# Patient Record
Sex: FEMALE | Race: WHITE | NOT HISPANIC OR LATINO | Employment: OTHER | URBAN - METROPOLITAN AREA
[De-identification: names, ages, dates, MRNs, and addresses within clinical notes are randomized per-mention and may not be internally consistent; named-entity substitution may affect disease eponyms.]

---

## 2020-01-09 ENCOUNTER — TRANSFERRED RECORDS (OUTPATIENT)
Dept: HEALTH INFORMATION MANAGEMENT | Facility: CLINIC | Age: 75
End: 2020-01-09

## 2020-01-21 ENCOUNTER — TELEPHONE (OUTPATIENT)
Dept: SURGERY | Facility: CLINIC | Age: 75
End: 2020-01-21

## 2020-01-21 ENCOUNTER — TRANSCRIBE ORDERS (OUTPATIENT)
Dept: OTHER | Age: 75
End: 2020-01-21

## 2020-01-21 DIAGNOSIS — C43.71: Primary | ICD-10-CM

## 2020-01-21 NOTE — TELEPHONE ENCOUNTER
ONCOLOGY INTAKE: Records Information      APPT INFORMATION:  Referring provider:  Jessica Meadows DPM  Referring provider s clinic:  [facility]  Reason for visit/diagnosis:  Malignant Melanoma, right heel  Has patient been notified of appointment date and time?: Yes    RECORDS INFORMATION:  Were the records received with the referral (via [employer])? Yes    Has patient been seen for any external appt for this diagnosis? Yes    If yes, where? [facility]    Has patient had any imaging or procedures outside of [place] for this condition? Yes      If Yes, where? [facility]      ADDITIONAL INFORMATION:  Patient is on the wait list.

## 2020-01-21 NOTE — TELEPHONE ENCOUNTER
Received a call from Dr.Allison Alvarado from [facility] who is referring this pt to  for MM to right heel. Dr. Alvarado states that they were told pt could be seen on 2/27 but  is hoping pt can be seen sooner. RN notified Dr. Alvarado that RN would reach out to  to advise on appt. Openings available on 2/3 in  but RN wanted to reach out to  to advise if pt should be seen sooner. Records will be faxed over per .Staff message sent to Dr. Dorsey to advise. Dr. Dorsey in clinic today and RN spoke with her regarding pt.  Dr. Dorsey ok'd seeing pt on 1/27/20 in  at 2:45pm but that there may be a slight wait as pt being worked in . Pt called and notified of appt and pt expressed thanks and okay if there is a wait. Address of clinic given to pt. Pt advised upon arrival to go up to the second floor and check in at desk D. Call placed to  to update on sooner appt for pt..Melissa Ellsworth RN

## 2020-01-22 NOTE — TELEPHONE ENCOUNTER
RECORDS STATUS - ALL OTHER DIAGNOSIS      RECORDS RECEIVED FROM: [facility]   DATE RECEIVED: 1/27/2020   NOTES STATUS DETAILS   OFFICE NOTE from referring provider     OFFICE NOTE from medical oncologist     DISCHARGE SUMMARY from hospital     DISCHARGE REPORT from the ER     OPERATIVE REPORT     MEDICATION LIST     CLINICAL TRIAL TREATMENTS TO DATE     LABS     PATHOLOGY REPORTS     ANYTHING RELATED TO DIAGNOSIS     GENONOMIC TESTING     TYPE:     IMAGING (NEED IMAGES & REPORT)     CT SCANS     MRI     MAMMO     ULTRASOUND     PET

## 2020-01-22 NOTE — TELEPHONE ENCOUNTER
Action    Action Taken January 22, 2020  Call to PT and she stated she was seen at a place called [facility] in [facility] but no Dx was made as they didn't know what it was so she went to the podiatrist.     RECORDS STATUS - ALL OTHER DIAGNOSIS      RECORDS RECEIVED FROM: [facility]   DATE RECEIVED: In Epic 1/22/20   NOTES STATUS DETAILS   OFFICE NOTE from referring provider Received 1/22/20 [facility]   OFFICE NOTE from medical oncologist     DISCHARGE SUMMARY from hospital     DISCHARGE REPORT from the ER     OPERATIVE REPORT     MEDICATION LIST     CLINICAL TRIAL TREATMENTS TO DATE     LABS     PATHOLOGY REPORTS     ANYTHING RELATED TO DIAGNOSIS [place] - Need to request [facility]   GENONOMIC TESTING     TYPE:     IMAGING (NEED IMAGES & REPORT)     CT SCANS     MRI     MAMMO     ULTRASOUND     PET

## 2020-01-24 ENCOUNTER — TELEPHONE (OUTPATIENT)
Dept: SURGERY | Facility: CLINIC | Age: 75
End: 2020-01-24

## 2020-01-24 NOTE — TELEPHONE ENCOUNTER
PREVISIT INFORMATION                                                    Yadira Hoang scheduled for future visit at [facility] specialty clinics.    Patient is scheduled to see Dr. Dorsey on 1/27/20  Reason for visit: Malignant Melanoma right foot  Referring provider ROMA Gr  Has patient seen previous specialist? No  Medical Records:  Available in chart.  Patient was previously seen at a [facility] or [facility] facility.    REVIEW                                                      New patient packet mailed to patient: No  Medication reconciliation complete: No      No current outpatient medications on file.       Allergies: Patient has no allergy information on record.        PLAN/FOLLOW-UP NEEDED                                                      Previsit review complete.  Patient will see provider at future scheduled appointment.     Patient Reminders Given:  Please, make sure you bring an updated list of your medications.   If you are having a procedure, please, present 15 minutes early.  If you need to cancel or reschedule,please call [phone].    Marion Bonilla LPN

## 2020-01-27 ENCOUNTER — PRE VISIT (OUTPATIENT)
Dept: SURGERY | Facility: CLINIC | Age: 75
End: 2020-01-27

## 2020-01-27 ENCOUNTER — TRANSFERRED RECORDS (OUTPATIENT)
Dept: HEALTH INFORMATION MANAGEMENT | Facility: CLINIC | Age: 75
End: 2020-01-27

## 2020-01-27 ENCOUNTER — TELEPHONE (OUTPATIENT)
Dept: SURGERY | Facility: CLINIC | Age: 75
End: 2020-01-27

## 2020-01-27 ENCOUNTER — OFFICE VISIT (OUTPATIENT)
Dept: SURGERY | Facility: CLINIC | Age: 75
End: 2020-01-27
Payer: COMMERCIAL

## 2020-01-27 VITALS — HEIGHT: 67 IN | BODY MASS INDEX: 22.6 KG/M2 | WEIGHT: 144 LBS

## 2020-01-27 DIAGNOSIS — C43.71 MALIGNANT MELANOMA OF RIGHT LOWER EXTREMITY INCLUDING HIP (H): ICD-10-CM

## 2020-01-27 PROCEDURE — 99203 OFFICE O/P NEW LOW 30 MIN: CPT | Performed by: SURGERY

## 2020-01-27 RX ORDER — ACETAMINOPHEN 325 MG/1
975 TABLET ORAL ONCE
Status: CANCELLED | OUTPATIENT
Start: 2020-01-27 | End: 2020-01-27

## 2020-01-27 RX ORDER — TRAZODONE HYDROCHLORIDE 50 MG/1
TABLET, FILM COATED ORAL
COMMUNITY
End: 2020-01-27

## 2020-01-27 RX ORDER — OMEPRAZOLE 20 MG/1
20 TABLET, DELAYED RELEASE ORAL
COMMUNITY
Start: 2020-01-06 | End: 2020-01-27

## 2020-01-27 RX ORDER — LANOLIN ALCOHOL/MO/W.PET/CERES
CREAM (GRAM) TOPICAL
COMMUNITY
End: 2020-01-27

## 2020-01-27 RX ORDER — BUSPIRONE HYDROCHLORIDE 5 MG/1
TABLET ORAL
COMMUNITY
End: 2020-01-27

## 2020-01-27 RX ORDER — FLUCONAZOLE 100 MG/1
TABLET ORAL
COMMUNITY
End: 2020-01-27

## 2020-01-27 RX ORDER — FLUTICASONE PROPIONATE 50 MCG
1 SPRAY, SUSPENSION (ML) NASAL
COMMUNITY
Start: 2020-01-24 | End: 2020-02-17

## 2020-01-27 RX ORDER — ESTRADIOL 0.1 MG/G
1 CREAM VAGINAL
COMMUNITY
Start: 2019-10-09 | End: 2020-06-18

## 2020-01-27 RX ORDER — ALPRAZOLAM 0.25 MG
TABLET ORAL
COMMUNITY
End: 2020-01-27

## 2020-01-27 ASSESSMENT — MIFFLIN-ST. JEOR: SCORE: 1180.81

## 2020-01-27 NOTE — NURSING NOTE
Yadira Hoang's goals for this visit include:   Chief Complaint   Patient presents with     Consult     malignant melanoma on right heel       She requests these members of her care team be copied on today's visit information: no    PCP: No Ref-Primary, Physician    Referring Provider:  No referring provider defined for this encounter.    There were no vitals taken for this visit.    Do you need any medication refills at today's visit? No    Marion Bonilla LPN

## 2020-01-27 NOTE — PATIENT INSTRUCTIONS
Surgery Instructions    Always follow your surgeon s instructions. If you don t, your surgery could be cancelled. Please use the following checklist.  Your surgery is on: The surgery scheduler will contact you within 1 week of your consult with the surgeon. If you do not hear from them, please call the clinic or RN Care Coordinator for your provider.    Time: Prearrival times can vary depending on location/type of surgery.  [place] - 2 hour pre-arrival  [facility] - 2 hour pre-arrival  [place] - 1 hour pre-arrival    Note:  These times may change. A nurse will call you before surgery to confirm. If you have not received a call or if you have more questions, please call us on the working day before your surgery:  ? [place]: [phone] or [phone] (9am to 5:30pm)  ? [facility]: [phone] (8am to 6pm)  ? [place]: [phone] (9am to 5pm)  ? [facility] [phone] (7am to 4pm)  Prior to surgery  ? Have a pre-op physical exam with your Primary Doctor within 30 days of surgery  - Ask your doctor to send all of your results to the surgery center/hospital before surgery. Your doctor also may ask you to bring the results with you on the day of surgery.  - Tell your doctor if:  - You are allergic to latex or rubber (latex and rubber gloves are often used in medical care).  - You are taking any medicines (including aspirin), vitamins, or herbal products. You may need to stop taking some medicines before surgery.  - You have any medical problems (allergies, diabetes, or heart disease, for example).  - You have a pacemaker or an AICD (automatic implanted cardiac defibrillator). If you do, please bring the ID card with you on the day of surgery.  - People who smoke have a higher risk of infection after surgery. Ask your doctor how you can quit smoking.  - If you Primary Doctor is not within the [employer] system, you will need to have your pre-op physical faxed to us to be scanned into your  chart.  - [place]: [phone] or [phone]  - [facility]): [phone]  - [facility] [place] [place] [phone]  ? Call your insurance company. Ask if you need pre-approval for your surgery. If you do not have insurance, please let us know. If you wish to speak to the , please alert the clinic staff so this can be arranged.  ? Arrange for someone to drive you home after surgery.  will need to be a responsible adult (18 years or older) that will provide transportation to and from surgery and stay in the waiting room during your surgery. You may not drive yourself or take public transportation to and from surgery.  ? Arrange for someone to stay with you for 24 hours after you go home. This person must be a responsible adult (18 years or older).  ? Call your surgeon or their nurse if there is any change in your health (cold, flu, infections, hospitalizations).  ? Do not smoke, drink alcohol, or take over-the-counter medicine for 24 hours before and after surgery.  ? If you take prescribed drugs, you may need to stop them until after the surgery.  Discuss what medications to take or not take prior to surgery with your Primary Doctor at your pre-op physical. Avoid over-the-counter blood-thinning medications such as Aspirin, Ibuprofen, vitamin E, or fish oil 7 days prior to surgery (unless otherwise directed by your Primary Doctor). Tylenol is a good alternative for mild pain relief prior to surgery.  ? Eating and drinking guidelines prior to surgery:  - Stop all solid food consumption 8 hours prior to surgery  - You may drink clear liquids up to 2 hours prior to surgery (water, fruit juices without pulp, jello, tea/coffee without creamer, sports drinks, clear-fat free broth (bouillon or consomme), popsicles (without milk, bits of fruit, or seeds/nuts)  ? Follow instructions given for showering or bathing before surgery.    - Use 8 ounces of antiseptic surgical soap,  like:  - Hibiclens, Scrub Care, or Exidine  - You can find it at your local pharmacy, clinic, or retail store. If you have trouble, ask your pharmacist to help you find the right substitute.  - Please wash with one of the above soaps twice before coming to the hospital for your surgery. This will decrease bacteria (germs) on your skin. It will also help reduce your chance of infection after surgery.  - Items you will need for showering:  - 4 newly washed washcloths  - 2 newly washed towels  - 8 ounces of one of the above soaps  - Following these instructions:  - The evening before surgery: Shower or bathe as you normally would, using your regular soap and a clean washcloth. Give special attention to places where your incision (surgical cut) or catheters will be. This includes your groin area. Rinse well. You may wash your hair with your regular shampoo. Next, wash your body with 4 ounces of the antiseptic soap. Use a clean, damp washcloth and gently clean your body (from the chin down). If your surgery involves your head, use the special soap on your head and scalp. Rinse well and dry off using a newly washed towel.  - The morning of surgery: Repeat the same process as the evening shower.  - Other suggestions:    Do not shave within 12 inches of your incision (surgical cut) area for at least 3 days before surgery. Shaving can make small cuts in the skin. This puts you at higher risk of infection.    Wear freshly washed pajamas or clothing after your evening shower.    Wear freshly washed clothes the day of surgery.    Wash and change your bed sheets the day before surgery to have clean bed sheets after your shower and when you get home from surgery.    If you have trouble washing all areas, make sure someone helps you.    Don't use any deodorant, lotion or powder after your shower.    Women who are menstruating should wear a fresh sanitary pad to the hospital.  ? Do not wear or add deodorant, cologne, lotion,  makeup, nail polish or jewelry to surgery. If you wear fake nails, please remove at least one nail before coming to surgery (an oxygen monitor needs to be placed on your finger during surgery).  ? Bring these items to the surgery center/hospital:  - Insurance card  - Money for parking and co-pays, if needed  - A list of all the medicines you take. Include vitamins, minerals, herbs, and over-the-counter drugs.  Note any drug allergies.  - A copy of your advance health care directive, if you have one. This tells us what treatment you would want--and who would make health care decisions--if you could no longer speak for yourself. You may request this form in advance or download it from [email or web address].  - A case for glasses, contact lenses, hearing aids, or dentures.  - Your inhaler or CPAP machine, if you use these at home.  ? Leave extra cash, jewelry, and other valuables at home.  When you arrive  When you get to the surgery center/hospital, you will:  ? Check in. If you are under age 18, you must be with a parent or legal guardian.  ? Sign consent forms, if you haven t already. These forms state that you know the risks and benefits of surgery. When you sign the forms, you give us permission to do the surgery. Do not sign them unless you understand what will happen during and after your surgery. If you have any questions about your surgery, ask to speak with your doctor before you sign the forms. If you don t understand the answers, ask again.  ? Receive a copy of the Patient s Bill of Rights. If you do not receive a copy, please ask for one.  ? Change into hospital clothes. Your belongings will be placed in a bag. We will return them to you after surgery.  ? Meet with the anesthesia provider. He or she will tell you what kind of anesthesia (medicine) will be used to keep you comfortable during surgery.  Remember: it s okay to remind doctors and nurses to wash their hands before touching you.  In most  cases, your surgeon will use a marker to write his or her initials on the surgery site. This ensures that the exact site is operated on.  For safety reasons, we will ask you the same questions many times. For example, we may ask your name and birth date over and over again.  Friends and family can stay with you until it s time for surgery. While you re in surgery, they will be in the waiting area. Please note that cell phones are not allowed in some patient care areas.  If you have questions about what will happen in the operating room, talk to your care team.  After surgery  We will move you to a recovery room, where we will watch you closely. If you have any pain or discomfort, tell your nurse. He or she will try to make you comfortable.  If you are staying overnight, we will move you to your hospital room after you are awake.  If you are going home, we will move you to another room. Friends and family may be able to join you. The length of time you spend in recovery depend on the type of medicine you received, your medical condition, the type of surgery you had, or your response to the anesthesia given during your procedure.  When you are discharged from the recovery room, the nurses will review instructions with you and your caregiver.  ? Please wash your hands every time you touch the wound or change bandages or dressings.  ? Do not submerge the wound in water.  You may not use a bathtub or hot tub until the wound is closed. The wait time frame is generally 2-3 weeks, but any open area can be a source of incoming bacteria, so it is better to be on the safe side and avoid water submersion until your wound is fully healed.  ? You may take a shower 24 hours after surgery. Double check with your surgeon if it is OK for water to run over the wound, whether it has been sutured, stapled, glued, or is open. You may gently wash the wound using the antiseptic soap provided for your pre-surgery showering (do not use a  washcloth). Any mild soap will work as well.  ? Many surgical wounds will have small white strips of tape on them called steri-strips.  Do not remove these. The edges will curl and fall off within 7-10 days with normal showering.  ? If you are going home with sutures (stitches) or staples, you must return to the clinic to have them taken out, usually within 1-2 weeks. Some stitches are dissolvable and do not require removal. Make sure to clarify with your surgeon or surgery nurse reviewing discharge paperwork what kind of sutures you have.  ? Signs and symptoms of infection include:  - Fever, temperature over 101.5   F  - Redness  - Swelling  - Increased pain  - Green or yellow drainage which may or may not have a foul odor  Dealing with pain  A nurse will check your comfort level often during your stay. He or she will work with you to manage your pain.  Remember:  ? All pain is real. There are many ways to control pain. We can help you decide what works best for you.  ? Ask for pain medicine when you need it. Don t try to  tough it out --this can make you feel worse. Always take your medicine as ordered.  ? Medicine doesn t work the same for everyone. If your medicine isn t working, tell your nurse. There may be other medicines or treatments we can try.  Going home  We will let you know when you re ready to leave the surgery center or hospital. Before you leave, we will tell you how to care for yourself at home and prevent infections. If you do not understand something, please say so. We will answer any questions you have. We will then help you get ready to leave.  Remember, you must have a responsible adult (18 years or older) to stay with you 24 hours after you leave the hospital.  Take it easy when you get home. You will need some time to recover--you may be more tired than you realize at first. Rest and relax for at least the first 24 hours at home. You ll feel better and heal faster if you take good care of  yourself.  Follow the discharge instructions that are given to you when you leave the surgery center or hospital  Please call the clinic if you experience any problems during regular clinic hours (Monday-Friday 8:00am-5:00pm).  If you experience problems during non-clinic hours, please call the [facility] on-call line at [phone] and ask the  to page the on-call Provider for your specialty. The on-call Provider will call you back and can triage your symptoms and further advise. If you are having an emergency, always call 911 or seek immediate evaluation at the Emergency Room.  Locations  [facility]  Same-day surgery center - 2nd floor, check-in #5  [address] [place] [address]  [phone]  www.[facility].[place].[facility]  [address] [place], [place] [address] [phone]   [email or web address]    [address] [address] [phone] (patient registration)  [phone] (main line)  [email or web address]  [facility]  [address] [place] [address]  [facility], 3rd floor for check-in  [phone] (patient registration)  [phone] (main line)  [email or web address]  [facility]  [address] [address]  [phone]  www.[facility]  [address] [facility] [place] [address]  [phone]  www.[facility].[email or web address].[facility]  [address] [place] [address]  [phone]  www.[facility]  [address]  [place] [address]  [phone]  www.[facility].[email or web address].[facility]  [address]  [place] [address]  [phone]  [email or web address]  [address] [address]  [phone]  [email or web address]

## 2020-01-27 NOTE — LETTER
2020      RE: Yadira Hoang  [address] [facility] [address]     2020    No referring provider defined for this encounter.    RE: Yadira Hoang  (: 1945)    Dear Dr. Jessica Meadows:    Your patient was seen for evaluation in my office.  Please find a copy of my notes for your record and review.  If you have any further questions, please feel free to contact my office.   Thank you for your kind referral.    Sincerely,   Gabriela Dorsey MD MSc [facility] FACS    ---     NEW SURGICAL CONSULTATION  2020    Yadira Hoang is a 74 year old female who presents with a lesion on her right heel.  She was referred by Dr Meadows.    HPI:    She noted a painful lesion on the sole of the right foot for a few months.  It initially was small then became raised.  It spontaneously bled. She is not entirely sure of its appearance initially.  She denies noting any masses behind the knee or in the groin.    A punch biopsy was performed by Dr Jessica Meadows on 2020.  It showed melanoma, at least 3.4 mm deep with ulceration and 0 mitoses.      MELANOMA-SPECIFIC HISTORY:  History of tanning bed use: No  Prior skin biopsies: No  Prior skin cancer: No  Race/Ethnicity: Sd  Works indoors/outdoors: retired, worked in an office  Uses sunscreen: No    She has not had a skin check.   FAMILY HISTORY:  Pancreatic ca: Does not know ?brother  Melanoma: No   Breast ca: No  Other cancer: Yes - sister had ?GYN cancer.    Past Medical History:   Diagnosis Date     Concussion      No HTN, MI, CVA, asthma, DM    Past Surgical History:   Procedure Laterality Date     TUBAL LIGATION     No GA issues    Current Outpatient Medications   Medication Sig Dispense Refill     cholecalciferol (VITAMIN D3) 125 MCG (5000 UT) TABS tablet Take 2,000 Units by mouth       estradiol (ESTRACE) 0.1 MG/GM vaginal cream estradiol 0.01% (0.1 mg/gram) vaginal cream       fluticasone (FLONASE) 50 MCG/ACT nasal spray Spray 1 spray in  "nostril           Allergies no known allergies     SOCIAL HISTORY:  Smokes: No  EtOH: No  Illicit drugs: No    ROS  Headaches Yes - resolve spontaneously, not nocturnal, prior concussion (2011, 2014)  Vision changes No  Dizziness Yes - lightheadedness since concussion   Abdominal pain No  Rectal bleeding No  Melena No  Unintentional weight loss Yes - loss of appetite    Ht 1.702 m (5' 7\")   Wt 65.3 kg (144 lb)   BMI 22.55 kg/m     Physical Exam  Constitutional:       Appearance: She is well-developed.   Abdominal:      General: There is no distension.      Palpations: Abdomen is soft. There is no fluid wave, hepatomegaly or mass.      Tenderness: There is no abdominal tenderness.   Lymphadenopathy:      Cervical: No cervical adenopathy.      Right cervical: No superficial, deep or posterior cervical adenopathy.     Left cervical: No superficial, deep or posterior cervical adenopathy.      Upper Body:      Right upper body: No supraclavicular, axillary or pectoral adenopathy.      Left upper body: No supraclavicular, axillary or pectoral adenopathy.      Lower Body: No right inguinal adenopathy. No left inguinal adenopathy.      Comments: No popliteal lymphadenopathy bilaterally. No lymphedema in the lower extremities bilaterally.   Skin:     General: Skin is warm and dry.               INVESTIGATIONS:    Biopsy from [facility] (1/9/2020) showed:  Diagnosis:  Skin, Right Foot, Biopsy:  Malignant melanoma  Depth: at least 3.4 mm  Ulceration: Present  Mitotic rate: 0   Margin status: involved     ASSESSMENT:  Yadira Hoang is a 74 year old female with a right heel melanoma.    Her stage is:  Cancer Staging  Malignant melanoma of right lower extremity including hip (H)  Staging form: Melanoma of the Skin, AJCC 8th Edition  - Clinical: Stage IIB (cT3b, cN0, cM0) - Signed by Gabriela Dorsey MD on 1/27/2020    The staging and natural history of melanoma was discussed with the patient and her " .  With the presence of gross disease in the heel, I suspect it is more likely a stage IIC with a T4b lesion.    A wide local excision with appropriate margins is indicated to reduce the risk of local recurrence.  The risks of a wide local excision were discussed, including scar formation, bleeding, wound infection, wound dehiscence, seroma formation, and numbness of surrounding skin.  We reviewed the difficulties in closing an incision on the heel and I have recommended a referral to plastic surgery for assistance with complex wound closure. We also discussed potentially staging the surgeries, with the excision with wound vac application first, then wound closure later.    In addition to the surgical management of the skin, a sentinel lymph node biopsy is recommended for chantell staging.  This is performed with the combination of the radioactive colloid and lymphazurin. The risks of a sentinel lymph node biopsy were discussed with the patient and her , including the risks of lymphedema, bleeding, wound infection, wound dehiscence, seroma formation, and paresthesias. There is also a small risk of anaphylaxis with lymphazurin injection as part of the procedure.  The findings of the sentinel lymph node biopsy may result in the need for further staging evaluations, surgery (i.e. Completion lymph node dissection) +/- radiation.  Mapping will be determined pre-operatively with a lymphoscintigram.  There is a 10% chance of patients whose sentinel lymph nodes do not map. Should this be the case, we discussed that we would not pursue surgical chantell staging, but would instead proceed with chantell surveillance with imaging.     We discussed that surgical pathology results will be reviewed in person in clinic, at the postoperative visit. Because pathology reports are often complicated, results are not reviewed by phone.  Reviewing in person would also allow for careful discussion of next steps and for answering  questions.    Finally, we discussed that patients with a diagnosis of melanoma are at risk of recurrence (local and distant) and of subsequent de bigg melanoma.  Yadira Hoang will need to proceed with quarterly dermatologic full skin survey and evaluation.  I also reviewed the importance of protection from sun exposure, including wearing long sleeves, hats, etc and also the use of sunscreen with SPF of at least 35, with frequent re-applications.      She needs a referral to dermatology for a full skin evaluation at baseline, and then for follow up.    All of the above were discussed with the patient and all questions answered.  Yadira Hoang elected to proceed with the above, including a wide local excision of the right heel melanoma and sentinel lymph node mapping and biopsy.     Total time spent with the patient was 30 minutes, of which 75% was counseling.     PLAN:  1. Dermatology referral for full skin evaluation  2. Plastic surgery referral for complex wound closure  3. Wide local excision of right heel melanoma with wound vac application  4. [place] lymph node mapping and biopsy  5. Patient to report to her PCP for preoperative H&P and testing.    Gabriela Dorsey MD MSc [facility] FACS    Division of Surgical Oncology  [facility]

## 2020-01-27 NOTE — PROGRESS NOTES
"NEW SURGICAL CONSULTATION  Jan 27, 2020    Yadira Hoang is a 74 year old female who presents with a lesion on her right heel.  She was referred by Dr Meadows.    HPI:    She noted a painful lesion on the sole of the right foot for a few months.  It initially was small then became raised.  It spontaneously bled. She is not entirely sure of its appearance initially.  She denies noting any masses behind the knee or in the groin.    A punch biopsy was performed by Dr Jessica Meadows on 1/9/2020.  It showed melanoma, at least 3.4 mm deep with ulceration and 0 mitoses.      MELANOMA-SPECIFIC HISTORY:  History of tanning bed use: No  Prior skin biopsies: No  Prior skin cancer: No  Race/Ethnicity: Sd  Works indoors/outdoors: retired, worked in an office  Uses sunscreen: No    She has not had a skin check.   FAMILY HISTORY:  Pancreatic ca: Does not know ?brother  Melanoma: No   Breast ca: No  Other cancer: Yes - sister had ?GYN cancer.    Past Medical History:   Diagnosis Date     Concussion      No HTN, MI, CVA, asthma, DM    Past Surgical History:   Procedure Laterality Date     TUBAL LIGATION     No GA issues    Current Outpatient Medications   Medication Sig Dispense Refill     cholecalciferol (VITAMIN D3) 125 MCG (5000 UT) TABS tablet Take 2,000 Units by mouth       estradiol (ESTRACE) 0.1 MG/GM vaginal cream estradiol 0.01% (0.1 mg/gram) vaginal cream       fluticasone (FLONASE) 50 MCG/ACT nasal spray Spray 1 spray in nostril           Allergies no known allergies     SOCIAL HISTORY:  Smokes: No  EtOH: No  Illicit drugs: No    ROS  Headaches Yes - resolve spontaneously, not nocturnal, prior concussion (2011, 2014)  Vision changes No  Dizziness Yes - lightheadedness since concussion   Abdominal pain No  Rectal bleeding No  Melena No  Unintentional weight loss Yes - loss of appetite    Ht 1.702 m (5' 7\")   Wt 65.3 kg (144 lb)   BMI 22.55 kg/m    Physical Exam  Constitutional:       Appearance: She is " well-developed.   Abdominal:      General: There is no distension.      Palpations: Abdomen is soft. There is no fluid wave, hepatomegaly or mass.      Tenderness: There is no abdominal tenderness.   Lymphadenopathy:      Cervical: No cervical adenopathy.      Right cervical: No superficial, deep or posterior cervical adenopathy.     Left cervical: No superficial, deep or posterior cervical adenopathy.      Upper Body:      Right upper body: No supraclavicular, axillary or pectoral adenopathy.      Left upper body: No supraclavicular, axillary or pectoral adenopathy.      Lower Body: No right inguinal adenopathy. No left inguinal adenopathy.      Comments: No popliteal lymphadenopathy bilaterally. No lymphedema in the lower extremities bilaterally.   Skin:     General: Skin is warm and dry.               INVESTIGATIONS:    Biopsy from [facility] (1/9/2020) showed:  Diagnosis:  Skin, Right Foot, Biopsy:  Malignant melanoma  Depth: at least 3.4 mm  Ulceration: Present  Mitotic rate: 0   Margin status: involved     ASSESSMENT:  Yadira Hoang is a 74 year old female with a right heel melanoma.    Her stage is:  Cancer Staging  Malignant melanoma of right lower extremity including hip (H)  Staging form: Melanoma of the Skin, AJCC 8th Edition  - Clinical: Stage IIB (cT3b, cN0, cM0) - Signed by Gabriela Dorsey MD on 1/27/2020    The staging and natural history of melanoma was discussed with the patient and her .  With the presence of gross disease in the heel, I suspect it is more likely a stage IIC with a T4b lesion.    A wide local excision with appropriate margins is indicated to reduce the risk of local recurrence.  The risks of a wide local excision were discussed, including scar formation, bleeding, wound infection, wound dehiscence, seroma formation, and numbness of surrounding skin.  We reviewed the difficulties in closing an incision on the heel and I have recommended a referral to  plastic surgery for assistance with complex wound closure. We also discussed potentially staging the surgeries, with the excision with wound vac application first, then wound closure later.    In addition to the surgical management of the skin, a sentinel lymph node biopsy is recommended for chantell staging.  This is performed with the combination of the radioactive colloid and lymphazurin. The risks of a sentinel lymph node biopsy were discussed with the patient and her , including the risks of lymphedema, bleeding, wound infection, wound dehiscence, seroma formation, and paresthesias. There is also a small risk of anaphylaxis with lymphazurin injection as part of the procedure.  The findings of the sentinel lymph node biopsy may result in the need for further staging evaluations, surgery (i.e. Completion lymph node dissection) +/- radiation.  Mapping will be determined pre-operatively with a lymphoscintigram.  There is a 10% chance of patients whose sentinel lymph nodes do not map. Should this be the case, we discussed that we would not pursue surgical chantell staging, but would instead proceed with chantell surveillance with imaging.     We discussed that surgical pathology results will be reviewed in person in clinic, at the postoperative visit. Because pathology reports are often complicated, results are not reviewed by phone.  Reviewing in person would also allow for careful discussion of next steps and for answering questions.    Finally, we discussed that patients with a diagnosis of melanoma are at risk of recurrence (local and distant) and of subsequent de bigg melanoma.  Yadira Hoang will need to proceed with quarterly dermatologic full skin survey and evaluation.  I also reviewed the importance of protection from sun exposure, including wearing long sleeves, hats, etc and also the use of sunscreen with SPF of at least 35, with frequent re-applications.      She needs a referral to dermatology for a  full skin evaluation at baseline, and then for follow up.    All of the above were discussed with the patient and all questions answered.  Yadira Hoang elected to proceed with the above, including a wide local excision of the right heel melanoma and sentinel lymph node mapping and biopsy.     Total time spent with the patient was 30 minutes, of which 75% was counseling.     PLAN:  1. Dermatology referral for full skin evaluation  2. Plastic surgery referral for complex wound closure  3. Wide local excision of right heel melanoma with wound vac application  4. [place] lymph node mapping and biopsy  5. Patient to report to her PCP for preoperative H&P and testing.    Gabriela Dorsey MD MSc [facility] FACS    Division of Surgical Oncology  [facility]

## 2020-01-27 NOTE — TELEPHONE ENCOUNTER
PREVISIT INFORMATION                                                    Yadira Hoang scheduled for future visit at [facility] specialty clinics.    Patient is scheduled to see Dr. Crabtree on 1/31/20  Reason for visit: Malignant melanoma right heel  Referring provider Gabriela Dorsey MD  Has patient seen previous specialist? No  Medical Records:  Available in chart.  Patient was previously seen at a [facility] or [facility] facility.    REVIEW                                                      New patient packet mailed to patient: No  Medication reconciliation complete: No      Current Outpatient Medications   Medication Sig Dispense Refill     cholecalciferol (VITAMIN D3) 125 MCG (5000 UT) TABS tablet Take 2,000 Units by mouth       estradiol (ESTRACE) 0.1 MG/GM vaginal cream estradiol 0.01% (0.1 mg/gram) vaginal cream       fluticasone (FLONASE) 50 MCG/ACT nasal spray Spray 1 spray in nostril         Allergies: Patient has no known allergies.        PLAN/FOLLOW-UP NEEDED                                                      Previsit review complete.  Patient will see provider at future scheduled appointment.     Appointment date and time reviewed in Dr. Dorsey's clinic 1/27/20.    Patient Reminders Given:  Please, make sure you bring an updated list of your medications.   If you are having a procedure, please, present 15 minutes early.  If you need to cancel or reschedule,please call [phone].    Marion Bonilla LPN

## 2020-02-04 ENCOUNTER — OFFICE VISIT (OUTPATIENT)
Dept: ORTHOPEDICS | Facility: CLINIC | Age: 75
End: 2020-02-04
Payer: COMMERCIAL

## 2020-02-04 DIAGNOSIS — C43.71 MALIGNANT MELANOMA OF RIGHT LOWER EXTREMITY INCLUDING HIP (H): Primary | ICD-10-CM

## 2020-02-04 DIAGNOSIS — C43.9 MELANOMA OF SKIN (H): Primary | ICD-10-CM

## 2020-02-04 PROCEDURE — 99203 OFFICE O/P NEW LOW 30 MIN: CPT | Performed by: PLASTIC SURGERY

## 2020-02-04 RX ORDER — CEFAZOLIN SODIUM 1 G/50ML
1 INJECTION, SOLUTION INTRAVENOUS SEE ADMIN INSTRUCTIONS
Status: CANCELLED | OUTPATIENT
Start: 2020-02-04

## 2020-02-04 RX ORDER — CEFAZOLIN SODIUM 2 G/50ML
2 SOLUTION INTRAVENOUS
Status: CANCELLED | OUTPATIENT
Start: 2020-02-04

## 2020-02-04 NOTE — NURSING NOTE
Yadira Hoang's goals for this visit include:   Chief Complaint   Patient presents with     Left Foot - Consult     Consult     MM left heal       She requests these members of her care team be copied on today's visit information: no    PCP: No Ref-Primary, Physician    Referring Provider:  No referring provider defined for this encounter.    There were no vitals taken for this visit.    Do you need any medication refills at today's visit? No    Marion Bonilla LPN

## 2020-02-04 NOTE — PROGRESS NOTES
CONSULT NOTE      REFERRING PHYSICIAN:  Gabriela Dorsey MD      PRESENTING COMPLAINT:  Consultation for a right heel malignant melanoma.      HISTORY OF PRESENTING COMPLAINT:  Ms. Hoang is 74 years old.  She has been diagnosed with a malignant melanoma of the right heel 3.4 mm in [place] depth.  Planning a 2 cm margin excision.  Reconstruction of the heel will be required and she is here to discuss that with me.      PAST MEDICAL HISTORY:  Nil.      PAST SURGICAL HISTORY:  Nil.      MEDICATIONS:  Nil.      ALLERGIES:  Statin drugs.      SOCIAL HISTORY:  Does not smoke, does not drink.  She is retired, lives in [place].      REVIEW OF SYSTEMS:  Denies chest pain, shortness of breath, MI, CVA, DVT and PE.      PHYSICAL EXAMINATION:  Vital signs are stable.  She is afebrile, in no obvious distress.  She is 5 feet 7 inches, 144 pounds, BMI 22 kg/m2.  On examination of her right heel, she has about a 3 x 3 cm verrucous lesion on the right heel just on the instep on the medial aspect.  Pulses are palpable.  Sensation is intact.      ASSESSMENT AND PLAN:  Based on above findings, a diagnosis of a right heel malignant melanoma was made.  Reconstruction of the defect after excision will be required.  I think the best course of action would be to either use an instep flap if the blood flow is intact with a skin graft from the donor site or a free groin flap.  This was discussed with the patient and her  in detail.  I will discuss with Dr. Dorsey whether we do this simultaneously or in a staged fashion, depending on how confident she is about the margins given the fact that the lesion is quite atypical looking.  All risks, benefits and alternatives of the procedure including pain, infection, bleeding, scarring, asymmetry, seromas, hematomas, wound breakdown, wound dehiscence, loss of the flaps, requirement of further surgeries, long convalescence sensation loss, requirement of staged revisional surgeries, DVT, PE, MI,  CVA, pneumonia, renal failure and death were all explained.  She understood everything and wants to proceed.  I look forward to helping her out in the near future.      Total time spent with patient 30 minutes, more than half was counseling.      cc:   Gabriela Dorsey MD   [address], [facility] [address]   [place]  [address]

## 2020-02-05 ENCOUNTER — TELEPHONE (OUTPATIENT)
Dept: SURGERY | Facility: CLINIC | Age: 75
End: 2020-02-05

## 2020-02-05 DIAGNOSIS — C43.71 MALIGNANT MELANOMA OF RIGHT LOWER EXTREMITY INCLUDING HIP (H): Primary | ICD-10-CM

## 2020-02-05 NOTE — TELEPHONE ENCOUNTER
Patient was in clinic yesterday 2/4/20 to see Dr. Crabtree and patient stated she would like to come in and see Dr. Dorsey again to discuss surgery as she questions. Writer called and spoke with patient and schedule her on Monday 2/11/20 at 4:00.    Marion Bonilla LPN

## 2020-02-06 ENCOUNTER — TELEPHONE (OUTPATIENT)
Dept: SURGERY | Facility: CLINIC | Age: 75
End: 2020-02-06

## 2020-02-06 ENCOUNTER — TELEPHONE (OUTPATIENT)
Dept: ONCOLOGY | Facility: CLINIC | Age: 75
End: 2020-02-06

## 2020-02-06 NOTE — TELEPHONE ENCOUNTER
Patient called requesting pre-op forms. Then stated what she needs is a pre-op appointment. Writer advised that pre-op appointment is usually done with primary care provider. She then had more questions regarding where she can go and when it needs to be done. Writer advised patient that Dr. Dorsey's nurse will return her call to answer questions as writer does not know and did not want to give incorrect information. Patient understands and agrees to plan.  Aleida Gautam LPN

## 2020-02-06 NOTE — TELEPHONE ENCOUNTER
Patient returned call. Writer explained that she can reach out to the call center to schedule a pre-op with a PCP. Writer gave [phone] number to schedule. Writer transferred call to schedule.    Marion Bonilla LPN

## 2020-02-06 NOTE — TELEPHONE ENCOUNTER
Late entry note. Spoke with patient by phone at 8:30 am this morning.    Reviewed with her expectations of the wide local excision and sentinel lymph node mapping and biopsy. Explained that because she will need a free flap for reconstruction, scheduled for 2/26, it would be preferable to go head with surgery next Monday 2/10 instead of 2/17, so that there is sufficient time for us to obtain the pathology results regarding the surgical margins.    All questions answered and she was appreciative of the call.  Will get her scheduled.    Gabriela Dorsey MD MSc [facility]    Division of Surgical Oncology  [facility]

## 2020-02-06 NOTE — TELEPHONE ENCOUNTER
Writer returned call and spoke with patients . He stated she was on her cell phone making appointments. Writer explained that to change/see a primary care physician she just needs to call the call center and schedule. Writer gave clinic number if Yadira has any additional questions.    Marion Bonilla LPN

## 2020-02-06 NOTE — PROGRESS NOTES
[address] [address]  [phone]  Dept: [phone]    PRE-OP EVALUATION:  Today's date: 2020    Yadira Hoang (: 1945) presents for pre-operative evaluation assessment as requested by NIKA Mar MD Dr Hui MD.  She requires evaluation and anesthesia risk assessment prior to undergoing surgery/procedure for treatment of Right heel reconstruction,with local flap or groin free flap,possible skin graft.    Proposed Surgery/ Procedure: Right heel reconstruction,with local flap or groin free flap,possible skin graft.  Date of Surgery/ Procedure: 20  Time of Surgery/ Procedure: 12:00pm  Hospital/Surgical Facility: [facility]  Fax number for surgical facility: in [facility]  Primary Physician: No Ref-Primary, Physician  Type of Anesthesia Anticipated: General    Patient has a Health Care Directive or Living Will:  NO    1. NO - Do you have a history of heart attack, stroke, stent, bypass or surgery on an artery in the head, neck, heart or legs?  2. NO - Do you ever have any pain or discomfort in your chest?  3. NO - Do you have a history of  Heart Failure?  4. NO - Are you troubled by shortness of breath when: walking on the level, up a slight hill or at night?  5. NO - Do you currently have a cold, bronchitis or other respiratory infection?  6. YES - Do you have a cough, shortness of breath or wheezing? Chronic sinus congestion all her life. Clear nasal drainage. netti pot helps more than anything else.   7. NO - Do you sometimes get pains in the calves of your legs when you walk?  8. NO - Do you or anyone in your family have previous history of blood clots?  9. NO - Do you or does anyone in your family have a serious bleeding problem such as prolonged bleeding following surgeries or cuts?  10. NO - Have you ever had problems with anemia or been told to take iron pills?  11. NO - Have you had any abnormal blood loss such as  black, tarry or bloody stools, or abnormal vaginal bleeding?  12. YES - Have you ever had a blood transfusion? Years ago.   13. NO - Have you or any of your relatives ever had problems with anesthesia?  14. YES - Do you have sleep apnea, excessive snoring or daytime drowsiness? GUERLINE  15. NO - Do you have any prosthetic heart valves?  16. NO - Do you have prosthetic joints?  17. NO - Is there any chance that you may be pregnant?      HPI:     HPI related to upcoming procedure: history of melanoma, scheduled for wide local excision.     Yadira is new to our clinic.  She has a history of obstructive sleep apnea, has a new CPAP machine, doing very well with this.  She has no other health issues except for the recent diagnosis of melanoma.  Reports long standing history of food intolerance. Her stomach is always upset. Has lost 5 lbs in the last few weeks. She saw a GI provider in January this year but was a bad experience. She plans to see GI provider here in the future. Reports she has leaky gut. Does not take medication well. She is not taking anything for her GI issues. No vomiting or diarrhea.     MEDICAL HISTORY:     Patient Active Problem List    Diagnosis Date Noted     GUERLINE (obstructive sleep apnea) 02/07/2020     Priority: Medium     Chronic congestion of paranasal sinus 02/07/2020     Priority: Medium     Melanoma of skin (H) 02/04/2020     Priority: Medium     Added automatically from request for surgery [id number]       Malignant melanoma of right lower extremity including hip (H) 01/27/2020     Priority: Medium      Past Medical History:   Diagnosis Date     Concussion      Past Surgical History:   Procedure Laterality Date     TUBAL LIGATION       Current Outpatient Medications   Medication Sig Dispense Refill     cholecalciferol (VITAMIN D3) 125 MCG (5000 UT) TABS tablet Take 2,000 Units by mouth       estradiol (ESTRACE) 0.1 MG/GM vaginal cream estradiol 0.01% (0.1 mg/gram) vaginal cream       fluticasone  "(FLONASE) 50 MCG/ACT nasal spray Spray 1 spray in nostril       OTC products: None, except as noted above    Allergies   Allergen Reactions     Amoxicillin      Hmg-Coa-R Inhibitors Swelling     Lactose      Intolerance      Latex Allergy: NO    Social History     Tobacco Use     Smoking status: Never Smoker     Smokeless tobacco: Never Used   Substance Use Topics     Alcohol use: Not Currently     Frequency: Never     History   Drug Use Unknown       REVIEW OF SYSTEMS:   CONSTITUTIONAL: NEGATIVE for fever, chills, Positive for weight loss  ENT/MOUTH: NEGATIVE for ear, mouth and throat problems; chronic sinus congestion.   RESP: NEGATIVE for significant cough or SOB  CV: NEGATIVE for chest pain, palpitations or peripheral edema  GI: chronic stomach upset, no vomiting or diarrhea  MUSCULOSKELETAL: NEGATIVE for significant arthralgias or myalgia  Psych: feeling a bit anxious over surgery. No prior diagnosis of anxiety or depression.     EXAM:   /74   Pulse 98   Temp 97.9  F (36.6  C) (Temporal)   Ht 1.67 m (5' 5.75\")   Wt 63 kg (139 lb)   SpO2 97%   BMI 22.61 kg/m    GENERAL APPEARANCE: healthy, alert and no distress  HENT: ear canals and TM's normal and nose and mouth without ulcers or lesions  RESP: lungs clear to auscultation - no rales, rhonchi or wheezes  CV: regular rate and rhythm, normal S1 S2, no S3 or S4 and no murmur, click or rub   ABDOMEN: soft, nontender, no HSM or masses and bowel sounds normal  MS: extremities normal- no gross deformities noted  NEURO: Normal strength and tone, sensory exam grossly normal, mentation intact and speech normal    DIAGNOSTICS:   EKG: appears normal, NSR  Reviewed labs from outside hospital, previously had normal CBC BMP and liver function tests as of January 2020.  IMPRESSION:   Reason for surgery/procedure: melanoma  Diagnosis/reason for consult:       The proposed surgical procedure is considered INTERMEDIATE risk.    REVISED CARDIAC RISK INDEX  The patient " has the following serious cardiovascular risks for perioperative complications such as (MI, PE, VFib and 3  AV Block):  No serious cardiac risks  INTERPRETATION: 0 risks: Class I (very low risk - 0.4% complication rate)    The patient has the following additional risks for perioperative complications:  No identified additional risks    RECOMMENDATIONS:         --Patient is to take all scheduled medications on the day of surgery.    APPROVAL GIVEN to proceed with proposed procedure, without further diagnostic evaluation       Signed Electronically by: Erika Blanton MD PhD    Copy of this evaluation report is provided to requesting physician.    [facility] Preop Guidelines    Revised Cardiac Risk Index

## 2020-02-07 ENCOUNTER — OFFICE VISIT (OUTPATIENT)
Dept: PEDIATRICS | Facility: CLINIC | Age: 75
End: 2020-02-07
Payer: COMMERCIAL

## 2020-02-07 ENCOUNTER — ANESTHESIA EVENT (OUTPATIENT)
Dept: SURGERY | Facility: AMBULATORY SURGERY CENTER | Age: 75
End: 2020-02-07

## 2020-02-07 ENCOUNTER — ALLIED HEALTH/NURSE VISIT (OUTPATIENT)
Dept: NURSING | Facility: CLINIC | Age: 75
End: 2020-02-07
Payer: COMMERCIAL

## 2020-02-07 VITALS
SYSTOLIC BLOOD PRESSURE: 118 MMHG | OXYGEN SATURATION: 97 % | DIASTOLIC BLOOD PRESSURE: 74 MMHG | WEIGHT: 139 LBS | TEMPERATURE: 97.9 F | HEART RATE: 98 BPM | BODY MASS INDEX: 22.34 KG/M2 | HEIGHT: 66 IN

## 2020-02-07 DIAGNOSIS — Z99.89 CRUTCHES AS AMBULATION AID: Primary | ICD-10-CM

## 2020-02-07 DIAGNOSIS — G47.33 OSA (OBSTRUCTIVE SLEEP APNEA): ICD-10-CM

## 2020-02-07 DIAGNOSIS — C43.71 MALIGNANT MELANOMA OF RIGHT LOWER EXTREMITY INCLUDING HIP (H): ICD-10-CM

## 2020-02-07 DIAGNOSIS — Z01.818 PREOP GENERAL PHYSICAL EXAM: Primary | ICD-10-CM

## 2020-02-07 DIAGNOSIS — J32.9 CHRONIC CONGESTION OF PARANASAL SINUS: ICD-10-CM

## 2020-02-07 DIAGNOSIS — C43.71 MALIGNANT MELANOMA OF RIGHT LOWER EXTREMITY INCLUDING HIP (H): Primary | ICD-10-CM

## 2020-02-07 PROCEDURE — 93000 ELECTROCARDIOGRAM COMPLETE: CPT | Performed by: INTERNAL MEDICINE

## 2020-02-07 PROCEDURE — 99203 OFFICE O/P NEW LOW 30 MIN: CPT | Performed by: INTERNAL MEDICINE

## 2020-02-07 RX ORDER — CLINDAMYCIN PHOSPHATE 900 MG/50ML
900 INJECTION, SOLUTION INTRAVENOUS SEE ADMIN INSTRUCTIONS
Status: CANCELLED | OUTPATIENT
Start: 2020-02-07

## 2020-02-07 RX ORDER — CLINDAMYCIN PHOSPHATE 900 MG/50ML
900 INJECTION, SOLUTION INTRAVENOUS
Status: CANCELLED | OUTPATIENT
Start: 2020-02-07

## 2020-02-07 SDOH — HEALTH STABILITY: MENTAL HEALTH: HOW OFTEN DO YOU HAVE A DRINK CONTAINING ALCOHOL?: NEVER

## 2020-02-07 ASSESSMENT — MIFFLIN-ST. JEOR: SCORE: 1138.28

## 2020-02-07 NOTE — TELEPHONE ENCOUNTER
Per 's staff message on 2/6/2020 wound vac and  boot/crutchesl  to be ordered and available for surgery on 2/10/2020.  also would like Home care nursing ordered for pt. Call placed to  Home care nursing to notify of pt's need for home care after surgery. RN submitted Wound vac order today through [facility] express. H&P, snapshot, Insurance info and clinical documentation faxed to [facility]. Pt called and notified. RN also spoke with Earl MARTINS in ortho regarding boot and crutches and Earl offered to see pt on his nurse schedule to have pt try out crutches and boot. Pt called and notified and pt scheduled today after her pre-op appt with Ortho nurse at 12noon....Melissa Ellsworth RN

## 2020-02-07 NOTE — TELEPHONE ENCOUNTER
Call received from Keren from [facility] that they have been trying to reach pt regarding an insurance issue. Keren states that she is trying to get the wound vac delivered by tonight or tomorrow if possible RN called pt and notified pt of this and pt states that she will keep phone by her and answer RN called keren no answer. No message left.   RN sent an email to Keren notifying her that the [facility] is not open to accept the delivery tomorrow ...Melissa Ellsworth RN

## 2020-02-07 NOTE — TELEPHONE ENCOUNTER
RN received call from [facility] that wound vac was delivered....Melissa Ellsworth RN

## 2020-02-08 ENCOUNTER — DOCUMENTATION ONLY (OUTPATIENT)
Dept: CARE COORDINATION | Facility: CLINIC | Age: 75
End: 2020-02-08

## 2020-02-08 NOTE — PROGRESS NOTES
Dear Dr. Blanton,  Medicare Home Health regulations requires [facility] to provide an initial assessment visit either within 48 hours of the patient's return home, or on the physician ordered Start of Care date.    There will be a delay in the Initial Assessment for Yadira Hoang; MRN [id number]  Her surgery is planned for Monday, 2/10/20 with wound vac application at that time. She has requested her admission to home care be moved to Wednesday, 2/12/20 - when the vac will need to be changed.    Sincerely [facility]  Latonia Eastman, RN  [phone]

## 2020-02-09 NOTE — ANESTHESIA PREPROCEDURE EVALUATION
"Anesthesia Pre-Procedure Evaluation    Patient: Yadira Hoang   MRN:     [id number] Gender:   female   Age:    74 year old :      1945        Preoperative Diagnosis: Malignant melanoma of right lower extremity including hip (H) [C43.71]   Procedure(s):  Wide Local Excision of RIGHT heel melanoma  [place] Lymph Node Mapping And Biopsy     Past Medical History:   Diagnosis Date     Concussion       Past Surgical History:   Procedure Laterality Date     TUBAL LIGATION                     PHYSICAL EXAM:   Mental Status/Neuro: A/A/O   Airway: Facies: Feasible  Mallampati: II  Mouth/Opening: Full  TM distance: > 6 cm  Neck ROM: Full   Respiratory:   Resp. Rate: Normal     Resp. Effort: Normal      CV:   Rate: Age appropriate  Edema: None   Comments:      Dental: Normal Dentition                LABS:  CBC: No results found for: WBC, HGB, HCT, PLT  BMP: No results found for: NA, POTASSIUM, CHLORIDE, CO2, BUN, CR, GLC  COAGS: No results found for: PTT, INR, FIBR  POC: No results found for: BGM, HCG, HCGS  OTHER: No results found for: PH, LACT, A1C, GABY, PHOS, MAG, ALBUMIN, PROTTOTAL, ALT, AST, GGT, ALKPHOS, BILITOTAL, BILIDIRECT, LIPASE, AMYLASE, PALLAVI, TSH, T4, T3, CRP, SED     Preop Vitals    BP Readings from Last 3 Encounters:   20 118/74    Pulse Readings from Last 3 Encounters:   20 98      Resp Readings from Last 3 Encounters:   No data found for Resp    SpO2 Readings from Last 3 Encounters:   20 97%      Temp Readings from Last 1 Encounters:   20 97.9  F (36.6  C) (Temporal)    Ht Readings from Last 1 Encounters:   20 1.67 m (5' 5.75\")      Wt Readings from Last 1 Encounters:   20 63 kg (139 lb)    Estimated body mass index is 22.61 kg/m  as calculated from the following:    Height as of 20: 1.67 m (5' 5.75\").    Weight as of 20: 63 kg (139 lb).     LDA:        Assessment:   ASA SCORE: 2    H&P: History and physical reviewed and following examination; no " interval change.    NPO Status: NPO Appropriate     Plan:   Anes. Type:  General   Pre-Medication: None   Induction:  IV (Standard)   Airway: ETT; Oral   Access/Monitoring: PIV   Maintenance: Balanced     Postop Plan:   Postop Pain: Opioids  Postop Sedation/Airway: Not planned  Disposition: Outpatient     PONV Management:   Adult Risk Factors: Female, Postop Opioids   Prevention: Ondansetron, Dexamethasone     CONSENT: Direct conversation   Plan and risks discussed with: Patient          Comments for Plan/Consent:  LMA if supine, ETT if prone                 Catarino Kim MD     ANESTHESIA PREOP EVALUATION    PROCEDURE: Procedure(s):  Wide Local Excision of RIGHT heel melanoma  [place] Lymph Node Mapping And Biopsy    HPI: Yadira Hoang is a 74 year old female who presents for above procedure 2/2 melanoma.     PAST MEDICAL HISTORY:    Past Medical History:   Diagnosis Date     Concussion        PAST SURGICAL HISTORY:    Past Surgical History:   Procedure Laterality Date     TUBAL LIGATION         SOCIAL HISTORY:       Social History     Tobacco Use     Smoking status: Never Smoker     Smokeless tobacco: Never Used   Substance Use Topics     Alcohol use: Not Currently     Frequency: Never       ALLERGIES:     Allergies   Allergen Reactions     Amoxicillin      Hmg-Coa-R Inhibitors Swelling     Lactose      Intolerance       MEDICATIONS:     (Not in a hospital admission)      Current Outpatient Medications   Medication Sig Dispense Refill     cholecalciferol (VITAMIN D3) 125 MCG (5000 UT) TABS tablet Take 2,000 Units by mouth       estradiol (ESTRACE) 0.1 MG/GM vaginal cream estradiol 0.01% (0.1 mg/gram) vaginal cream       fluticasone (FLONASE) 50 MCG/ACT nasal spray Spray 1 spray in nostril       order for KHOA Wilkerson, Youth, Aluminum  CRYJOSE $67  1 each 0       Current Outpatient Medications Ordered in Epic   Medication Sig Dispense Refill     cholecalciferol (VITAMIN D3) 125 MCG (5000 UT) TABS tablet Take  2,000 Units by mouth       estradiol (ESTRACE) 0.1 MG/GM vaginal cream estradiol 0.01% (0.1 mg/gram) vaginal cream       fluticasone (FLONASE) 50 MCG/ACT nasal spray Spray 1 spray in nostril       order for DME Crutch, Youth, Aluminum  CRYTH $67  1 each 0     No current Epic-ordered facility-administered medications on file.        PHYSICAL EXAM:    Vitals: T Data Unavailable, P Data Unavailable, BP Data Unavailable, R Data Unavailable, SpO2  , Weight   Wt Readings from Last 2 Encounters:   02/07/20 63 kg (139 lb)   01/27/20 65.3 kg (144 lb)       See doc flowsheet    NPO STATUS: see doc flowsheet    LABS:    BMP:  No results for input(s): NA, POTASSIUM, CHLORIDE, CO2, BUN, CR, GLC, GABY in the last 06455 hours.    LFTs:   No results for input(s): PROTTOTAL, ALBUMIN, BILITOTAL, ALKPHOS, AST, ALT, BILIDIRECT in the last 15769 hours.    CBC:   No results for input(s): WBC, RBC, HGB, HCT, MCV, MCH, MCHC, RDW, PLT in the last 45756 hours.    Coags:  No results for input(s): INR, PTT, FIBR in the last 46832 hours.    Imaging:  No orders to display       Catarino Kim MD  Anesthesiology Staff  Pager [phone]    2/9/2020  3:10 PM

## 2020-02-10 ENCOUNTER — ANCILLARY PROCEDURE (OUTPATIENT)
Dept: NUCLEAR MEDICINE | Facility: CLINIC | Age: 75
End: 2020-02-10
Attending: SURGERY
Payer: COMMERCIAL

## 2020-02-10 ENCOUNTER — ANESTHESIA (OUTPATIENT)
Dept: SURGERY | Facility: AMBULATORY SURGERY CENTER | Age: 75
End: 2020-02-10

## 2020-02-10 ENCOUNTER — SURGERY (OUTPATIENT)
Age: 75
End: 2020-02-10
Payer: COMMERCIAL

## 2020-02-10 ENCOUNTER — TELEPHONE (OUTPATIENT)
Dept: SURGERY | Facility: CLINIC | Age: 75
End: 2020-02-10

## 2020-02-10 ENCOUNTER — HOSPITAL ENCOUNTER (OUTPATIENT)
Facility: AMBULATORY SURGERY CENTER | Age: 75
Discharge: HOME | End: 2020-02-10
Attending: SURGERY | Admitting: SURGERY
Payer: COMMERCIAL

## 2020-02-10 VITALS
TEMPERATURE: 97.6 F | SYSTOLIC BLOOD PRESSURE: 128 MMHG | OXYGEN SATURATION: 97 % | RESPIRATION RATE: 14 BRPM | DIASTOLIC BLOOD PRESSURE: 76 MMHG | HEART RATE: 81 BPM

## 2020-02-10 DIAGNOSIS — C43.71 MALIGNANT MELANOMA OF RIGHT LOWER EXTREMITY INCLUDING HIP (H): ICD-10-CM

## 2020-02-10 PROCEDURE — 00000159 ZZHCL STATISTIC H-SEND OUTS PREP: Performed by: SURGERY

## 2020-02-10 PROCEDURE — 38900 IO MAP OF SENT LYMPH NODE: CPT | Mod: RT | Performed by: SURGERY

## 2020-02-10 PROCEDURE — 11626 EXC S/N/H/F/G MAL+MRG >4 CM: CPT

## 2020-02-10 PROCEDURE — 11626 EXC S/N/H/F/G MAL+MRG >4 CM: CPT | Mod: 51 | Performed by: SURGERY

## 2020-02-10 PROCEDURE — 38500 BIOPSY/REMOVAL LYMPH NODES: CPT

## 2020-02-10 PROCEDURE — 88360 TUMOR IMMUNOHISTOCHEM/MANUAL: CPT | Performed by: SURGERY

## 2020-02-10 PROCEDURE — 38900 IO MAP OF SENT LYMPH NODE: CPT

## 2020-02-10 PROCEDURE — G8907 PT DOC NO EVENTS ON DISCHARG: HCPCS

## 2020-02-10 PROCEDURE — A9520 TC99 TILMANOCEPT DIAG 0.5MCI: HCPCS | Performed by: SURGERY

## 2020-02-10 PROCEDURE — G8916 PT W IV AB GIVEN ON TIME: HCPCS

## 2020-02-10 PROCEDURE — 88307 TISSUE EXAM BY PATHOLOGIST: CPT | Performed by: SURGERY

## 2020-02-10 PROCEDURE — 38531 OPEN BX/EXC INGUINOFEM NODES: CPT | Mod: RT | Performed by: SURGERY

## 2020-02-10 PROCEDURE — 88342 IMHCHEM/IMCYTCHM 1ST ANTB: CPT | Performed by: SURGERY

## 2020-02-10 PROCEDURE — 88341 IMHCHEM/IMCYTCHM EA ADD ANTB: CPT | Performed by: SURGERY

## 2020-02-10 PROCEDURE — 78195 LYMPH SYSTEM IMAGING: CPT | Performed by: RADIOLOGY

## 2020-02-10 PROCEDURE — 88305 TISSUE EXAM BY PATHOLOGIST: CPT | Performed by: SURGERY

## 2020-02-10 RX ORDER — LIDOCAINE 40 MG/G
CREAM TOPICAL
Status: DISCONTINUED | OUTPATIENT
Start: 2020-02-10 | End: 2020-02-11 | Stop reason: HOSPADM

## 2020-02-10 RX ORDER — ISOSULFAN BLUE 50 MG/5ML
INJECTION, SOLUTION SUBCUTANEOUS PRN
Status: DISCONTINUED | OUTPATIENT
Start: 2020-02-10 | End: 2020-02-10 | Stop reason: HOSPADM

## 2020-02-10 RX ORDER — PROPOFOL 10 MG/ML
INJECTION, EMULSION INTRAVENOUS PRN
Status: DISCONTINUED | OUTPATIENT
Start: 2020-02-10 | End: 2020-02-10

## 2020-02-10 RX ORDER — KETOROLAC TROMETHAMINE 30 MG/ML
INJECTION, SOLUTION INTRAMUSCULAR; INTRAVENOUS PRN
Status: DISCONTINUED | OUTPATIENT
Start: 2020-02-10 | End: 2020-02-10

## 2020-02-10 RX ORDER — ACETAMINOPHEN 325 MG/1
975 TABLET ORAL ONCE
Status: DISCONTINUED | OUTPATIENT
Start: 2020-02-10 | End: 2020-02-11 | Stop reason: HOSPADM

## 2020-02-10 RX ORDER — PROPOFOL 10 MG/ML
INJECTION, EMULSION INTRAVENOUS CONTINUOUS PRN
Status: DISCONTINUED | OUTPATIENT
Start: 2020-02-10 | End: 2020-02-10

## 2020-02-10 RX ORDER — SODIUM CHLORIDE, SODIUM LACTATE, POTASSIUM CHLORIDE, CALCIUM CHLORIDE 600; 310; 30; 20 MG/100ML; MG/100ML; MG/100ML; MG/100ML
INJECTION, SOLUTION INTRAVENOUS CONTINUOUS
Status: DISCONTINUED | OUTPATIENT
Start: 2020-02-10 | End: 2020-02-11 | Stop reason: HOSPADM

## 2020-02-10 RX ORDER — AMOXICILLIN 250 MG
1-2 CAPSULE ORAL 2 TIMES DAILY
Qty: 30 TABLET | Refills: 0 | Status: ON HOLD | OUTPATIENT
Start: 2020-02-10 | End: 2020-03-06

## 2020-02-10 RX ORDER — FENTANYL CITRATE 50 UG/ML
25-50 INJECTION, SOLUTION INTRAMUSCULAR; INTRAVENOUS
Status: DISCONTINUED | OUTPATIENT
Start: 2020-02-10 | End: 2020-02-11 | Stop reason: HOSPADM

## 2020-02-10 RX ORDER — LIDOCAINE HYDROCHLORIDE 20 MG/ML
INJECTION, SOLUTION INFILTRATION; PERINEURAL PRN
Status: DISCONTINUED | OUTPATIENT
Start: 2020-02-10 | End: 2020-02-10

## 2020-02-10 RX ORDER — OXYCODONE HYDROCHLORIDE 5 MG/1
5-10 TABLET ORAL EVERY 4 HOURS PRN
Qty: 30 TABLET | Refills: 0 | Status: ON HOLD | OUTPATIENT
Start: 2020-02-10 | End: 2020-03-06

## 2020-02-10 RX ORDER — ONDANSETRON 4 MG/1
4 TABLET, ORALLY DISINTEGRATING ORAL EVERY 30 MIN PRN
Status: DISCONTINUED | OUTPATIENT
Start: 2020-02-10 | End: 2020-02-11 | Stop reason: HOSPADM

## 2020-02-10 RX ORDER — OXYCODONE HYDROCHLORIDE 5 MG/1
5-10 TABLET ORAL EVERY 4 HOURS PRN
Status: DISCONTINUED | OUTPATIENT
Start: 2020-02-10 | End: 2020-02-11 | Stop reason: HOSPADM

## 2020-02-10 RX ORDER — ONDANSETRON 2 MG/ML
INJECTION INTRAMUSCULAR; INTRAVENOUS PRN
Status: DISCONTINUED | OUTPATIENT
Start: 2020-02-10 | End: 2020-02-10

## 2020-02-10 RX ORDER — NALOXONE HYDROCHLORIDE 0.4 MG/ML
.1-.4 INJECTION, SOLUTION INTRAMUSCULAR; INTRAVENOUS; SUBCUTANEOUS
Status: DISCONTINUED | OUTPATIENT
Start: 2020-02-10 | End: 2020-02-11 | Stop reason: HOSPADM

## 2020-02-10 RX ORDER — ACETAMINOPHEN 325 MG/1
975 TABLET ORAL ONCE
Status: COMPLETED | OUTPATIENT
Start: 2020-02-10 | End: 2020-02-10

## 2020-02-10 RX ORDER — HYDROMORPHONE HYDROCHLORIDE 1 MG/ML
.3-.5 INJECTION, SOLUTION INTRAMUSCULAR; INTRAVENOUS; SUBCUTANEOUS EVERY 10 MIN PRN
Status: DISCONTINUED | OUTPATIENT
Start: 2020-02-10 | End: 2020-02-11 | Stop reason: HOSPADM

## 2020-02-10 RX ORDER — ACETAMINOPHEN 325 MG/1
650 TABLET ORAL EVERY 4 HOURS PRN
Qty: 50 TABLET | Refills: 0 | Status: SHIPPED | OUTPATIENT
Start: 2020-02-10 | End: 2020-09-16

## 2020-02-10 RX ORDER — CLINDAMYCIN PHOSPHATE 900 MG/50ML
900 INJECTION, SOLUTION INTRAVENOUS SEE ADMIN INSTRUCTIONS
Status: DISCONTINUED | OUTPATIENT
Start: 2020-02-10 | End: 2020-02-11 | Stop reason: HOSPADM

## 2020-02-10 RX ORDER — OXYCODONE HYDROCHLORIDE 5 MG/1
5 TABLET ORAL
Status: CANCELLED | OUTPATIENT
Start: 2020-02-10

## 2020-02-10 RX ORDER — DEXAMETHASONE SODIUM PHOSPHATE 4 MG/ML
4 INJECTION, SOLUTION INTRA-ARTICULAR; INTRALESIONAL; INTRAMUSCULAR; INTRAVENOUS; SOFT TISSUE EVERY 10 MIN PRN
Status: DISCONTINUED | OUTPATIENT
Start: 2020-02-10 | End: 2020-02-11 | Stop reason: HOSPADM

## 2020-02-10 RX ORDER — ONDANSETRON 2 MG/ML
4 INJECTION INTRAMUSCULAR; INTRAVENOUS EVERY 30 MIN PRN
Status: DISCONTINUED | OUTPATIENT
Start: 2020-02-10 | End: 2020-02-11 | Stop reason: HOSPADM

## 2020-02-10 RX ORDER — DEXAMETHASONE SODIUM PHOSPHATE 4 MG/ML
INJECTION, SOLUTION INTRA-ARTICULAR; INTRALESIONAL; INTRAMUSCULAR; INTRAVENOUS; SOFT TISSUE PRN
Status: DISCONTINUED | OUTPATIENT
Start: 2020-02-10 | End: 2020-02-10

## 2020-02-10 RX ORDER — ACETAMINOPHEN 325 MG/1
650 TABLET ORAL
Status: CANCELLED | OUTPATIENT
Start: 2020-02-10

## 2020-02-10 RX ORDER — CLINDAMYCIN PHOSPHATE 900 MG/50ML
900 INJECTION, SOLUTION INTRAVENOUS
Status: COMPLETED | OUTPATIENT
Start: 2020-02-10 | End: 2020-02-10

## 2020-02-10 RX ORDER — ALBUTEROL SULFATE 0.83 MG/ML
2.5 SOLUTION RESPIRATORY (INHALATION) EVERY 4 HOURS PRN
Status: DISCONTINUED | OUTPATIENT
Start: 2020-02-10 | End: 2020-02-11 | Stop reason: HOSPADM

## 2020-02-10 RX ORDER — FENTANYL CITRATE 50 UG/ML
INJECTION, SOLUTION INTRAMUSCULAR; INTRAVENOUS PRN
Status: DISCONTINUED | OUTPATIENT
Start: 2020-02-10 | End: 2020-02-10

## 2020-02-10 RX ORDER — MEPERIDINE HYDROCHLORIDE 25 MG/ML
12.5 INJECTION INTRAMUSCULAR; INTRAVENOUS; SUBCUTANEOUS
Status: DISCONTINUED | OUTPATIENT
Start: 2020-02-10 | End: 2020-02-11 | Stop reason: HOSPADM

## 2020-02-10 RX ORDER — LIDOCAINE HYDROCHLORIDE AND EPINEPHRINE 10; 10 MG/ML; UG/ML
INJECTION, SOLUTION INFILTRATION; PERINEURAL PRN
Status: DISCONTINUED | OUTPATIENT
Start: 2020-02-10 | End: 2020-02-10 | Stop reason: HOSPADM

## 2020-02-10 RX ADMIN — Medication 20 MG: at 10:34

## 2020-02-10 RX ADMIN — PROPOFOL 50 MCG/KG/MIN: 10 INJECTION, EMULSION INTRAVENOUS at 09:24

## 2020-02-10 RX ADMIN — FENTANYL CITRATE 25 MCG: 50 INJECTION, SOLUTION INTRAMUSCULAR; INTRAVENOUS at 10:43

## 2020-02-10 RX ADMIN — ONDANSETRON 4 MG: 2 INJECTION INTRAMUSCULAR; INTRAVENOUS at 09:31

## 2020-02-10 RX ADMIN — FENTANYL CITRATE 25 MCG: 50 INJECTION, SOLUTION INTRAMUSCULAR; INTRAVENOUS at 09:17

## 2020-02-10 RX ADMIN — OXYCODONE HYDROCHLORIDE 5 MG: 5 TABLET ORAL at 12:18

## 2020-02-10 RX ADMIN — KETOROLAC TROMETHAMINE 30 MG: 30 INJECTION, SOLUTION INTRAMUSCULAR; INTRAVENOUS at 11:28

## 2020-02-10 RX ADMIN — Medication 100 MCG: at 11:15

## 2020-02-10 RX ADMIN — CLINDAMYCIN PHOSPHATE 900 MG: 900 INJECTION, SOLUTION INTRAVENOUS at 09:24

## 2020-02-10 RX ADMIN — SODIUM CHLORIDE, SODIUM LACTATE, POTASSIUM CHLORIDE, CALCIUM CHLORIDE: 600; 310; 30; 20 INJECTION, SOLUTION INTRAVENOUS at 09:03

## 2020-02-10 RX ADMIN — SODIUM CHLORIDE, SODIUM LACTATE, POTASSIUM CHLORIDE, CALCIUM CHLORIDE: 600; 310; 30; 20 INJECTION, SOLUTION INTRAVENOUS at 11:35

## 2020-02-10 RX ADMIN — ISOSULFAN BLUE 0.05 ML: 50 INJECTION, SOLUTION SUBCUTANEOUS at 10:05

## 2020-02-10 RX ADMIN — FENTANYL CITRATE 25 MCG: 50 INJECTION, SOLUTION INTRAMUSCULAR; INTRAVENOUS at 09:09

## 2020-02-10 RX ADMIN — PROPOFOL 50 MG: 10 INJECTION, EMULSION INTRAVENOUS at 10:34

## 2020-02-10 RX ADMIN — LIDOCAINE HYDROCHLORIDE AND EPINEPHRINE 20 ML: 10; 10 INJECTION, SOLUTION INFILTRATION; PERINEURAL at 11:32

## 2020-02-10 RX ADMIN — Medication 100 MCG: at 11:19

## 2020-02-10 RX ADMIN — ONDANSETRON 4 MG: 4 TABLET, ORALLY DISINTEGRATING ORAL at 13:17

## 2020-02-10 RX ADMIN — LIDOCAINE HYDROCHLORIDE 3 ML: 20 INJECTION, SOLUTION INFILTRATION; PERINEURAL at 09:17

## 2020-02-10 RX ADMIN — DEXAMETHASONE SODIUM PHOSPHATE 8 MG: 4 INJECTION, SOLUTION INTRA-ARTICULAR; INTRALESIONAL; INTRAMUSCULAR; INTRAVENOUS; SOFT TISSUE at 09:31

## 2020-02-10 RX ADMIN — Medication 100 MCG: at 10:06

## 2020-02-10 RX ADMIN — ACETAMINOPHEN 975 MG: 325 TABLET ORAL at 08:53

## 2020-02-10 RX ADMIN — PROPOFOL 150 MG: 10 INJECTION, EMULSION INTRAVENOUS at 09:17

## 2020-02-10 RX ADMIN — Medication 100 MCG: at 09:39

## 2020-02-10 RX ADMIN — Medication 30 MG: at 09:17

## 2020-02-10 RX ADMIN — Medication 100 MCG: at 10:16

## 2020-02-10 NOTE — DISCHARGE INSTRUCTIONS
[facility]  Same-Day Surgery   Adult Discharge Orders & Instructions   For 24 hours after surgery  1. Get plenty of rest.  A responsible adult must stay with you for at least 24 hours after you leave the hospital.   2. Do not drive or use heavy equipment.  If you have weakness or tingling, don't drive or use heavy equipment until this feeling goes away.  3. Do not drink alcohol.  4. Avoid strenuous or risky activities.  Ask for help when climbing stairs.   5. You may feel lightheaded.  IF so, sit for a few minutes before standing.  Have someone help you get up.   6. If you have nausea (feel sick to your stomach): Drink only clear liquids such as apple juice, ginger ale, broth or 7-Up.  Rest may also help.  Be sure to drink enough fluids.  Move to a regular diet as you feel able.  7. You may have a slight fever. Call the doctor if your fever is over 100 F (37.7 C) (taken under the tongue) or lasts longer than 24 hours.  8. You may have a dry mouth, a sore throat, muscle aches or trouble sleeping.  These should go away after 24 hours.  9. Do not make important or legal decisions.   Call your doctor for any of the followin.  Signs of infection (fever, growing tenderness at the surgery site, a large amount of drainage or bleeding, severe pain, foul-smelling drainage, redness, swelling).    2. It has been over 8 to 10 hours since surgery and you are still not able to urinate (pass water).    3.  Headache for over 24 hours.

## 2020-02-10 NOTE — ANESTHESIA POSTPROCEDURE EVALUATION
Anesthesia POST Procedure Evaluation    Patient: Yadira Hoang   MRN:     [id number] Gender:   female   Age:    74 year old :      1945        Preoperative Diagnosis: Malignant melanoma of right lower extremity including hip (H) [C43.71]   Procedure(s):  Wide Local Excision of RIGHT heel melanoma  [place] Lymph Node Mapping And Biopsy   Postop Comments: No value filed.       Anesthesia Type:  Not documented  General    Reportable Event: NO     PAIN: Uncomplicated   Sign Out status: Comfortable, Well controlled pain     PONV: No PONV   Sign Out status:  No Nausea or Vomiting     Neuro/Psych: Uneventful perioperative course   Sign Out Status: Preoperative baseline; Age appropriate mentation     Airway/Resp.: Uneventful perioperative course   Sign Out Status: Non labored breathing, age appropriate RR; Resp. Status within EXPECTED Parameters     CV: Uneventful perioperative course   Sign Out status: Appropriate BP and perfusion indices; Appropriate HR/Rhythm     Disposition:   Sign Out in:  PACU  Disposition:  Phase II; Home  Recovery Course: Uneventful  Follow-Up: Not required           Last Anesthesia Record Vitals:  CRNA VITALS  2/10/2020 1114 - 2/10/2020 1214      2/10/2020             Resp Rate (observed):  (!) 3    EKG:  NSR          Last PACU Vitals:  Vitals Value Taken Time   /90 2/10/2020 12:15 PM   Temp     Pulse 91 2/10/2020 12:15 PM   Resp 14 2/10/2020 12:15 PM   SpO2 97 % 2/10/2020 12:15 PM   Temp src     NIBP     Pulse     SpO2     Resp     Temp     Ht Rate     Temp 2           Electronically Signed By: Catarino Kim MD, February 10, 2020, 1:47 PM

## 2020-02-10 NOTE — PATIENT INSTRUCTIONS
Thanks for coming today.  Ortho/Sports Medicine Clinic  [address] [place] [address]    To schedule future appointments in Ortho Clinic, you may call [phone].    To schedule ordered imaging by your provider:   Call Central Imaging [phone]    To schedule an injection ordered by your provider:  Call Central Imaging Injection scheduling line: [phone]  [employer] available online at:  [email or web address]    Please call if any further questions or concerns ([phone]).  Clinic hours 8 am to 5 pm.    Return to clinic (call) if symptoms worsen or fail to improve.

## 2020-02-10 NOTE — OP NOTE
DATE OF SURGERY: February 10, 2020     SURGEON: Gabriela Dorsey MD  ASSISTANT: Chidi Maria MD PGY-5    PREOPERATIVE DIAGNOSIS: Melanoma of the right heel  POSTOPERATIVE DIAGNOSIS: same    PROCEDURE:   1. Right femoral sentinel lymph node mapping and biopsy  2. Wide local excision of right heel melanoma with 2 cm margins    ANESTHESIA: General    CLINICAL NOTE:  Yadira Hoang is a 74 year old woman with a melanoma on the right heel.  The risks and benefits of a wide local excision and sentinel lymph node biopsy were discussed with the patient and she elected to proceed with informed consent.    OPERATIVE FINDINGS:  1. No foci of radioactivity identified in popliteal region  2. Two femoral sentinel lymph node removed    OPERATIVE PROCEDURE:  The patient first presented to the Nuclear Medicine department for amador-tumoral injection of technetium-labelled  colloid (tilmanocept) and lymphoscintigraphy.  The lymphoscintigraphy images were personally reviewed by me prior to the start of the procedure. The tumor site was verified with the patient by me personally.  The patient was brought to the operating room and placed in the supine position with appropriate padding for all of the pressure points. A general anesthetic was administered.  A surgical safety checklist was performed to confirm the patient's identity, the site and laterality of the procedure. Perioperative antibiotics (Clindamycin) was provided.  VTE prophylaxis was provided with serial compression devices.  I interrogated the popliteal and femoral regions with the Neoprobe and there was a focus of radioactivity in the femoral region but none in the popliteal region.  0.5 mL of lymphazurin was injected into the four quadrants around the tumor site on the right heel.   The right lower extremity was then prepped and draped in the usual sterile fashion using Chloraprep.     We began in the right femoral region.  An incision was made directly overlying the focus of  radioactivity. The Neoprobe was used to identify the sentinel lymph nodes.  [place] lymph node #1 was blue and had an ex vivo count of 6255.  [place] lymph node #2 was blue and had an ex vivo count of 1049. The background count was 251. No other blue or palpable lymph nodes were found.  Thus no additional sentinel lymph nodes were sought.  All of the lymph nodes were sent to pathology individually.  The wound was irrigated and hemostasis was achieved.  A total of 10 mL of 1:1 mix of 1% lidocaine with epinephrine and 0.25% marcaine without epinephrine into the surgical site. The incision was closed in two layers with interrupted 3-0 vicryl and a running subcuticular 4-0 monocryl.  Steristrips and dressings were applied.      Next, 2 cm margins were marked out circumferentially around the tumor site (where the biopsy previously took place) on the right heel.  There is pigmentation in the skin of the heel away from the main mass with firmness that was included as the tumor site.  The tumor site itself measured 34 mm x 50 mm. The [place] was then excised, taking with it the subcutaneous fat.  The dissection was carried out down to the underlying fascia; the heel fat pad was left intact.  The specimen was then marked using a marking suture; short for 12:00 (towards the toes), and long for medial at 3:00.  The specimen measured 7.5 cm x 8.0 cm.  The specimen was then sent to pathology.  The cavity at this point measured 7.3 cm x 8.0 cm.  In the heel fat pad immediately deep to where the melanoma was, there was an area of firmness. This was excised, inked for the new posterior margin, and sent to pathology. The wound was irrigated and hemostasis was achieved.  10 mL of 1:1 mix of 1% lidocaine with epinephrine and 0.25% marcaine without epinephrine was infiltrated into the surgical site.  A wound vac was then applied with a black sponge and connected to the machine with 125 mmHg continuous suction.  It was well-sealed.    The patient tolerated the procedure well. The sponge, needle, instrument counts were correct.  The patient was then awakened and taken to recovery in stable fashion.     I was present and scrubbed for the entire above procedure.    EBL: 20 mL    SPECIMEN(S):  A. Right femoral sentinel lymph node # 1  B. Right femoral sentinel lymph node # 2  C. Right heel skin lesion; short suture = 12:00, long suture = 3:00 (medial)  D. Right heel, new posterior margin; black ink = new margin    POSTOPERATIVE PLANS:  Yadira Hoang will be discharged home today with wound care instructions/home care for wound vac dressing changes and a prescription for analgesics.  She will be fitted with a boot and crutches. She is to remain non-weightbearing on the right foot after surgery.  She will follow-up with me in 2 weeks.     Gabriela Dorsey MD MSc [facility] FACS    Division of Surgical Oncology  [facility]

## 2020-02-10 NOTE — ANESTHESIA CARE TRANSFER NOTE
Patient: Yadira Hoang    Procedure(s):  Wide Local Excision of RIGHT heel melanoma  [place] Lymph Node Mapping And Biopsy    Diagnosis: Malignant melanoma of right lower extremity including hip (H) [C43.71]  Diagnosis Additional Information: No value filed.    Anesthesia Type:   General     Note:  Airway :Nasal Cannula  Patient transferred to:PACU  Handoff Report: Identifed the Patient, Identified the Reponsible Provider, Reviewed the pertinent medical history, Discussed the surgical course, Reviewed Intra-OP anesthesia mangement and issues during anesthesia, Set expectations for post-procedure period and Allowed opportunity for questions and acknowledgement of understanding      Vitals: (Last set prior to Anesthesia Care Transfer)    CRNA VITALS  2/10/2020 1114 - 2/10/2020 1159      2/10/2020             Resp Rate (observed):  (!) 3                Electronically Signed By: YVONNE Humphreys CRNA  February 10, 2020  11:59 AM

## 2020-02-11 ENCOUNTER — TELEPHONE (OUTPATIENT)
Dept: SURGERY | Facility: CLINIC | Age: 75
End: 2020-02-11

## 2020-02-11 NOTE — TELEPHONE ENCOUNTER
ORDER RECEIVED VIA: CASE MESSAGE    Spoke with patient to schedule surgery with Dr Dayo Cosby and Dr Hao Crabtree     Surgery was scheduled on 2/19 at [facility]    Patient will have pre-surgery visit with PCP  (completed)    -Patient advised H&P is needed or surgery cancellation may occur    Post-Op care appointment scheduled?  NO on      Patient is aware a / is needed day of surgery.     Surgery packet was sent via mail, patient has my direct contact information for any further questions.     Luana Grewal  Surgical [facility]-Op Coordinator  [phone]

## 2020-02-12 ENCOUNTER — TELEPHONE (OUTPATIENT)
Dept: ONCOLOGY | Facility: CLINIC | Age: 75
End: 2020-02-12

## 2020-02-12 ENCOUNTER — TELEPHONE (OUTPATIENT)
Dept: SURGERY | Facility: CLINIC | Age: 75
End: 2020-02-12

## 2020-02-12 NOTE — TELEPHONE ENCOUNTER
Per Dr. Dorsey's request, patient was called and scheduled post op appointment. Patient schedule on 2/24/20 at 3:30pm.    Marion Bonilla LPN

## 2020-02-12 NOTE — TELEPHONE ENCOUNTER
Received a staff message from Luana CASPER requesting 's [facility] staff assist with scheduling a post op. Pt called and scheduled...Melissa Ellsworth RN

## 2020-02-12 NOTE — TELEPHONE ENCOUNTER
[facility] LAKSHMI Horne, , called to ask fo verbal orders to:  Change wound vac to R heel MON, WED, FRI.    Pt has a derm appointment on 2/17, follow-up with Dr Dorsey on 2/24.    Gave TORB for MWF wound vac change.

## 2020-02-14 ENCOUNTER — TELEPHONE (OUTPATIENT)
Dept: SURGERY | Facility: CLINIC | Age: 75
End: 2020-02-14

## 2020-02-14 ENCOUNTER — DOCUMENTATION ONLY (OUTPATIENT)
Dept: CARE COORDINATION | Facility: CLINIC | Age: 75
End: 2020-02-14

## 2020-02-14 NOTE — TELEPHONE ENCOUNTER
RN spoke with pt on 2/14/20 and pt at that time requested to cancel her dermatology appt for Monday 2/17/20 with . It has been brought to my attention today that pt called back today and requested to be seen today in Dermatology and pt was scheduled. In regards to the appt with , pt advised to keep appt for now and pt advised that RN would speak with  to see if she would prefer to see pt on this day 2/24 or after her appt with . Pt verbalized understanding and had no further questions.Melissa Ellsworth RN

## 2020-02-14 NOTE — TELEPHONE ENCOUNTER
[facility] Call Center    Phone Message    May a detailed message be left on voicemail: yes     Reason for Call: Other: Pt is requesting a call back from care team to address a couple questions and get Dr. Dorsey's opinion.    1.) Pt has a dermatology appointment, she's wondering if she should do that appt asap or if it can wait due to her healing from a surgery    2.) She has a surgery on 2/19 and is wondering if she should reschedule Dr. Dorsey's appointment on 2/24.     Please advise.    Action Taken: Message Routed to Surgery    Travel Screening: Not Applicable

## 2020-02-14 NOTE — PROGRESS NOTES
[facility] utilizes an encounter to take the place of a direct phone call to your office. Please take a moment to review the below request. Please reply or route message to author of this encounter.  Message will act as a verbal OK of orders requested below. Thank you.    ORDER  Pt seen for home PT eval today 2/14/20.  Pt appropriate for continued PT 1w1 for gait training trial with walker to improve home safety week of 2/16/20.  Christian Carver PT

## 2020-02-15 ENCOUNTER — TELEPHONE (OUTPATIENT)
Dept: DERMATOLOGY | Facility: CLINIC | Age: 75
End: 2020-02-15

## 2020-02-17 ENCOUNTER — OFFICE VISIT (OUTPATIENT)
Dept: DERMATOLOGY | Facility: CLINIC | Age: 75
End: 2020-02-17
Payer: COMMERCIAL

## 2020-02-17 DIAGNOSIS — D18.01 CHERRY ANGIOMA: ICD-10-CM

## 2020-02-17 DIAGNOSIS — L81.4 SOLAR LENTIGO: ICD-10-CM

## 2020-02-17 DIAGNOSIS — Z85.820 HISTORY OF MELANOMA: Primary | ICD-10-CM

## 2020-02-17 PROCEDURE — 99203 OFFICE O/P NEW LOW 30 MIN: CPT | Performed by: DERMATOLOGY

## 2020-02-17 ASSESSMENT — PAIN SCALES - GENERAL: PAINLEVEL: NO PAIN (0)

## 2020-02-17 NOTE — TELEPHONE ENCOUNTER
Pt calling back about message below. Pt is wondering if she can come at original time her appt was scheduled for today at 4pm.   Please call back ASAP.

## 2020-02-17 NOTE — PROGRESS NOTES
[facility] Dermatology Note    Dermatology Problem List:  1. 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 At least Stage IIB.   - right heel s/p WLE 2/10/2020 pending repair per plastic surgery  - s/p sentinel lymph node biopsy 2/10/2020 (pending)     Encounter Date: Feb 17, 2020    CC:  Chief Complaint   Patient presents with     Skin Check     Hx of Melanoma - right heel - currently has a wound vac       History of Present Illness:  Ms. Yadira Hoang is a 74 year old female who is a new patient and presents for a full body skin check. She was diagnosed with melanoma on the right heel by [facility] and it was removed via wide local excision on 2/10/2020 by Gabriela Dorsey MD. She presents today with a wound vacuum and is scheduled to follow up on 2/19/2020 with Hao Crabtree MD at [facility] for reconstruction of the right heel.  Today, the patient is here for full body skin examination and evaluation of the lesions on her trunk and extremities.     The patient otherwise denies any new or concerning lesions. No bleeding, painful, pruritic, or changing lesions. They report a personal history of skin cancer, melanoma as above, but no history of NMSC. There is no family history of skin cancer. No history of immunosuppression. No occupational exposure to ultraviolet light or other forms of radiation. Patient is retired and previously had an office job.  Health otherwise stable. No other skin concerns.     Past Medical History:   Patient Active Problem List   Diagnosis     Malignant melanoma of right lower extremity including hip (H)     Melanoma of skin (H)     GUERLINE (obstructive sleep apnea)     Chronic congestion of paranasal sinus     Past Medical History:   Diagnosis Date     Concussion      Past Surgical History:   Procedure Laterality Date     BIOPSY NODE SENTINEL Right 2/10/2020    Procedure: [place] Lymph Node Mapping And Biopsy;  Surgeon: Gabriela Dorsey  MD;  Location: [facility] OR     EXCISE LESION LOWER EXTREMITY Right 2/10/2020    Procedure: Wide Local Excision of RIGHT heel melanoma;  Surgeon: Gabriela Dorsey MD;  Location: [facility] OR     TUBAL LIGATION         Social History:  Patient reports that she has never smoked. She has never used smokeless tobacco. She reports previous alcohol use. She reports that she does not use drugs.    Family History:  No family history on file.    Medications:  Current Outpatient Medications   Medication Sig Dispense Refill     acetaminophen (TYLENOL) 325 MG tablet Take 2 tablets (650 mg) by mouth every 4 hours as needed for mild pain 50 tablet 0     cholecalciferol (VITAMIN D3) 125 MCG (5000 UT) TABS tablet Take 2,000 Units by mouth       estradiol (ESTRACE) 0.1 MG/GM vaginal cream estradiol 0.01% (0.1 mg/gram) vaginal cream       order for [facility] [employer] Youth, Aluminum  SOLEDADJOSE $67  1 each 0     oxyCODONE (ROXICODONE) 5 MG tablet Take 1-2 tablets (5-10 mg) by mouth every 4 hours as needed for moderate to severe pain 30 tablet 0     senna-docusate (SENOKOT-S/PERICOLACE) 8.6-50 MG tablet Take 1-2 tablets by mouth 2 times daily 30 tablet 0       Allergies   Allergen Reactions     Amoxicillin      Hmg-Coa-R Inhibitors Swelling     Lactose      Intolerance       Review of Systems:  -Constitutional: Patient is otherwise feeling well, in usual state of health.   -Skin: As above in HPI. No additional skin concerns.    Physical exam:  Vitals: There were no vitals taken for this visit.  GEN: This is a well developed, well-nourished female in no acute distress, in a pleasant mood.    SKIN: Full skin, which includes the head/face, both arms, chest, back, abdomen,both legs, genitalia and/or groin buttocks, digits and/or nails, was examined.  - Canales Type I/II  - Scattered brown macules on sun exposed areas.  - Dome shaped bright red papules on the trunk and extremities.   - Healing excision wound on the right heel with a wound vaccuum in  place.   - No other lesions of concern on areas examined.     Impression/Plan:    1. History of invasive acral melanoma with breslow depth 3.4 mm s/p WLE with SLN 2/10/2020 (SNL results still pending). At least Stage IIB.     Recommend sunscreens SPF #30 or greater, protective clothing and avoidance of tanning beds.    ABCDs of melanoma were discussed and self skin checks were advised.     Continue q3 monthly skin checks for 2 years; then q6 monthly for 3 years; then yearly for life.     Continue to follow-up with plastic surgery and surg-oncology    2. Multiple clinically benign lesions: solar lentigines    Recommend sunscreens SPF #30 or greater, protective clothing and avoidance of tanning beds.    ABCDs of melanoma were discussed and self skin checks were advised.     3. Benign findings including: cherry angioma    No further intervention required. Patient to report changes.     Patient reassured of the benign nature of these lesions.    Follow-up in 3 months for a full body skin examination, earlier for new or changing lesions.     Staff Involved:  Scribe/Staff    Scribe Disclosure  I, Latonia Vance, am serving as a scribe to document services personally performed by Dr. Joseph Fallon MD, based on data collection and the provider's statements to me.     Provider Disclosure:   The documentation recorded by the scribe accurately reflects the services I personally performed and the decisions made by me.    Joseph Fallon MD    Department of Dermatology  [facility]: Phone: [phone], Fax:[phone]  [facility]: Phone: [phone] Fax: [phone]

## 2020-02-17 NOTE — NURSING NOTE
@[email or web address] Hoang's goals for this visit include:   Chief Complaint   Patient presents with     Skin Check     Hx of Melanoma - right heel - currently has a wound vac       She requests these members of her care team be copied on today's visit information: NO    PCP: Erika Blanton    Referring Provider:  No referring provider defined for this encounter.    There were no vitals taken for this visit.    Do you need any medication refills at today's visit? NO    Teodora Saenz CMA

## 2020-02-17 NOTE — LETTER
2/17/2020         RE: Yadira Hoang  [address] [place] [facility] [address]        Dear Colleague,    Thank you for referring your patient, Yadira Hoang, to the [facility]. Please see a copy of my visit note below.    [facility] Dermatology Note    Dermatology Problem List:  1. 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 At least Stage IIB.   - right heel s/p WLE 2/10/2020 pending repair per plastic surgery  - s/p sentinel lymph node biopsy 2/10/2020 (pending)     Encounter Date: Feb 17, 2020    CC:  Chief Complaint   Patient presents with     Skin Check     Hx of Melanoma - right heel - currently has a wound vac       History of Present Illness:  Ms. Yadira Hoang is a 74 year old female who is a new patient and presents for a full body skin check. She was diagnosed with melanoma on the right heel by [facility] and it was removed via wide local excision on 2/10/2020 by Gabriela Dorsey MD. She presents today with a wound vacuum and is scheduled to follow up on 2/19/2020 with Hao Crabtree MD at [facility] for reconstruction of the right heel.  Today, the patient is here for full body skin examination and evaluation of the lesions on her trunk and extremities.     The patient otherwise denies any new or concerning lesions. No bleeding, painful, pruritic, or changing lesions. They report a personal history of skin cancer, melanoma as above, but no history of NMSC. There is no family history of skin cancer. No history of immunosuppression. No occupational exposure to ultraviolet light or other forms of radiation. Patient is retired and previously had an office job.  Health otherwise stable. No other skin concerns.     Past Medical History:   Patient Active Problem List   Diagnosis     Malignant melanoma of right lower extremity including hip (H)     Melanoma of skin (H)     GUERLINE (obstructive sleep apnea)     Chronic congestion of paranasal  sinus     Past Medical History:   Diagnosis Date     Concussion      Past Surgical History:   Procedure Laterality Date     BIOPSY NODE SENTINEL Right 2/10/2020    Procedure: [place] Lymph Node Mapping And Biopsy;  Surgeon: Gabriela Dorsey MD;  Location: [facility] OR     EXCISE LESION LOWER EXTREMITY Right 2/10/2020    Procedure: Wide Local Excision of RIGHT heel melanoma;  Surgeon: Gabriela Dorsey MD;  Location: [facility] OR     TUBAL LIGATION         Social History:  Patient reports that she has never smoked. She has never used smokeless tobacco. She reports previous alcohol use. She reports that she does not use drugs.    Family History:  No family history on file.    Medications:  Current Outpatient Medications   Medication Sig Dispense Refill     acetaminophen (TYLENOL) 325 MG tablet Take 2 tablets (650 mg) by mouth every 4 hours as needed for mild pain 50 tablet 0     cholecalciferol (VITAMIN D3) 125 MCG (5000 UT) TABS tablet Take 2,000 Units by mouth       estradiol (ESTRACE) 0.1 MG/GM vaginal cream estradiol 0.01% (0.1 mg/gram) vaginal cream       order for [facility] Crutch, Youth, Aluminum  CRYTH $67  1 each 0     oxyCODONE (ROXICODONE) 5 MG tablet Take 1-2 tablets (5-10 mg) by mouth every 4 hours as needed for moderate to severe pain 30 tablet 0     senna-docusate (SENOKOT-S/PERICOLACE) 8.6-50 MG tablet Take 1-2 tablets by mouth 2 times daily 30 tablet 0       Allergies   Allergen Reactions     Amoxicillin      Hmg-Coa-R Inhibitors Swelling     Lactose      Intolerance       Review of Systems:  -Constitutional: Patient is otherwise feeling well, in usual state of health.   -Skin: As above in HPI. No additional skin concerns.    Physical exam:  Vitals: There were no vitals taken for this visit.  GEN: This is a well developed, well-nourished female in no acute distress, in a pleasant mood.    SKIN: Full skin, which includes the head/face, both arms, chest, back, abdomen,both legs, genitalia and/or groin  buttocks, digits and/or nails, was examined.  - Canales Type I/II  - Scattered brown macules on sun exposed areas.  - Dome shaped bright red papules on the trunk and extremities.   - Healing excision wound on the right heel with a wound vaccuum in place.   - No other lesions of concern on areas examined.     Impression/Plan:    1. History of invasive acral melanoma with breslow depth 3.4 mm s/p WLE with SLN 2/10/2020 (SNL results still pending). At least Stage IIB.     Recommend sunscreens SPF #30 or greater, protective clothing and avoidance of tanning beds.    ABCDs of melanoma were discussed and self skin checks were advised.     Continue q3 monthly skin checks for 2 years; then q6 monthly for 3 years; then yearly for life.     Continue to follow-up with plastic surgery and surg-oncology    2. Multiple clinically benign lesions: solar lentigines    Recommend sunscreens SPF #30 or greater, protective clothing and avoidance of tanning beds.    ABCDs of melanoma were discussed and self skin checks were advised.     3. Benign findings including: cherry angioma    No further intervention required. Patient to report changes.     Patient reassured of the benign nature of these lesions.    Follow-up in 3 months for a full body skin examination, earlier for new or changing lesions.     Staff Involved:  Scribe/Staff    Scribe Disclosure  I, Latonia Vance, am serving as a scribe to document services personally performed by Dr. Joseph Fallon MD, based on data collection and the provider's statements to me.     Provider Disclosure:   The documentation recorded by the scribe accurately reflects the services I personally performed and the decisions made by me.    Joseph Fallon MD    Department of Dermatology  [facility]: Phone: [phone], Fax:[phone]  [facility]: Phone: [phone] Fax:  [phone]              Again, thank you for allowing me to participate in the care of your patient.        Sincerely,        Joseph Fallon MD

## 2020-02-17 NOTE — TELEPHONE ENCOUNTER
Writer called and spoke with patient and scheduled her with Dr. Fallon today 2/17/20 at 4pm.    Marion Bonilla LPN

## 2020-02-18 ENCOUNTER — TELEPHONE (OUTPATIENT)
Dept: SURGERY | Facility: CLINIC | Age: 75
End: 2020-02-18

## 2020-02-18 ENCOUNTER — ANCILLARY PROCEDURE (OUTPATIENT)
Dept: ULTRASOUND IMAGING | Facility: CLINIC | Age: 75
End: 2020-02-18
Attending: SURGERY
Payer: COMMERCIAL

## 2020-02-18 ENCOUNTER — OFFICE VISIT (OUTPATIENT)
Dept: SURGERY | Facility: CLINIC | Age: 75
End: 2020-02-18
Payer: COMMERCIAL

## 2020-02-18 DIAGNOSIS — C43.71 MALIGNANT MELANOMA OF RIGHT LOWER EXTREMITY INCLUDING HIP (H): ICD-10-CM

## 2020-02-18 DIAGNOSIS — C43.71 MALIGNANT MELANOMA OF RIGHT LOWER EXTREMITY INCLUDING HIP (H): Primary | ICD-10-CM

## 2020-02-18 PROCEDURE — 93971 EXTREMITY STUDY: CPT | Mod: RT | Performed by: RADIOLOGY

## 2020-02-18 PROCEDURE — 99024 POSTOP FOLLOW-UP VISIT: CPT | Performed by: SURGERY

## 2020-02-18 RX ORDER — ACETAMINOPHEN 325 MG/1
975 TABLET ORAL ONCE
Status: CANCELLED | OUTPATIENT
Start: 2020-02-18 | End: 2020-02-18

## 2020-02-18 NOTE — LETTER
2020      RE: Yadira Hoang  [address]  [place] [address]     2020    Erika Blanton MD PhD   [address]  [place] [facility] [address]    RE: Yadira Hoang  (: 1945)    Dear Dr. Erika Blanton    Your patient was seen for evaluation in my office.  Please find a copy of my notes for your record and review.  If you have any further questions, please feel free to contact my office.   Thank you for your kind referral.    Sincerely,   Gabriela Dorsey MD MSc [facility] FACS    ---   FOLLOW-UP  2020    Yadira Hoang is a 74 year old female who returns for her 1st post-operative follow-up visit.    Cancer Staging  Malignant melanoma of right lower extremity including hip (H)  Staging form: Melanoma of the Skin, AJCC 8th Edition  - Clinical: Stage IIB (cT3b, cN0, cM0) - Signed by Gabriela Dorsey MD on 2020      Treatment to date:  1. Wide local excision of right heel melanoma with 2 cm margins and right femoral sentinel lymph node biopsy (2/10/2020)    HPI:    She underwent a wide local excision of right heel melanoma and right femoral SLNB on 2/10/2020.  She is currently 1 week(s) post-op.  Final surgical pathology showed 1/2 involved sentinel lymph nodes and 6.6 mm deep residual melanoma with microsatellites and an involved lateral (blue margin = 12:00 to 3:00 to 6:00) margin with melanoma in situ.    Since the procedure, she has been doing well.  She is tolerating the dressing changes. No fever/chills. She has been mainly off the foot.  She started having right foot swelling 3 days ago. No chest pain, no shortness of breath.      Physical Exam  Constitutional:       Appearance: She is well-developed.   Pulmonary:      Effort: No respiratory distress.   Lymphadenopathy:      Comments: Right femoral SLNB site unremarkable. No cellulitis, seroma, or hematoma   Skin:     General: Skin is warm and dry.      Comments: WLE site with vac on. No cellulitis. No purulent drainage. There is  lower leg and foot edema.         INVESTIGATIONS:    Surgical Pathology (2/10/2020):  FINAL DIAGNOSIS:   A. Lymph node, right groin femoral, sentinel #1:   - Metastatic melanoma involving one lymph node (1 of 1 node) - (see description)   B. Lymph node, right groin femoral, sentinel #2:   - One sentinel lymph node, negative for metastatic melanoma (0 of 1 lymph node)  (see description)   C. Skin, right heel, wide local excision:   - Residual malignant melanoma - (see comment)   D. Right heel, additional posterior margin:   - Fibroadipose tissue - (see description)   COMMENT:   C. The lesion extends to a depth of 6.6 mm. Melanoma in situ extends to the cauterized lateral margin (blue inked) and invasive melanoma is present at 0.2 mm from the deep blue inked  margin. Microsatellites are also present. Thus, the current updated stage for this lesion is pT4b pN2c. Selected sections were reviewed at the dermatopathology consensus  conference.     ASSESSMENT:    Yadira Hoang is a 74 year old female with stage III melanoma, s/p surgery.    She is healing well.  We reviewed the pathology today and a copy of the report was provided.  I have recommended delaying her flap surgery (scheduled for tomorrow) for a re-excision surgery for the margins.  We will get this scheduled for 2/20/2020 with a plan to replace the wound vac at that time.    She will also need a PET/CT to complete staging and rule out stage IV disease.  I have also recommended a referral to Dr Cartagena of medical oncology for adjuvant immunotherapy.    Finally, for the right lower extremity swelling, we will obtain a doppler today to rule out DVT.    All of the above was discussed with the patient and all questions were answered.  They are understandably overwhelmed with all of the above information, and I've provided a written summary of the plan below as well.  I also recommended a  or psychology referral for support and she would  like this.    PLAN:  1. Right leg doppler, rule out DVT  2. PET/CT for staging  3. Medical oncology referral  4. Re-excisional wide local excision right heel  5. Delay flap closure with Dr Crabtree  6. Cancer center  referral    Gabriela Dorsey MD MSc [facility] FACS    Division of Surgical Oncology  [facility]

## 2020-02-18 NOTE — Clinical Note
2/18/2020         RE: Yadira Hoang  [address] [place] [facility] [address]        Dear Colleague,    Thank you for referring your patient, Yadira Hoang, to the [facility]. Please see a copy of my visit note below.    FOLLOW-UP  Feb 18, 2020    Yadira Hoang is a 74 year old female who returns for her 1st post-operative follow-up visit.    Cancer Staging  Malignant melanoma of right lower extremity including hip (H)  Staging form: Melanoma of the Skin, AJCC 8th Edition  - Clinical: Stage IIB (cT3b, cN0, cM0) - Signed by Gabriela Dorsey MD on 1/27/2020      Treatment to date:  1. Wide local excision of right heel melanoma with 2 cm margins and right femoral sentinel lymph node biopsy (2/10/2020)    HPI:    She underwent a wide local excision of right heel melanoma and right femoral SLNB on 2/10/2020.  She is currently 1 week(s) post-op.  Final surgical pathology showed 1/2 involved sentinel lymph nodes and 6.6 mm deep residual melanoma with microsatellites and an involved lateral (blue margin = 12:00 to 3:00 to 6:00) margin with melanoma in situ.    Since the procedure, she has been doing well.  She is tolerating the dressing changes. No fever/chills. She has been mainly off the foot.  She started having right foot swelling 3 days ago. No chest pain, no shortness of breath.      Physical Exam  Constitutional:       Appearance: She is well-developed.   Pulmonary:      Effort: No respiratory distress.   Lymphadenopathy:      Comments: Right femoral SLNB site unremarkable. No cellulitis, seroma, or hematoma   Skin:     General: Skin is warm and dry.      Comments: WLE site with vac on. No cellulitis. No purulent drainage. There is lower leg and foot edema.         INVESTIGATIONS:    Surgical Pathology (2/10/2020):  FINAL DIAGNOSIS:   A. Lymph node, right groin femoral, sentinel #1:   - Metastatic melanoma involving one lymph node (1 of 1 node) - (see description)   B. Lymph  node, right groin femoral, sentinel #2:   - One sentinel lymph node, negative for metastatic melanoma (0 of 1 lymph node)  (see description)   C. Skin, right heel, wide local excision:   - Residual malignant melanoma - (see comment)   D. Right heel, additional posterior margin:   - Fibroadipose tissue - (see description)   COMMENT:   C. The lesion extends to a depth of 6.6 mm. Melanoma in situ extends to the cauterized lateral margin (blue inked) and invasive melanoma is present at 0.2 mm from the deep blue inked  margin. Microsatellites are also present. Thus, the current updated stage for this lesion is pT4b pN2c. Selected sections were reviewed at the dermatopathology consensus  conference.     ASSESSMENT:    Yadira Hoang is a 74 year old female with stage III melanoma, s/p surgery.    She is healing well.  We reviewed the pathology today and a copy of the report was provided.  I have recommended delaying her flap surgery (scheduled for tomorrow) for a re-excision surgery for the margins.  We will get this scheduled for 2/20/2020 with a plan to replace the wound vac at that time.    She will also need a PET/CT to complete staging and rule out stage IV disease.  I have also recommended a referral to Dr Cartagena of medical oncology for adjuvant immunotherapy.    Finally, for the right lower extremity swelling, we will obtain a doppler today to rule out DVT.    All of the above was discussed with the patient and all questions were answered.  They are understandably overwhelmed with all of the above information, and I've provided a written summary of the plan below as well.  I also recommended a  or psychology referral for support and she would like this.    PLAN:  1. Right leg doppler, rule out DVT  2. PET/CT for staging  3. Medical oncology referral  4. Re-excisional wide local excision right heel  5. Delay flap closure with Dr Crabtree  6. Cancer center  referral    Gabriela Dorsey  MD MSc [facility] FACS    Division of Surgical Oncology  [facility]     Again, thank you for allowing me to participate in the care of your patient.        Sincerely,        Gabriela Dorsey MD

## 2020-02-18 NOTE — LETTER
2020      RE: Yadira Hoang  [address] [facility] [address]     2020      RE: Yadira Hoang  (: 1945)    Dear Dr. Cartagena::    Thank you for seeing Yadira Hoang regarding stage IIIC melanoma.  Please find a copy of my notes for your record and review.  If you have any further questions, please feel free to contact my office.      Sincerely,   Gabriela Dorsey MD MSc [facility] FACS    ---      FOLLOW-UP  2020    Yadira Hoang is a 74 year old female who returns for her 1st post-operative follow-up visit.    Cancer Staging  Malignant melanoma of right lower extremity including hip (H)  Staging form: Melanoma of the Skin, AJCC 8th Edition  - Clinical: Stage IIB (cT3b, cN0, cM0) - Signed by Gabriela Dorsey MD on 2020      Treatment to date:  1. Wide local excision of right heel melanoma with 2 cm margins and right femoral sentinel lymph node biopsy (2/10/2020)    HPI:    She underwent a wide local excision of right heel melanoma and right femoral SLNB on 2/10/2020.  She is currently 1 week(s) post-op.  Final surgical pathology showed 1/2 involved sentinel lymph nodes and 6.6 mm deep residual melanoma with microsatellites and an involved lateral (blue margin = 12:00 to 3:00 to 6:00) margin with melanoma in situ.    Since the procedure, she has been doing well.  She is tolerating the dressing changes. No fever/chills. She has been mainly off the foot.  She started having right foot swelling 3 days ago. No chest pain, no shortness of breath.      Physical Exam  Constitutional:       Appearance: She is well-developed.   Pulmonary:      Effort: No respiratory distress.   Lymphadenopathy:      Comments: Right femoral SLNB site unremarkable. No cellulitis, seroma, or hematoma   Skin:     General: Skin is warm and dry.      Comments: WLE site with vac on. No cellulitis. No purulent drainage. There is lower leg and foot edema.         INVESTIGATIONS:    Surgical  Pathology (2/10/2020):  FINAL DIAGNOSIS:   A. Lymph node, right groin femoral, sentinel #1:   - Metastatic melanoma involving one lymph node (1 of 1 node) - (see description)   B. Lymph node, right groin femoral, sentinel #2:   - One sentinel lymph node, negative for metastatic melanoma (0 of 1 lymph node)  (see description)   C. Skin, right heel, wide local excision:   - Residual malignant melanoma - (see comment)   D. Right heel, additional posterior margin:   - Fibroadipose tissue - (see description)   COMMENT:   C. The lesion extends to a depth of 6.6 mm. Melanoma in situ extends to the cauterized lateral margin (blue inked) and invasive melanoma is present at 0.2 mm from the deep blue inked  margin. Microsatellites are also present. Thus, the current updated stage for this lesion is pT4b pN2c. Selected sections were reviewed at the dermatopathology consensus  conference.     ASSESSMENT:    Yadira Hoang is a 74 year old female with stage III melanoma, s/p surgery.    She is healing well.  We reviewed the pathology today and a copy of the report was provided.  I have recommended delaying her flap surgery (scheduled for tomorrow) for a re-excision surgery for the margins.  We will get this scheduled for 2/20/2020 with a plan to replace the wound vac at that time.    She will also need a PET/CT to complete staging and rule out stage IV disease.  I have also recommended a referral to Dr Cartagena of medical oncology for adjuvant immunotherapy.    Finally, for the right lower extremity swelling, we will obtain a doppler today to rule out DVT.    All of the above was discussed with the patient and all questions were answered.  They are understandably overwhelmed with all of the above information, and I've provided a written summary of the plan below as well.  I also recommended a  or psychology referral for support and she would like this.    PLAN:  1. Right leg doppler, rule out DVT  2.  PET/CT for staging  3. Medical oncology referral  4. Re-excisional wide local excision right heel  5. Delay flap closure with Dr Crabtree  6. Cancer center  referral    Gabriela Dorsey MD MSc [facility] FACS    Division of Surgical Oncology  [facility]

## 2020-02-18 NOTE — TELEPHONE ENCOUNTER
asked RN to reach out to Home care to notify them that pt will need extended home care service starting this Saturday and continuing on the following week, Mon-Wed-Fri then the week after that Mon-Wed-Fri until pt's surgery with  on 3/4. Rn was transferred to [facility] , who did not answer. VM left for [facility] with detailed message with reason for call and  that RN is out of the clinic tomorrow but will be back on Thursday and  to call the clinic back with questions to [phone]......Melissa Ellsworth RN

## 2020-02-18 NOTE — PROGRESS NOTES
FOLLOW-UP  Feb 18, 2020    Yadira Hoang is a 74 year old female who returns for her 1st post-operative follow-up visit.    Cancer Staging  Malignant melanoma of right lower extremity including hip (H)  Staging form: Melanoma of the Skin, AJCC 8th Edition  - Clinical: Stage IIB (cT3b, cN0, cM0) - Signed by Gabriela Dorsey MD on 1/27/2020      Treatment to date:  1. Wide local excision of right heel melanoma with 2 cm margins and right femoral sentinel lymph node biopsy (2/10/2020)    HPI:    She underwent a wide local excision of right heel melanoma and right femoral SLNB on 2/10/2020.  She is currently 1 week(s) post-op.  Final surgical pathology showed 1/2 involved sentinel lymph nodes and 6.6 mm deep residual melanoma with microsatellites and an involved lateral (blue margin = 12:00 to 3:00 to 6:00) margin with melanoma in situ.    Since the procedure, she has been doing well.  She is tolerating the dressing changes. No fever/chills. She has been mainly off the foot.  She started having right foot swelling 3 days ago. No chest pain, no shortness of breath.      Physical Exam  Constitutional:       Appearance: She is well-developed.   Pulmonary:      Effort: No respiratory distress.   Lymphadenopathy:      Comments: Right femoral SLNB site unremarkable. No cellulitis, seroma, or hematoma   Skin:     General: Skin is warm and dry.      Comments: WLE site with vac on. No cellulitis. No purulent drainage. There is lower leg and foot edema.         INVESTIGATIONS:    Surgical Pathology (2/10/2020):  FINAL DIAGNOSIS:   A. Lymph node, right groin femoral, sentinel #1:   - Metastatic melanoma involving one lymph node (1 of 1 node) - (see description)   B. Lymph node, right groin femoral, sentinel #2:   - One sentinel lymph node, negative for metastatic melanoma (0 of 1 lymph node)  (see description)   C. Skin, right heel, wide local excision:   - Residual malignant melanoma - (see comment)   D. Right heel,  additional posterior margin:   - Fibroadipose tissue - (see description)   COMMENT:   C. The lesion extends to a depth of 6.6 mm. Melanoma in situ extends to the cauterized lateral margin (blue inked) and invasive melanoma is present at 0.2 mm from the deep blue inked  margin. Microsatellites are also present. Thus, the current updated stage for this lesion is pT4b pN2c. Selected sections were reviewed at the dermatopathology consensus  conference.     ASSESSMENT:    Yadira Hoang is a 74 year old female with stage III melanoma, s/p surgery.    She is healing well.  We reviewed the pathology today and a copy of the report was provided.  I have recommended delaying her flap surgery (scheduled for tomorrow) for a re-excision surgery for the margins.  We will get this scheduled for 2/20/2020 with a plan to replace the wound vac at that time.    She will also need a PET/CT to complete staging and rule out stage IV disease.  I have also recommended a referral to Dr Cartagena of medical oncology for adjuvant immunotherapy.    Finally, for the right lower extremity swelling, we will obtain a doppler today to rule out DVT.    All of the above was discussed with the patient and all questions were answered.  They are understandably overwhelmed with all of the above information, and I've provided a written summary of the plan below as well.  I also recommended a  or psychology referral for support and she would like this.    PLAN:  1. Right leg doppler, rule out DVT  2. PET/CT for staging  3. Medical oncology referral  4. Re-excisional wide local excision right heel  5. Delay flap closure with Dr Crabtree  6. Cancer center  referral    Gabriela Dorsey MD MSc [facility] FACS    Division of Surgical Oncology  [facility]

## 2020-02-18 NOTE — LETTER
2020      RE: Yadira Hoang  [address] [facility] [place] [address]     2020    RE: Yadira Hoang  (: 1945)    Dear Dr. Meadows:    Your patient was seen for evaluation in my office.  Please find a copy of my notes for your record and review.  If you have any further questions, please feel free to contact my office.   Thank you for your kind referral.    Sincerely,   Gabriela Dorsey MD MSc [facility] FACS    ---         FOLLOW-UP  2020    Yadira Hoang is a 74 year old female who returns for her 1st post-operative follow-up visit.    Cancer Staging  Malignant melanoma of right lower extremity including hip (H)  Staging form: Melanoma of the Skin, AJCC 8th Edition  - Clinical: Stage IIB (cT3b, cN0, cM0) - Signed by Gabriela oDrsey MD on 2020      Treatment to date:  1. Wide local excision of right heel melanoma with 2 cm margins and right femoral sentinel lymph node biopsy (2/10/2020)    HPI:    She underwent a wide local excision of right heel melanoma and right femoral SLNB on 2/10/2020.  She is currently 1 week(s) post-op.  Final surgical pathology showed 1/2 involved sentinel lymph nodes and 6.6 mm deep residual melanoma with microsatellites and an involved lateral (blue margin = 12:00 to 3:00 to 6:00) margin with melanoma in situ.    Since the procedure, she has been doing well.  She is tolerating the dressing changes. No fever/chills. She has been mainly off the foot.  She started having right foot swelling 3 days ago. No chest pain, no shortness of breath.      Physical Exam  Constitutional:       Appearance: She is well-developed.   Pulmonary:      Effort: No respiratory distress.   Lymphadenopathy:      Comments: Right femoral SLNB site unremarkable. No cellulitis, seroma, or hematoma   Skin:     General: Skin is warm and dry.      Comments: WLE site with vac on. No cellulitis. No purulent drainage. There is lower leg and foot edema.          INVESTIGATIONS:    Surgical Pathology (2/10/2020):  FINAL DIAGNOSIS:   A. Lymph node, right groin femoral, sentinel #1:   - Metastatic melanoma involving one lymph node (1 of 1 node) - (see description)   B. Lymph node, right groin femoral, sentinel #2:   - One sentinel lymph node, negative for metastatic melanoma (0 of 1 lymph node)  (see description)   C. Skin, right heel, wide local excision:   - Residual malignant melanoma - (see comment)   D. Right heel, additional posterior margin:   - Fibroadipose tissue - (see description)   COMMENT:   C. The lesion extends to a depth of 6.6 mm. Melanoma in situ extends to the cauterized lateral margin (blue inked) and invasive melanoma is present at 0.2 mm from the deep blue inked  margin. Microsatellites are also present. Thus, the current updated stage for this lesion is pT4b pN2c. Selected sections were reviewed at the dermatopathology consensus  conference.     ASSESSMENT:    Yadira Hoang is a 74 year old female with stage III melanoma, s/p surgery.    She is healing well.  We reviewed the pathology today and a copy of the report was provided.  I have recommended delaying her flap surgery (scheduled for tomorrow) for a re-excision surgery for the margins.  We will get this scheduled for 2/20/2020 with a plan to replace the wound vac at that time.    She will also need a PET/CT to complete staging and rule out stage IV disease.  I have also recommended a referral to Dr Cartagena of medical oncology for adjuvant immunotherapy.    Finally, for the right lower extremity swelling, we will obtain a doppler today to rule out DVT.    All of the above was discussed with the patient and all questions were answered.  They are understandably overwhelmed with all of the above information, and I've provided a written summary of the plan below as well.  I also recommended a  or psychology referral for support and she would like this.    PLAN:  1. Right  leg doppler, rule out DVT  2. PET/CT for staging  3. Medical oncology referral  4. Re-excisional wide local excision right heel  5. Delay flap closure with Dr Crabtree  6. Cancer center  referral    Gabriela Dorsey MD MSc [facility] FACS    Division of Surgical Oncology  [facility]

## 2020-02-19 ENCOUNTER — ANESTHESIA EVENT (OUTPATIENT)
Dept: SURGERY | Facility: AMBULATORY SURGERY CENTER | Age: 75
End: 2020-02-19

## 2020-02-19 ENCOUNTER — PATIENT OUTREACH (OUTPATIENT)
Dept: CARE COORDINATION | Facility: CLINIC | Age: 75
End: 2020-02-19

## 2020-02-19 ENCOUNTER — TELEPHONE (OUTPATIENT)
Dept: ONCOLOGY | Facility: CLINIC | Age: 75
End: 2020-02-19

## 2020-02-19 NOTE — TELEPHONE ENCOUNTER
[facility] called back to confirm they received the message and have extended services for patient.  Lisa Hughes RN

## 2020-02-19 NOTE — PROGRESS NOTES
"Social Work Note: Telephone Call  Oncology Clinic    Data/Intervention:  Patient Name:  Yadira Hoang  /Age:  1945 (74 year old)    Call From:  BELGICA  Reason for Call:  Referral from Dr. Dorsey    Assessment:  BELGICA called and spoke to Yadira this afternoon after referral from Dr. Dorsey that Yadira seemed to be having trouble coping with her diagnosis.  BELGICA introduced self and purpose of call, offering support. Yadira acknowledged she is feeling rather overwhelmed. She states she initially thought she needed to stay strong and positive, but this has started to become more difficult. BELGICA agreed that there is a place for feeling strong, but most people can't maintain that indefinitely. BELGICA encouraged Yadira to just feel and express whatever those feelings are so the appropriate level of support can be offered. Yadira reports she has very good support from her  and children. She has two daughters and two sons and reports she has been amazed at how good her daughters have been in taking care of her. BELGICA asked about Yadira's history of challenges, she reports she had two previous concussions that were very difficult, but she got through them. She also acknowledges having high anxiety and a history of depression. These were previously treated with medications, though she doesn't take anything at this time. She has also worked with a therapist out of [place] for some time, but states it is difficult to get to her now. Yadira states that she is feeling down, but her bigger concern is that she will \"get to a down place that I will have trouble getting out of.\"  BELGICA emphasized that was a gill goal, noting that it is normal to struggle when going through difficult medical experiences, but we don't want that to be the primary feeling in her life. BELGICA spoke with Yadira about options to address this now instead of waiting until she is feeling more depressed, including talking about medications again with her provider, making it a priority " to see her established therapist, seeing Dr. Angel here at the [facility], or pursuing another option closer to home. BELGICA talked through these options and tried to help Yadira think about what felt best to her. Yadira struggled with this today. She was focused on her other procedure tomorrow and upcoming reconstruction in March. BELGICA suggested that she could call Yadira back on Friday to give Yadira some time to think about those options. Yadira agreed. She was appreciative of the call and the support provided.     Plan:  BELGICA will contact Yadira again on Friday to further discuss support options and help her move to the next stop of pursuing supportive care.     JORGE Pelaez, [facility]  Clinical , Adult Oncology  Pager: [phone]  Phone: [phone]

## 2020-02-19 NOTE — TELEPHONE ENCOUNTER
Spoke with patient/family to schedule surgery with Dr Gabriela Dorsey     [facility]    Patient will have pre-surgery visit with PCP      -Patient advised H&P is needed or surgery cancellation may occur    Post-Op care appointment scheduled?  Not Applicable on    Scheduled post op with Dr Crabtree 3/20 (surgery date was moved)    Patient is aware a / is needed day of surgery.     Surgery packet will be given in clinic, patient has my direct contact information for any further questions.     Luana Grewal  Surgical [facility]-Op Coordinator  [phone]

## 2020-02-20 ENCOUNTER — ANESTHESIA (OUTPATIENT)
Dept: SURGERY | Facility: AMBULATORY SURGERY CENTER | Age: 75
End: 2020-02-20

## 2020-02-20 ENCOUNTER — HOSPITAL ENCOUNTER (OUTPATIENT)
Facility: AMBULATORY SURGERY CENTER | Age: 75
End: 2020-02-20
Attending: SURGERY
Payer: COMMERCIAL

## 2020-02-20 VITALS
HEART RATE: 98 BPM | OXYGEN SATURATION: 93 % | BODY MASS INDEX: 22.6 KG/M2 | WEIGHT: 144 LBS | HEIGHT: 67 IN | TEMPERATURE: 97.6 F | SYSTOLIC BLOOD PRESSURE: 128 MMHG | RESPIRATION RATE: 12 BRPM | DIASTOLIC BLOOD PRESSURE: 81 MMHG

## 2020-02-20 DIAGNOSIS — C43.71 MALIGNANT MELANOMA OF RIGHT LOWER EXTREMITY INCLUDING HIP (H): ICD-10-CM

## 2020-02-20 RX ORDER — MEPERIDINE HYDROCHLORIDE 25 MG/ML
12.5 INJECTION INTRAMUSCULAR; INTRAVENOUS; SUBCUTANEOUS
Status: DISCONTINUED | OUTPATIENT
Start: 2020-02-20 | End: 2020-02-21 | Stop reason: HOSPADM

## 2020-02-20 RX ORDER — ONDANSETRON 2 MG/ML
INJECTION INTRAMUSCULAR; INTRAVENOUS PRN
Status: DISCONTINUED | OUTPATIENT
Start: 2020-02-20 | End: 2020-02-20

## 2020-02-20 RX ORDER — SODIUM CHLORIDE, SODIUM LACTATE, POTASSIUM CHLORIDE, CALCIUM CHLORIDE 600; 310; 30; 20 MG/100ML; MG/100ML; MG/100ML; MG/100ML
INJECTION, SOLUTION INTRAVENOUS CONTINUOUS
Status: DISCONTINUED | OUTPATIENT
Start: 2020-02-20 | End: 2020-02-20 | Stop reason: HOSPADM

## 2020-02-20 RX ORDER — PROPOFOL 10 MG/ML
INJECTION, EMULSION INTRAVENOUS PRN
Status: DISCONTINUED | OUTPATIENT
Start: 2020-02-20 | End: 2020-02-20

## 2020-02-20 RX ORDER — OXYCODONE HYDROCHLORIDE 5 MG/1
5-10 TABLET ORAL EVERY 4 HOURS PRN
Status: DISCONTINUED | OUTPATIENT
Start: 2020-02-20 | End: 2020-02-21 | Stop reason: HOSPADM

## 2020-02-20 RX ORDER — ONDANSETRON 4 MG/1
4 TABLET, ORALLY DISINTEGRATING ORAL EVERY 30 MIN PRN
Status: DISCONTINUED | OUTPATIENT
Start: 2020-02-20 | End: 2020-02-21 | Stop reason: HOSPADM

## 2020-02-20 RX ORDER — SODIUM CHLORIDE, SODIUM LACTATE, POTASSIUM CHLORIDE, CALCIUM CHLORIDE 600; 310; 30; 20 MG/100ML; MG/100ML; MG/100ML; MG/100ML
INJECTION, SOLUTION INTRAVENOUS CONTINUOUS
Status: DISCONTINUED | OUTPATIENT
Start: 2020-02-20 | End: 2020-02-21 | Stop reason: HOSPADM

## 2020-02-20 RX ORDER — LIDOCAINE HYDROCHLORIDE 20 MG/ML
INJECTION, SOLUTION INFILTRATION; PERINEURAL PRN
Status: DISCONTINUED | OUTPATIENT
Start: 2020-02-20 | End: 2020-02-20

## 2020-02-20 RX ORDER — KETAMINE HYDROCHLORIDE 10 MG/ML
INJECTION, SOLUTION INTRAMUSCULAR; INTRAVENOUS PRN
Status: DISCONTINUED | OUTPATIENT
Start: 2020-02-20 | End: 2020-02-20

## 2020-02-20 RX ORDER — FENTANYL CITRATE 50 UG/ML
25-50 INJECTION, SOLUTION INTRAMUSCULAR; INTRAVENOUS
Status: DISCONTINUED | OUTPATIENT
Start: 2020-02-20 | End: 2020-02-20 | Stop reason: HOSPADM

## 2020-02-20 RX ORDER — OXYCODONE HYDROCHLORIDE 5 MG/1
5-10 TABLET ORAL EVERY 6 HOURS PRN
Qty: 12 TABLET | Refills: 0 | Status: SHIPPED | OUTPATIENT
Start: 2020-02-20 | End: 2020-03-03

## 2020-02-20 RX ORDER — DEXAMETHASONE SODIUM PHOSPHATE 4 MG/ML
INJECTION, SOLUTION INTRA-ARTICULAR; INTRALESIONAL; INTRAMUSCULAR; INTRAVENOUS; SOFT TISSUE PRN
Status: DISCONTINUED | OUTPATIENT
Start: 2020-02-20 | End: 2020-02-20

## 2020-02-20 RX ORDER — ALBUTEROL SULFATE 0.83 MG/ML
2.5 SOLUTION RESPIRATORY (INHALATION) EVERY 4 HOURS PRN
Status: DISCONTINUED | OUTPATIENT
Start: 2020-02-20 | End: 2020-02-20 | Stop reason: HOSPADM

## 2020-02-20 RX ORDER — DEXAMETHASONE SODIUM PHOSPHATE 4 MG/ML
4 INJECTION, SOLUTION INTRA-ARTICULAR; INTRALESIONAL; INTRAMUSCULAR; INTRAVENOUS; SOFT TISSUE EVERY 10 MIN PRN
Status: DISCONTINUED | OUTPATIENT
Start: 2020-02-20 | End: 2020-02-21 | Stop reason: HOSPADM

## 2020-02-20 RX ORDER — ONDANSETRON 2 MG/ML
4 INJECTION INTRAMUSCULAR; INTRAVENOUS EVERY 30 MIN PRN
Status: DISCONTINUED | OUTPATIENT
Start: 2020-02-20 | End: 2020-02-21 | Stop reason: HOSPADM

## 2020-02-20 RX ORDER — CEFAZOLIN SODIUM 1 G/50ML
1 SOLUTION INTRAVENOUS SEE ADMIN INSTRUCTIONS
Status: DISCONTINUED | OUTPATIENT
Start: 2020-02-20 | End: 2020-02-20 | Stop reason: HOSPADM

## 2020-02-20 RX ORDER — NALOXONE HYDROCHLORIDE 0.4 MG/ML
.1-.4 INJECTION, SOLUTION INTRAMUSCULAR; INTRAVENOUS; SUBCUTANEOUS
Status: DISCONTINUED | OUTPATIENT
Start: 2020-02-20 | End: 2020-02-21 | Stop reason: HOSPADM

## 2020-02-20 RX ORDER — FENTANYL CITRATE 50 UG/ML
INJECTION, SOLUTION INTRAMUSCULAR; INTRAVENOUS PRN
Status: DISCONTINUED | OUTPATIENT
Start: 2020-02-20 | End: 2020-02-20

## 2020-02-20 RX ORDER — GLYCOPYRROLATE 0.2 MG/ML
INJECTION, SOLUTION INTRAMUSCULAR; INTRAVENOUS PRN
Status: DISCONTINUED | OUTPATIENT
Start: 2020-02-20 | End: 2020-02-20

## 2020-02-20 RX ORDER — HYDROMORPHONE HYDROCHLORIDE 1 MG/ML
.3-.5 INJECTION, SOLUTION INTRAMUSCULAR; INTRAVENOUS; SUBCUTANEOUS EVERY 10 MIN PRN
Status: DISCONTINUED | OUTPATIENT
Start: 2020-02-20 | End: 2020-02-21 | Stop reason: HOSPADM

## 2020-02-20 RX ORDER — BUPIVACAINE HYDROCHLORIDE 2.5 MG/ML
INJECTION, SOLUTION INFILTRATION; PERINEURAL PRN
Status: DISCONTINUED | OUTPATIENT
Start: 2020-02-20 | End: 2020-02-20 | Stop reason: HOSPADM

## 2020-02-20 RX ORDER — CLINDAMYCIN PHOSPHATE 900 MG/50ML
900 INJECTION, SOLUTION INTRAVENOUS SEE ADMIN INSTRUCTIONS
Status: DISCONTINUED | OUTPATIENT
Start: 2020-02-20 | End: 2020-02-20 | Stop reason: HOSPADM

## 2020-02-20 RX ORDER — CLINDAMYCIN PHOSPHATE 900 MG/50ML
900 INJECTION, SOLUTION INTRAVENOUS
Status: DISCONTINUED | OUTPATIENT
Start: 2020-02-20 | End: 2020-02-20 | Stop reason: HOSPADM

## 2020-02-20 RX ORDER — ACETAMINOPHEN 325 MG/1
975 TABLET ORAL ONCE
Status: DISCONTINUED | OUTPATIENT
Start: 2020-02-20 | End: 2020-02-20 | Stop reason: HOSPADM

## 2020-02-20 RX ORDER — PROPOFOL 10 MG/ML
INJECTION, EMULSION INTRAVENOUS CONTINUOUS PRN
Status: DISCONTINUED | OUTPATIENT
Start: 2020-02-20 | End: 2020-02-20

## 2020-02-20 RX ORDER — CEFAZOLIN SODIUM 2 G/50ML
2 SOLUTION INTRAVENOUS
Status: DISCONTINUED | OUTPATIENT
Start: 2020-02-20 | End: 2020-02-20 | Stop reason: HOSPADM

## 2020-02-20 RX ORDER — LIDOCAINE 40 MG/G
CREAM TOPICAL
Status: DISCONTINUED | OUTPATIENT
Start: 2020-02-20 | End: 2020-02-20 | Stop reason: HOSPADM

## 2020-02-20 RX ORDER — FENTANYL CITRATE 50 UG/ML
25-50 INJECTION, SOLUTION INTRAMUSCULAR; INTRAVENOUS
Status: DISCONTINUED | OUTPATIENT
Start: 2020-02-20 | End: 2020-02-21 | Stop reason: HOSPADM

## 2020-02-20 RX ADMIN — FENTANYL CITRATE 25 MCG: 50 INJECTION, SOLUTION INTRAMUSCULAR; INTRAVENOUS at 16:07

## 2020-02-20 RX ADMIN — PROPOFOL 150 MG: 10 INJECTION, EMULSION INTRAVENOUS at 14:28

## 2020-02-20 RX ADMIN — FENTANYL CITRATE 50 MCG: 50 INJECTION, SOLUTION INTRAMUSCULAR; INTRAVENOUS at 14:28

## 2020-02-20 RX ADMIN — SODIUM CHLORIDE, SODIUM LACTATE, POTASSIUM CHLORIDE, CALCIUM CHLORIDE: 600; 310; 30; 20 INJECTION, SOLUTION INTRAVENOUS at 12:39

## 2020-02-20 RX ADMIN — Medication 100 MCG: at 15:03

## 2020-02-20 RX ADMIN — OXYCODONE HYDROCHLORIDE 5 MG: 5 TABLET ORAL at 16:05

## 2020-02-20 RX ADMIN — DEXAMETHASONE SODIUM PHOSPHATE 4 MG: 4 INJECTION, SOLUTION INTRA-ARTICULAR; INTRALESIONAL; INTRAMUSCULAR; INTRAVENOUS; SOFT TISSUE at 14:30

## 2020-02-20 RX ADMIN — FENTANYL CITRATE 25 MCG: 50 INJECTION, SOLUTION INTRAMUSCULAR; INTRAVENOUS at 14:46

## 2020-02-20 RX ADMIN — CLINDAMYCIN PHOSPHATE 900 MG: 900 INJECTION, SOLUTION INTRAVENOUS at 14:30

## 2020-02-20 RX ADMIN — LIDOCAINE HYDROCHLORIDE 60 MG: 20 INJECTION, SOLUTION INFILTRATION; PERINEURAL at 14:28

## 2020-02-20 RX ADMIN — KETAMINE HYDROCHLORIDE 20 MG: 10 INJECTION, SOLUTION INTRAMUSCULAR; INTRAVENOUS at 14:47

## 2020-02-20 RX ADMIN — LIDOCAINE HYDROCHLORIDE 40 MG: 20 INJECTION, SOLUTION INFILTRATION; PERINEURAL at 14:45

## 2020-02-20 RX ADMIN — ONDANSETRON 4 MG: 2 INJECTION INTRAMUSCULAR; INTRAVENOUS at 16:39

## 2020-02-20 RX ADMIN — Medication 100 MCG: at 15:10

## 2020-02-20 RX ADMIN — FENTANYL CITRATE 25 MCG: 50 INJECTION, SOLUTION INTRAMUSCULAR; INTRAVENOUS at 14:45

## 2020-02-20 RX ADMIN — GLYCOPYRROLATE 0.2 MG: 0.2 INJECTION, SOLUTION INTRAMUSCULAR; INTRAVENOUS at 14:30

## 2020-02-20 RX ADMIN — PROPOFOL 200 MCG/KG/MIN: 10 INJECTION, EMULSION INTRAVENOUS at 14:28

## 2020-02-20 RX ADMIN — ONDANSETRON 4 MG: 2 INJECTION INTRAMUSCULAR; INTRAVENOUS at 14:30

## 2020-02-20 RX ADMIN — FENTANYL CITRATE 25 MCG: 50 INJECTION, SOLUTION INTRAMUSCULAR; INTRAVENOUS at 16:00

## 2020-02-20 ASSESSMENT — MIFFLIN-ST. JEOR: SCORE: 1180.81

## 2020-02-20 NOTE — ANESTHESIA PREPROCEDURE EVALUATION
"Anesthesia Pre-Procedure Evaluation    Patient: Yadira Hoang   MRN:     [id number] Gender:   female   Age:    74 year old :      1945        Preoperative Diagnosis: Malignant melanoma of right lower extremity including hip (H) [C43.71]   Procedure(s):  Wide local excision RIGHT heel Wound vac application     LABS:  CBC: No results found for: WBC, HGB, HCT, PLT  BMP: No results found for: NA, POTASSIUM, CHLORIDE, CO2, BUN, CR, GLC  COAGS: No results found for: PTT, INR, FIBR  POC: No results found for: BGM, HCG, HCGS  OTHER: No results found for: PH, LACT, A1C, GABY, PHOS, MAG, ALBUMIN, PROTTOTAL, ALT, AST, GGT, ALKPHOS, BILITOTAL, BILIDIRECT, LIPASE, AMYLASE, PALLAVI, TSH, T4, T3, CRP, SED     Preop Vitals    BP Readings from Last 3 Encounters:   02/10/20 128/76   20 118/74    Pulse Readings from Last 3 Encounters:   02/10/20 81   20 98      Resp Readings from Last 3 Encounters:   02/10/20 14    SpO2 Readings from Last 3 Encounters:   02/10/20 97%   20 97%      Temp Readings from Last 1 Encounters:   02/10/20 36.4  C (97.6  F) (Temporal)    Ht Readings from Last 1 Encounters:   20 1.67 m (5' 5.75\")      Wt Readings from Last 1 Encounters:   20 63 kg (139 lb)    Estimated body mass index is 22.61 kg/m  as calculated from the following:    Height as of 20: 1.67 m (5' 5.75\").    Weight as of 20: 63 kg (139 lb).     LDA:  Peripheral IV 02/10/20 Anterior;Left Hand (Active)   Number of days: 10       Negative Pressure Wound Therapy Foot Right (Active)   Number of days: 10        Past Medical History:   Diagnosis Date     Concussion       Past Surgical History:   Procedure Laterality Date     BIOPSY NODE SENTINEL Right 2/10/2020    Procedure: [place] Lymph Node Mapping And Biopsy;  Surgeon: Gabriela Dorsey MD;  Location: [facility] OR     EXCISE LESION LOWER EXTREMITY Right 2/10/2020    Procedure: Wide Local Excision of RIGHT heel melanoma;  Surgeon: Gabriela Dorsey MD; "  Location: [facility] OR     TUBAL LIGATION        Allergies   Allergen Reactions     Hmg-Coa-R Inhibitors Swelling     Lactose      Intolerance                 PHYSICAL EXAM:   Mental Status/Neuro: A/A/O   Airway: Facies: Feasible  Mallampati: II  Mouth/Opening: Full  TM distance: > 6 cm  Neck ROM: Full   Respiratory:   Resp. Rate: Normal     Resp. Effort: Normal      CV:   Rate: Age appropriate  Edema: None   Comments:      Dental: Normal Dentition                Assessment:   ASA SCORE: 2    H&P: History and physical reviewed and following examination; no interval change.    NPO Status: NPO Appropriate     Plan:   Anes. Type:  General   Pre-Medication: None   Induction:  IV (Standard)   Airway: ETT; Oral   Access/Monitoring: PIV   Maintenance: Balanced     Postop Plan:   Postop Pain: Opioids  Postop Sedation/Airway: Not planned  Disposition: Outpatient     PONV Management: Adult Risk Factors: Female   Prevention: Ondansetron, Dexamethasone     CONSENT: Direct conversation   Plan and risks discussed with: Patient          Comments for Plan/Consent:  LMA if supine, ETT if prone, CMAC or glidescope if ETT.                 Catarino Kim MD

## 2020-02-20 NOTE — ANESTHESIA POSTPROCEDURE EVALUATION
Anesthesia POST Procedure Evaluation    Patient: Yadira Hoang   MRN:     [id number] Gender:   female   Age:    74 year old :      1945        Preoperative Diagnosis: Malignant melanoma of right lower extremity including hip (H) [C43.71]   Procedure(s):  Wide local Re-excision RIGHT heel Wound vac application   Postop Comments: No value filed.     Anesthesia Type: General          Postop Pain Control: Uneventful            Sign Out: Well controlled pain   PONV: No   Neuro/Psych: Uneventful            Sign Out: Acceptable/Baseline neuro status   Airway/Respiratory: Uneventful            Sign Out: Acceptable/Baseline resp. status   CV/Hemodynamics: Uneventful            Sign Out: Acceptable CV status   Other NRE: NONE   DID A NON-ROUTINE EVENT OCCUR? No         Last Anesthesia Record Vitals:  CRNA VITALS  2020 1503 - 2020 1603      2020             Pulse:  88    SpO2:  99 %    Resp Rate (observed):  9          Last PACU Vitals:  Vitals Value Taken Time   /74 2020  3:45 PM   Temp 36.4  C (97.6  F) 2020  3:37 PM   Pulse     Resp 13 2020  3:45 PM   SpO2 95 % 2020  3:45 PM   Temp src     NIBP     Pulse     SpO2     Resp     Temp     Ht Rate     Temp 2           Electronically Signed By: Garrett Reid MD, 2020, 4:26 PM

## 2020-02-20 NOTE — DISCHARGE INSTRUCTIONS
[facility]  Home Care Following Anesthesia  For 24 hours after surgery:  1. Get plenty of rest.  A responsible adult must stay with you for at least 24 hours after you leave the surgery center.  2. Do not drive or use heavy equipment.  If you have weakness or tingling, don't drive or use heavy equipment until this feeling goes away.   3. Do not drink alcohol.   4. Avoid strenuous or risky activities.  Ask for help when climbing stairs.  5. You may feel lightheaded.  IF so, sit for a few minutes before standing.  Have someone help you get up.   6. If you have nausea (feel sick to your stomach): Drink only clear liquids such as apple juice, ginger ale, broth or 7-Up.  Rest may also help.  Be sure to drink enough fluids.  Move to a regular diet as you feel able.   7. You may have a slight fever.  Call the doctor if your fever is over 100 F (37.7 C) (taken under the tongue) or lasts longer than 24 hours.  8. You may have a dry mouth, a sore throat, muscle aches or trouble sleeping. These should go away after 24 hours.  9. Do not make important or legal decisions.               Tips for taking pain medications  To get the best pain relief possible, remember these points:    Take pain medications as directed, before pain becomes severe.    Pain medication can upset your stomach: taking it with food may help.    Constipation is a common side effect of pain medication. Drink plenty of  fluids.    Eat foods high in fiber. Take a stool softener if recommended by your doctor or pharmacist.    Do not drink alcohol, drive or operate machinery while taking pain medications.    Ask about other ways to control pain, such as with heat, ice or relaxation.    Tylenol/Acetaminophen Consumption  To help encourage the safe use of acetaminophen, the makers of TYLENOL  have lowered the maximum daily dose for single-ingredient Extra Strength TYLENOL  (acetaminophen) products sold in the [place] from 8  pills per day (4,000 mg) to 6 pills per day (3,000 mg). The dosing interval has also changed from 2 pills every 4-6 hours to 2 pills every 6 hours.    If you feel your pain relief is insufficient, you may take Tylenol/Acetaminophen in addition to your narcotic pain medication.     Be careful not to exceed 3,000 mg of Tylenol/Acetaminophen in a 24 hour period from all sources.    If you are taking extra strength Tylenol/acetaminophen (500 mg), the maximum dose is 6 tablets in 24 hours.    If you are taking regular strength acetaminophen (325 mg), the maximum dose is 9 tablets in 24 hours.    Call a doctor for any of the followin. Signs of infection (fever, growing tenderness at the surgery site, a large amount of drainage or bleeding, severe pain, foul-smelling drainage, redness, swelling).  2. It has been over 8 to 10 hours since surgery and you are still not able to urinate (pass water).  3. Headache for over 24 hours.    Your doctor is:  Dr. Gabriela Dorsey, [facility]: [phone]                  Or dial [phone] and ask for the resident on call for:  [facility]  For emergency care, call the:  [facility]:  [phone] (TTY for hearing impaired: [phone])

## 2020-02-20 NOTE — ANESTHESIA CARE TRANSFER NOTE
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    Anesthesia Type:   General     Note:  Airway :Room Air  Patient transferred to:PACU  Comments: VSS and WN, comfortable, no PONV, report to Magda MARTINSHandoff Report: Identifed the Patient, Identified the Reponsible Provider, Reviewed the pertinent medical history, Discussed the surgical course, Reviewed Intra-OP anesthesia mangement and issues during anesthesia, Set expectations for post-procedure period and Allowed opportunity for questions and acknowledgement of understanding      Vitals: (Last set prior to Anesthesia Care Transfer)    CRNA VITALS  2/20/2020 1503 - 2/20/2020 1538      2/20/2020             Pulse:  88    SpO2:  99 %    Resp Rate (observed):  9                Electronically Signed By: YVONNE Riojas CRNA  February 20, 2020  3:38 PM

## 2020-02-20 NOTE — OP NOTE
DATE OF SURGERY: February 20, 2020     SURGEON: Gabriela Dorsey MD  ASSISTANT: Urmila Jones MS-3    PREOPERATIVE DIAGNOSIS: Right heel melanoma  POSTOPERATIVE DIAGNOSIS: same    PROCEDURE:   1. Re-excision of medial margin with 1 cm margin  2. Application of wound vac    ANESTHESIA: General    CLINICAL NOTE:  Yadira Hoang is a 74 year old woman with a right heel melanoma.  Her wide local excision was performed and the 12:00 to 6:00 margin was involved with melanoma in situ.  She therefore returns for a re-excision for margins.  The risks and benefits of excision and wound vac application were discussed with the patient and she elected to proceed with informed consent.    OPERATIVE FINDINGS:  1. Additional 1 cm margin removed on the medial aspect of the foot.    OPERATIVE PROCEDURE:  The patient was brought to the operating room and placed in the supine position with appropriate padding for all of the pressure points. A general anesthetic was administered.   A surgical safety checklist was performed to confirm the patient's identity, the site and laterality of the procedure. Perioperative antibiotics (Ancef) was provided.  VTE prophylaxis was provided with serial compression devices.  The wound vac was removed.  The right heel was then prepped and draped in the usual sterile fashion using Chloraprep.     There was healthy granulation tissue on the wound bed.  An additional 1 cm of skin was marked out on the medial aspect of the wound.  This was excised down to the underlying fascia.  The specimen was inked and sent to pathology.  The wound was irrigated and hemostasis was achieved. 12 mL of 0.25% marcaine was infiltrated into the surgical site. The wound bed now measures 10 cm x 9 cm x 0.5 cm deep.  A wound vac was reapplied, with the tubing sited on the top of the foot to facilitate wearing a orthotic boot postoperatively. The skin underlying the top of the foot where the vac is located was protected with plastic  first.  The vac was connected to 125 mmHg continuous suction and there was a good seal.   The patient tolerated the procedure well. The sponge, needle, instrument counts were correct.  The patient was then awakened and taken to recovery in stable fashion.      I was present and scrubbed for the entire above procedure.    EBL: Nil    SPECIMEN(S):  A. Right heel, new medial margin    POSTOPERATIVE PLANS:  Yadira Hoang will be discharged home today with wound care instructions and a prescription for analgesics.  She should be non-weightbearing on the right foot. She will follow-up with me in 2 weeks.     Gabriela Dorsey MD MSc FRCSC FACS    Division of Surgical Oncology  [facility]

## 2020-02-21 ENCOUNTER — TELEPHONE (OUTPATIENT)
Dept: SURGERY | Facility: CLINIC | Age: 75
End: 2020-02-21

## 2020-02-21 ENCOUNTER — PATIENT OUTREACH (OUTPATIENT)
Dept: CARE COORDINATION | Facility: CLINIC | Age: 75
End: 2020-02-21

## 2020-02-21 NOTE — TELEPHONE ENCOUNTER
RN called pt to follow up with reminder of upcoming PET scan scheduled tomorrow. Pt advised to arrive at 10:45am and no eating 6 hours prior. Pt verbalized understanding and relayed information back to RN. Pt states that she does not have any other questions at the moment. Pt reports wound vac is on and working properly. RN notified pt that home care is aware of continued wound vac care that is needed. Pt expressed confusion with that so RN notified pt that RN would reach out to home care to confirm that a nurse will be out tomorrow. Pt advised to call RN back with any questions at anytime. RN called home care and spoke with Yadira who states that she spoke with pt this morning and confirmed that pt's Nurse  will be out tomorrow and that if any further home care orders are needed she will reach out to clinic....Melissa Ellsworth RN

## 2020-02-21 NOTE — PROGRESS NOTES
Social Work Note: Telephone Call  Oncology Clinic    Data/Intervention:  Patient Name:  Yadira Hoang  /Age:  1945 (74 year old)    Call From:  BELGICA  Reason for Call:  Psychosocial follow up    Assessment:  BELGICA called and spoke to Yadira this afternoon. Yadira sounded well and reports that everything yesterday was a success. She notes that she does have some relief from her anxiety now that it is over. BELGICA supported Yadira with this and agreed that it is very common to feel heightened distress preceding a procedure like that. Yadira states her pain is no worse then it has been and things are going well. BELGICA followed up on Wednesday's discussion regarding counseling support. Yadira states she isn't sure she wants to do that at this time. She feels like she already has so many appointments and is not interested in adding to it at this time. BELGICA agreed this was Yadira's choice, but encouraged her to stay mindful of her mental/emotional state so if things appear to be getting worse, she wants aYdira to stay on top of those things. Yadira agreed that she was glad to know some of the resources and know she can contact BELGICA with further questions.     Plan:  Yadira aware of ways to contact BELGICA or schedule with Ck Angel if she decides to pursue that support. BELGICA will remain available as needed and appropriate.       JORGE Pelaez, [facility]  Clinical , Adult Oncology  Pager: [phone]  Phone: [phone]

## 2020-02-22 ENCOUNTER — HOSPITAL ENCOUNTER (OUTPATIENT)
Dept: PET IMAGING | Facility: CLINIC | Age: 75
Setting detail: NUCLEAR MEDICINE
Discharge: HOME | End: 2020-02-22
Attending: SURGERY | Admitting: SURGERY
Payer: COMMERCIAL

## 2020-02-22 DIAGNOSIS — C43.71 MALIGNANT MELANOMA OF RIGHT LOWER EXTREMITY INCLUDING HIP (H): ICD-10-CM

## 2020-02-22 PROCEDURE — 74177 CT ABD & PELVIS W/CONTRAST: CPT

## 2020-02-22 PROCEDURE — 34300033 ZZH RX 343: Performed by: SURGERY

## 2020-02-22 PROCEDURE — 25000128 H RX IP 250 OP 636: Performed by: SURGERY

## 2020-02-22 PROCEDURE — A9552 F18 FDG: HCPCS | Performed by: SURGERY

## 2020-02-22 RX ORDER — IOPAMIDOL 755 MG/ML
1-135 INJECTION, SOLUTION INTRAVASCULAR ONCE
Status: COMPLETED | OUTPATIENT
Start: 2020-02-22 | End: 2020-02-22

## 2020-02-22 RX ADMIN — IOPAMIDOL 88 ML: 755 INJECTION, SOLUTION INTRAVENOUS at 11:27

## 2020-02-22 RX ADMIN — FLUDEOXYGLUCOSE F-18 10.18 MCI.: 500 INJECTION, SOLUTION INTRAVENOUS at 11:02

## 2020-02-24 ENCOUNTER — TELEPHONE (OUTPATIENT)
Dept: SURGERY | Facility: CLINIC | Age: 75
End: 2020-02-24

## 2020-02-24 NOTE — TELEPHONE ENCOUNTER
[facility] Call Center    Phone Message    May a detailed message be left on voicemail: yes     Reason for Call: Medication Refill Request    Has the patient contacted the pharmacy for the refill? Yes   Name of medication being requested: acetaminophen (TYLENOL) 325 MG tablet [283] (Order [id number])   Pt has question about  acetaminiphen with the oxycodone together, please call pt   Provider who prescribed the medication: Dr Dorsey  Pharmacy: [facility]  Date medication is needed: ASAP     Action Taken: Message routed to:  Adult Clinics: Surgery, General p [id number]    Travel Screening: Not Applicable

## 2020-02-24 NOTE — TELEPHONE ENCOUNTER
Pt called and states that she has been experiencing increased pain and is wondering if she can take Tylenol and Oxycodone together. RN advised alternating the two medications. Home care nurse was there today per pt and pt states that she said the site looked good and had no concerns. RN advised pt to try alternating the medication and call the clinic back tomorrow if pain is not improved or worsening. Pt verbalized understanding...Melissa Ellsworth RN

## 2020-02-25 ENCOUNTER — TELEPHONE (OUTPATIENT)
Dept: ONCOLOGY | Facility: CLINIC | Age: 75
End: 2020-02-25

## 2020-02-25 DIAGNOSIS — C43.71 MALIGNANT MELANOMA OF RIGHT LOWER EXTREMITY INCLUDING HIP (H): Primary | ICD-10-CM

## 2020-02-25 RX ORDER — ONDANSETRON 4 MG/1
4 TABLET, FILM COATED ORAL EVERY 8 HOURS PRN
Qty: 20 TABLET | Refills: 1 | Status: ON HOLD | OUTPATIENT
Start: 2020-02-25 | End: 2020-03-06

## 2020-02-25 NOTE — TELEPHONE ENCOUNTER
ok'd pt taking Ibuprofen and also advised if the Ibuprofen is not adequate then  would send a Rx for Zofran for pt to take with the Oxycodone. Pt called and notified.....Melissa Ellsworth RN

## 2020-02-25 NOTE — TELEPHONE ENCOUNTER
"Pt s/p wide local re-excision or right lower extremity including hip. Pt called and states that she tried taking the Oxycodone but \"it makes me sick\". Pt reports severe nausea with taking the Oxycodone. Tylenol taken this morning with minimal pain relief. Pain is not worse per pt but not better . Home care nurse is coming tomorrow and pt usually takes pain medication 30 min before wound vac cares but pt is not sure what to take . Will route to  to advise if okay for pt to take Ibuprofen or for any further recommendations. Thank you....Melissa Ellsworth RN    "

## 2020-02-25 NOTE — TELEPHONE ENCOUNTER
[facility] Call Center    Phone Message    May a detailed message be left on voicemail: yes     Reason for Call: Other: patient would like medication switched as it makes her sick. oxyCODONE 5 MG PO tablet [[id number]] (Order [id number])     Action Taken: Message routed to:  Adult Clinics: Surgery, General p [id number]

## 2020-02-26 ENCOUNTER — MEDICAL CORRESPONDENCE (OUTPATIENT)
Dept: HEALTH INFORMATION MANAGEMENT | Facility: CLINIC | Age: 75
End: 2020-02-26

## 2020-02-26 ENCOUNTER — TELEPHONE (OUTPATIENT)
Dept: SURGERY | Facility: CLINIC | Age: 75
End: 2020-02-26

## 2020-02-26 NOTE — TELEPHONE ENCOUNTER
ONCOLOGY INTAKE: Records Information      APPT INFORMATION: 2/27/20 - Esteban-Rigoberto - [facility]  Referring provider:  Jessica Meadows DPM  Referring provider s clinic:  [facility]  Reason for visit/diagnosis:  Malignant Melanoma, right heel  Has patient been notified of appointment date and time?: Yes - per Daniela     RECORDS INFORMATION:  Were the records received with the referral (via [employer])? Yes     Has patient been seen for any external appt for this diagnosis? Yes     If yes, where? [facility]     Has patient had any imaging or procedures outside of [place] for this condition? Yes                  If Yes, where? [facility]        ADDITIONAL INFORMATION:  Scheduled via IB from Daniela LISA - pamella notified patient

## 2020-02-26 NOTE — TELEPHONE ENCOUNTER
RECORDS STATUS - ALL OTHER DIAGNOSIS      RECORDS RECEIVED FROM: [facility]   DATE RECEIVED: 02/26/2020   NOTES STATUS DETAILS   OFFICE NOTE from referring provider complete

## 2020-02-26 NOTE — TELEPHONE ENCOUNTER
Tata from home care called to report that the wound to pt's right heel she thinks is looking worse since Monday. Tata reports that there is increased sloughing and pt is reporting increased pain today. Tata advises that pt be seen by a provider and Tata could send a photo via email if  prefers. RN notified Tata that RN would update  and call Tata back . RN reached out to  via phone message and Dr. Dorsey advised a photo be sent to her email. Tata called and notified and 's email given to Tata who states that she would send the photo in a few minutes....Melissa Ellsworth RN

## 2020-02-27 ENCOUNTER — PRE VISIT (OUTPATIENT)
Dept: ONCOLOGY | Facility: CLINIC | Age: 75
End: 2020-02-27

## 2020-02-27 ENCOUNTER — ONCOLOGY VISIT (OUTPATIENT)
Dept: ONCOLOGY | Facility: CLINIC | Age: 75
End: 2020-02-27
Attending: INTERNAL MEDICINE
Payer: COMMERCIAL

## 2020-02-27 VITALS
SYSTOLIC BLOOD PRESSURE: 114 MMHG | DIASTOLIC BLOOD PRESSURE: 70 MMHG | TEMPERATURE: 97.1 F | BODY MASS INDEX: 22.76 KG/M2 | HEART RATE: 89 BPM | OXYGEN SATURATION: 98 % | RESPIRATION RATE: 18 BRPM | WEIGHT: 145.3 LBS

## 2020-02-27 DIAGNOSIS — C43.71 MALIGNANT MELANOMA OF RIGHT LOWER EXTREMITY INCLUDING HIP (H): ICD-10-CM

## 2020-02-27 DIAGNOSIS — C43.71 MALIGNANT MELANOMA OF RIGHT LOWER EXTREMITY INCLUDING HIP (H): Primary | ICD-10-CM

## 2020-02-27 LAB
ALBUMIN SERPL-MCNC: 3.3 G/DL (ref 3.4–5)
ALP SERPL-CCNC: 66 U/L (ref 40–150)
ALT SERPL W P-5'-P-CCNC: 20 U/L (ref 0–50)
ANION GAP SERPL CALCULATED.3IONS-SCNC: 6 MMOL/L (ref 3–14)
AST SERPL W P-5'-P-CCNC: 10 U/L (ref 0–45)
BASOPHILS # BLD AUTO: 0 10E9/L (ref 0–0.2)
BASOPHILS NFR BLD AUTO: 0.4 %
BILIRUB SERPL-MCNC: 0.3 MG/DL (ref 0.2–1.3)
BUN SERPL-MCNC: 15 MG/DL (ref 7–30)
CALCIUM SERPL-MCNC: 9.1 MG/DL (ref 8.5–10.1)
CHLORIDE SERPL-SCNC: 107 MMOL/L (ref 94–109)
CO2 SERPL-SCNC: 31 MMOL/L (ref 20–32)
COPATH REPORT: NORMAL
CREAT SERPL-MCNC: 0.55 MG/DL (ref 0.52–1.04)
DIFFERENTIAL METHOD BLD: NORMAL
EOSINOPHIL # BLD AUTO: 0.1 10E9/L (ref 0–0.7)
EOSINOPHIL NFR BLD AUTO: 1.5 %
ERYTHROCYTE [DISTWIDTH] IN BLOOD BY AUTOMATED COUNT: 13.2 % (ref 10–15)
GFR SERPL CREATININE-BSD FRML MDRD: >90 ML/MIN/{1.73_M2}
GLUCOSE SERPL-MCNC: 81 MG/DL (ref 70–99)
HCT VFR BLD AUTO: 37.6 % (ref 35–47)
HGB BLD-MCNC: 12.2 G/DL (ref 11.7–15.7)
IMM GRANULOCYTES # BLD: 0 10E9/L (ref 0–0.4)
IMM GRANULOCYTES NFR BLD: 0.2 %
LYMPHOCYTES # BLD AUTO: 1.2 10E9/L (ref 0.8–5.3)
LYMPHOCYTES NFR BLD AUTO: 22.2 %
MCH RBC QN AUTO: 30.7 PG (ref 26.5–33)
MCHC RBC AUTO-ENTMCNC: 32.4 G/DL (ref 31.5–36.5)
MCV RBC AUTO: 95 FL (ref 78–100)
MONOCYTES # BLD AUTO: 0.4 10E9/L (ref 0–1.3)
MONOCYTES NFR BLD AUTO: 7.5 %
NEUTROPHILS # BLD AUTO: 3.7 10E9/L (ref 1.6–8.3)
NEUTROPHILS NFR BLD AUTO: 68.2 %
NRBC # BLD AUTO: 0 10*3/UL
NRBC BLD AUTO-RTO: 0 /100
PLATELET # BLD AUTO: 378 10E9/L (ref 150–450)
POTASSIUM SERPL-SCNC: 4.1 MMOL/L (ref 3.4–5.3)
PROT SERPL-MCNC: 6.8 G/DL (ref 6.8–8.8)
RBC # BLD AUTO: 3.98 10E12/L (ref 3.8–5.2)
SODIUM SERPL-SCNC: 143 MMOL/L (ref 133–144)
WBC # BLD AUTO: 5.4 10E9/L (ref 4–11)

## 2020-02-27 PROCEDURE — 99205 OFFICE O/P NEW HI 60 MIN: CPT | Mod: ZP | Performed by: INTERNAL MEDICINE

## 2020-02-27 PROCEDURE — G0463 HOSPITAL OUTPT CLINIC VISIT: HCPCS | Mod: ZF

## 2020-02-27 PROCEDURE — 36415 COLL VENOUS BLD VENIPUNCTURE: CPT | Performed by: INTERNAL MEDICINE

## 2020-02-27 PROCEDURE — 85025 COMPLETE CBC W/AUTO DIFF WBC: CPT | Performed by: INTERNAL MEDICINE

## 2020-02-27 PROCEDURE — 80053 COMPREHEN METABOLIC PANEL: CPT | Performed by: INTERNAL MEDICINE

## 2020-02-27 ASSESSMENT — PAIN SCALES - GENERAL: PAINLEVEL: MODERATE PAIN (5)

## 2020-02-27 NOTE — PROGRESS NOTES
[facility]  MEDICAL ONCOLOGY CONSULT  Feb 27, 2020    CHIEF COMPLAINT: acral melanoma, stage III    HISTORY OF PRESENT ILLNESS  Yadira Hoang is a 74 year old female with stage III acral melanoma.    She noted a painful lesion on the sole of the right foot for a few months, since last summer.  It initially was small then became raised.  It spontaneously bled. She is not entirely sure of its appearance initially.  She denies noting any masses behind the knee or in the groin.     A punch biopsy was performed by Dr Jessica Meadows on 1/9/2020.  It showed melanoma, at least 3.4 mm deep with ulceration and 0 mitoses, and perineural invasion present. TILs were non-brisk and LVI not identified. pT3b.    2/10/2020, she has right femoral sentinel lymph node biopsy and wide local excision (Specimen #: [id number]) and the right heel lesion extends to a depth of 6.6 mm, and invasive melanoma is present at 0.2 mm from the deep margin. Microsatellites are also present. There was 1 (of 2) lymph nodes involved. The lymph node tissue exhibits extensive subcapsular and parenchymal clusters of melanoma cells, highlighted on Melan-A immunostain. The largest cluster is 7 millimeters in greatest diameter. pT4b pN2c.    She had PET-CT on 2/22/2020, which showed intense FDG uptake in a right inguinal lymph node, concerning for an additional focus of metastatic disease. There is also an indeterminate right external iliac lymph node with mild FDG uptake.    Currently, Ms. Hoang is recovering from her most recent surgery. She is on a wound vac, and there are plans to use free flap coverage. A nurse comes to the home 3x per week to help with wound cares. She is taking oxycodone an hour before bandage changes. However, she notes the pain medication makes her nauseous. She has taken Zofran, which did not really help. OItherwise, she has a history of irritable bowels. She notes regular bowel movements on opioids. She does not  feel constipated. She denies shortness of breath or cough. No fevers or chills.    Labs today are normal except for mildly reduced albumin of 3.3. ECOG performance status is 0-1.    Melanoma History  History of tanning bed use: No  Prior skin biopsies: No  Prior skin cancer: No  Race/Ethnicity: Sd  Works indoors/outdoors: retired, worked in an office  Uses sunscreen: No    Results for orders placed or performed in visit on 02/27/20   Comprehensive metabolic panel     Status: Abnormal   Result Value Ref Range    Sodium 143 133 - 144 mmol/L    Potassium 4.1 3.4 - 5.3 mmol/L    Chloride 107 94 - 109 mmol/L    Carbon Dioxide 31 20 - 32 mmol/L    Anion Gap 6 3 - 14 mmol/L    Glucose 81 70 - 99 mg/dL    Urea Nitrogen 15 7 - 30 mg/dL    Creatinine 0.55 0.52 - 1.04 mg/dL    GFR Estimate >90 >60 mL/min/[1.73_m2]    GFR Estimate If Black >90 >60 mL/min/[1.73_m2]    Calcium 9.1 8.5 - 10.1 mg/dL    Bilirubin Total 0.3 0.2 - 1.3 mg/dL    Albumin 3.3 (L) 3.4 - 5.0 g/dL    Protein Total 6.8 6.8 - 8.8 g/dL    Alkaline Phosphatase 66 40 - 150 U/L    ALT 20 0 - 50 U/L    AST 10 0 - 45 U/L   CBC with platelets differential     Status: None   Result Value Ref Range    WBC 5.4 4.0 - 11.0 10e9/L    RBC Count 3.98 3.8 - 5.2 10e12/L    Hemoglobin 12.2 11.7 - 15.7 g/dL    Hematocrit 37.6 35.0 - 47.0 %    MCV 95 78 - 100 fl    MCH 30.7 26.5 - 33.0 pg    MCHC 32.4 31.5 - 36.5 g/dL    RDW 13.2 10.0 - 15.0 %    Platelet Count 378 150 - 450 10e9/L    Diff Method Automated Method     % Neutrophils 68.2 %    % Lymphocytes 22.2 %    % Monocytes 7.5 %    % Eosinophils 1.5 %    % Basophils 0.4 %    % Immature Granulocytes 0.2 %    Nucleated RBCs 0 0 /100    Absolute Neutrophil 3.7 1.6 - 8.3 10e9/L    Absolute Lymphocytes 1.2 0.8 - 5.3 10e9/L    Absolute Monocytes 0.4 0.0 - 1.3 10e9/L    Absolute Eosinophils 0.1 0.0 - 0.7 10e9/L    Absolute Basophils 0.0 0.0 - 0.2 10e9/L    Abs Immature Granulocytes 0.0 0 - 0.4 10e9/L    Absolute Nucleated RBC  0.0          REVIEW OF SYSTEMS  A 12-point ROS negative except as in HPI    Current Outpatient Medications   Medication Sig Dispense Refill     acetaminophen (TYLENOL) 325 MG tablet Take 2 tablets (650 mg) by mouth every 4 hours as needed for mild pain 50 tablet 0     cholecalciferol (VITAMIN D3) 125 MCG (5000 UT) TABS tablet Take 2,000 Units by mouth       estradiol (ESTRACE) 0.1 MG/GM vaginal cream estradiol 0.01% (0.1 mg/gram) vaginal cream       ondansetron (ZOFRAN) 4 MG tablet Take 1 tablet (4 mg) by mouth every 8 hours as needed for nausea 20 tablet 1     oxyCODONE (ROXICODONE) 5 MG tablet Take 1-2 tablets (5-10 mg) by mouth every 4 hours as needed for moderate to severe pain 30 tablet 0     oxyCODONE 5 MG PO tablet Take 1-2 tablets (5-10 mg) by mouth every 6 hours as needed for severe pain 12 tablet 0     senna-docusate (SENOKOT-S/PERICOLACE) 8.6-50 MG tablet Take 1-2 tablets by mouth 2 times daily 30 tablet 0     order for [facility], [employer], [employer]  CR[facility] $67  1 each 0       Allergies   Allergen Reactions     Hmg-Coa-R Inhibitors Swelling     Lactose      Intolerance       There is no immunization history on file for this patient.    PAST MEDICAL HISTORY  1. Sleep apnea  2. Acral melanoma, as above    Past Surgical History:   Procedure Laterality Date     BIOPSY NODE SENTINEL Right 2/10/2020    Procedure: [place] Lymph Node Mapping And Biopsy;  Surgeon: Gabriela Dorsey MD;  Location: [facility] OR     EXCISE LESION LOWER EXTREMITY Right 2/10/2020    Procedure: Wide Local Excision of RIGHT heel melanoma;  Surgeon: Gabriela Dorsey MD;  Location: [facility] OR     EXCISE LESION LOWER EXTREMITY Right 2/20/2020    Procedure: Wide local Re-excision RIGHT heel Wound vac application;  Surgeon: Gabriela Dorsey MD;  Location: [facility] OR     TUBAL LIGATION         SOCIAL HISTORY  History   Smoking Status     Never Smoker   Smokeless Tobacco     Never Used    Social History    Substance and Sexual Activity       Alcohol use: Not Currently        Frequency: Never     History   Drug Use Unknown       FAMILY HISTORY  Melanoma: No   Breast ca: No  Other cancer: Yes - sister had GYN cancer.      PHYSICAL EXAMINATION  /70 (BP Location: Left arm, Patient Position: Chair, Cuff Size: Adult Regular)   Pulse 89   Temp 97.1  F (36.2  C) (Oral)   Resp 18   Wt 65.9 kg (145 lb 4.8 oz)   SpO2 98%   BMI 22.76 kg/m    Wt Readings from Last 2 Encounters:   02/27/20 65.9 kg (145 lb 4.8 oz)   02/20/20 65.3 kg (144 lb)     Physical Exam  Vitals signs and nursing note reviewed.   Constitutional:       Appearance: Normal appearance.   HENT:      Head: Normocephalic.      Nose: Nose normal.      Mouth/Throat:      Mouth: Mucous membranes are dry.   Eyes:      General: No scleral icterus.     Pupils: Pupils are equal, round, and reactive to light.   Neck:      Musculoskeletal: Normal range of motion and neck supple.   Cardiovascular:      Rate and Rhythm: Regular rhythm.      Heart sounds: Normal heart sounds. No murmur.   Pulmonary:      Effort: Pulmonary effort is normal. No respiratory distress.      Breath sounds: Normal breath sounds. No wheezing.   Abdominal:      General: Abdomen is flat.   Musculoskeletal: Normal range of motion.   Skin:     General: Skin is warm and dry.      Coloration: Skin is not pale.      Findings: No erythema.   Neurological:      General: No focal deficit present.      Mental Status: She is alert and oriented to person, place, and time.   Psychiatric:         Mood and Affect: Mood normal.         Behavior: Behavior normal.         ASSESSMENT AND PLAN  #1 Acral melanoma, right heel, pT4b pN3c, Stage IIID  It was a pleasure to meet Ms. Hoang. She is a 74 year old woman recently upstaged to Stage IIID melanoma on PET-CT scan, which shows another right inguinal lymph node following her WLE and SLN surgery on 2/10/2020.    We reviewed the NeoActivate study, which I think would be a good option for her at  this time. We reviewed the need to undergo surgery related to heel reconstruction with free flap as well as rehab. This study would allow for her to receive treatment prior to planned resection of the involved lymph node in the groin. She was given the consent form to review.    We will plan to contact her tomorrow or Monday to see if she would like to return to review/sign consent. Will tentatively plan to see her back in 1-2 months to review her overall status. Plan to see her back sooner in the event she chooses neoadjuvant treatment on study.    #2 Pain, right heel, post-operative  #3 Nausea, opioid induced  She will continue oxycodone 1 hour prior to dressing changes. Last few days has been taking every 4 hours as needed, which she will continue taking for now.      Gi Cartagena M.D.   of Medicine  Hematology, Oncology and Transplantation      Malignant melanoma of right lower extremity including hip (H)

## 2020-02-27 NOTE — TELEPHONE ENCOUNTER
RN spoke with Tata on 2/26/20 and Tata states that she did email  and  advised that she did not think the wound looked too bad and she wanted to continue with current wound cares. RN instructed Tata to call RN back with any further questions or concerns....Melissa Ellsworth RN

## 2020-02-27 NOTE — LETTER
RE: Yadira Hoang  [address] [facility] [address]     Dear Colleague,    Thank you for referring your patient, Yadira Hoang, to the [facility]. Please see a copy of my visit note below.    [facility]  MEDICAL ONCOLOGY CONSULT  Feb 27, 2020    CHIEF COMPLAINT: acral melanoma, stage III    HISTORY OF PRESENT ILLNESS  Yadira Hoang is a 74 year old female with stage III acral melanoma.    She noted a painful lesion on the sole of the right foot for a few months, since last summer.  It initially was small then became raised.  It spontaneously bled. She is not entirely sure of its appearance initially.  She denies noting any masses behind the knee or in the groin.     A punch biopsy was performed by Dr Jessica Meadows on 1/9/2020.  It showed melanoma, at least 3.4 mm deep with ulceration and 0 mitoses, and perineural invasion present. TILs were non-brisk and LVI not identified. pT3b.    2/10/2020, she has right femoral sentinel lymph node biopsy and wide local excision (Specimen #: [id number]) and the right heel lesion extends to a depth of 6.6 mm, and invasive melanoma is present at 0.2 mm from the deep margin. Microsatellites are also present. There was 1 (of 2) lymph nodes involved. The lymph node tissue exhibits extensive subcapsular and parenchymal clusters of melanoma cells, highlighted on Melan-A immunostain. The largest cluster is 7 millimeters in greatest diameter. pT4b pN2c.    She had PET-CT on 2/22/2020, which showed intense FDG uptake in a right inguinal lymph node, concerning for an additional focus of metastatic disease. There is also an indeterminate right external iliac lymph node with mild FDG uptake.    Currently, Ms. Hoang is recovering from her most recent surgery. She is on a wound vac, and there are plans to use free flap coverage. A nurse comes to the home 3x per week to help with wound cares. She is taking oxycodone an hour before  bandage changes. However, she notes the pain medication makes her nauseous. She has taken Zofran, which did not really help. OItherwise, she has a history of irritable bowels. She notes regular bowel movements on opioids. She does not feel constipated. She denies shortness of breath or cough. No fevers or chills.    Labs today are normal except for mildly reduced albumin of 3.3. ECOG performance status is 0-1.    Melanoma History  History of tanning bed use: No  Prior skin biopsies: No  Prior skin cancer: No  Race/Ethnicity: Sd  Works indoors/outdoors: retired, worked in an office  Uses sunscreen: No    Results for orders placed or performed in visit on 02/27/20   Comprehensive metabolic panel     Status: Abnormal   Result Value Ref Range    Sodium 143 133 - 144 mmol/L    Potassium 4.1 3.4 - 5.3 mmol/L    Chloride 107 94 - 109 mmol/L    Carbon Dioxide 31 20 - 32 mmol/L    Anion Gap 6 3 - 14 mmol/L    Glucose 81 70 - 99 mg/dL    Urea Nitrogen 15 7 - 30 mg/dL    Creatinine 0.55 0.52 - 1.04 mg/dL    GFR Estimate >90 >60 mL/min/[1.73_m2]    GFR Estimate If Black >90 >60 mL/min/[1.73_m2]    Calcium 9.1 8.5 - 10.1 mg/dL    Bilirubin Total 0.3 0.2 - 1.3 mg/dL    Albumin 3.3 (L) 3.4 - 5.0 g/dL    Protein Total 6.8 6.8 - 8.8 g/dL    Alkaline Phosphatase 66 40 - 150 U/L    ALT 20 0 - 50 U/L    AST 10 0 - 45 U/L   CBC with platelets differential     Status: None   Result Value Ref Range    WBC 5.4 4.0 - 11.0 10e9/L    RBC Count 3.98 3.8 - 5.2 10e12/L    Hemoglobin 12.2 11.7 - 15.7 g/dL    Hematocrit 37.6 35.0 - 47.0 %    MCV 95 78 - 100 fl    MCH 30.7 26.5 - 33.0 pg    MCHC 32.4 31.5 - 36.5 g/dL    RDW 13.2 10.0 - 15.0 %    Platelet Count 378 150 - 450 10e9/L    Diff Method Automated Method     % Neutrophils 68.2 %    % Lymphocytes 22.2 %    % Monocytes 7.5 %    % Eosinophils 1.5 %    % Basophils 0.4 %    % Immature Granulocytes 0.2 %    Nucleated RBCs 0 0 /100    Absolute Neutrophil 3.7 1.6 - 8.3 10e9/L    Absolute  Lymphocytes 1.2 0.8 - 5.3 10e9/L    Absolute Monocytes 0.4 0.0 - 1.3 10e9/L    Absolute Eosinophils 0.1 0.0 - 0.7 10e9/L    Absolute Basophils 0.0 0.0 - 0.2 10e9/L    Abs Immature Granulocytes 0.0 0 - 0.4 10e9/L    Absolute Nucleated RBC 0.0        REVIEW OF SYSTEMS  A 12-point ROS negative except as in HPI    Current Outpatient Medications   Medication Sig Dispense Refill     acetaminophen (TYLENOL) 325 MG tablet Take 2 tablets (650 mg) by mouth every 4 hours as needed for mild pain 50 tablet 0     cholecalciferol (VITAMIN D3) 125 MCG (5000 UT) TABS tablet Take 2,000 Units by mouth       estradiol (ESTRACE) 0.1 MG/GM vaginal cream estradiol 0.01% (0.1 mg/gram) vaginal cream       ondansetron (ZOFRAN) 4 MG tablet Take 1 tablet (4 mg) by mouth every 8 hours as needed for nausea 20 tablet 1     oxyCODONE (ROXICODONE) 5 MG tablet Take 1-2 tablets (5-10 mg) by mouth every 4 hours as needed for moderate to severe pain 30 tablet 0     oxyCODONE 5 MG PO tablet Take 1-2 tablets (5-10 mg) by mouth every 6 hours as needed for severe pain 12 tablet 0     senna-docusate (SENOKOT-S/PERICOLACE) 8.6-50 MG tablet Take 1-2 tablets by mouth 2 times daily 30 tablet 0     order for [facility] [employer], Aluminum  CRYTH $67  1 each 0       Allergies   Allergen Reactions     Hmg-Coa-R Inhibitors Swelling     Lactose      Intolerance       There is no immunization history on file for this patient.    PAST MEDICAL HISTORY  1. Sleep apnea  2. Acral melanoma, as above    Past Surgical History:   Procedure Laterality Date     BIOPSY NODE SENTINEL Right 2/10/2020    Procedure: [place] Lymph Node Mapping And Biopsy;  Surgeon: Gabriela Dorsey MD;  Location: [facility] OR     EXCISE LESION LOWER EXTREMITY Right 2/10/2020    Procedure: Wide Local Excision of RIGHT heel melanoma;  Surgeon: Gabriela Dorsey MD;  Location: [facility] OR     EXCISE LESION LOWER EXTREMITY Right 2/20/2020    Procedure: Wide local Re-excision RIGHT heel Wound vac  application;  Surgeon: Gabriela Dorsey MD;  [facility]: [facility] OR     TUBAL LIGATION         SOCIAL HISTORY  History   Smoking Status     Never Smoker   Smokeless Tobacco     Never Used    Social History    Substance and Sexual Activity      Alcohol use: Not Currently        Frequency: Never     History   Drug Use Unknown       FAMILY HISTORY  Melanoma: No   Breast ca: No  Other cancer: Yes - sister had GYN cancer.    PHYSICAL EXAMINATION  /70 (BP Location: Left arm, Patient Position: Chair, Cuff Size: Adult Regular)   Pulse 89   Temp 97.1  F (36.2  C) (Oral)   Resp 18   Wt 65.9 kg (145 lb 4.8 oz)   SpO2 98%   BMI 22.76 kg/m     Wt Readings from Last 2 Encounters:   02/27/20 65.9 kg (145 lb 4.8 oz)   02/20/20 65.3 kg (144 lb)     Physical Exam  Vitals signs and nursing note reviewed.   Constitutional:       Appearance: Normal appearance.   HENT:      Head: Normocephalic.      Nose: Nose normal.      Mouth/Throat:      Mouth: Mucous membranes are dry.   Eyes:      General: No scleral icterus.     Pupils: Pupils are equal, round, and reactive to light.   Neck:      Musculoskeletal: Normal range of motion and neck supple.   Cardiovascular:      Rate and Rhythm: Regular rhythm.      Heart sounds: Normal heart sounds. No murmur.   Pulmonary:      Effort: Pulmonary effort is normal. No respiratory distress.      Breath sounds: Normal breath sounds. No wheezing.   Abdominal:      General: Abdomen is flat.   Musculoskeletal: Normal range of motion.   Skin:     General: Skin is warm and dry.      Coloration: Skin is not pale.      Findings: No erythema.   Neurological:      General: No focal deficit present.      Mental Status: She is alert and oriented to person, place, and time.   Psychiatric:         Mood and Affect: Mood normal.         Behavior: Behavior normal.     ASSESSMENT AND PLAN  #1 Acral melanoma, right heel, pT4b pN3c, Stage IIID  It was a pleasure to meet Ms. Hoang. She is a 74 year old woman  recently upstaged to Stage IIID melanoma on PET-CT scan, which shows another right inguinal lymph node following her WLE and SLN surgery on 2/10/2020.    We reviewed the NeoActivate study, which I think would be a good option for her at this time. We reviewed the need to undergo surgery related to heel reconstruction with free flap as well as rehab. This study would allow for her to receive treatment prior to planned resection of the involved lymph node in the groin. She was given the consent form to review.    We will plan to contact her tomorrow or Monday to see if she would like to return to review/sign consent. Will tentatively plan to see her back in 1-2 months to review her overall status. Plan to see her back sooner in the event she chooses neoadjuvant treatment on study.    #2 Pain, right heel, post-operative  #3 Nausea, opioid induced  She will continue oxycodone 1 hour prior to dressing changes. Last few days has been taking every 4 hours as needed, which she will continue taking for now.    Gi Cartagena M.D.   of Medicine  Hematology, Oncology and Transplantation    Malignant melanoma of right lower extremity including hip (H)

## 2020-02-27 NOTE — NURSING NOTE
"Oncology Rooming Note    February 27, 2020 7:39 AM   Yadira Hoang is a 74 year old female who presents for:    Chief Complaint   Patient presents with     Oncology Clinic Visit     Patient with Malignant melanoma of right lower extremity including hip here for provider consult      Initial Vitals: There were no vitals taken for this visit. Estimated body mass index is 22.55 kg/m  as calculated from the following:    Height as of 2/20/20: 1.702 m (5' 7\").    Weight as of 2/20/20: 65.3 kg (144 lb). There is no height or weight on file to calculate BSA.  Data Unavailable Comment: Data Unavailable   No LMP recorded. Patient is postmenopausal.  Allergies reviewed: Yes  Medications reviewed: Yes    Medications: Medication refills not needed today.  Pharmacy name entered into [facility]: [facility] [address] [place] [address] [id number] [facility]    Clinical concerns:     Alysha Quinn CMA              "

## 2020-02-28 RX ORDER — CEFAZOLIN SODIUM 2 G/50ML
2 SOLUTION INTRAVENOUS
Status: CANCELLED | OUTPATIENT
Start: 2020-02-28

## 2020-02-28 RX ORDER — CEFAZOLIN SODIUM 1 G/50ML
1 INJECTION, SOLUTION INTRAVENOUS SEE ADMIN INSTRUCTIONS
Status: CANCELLED | OUTPATIENT
Start: 2020-02-28

## 2020-02-29 DIAGNOSIS — Z53.9 DIAGNOSIS NOT YET DEFINED: Primary | ICD-10-CM

## 2020-02-29 PROCEDURE — G0180 MD CERTIFICATION HHA PATIENT: HCPCS | Performed by: INTERNAL MEDICINE

## 2020-03-03 ENCOUNTER — ANESTHESIA EVENT (OUTPATIENT)
Dept: SURGERY | Facility: CLINIC | Age: 75
DRG: 578 | End: 2020-03-03
Payer: MEDICARE

## 2020-03-03 NOTE — ANESTHESIA PREPROCEDURE EVALUATION
"Anesthesia Pre-Procedure Evaluation    Patient: Yadira Hoang   MRN:     [id number] Gender:   female   Age:    74 year old :      1945        Preoperative Diagnosis: Melanoma of skin (H) [C43.9]   Procedure(s):  Right heel reconstruction  with local flap or groin free flap  possible skin graft     LABS:  CBC:   Lab Results   Component Value Date    WBC 5.4 2020    HGB 12.2 2020    HCT 37.6 2020     2020     BMP:   Lab Results   Component Value Date     2020    POTASSIUM 4.1 2020    CHLORIDE 107 2020    CO2 31 2020    BUN 15 2020    CR 0.55 2020    GLC 81 2020     COAGS: No results found for: PTT, INR, FIBR  POC: No results found for: BGM, HCG, HCGS  OTHER:   Lab Results   Component Value Date    GABY 9.1 2020    ALBUMIN 3.3 (L) 2020    PROTTOTAL 6.8 2020    ALT 20 2020    AST 10 2020    ALKPHOS 66 2020    BILITOTAL 0.3 2020        Preop Vitals    BP Readings from Last 3 Encounters:   20 114/70   20 128/81   02/10/20 128/76    Pulse Readings from Last 3 Encounters:   20 89   20 98   02/10/20 81      Resp Readings from Last 3 Encounters:   20 18   20 12   02/10/20 14    SpO2 Readings from Last 3 Encounters:   20 98%   20 93%   02/10/20 97%      Temp Readings from Last 1 Encounters:   20 36.2  C (97.1  F) (Oral)    Ht Readings from Last 1 Encounters:   20 1.702 m (5' 7\")      Wt Readings from Last 1 Encounters:   20 65.9 kg (145 lb 4.8 oz)    Estimated body mass index is 22.76 kg/m  as calculated from the following:    Height as of 20: 1.702 m (5' 7\").    Weight as of 20: 65.9 kg (145 lb 4.8 oz).     LDA:  Peripheral IV 20 Right Upper arm (Active)   Number of days: 10       Negative Pressure Wound Therapy Foot Right (Active)   Number of days: 22       Negative Pressure Wound Therapy Other (Comment) Right " (Active)   Number of days: 12        Past Medical History:   Diagnosis Date     Concussion      Melanoma (H)      Sleep apnea     CPAP      Past Surgical History:   Procedure Laterality Date     BIOPSY NODE SENTINEL Right 2/10/2020    Procedure: [place] Lymph Node Mapping And Biopsy;  Surgeon: Gabriela Dorsey MD;  Location: [facility] OR     EXCISE LESION LOWER EXTREMITY Right 2/10/2020    Procedure: Wide Local Excision of RIGHT heel melanoma;  Surgeon: Gabriela Dorsey MD;  Location: [facility] OR     EXCISE LESION LOWER EXTREMITY Right 2/20/2020    Procedure: Wide local Re-excision RIGHT heel Wound vac application;  Surgeon: Gabriela Dorsey MD;  [facility]     ORTHOPEDIC SURGERY Right     heel surgery x2     TUBAL LIGATION        Allergies   Allergen Reactions     Banana Nausea     Food      Dairy, Soy and Corn     Hmg-Coa-R Inhibitors Swelling     Lactose      Intolerance        Anesthesia Evaluation     . Pt has had prior anesthetic.     No history of anesthetic complications          ROS/MED HX    ENT/Pulmonary:     (+)sleep apnea, uses CPAP , . .    Neurologic:     (+)other neuro (prior concussion )     Cardiovascular:  - neg cardiovascular ROS       METS/Exercise Tolerance:     Hematologic:  - neg hematologic  ROS       Musculoskeletal:  - neg musculoskeletal ROS       GI/Hepatic:  - neg GI/hepatic ROS       Renal/Genitourinary:  - ROS Renal section negative       Endo:  - neg endo ROS       Psychiatric:  - neg psychiatric ROS       Infectious Disease:  - neg infectious disease ROS       Malignancy:   (+) Malignancy (right sole of foot melanoma, stage IIb, s/p wide excision now with complex wound requiring plastic surgery closure) History of Skin  Skin CA Active status post Surgery,         Other: Comment: Chronic oxycodone for wound changes for right foot wound    (+) No chance of pregnancy H/O chronic opiod use ,                        PHYSICAL EXAM:   Mental Status/Neuro: A/A/O   Airway: Facies:  Feasible  Mallampati: I  Mouth/Opening: Full  TM distance: > 6 cm  Neck ROM: Full   Respiratory: Auscultation: CTAB     Resp. Rate: Normal     Resp. Effort: Normal      CV: Rhythm: Regular  Rate: Age appropriate  Heart: Normal Sounds  Edema: None   Comments:      Dental: Normal Dentition                Assessment:   ASA SCORE: 2    H&P: History and physical reviewed and following examination; no interval change.   Smoking Status:  Non-Smoker/Unknown        Plan:   Anes. Type:  General   Pre-Medication: None   Induction:  IV (Standard)   Airway: ETT; Oral   Access/Monitoring: PIV; 2nd PIV   Maintenance: Balanced     Postop Plan:   Postop Pain: Opioids  Postop Sedation/Airway: Not planned  Disposition: Inpatient/Admit     PONV Management:   Adult Risk Factors: Female, Non-Smoker, Postop Opioids   Prevention: Ondansetron, Dexamethasone     CONSENT: Direct conversation   Plan and risks discussed with: Patient   Blood Products: Consent Deferred (Minimal Blood Loss)       Comments for Plan/Consent:  74 year old female, ASA 2, with open right foot wound after melanoma excision presenting for plastic surgery closure.  She has no cardiopulmonary pathology other than GUERLINE for which she uses CPAP and no other significant PMH.  She is on chronic opioids s/p surgery on 02/20/20 for melanoma excision. Of note, surgery is scheduled for >6 hrs.   - GETA  - 2nd IV (due to duration of case)  - consider using ClearSight due to duration of case  - multimodal pain treatment (APAP, gabapentin, ketamine, opioids)                 Oj Junior III, MD

## 2020-03-04 ENCOUNTER — HOSPITAL ENCOUNTER (INPATIENT)
Facility: CLINIC | Age: 75
LOS: 2 days | Discharge: HOME HEALTH | DRG: 578 | End: 2020-03-06
Attending: PLASTIC SURGERY | Admitting: PLASTIC SURGERY
Payer: MEDICARE

## 2020-03-04 ENCOUNTER — ANESTHESIA (OUTPATIENT)
Dept: SURGERY | Facility: CLINIC | Age: 75
DRG: 578 | End: 2020-03-04
Payer: MEDICARE

## 2020-03-04 DIAGNOSIS — C43.9 MELANOMA OF SKIN (H): ICD-10-CM

## 2020-03-04 DIAGNOSIS — C43.71 MALIGNANT MELANOMA OF RIGHT LOWER EXTREMITY INCLUDING HIP (H): Primary | ICD-10-CM

## 2020-03-04 LAB
CREAT SERPL-MCNC: 0.52 MG/DL (ref 0.52–1.04)
GFR SERPL CREATININE-BSD FRML MDRD: >90 ML/MIN/{1.73_M2}
GLUCOSE BLDC GLUCOMTR-MCNC: 108 MG/DL (ref 70–99)
PLATELET # BLD AUTO: 361 10E9/L (ref 150–450)

## 2020-03-04 PROCEDURE — 71000014 ZZH RECOVERY PHASE 1 LEVEL 2 FIRST HR: Performed by: PLASTIC SURGERY

## 2020-03-04 PROCEDURE — 36000057 ZZH SURGERY LEVEL 3 1ST 30 MIN - UMMC: Performed by: PLASTIC SURGERY

## 2020-03-04 PROCEDURE — 25000128 H RX IP 250 OP 636: Performed by: ANESTHESIOLOGY

## 2020-03-04 PROCEDURE — 0JXQ0ZC TRANSFER RIGHT FOOT SUBCUTANEOUS TISSUE AND FASCIA WITH SKIN, SUBCUTANEOUS TISSUE AND FASCIA, OPEN APPROACH: ICD-10-PCS | Performed by: PLASTIC SURGERY

## 2020-03-04 PROCEDURE — 25000128 H RX IP 250 OP 636: Performed by: STUDENT IN AN ORGANIZED HEALTH CARE EDUCATION/TRAINING PROGRAM

## 2020-03-04 PROCEDURE — 00000146 ZZHCL STATISTIC GLUCOSE BY METER IP

## 2020-03-04 PROCEDURE — 37000008 ZZH ANESTHESIA TECHNICAL FEE, 1ST 30 MIN: Performed by: PLASTIC SURGERY

## 2020-03-04 PROCEDURE — 82565 ASSAY OF CREATININE: CPT | Performed by: STUDENT IN AN ORGANIZED HEALTH CARE EDUCATION/TRAINING PROGRAM

## 2020-03-04 PROCEDURE — 27210794 ZZH OR GENERAL SUPPLY STERILE: Performed by: PLASTIC SURGERY

## 2020-03-04 PROCEDURE — 4A1GXSH MONITORING OF SKIN AND BREAST VASCULAR PERFUSION USING INDOCYANINE GREEN DYE, EXTERNAL APPROACH: ICD-10-PCS | Performed by: PLASTIC SURGERY

## 2020-03-04 PROCEDURE — 25000125 ZZHC RX 250: Performed by: NURSE ANESTHETIST, CERTIFIED REGISTERED

## 2020-03-04 PROCEDURE — 25800030 ZZH RX IP 258 OP 636: Performed by: NURSE ANESTHETIST, CERTIFIED REGISTERED

## 2020-03-04 PROCEDURE — 85049 AUTOMATED PLATELET COUNT: CPT | Performed by: STUDENT IN AN ORGANIZED HEALTH CARE EDUCATION/TRAINING PROGRAM

## 2020-03-04 PROCEDURE — 27211024 ZZHC OR SUPPLY OTHER OPNP: Performed by: PLASTIC SURGERY

## 2020-03-04 PROCEDURE — 25000128 H RX IP 250 OP 636: Performed by: PLASTIC SURGERY

## 2020-03-04 PROCEDURE — 25800030 ZZH RX IP 258 OP 636: Performed by: STUDENT IN AN ORGANIZED HEALTH CARE EDUCATION/TRAINING PROGRAM

## 2020-03-04 PROCEDURE — 25000132 ZZH RX MED GY IP 250 OP 250 PS 637: Performed by: STUDENT IN AN ORGANIZED HEALTH CARE EDUCATION/TRAINING PROGRAM

## 2020-03-04 PROCEDURE — 36000059 ZZH SURGERY LEVEL 3 EA 15 ADDTL MIN UMMC: Performed by: PLASTIC SURGERY

## 2020-03-04 PROCEDURE — 12000001 ZZH R&B MED SURG/OB UMMC

## 2020-03-04 PROCEDURE — 36415 COLL VENOUS BLD VENIPUNCTURE: CPT | Performed by: STUDENT IN AN ORGANIZED HEALTH CARE EDUCATION/TRAINING PROGRAM

## 2020-03-04 PROCEDURE — 25000128 H RX IP 250 OP 636: Performed by: NURSE ANESTHETIST, CERTIFIED REGISTERED

## 2020-03-04 PROCEDURE — 25000566 ZZH SEVOFLURANE, EA 15 MIN: Performed by: PLASTIC SURGERY

## 2020-03-04 PROCEDURE — 25000125 ZZHC RX 250: Performed by: STUDENT IN AN ORGANIZED HEALTH CARE EDUCATION/TRAINING PROGRAM

## 2020-03-04 PROCEDURE — 40000170 ZZH STATISTIC PRE-PROCEDURE ASSESSMENT II: Performed by: PLASTIC SURGERY

## 2020-03-04 PROCEDURE — 25000125 ZZHC RX 250: Performed by: PLASTIC SURGERY

## 2020-03-04 PROCEDURE — 37000009 ZZH ANESTHESIA TECHNICAL FEE, EACH ADDTL 15 MIN: Performed by: PLASTIC SURGERY

## 2020-03-04 DEVICE — DRSG INTEGRA MATRIX MESH BILAYER 4X5" MWM4051 PER SQ CM= 129: Type: IMPLANTABLE DEVICE | Site: FOOT | Status: FUNCTIONAL

## 2020-03-04 RX ORDER — INDOCYANINE GREEN AND WATER 25 MG
KIT INJECTION PRN
Status: DISCONTINUED | OUTPATIENT
Start: 2020-03-04 | End: 2020-03-04

## 2020-03-04 RX ORDER — ESMOLOL HYDROCHLORIDE 10 MG/ML
INJECTION INTRAVENOUS PRN
Status: DISCONTINUED | OUTPATIENT
Start: 2020-03-04 | End: 2020-03-04

## 2020-03-04 RX ORDER — NALOXONE HYDROCHLORIDE 0.4 MG/ML
.1-.4 INJECTION, SOLUTION INTRAMUSCULAR; INTRAVENOUS; SUBCUTANEOUS
Status: DISCONTINUED | OUTPATIENT
Start: 2020-03-04 | End: 2020-03-06 | Stop reason: HOSPADM

## 2020-03-04 RX ORDER — ONDANSETRON 4 MG/1
4 TABLET, ORALLY DISINTEGRATING ORAL EVERY 30 MIN PRN
Status: DISCONTINUED | OUTPATIENT
Start: 2020-03-04 | End: 2020-03-04 | Stop reason: HOSPADM

## 2020-03-04 RX ORDER — ONDANSETRON 2 MG/ML
INJECTION INTRAMUSCULAR; INTRAVENOUS PRN
Status: DISCONTINUED | OUTPATIENT
Start: 2020-03-04 | End: 2020-03-04

## 2020-03-04 RX ORDER — CEFAZOLIN SODIUM 2 G/100ML
2 INJECTION, SOLUTION INTRAVENOUS
Status: COMPLETED | OUTPATIENT
Start: 2020-03-04 | End: 2020-03-04

## 2020-03-04 RX ORDER — AMOXICILLIN 250 MG
1 CAPSULE ORAL 2 TIMES DAILY
Status: DISCONTINUED | OUTPATIENT
Start: 2020-03-04 | End: 2020-03-06 | Stop reason: HOSPADM

## 2020-03-04 RX ORDER — CEFAZOLIN SODIUM 1 G/3ML
1 INJECTION, POWDER, FOR SOLUTION INTRAMUSCULAR; INTRAVENOUS EVERY 8 HOURS
Status: COMPLETED | OUTPATIENT
Start: 2020-03-04 | End: 2020-03-05

## 2020-03-04 RX ORDER — ONDANSETRON 4 MG/1
4 TABLET, ORALLY DISINTEGRATING ORAL EVERY 6 HOURS PRN
Status: DISCONTINUED | OUTPATIENT
Start: 2020-03-04 | End: 2020-03-06 | Stop reason: HOSPADM

## 2020-03-04 RX ORDER — SODIUM CHLORIDE, SODIUM LACTATE, POTASSIUM CHLORIDE, CALCIUM CHLORIDE 600; 310; 30; 20 MG/100ML; MG/100ML; MG/100ML; MG/100ML
INJECTION, SOLUTION INTRAVENOUS CONTINUOUS
Status: DISCONTINUED | OUTPATIENT
Start: 2020-03-04 | End: 2020-03-04 | Stop reason: HOSPADM

## 2020-03-04 RX ORDER — LIDOCAINE HYDROCHLORIDE 20 MG/ML
INJECTION, SOLUTION INFILTRATION; PERINEURAL PRN
Status: DISCONTINUED | OUTPATIENT
Start: 2020-03-04 | End: 2020-03-04

## 2020-03-04 RX ORDER — EPHEDRINE SULFATE 50 MG/ML
INJECTION, SOLUTION INTRAMUSCULAR; INTRAVENOUS; SUBCUTANEOUS PRN
Status: DISCONTINUED | OUTPATIENT
Start: 2020-03-04 | End: 2020-03-04

## 2020-03-04 RX ORDER — MAGNESIUM HYDROXIDE 1200 MG/15ML
LIQUID ORAL PRN
Status: DISCONTINUED | OUTPATIENT
Start: 2020-03-04 | End: 2020-03-04 | Stop reason: HOSPADM

## 2020-03-04 RX ORDER — FENTANYL CITRATE 50 UG/ML
25-50 INJECTION, SOLUTION INTRAMUSCULAR; INTRAVENOUS
Status: DISCONTINUED | OUTPATIENT
Start: 2020-03-04 | End: 2020-03-04 | Stop reason: HOSPADM

## 2020-03-04 RX ORDER — KETAMINE HYDROCHLORIDE 10 MG/ML
INJECTION, SOLUTION INTRAMUSCULAR; INTRAVENOUS PRN
Status: DISCONTINUED | OUTPATIENT
Start: 2020-03-04 | End: 2020-03-04

## 2020-03-04 RX ORDER — AMOXICILLIN 250 MG
2 CAPSULE ORAL 2 TIMES DAILY
Status: DISCONTINUED | OUTPATIENT
Start: 2020-03-04 | End: 2020-03-06 | Stop reason: HOSPADM

## 2020-03-04 RX ORDER — GABAPENTIN 300 MG/1
300 CAPSULE ORAL ONCE
Status: COMPLETED | OUTPATIENT
Start: 2020-03-04 | End: 2020-03-04

## 2020-03-04 RX ORDER — HYDROMORPHONE HYDROCHLORIDE 1 MG/ML
.3-.5 INJECTION, SOLUTION INTRAMUSCULAR; INTRAVENOUS; SUBCUTANEOUS EVERY 5 MIN PRN
Status: DISCONTINUED | OUTPATIENT
Start: 2020-03-04 | End: 2020-03-04 | Stop reason: HOSPADM

## 2020-03-04 RX ORDER — PROCHLORPERAZINE MALEATE 5 MG
5 TABLET ORAL EVERY 6 HOURS PRN
Status: DISCONTINUED | OUTPATIENT
Start: 2020-03-04 | End: 2020-03-06 | Stop reason: HOSPADM

## 2020-03-04 RX ORDER — PROPOFOL 10 MG/ML
INJECTION, EMULSION INTRAVENOUS PRN
Status: DISCONTINUED | OUTPATIENT
Start: 2020-03-04 | End: 2020-03-04

## 2020-03-04 RX ORDER — LIDOCAINE 40 MG/G
CREAM TOPICAL
Status: DISCONTINUED | OUTPATIENT
Start: 2020-03-04 | End: 2020-03-06

## 2020-03-04 RX ORDER — DEXAMETHASONE SODIUM PHOSPHATE 4 MG/ML
INJECTION, SOLUTION INTRA-ARTICULAR; INTRALESIONAL; INTRAMUSCULAR; INTRAVENOUS; SOFT TISSUE PRN
Status: DISCONTINUED | OUTPATIENT
Start: 2020-03-04 | End: 2020-03-04

## 2020-03-04 RX ORDER — SODIUM CHLORIDE, SODIUM LACTATE, POTASSIUM CHLORIDE, CALCIUM CHLORIDE 600; 310; 30; 20 MG/100ML; MG/100ML; MG/100ML; MG/100ML
INJECTION, SOLUTION INTRAVENOUS CONTINUOUS PRN
Status: DISCONTINUED | OUTPATIENT
Start: 2020-03-04 | End: 2020-03-04

## 2020-03-04 RX ORDER — ACETAMINOPHEN 325 MG/1
975 TABLET ORAL ONCE
Status: COMPLETED | OUTPATIENT
Start: 2020-03-04 | End: 2020-03-04

## 2020-03-04 RX ORDER — LIDOCAINE 40 MG/G
CREAM TOPICAL
Status: DISCONTINUED | OUTPATIENT
Start: 2020-03-04 | End: 2020-03-06 | Stop reason: HOSPADM

## 2020-03-04 RX ORDER — METHOCARBAMOL 500 MG/1
500 TABLET, FILM COATED ORAL 4 TIMES DAILY PRN
Status: DISCONTINUED | OUTPATIENT
Start: 2020-03-04 | End: 2020-03-06 | Stop reason: HOSPADM

## 2020-03-04 RX ORDER — FENTANYL CITRATE 50 UG/ML
INJECTION, SOLUTION INTRAMUSCULAR; INTRAVENOUS PRN
Status: DISCONTINUED | OUTPATIENT
Start: 2020-03-04 | End: 2020-03-04

## 2020-03-04 RX ORDER — ACETAMINOPHEN 325 MG/1
650 TABLET ORAL EVERY 4 HOURS PRN
Status: DISCONTINUED | OUTPATIENT
Start: 2020-03-07 | End: 2020-03-06 | Stop reason: HOSPADM

## 2020-03-04 RX ORDER — CEFAZOLIN SODIUM 1 G/3ML
1 INJECTION, POWDER, FOR SOLUTION INTRAMUSCULAR; INTRAVENOUS SEE ADMIN INSTRUCTIONS
Status: DISCONTINUED | OUTPATIENT
Start: 2020-03-04 | End: 2020-03-04 | Stop reason: HOSPADM

## 2020-03-04 RX ORDER — ACETAMINOPHEN 325 MG/1
975 TABLET ORAL EVERY 8 HOURS
Status: DISCONTINUED | OUTPATIENT
Start: 2020-03-04 | End: 2020-03-06 | Stop reason: HOSPADM

## 2020-03-04 RX ORDER — HYDROMORPHONE HYDROCHLORIDE 1 MG/ML
.3-.5 INJECTION, SOLUTION INTRAMUSCULAR; INTRAVENOUS; SUBCUTANEOUS
Status: DISCONTINUED | OUTPATIENT
Start: 2020-03-04 | End: 2020-03-06 | Stop reason: HOSPADM

## 2020-03-04 RX ORDER — ONDANSETRON 2 MG/ML
4 INJECTION INTRAMUSCULAR; INTRAVENOUS EVERY 6 HOURS PRN
Status: DISCONTINUED | OUTPATIENT
Start: 2020-03-04 | End: 2020-03-06 | Stop reason: HOSPADM

## 2020-03-04 RX ORDER — SODIUM CHLORIDE, SODIUM LACTATE, POTASSIUM CHLORIDE, CALCIUM CHLORIDE 600; 310; 30; 20 MG/100ML; MG/100ML; MG/100ML; MG/100ML
INJECTION, SOLUTION INTRAVENOUS CONTINUOUS
Status: DISCONTINUED | OUTPATIENT
Start: 2020-03-04 | End: 2020-03-05

## 2020-03-04 RX ORDER — OXYCODONE HYDROCHLORIDE 5 MG/1
5-10 TABLET ORAL EVERY 4 HOURS PRN
Status: DISCONTINUED | OUTPATIENT
Start: 2020-03-04 | End: 2020-03-05 | Stop reason: ALTCHOICE

## 2020-03-04 RX ORDER — ONDANSETRON 2 MG/ML
4 INJECTION INTRAMUSCULAR; INTRAVENOUS EVERY 30 MIN PRN
Status: DISCONTINUED | OUTPATIENT
Start: 2020-03-04 | End: 2020-03-04 | Stop reason: HOSPADM

## 2020-03-04 RX ORDER — NALOXONE HYDROCHLORIDE 0.4 MG/ML
.1-.4 INJECTION, SOLUTION INTRAMUSCULAR; INTRAVENOUS; SUBCUTANEOUS
Status: DISCONTINUED | OUTPATIENT
Start: 2020-03-04 | End: 2020-03-05

## 2020-03-04 RX ADMIN — ESMOLOL HYDROCHLORIDE 20 MG: 10 INJECTION, SOLUTION INTRAVENOUS at 15:45

## 2020-03-04 RX ADMIN — ONDANSETRON 4 MG: 2 INJECTION INTRAMUSCULAR; INTRAVENOUS at 19:10

## 2020-03-04 RX ADMIN — KETAMINE HYDROCHLORIDE 10 MG: 10 INJECTION, SOLUTION INTRAMUSCULAR; INTRAVENOUS at 14:45

## 2020-03-04 RX ADMIN — Medication 2 G: at 11:50

## 2020-03-04 RX ADMIN — SODIUM CHLORIDE, POTASSIUM CHLORIDE, SODIUM LACTATE AND CALCIUM CHLORIDE: 600; 310; 30; 20 INJECTION, SOLUTION INTRAVENOUS at 11:45

## 2020-03-04 RX ADMIN — KETAMINE HYDROCHLORIDE 20 MG: 10 INJECTION, SOLUTION INTRAMUSCULAR; INTRAVENOUS at 12:45

## 2020-03-04 RX ADMIN — FENTANYL CITRATE 50 MCG: 50 INJECTION, SOLUTION INTRAMUSCULAR; INTRAVENOUS at 12:16

## 2020-03-04 RX ADMIN — PROPOFOL 50 MG: 10 INJECTION, EMULSION INTRAVENOUS at 13:41

## 2020-03-04 RX ADMIN — KETAMINE HYDROCHLORIDE 10 MG: 10 INJECTION, SOLUTION INTRAMUSCULAR; INTRAVENOUS at 15:16

## 2020-03-04 RX ADMIN — ACETAMINOPHEN 975 MG: 325 TABLET, FILM COATED ORAL at 09:58

## 2020-03-04 RX ADMIN — ROCURONIUM BROMIDE 20 MG: 10 INJECTION INTRAVENOUS at 13:03

## 2020-03-04 RX ADMIN — LIDOCAINE HYDROCHLORIDE 40 MG: 20 INJECTION, SOLUTION INFILTRATION; PERINEURAL at 11:29

## 2020-03-04 RX ADMIN — SUGAMMADEX 200 MG: 100 INJECTION, SOLUTION INTRAVENOUS at 15:35

## 2020-03-04 RX ADMIN — Medication 1 G: at 13:50

## 2020-03-04 RX ADMIN — FENTANYL CITRATE 50 MCG: 50 INJECTION, SOLUTION INTRAMUSCULAR; INTRAVENOUS at 12:43

## 2020-03-04 RX ADMIN — FENTANYL CITRATE 50 MCG: 50 INJECTION, SOLUTION INTRAMUSCULAR; INTRAVENOUS at 13:12

## 2020-03-04 RX ADMIN — Medication 5 MG: at 12:35

## 2020-03-04 RX ADMIN — SODIUM CHLORIDE, POTASSIUM CHLORIDE, SODIUM LACTATE AND CALCIUM CHLORIDE: 600; 310; 30; 20 INJECTION, SOLUTION INTRAVENOUS at 11:18

## 2020-03-04 RX ADMIN — HYDROMORPHONE HYDROCHLORIDE 0.3 MG: 1 INJECTION, SOLUTION INTRAMUSCULAR; INTRAVENOUS; SUBCUTANEOUS at 16:35

## 2020-03-04 RX ADMIN — GABAPENTIN 300 MG: 300 CAPSULE ORAL at 09:58

## 2020-03-04 RX ADMIN — ROCURONIUM BROMIDE 20 MG: 10 INJECTION INTRAVENOUS at 14:32

## 2020-03-04 RX ADMIN — Medication 5 MG: at 12:22

## 2020-03-04 RX ADMIN — PROPOFOL 150 MG: 10 INJECTION, EMULSION INTRAVENOUS at 11:29

## 2020-03-04 RX ADMIN — CEFAZOLIN 1 G: 330 INJECTION, POWDER, FOR SOLUTION INTRAMUSCULAR; INTRAVENOUS at 18:33

## 2020-03-04 RX ADMIN — ONDANSETRON 4 MG: 2 INJECTION INTRAMUSCULAR; INTRAVENOUS at 15:31

## 2020-03-04 RX ADMIN — SODIUM CHLORIDE, POTASSIUM CHLORIDE, SODIUM LACTATE AND CALCIUM CHLORIDE: 600; 310; 30; 20 INJECTION, SOLUTION INTRAVENOUS at 16:32

## 2020-03-04 RX ADMIN — SENNOSIDES AND DOCUSATE SODIUM 1 TABLET: 8.6; 5 TABLET ORAL at 19:10

## 2020-03-04 RX ADMIN — DEXAMETHASONE SODIUM PHOSPHATE 8 MG: 4 INJECTION, SOLUTION INTRA-ARTICULAR; INTRALESIONAL; INTRAMUSCULAR; INTRAVENOUS; SOFT TISSUE at 11:41

## 2020-03-04 RX ADMIN — ROCURONIUM BROMIDE 20 MG: 10 INJECTION INTRAVENOUS at 13:41

## 2020-03-04 RX ADMIN — HYDROMORPHONE HYDROCHLORIDE 0.2 MG: 1 INJECTION, SOLUTION INTRAMUSCULAR; INTRAVENOUS; SUBCUTANEOUS at 16:40

## 2020-03-04 RX ADMIN — ENOXAPARIN SODIUM 40 MG: 40 INJECTION SUBCUTANEOUS at 10:06

## 2020-03-04 RX ADMIN — Medication 10 MG: at 13:01

## 2020-03-04 RX ADMIN — INDOCYANINE GREEN 12.5 MG: KIT INTRAVENOUS at 14:39

## 2020-03-04 RX ADMIN — HYDROMORPHONE HYDROCHLORIDE 0.5 MG: 1 INJECTION, SOLUTION INTRAMUSCULAR; INTRAVENOUS; SUBCUTANEOUS at 12:45

## 2020-03-04 RX ADMIN — ROCURONIUM BROMIDE 20 MG: 10 INJECTION INTRAVENOUS at 12:16

## 2020-03-04 RX ADMIN — HYDROMORPHONE HYDROCHLORIDE 0.5 MG: 1 INJECTION, SOLUTION INTRAMUSCULAR; INTRAVENOUS; SUBCUTANEOUS at 14:01

## 2020-03-04 RX ADMIN — ACETAMINOPHEN 975 MG: 325 TABLET, FILM COATED ORAL at 18:33

## 2020-03-04 RX ADMIN — KETAMINE HYDROCHLORIDE 10 MG: 10 INJECTION, SOLUTION INTRAMUSCULAR; INTRAVENOUS at 13:45

## 2020-03-04 ASSESSMENT — ACTIVITIES OF DAILY LIVING (ADL)
AMBULATION: 1-->ASSISTIVE EQUIPMENT
RETIRED_COMMUNICATION: 0-->UNDERSTANDS/COMMUNICATES WITHOUT DIFFICULTY
TOILETING: 3-->ASSISTIVE EQUIPMENT AND PERSON
COGNITION: 0 - NO COGNITION ISSUES REPORTED
SWALLOWING: 0-->SWALLOWS FOODS/LIQUIDS WITHOUT DIFFICULTY
ADLS_ACUITY_SCORE: 24
WHICH_OF_THE_ABOVE_FUNCTIONAL_RISKS_HAD_A_RECENT_ONSET_OR_CHANGE?: AMBULATION;TRANSFERRING;TOILETING;BATHING;DRESSING
TRANSFERRING: 1-->ASSISTIVE EQUIPMENT
BATHING: 3-->ASSISTIVE EQUIPMENT AND PERSON
DRESS: 2-->ASSISTIVE PERSON
FALL_HISTORY_WITHIN_LAST_SIX_MONTHS: NO
RETIRED_EATING: 0-->INDEPENDENT

## 2020-03-04 ASSESSMENT — PAIN DESCRIPTION - DESCRIPTORS: DESCRIPTORS: ACHING

## 2020-03-04 ASSESSMENT — MIFFLIN-ST. JEOR: SCORE: 1167.63

## 2020-03-04 NOTE — SIGNIFICANT EVENT
SPIRITUAL HEALTH SERVICES  [facility] ([place] 3C   PRE-SURGERY VISIT    Had pre-surgery visit with pt./family. Provided spiritual support, prayer.     PATIENT ANOINTED by Father Min Bond 3/4/2020.     Min Bond M.Div.     Pager [phone]

## 2020-03-04 NOTE — BRIEF OP NOTE
[facility], [place]    Brief Operative Note    Pre-operative diagnosis: Melanoma of skin (H) [C43.9]  Post-operative diagnosis Same as pre-operative diagnosis    Procedure: Procedure(s):  Right heel reconstruction with regional flap, biologic dressing and SPY  Surgeon: Surgeon(s) and Role:     * NIKA Crabtree MD - Primary     * Dayo Cosby MD - Assisting     * Marcus Gonzalez MD - Resident - Assisting     * Lukas Kelsey MD - Resident - Observing  Anesthesia: General   Estimated blood loss: 100cc  Drains: None  Specimens: * No specimens in log *  Findings:   None.  Complications: None.  Implants:   Implant Name Type Inv. Item Serial No.  Lot No. LRB No. Used   DRSG INTEGRA MATRIX MESH BILAYER 4X5&quot; [id number] PER SQ CM= 129  DRSG INTEGRA MATRIX MESH BILAYER 4X5&quot; [id number] PER SQ CM= 129  [employer] [id number] Right 129     Plan:  - Q2H flap checks  - Elevate RLE on foam wedge - no pressure on the heel  - Up ad magan. TTWB RLE  - PT/SW for discharge planning  - OT for custom splint  - VAC to be changed in clinic next Wednesday, Mar 11.       Marcus Gonzalez MD  Plastic surgery resident

## 2020-03-04 NOTE — ANESTHESIA POSTPROCEDURE EVALUATION
Anesthesia POST Procedure Evaluation    Patient: Yadira Hoang   MRN:     [id number] Gender:   female   Age:    74 year old :      1945        Preoperative Diagnosis: Melanoma of skin (H) [C43.9]   Procedure(s):  Right heel reconstruction with regional flap, biologic dressing and SPY   Postop Comments: No value filed.     Anesthesia Type: General       Disposition: Admission   Postop Pain Control: Uneventful            Sign Out: Well controlled pain   PONV: No   Neuro/Psych: Uneventful            Sign Out: Acceptable/Baseline neuro status   Airway/Respiratory: Uneventful            Sign Out: Acceptable/Baseline resp. status   CV/Hemodynamics: Uneventful            Sign Out: Acceptable CV status   Other NRE: NONE   DID A NON-ROUTINE EVENT OCCUR? No         Last Anesthesia Record Vitals:  CRNA VITALS  3/4/2020 1519 - 3/4/2020 1619      3/4/2020             NIBP:  136/82    Pulse:  100    SpO2:  99 %          Last PACU Vitals:  Vitals Value Taken Time   /82 3/4/2020  4:15 PM   Temp 36.8  C (98.3  F) 3/4/2020  3:53 PM   Pulse 110 3/4/2020  4:15 PM   Resp 14 3/4/2020  3:53 PM   SpO2 98 % 3/4/2020  4:23 PM   Temp src     NIBP 136/82 3/4/2020  3:54 PM   Pulse 100 3/4/2020  3:54 PM   SpO2 99 % 3/4/2020  3:54 PM   Resp     Temp     Ht Rate     Temp 2     Vitals shown include unvalidated device data.      Electronically Signed By: Marcy Dee MD, 2020, 4:25 PM

## 2020-03-04 NOTE — OP NOTE
DATE OF OPERATION: March 4, 2020    PREOPERATIVE DIAGNOSIS: Right heel melanoma excision wound.    POSTOPERATIVE DIAGNOSIS: Right heel melanoma excision wound.    PROCEDURES PERFORMED: Coverage of right heel melanoma excision wound with pedicled medial plantar artery fasciocutaneous flap, size 12 x 8 cm.    SURGEON: Dayo Cosby MD    CO SURGEON: Hao Crabtree MD (co-surgeon was required for this procedure given the difficulty of dissection of perforators involving the medial plantar vessels.  There was inadequate appropriately trained resident assistance.)    ASSISTANT: Marcus Gonzalez MD    ANESTHESIA: General.    ESTIMATED BLOOD LOSS: 100 mL    TOURNIQUET TIME: Less than 1 hour.    FINDINGS: Well perfused flap after inset.    SPECIMENS: None.    COMPLICATIONS: None.    DRAINS: Vacuum-assisted closure device applied over Integra dressing and set at 75 continuous.    IMPLANTS: None.    DESCRIPTION OF PROCEDURE: Informed consent was obtained in the preoperative holding area.  The right foot was then marked.  Patient was given preoperative antibiotics and anticoagulation.  She was then taken to the operating room and placed in supine position with the left hip bumped.  All pressure points were well-padded and under body bear hugger placed.  Lower leg SCDs were placed.  General anesthesia was obtained.  Right lower extremity was then prepped and draped circumferentially in a sterile fashion.  A timeout was performed.    The right heel wound was examined.  Given that patient was lacking glabra skin along the weightbearing region of the heel, the decision was made to cover this wound with a pedicled medial plantar artery flap for its glabrous skin.  A 12 x 8 cm flap was designed in the territory of the medial plantar artery and its perforators with care taken not to include any weightbearing glabra skin along the foot.  The right lower extremity was then exsanguinated by gravity and the tourniquet at the upper thigh  was brought to 300.  The anterior incision of the flap markings was made and carried down through the subcutaneous tissue to identify the abductor hallucis muscle.  Dissection was then performed around this muscle to identify the septum between it and the flexor digitorum brevis muscle.  The medial plantar artery and its branches were found within the symptom.    We then released the tourniquet and dopplered this pedicle.  But had a strong arterial Doppler signal.  The lateral incision of the flap markings was then made and the flap was raised along with the plantar fascia aponeurosis and the septum continue the medial plantar artery.  Care was taken not to damage the distal extents of the tibial nerve.  The flap was raised to a source vessel, the posterior tibial vessels.  It was an island that off of this vessel so that he could rotate 90 degrees towards the heel.  Once this was achieved, the remaining skin incisions were made and the flap rotated 90 degrees and inset over the heel.  It was sewn down with 2-0 nylon mattress sutures.  The flap was pink and well-perfused following inset.  Intraoperative chemical angiography was performed using the spy Elite system and this confirmed adequate perfusion of the flap.    Dr. Galan then covered the flap donor site wound by bringing together abductor hallucis and flexor digitorum brevis muscle fibers together with Monocryl suture to cover the exposed nerves.  He then used Integra and a vacuum-assisted closure device for wound coverage.  This will be dictated separately by him.  The flap was pink and well-perfused following application of the vacuum device at 75 continuous.  The vacuum-assisted closure held suction.  All counts were correct at the end of the case.  A short leg splint was applied to keep the ankle at 90 with no pressure over the heel.  The patient was then expanded, awakened, and transferred to the postoperative area in stable condition.    DISPOSITION:  Inpatient floor for flap monitoring and therapy.

## 2020-03-05 ENCOUNTER — APPOINTMENT (OUTPATIENT)
Dept: OCCUPATIONAL THERAPY | Facility: CLINIC | Age: 75
DRG: 578 | End: 2020-03-05
Attending: PLASTIC SURGERY
Payer: MEDICARE

## 2020-03-05 ENCOUNTER — APPOINTMENT (OUTPATIENT)
Dept: PHYSICAL THERAPY | Facility: CLINIC | Age: 75
DRG: 578 | End: 2020-03-05
Attending: PLASTIC SURGERY
Payer: MEDICARE

## 2020-03-05 LAB — GLUCOSE BLDC GLUCOMTR-MCNC: 103 MG/DL (ref 70–99)

## 2020-03-05 PROCEDURE — 12000001 ZZH R&B MED SURG/OB UMMC

## 2020-03-05 PROCEDURE — 25000128 H RX IP 250 OP 636: Performed by: STUDENT IN AN ORGANIZED HEALTH CARE EDUCATION/TRAINING PROGRAM

## 2020-03-05 PROCEDURE — 25000132 ZZH RX MED GY IP 250 OP 250 PS 637: Performed by: STUDENT IN AN ORGANIZED HEALTH CARE EDUCATION/TRAINING PROGRAM

## 2020-03-05 PROCEDURE — 97116 GAIT TRAINING THERAPY: CPT | Mod: GP

## 2020-03-05 PROCEDURE — 00000146 ZZHCL STATISTIC GLUCOSE BY METER IP

## 2020-03-05 PROCEDURE — 97530 THERAPEUTIC ACTIVITIES: CPT | Mod: GP

## 2020-03-05 PROCEDURE — 25000132 ZZH RX MED GY IP 250 OP 250 PS 637: Performed by: PHYSICIAN ASSISTANT

## 2020-03-05 PROCEDURE — 97535 SELF CARE MNGMENT TRAINING: CPT | Mod: GO | Performed by: OCCUPATIONAL THERAPIST

## 2020-03-05 PROCEDURE — 97165 OT EVAL LOW COMPLEX 30 MIN: CPT | Mod: GO | Performed by: OCCUPATIONAL THERAPIST

## 2020-03-05 PROCEDURE — 97161 PT EVAL LOW COMPLEX 20 MIN: CPT | Mod: GP

## 2020-03-05 RX ORDER — TRAMADOL HYDROCHLORIDE 50 MG/1
50-100 TABLET ORAL EVERY 6 HOURS PRN
Status: DISCONTINUED | OUTPATIENT
Start: 2020-03-05 | End: 2020-03-06 | Stop reason: HOSPADM

## 2020-03-05 RX ORDER — TRAMADOL HYDROCHLORIDE 50 MG/1
50 TABLET ORAL EVERY 6 HOURS PRN
Qty: 20 TABLET | Refills: 0 | Status: CANCELLED | OUTPATIENT
Start: 2020-03-05

## 2020-03-05 RX ORDER — TRAMADOL HYDROCHLORIDE 50 MG/1
50 TABLET ORAL EVERY 6 HOURS PRN
Status: DISCONTINUED | OUTPATIENT
Start: 2020-03-05 | End: 2020-03-05

## 2020-03-05 RX ADMIN — ACETAMINOPHEN 975 MG: 325 TABLET, FILM COATED ORAL at 17:37

## 2020-03-05 RX ADMIN — TRAMADOL HYDROCHLORIDE 50 MG: 50 TABLET, FILM COATED ORAL at 12:52

## 2020-03-05 RX ADMIN — ACETAMINOPHEN 975 MG: 325 TABLET, FILM COATED ORAL at 03:16

## 2020-03-05 RX ADMIN — CEFAZOLIN 1 G: 330 INJECTION, POWDER, FOR SOLUTION INTRAMUSCULAR; INTRAVENOUS at 01:32

## 2020-03-05 RX ADMIN — ENOXAPARIN SODIUM 40 MG: 40 INJECTION SUBCUTANEOUS at 10:23

## 2020-03-05 RX ADMIN — TRAMADOL HYDROCHLORIDE 100 MG: 50 TABLET, FILM COATED ORAL at 17:37

## 2020-03-05 RX ADMIN — SENNOSIDES AND DOCUSATE SODIUM 2 TABLET: 8.6; 5 TABLET ORAL at 20:22

## 2020-03-05 RX ADMIN — ACETAMINOPHEN 975 MG: 325 TABLET, FILM COATED ORAL at 10:15

## 2020-03-05 RX ADMIN — SENNOSIDES AND DOCUSATE SODIUM 1 TABLET: 8.6; 5 TABLET ORAL at 07:34

## 2020-03-05 RX ADMIN — TRAMADOL HYDROCHLORIDE 50 MG: 50 TABLET, FILM COATED ORAL at 11:36

## 2020-03-05 ASSESSMENT — ACTIVITIES OF DAILY LIVING (ADL)
ADLS_ACUITY_SCORE: 24
ADLS_ACUITY_SCORE: 24
PREVIOUS_RESPONSIBILITIES: MEAL PREP;HOUSEKEEPING;LAUNDRY;SHOPPING;MEDICATION MANAGEMENT;FINANCES;DRIVING
ADLS_ACUITY_SCORE: 23
ADLS_ACUITY_SCORE: 24

## 2020-03-05 ASSESSMENT — PAIN DESCRIPTION - DESCRIPTORS
DESCRIPTORS: ACHING
DESCRIPTORS: ACHING
DESCRIPTORS: ACHING;SHOOTING
DESCRIPTORS: ACHING;SHOOTING
DESCRIPTORS: ACHING

## 2020-03-05 NOTE — PLAN OF CARE
VSS on room air. Pain controlled with tylenol and tramadol. Flap checks q3h. Flap pink, warm, soft, and has good capillary refill. Flap became slightly purplish after being in a dependent position while using the commode. PA notified, came to assess, and said that it was within normal limits. Wound vac to donor site at 75mmHg low continuous suction. Right leg elevated with wedge cushion while in bed. Toe touch weight bearing when out of bed. Up with SBA and walker. Tolerating regular diet. Voiding with adequate urine output. Passing gas but no bowel movement. Possible discharge tomorrow.

## 2020-03-05 NOTE — PROGRESS NOTES
Care Coordinator - Discharge Planning    Admission Date/Time:  3/4/2020  Attending MD:  NIKA Crabtree MD     Data  Date of initial CC assessment:  Today after update in interdisciplinary rounds and consultation with the inpatient [facility] Liaison's.  Thank you.  Chart reviewed, discussed with interdisciplinary team.   Patient was admitted for:   1. Malignant melanoma of right lower extremity including hip (H)    2. Melanoma of skin (H)    3. Melanoma of skin (H)         Assessment     The following home care plans of care have been initiated to assist patient in her home setting once stable for discharge:    Please fax discharge orders to [facility]     Ph:  [phone]     Fax: [phone]     Skilled home care RN for resumption of home care services and to assist with the MD team plans of care as outlined in MD discharge orders.     Skilled home care RN to evaluate medication management, nutrition and hydration evaluation, endurance evaluation, and general status evaluation after discharge from the acute care hospital setting.     Skilled home care RN to assist with home [employer] Wound Vac cares/dressing changes per MD orders.     Skilled home care Physical Therapy and Occupational Therapy to evaluate and treat in home setting after surgery per recommendations of the inpatient Rehabilitation Consult Team.     Coordination of Care and Referrals: Provided patient/family with options for home care agency of choice in home area who can assist with the MD team plans of care at time of discharge.      Plan  Anticipated Discharge Date:  To be determined.  Anticipated Discharge Plan:  As above and per MD team updated plans of care at time of discharge.  New home care Face to Face to be completed secondary to the addition of home skilled PT and OT.    CTS Handoff completed:  (Clinic Letter)  To be sent.    ANALISA Sanchez.S.MARLY., R.N., P.H.N..  Care Coordinator      Pager   [facility] [place]

## 2020-03-05 NOTE — PROGRESS NOTES
[facility]  Patient is currently open to home care services with [facility]. The patient is currently receiving RN services. [facility]  and team have been notified of patient admission. [facility] liaison will continue to follow patient during stay. If appropriate provide orders to resume home care at time of discharge.    Federica Boston RN   [facility] Care Liaison   [phone]

## 2020-03-05 NOTE — PROGRESS NOTES
Admitted/transferred from: PACU  2 RN full except under ACE wrap on right lower leg/foot covering surgical dressing (orders not to remove)  skin assessment completed by Maddi Klein RN and Meghan Nava RN.  Skin assessment finding: skin intact, no problems   Interventions/actions: other none needed     Will continue to monitor.

## 2020-03-05 NOTE — PLAN OF CARE
Discharge Planner OT   Patient plan for discharge: home with assist from SO  Current status: pt and SO educated in basic functional transfers, Prevlon boot for protection of surgical site.   Barriers to return to prior living situation: post surgical precautions.   Recommendations for discharge: home with assist from SO as previous  Rationale for recommendations: pt with sufficient support at home and has been TTWB on R LE for past month.        Entered by: Benoit Jett 03/05/2020 4:48 PM

## 2020-03-05 NOTE — PROGRESS NOTES
Plastic Surgery Progress Note  Attending: Treasure Crabtree and Alea    POD1 right heel reconstruction with pedicled instep flap, integra/wound vac to donor site.    Pt doing well overnight. Leg was elevated on foam wedge due to flap color concerns. Pain controlled with tylenol, does not tolerate oxy due to nausea. Up to commode, voiding spontaneously. Requesting diet.    Temp:  [97.6  F (36.4  C)-98.6  F (37  C)] 98.6  F (37  C)  Pulse:  [] 92  Heart Rate:  [] 99  Resp:  [10-20] 16  BP: (101-148)/(54-88) 101/54  SpO2:  [94 %-99 %] 95 %  I/O last 3 completed shifts:  In: 2343.75 [P.O.:360; I.V.:1983.75]  Out: 675 [Urine:575; Blood:100]    General: NAD, alert and answering questions appropriately  Resp: NLB on RA  Ext: R heel flap warm, soft, pink with 3 second cap refill. Wound vac holding suction. Splint dressing in place.     CBC  Recent Labs   Lab 03/04/20  1803 02/27/20  0700   WBC  --  5.4   RBC  --  3.98   HGB  --  12.2   HCT  --  37.6   MCV  --  95   MCH  --  30.7   MCHC  --  32.4   RDW  --  13.2    378     BMP  Recent Labs   Lab 03/04/20  1803 02/27/20  0700   NA  --  143   POTASSIUM  --  4.1   CHLORIDE  --  107   GABY  --  9.1   CO2  --  31   BUN  --  15   CR 0.52 0.55   GLC  --  81       ASSESSMENT: 74 year old female POD #1 right heel reconstruction with pedicled instep flap, integra/wound vac to donor site, healing as anticipated    PLAN:     -q3 hr flap checks  -Keep leg elevated if laying in bed, keep pressure off heel  -activity: toe touch weight bearing RLE, up with PT today  -OT, please evaluate for R lower leg splint, must avoid heel pressure and allow for TTWB  -advance diet  -vac to stay until next week  - patient will need a right leg extension for the home wheel chair while the reconstruction is healing (2-4 weeks)    Carola Mathis PA-C  Plastic and Reconstructive Surgery  -

## 2020-03-05 NOTE — PLAN OF CARE
Arrived from PACU 1730. AVSS on 1L NC. Pain controlled with scheduled tylenol, declines oxycodone/dilaudid. Intermittent nausea somewhat improved with PRN zofran. Clear liquid diet, ate some jello and drank water. PIV L infusing, PIV R SL'd. Wound vac to R outer heel to 75mg sx, c/d/I. Flap stapled into place, pink warm and well-perfused with some edema and bloody drainage peripherally. No doppler site present, assessing q2h. Toe touch weight bearing on the RLE, heel to be elevated at all times with no pressure against it. Room 75 degrees, haritha hugger in place to RLE. Voiding adequate amounts, pivots to commode (on left leg only) with SBA of 2/GB for safety. Admit docs completed. IS teaching done, pneumo boot to LLE only.     Update: at 2030 assessment noted purplish area to left lower/middle quadrant of flap. Remains warm with good capillary refill. Updated Dr. Cosby, he advised to place 5-6 pillows underneath until we can get a wedge to keep leg at 45 degree angle from body. Will do so and update him on appearance in a couple of hours.     Continue POC.

## 2020-03-05 NOTE — PLAN OF CARE
A x O, VSS on 1L NC. Pain managed w/ tylenol. Q2H flap checks. Foot elevated to 45 degrees. Wound vac to R outer heel to 75mg sx, c/d/I. Ab hugger in place. Denies n/v. Clear liquid diet, tolerating. Voiding adequately, commode at bedside. Assist x 2, pivots. Resting between cares. Continue POC.       AM shift to check flaps q3h and ab hugger can be discontinued.

## 2020-03-05 NOTE — PROGRESS NOTES
CLINICAL NUTRITION SERVICES - BRIEF NOTE    Pt has the following food allergies listed in chart: bananas, soy, corn, dairy, and lactose.  Pt reports she has intolerances to these, not allergies.  Pt reports she also avoids pears, blueberries, and pineapple. Symptoms after eating these foods are GI issues (stomachache), and pt says she can tolerate foods with small amounts of these ingredients and is able to self select what she can and cannot tolerate.  Pt would prefer to have these foods removed from allergy list since they limit what she can order from menu.  RD removed these food allergies, will update diet order.     Patient triggered Admission Nutrition Risk Screen for stageable pressure injuries or large/non-healing wound or burn (comments: open area right heel).  Per chart, pt admit for right heel reconstruction, but no mention of pressure injury or large/non-healing wound or burn; does not meet criteria for above nutrition risk screen.    RD will follow per nutrition protocol    Dona Lo RD, LD  7C RD pager: [phone]

## 2020-03-05 NOTE — PROGRESS NOTES
03/05/20 1248   Quick Adds   Type of Visit Initial PT Evaluation   Living Environment   Lives With spouse   Living Arrangements house   Home Accessibility no concerns   Transportation Anticipated family or friend will provide   Living Environment Comment  and pt are retired,  has been providing assist for several weeks since first surgery   Self-Care   Usual Activity Tolerance good   Current Activity Tolerance fair   Regular Exercise No   Equipment Currently Used at Home grab bar, tub/shower;walker, standard;wheelchair, manual   Functional Level Prior   Ambulation 1-->assistive equipment   Transferring 3-->assistive equipment and person   Toileting 2-->assistive person   Bathing 3-->assistive equipment and person   Communication 0-->understands/communicates without difficulty   Swallowing 0-->swallows foods/liquids without difficulty   Cognition 0 - no cognition issues reported   Fall history within last six months no   General Information   Onset of Illness/Injury or Date of Surgery - Date 03/04/20   Referring Physician Marcus Gonzalez MD   Patient/Family Goals Statement return home   Pertinent History of Current Problem (include personal factors and/or comorbidities that impact the POC) 74 year old female POD #1 right heel reconstruction with pedicled instep flap, integra/wound vac to donor site, healing as anticipated   Precautions/Limitations fall precautions;other (see comments)   Weight-Bearing Status - RLE touch down weight-bearing  (no pressure on R heel)   Cognitive Status Examination   Orientation orientation to person, place and time   Level of Consciousness alert   Follows Commands and Answers Questions 100% of the time   Personal Safety and Judgment intact   Memory intact   Pain Assessment   Patient Currently in Pain Yes, see Vital Sign flowsheet   Integumentary/Edema   Integumentary/Edema Comments RLE ACE wrapped from knee to toes with heel exposed   Posture    Posture Protracted  "shoulders   Range of Motion (ROM)   ROM Comment WFL BUE and LLE.  R ankle limits by edema and ACE wraps   Strength   Strength Comments 5/5 BUE and LLE.  able to complete SLR RLE   Bed Mobility   Bed Mobility Comments SBA to manage linens and lines   Transfer Skills   Transfer Comments SBA to FWW with cues for technique and precautions   Gait   Gait Comments CGA, FWW, step-to patttern, utilizes a NWB pattern on RLE per preference   Balance   Balance Comments good in sitting and static standing   General Therapy Interventions   Planned Therapy Interventions gait training;transfer training;progressive activity/exercise;home program guidelines;risk factor education;orthotic fitting/training   Clinical Impression   Criteria for Skilled Therapeutic Intervention yes, treatment indicated   PT Diagnosis impaired functional mobility   Influenced by the following impairments pain, strength, ROM   Functional limitations due to impairments gait, transfers   Clinical Presentation Stable/Uncomplicated   Clinical Presentation Rationale clinical judgment   Clinical Decision Making (Complexity) Low complexity   Therapy Frequency Daily   Predicted Duration of Therapy Intervention (days/wks) 4 days   Anticipated Discharge Disposition Home with Home Therapy   Risk & Benefits of therapy have been explained Yes   Patient, Family & other staff in agreement with plan of care Yes   [facility] AM-PAC  \"6 Clicks\" V.2 Basic Mobility Inpatient Short Form   1. Turning from your back to your side while in a flat bed without using bedrails? 4 - None   2. Moving from lying on your back to sitting on the side of a flat bed without using bedrails? 4 - None   3. Moving to and from a bed to a chair (including a wheelchair)? 4 - None   4. Standing up from a chair using your arms (e.g., wheelchair, or bedside chair)? 4 - None   5. To walk in hospital room? 3 - A Little   6. Climbing 3-5 steps with a railing? 2 - A Lot   Basic Mobility Raw Score " (Score out of 24.Lower scores equate to lower levels of function) 21   Total Evaluation Time   Total Evaluation Time (Minutes) 8

## 2020-03-05 NOTE — PROGRESS NOTES
03/05/20 1100   Quick Adds   Type of Visit Initial Occupational Therapy Evaluation   Living Environment   Lives With spouse   Living Arrangements house   Home Accessibility no concerns   Transportation Anticipated family or friend will provide   Living Environment Comment live in slab house, SO able to assist as needed.    Self-Care   Usual Activity Tolerance good   Current Activity Tolerance fair   Regular Exercise No   Equipment Currently Used at Home grab bar, tub/shower;walker, standard;wheelchair, manual   Functional Level   Ambulation 1-->assistive equipment   Transferring 3-->assistive equipment and person   Toileting 2-->assistive person   Bathing 3-->assistive equipment and person   Dressing 2-->assistive person   Eating 0-->independent   Communication 0-->understands/communicates without difficulty   Swallowing 0-->swallows foods/liquids without difficulty   Cognition 0 - no cognition issues reported   Fall history within last six months no   Prior Functional Level Comment PLOF of I with all ADL's, however over past month pt has had weight bearing restrictions to R LE of which her ADL's are oimpacted as above.    General Information   Referring Physician Carola Mathis   Patient/Family Goals Statement return home, heal well and eventually return to I ADL's.    Additional Occupational Profile Info/Pertinent History of Current Problem POD1 right heel reconstruction with pedicled instep flap, integra/wound vac to donor site.   Precautions/Limitations fall precautions   Weight-Bearing Status - RLE toe touch weight-bearing   General Observations pt motivated with supportive SO present. Pt and SO appearing slightly overwhelmed by education today and endorsing lmited education from home PT/OT in past.    Cognitive Status Examination   Orientation orientation to person, place and time   Level of Consciousness alert   Follows Commands (Cognition) WNL   Memory intact   Cognitive Comment no concerns.    Visual  Perception   Visual Perception No deficits were identified   Sensory Examination   Sensory Quick Adds No deficits were identified   Range of Motion (ROM)   ROM Quick Adds No deficits were identified   Strength   Manual Muscle Testing Quick Adds No deficits were identified   Hand Strength   Hand Strength Comments no concerns.    Coordination   Coordination Comments no concerns.    Transfer Skill: Sit to Stand   Level of [facility]: Sit/Stand other (see comments)  (education required. )   Lower Body Dressing   Level of [place]: Dress Lower Body   (pt recieving assist from SO)   Instrumental Activities of Daily Living (IADL)   Previous Responsibilities meal prep;housekeeping;laundry;shopping;medication management;finances;driving   IADL Comments of recent SO has been assisting since pt with WB restrictions, at baseline pt I with all ADL/IADL's.    Activities of Daily Living Analysis   Impairments Contributing to Impaired Activities of Daily Living balance impaired;fear and anxiety;pain;post surgical precautions  (structure at risk of further injury. )   General Therapy Interventions   Planned Therapy Interventions ADL retraining;IADL retraining;bed mobility training;orthotic fitting/training;transfer training;home program guidelines;progressive activity/exercise;risk factor education   Clinical Impression   Criteria for Skilled Therapeutic Interventions Met yes, treatment indicated   OT Diagnosis decreased ADL I   Assessment of Occupational Performance 5 or more Performance Deficits   Identified Performance Deficits dressing, bathing, toileting, home making, leisure.    Clinical Decision Making (Complexity) Low complexity   Therapy Frequency 5x/week   Predicted Duration of Therapy Intervention (days/wks) 1 week   Anticipated Discharge Disposition Home with Assist;Home with Home Therapy   Risks and Benefits of Treatment have been explained. Yes   Patient, Family & other staff in agreement with plan of care Yes  "  Clinical Impression Comments Pt presents to OT with post surgical /TTWB R LE precautions leading to decreased ADL I. pt to benefit from skilled OT intervention  to address the above problem list.    [facility] TM \"6 Clicks\"   2016, Trustees of [facility], under license to [employer].  All rights reserved.   6 Clicks Short Forms Daily Activity Inpatient Short Form   [facility] AM-PAC  \"6 Clicks\" Daily Activity Inpatient Short Form   1. Putting on and taking off regular lower body clothing? 2 - A Lot   2. Bathing (including washing, rinsing, drying)? 2 - A Lot   3. Toileting, which includes using toilet, bedpan or urinal? 3 - A Little   4. Putting on and taking off regular upper body clothing? 4 - None   5. Taking care of personal grooming such as brushing teeth? 4 - None   6. Eating meals? 4 - None   Daily Activity Raw Score (Score out of 24.Lower scores equate to lower levels of function) 19   Total Evaluation Time   Total Evaluation Time (Minutes) 5     "

## 2020-03-05 NOTE — OP NOTE
DATE OF OPERATION: March 4, 2020     PREOPERATIVE DIAGNOSIS: Right heel melanoma excision wound.     POSTOPERATIVE DIAGNOSIS: Right heel melanoma excision wound.     PROCEDURES PERFORMED: 1. Coverage of right heel melanoma excision wound with pedicled medial plantar artery fasciocutaneous flap, size 12 x 8 cm. (Dr. Crabtree and Dr. Cosby)    2. Intraoperative chemical angiography with the SPY Elite system: Supervision of injection of IV ICG dye, Interpretation of the angiogram. Indication of use was to confirm the entire flap had reliable blood flow prior to usage. (Dr. Crabtree)  3. Coverage of the foot wound donor site with Bilaminar Skin Substitute (Integra), 6 inches x 5 inches, 100% used and VAC dressing. (Dr. Crabtree)     SURGEON: Hao Crabtree MD     CO SURGEON: Dayo Cosby MD (co-surgeon was required for this procedure given the difficulty of dissection of perforators involving the medial plantar vessels.  There was inadequate appropriately trained resident assistance. Dr. Cosby and RAMIRO performed the entire flap procedure together)     ASSISTANT: Marcus Gonzalez MD     ANESTHESIA: General.     ESTIMATED BLOOD LOSS: 100 mL     TOURNIQUET TIME: Less than 1 hour.     FINDINGS: Well perfused flap after inset.     SPECIMENS: None.     COMPLICATIONS: None.     DRAINS: Vacuum-assisted closure device applied over Integra dressing and set at 75 continuous.     IMPLANTS: None.     DESCRIPTION OF PROCEDURE: Informed consent was obtained in the preoperative holding area.  The right foot was then marked.  Patient was given preoperative antibiotics and anticoagulation.  She was then taken to the operating room and placed in supine position with the left hip bumped.  All pressure points were well-padded and under body bear hugger placed.  Lower leg SCDs were placed.  General anesthesia was obtained.  Right lower extremity was then prepped and draped circumferentially in a sterile fashion.  A timeout was performed.     The  right heel wound was examined.  Given that patient was lacking glabra skin along the weightbearing region of the heel, the decision was made to cover this wound with a pedicled medial plantar artery flap for its glabrous skin.  A 12 x 8 cm flap was designed in the territory of the medial plantar artery and its perforators with care taken not to include any weightbearing glabra skin along the foot.  The right lower extremity was then exsanguinated by gravity and the tourniquet at the upper thigh was brought to 300.  The anterior incision of the flap markings was made and carried down through the subcutaneous tissue to identify the abductor hallucis muscle.  Dissection was then performed around this muscle to identify the septum between it and the flexor digitorum brevis muscle.  The medial plantar artery and its branches were found within the symptom.     We then released the tourniquet and dopplered this pedicle.  But had a strong arterial Doppler signal.  The lateral incision of the flap markings was then made and the flap was raised along with the plantar fascia aponeurosis and the septum continue the medial plantar artery.  Care was taken not to damage the distal extents of the tibial nerve.  The flap was raised to a source vessel, the posterior tibial vessels.  It was an island that off of this vessel so that he could rotate 90 degrees towards the heel.  Once this was achieved, the SPY angiogram was performed. 3 ml of ICG dye was injected IV and then at 30 and 60 seconds the blood flow was checked. It all lit up well. Tthe remaining skin incisions were made and the flap rotated 90 degrees and inset over the heel.  It was sewn down with 2-0 nylon mattress sutures.  The flap was pink and well-perfused following inset.      I then covered the flap donor site wound by bringing together abductor hallucis and flexor digitorum brevis muscle fibers together with Monocryl suture to cover the exposed nerves. I then used  Integra and a vacuum-assisted closure device for wound coverage.  A 6x5 inches was used and 100% was used. It was stpaled into position and then a VAC was placed with silver sponge. The flap was pink and well-perfused following application of the vacuum device at 75 continuous.  The vacuum-assisted closure held suction.  All counts were correct at the end of the case.  A short leg splint was applied to keep the ankle at 90 with no pressure over the heel.  The patient was then expanded, awakened, and transferred to the postoperative area in stable condition.     DISPOSITION: Inpatient floor for flap monitoring and therapy.

## 2020-03-05 NOTE — PLAN OF CARE
PT 7C: Evaluation completed and treatment initiated.   Discharge Planner PT   Patient plan for discharge: Home with assist from   Current status: Mobilizing well POD1, adhering well to toe-touch weight-bearing precautions on RLE with minimal instruction.  Able to ambulate x75ft with FWW, occasional minimal assist.  Pt a bit unconfident with mobility, appreciates reinforcement from PT.  Barriers to return to prior living situation: medical and functional status  Recommendations for discharge: Home with assist from  HHPT/OT  Rationale for recommendations: Pt already owns the necessary DME and has been managing well at home with assist from  the last few weeks since her initial surgery.  Anticipate will progress mobility well here.         Entered by: Fawn Garner 03/05/2020 1:38 PM

## 2020-03-06 ENCOUNTER — PATIENT OUTREACH (OUTPATIENT)
Dept: CARE COORDINATION | Facility: CLINIC | Age: 75
End: 2020-03-06

## 2020-03-06 ENCOUNTER — APPOINTMENT (OUTPATIENT)
Dept: PHYSICAL THERAPY | Facility: CLINIC | Age: 75
DRG: 578 | End: 2020-03-06
Attending: PLASTIC SURGERY
Payer: MEDICARE

## 2020-03-06 ENCOUNTER — APPOINTMENT (OUTPATIENT)
Dept: OCCUPATIONAL THERAPY | Facility: CLINIC | Age: 75
DRG: 578 | End: 2020-03-06
Attending: PLASTIC SURGERY
Payer: MEDICARE

## 2020-03-06 VITALS
TEMPERATURE: 97.2 F | WEIGHT: 141.09 LBS | OXYGEN SATURATION: 94 % | RESPIRATION RATE: 18 BRPM | BODY MASS INDEX: 22.15 KG/M2 | DIASTOLIC BLOOD PRESSURE: 71 MMHG | SYSTOLIC BLOOD PRESSURE: 140 MMHG | HEART RATE: 74 BPM | HEIGHT: 67 IN

## 2020-03-06 PROCEDURE — 25000132 ZZH RX MED GY IP 250 OP 250 PS 637: Performed by: STUDENT IN AN ORGANIZED HEALTH CARE EDUCATION/TRAINING PROGRAM

## 2020-03-06 PROCEDURE — 97116 GAIT TRAINING THERAPY: CPT | Mod: GP

## 2020-03-06 PROCEDURE — 97110 THERAPEUTIC EXERCISES: CPT | Mod: GP

## 2020-03-06 PROCEDURE — 97760 ORTHOTIC MGMT&TRAING 1ST ENC: CPT | Mod: GO | Performed by: OCCUPATIONAL THERAPIST

## 2020-03-06 PROCEDURE — 97535 SELF CARE MNGMENT TRAINING: CPT | Mod: GO | Performed by: OCCUPATIONAL THERAPIST

## 2020-03-06 PROCEDURE — 25000132 ZZH RX MED GY IP 250 OP 250 PS 637: Performed by: PHYSICIAN ASSISTANT

## 2020-03-06 PROCEDURE — 25000128 H RX IP 250 OP 636: Performed by: STUDENT IN AN ORGANIZED HEALTH CARE EDUCATION/TRAINING PROGRAM

## 2020-03-06 PROCEDURE — 97530 THERAPEUTIC ACTIVITIES: CPT | Mod: GP

## 2020-03-06 RX ORDER — TRAMADOL HYDROCHLORIDE 50 MG/1
50-100 TABLET ORAL EVERY 6 HOURS PRN
Qty: 20 TABLET | Refills: 0 | Status: SHIPPED | OUTPATIENT
Start: 2020-03-06 | End: 2020-03-27

## 2020-03-06 RX ORDER — ONDANSETRON 4 MG/1
4 TABLET, ORALLY DISINTEGRATING ORAL EVERY 8 HOURS PRN
Qty: 15 TABLET | Refills: 0 | Status: SHIPPED | OUTPATIENT
Start: 2020-03-06 | End: 2020-03-27

## 2020-03-06 RX ORDER — AMOXICILLIN 250 MG
1 CAPSULE ORAL 2 TIMES DAILY
Qty: 20 TABLET | Refills: 0 | Status: SHIPPED | OUTPATIENT
Start: 2020-03-06 | End: 2020-03-27

## 2020-03-06 RX ADMIN — TRAMADOL HYDROCHLORIDE 100 MG: 50 TABLET, FILM COATED ORAL at 01:28

## 2020-03-06 RX ADMIN — ACETAMINOPHEN 975 MG: 325 TABLET, FILM COATED ORAL at 01:28

## 2020-03-06 RX ADMIN — ENOXAPARIN SODIUM 40 MG: 40 INJECTION SUBCUTANEOUS at 09:10

## 2020-03-06 RX ADMIN — ONDANSETRON 4 MG: 4 TABLET, ORALLY DISINTEGRATING ORAL at 09:06

## 2020-03-06 RX ADMIN — ACETAMINOPHEN 975 MG: 325 TABLET, FILM COATED ORAL at 09:05

## 2020-03-06 RX ADMIN — TRAMADOL HYDROCHLORIDE 100 MG: 50 TABLET, FILM COATED ORAL at 09:05

## 2020-03-06 ASSESSMENT — ACTIVITIES OF DAILY LIVING (ADL)
ADLS_ACUITY_SCORE: 24

## 2020-03-06 ASSESSMENT — PAIN DESCRIPTION - DESCRIPTORS
DESCRIPTORS: DISCOMFORT
DESCRIPTORS: DISCOMFORT

## 2020-03-06 NOTE — PLAN OF CARE
PT 7C: Up with supervision using FWW - most assist required for vac management    Discharge Planner PT   Patient plan for discharge: Home - likely today per report  Current status: Engaged pt in bed mobility at modified independent, sit to/from stand from EOB at modified independent, gait with FWW at supervision level of assist for ~200ft, education on appropriate TTWB techniques and AROM of ankle.   Barriers to return to prior living situation: medical status, post op precautions  Recommendations for discharge: Home with assist from  + HHPT  Rationale for recommendations: Pt is below her baseline for mobility, but is progressing well. She will continue to benefit from mobility within appropriate restrictions + HHPT for safe home navigation/set-up.        Entered by: Kina Decker 03/06/2020 11:37 AM

## 2020-03-06 NOTE — PLAN OF CARE
AVSS. Pain managed with Tylenol and Tramadol. Flap checked q 3hrs, warm with good capillary refill. RLE elevated while in bed. Wound vac to 75 mmHg continuous suction. Voiding at bedside commode. Ate full meals yesterday with no nausea. No BM but passing small amount of flatus.Up with assist of 1, toe touch WB on RLE. Continue POC.

## 2020-03-06 NOTE — PLAN OF CARE
Discharge Planner OT   Patient plan for discharge: home with assist  Current status: pt with understanding of all precautions and Prevlon don/doff and wearing schedule of on as needed for protection and comfort.   Barriers to return to prior living situation: post surgical precautions, deconditioning  Recommendations for discharge: home with assist, home OT safety evaluation.   Rationale for recommendations: pt with sufficient skills and assist in home home OT to ensure safe setup.        Entered by: Benoit Jett 03/06/2020 1:22 PM

## 2020-03-06 NOTE — DISCHARGE SUMMARY
Pt discharged at 1345 via wheelchair with all of her belongings. discharge paperwork gone over with pt and  with all additional questions answered by RN, PT, OT, and MD. PIV removed before discharge.

## 2020-03-06 NOTE — PLAN OF CARE
Occupational Therapy Discharge Summary    Reason for therapy discharge:    Discharged to home with outpatient therapy.    Progress towards therapy goal(s). See goals on Care Plan in [facility] electronic health record for goal details.  Goals met    Therapy recommendation(s):    Continued therapy is recommended.  Rationale/Recommendations:  home OT to assess for safe home setup.

## 2020-03-07 NOTE — DISCHARGE SUMMARY
"Plastic Surgery Discharge Summary    Yadira Hoang MRN# [id number]   YOB: 1945 Age: 74 year old     Date of Admission:  3/4/2020  Date of Discharge::  3/6/2020  1:44 PM  Admitting Physician:  NIKA Crabtree MD  Discharge Physician:  NIKA Crabtree MD  Primary Care Physician:         Erika Blanton          Admission Diagnoses:   Melanoma of skin (H) [C43.9]          Discharge Diagnosis:   Melanoma of skin (H) [C43.9]         Procedures:   Coverage of right heel melanoma excision wound with pedicled medial plantar artery fasciocutaneous flap, size 12 x 8 cm.         Non-operative procedures:   None performed          Consultations:   PHYSICAL THERAPY ADULT IP CONSULT  OCCUPATIONAL THERAPY ADULT IP CONSULT         Medications Prior to Admission:     No medications prior to admission.            Discharge Medications:      Yadira Hoang   Home Medication Instructions DEAN:[id number]    Printed on:03/07/20 4110   Medication Information                      acetaminophen (TYLENOL) 325 MG tablet  Take 2 tablets (650 mg) by mouth every 4 hours as needed for mild pain             cholecalciferol (VITAMIN D3) 125 MCG (5000 UT) TABS tablet  Take 2,000 Units by mouth every other day              estradiol (ESTRACE) 0.1 MG/GM vaginal cream  Place 1 g vaginally twice a week              ondansetron (ZOFRAN-ODT) 4 MG ODT tab  Take 1 tablet (4 mg) by mouth every 8 hours as needed for nausea             [place] [facility]  [place]  [facility] $67              senna-docusate (SENOKOT-S/PERICOLACE) 8.6-50 MG tablet  Take 1 tablet by mouth 2 times daily Take if taking tramadol to prevent constipation.             traMADol (ULTRAM) 50 MG tablet  Take 1-2 tablets ( mg) by mouth every 6 hours as needed for moderate pain                       Day of Discharge Exam   BP (!) 140/71 (BP Location: Right arm)   Pulse 74   Temp 97.2  F (36.2  C) (Oral)   Resp 18   Ht 1.702 m (5' 7\")   Wt 64 kg " (141 lb 1.5 oz)   SpO2 94%   BMI 22.10 kg/m      General:  A&Ox3, NAD  Cardio:   RRR  Chest:   Non labored breathing on RA  Ext:   RLE splinted. Right heel flap wwp. 2s cap refill          Brief History of Illness:   The patient had an acral melanoma on her right heel excised and had a temporary VAC dressing placed. The patient now presents for definitive reconstruction of the heel defect.           Hospital Course:   The patient was admitted and underwent the above procedure. The patient tolerated the procedure well. There were no complications. Postoperatively the patient was transferred to the general floor for further care. The patient's diet advanced. Pain was controlled with oral pain medication and the patient was able to ambulate with assistance and void without difficulty. The patient received appropriate education post operatively. On POD 2 the patient was discharged to home with appropriate instructions and follow up. The patient acknowledged understanding and were in agreement with the plan.         Antibiotics Prescribed at Discharge:   None prescribed         Imaging Studies:   No studies require specific follow-up  Results for orders placed or performed during the hospital encounter of 02/22/20   PET Oncology Whole Body    Narrative    Combined Report of:    PET and CT on  2/22/2020 12:35 PM :    1. PET of the neck, chest, abdomen, and pelvis.  2. PET CT Fusion for Attenuation Correction and Anatomical  Localization:    3. Diagnostic CT scan of the chest, abdomen, and pelvis with  intravenous contrast for interpretation.  3. CT of the chest, abdomen and pelvis obtained for diagnostic  interpretation.  4. 3D MIP and PET-CT fused images were processed on an independent  workstation and archived to PACS and reviewed by a radiologist.    Technique:    1. PET: The patient received 10.18 mCi of F-18-FDG; the serum glucose  was 90 prior to administration, body weight was 65.3 kg. Images were  evaluated in  the axial, sagittal, and coronal planes as well as the  rotational whole body MIP. Images were acquired from the Vertex to the  Feet.    UPTAKE WAS MEASURED AT 66 MINUTES.     BACKGROUND:  Liver SUV max= 3.24,   Aorta Blood SUV Max: 2.4.     2. CT: Volumetric acquisition for clinical interpretation of the  chest, abdomen, and pelvis acquired at 3 mm sections . The chest,  abdomen, and pelvis were evaluated at 5 mm sections in bone, soft  tissue, and lung windows.      The patient received 88 cc. Of Isovue 370 intravenously for the  examination.      3. 3D MIP and PET-CT fused images were processed on an independent  workstation and archived to PACS and reviewed by a radiologist.    INDICATION: stage III melanoma, rule out metastatic disease; Malignant  melanoma of right lower extremity including hip (H)    ADDITIONAL INFORMATION OBTAINED FROM EMR: 24-year-old female with  malignant melanoma of right lower extremity, post right local excision  of right heel melanoma and right femoral sentinel lymph node biopsy  (2/10/2020    COMPARISON: None.    FINDINGS:     HEAD/NECK:    There is no  suspicious FDG uptake in the neck.     The paranasal sinuses are clear. The mastoid air cells are clear.     The mucosal pharyngeal space, the , prevertebral and carotid  spaces are within normal limits.     No masses, mass effect or pathologically enlarged lymph nodes are  evident. The thyroid gland is within normal limits.    CHEST:  There is no suspicious FDG uptake in the chest.     There are no pathologically enlarged mediastinal, hilar or axillary  lymph nodes.  There are no suspicious lung nodules or evidence for infection.        There is no significant pericardial or plural effusions.    ABDOMEN AND PELVIS:    Intense FDG uptake in right inguinal lymph node with SUV max 8.97,  lymph node measuring 0.92 cm. Lymph node in the right external iliac  location measuring 1.1 x 0.5 cm with FDG uptake SUV max 3. 5.    There  are no suspicious hepatic lesions. There is no splenomegaly or  evidence for splenic or pancreatic mass lesion.  There are no suspicious adrenal mass lesions or opaque gallbladder  calculi.   There is symmetric nephrographic renal phase without  hydronephrosis.     There is no evidence for diverticulitis, bowel obstruction or free  fluid.      LOWER EXTREMITIES:     Increased soft tissue uptake in the posterior right hindfoot 4.83.    BONES:   There are no suspicious lytic or blastic osseous lesions.  There is no  abnormal FDG uptake in the skeleton. Increased FDG uptake in the left  gluteal musculature.        Impression    IMPRESSION: In this patient with history of right heel melanoma, the   current scan shows:  1. Increased uptake in posterior right hindfoot likely representing  posttreatment operative change.  2. Intense FDG uptake in right inguinal lymph node, concerning for  metastatic disease.  3. Indeterminate right external iliac lymph node with mild FDG uptake.  Recommend attention on follow-up  4. No suspicious FDG uptake in head, neck, chest or abdomen.    I have personally reviewed the examination and initial interpretation  and I agree with the findings.    WES FORTE MD            Final Pathology Result:   No pathology submitted         Discharge Instructions:        Home care nursing referral      MD face to face encounter    Documentation of Face to Face and Certification for Home Health Services    I certify that patient: Yadira Hoang is under my care and that I, or a nurse practitioner or physician's assistant working with me, had a face-to-face encounter that meets the physician face-to-face encounter requirements with this patient on: March 5, 2020.    This encounter with the patient was in whole, or in part, for the following medical condition, which is the primary reason for home health care: R LE skin Melanoma.    I certify that, based on my findings, the following services  are medically necessary home health services: resumption of home RN and addition of home PT and OT.    My clinical findings support the need for the above services because: Per MD orders and recommendations of the inpatient interdisciplinary team.    Further, I certify that my clinical findings support that this patient is homebound illness and deconditioning after surgery.    Based on the above findings. I certify that this patient is confined to the home and needs intermittent skilled nursing care, physical therapy and/or speech therapy.  The patient is under my care, and I have initiated the establishment of the plan of care.  This patient will be followed by a physician who will periodically review the plan of care.  Physician/Provider to provide follow up care: Erika Blanton    Attending hospital physician (the Medicare certified [place] provider): Dr. Hao Crabtree M.D.  Physician Signature: See electronic signature associated with these discharge orders.    Date: 3/5/2020     Reason for your hospital stay    You were hospitalized after surgery for melanoma cancer reconstruction of your right foot.     Adult [facility] Follow-up and recommended labs and tests    Follow up with Dr. Crabtree , at [address] [place] [address] as scheduled.    Appointments on [place] and/or [place] (with [facility] or [facility] provider or service). Call [phone] if you haven't heard regarding these appointments within 7 days of discharge.     Activity    Your activity upon discharge: up as tolerated. Toe touch weight bearing to right foot.  Keep foot elevated when laying in bed or sitting on a couch.  Use your walker as instructed to OT/PT for assistance.     When to contact your care team    Please call with concerns for infection (redness to skin, increased warmth to flap area, fever to 100.5 or greater)     Wound care and dressings    Instructions to care for your wound at home: as directed.     Reason for your hospital stay     Melanoma     Adult [facility] Follow-up and recommended labs and tests    Follow up with Dr. Crabtree at Plastic Surgery clinic on Wednesday, March 11 to evaluate after surgery. No follow up labs or test are needed.    Appointments on [place] and/or [facility] (with [facility] or [facility] provider or service). Call [phone] if you haven't heard regarding these appointments within 7 days of discharge.     Activity    Your activity upon discharge: activity as tolerated. Toe Touch Weight Bearing on the Right Foot. Elevate right leg when in chair or bed     When to contact your care team    Call your primary doctor if you have any of the following: temperature greater than 101.5,  increased shortness of breath, increased drainage, increased swelling or increased pain.     Wound care and dressings    Instructions to care for your wound at home: keep wound clean and dry with VAC in place.     Discharge Instructions    May start regular diet immediately. Keep leg dressing dry. Keep leg elevated when in chair or bed. Follow up in 1 week. Please call if you experience increasing pain, redness, swelling, increasing drainage from your wounds, chills, or fever >101.5     Diet    Follow this diet upon discharge: Regular     Diet    Follow this diet upon discharge: Regular Diet Adult           Home Health Care:   Not needed           Discharge Disposition:   Discharged to home      Condition at discharge: Stable      Marcus Gonzalez MD  Plastic surgery resident

## 2020-03-09 ENCOUNTER — TELEPHONE (OUTPATIENT)
Dept: SURGERY | Facility: CLINIC | Age: 75
End: 2020-03-09

## 2020-03-09 ENCOUNTER — DOCUMENTATION ONLY (OUTPATIENT)
Dept: CARE COORDINATION | Facility: CLINIC | Age: 75
End: 2020-03-09

## 2020-03-09 ENCOUNTER — TELEPHONE (OUTPATIENT)
Dept: PLASTIC SURGERY | Facility: CLINIC | Age: 75
End: 2020-03-09

## 2020-03-09 PROBLEM — M54.2 NECK PAIN, CHRONIC: Status: ACTIVE | Noted: 2017-05-30

## 2020-03-09 PROBLEM — R29.898 WEAKNESS OF FOOT: Status: ACTIVE | Noted: 2017-05-02

## 2020-03-09 PROBLEM — R06.83 SNORING: Status: ACTIVE | Noted: 2018-10-31

## 2020-03-09 PROBLEM — M54.81 BILATERAL OCCIPITAL NEURALGIA: Status: ACTIVE | Noted: 2017-05-30

## 2020-03-09 PROBLEM — G89.29 NECK PAIN, CHRONIC: Status: ACTIVE | Noted: 2017-05-30

## 2020-03-09 PROBLEM — Z87.820 HISTORY OF MULTIPLE CONCUSSIONS: Status: ACTIVE | Noted: 2017-05-30

## 2020-03-09 PROBLEM — R26.2 DIFFICULTY WALKING: Status: ACTIVE | Noted: 2017-05-02

## 2020-03-09 PROBLEM — G89.4 CHRONIC PAIN DISORDER: Status: ACTIVE | Noted: 2018-11-13

## 2020-03-09 PROBLEM — G43.019 INTRACTABLE MIGRAINE WITHOUT AURA AND WITHOUT STATUS MIGRAINOSUS: Status: ACTIVE | Noted: 2017-05-30

## 2020-03-09 PROBLEM — R53.82 CHRONIC FATIGUE: Status: ACTIVE | Noted: 2020-01-06

## 2020-03-09 PROBLEM — M79.672 FOOT PAIN, BILATERAL: Status: ACTIVE | Noted: 2017-05-02

## 2020-03-09 PROBLEM — M79.671 FOOT PAIN, BILATERAL: Status: ACTIVE | Noted: 2017-05-02

## 2020-03-09 PROBLEM — M79.18 MYOFASCIAL PAIN: Status: ACTIVE | Noted: 2018-10-31

## 2020-03-09 NOTE — TELEPHONE ENCOUNTER
"Pt called and states that she has not had a BM in 5- 6days. S/P right heel reconstruction with  on 3/4/20  Pt reports that she thinks she has a hemorrhoid due to straining to have a BM. Pt states that she is passing gas. RN asked pt if she is eating a high fiber diet and pt states \" well I am trying to\". Pt states that she has been taking MOM with no relief. RN reviewed information below with pt and advised to try using a Glycerin suppository, eat a high fiber diet and drink warm liquids and call RN back if no improvements or if pt experiencing severe abdominal pain, or vomiting. Pt expressed understanding and had no further questions at this time...Melissa Ellsworth RN                    Constipation (Adult)  Constipation means that you have bowel movements that are less frequent than usual. Stools often become very hard and difficult to pass.  Constipation is very common. At some point in life, it affects almost everyone. Since everyone's bowel habits are different, what is constipation to one person may not be to another. Your healthcare provider may do tests to diagnose constipation. It depends on what he or she finds when evaluating you.    Symptoms of constipation include:    Abdominal pain    Bloating    Vomiting    Painful bowel movements    Itching, swelling, bleeding, or pain around the anus  Causes  Constipation can have many causes. These include:    Diet low in fiber    Too much dairy    Not drinking enough liquids    Lack of exercise or physical activity (especially true for older adults)    Changes in lifestyle or daily routine, including pregnancy, aging, work, and travel    Frequent use or misuse of laxatives    Ignoring the urge to have a bowel movement or delaying it until later    Medicines, such as certain prescription pain medicines, iron supplements, antacids, certain antidepressants, and calcium supplements    Diseases like irritable bowel syndrome, bowel obstructions, stroke, " diabetes, thyroid disease, Parkinson disease, hemorrhoids, and colon cancer  Complications  Potential complications of constipation can include:    Hemorrhoids    Rectal bleeding from hemorrhoids or anal fissures (skin tears)    Hernias    Dependency on laxatives    Chronic constipation    Fecal impaction, a severe form of constipation in which a large amount of hard stool is in your rectum that you can't pass    Bowel obstruction or perforation  Home care  All treatment should be done after talking with your healthcare provider. This is especially true if you have another medical problems, are taking prescription medicines, or are an older adult. Treatment most often involves lifestyle changes. You may also need medicines. Your healthcare provider will tell you which will work best for you. Follow the advice below to help avoid this problem in the future.  Lifestyle changes  These lifestyle changes can help prevent constipation:    Diet. Eat a high-fiber diet, with fresh fruit and vegetables, and reduce dairy intake, meats, and processed foods    Fluids. It's important to get enough fluids each day. Drink plenty of water when you eat more fiber. If you are on diet that limits the amount of fluid you can have, talk about this with your healthcare provider.    Regular exercise. Check with your healthcare provider first.  Medicines  Take any medicines as directed. Some laxatives are safe to use only every now and then. Others can be taken on a regular basis. While laxatives don't cause bowel dependence, they are treating the symptoms. So your constipation may return if you don't make other changes. Talk with your healthcare provider or pharmacist if you have questions.  Prescription pain medicines can cause constipation. If you are taking this kind of medicine, ask your healthcare provider if you should also take a stool softener.  Medicines you may take to treat constipation include:    Fiber supplements    Stool  softeners    Laxatives    Enemas    Rectal suppositories  Follow-up care  Follow up with your healthcare provider if symptoms don't get better in the next few days. You may need to have more tests or see a specialist.  Call 911  Call 911 if any of these occur:    Trouble breathing    Stiff, rigid abdomen that is severely painful to touch    Confusion    Fainting or loss of consciousness    Rapid heart rate    Chest pain  When to seek medical advice  Call your healthcare provider right away if any of these occur:    Fever of 100.4 F (38 C) or higher, or as directed by your healthcare provider    Failure to resume normal bowel movements    Pain in your abdomen or back gets worse    Nausea or vomiting    Swelling in your abdomen    Blood in the stool    Black, tarry stool    Involuntary weight loss    Weakness  Date Last Reviewed: 6/1/2018 2000-2019 [employer]. [address] [place] [address], [place] [address]. All rights reserved. This information is not intended as a substitute for professional medical care. Always follow your healthcare professional's instructions.

## 2020-03-09 NOTE — TELEPHONE ENCOUNTER
"Pt called to report that after using the suppository she had minimal results. Pt also states that she is eating more fiber and drinking plenty of fluids. Pt states that the area to her rectum is \"big\". RN asked if pt was referring to a hemorrhoid and pt states that she is not sure if that's what it is. RN advised pt to schedule an appt with her pcp to have the site looked at and also to help with her constipation issue and if pt has a BM and does not need an appt pt can cancel. Pt verbalized understanding. Pt advised if she experiences severe abdominal pain to be seen in the ER. Pt states understanding Pt transferred to the scheduling line ...Melissa Ellsworth RN    "

## 2020-03-09 NOTE — TELEPHONE ENCOUNTER
Pt called, no answer. VM Id's pt. Left detailed message with reason for call and  for pt to call the [facility] back at [phone]....Melissa Ellsworth RN

## 2020-03-09 NOTE — TELEPHONE ENCOUNTER
[facility] Call Center    Phone Message    May a detailed message be left on voicemail: yes     Reason for Call: Other: casimiro manjarrez, home care nurse is wondering how often she should do wound care, and basically what she should be doing during her nurse visit, please call her back at [phone] thanks     Action Taken: Message routed to:  [facility] ([facility] surgery    Travel Screening: Not Applicable

## 2020-03-09 NOTE — TELEPHONE ENCOUNTER
[facility] Call Center    Phone Message    May a detailed message be left on voicemail: yes     Reason for Call: Other: Pt is requesting a call back from Melissa to discuss her medications. Please advise.      Action Taken: Message routed to:  Adult Clinics: Surgery, General p [id number]    Travel Screening: Not Applicable

## 2020-03-09 NOTE — PROGRESS NOTES
[facility] now requests orders and shares plan of care/discharge summaries for some patients through [email or web address].  Please REPLY TO THIS MESSAGE OR ROUTE BACK TO THE AUTHOR in order to give authorization for orders when needed.  This is considered a verbal order, you will still receive a faxed copy of orders for signature.  Thank you for your assistance in improving collaboration for our patients.    ORDER  1. Skilled Nursing Visits 1w1, 3w4, 3 prn  2. PT eval and treat  3. OT eval and treat  4. Writer waiting for wound care orders. Pt's wound is currently covered with VAC, which was not removed today per order. She will see surgeon on Wednesday this week.     Thank you  Juju Valverde RN  [phone]

## 2020-03-10 ENCOUNTER — OFFICE VISIT (OUTPATIENT)
Dept: PEDIATRICS | Facility: CLINIC | Age: 75
End: 2020-03-10
Payer: COMMERCIAL

## 2020-03-10 VITALS
TEMPERATURE: 98 F | DIASTOLIC BLOOD PRESSURE: 71 MMHG | OXYGEN SATURATION: 96 % | HEART RATE: 103 BPM | SYSTOLIC BLOOD PRESSURE: 129 MMHG

## 2020-03-10 DIAGNOSIS — K59.03 DRUG-INDUCED CONSTIPATION: Primary | ICD-10-CM

## 2020-03-10 PROBLEM — C43.9 MELANOMA (H): Status: RESOLVED | Noted: 2020-03-04 | Resolved: 2020-03-10

## 2020-03-10 PROBLEM — R06.83 SNORING: Status: RESOLVED | Noted: 2018-10-31 | Resolved: 2020-03-10

## 2020-03-10 PROCEDURE — 99214 OFFICE O/P EST MOD 30 MIN: CPT | Performed by: INTERNAL MEDICINE

## 2020-03-10 NOTE — PROGRESS NOTES
Subjective     Yadira Hoang is a 74 year old female who presents to clinic today for the following health issues:    HPI   Constipation      Duration: 7 days since foot surgery    Description:       Frequency of bowel movements: No BM in 7 days       Consistency of stool: NA    Intensity:  severe    Accompanying signs and symptoms: Nausea       Abdominal pain: YES       Rectal pain: YES Hemorroids       Blood in stool: unknown        Nausea/vomitting: YES    History:        Similar problems in past: no     Precipitating or alleviating factors: none         Medications worsening symptoms: YES but stopped taking     Therapies tried and outcome: OTC stool softeners       Chronic laxative use: no     Patient has a history of melanoma diagnosed on her foot.  She underwent right heel reconstructive surgery and excisional surgery on 3/4/2020.  She was given tramadol while she was in the hospital for pain control.  She did not have any bowel movements for the 3 days she was in the hospital.  Since her procedure, she has not had a bowel movement.  She quit taking tramadol a couple of days ago.  Patient reports severe discomfort in her abdomen.  When she tries to go, she can only get small pebble out.  She is very uncomfortable.  She has tried stool softener, milk of magnesia, suppository.  Nothing is worked.  She has a bottle of enema at home.  Her  does not feel comfortable administering the enema due to her hemorrhoids.  She had a home care nurse visit yesterday, but did not talk about the constipation issue or enema administration.  Patient is here to discuss having enema in the clinic.    ROS:  Constitutional, HEENT, cardiovascular, pulmonary, gi and gu systems are negative, except as otherwise noted.       acetaminophen (TYLENOL) 325 MG tablet, Take 2 tablets (650 mg) by mouth every 4 hours as needed for mild pain  cholecalciferol (VITAMIN D3) 125 MCG (5000 UT) TABS tablet, Take 2,000 Units by mouth every  other day   estradiol (ESTRACE) 0.1 MG/GM vaginal cream, Place 1 g vaginally twice a week   ondansetron (ZOFRAN-ODT) 4 MG ODT tab, Take 1 tablet (4 mg) by mouth every 8 hours as needed for nausea (Patient not taking: Reported on 3/10/2020)  order for DME, [employer] $67   senna-docusate (SENOKOT-S/PERICOLACE) 8.6-50 MG tablet, Take 1 tablet by mouth 2 times daily Take if taking tramadol to prevent constipation. (Patient not taking: Reported on 3/10/2020)  traMADol (ULTRAM) 50 MG tablet, Take 1-2 tablets ( mg) by mouth every 6 hours as needed for moderate pain (Patient not taking: Reported on 3/10/2020)    No current facility-administered medications on file prior to visit.          Patient Active Problem List   Diagnosis     Malignant melanoma of right lower extremity including hip (H)     Melanoma of skin (H)     Obstructive sleep apnea syndrome     Chronic congestion of paranasal sinus     Weakness of foot     Neck pain, chronic     Myofascial pain     Intractable migraine without aura and without status migrainosus     History of multiple concussions     Foot pain, bilateral     Fibromyositis     Difficulty walking     Chronic pain disorder     Chronic fatigue     Bilateral occipital neuralgia     Articular disc disorder of temporomandibular joint     Past Surgical History:   Procedure Laterality Date     BIOPSY NODE SENTINEL Right 2/10/2020    Procedure: [place] Lymph Node Mapping And Biopsy;  Surgeon: Gabriela Dorsey MD;  Location: [facility] OR     EXCISE LESION LOWER EXTREMITY Right 2/10/2020    Procedure: Wide Local Excision of RIGHT heel melanoma;  Surgeon: Gabriela Dorsey MD;  Location: [facility] OR     EXCISE LESION LOWER EXTREMITY Right 2/20/2020    Procedure: Wide local Re-excision RIGHT heel Wound vac application;  Surgeon: Gabriela Dorsey MD;  Location: [facility] OR     ORTHOPEDIC SURGERY Right     heel surgery x2     RECONSTRUCT FOOT Right 3/4/2020    Procedure: Right heel  reconstruction with regional flap, biologic dressing and SPY;  Surgeon: NIKA Crabtree MD;  Location: [facility] OR     TUBAL LIGATION         Social History     Tobacco Use     Smoking status: Never Smoker     Smokeless tobacco: Never Used   Substance Use Topics     Alcohol use: Not Currently     Frequency: Never     History reviewed. No pertinent family history.          Problem list, Medication list, Allergies, and Medical/Social/Surgical histories reviewed in [facility] and updated as appropriate.    OBJECTIVE:                                                    /71   Pulse 103   Temp 98  F (36.7  C) (Temporal)   SpO2 96%     GENERAL: 74-year-old female in tears talking about constipation issue.  Rectal exam: There is external hemorrhoid tags, but no actively engorged hemorrhoid or thrombosed hemorrhoids.        Diagnostic test results:  No results found for any visits on 03/10/20.      ASSESSMENT/PLAN:                                                      74 year old female with the following diagnoses and treatment plan:      ICD-10-CM    1. Drug-induced constipation  K59.03      --I discussed with patient options of having home care nurse come to administer enema, in the meantime I will give her prescription of MiraLAX.  I did call the home care nurse line, they cannot guarantee having a nurse out to her house today yet.  The earliest may be tomorrow.  I discussed with patient again regarding this option, patient is in tears, did not want to wait for the home care nurse to visit tomorrow.  She has surgery follow-up in the morning, would not be able to see home care nurse on today afternoon.  She did not feel she could handle this.  The next best option would be going to the hospital emergency room for administration of enema.  Patient and her  decided to go to the hospital.    Will call or return to clinic if worsening or symptoms not improving as discussed.  See Patient Instructions.      Erika Blanton  MD-PhD  [facility]    (Note: Chart documentation was done in part with Dragon Voice Recognition software. Although reviewed after completion, some word and grammatical errors may remain.)

## 2020-03-10 NOTE — TELEPHONE ENCOUNTER
RN called Sharon and notified Juju that pt has a follow up appt with  tomorrow and further wound care recommendation will be provided then. Sharon asked about resumption of home care orders so they can continue visiting pt. RN noted notes below on pt's discharge instructions from surgery. Sharon verbalized understanding and would just like someone to call her back with new wound care orders once pt see's . RN notified Juju that the visit tomorrow is at the [facility] [place] but RN would reach out to the RN there to notify to reach out to Home care with further wound care orders. Juju Marvin verbalized understanding. Staff message sent to Yadira MARTINS at the [facility] and response received from Yadira that this will be done. ..Melissa Ellsworth RN, MD face to face encounter     Documentation of Face to Face and Certification for Home Health Services     I certify that patient: Yadira Hoang is under my care and that I, or a nurse practitioner or physician's assistant working with me, had a face-to-face encounter that meets the physician face-to-face encounter requirements with this patient on: March 5, 2020.     This encounter with the patient was in whole, or in part, for the following medical condition, which is the primary reason for home health care: R LE skin Melanoma.     I certify that, based on my findings, the following services are medically necessary home health services: resumption of home RN and addition of home PT and OT.     My clinical findings support the need for the above services because: Per MD orders and recommendations of the inpatient interdisciplinary team.     Further, I certify that my clinical findings support that this patient is homebound illness and deconditioning after surgery.     Based on the above findings. I certify that this patient is confined to the home and needs intermittent skilled nursing care, physical therapy and/or speech therapy.  The patient is  under my care, and I have initiated the establishment of the plan of care.  This patient will be followed by a physician who will periodically review the plan of care.  Physician/Provider to provide follow up care: Erika Blanton     Attending hospital physician (the Medicare certified [place] provider): Dr. Hao Crabtree M.D.  Physician Signature: See electronic signature associated with these discharge orders.     Date: 3/5/2020          Reason for your hospital stay     You were hospitalized after surgery for melanoma cancer reconstruction of your right foot.          Adult [facility] Follow-up and recommended labs and tests     Follow up with Dr. Crabtree , at [address] [place] [address] as scheduled.     Appointments on [place] and/or [facility] (with [facility] or [facility] provider or service). Call [phone] if you haven't heard regarding these appointments within 7 days of discharge.          Activity     Your activity upon discharge: up as tolerated. Toe touch weight bearing to right foot.  Keep foot elevated when laying in bed or sitting on a couch.  Use your walker as instructed to OT/PT for assistance.          When to contact your care team     Please call with concerns for infection (redness to skin, increased warmth to flap area, fever to 100.5 or greater)      Wound care and dressings     Instructions to care for your wound at home: as directed.          Reason for your hospital stay     Melanoma          Adult [facility] Follow-up and recommended labs and tests     Follow up with Dr. Crabtree at Plastic Surgery clinic on Wednesday, March 11 to evaluate after surgery. No follow up labs or test are needed.     Appointments on [place] and/or [facility] (with [facility] or [facility] provider or service). Call [phone] if you haven't heard regarding these appointments within 7 days of discharge.          Activity     Your activity upon discharge: activity as tolerated. Toe Touch Weight Bearing on the Right Foot.  Elevate right leg when in chair or bed          When to contact your care team     Call your primary doctor if you have any of the following: temperature greater than 101.5,  increased shortness of breath, increased drainage, increased swelling or increased pain.          Wound care and dressings     Instructions to care for your wound at home: keep wound clean and dry with VAC in place.          Discharge Instructions     May start regular diet immediately. Keep leg dressing dry. Keep leg elevated when in chair or bed. Follow up in 1 week. Please call if you experience increasing pain, redness, swelling, increasing drainage from your wounds, chills, or fever >101.5          Diet     Follow this diet upon discharge: Regular      Diet     Follow this diet upon discharge: Regular Diet Adult                Home Health Care:    Not needed               Discharge Disposition:    Discharged to home      Condition at discharge: Stable        Marcus Gonzalez MD  Plastic surgery resident

## 2020-03-11 ENCOUNTER — PATIENT OUTREACH (OUTPATIENT)
Dept: PLASTIC SURGERY | Facility: CLINIC | Age: 75
End: 2020-03-11

## 2020-03-11 ENCOUNTER — HEALTH MAINTENANCE LETTER (OUTPATIENT)
Age: 75
End: 2020-03-11

## 2020-03-11 ENCOUNTER — OFFICE VISIT (OUTPATIENT)
Dept: PLASTIC SURGERY | Facility: CLINIC | Age: 75
End: 2020-03-11
Payer: COMMERCIAL

## 2020-03-11 VITALS
HEIGHT: 67 IN | TEMPERATURE: 97.6 F | BODY MASS INDEX: 22.1 KG/M2 | OXYGEN SATURATION: 95 % | SYSTOLIC BLOOD PRESSURE: 117 MMHG | HEART RATE: 102 BPM | DIASTOLIC BLOOD PRESSURE: 75 MMHG

## 2020-03-11 DIAGNOSIS — Z98.890 S/P FLAP GRAFT: Primary | ICD-10-CM

## 2020-03-11 RX ORDER — CEFAZOLIN SODIUM 1 G/50ML
1 INJECTION, SOLUTION INTRAVENOUS SEE ADMIN INSTRUCTIONS
Status: CANCELLED | OUTPATIENT
Start: 2020-03-11

## 2020-03-11 RX ORDER — CEFAZOLIN SODIUM 2 G/50ML
2 SOLUTION INTRAVENOUS
Status: CANCELLED | OUTPATIENT
Start: 2020-03-11

## 2020-03-11 RX ORDER — POLYETHYLENE GLYCOL 3350 17 G/17G
17 POWDER, FOR SOLUTION ORAL
COMMUNITY
Start: 2020-03-10 | End: 2021-03-22

## 2020-03-11 ASSESSMENT — PAIN SCALES - GENERAL: PAINLEVEL: MILD PAIN (2)

## 2020-03-11 NOTE — PROGRESS NOTES
Service Date: 2020      POSTOPERATIVE VISIT      PRESENTING COMPLAINT:  Postoperative visit, status post right heel melanoma excision defect covered with an medial plantar artery  fasciocutaneous instep flap and Integra placement to the donor site, done 2020.      HISTORY OF PRESENTING COMPLAINT:  Ms. Nieves is 74 years old.  She is about a week out from her surgery.  Done extremely well, no major issues.  She has been wearing her VAC nonstop and her boot.      PHYSICAL EXAMINATION:  Vital signs are stable.  She is afebrile, in no obvious distress.  I took down all the dressings.  The flap is healing in well.  The Integra has taken well.  No evidence for infection.  She is able to fully dorsiflex her foot.      ASSESSMENT AND PLAN:  Based on above findings, a diagnosis of right heel reconstruction was made.  Discussed with the patient the importance of dorsiflexing her foot and doing exercises every hour.  She will continue with VAC dressings.  She will follow up with my wound nurse.  I will see her back every 1-2 weeks.  We will plan skin grafting to the Integra site in about 3-4 weeks.  I will see her back in a week's time.         NIKA FENG MD             D: 2020   T: 2020   MT: leigha      Name:     ABDIAZIZ NIEVES   MRN:      [id number]        Account:      [id number]   :      1945           Service Date: 2020      Document: [id number]

## 2020-03-11 NOTE — PATIENT INSTRUCTIONS
Follow up in 2 weeks with Dr. Crabtree. (3/25/2020 @ 10am and [facility].)    Continue Wound Vac changes Monday, Wednesday, and Friday.    Please call with any questions or concerns regarding your clinic visit today.    It is a pleasure being involved in your health care.    Contacts post-consultation depending on your need:    Koko Brock -[phone]    Yadira Davies -[phone]    Radiology Appointments [phone]    Schedule Clinic Appointments [phone] # 1   M-F 7:30 - 5 pm    Clinic Fax Number [phone]    Surgery Scheduling [phone] - Luana FLORES    My Chart is available 24 hours a day and is a secure way to access your records and communicate with your care team.  I strongly recommend signing up if you haven't already done so, if you are comfortable with computers.  If you would like to inquire about this or are having problems with My Chart access, you may call [phone] or go online at [email or web address].  Please allow at least 24 hours for a response and extra time on weekends and Holidays.

## 2020-03-11 NOTE — LETTER
3/11/2020       RE: Abdiaziz Nieves  [address] [facility] [address]     Dear Colleague,    Thank you for referring your patient, Abdiaziz Nieves, to the [facility] PLASTIC AND RECONSTRUCTIVE SURGERY at [facility]. Please see a copy of my visit note below.    Service Date: 2020      POSTOPERATIVE VISIT      PRESENTING COMPLAINT:  Postoperative visit, status post right heel melanoma excision defect covered with an medial plantar artery  fasciocutaneous instep flap and Integra placement to the donor site, done 2020.      HISTORY OF PRESENTING COMPLAINT:  Ms. Nieves is 74 years old.  She is about a week out from her surgery.  Done extremely well, no major issues.  She has been wearing her VAC nonstop and her boot.      PHYSICAL EXAMINATION:  Vital signs are stable.  She is afebrile, in no obvious distress.  I took down all the dressings.  The flap is healing in well.  The Integra has taken well.  No evidence for infection.  She is able to fully dorsiflex her foot.      ASSESSMENT AND PLAN:  Based on above findings, a diagnosis of right heel reconstruction was made.  Discussed with the patient the importance of dorsiflexing her foot and doing exercises every hour.  She will continue with VAC dressings.  She will follow up with my wound nurse.  I will see her back every 1-2 weeks.  We will plan skin grafting to the Integra site in about 3-4 weeks.  I will see her back in a week's time.      NIKA FENG MD      D: 2020   T: 2020   MT: leigha      Name:     ABDIAZIZ NIEVES   MRN:      [id number]        Account:      [id number]   :      1945           Service Date: 2020      Document: [id number]

## 2020-03-11 NOTE — PATIENT INSTRUCTIONS
Spoke with pts  and relayed wound vac will be continued. Pt to have three times weekly vac changes (Mon, Wed, Fri). REMY Pitts

## 2020-03-11 NOTE — NURSING NOTE
"Chief Complaint   Patient presents with     Surgical Followup     R heel melanoma coverage + VAC 3/4/20       Vitals:    03/11/20 0827   BP: 117/75   BP Location: Right arm   Patient Position: Chair   Cuff Size: Adult Regular   Pulse: 102   Temp: 97.6  F (36.4  C)   TempSrc: Oral   SpO2: 95%   Height: 1.702 m (5' 7\")       Body mass index is 22.1 kg/m .    Arnulfo Marrero, EMT    "

## 2020-03-12 ENCOUNTER — TELEPHONE (OUTPATIENT)
Dept: PEDIATRICS | Facility: CLINIC | Age: 75
End: 2020-03-12

## 2020-03-12 NOTE — TELEPHONE ENCOUNTER
"Patient went to the ED on 3/10/20 for constipation.      MDM:   Yadira Hoang is a 74 y.o. female who has been on narcotics for prior surgery who presents for evaluation of constipation. She has had no vomiting. Given her age, we did perform an x-ray, which showed significant amount of stool in the colon, but no evidence of obstruction or free air. The patient was given an enema with some relief but no large BM. Unfortunately, she was not able to hold it in very well. She can continue to use stool softeners and will try magnesium citrate at home. She will continue to take stool softeners and is otherwise stable for discharge.    Discharge Medication List as of 3/10/2020 2:57 PM     New Prescriptions   Details   magnesium citrate oral oral solution Take 296 mL by mouth ONCE for 1 dose., Disp-296 mL, R-0, e-Prescribe     polyethylene glycol (MIRALAX) 17 gram oral powder Take 17 g by mouth once daily., Disp-238 g, R-0, e-Prescribe     Called patient, she states she has terrible hemorrhoids and knows she has a BM \"right there\" but it is so hard that it isn't really able have a BM due to the severe pain.    She took 2 Senna yesterday, she took one so far today. She did a suppository once today.  Note that there was medications prescribed at the ED visit, she threw up the Mag. Citrate solution, \"I don't take medications very well\"  And she did not start the Miralax as she was waiting to hear from Dr. Blanton what to do.    Routing to provider to please review.     Mirella Jimenez RN, [facility]    "

## 2020-03-12 NOTE — TELEPHONE ENCOUNTER
[facility] Call Center    Phone Message    May a detailed message be left on voicemail: yes     Reason for Call: Other: Patient states she wants to get a call back to discuss her medications that she discussed with Dr. Blanton at her last visit. Please advise     Action Taken: Message routed to:  Primary Care p [phone]

## 2020-03-12 NOTE — TELEPHONE ENCOUNTER
Called patient and gave message per Dr. Blanton.  Patient verbalized understanding and agreement to plan.     Did inform her that she needs to go back to the hospital if anything worsens.  She agrees to plan.    Asked her to call back for update tomorrow.    Mirella Jimenez RN, [facility]

## 2020-03-12 NOTE — TELEPHONE ENCOUNTER
I recommend she goes on a clear liquid diet for 24 hours until she has BM. Do take the MiraLAX 1-2 times a day as she tolerates. Send her the link below for reference.     [email or web address]

## 2020-03-13 ENCOUNTER — TELEPHONE (OUTPATIENT)
Dept: DERMATOLOGY | Facility: CLINIC | Age: 75
End: 2020-03-13

## 2020-03-13 NOTE — TELEPHONE ENCOUNTER
Pt called to report that she is wondering if she can take any other pain medications. Pt states that she does not like the Tramadol. Pt reports that overall her pain is better but once in awhile she gets shooting pain in her foot. RN asked pt if she is taking Tylenol consistently. Pt states that she is not only when its needed pt reports. RN advised pt to take the Tylenol as directed every 4 hours and if no improvement pt advised to call the clinic back on Monday. Pt verbalized understanding and had no further questions..Melissa Ellsworth RN

## 2020-03-13 NOTE — TELEPHONE ENCOUNTER
Pt called, No answer.  does not identify pt. Left general message for pt to call the [facility] clinic back at [phone].....Melissa Ellsworth RN

## 2020-03-13 NOTE — TELEPHONE ENCOUNTER
Patient left a voicemail requesting a call back to discuss a non-narcotic pain reliever due to stomach issues on narcotics.  She can be reached at [phone].    Lisa Hughes RN

## 2020-03-18 ENCOUNTER — TELEPHONE (OUTPATIENT)
Dept: SURGERY | Facility: CLINIC | Age: 75
End: 2020-03-18

## 2020-03-18 ENCOUNTER — TELEPHONE (OUTPATIENT)
Dept: PLASTIC SURGERY | Facility: CLINIC | Age: 75
End: 2020-03-18

## 2020-03-18 DIAGNOSIS — Z98.890 S/P FLAP GRAFT: Primary | ICD-10-CM

## 2020-03-18 RX ORDER — HYDROCODONE BITARTRATE AND ACETAMINOPHEN 5; 325 MG/1; MG/1
1 TABLET ORAL EVERY 6 HOURS PRN
Status: DISCONTINUED | OUTPATIENT
Start: 2020-03-18 | End: 2020-03-18

## 2020-03-18 RX ORDER — HYDROCODONE BITARTRATE AND ACETAMINOPHEN 5; 325 MG/1; MG/1
1 TABLET ORAL EVERY 6 HOURS PRN
Qty: 10 TABLET | Refills: 0 | Status: SHIPPED | OUTPATIENT
Start: 2020-03-18 | End: 2020-03-27

## 2020-03-18 NOTE — TELEPHONE ENCOUNTER
3/18/2020, 12:51 PM    Spoke with patient. Rescheduled pt to earlier visit on 3/25 with Dr. Crabtree. The patient voiced agreement and understanding of date, time, and place of appointment.      BESS CevallosT

## 2020-03-18 NOTE — TELEPHONE ENCOUNTER
"Patient called stating that she is still having right heel post op pain s/p flap graft done on 3/4/20. She stated she was prescribed Tramadol that she discontinued due to nausea. Patient spoke with LAKSHMI Torres on 3/13/20 who suggested to take Tylenol as directed every 4 hours.     Patient stated that when she saw Dr. Crabtree in clinic on 3/11/20 that she begin foot exercises. She stated that the exercises cause her pain and that the Tylenol isn't \"strong enough' and is inquiring if she can get something stronger.    ASSESSMENT AND PLAN:  Based on above findings, a diagnosis of right heel reconstruction was made.  Discussed with the patient the importance of dorsiflexing her foot and doing exercises every hour.  She will continue with VAC dressings.  She will follow up with my wound nurse.  I will see her back every 1-2 weeks.  We will plan skin grafting to the Integra site in about 3-4 weeks.  I will see her back in a week's time.     Dr. Crabtree's RN is out of the clinic today.    Routing message to Dr. Crabtree to review.    Marion Bonilla LPN    "

## 2020-03-19 ENCOUNTER — TELEPHONE (OUTPATIENT)
Dept: SURGERY | Facility: CLINIC | Age: 75
End: 2020-03-19

## 2020-03-19 NOTE — TELEPHONE ENCOUNTER
Pt called stating that she was concerned about taking the Hydrocodone as she had heard her age group should not take it. RN looked up this medication on up to date and there were no specific contraindications for her age group when taking this medication. RN reviewed with pt the side effects  and since pt has had issues with constipation RN advised pt take a stool softener with this medication and also increase her daily fiber and fluid intake. Pt verbalized understanding. RN notified pt to call the clinic back right away  if she notices any problems while taking this medication. Pt voiced understanding and thanked RN for the information.....Melissa Ellsworth RN

## 2020-03-25 ENCOUNTER — TELEPHONE (OUTPATIENT)
Dept: SURGERY | Facility: CLINIC | Age: 75
End: 2020-03-25

## 2020-03-25 ENCOUNTER — TELEPHONE (OUTPATIENT)
Dept: DERMATOLOGY | Facility: CLINIC | Age: 75
End: 2020-03-25

## 2020-03-25 ENCOUNTER — OFFICE VISIT (OUTPATIENT)
Dept: PLASTIC SURGERY | Facility: CLINIC | Age: 75
End: 2020-03-25
Payer: COMMERCIAL

## 2020-03-25 VITALS
BODY MASS INDEX: 22.1 KG/M2 | OXYGEN SATURATION: 96 % | SYSTOLIC BLOOD PRESSURE: 134 MMHG | TEMPERATURE: 97.8 F | HEIGHT: 67 IN | DIASTOLIC BLOOD PRESSURE: 75 MMHG | HEART RATE: 90 BPM

## 2020-03-25 DIAGNOSIS — Z98.890 S/P FLAP GRAFT: Primary | ICD-10-CM

## 2020-03-25 ASSESSMENT — PAIN SCALES - GENERAL: PAINLEVEL: MODERATE PAIN (4)

## 2020-03-25 NOTE — TELEPHONE ENCOUNTER
Patient called stated she needed to leave a message with wilton, this cma stated she could take the message and give it to wilton. Patient stated she would like to give her the message herself. This cma told her she was on a call with a patient and she would call her back.     Amorrconcepción Snider CMA

## 2020-03-25 NOTE — NURSING NOTE
"Chief Complaint   Patient presents with     RECHECK     R heel recon 3/4 w/ VAC       Vitals:    03/25/20 0850   BP: 134/75   BP Location: Right arm   Patient Position: Chair   Cuff Size: Adult Regular   Pulse: 90   Temp: 97.8  F (36.6  C)   TempSrc: Oral   SpO2: 96%   Height: 1.702 m (5' 7\")       Body mass index is 22.1 kg/m .    Arnulfo Marrero, EMT    "

## 2020-03-25 NOTE — TELEPHONE ENCOUNTER
Pt called to state that she was told that home care needs wound vac orders extended so that they can continue to get supplies.  RN called [employer] who advised that [facility] was told that the wound vac was discharged as of 3/4 and her acct was closed out. [facility] informed them that pt still has the wound vac and [facility] said they would send out a form to have completed. RN notified [employer] that RN has not received any forms as of yet but RN will watch for the form ....Melissa Ellsworth RN

## 2020-03-25 NOTE — PROGRESS NOTES
PRESENTING COMPLAINT:  Postoperative visit, status post right heel melanoma excision defect covered with a medial plantar artery  fasciocutaneous instep flap and Integra to the donor site done 03/04/2020.        HISTORY OF PRESENTING COMPLAINT:  Ms. Hoang is 74 years old.  She is about 3 weeks out from her surgery, doing well, continuing with range-of-motion exercises and VAC therapy to the Integra.      PHYSICAL EXAMINATION:  Vital signs are stable.  She is afebrile, in no obvious distress.  The flap has healed in well.  The Integra has taken well.      ASSESSMENT AND PLAN:  Based upon the above findings, a diagnosis of right heel reconstruction was made.  The plan now is to proceed with a skin graft to the Integra area in 2 weeks' time.  We placed those orders.  I explained to her how this would be done.  All risks, benefits and alternatives of the procedure were explained.  She understood them all and wants to proceed.  I look forward to helping her out in the near future.

## 2020-03-25 NOTE — LETTER
3/25/2020       RE: Yadira Hoang  [address] [facility] [address]     Dear Colleague,    Thank you for referring your patient, Yadira Hoang, to the [facility] PLASTIC AND RECONSTRUCTIVE SURGERY at [facility]. Please see a copy of my visit note below.    PRESENTING COMPLAINT:  Postoperative visit, status post right heel melanoma excision defect covered with a medial plantar artery  fasciocutaneous instep flap and Integra to the donor site done 03/04/2020.        HISTORY OF PRESENTING COMPLAINT:  Ms. Hoang is 74 years old.  She is about 3 weeks out from her surgery, doing well, continuing with range-of-motion exercises and VAC therapy to the Integra.      PHYSICAL EXAMINATION:  Vital signs are stable.  She is afebrile, in no obvious distress.  The flap has healed in well.  The Integra has taken well.      ASSESSMENT AND PLAN:  Based upon the above findings, a diagnosis of right heel reconstruction was made.  The plan now is to proceed with a skin graft to the Integra area in 2 weeks' time.  We placed those orders.  I explained to her how this would be done.  All risks, benefits and alternatives of the procedure were explained.  She understood them all and wants to proceed.  I look forward to helping her out in the near future.         Again, thank you for allowing me to participate in the care of your patient.      Sincerely,    NIKA Crabtree MD

## 2020-03-25 NOTE — TELEPHONE ENCOUNTER
Surgery is scheduled with Dr. Crabtree on 4/6 at [facility].  Scheduled per MD.    H&P: to be completed by PCP.  PCP: Erika Blanton  POST-OP: 4/14 AT 8 AM     The RN completed the education regarding the surgery.     The surgery packet was provided that contains surgical instructions and a map.     They are aware that they will receive a call  ~2 days prior to the scheduled procedure and will be given an exact arrival/start time.    I contacted the patient and was able to confirm the scheduled dates.

## 2020-03-26 ENCOUNTER — TELEPHONE (OUTPATIENT)
Dept: PEDIATRICS | Facility: CLINIC | Age: 75
End: 2020-03-26

## 2020-03-26 NOTE — TELEPHONE ENCOUNTER
Do you have any of the following symptoms:  a)      Fever (or reported chills) No  b)      Shortness of Breath No  c)      Rash No    If a patient reports yes to any of these symptoms, obtain direction from the provider and call the patient back to let them know if they can come in or not.  1.    Provider needs to determine if this patient should still be seen in clinic.  2.    If decision to not see in clinic, call patient back and refer them to COVID- 19 [email or web address] or schedule COVID-19 phone visit.  3.    Turn in-person visit into telephone visit (FOR RETURN PATIENTS ONLY)    Remind patients that visitors are not allowed on site. Only one legal guardian who screens negative to the above questions will be allowed to accompany patients. If a patient indicates that they will be bringing a legal guardian with them to the appointment please make sure to screen both the patient and the legal guardian for symptoms using the tool above.  Eladia MIMS,CMA

## 2020-03-27 ENCOUNTER — OFFICE VISIT (OUTPATIENT)
Dept: PEDIATRICS | Facility: CLINIC | Age: 75
End: 2020-03-27
Payer: COMMERCIAL

## 2020-03-27 VITALS
DIASTOLIC BLOOD PRESSURE: 73 MMHG | HEART RATE: 89 BPM | WEIGHT: 141.5 LBS | BODY MASS INDEX: 22.16 KG/M2 | OXYGEN SATURATION: 97 % | TEMPERATURE: 97.6 F | SYSTOLIC BLOOD PRESSURE: 119 MMHG

## 2020-03-27 DIAGNOSIS — K59.03 DRUG INDUCED CONSTIPATION: ICD-10-CM

## 2020-03-27 DIAGNOSIS — G47.33 OBSTRUCTIVE SLEEP APNEA SYNDROME: ICD-10-CM

## 2020-03-27 DIAGNOSIS — Z01.818 PREOP GENERAL PHYSICAL EXAM: Primary | ICD-10-CM

## 2020-03-27 DIAGNOSIS — R68.89 HEAT INTOLERANCE: ICD-10-CM

## 2020-03-27 DIAGNOSIS — C43.71 MALIGNANT MELANOMA OF RIGHT LOWER EXTREMITY INCLUDING HIP (H): ICD-10-CM

## 2020-03-27 DIAGNOSIS — Z98.890 S/P FLAP GRAFT: ICD-10-CM

## 2020-03-27 LAB
HGB BLD-MCNC: 12.4 G/DL (ref 11.7–15.7)
TSH SERPL DL<=0.005 MIU/L-ACNC: 1.38 MU/L (ref 0.4–4)

## 2020-03-27 PROCEDURE — 85018 HEMOGLOBIN: CPT | Performed by: INTERNAL MEDICINE

## 2020-03-27 PROCEDURE — 99214 OFFICE O/P EST MOD 30 MIN: CPT | Performed by: INTERNAL MEDICINE

## 2020-03-27 PROCEDURE — 84443 ASSAY THYROID STIM HORMONE: CPT | Performed by: INTERNAL MEDICINE

## 2020-03-27 PROCEDURE — 36415 COLL VENOUS BLD VENIPUNCTURE: CPT | Performed by: INTERNAL MEDICINE

## 2020-03-27 ASSESSMENT — PAIN SCALES - GENERAL: PAINLEVEL: NO PAIN (0)

## 2020-03-27 NOTE — RESULT ENCOUNTER NOTE
Dear Yadira,   Your recent test result are within acceptable range or at baseline. Please continue with your current plan of care.       Please call or Mychart to our office if you have further questions.     Erika Blanton MD-PhD

## 2020-03-27 NOTE — PATIENT INSTRUCTIONS
Stop Omega supplement 10 days before your surgery.   Get labs today.         Before Your Surgery      Call your surgeon if there is any change in your health. This includes signs of a cold or flu (such as a sore throat, runny nose, cough, rash or fever).    Do not smoke, drink alcohol or take over the counter medicine (unless your surgeon or primary care doctor tells you to) for the 24 hours before and after surgery.    If you take prescribed drugs: Follow your doctor s orders about which medicines to take and which to stop until after surgery.    Eating and drinking prior to surgery: follow the instructions from your surgeon    Take a shower or bath the night before surgery. Use the soap your surgeon gave you to gently clean your skin. If you do not have soap from your surgeon, use your regular soap. Do not shave or scrub the surgery site.  Wear clean pajamas and have clean sheets on your bed.

## 2020-03-27 NOTE — PROGRESS NOTES
[address] [address]  [phone]  Dept: [phone]    PRE-OP EVALUATION:  Today's date: 3/27/2020    Yadira Hoang (: 1945) presents for pre-operative evaluation assessment as requested by Dr. Hao Crabtree.  She requires evaluation and anesthesia risk assessment prior to undergoing surgery/procedure for treatment of right foot/heel debridement, split skin graft .    Proposed Surgery/ Procedure: right foot/heel debridement and split skin graft from right thigh   Date of Surgery/ Procedure: 2020  Time of Surgery/ Procedure: 7:45 AM  Hospital/Surgical Facility: [facility]  Fax number for surgical facility: available electronically  Primary Physician: Erika Blanton  Type of Anesthesia Anticipated: General    Patient has a Health Care Directive or Living Will:  NO    1. NO - Do you have a history of heart attack, stroke, stent, bypass or surgery on an artery in the head, neck, heart or legs?  2. NO - Do you ever have any pain or discomfort in your chest?  3. NO - Do you have a history of  Heart Failure?  4. NO - Are you troubled by shortness of breath when: walking on the level, up a slight hill or at night?  5. NO - Do you currently have a cold, bronchitis or other respiratory infection?  6. NO - Do you have a cough, shortness of breath or wheezing?  7. NO - Do you sometimes get pains in the calves of your legs when you walk?  8. NO - Do you or anyone in your family have previous history of blood clots?  9. NO - Do you or does anyone in your family have a serious bleeding problem such as prolonged bleeding following surgeries or cuts?  10. NO - Have you ever had problems with anemia or been told to take iron pills?  11. NO - Have you had any abnormal blood loss such as black, tarry or bloody stools, or abnormal vaginal bleeding?  12. YES - HAVE YOU EVER HAD A BLOOD TRANSFUSION? Childbirth 48 years ago.    13. YES - HAVE YOU OR ANY OF YOUR RELATIVES EVER HAD PROBLEMS WITH ANESTHESIA? Takes her longer to come out of it.   14. YES - DO YOU HAVE SLEEP APNEA, EXCESSIVE SNORING OR DAYTIME DROWSINESS? Has GUERLINE, uses CPAP.   15. NO - Do you have any prosthetic heart valves?  16. NO - Do you have prosthetic joints?  17. NO - Is there any chance that you may be pregnant?      HPI:     HPI related to upcoming procedure: right foot/heel debridement and split skin graft.     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     Patient had severe constipation after the last surgery due to narcotic use.  She went to the emergency room had an enema that was not successful.  Eventually this was resolved with daily MiraLAX with daily suppository use.  Now she uses MiraLAX every day.  Her bowel movements is back to normal.  She still feels a little bit stomach upset.  She feels anxious.  She does not want to take any medications for anxiety.    Started in the fall. Feels hot all the time.  Prior to that she had felt cold all the time, always needed to be covered by blankets.  Since the fall, she has been feeling hot all the time.  Now sleeping at night she only uses one layer sheet    MEDICAL HISTORY:     Patient Active Problem List    Diagnosis Date Noted     Melanoma of skin (H) 02/04/2020     Priority: High     Added automatically from request for surgery [id number]       Malignant melanoma of right lower extremity including hip (H) 01/27/2020     Priority: High     Obstructive sleep apnea syndrome 05/19/2015     Priority: High     Has CPAP       S/P flap graft 03/25/2020     Priority: Medium     Added automatically from request for surgery [id number]       Chronic congestion of paranasal sinus 02/07/2020     Priority: Medium     Chronic fatigue 01/06/2020     Priority: Medium     Chronic pain disorder 11/13/2018      Priority: Medium     Myofascial pain 10/31/2018     Priority: Medium     Neck pain, chronic 05/30/2017     Priority: Medium     Intractable migraine without aura and without status migrainosus 05/30/2017     Priority: Medium     History of multiple concussions 05/30/2017     Priority: Medium     Bilateral occipital neuralgia 05/30/2017     Priority: Medium     Weakness of foot 05/02/2017     Priority: Medium     Foot pain, bilateral 05/02/2017     Priority: Medium     Difficulty walking 05/02/2017     Priority: Medium     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     Priority: Medium     Articular disc disorder of temporomandibular joint 05/19/2015     Priority: Medium      Past Medical History:   Diagnosis Date     Concussion      Melanoma (H)      Sleep apnea     CPAP     Past Surgical History:   Procedure Laterality Date     BIOPSY NODE SENTINEL Right 2/10/2020    Procedure: [place] Lymph Node Mapping And Biopsy;  Surgeon: Gabriela Dorsey MD;  Location: [facility] OR     EXCISE LESION LOWER EXTREMITY Right 2/10/2020    Procedure: Wide Local Excision of RIGHT heel melanoma;  Surgeon: Gabriela Dorsey MD;  Location: [facility] OR     EXCISE LESION LOWER EXTREMITY Right 2/20/2020    Procedure: Wide local Re-excision RIGHT heel Wound vac application;  Surgeon: Gabriela Dorsey MD;  Location: [facility] OR     ORTHOPEDIC SURGERY Right     heel surgery x2     RECONSTRUCT FOOT Right 3/4/2020    Procedure: Right heel reconstruction with regional flap, biologic dressing and SPY;  Surgeon: NIKA Crabtree MD;  Location: [facility] OR     TUBAL LIGATION       Current Outpatient Medications   Medication Sig Dispense Refill     acetaminophen (TYLENOL) 325 MG tablet Take 2 tablets (650 mg) by mouth every 4 hours as needed for mild pain 50 tablet 0     cholecalciferol (VITAMIN D3) 125 MCG (5000 UT) TABS tablet Take 2,000 Units by mouth every other day        estradiol (ESTRACE) 0.1 MG/GM vaginal cream Place 1 g vaginally twice a week        order for  [employer], [employer]  CRYJOSE $67  1 each 0     polyethylene glycol (MIRALAX) powder Take 17 g by mouth       HYDROcodone-acetaminophen (NORCO) 5-325 MG tablet Take 1 tablet by mouth every 6 hours as needed for pain (Patient not taking: Reported on 3/25/2020) 10 tablet 0     ondansetron (ZOFRAN-ODT) 4 MG ODT tab Take 1 tablet (4 mg) by mouth every 8 hours as needed for nausea (Patient not taking: Reported on 3/10/2020) 15 tablet 0     senna-docusate (SENOKOT-S/PERICOLACE) 8.6-50 MG tablet Take 1 tablet by mouth 2 times daily Take if taking tramadol to prevent constipation. (Patient not taking: Reported on 3/10/2020) 20 tablet 0     traMADol (ULTRAM) 50 MG tablet Take 1-2 tablets ( mg) by mouth every 6 hours as needed for moderate pain (Patient not taking: Reported on 3/10/2020) 20 tablet 0     OTC products: None, except as noted above    Allergies   Allergen Reactions     Atorvastatin      Statins all swelling     Hmg-Coa-R Inhibitors Swelling      Latex Allergy: NO    Social History     Tobacco Use     Smoking status: Never Smoker     Smokeless tobacco: Never Used   Substance Use Topics     Alcohol use: Not Currently     Frequency: Never     History   Drug Use Unknown       REVIEW OF SYSTEMS:   CONSTITUTIONAL: NEGATIVE for fever, chills, change in weight  ENT/MOUTH: NEGATIVE for ear, mouth and throat problems  RESP: NEGATIVE for significant cough or SOB  CV: NEGATIVE for chest pain, palpitations or peripheral edema  GI: had a bout of constipation, resolved now. Stomach still feels upset.   MUSCULOSKELETAL: NEGATIVE for significant arthralgias or myalgia  HEME/ALLERGY/IMMUNE: NEGATIVE for bleeding problems  PSYCHIATRIC: anxious    EXAM:   /73 (BP Location: Right arm, Patient Position: Sitting, Cuff Size: Adult Regular)   Pulse 89   Temp 97.6  F (36.4  C) (Oral)   Wt 64.2 kg (141 lb 8 oz)   SpO2 97%   BMI 22.16 kg/m    GENERAL APPEARANCE: healthy, alert and no distress  HENT: ear canals and  TM's normal and nose and mouth without ulcers or lesions  RESP: lungs clear to auscultation - no rales, rhonchi or wheezes  CV: regular rate and rhythm, normal S1 S2, no S3 or S4 and no murmur, click or rub   ABDOMEN: soft, nontender, no HSM or masses and bowel sounds normal  MS: extremities normal- no gross deformities noted    DIAGNOSTICS:   EKG: appears normal, NSR 2/7/2020      IMPRESSION:   Reason for surgery/procedure: melanoma, s/p flap graft  Diagnosis/reason for consult:       The proposed surgical procedure is considered LOW risk.    REVISED CARDIAC RISK INDEX  The patient has the following serious cardiovascular risks for perioperative complications such as (MI, PE, VFib and 3  AV Block):  No serious cardiac risks  INTERPRETATION: 0 risks: Class I (very low risk - 0.4% complication rate)    The patient has the following additional risks for perioperative complications:  No identified additional risks      RECOMMENDATIONS:       Obstructive Sleep Apnea (or suspected sleep apnea)  Patient is to bring their home CPAP with them on the day of surgery    Heat intolerance: We will check a TSH    Drug-induced constipation: Advised patient to continue with daily MiraLAX use.  She may need to increase the use to twice a day if she is put on narcotics for pain control postoperatively.      --Patient is to take all scheduled medications on the day of surgery    APPROVAL GIVEN to proceed with proposed procedure, without further diagnostic evaluation       Signed Electronically by: Erika Blanton MD PhD    Copy of this evaluation report is provided to requesting physician.    [facility] Preop Guidelines    Revised Cardiac Risk Index

## 2020-03-30 ENCOUNTER — TELEPHONE (OUTPATIENT)
Dept: SURGERY | Facility: CLINIC | Age: 75
End: 2020-03-30

## 2020-03-30 NOTE — TELEPHONE ENCOUNTER
Pt called questioning her appt on 4/9 with  and states that she is wondering about changing that appt time or day. RN notified pt that RN cannot change or reschedule appts at the [facility] but RN gave pt the number to call at the [facility]. Pt became weepy about not being able to have her  come in for her surgery and appts. RN voiced understanding and instructed pt to reach out to staff if she feels anxious and needs some extra support. Pt states that she has been feeling better and overall is doing better. RN notified pt that pt sounded good and more self assured. Pt thanked RN for the call and RN told pt to call back with any further questions..Melissa Ellsworth RN

## 2020-03-30 NOTE — TELEPHONE ENCOUNTER
Patient called requesting to talk to Melissa about upcoming surgery. Informed that she was working from home and I could send her a message to call patient back. Patient agrees to this and will wait for call.     Whit St LPN

## 2020-04-01 ENCOUNTER — TELEPHONE (OUTPATIENT)
Dept: PEDIATRICS | Facility: CLINIC | Age: 75
End: 2020-04-01

## 2020-04-01 ENCOUNTER — TELEPHONE (OUTPATIENT)
Dept: SURGERY | Facility: CLINIC | Age: 75
End: 2020-04-01

## 2020-04-01 ENCOUNTER — PATIENT OUTREACH (OUTPATIENT)
Dept: PLASTIC SURGERY | Facility: CLINIC | Age: 75
End: 2020-04-01

## 2020-04-01 NOTE — TELEPHONE ENCOUNTER
Reviewed results as written in [facility]. Pt reports unable to get in. Notified results are normal.   Pt wondering why she is getting so hot at night since the labs are normal. Notified will review with provider.   Pt also reports has surgery in the morning so if we call she won't be able to talk until the afternoon. Luana Maya LPN

## 2020-04-01 NOTE — TELEPHONE ENCOUNTER
[facility] Call Center    Phone Message    May a detailed message be left on voicemail: yes     Reason for Call: Requesting Results   Name/type of test: blood work done on 3/27/2020    Was test done at a location other than [facility] (Please fill in the location if not [facility])?: No      Action Taken: Message routed to:  Primary Care p [phone]

## 2020-04-01 NOTE — PATIENT INSTRUCTIONS
Left message for pt regarding questions about vac for upcoming surgery. Provided direct contact information and requested call back to discuss. Yadira PALOMARES RNCC  Spoke with pt regarding vac for tomorrow's surgery. Pt states she does not have any more dressings and wanted to ensure that this would be available at for the surgery. Explained that this would be confirmed with supply at the hospital. Supply contacted and confirmed this will be available. Yadira PALOMARES RNCC

## 2020-04-02 ENCOUNTER — HOSPITAL ENCOUNTER (OUTPATIENT)
Facility: CLINIC | Age: 75
Discharge: HOME | End: 2020-04-02
Attending: PLASTIC SURGERY | Admitting: PLASTIC SURGERY
Payer: COMMERCIAL

## 2020-04-02 ENCOUNTER — ANESTHESIA (OUTPATIENT)
Dept: SURGERY | Facility: CLINIC | Age: 75
End: 2020-04-02
Payer: COMMERCIAL

## 2020-04-02 ENCOUNTER — ANESTHESIA EVENT (OUTPATIENT)
Dept: SURGERY | Facility: CLINIC | Age: 75
End: 2020-04-02
Payer: COMMERCIAL

## 2020-04-02 VITALS
WEIGHT: 139.77 LBS | OXYGEN SATURATION: 93 % | TEMPERATURE: 97.5 F | DIASTOLIC BLOOD PRESSURE: 65 MMHG | SYSTOLIC BLOOD PRESSURE: 133 MMHG | HEART RATE: 104 BPM | RESPIRATION RATE: 16 BRPM | HEIGHT: 67 IN | BODY MASS INDEX: 21.94 KG/M2

## 2020-04-02 DIAGNOSIS — Z98.890 S/P FLAP GRAFT: ICD-10-CM

## 2020-04-02 LAB — GLUCOSE BLDC GLUCOMTR-MCNC: 96 MG/DL (ref 70–99)

## 2020-04-02 PROCEDURE — 40000170 ZZH STATISTIC PRE-PROCEDURE ASSESSMENT II: Performed by: PLASTIC SURGERY

## 2020-04-02 PROCEDURE — 25000128 H RX IP 250 OP 636: Performed by: NURSE ANESTHETIST, CERTIFIED REGISTERED

## 2020-04-02 PROCEDURE — 25800030 ZZH RX IP 258 OP 636: Performed by: NURSE ANESTHETIST, CERTIFIED REGISTERED

## 2020-04-02 PROCEDURE — 25000128 H RX IP 250 OP 636: Performed by: PLASTIC SURGERY

## 2020-04-02 PROCEDURE — 71000014 ZZH RECOVERY PHASE 1 LEVEL 2 FIRST HR: Performed by: PLASTIC SURGERY

## 2020-04-02 PROCEDURE — 82962 GLUCOSE BLOOD TEST: CPT

## 2020-04-02 PROCEDURE — 37000008 ZZH ANESTHESIA TECHNICAL FEE, 1ST 30 MIN: Performed by: PLASTIC SURGERY

## 2020-04-02 PROCEDURE — 25000132 ZZH RX MED GY IP 250 OP 250 PS 637: Performed by: ANESTHESIOLOGY

## 2020-04-02 PROCEDURE — 37000009 ZZH ANESTHESIA TECHNICAL FEE, EACH ADDTL 15 MIN: Performed by: PLASTIC SURGERY

## 2020-04-02 PROCEDURE — 27210794 ZZH OR GENERAL SUPPLY STERILE: Performed by: PLASTIC SURGERY

## 2020-04-02 PROCEDURE — 27211024 ZZHC OR SUPPLY OTHER OPNP: Performed by: PLASTIC SURGERY

## 2020-04-02 PROCEDURE — 36000068 ZZH SURGERY LEVEL 5 1ST 30 MIN - UMMC: Performed by: PLASTIC SURGERY

## 2020-04-02 PROCEDURE — 25800030 ZZH RX IP 258 OP 636: Performed by: ANESTHESIOLOGY

## 2020-04-02 PROCEDURE — 25000125 ZZHC RX 250: Performed by: NURSE ANESTHETIST, CERTIFIED REGISTERED

## 2020-04-02 PROCEDURE — 71000015 ZZH RECOVERY PHASE 1 LEVEL 2 EA ADDTL HR: Performed by: PLASTIC SURGERY

## 2020-04-02 PROCEDURE — 25000566 ZZH SEVOFLURANE, EA 15 MIN: Performed by: PLASTIC SURGERY

## 2020-04-02 PROCEDURE — 36000070 ZZH SURGERY LEVEL 5 EA 15 ADDTL MIN - UMMC: Performed by: PLASTIC SURGERY

## 2020-04-02 PROCEDURE — 71000027 ZZH RECOVERY PHASE 2 EACH 15 MINS: Performed by: PLASTIC SURGERY

## 2020-04-02 PROCEDURE — 25000128 H RX IP 250 OP 636: Performed by: ANESTHESIOLOGY

## 2020-04-02 PROCEDURE — 25000125 ZZHC RX 250: Performed by: PLASTIC SURGERY

## 2020-04-02 RX ORDER — LIDOCAINE HYDROCHLORIDE 20 MG/ML
INJECTION, SOLUTION INFILTRATION; PERINEURAL PRN
Status: DISCONTINUED | OUTPATIENT
Start: 2020-04-02 | End: 2020-04-02

## 2020-04-02 RX ORDER — FENTANYL CITRATE 50 UG/ML
25-50 INJECTION, SOLUTION INTRAMUSCULAR; INTRAVENOUS
Status: DISCONTINUED | OUTPATIENT
Start: 2020-04-02 | End: 2020-04-02 | Stop reason: HOSPADM

## 2020-04-02 RX ORDER — ONDANSETRON 4 MG/1
4 TABLET, ORALLY DISINTEGRATING ORAL EVERY 30 MIN PRN
Status: DISCONTINUED | OUTPATIENT
Start: 2020-04-02 | End: 2020-04-02 | Stop reason: HOSPADM

## 2020-04-02 RX ORDER — MEPERIDINE HYDROCHLORIDE 25 MG/ML
12.5 INJECTION INTRAMUSCULAR; INTRAVENOUS; SUBCUTANEOUS
Status: DISCONTINUED | OUTPATIENT
Start: 2020-04-02 | End: 2020-04-02 | Stop reason: HOSPADM

## 2020-04-02 RX ORDER — ACETAMINOPHEN 325 MG/1
975 TABLET ORAL ONCE
Status: COMPLETED | OUTPATIENT
Start: 2020-04-02 | End: 2020-04-02

## 2020-04-02 RX ORDER — FENTANYL CITRATE 50 UG/ML
INJECTION, SOLUTION INTRAMUSCULAR; INTRAVENOUS PRN
Status: DISCONTINUED | OUTPATIENT
Start: 2020-04-02 | End: 2020-04-02

## 2020-04-02 RX ORDER — ONDANSETRON 2 MG/ML
4 INJECTION INTRAMUSCULAR; INTRAVENOUS EVERY 30 MIN PRN
Status: DISCONTINUED | OUTPATIENT
Start: 2020-04-02 | End: 2020-04-02 | Stop reason: HOSPADM

## 2020-04-02 RX ORDER — EPINEPHRINE 1 MG/ML
INJECTION, SOLUTION, CONCENTRATE INTRAVENOUS PRN
Status: DISCONTINUED | OUTPATIENT
Start: 2020-04-02 | End: 2020-04-02 | Stop reason: HOSPADM

## 2020-04-02 RX ORDER — NALOXONE HYDROCHLORIDE 0.4 MG/ML
.1-.4 INJECTION, SOLUTION INTRAMUSCULAR; INTRAVENOUS; SUBCUTANEOUS
Status: DISCONTINUED | OUTPATIENT
Start: 2020-04-02 | End: 2020-04-02 | Stop reason: HOSPADM

## 2020-04-02 RX ORDER — OXYCODONE HYDROCHLORIDE 5 MG/1
5-10 TABLET ORAL EVERY 6 HOURS PRN
Qty: 10 TABLET | Refills: 0 | Status: SHIPPED | OUTPATIENT
Start: 2020-04-02 | End: 2020-05-07

## 2020-04-02 RX ORDER — PROPOFOL 10 MG/ML
INJECTION, EMULSION INTRAVENOUS PRN
Status: DISCONTINUED | OUTPATIENT
Start: 2020-04-02 | End: 2020-04-02

## 2020-04-02 RX ORDER — ONDANSETRON 2 MG/ML
INJECTION INTRAMUSCULAR; INTRAVENOUS PRN
Status: DISCONTINUED | OUTPATIENT
Start: 2020-04-02 | End: 2020-04-02

## 2020-04-02 RX ORDER — CEFAZOLIN SODIUM 1 G/3ML
1 INJECTION, POWDER, FOR SOLUTION INTRAMUSCULAR; INTRAVENOUS SEE ADMIN INSTRUCTIONS
Status: DISCONTINUED | OUTPATIENT
Start: 2020-04-02 | End: 2020-04-02 | Stop reason: HOSPADM

## 2020-04-02 RX ORDER — BUPIVACAINE HYDROCHLORIDE 2.5 MG/ML
INJECTION, SOLUTION INFILTRATION; PERINEURAL PRN
Status: DISCONTINUED | OUTPATIENT
Start: 2020-04-02 | End: 2020-04-02 | Stop reason: HOSPADM

## 2020-04-02 RX ORDER — SODIUM CHLORIDE, SODIUM LACTATE, POTASSIUM CHLORIDE, CALCIUM CHLORIDE 600; 310; 30; 20 MG/100ML; MG/100ML; MG/100ML; MG/100ML
INJECTION, SOLUTION INTRAVENOUS CONTINUOUS
Status: DISCONTINUED | OUTPATIENT
Start: 2020-04-02 | End: 2020-04-02 | Stop reason: HOSPADM

## 2020-04-02 RX ORDER — CEFAZOLIN SODIUM 2 G/100ML
2 INJECTION, SOLUTION INTRAVENOUS
Status: COMPLETED | OUTPATIENT
Start: 2020-04-02 | End: 2020-04-02

## 2020-04-02 RX ORDER — AMOXICILLIN 250 MG
1-2 CAPSULE ORAL 2 TIMES DAILY
Qty: 30 TABLET | Refills: 0 | Status: SHIPPED | OUTPATIENT
Start: 2020-04-02 | End: 2020-05-07

## 2020-04-02 RX ORDER — ONDANSETRON 4 MG/1
4-8 TABLET, ORALLY DISINTEGRATING ORAL EVERY 8 HOURS PRN
Qty: 4 TABLET | Refills: 0 | Status: SHIPPED | OUTPATIENT
Start: 2020-04-02 | End: 2020-05-07

## 2020-04-02 RX ADMIN — PROPOFOL 180 MG: 10 INJECTION, EMULSION INTRAVENOUS at 08:10

## 2020-04-02 RX ADMIN — PHENYLEPHRINE HYDROCHLORIDE 100 MCG: 10 INJECTION INTRAVENOUS at 09:10

## 2020-04-02 RX ADMIN — FENTANYL CITRATE 25 MCG: 50 INJECTION INTRAMUSCULAR; INTRAVENOUS at 10:33

## 2020-04-02 RX ADMIN — SODIUM CHLORIDE, POTASSIUM CHLORIDE, SODIUM LACTATE AND CALCIUM CHLORIDE: 600; 310; 30; 20 INJECTION, SOLUTION INTRAVENOUS at 10:09

## 2020-04-02 RX ADMIN — CEFAZOLIN SODIUM 2 G: 2 INJECTION, SOLUTION INTRAVENOUS at 08:18

## 2020-04-02 RX ADMIN — ONDANSETRON 4 MG: 2 INJECTION INTRAMUSCULAR; INTRAVENOUS at 09:13

## 2020-04-02 RX ADMIN — LIDOCAINE HYDROCHLORIDE 60 MG: 20 INJECTION, SOLUTION INFILTRATION; PERINEURAL at 08:10

## 2020-04-02 RX ADMIN — ACETAMINOPHEN 975 MG: 325 TABLET, FILM COATED ORAL at 11:03

## 2020-04-02 RX ADMIN — SODIUM CHLORIDE, POTASSIUM CHLORIDE, SODIUM LACTATE AND CALCIUM CHLORIDE: 600; 310; 30; 20 INJECTION, SOLUTION INTRAVENOUS at 08:00

## 2020-04-02 RX ADMIN — ONDANSETRON 4 MG: 2 INJECTION INTRAMUSCULAR; INTRAVENOUS at 10:35

## 2020-04-02 RX ADMIN — PHENYLEPHRINE HYDROCHLORIDE 100 MCG: 10 INJECTION INTRAVENOUS at 09:01

## 2020-04-02 RX ADMIN — Medication 1 TABLET: at 11:34

## 2020-04-02 RX ADMIN — PHENYLEPHRINE HYDROCHLORIDE 100 MCG: 10 INJECTION INTRAVENOUS at 09:35

## 2020-04-02 RX ADMIN — PHENYLEPHRINE HYDROCHLORIDE 100 MCG: 10 INJECTION INTRAVENOUS at 09:21

## 2020-04-02 RX ADMIN — ROCURONIUM BROMIDE 40 MG: 10 INJECTION INTRAVENOUS at 08:10

## 2020-04-02 RX ADMIN — PROCHLORPERAZINE EDISYLATE 5 MG: 5 INJECTION INTRAMUSCULAR; INTRAVENOUS at 10:50

## 2020-04-02 RX ADMIN — ROCURONIUM BROMIDE 10 MG: 10 INJECTION INTRAVENOUS at 09:01

## 2020-04-02 RX ADMIN — FENTANYL CITRATE 25 MCG: 50 INJECTION, SOLUTION INTRAMUSCULAR; INTRAVENOUS at 08:10

## 2020-04-02 RX ADMIN — SUGAMMADEX 200 MG: 100 INJECTION, SOLUTION INTRAVENOUS at 09:40

## 2020-04-02 RX ADMIN — FENTANYL CITRATE 50 MCG: 50 INJECTION, SOLUTION INTRAMUSCULAR; INTRAVENOUS at 08:46

## 2020-04-02 RX ADMIN — ROCURONIUM BROMIDE 10 MG: 10 INJECTION INTRAVENOUS at 08:46

## 2020-04-02 RX ADMIN — PHENYLEPHRINE HYDROCHLORIDE 100 MCG: 10 INJECTION INTRAVENOUS at 09:00

## 2020-04-02 RX ADMIN — FENTANYL CITRATE 25 MCG: 50 INJECTION, SOLUTION INTRAMUSCULAR; INTRAVENOUS at 09:44

## 2020-04-02 ASSESSMENT — MIFFLIN-ST. JEOR: SCORE: 1161.63

## 2020-04-02 NOTE — OP NOTE
Procedure Date: 04/02/2020      PREOPERATIVE DIAGNOSIS:  Status post right foot reconstruction with medial plantar artery flap and Integra placement over the donor site, now ready for skin grafting.      POSTOPERATIVE DIAGNOSIS:  Status post right foot reconstruction with medial plantar artery flap and Integra placement over the donor site, now ready for skin grafting.      PROCEDURES:     1.  Right foot split-thickness skin graft from right thigh, measuring approximately 13 x 8 cm.   2.  Preparation of right foot wounds for split-thickness skin graft with excision of silicone layer and underlying biofilm layer using curettage and irrigation.      SURGEON:  Hao Crabtree MD      RESIDENT:  Lukas Kelsey MD      ANESTHESIA:  General anesthesia with endotracheal intubation.      COMPLICATIONS:  Nil.      DRAINS:  Nil.      SPECIMENS:  Nil.      BLOOD LOSS:  5 mL.      DESCRIPTION OF PROCEDURE:  After informed consent was taken from the patient, the proper site and procedure was ascertained with her.  She was appropriately marked and was taken to the operating room.  She was placed in a supine position with the knees comfortably flexed, pillows underneath them and pneumoboots placed and running prior to induction of anesthesia.  Preoperative antibiotics were given in the OR.  All pressure points were well padded.  General anesthesia was administered without any complications.  Her right foot and right leg were prepped and draped in a standard surgical fashion.  I began by first analyzing the right foot wound.  The heel had been covered with an instep flap.  Staples were all removed.  I then removed the silicone layer.  The underlying Integra has taken well.  It was granulating well.  No evidence of infection.  I then went ahead and prepared the wound for a split-thickness skin graft by removing the biofilm layer using curettage and antibiotic irrigation.  Once that was completed, then a split thickness skin graft  using a [place] dermatome at 0.012 inch thickness was harvested from the right lateral thigh.  A total of about 13 x 8 cm was harvested, meshed in a 1.5:1 manner, placed over the foot wound and stapled into position.  A Xeroform interface and VAC dressing was placed on top.  The patient was placed in a splint.  The donor site was covered with Aquacel AG and Tegaderm followed by a wrap.  The patient tolerated the procedure well.  All counts correct at the end of the case.  The patient was extubated and sent to recovery room in stable condition.         NIKA FENG MD             D: 2020   T: 2020   MT: MI      Name:     ABDIAZIZ NIEVES   MRN:      [id number]        Account:        [id number]   :      1945           Procedure Date: 2020      Document: [id number]

## 2020-04-02 NOTE — TELEPHONE ENCOUNTER
I do not have an explanation of why she is feeling hot. She may want to correlate this with her stress/anxiety level. At this point, we will have to monitor her symptoms and if any new symptoms develop.

## 2020-04-02 NOTE — DISCHARGE INSTRUCTIONS
SKIN GRAFT POST-OPERATIVE INSTRUCTIONS    Instructions     Have someone drive you home after surgery and help you at home for 1-2     days.     Get plenty of rest and follow balanced diet.     Decreased activity may promote constipation, so you may want to add     more raw fruit to your diet, and be sure to increase fluid intake.    Take pain medication as prescribed. Do not take aspirin or any products     containing aspirin unless approved by your surgeon.     Do not drink alcohol when taking pain medications.     Even when not taking pain medications, no alcohol for 3 weeks as it     causes fluid retention.     If you are taking vitamins with iron, resume these as tolerated.     Do not smoke, as smoking delays healing and increases the risk of     Complications.    Activities     It depends upon where on your body the skin graft was performed.  The area involved     should remain immobile to allow proper healing of the graft.     Do not drive until you are no longer taking narcotics and have been approved to drive      by your surgeon.     No lifting greater than 5-10 pounds for the first 2-3 weeks.    Skin Graft Site Care     Keep the VAC dressing (if used) in place without changing the foam dressing,                  cannister, or settings until your clinic visit.     Keep the VAC dressing plugged to the outlet when possible to prevent loss of                  battery power.     If the VAC dressing leaks or loses suction, please do not take down the dressing.      Rather, try to find the leak and patch it with the adhesive dressing and reseal the             suction.  If unable to do so, call the clinic or hospital (numbers at the end of this               document).      If a bolster dressing is used instead of the VAC dressing, keep it in place until your          clinic visit.    Donor Site Care     Keep the surgical dressing in place until the clinic visit.     If it leaks, try to reseal it with the  adhesive tape provided. Leakage is very common         and not worrisome, rather it is a common nuisance. Alternatively, you may wrap the        donor site (if on the extremity) with an ACE bandage.     The donor site will heal in about 1-3 weeks and when no longer weepy, you may start      using moisturizing cream on the site twice daily for about 3 months.    What to Expect     Some swelling and bruising     Slight bleeding     Pain and soreness at sites    When to Call:     If there is leakage of the VAC dressing or constant beeping from the machine and you       cannot take care of it.     If there is constant major leakage from the donor site and you cannot control it.     If you have increased swelling or bruising.     If swelling and redness persist for a few days.     If you have severe or increased pain not relieved by medication.     If you have any side effects to medications; such as, rash, nausea,      headache, vomiting or constipation.     If you have an oral temperature over 100.4 degrees.     If you have any yellowish or greenish drainage from the incisions or      notice a foul odor.     If you develop increased pain in your calves, shortness of breath, or chest     pain.     If you develop any symptoms of concern.     For Medical Questions, Please Call:      [phone], Monday - Friday, 8 a.m. - 4:30 p.m.      After hours and on weekends, call Hospital Paging at [phone] and       ask for the Plastic Surgeon on call.  [facility]   Adult Discharge Orders & Instructions     For 24 hours after surgery    1. Get plenty of rest.  A responsible adult must stay with you for at least 24 hours after you leave the hospital.   2. Do not drive or use heavy equipment.  If you have weakness or tingling, don't drive or use heavy equipment until this feeling goes away.  3. Do not drink alcohol.  4. Avoid strenuous or risky activities.  Ask for help when climbing stairs.   5. You may feel  lightheaded.  IF so, sit for a few minutes before standing.  Have someone help you get up.   6. If you have nausea (feel sick to your stomach): Drink only clear liquids such as apple juice, ginger ale, broth or 7-Up.  Rest may also help.  Be sure to drink enough fluids.  Move to a regular diet as you feel able.  7. You may have a slight fever. Call the doctor if your fever is over 100 F (37.7 C) (taken under the tongue) or lasts longer than 24 hours.  8. You may have a dry mouth, a sore throat, muscle aches or trouble sleeping.  These should go away after 24 hours.  9. Do not make important or legal decisions.   Call your doctor for any of the followin.  Signs of infection (fever, growing tenderness at the surgery site, a large amount of drainage or bleeding, severe pain, foul-smelling drainage, redness, swelling).    2. It has been over 8 to 10 hours since surgery and you are still not able to urinate (pass water).    3.  Headache for over 24 hours.    To contact a doctor, call __ Dr. Crabtree___[phone]_

## 2020-04-02 NOTE — BRIEF OP NOTE
[facility], [place]    Brief Operative Note    Pre-operative diagnosis: S/P flap graft [Z98.890]  Post-operative diagnosis Same as pre-operative diagnosis    Procedure: Procedure(s):    1. Preparation of surgical wounds (12 x 8cm and 3 x 6cm)  2. Split thickness 0.012 inch skin graft from right thigh to foot wounds measuring 13 x 8 cm = 104 cm2    Surgeon: Surgeon(s) and Role:     * NIKA Crabtree MD - Primary     * Lukas Kelsey MD - Resident - Assisting     Anesthesia: General   Estimated blood loss: 5ml  Drains:  Wound vac  Specimens: * No specimens in log *  Findings:   None.  Complications: None.  Implants: * No implants in log *      Lukas Kelsey  [phone]

## 2020-04-02 NOTE — ANESTHESIA PREPROCEDURE EVALUATION
"Anesthesia Pre-Procedure Evaluation    Patient: Yadira Hoang   MRN:     [id number] Gender:   female   Age:    74 year old :      1945        Preoperative Diagnosis: Melanoma of skin (H) [C43.9]   Procedure(s):  Right heel reconstruction  with local flap or groin free flap  possible skin graft     LABS:  CBC:   Lab Results   Component Value Date    WBC 5.4 2020    HGB 12.4 2020    HGB 12.2 2020    HCT 37.6 2020     2020     2020     BMP:   Lab Results   Component Value Date     2020    POTASSIUM 4.1 2020    CHLORIDE 107 2020    CO2 31 2020    BUN 15 2020    CR 0.52 2020    CR 0.55 2020    GLC 81 2020     COAGS: No results found for: PTT, INR, FIBR  POC:   Lab Results   Component Value Date    BGM 96 2020     OTHER:   Lab Results   Component Value Date    GABY 9.1 2020    ALBUMIN 3.3 (L) 2020    PROTTOTAL 6.8 2020    ALT 20 2020    AST 10 2020    ALKPHOS 66 2020    BILITOTAL 0.3 2020    TSH 1.38 2020        Preop Vitals    BP Readings from Last 3 Encounters:   20 135/82   20 119/73   20 134/75    Pulse Readings from Last 3 Encounters:   20 95   20 89   20 90      Resp Readings from Last 3 Encounters:   20 16   20 18   20 18    SpO2 Readings from Last 3 Encounters:   20 97%   20 97%   20 96%      Temp Readings from Last 1 Encounters:   20 36.4  C (97.6  F) (Oral)    Ht Readings from Last 1 Encounters:   20 1.702 m (5' 7\")      Wt Readings from Last 1 Encounters:   20 63.4 kg (139 lb 12.4 oz)    Estimated body mass index is 21.89 kg/m  as calculated from the following:    Height as of an earlier encounter on 20: 1.702 m (5' 7\").    Weight as of an earlier encounter on 20: 63.4 kg (139 lb 12.4 oz).     LDA:  Negative Pressure Wound Therapy Other (Comment) " Right (Active)   Wound Type Surgical 04/02/20 0636   Cycle Continuous 04/02/20 0636   Target Pressure (mmHg) 75 04/02/20 0636   Dressing Status Clean, dry, intact 04/02/20 0636   Number of days: 42        Past Medical History:   Diagnosis Date     Concussion      Melanoma (H)      Sleep apnea     CPAP      Past Surgical History:   Procedure Laterality Date     BIOPSY NODE SENTINEL Right 2/10/2020    Procedure: [place] Lymph Node Mapping And Biopsy;  Surgeon: Gabriela Dorsey MD;  Location: [facility] OR     EXCISE LESION LOWER EXTREMITY Right 2/10/2020    Procedure: Wide Local Excision of RIGHT heel melanoma;  Surgeon: Gabriela Dorsey MD;  Location: [facility] OR     EXCISE LESION LOWER EXTREMITY Right 2/20/2020    Procedure: Wide local Re-excision RIGHT heel Wound vac application;  Surgeon: Gabriela Dorsey MD;  Location: [facility] OR     ORTHOPEDIC SURGERY Right     heel surgery x2     RECONSTRUCT FOOT Right 3/4/2020    Procedure: Right heel reconstruction with regional flap, biologic dressing and SPY;  Surgeon: NIKA Crabtree MD;  Location: [facility] OR     TUBAL LIGATION        Allergies   Allergen Reactions     Atorvastatin      Statins all swelling     Hmg-Coa-R Inhibitors Swelling        Anesthesia Evaluation     . Pt has had prior anesthetic.     No history of anesthetic complications          ROS/MED HX    ENT/Pulmonary:     (+)sleep apnea, uses CPAP , . .    Neurologic:     (+)other neuro (prior concussion )     Cardiovascular:  - neg cardiovascular ROS       METS/Exercise Tolerance:     Hematologic:  - neg hematologic  ROS       Musculoskeletal:  - neg musculoskeletal ROS       GI/Hepatic:  - neg GI/hepatic ROS       Renal/Genitourinary:  - ROS Renal section negative       Endo:  - neg endo ROS       Psychiatric:  - neg psychiatric ROS       Infectious Disease:  - neg infectious disease ROS       Malignancy:   (+) Malignancy (right sole of foot melanoma, stage IIb, s/p wide excision now with complex wound  requiring plastic surgery closure) History of Skin  Skin CA Active status post Surgery,         Other: Comment: Chronic oxycodone for wound changes for right foot wound    (+) No chance of pregnancy H/O chronic opiod use ,                        PHYSICAL EXAM:   Mental Status/Neuro: A/A/O   Airway: Facies: Feasible  Mallampati: I  Mouth/Opening: Full  TM distance: > 6 cm  Neck ROM: Full   Respiratory: Auscultation: CTAB     Resp. Rate: Normal     Resp. Effort: Normal      CV: Rhythm: Regular  Rate: Age appropriate  Heart: Normal Sounds  Edema: None   Comments:      Dental: Normal Dentition                Assessment:   ASA SCORE: 2    H&P: History and physical reviewed and following examination; no interval change.   Smoking Status:  Non-Smoker/Unknown        Plan:   Anes. Type:  General   Pre-Medication: None   Induction:  IV (Standard)   Airway: ETT; Oral   Access/Monitoring: PIV   Maintenance: Balanced     Postop Plan:   Postop Pain: Opioids  Postop Sedation/Airway: Not planned  Disposition: Inpatient/Admit     PONV Management:   Adult Risk Factors: Female, Non-Smoker, Postop Opioids   Prevention: Ondansetron, Dexamethasone     CONSENT: Direct conversation   Plan and risks discussed with: Patient   Blood Products: Consent Deferred (Minimal Blood Loss)                       Michael Martinez MD

## 2020-04-02 NOTE — OR NURSING
Pt has done fairly well.. Pain controlled.. Will be picking up oxycodone.. Pt VSS Nausia betted.. Ready for phase 2  updated..

## 2020-04-02 NOTE — ANESTHESIA POSTPROCEDURE EVALUATION
Anesthesia POST Procedure Evaluation    Patient: Yadira Hoang   MRN:     [id number] Gender:   female   Age:    74 year old :      1945        Preoperative Diagnosis: S/P flap graft [Z98.890]   Procedure(s):  right foot/heel debridement  split skin graft from right thigh   Postop Comments: No value filed.     Anesthesia Type: General          Postop Pain Control: Uneventful            Sign Out: Well controlled pain   PONV: No   Neuro/Psych: Uneventful            Sign Out: Acceptable/Baseline neuro status   Airway/Respiratory: Uneventful            Sign Out: Acceptable/Baseline resp. status   CV/Hemodynamics: Uneventful            Sign Out: Acceptable CV status   Other NRE: NONE   DID A NON-ROUTINE EVENT OCCUR? No         Last Anesthesia Record Vitals:  CRNA VITALS  2020 0935 - 2020 1035      2020             Resp Rate (observed):  (!) 2          Last PACU Vitals:  Vitals Value Taken Time   /59 2020 11:30 AM   Temp 36.7  C (98  F) 2020 11:00 AM   Pulse 82 2020 11:30 AM   Resp 16 2020 11:15 AM   SpO2 95 % 2020 11:34 AM   Temp src     NIBP     Pulse     SpO2     Resp     Temp     Ht Rate     Temp 2     Vitals shown include unvalidated device data.      Electronically Signed By: Michael Martinez MD, 2020, 12:30 PM   81.6

## 2020-04-02 NOTE — TELEPHONE ENCOUNTER
RN noted encounter dated 4/1 where wound vac supplies were addressed. Will close this encounter..Melissa Ellsworth RN

## 2020-04-10 ENCOUNTER — OFFICE VISIT (OUTPATIENT)
Dept: PLASTIC SURGERY | Facility: CLINIC | Age: 75
End: 2020-04-10
Payer: COMMERCIAL

## 2020-04-10 ENCOUNTER — TELEPHONE (OUTPATIENT)
Dept: SURGERY | Facility: CLINIC | Age: 75
End: 2020-04-10

## 2020-04-10 VITALS
TEMPERATURE: 97.6 F | HEART RATE: 92 BPM | OXYGEN SATURATION: 96 % | HEIGHT: 67 IN | DIASTOLIC BLOOD PRESSURE: 67 MMHG | SYSTOLIC BLOOD PRESSURE: 114 MMHG | BODY MASS INDEX: 21.89 KG/M2

## 2020-04-10 DIAGNOSIS — Z98.890 S/P FLAP GRAFT: Primary | ICD-10-CM

## 2020-04-10 ASSESSMENT — PAIN SCALES - GENERAL: PAINLEVEL: SEVERE PAIN (6)

## 2020-04-10 NOTE — PROGRESS NOTES
"Plastic Surgery Outpatient Visit    ID: Yadira Hoang is a 74 year old female with PMH R heel melanoma s/p excision and pedicled medial plantar artery fasciocutaneous flap recon DOS 3/4/20, now s/p STSG coverage to R foot flap donor site DOS 4/2/20.    S: Overall doing well. Has some pain at wound vac site. Using walker to ambulate and hopping on L foot.     O:  /67 (BP Location: Left arm, Patient Position: Sitting, Cuff Size: Adult Regular)   Pulse 92   Temp 97.6  F (36.4  C) (Oral)   Ht 1.702 m (5' 7\")   SpO2 96%   BMI 21.89 kg/m     General: NAD  Extremities: R leg, donor site with aquacel and tegaderm dressing in place. R foot, vac removed, skin graft with 100% take. Moderate edema to foot. No erythema.     A/P:  POD 8 s/p R foot STSG, healing well    -start dressing changes at home every other day: xeroform, abd, kerlix, ace, boot. Video made for  to help with dressings.   -wear boot at all times during day  -keep leg elevated whenever possible   -Leave donor site dressing in place, reinforce as needed with tegaderm  -RTC 10 days for staple removal    Total time spent with patient was 25 min of which greater than 50% was in counseling.    Carola Mathis PA-C  Plastic and Reconstructive Surgery    "

## 2020-04-10 NOTE — LETTER
"4/10/2020       RE: Yadira Hoang  [address] [facility] [address]     Dear Colleague,    Thank you for referring your patient, Yadira Hoang, to the [facility] PLASTIC AND RECONSTRUCTIVE SURGERY at [facility]. Please see a copy of my visit note below.    Plastic Surgery Outpatient Visit    ID: Yadira Hoang is a 74 year old female with PMH R heel melanoma s/p excision and pedicled medial plantar artery fasciocutaneous flap recon DOS 3/4/20, now s/p STSG coverage to R foot flap donor site DOS 4/2/20.    S: Overall doing well. Has some pain at wound vac site. Using walker to ambulate and hopping on L foot.     O:  /67 (BP Location: Left arm, Patient Position: Sitting, Cuff Size: Adult Regular)   Pulse 92   Temp 97.6  F (36.4  C) (Oral)   Ht 1.702 m (5' 7\")   SpO2 96%   BMI 21.89 kg/m     General: NAD  Extremities: R leg, donor site with aquacel and tegaderm dressing in place. R foot, vac removed, skin graft with 100% take. Moderate edema to foot. No erythema.     A/P:  POD 8 s/p R foot STSG, healing well    -start dressing changes at home every other day: xeroform, abd, kerlix, ace, boot. Video made for  to help with dressings.   -wear boot at all times during day  -keep leg elevated whenever possible   -Leave donor site dressing in place, reinforce as needed with tegaderm  -RTC 10 days for staple removal    Total time spent with patient was 25 min of which greater than 50% was in counseling.    Carola Mathis PA-C  Plastic and Reconstructive Surgery      Again, thank you for allowing me to participate in the care of your patient.      Sincerely,    Carola Mathis PA-C      "

## 2020-04-10 NOTE — TELEPHONE ENCOUNTER
Pt called wondering if she was supposed to see  before her appt with . RN received a staff message from Dr. Dorsey on 4/9 that she will see pt in early May. Pt scheduled....Melissa Ellsworth RN

## 2020-04-10 NOTE — NURSING NOTE
"Chief Complaint   Patient presents with     Surgical Followup     1 WEEK POST-OP FOLLOW UP.       Vitals:    04/10/20 0941   BP: 114/67   BP Location: Left arm   Patient Position: Sitting   Cuff Size: Adult Regular   Pulse: 92   Temp: 97.6  F (36.4  C)   TempSrc: Oral   SpO2: 96%   Height: 1.702 m (5' 7\")       Body mass index is 21.89 kg/m .            Susan Can CMA    "

## 2020-04-21 ENCOUNTER — OFFICE VISIT (OUTPATIENT)
Dept: PLASTIC SURGERY | Facility: CLINIC | Age: 75
End: 2020-04-21
Payer: COMMERCIAL

## 2020-04-21 VITALS
DIASTOLIC BLOOD PRESSURE: 65 MMHG | OXYGEN SATURATION: 97 % | BODY MASS INDEX: 21.89 KG/M2 | HEIGHT: 67 IN | HEART RATE: 94 BPM | TEMPERATURE: 98 F | SYSTOLIC BLOOD PRESSURE: 124 MMHG

## 2020-04-21 DIAGNOSIS — Z98.890 S/P FLAP GRAFT: Primary | ICD-10-CM

## 2020-04-21 ASSESSMENT — PAIN SCALES - GENERAL: PAINLEVEL: MILD PAIN (2)

## 2020-04-21 NOTE — LETTER
"4/21/2020       RE: Yadira Hoang  [address] [facility] [address]     Dear Colleague,    Thank you for referring your patient, Yadira Hoang, to the [facility] PLASTIC AND RECONSTRUCTIVE SURGERY at [facility]. Please see a copy of my visit note below.    Plastic Surgery Outpatient Visit     ID: Yadira Hoang is a 74 year old female with PMH R heel melanoma s/p excision and pedicled medial plantar artery fasciocutaneous flap recon DOS 3/4/20, now s/p STSG coverage to R foot flap donor site DOS 4/2/20.     S: Overall doing well since last visit. Finds boot heavy and somewhat uncomfortable.       O:  /67 (BP Location: Left arm, Patient Position: Sitting, Cuff Size: Adult Regular)   Pulse 92   Temp 97.6  F (36.4  C) (Oral)   Ht 1.702 m (5' 7\")   SpO2 96%   BMI 21.89 kg/m     General: NAD  Extremities: R leg, donor site with aquacel and tegaderm dressing removed, donor site 98% epithelialized. R foot skin graft well adhered. Staples intact, crusting to edges. Flap to heel intact, soft.      A/P:  POD 19 s/p R foot STSG, healing well     -staples removed around graft. Ok to get area wet in shower. Moisturize with aquaphor 2-3x daily.   -ok to toe touch weight bear. Wear boot for 1 more week, use when moving around. Ok to remove when laying down/sitting. Do dorsiflexion exercises for R foot.   -R thigh donor site, moisturize 2-3x/daily  -next week, ok to start standing on R foot with full pressure on flap for 3-5 minutes a few times a day. Increase time/frequency over a week's time.   -in 2 weeks ok to start walking short distances in the house.  -follow up 2 weeks with phone call visit     Total time spent with patient was 30 min of which greater than 50% was in counseling.     Carola Mathis PA-C  Plastic and Reconstructive Surgery        "

## 2020-04-21 NOTE — PROGRESS NOTES
"Plastic Surgery Outpatient Visit     ID: Yadira Hoang is a 74 year old female with PMH R heel melanoma s/p excision and pedicled medial plantar artery fasciocutaneous flap recon DOS 3/4/20, now s/p STSG coverage to R foot flap donor site DOS 4/2/20.     S: Overall doing well since last visit. Finds boot heavy and somewhat uncomfortable.       O:  /67 (BP Location: Left arm, Patient Position: Sitting, Cuff Size: Adult Regular)   Pulse 92   Temp 97.6  F (36.4  C) (Oral)   Ht 1.702 m (5' 7\")   SpO2 96%   BMI 21.89 kg/m     General: NAD  Extremities: R leg, donor site with aquacel and tegaderm dressing removed, donor site 98% epithelialized. R foot skin graft well adhered. Staples intact, crusting to edges. Flap to heel intact, soft.      A/P:  POD 19 s/p R foot STSG, healing well     -staples removed around graft. Ok to get area wet in shower. Moisturize with aquaphor 2-3x daily.   -ok to toe touch weight bear. Wear boot for 1 more week, use when moving around. Ok to remove when laying down/sitting. Do dorsiflexion exercises for R foot.   -R thigh donor site, moisturize 2-3x/daily  -next week, ok to start standing on R foot with full pressure on flap for 3-5 minutes a few times a day. Increase time/frequency over a week's time.   -in 2 weeks ok to start walking short distances in the house.  -follow up 2 weeks with phone call visit     Total time spent with patient was 30 min of which greater than 50% was in counseling.     Carola Mathis PA-C  Plastic and Reconstructive Surgery  "

## 2020-04-21 NOTE — NURSING NOTE
"Chief Complaint   Patient presents with     Wound Check     RLE wound check, staple removal       Vitals:    04/21/20 0942   BP: 124/65   BP Location: Left arm   Patient Position: Chair   Cuff Size: Adult Regular   Pulse: 94   Temp: 98  F (36.7  C)   TempSrc: Oral   SpO2: 97%   Height: 1.702 m (5' 7\")       Body mass index is 21.89 kg/m .    Arnulfo Marrero, EMT    "

## 2020-04-22 DIAGNOSIS — C43.71 MALIGNANT MELANOMA OF RIGHT LOWER EXTREMITY INCLUDING HIP (H): ICD-10-CM

## 2020-04-22 LAB
ALBUMIN SERPL-MCNC: 3.7 G/DL (ref 3.4–5)
ALP SERPL-CCNC: 61 U/L (ref 40–150)
ALT SERPL W P-5'-P-CCNC: 20 U/L (ref 0–50)
ANION GAP SERPL CALCULATED.3IONS-SCNC: 2 MMOL/L (ref 3–14)
AST SERPL W P-5'-P-CCNC: 17 U/L (ref 0–45)
BASOPHILS # BLD AUTO: 0 10E9/L (ref 0–0.2)
BASOPHILS NFR BLD AUTO: 0.5 %
BILIRUB SERPL-MCNC: 0.4 MG/DL (ref 0.2–1.3)
BUN SERPL-MCNC: 20 MG/DL (ref 7–30)
CALCIUM SERPL-MCNC: 9.6 MG/DL (ref 8.5–10.1)
CHLORIDE SERPL-SCNC: 109 MMOL/L (ref 94–109)
CO2 SERPL-SCNC: 32 MMOL/L (ref 20–32)
CREAT SERPL-MCNC: 0.57 MG/DL (ref 0.52–1.04)
DIFFERENTIAL METHOD BLD: NORMAL
EOSINOPHIL # BLD AUTO: 0.1 10E9/L (ref 0–0.7)
EOSINOPHIL NFR BLD AUTO: 2.1 %
ERYTHROCYTE [DISTWIDTH] IN BLOOD BY AUTOMATED COUNT: 13.6 % (ref 10–15)
GFR SERPL CREATININE-BSD FRML MDRD: >90 ML/MIN/{1.73_M2}
GLUCOSE SERPL-MCNC: 94 MG/DL (ref 70–99)
HCT VFR BLD AUTO: 36.5 % (ref 35–47)
HGB BLD-MCNC: 12 G/DL (ref 11.7–15.7)
IMM GRANULOCYTES # BLD: 0 10E9/L (ref 0–0.4)
IMM GRANULOCYTES NFR BLD: 0.2 %
LYMPHOCYTES # BLD AUTO: 1.6 10E9/L (ref 0.8–5.3)
LYMPHOCYTES NFR BLD AUTO: 38.7 %
MCH RBC QN AUTO: 30.7 PG (ref 26.5–33)
MCHC RBC AUTO-ENTMCNC: 32.9 G/DL (ref 31.5–36.5)
MCV RBC AUTO: 93 FL (ref 78–100)
MONOCYTES # BLD AUTO: 0.3 10E9/L (ref 0–1.3)
MONOCYTES NFR BLD AUTO: 7.9 %
NEUTROPHILS # BLD AUTO: 2.1 10E9/L (ref 1.6–8.3)
NEUTROPHILS NFR BLD AUTO: 50.6 %
PLATELET # BLD AUTO: 295 10E9/L (ref 150–450)
POTASSIUM SERPL-SCNC: 3.9 MMOL/L (ref 3.4–5.3)
PROT SERPL-MCNC: 7.3 G/DL (ref 6.8–8.8)
RBC # BLD AUTO: 3.91 10E12/L (ref 3.8–5.2)
SODIUM SERPL-SCNC: 143 MMOL/L (ref 133–144)
WBC # BLD AUTO: 4.2 10E9/L (ref 4–11)

## 2020-04-22 PROCEDURE — 85025 COMPLETE CBC W/AUTO DIFF WBC: CPT | Performed by: INTERNAL MEDICINE

## 2020-04-22 PROCEDURE — 80053 COMPREHEN METABOLIC PANEL: CPT | Performed by: INTERNAL MEDICINE

## 2020-04-22 PROCEDURE — 36415 COLL VENOUS BLD VENIPUNCTURE: CPT | Performed by: INTERNAL MEDICINE

## 2020-04-23 ENCOUNTER — VIRTUAL VISIT (OUTPATIENT)
Dept: ONCOLOGY | Facility: CLINIC | Age: 75
End: 2020-04-23
Attending: INTERNAL MEDICINE
Payer: COMMERCIAL

## 2020-04-23 DIAGNOSIS — C43.71 MALIGNANT MELANOMA OF RIGHT LOWER EXTREMITY INCLUDING HIP (H): Primary | ICD-10-CM

## 2020-04-23 DIAGNOSIS — C43.9 MELANOMA OF SKIN (H): ICD-10-CM

## 2020-04-23 PROCEDURE — 99213 OFFICE O/P EST LOW 20 MIN: CPT | Mod: 95 | Performed by: INTERNAL MEDICINE

## 2020-04-23 PROCEDURE — 40000114 ZZH STATISTIC NO CHARGE CLINIC VISIT

## 2020-04-23 NOTE — PROGRESS NOTES
"Yadira Hoang is a 74 year old female who is being evaluated via a billable telephone visit.      The patient has been notified of following:     \"This telephone visit will be conducted via a call between you and your physician/provider. We have found that certain health care needs can be provided without the need for a physical exam.  This service lets us provide the care you need with a short phone conversation.  If a prescription is necessary we can send it directly to your pharmacy.  If lab work is needed we can place an order for that and you can then stop by our lab to have the test done at a later time.    Telephone visits are billed at different rates depending on your insurance coverage. During this emergency period, for some insurers they may be billed the same as an in-person visit.  Please reach out to your insurance provider with any questions.    If during the course of the call the physician/provider feels a telephone visit is not appropriate, you will not be charged for this service.\"    Patient has given verbal consent for Telephone visit?  Yes    How would you like to obtain your AVS? MyChart    Refills: None needed.   Concerns: No new concerns.  Best call back #: [phone].    Fouzia Camargo, [facility]  MEDICAL ONCOLOGY PROGRESS NOTE  Apr 23, 2020     CHIEF COMPLAINT: acral melanoma, stage III, s/p primary resection and reconstruction    Melanoma History:  1. She noted a painful lesion on the sole of the right foot for a few months, since last summer.  It initially was small then became raised.  It spontaneously bled. She is not entirely sure of its appearance initially.  She denies noting any masses behind the knee or in the groin.  2. A punch biopsy was performed by Dr Jessica Meadows on 1/9/2020.  It showed melanoma, at least 3.4 mm deep with ulceration and 0 mitoses, and perineural invasion present. TILs were non-brisk and LVI not identified. pT3b.  3. 2/10/2020, " she has right femoral sentinel lymph node biopsy and wide local excision (Specimen #: [id number]) and the right heel lesion extends to a depth of 6.6 mm, and invasive melanoma is present at 0.2 mm from the deep margin. Microsatellites are also present. There was 1 (of 2) lymph nodes involved. The lymph node tissue exhibits extensive subcapsular and parenchymal clusters of melanoma cells, highlighted on Melan-A immunostain. The largest cluster is 7 millimeters in greatest diameter. pT4b pN2c.  4. She had PET-CT on 2/22/2020, which showed intense FDG uptake in a right inguinal lymph node, concerning for an additional focus of metastatic disease. There is also an indeterminate right external iliac lymph node with mild FDG uptake.     HISTORY OF PRESENT ILLNESS  Yadira Hoang is a 74 year old female with stage III acral melanoma.     Currently, Ms. Hoang continues her recovery. She is still using a walker to get around. She was just recently instructed she could begin toe touches only. She was instructed to continue with wrapping for another week and can start putting pressure on the heal starting next week.    She is taking Tylenol for pain. Pain medication makes her nauseous and constipated, so she avoids it. She denies shortness of breath or cough. No fevers or chills.     Labs today are normal. ECOG performance status is 0-1.    PHYSICAL EXAMINATION  None performed as this was telephone visit.    ASSESSMENT AND PLAN  #1 Acral melanoma, right heel, pT4b pN3c, Stage IIID  It was a pleasure to talk with Ms. Hoang. She is a 74 year old woman with Stage IIID melanoma on PET-CT. She has healed up from primary right heel surgery, but has a right inguinal lymph node remaining.    We will re-review her case at tumor board meeting tomorrow. She has a meeting with Dr. Dorsey coming up in May and the question is whether neoadjuvant immunotherapy could be indicated prior to that appointment. We will review and our team  will reach out with any additional recommendations.    For now will plan return to see me in 1 month with PET-CT.    Phone call duration: 13 minutes    Gi Cartagena M.D.   of Medicine  Hematology, Oncology and Transplantation

## 2020-04-27 DIAGNOSIS — C43.9 MELANOMA (H): Primary | ICD-10-CM

## 2020-04-27 LAB — COPATH REPORT: NORMAL

## 2020-04-27 PROCEDURE — 81445 SO NEO GSAP 5-50DNA/DNA&RNA: CPT | Performed by: SURGERY

## 2020-04-28 ENCOUNTER — TELEPHONE (OUTPATIENT)
Dept: ONCOLOGY | Facility: CLINIC | Age: 75
End: 2020-04-28

## 2020-04-28 ENCOUNTER — TELEPHONE (OUTPATIENT)
Dept: SURGERY | Facility: CLINIC | Age: 75
End: 2020-04-28

## 2020-04-28 NOTE — TELEPHONE ENCOUNTER
Patient called requesting to speak with Melissa MARTINS. Informed she was currently with a patient and I would have her call patient back.     Whit St LPN

## 2020-04-28 NOTE — TELEPHONE ENCOUNTER
Pt asked about her appt with  and what that is about. RN notified pt that the visit with  is a follow up. Pt asked about questions pertaining to further surgery and RN advised pt to bring with her questions to her visit with . Pt verbalized understanding. Pt asked about wearing the boot. RN reviewed with pt that she was to wear the boot one more week and okay to use when moving around. Pt states understanding and had no further questions at this time..Melissa Ellsworth RN        -staples removed around graft. Ok to get area wet in shower. Moisturize with aquaphor 2-3x daily.   -ok to toe touch weight bear. Wear boot for 1 more week, use when moving around. Ok to remove when laying down/sitting. Do dorsiflexion exercises for R foot.   -R thigh donor site, moisturize 2-3x/daily  -next week, ok to start standing on R foot with full pressure on flap for 3-5 minutes a few times a day. Increase time/frequency over a week's time.   -in 2 weeks ok to start walking short distances in the house.  -follow up 2 weeks with phone call visit     Total time spent with patient was 30 min of which greater than 50% was in counseling.     Carola Mathis PA-C  Plastic and Reconstructive Surgery

## 2020-05-01 ENCOUNTER — TELEPHONE (OUTPATIENT)
Dept: SURGERY | Facility: CLINIC | Age: 75
End: 2020-05-01

## 2020-05-01 NOTE — TELEPHONE ENCOUNTER
Writer called and spoke to patient to change appointment with Dr. Alston to virtual. Patient stated she has attempted to do video visits and has had problems. Visit changed to telephone.    Marion Bonilla LPN

## 2020-05-05 ENCOUNTER — TELEPHONE (OUTPATIENT)
Dept: PEDIATRICS | Facility: CLINIC | Age: 75
End: 2020-05-05

## 2020-05-05 ENCOUNTER — VIRTUAL VISIT (OUTPATIENT)
Dept: PLASTIC SURGERY | Facility: CLINIC | Age: 75
End: 2020-05-05
Payer: COMMERCIAL

## 2020-05-05 DIAGNOSIS — Z98.890 S/P FLAP GRAFT: Primary | ICD-10-CM

## 2020-05-05 LAB — COPATH REPORT: NORMAL

## 2020-05-05 ASSESSMENT — PAIN SCALES - GENERAL: PAINLEVEL: MODERATE PAIN (4)

## 2020-05-05 NOTE — NURSING NOTE
Chief Complaint   Patient presents with     RECHECK     2wk follow up R foot/heel debridement 4/2       There were no vitals filed for this visit.    There is no height or weight on file to calculate BMI.    Arnulfo Marrero, EMT

## 2020-05-05 NOTE — PROGRESS NOTES
Plastic Surgery Outpatient Visit  *telephone encounter due to COVID19*    ID: Yadira Hoang is a 74 year old female with PMH R heel melanoma s/p excision and pedicled medial plantar artery fasciocutaneous flap recon DOS 3/4/20, now s/p STSG coverage to R foot flap donor site DOS 4/2/20.    S: Doing ok since the last visit. Stopped wearing the boot 1 week ago. Is walking toe touch at home. Just started standing on heel flap this week. Does endorse some shooting pain with this. She takes tylenol PRN. Using aquafor to donor site and skin graft. Donor site is still sore. Admits she is feeling a bit depressed after the surgeries and not being able to do much at home.     O:  There were no vitals taken for this visit.   Telephone encounter    A/P:  R heel melanoma s/p excision and pedicled medial plantar artery fasciocutaneous flap recon DOS 3/4/20, now s/p STSG coverage to R foot flap donor site DOS 4/2/20, healing as anticipated.    -Overall doing well  -continue to increase weight bearing to R heel flap this week, 3-5 minute intervals with  Increasing frequency throughout the week. Later this week or next week she can start to walk short distances on heel as tolerated.   -ok to take tylenol as needed, anticipate some discomfort as she increases use of foot  -continue to moisturize donor site and graft multiple times a day, lotion or aquafor  -wash foot regularly  -likely will need PT/OT once they reopen, she would like to work on her strength and mobility.   -encouraged her to reconnect with counselor, which she has been looking into. Also recommend follow up with PCP re: depression  -email sent to patient, she will send photos if possible    Total time spent with patient was 21 min of which greater than 50% was in counseling.    Carola Mathis PA-C  Plastic and Reconstructive Surgery

## 2020-05-05 NOTE — TELEPHONE ENCOUNTER
"[facility] Call Center    Phone Message    May a detailed message be left on voicemail: yes     Reason for Call: Symptoms or Concerns     Thinks she may be \"having side effects from a medication possibly? Itchy eyelids with pain and eyes hurt. Also, white pimple looking spot on lip that's getting crusty along with a bunch of other stuff that hasnt come up before\":  Would like to discuss.       Action Taken: Message routed to:  Primary Care p [phone]    Travel Screening: Not Applicable                                                                      "

## 2020-05-05 NOTE — TELEPHONE ENCOUNTER
Called patient, she is not taking pain medication anymore.  She thought it was from the pain medications, she has not taken any pain medications for a while.  She has multiple derm concerns.    The onset of symptoms is 3 weeks ago.      She has R upper eye lid itchy, slight drainage, the pimple like area is not crusty and hurts. She has white sclera.      She also complains of lip area that started as a pimple and now crusty and slightly swollen.      She has itchy areas under her arms, she is dealing with a split in her tongue.      Routing to Dr Blanton to advise if she this is appropriate for an office visit or telephone.  She is not able to do Video.    Patient is ok if response is back tomorrow.    Mirella Jimenez RN, [facility]

## 2020-05-06 ENCOUNTER — TELEPHONE (OUTPATIENT)
Dept: SURGERY | Facility: CLINIC | Age: 75
End: 2020-05-06

## 2020-05-06 DIAGNOSIS — C43.71 MALIGNANT MELANOMA OF RIGHT LOWER EXTREMITY INCLUDING HIP (H): ICD-10-CM

## 2020-05-06 DIAGNOSIS — C43.9 MELANOMA OF SKIN (H): Primary | ICD-10-CM

## 2020-05-06 RX ORDER — DIPHENHYDRAMINE HYDROCHLORIDE 50 MG/ML
50 INJECTION INTRAMUSCULAR; INTRAVENOUS
Status: CANCELLED
Start: 2020-05-27

## 2020-05-06 RX ORDER — MEPERIDINE HYDROCHLORIDE 25 MG/ML
25 INJECTION INTRAMUSCULAR; INTRAVENOUS; SUBCUTANEOUS EVERY 30 MIN PRN
Status: CANCELLED | OUTPATIENT
Start: 2020-05-27

## 2020-05-06 RX ORDER — EPINEPHRINE 1 MG/ML
0.3 INJECTION, SOLUTION INTRAMUSCULAR; SUBCUTANEOUS EVERY 5 MIN PRN
Status: CANCELLED | OUTPATIENT
Start: 2020-05-27

## 2020-05-06 RX ORDER — NALOXONE HYDROCHLORIDE 0.4 MG/ML
.1-.4 INJECTION, SOLUTION INTRAMUSCULAR; INTRAVENOUS; SUBCUTANEOUS
Status: CANCELLED | OUTPATIENT
Start: 2020-05-27

## 2020-05-06 RX ORDER — HEPARIN SODIUM,PORCINE 10 UNIT/ML
5 VIAL (ML) INTRAVENOUS
Status: CANCELLED | OUTPATIENT
Start: 2020-05-27

## 2020-05-06 RX ORDER — METHYLPREDNISOLONE SODIUM SUCCINATE 125 MG/2ML
125 INJECTION, POWDER, LYOPHILIZED, FOR SOLUTION INTRAMUSCULAR; INTRAVENOUS
Status: CANCELLED
Start: 2020-05-27

## 2020-05-06 RX ORDER — LORAZEPAM 2 MG/ML
0.5 INJECTION INTRAMUSCULAR EVERY 4 HOURS PRN
Status: CANCELLED
Start: 2020-05-27

## 2020-05-06 RX ORDER — ALBUTEROL SULFATE 90 UG/1
1-2 AEROSOL, METERED RESPIRATORY (INHALATION)
Status: CANCELLED
Start: 2020-05-27

## 2020-05-06 RX ORDER — ALBUTEROL SULFATE 0.83 MG/ML
2.5 SOLUTION RESPIRATORY (INHALATION)
Status: CANCELLED | OUTPATIENT
Start: 2020-05-27

## 2020-05-06 RX ORDER — SODIUM CHLORIDE 9 MG/ML
1000 INJECTION, SOLUTION INTRAVENOUS CONTINUOUS PRN
Status: CANCELLED
Start: 2020-05-27

## 2020-05-06 RX ORDER — HEPARIN SODIUM (PORCINE) LOCK FLUSH IV SOLN 100 UNIT/ML 100 UNIT/ML
5 SOLUTION INTRAVENOUS
Status: CANCELLED | OUTPATIENT
Start: 2020-05-27

## 2020-05-06 NOTE — TELEPHONE ENCOUNTER
Pt seen at the [facility] site and since it mentions email sent to pt regarding photos RN will route this message to the staff at the [facility]. . If [facility] can assist with anything however please reach out to RN. Thank you...Melissa Ellsworth RN

## 2020-05-06 NOTE — TELEPHONE ENCOUNTER
Spoke with pt regarding sending photos via [employer]. Pt to contact clinic if she needs any additional assistance. Yadira PALOMARES RNCC

## 2020-05-06 NOTE — TELEPHONE ENCOUNTER
[facility] Call Center    Phone Message    May a detailed message be left on voicemail: yes     Reason for Call: Other: Patient requesting call back to discuss how she can get a picture of her foot snet to Dr Crabtree from her phone. Please call patient to discuss     Action Taken: Message routed to:  Adult Clinics: Surgery, General p [id number]    Travel Screening: Not Applicable

## 2020-05-07 ENCOUNTER — OFFICE VISIT (OUTPATIENT)
Dept: PEDIATRICS | Facility: CLINIC | Age: 75
End: 2020-05-07
Payer: COMMERCIAL

## 2020-05-07 VITALS
OXYGEN SATURATION: 96 % | WEIGHT: 136 LBS | DIASTOLIC BLOOD PRESSURE: 73 MMHG | BODY MASS INDEX: 21.3 KG/M2 | SYSTOLIC BLOOD PRESSURE: 132 MMHG | TEMPERATURE: 99.2 F | HEART RATE: 90 BPM

## 2020-05-07 DIAGNOSIS — F33.9 MAJOR DEPRESSION, RECURRENT, CHRONIC (H): ICD-10-CM

## 2020-05-07 DIAGNOSIS — C43.71 MALIGNANT MELANOMA OF RIGHT LOWER EXTREMITY INCLUDING HIP (H): ICD-10-CM

## 2020-05-07 DIAGNOSIS — F41.1 GAD (GENERALIZED ANXIETY DISORDER): ICD-10-CM

## 2020-05-07 DIAGNOSIS — L73.9 HAIR FOLLICLE INFECTION: ICD-10-CM

## 2020-05-07 DIAGNOSIS — L30.9 DERMATITIS: Primary | ICD-10-CM

## 2020-05-07 PROCEDURE — 99214 OFFICE O/P EST MOD 30 MIN: CPT | Performed by: INTERNAL MEDICINE

## 2020-05-07 RX ORDER — ESCITALOPRAM OXALATE 10 MG/1
10 TABLET ORAL DAILY
Qty: 30 TABLET | Refills: 3 | Status: SHIPPED | OUTPATIENT
Start: 2020-05-07 | End: 2020-06-24

## 2020-05-07 RX ORDER — DOXYCYCLINE 100 MG/1
100 CAPSULE ORAL 2 TIMES DAILY
Qty: 14 CAPSULE | Refills: 0 | Status: SHIPPED | OUTPATIENT
Start: 2020-05-07 | End: 2020-06-24

## 2020-05-07 NOTE — PROGRESS NOTES
Subjective     Yadira Hoang is a 74 year old female who presents to clinic today for the following health issues:    HPI   1, Armpit itching onset March 4th after surgery, concerned it was caused by the narcotics she was on. Finished Narcotics after hospital stay.   2, Right eye itching on the outer eyelid, onset March 4th after surgery  3, Pimple type lesion on lip, onset 2 weeks, now more crusty feeling      Patient comes in with several concerns.  She had surgery for melanoma of the right leg.  Second surgery was completed in March.  She was given opioids for pain control.  She feels she is having a lot of the side effect of these drugs.  Ever since then she has noticed some itching around the eye.  She points to the upper and lower eyelids.  She has not noticed a rash.  Also she has itching in the armpits.  She has not taken anything for it.  In the past 2 weeks she has noticed a pimple type skin growth on the right lower lip.  Tender to touch but has not drained.  She had one positive inguinal lymph node and has had a abnormal PET scan.  She is worried about metastasis of the melanoma.  Immunotherapy has been discussed with her.  She indicates that she reacts to all kinds of medication and is concerned about starting immunotherapy.  She has not been sleeping well or eating well.  She feels anxious and depressed.  She has a longstanding history of depression anxiety.  She has a psychologist that she sees on a regular basis.  Years ago she used to be under care of a psychiatrist.  Lot of the drugs prescribed then did not agree with her and she had side effects.  Recently her symptoms are worst and after further discussion she is more agreeable to consider treatment for it.    Patient Active Problem List   Diagnosis     Malignant melanoma of right lower extremity including hip (H)     Melanoma of skin (H)     GUERLINE on CPAP     Chronic congestion of paranasal sinus     Weakness of foot     Neck pain, chronic      Myofascial pain     Intractable migraine without aura and without status migrainosus     History of multiple concussions     Foot pain, bilateral     Fibromyositis     Difficulty walking     Chronic pain disorder     Chronic fatigue     Bilateral occipital neuralgia     Articular disc disorder of temporomandibular joint     S/P flap graft     Osteopenia     Injury of head     Hyperlipidemia     ALEXUS (generalized anxiety disorder)     Major depression, recurrent, chronic (H)     Past Surgical History:   Procedure Laterality Date     BIOPSY NODE SENTINEL Right 2/10/2020    Procedure: Surrency Lymph Node Mapping And Biopsy;  Surgeon: Gabriela Dorsey MD;  Location: [facility] OR     EXCISE LESION LOWER EXTREMITY Right 2/10/2020    Procedure: Wide Local Excision of RIGHT heel melanoma;  Surgeon: Gabriela Dorsey MD;  Location: [facility] OR     EXCISE LESION LOWER EXTREMITY Right 2/20/2020    Procedure: Wide local Re-excision RIGHT heel Wound vac application;  Surgeon: Gabriela Dorsey MD;  Location: [facility]     GRAFT SKIN SPLIT THICKNESS FROM EXTREMITY Right 4/2/2020    Procedure: split skin graft from right thigh;  Surgeon: NIKA Crabtree MD;  Location: [facility] OR     IRRIGATION AND DEBRIDEMENT FOOT, COMBINED Right 4/2/2020    Procedure: right foot/heel debridement;  Surgeon: NIKA Crabtree MD;  Location: [facility] OR     ORTHOPEDIC SURGERY Right     heel surgery x2     RECONSTRUCT FOOT Right 3/4/2020    Procedure: Right heel reconstruction with regional flap, biologic dressing and SPY;  Surgeon: NIKA Crabtree MD;  Location: [facility] OR     TUBAL LIGATION         Social History     Tobacco Use     Smoking status: Never Smoker     Smokeless tobacco: Never Used   Substance Use Topics     Alcohol use: Not Currently     Frequency: Never     History reviewed. No pertinent family history.      Current Outpatient Medications   Medication Sig Dispense Refill     acetaminophen (TYLENOL) 325 MG tablet Take 2 tablets (650  mg) by mouth every 4 hours as needed for mild pain 50 tablet 0     cholecalciferol (VITAMIN D3) 125 MCG (5000 UT) TABS tablet Take 2,000 Units by mouth every other day        doxycycline hyclate (VIBRAMYCIN) 100 MG capsule Take 1 capsule (100 mg) by mouth 2 times daily for 7 days 14 capsule 0     escitalopram (LEXAPRO) 10 MG tablet Take 1 tablet (10 mg) by mouth daily 30 tablet 3     estradiol (ESTRACE) 0.1 MG/GM vaginal cream Place 1 g vaginally twice a week        polyethylene glycol (MIRALAX) powder Take 17 g by mouth       Allergies   Allergen Reactions     Atorvastatin      Statins all swelling     Hmg-Coa-R Inhibitors Swelling         Reviewed and updated as needed this visit by Provider  Tobacco  Allergies  Meds  Problems  Med Hx  Surg Hx  Fam Hx         Review of Systems   ROS COMP: Constitutional, HEENT, cardiovascular, pulmonary, GI, , musculoskeletal, neuro, skin, endocrine and psych systems are negative, except as otherwise noted.      Objective    /73   Pulse 90   Temp 99.2  F (37.3  C) (Temporal)   Wt 61.7 kg (136 lb)   SpO2 96%   BMI 21.30 kg/m    Body mass index is 21.3 kg/m .  Physical Exam   GENERAL: healthy, alert and no distress  EYES: Eyes grossly normal to inspection, PERRL and conjunctivae and sclerae normal  MS: no gross musculoskeletal defects noted, no edema  SKIN: There is slight erythema and redness along with dryness in the axillary fold.  The eye examination was negative including the eyelids.  I do not see a clear evidence of dermatitis there.  Right lower lip appears to have a small three 4 mm papular lesion tender to touch.  Looks like a hair follicle infection.    Diagnostic Test Results:  Labs reviewed in Epic        Assessment & Plan     1.  Dermatitis in form of itching.  Advised to use over-the-counter hydrocortisone cream once or twice a day and see if it helps in the axillary area.  2.  Hair follicle infection.  Prescribed doxycycline 100 mg twice daily for  7 days.  If it does not resolve she will need to be rechecked.  3.  Major depression recurrent and chronic with increased recent symptoms.  Advised to contact her psychologist.  She agreed to start medication and I prescribed Lexapro 10 mg a day.  Follow-up with her primary provider.  4.  Generalized anxiety disorder.  Hopefully Lexapro will help that as well.  Patient informed that it will take 3 to 4 weeks to get the benefit of the medication.  5.  Malignant melanoma involving the right lower extremity.       No follow-ups on file.    Carlos Ortiz MD  [facility]

## 2020-05-14 ENCOUNTER — PATIENT OUTREACH (OUTPATIENT)
Dept: ONCOLOGY | Facility: CLINIC | Age: 75
End: 2020-05-14

## 2020-05-14 NOTE — PROGRESS NOTES
Called patient today to discuss plan of care.  A lengthy discussion ensued.  Patient had numerous questions.  We reviewed:    1. MD role, SUSI role and RNCC role.  2. Discussed plan for neoadjuvant immunotherapy (3 cycles).  3. Education on Opdivo given.  Discussed fatigue, colitis, pneumonitis.  4. Reviewed and made some changes to schedule.  She will have a video visit with MD following upcoming PET scan and then follow with Meghan Perdomo PA-C for subsequent infusions.  She will also have her infusions at [facility].    She is excited about the possibility of video visits as she feels rather disconnected.  This RN has a nursing visit with patient and daughter next week to set up video visits.  Patient was very grateful for the call.  Questions answered to her satisfaction.    Daniela Guardado MBA, MSN, RN, ONC  RN Care Coordinator  [facility]

## 2020-05-19 ENCOUNTER — TELEPHONE (OUTPATIENT)
Dept: PLASTIC SURGERY | Facility: CLINIC | Age: 75
End: 2020-05-19

## 2020-05-19 ENCOUNTER — VIRTUAL VISIT (OUTPATIENT)
Dept: SURGERY | Facility: CLINIC | Age: 75
End: 2020-05-19
Payer: COMMERCIAL

## 2020-05-19 ENCOUNTER — VIRTUAL VISIT (OUTPATIENT)
Dept: PLASTIC SURGERY | Facility: CLINIC | Age: 75
End: 2020-05-19
Payer: COMMERCIAL

## 2020-05-19 DIAGNOSIS — C43.71 MALIGNANT MELANOMA OF RIGHT LOWER EXTREMITY INCLUDING HIP (H): Primary | ICD-10-CM

## 2020-05-19 DIAGNOSIS — C43.9 MELANOMA OF SKIN (H): ICD-10-CM

## 2020-05-19 DIAGNOSIS — Z98.890 S/P FLAP GRAFT: Primary | ICD-10-CM

## 2020-05-19 PROCEDURE — 99443 ZZC PHYSICIAN TELEPHONE EVALUATION 21-30 MIN: CPT | Mod: TEL | Performed by: SURGERY

## 2020-05-19 ASSESSMENT — PAIN SCALES - GENERAL
PAINLEVEL: MODERATE PAIN (4)
PAINLEVEL: MILD PAIN (3)

## 2020-05-19 NOTE — TELEPHONE ENCOUNTER
5/19/2020, 1:35 PM    Spoke with patient. Scheduled appointment for 6/17/20 at 0945h in person @ [facility]. The patient voiced agreement and understanding of date, time, and place of appointment.      BESS CevallosT

## 2020-05-19 NOTE — NURSING NOTE
Chief Complaint   Patient presents with     RECHECK     2wk f/u s/p R heel melanoma DOS 4/2       There were no vitals filed for this visit.    There is no height or weight on file to calculate BMI.    Arnulfo Marrero, EMT

## 2020-05-19 NOTE — PROGRESS NOTES
"Yadira Hoang is a 74 year old female who is being evaluated via a billable telephone visit.      The patient has been notified of following:     \"This telephone visit will be conducted via a call between you and your physician/provider. We have found that certain health care needs can be provided without the need for a physical exam.  This service lets us provide the care you need with a short phone conversation.  If a prescription is necessary we can send it directly to your pharmacy.  If lab work is needed we can place an order for that and you can then stop by our lab to have the test done at a later time.    Telephone visits are billed at different rates depending on your insurance coverage. During this emergency period, for some insurers they may be billed the same as an in-person visit.  Please reach out to your insurance provider with any questions.    If during the course of the call the physician/provider feels a telephone visit is not appropriate, you will not be charged for this service.\"    Patient has given verbal consent for Telephone visit?  Yes    What phone number would you like to be contacted at? [phone]    How would you like to obtain your AVS? [facility]    Phone call duration: 22 minutes    Carola Mathis PA-C     Plastic Surgery Outpatient Visit    ID: Yadira Hoang is a 74 year old female with PMH R heel melanoma s/p excision and pedicled medial plantar artery fasciocutaneous flap recon DOS 3/4/20, now s/p STSG coverage to R foot flap donor site DOS 4/2/20.    S: Pt is doing ok. She has started walking more on foot but does have pain when putting more pressure on heel flap. Also has pain to upper thigh. Unable to describe if pain is more superficial or deep. Also endorses trouble sleeping, constipation that required an enema for relief and depression. She has seen her counselor and PCP re: depression. Pt unable to send photo.    O:  There were no vitals taken for this visit. "   *phone call*    A/P:  75 yo F 6 weeks post op R foot STSG, 10 weeks post op R foot pedicled instep flap for melanoma excision coverage, healing as anticipated but with residual pain and difficulty with mobility    -start PT for ambulation, strength and mobility- referral given.   -continue to ambulate as tolerated on R foot.   -continue to follow up with counselor and PCP re: depression  -RTC 1 month in person with Dr. Crabtree. Pt with many questions about healing and unable to send photo for evaluation.     Total time spent with patient was 25 min of which greater than 50% was in counseling.    Carola Mathis PA-C  Plastic and Reconstructive Surgery

## 2020-05-19 NOTE — PROGRESS NOTES
"Yadira Hoang is a 74 year old female who is being evaluated via a billable telephone visit.      The patient has been notified of following:     \"This telephone visit will be conducted via a call between you and your physician/provider. We have found that certain health care needs can be provided without the need for a physical exam.  This service lets us provide the care you need with a short phone conversation.  If a prescription is necessary we can send it directly to your pharmacy.  If lab work is needed we can place an order for that and you can then stop by our lab to have the test done at a later time.    Telephone visits are billed at different rates depending on your insurance coverage. During this emergency period, for some insurers they may be billed the same as an in-person visit.  Please reach out to your insurance provider with any questions.    If during the course of the call the physician/provider feels a telephone visit is not appropriate, you will not be charged for this service.\"    Patient has given verbal consent for Telephone visit?  Yes    What phone number would you like to be contacted at? [phone]    How would you like to obtain your AVS? Mail a copy       Melissa Ellsworth RN    The above were completed by the medical assistant for the visit.    FOLLOW-UP  May 19, 2020    Yadira Hoang is a 74 year old female who is phoning in for follow-up for:    Cancer Staging  Malignant melanoma of right lower extremity including hip (H)  Staging form: Melanoma of the Skin, AJCC 8th Edition  - Clinical: Stage IIB (cT3b, cN0, cM0) - Signed by Gabriela Dorsey MD on 1/27/2020  - Pathologic: Stage IIIC (pT4b, pN2c, cM0) - Signed by Gabriela Dorsey MD on 2/18/2020      Treatment to date:  1. Wide local excision of right heel melanoma with 2 cm margins and right femoral sentinel lymph node biopsy (2/10/2020)  2. Re-excision of right heel (2/20/2020)    HPI:    Since her last visit, " a PET/CT was performed on 2/22/2020, which identified an FDG avid 9 mm right inguinal node and a 1.1 cm right external iliac node.  She has also subsequently undergone complex wound closure x2 by Dr Crabtree, most recently on 4/2/2020. She is slowly recovering from this  She has slowly been trying to walk on it. She continues to have fatigue.  She can only ambulate a little at a time with her walker, before stopping due to fatigue and pain. She does have occasional sharp shooting pains in the heel. She has met with Dr Franklin of medical oncology regarding adjuvant immunotherapy.  The tumor appears to be PD-L1 negative.    She has been seeing a psychologist; she previously had required multiple anti-depressants.  She has concerns regarding narcotics and constipation, and also with taking medications overall in general.    Assessment/Plan:  Diagnoses       Codes Comments    Malignant melanoma of right lower extremity including hip (H)    -  Primary C43.71          ASSESSMENT:  Yadira Hoang is a 74 year old female with right heel melanoma, s/p WLE    She is continuing to slowly recover from the complex wound reconstruction.  She has yet to really ambulate fully. I agree with the recommendation for physical therapy.  We discussed the significance of the findings on PET. She did have a large open wound at the time of the PET/CT, though the node was quite FDG avid. We discussed that typically a completion lymph node dissection would be recommended for an FDG avid node after a positive sentinel lymph node biopsy.  However, she had this large open wound at the time, and inflammatory reaction is not impossible.  In addition, she has had to undergo complex wound reconstruction and recover from that. She has a PET/CT scheduled later this week to reassess.  I recommended that we wait for the PET/CT to determine the next steps in treatment, and whether she needs a completion lymph node dissection.    We did briefly review  an inguinal lymph node dissection for locoregional control. We reviewed the risks and benefits of an inguinal lymph node dissection, including bleeding, wound infection, wound dehiscence, seroma formation, and lymphedema. We reviewed the specifics regarding a sartorius transposition and that a drain will be placed at the time of surgery.  I am concerned about her ability to recover from another operation given she hasn't fully recovered from her last yet.     I asked Yadira Hoang to please call her psychologist given her ongoing fatigue and depression.  I offered a referral to a psychiatrist, but Yadira Hoang was not interested in more medications and declined.    I will follow up with her next week after her PET/CT to further discuss.  I will also touch base with Dr Franklin regarding the plan.    All of the above was discussed and all questions were answered.    PLAN:  1. Awaiting repeat PET/CT this week  2. Physical therapy referral  3. Patient to call her psychologist    Gabriela Dorsey MD MSc [facility] FACS    Division of Surgical Oncology  [facility]     Phone call duration: 28 minutes

## 2020-05-20 ENCOUNTER — MYC MEDICAL ADVICE (OUTPATIENT)
Dept: DERMATOLOGY | Facility: CLINIC | Age: 75
End: 2020-05-20

## 2020-05-20 NOTE — TELEPHONE ENCOUNTER
Per  RN to assist with pt getting scheduled with PT and also to help pt get set up for VV. RN reached out to a clinic scheduler Louann regarding PT scheduling and Louann advised that PT will reach out to schedule the appointments. RN also spoke with PT  who advised that they would reach out to pt to schedule   RN called pt and notified pt of 's message and that PT would be calling pt to schedule. Pt states understanding. RN reviewed with pt the process of the virtual visits. Pt advised that RN would send her a [employer] message with specific instructions on how to download the yossi for the VV. Pt states that her daughter is coming this weekend and will help her. Pt asked about sending photos. RN notified pt to send photos pt would just need to respond to RN's message and at the bottom theres a box that says attach an image and pt can click on that and attach a photo from her phone and hit send and it will be sent. Pt verbalized understanding. Pt notified that if and when photos are needed RN will help walk pt through this process again. Pt states understanding and had no further questions..Melissa Ellsworth RN

## 2020-05-21 ENCOUNTER — PATIENT OUTREACH (OUTPATIENT)
Dept: PLASTIC SURGERY | Facility: CLINIC | Age: 75
End: 2020-05-21

## 2020-05-21 NOTE — PATIENT INSTRUCTIONS
Contacted pt to discuss PT at external facility. Pt states she has decided to go to [facility]. Pt has appt scheduled 6/3/20. Encouraged pt to reach out if she needs any additional assistance. Yadira PALOMARES RNCC

## 2020-05-22 ENCOUNTER — ANCILLARY PROCEDURE (OUTPATIENT)
Dept: PET IMAGING | Facility: CLINIC | Age: 75
End: 2020-05-22
Attending: INTERNAL MEDICINE
Payer: COMMERCIAL

## 2020-05-22 DIAGNOSIS — C43.71 MALIGNANT MELANOMA OF RIGHT LOWER EXTREMITY INCLUDING HIP (H): ICD-10-CM

## 2020-05-22 DIAGNOSIS — C43.9 MELANOMA OF SKIN (H): ICD-10-CM

## 2020-05-22 PROCEDURE — 71260 CT THORAX DX C+: CPT | Mod: 59

## 2020-05-22 PROCEDURE — 78816 PET IMAGE W/CT FULL BODY: CPT | Mod: PS

## 2020-05-22 PROCEDURE — A9552 F18 FDG: HCPCS

## 2020-05-22 PROCEDURE — 74177 CT ABD & PELVIS W/CONTRAST: CPT | Mod: 59

## 2020-05-22 RX ORDER — IOPAMIDOL 755 MG/ML
81 INJECTION, SOLUTION INTRAVASCULAR ONCE
Status: COMPLETED | OUTPATIENT
Start: 2020-05-22 | End: 2020-05-22

## 2020-05-22 RX ADMIN — IOPAMIDOL 81 ML: 755 INJECTION, SOLUTION INTRAVASCULAR at 10:30

## 2020-05-26 ENCOUNTER — PATIENT OUTREACH (OUTPATIENT)
Dept: ONCOLOGY | Facility: CLINIC | Age: 75
End: 2020-05-26

## 2020-05-26 ENCOUNTER — TELEPHONE (OUTPATIENT)
Dept: SURGERY | Facility: CLINIC | Age: 75
End: 2020-05-26

## 2020-05-26 DIAGNOSIS — C43.71 MALIGNANT MELANOMA OF RIGHT LOWER EXTREMITY INCLUDING HIP (H): ICD-10-CM

## 2020-05-26 DIAGNOSIS — C43.9 MELANOMA OF SKIN (H): Primary | ICD-10-CM

## 2020-05-26 RX ORDER — PROCHLORPERAZINE MALEATE 10 MG
10 TABLET ORAL EVERY 6 HOURS PRN
Qty: 30 TABLET | Refills: 3 | Status: SHIPPED | OUTPATIENT
Start: 2020-05-26 | End: 2020-07-14

## 2020-05-26 RX ORDER — LORAZEPAM 0.5 MG/1
0.5 TABLET ORAL EVERY 4 HOURS PRN
Qty: 30 TABLET | Refills: 3 | Status: SHIPPED | OUTPATIENT
Start: 2020-05-26 | End: 2020-07-14

## 2020-05-26 NOTE — TELEPHONE ENCOUNTER
Pt called, No answer.  does not identify pt. Left general message for pt to call the [facility] back at [phone].RN sent a staff message to  to advise on any potential surgeries. Will call pt again and update pt as soon as RN hears back..Melissa Ellsworth RN

## 2020-05-26 NOTE — PROGRESS NOTES
Called patient today to update her on plan of care.  Advised her that her immunotherapy would start tomorrow and that her surgery is on hold per 's Sunita and Rigoberto.  Did teaching on opdivo.  Discussed potential side effects of fatigue, diarrhea and cough.  Told patient to call this RN particularly with diarrhea and cough side effects.  Gave patient direct dial number as she tends to require a lot of support.  Patient indicated understanding of discussion, but also tends to be a little forgetful.      Daniela TARANGOA, MSN, RN, ONC  RN Care Coordinator  [facility]

## 2020-05-26 NOTE — TELEPHONE ENCOUNTER
[facility] Call Center    Phone Message    May a detailed message be left on voicemail: no     Reason for Call: Other: Pt asking if she still has surgery for tomorrow.  Had CT scan on Friday.  Pt has rec'd no information if surgery is still on. Please advise.      Action Taken: Message routed to:  Adult Clinics: Surgery, General p [id number]    Travel Screening:

## 2020-05-26 NOTE — TELEPHONE ENCOUNTER
Received a staff message back today from  that pt is not having surgery but beginning treatment and that Daniela MARTINS is going to be reaching out to inform pt. RN received a message from Daniela that pt has been contacted and updated. Will close this encounter..Melissa Ellsworth RN

## 2020-05-27 ENCOUNTER — INFUSION THERAPY VISIT (OUTPATIENT)
Dept: INFUSION THERAPY | Facility: CLINIC | Age: 75
End: 2020-05-27
Payer: COMMERCIAL

## 2020-05-27 VITALS
OXYGEN SATURATION: 100 % | DIASTOLIC BLOOD PRESSURE: 73 MMHG | RESPIRATION RATE: 16 BRPM | WEIGHT: 142 LBS | TEMPERATURE: 98.2 F | SYSTOLIC BLOOD PRESSURE: 121 MMHG | BODY MASS INDEX: 22.24 KG/M2 | HEART RATE: 89 BPM

## 2020-05-27 DIAGNOSIS — C43.71 MALIGNANT MELANOMA OF RIGHT LOWER EXTREMITY INCLUDING HIP (H): ICD-10-CM

## 2020-05-27 DIAGNOSIS — C43.9 MELANOMA OF SKIN (H): Primary | ICD-10-CM

## 2020-05-27 LAB
ALBUMIN SERPL-MCNC: 3.8 G/DL (ref 3.4–5)
ALP SERPL-CCNC: 62 U/L (ref 40–150)
ALT SERPL W P-5'-P-CCNC: 20 U/L (ref 0–50)
ANION GAP SERPL CALCULATED.3IONS-SCNC: 5 MMOL/L (ref 3–14)
AST SERPL W P-5'-P-CCNC: 11 U/L (ref 0–45)
BILIRUB SERPL-MCNC: 0.4 MG/DL (ref 0.2–1.3)
BUN SERPL-MCNC: 18 MG/DL (ref 7–30)
CALCIUM SERPL-MCNC: 9.8 MG/DL (ref 8.5–10.1)
CHLORIDE SERPL-SCNC: 106 MMOL/L (ref 94–109)
CO2 SERPL-SCNC: 31 MMOL/L (ref 20–32)
CREAT SERPL-MCNC: 0.65 MG/DL (ref 0.52–1.04)
GFR SERPL CREATININE-BSD FRML MDRD: 86 ML/MIN/{1.73_M2}
GLUCOSE SERPL-MCNC: 94 MG/DL (ref 70–99)
POTASSIUM SERPL-SCNC: 3.8 MMOL/L (ref 3.4–5.3)
PROT SERPL-MCNC: 7.3 G/DL (ref 6.8–8.8)
SODIUM SERPL-SCNC: 142 MMOL/L (ref 133–144)
TSH SERPL DL<=0.005 MIU/L-ACNC: 2.49 MU/L (ref 0.4–4)

## 2020-05-27 PROCEDURE — 80053 COMPREHEN METABOLIC PANEL: CPT | Performed by: INTERNAL MEDICINE

## 2020-05-27 PROCEDURE — 99207 ZZC NO CHARGE LOS: CPT

## 2020-05-27 PROCEDURE — 96413 CHEMO IV INFUSION 1 HR: CPT | Performed by: INTERNAL MEDICINE

## 2020-05-27 PROCEDURE — 36415 COLL VENOUS BLD VENIPUNCTURE: CPT | Performed by: INTERNAL MEDICINE

## 2020-05-27 PROCEDURE — 84443 ASSAY THYROID STIM HORMONE: CPT | Performed by: INTERNAL MEDICINE

## 2020-05-27 RX ADMIN — Medication 250 ML: at 13:14

## 2020-05-27 ASSESSMENT — PAIN SCALES - GENERAL: PAINLEVEL: MODERATE PAIN (5)

## 2020-05-27 NOTE — PROGRESS NOTES
Infusion Nursing Note:  Yadira Hoang presents today for 1st dose Opdivo.    Patient seen by provider today: No   present during visit today: Not Applicable.    Note: Printout given for Opdivo with discussion re: side effects. .    Intravenous Access:  Peripheral IV placed.    Treatment Conditions:  Lab Results   Component Value Date     05/27/2020                   Lab Results   Component Value Date    POTASSIUM 3.8 05/27/2020           No results found for: MAG         Lab Results   Component Value Date    CR 0.65 05/27/2020                   Lab Results   Component Value Date    GABY 9.8 05/27/2020                Lab Results   Component Value Date    BILITOTAL 0.4 05/27/2020           Lab Results   Component Value Date    ALBUMIN 3.8 05/27/2020                    Lab Results   Component Value Date    ALT 20 05/27/2020           Lab Results   Component Value Date    AST 11 05/27/2020       Results reviewed, labs MET treatment parameters, ok to proceed with treatment.      Post Infusion Assessment:  Patient tolerated infusion without incident.  Patient observed for 20 minutes post Opdivo per protocol.  Site patent and intact, free from redness, edema or discomfort.  No evidence of extravasations.  Access discontinued per protocol.       Discharge Plan:   Discharge instructions reviewed with: Patient.  Patient discharged in stable condition accompanied by: .  Departure Mode: w/chr.    Melissa Robison RN

## 2020-05-28 ENCOUNTER — VIRTUAL VISIT (OUTPATIENT)
Dept: ONCOLOGY | Facility: CLINIC | Age: 75
End: 2020-05-28
Attending: INTERNAL MEDICINE
Payer: COMMERCIAL

## 2020-05-28 ENCOUNTER — VIRTUAL VISIT (OUTPATIENT)
Dept: ONCOLOGY | Facility: CLINIC | Age: 75
End: 2020-05-28
Attending: SURGERY
Payer: COMMERCIAL

## 2020-05-28 VITALS — WEIGHT: 136.5 LBS | BODY MASS INDEX: 21.43 KG/M2 | HEIGHT: 67 IN

## 2020-05-28 DIAGNOSIS — R53.81 PHYSICAL DECONDITIONING: ICD-10-CM

## 2020-05-28 DIAGNOSIS — C43.71 MALIGNANT MELANOMA OF RIGHT LOWER EXTREMITY INCLUDING HIP (H): Primary | ICD-10-CM

## 2020-05-28 DIAGNOSIS — C43.71 MALIGNANT MELANOMA OF RIGHT LOWER EXTREMITY INCLUDING HIP (H): ICD-10-CM

## 2020-05-28 DIAGNOSIS — C43.9 MELANOMA OF SKIN (H): Primary | ICD-10-CM

## 2020-05-28 PROCEDURE — 40001009 ZZH VIDEO/TELEPHONE VISIT; NO CHARGE

## 2020-05-28 PROCEDURE — 99214 OFFICE O/P EST MOD 30 MIN: CPT | Mod: 95 | Performed by: INTERNAL MEDICINE

## 2020-05-28 PROCEDURE — 40000114 ZZH STATISTIC NO CHARGE CLINIC VISIT

## 2020-05-28 ASSESSMENT — PAIN SCALES - GENERAL: PAINLEVEL: EXTREME PAIN (8)

## 2020-05-28 ASSESSMENT — MIFFLIN-ST. JEOR: SCORE: 1146.91

## 2020-05-28 NOTE — LETTER
"    5/28/2020     RE: Yadira Hoang  [address] [facility] [address]      Dear Colleague,    Thank you for referring your patient, Yadira Hoang, to the [facility]. Please see a copy of my visit note below.    Yadira Hoang is a 74 year old female who is being evaluated via a billable telephone visit.      The patient has been notified of following:     \"This telephone visit will be conducted via a call between you and your physician/provider. We have found that certain health care needs can be provided without the need for a physical exam.  This service lets us provide the care you need with a short phone conversation.  If a prescription is necessary we can send it directly to your pharmacy.  If lab work is needed we can place an order for that and you can then stop by our lab to have the test done at a later time.    Telephone visits are billed at different rates depending on your insurance coverage. During this emergency period, for some insurers they may be billed the same as an in-person visit.  Please reach out to your insurance provider with any questions.    If during the course of the call the physician/provider feels a telephone visit is not appropriate, you will not be charged for this service.\"    Patient has given verbal consent for Telephone visit?  Yes    What phone number would you like to be contacted at? [phone]    How would you like to obtain your AVS? Mail a copy     8/10 pain in right foot. Has foot elevated which seems to help a little patient stated. She is alternating between Tylenol and ibuprofen.     Caop Rowe LPN       The above were completed by the medical assistant for the visit.    FOLLOW-UP  May 28, 2020    Yadira Hoang is a 74 year old female who is phoning in for follow-up for.    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 Wide local excision of right heel melanoma with 2 cm margins and right femoral sentinel lymph node biopsy (2/10/2020)  2. Re-excision of right heel (2/20/2020)  3. Nivolumab (5/27/2020 to ongoing)    HPI:    Since her last visit, her PET/CT on 5/22 demonstrated increase in size and FDG avidity of a right inguinal and a right external iliac lymph node. She continues to have pain in her leg and foot and has ambulated minimally. She has a physical therapy visit scheduled for next week.  She had her first nivolumab infusion yesterday and feels some abdominal discomfort and constipation today. She has a follow up later today with Dr Franklin to further discuss this.    INVESTIGATIONS:    PET/CT (5/22/2020) showed:  IMPRESSION: In this patient with a history of right heel melanoma, status post resection and reconstruction:  1. Resolution of previously seen FDG uptake along the surgical site in the posterior right foot.   2. Increased size and FDG uptake of right inguinal and right external iliac chain lymph nodes, concerning for metastatic disease.  3. No suspicious FDG uptake in the head, neck or chest.  4. Scattered indeterminate solid pulmonary nodules in both lungs, including a 6 mm solid nodule in the right lower lobe, are stable since 2/22/2020. No new or enlarging pulmonary nodules.    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    We reviewed the PET/CT findings. They are suggestive of chantell metastases without distant metastatic disease.  Yadira Hoang continues to be tearful and overwhelmed at the melanoma diagnosis. We reviewed that treatment  for chantell metastatic disease involves a combination of lymphadenectomy and immunotherapy.  Because of her poor functional status from her heel surgery with reconstruction, I am recommending she start with immunotherapy to allow her to further convalesce before proceeding with another surgery.  We did discuss that ultimately she will need a inguinofemoral and iliac lymphadenectomy, but she is not a surgical candidate at this time.  We will reassess in 2 months, just after her restaging CT.    All of the above was discussed and all questions were answered.    PLAN:  1. Follow up with me in 2 months, just after the restaging CT CAP  2. Physical therapy  3. Currently not a surgical candidate due to functional status   4. Continue nivolumab per Dr Franklin.    Gabriela Dorsey MD [facility]Sc [facility] FACS    Division of Surgical Oncology  [facility]     Phone call duration: 22 minutes

## 2020-05-28 NOTE — PROGRESS NOTES
[facility]  MEDICAL ONCOLOGY PROGRESS NOTE  May 28, 2020       CHIEF COMPLAINT: acral melanoma, stage III, s/p primary resection and reconstruction    Melanoma History:  1. She noted a painful lesion on the sole of the right foot for a few months, since last summer.  It initially was small then became raised.  It spontaneously bled. She is not entirely sure of its appearance initially.  She denies noting any masses behind the knee or in the groin.  2. 1/9/2020, punch biopsy was performed by Dr Jessica Meadows on .  It showed melanoma, at least 3.4 mm deep with ulceration and 0 mitoses, and perineural invasion present. TILs were non-brisk and LVI not identified. pT3b.  3. 2/10/2020, she has right femoral sentinel lymph node biopsy and wide local excision (Specimen #: [id number]) and the right heel lesion extends to a depth of 6.6 mm, and invasive melanoma is present at 0.2 mm from the deep margin. Microsatellites are also present. There was 1 (of 2) lymph nodes involved. The lymph node tissue exhibits extensive subcapsular and parenchymal clusters of melanoma cells, highlighted on Melan-A immunostain. The largest cluster is 7 millimeters in greatest diameter. pT4b pN2c.  4. 2/22/2020, she had PET-CT, which showed intense FDG uptake in a right inguinal lymph node, concerning for an additional focus of metastatic disease. There is also an indeterminate right external iliac lymph node with mild FDG uptake.  5. 3/4/2020, she has medial plantar artery  fasciocutaneous instep flap and Integra placement to the donor site. On 4/2/2020, she has additional reconstruction with skin grafting.  6. 5/27/2020, she starts nivolumab.     HISTORY OF PRESENT ILLNESS  Yadira Hoang is a 74 year old female with stage III acral melanoma.     Currently, Ms. Hoang continues her recovery from the heel surgeries. She was never really able to start physical therapy due to Covid-19, so she has not made as much  "progress as she would have liked. However, she has appointment next week to discuss therapy options during pandemic. She has tried home care instruction, but she did not find this was helpful.    She is still using a walker to get around the home. She uses a wheelchair if she doing something by the table. She is taking Tylenol for pain. She denies shortness of breath or cough. No fevers or chills. No diarrhea or abdominal pain.    She saw Dr. Dorsey for consideration of further surgier, but given her current performance status Dr. Dorsey did not favor surgery at the present time. Yadira had her first infusion of nivolumab yesterday. She fell asleep during infusion and tolerated it very well. Has had no issues.     Labs today are normal. ECOG performance status is 0-1.    PHYSICAL EXAMINATION  None performed as this was telephone visit.    ASSESSMENT AND PLAN  #1 Acral melanoma, right heel, pT4b pN3c, Stage IIID  It was a pleasure to talk with Ms. Hoang. She is a 74 year old woman with Stage IIID melanoma on PET-CT. She has healed up from primary right heel surgery, but has a right inguinal lymph node and a small right pelvic lymph node on the most recent PET-CT scan.    She is still recovering from her primary surgery and reconstruction and additional surgery is not currently feasible. We have started nivolumab and she tolerated infusion yesterday with no major issues.    We will continue nivolumab every month for about 3-4 cycles prior to consideration of surgery. I will plan to see her back in about 3 months with PET-CT.      Gi Cartagena M.D.   of Medicine  Hematology, Oncology and Transplantation      Yadira Hoang is a 74 year old female who is being evaluated via a billable telephone visit.      The patient has been notified of following:     \"This telephone visit will be conducted via a call between you and your physician/provider. We have found that certain health care needs can " "be provided without the need for a physical exam.  This service lets us provide the care you need with a short phone conversation.  If a prescription is necessary we can send it directly to your pharmacy.  If lab work is needed we can place an order for that and you can then stop by our lab to have the test done at a later time.    Telephone visits are billed at different rates depending on your insurance coverage. During this emergency period, for some insurers they may be billed the same as an in-person visit.  Please reach out to your insurance provider with any questions.    If during the course of the call the physician/provider feels a telephone visit is not appropriate, you will not be charged for this service.\"    Patient has given verbal consent for Telephone visit?  Yes    What phone number would you like to be contacted at? [phone]    How would you like to obtain your AVS? Montserrat Rowe LPN    Phone call duration: 25 minutes          "

## 2020-05-28 NOTE — PROGRESS NOTES
"Yadira Hoang is a 74 year old female who is being evaluated via a billable telephone visit.      The patient has been notified of following:     \"This telephone visit will be conducted via a call between you and your physician/provider. We have found that certain health care needs can be provided without the need for a physical exam.  This service lets us provide the care you need with a short phone conversation.  If a prescription is necessary we can send it directly to your pharmacy.  If lab work is needed we can place an order for that and you can then stop by our lab to have the test done at a later time.    Telephone visits are billed at different rates depending on your insurance coverage. During this emergency period, for some insurers they may be billed the same as an in-person visit.  Please reach out to your insurance provider with any questions.    If during the course of the call the physician/provider feels a telephone visit is not appropriate, you will not be charged for this service.\"    Patient has given verbal consent for Telephone visit?  Yes    What phone number would you like to be contacted at? [phone]    How would you like to obtain your AVS? Mail a copy     8/10 pain in right foot. Has foot elevated which seems to help a little patient stated. She is alternating between Tylenol and ibuprofen.     Capo Rowe LPN       The above were completed by the medical assistant for the visit.    FOLLOW-UP  May 28, 2020    Yadira Hoang is a 74 year old female who is phoning in for follow-up for.    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 Wide local excision of right heel melanoma with " 2 cm margins and right femoral sentinel lymph node biopsy (2/10/2020)  2. Re-excision of right heel (2/20/2020)  3. Nivolumab (5/27/2020 to ongoing)    HPI:    Since her last visit, her PET/CT on 5/22 demonstrated increase in size and FDG avidity of a right inguinal and a right external iliac lymph node. She continues to have pain in her leg and foot and has ambulated minimally. She has a physical therapy visit scheduled for next week.  She had her first nivolumab infusion yesterday and feels some abdominal discomfort and constipation today. She has a follow up later today with Dr Franklin to further discuss this.    INVESTIGATIONS:    PET/CT (5/22/2020) showed:  IMPRESSION: In this patient with a history of right heel melanoma, status post resection and reconstruction:  1. Resolution of previously seen FDG uptake along the surgical site in the posterior right foot.   2. Increased size and FDG uptake of right inguinal and right external iliac chain lymph nodes, concerning for metastatic disease.  3. No suspicious FDG uptake in the head, neck or chest.  4. Scattered indeterminate solid pulmonary nodules in both lungs, including a 6 mm solid nodule in the right lower lobe, are stable since 2/22/2020. No new or enlarging pulmonary nodules.    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    We reviewed the PET/CT findings. They are suggestive of chantell metastases without distant metastatic disease.  Yadira Hoang continues to be tearful and overwhelmed at the melanoma diagnosis. We reviewed that treatment for chantell metastatic disease involves a combination of lymphadenectomy and immunotherapy.  Because of her poor functional status from her heel surgery with reconstruction, I am recommending she start with immunotherapy to allow her to  further convalesce before proceeding with another surgery.  We did discuss that ultimately she will need a inguinofemoral and iliac lymphadenectomy, but she is not a surgical candidate at this time.  We will reassess in 2 months, just after her restaging CT.    All of the above was discussed and all questions were answered.    PLAN:  1. Follow up with me in 2 months, just after the restaging CT CAP  2. Physical therapy  3. Currently not a surgical candidate due to functional status   4. Continue nivolumab per Dr Franklin.    Gabriela Dorsey MD MSc [facility] FACS    Division of Surgical Oncology  [facility]     Phone call duration: 22 minutes

## 2020-05-28 NOTE — LETTER
5/28/2020         RE: Yadira Hoang  [address] [facility] [address]        Dear Colleague,    Thank you for referring your patient, Yadira Hoang, to the [facility]. Please see a copy of my visit note below.    [facility]  MEDICAL ONCOLOGY PROGRESS NOTE  May 28, 2020       CHIEF COMPLAINT: acral melanoma, stage III, s/p primary resection and reconstruction    Melanoma History:  1. She noted a painful lesion on the sole of the right foot for a few months, since last summer.  It initially was small then became raised.  It spontaneously bled. She is not entirely sure of its appearance initially.  She denies noting any masses behind the knee or in the groin.  2. 1/9/2020, punch biopsy was performed by Dr Jessica Meadows on .  It showed melanoma, at least 3.4 mm deep with ulceration and 0 mitoses, and perineural invasion present. TILs were non-brisk and LVI not identified. pT3b.  3. 2/10/2020, she has right femoral sentinel lymph node biopsy and wide local excision (Specimen #: [id number]) and the right heel lesion extends to a depth of 6.6 mm, and invasive melanoma is present at 0.2 mm from the deep margin. Microsatellites are also present. There was 1 (of 2) lymph nodes involved. The lymph node tissue exhibits extensive subcapsular and parenchymal clusters of melanoma cells, highlighted on Melan-A immunostain. The largest cluster is 7 millimeters in greatest diameter. pT4b pN2c.  4. 2/22/2020, she had PET-CT, which showed intense FDG uptake in a right inguinal lymph node, concerning for an additional focus of metastatic disease. There is also an indeterminate right external iliac lymph node with mild FDG uptake.  5. 3/4/2020, she has medial plantar artery  fasciocutaneous instep flap and Integra placement to the donor site. On 4/2/2020, she has additional reconstruction with skin grafting.  6. 5/27/2020, she starts nivolumab.     HISTORY OF PRESENT  ILLNESS  Yadira Hoang is a 74 year old female with stage III acral melanoma.     Currently, Ms. Hoang continues her recovery from the heel surgeries. She was never really able to start physical therapy due to Covid-19, so she has not made as much progress as she would have liked. However, she has appointment next week to discuss therapy options during pandemic. She has tried home care instruction, but she did not find this was helpful.    She is still using a walker to get around the home. She uses a wheelchair if she doing something by the table. She is taking Tylenol for pain. She denies shortness of breath or cough. No fevers or chills. No diarrhea or abdominal pain.    She saw Dr. Dorsey for consideration of further surgier, but given her current performance status Dr. Dorsey did not favor surgery at the present time. Yadira had her first infusion of nivolumab yesterday. She fell asleep during infusion and tolerated it very well. Has had no issues.     Labs today are normal. ECOG performance status is 0-1.    PHYSICAL EXAMINATION  None performed as this was telephone visit.    ASSESSMENT AND PLAN  #1 Acral melanoma, right heel, pT4b pN3c, Stage IIID  It was a pleasure to talk with Ms. Hoang. She is a 74 year old woman with Stage IIID melanoma on PET-CT. She has healed up from primary right heel surgery, but has a right inguinal lymph node and a small right pelvic lymph node on the most recent PET-CT scan.    She is still recovering from her primary surgery and reconstruction and additional surgery is not currently feasible. We have started nivolumab and she tolerated infusion yesterday with no major issues.    We will continue nivolumab every month for about 3-4 cycles prior to consideration of surgery. I will plan to see her back in about 3 months with PET-CT.      Gi Cartagena M.D.   of Medicine  Hematology, Oncology and Transplantation      Yadira Hoang is a 74 year old  "female who is being evaluated via a billable telephone visit.      The patient has been notified of following:     \"This telephone visit will be conducted via a call between you and your physician/provider. We have found that certain health care needs can be provided without the need for a physical exam.  This service lets us provide the care you need with a short phone conversation.  If a prescription is necessary we can send it directly to your pharmacy.  If lab work is needed we can place an order for that and you can then stop by our lab to have the test done at a later time.    Telephone visits are billed at different rates depending on your insurance coverage. During this emergency period, for some insurers they may be billed the same as an in-person visit.  Please reach out to your insurance provider with any questions.    If during the course of the call the physician/provider feels a telephone visit is not appropriate, you will not be charged for this service.\"    Patient has given verbal consent for Telephone visit?  Yes    What phone number would you like to be contacted at? [phone]    How would you like to obtain your AVS? Montserrat Rowe LPN    Phone call duration: 25 minutes            Again, thank you for allowing me to participate in the care of your patient.        Sincerely,        Gi Franklin MD    "

## 2020-06-02 ENCOUNTER — TELEPHONE (OUTPATIENT)
Dept: SURGERY | Facility: CLINIC | Age: 75
End: 2020-06-02

## 2020-06-02 NOTE — TELEPHONE ENCOUNTER
Pt called with questions about her visit with Carola alvarado at the . Pt states that she is wondering about if a staple was left in the site. RN advised pt talk to Carola's staff at the . Contact number given to pt. Pt also became weepy about her dx and asked what she should expect with the surgery that she will have done after the infusions. RN notified pt that once the surgery date is closer surgery information will be discussed and post surgery information as well. RN asked pt if she had support at home and pt states that she has her  and her kids. Pt also states her counselor will be calling later today. RN instructed pt to take one day at a time, go easy on herself and do things that help relieve her stress and anxiety. Pt states that she is sorry for bothering RN but that she just wanted to talk to someone. Pt advised she can always call RN if she has concerns, questions or just needs to talk. Pt also asked RN about some vaginal sx she is experiencing. RN instructed pt to reach out to her pcp to notify of this. Pt states understanding and had no further questions...Melissa Ellsworth RN

## 2020-06-03 ENCOUNTER — HOSPITAL ENCOUNTER (OUTPATIENT)
Dept: PHYSICAL THERAPY | Facility: CLINIC | Age: 75
Setting detail: THERAPIES SERIES
End: 2020-06-03
Attending: INTERNAL MEDICINE
Payer: COMMERCIAL

## 2020-06-03 DIAGNOSIS — Z98.890 S/P FLAP GRAFT: ICD-10-CM

## 2020-06-03 DIAGNOSIS — C43.9 MELANOMA OF SKIN (H): ICD-10-CM

## 2020-06-03 PROCEDURE — 97110 THERAPEUTIC EXERCISES: CPT | Mod: GP | Performed by: PHYSICAL THERAPIST

## 2020-06-03 PROCEDURE — 97116 GAIT TRAINING THERAPY: CPT | Mod: GP | Performed by: PHYSICAL THERAPIST

## 2020-06-03 PROCEDURE — 97161 PT EVAL LOW COMPLEX 20 MIN: CPT | Mod: GP | Performed by: PHYSICAL THERAPIST

## 2020-06-03 NOTE — PROGRESS NOTES
"   06/03/20 1300   General Information   Start of Care Date 06/03/20   Referring Physician DR Dorsey   Orders Evaluate and Treat as Indicated   Additional Orders needs ot increase overal status before surgery   Order Date 05/28/20   Medical Diagnosis malignant neoplasm right L/E, physical deconditioning   Special Instructions Had heel surgery in Feb for melanoma, then 3/4/2020 flap surgery an 4/21 reconstruction surgery wtih skin graft (from right thigh)   Surgical/Medical history reviewed Yes  (depression, neck pain, concussion 2018)   Pertinent history of current problem (include personal factors and/or comorbidities that impact the POC) Subjective: feels wiped out completely. Has been since Feb. I didnt think I could get anymore tired but I have. Stomach is not back to normal. Went to PT for arm/leg strengthening in the fall and it helped (doing some of those exs now).When I am walking on foot my quad/thigh is so tight and it goes up into my low back/buttock on right. I had back trouble before that. Left ankle was hurting for awhile with all the wt on it. Wears CPAP machine, wake up just as tired. Hasnt walked on steps since Feb \"I dont know how\"   Pertinent Visual History  glasses   Prior level of function comment I was moving alot in the house prior to all of this. Seeing [employer] and [employer] club monthly. Out of house > 3 days a week prior. [address].    Previous/Current Treatment Other   Other treatment home care PT 3 visits that she did not find helpful but it does sound like he recommends her bathroom equipment and the walker. OT came twice for home care.    Current Community Support Family/friend caregiver  (dtr, son close by.  alisson Slater)   Patient role/Employment history Retired   Living environment House/[facility]  (2 level)   Home/Community Accessibility Comments Tori is on the first floor for her. Her computer was upstairs before all of this so she did steps but now its on main level. I cant walk " steps. Hasnt done steps since this all started. Garage is NOT attached. threshold to get in/out of house.    Current Assistive Devices Front Wheeled Walker;Manual Wheel Chair  (Crowdx sultana manual w/c. )   Assistive Devices Comments using 2WW in the house for mobility. Only uses w/c to sit for meals/crafts.Left hospital with crutches (??in FEb??) and it was a nightmare.   Patient/Family Goals Statement I want to get well, I want to walk without a walker.    General Information Comments Has to be careful with right heel.    Fall Risk Screen   Fall screen completed by PT   Have you fallen 2 or more times in the past year? No   Have you fallen and had an injury in the past year? No   Is patient a fall risk? Yes   Fall screen comments very careful   Abuse Screen (yes response referral indicated)   Feels Unsafe at Home or Work/School no   Feels Threatened by Someone no   Does Anyone Try to Keep You From Having Contact with Others or Doing Things Outside Your Home? no   Physical Signs of Abuse Present no   System Outcome Measures   FACIT Fatigue Subscale (score out of 52). The higher the score, the better the QOL. 11   Pain   Patient currently in pain Yes   Pain location right leg/foot   Pain rating 5  (scale of 0-10, foot hurts worse when walking)   Pain description   (tightness)   Pain description comment Leg does not hurt in bed. lays on back with pillow under her knee to sleep or on her side.   Pain comments takes some tylenol occasionally for pain. Does not want to take narcotics again.   Integumentary   Integumentary Comments she is wearing a sock over right foot and NO SHOE, more comfortable per pt. Achilles indent from surgery and asymmetrical heel   Range of Motion (ROM)   ROM Comment Surprisingly she could lay supine on bed with one pillow under length of right leg with no increased right leg complaints. She couls sit in w/c and flex right knww > 120 without much complaint then either. She did supine hamstring  stretch in 90/90 position without complaints. LImited ankle AROM to < neutral for DF in supine and seated.    Transfer Skills   Transfer Comments She transfered from ex mat to w/c with no wt on her right foot. She hopped with 2WW. Cues to put foot down.   Locomotion   Wheel Chair Mobility Comments she is in aloaner w/c that is heavy/steel. She has to weargloves to propel it and gets very fatigued.    Stairs   Self Performance Stand by assist   Physical/Nonphysical Assist: Stairs Set-up or supervision only   Rails 2 rails   Indicate number of stairs 4  (tandem pattern, cues, wt bears on arms a lot)   Gait Special Tests 25 Foot Timed Walk   Seconds 22.75  (23.56)   Steps 31 Steps  (29)   Comments 2WW, Decreased step length on left-heel is not on the ground, Wt bears on arms a fair amount. NO SHOE on right foot.    Clinical Impression   Criteria for Skilled Therapeutic Interventions Met yes, treatment indicated   PT Diagnosis debility, deconditioned s/p right leg/heel melanoma surgeries   Influenced by the following impairments fatigue, weakness, dynamic postural control, right leg tightness sensation, right heel pain, and decreased activity tolerance   Functional limitations due to impairments affects ADLS/IADLS, household/community mobility and recreational activities   Clinical Presentation Stable/Uncomplicated   Clinical Presentation Rationale medical history (still having treatment, possible further surgery), impairments, gait speed/quality/tolerance, ROM at ankle and clinical judgement. Plus FACIT.   Clinical Decision Making (Complexity) Low complexity   Therapy Frequency 2 times/Week   Predicted Duration of Therapy Intervention (days/wks) 3 months   Risk & Benefits of therapy have been explained Yes   Patient, Family & other staff in agreement with plan of care Yes   Clinical Impression Comments Fatigue is biggest problem. Today in clinic she could do much more than she thinks she can. I think she will do well  in PT/OT but it will take time for her to feel like she is moving better and has more energy. We will need to figure out about shoewear on right foot.    Goal 1   Goal Identifier gait   Goal Description Improve on gait speed/quality to walk 25 ft with AD in < 15 seconds and <20 steps consistently for household mobility   Target Date 20   Goal 2   Goal Identifier stairs   Goal Description she will be able to go up/down 10-12 steps with one railing and one AD with SBA consistently   Target Date 20   Goal 3   Goal Identifier FACIT    Goal Description Improve on facit from  to  (self rating) for improved energy for ADLs   Target Date 20   Goal 4   Goal Identifier 6MWT   Goal Description She will be able to complete 6MWT with 4WW at >8 m/s for community mobility   Target Date 20   Goal 5   Goal Identifier w/c   Goal Description She will be able to identify the features she prefers in a w/c and be able to propel that w/c > 100 feet IND at 1.0 m/s   Target Date 20   Total Evaluation Time   PT Guero, Low Complexity Minutes (62053) 35   Tere Hough DPT, MPT, NCS  Physical Therapist   Board Certified Neurologic Clinical Specialist     [facility]   [address] [place] [address]   [email or web address]  [email or web address]   [phone]   Clinic: [phone] //   Fax: [phone]

## 2020-06-05 ENCOUNTER — TELEPHONE (OUTPATIENT)
Dept: PLASTIC SURGERY | Facility: CLINIC | Age: 75
End: 2020-06-05

## 2020-06-05 ENCOUNTER — TELEPHONE (OUTPATIENT)
Dept: SURGERY | Facility: CLINIC | Age: 75
End: 2020-06-05

## 2020-06-05 NOTE — TELEPHONE ENCOUNTER
6/5/2020, 1:53 PM    Spoke with patient. She notes one leftover staple in her foot she would like removed. Offered earlier apt next week w/ Carola or Dr. Crabtree. Pt prefers to keep her appt on 6/17 w/ Dr. Crabtree @ [facility] and we can remove staple then. Pt agreeable.    Arnulfo Marrero, NREMT

## 2020-06-05 NOTE — TELEPHONE ENCOUNTER
[facility] Call Center    Phone Message    May a detailed message be left on voicemail: yes     Reason for Call: Symptoms or Concerns     If patient has red-flag symptoms, warm transfer to triage line    Current symptom or concern: Pt discovered she still has stapes and partial staples in the outside of her foot that need to be removed.  Pt requests someone other that Carola remove them for her please    Symptoms have been present for:  2 month(s)    Has patient previously been seen for this? No    By Hao aaron MD    Date: 4/2/2020    Are there any new or worsening symptoms? Yes: discomfort      Action Taken: Message routed to:  [facility] surgery    Travel Screening: Not Applicable

## 2020-06-08 NOTE — TELEPHONE ENCOUNTER
Call and spoke to patient, she said she has pain on her right foot where her flap surgery was done. Pain is intermittent, when she doesn't have pain, she is able to put pressure on the foot, but sometimes the pain shoots up her leg and into her buttocks. When she is laying in bed with her foot elevated, there is no pain. Patient is currently taking Tylenol PRN and doing PT. Advise patient to proceed as tolerated with applying pressure to her right foot. Continue Tylenol PRN, advise to take tylenol before going to PT. Patient does not want to move up appointment. She will call us if pain gets worst or if she has any other questions.      Koko Brock, TIM

## 2020-06-08 NOTE — TELEPHONE ENCOUNTER
Pt says she started Physical Therapy but is in great pain.  She believes walking on it is making it worse.  There is pain in her ankle and all up her leg as well.    Pt would like to know if she should continue walking as Dr Crabtree had said was ok or if it would be best to stop until this is dealt with.  She has her next Physical Therapy session tomorrow.  Please call back before that if possible

## 2020-06-09 ENCOUNTER — HOSPITAL ENCOUNTER (OUTPATIENT)
Dept: PHYSICAL THERAPY | Facility: CLINIC | Age: 75
Setting detail: THERAPIES SERIES
End: 2020-06-09
Attending: INTERNAL MEDICINE
Payer: COMMERCIAL

## 2020-06-09 PROCEDURE — 97110 THERAPEUTIC EXERCISES: CPT | Mod: GP | Performed by: PHYSICAL THERAPIST

## 2020-06-09 PROCEDURE — 97116 GAIT TRAINING THERAPY: CPT | Mod: GP | Performed by: PHYSICAL THERAPIST

## 2020-06-11 ENCOUNTER — OFFICE VISIT (OUTPATIENT)
Dept: PEDIATRICS | Facility: CLINIC | Age: 75
End: 2020-06-11
Payer: COMMERCIAL

## 2020-06-11 VITALS
TEMPERATURE: 98.8 F | SYSTOLIC BLOOD PRESSURE: 120 MMHG | DIASTOLIC BLOOD PRESSURE: 70 MMHG | HEART RATE: 93 BPM | BODY MASS INDEX: 22.09 KG/M2 | WEIGHT: 141.1 LBS | OXYGEN SATURATION: 96 %

## 2020-06-11 DIAGNOSIS — R39.9 UTI SYMPTOMS: Primary | ICD-10-CM

## 2020-06-11 DIAGNOSIS — N89.8 VAGINAL ODOR: ICD-10-CM

## 2020-06-11 DIAGNOSIS — H04.123 DRY EYES: ICD-10-CM

## 2020-06-11 LAB
ALBUMIN UR-MCNC: NEGATIVE MG/DL
APPEARANCE UR: CLEAR
BILIRUB UR QL STRIP: NEGATIVE
COLOR UR AUTO: ABNORMAL
GLUCOSE UR STRIP-MCNC: NEGATIVE MG/DL
HGB UR QL STRIP: NEGATIVE
KETONES UR STRIP-MCNC: NEGATIVE MG/DL
LEUKOCYTE ESTERASE UR QL STRIP: NEGATIVE
NITRATE UR QL: NEGATIVE
NON-SQ EPI CELLS #/AREA URNS LPF: ABNORMAL /LPF
PH UR STRIP: 7.5 PH (ref 5–7)
RBC #/AREA URNS AUTO: ABNORMAL /HPF
SOURCE: ABNORMAL
SP GR UR STRIP: 1 (ref 1–1.03)
SPECIMEN SOURCE: ABNORMAL
UROBILINOGEN UR STRIP-MCNC: NORMAL MG/DL (ref 0–2)
WBC #/AREA URNS AUTO: ABNORMAL /HPF
WET PREP SPEC: ABNORMAL

## 2020-06-11 PROCEDURE — 81001 URINALYSIS AUTO W/SCOPE: CPT | Performed by: NURSE PRACTITIONER

## 2020-06-11 PROCEDURE — 99213 OFFICE O/P EST LOW 20 MIN: CPT | Performed by: NURSE PRACTITIONER

## 2020-06-11 PROCEDURE — 87210 SMEAR WET MOUNT SALINE/INK: CPT | Performed by: NURSE PRACTITIONER

## 2020-06-11 RX ORDER — FLUCONAZOLE 150 MG/1
150 TABLET ORAL ONCE
Qty: 1 TABLET | Refills: 0 | Status: SHIPPED | OUTPATIENT
Start: 2020-06-11 | End: 2020-06-24

## 2020-06-11 NOTE — PATIENT INSTRUCTIONS
PLAN:   1.   Symptomatic therapy suggested: will send in a script for Diflucan  2.  Orders Placed This Encounter   Medications     fluconazole (DIFLUCAN) 150 MG tablet     Sig: Take 1 tablet (150 mg) by mouth once for 1 dose     Dispense:  1 tablet     Refill:  0     Orders Placed This Encounter   Procedures     UA with Microscopic reflex to Culture     OPHTHALMOLOGY ADULT REFERRAL       3. Patient needs to follow up in if no improvement,or sooner if worsening of symptoms or other symptoms develop.  CONSULTATION/REFERRAL to Ophthalmology  Please call [phone] to make appointment  if you do not hear from referrals in the next few days.

## 2020-06-11 NOTE — PROGRESS NOTES
Subjective     Yadira Hoang is a 75 year old female who presents to clinic today for the following health issues:    HPI     1. Having some constipation x 3 months after her surgery. Notes might be th narcotics causing her constipation. Itching in the eye lid and armpits. Has been trying miralax and does not seem to work.  Having an issue with external hemorrhoid and using Tucks   Was taking it Miralax but only intermittently now    URINARY TRACT SYMPTOMS  Onset: 1 month    Description:   Painful urination (Dysuria): YES           Frequency: YES  Blood in urine (Hematuria): no   Delay in urine (Hesitency): no     Intensity: moderate    Progression of Symptoms:  worsening    Accompanying Signs & Symptoms:  Fever/chills: no   Flank pain no   Nausea and vomiting: no   Any vaginal symptoms: vaginal odor  Abdominal/Pelvic Pain: no     History:   History of frequent UTI's: no   History of kidney stones: no   Sexually Active: no   Possibility of pregnancy: No    Precipitating factors:   none    Therapies Tried and outcome: monistat, increased fluid intake      Patient Active Problem List   Diagnosis     Malignant melanoma of right lower extremity including hip (H)     Melanoma of skin (H)     GUERLINE on CPAP     Chronic congestion of paranasal sinus     Weakness of foot     Neck pain, chronic     Myofascial pain     Intractable migraine without aura and without status migrainosus     History of multiple concussions     Foot pain, bilateral     Fibromyositis     Difficulty walking     Chronic pain disorder     Chronic fatigue     Bilateral occipital neuralgia     Articular disc disorder of temporomandibular joint     S/P flap graft     Osteopenia     Injury of head     Hyperlipidemia     ALEXUS (generalized anxiety disorder)     Major depression, recurrent, chronic (H)     Past Surgical History:   Procedure Laterality Date     BIOPSY NODE SENTINEL Right 2/10/2020    Procedure: Stanton Lymph Node Mapping And Biopsy;   Surgeon: Gabriela Dorsey MD;  Location: [facility] OR     EXCISE LESION LOWER EXTREMITY Right 2/10/2020    Procedure: Wide Local Excision of RIGHT heel melanoma;  Surgeon: Gabriela Dorsey MD;  Location: [facility] OR     EXCISE LESION LOWER EXTREMITY Right 2/20/2020    Procedure: Wide local Re-excision RIGHT heel Wound vac application;  Surgeon: Gabriela Dorsey MD;  Location: [facility] OR     GRAFT SKIN SPLIT THICKNESS FROM EXTREMITY Right 4/2/2020    Procedure: split skin graft from right thigh;  Surgeon: NIKA Crabtree MD;  Location: [facility] OR     IRRIGATION AND DEBRIDEMENT FOOT, COMBINED Right 4/2/2020    Procedure: right foot/heel debridement;  Surgeon: NIKA Crabtree MD;  Location: [facility] OR     ORTHOPEDIC SURGERY Right     heel surgery x2     RECONSTRUCT FOOT Right 3/4/2020    Procedure: Right heel reconstruction with regional flap, biologic dressing and SPY;  Surgeon: NIKA Crabtree MD;  Location: [facility] OR     TUBAL LIGATION         Social History     Tobacco Use     Smoking status: Never Smoker     Smokeless tobacco: Never Used   Substance Use Topics     Alcohol use: Not Currently     Frequency: Never     History reviewed. No pertinent family history.      Current Outpatient Medications   Medication Sig Dispense Refill     acetaminophen (TYLENOL) 325 MG tablet Take 2 tablets (650 mg) by mouth every 4 hours as needed for mild pain 50 tablet 0     cholecalciferol (VITAMIN D3) 125 MCG (5000 UT) TABS tablet Take 2,000 Units by mouth every other day        polyethylene glycol (MIRALAX) powder Take 17 g by mouth       estradiol (ESTRACE) 0.1 MG/GM vaginal cream Place 1 g vaginally twice a week 42.5 g 1     hydrocortisone, Perianal, (HYDROCORTISONE) 2.5 % cream Place rectally 2 times daily as needed for hemorrhoids 1 Tube 1     LORazepam (ATIVAN) 0.5 MG tablet Take 1 tablet (0.5 mg) by mouth every 4 hours as needed (Anxiety, Nausea/Vomiting or Sleep) (Patient not taking: Reported on 5/27/2020) 30 tablet 3      prochlorperazine (COMPAZINE) 10 MG tablet Take 1 tablet (10 mg) by mouth every 6 hours as needed (Nausea/Vomiting) (Patient not taking: Reported on 5/27/2020) 30 tablet 3     Allergies   Allergen Reactions     Atorvastatin      Statins all swelling     Hmg-Coa-R Inhibitors Swelling     BP Readings from Last 3 Encounters:   06/11/20 120/70   05/27/20 121/73   05/07/20 132/73    Wt Readings from Last 3 Encounters:   06/11/20 64 kg (141 lb 1.6 oz)   05/28/20 61.9 kg (136 lb 8 oz)   05/27/20 64.4 kg (142 lb)           Reviewed and updated as needed this visit by Provider         Review of Systems   CONSTITUTIONAL:NEGATIVE for fever, chills, change in weight  INTEGUMENTARY/SKIN: NEGATIVE for rash   RESP:NEGATIVE for significant cough or SOB  CV: NEGATIVE for chest pain, palpitations or peripheral edema  GI: POSITIVE for constipation and hemorrhoids and NEGATIVE for melena, nausea, poor appetite, vomiting and weight loss  : vaginal odor   MUSCULOSKELETAL: Is here via wheelchair due to surgery on her right foot   NEURO: NEGATIVE for weakness, dizziness or paresthesias  ENDOCRINE: NEGATIVE for temperature intolerance, skin/hair changes      Objective    /70 (BP Location: Right arm, Patient Position: Sitting, Cuff Size: Adult Regular)   Pulse 93   Temp 98.8  F (37.1  C) (Temporal)   Wt 64 kg (141 lb 1.6 oz)   SpO2 96%   Breastfeeding No   BMI 22.09 kg/m    Body mass index is 22.09 kg/m .  Physical Exam   GENERAL APPEARANCE: alert, active and no distress  NECK: no adenopathy  RESP: lungs clear to auscultation - no rales, rhonchi or wheezes  CV: regular rates and rhythm and no murmur, click or rub  ABDOMEN: soft, nontender, without hepatosplenomegaly or masses and bowel sounds normal   (female): negative findings:  external genitalia normal, cervix- no lesions, no cervical motion tenderness, adnexae- no masses, non-tender, Bartholin's glands, urethra, Startex's glands negative, positive findings:  vaginal  discharge - scant, vaginal mucosa atrophy  MS: Is here via wheelchair due to surgery on her right foot   SKIN: no suspicious lesions or rashes  NEURO: Normal strength and tone, mentation intact and speech normal  PSYCH: mentation appears normal and affect normal/bright    Diagnostic Test Results:  Labs reviewed in Epic  Results for orders placed or performed in visit on 06/11/20   UA with Microscopic reflex to Culture     Status: Abnormal    Specimen: Midstream Urine   Result Value Ref Range    Color Urine Straw     Appearance Urine Clear     Glucose Urine Negative NEG^Negative mg/dL    Bilirubin Urine Negative NEG^Negative    Ketones Urine Negative NEG^Negative mg/dL    Specific Gravity Urine 1.004 1.003 - 1.035    Blood Urine Negative NEG^Negative    pH Urine 7.5 (H) 5.0 - 7.0 pH    Protein Albumin Urine Negative NEG^Negative mg/dL    Urobilinogen mg/dL Normal 0.0 - 2.0 mg/dL    Nitrite Urine Negative NEG^Negative    Leukocyte Esterase Urine Negative NEG^Negative    Source Midstream Urine     WBC Urine 0 - 5 OTO5^0 - 5 /HPF    RBC Urine O - 2 OTO2^O - 2 /HPF    Squamous Epithelial /LPF Urine Few FEW^Few /LPF   Wet prep     Status: Abnormal    Specimen: Vagina   Result Value Ref Range    Specimen Description Vagina     Wet Prep No Trichomonas seen     Wet Prep No clue cells seen     Wet Prep Moderate  Yeast seen   (A)     Wet Prep Moderate  WBC'S seen              Assessment & Plan     Yadira Ochoa was seen today for uti.    Diagnoses and all orders for this visit:    UTI symptoms  -     UA with Microscopic reflex to Culture    Vaginal odor  -     Wet prep  -     fluconazole (DIFLUCAN) 150 MG tablet; Take 1 tablet (150 mg) by mouth once for 1 dose    Dry eyes  -     OPHTHALMOLOGY ADULT REFERRAL    See Patient Instructions  Patient Instructions     PLAN:   1.   Symptomatic therapy suggested: will send in a script for Diflucan  2.  Orders Placed This Encounter   Medications     fluconazole (DIFLUCAN) 150 MG tablet      Sig: Take 1 tablet (150 mg) by mouth once for 1 dose     Dispense:  1 tablet     Refill:  0     Orders Placed This Encounter   Procedures     UA with Microscopic reflex to Culture     OPHTHALMOLOGY ADULT REFERRAL       3. Patient needs to follow up in if no improvement,or sooner if worsening of symptoms or other symptoms develop.  CONSULTATION/REFERRAL to Ophthalmology  Please call [phone] to make appointment  if you do not hear from referrals in the next few days.         No follow-ups on file.    YVONNE Moffett CNP  [facility]

## 2020-06-12 ENCOUNTER — TELEPHONE (OUTPATIENT)
Dept: SURGERY | Facility: CLINIC | Age: 75
End: 2020-06-12

## 2020-06-12 ENCOUNTER — PATIENT OUTREACH (OUTPATIENT)
Dept: PLASTIC SURGERY | Facility: CLINIC | Age: 75
End: 2020-06-12

## 2020-06-12 ENCOUNTER — TELEPHONE (OUTPATIENT)
Dept: PEDIATRICS | Facility: CLINIC | Age: 75
End: 2020-06-12

## 2020-06-12 NOTE — TELEPHONE ENCOUNTER
[facility] Call Center    Phone Message    May a detailed message be left on voicemail: yes     Reason for Call: Requesting Results   Name/type of test: Urine Culture  Date of test: 6/11/2020  Was test done at a location other than [facility] (Please fill in the location if not [facility])?: No      Action Taken: Message routed to:  Primary Care p [phone]    Travel Screening: Not Applicable

## 2020-06-12 NOTE — PATIENT INSTRUCTIONS
Left message for pt regarding upcoming appt on 6/17/20 at 9:45am. Apologized for the inconvenience and explained that Dr. Crabtree is needed for in the OR at this time. Appt has been rescheduled for 8:45am. Provided direct contact information and requested pt call back to confirm new appt time.REMY Pitts  Pt left VM confirming new appt time. Contacted pt and confirmed new appt time. REMY Pitts

## 2020-06-12 NOTE — TELEPHONE ENCOUNTER
Per Dr. Dorsey's request, patient was called to schedule appointment after 8/21. Dr. Dorsey's next clinic availability is 9/14/20 at 4:30pm. This date ok'd by Dr. Dorsey and patient scheduled.    Marion Bonilla LPN

## 2020-06-12 NOTE — RESULT ENCOUNTER NOTE
Koffi Hoang,    Attached are your test results.  -Yeast vaginal infection test is POSITIVE.    ADVISE: treatment Diflucan 150 mg tablet for 1 dose and a prescription has been sent to your pharmacy  -Bacterial vaginal infection test is normal - no signs of a bacterial infection.  -Trichomonas vaginal infection test is normal - no signs of a trichomonas infection.   Please contact us if you have any questions.    Marisol Duarte, CNP

## 2020-06-12 NOTE — TELEPHONE ENCOUNTER
Patient contacted, informed that UA showed no sign of infection, so no culture done.  Discussed Diflucan prescribed for Yeast infection.  Patient took the dose this morning.  Advised if symptoms do not resolve over the weekend to return call to clinic.    Irais Wiggins RN

## 2020-06-15 ENCOUNTER — HOSPITAL ENCOUNTER (OUTPATIENT)
Dept: PHYSICAL THERAPY | Facility: CLINIC | Age: 75
Setting detail: THERAPIES SERIES
End: 2020-06-15
Attending: INTERNAL MEDICINE
Payer: COMMERCIAL

## 2020-06-15 PROCEDURE — 97116 GAIT TRAINING THERAPY: CPT | Mod: GP | Performed by: PHYSICAL THERAPIST

## 2020-06-15 PROCEDURE — 97110 THERAPEUTIC EXERCISES: CPT | Mod: GP | Performed by: PHYSICAL THERAPIST

## 2020-06-15 NOTE — IP AVS SNAPSHOT
MRN:[id number]                      After Visit Summary   6/15/2020    Yadira Hoang    MRN: [id number]           Visit Information        Provider Department      6/15/2020  8:30 AM Halina Hough PT [facility]        Your next 10 appointments already scheduled    Jun 17, 2020  8:45 AM CDT  (Arrive by 8:30 AM)  Return Plastic Surgery with NIKA Crabrtee MD  [facility] ([facility]) [address]  4th Floor  [facility] [address] [phone]   Do not wear perfume.     Jun 18, 2020  9:45 AM CDT  Cancer Rehab Treatment with [facility] ([facility] [address] [facility] [address]  [phone]      Jun 22, 2020  8:45 AM CDT  Cancer Rehab Treatment with Halina Hough PT  [facility] ([facility]) [address] [facility] [address]  [phone]      Jun 22, 2020  9:45 AM CDT  Cancer Rehab Eval with Cathleen Walton OT  [facility] ([facility]) [address] [facility] [address]  [phone]      Jun 24, 2020  2:00 PM CDT  LAB with LAB ONC [facility] ([facility]) [address] [facility] [address]  [phone]   Please do not eat 10-12 hours before your appointment if you are coming in fasting for labs on lipids, cholesterol, or glucose (sugar). Does not apply to pregnant women. Water, tea and black coffee (with nothing added) is okay. Do not drink other fluids, diet soda or gum. If you have concerns about taking your medications, please send a message by clicking on Secure Messaging, Message Your Care Team.     Jun 24, 2020  2:30 PM CDT  Level 1 with BAY 7 [facility] ([facility]) [address] [facility]  [address]  [phone]      Jun 29, 2020  8:30 AM CDT  Cancer Rehab Treatment with Halina Hough PT  [facility] ([facility] [address] [facility] [address]  [phone]      Jul 02, 2020  9:30 AM CDT  Cancer Rehab Treatment with Halina Hough PT  [facility] [place] [facility] ([facility] [address] [facility] [address]  [phone]      Jul 22, 2020  2:00 PM CDT  LAB with LAB ONC [facility] ([facility]) [address] [facility] [address]  [phone]   Please do not eat 10-12 hours before your appointment if you are coming in fasting for labs on lipids, cholesterol, or glucose (sugar). Does not apply to pregnant women. Water, tea and black coffee (with nothing added) is okay. Do not drink other fluids, diet soda or gum. If you have concerns about taking your medications, please send a message by clicking on Secure Messaging, Message Your Care Team.     Jul 22, 2020  2:30 PM CDT  Level 1 with [address] ([facility]) [address] [place] [address] Avenue [facility] [address]  [phone]           Further instructions from your care team       When adjusting the ht of the new walker. When you are standing and your arm is hanging down at your side the handle should be at your wrist.     Your elbow should be slightly bent when you are hanging onto the walker, shoulder should be relaxed (not elevated).    We will set up an appt with an orthotist (shoe/foot specialist) to come to an appt with me, in Physical Therapy. Bring your foot orthotics for both feet to an appt.     I think you need to be wearing a shoe that is open in the back.     Tere Hough DPT, MPT, NCS  Physical Therapist   Board Certified Neurologic Clinical Specialist     [facility]   [address]   [place]  [place] [address]   [email or web address]   [phone]   Clinic: [phone] //   Fax: [phone]    MyChart Information    [employer] gives you secure access to your electronic health record. If you see a primary care provider, you can also send messages to your care team and make appointments. If you have questions, please call your primary care clinic.  If you do not have a primary care provider, please call [phone] and they will assist you.       Care EveryWhere ID    This is your Care EveryWhere ID. This could be used by other organizations to access your [facility] medical records  [id number]       Equal Access to Services    BRIAN MORLEY : Rafaela Sharp, cirilo jewell, nick fernández, curtis serna. So [facility] [phone].    ATENCIÓN: Si habla español, tiene a oneal disposición servicios gratuitos de asistencia lingüística. Llame al [phone].    We comply with applicable federal and state civil rights laws, including the [place] Human Rights Act. We do not discriminate on the basis of race, color, creed, Alevism, national origin, marital status, age, disability, sex, sexual orientation, or gender identity.

## 2020-06-15 NOTE — DISCHARGE INSTRUCTIONS
When adjusting the ht of the new walker. When you are standing and your arm is hanging down at your side the handle should be at your wrist.     Your elbow should be slightly bent when you are hanging onto the walker, shoulder should be relaxed (not elevated).    We will set up an appt with an orthotist (shoe/foot specialist) to come to an appt with me, in Physical Therapy. Bring your foot orthotics for both feet to an appt.     I think you need to be wearing a shoe that is open in the back.     Tere Hough DPT, MPT, NCS  Physical Therapist   Board Certified Neurologic Clinical Specialist     [facility]   [address] [place] [address]   [email or web address]  [email or web address]   [phone]   Clinic: [phone] //   Fax: [phone]

## 2020-06-17 ENCOUNTER — OFFICE VISIT (OUTPATIENT)
Dept: PLASTIC SURGERY | Facility: CLINIC | Age: 75
End: 2020-06-17
Payer: COMMERCIAL

## 2020-06-17 VITALS
BODY MASS INDEX: 22.1 KG/M2 | SYSTOLIC BLOOD PRESSURE: 108 MMHG | TEMPERATURE: 99 F | DIASTOLIC BLOOD PRESSURE: 69 MMHG | HEIGHT: 67 IN | HEART RATE: 90 BPM | OXYGEN SATURATION: 95 %

## 2020-06-17 DIAGNOSIS — Z98.890 S/P FLAP GRAFT: Primary | ICD-10-CM

## 2020-06-17 RX ORDER — FLUCONAZOLE 150 MG/1
TABLET ORAL
COMMUNITY
Start: 2020-06-11 | End: 2020-06-18

## 2020-06-17 ASSESSMENT — PAIN SCALES - GENERAL: PAINLEVEL: MILD PAIN (3)

## 2020-06-17 NOTE — PROGRESS NOTES
PRESENTING COMPLAINT:  Postoperative visit, status post right foot reconstruction with medial plantar flap and skin graft done on 04/02/2020.      HISTORY OF PRESENTING COMPLAINT:  Ms. Hoang is 75 years old, well known to my service.  She is here for a regular postoperative visit.  She is completely healed, ambulating, minimal pain.      PHYSICAL EXAMINATION:  Vital signs are stable.  She is afebrile, in no obvious distress.  Everything is healing in well.  Everything looks good.      ASSESSMENT AND PLAN:  Based upon the above findings, a diagnosis of right foot reconstruction with a flap and graft was made.  I advised aggressive moisturization.  Continue to walk, continue to use a JD stocking, and I am available if anything changes.  All questions were answered.  She was happy with the visit.

## 2020-06-17 NOTE — NURSING NOTE
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    Arnulfo Marrero, EMT    "

## 2020-06-17 NOTE — LETTER
6/17/2020       RE: Yadira Hoang  [address] [place] [facility] [address]     Dear Colleague,    Thank you for referring your patient, Yadira Hoang, to the [facility] RECON[facility]GERY at [facility]. Please see a copy of my visit note below.    PRESENTING COMPLAINT:  Postoperative visit, status post right foot reconstruction with medial plantar flap and skin graft done on 04/02/2020.      HISTORY OF PRESENTING COMPLAINT:  Ms. Hoang is 75 years old, well known to my service.  She is here for a regular postoperative visit.  She is completely healed, ambulating, minimal pain.      PHYSICAL EXAMINATION:  Vital signs are stable.  She is afebrile, in no obvious distress.  Everything is healing in well.  Everything looks good.      ASSESSMENT AND PLAN:  Based upon the above findings, a diagnosis of right foot reconstruction with a flap and graft was made.  I advised aggressive moisturization.  Continue to walk, continue to use a JD stocking, and I am available if anything changes.  All questions were answered.  She was happy with the visit.

## 2020-06-18 ENCOUNTER — TELEPHONE (OUTPATIENT)
Dept: PEDIATRICS | Facility: CLINIC | Age: 75
End: 2020-06-18

## 2020-06-18 ENCOUNTER — HOSPITAL ENCOUNTER (OUTPATIENT)
Dept: PHYSICAL THERAPY | Facility: CLINIC | Age: 75
Setting detail: THERAPIES SERIES
End: 2020-06-18
Attending: INTERNAL MEDICINE
Payer: COMMERCIAL

## 2020-06-18 DIAGNOSIS — K64.4 EXTERNAL HEMORRHOIDS: ICD-10-CM

## 2020-06-18 DIAGNOSIS — C43.9 MELANOMA OF SKIN (H): ICD-10-CM

## 2020-06-18 DIAGNOSIS — C43.71 MALIGNANT MELANOMA OF RIGHT LOWER EXTREMITY INCLUDING HIP (H): Primary | ICD-10-CM

## 2020-06-18 DIAGNOSIS — B37.31 YEAST INFECTION OF THE VAGINA: Primary | ICD-10-CM

## 2020-06-18 DIAGNOSIS — N95.2 ATROPHIC VAGINITIS: ICD-10-CM

## 2020-06-18 PROCEDURE — 97110 THERAPEUTIC EXERCISES: CPT | Mod: GP | Performed by: PHYSICAL THERAPIST

## 2020-06-18 PROCEDURE — 97116 GAIT TRAINING THERAPY: CPT | Mod: GP | Performed by: PHYSICAL THERAPIST

## 2020-06-18 RX ORDER — DIPHENHYDRAMINE HYDROCHLORIDE 50 MG/ML
50 INJECTION INTRAMUSCULAR; INTRAVENOUS
Status: CANCELLED
Start: 2020-06-24

## 2020-06-18 RX ORDER — EPINEPHRINE 1 MG/ML
0.3 INJECTION, SOLUTION INTRAMUSCULAR; SUBCUTANEOUS EVERY 5 MIN PRN
Status: CANCELLED | OUTPATIENT
Start: 2020-06-24

## 2020-06-18 RX ORDER — SODIUM CHLORIDE 9 MG/ML
1000 INJECTION, SOLUTION INTRAVENOUS CONTINUOUS PRN
Status: CANCELLED
Start: 2020-06-24

## 2020-06-18 RX ORDER — ALBUTEROL SULFATE 0.83 MG/ML
2.5 SOLUTION RESPIRATORY (INHALATION)
Status: CANCELLED | OUTPATIENT
Start: 2020-06-24

## 2020-06-18 RX ORDER — HEPARIN SODIUM,PORCINE 10 UNIT/ML
5 VIAL (ML) INTRAVENOUS
Status: CANCELLED | OUTPATIENT
Start: 2020-06-24

## 2020-06-18 RX ORDER — NALOXONE HYDROCHLORIDE 0.4 MG/ML
.1-.4 INJECTION, SOLUTION INTRAMUSCULAR; INTRAVENOUS; SUBCUTANEOUS
Status: CANCELLED | OUTPATIENT
Start: 2020-06-24

## 2020-06-18 RX ORDER — MEPERIDINE HYDROCHLORIDE 25 MG/ML
25 INJECTION INTRAMUSCULAR; INTRAVENOUS; SUBCUTANEOUS EVERY 30 MIN PRN
Status: CANCELLED | OUTPATIENT
Start: 2020-06-24

## 2020-06-18 RX ORDER — METHYLPREDNISOLONE SODIUM SUCCINATE 125 MG/2ML
125 INJECTION, POWDER, LYOPHILIZED, FOR SOLUTION INTRAMUSCULAR; INTRAVENOUS
Status: CANCELLED
Start: 2020-06-24

## 2020-06-18 RX ORDER — HEPARIN SODIUM (PORCINE) LOCK FLUSH IV SOLN 100 UNIT/ML 100 UNIT/ML
5 SOLUTION INTRAVENOUS
Status: CANCELLED | OUTPATIENT
Start: 2020-06-24

## 2020-06-18 RX ORDER — ALBUTEROL SULFATE 90 UG/1
1-2 AEROSOL, METERED RESPIRATORY (INHALATION)
Status: CANCELLED
Start: 2020-06-24

## 2020-06-18 RX ORDER — LORAZEPAM 2 MG/ML
0.5 INJECTION INTRAMUSCULAR EVERY 4 HOURS PRN
Status: CANCELLED
Start: 2020-06-24

## 2020-06-18 NOTE — TELEPHONE ENCOUNTER
[facility] Call [facility]    Phone Message    May a detailed message be left on voicemail: yes     Reason for Call: Symptoms or Concerns     Possible yeast infection - would like to have pill that she has taken before for these symptoms. Also has some other concerns she will speak with nurse about when called back.  She wants to  any meds during when she is in clinic for appts.     Action Taken: Message routed to:  Primary Care p [phone]    Travel Screening: Not Applicable

## 2020-06-18 NOTE — TELEPHONE ENCOUNTER
Attempted to reach patient, message left to return call.  Patient had appointment on site at 9:45 AM with PT.     Irais Wiggins RN

## 2020-06-18 NOTE — TELEPHONE ENCOUNTER
"Patient returned call, states that she is still having symptoms of yeast infection - still itchy and \"it doesn't feel right\".  States in the past, she has had to take more than one dose of Diflucan to resolve symptoms.  Wonders if she could get another dose to pharmacy?      Patient states she discussed a hemorrhoid with provder at visit and they had discussed a prescription cream, but thinks that it was forgotten.     Also, patient has used estradiol in the past.  She is nearly out of the cream.  She would like YVONNE Muhammad CNP to manage all of her prescriptions.      Please inform patient if/when these are sent.    Irais Wiggins RN   "

## 2020-06-19 RX ORDER — FLUCONAZOLE 150 MG/1
150 TABLET ORAL ONCE
Qty: 1 TABLET | Refills: 0 | Status: SHIPPED | OUTPATIENT
Start: 2020-06-19 | End: 2020-06-24

## 2020-06-19 RX ORDER — ESTRADIOL 0.1 MG/G
1 CREAM VAGINAL
Qty: 42.5 G | Refills: 1 | Status: SHIPPED | OUTPATIENT
Start: 2020-06-22 | End: 2020-09-16

## 2020-06-19 RX ORDER — HYDROCORTISONE 25 MG/G
CREAM TOPICAL 2 TIMES DAILY PRN
Qty: 1 TUBE | Refills: 1 | Status: SHIPPED | OUTPATIENT
Start: 2020-06-19 | End: 2020-07-14

## 2020-06-22 ENCOUNTER — HOSPITAL ENCOUNTER (OUTPATIENT)
Dept: OCCUPATIONAL THERAPY | Facility: CLINIC | Age: 75
Setting detail: THERAPIES SERIES
End: 2020-06-22
Attending: INTERNAL MEDICINE
Payer: COMMERCIAL

## 2020-06-22 ENCOUNTER — HOSPITAL ENCOUNTER (OUTPATIENT)
Dept: PHYSICAL THERAPY | Facility: CLINIC | Age: 75
Setting detail: THERAPIES SERIES
End: 2020-06-22
Attending: INTERNAL MEDICINE
Payer: COMMERCIAL

## 2020-06-22 PROCEDURE — 97110 THERAPEUTIC EXERCISES: CPT | Mod: GP | Performed by: PHYSICAL THERAPIST

## 2020-06-22 PROCEDURE — 97110 THERAPEUTIC EXERCISES: CPT | Mod: GO | Performed by: OCCUPATIONAL THERAPIST

## 2020-06-22 PROCEDURE — 97166 OT EVAL MOD COMPLEX 45 MIN: CPT | Mod: GO | Performed by: OCCUPATIONAL THERAPIST

## 2020-06-22 PROCEDURE — 97116 GAIT TRAINING THERAPY: CPT | Mod: GP | Performed by: PHYSICAL THERAPIST

## 2020-06-22 PROCEDURE — 97535 SELF CARE MNGMENT TRAINING: CPT | Mod: GO | Performed by: OCCUPATIONAL THERAPIST

## 2020-06-22 NOTE — PROGRESS NOTES
"   06/22/20 0959   Quick Adds   Type of Visit Initial Outpatient Occupational Therapy Evaluation       Present No   General Information   Start Of Care Date 06/22/20   Referring Physician Gabriela Dorsey MD   Orders Evaluate and treat as indicated   Other Orders \"needs to increase functional status before surgrey\"   Orders Date 05/27/20   Medical Diagnosis H/o melanoma of right heel, chronic pain and fibromyalgia.  Pt had excision of melanoma and right femoral LND 2/10/2020.  Pt has had 3 more surgeries since that time including a skin graft on right foot.  Pt presents with decreased endurance and fatigue.   Onset of Illness/Injury or Date of Surgery 02/10/20   Precautions/Limitations   (Pt reported no WB status)   Special Instructions Pt does not have a shoe to wear on right foot.  PT Is helping pt get shoe from orthotics   Surgical/Medical History Reviewed Yes   Additional Occupational Profile Info/Pertinent History of Current Problem Pt reported being very active prior to cancer diagnosis and surgery.  Pt stated she gets an infusion every 4 weeks which brings on great fatigue.   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[employer], puzzles, enjoyed walking   Current Living Environment House  (2 level, everything pt needs is on main level)   Primary Bathroom Location/Comments main level   Additional Bathroom Location/Comments bath bench and hand held shower head   Prior Level - Transfers Independent   Prior Level - Ambulation Independent   Prior Level - ADLS Independent   Prior Responsibilities - IADL Meal Preparation;Housekeeping;Laundry;Shopping;Yardwork;Medication management;Finances;Driving   Current Assistive Devices - Mobility Manual wheelchair  (4ww)   Role/Living Environment Comments Pt has assistance with " meal prep, laundry, housework, driving, shopping.  Pt IND iwth self cares.  Pt reported fatigue with propeling self in WC, going out of the house, bathing/showering   Patient/family Goals Statement to increase energy and activity level, improve strength in arms   Pain   Patient currently in pain No   System Outcome Measures   FACIT Fatigue Subscale (score out of 52). The higher the score, the better the QOL. 13  (only 2 points higher form 6/3/2020)   Cognitive Status Examination   Orientation Orientation to person, place and time   Level of Consciousness Alert   Follows Commands and Answers Questions 100% of the time   Cognitive Comment hard of hearing     Visual Perception   Visual Perception Wears glasses   Posture   Posture Not impaired   Range of Motion (ROM)   ROM Comments BUE AROM WNL   Strength   Strength Comments not formally tested, appears WFL but fatigues quickly   Hand Strength   Hand Dominance Right   Functional Mobility   Ambulation working with PT, walks with 4WW   Wheelchair manual WC-can propel self if needed   Transfer Skill   Level of [place]: Transfers independent   Bathing   Bathing Comments modified IND iwth bath bench and hand held shower, supervision with bathing tasks and holding grab bar   Upper Body Dressing   Level of [place]: Dress Upper Body independent   Lower Body Dressing   Level of [place]: Dress Lower Body independent   Toileting   Level of [place]: Toilet independent   Grooming   Level of [place]: Grooming independent   Eating/Self-Feeding   Level of [place]: Eating independent   Activity Tolerance   Activity Tolerance fair: FACIT score from 6/3/2020 was 11, today it is 13   Planned Therapy Interventions   Planned Therapy Interventions IADL training;Self care/Home management;Strengthening;Therapeutic activities   Intervention Comments energy management   Adult OT Eval Goals   OT Eval Goals (Adult) 1;2;3    OT Goal 1   Goal Identifier HEP   Goal  Description Pt will report adherence with home exercise program at 2 consecutive treatment sessions including arm exercises and calisthenic exercises to improve functional endurance for IADLs.     Target Date 08/10/20    OT Goal 2   Goal Identifier energy management   Goal Description Pt will demonstrate and/or verbalize understanding at least 3 new methods to manage her energy as indicated by at least a 15 point improvement in FACIT.    Target Date 08/10/20    OT Goal 3   Goal Identifier functional endurance   Goal Description Pt will demonstrate standing task for 15 minutes to demonstrate improved functional endurance needed for IADLs including laundry, meal prep, housework and community outings.    Target Date 08/10/20   Clinical Impression   Criteria for Skilled Therapeutic Interventions Met Yes, treatment indicated   OT Diagnosis decreased IADL performace   Influenced by the following impairments fatigue, decreased strength   Assessment of Occupational Performance 3-5 Performance Deficits   Identified Performance Deficits unable to stand to prep meals, unable to perform housework, unable to grocery shop, fatigue after outings for medical appointments   Clinical Decision Making (Complexity) Moderate complexity   Therapy Frequency 1x/wk   Predicted Duration of Therapy Intervention (days/wks) within 7 weeks (5 more txs requested)   Risks and Benefits of Treatment have been explained. Yes   Patient, Family & other staff in agreement with plan of care Yes   Clinical Impression Comments Pt presents with decreased IADL performance due to decreased strength and endurance.  This is negatively impacting pt's ability to meal prep, do laundry and housework as well as postponing pt's next surgery.  Pt will benefit from continued skilled OT intervention to address decreased functional endurance and strength.  Therapist and pt will work together to develop a home exercise program to improve strength and endurance for IADls,  prepare for surgery and to develop strategies to manage fluctuating energy levels.     Total Evaluation Time   OT Eval, Moderate Complexity Minutes (78035) 17

## 2020-06-22 NOTE — DISCHARGE INSTRUCTIONS
OT Instructions 6/22/2020:  1.  Pace activity: Daily and weekly.  Perform OT and PT exercises in the morning when you feel the most energized.  Reduce repetitions with exercises the week that you have your infusion.  2.  Perform OT exercises every day (see handout).  Start with 8 repetitions per exercise.  Your goal is to increase repetitions.    3.  Perform OT and PT exercises and walk with music you like, listening to an audiobook.  4.  Work on your cards while sitting and standing.  Alternate sitting and standing to improve endurance.  Set a goal for how long you work on cards  Ie. 1 hour

## 2020-06-22 NOTE — IP AVS SNAPSHOT
MRN:[id number]                      After Visit Summary   6/22/2020    Yadira Hoang    MRN: [id number]           Visit Information        Provider Department      6/22/2020  9:45 AM Cathleen Walton OT [facility]        Your next 10 appointments already scheduled    Jun 24, 2020  2:00 PM CDT  LAB with LAB ONC [facility] ([facility]) [address] [facility] [address]  [phone]   Please do not eat 10-12 hours before your appointment if you are coming in fasting for labs on lipids, cholesterol, or glucose (sugar). Does not apply to pregnant women. Water, tea and black coffee (with nothing added) is okay. Do not drink other fluids, diet soda or gum. If you have concerns about taking your medications, please send a message by clicking on Secure Messaging, Message Your Care Team.     Jun 24, 2020  2:30 PM CDT  Level 1 with BAY [address] [place] [address] ([facility]) [address] [facility] [address]  [phone]      Jun 29, 2020  8:30 AM CDT  Cancer Rehab Treatment with Halina Hough PT  [facility] ([facility] [address] [facility] [address]  [phone]      Jul 02, 2020  9:30 AM CDT  Cancer Rehab Treatment with Halina Hough PT  [facility] ([facility] [address] [facility] [address]  [phone]      Jul 09, 2020  9:45 AM CDT  Cancer Rehab Treatment with Halina Hough PT  [facility] ([facility] [address] [facility] [address]  [phone]      Jul 16, 2020  9:45 AM CDT  Cancer Rehab Treatment with Halina Hough PT  [facility] ([facility] [address] [facility] [address]  [phone]      Jul 22, 2020  2:00 PM CDT  LAB with LAB ONC [facility]  [facility] ([facility]) [address] [place] [address]  [phone]   Please do not eat 10-12 hours before your appointment if you are coming in fasting for labs on lipids, cholesterol, or glucose (sugar). Does not apply to pregnant women. Water, tea and black coffee (with nothing added) is okay. Do not drink other fluids, diet soda or gum. If you have concerns about taking your medications, please send a message by clicking on Secure Messaging, Message Your Care Team.     Jul 22, 2020  2:30 PM CDT  Level 1 with BAY 5 INFUSION  [facility] ([facility]) [address] [place] [address]  [phone]      Jul 23, 2020  9:45 AM CDT  Cancer Rehab Treatment with Halina Hough PT  [facility] ([facility] [address] [place] [address]  [phone]      Jul 30, 2020  9:45 AM CDT  Cancer Rehab Treatment with Halina Hough PT  [facility] ([facility] [address] [place] [address]  [phone]           Further instructions from your care team       OT Instructions 6/22/2020:  1.  Pace activity: Daily and weekly.  Perform OT and PT exercises in the morning when you feel the most energized.  Reduce repetitions with exercises the week that you have your infusion.  2.  Perform OT exercises every day (see handout).  Start with 8 repetitions per exercise.  Your goal is to increase repetitions.    3.  Perform OT and PT exercises and walk with music you like, listening to an audiobook.  4.  Work on your cards while sitting and standing.  Alternate sitting and standing to improve endurance.  Set a goal for how long you work on cards  Ie. 1 hour      [employer] Information    [employer] gives you secure access to your electronic health record. If you see a primary care provider, you can also send messages to your care team and make appointments.  If you have questions, please call your primary care clinic.  If you do not have a primary care provider, please call [phone] and they will assist you.       Care EveryWhere ID    This is your Care EveryWhere ID. This could be used by other organizations to access your [place] medical records  [id number]       Equal Access to Services    BRIAN MORLEY : Rafaela Sharp, cirilo jewell, curtis perez. So [facility] [phone].    ATENCIÓN: Si habla español, tiene a oneal disposición servicios gratuitos de asistencia lingüística. Llame al [phone].    We comply with applicable federal and state civil rights laws, including the [place] Human Rights Act. We do not discriminate on the basis of race, color, creed, Baptism, national origin, marital status, age, disability, sex, sexual orientation, or gender identity.

## 2020-06-24 ENCOUNTER — INFUSION THERAPY VISIT (OUTPATIENT)
Dept: INFUSION THERAPY | Facility: CLINIC | Age: 75
End: 2020-06-24
Payer: COMMERCIAL

## 2020-06-24 VITALS
DIASTOLIC BLOOD PRESSURE: 76 MMHG | WEIGHT: 139.8 LBS | RESPIRATION RATE: 16 BRPM | HEART RATE: 88 BPM | TEMPERATURE: 98.3 F | OXYGEN SATURATION: 95 % | BODY MASS INDEX: 21.9 KG/M2 | SYSTOLIC BLOOD PRESSURE: 133 MMHG

## 2020-06-24 DIAGNOSIS — C43.9 MELANOMA OF SKIN (H): Primary | ICD-10-CM

## 2020-06-24 DIAGNOSIS — C43.71 MALIGNANT MELANOMA OF RIGHT LOWER EXTREMITY INCLUDING HIP (H): ICD-10-CM

## 2020-06-24 LAB
ALBUMIN SERPL-MCNC: 3.9 G/DL (ref 3.4–5)
ALP SERPL-CCNC: 68 U/L (ref 40–150)
ALT SERPL W P-5'-P-CCNC: 19 U/L (ref 0–50)
ANION GAP SERPL CALCULATED.3IONS-SCNC: 3 MMOL/L (ref 3–14)
AST SERPL W P-5'-P-CCNC: 13 U/L (ref 0–45)
BASOPHILS # BLD AUTO: 0 10E9/L (ref 0–0.2)
BASOPHILS NFR BLD AUTO: 0.4 %
BILIRUB SERPL-MCNC: 0.3 MG/DL (ref 0.2–1.3)
BUN SERPL-MCNC: 26 MG/DL (ref 7–30)
CALCIUM SERPL-MCNC: 9.5 MG/DL (ref 8.5–10.1)
CHLORIDE SERPL-SCNC: 104 MMOL/L (ref 94–109)
CO2 SERPL-SCNC: 33 MMOL/L (ref 20–32)
CREAT SERPL-MCNC: 0.75 MG/DL (ref 0.52–1.04)
DIFFERENTIAL METHOD BLD: NORMAL
EOSINOPHIL # BLD AUTO: 0.1 10E9/L (ref 0–0.7)
EOSINOPHIL NFR BLD AUTO: 2 %
ERYTHROCYTE [DISTWIDTH] IN BLOOD BY AUTOMATED COUNT: 11.9 % (ref 10–15)
GFR SERPL CREATININE-BSD FRML MDRD: 77 ML/MIN/{1.73_M2}
GLUCOSE SERPL-MCNC: 133 MG/DL (ref 70–99)
HCT VFR BLD AUTO: 38.3 % (ref 35–47)
HGB BLD-MCNC: 12.7 G/DL (ref 11.7–15.7)
IMM GRANULOCYTES # BLD: 0 10E9/L (ref 0–0.4)
IMM GRANULOCYTES NFR BLD: 0.2 %
LYMPHOCYTES # BLD AUTO: 1.6 10E9/L (ref 0.8–5.3)
LYMPHOCYTES NFR BLD AUTO: 28.7 %
MCH RBC QN AUTO: 30.4 PG (ref 26.5–33)
MCHC RBC AUTO-ENTMCNC: 33.2 G/DL (ref 31.5–36.5)
MCV RBC AUTO: 92 FL (ref 78–100)
MONOCYTES # BLD AUTO: 0.4 10E9/L (ref 0–1.3)
MONOCYTES NFR BLD AUTO: 6.9 %
NEUTROPHILS # BLD AUTO: 3.4 10E9/L (ref 1.6–8.3)
NEUTROPHILS NFR BLD AUTO: 61.8 %
PLATELET # BLD AUTO: 312 10E9/L (ref 150–450)
POTASSIUM SERPL-SCNC: 3.6 MMOL/L (ref 3.4–5.3)
PROT SERPL-MCNC: 7.7 G/DL (ref 6.8–8.8)
RBC # BLD AUTO: 4.18 10E12/L (ref 3.8–5.2)
SODIUM SERPL-SCNC: 140 MMOL/L (ref 133–144)
TSH SERPL DL<=0.005 MIU/L-ACNC: 1.95 MU/L (ref 0.4–4)
WBC # BLD AUTO: 5.5 10E9/L (ref 4–11)

## 2020-06-24 PROCEDURE — 96413 CHEMO IV INFUSION 1 HR: CPT | Performed by: NURSE PRACTITIONER

## 2020-06-24 PROCEDURE — 80050 GENERAL HEALTH PANEL: CPT | Performed by: INTERNAL MEDICINE

## 2020-06-24 PROCEDURE — 36415 COLL VENOUS BLD VENIPUNCTURE: CPT | Performed by: INTERNAL MEDICINE

## 2020-06-24 RX ADMIN — Medication 250 ML: at 15:16

## 2020-06-29 ENCOUNTER — HOSPITAL ENCOUNTER (OUTPATIENT)
Dept: PHYSICAL THERAPY | Facility: CLINIC | Age: 75
Setting detail: THERAPIES SERIES
End: 2020-06-29
Attending: INTERNAL MEDICINE
Payer: COMMERCIAL

## 2020-06-29 PROCEDURE — 97110 THERAPEUTIC EXERCISES: CPT | Mod: GP | Performed by: PHYSICAL THERAPIST

## 2020-06-29 PROCEDURE — 97116 GAIT TRAINING THERAPY: CPT | Mod: GP | Performed by: PHYSICAL THERAPIST

## 2020-06-30 DIAGNOSIS — Z98.890 S/P FLAP GRAFT: Primary | ICD-10-CM

## 2020-07-02 ENCOUNTER — HOSPITAL ENCOUNTER (OUTPATIENT)
Dept: OCCUPATIONAL THERAPY | Facility: CLINIC | Age: 75
Setting detail: THERAPIES SERIES
End: 2020-07-02
Attending: INTERNAL MEDICINE
Payer: COMMERCIAL

## 2020-07-02 ENCOUNTER — HOSPITAL ENCOUNTER (OUTPATIENT)
Dept: PHYSICAL THERAPY | Facility: CLINIC | Age: 75
Setting detail: THERAPIES SERIES
End: 2020-07-02
Attending: INTERNAL MEDICINE
Payer: COMMERCIAL

## 2020-07-02 PROCEDURE — 97110 THERAPEUTIC EXERCISES: CPT | Mod: GP | Performed by: PHYSICAL THERAPIST

## 2020-07-02 PROCEDURE — 97116 GAIT TRAINING THERAPY: CPT | Mod: GP | Performed by: PHYSICAL THERAPIST

## 2020-07-02 PROCEDURE — 97110 THERAPEUTIC EXERCISES: CPT | Mod: GO | Performed by: OCCUPATIONAL THERAPIST

## 2020-07-08 ENCOUNTER — TELEPHONE (OUTPATIENT)
Dept: SURGERY | Facility: CLINIC | Age: 75
End: 2020-07-08
Payer: COMMERCIAL

## 2020-07-08 NOTE — TELEPHONE ENCOUNTER
Patient states she feels like cancer is on other leg, she is currently in recovery, but just does not feel right. Patient would like call back, did not want to schedule appt, waiting for call back to discuss if the infusions are working.

## 2020-07-09 ENCOUNTER — HOSPITAL ENCOUNTER (OUTPATIENT)
Dept: PHYSICAL THERAPY | Facility: CLINIC | Age: 75
Setting detail: THERAPIES SERIES
End: 2020-07-09
Attending: INTERNAL MEDICINE
Payer: COMMERCIAL

## 2020-07-09 ENCOUNTER — HOSPITAL ENCOUNTER (OUTPATIENT)
Dept: OCCUPATIONAL THERAPY | Facility: CLINIC | Age: 75
Setting detail: THERAPIES SERIES
End: 2020-07-09
Attending: INTERNAL MEDICINE
Payer: COMMERCIAL

## 2020-07-09 PROCEDURE — 97763 ORTHC/PROSTC MGMT SBSQ ENC: CPT | Mod: GP | Performed by: PHYSICAL THERAPIST

## 2020-07-09 PROCEDURE — 97110 THERAPEUTIC EXERCISES: CPT | Mod: GO | Performed by: OCCUPATIONAL THERAPIST

## 2020-07-09 NOTE — TELEPHONE ENCOUNTER
Will route patients message Dr.Musibays LAKSHMI Suarez  to assist with answering questions about the infusions. If anything surgery needed please route back to the  plastics or Gen surgery pool  . Thank you..Melissa Ellsworth RN

## 2020-07-10 NOTE — PROGRESS NOTES
[facility]  MEDICAL ONCOLOGY PROGRESS NOTE  Jul 13, 2020    CHIEF COMPLAINT: acral melanoma, stage III, s/p primary resection and reconstruction    Melanoma History:  1. She noted a painful lesion on the sole of the right foot for a few months, since last summer.  It initially was small then became raised.  It spontaneously bled. She is not entirely sure of its appearance initially.  She denies noting any masses behind the knee or in the groin.  2. 1/9/2020, punch biopsy was performed by Dr Jessica Meadows on .  It showed melanoma, at least 3.4 mm deep with ulceration and 0 mitoses, and perineural invasion present. TILs were non-brisk and LVI not identified. pT3b.  3. 2/10/2020, she has right femoral sentinel lymph node biopsy and wide local excision (Specimen #: [id number]) and the right heel lesion extends to a depth of 6.6 mm, and invasive melanoma is present at 0.2 mm from the deep margin. Microsatellites are also present. There was 1 (of 2) lymph nodes involved. The lymph node tissue exhibits extensive subcapsular and parenchymal clusters of melanoma cells, highlighted on Melan-A immunostain. The largest cluster is 7 millimeters in greatest diameter. pT4b pN2c.  4. 2/22/2020, she had PET-CT, which showed intense FDG uptake in a right inguinal lymph node, concerning for an additional focus of metastatic disease. There is also an indeterminate right external iliac lymph node with mild FDG uptake.  5. 3/4/2020, she has medial plantar artery  fasciocutaneous instep flap and Integra placement to the donor site. On 4/2/2020, she has additional reconstruction with skin grafting.  6. 5/27/2020, she starts nivolumab.     HISTORY OF PRESENT ILLNESS  Yadira Hoang is a 75 year old female with stage III acral melanoma.  Patient reports that she has felt fatigued and a lack of motivation since starting on immune therapy.  She also notes a rash on her back and feels itchy all over.  She has not  applied any lotion or creams for this.  She has occasional non-drenching night sweats.  She has ongoing dry mouth which is unchanged.  She does use a CPAP that has humidity on it.  She manages her dry mouth with drinking water.  She in the past had tried Biotene and felt this bothered her mouth.  She is typically waking up sometime between 3:58 AM and has a hard time falling back asleep.  She reports that her appetite has been low but she has been eating a little better lately as she is worried about losing weight.  She notes that she had a white pimple on her lip and is worried that it has not yet resolved.  She has chronic dry eyes and feels that this is gotten worse.  She reports that her yeast infection did not improve with her recent courses of fluconazole.  She has felt quite warm when she has been going for some walks.  She also is worried about increased pain in her right leg with walking.  She was recently cleared to walk per Dr. Chery from plastic surgery.  She has been doing physical therapy for about 5 weeks and was fitted for a special shoe last week.  She does apply ice to her leg.  She feels she does not tolerate opioids well due to constipation and does not wish to take these again.  She does plan to take some ibuprofen and Tylenol.  She has also noticed some skin spots in her groin and thighs.  She denies other concerns.    Current Outpatient Medications   Medication Sig Dispense Refill     acetaminophen (TYLENOL) 325 MG tablet Take 2 tablets (650 mg) by mouth every 4 hours as needed for mild pain 50 tablet 0     cholecalciferol (VITAMIN D3) 125 MCG (5000 UT) TABS tablet Take 2,000 Units by mouth every other day        estradiol (ESTRACE) 0.1 MG/GM vaginal cream Place 1 g vaginally twice a week 42.5 g 1     hydrocortisone, Perianal, (HYDROCORTISONE) 2.5 % cream Place rectally 2 times daily as needed for hemorrhoids 1 Tube 1     polyethylene glycol (MIRALAX) powder Take 17 g by mouth        "LORazepam (ATIVAN) 0.5 MG tablet Take 1 tablet (0.5 mg) by mouth every 4 hours as needed (Anxiety, Nausea/Vomiting or Sleep) (Patient not taking: Reported on 5/27/2020) 30 tablet 3     prochlorperazine (COMPAZINE) 10 MG tablet Take 1 tablet (10 mg) by mouth every 6 hours as needed (Nausea/Vomiting) (Patient not taking: Reported on 5/27/2020) 30 tablet 3     PHYSICAL EXAMINATION  General: The patient is a pleasant female in no acute distress. She appears moderately anxious and tearful at times. Here today with her .  /73   Pulse 90   Temp 98  F (36.7  C) (Oral)   Resp 16   Ht 1.702 m (5' 7\")   Wt 64.2 kg (141 lb 9.6 oz)   SpO2 97%   BMI 22.18 kg/m    Wt Readings from Last 10 Encounters:   07/13/20 64.2 kg (141 lb 9.6 oz)   06/24/20 63.4 kg (139 lb 12.8 oz)   06/11/20 64 kg (141 lb 1.6 oz)   05/28/20 61.9 kg (136 lb 8 oz)   05/27/20 64.4 kg (142 lb)   05/07/20 61.7 kg (136 lb)   04/02/20 63.4 kg (139 lb 12.4 oz)   03/27/20 64.2 kg (141 lb 8 oz)   03/04/20 64 kg (141 lb 1.5 oz)   02/27/20 65.9 kg (145 lb 4.8 oz)   HEENT: EOMI, PERRL. Sclerae are anicteric. Oral mucosa is pink and moist with no lesions or thrush.   Lymph: Neck is supple with no lymphadenopathy in the cervical or supraclavicular areas. Right inguinal area with two palpable nodes, each about 2 cm.  Heart: Regular rate and rhythm.   Lungs: Clear to auscultation bilaterally.   Abdomen: Bowel sounds present, soft, nontender with no palpable hepatosplenomegaly or masses.   Extremities: Trace sockline lower extremity edema noted bilaterally.   Neuro: Cranial nerves II through XII are grossly intact.  Skin: Skin on back is mildly dry with excoriations. Oliver hemangiomas scattered throughout body, most notable on abdomen.  Right thigh and right heel are healing well s/p skin graft. Just below right lower lip, two open comedones noted.     ASSESSMENT AND PLAN  Acral melanoma, right heel, pT4b pN3c, Stage IIID  Patient is a 75 year old woman " with Stage IIID melanoma on PET-CT. She has healed up from primary right heel surgery, but has a right inguinal lymph node and a small right pelvic lymph node on the most recent PET-CT scan. Due to need to have additional time to recover from her primary surgery and reconstruction, she started on monthly nivolumab on 5/27/20. She is having multiple toxicities from treatment including fatigue, pruritus with dry skin, dry eyes, and vaginal dryness. She will continue with cycle 3 nivolumab on 7/22/20. Will plan to repeat imaging after 3 cycles with a PET/CT. She will see Treasure Franklin and Sunita to review. We may consider additional surgery at that time. Patient is feeling anxious about this.     Vaginal dryness. Patient was recently treated for a yeast infection, but still feel dry. Discussed discussing with PCP if needs another wet prep, but recommend trying Replens for the dryness.     Fatigue. Recommend regular exercise to help with fatigue.     Pruritus. Recommend using Aveeno or Eucerin cream. May also take Benadryl prn.    Insomnia. Discussed taking 25-50 mg Benadryl at bedtime as needed.     Right leg pain. Started after starting with weight bearing. Has ongoing physical therapy.  Question if she may benefit from gabapentin. Given history of intolerance to narcotics, would recommend starting with a low dose. Recommend she discuss medication with her PCP. Recommend discussing her pain with her surgeon.    Dry eyes. Chronic. Will refer to ophthalmology. Reports trying many different types of eye drops in the past.     Anxiety. Will consider a referral for counseling.     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 Will review with Dr. Franklin.    Greater than 40 minutes was spent with this patient with greater than 20 minutes spent in counseling and coordination of care.    Meghan Perdomo PA-C  [facility]  [address] [place], [place] [id number] [phone]

## 2020-07-13 ENCOUNTER — ONCOLOGY VISIT (OUTPATIENT)
Dept: ONCOLOGY | Facility: CLINIC | Age: 75
End: 2020-07-13
Attending: PHYSICIAN ASSISTANT
Payer: COMMERCIAL

## 2020-07-13 VITALS
HEIGHT: 67 IN | TEMPERATURE: 98 F | DIASTOLIC BLOOD PRESSURE: 73 MMHG | OXYGEN SATURATION: 97 % | RESPIRATION RATE: 16 BRPM | SYSTOLIC BLOOD PRESSURE: 123 MMHG | HEART RATE: 90 BPM | BODY MASS INDEX: 22.22 KG/M2 | WEIGHT: 141.6 LBS

## 2020-07-13 DIAGNOSIS — C43.9 MELANOMA OF SKIN (H): Primary | ICD-10-CM

## 2020-07-13 DIAGNOSIS — C43.71 MALIGNANT MELANOMA OF RIGHT LOWER EXTREMITY INCLUDING HIP (H): ICD-10-CM

## 2020-07-13 PROCEDURE — G0463 HOSPITAL OUTPT CLINIC VISIT: HCPCS | Mod: ZF

## 2020-07-13 PROCEDURE — 99215 OFFICE O/P EST HI 40 MIN: CPT | Mod: ZP | Performed by: PHYSICIAN ASSISTANT

## 2020-07-13 RX ORDER — HEPARIN SODIUM,PORCINE 10 UNIT/ML
5 VIAL (ML) INTRAVENOUS
Status: CANCELLED | OUTPATIENT
Start: 2020-07-22

## 2020-07-13 RX ORDER — ALBUTEROL SULFATE 90 UG/1
1-2 AEROSOL, METERED RESPIRATORY (INHALATION)
Status: CANCELLED
Start: 2020-07-22

## 2020-07-13 RX ORDER — METHYLPREDNISOLONE SODIUM SUCCINATE 125 MG/2ML
125 INJECTION, POWDER, LYOPHILIZED, FOR SOLUTION INTRAMUSCULAR; INTRAVENOUS
Status: CANCELLED
Start: 2020-07-22

## 2020-07-13 RX ORDER — DIPHENHYDRAMINE HYDROCHLORIDE 50 MG/ML
50 INJECTION INTRAMUSCULAR; INTRAVENOUS
Status: CANCELLED
Start: 2020-07-22

## 2020-07-13 RX ORDER — MEPERIDINE HYDROCHLORIDE 25 MG/ML
25 INJECTION INTRAMUSCULAR; INTRAVENOUS; SUBCUTANEOUS EVERY 30 MIN PRN
Status: CANCELLED | OUTPATIENT
Start: 2020-07-22

## 2020-07-13 RX ORDER — SODIUM CHLORIDE 9 MG/ML
1000 INJECTION, SOLUTION INTRAVENOUS CONTINUOUS PRN
Status: CANCELLED
Start: 2020-07-22

## 2020-07-13 RX ORDER — EPINEPHRINE 1 MG/ML
0.3 INJECTION, SOLUTION INTRAMUSCULAR; SUBCUTANEOUS EVERY 5 MIN PRN
Status: CANCELLED | OUTPATIENT
Start: 2020-07-22

## 2020-07-13 RX ORDER — LORAZEPAM 2 MG/ML
0.5 INJECTION INTRAMUSCULAR EVERY 4 HOURS PRN
Status: CANCELLED
Start: 2020-07-22

## 2020-07-13 RX ORDER — ALBUTEROL SULFATE 0.83 MG/ML
2.5 SOLUTION RESPIRATORY (INHALATION)
Status: CANCELLED | OUTPATIENT
Start: 2020-07-22

## 2020-07-13 RX ORDER — NALOXONE HYDROCHLORIDE 0.4 MG/ML
.1-.4 INJECTION, SOLUTION INTRAMUSCULAR; INTRAVENOUS; SUBCUTANEOUS
Status: CANCELLED | OUTPATIENT
Start: 2020-07-22

## 2020-07-13 RX ORDER — HEPARIN SODIUM (PORCINE) LOCK FLUSH IV SOLN 100 UNIT/ML 100 UNIT/ML
5 SOLUTION INTRAVENOUS
Status: CANCELLED | OUTPATIENT
Start: 2020-07-22

## 2020-07-13 ASSESSMENT — MIFFLIN-ST. JEOR: SCORE: 1164.92

## 2020-07-13 ASSESSMENT — PAIN SCALES - GENERAL: PAINLEVEL: MODERATE PAIN (5)

## 2020-07-13 NOTE — NURSING NOTE
"Oncology Rooming Note    July 13, 2020 12:23 PM   Yadira Hoang is a 75 year old female who presents for:    Chief Complaint   Patient presents with     Oncology Clinic Visit     ACRAL MELANOMA      Initial Vitals: /73   Pulse 90   Temp 98  F (36.7  C) (Oral)   Resp 16   Ht 1.702 m (5' 7\")   Wt 64.2 kg (141 lb 9.6 oz)   SpO2 97%   BMI 22.18 kg/m   Estimated body mass index is 22.18 kg/m  as calculated from the following:    Height as of this encounter: 1.702 m (5' 7\").    Weight as of this encounter: 64.2 kg (141 lb 9.6 oz). Body surface area is 1.74 meters squared.  Moderate Pain (5) Comment: Data Unavailable   No LMP recorded. Patient is postmenopausal.  Allergies reviewed: Yes  Medications reviewed: Yes    Medications: Medication refills not needed today.  Pharmacy name entered into [employer]: [facility] [address] [place] [address] [id number]    Clinical concerns: Patient stats that the pain in her right foot is getting better. It was a 10/10 this morning but now it is 5/10.  Meghan Perdomo  was notified.      Carla Castillo              "

## 2020-07-13 NOTE — PATIENT INSTRUCTIONS
Side Effect Management of Immune Therapy    Fatigue  Exercise daily. Call if fatigue so severe unable to get out of bed. Limit naps to less than 1 hour per day to avoid affecting nighttime sleep quality.    Loose stools or diarrhea  Okay to take Imodium (loperamide) up to 8 tablets/day. If having 4 or more loose stools per day or if having blood in stool or dark/tarry stools, call the clinic.     Dry or itchy skin  Try Aveeno lotion or Eucerin cream at least twice daily. Okay to take Benadryl orally or use topically. Avoid topical steroids if possible (hydrocortisone). Call if these measures ineffective.    Joint or muscle aches  Okay to try Tylenol (acetaminophen) no more than 1000 mg three times/day or Advil (ibuprofen) with food no more than 800 mg three times/day. Okay to try topical lidocaine patches or cream or creams like TigerBalm or IcyHot. May also try ice or heat to affected areas.  Call clinic if ineffective.    Nausea  Eat small, frequent meals. Avoid spicy and acidic foods. Some people find a benefit from wearing SeaBands (wrist bands with acupressure) or drinking ofelia tea. Take prescribed nausea medications. If still nauseated despite these measures, call clinic.    Call if you develop  -Difficulty breathing  -Persistent dry cough  -Chest pain  -Moderate to severe abdominal pain  -Decrease in urine output despite good fluid intake  -Fever of 100.4F or higher     Call [phone] and ask to speak with triage nurse for any medical concerns. Use [employer] messaging only for non-urgent concerns and not for symptom concerns.       Difficulty sleeping: Try Benadryl 25-50 mg before bed  Vaginal dryness: Try Replens lubricant  Call primary care provider about possibly repeating wet prep.  Ask primary care provider about gabapentin.  Call surgeon about leg pain since starting walking.

## 2020-07-13 NOTE — LETTER
7/13/2020         RE: Yadira Hoang  [address] [facility] [address]      [facility]  MEDICAL ONCOLOGY PROGRESS NOTE  Jul 13, 2020    CHIEF COMPLAINT: acral melanoma, stage III, s/p primary resection and reconstruction    Melanoma History:  1. She noted a painful lesion on the sole of the right foot for a few months, since last summer.  It initially was small then became raised.  It spontaneously bled. She is not entirely sure of its appearance initially.  She denies noting any masses behind the knee or in the groin.  2. 1/9/2020, punch biopsy was performed by Dr Jessica Meadows on .  It showed melanoma, at least 3.4 mm deep with ulceration and 0 mitoses, and perineural invasion present. TILs were non-brisk and LVI not identified. pT3b.  3. 2/10/2020, she has right femoral sentinel lymph node biopsy and wide local excision (Specimen #: [id number]) and the right heel lesion extends to a depth of 6.6 mm, and invasive melanoma is present at 0.2 mm from the deep margin. Microsatellites are also present. There was 1 (of 2) lymph nodes involved. The lymph node tissue exhibits extensive subcapsular and parenchymal clusters of melanoma cells, highlighted on Melan-A immunostain. The largest cluster is 7 millimeters in greatest diameter. pT4b pN2c.  4. 2/22/2020, she had PET-CT, which showed intense FDG uptake in a right inguinal lymph node, concerning for an additional focus of metastatic disease. There is also an indeterminate right external iliac lymph node with mild FDG uptake.  5. 3/4/2020, she has medial plantar artery  fasciocutaneous instep flap and Integra placement to the donor site. On 4/2/2020, she has additional reconstruction with skin grafting.  6. 5/27/2020, she starts nivolumab.     HISTORY OF PRESENT ILLNESS  Yadira Hoang is a 75 year old female with stage III acral melanoma.  Patient reports that she has felt fatigued and a lack of motivation since starting on immune  therapy.  She also notes a rash on her back and feels itchy all over.  She has not applied any lotion or creams for this.  She has occasional non-drenching night sweats.  She has ongoing dry mouth which is unchanged.  She does use a CPAP that has humidity on it.  She manages her dry mouth with drinking water.  She in the past had tried Biotene and felt this bothered her mouth.  She is typically waking up sometime between 3:58 AM and has a hard time falling back asleep.  She reports that her appetite has been low but she has been eating a little better lately as she is worried about losing weight.  She notes that she had a white pimple on her lip and is worried that it has not yet resolved.  She has chronic dry eyes and feels that this is gotten worse.  She reports that her yeast infection did not improve with her recent courses of fluconazole.  She has felt quite warm when she has been going for some walks.  She also is worried about increased pain in her right leg with walking.  She was recently cleared to walk per Dr. Chery from plastic surgery.  She has been doing physical therapy for about 5 weeks and was fitted for a special shoe last week.  She does apply ice to her leg.  She feels she does not tolerate opioids well due to constipation and does not wish to take these again.  She does plan to take some ibuprofen and Tylenol.  She has also noticed some skin spots in her groin and thighs.  She denies other concerns.    Current Outpatient Medications   Medication Sig Dispense Refill     acetaminophen (TYLENOL) 325 MG tablet Take 2 tablets (650 mg) by mouth every 4 hours as needed for mild pain 50 tablet 0     cholecalciferol (VITAMIN D3) 125 MCG (5000 UT) TABS tablet Take 2,000 Units by mouth every other day        estradiol (ESTRACE) 0.1 MG/GM vaginal cream Place 1 g vaginally twice a week 42.5 g 1     hydrocortisone, Perianal, (HYDROCORTISONE) 2.5 % cream Place rectally 2 times daily as needed for  "hemorrhoids 1 Tube 1     polyethylene glycol (MIRALAX) powder Take 17 g by mouth       LORazepam (ATIVAN) 0.5 MG tablet Take 1 tablet (0.5 mg) by mouth every 4 hours as needed (Anxiety, Nausea/Vomiting or Sleep) (Patient not taking: Reported on 5/27/2020) 30 tablet 3     prochlorperazine (COMPAZINE) 10 MG tablet Take 1 tablet (10 mg) by mouth every 6 hours as needed (Nausea/Vomiting) (Patient not taking: Reported on 5/27/2020) 30 tablet 3     PHYSICAL EXAMINATION  General: The patient is a pleasant female in no acute distress. She appears moderately anxious and tearful at times. Here today with her .  /73   Pulse 90   Temp 98  F (36.7  C) (Oral)   Resp 16   Ht 1.702 m (5' 7\")   Wt 64.2 kg (141 lb 9.6 oz)   SpO2 97%   BMI 22.18 kg/m    Wt Readings from Last 10 Encounters:   07/13/20 64.2 kg (141 lb 9.6 oz)   06/24/20 63.4 kg (139 lb 12.8 oz)   06/11/20 64 kg (141 lb 1.6 oz)   05/28/20 61.9 kg (136 lb 8 oz)   05/27/20 64.4 kg (142 lb)   05/07/20 61.7 kg (136 lb)   04/02/20 63.4 kg (139 lb 12.4 oz)   03/27/20 64.2 kg (141 lb 8 oz)   03/04/20 64 kg (141 lb 1.5 oz)   02/27/20 65.9 kg (145 lb 4.8 oz)   HEENT: EOMI, PERRL. Sclerae are anicteric. Oral mucosa is pink and moist with no lesions or thrush.   Lymph: Neck is supple with no lymphadenopathy in the cervical or supraclavicular areas. Right inguinal area with two palpable nodes, each about 2 cm.  Heart: Regular rate and rhythm.   Lungs: Clear to auscultation bilaterally.   Abdomen: Bowel sounds present, soft, nontender with no palpable hepatosplenomegaly or masses.   Extremities: Trace sockline lower extremity edema noted bilaterally.   Neuro: Cranial nerves II through XII are grossly intact.  Skin: Skin on back is mildly dry with excoriations. Oliver hemangiomas scattered throughout body, most notable on abdomen.  Right thigh and right heel are healing well s/p skin graft. Just below right lower lip, two open comedones noted.     ASSESSMENT AND " PLAN  Acral melanoma, right heel, pT4b pN3c, Stage IIID  Patient is a 75 year old woman with Stage IIID melanoma on PET-CT. She has healed up from primary right heel surgery, but has a right inguinal lymph node and a small right pelvic lymph node on the most recent PET-CT scan. Due to need to have additional time to recover from her primary surgery and reconstruction, she started on monthly nivolumab on 5/27/20. She is having multiple toxicities from treatment including fatigue, pruritus with dry skin, dry eyes, and vaginal dryness. She will continue with cycle 3 nivolumab on 7/22/20. Will plan to repeat imaging after 3 cycles with a PET/CT. She will see Treasure Franklin and Sunita to review. We may consider additional surgery at that time. Patient is feeling anxious about this.     Vaginal dryness. Patient was recently treated for a yeast infection, but still feel dry. Discussed discussing with PCP if needs another wet prep, but recommend trying Replens for the dryness.     Fatigue. Recommend regular exercise to help with fatigue.     Pruritus. Recommend using Aveeno or Eucerin cream. May also take Benadryl prn.    Insomnia. Discussed taking 25-50 mg Benadryl at bedtime as needed.     Right leg pain. Started after starting with weight bearing. Has ongoing physical therapy.  Question if she may benefit from gabapentin. Given history of intolerance to narcotics, would recommend starting with a low dose. Recommend she discuss medication with her PCP. Recommend discussing her pain with her surgeon.    Dry eyes. Chronic. Will refer to ophthalmology. Reports trying many different types of eye drops in the past.     Anxiety. Will consider a referral for counseling.     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 Will review with Dr. Franklin.    Greater than 40 minutes was spent with this patient with greater than 20 minutes spent in counseling and coordination of care.    Meghan Alaniz  [facility]  [address] [place] [address]  [phone]

## 2020-07-14 ENCOUNTER — OFFICE VISIT (OUTPATIENT)
Dept: PEDIATRICS | Facility: CLINIC | Age: 75
End: 2020-07-14
Payer: COMMERCIAL

## 2020-07-14 VITALS
BODY MASS INDEX: 22.19 KG/M2 | OXYGEN SATURATION: 96 % | DIASTOLIC BLOOD PRESSURE: 62 MMHG | HEART RATE: 78 BPM | TEMPERATURE: 98.4 F | SYSTOLIC BLOOD PRESSURE: 128 MMHG | WEIGHT: 141.7 LBS

## 2020-07-14 DIAGNOSIS — B37.31 CANDIDIASIS OF VAGINA: Primary | ICD-10-CM

## 2020-07-14 DIAGNOSIS — H04.123 DRY EYES: ICD-10-CM

## 2020-07-14 LAB
ALBUMIN UR-MCNC: NEGATIVE MG/DL
APPEARANCE UR: CLEAR
BACTERIA #/AREA URNS HPF: ABNORMAL /HPF
BILIRUB UR QL STRIP: NEGATIVE
COLOR UR AUTO: ABNORMAL
GLUCOSE UR STRIP-MCNC: NEGATIVE MG/DL
HGB UR QL STRIP: NEGATIVE
KETONES UR STRIP-MCNC: NEGATIVE MG/DL
LEUKOCYTE ESTERASE UR QL STRIP: NEGATIVE
NITRATE UR QL: NEGATIVE
NON-SQ EPI CELLS #/AREA URNS LPF: ABNORMAL /LPF
PH UR STRIP: 6 PH (ref 5–7)
RBC #/AREA URNS AUTO: ABNORMAL /HPF
SOURCE: ABNORMAL
SP GR UR STRIP: 1.01 (ref 1–1.03)
SPECIMEN SOURCE: NORMAL
UROBILINOGEN UR STRIP-MCNC: NORMAL MG/DL (ref 0–2)
WBC #/AREA URNS AUTO: ABNORMAL /HPF
WET PREP SPEC: NORMAL

## 2020-07-14 PROCEDURE — 87210 SMEAR WET MOUNT SALINE/INK: CPT | Performed by: FAMILY MEDICINE

## 2020-07-14 PROCEDURE — 81001 URINALYSIS AUTO W/SCOPE: CPT | Performed by: FAMILY MEDICINE

## 2020-07-14 PROCEDURE — 99214 OFFICE O/P EST MOD 30 MIN: CPT | Performed by: FAMILY MEDICINE

## 2020-07-14 RX ORDER — POLYETHYLENE GLYCOL 400 AND PROPYLENE GLYCOL 4; 3 MG/ML; MG/ML
1 SOLUTION/ DROPS OPHTHALMIC 4 TIMES DAILY PRN
Qty: 28 EACH | Refills: 1 | Status: SHIPPED | OUTPATIENT
Start: 2020-07-14 | End: 2020-09-16

## 2020-07-14 RX ORDER — FLUCONAZOLE 150 MG/1
150 TABLET ORAL
Qty: 3 TABLET | Refills: 0 | Status: SHIPPED | OUTPATIENT
Start: 2020-07-14 | End: 2020-07-21

## 2020-07-14 NOTE — PATIENT INSTRUCTIONS
Patient Education     Yeast Infection (Candida Vaginal Infection)    You have a Candida vaginal infection. This is also known as a yeast infection. It is most often caused by a type of yeast (fungus) called Candida. Candida are normally found in the vagina. But if they increase in number, this can lead to infection and cause symptoms.  Symptoms of a yeast infection can include:    Clumpy or thin, white discharge, which may look like cottage cheese    Itching or burning    Burning with urination  Certain factors can make a yeast infection more likely. These can include:    Taking certain medicines, such as antibiotics or birth control pills    Pregnancy    Diabetes    Weak immune system  A yeast infection is most often treated with antifungal medicine. This may be given as a vaginal cream or pills you take by mouth. Treatment may last for about 1 to 7 days. Women with severe or recurrent infections may need longer courses of treatment.  Home care    If you re prescribed medicine, be sure to use it as directed. Finish all of the medicine, even if your symptoms go away. Note: Don t try to treat yourself using over-the-counter products without talking to your provider first. He or she will let you know if this is a good option for you.    Ask your provider what steps you can take to help reduce your risk of having a yeast infection in the future.  Follow-up care  Follow up with your healthcare provider, or as directed.  When to seek medical advice  Call your healthcare provider right away if:    You have a fever of 100.4 F (38 C) or higher, or as directed by your provider.    Your symptoms worsen, or they don t go away within a few days of starting treatment.    You have new pain in the lower belly or pelvic region.    You have side effects that bother you or a reaction to the cream or pills you re prescribed.    You or any partners you have sex with have new symptoms, such as a rash, joint pain, or sores.  Date Last  Reviewed: 10/1/2017    6599-4813 [employer]. [address] [place] [address], [place] [address]. All rights reserved. This information is not intended as a substitute for professional medical care. Always follow your healthcare professional's instructions.

## 2020-07-14 NOTE — PROGRESS NOTES
Subjective     Yadira Hoang is a 75 year old female who presents to clinic today for the following health issues:    HPI     Vaginal Symptoms      Duration: 1-2 weeks    Description  itching    Intensity:  moderate    Accompanying signs and symptoms (fever/dysuria/abdominal or back pain): None    History  Sexually active: not at present  Possibility of pregnancy: No  Recent antibiotic use: YES    Precipitating or alleviating factors: getting infusions    Therapies tried and outcome: none   Outcome: waxing and waning    Patient also complains of bilateral dry eyes, she is scheduled with ophthalmology. No conjunctivitis    Patient Active Problem List   Diagnosis     Malignant melanoma of right lower extremity including hip (H)     Melanoma of skin (H)     GUERLINE on CPAP     Chronic congestion of paranasal sinus     Weakness of foot     Neck pain, chronic     Myofascial pain     Intractable migraine without aura and without status migrainosus     History of multiple concussions     Foot pain, bilateral     Fibromyositis     Difficulty walking     Chronic pain disorder     Chronic fatigue     Bilateral occipital neuralgia     Articular disc disorder of temporomandibular joint     S/P flap graft     Osteopenia     Injury of head     Hyperlipidemia     ALEXUS (generalized anxiety disorder)     Major depression, recurrent, chronic (H)     Past Surgical History:   Procedure Laterality Date     BIOPSY NODE SENTINEL Right 2/10/2020    Procedure: [place] Lymph Node Mapping And Biopsy;  Surgeon: Gabriela Dorsey MD;  Location: [facility] OR     EXCISE LESION LOWER EXTREMITY Right 2/10/2020    Procedure: Wide Local Excision of RIGHT heel melanoma;  Surgeon: Gabriela Dorsey MD;  Location: [facility] OR     EXCISE LESION LOWER EXTREMITY Right 2/20/2020    Procedure: Wide local Re-excision RIGHT heel Wound vac application;  Surgeon: Gabriela Dorsey MD;  [facility]: [facility]     GRAFT SKIN SPLIT THICKNESS FROM EXTREMITY Right 4/2/2020     Procedure: split skin graft from right thigh;  Surgeon: INKA Crabtree MD;  Location: [facility] OR     IRRIGATION AND DEBRIDEMENT FOOT, COMBINED Right 4/2/2020    Procedure: right foot/heel debridement;  Surgeon: NIKA Crabtree MD;  Location: [facility] OR     ORTHOPEDIC SURGERY Right     heel surgery x2     RECONSTRUCT FOOT Right 3/4/2020    Procedure: Right heel reconstruction with regional flap, biologic dressing and SPY;  Surgeon: NIKA Crabtree MD;  Location: [facility] OR     TUBAL LIGATION         Social History     Tobacco Use     Smoking status: Never Smoker     Smokeless tobacco: Never Used   Substance Use Topics     Alcohol use: Not Currently     Frequency: Never     History reviewed. No pertinent family history.      Current Outpatient Medications   Medication Sig Dispense Refill     acetaminophen (TYLENOL) 325 MG tablet Take 2 tablets (650 mg) by mouth every 4 hours as needed for mild pain 50 tablet 0     cholecalciferol (VITAMIN D3) 125 MCG (5000 UT) TABS tablet Take 2,000 Units by mouth every other day        estradiol (ESTRACE) 0.1 MG/GM vaginal cream Place 1 g vaginally twice a week 42.5 g 1     fluconazole (DIFLUCAN) 150 MG tablet Take 1 tablet (150 mg) by mouth every 72 hours for 3 doses 3 tablet 0     hydrocortisone, Perianal, (HYDROCORTISONE) 2.5 % cream Place rectally 2 times daily as needed for hemorrhoids 1 Tube 1     polyethylene glycol (MIRALAX) powder Take 17 g by mouth       polyethylene glycol-propylene glycol PF (SYSTANE HYDRATION PF) 0.4-0.3 % SOLN opthalmic solution Place 1 drop into both eyes 4 times daily as needed for dry eyes 28 each 1     LORazepam (ATIVAN) 0.5 MG tablet Take 1 tablet (0.5 mg) by mouth every 4 hours as needed (Anxiety, Nausea/Vomiting or Sleep) (Patient not taking: Reported on 5/27/2020) 30 tablet 3     prochlorperazine (COMPAZINE) 10 MG tablet Take 1 tablet (10 mg) by mouth every 6 hours as needed (Nausea/Vomiting) (Patient not taking: Reported on 5/27/2020)  30 tablet 3     Allergies   Allergen Reactions     Atorvastatin      Statins all swelling     Hmg-Coa-R Inhibitors Swelling     Recent Labs   Lab Test 06/24/20  1350 05/27/20  1157 04/22/20  0802   ALT 19 20 20   CR 0.75 0.65 0.57   GFRESTIMATED 77 86 >90   GFRESTBLACK 90 >90 >90   POTASSIUM 3.6 3.8 3.9   TSH 1.95 2.49  --       BP Readings from Last 3 Encounters:   07/14/20 128/62   07/13/20 123/73   06/24/20 133/76    Wt Readings from Last 3 Encounters:   07/14/20 64.3 kg (141 lb 11.2 oz)   07/13/20 64.2 kg (141 lb 9.6 oz)   06/24/20 63.4 kg (139 lb 12.8 oz)                    Reviewed and updated as needed this visit by Provider         Review of Systems   Constitutional, HEENT, cardiovascular, pulmonary, GI, , musculoskeletal, neuro, skin, endocrine and psych systems are negative, except as otherwise noted.      Objective    /62   Pulse 78   Temp 98.4  F (36.9  C) (Tympanic)   Wt 64.3 kg (141 lb 11.2 oz)   SpO2 96%   BMI 22.19 kg/m    Body mass index is 22.19 kg/m .  Physical Exam     GENERAL: healthy, alert and no distress  HEENT: Atraumatic, normocephalic, TM normal bilateral no conjunctival erythema or discharge.   (female): vaginal atrophy with irritation no discharge            Assessment & Plan   1.  Candidiasis of vagina  - fluconazole (DIFLUCAN) 150 MG tablet; Take 1 tablet (150 mg) by mouth every 72 hours for 3 doses  Dispense: 3 tablet; Refill: 0  - Wet prep     2. Dry eyes  systane eye drops per [facility] care.        Return if symptoms worsen or fail to improve.    Shannon Rosario MD  [facility]

## 2020-07-15 ENCOUNTER — TELEPHONE (OUTPATIENT)
Dept: PHYSICAL THERAPY | Facility: CLINIC | Age: 75
End: 2020-07-15

## 2020-07-15 ENCOUNTER — PATIENT OUTREACH (OUTPATIENT)
Dept: PLASTIC SURGERY | Facility: CLINIC | Age: 75
End: 2020-07-15

## 2020-07-15 NOTE — TELEPHONE ENCOUNTER
I returned Yadira's phone call. She cancelled her OT/PT appts for tomorrow. She reports that she has already had two medical appts this week and just cannot attend another appt. She is feeling like she has too much pain and is too fatigued.     She is asking me about purchasing a cane. I explained that they are about $25.00 at [employer] etc or we can bill insurance for it. If we bill insurance she cannot get another asssitive device for 5 years. She has a loaner 4WW from a family member and they bough standard walker OOP. She will have her dtr buy her a cane. I explained that we having been using a tripod base on the cane in clinic and I am not sure if she will need to purchase that or not. It will depend on how things go with the shoe for her foot.     Tere Hough DPT, MPT, NCS  Physical Therapist   Board Certified Neurologic Clinical Specialist     [facility], [facility]   [address] [place] [address]   [email or web address]   [phone]   Clinic: [phone] //   Fax: [phone]

## 2020-07-15 NOTE — PATIENT INSTRUCTIONS
Spoke with pt regarding pain in foot. Pt states she experiences shooting, sharp pain in her foot which comes and goes throughout the day. States walking seems to increase this pain. Pt states she has been taking Tylenol for pain.Will begin alternating with Ibuprofen as well. Pt states she would like to avoid any narcotic pain medication. Explained that pain description is consistent with nerve pain. Reviewed recommendation from KEITH Cohn to discuss potential treatment with PCP of Gabapentin. Pt states understanding. Pt states swelling in foot has dramatically decreased. States skin on foot is maintaining appropriate color when ambulating, no flap concerns. Offered appt with Dr. Crabtree should pt wish to discuss further. Pt declines. Pt will reach out as needed after meeting with PCP. Yadira PALOMARES RNCC

## 2020-07-16 ENCOUNTER — PATIENT OUTREACH (OUTPATIENT)
Dept: PLASTIC SURGERY | Facility: CLINIC | Age: 75
End: 2020-07-16

## 2020-07-16 NOTE — PATIENT INSTRUCTIONS
Spoke with pt regarding continued pain. Pt states she has prescription for Gabapentin but was unsure if this is safe to take with her current infusion. Recommended that pt check with PCP prescribing Gabapentin, MD prescribing infusion, or pharmacist to review safety. Requested pt follow up with clinic if she is unable to take this medication or if medication is not improving pain. Pt states understanding and denies any additional questions or concerns. Yadira PALOMARES RNCC

## 2020-07-21 ENCOUNTER — TELEPHONE (OUTPATIENT)
Dept: PEDIATRICS | Facility: CLINIC | Age: 75
End: 2020-07-21

## 2020-07-21 DIAGNOSIS — G89.29 NECK PAIN, CHRONIC: Primary | ICD-10-CM

## 2020-07-21 DIAGNOSIS — M54.2 NECK PAIN, CHRONIC: Primary | ICD-10-CM

## 2020-07-21 DIAGNOSIS — G43.019 INTRACTABLE MIGRAINE WITHOUT AURA AND WITHOUT STATUS MIGRAINOSUS: ICD-10-CM

## 2020-07-21 DIAGNOSIS — G89.4 CHRONIC PAIN DISORDER: ICD-10-CM

## 2020-07-21 RX ORDER — GABAPENTIN 100 MG/1
100 CAPSULE ORAL
Qty: 90 CAPSULE | Refills: 0 | Status: SHIPPED | OUTPATIENT
Start: 2020-07-21 | End: 2020-10-01

## 2020-07-21 NOTE — TELEPHONE ENCOUNTER
[facility] Call Center    Phone Message    May a detailed message be left on voicemail: yes     Reason for Call: The patient stated that she and Dr. Rosario discussed the patient trying Gabapentin.  She would like a prescription called in to her preferred pharmacy which is [facility].  The patient has an appointment at the Clinic tomorrow and would like to  the prescription at that time.  Thank you.     Action Taken: Message routed to:  Primary Care p [phone]    Travel Screening: Not Applicable

## 2020-07-22 ENCOUNTER — INFUSION THERAPY VISIT (OUTPATIENT)
Dept: INFUSION THERAPY | Facility: CLINIC | Age: 75
End: 2020-07-22
Payer: COMMERCIAL

## 2020-07-22 VITALS
TEMPERATURE: 98.2 F | WEIGHT: 144 LBS | BODY MASS INDEX: 22.55 KG/M2 | RESPIRATION RATE: 16 BRPM | SYSTOLIC BLOOD PRESSURE: 124 MMHG | HEART RATE: 74 BPM | OXYGEN SATURATION: 98 % | DIASTOLIC BLOOD PRESSURE: 74 MMHG

## 2020-07-22 DIAGNOSIS — C43.71 MALIGNANT MELANOMA OF RIGHT LOWER EXTREMITY INCLUDING HIP (H): ICD-10-CM

## 2020-07-22 DIAGNOSIS — C43.9 MELANOMA OF SKIN (H): Primary | ICD-10-CM

## 2020-07-22 DIAGNOSIS — C43.71 MALIGNANT MELANOMA OF RIGHT LOWER EXTREMITY INCLUDING HIP (H): Primary | ICD-10-CM

## 2020-07-22 LAB
ALBUMIN SERPL-MCNC: 3.7 G/DL (ref 3.4–5)
ALP SERPL-CCNC: 73 U/L (ref 40–150)
ALT SERPL W P-5'-P-CCNC: 20 U/L (ref 0–50)
ANION GAP SERPL CALCULATED.3IONS-SCNC: 4 MMOL/L (ref 3–14)
AST SERPL W P-5'-P-CCNC: 11 U/L (ref 0–45)
BILIRUB SERPL-MCNC: 0.2 MG/DL (ref 0.2–1.3)
BUN SERPL-MCNC: 18 MG/DL (ref 7–30)
CALCIUM SERPL-MCNC: 9.3 MG/DL (ref 8.5–10.1)
CHLORIDE SERPL-SCNC: 108 MMOL/L (ref 94–109)
CO2 SERPL-SCNC: 32 MMOL/L (ref 20–32)
CREAT SERPL-MCNC: 0.64 MG/DL (ref 0.52–1.04)
GFR SERPL CREATININE-BSD FRML MDRD: 86 ML/MIN/{1.73_M2}
GLUCOSE SERPL-MCNC: 119 MG/DL (ref 70–99)
POTASSIUM SERPL-SCNC: 3.8 MMOL/L (ref 3.4–5.3)
PROT SERPL-MCNC: 7.2 G/DL (ref 6.8–8.8)
SODIUM SERPL-SCNC: 144 MMOL/L (ref 133–144)
TSH SERPL DL<=0.005 MIU/L-ACNC: 1.77 MU/L (ref 0.4–4)

## 2020-07-22 PROCEDURE — 99207 ZZC NO CHARGE LOS: CPT

## 2020-07-22 PROCEDURE — 80053 COMPREHEN METABOLIC PANEL: CPT | Performed by: PHYSICIAN ASSISTANT

## 2020-07-22 PROCEDURE — 84443 ASSAY THYROID STIM HORMONE: CPT | Performed by: PHYSICIAN ASSISTANT

## 2020-07-22 PROCEDURE — 36415 COLL VENOUS BLD VENIPUNCTURE: CPT | Performed by: PHYSICIAN ASSISTANT

## 2020-07-22 PROCEDURE — 96413 CHEMO IV INFUSION 1 HR: CPT | Performed by: NURSE PRACTITIONER

## 2020-07-22 RX ADMIN — Medication 250 ML: at 15:07

## 2020-07-22 ASSESSMENT — PAIN SCALES - GENERAL: PAINLEVEL: NO PAIN (0)

## 2020-07-22 NOTE — PROGRESS NOTES
Infusion Nursing Note:  Yadira Hoang presents today for C3 Opdivo.    Patient seen by provider today: No   present during visit today: Not Applicable.    Note: N/A.    Intravenous Access:  Implanted Port.    Treatment Conditions:  Lab Results   Component Value Date     07/22/2020                   Lab Results   Component Value Date    POTASSIUM 3.8 07/22/2020           No results found for: MAG         Lab Results   Component Value Date    CR 0.64 07/22/2020                   Lab Results   Component Value Date    GABY 9.3 07/22/2020                Lab Results   Component Value Date    BILITOTAL 0.2 07/22/2020           Lab Results   Component Value Date    ALBUMIN 3.7 07/22/2020                    Lab Results   Component Value Date    ALT 20 07/22/2020           Lab Results   Component Value Date    AST 11 07/22/2020       Results reviewed, labs MET treatment parameters, ok to proceed with treatment.      Post Infusion Assessment:  Patient tolerated infusion without incident.  Site patent and intact, free from redness, edema or discomfort.  No evidence of extravasations.  Access discontinued per protocol.       Discharge Plan:   Discharge instructions reviewed with: Patient.  Patient and/or family verbalized understanding of discharge instructions and all questions answered.    Pao Peguero RN

## 2020-07-23 ENCOUNTER — HOSPITAL ENCOUNTER (OUTPATIENT)
Dept: PHYSICAL THERAPY | Facility: CLINIC | Age: 75
Setting detail: THERAPIES SERIES
End: 2020-07-23
Attending: INTERNAL MEDICINE
Payer: COMMERCIAL

## 2020-07-23 PROCEDURE — 97110 THERAPEUTIC EXERCISES: CPT | Mod: GP | Performed by: PHYSICAL THERAPIST

## 2020-07-27 ENCOUNTER — TELEPHONE (OUTPATIENT)
Dept: DERMATOLOGY | Facility: CLINIC | Age: 75
End: 2020-07-27

## 2020-07-27 NOTE — TELEPHONE ENCOUNTER
Pt called and scheduled with  on 8/13 at 4pm....Melissa Ellsworth RN            .  Sagar Mendoza MD  [facility] [place]               May I have someone contact this patient to arrange an appointment with me sometime in the next 2 weeks? It might be helpful for me to see her when a fellow or resident is present. We could try treating some angiomas with the PDL.     Thank you.   AM    Previous Messages     ----- Message -----   From: Gi Kowalski MD   Sent: 7/21/2020   3:47 PM CDT   To: Meghan Perdomo PA-C, Sagar Mendoza MD, *   Subject: RE: cherry hemangiomas                           Thanks Sagar!   Anecdotally I've seen maybe one or two other patients develop them on immunotherapy.   I'll be on the look out for more of these..   E   ----- Message -----   From: Sagar Mendoza MD   Sent: 7/20/2020   4:20 PM CDT   To: Meghan Perdomo PA-C, *   Subject: RE: cherry hemangiomas                           Hi Meghan and Gi,     It's not common, so I had to do some research into this side effect. Reactive Capillary Hemangiomas have been reported with Camreluzimab (Cancer Biol Med 2019. doi: 10.51549/j.issn.3515-2362.2018.0172) but not really with Nivolumab. It's possbile she's having a similar effect since they are both PD-1 inhibitors. If she's interested in having it worked up, we could see her for a biopsy. If any are symptomatic we could cauterize them or consider a pulse dye laser treatment.     The article indicates, this phenomenon MAY be associated with increased efficacy, but it is arguable. Some of the lesions seemed to resolve after discontinuation, some were helped (but not resolved) by imiquimod, steroids, or topical beta-blockers.     If she would like an appointment, I can [place] at [place] [facility]     AM         ----- Message -----   From: Meghan Perdomo PA-C   Sent: 7/14/2020   3:39 PM CDT   To: Sagar Mendoza MD   Subject: FW: darlin  hemangiomas                           This patient has melanoma and has received 2 cycles of nivolumab. She has developed multiple new red lesions on her skin that look like cherry hemangiomas. I have not met her before, but she tells me they are new and developed after starting immune therapy. I have not heard of this or seen this before. I'm wondering if you might see her to check it out or if you have other thoughts on this.   Meghan   ----- Message -----   From: Gi Kowalski MD   Sent: 7/14/2020   2:36 PM CDT   To: Meghan Perdomo PA-C   Subject: RE: cherry hemangiomas                           I haven't seen this in patients. Maybe derm could help us out?   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 I'm wondering if it is from the immune therapy. Have you heard of this?   Meghan

## 2020-07-30 ENCOUNTER — HOSPITAL ENCOUNTER (OUTPATIENT)
Dept: OCCUPATIONAL THERAPY | Facility: CLINIC | Age: 75
Setting detail: THERAPIES SERIES
End: 2020-07-30
Attending: INTERNAL MEDICINE
Payer: COMMERCIAL

## 2020-07-30 ENCOUNTER — HOSPITAL ENCOUNTER (OUTPATIENT)
Dept: PHYSICAL THERAPY | Facility: CLINIC | Age: 75
Setting detail: THERAPIES SERIES
End: 2020-07-30
Attending: INTERNAL MEDICINE
Payer: COMMERCIAL

## 2020-07-30 PROCEDURE — 97110 THERAPEUTIC EXERCISES: CPT | Mod: GO | Performed by: OCCUPATIONAL THERAPIST

## 2020-07-30 PROCEDURE — 97760 ORTHOTIC MGMT&TRAING 1ST ENC: CPT | Mod: GP | Performed by: PHYSICAL THERAPIST

## 2020-07-31 NOTE — TELEPHONE ENCOUNTER
Pt called, No answer.  Left general message for pt to call the [facility] clinic back at [phone]...Georgette Lake RN [facility] [place]               Hello,     Pt requesting a call from a nurse for more information regarding her appointment with . She is wondering if this related to her referral from . If someone could kindly give her call.       Thank you,   Georgette

## 2020-08-01 NOTE — PROGRESS NOTES
Outpatient Physical Therapy Progress Note     Patient: Yadira Hoang  : 1945    Beginning/End Dates of Reporting Period:  6/3/2020 to 2020    Referring Provider: Dr Dorsey    Therapy Diagnosis: Malignant neoplasm right lower extremity, physical deconditioning     Client Self Report:Right knee pain/leg pain is waking her up at night. Now that I am walking more and more its bothering me and I havent had an injection in awhile. Wonders if she can have a knee injection. She is wanting to walk on the driveway once we get a shoe that works. Not doing much for leg exs lately since the whole leg hurts .My coping skills I am losing them. If I wasnt so fatigued I could cope.    Objective Measurements:  Objective Measure: 25fTW wtih 4WW and foot orthotics and shoes 11.25 seconds and 16 steps.      Objective Measure: 25fTW  with foot orthotics and tripod cane 12.91 seconds and 19 steps.      Goals:  Goal Identifier gait-MET   Goal Description Improve on gait speed/quality to walk 25 ft with AD in < 15 seconds and <20 steps consistently for household mobility   Target Date 20   Date Met  20   Progress:     Goal Identifier stairs-progressing   Goal Description she will be able to go up/down 10-12 steps with one railing and one AD with SBA consistently   Target Date 20   Date Met      Progress:     Goal Identifier FACIT-not improving   Goal Description Improve on facit from  to  (self rating) for improved energy for ADLs   Target Date 20   Date Met      Progress:     Goal Identifier 6MWT-progressing   Goal Description She will be able to complete 6MWT with 4WW at >.8 m/s for community mobility   Target Date 20   Date Met      Progress:     Progress Toward Goals: Yadira is having a lot of fatigue and right leg tightness/pain sensation that limit her mobility. Her right foot swelling has decreased nicely over the past 2 months. We were able today to put her in custom orthopedic  shoes with custom foot orthotics. Now she will be able ot do some walking outside on her driveway and use the stairs more. She has been using a 4WW for most mobilty. We are progressing to a tripod cane. She is progressing and the goals are partially met. I do not think she will meet the FACIT goal as fatigue is her biggest issue.    Plan:  Continue therapy per current plan of care. She wants limited appts due to fatigue and COVID. Anticipate appts every 1-2 weeks.    Discharge:  Angeles Hough DPT, MPT, NCS  Physical Therapist   Board Certified Neurologic Clinical Specialist     [facility], [facility]   [address] [place] [address]   [email or web address]   [phone]   Clinic: [phone] //   Fax: [phone]

## 2020-08-03 ENCOUNTER — TELEPHONE (OUTPATIENT)
Dept: PHYSICAL THERAPY | Facility: CLINIC | Age: 75
End: 2020-08-03

## 2020-08-03 ENCOUNTER — OFFICE VISIT (OUTPATIENT)
Dept: OPHTHALMOLOGY | Facility: CLINIC | Age: 75
End: 2020-08-03
Payer: COMMERCIAL

## 2020-08-03 ENCOUNTER — TELEPHONE (OUTPATIENT)
Dept: SURGERY | Facility: CLINIC | Age: 75
End: 2020-08-03

## 2020-08-03 DIAGNOSIS — H04.123 DRY EYES, BILATERAL: Primary | ICD-10-CM

## 2020-08-03 DIAGNOSIS — H02.889 MGD (MEIBOMIAN GLAND DISEASE), UNSPECIFIED LATERALITY: ICD-10-CM

## 2020-08-03 PROCEDURE — 92002 INTRM OPH EXAM NEW PATIENT: CPT | Performed by: OPHTHALMOLOGY

## 2020-08-03 ASSESSMENT — SLIT LAMP EXAM - LIDS
COMMENTS: MEIBOMIAN GLAND DYSFUNCTION
COMMENTS: MEIBOMIAN GLAND DYSFUNCTION

## 2020-08-03 ASSESSMENT — TONOMETRY
OS_IOP_MMHG: 11
OD_IOP_MMHG: 9
IOP_METHOD: ICARE

## 2020-08-03 ASSESSMENT — CONF VISUAL FIELD
OD_NORMAL: 1
OS_NORMAL: 1
METHOD: COUNTING FINGERS

## 2020-08-03 ASSESSMENT — VISUAL ACUITY
OS_CC+: -1
OS_CC: 20/15
METHOD: SNELLEN - LINEAR
OD_CC: 20/25
OD_CC+: +2
CORRECTION_TYPE: GLASSES

## 2020-08-03 ASSESSMENT — EXTERNAL EXAM - LEFT EYE: OS_EXAM: NORMAL

## 2020-08-03 ASSESSMENT — EXTERNAL EXAM - RIGHT EYE: OD_EXAM: NORMAL

## 2020-08-03 NOTE — NURSING NOTE
Chief Complaints and History of Present Illnesses   Patient presents with     Dry Eye(s)       Chief Complaint(s) and History of Present Illness(es)     Dry Eye(s)     Laterality: both eyes    Associated signs and symptoms: irritation, discharge, redness and eye pain (Soreness)    Duration: years    Treatments tried: artificial tears, warm compresses and punctal plugs              Comments     Patient is having problems with dry eyes. Problem started when she was on narcotics for 4 surgeries. No longer on meds but still having problems. Eye lids seem to swell. Gets crustiness in corners of eyes. Has had dry eyes for years but nothing like this.    Was using Systane but has a hard time with eyedrops so wondering if there's something else she can do. Had punctal plugs years ago.                Melanie Jeans, OA

## 2020-08-03 NOTE — PROGRESS NOTES
Assessment & Plan   Yadira Hoang is a 75 year old female who presents with:   Review of systems for the eyes was negative other than the pertinent positives and negatives noted in the HPI.    Dry eyes, bilateral  - Start AT's    MGD (meibomian gland disease), unspecified laterality  - Recommend Lipiflow    Allergic conjunctivitis  - OTC allergy eye drops    BLL laxity with orbital fat prolapse  - Offered consultation with oculoplastics. Patient declined    Return for scheduled annual in one month (DFE)      Attending Physician Attestation:  Complete documentation of historical and exam elements from today's encounter can be found in the full encounter summary report (not reduplicated in this progress note).  I personally obtained the chief complaint(s) and history of present illness.  I confirmed and edited as necessary the review of systems, past medical/surgical history, family history, social history, and examination findings as documented by others; and I examined the patient myself.  I personally reviewed the relevant tests, images, and reports as documented above.  I formulated and edited as necessary the assessment and plan and discussed the findings and management plan with the patient and family. - Alec Rolle MD

## 2020-08-03 NOTE — TELEPHONE ENCOUNTER
"Yadira was seen on  in Physical therapy by myself and Joaquin Connors in orthotics. Joaquin issued her new custom orthotics and new shoes. She called today to say \"they did not work out al all\". She feels she is in more pain now. I specifically asked where it hurt her foot and she reports it is the bottom of her foot and the cut on the posterior heel/achilles. Plus the shoes were \"too tight, too small\". I told her to bring the shoes and orthotics to next appt and I will see if Joaquin is available for that appt. I explained that I did not know if we could return the shoes or not (sometimes we have 7-10 days to return items). I also explained Joaquin can trim more on the orthotics. She is frustrated and is not sure if she will come to the next appt. She is waiting to see what happens at Dr Crabtree's appt on . She has no other PT appts scheduled except on  with me.     Tere Hough DPT, MPT, NCS  Physical Therapist   Board Certified Neurologic Clinical Specialist     [facility], [facility]   [address] [place] [address]   [email or web address]  [email or web address]   [phone]   Clinic: [phone] //   Fax: [phone]  "

## 2020-08-03 NOTE — TELEPHONE ENCOUNTER
"RN received a staff message from Ortho nurse asking if pt is good to go on Ortho schedule tomorrow as pt is a plastics pt. RN called pt and pt reports for the past 3 weeks experiencing tightness and pain to her right  foot to her knee and around the graft site. Pt states that she feels like it is not improving and feels a little worse. No numbness to the right foot  ,or swelling noted. RN asked pt if she was continuing to moisturize the graft site and pt states \" probably not as much as I should I often forget\". RN advised using vaseline to the graft site frequently throughout the day. RN offered appt on 's plastics clinic this Friday. Pt states \"oh that works out better for me\". RN asked pt if she felt like she could wait until Friday to be seen and pt states\"Yes that's fine\".  Pt scheduled this Friday 8/7  in  to see ..Melissa Ellsworth RN    "

## 2020-08-07 ENCOUNTER — OFFICE VISIT (OUTPATIENT)
Dept: SURGERY | Facility: CLINIC | Age: 75
End: 2020-08-07
Payer: COMMERCIAL

## 2020-08-07 DIAGNOSIS — Z98.890 S/P FLAP GRAFT: Primary | ICD-10-CM

## 2020-08-07 PROCEDURE — 99213 OFFICE O/P EST LOW 20 MIN: CPT | Performed by: PLASTIC SURGERY

## 2020-08-07 NOTE — LETTER
8/7/2020         RE: Yadira Hoang  [address] [facility] [address]        Dear Colleague,    Thank you for referring your patient, Yadira Hoang, to the [facility]. Please see a copy of my visit note below.    PRESENTING COMPLAINT:  Postoperative visit, status post right foot reconstruction with medial plantar flap and skin graft on 04/02/2020.      HISTORY OF PRESENTING COMPLAINT:  Ms. Hoang is 75 years old.  She is about 4 months out from surgery, overall everything is healed, but she still complains of a lot of aching pain in her calf and thigh regions.  Additionally, she has some nonspecific swelling of her right knee and occasionally has pain in the heel area that comes and goes.      PHYSICAL EXAMINATION:  Vital signs are stable.  She is afebrile, in no obvious distress.  Her foot is completely healed, softening well.  No evidence for any Tinel's anywhere.  Vascularity is palpable in the posterior tibial but not in the dorsalis pedis area.  She is soft and the calf region, soft in the thigh region.  No obvious swelling of her right knee.      ASSESSMENT AND PLAN:  Based on above findings, a diagnosis of nonspecific pains and the right lower extremity was made.  I had a yinka discussion with the patient and her .  This could be related to some nonspecific neuropathic pain.  My advice is to have her primary care physician start her on Lyrica or gabapentin on a regular basis and see over 4-6 weeks if that helps with her nonspecific dull aching pain.  I do not think there is an obvious neuroma, as I cannot elicit one, but nonspecific neuropathic pain could be the etiology.  With regards to her knee, again I have asked her to go back to her physician who gave her steroid injections in the past and she already knows what to do about that.  I will see her back in 4-6 weeks after Lyrica or gabapentin is started.         Again, thank you for allowing me to participate in  the care of your patient.        Sincerely,        NIKA Crabtree MD

## 2020-08-07 NOTE — NURSING NOTE
Yadira Hoang's goals for this visit include:   Chief Complaint   Patient presents with     RECHECK     leg pain and tightness in right leg, post MM       She requests these members of her care team be copied on today's visit information: no    PCP: Erika Blanton    Referring Provider:  No referring provider defined for this encounter.    There were no vitals taken for this visit.    Do you need any medication refills at today's visit? No    Marion Bonilla LPN

## 2020-08-07 NOTE — PROGRESS NOTES
PRESENTING COMPLAINT:  Postoperative visit, status post right foot reconstruction with medial plantar flap and skin graft on 04/02/2020.      HISTORY OF PRESENTING COMPLAINT:  Ms. Hoang is 75 years old.  She is about 4 months out from surgery, overall everything is healed, but she still complains of a lot of aching pain in her calf and thigh regions.  Additionally, she has some nonspecific swelling of her right knee and occasionally has pain in the heel area that comes and goes.      PHYSICAL EXAMINATION:  Vital signs are stable.  She is afebrile, in no obvious distress.  Her foot is completely healed, softening well.  No evidence for any Tinel's anywhere.  Vascularity is palpable in the posterior tibial but not in the dorsalis pedis area.  She is soft and the calf region, soft in the thigh region.  No obvious swelling of her right knee.      ASSESSMENT AND PLAN:  Based on above findings, a diagnosis of nonspecific pains and the right lower extremity was made.  I had a yinka discussion with the patient and her .  This could be related to some nonspecific neuropathic pain.  My advice is to have her primary care physician start her on Lyrica or gabapentin on a regular basis and see over 4-6 weeks if that helps with her nonspecific dull aching pain.  I do not think there is an obvious neuroma, as I cannot elicit one, but nonspecific neuropathic pain could be the etiology.  With regards to her knee, again I have asked her to go back to her physician who gave her steroid injections in the past and she already knows what to do about that.  I will see her back in 4-6 weeks after Lyrica or gabapentin is started.

## 2020-08-17 ENCOUNTER — TELEPHONE (OUTPATIENT)
Dept: ORTHOPEDICS | Facility: CLINIC | Age: 75
End: 2020-08-17

## 2020-08-17 NOTE — TELEPHONE ENCOUNTER
[facility] Call Center    Phone Message    May a detailed message be left on voicemail: no     Reason for Call: Symptoms or Concerns     If patient has red-flag symptoms, warm transfer to triage line    Current symptom or concern: Pt also has symptoms in her right shoulder. Extreme pain.  Unable to get dressed herself.     Symptoms have been present for:  1 week(s)    Pt feels it is the way she is pushing her arm on her walker to compensate for her knee.  Still waiting to hear back on what medication is allowed per Dr Dorsey. Pt wants injections and not just xrays and consult. Please advise.     Action Taken: Message routed to:  Adult Clinics: Sports Medicine p [id number]    Travel Screening:

## 2020-08-18 DIAGNOSIS — C43.71 MALIGNANT MELANOMA OF RIGHT LOWER EXTREMITY INCLUDING HIP (H): Primary | ICD-10-CM

## 2020-08-18 RX ORDER — SERTRALINE HYDROCHLORIDE 25 MG/1
25 TABLET, FILM COATED ORAL DAILY
Qty: 30 TABLET | Refills: 4 | Status: SHIPPED | OUTPATIENT
Start: 2020-08-18 | End: 2021-02-12

## 2020-08-18 NOTE — TELEPHONE ENCOUNTER
Patient was contacted and a generic voicemail was left, per her note.     Injection therapy can be discuss at her upcoming appointment,

## 2020-08-21 ENCOUNTER — ANCILLARY PROCEDURE (OUTPATIENT)
Dept: PET IMAGING | Facility: CLINIC | Age: 75
End: 2020-08-21
Attending: INTERNAL MEDICINE
Payer: COMMERCIAL

## 2020-08-21 DIAGNOSIS — C43.71 MALIGNANT MELANOMA OF RIGHT LOWER EXTREMITY INCLUDING HIP (H): ICD-10-CM

## 2020-08-21 DIAGNOSIS — C43.9 MELANOMA OF SKIN (H): ICD-10-CM

## 2020-08-21 LAB
ALBUMIN SERPL-MCNC: 3.7 G/DL (ref 3.4–5)
ALP SERPL-CCNC: 76 U/L (ref 40–150)
ALT SERPL W P-5'-P-CCNC: 20 U/L (ref 0–50)
ANION GAP SERPL CALCULATED.3IONS-SCNC: 4 MMOL/L (ref 3–14)
AST SERPL W P-5'-P-CCNC: 11 U/L (ref 0–45)
BASOPHILS # BLD AUTO: 0 10E9/L (ref 0–0.2)
BASOPHILS NFR BLD AUTO: 0.4 %
BILIRUB SERPL-MCNC: 0.5 MG/DL (ref 0.2–1.3)
BUN SERPL-MCNC: 20 MG/DL (ref 7–30)
CALCIUM SERPL-MCNC: 9.2 MG/DL (ref 8.5–10.1)
CHLORIDE SERPL-SCNC: 102 MMOL/L (ref 94–109)
CO2 SERPL-SCNC: 32 MMOL/L (ref 20–32)
CREAT SERPL-MCNC: 0.6 MG/DL (ref 0.52–1.04)
DIFFERENTIAL METHOD BLD: NORMAL
EOSINOPHIL # BLD AUTO: 0.1 10E9/L (ref 0–0.7)
EOSINOPHIL NFR BLD AUTO: 0.8 %
ERYTHROCYTE [DISTWIDTH] IN BLOOD BY AUTOMATED COUNT: 12.1 % (ref 10–15)
GFR SERPL CREATININE-BSD FRML MDRD: 89 ML/MIN/{1.73_M2}
GLUCOSE SERPL-MCNC: 91 MG/DL (ref 70–99)
HCT VFR BLD AUTO: 38 % (ref 35–47)
HGB BLD-MCNC: 12.6 G/DL (ref 11.7–15.7)
IMM GRANULOCYTES # BLD: 0 10E9/L (ref 0–0.4)
IMM GRANULOCYTES NFR BLD: 0.3 %
LYMPHOCYTES # BLD AUTO: 1.7 10E9/L (ref 0.8–5.3)
LYMPHOCYTES NFR BLD AUTO: 22.9 %
MCH RBC QN AUTO: 30.4 PG (ref 26.5–33)
MCHC RBC AUTO-ENTMCNC: 33.2 G/DL (ref 31.5–36.5)
MCV RBC AUTO: 92 FL (ref 78–100)
MONOCYTES # BLD AUTO: 0.5 10E9/L (ref 0–1.3)
MONOCYTES NFR BLD AUTO: 6.2 %
NEUTROPHILS # BLD AUTO: 5.1 10E9/L (ref 1.6–8.3)
NEUTROPHILS NFR BLD AUTO: 69.4 %
PLATELET # BLD AUTO: 337 10E9/L (ref 150–450)
POTASSIUM SERPL-SCNC: 3.8 MMOL/L (ref 3.4–5.3)
PROT SERPL-MCNC: 7.6 G/DL (ref 6.8–8.8)
RBC # BLD AUTO: 4.14 10E12/L (ref 3.8–5.2)
SODIUM SERPL-SCNC: 138 MMOL/L (ref 133–144)
WBC # BLD AUTO: 7.3 10E9/L (ref 4–11)

## 2020-08-21 PROCEDURE — A9552 F18 FDG: HCPCS | Performed by: RADIOLOGY

## 2020-08-21 PROCEDURE — 85025 COMPLETE CBC W/AUTO DIFF WBC: CPT | Performed by: INTERNAL MEDICINE

## 2020-08-21 PROCEDURE — 80053 COMPREHEN METABOLIC PANEL: CPT | Performed by: INTERNAL MEDICINE

## 2020-08-21 PROCEDURE — 78816 PET IMAGE W/CT FULL BODY: CPT | Mod: PS | Performed by: RADIOLOGY

## 2020-08-21 PROCEDURE — 36415 COLL VENOUS BLD VENIPUNCTURE: CPT | Performed by: INTERNAL MEDICINE

## 2020-08-21 PROCEDURE — 74177 CT ABD & PELVIS W/CONTRAST: CPT | Mod: 59 | Performed by: RADIOLOGY

## 2020-08-21 PROCEDURE — 71260 CT THORAX DX C+: CPT | Mod: 59 | Performed by: RADIOLOGY

## 2020-08-21 RX ORDER — IOPAMIDOL 755 MG/ML
10-135 INJECTION, SOLUTION INTRAVASCULAR ONCE
Status: COMPLETED | OUTPATIENT
Start: 2020-08-21 | End: 2020-08-21

## 2020-08-21 RX ADMIN — IOPAMIDOL 88 ML: 755 INJECTION, SOLUTION INTRAVASCULAR at 10:38

## 2020-08-25 ENCOUNTER — OFFICE VISIT (OUTPATIENT)
Dept: ORTHOPEDICS | Facility: CLINIC | Age: 75
End: 2020-08-25
Payer: COMMERCIAL

## 2020-08-25 ENCOUNTER — ANCILLARY PROCEDURE (OUTPATIENT)
Dept: GENERAL RADIOLOGY | Facility: CLINIC | Age: 75
End: 2020-08-25
Attending: FAMILY MEDICINE
Payer: COMMERCIAL

## 2020-08-25 VITALS — WEIGHT: 144 LBS | BODY MASS INDEX: 22.55 KG/M2

## 2020-08-25 DIAGNOSIS — M25.511 RIGHT SHOULDER PAIN: ICD-10-CM

## 2020-08-25 DIAGNOSIS — M25.561 CHRONIC PAIN OF RIGHT KNEE: ICD-10-CM

## 2020-08-25 DIAGNOSIS — G89.29 CHRONIC PAIN OF RIGHT KNEE: ICD-10-CM

## 2020-08-25 DIAGNOSIS — M25.561 RIGHT KNEE PAIN: ICD-10-CM

## 2020-08-25 DIAGNOSIS — M25.511 ACUTE PAIN OF RIGHT SHOULDER: Primary | ICD-10-CM

## 2020-08-25 PROCEDURE — 73030 X-RAY EXAM OF SHOULDER: CPT | Mod: RT | Performed by: RADIOLOGY

## 2020-08-25 PROCEDURE — 99207 ZZC NO CHARGE INJECTABLE MED/DRUG: CPT | Performed by: FAMILY MEDICINE

## 2020-08-25 PROCEDURE — 73564 X-RAY EXAM KNEE 4 OR MORE: CPT | Mod: RT | Performed by: RADIOLOGY

## 2020-08-25 PROCEDURE — 99214 OFFICE O/P EST MOD 30 MIN: CPT | Mod: 25 | Performed by: FAMILY MEDICINE

## 2020-08-25 PROCEDURE — 20610 DRAIN/INJ JOINT/BURSA W/O US: CPT | Mod: 59 | Performed by: FAMILY MEDICINE

## 2020-08-25 RX ORDER — BUPIVACAINE HYDROCHLORIDE 2.5 MG/ML
2 INJECTION, SOLUTION EPIDURAL; INFILTRATION; INTRACAUDAL
Status: DISCONTINUED | OUTPATIENT
Start: 2020-08-25 | End: 2021-06-04

## 2020-08-25 RX ORDER — LIDOCAINE HYDROCHLORIDE 10 MG/ML
2 INJECTION, SOLUTION EPIDURAL; INFILTRATION; INTRACAUDAL; PERINEURAL
Status: DISCONTINUED | OUTPATIENT
Start: 2020-08-25 | End: 2020-10-02

## 2020-08-25 RX ADMIN — LIDOCAINE HYDROCHLORIDE 2 ML: 10 INJECTION, SOLUTION EPIDURAL; INFILTRATION; INTRACAUDAL; PERINEURAL at 12:12

## 2020-08-25 RX ADMIN — BUPIVACAINE HYDROCHLORIDE 2 ML: 2.5 INJECTION, SOLUTION EPIDURAL; INFILTRATION; INTRACAUDAL at 12:11

## 2020-08-25 RX ADMIN — BUPIVACAINE HYDROCHLORIDE 2 ML: 2.5 INJECTION, SOLUTION EPIDURAL; INFILTRATION; INTRACAUDAL at 12:12

## 2020-08-25 RX ADMIN — LIDOCAINE HYDROCHLORIDE 2 ML: 10 INJECTION, SOLUTION EPIDURAL; INFILTRATION; INTRACAUDAL; PERINEURAL at 12:11

## 2020-08-25 NOTE — PROGRESS NOTES
"CHIEF COMPLAINT:  Consult (right knee and right shoulder pain, right knee pain is long standing, right shoulder pain started a few weeks ago, no known injury )       HISTORY OF PRESENT ILLNESS  Ms. Hoang is a pleasant 75 year old female who presents to clinic today with multiple issues.  Yadira Ochoa has pain in her right shoulder and her right knee.  Her right knee pain is been present for many months, possibly more.  No clear inciting event that she can recall.  Pain radiates up her leg and down her leg, she points to her thigh and her calf also.  She feels that there is swelling in her knee.  Of note, she did have a skin graft on her right upper thigh, this is in the location of her pain.  She does not feel that the pain is numb or tingly, rather \"painful.\"  She has difficulty describing this.    Her right shoulder has been bothersome for a few weeks.  Again, no clear inciting event that she can recall.  She points to her shoulder region, however, pain radiates all the way down her arm.  She has trouble localizing the pain, although she believes it radiates from her shoulder.  She denies any clinically significant neck pain, although she does note that her neck has bothered her more recently.  As with her leg, no numbness or tingling in the arm.    She does have a history of melanoma, she is undergoing active cancer treatment.  Her cancer team reached out to me regarding her appointment, part of this message stated that steroids are not an option for her given her immunocompromised nature.        Additional history: as documented    MEDICAL HISTORY  Patient Active Problem List   Diagnosis     Malignant melanoma of right lower extremity including hip (H)     Melanoma of skin (H)     GUERLINE on CPAP     Chronic congestion of paranasal sinus     Weakness of foot     Neck pain, chronic     Myofascial pain     Intractable migraine without aura and without status migrainosus     History of multiple concussions     Foot " pain, bilateral     Fibromyositis     Difficulty walking     Chronic pain disorder     Chronic fatigue     Bilateral occipital neuralgia     Articular disc disorder of temporomandibular joint     S/P flap graft     Osteopenia     Injury of head     Hyperlipidemia     ALEXUS (generalized anxiety disorder)     Major depression, recurrent, chronic (H)       Current Outpatient Medications   Medication Sig Dispense Refill     acetaminophen (TYLENOL) 325 MG tablet Take 2 tablets (650 mg) by mouth every 4 hours as needed for mild pain (Patient not taking: Reported on 8/7/2020) 50 tablet 0     cholecalciferol (VITAMIN D3) 125 MCG (5000 UT) TABS tablet Take 2,000 Units by mouth every other day        diclofenac (VOLTAREN) 1 % topical gel Apply 4 g topically 4 times daily 50 g 4     estradiol (ESTRACE) 0.1 MG/GM vaginal cream Place 1 g vaginally twice a week 42.5 g 1     gabapentin (NEURONTIN) 100 MG capsule Take 1 capsule (100 mg) by mouth nightly as needed for other (Pain multiple site) (Patient not taking: Reported on 8/7/2020) 90 capsule 0     polyethylene glycol (MIRALAX) powder Take 17 g by mouth       polyethylene glycol-propylene glycol PF (SYSTANE HYDRATION PF) 0.4-0.3 % SOLN opthalmic solution Place 1 drop into both eyes 4 times daily as needed for dry eyes 28 each 1     sertraline (ZOLOFT) 25 MG tablet Take 1 tablet (25 mg) by mouth daily 30 tablet 4       Allergies   Allergen Reactions     Atorvastatin      Statins all swelling     Hmg-Coa-R Inhibitors Swelling       Family History   Problem Relation Age of Onset     Glaucoma No family hx of      Macular Degeneration No family hx of        Additional medical/Social/Surgical histories reviewed in [facility] and updated as appropriate.        PHYSICAL EXAM    Vitals:    08/25/20 1102   Weight: 65.3 kg (144 lb)     General  - normal appearance, in no obvious distress  HEENT  - conjunctivae not injected, moist mucous membranes  CV  - normal radial pulse  Pulm  - normal  respiratory pattern, non-labored  Musculoskeletal - right shoulder  - inspection: normal bone and joint alignment, no obvious deformity, no scapular winging, no AC step-off  - palpation: mildly tender throughout entirety of shoulder  - ROM: painful to initiate any movement, extreme pain with passive movement of the shoulder above 90 degrees, guarding  - strength: 5/5  strength, 5/5 in all shoulder planes  - special tests:  Unable to perform    Musculoskeletal - right knee  - inspection: no swelling, no effusion  - palpation: mild medial and lateral joint line tenderness, patella and patellar tendon non-tender, normal popliteal pulse  - ROM: 100 degrees flexion, 0 degrees extension, painful active ROM  - strength: 5/5 in flexion, 5/5 in extension    Neuro  - no sensory or motor deficit, grossly normal coordination, normal muscle tone  Skin  - no ecchymosis, erythema, warmth, or induration, no obvious rash  Psych  - interactive, appropriate, normal mood and affect             ASSESSMENT & PLAN  Ms. Hoang is a 75 year old female who presents to clinic today with right arm and right leg pain, which she believes are likely from her shoulder and knee.    I ordered radiographs of her right shoulder and right knee, I reviewed these in the room with her.  Her right knee x-ray shows no obvious acute issues, she does have what appears to be mild osteoarthritis throughout.  Her shoulder x-ray shows a humeral head enthesophyte, consistent with calcific tendinitis.    I had a very long discussion with Yadira and her  centering around her pain, her x-rays, and the implications.  In her case, I do think interventions would be appropriate given that her pain is marked and out of proportion to her physical exam and x-ray findings.  Unfortunately steroids are not an option for her, in an ideal case we could perform a corticosteroid injection.    After some discussion, through shared decision making I did perform injections  on a diagnostic and therapeutic basis, these injections were performed with lidocaine and bupivacaine (see procedure note).    I do have some concern that we may not be able to help with her pain given the immunocompromise nature and limited options that we have.  I am referring her to our local partners in pain management for their expertise regarding the next step.  This is a somewhat urgent referral.    It was a pleasure seeing Yadira Ochoa today.    Aside to and separate from the procedure approximately 40 minutes was spent in the room in discussion regarding the above, and in coordination of care.    Abhi Duarte DO, [facility]      This note was constructed using Dragon dictation software, please excuse any minor errors in spelling, grammar, or syntax.    Scribed by Maureen Sanchez ATC for Dr. Duarte on 8/25/20 at 12:00 PM, based on the providers statements to me.           [facility] FOLLOW-UP VISIT 8/25/2020    Yadira Hoang's chief complaint for this visit includes:  Chief Complaint   Patient presents with     Consult     right knee and right shoulder pain, right knee pain is long standing, right shoulder pain started a few weeks ago, no known injury      PCP: Erika Blanton    Referring Provider:  No referring provider defined for this encounter.    Wt 65.3 kg (144 lb)   BMI 22.55 kg/m    Data Unavailable       Interval History:     Follow up reason: right knee and right shoulder     Pain: Worsening    Function: Worsening    Medical History:    Any recent changes to your medical history? No    Any new medication prescribed since last visit? No    Review of Systems:    Do you have fever, chills, weight loss? No    Do you have any vision problems? No    Do you have any chest pain or edema? No    Do you have any shortness of breath or wheezing?  No    Do you have stomach problems? No    Do you have any urinary track issues? No    Do you have any numbness or focal weakness?  No    Do you have diabetes? No    Do you have problems with bleeding or clotting? No    Do you have an rashes or other skin lesions? No    Large Joint Injection/Arthocentesis: R knee joint    Date/Time: 8/25/2020 12:11 PM  Performed by: Abhi Duarte DO  Authorized by: Abhi Duarte DO     Indications:  Pain  Needle Size:  22 G  Guidance: landmark guided    Approach:  Lateral  Location:  Knee      Medications:  2 mL lidocaine (PF) 1 %; 2 mL bupivacaine HCl (PF) 0.25 %  Outcome:  Tolerated well, no immediate complications  Procedure discussed: discussed risks, benefits, and alternatives    Consent Given by:  Patient  Timeout: timeout called immediately prior to procedure    Prep: patient was prepped and draped in usual sterile fashion    Large Joint Injection/Arthocentesis: R subacromial bursa    Date/Time: 8/25/2020 12:12 PM  Performed by: Abhi Duarte DO  Authorized by: Abhi Duarte DO     Indications:  Pain  Needle Size:  22 G  Guidance: landmark guided    Approach:  Posterior  Location:  Shoulder      Site:  R subacromial bursa  Medications:  2 mL lidocaine (PF) 1 %; 2 mL bupivacaine HCl (PF) 0.25 %  Outcome:  Tolerated well, no immediate complications  Procedure discussed: discussed risks, benefits, and alternatives    Consent Given by:  Patient  Timeout: timeout called immediately prior to procedure    Prep: patient was prepped and draped in usual sterile fashion       PROCEDURE    Knee Injection - Intraarticular    The patient was informed of the risks and the benefits of the procedure and a written consent was signed.    The patient s right knee was prepped with chlorhexidine in sterile fashion.   2 mL of 1% lidocaine and 2 mL of 0.25% bupivacaine were drawn up into a 5 mL syringe.  Injection was performed using substerile technique.  A 1.5-inch 22-gauge needle was used to enter the lateral aspect of the right knee.  Injection performed successfully without difficulty.  There were no  complications. The patient tolerated the procedure well. There was negligible bleeding.     The patient was instructed to ice the knee upon leaving clinic and refrain from overuse over the next 3 days.   The patient was instructed to call or go to the emergency room with any unusual pain, swelling, redness, or if otherwise concerned.      Shoulder Injection - subacromial space     The patient was informed of the risks and the benefits of the procedure and a written consent was signed.    The patient s right shoulder was prepped with Betadine in sterile fashion.   2 mL of 1% lidocaine and 2 mL of 0.25% bupivacaine were drawn up into a 5 mL syringe.  Injection was performed with sub-sterile technique.  A 1.5-inch 22-gauge needle was used to enter the subacromial space at the posterior glenohumeral joint.  There were no complications. The patient tolerated the procedure well. There was negligible bleeding.     The patient was instructed to ice the shoulder upon leaving clinic and refrain from overuse over the next 3 days.   The patient was instructed to call or go to the emergency room with any unusual pain, swelling, redness, or if otherwise concerned.

## 2020-08-25 NOTE — LETTER
"    8/25/2020         RE: Yadira Hoang  [address] [facility] [address]        Dear Colleague,    Thank you for referring your patient, Yadira Hoang, to the [facility]. Please see a copy of my visit note below.    CHIEF COMPLAINT:  Consult (right knee and right shoulder pain, right knee pain is long standing, right shoulder pain started a few weeks ago, no known injury )       HISTORY OF PRESENT ILLNESS  Ms. Hoang is a pleasant 75 year old female who presents to clinic today with multiple issues.  Yadira Ochoa has pain in her right shoulder and her right knee.  Her right knee pain is been present for many months, possibly more.  No clear inciting event that she can recall.  Pain radiates up her leg and down her leg, she points to her thigh and her calf also.  She feels that there is swelling in her knee.  Of note, she did have a skin graft on her right upper thigh, this is in the location of her pain.  She does not feel that the pain is numb or tingly, rather \"painful.\"  She has difficulty describing this.    Her right shoulder has been bothersome for a few weeks.  Again, no clear inciting event that she can recall.  She points to her shoulder region, however, pain radiates all the way down her arm.  She has trouble localizing the pain, although she believes it radiates from her shoulder.  She denies any clinically significant neck pain, although she does note that her neck has bothered her more recently.  As with her leg, no numbness or tingling in the arm.    She does have a history of melanoma, she is undergoing active cancer treatment.  Her cancer team reached out to me regarding her appointment, part of this message stated that steroids are not an option for her given her immunocompromised nature.        Additional history: as documented    MEDICAL HISTORY  Patient Active Problem List   Diagnosis     Malignant melanoma of right lower extremity including hip (H)     Melanoma of " skin (H)     GUERLINE on CPAP     Chronic congestion of paranasal sinus     Weakness of foot     Neck pain, chronic     Myofascial pain     Intractable migraine without aura and without status migrainosus     History of multiple concussions     Foot pain, bilateral     Fibromyositis     Difficulty walking     Chronic pain disorder     Chronic fatigue     Bilateral occipital neuralgia     Articular disc disorder of temporomandibular joint     S/P flap graft     Osteopenia     Injury of head     Hyperlipidemia     ALEXUS (generalized anxiety disorder)     Major depression, recurrent, chronic (H)       Current Outpatient Medications   Medication Sig Dispense Refill     acetaminophen (TYLENOL) 325 MG tablet Take 2 tablets (650 mg) by mouth every 4 hours as needed for mild pain (Patient not taking: Reported on 8/7/2020) 50 tablet 0     cholecalciferol (VITAMIN D3) 125 MCG (5000 UT) TABS tablet Take 2,000 Units by mouth every other day        diclofenac (VOLTAREN) 1 % topical gel Apply 4 g topically 4 times daily 50 g 4     estradiol (ESTRACE) 0.1 MG/GM vaginal cream Place 1 g vaginally twice a week 42.5 g 1     gabapentin (NEURONTIN) 100 MG capsule Take 1 capsule (100 mg) by mouth nightly as needed for other (Pain multiple site) (Patient not taking: Reported on 8/7/2020) 90 capsule 0     polyethylene glycol (MIRALAX) powder Take 17 g by mouth       polyethylene glycol-propylene glycol PF (SYSTANE HYDRATION PF) 0.4-0.3 % SOLN opthalmic solution Place 1 drop into both eyes 4 times daily as needed for dry eyes 28 each 1     sertraline (ZOLOFT) 25 MG tablet Take 1 tablet (25 mg) by mouth daily 30 tablet 4       Allergies   Allergen Reactions     Atorvastatin      Statins all swelling     Hmg-Coa-R Inhibitors Swelling       Family History   Problem Relation Age of Onset     Glaucoma No family hx of      Macular Degeneration No family hx of        Additional medical/Social/Surgical histories reviewed in EPIC and updated as  appropriate.        PHYSICAL EXAM    Vitals:    08/25/20 1102   Weight: 65.3 kg (144 lb)     General  - normal appearance, in no obvious distress  HEENT  - conjunctivae not injected, moist mucous membranes  CV  - normal radial pulse  Pulm  - normal respiratory pattern, non-labored  Musculoskeletal - right shoulder  - inspection: normal bone and joint alignment, no obvious deformity, no scapular winging, no AC step-off  - palpation: mildly tender throughout entirety of shoulder  - ROM: painful to initiate any movement, extreme pain with passive movement of the shoulder above 90 degrees, guarding  - strength: 5/5  strength, 5/5 in all shoulder planes  - special tests:  Unable to perform    Musculoskeletal - right knee  - inspection: no swelling, no effusion  - palpation: mild medial and lateral joint line tenderness, patella and patellar tendon non-tender, normal popliteal pulse  - ROM: 100 degrees flexion, 0 degrees extension, painful active ROM  - strength: 5/5 in flexion, 5/5 in extension    Neuro  - no sensory or motor deficit, grossly normal coordination, normal muscle tone  Skin  - no ecchymosis, erythema, warmth, or induration, no obvious rash  Psych  - interactive, appropriate, normal mood and affect             ASSESSMENT & PLAN  Ms. Hoang is a 75 year old female who presents to clinic today with right arm and right leg pain, which she believes are likely from her shoulder and knee.    I ordered radiographs of her right shoulder and right knee, I reviewed these in the room with her.  Her right knee x-ray shows no obvious acute issues, she does have what appears to be mild osteoarthritis throughout.  Her shoulder x-ray shows a humeral head enthesophyte, consistent with calcific tendinitis.    I had a very long discussion with Yadira and her  centering around her pain, her x-rays, and the implications.  In her case, I do think interventions would be appropriate given that her pain is marked and out  of proportion to her physical exam and x-ray findings.  Unfortunately steroids are not an option for her, in an ideal case we could perform a corticosteroid injection.    After some discussion, through shared decision making I did perform injections on a diagnostic and therapeutic basis, these injections were performed with lidocaine and bupivacaine (see procedure note).    I do have some concern that we may not be able to help with her pain given the immunocompromise nature and limited options that we have.  I am referring her to our local partners in pain management for their expertise regarding the next step.  This is a somewhat urgent referral.    It was a pleasure seeing Yadira Ochoa today.    Aside to and separate from the procedure approximately 40 minutes was spent in the room in discussion regarding the above, and in coordination of care.    Abhi Duarte DO, CAM  Primary [facility] Sports Medicine      This note was constructed using Dragon dictation software, please excuse any minor errors in spelling, grammar, or syntax.    Scribed by Maureen Sanchez ATC for Dr. Duarte on 8/25/20 at 12:00 PM, based on the providers statements to me.           [facility] FOLLOW-UP VISIT 8/25/2020    Yadira Honag's chief complaint for this visit includes:  Chief Complaint   Patient presents with     Consult     right knee and right shoulder pain, right knee pain is long standing, right shoulder pain started a few weeks ago, no known injury      PCP: Erika Blanton    Referring Provider:  No referring provider defined for this encounter.    Wt 65.3 kg (144 lb)   BMI 22.55 kg/m    Data Unavailable       Interval History:     Follow up reason: right knee and right shoulder     Pain: Worsening    Function: Worsening    Medical History:    Any recent changes to your medical history? No    Any new medication prescribed since last visit? No    Review of Systems:    Do you have fever, chills, weight loss? No    Do you have  any vision problems? No    Do you have any chest pain or edema? No    Do you have any shortness of breath or wheezing?  No    Do you have stomach problems? No    Do you have any urinary track issues? No    Do you have any numbness or focal weakness? No    Do you have diabetes? No    Do you have problems with bleeding or clotting? No    Do you have an rashes or other skin lesions? No    Large Joint Injection/Arthocentesis: R knee joint    Date/Time: 8/25/2020 12:11 PM  Performed by: Abhi Duarte DO  Authorized by: Abhi Duarte DO     Indications:  Pain  Needle Size:  22 G  Guidance: landmark guided    Approach:  Lateral  Location:  Knee      Medications:  2 mL lidocaine (PF) 1 %; 2 mL bupivacaine HCl (PF) 0.25 %  Outcome:  Tolerated well, no immediate complications  Procedure discussed: discussed risks, benefits, and alternatives    Consent Given by:  Patient  Timeout: timeout called immediately prior to procedure    Prep: patient was prepped and draped in usual sterile fashion    Large Joint Injection/Arthocentesis: R subacromial bursa    Date/Time: 8/25/2020 12:12 PM  Performed by: Abhi Duarte DO  Authorized by: Abhi Duarte DO     Indications:  Pain  Needle Size:  22 G  Guidance: landmark guided    Approach:  Posterior  Location:  Shoulder      Site:  R subacromial bursa  Medications:  2 mL lidocaine (PF) 1 %; 2 mL bupivacaine HCl (PF) 0.25 %  Outcome:  Tolerated well, no immediate complications  Procedure discussed: discussed risks, benefits, and alternatives    Consent Given by:  Patient  Timeout: timeout called immediately prior to procedure    Prep: patient was prepped and draped in usual sterile fashion       PROCEDURE    Knee Injection - Intraarticular    The patient was informed of the risks and the benefits of the procedure and a written consent was signed.    The patient s right knee was prepped with chlorhexidine in sterile fashion.   2 mL of 1% lidocaine and 2 mL of 0.25%  bupivacaine were drawn up into a 5 mL syringe.  Injection was performed using substerile technique.  A 1.5-inch 22-gauge needle was used to enter the lateral aspect of the right knee.  Injection performed successfully without difficulty.  There were no complications. The patient tolerated the procedure well. There was negligible bleeding.     The patient was instructed to ice the knee upon leaving clinic and refrain from overuse over the next 3 days.   The patient was instructed to call or go to the emergency room with any unusual pain, swelling, redness, or if otherwise concerned.      Shoulder Injection - subacromial space     The patient was informed of the risks and the benefits of the procedure and a written consent was signed.    The patient s right shoulder was prepped with Betadine in sterile fashion.   2 mL of 1% lidocaine and 2 mL of 0.25% bupivacaine were drawn up into a 5 mL syringe.  Injection was performed with sub-sterile technique.  A 1.5-inch 22-gauge needle was used to enter the subacromial space at the posterior glenohumeral joint.  There were no complications. The patient tolerated the procedure well. There was negligible bleeding.     The patient was instructed to ice the shoulder upon leaving clinic and refrain from overuse over the next 3 days.   The patient was instructed to call or go to the emergency room with any unusual pain, swelling, redness, or if otherwise concerned.              Again, thank you for allowing me to participate in the care of your patient.        Sincerely,        Abhi Duarte DO

## 2020-08-27 ENCOUNTER — VIRTUAL VISIT (OUTPATIENT)
Dept: ONCOLOGY | Facility: CLINIC | Age: 75
End: 2020-08-27
Attending: INTERNAL MEDICINE
Payer: COMMERCIAL

## 2020-08-27 DIAGNOSIS — C43.9 MELANOMA OF SKIN (H): Primary | ICD-10-CM

## 2020-08-27 DIAGNOSIS — C43.71 MALIGNANT MELANOMA OF RIGHT LOWER EXTREMITY INCLUDING HIP (H): ICD-10-CM

## 2020-08-27 PROCEDURE — 40001009 ZZH VIDEO/TELEPHONE VISIT; NO CHARGE

## 2020-08-27 PROCEDURE — 99214 OFFICE O/P EST MOD 30 MIN: CPT | Mod: 95 | Performed by: INTERNAL MEDICINE

## 2020-08-27 RX ORDER — EPINEPHRINE 1 MG/ML
0.3 INJECTION, SOLUTION INTRAMUSCULAR; SUBCUTANEOUS EVERY 5 MIN PRN
Status: CANCELLED | OUTPATIENT
Start: 2020-09-03

## 2020-08-27 RX ORDER — ALBUTEROL SULFATE 0.83 MG/ML
2.5 SOLUTION RESPIRATORY (INHALATION)
Status: CANCELLED | OUTPATIENT
Start: 2020-09-03

## 2020-08-27 RX ORDER — HEPARIN SODIUM,PORCINE 10 UNIT/ML
5 VIAL (ML) INTRAVENOUS
Status: CANCELLED | OUTPATIENT
Start: 2020-09-03

## 2020-08-27 RX ORDER — DIPHENHYDRAMINE HYDROCHLORIDE 50 MG/ML
50 INJECTION INTRAMUSCULAR; INTRAVENOUS
Status: CANCELLED
Start: 2020-09-03

## 2020-08-27 RX ORDER — LORAZEPAM 2 MG/ML
0.5 INJECTION INTRAMUSCULAR EVERY 4 HOURS PRN
Status: CANCELLED
Start: 2020-09-03

## 2020-08-27 RX ORDER — SODIUM CHLORIDE 9 MG/ML
1000 INJECTION, SOLUTION INTRAVENOUS CONTINUOUS PRN
Status: CANCELLED
Start: 2020-09-03

## 2020-08-27 RX ORDER — ALBUTEROL SULFATE 90 UG/1
1-2 AEROSOL, METERED RESPIRATORY (INHALATION)
Status: CANCELLED
Start: 2020-09-03

## 2020-08-27 RX ORDER — NALOXONE HYDROCHLORIDE 0.4 MG/ML
.1-.4 INJECTION, SOLUTION INTRAMUSCULAR; INTRAVENOUS; SUBCUTANEOUS
Status: CANCELLED | OUTPATIENT
Start: 2020-09-03

## 2020-08-27 RX ORDER — HEPARIN SODIUM (PORCINE) LOCK FLUSH IV SOLN 100 UNIT/ML 100 UNIT/ML
5 SOLUTION INTRAVENOUS
Status: CANCELLED | OUTPATIENT
Start: 2020-09-03

## 2020-08-27 RX ORDER — MEPERIDINE HYDROCHLORIDE 25 MG/ML
25 INJECTION INTRAMUSCULAR; INTRAVENOUS; SUBCUTANEOUS EVERY 30 MIN PRN
Status: CANCELLED | OUTPATIENT
Start: 2020-09-03

## 2020-08-27 RX ORDER — METHYLPREDNISOLONE SODIUM SUCCINATE 125 MG/2ML
125 INJECTION, POWDER, LYOPHILIZED, FOR SOLUTION INTRAMUSCULAR; INTRAVENOUS
Status: CANCELLED
Start: 2020-09-03

## 2020-08-27 NOTE — PROGRESS NOTES
MEDICAL ONCOLOGY PROGRESS NOTE  Melanoma Clinic  Aug 27, 2020       CHIEF COMPLAINT: acral melanoma, stage IV, s/p primary resection and reconstruction    Melanoma History:  1. She noted a painful lesion on the sole of the right foot for a few months, since last summer.  It initially was small then became raised.  It spontaneously bled. She is not entirely sure of its appearance initially.  She denies noting any masses behind the knee or in the groin.  2. 1/9/2020, punch biopsy was performed by Dr Jessica Meadows on .  It showed melanoma, at least 3.4 mm deep with ulceration and 0 mitoses, and perineural invasion present. TILs were non-brisk and LVI not identified. pT3b.  3. 2/10/2020, she has right femoral sentinel lymph node biopsy and wide local excision (Specimen #: [id number]) and the right heel lesion extends to a depth of 6.6 mm, and invasive melanoma is present at 0.2 mm from the deep margin. Microsatellites are also present. There was 1 (of 2) lymph nodes involved. The lymph node tissue exhibits extensive subcapsular and parenchymal clusters of melanoma cells, highlighted on Melan-A immunostain. The largest cluster is 7 millimeters in greatest diameter. pT4b pN2c.  4. 2/22/2020, she had PET-CT, which showed intense FDG uptake in a right inguinal lymph node, concerning for an additional focus of metastatic disease. There is also an indeterminate right external iliac lymph node with mild FDG uptake.  5. 3/4/2020, she has medial plantar artery  fasciocutaneous instep flap and Integra placement to the donor site. On 4/2/2020, she has additional reconstruction with skin grafting.  6. 5/27/2020, she starts nivolumab.     HISTORY OF PRESENT ILLNESS  Yadira Hoang is a 75 year old female with stage IV acral melanoma.     Currently, Ms. Hoang is has mostly recovered from the heel surgeries. She feels drained, though. She feels her body is weaker and she is having more aches and pains in various  joints. She feels she can barely move her right arm due to joint pain and recent lidocaine/bupivicaine injections were not particularly helpful. She also has a history of myofascial pain, depression, and anxiety likely contributing.    She similarly notes significant fatigue on the nivolumab. Aside from the recent joint injections, she is taking Tylenol for pain. She does not tolerate narcotics. She denies shortness of breath or cough. No fevers or chills. No diarrhea or abdominal pain.     Labs from 8/21/2020 are normal. ECOG performance status is 1.    Current Outpatient Medications   Medication     acetaminophen (TYLENOL) 325 MG tablet     cholecalciferol (VITAMIN D3) 125 MCG (5000 UT) TABS tablet     diclofenac (VOLTAREN) 1 % topical gel     estradiol (ESTRACE) 0.1 MG/GM vaginal cream     gabapentin (NEURONTIN) 100 MG capsule     polyethylene glycol (MIRALAX) powder     polyethylene glycol-propylene glycol PF (SYSTANE HYDRATION PF) 0.4-0.3 % SOLN opthalmic solution     sertraline (ZOLOFT) 25 MG tablet     Current Facility-Administered Medications   Medication     2 mL bupivacaine (MARCAINE) preservative free injection 0.25% (10 mL vial)     2 mL bupivacaine (MARCAINE) preservative free injection 0.25% (10 mL vial)     lidocaine (PF) (XYLOCAINE) 1 % injection 2 mL     lidocaine (PF) (XYLOCAINE) 1 % injection 2 mL       PHYSICAL EXAMINATION  None performed as this was telephone visit.    ASSESSMENT AND PLAN  #1 Acral melanoma, right heel primary, pT4b pN3c M1, Stage IV  It was a pleasure to talk with Ms. Hoang. She is a 75 year old woman with evidence of a new liver metastasis on recent PET-CT.    We discussed the diagnosis of stage IV melanoma. We reviewed that she currently has oligometastatic disease that appears refractory to anti-PD1 monotherapy. We reviewed standard treatment options as well as clinical trial options.    She does appear to have worsening joint pains on nivolumab, therefore, trial options  "exploring less toxic options than ipi/nivo (standard treatment) would be very reasonable. We discussed the Nektar study, which is currently enrolling patients in Cohort A Nektar 214/262 versus Cohort B nivolumab + Nektar 214/262.    We will continue nivolumab next week, for now as we are still early in her treatment and there may still be potential for late response or disease stabilization. However, I would like for her to meet with our study nurse to review the option of Nektar study more closely. We will plan to meet next Thursday at 9:15 am to review her meeting with Alea and her thoughts on trial participation.    Questions answered.      Gi Cartagena M.D.   of Medicine  Hematology, Oncology and Transplantation      Yadira Hoang is a 75 year old female who is being evaluated via a billable telephone visit.      The patient has been notified of following:     \"This telephone visit will be conducted via a call between you and your physician/provider. We have found that certain health care needs can be provided without the need for a physical exam.  This service lets us provide the care you need with a short phone conversation.  If a prescription is necessary we can send it directly to your pharmacy.  If lab work is needed we can place an order for that and you can then stop by our lab to have the test done at a later time.    Telephone visits are billed at different rates depending on your insurance coverage. During this emergency period, for some insurers they may be billed the same as an in-person visit.  Please reach out to your insurance provider with any questions.    If during the course of the call the physician/provider feels a telephone visit is not appropriate, you will not be charged for this service.\"    Patient has given verbal consent for Telephone visit?  Yes    What phone number would you like to be contacted at? [phone]    How would you like to obtain your " "AVS? Mail a copy     Vitals - Patient Reported  Weight (Patient Reported): 63 kg (139 lb)  Height (Patient Reported): 170.2 cm (5' 7.01\")  BMI (Based on Pt Reported Ht/Wt): 21.77  Pain Score: No Pain (0)    Patients is taking no medications right now, but PRN Tylenol. She scored high on her oncology distress screening- 10/10 for feeling depressed and sad and 7/10 for feeling anxious. She feels helpless and is anxious about scans done and if another surgery is needed.    Capo Rowe LPN    Phone call duration: 30 minutes      "

## 2020-08-27 NOTE — LETTER
8/27/2020         RE: Yadira Hoang  [address] [facility] [address]        Dear Colleague,    Thank you for referring your patient, Yadira Hoang, to the [facility]. Please see a copy of my visit note below.    MEDICAL ONCOLOGY PROGRESS NOTE  Melanoma Clinic  Aug 27, 2020       CHIEF COMPLAINT: acral melanoma, stage IV, s/p primary resection and reconstruction    Melanoma History:  1. She noted a painful lesion on the sole of the right foot for a few months, since last summer.  It initially was small then became raised.  It spontaneously bled. She is not entirely sure of its appearance initially.  She denies noting any masses behind the knee or in the groin.  2. 1/9/2020, punch biopsy was performed by Dr Jessica Meadows on .  It showed melanoma, at least 3.4 mm deep with ulceration and 0 mitoses, and perineural invasion present. TILs were non-brisk and LVI not identified. pT3b.  3. 2/10/2020, she has right femoral sentinel lymph node biopsy and wide local excision (Specimen #: [id number]) and the right heel lesion extends to a depth of 6.6 mm, and invasive melanoma is present at 0.2 mm from the deep margin. Microsatellites are also present. There was 1 (of 2) lymph nodes involved. The lymph node tissue exhibits extensive subcapsular and parenchymal clusters of melanoma cells, highlighted on Melan-A immunostain. The largest cluster is 7 millimeters in greatest diameter. pT4b pN2c.  4. 2/22/2020, she had PET-CT, which showed intense FDG uptake in a right inguinal lymph node, concerning for an additional focus of metastatic disease. There is also an indeterminate right external iliac lymph node with mild FDG uptake.  5. 3/4/2020, she has medial plantar artery  fasciocutaneous instep flap and Integra placement to the donor site. On 4/2/2020, she has additional reconstruction with skin grafting.  6. 5/27/2020, she starts nivolumab.     HISTORY OF PRESENT ILLNESS  Yadira Ochoa  Viktor is a 75 year old female with stage IV acral melanoma.     Currently, Ms. Hoang is has mostly recovered from the heel surgeries. She feels drained, though. She feels her body is weaker and she is having more aches and pains in various joints. She feels she can barely move her right arm due to joint pain and recent lidocaine/bupivicaine injections were not particularly helpful. She also has a history of myofascial pain, depression, and anxiety likely contributing.    She similarly notes significant fatigue on the nivolumab. Aside from the recent joint injections, she is taking Tylenol for pain. She does not tolerate narcotics. She denies shortness of breath or cough. No fevers or chills. No diarrhea or abdominal pain.     Labs from 8/21/2020 are normal. ECOG performance status is 1.    Current Outpatient Medications   Medication     acetaminophen (TYLENOL) 325 MG tablet     cholecalciferol (VITAMIN D3) 125 MCG (5000 UT) TABS tablet     diclofenac (VOLTAREN) 1 % topical gel     estradiol (ESTRACE) 0.1 MG/GM vaginal cream     gabapentin (NEURONTIN) 100 MG capsule     polyethylene glycol (MIRALAX) powder     polyethylene glycol-propylene glycol PF (SYSTANE HYDRATION PF) 0.4-0.3 % SOLN opthalmic solution     sertraline (ZOLOFT) 25 MG tablet     Current Facility-Administered Medications   Medication     2 mL bupivacaine (MARCAINE) preservative free injection 0.25% (10 mL vial)     2 mL bupivacaine (MARCAINE) preservative free injection 0.25% (10 mL vial)     lidocaine (PF) (XYLOCAINE) 1 % injection 2 mL     lidocaine (PF) (XYLOCAINE) 1 % injection 2 mL       PHYSICAL EXAMINATION  None performed as this was telephone visit.    ASSESSMENT AND PLAN  #1 Acral melanoma, right heel primary, pT4b pN3c M1, Stage IV  It was a pleasure to talk with Ms. Hoang. She is a 75 year old woman with evidence of a new liver metastasis on recent PET-CT.    We discussed the diagnosis of stage IV melanoma. We reviewed that she  "currently has oligometastatic disease that appears refractory to anti-PD1 monotherapy. We reviewed standard treatment options as well as clinical trial options.    She does appear to have worsening joint pains on nivolumab, therefore, trial options exploring less toxic options than ipi/nivo (standard treatment) would be very reasonable. We discussed the Nektar study, which is currently enrolling patients in Cohort A Nektar 214/262 versus Cohort B nivolumab + Nektar 214/262.    We will continue nivolumab next week, for now as we are still early in her treatment and there may still be potential for late response or disease stabilization. However, I would like for her to meet with our study nurse to review the option of Nektar study more closely. We will plan to meet next Thursday at 9:15 am to review her meeting with Alea and her thoughts on trial participation.    Questions answered.      Gi Cartagena M.D.   of Medicine  Hematology, Oncology and Transplantation      Yadira Hoang is a 75 year old female who is being evaluated via a billable telephone visit.      The patient has been notified of following:     \"This telephone visit will be conducted via a call between you and your physician/provider. We have found that certain health care needs can be provided without the need for a physical exam.  This service lets us provide the care you need with a short phone conversation.  If a prescription is necessary we can send it directly to your pharmacy.  If lab work is needed we can place an order for that and you can then stop by our lab to have the test done at a later time.    Telephone visits are billed at different rates depending on your insurance coverage. During this emergency period, for some insurers they may be billed the same as an in-person visit.  Please reach out to your insurance provider with any questions.    If during the course of the call the physician/provider feels " "a telephone visit is not appropriate, you will not be charged for this service.\"    Patient has given verbal consent for Telephone visit?  Yes    What phone number would you like to be contacted at? [phone]    How would you like to obtain your AVS? Mail a copy     Vitals - Patient Reported  Weight (Patient Reported): 63 kg (139 lb)  Height (Patient Reported): 170.2 cm (5' 7.01\")  BMI (Based on Pt Reported Ht/Wt): 21.77  Pain Score: No Pain (0)    Patients is taking no medications right now, but PRN Tylenol. She scored high on her oncology distress screening- 10/10 for feeling depressed and sad and 7/10 for feeling anxious. She feels helpless and is anxious about scans done and if another surgery is needed.    Capo Rowe LPN    Phone call duration: 30 minutes        Again, thank you for allowing me to participate in the care of your patient.        Sincerely,        Gi Franklin MD    "

## 2020-08-31 ENCOUNTER — TELEPHONE (OUTPATIENT)
Dept: ORTHOPEDICS | Facility: CLINIC | Age: 75
End: 2020-08-31

## 2020-08-31 ENCOUNTER — TRANSFERRED RECORDS (OUTPATIENT)
Dept: HEALTH INFORMATION MANAGEMENT | Facility: CLINIC | Age: 75
End: 2020-08-31

## 2020-08-31 NOTE — TELEPHONE ENCOUNTER
[facility] Call Center    Phone Message    May a detailed message be left on voicemail: yes     Reason for Call: Other: Dr Duarte referred patient to  [facility]. They would like Dr Duartes last notes sent to them.  Pt has appt today at 12:45pm.  Pt did not have fa x number or phone number.         Action Taken: Message routed to:  Adult Clinics: Sports Medicine p [id number]    Travel Screening:

## 2020-09-01 ENCOUNTER — TELEPHONE (OUTPATIENT)
Dept: CARE COORDINATION | Facility: CLINIC | Age: 75
End: 2020-09-01

## 2020-09-01 DIAGNOSIS — C43.71 MALIGNANT MELANOMA OF RIGHT LOWER EXTREMITY INCLUDING HIP (H): Primary | ICD-10-CM

## 2020-09-01 NOTE — TELEPHONE ENCOUNTER
"Oncology Distress Screening Follow-up  Clinical Social Work  [facility]    Identified Concern and Score From Distress Screening:   3. How concerned are you about feeling depressed or very sad?   10Abnormal    feels helpless            4. How concerned are you about feeling anxious or very scared?   7Abnormal    anxious about scan                Date of Distress Screenin2020      Data: Patient is a 75 year old female with melanoma.      Intervention/Education Provided:: SW contacted patient by phone to address oncology distress screening trigger.  Patient shared that she has a supportive family and appreciates their assistance but \"they don't know what it's really like.\" She shared that she has tried to stay optimistic but has struggled with the unknown and her fear of what could happen.  She shared that she is having an appointment later this week that should provide her with more information about her cancer and plan.  Patient has seen a counselor in the past but has limited appointments with them now and is interested in meeting with someone.  She indicated interest in meeting with a palliative care SW at [facility] or a psychologist.  BELGICA sent staff message to [facility] SW regarding counseling services at that clinic.  Patient also expressed wanting to know more about support groups and requested this information be sent by postal mail.  SW will mail out local support group information and writer's contact information.  Patient also requested dietitian referral and SW sent staff message to oncology dietitian,      Follow-up Required: SW contact information give to patient. SW available to assist.     Soo Yeon Han, MSW, LICSW  Pager: [phone]  Phone: [phone]    "

## 2020-09-03 ENCOUNTER — VIRTUAL VISIT (OUTPATIENT)
Dept: ONCOLOGY | Facility: CLINIC | Age: 75
End: 2020-09-03
Attending: INTERNAL MEDICINE
Payer: COMMERCIAL

## 2020-09-03 ENCOUNTER — APPOINTMENT (OUTPATIENT)
Dept: LAB | Facility: CLINIC | Age: 75
End: 2020-09-03
Attending: INTERNAL MEDICINE
Payer: COMMERCIAL

## 2020-09-03 VITALS
WEIGHT: 141.4 LBS | RESPIRATION RATE: 14 BRPM | OXYGEN SATURATION: 97 % | BODY MASS INDEX: 22.15 KG/M2 | HEART RATE: 104 BPM | SYSTOLIC BLOOD PRESSURE: 135 MMHG | TEMPERATURE: 98 F | DIASTOLIC BLOOD PRESSURE: 70 MMHG

## 2020-09-03 DIAGNOSIS — C43.9 MELANOMA OF SKIN (H): ICD-10-CM

## 2020-09-03 DIAGNOSIS — C43.71 MALIGNANT MELANOMA OF RIGHT LOWER EXTREMITY INCLUDING HIP (H): Primary | ICD-10-CM

## 2020-09-03 LAB
ALBUMIN SERPL-MCNC: 3.3 G/DL (ref 3.4–5)
ALP SERPL-CCNC: 73 U/L (ref 40–150)
ALT SERPL W P-5'-P-CCNC: 20 U/L (ref 0–50)
ANION GAP SERPL CALCULATED.3IONS-SCNC: 6 MMOL/L (ref 3–14)
AST SERPL W P-5'-P-CCNC: 13 U/L (ref 0–45)
BASOPHILS # BLD AUTO: 0 10E9/L (ref 0–0.2)
BASOPHILS NFR BLD AUTO: 0.4 %
BILIRUB SERPL-MCNC: 0.3 MG/DL (ref 0.2–1.3)
BUN SERPL-MCNC: 22 MG/DL (ref 7–30)
CALCIUM SERPL-MCNC: 9.1 MG/DL (ref 8.5–10.1)
CHLORIDE SERPL-SCNC: 107 MMOL/L (ref 94–109)
CO2 SERPL-SCNC: 29 MMOL/L (ref 20–32)
CREAT SERPL-MCNC: 0.57 MG/DL (ref 0.52–1.04)
DIFFERENTIAL METHOD BLD: ABNORMAL
EOSINOPHIL # BLD AUTO: 0.1 10E9/L (ref 0–0.7)
EOSINOPHIL NFR BLD AUTO: 1.7 %
ERYTHROCYTE [DISTWIDTH] IN BLOOD BY AUTOMATED COUNT: 12.2 % (ref 10–15)
GFR SERPL CREATININE-BSD FRML MDRD: 90 ML/MIN/{1.73_M2}
GLUCOSE SERPL-MCNC: 82 MG/DL (ref 70–99)
HCT VFR BLD AUTO: 35.5 % (ref 35–47)
HGB BLD-MCNC: 11.7 G/DL (ref 11.7–15.7)
IMM GRANULOCYTES # BLD: 0 10E9/L (ref 0–0.4)
IMM GRANULOCYTES NFR BLD: 0.4 %
LYMPHOCYTES # BLD AUTO: 1.4 10E9/L (ref 0.8–5.3)
LYMPHOCYTES NFR BLD AUTO: 26.3 %
MCH RBC QN AUTO: 31.1 PG (ref 26.5–33)
MCHC RBC AUTO-ENTMCNC: 33 G/DL (ref 31.5–36.5)
MCV RBC AUTO: 94 FL (ref 78–100)
MONOCYTES # BLD AUTO: 0.4 10E9/L (ref 0–1.3)
MONOCYTES NFR BLD AUTO: 6.8 %
NEUTROPHILS # BLD AUTO: 3.4 10E9/L (ref 1.6–8.3)
NEUTROPHILS NFR BLD AUTO: 64.4 %
NRBC # BLD AUTO: 0 10*3/UL
NRBC BLD AUTO-RTO: 0 /100
PLATELET # BLD AUTO: 376 10E9/L (ref 150–450)
POTASSIUM SERPL-SCNC: 3.9 MMOL/L (ref 3.4–5.3)
PROT SERPL-MCNC: 7.2 G/DL (ref 6.8–8.8)
RBC # BLD AUTO: 3.76 10E12/L (ref 3.8–5.2)
SODIUM SERPL-SCNC: 142 MMOL/L (ref 133–144)
TSH SERPL DL<=0.005 MIU/L-ACNC: 2.15 MU/L (ref 0.4–4)
WBC # BLD AUTO: 5.3 10E9/L (ref 4–11)

## 2020-09-03 PROCEDURE — 99214 OFFICE O/P EST MOD 30 MIN: CPT | Mod: 95 | Performed by: INTERNAL MEDICINE

## 2020-09-03 PROCEDURE — 85025 COMPLETE CBC W/AUTO DIFF WBC: CPT | Performed by: INTERNAL MEDICINE

## 2020-09-03 PROCEDURE — 25800030 ZZH RX IP 258 OP 636: Mod: ZF | Performed by: INTERNAL MEDICINE

## 2020-09-03 PROCEDURE — 96413 CHEMO IV INFUSION 1 HR: CPT

## 2020-09-03 PROCEDURE — 80053 COMPREHEN METABOLIC PANEL: CPT | Performed by: INTERNAL MEDICINE

## 2020-09-03 PROCEDURE — 84443 ASSAY THYROID STIM HORMONE: CPT | Performed by: INTERNAL MEDICINE

## 2020-09-03 PROCEDURE — 25000128 H RX IP 250 OP 636: Mod: ZF | Performed by: INTERNAL MEDICINE

## 2020-09-03 RX ADMIN — SODIUM CHLORIDE 250 ML: 9 INJECTION, SOLUTION INTRAVENOUS at 10:57

## 2020-09-03 RX ADMIN — SODIUM CHLORIDE 480 MG: 9 INJECTION, SOLUTION INTRAVENOUS at 10:59

## 2020-09-03 ASSESSMENT — PAIN SCALES - GENERAL: PAINLEVEL: NO PAIN (0)

## 2020-09-03 NOTE — PROGRESS NOTES
Infusion Nursing Note:  Yadira Hoang presents today for Cycle 4 Opdivo.    Patient had a virtual visit with Dr Esteban Franklin prior to infusion      Intravenous Access:  Peripheral IV placed.  Positive blood return pre and post opdivo    Treatment Conditions:  Lab Results   Component Value Date    HGB 11.7 09/03/2020     Lab Results   Component Value Date    WBC 5.3 09/03/2020      Lab Results   Component Value Date    ANEU 3.4 09/03/2020     Lab Results   Component Value Date     09/03/2020      Lab Results   Component Value Date     09/03/2020                   Lab Results   Component Value Date    POTASSIUM 3.9 09/03/2020           No results found for: MAG         Lab Results   Component Value Date    CR 0.57 09/03/2020                   Lab Results   Component Value Date    GABY 9.1 09/03/2020                Lab Results   Component Value Date    BILITOTAL 0.3 09/03/2020           Lab Results   Component Value Date    ALBUMIN 3.3 09/03/2020                    Lab Results   Component Value Date    ALT 20 09/03/2020           Lab Results   Component Value Date    AST 13 09/03/2020       Results reviewed, labs MET treatment parameters, ok to proceed with treatment.      Post Infusion Assessment:  Patient tolerated infusion without incident.       Discharge Plan:   Patient declined prescription refills.  Copy of AVS reviewed with patient and/or family.  Patient will return 10/1/20 for cycle 5  Face to Face time: 0.    Indu Sanchez RN

## 2020-09-03 NOTE — NURSING NOTE
Chief Complaint   Patient presents with     Blood Draw     Labs drawn via PIV by RN in lab. VS taken.      Labs drawn via peripheral IV. Vital signs taken. Checked into next appointment.   Carola Lynch RN

## 2020-09-03 NOTE — LETTER
9/3/2020         RE: Yadira Hoang  [address] [facility] [address]        Dear Colleague,    Thank you for referring your patient, Yadira Hoang, to the [facility]. Please see a copy of my visit note below.    MEDICAL ONCOLOGY PROGRESS NOTE  Melanoma Clinic  Sep 3, 2020     CHIEF COMPLAINT: acral melanoma, stage IV, s/p primary resection and reconstruction    Melanoma History:  1. She noted a painful lesion on the sole of the right foot for a few months, since last summer.  It initially was small then became raised.  It spontaneously bled. She is not entirely sure of its appearance initially.  She denies noting any masses behind the knee or in the groin.  2. 1/9/2020, punch biopsy was performed by Dr Jessica Meadows on .  It showed melanoma, at least 3.4 mm deep with ulceration and 0 mitoses, and perineural invasion present. TILs were non-brisk and LVI not identified. pT3b.  3. 2/10/2020, she has right femoral sentinel lymph node biopsy and wide local excision (Specimen #: [id number]) and the right heel lesion extends to a depth of 6.6 mm, and invasive melanoma is present at 0.2 mm from the deep margin. Microsatellites are also present. There was 1 (of 2) lymph nodes involved. The lymph node tissue exhibits extensive subcapsular and parenchymal clusters of melanoma cells, highlighted on Melan-A immunostain. The largest cluster is 7 millimeters in greatest diameter. pT4b pN2c.  4. 2/22/2020, she had PET-CT, which showed intense FDG uptake in a right inguinal lymph node, concerning for an additional focus of metastatic disease. There is also an indeterminate right external iliac lymph node with mild FDG uptake.  5. 3/4/2020, she has medial plantar artery  fasciocutaneous instep flap and Integra placement to the donor site. On 4/2/2020, she has additional reconstruction with skin grafting.  6. 5/27/2020, she starts nivolumab.     HISTORY OF PRESENT ILLNESS  Yadira Ochoa  Viktor is a 75 year old female with stage IV acral melanoma.     Currently, Ms. Hoang is has mostly recovered from the heel surgeries. However, the leg is still painful with walking. She also has a history of myofascial pain, depression, and anxiety likely contributing. She feels drained physically as well, and she has had increasing fatigue. She feels her body is weaker and she is having more aches and pains in various joints. She had had lidocaine/bupivicaine injections into the right shoudler, and now.feels she can move her right arm better.     Her main complaints are the joint pains and significant fatigue on the nivolumab. She is taking Tylenol for pain. She does not tolerate narcotics. She denies shortness of breath or cough. No fevers or chills. No diarrhea or abdominal pain.     Labs from 8/21/2020 are normal. ECOG performance status is 1.    Current Outpatient Medications   Medication     acetaminophen (TYLENOL) 325 MG tablet     cholecalciferol (VITAMIN D3) 125 MCG (5000 UT) TABS tablet     polyethylene glycol (MIRALAX) powder     diclofenac (VOLTAREN) 1 % topical gel     estradiol (ESTRACE) 0.1 MG/GM vaginal cream     gabapentin (NEURONTIN) 100 MG capsule     polyethylene glycol-propylene glycol PF (SYSTANE HYDRATION PF) 0.4-0.3 % SOLN opthalmic solution     sertraline (ZOLOFT) 25 MG tablet     Current Facility-Administered Medications   Medication     2 mL bupivacaine (MARCAINE) preservative free injection 0.25% (10 mL vial)     2 mL bupivacaine (MARCAINE) preservative free injection 0.25% (10 mL vial)     lidocaine (PF) (XYLOCAINE) 1 % injection 2 mL     lidocaine (PF) (XYLOCAINE) 1 % injection 2 mL       PHYSICAL EXAMINATION  None performed as this was telephone visit.    ASSESSMENT AND PLAN  #1 Acral melanoma, right heel primary, pT4b pN3c M1, Stage IV  It was a pleasure to talk with Ms. Hoang. She is a 75 year old woman with evidence of a new liver metastasis on recent PET-CT.    We  "discussed the diagnosis of stage IV melanoma. We reviewed that she currently has oligometastatic disease that appears could be refractory to anti-PD1 monotherapy, though pseudoprogression is also possible and we will need to confirm in 6-8 weeks.    We reviewed standard treatment options as well as clinical trial options going forward if in fact future scans confirm progressiosn.  She does have worsening joint pains on nivolumab, therefore, trial options exploring less toxic options than ipi/nivo (standard treatment) would be very reasonable. We discussed the Nektar study, which is currently enrolling patients in Cohort A Nektar 214/262 versus Cohort B nivolumab + Nektar 214/262. She spoke with Alea about the study already, but was unable to open the PDF file sent. She would like a paper copy of the Nektar REVEAL ICF sent to her home to review with her family.     We will continue nivolumab until we confirm progression, hoping for delayed response or disease stabilization. Rescan in mid-October and plan next steps in treatment at that time.    Questions answered.       Gi Cartagena M.D.   of Medicine  Hematology, Oncology and Transplantation        Yadira Hoang is a 75 year old female who is being evaluated via a billable telephone visit.      The patient has been notified of following:     \"This telephone visit will be conducted via a call between you and your physician/provider. We have found that certain health care needs can be provided without the need for a physical exam.  This service lets us provide the care you need with a short phone conversation.  If a prescription is necessary we can send it directly to your pharmacy.  If lab work is needed we can place an order for that and you can then stop by our lab to have the test done at a later time.    Telephone visits are billed at different rates depending on your insurance coverage. During this emergency period, for some " "insurers they may be billed the same as an in-person visit.  Please reach out to your insurance provider with any questions.    If during the course of the call the physician/provider feels a telephone visit is not appropriate, you will not be charged for this service.\"    Patient has given verbal consent for Telephone visit?  Yes    What phone number would you like to be contacted at? [phone]     How would you like to obtain your AVS? Savannahharmichelle     Vitals - Patient Reported  Weight (Patient Reported): 64 kg (141 lb)  Height (Patient Reported): 170.2 cm (5' 7\")  BMI (Based on Pt Reported Ht/Wt): 22.08  Pain Score: No Pain (0)      I have reviewed and updated the patient's allergies and medication list.    Concerns: No concerns  Refills: No refills needed.        Gisselle Smith CMA      Phone call duration: 21 minutes          Again, thank you for allowing me to participate in the care of your patient.        Sincerely,        Gi Franklin MD    "

## 2020-09-03 NOTE — PROGRESS NOTES
MEDICAL ONCOLOGY PROGRESS NOTE  Melanoma Clinic  Sep 3, 2020     CHIEF COMPLAINT: acral melanoma, stage IV, s/p primary resection and reconstruction    Melanoma History:  1. She noted a painful lesion on the sole of the right foot for a few months, since last summer.  It initially was small then became raised.  It spontaneously bled. She is not entirely sure of its appearance initially.  She denies noting any masses behind the knee or in the groin.  2. 1/9/2020, punch biopsy was performed by Dr Jessica Meadows on .  It showed melanoma, at least 3.4 mm deep with ulceration and 0 mitoses, and perineural invasion present. TILs were non-brisk and LVI not identified. pT3b.  3. 2/10/2020, she has right femoral sentinel lymph node biopsy and wide local excision (Specimen #: [id number]) and the right heel lesion extends to a depth of 6.6 mm, and invasive melanoma is present at 0.2 mm from the deep margin. Microsatellites are also present. There was 1 (of 2) lymph nodes involved. The lymph node tissue exhibits extensive subcapsular and parenchymal clusters of melanoma cells, highlighted on Melan-A immunostain. The largest cluster is 7 millimeters in greatest diameter. pT4b pN2c.  4. 2/22/2020, she had PET-CT, which showed intense FDG uptake in a right inguinal lymph node, concerning for an additional focus of metastatic disease. There is also an indeterminate right external iliac lymph node with mild FDG uptake.  5. 3/4/2020, she has medial plantar artery  fasciocutaneous instep flap and Integra placement to the donor site. On 4/2/2020, she has additional reconstruction with skin grafting.  6. 5/27/2020, she starts nivolumab.     HISTORY OF PRESENT ILLNESS  Yadira Hoang is a 75 year old female with stage IV acral melanoma.     Currently, Ms. Hoang is has mostly recovered from the heel surgeries. However, the leg is still painful with walking. She also has a history of myofascial pain, depression, and anxiety  likely contributing. She feels drained physically as well, and she has had increasing fatigue. She feels her body is weaker and she is having more aches and pains in various joints. She had had lidocaine/bupivicaine injections into the right shoudler, and now.feels she can move her right arm better.     Her main complaints are the joint pains and significant fatigue on the nivolumab. She is taking Tylenol for pain. She does not tolerate narcotics. She denies shortness of breath or cough. No fevers or chills. No diarrhea or abdominal pain.     Labs from 8/21/2020 are normal. ECOG performance status is 1.    Current Outpatient Medications   Medication     acetaminophen (TYLENOL) 325 MG tablet     cholecalciferol (VITAMIN D3) 125 MCG (5000 UT) TABS tablet     polyethylene glycol (MIRALAX) powder     diclofenac (VOLTAREN) 1 % topical gel     estradiol (ESTRACE) 0.1 MG/GM vaginal cream     gabapentin (NEURONTIN) 100 MG capsule     polyethylene glycol-propylene glycol PF (SYSTANE HYDRATION PF) 0.4-0.3 % SOLN opthalmic solution     sertraline (ZOLOFT) 25 MG tablet     Current Facility-Administered Medications   Medication     2 mL bupivacaine (MARCAINE) preservative free injection 0.25% (10 mL vial)     2 mL bupivacaine (MARCAINE) preservative free injection 0.25% (10 mL vial)     lidocaine (PF) (XYLOCAINE) 1 % injection 2 mL     lidocaine (PF) (XYLOCAINE) 1 % injection 2 mL       PHYSICAL EXAMINATION  None performed as this was telephone visit.    ASSESSMENT AND PLAN  #1 Acral melanoma, right heel primary, pT4b pN3c M1, Stage IV  It was a pleasure to talk with Ms. Hoang. She is a 75 year old woman with evidence of a new liver metastasis on recent PET-CT.    We discussed the diagnosis of stage IV melanoma. We reviewed that she currently has oligometastatic disease that appears could be refractory to anti-PD1 monotherapy, though pseudoprogression is also possible and we will need to confirm in 6-8 weeks.    We reviewed  "standard treatment options as well as clinical trial options going forward if in fact future scans confirm progressiosn.  She does have worsening joint pains on nivolumab, therefore, trial options exploring less toxic options than ipi/nivo (standard treatment) would be very reasonable. We discussed the Nektar study, which is currently enrolling patients in Cohort A Nektar 214/262 versus Cohort B nivolumab + Nektar 214/262. She spoke with Alea about the study already, but was unable to open the PDF file sent. She would like a paper copy of the Nektar REVEAL ICF sent to her home to review with her family.     We will continue nivolumab until we confirm progression, hoping for delayed response or disease stabilization. Rescan in mid-October and plan next steps in treatment at that time.    Questions answered.       Gi Cartagena M.D.   of Medicine  Hematology, Oncology and Transplantation        Yadira Hoang is a 75 year old female who is being evaluated via a billable telephone visit.      The patient has been notified of following:     \"This telephone visit will be conducted via a call between you and your physician/provider. We have found that certain health care needs can be provided without the need for a physical exam.  This service lets us provide the care you need with a short phone conversation.  If a prescription is necessary we can send it directly to your pharmacy.  If lab work is needed we can place an order for that and you can then stop by our lab to have the test done at a later time.    Telephone visits are billed at different rates depending on your insurance coverage. During this emergency period, for some insurers they may be billed the same as an in-person visit.  Please reach out to your insurance provider with any questions.    If during the course of the call the physician/provider feels a telephone visit is not appropriate, you will not be charged for this " "service.\"    Patient has given verbal consent for Telephone visit?  Yes    What phone number would you like to be contacted at? [phone]     How would you like to obtain your AVS? MyChart     Vitals - Patient Reported  Weight (Patient Reported): 64 kg (141 lb)  Height (Patient Reported): 170.2 cm (5' 7\")  BMI (Based on Pt Reported Ht/Wt): 22.08  Pain Score: No Pain (0)      I have reviewed and updated the patient's allergies and medication list.    Concerns: No concerns  Refills: No refills needed.        Gisselle Smith CMA      Phone call duration: 21 minutes        "

## 2020-09-03 NOTE — PATIENT INSTRUCTIONS
[facility] Triage and after hours / weekends / holidays:  [phone]    Please call the triage or after hours line if you experience a temperature greater than or equal to 100.5, shaking chills, have uncontrolled nausea, vomiting and/or diarrhea, dizziness, shortness of breath, chest pain, bleeding, unexplained bruising, or if you have any other new/concerning symptoms, questions or concerns.      If you are having any concerning symptoms or wish to speak to a provider before your next infusion visit, please call your care coordinator or triage to notify them so we can adequately serve you.     If you need a refill on a narcotic prescription or other medication, please call before your infusion appointment.

## 2020-09-08 ENCOUNTER — VIRTUAL VISIT (OUTPATIENT)
Dept: PEDIATRICS | Facility: CLINIC | Age: 75
End: 2020-09-08
Payer: COMMERCIAL

## 2020-09-08 DIAGNOSIS — C43.71 MALIGNANT MELANOMA OF RIGHT LOWER EXTREMITY INCLUDING HIP (H): ICD-10-CM

## 2020-09-08 DIAGNOSIS — R33.9 URINARY RETENTION WITH INCOMPLETE BLADDER EMPTYING: Primary | ICD-10-CM

## 2020-09-08 DIAGNOSIS — C43.9 MELANOMA OF SKIN (H): ICD-10-CM

## 2020-09-08 PROCEDURE — 99214 OFFICE O/P EST MOD 30 MIN: CPT | Mod: 95 | Performed by: INTERNAL MEDICINE

## 2020-09-08 NOTE — PROGRESS NOTES
"Yadira Hoang is a 75 year old female who is being evaluated via a billable telephone visit.      The patient has been notified of following:     \"This telephone visit will be conducted via a call between you and your physician/provider. We have found that certain health care needs can be provided without the need for a physical exam.  This service lets us provide the care you need with a short phone conversation.  If a prescription is necessary we can send it directly to your pharmacy.  If lab work is needed we can place an order for that and you can then stop by our lab to have the test done at a later time.    Telephone visits are billed at different rates depending on your insurance coverage. During this emergency period, for some insurers they may be billed the same as an in-person visit.  Please reach out to your insurance provider with any questions.    If during the course of the call the physician/provider feels a telephone visit is not appropriate, you will not be charged for this service.\"    Patient has given verbal consent for Telephone visit?  Yes    What phone number would you like to be contacted at? [phone]    How would you like to obtain your AVS? Mail a copy    Subjective     Yadira Hoang is a 75 year old female who presents via phone visit today for the following health issues:    HPI    Has several concerns. Questions about cancer, following up on medications. And some other questions    Has had right foot pain from recent surgery and osteoarthritis on the right knee. She had knee injection before but didn't work due to not able to put in too much into the joint while getting immunotherapy for melanoma.     She saw Dr. Crabtree who did reconstruction skin graft on her foot for melanoma.  recommended gabapentin, but pt didn't want to take this, afraid to have side effects. Her dose was 100 mg at bedtime. She is not able to wear the specialized shoe recommended for her. She found a " "pair of shoes that are helping some.     Has had bladder retention since her 30s and 40s. She went to urology years ago, had it \"stretched\" a few times. Now it is driving her crazy.     Constipation. Takes Miralax.   Reports a lot of food intolerances/allergies. Took a food allergy test, soy, corn, dairy, corn syrup. She doesn't know what to take.she has an appointment with nutritionist tomorrow.     She feels in the dark about her cancer. She feels totally alone in it. She has a counselor that she talks to every 2 weeks. She feels if someone explains to her more about her cancer, she will feel a lot better.     She is not taking sertraline. Feels she doesn't need it.     Whole body can itch from the infusion. The under the arm pit itches. Wonders what to take.       Review of Systems   Constitutional, HEENT, cardiovascular, pulmonary, gi and gu systems are negative, except as otherwise noted.       Objective          Vitals:  No vitals were obtained today due to virtual visit.    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     Melanoma of skin (H)  See above  advised OTC antihistamine such as claritin or Zyrtec. Advised to discuss with oncology regarding the itching from infusion       Return in " about 4 weeks (around 10/6/2020) for Virtual visit, Referral.    Erika Blanton MD PhD  [facility]    Phone call duration:  33 minutes

## 2020-09-09 ENCOUNTER — VIRTUAL VISIT (OUTPATIENT)
Dept: ONCOLOGY | Facility: CLINIC | Age: 75
End: 2020-09-09
Attending: DIETITIAN, REGISTERED
Payer: COMMERCIAL

## 2020-09-09 DIAGNOSIS — C43.71 MALIGNANT MELANOMA OF RIGHT LOWER EXTREMITY INCLUDING HIP (H): Primary | ICD-10-CM

## 2020-09-09 PROCEDURE — 97802 MEDICAL NUTRITION INDIV IN: CPT | Mod: TEL,ZF | Performed by: DIETITIAN, REGISTERED

## 2020-09-09 NOTE — LETTER
9/9/2020         RE: Yadira Hoang  [address] [facility] [address]        Dear Colleague,    Thank you for referring your patient, Yadira Hoang, to the [facility]. Please see a copy of my visit note below.        Again, thank you for allowing me to participate in the care of your patient.        Sincerely,        Kimberly Leiva RD

## 2020-09-10 NOTE — PROGRESS NOTES
"Yadira Hoang is a 75 year old female who is being evaluated via a billable telephone visit.      The patient has been notified of following:     \"This telephone visit will be conducted via a call between you and your physician/provider. We have found that certain health care needs can be provided without the need for a physical exam.  This service lets us provide the care you need with a short phone conversation.  If a prescription is necessary we can send it directly to your pharmacy.  If lab work is needed we can place an order for that and you can then stop by our lab to have the test done at a later time.    Telephone visits are billed at different rates depending on your insurance coverage. During this emergency period, for some insurers they may be billed the same as an in-person visit.  Please reach out to your insurance provider with any questions.    If during the course of the call the physician/provider feels a telephone visit is not appropriate, you will not be charged for this service.\"    Patient has given verbal consent for Telephone visit?  Yes    CLINICAL NUTRITION SERVICES - ASSESSMENT NOTE    Yadira Hoang 75 year old referred for MNT related to melanoma     Time Spent: 60 minutes  Visit Type: phone  Referring Physician: Leanne 9/1    Nutrition Prescription   Recommendations Suggested by Registered Dietitian (RD):   1. 5-6 small, frequent meals  2. 1600-1900kcal, 75 grams protein/day   Malnutrition: does not meet criteria at this time     NUTRITION HISTORY  Factors affecting nutrition intake include:decreased appetite and food allergies, fatigue  Current diet: gluten free, soy free, dairy free, corn free, no onions or garlic, no processed foods  Current appetite/intake: fair  Chemotherapy: Opdivo       Yadira expresses her frustration and challenge with her food allergies and inability to prepare healthy meals for herself and her .  He has been making all of their dinner meals, " "however, he doesn't always tailor to her diet restrictions.   She also expresses that the meals he makes are simple with minimal nutritional value.    She often has her daughter prepare batch cooked meals for them which has been helpful.   She typically eats 3 meals/day and snacks if needed.      Diet Recall  Breakfast Cereal with toast and milk OR pancakes with eggs   Lunch Hamburger with fresh vegetables or fruit   Dinner Protein with GF grain or potato with cooked vegetables (non cruciferous)   Snacks fruit   Beverages Protein smoothie (store bought kit), water, casa juice     ANTHROPOMETRICS  Height: 67\"  Weight: 141 lbs/64kg  BMI: 22  Weight Status:  Normal BMI  IBW: 135 lbs   Weight History: stable   Wt Readings from Last 6 Encounters:   09/03/20 64.1 kg (141 lb 6.4 oz)   08/25/20 65.3 kg (144 lb)   07/22/20 65.3 kg (144 lb)   07/14/20 64.3 kg (141 lb 11.2 oz)   07/13/20 64.2 kg (141 lb 9.6 oz)   06/24/20 63.4 kg (139 lb 12.8 oz)     Dosing Weight: 64kg    Medications/vitamins/minerals/herbals:   Reviewed    Labs:   Labs reviewed    NUTRITION FOCUSED PHYSICAL ASSESSMENT FOR DIAGNOSING MALNUTRITION:  Not observed due to Covid pandemic - phone call    ASSESSED NUTRITION NEEDS:  Estimated Energy Needs: 8690-1928 kcals (25-30 Kcal/Kg)  Justification: maintenance  Estimated Protein Needs: 75 grams protein (1.2 g pro/Kg)  Justification: maintenance  Estimated Fluid Needs: 2000  mL   Justification: maintenance    MALNUTRITION:  % Weight Loss:  None noted  % Intake:  Decreased intake does not meet criteria for malnutrition   Subcutaneous Fat Loss:  None observed  Muscle Loss:  None observed  Fluid Retention:  None noted    NUTRITION DIAGNOSIS:  Food and nutrition-related knowledge deficit related to nutrition needs during cancer treatment as evidenced by pt with questions regarding food choices with food allergy/intolerances    INTERVENTIONS  Provided written & verbal education:   - Discussed ways to optimize " nutrition via small frequent meals.  Reviewed meal and snack ideas with her allergies in mind.   - Advised pt to aim for at least 1600kcal and 75g protein daily.   - Reviewed ONS options (Orgain, Pea plant based). Encouraged utilizing these ONS in home made shakes/smoothies to prevent flavor fatigue.  Encouraged pt to start consuming 2 ONS or home made shakes/smoothies daily.     - Reviewed Mediterranean diet guidelines.     Provided pt with corresponding education materials/handouts on:  Six Small Meals a Day, 100-pradeep snack ideas,  Convenient recipes, Tips to Increase the Calories in Your Diet and Sources of Protein.     Pt verbalize understanding of materials provided during consult.   Patient Understanding: Excellent  Expected Compliance: Excellent     Goals  1.  Aim for 5-6 small frequent meals  2.  Aim for 1600kcal and 75g protein/day  3. Weight maintenance       Follow-Up Plans: Pt has RD contact information for questions.      Kimberly Leiva RDN, LD

## 2020-09-11 ENCOUNTER — TELEPHONE (OUTPATIENT)
Dept: PEDIATRICS | Facility: CLINIC | Age: 75
End: 2020-09-11

## 2020-09-11 DIAGNOSIS — C43.71 MALIGNANT MELANOMA OF RIGHT LOWER EXTREMITY INCLUDING HIP (H): Primary | ICD-10-CM

## 2020-09-11 RX ORDER — LIDOCAINE 4 G/G
1 PATCH TOPICAL EVERY 24 HOURS
Qty: 10 PATCH | Refills: 1 | Status: SHIPPED | OUTPATIENT
Start: 2020-09-11 | End: 2021-02-12

## 2020-09-11 NOTE — TELEPHONE ENCOUNTER
Left message for patient to return clinic call regarding scheduling. Patient needs a Phone Return  appointment for 4 week recheck with Erika Blanton MD around 10/6/2020 and  New patient visit with Dr Taveras. Number to clinic and Mychart option given, please assist in scheduling once patient returns clinic call.     Call Center OKAY TO SCHEDULE.    Thanks,   Kimberly Flaherty  Primary Care   [facility]

## 2020-09-16 ENCOUNTER — VIRTUAL VISIT (OUTPATIENT)
Dept: ONCOLOGY | Facility: CLINIC | Age: 75
End: 2020-09-16
Attending: PSYCHOLOGIST
Payer: COMMERCIAL

## 2020-09-16 DIAGNOSIS — F32.A DEPRESSION: Primary | ICD-10-CM

## 2020-09-16 PROCEDURE — 90791 PSYCH DIAGNOSTIC EVALUATION: CPT | Mod: 95 | Performed by: PSYCHOLOGIST

## 2020-09-16 NOTE — PROGRESS NOTES
"Yadira Hoang is a 75 year old female who is being evaluated via a billable telephone visit.  Phone call duration: 30 minutes    The patient has been notified of following:     \"This telephone visit will be conducted via a call between you and your physician/provider. We have found that certain health care needs can be provided without the need for a physical exam.  This service lets us provide the care you need with a short phone conversation.  If a prescription is necessary we can send it directly to your pharmacy.  If lab work is needed we can place an order for that and you can then stop by our lab to have the test done at a later time.    Telephone visits are billed at different rates depending on your insurance coverage. During this emergency period, for some insurers they may be billed the same as an in-person visit.  Please reach out to your insurance provider with any questions.    If during the course of the call the physician/provider feels a telephone visit is not appropriate, you will not be charged for this service.\"    Patient has given verbal consent for Telephone visit?  Yes    Yadira Hoang is a 75 year old female who was referred by Dr. Franklin for  Depression  The patient was seen for an initial 45 minute evaluation on 9/16/2020.    Presenting Concerns: Depressed mood, sadness, loneliness, loss of interest in activities, fatigue, boredom, changes in sleep, concern about family, and worry about illness.     Mental Status/Interview:   Orientation: Yadira Hoang was alert, attentive, and oriented to time, place, and person  Affect: Affect was appropriate to the situation and showed normal range and stability.  Speech: Speech was clear with normal fluency, rate, tone, and volume.  Memory: Although not formally assessed, immediate, recent, and remote memory appeared to be intact.   Insight and Judgement: Yadira Hoang demonstrates adequate awareness of the issues discussed and was " able to come to reasonable conclusions.  Thought: Thought patterns were coherent and logical. Hallucinations were denied and delusions were not evident.   Lethality: Yadira Hoang denied suicidal or homicidal ideation or intention.        Impression: Her fear about her cancer, its treatment, and side-effects of the treatment are leading her to experience depressed mood. We discussed using avoidance and distraction to reduce her cancer ruminations and increase thoughts about positive components of her life. She provided personal examples of these concepts to show her understanding.     Diagnosis:   Encounter Diagnosis   Name Primary?     Depression Yes     Recommendations/Plan:  1. Use cognitive avoidance and distraction to reduce her cancer ruminations.  2. Return for additional appointments as needed.     Thank you for this opportunity to participate in your care of this patient.    Alec Angel Psy.D., L.P.  Director, Oncology Supportive Care

## 2020-09-21 ENCOUNTER — ONCOLOGY VISIT (OUTPATIENT)
Dept: ONCOLOGY | Facility: CLINIC | Age: 75
End: 2020-09-21
Attending: PHYSICIAN ASSISTANT
Payer: COMMERCIAL

## 2020-09-21 VITALS
TEMPERATURE: 98 F | BODY MASS INDEX: 22.29 KG/M2 | OXYGEN SATURATION: 96 % | HEIGHT: 67 IN | SYSTOLIC BLOOD PRESSURE: 132 MMHG | DIASTOLIC BLOOD PRESSURE: 74 MMHG | HEART RATE: 95 BPM | RESPIRATION RATE: 16 BRPM | WEIGHT: 142 LBS

## 2020-09-21 DIAGNOSIS — C43.71 MALIGNANT MELANOMA OF RIGHT LOWER EXTREMITY INCLUDING HIP (H): Primary | ICD-10-CM

## 2020-09-21 DIAGNOSIS — F41.9 ANXIETY: ICD-10-CM

## 2020-09-21 DIAGNOSIS — M25.561 CHRONIC PAIN OF RIGHT KNEE: ICD-10-CM

## 2020-09-21 DIAGNOSIS — G89.29 CHRONIC RIGHT SHOULDER PAIN: ICD-10-CM

## 2020-09-21 DIAGNOSIS — M25.511 CHRONIC RIGHT SHOULDER PAIN: ICD-10-CM

## 2020-09-21 DIAGNOSIS — G89.29 CHRONIC PAIN OF RIGHT KNEE: ICD-10-CM

## 2020-09-21 PROCEDURE — 99214 OFFICE O/P EST MOD 30 MIN: CPT | Mod: ZP | Performed by: PHYSICIAN ASSISTANT

## 2020-09-21 PROCEDURE — G0463 HOSPITAL OUTPT CLINIC VISIT: HCPCS | Mod: ZF

## 2020-09-21 RX ORDER — MIRTAZAPINE 15 MG/1
15 TABLET, FILM COATED ORAL AT BEDTIME
Qty: 30 TABLET | Refills: 3 | Status: SHIPPED | OUTPATIENT
Start: 2020-09-21 | End: 2021-02-12

## 2020-09-21 ASSESSMENT — MIFFLIN-ST. JEOR: SCORE: 1166.74

## 2020-09-21 ASSESSMENT — PAIN SCALES - GENERAL: PAINLEVEL: MODERATE PAIN (5)

## 2020-09-21 NOTE — LETTER
9/21/2020         RE: Yadira Hoang  [address] [facility] [address]      [facility] Video Visit  September 21, 2020    HPI  Yadira Hoang is a 75 year old female who presents for video call to follow up on her acral melanoma, stage IV, per oncologic history below:    Oncologic History: Acral melanoma, stage IV, s/p primary resection and reconstruction  1. She noted a painful lesion on the sole of the right foot for a few months, since summer 2019.  It initially was small then became raised.  It spontaneously bled. She is not entirely sure of its appearance initially.  She denies noting any masses behind the knee or in the groin.  2. 1/9/2020, punch biopsy was performed by Dr Jessica Alvarado.  It showed melanoma, at least 3.4 mm deep with ulceration and 0 mitoses, and perineural invasion present. TILs were non-brisk and LVI not identified. pT3b.  3. 2/10/2020, she has right femoral sentinel lymph node biopsy and wide local excision (Specimen #: [id number]) and the right heel lesion extends to a depth of 6.6 mm, and invasive melanoma is present at 0.2 mm from the deep margin. Microsatellites are also present. There was 1 (of 2) lymph nodes involved. The lymph node tissue exhibits extensive subcapsular and parenchymal clusters of melanoma cells, highlighted on Melan-A immunostain. The largest cluster is 7 millimeters in greatest diameter. pT4b pN2c.  4. 2/22/2020, she had PET-CT, which showed intense FDG uptake in a right inguinal lymph node, concerning for an additional focus of metastatic disease. There is also an indeterminate right external iliac lymph node with mild FDG uptake.  5. 3/4/2020, she has medial plantar artery  fasciocutaneous instep flap and Integra placement to the donor site. On 4/2/2020, she has additional reconstruction with skin grafting.  6. 5/27/2020, initiated on nivolumab immunotherapy.  Had C2 6/24/20. C3 on 7/22/20. C4 on 9/3/2020.  7. 8/21/2020 evidence of a  "new liver metastasis on PET-CT.    Interval History:  Since Yadira's last virtual visit with Dr. Franklin and C4 on 9/3/2020, she reports feeling pretty anxious about her cancer and what all of her next steps are. She expresses she would like more contact and communication from everyone on her care team. Nancy mentions that she felt more fatigued than usual after C4 and that it hurts her motivation to want to be active. Nancy states that her  Sulaiman, who accompanies her today at the appointment, has been very supportive and helpful to her. She also found her meeting with Dr. Angel to be helpful and she wonders if she can meet with him more frequently, as waiting another month feels too long. Nancy mentions that once she tried Remeron for anxiety and that it helped. She is open to restarting it.    Nancy denies any abdominal pain, nausea, vomiting or bowel changes, but expresses \"my stomach just feels crummy\". She states she met with a nutritionist recently and that she didn't find it very helpful. She would like to try again meeting with a nutritionist about a plant based diet, as she has heard from other people in her Melanoma support group that this could be helpful.    Nancy states she is frustrated by the osteoarthritis in her knees (R> L) and her right shoulder discomfort. She reports she got injections from Dr. Duarte 8/25/20, but they did not work to improve her pain. She has not been taking anything for the pain because she was concerned about medication interactions with her treatments. She mentions at one point taking a topical supplement called Panoway by [employer], which was helpful. She wonders if that is safe for her to try again. Nancy gets around at home with the help of a cane or walker. She would like to be more active and wonders if restarting physical therapy could be helpful to her. She states she is not interested in OT.    Nancy does point out that she has noticed the bump in her right " "groin area seems like it is getting bigger. She denies recent fever/chills, chest pain, SOB, cough, cold symptoms, headaches, numbness/tingling, dizziness, skin changes (rashes, bruising, bleeding, new lesions).     14 point ROS conducted and otherwise negative apart from those symptoms in HPI above.      Current Outpatient Medications:  Current Outpatient Medications   Medication Instructions     diclofenac (VOLTAREN) 4 g, Topical, 4 TIMES DAILY     gabapentin (NEURONTIN) 100 mg, Oral, AT BEDTIME PRN     Lidocaine (LIDOCARE) 4 % Patch 1 patch, Transdermal, EVERY 24 HOURS, To prevent lidocaine toxicity, patient should be patch free for 12 hrs daily.     polyethylene glycol (MIRALAX) 17 g, Oral     sertraline (ZOLOFT) 25 mg, Oral, DAILY     Vitamin D3 (CHOLECALCIFEROL) 2,000 Units, Oral, EVERY OTHER DAY     Objective:  ECOG 1    Vitals: /74 (BP Location: Right arm, Patient Position: Sitting, Cuff Size: Adult Regular)   Pulse 95   Temp 98  F (36.7  C) (Oral)   Resp 16   Ht 1.702 m (5' 7\")   Wt 64.4 kg (142 lb)   SpO2 96%   BMI 22.24 kg/m      Exam:  Constitutional: Elderly adult female sitting in exam room in wheelchair. Wearing facemask. In no acute distress. Appears anxious.  EENT:  Head: Normocephalic. Atraumatic. No facial asymmetry. No masses, lesions, or abnormalities visualized.  Eyes: EOMI. PERRLA. Conjunctiva clear, non-injected. Wears glasses.  Mouth: Mucous membranes pink and moist. No oral lesions. No evidence of thrush.  Lymph: Neck is supple with no lymphadenopathy in the cervical or supraclavicular areas. Right inguinal area with large palpable lymph node measuring 2.5 cm x 4 cm.  Cardiovascular: Regular rate and rhythm. No murmurs, gallops or rubs.  Respiratory: No obvious increased work of breathing. Lungs CTA bilaterally. No adventitious lung sounds.  GI: Bowel sounds normoactive. No TTP. No organomegaly.  Skin: Warm and dry. No suspicious rashes, ecchymosis of signs of bleeding. Oliver " hemangiomas scattered throughout body, most notable on abdomen.  Right thigh and right heel are healing well s/p skin graft. No streaking or purulent discharge.  Extremities: Trace sockline lower extremity edema noted bilaterally.   Neurologic: Awake, alert, oriented x 3. Cns II - XII grossly intact. Clear and coherent speech.  Psychiatric: Anxious affect.     Lab Studies:  No labs today.    Imaging:  No new imaging to review.    Assessment/Plan:  #Acral melanoma, stage IV, s/p primary resection and reconstruction  -Per Oncologic history above. 5/27/2020, initiated on nivolumab immunotherapy.  Had C2 6/24/20. C3 on 7/22/20. C4 on 9/3/2020. 8/21/2020 evidence of a new liver metastasis on PET-CT.  -At 9/3/20 appointment with Dr. Franklin, discussed that her oligometastatic disease could be refractory to anti-PD1 monotherapy, though pseudoprogression is also possible and we will need to confirm in 6-8 weeks. Dr. Franklin reviewed standard treatment options as well as clinical trial options going forward if in fact future scans confirm progression.  She does have worsening joint pains on nivolumab, therefore, trial options exploring less toxic options than ipi/nivo (standard treatment) would be very reasonable. Discussed the Nektar study, which is currently enrolling patients in Cohort A Nektar 214/262 versus Cohort B nivolumab + Nektar 214/262.    -Noted on exam today that patient's right inguinal lymphadenopathy was increased in size and palpated a 2.5 cm x 4 cm node on physical exam. This could be pseudoprogression versus progression.    -Yadira was very anxious and overwhelmed at her appointment today about next steps in her care plan. Discussed that we will continue nivolumab infusion, hoping for delayed response or disease stabilization. She will have lab draw and C5 infusion of nivo on 10/1/2020. Discussed that we will rescan 10/9/20 and then meet with Dr. Franklin 10/15/20 in person to talk about the imaging results  and next steps. We discussed at length trying to take things one step at a time until we have her scan results back. Patient expressed that she felt a little better being able to talk through everything at today's appointment and that she understands the plan better.    #Depression/Anxiety  -Patient's anxiety level about her cancer continues to worsen and she appeared very anxious today.  -Patient met with Dr. Alec Angel 9/16/2020 regarding her depression and anxiety. Recommendations/plan discussed with Dr. Angel included the use of cognitive avoidance and distraction to reduce her cancer ruminations. Will work on getting patient an appointment within the next two weeks with Dr. Angel since she found meeting with him to be helpful.  -Patient is open to restarting Remeron, as it worked for her in the past. Will send prescription.  -Patient will continue with her Melanoma support group.    #MSK Pain  -Patient will take Ibuprofen 600mg BID scheduled for her pain secondary to OA. Patient did not want to try Tramadol at bedtime, as she states it didn't agree with her in the past.  -Patient will start trying Panoway again, as she felt it was helpful in the past for her.  -Provided patient with PT referral for help with her right shoulder and right knee pain.    #Nutrition  -Patient met with nutritionist Kimberly Leiva 9/9/20, who provided written & verbal education. Discussed ways to optimize nutrition via small frequent meals. Kimberly reviewed meal and snack ideas with her allergies in mind. Kimberly advised pt to aim for at least 1600kcal and 75g protein daily and she reviewed ONS options (Orgain, Pea plant based). Kimberly encouraged utilizing these ONS in home made shakes/smoothies to prevent flavor fatigue. She encouraged pt to start consuming 2 ONS or home made shakes/smoothies daily. Also had reviewed Mediterranean diet guidelines.   -Patient wants to discuss the plant based diet more and will set up a  follow up appointment accordingly. At today's appointment she seemed confused/overwhelmed regarding all of the recommendations that were provided to her by Kimberly 9/9 per above.     Signed:  TANYA Philippe  [facility]    Meghan Perdomo PA-C  [facility] [address] [address]  [phone]    The patient was seen in conjunction with TANYA Philippe who served as a scribe for today's visit. I have reviewed and edited the above note, and agree with the above findings and plan.          Meghan Perdomo PA-C

## 2020-09-21 NOTE — Clinical Note
9/21/2020         RE: Yadira Hoang  [address] [facility] [address]        Dear Colleague,    Thank you for referring your patient, Yadira Hoang, to the [facility]. Please see a copy of my visit note below.    [facility] Video Visit  September 21, 2020    HPI  Yadira Hoang is a 75 year old female who presents for video call to follow up on her acral melanoma, stage IV, per oncologic history below:    Oncologic History: Acral melanoma, stage IV, s/p primary resection and reconstruction  1. She noted a painful lesion on the sole of the right foot for a few months, since summer 2019.  It initially was small then became raised.  It spontaneously bled. She is not entirely sure of its appearance initially.  She denies noting any masses behind the knee or in the groin.  2. 1/9/2020, punch biopsy was performed by Dr Jessica Alvarado.  It showed melanoma, at least 3.4 mm deep with ulceration and 0 mitoses, and perineural invasion present. TILs were non-brisk and LVI not identified. pT3b.  3. 2/10/2020, she has right femoral sentinel lymph node biopsy and wide local excision (Specimen #: [id number]) and the right heel lesion extends to a depth of 6.6 mm, and invasive melanoma is present at 0.2 mm from the deep margin. Microsatellites are also present. There was 1 (of 2) lymph nodes involved. The lymph node tissue exhibits extensive subcapsular and parenchymal clusters of melanoma cells, highlighted on Melan-A immunostain. The largest cluster is 7 millimeters in greatest diameter. pT4b pN2c.  4. 2/22/2020, she had PET-CT, which showed intense FDG uptake in a right inguinal lymph node, concerning for an additional focus of metastatic disease. There is also an indeterminate right external iliac lymph node with mild FDG uptake.  5. 3/4/2020, she has medial plantar artery  fasciocutaneous instep flap and Integra placement to the donor site. On 4/2/2020, she has additional  "reconstruction with skin grafting.  6. 5/27/2020, initiated on nivolumab immunotherapy.  Had C2 6/24/20. C3 on 7/22/20. C4 on 9/3/2020.  7. 8/21/2020 evidence of a new liver metastasis on PET-CT.    Interval History:  Since Yadira's last virtual visit with Dr. Franklin and C4 on 9/3/2020, she reports feeling pretty anxious about her cancer and what all of her next steps are. She expresses she would like more contact and communication from everyone on her care team. Nancy mentions that she felt more fatigued than usual after C4 and that it hurts her motivation to want to be active. Nancy states that her  Sulaiman, who accompanies her today at the appointment, has been very supportive and helpful to her. She also found her meeting with Dr. Angel to be helpful and she wonders if she can meet with him more frequently, as waiting another month feels too long. Nancy mentions that once she tried Remeron for anxiety and that it helped. She is open to restarting it.    Nancy denies any abdominal pain, nausea, vomiting or bowel changes, but expresses \"my stomach just feels crummy\". She states she met with a nutritionist recently and that she didn't find it very helpful. She would like to try again meeting with a nutritionist about a plant based diet, as she has heard from other people in her Melanoma support group that this could be helpful.    Nancy states she is frustrated by the osteoarthritis in her knees (R> L) and her right shoulder discomfort. She reports she got injections from Dr. Duarte 8/25/20, but they did not work to improve her pain. She has not been taking anything for the pain because she was concerned about medication interactions with her treatments. She mentions at one point taking a topical supplement called Panoway by [employer], which was helpful. She wonders if that is safe for her to try again. Nancy gets around at home with the help of a cane or walker. She would like to be more active and wonders " "if restarting physical therapy could be helpful to her. She states she is not interested in OT.    Nancy does point out that she has noticed the bump in her right groin area seems like it is getting bigger. She denies recent fever/chills, chest pain, SOB, cough, cold symptoms, headaches, numbness/tingling, dizziness, skin changes (rashes, bruising, bleeding, new lesions).     14 point ROS conducted and otherwise negative apart from those symptoms in HPI above.    Medical, surgical, social, and family histories, medications and allergies reviewed and updated.  Current Outpatient Medications:  Current Outpatient Medications   Medication Instructions     diclofenac (VOLTAREN) 4 g, Topical, 4 TIMES DAILY     gabapentin (NEURONTIN) 100 mg, Oral, AT BEDTIME PRN     Lidocaine (LIDOCARE) 4 % Patch 1 patch, Transdermal, EVERY 24 HOURS, To prevent lidocaine toxicity, patient should be patch free for 12 hrs daily.     polyethylene glycol (MIRALAX) 17 g, Oral     sertraline (ZOLOFT) 25 mg, Oral, DAILY     Vitamin D3 (CHOLECALCIFEROL) 2,000 Units, Oral, EVERY OTHER DAY     Objective:  ECOG 1    Vitals: /74 (BP Location: Right arm, Patient Position: Sitting, Cuff Size: Adult Regular)   Pulse 95   Temp 98  F (36.7  C) (Oral)   Resp 16   Ht 1.702 m (5' 7\")   Wt 64.4 kg (142 lb)   SpO2 96%   BMI 22.24 kg/m      Exam:  Constitutional: Elderly adult female sitting in exam room in wheelchair. Wearing facemask. In no acute distress. Appears anxious.  EENT:  Head: Normocephalic. Atraumatic. No facial asymmetry. No masses, lesions, or abnormalities visualized.  Eyes: EOMI. PERRLA. Conjunctiva clear, non-injected. Wears glasses.  Mouth: Mucous membranes pink and moist. No oral lesions. No evidence of thrush.  Lymph: Neck is supple with no lymphadenopathy in the cervical or supraclavicular areas. Right inguinal area with large palpable lymph node measuring 2.5cm x 4cm.  Cardiovascular: Regular rate and rhythm. No murmurs, " gallops or rubs.  Respiratory: No obvious increased work of breathing. Lungs CTA bilaterally. No adventitious lung sounds.  GI: Bowel sounds normoactive. No TTP. No organomegaly.  Skin: Warm and dry. No suspicious rashes, ecchymosis of signs of bleeding. Cherry hemangiomas scattered throughout body, most notable on abdomen.  Right thigh and right heel are healing well s/p skin graft. No streaking or purulent discharge.  Extremities: Trace sockline lower extremity edema noted bilaterally.   Neurologic: Awake, alert, oriented x 3. Cns II - XII grossly intact. Clear and coherent speech.  Psychiatric: Anxious affect.     Lab Studies:  No labs today.    Imaging:  No new imaging to review.    Assessment/Plan:  #Acral melanoma, stage IV, s/p primary resection and reconstruction  -Per Oncologic history above. 5/27/2020, initiated on nivolumab immunotherapy.  Had C2 6/24/20. C3 on 7/22/20. C4 on 9/3/2020. 8/21/2020 evidence of a new liver metastasis on PET-CT.  -At 9/3/20 appointment with Dr. Franklin, discussed that her oligometastatic disease could be refractory to anti-PD1 monotherapy, though pseudoprogression is also possible and we will need to confirm in 6-8 weeks. Dr. Franklin reviewed standard treatment options as well as clinical trial options going forward if in fact future scans confirm progression.  She does have worsening joint pains on nivolumab, therefore, trial options exploring less toxic options than ipi/nivo (standard treatment) would be very reasonable. Discussed the Nektar study, which is currently enrolling patients in Cohort A Nektar 214/262 versus Cohort B nivolumab + Nektar 214/262.    -Noted on exam today that patient's right inguinal lymphadenopathy was increased in size and palpated a 2.5cm x 4cm node on physical exam, concerning for spread of her cancer.  -Yadira was very anxious and overwhelmed at her appointment today about next steps in her care plan. Discussed that we will continue nivolumab  infusion, hoping for delayed response or disease stabilization. She will have lab draw and C5 infusion of nivo10/1/2020. Discussed that we will rescan 10/9/220 and then meet with Dr. Franklin 10/15/20 in person to talk about the imaging results and next steps. We discussed at length trying to take things one step at a time until we have her scan results back. Patient expressed that she felt a little better being able to talk through everything at today's appointment and that she understands the plan better.    #Depression/Anxiety  -Patient's anxiety level about her cancer continues to worsen and she appeared very anxious today.  -Patient met with Dr. Alec Angel 9/16/2020 regarding her depression and anxiety. Recommendations/plan discussed with Dr. Angel included the use of cognitive avoidance and distraction to reduce her cancer ruminations. Will work on getting patient an appointment within the next two weeks with Dr. Angel since she found meeting with him to be helpful.  -Patient is open to restarting Remeron, as it worked for her in the past. Will send prescription.  -Patient will continue with her Melanoma support group.    #MSK Pain  -Patient will take Ibuprofen 600mg BID scheduled for her pain secondary to OA. Patient did not want to try Tramadol at bedtime, as she states it didn't agree with her in the past.  -Patient will start trying Panoway again, as she felt it was helpful in the past for her.  -Provided patient with PT referral for help with her right shoulder and right knee pain.    #Nutrition  -Patient met with nutritionist Kimberly Leiva 9/9/20, who provided written & verbal education. Discussed ways to optimize nutrition via small frequent meals. Kimberly reviewed meal and snack ideas with her allergies in mind. Kimberly advised pt to aim for at least 1600kcal and 75g protein daily and she reviewed ONS options (Orgain, Pea plant based). Kimberly encouraged utilizing these ONS in home made  shakes/smoothies to prevent flavor fatigue. She encouraged pt to start consuming 2 ONS or home made shakes/smoothies daily. Also had reviewed Mediterranean diet guidelines.   -Patient wants to discuss the plant based diet more and will set up a follow up appointment accordingly. At today's appointment she seemed confused/overwhelmed regarding all of the recommendations that were provided to her by Kimberly 9/9 per above.     Signed:  KEITH Philippe-S2  [facility] MPAS Program    ***  The patient was seen in conjunction with STALIN PhilippeS2 who served as a scribe for today's visit. I have reviewed and edited the above note, and agree with the above findings and plan.        Again, thank you for allowing me to participate in the care of your patient.        Sincerely,        Meghan Perodmo PA-C

## 2020-09-21 NOTE — PROGRESS NOTES
[facility] Video Visit  September 21, 2020    HPI  Yadira Hoang is a 75 year old female who presents for video call to follow up on her acral melanoma, stage IV, per oncologic history below:    Oncologic History: Acral melanoma, stage IV, s/p primary resection and reconstruction  1. She noted a painful lesion on the sole of the right foot for a few months, since summer 2019.  It initially was small then became raised.  It spontaneously bled. She is not entirely sure of its appearance initially.  She denies noting any masses behind the knee or in the groin.  2. 1/9/2020, punch biopsy was performed by Dr Jessica Alvarado.  It showed melanoma, at least 3.4 mm deep with ulceration and 0 mitoses, and perineural invasion present. TILs were non-brisk and LVI not identified. pT3b.  3. 2/10/2020, she has right femoral sentinel lymph node biopsy and wide local excision (Specimen #: [id number]) and the right heel lesion extends to a depth of 6.6 mm, and invasive melanoma is present at 0.2 mm from the deep margin. Microsatellites are also present. There was 1 (of 2) lymph nodes involved. The lymph node tissue exhibits extensive subcapsular and parenchymal clusters of melanoma cells, highlighted on Melan-A immunostain. The largest cluster is 7 millimeters in greatest diameter. pT4b pN2c.  4. 2/22/2020, she had PET-CT, which showed intense FDG uptake in a right inguinal lymph node, concerning for an additional focus of metastatic disease. There is also an indeterminate right external iliac lymph node with mild FDG uptake.  5. 3/4/2020, she has medial plantar artery  fasciocutaneous instep flap and Integra placement to the donor site. On 4/2/2020, she has additional reconstruction with skin grafting.  6. 5/27/2020, initiated on nivolumab immunotherapy.  Had C2 6/24/20. C3 on 7/22/20. C4 on 9/3/2020.  7. 8/21/2020 evidence of a new liver metastasis on PET-CT.    Interval History:  Since Yadira's last virtual visit  "with Dr. Franklin and C4 on 9/3/2020, she reports feeling pretty anxious about her cancer and what all of her next steps are. She expresses she would like more contact and communication from everyone on her care team. Nancy mentions that she felt more fatigued than usual after C4 and that it hurts her motivation to want to be active. Nancy states that her  Sulaiman, who accompanies her today at the appointment, has been very supportive and helpful to her. She also found her meeting with Dr. Angel to be helpful and she wonders if she can meet with him more frequently, as waiting another month feels too long. Nancy mentions that once she tried Remeron for anxiety and that it helped. She is open to restarting it.    Nancy denies any abdominal pain, nausea, vomiting or bowel changes, but expresses \"my stomach just feels crummy\". She states she met with a nutritionist recently and that she didn't find it very helpful. She would like to try again meeting with a nutritionist about a plant based diet, as she has heard from other people in her Melanoma support group that this could be helpful.    Nancy states she is frustrated by the osteoarthritis in her knees (R> L) and her right shoulder discomfort. She reports she got injections from Dr. Duarte 8/25/20, but they did not work to improve her pain. She has not been taking anything for the pain because she was concerned about medication interactions with her treatments. She mentions at one point taking a topical supplement called Panoway by [employer], which was helpful. She wonders if that is safe for her to try again. Nancy gets around at home with the help of a cane or walker. She would like to be more active and wonders if restarting physical therapy could be helpful to her. She states she is not interested in OT.    Nancy does point out that she has noticed the bump in her right groin area seems like it is getting bigger. She denies recent fever/chills, chest pain, " "SOB, cough, cold symptoms, headaches, numbness/tingling, dizziness, skin changes (rashes, bruising, bleeding, new lesions).     14 point ROS conducted and otherwise negative apart from those symptoms in HPI above.      Current Outpatient Medications:  Current Outpatient Medications   Medication Instructions     diclofenac (VOLTAREN) 4 g, Topical, 4 TIMES DAILY     gabapentin (NEURONTIN) 100 mg, Oral, AT BEDTIME PRN     Lidocaine (LIDOCARE) 4 % Patch 1 patch, Transdermal, EVERY 24 HOURS, To prevent lidocaine toxicity, patient should be patch free for 12 hrs daily.     polyethylene glycol (MIRALAX) 17 g, Oral     sertraline (ZOLOFT) 25 mg, Oral, DAILY     Vitamin D3 (CHOLECALCIFEROL) 2,000 Units, Oral, EVERY OTHER DAY     Objective:  ECOG 1    Vitals: /74 (BP Location: Right arm, Patient Position: Sitting, Cuff Size: Adult Regular)   Pulse 95   Temp 98  F (36.7  C) (Oral)   Resp 16   Ht 1.702 m (5' 7\")   Wt 64.4 kg (142 lb)   SpO2 96%   BMI 22.24 kg/m      Exam:  Constitutional: Elderly adult female sitting in exam room in wheelchair. Wearing facemask. In no acute distress. Appears anxious.  EENT:  Head: Normocephalic. Atraumatic. No facial asymmetry. No masses, lesions, or abnormalities visualized.  Eyes: EOMI. PERRLA. Conjunctiva clear, non-injected. Wears glasses.  Mouth: Mucous membranes pink and moist. No oral lesions. No evidence of thrush.  Lymph: Neck is supple with no lymphadenopathy in the cervical or supraclavicular areas. Right inguinal area with large palpable lymph node measuring 2.5 cm x 4 cm.  Cardiovascular: Regular rate and rhythm. No murmurs, gallops or rubs.  Respiratory: No obvious increased work of breathing. Lungs CTA bilaterally. No adventitious lung sounds.  GI: Bowel sounds normoactive. No TTP. No organomegaly.  Skin: Warm and dry. No suspicious rashes, ecchymosis of signs of bleeding. Cherry hemangiomas scattered throughout body, most notable on abdomen.  Right thigh and right " heel are healing well s/p skin graft. No streaking or purulent discharge.  Extremities: Trace sockline lower extremity edema noted bilaterally.   Neurologic: Awake, alert, oriented x 3. Cns II - XII grossly intact. Clear and coherent speech.  Psychiatric: Anxious affect.     Lab Studies:  No labs today.    Imaging:  No new imaging to review.    Assessment/Plan:  #Acral melanoma, stage IV, s/p primary resection and reconstruction  -Per Oncologic history above. 5/27/2020, initiated on nivolumab immunotherapy.  Had C2 6/24/20. C3 on 7/22/20. C4 on 9/3/2020. 8/21/2020 evidence of a new liver metastasis on PET-CT.  -At 9/3/20 appointment with Dr. Franklin, discussed that her oligometastatic disease could be refractory to anti-PD1 monotherapy, though pseudoprogression is also possible and we will need to confirm in 6-8 weeks. Dr. Franklin reviewed standard treatment options as well as clinical trial options going forward if in fact future scans confirm progression.  She does have worsening joint pains on nivolumab, therefore, trial options exploring less toxic options than ipi/nivo (standard treatment) would be very reasonable. Discussed the Nektar study, which is currently enrolling patients in Cohort A Nektar 214/262 versus Cohort B nivolumab + Nektar 214/262.    -Noted on exam today that patient's right inguinal lymphadenopathy was increased in size and palpated a 2.5 cm x 4 cm node on physical exam. This could be pseudoprogression versus progression.    -Yadira was very anxious and overwhelmed at her appointment today about next steps in her care plan. Discussed that we will continue nivolumab infusion, hoping for delayed response or disease stabilization. She will have lab draw and C5 infusion of nivo on 10/1/2020. Discussed that we will rescan 10/9/20 and then meet with Dr. Franklin 10/15/20 in person to talk about the imaging results and next steps. We discussed at length trying to take things one step at a time until  we have her scan results back. Patient expressed that she felt a little better being able to talk through everything at today's appointment and that she understands the plan better.    #Depression/Anxiety  -Patient's anxiety level about her cancer continues to worsen and she appeared very anxious today.  -Patient met with Dr. Alec Angel 9/16/2020 regarding her depression and anxiety. Recommendations/plan discussed with Dr. Angel included the use of cognitive avoidance and distraction to reduce her cancer ruminations. Will work on getting patient an appointment within the next two weeks with Dr. Angel since she found meeting with him to be helpful.  -Patient is open to restarting Remeron, as it worked for her in the past. Will send prescription.  -Patient will continue with her Melanoma support group.    #MSK Pain  -Patient will take Ibuprofen 600mg BID scheduled for her pain secondary to OA. Patient did not want to try Tramadol at bedtime, as she states it didn't agree with her in the past.  -Patient will start trying Panoway again, as she felt it was helpful in the past for her.  -Provided patient with PT referral for help with her right shoulder and right knee pain.    #Nutrition  -Patient met with nutritionist Kimberly Leiva 9/9/20, who provided written & verbal education. Discussed ways to optimize nutrition via small frequent meals. Kimberly reviewed meal and snack ideas with her allergies in mind. Kimberly advised pt to aim for at least 1600kcal and 75g protein daily and she reviewed ONS options (Orgain, Pea plant based). Kimberly encouraged utilizing these ONS in home made shakes/smoothies to prevent flavor fatigue. She encouraged pt to start consuming 2 ONS or home made shakes/smoothies daily. Also had reviewed Mediterranean diet guidelines.   -Patient wants to discuss the plant based diet more and will set up a follow up appointment accordingly. At today's appointment she seemed  confused/overwhelmed regarding all of the recommendations that were provided to her by Kimberly 9/9 per above.     Signed:  STALIN PhilippeS2  [facility] MPAS Program    Meghan Perdomo PA-C  [facility] [address] [address] [phone]    The patient was seen in conjunction with TANYA Philippe who served as a scribe for today's visit. I have reviewed and edited the above note, and agree with the above findings and plan.

## 2020-09-21 NOTE — NURSING NOTE
"Oncology Rooming Note    September 21, 2020 12:30 PM   Yadira Hoang is a 75 year old female who presents for:    Chief Complaint   Patient presents with     Oncology Clinic Visit     Return: Malignant melanoma of right lower extremity including hip (H)      Initial Vitals: /74 (BP Location: Right arm, Patient Position: Sitting, Cuff Size: Adult Regular)   Pulse 95   Temp 98  F (36.7  C) (Oral)   Resp 16   Ht 1.702 m (5' 7\")   Wt 64.4 kg (142 lb)   SpO2 96%   BMI 22.24 kg/m   Estimated body mass index is 22.24 kg/m  as calculated from the following:    Height as of this encounter: 1.702 m (5' 7\").    Weight as of this encounter: 64.4 kg (142 lb). Body surface area is 1.74 meters squared.  Moderate Pain (5) Comment: Data Unavailable   No LMP recorded. Patient is postmenopausal.  Allergies reviewed: Yes  Medications reviewed: Yes    Medications: Medication refills not needed today.  Pharmacy name entered into [facility]: [facility] [address] [place] [address] [id number] [facility]    Clinical concerns:Pt will discuss concerns with provider.   Meghan LISA was notified.      Lizette Chinchilla CMA              "

## 2020-09-22 ENCOUNTER — TELEPHONE (OUTPATIENT)
Dept: ONCOLOGY | Facility: CLINIC | Age: 75
End: 2020-09-22

## 2020-09-22 NOTE — TELEPHONE ENCOUNTER
Nutrition Services:     Called Yadira today per request of Meghan Perdomo regarding plant based diet.   RD met with patient via phone visit on 9/1/20.    Yadira expresses her desire to learn more about plant based diet and pros/cons to following this diet.   She has many diet restrictions due to 'sensitivities' including: gluten free, soy free, dairy free, corn free, no onions or garlic, no processed foods.   She has reviewed plant based diet on her own and feels this would be challenging to follow with all of her restrictions.  She does like chicken and eggs a lot and things she would keep eating these if she followed a plant based diet model.      She received education resources in the mail from previous RD visit.  These resources included recipes for batch cooking, 100kcal snack ideas, sources of proteins and Mediterranean diet guidelines.  She has made a couple of the recipes and really likes them.     Interventions/recommendations:   --Reviewed plant based/vegan diet. Discussed pros and cons to this diet with current diet restrictions.  Answered her questions regarding plant based diet. Encouraged her to follow a diet that fits her lifestyle and creates less stress and anxiety surrounding meals and food choices.  Advised her to choose a modified plant based diet with chicken and eggs if that's what she really enjoys eating.    --Reviewed Mediterranean diet.  She prefers to follow a modifed plant based diet.      Advised her to call RD with further nutrition questions or concerns.  Provided her with RD contact information.     Kimberly Leiva RD

## 2020-09-24 ENCOUNTER — THERAPY VISIT (OUTPATIENT)
Dept: PHYSICAL THERAPY | Facility: CLINIC | Age: 75
End: 2020-09-24
Attending: PHYSICIAN ASSISTANT
Payer: COMMERCIAL

## 2020-09-24 DIAGNOSIS — G89.29 CHRONIC RIGHT SHOULDER PAIN: ICD-10-CM

## 2020-09-24 DIAGNOSIS — M25.511 CHRONIC RIGHT SHOULDER PAIN: ICD-10-CM

## 2020-09-24 DIAGNOSIS — G89.29 CHRONIC PAIN OF RIGHT KNEE: ICD-10-CM

## 2020-09-24 DIAGNOSIS — M25.561 CHRONIC PAIN OF RIGHT KNEE: ICD-10-CM

## 2020-09-24 PROCEDURE — 97530 THERAPEUTIC ACTIVITIES: CPT | Mod: GP | Performed by: PHYSICAL THERAPIST

## 2020-09-24 PROCEDURE — 97161 PT EVAL LOW COMPLEX 20 MIN: CPT | Mod: GP | Performed by: PHYSICAL THERAPIST

## 2020-09-24 PROCEDURE — 97110 THERAPEUTIC EXERCISES: CPT | Mod: GP | Performed by: PHYSICAL THERAPIST

## 2020-09-24 NOTE — PROGRESS NOTES
[facility] Initial Evaluation -- Upper Extremity    Evaluation Date: September 24, 2020  Yadira Hoang is a 75 year old female with a R shoulder condition.   Referral: CRISTIN Perdomo PA-C  Functional disability score (SPADI): not assessed  VAS score (0-10): 5/10; at worst 10/10  Handedness (R/L):  R  Patient goals/expectations:  Be able to dress self without pain, able to sleep through the night, use arm without pain    HISTORY    Present symptoms: severe pain for the R shoulder and arm.  Yesterday she felt symptoms along the forearm into the thumb and index finger  Pain quality (sharp/shooting/stabbing/aching/burning/cramping):  Severe/extreme pain    Present since (onset date):  At least 2 month history of R shoulder and arm pain - July 2020.    Symptoms (improving/unchanging/worsening):  Improving.    Symptoms commenced as a result of: using a walker after foot surgery due to melanoma then having a skin graph (2 part surgery - one part in March, one part in April 2020)     Symptoms at onset: R shoulder and arm    Paresthesia (yes/no):  Yes, for the first time, yesterday, 9/23/3030  Spinal history: yes     Cough/Sneeze (pos/neg):  negative    Constant symptoms: R shoulder and proximal arm  Intermittent symptoms: neck, UT, forearm into the thumb and index finger    Symptoms are worse with the following: Always Dressing (needs assistance, Always Reaching, Sometimes Gripping, Sometimes On the move, Always Sleeping (prone/sup/side R/L) - side sleeper (tends to wake early and not able to get back to sleep due to pain, Time of day - variable during the day and Other - rolling over in bed   Symptoms are better with the following: Always Sitting and Other - changing positions if painful, ice    Continued use makes the pain (better/worse/no effect): worse    Disturbed night (yes/no):  Yes; wakes at 3-4 am and not able to go back to sleep    Pain at rest (yes/no): yes    Site  (neck/shoulder/elbow/wrist/hand): neck and shoulder    Other questions (swelling/catching/clicking/locking/subluxing):  none    Previous episodes: no previous history of shoulder pain, but long history of neck pain  Previous treatments: Steroid injection for the R shoulder 8/25/2020 without relief    Specific Questions:  General health (excellent/good/fair/poor):  good  Pertinent medical history includes: Cancer-Melanoma of R foot  Medications Remeron, Gabapentin, Zoloft  Medical allergies:  none  Imaging (None/Xray/MRI/Other): none for neck/shoulder  Recent or major surgery (yes/no): yes, R foot  Night pain (yes/no): yes - wakes due to shoulder and arm pain, not able to fall back to sleep  Accidents (yes/no): no  Unexplained weight loss (yes/no): no  Barriers at home: yes - needs assistance with dressing and reaching  Other red flags: no    Sites for physical examination (neck/shoulder/elbow/wrist/hand): neck and shoulder    EXAMINATION    Posture:  Sitting (good/fair/poor): poor  Correction of posture (better/worse/no effect/NA): no effect  Standing (good/fair/poor): fair  Other observations:  Using cane in R hand while symptoms and foot surgery are on the R    Neurological (NA/motor/sensory/reflexes/dural): brisk reflexes    Baselines (pain or functional activity): shoulder abduction to 90 degrees with pain.  Limited neck ROM with inc pain in  R arm when L lateral bend    Extremities (Shoulder/Elbow/Wrist/Hand): R shoulder    Movement Loss Param Mod Min Nil Pain   Flexion        Extension        Abduction     90 deg-arm and shoulder pain   Internal Rotation        External Rotation        Supination        Pronation        Radial Deviation        Ulnar Deviation           Passive Movement (+/- overpressure)/(PDM/ERP):  Not assessed  Resisted Test Response (pain): not assessed  Other Tests: gait training with cane in L hand and lowered one notch.  No increase in shoulder symptoms and unclear if any change in foot  though knee pain  Patient did note that after doing neck retraction she felt increased symptoms for the R LE  Spine:  Movement Loss: Mod loss of extension with inc neck pain, retraction produced symptoms into forearm and hand, L lateral bending increased symptoms in R arm.  Neck stiffness with B rotation (about 60 deg, bilateral rotation)  Effect of repeated movements: repeated retraction increased pain into R UE and abduction seemed more painful.  Repeated lateral bend to the R - inc neck, no worse, abduction returned to 90 deg after.  Effect of static positioning: not assessed  Spine testing (not relevant/relevant/secondary problem): relevant - needs further assessment    Baseline Symptoms: did not perform repeated movements for the shoulder.  Repeated Tests Symptom Response Mechanical Response   Active/Passive movement, resisted test, functional test During - Produce, Abolish, Increase, Decrease, NE After -   Better, Worse, NB, NW, NE Effect -   ? or ? ROM, strength or key functional test No Effect                                       Effect of static positioning                    Provisional Classification (Extremity/Spine): Spine - Inconclusive/Other - Mechanically Unresponsive Radiculopathy and monitor for possible shoulder involvement as well.        Principle of Management:  Education:  Discussed proper sitting posture but must not worsen the shoulder pain.  Use cane in L hand to determine impact on R side of the body.  Monitor pain and ability to raise R arm before/after the neck exercise.  If the pain/symptoms move down into the forearm and/or hand and remain, hold on the exercise, contact PT (peripheralization). May develop more consistent neck pain should the lateral bending assist in decreasing the R arm and shoulder pain (centralization)  Equipment provided:  none  Exercise and dosage:  Lateral bending to the R 10 reps, 6-8 times a day.      ASSESSMENT/PLAN:    Patient is a 75 year old female with  right side shoulder and arm and neck complaints.    Patient has the following significant findings with corresponding treatment plan.                Diagnosis 1: R shoulder and arm pain (?cervical radiculopathy)    Pain -  manual therapy, self management, education, directional preference exercise, home program.  (will contact MD if modalities other than heat/ice indicated)  Decreased ROM/flexibility - manual therapy, therapeutic exercise and home program  Decreased function - therapeutic activities and home program  Impaired posture - neuro re-education, therapeutic activities and home program    Therapy Evaluation Codes:   1) History comprised of:   Personal factors that impact the plan of care:      None.    Comorbidity factors that impact the plan of care are:      Cancer.     Medications impacting care: gabapentin, voltarin gel, zoloft.  2) Examination of Body Systems comprised of:   Body structures and functions that impact the plan of care:      Cervical spine and Shoulder.   Activity limitations that impact the plan of care are:      Bathing, Cooking, Driving, Dressing, Lifting, Sleeping and reaching.  3) Clinical presentation characteristics are:   Stable/Uncomplicated.    Cumulative Therapy Evaluation is: Low complexity.    Previous and current functional limitations:  (See Goal Flow Sheet for this information)    Short term and Long term goals: (See Goal Flow Sheet for this information)     Communication ability:  Patient appears to be able to clearly communicate and understand verbal and written communication and follow directions correctly.  Treatment Explanation - The following has been discussed with the patient:   RX ordered/plan of care  Anticipated outcomes  Possible risks and side effects  This patient would benefit from PT intervention to resume normal activities.   Rehab potential is good.    Frequency:  1 X week, once daily  Duration:  for 12 weeks  Discharge Plan:  Achieve all  LTG.  Independent in home treatment program.  Reach maximal therapeutic benefit.  Patient also has orders for chronic R knee issues.  Working on gait and adjusting cane limited evaluation time for shoulder today.   Will assess lower body at next session.     Please refer to the daily flowsheet for treatment today, total treatment time and time spent performing 1:1 timed codes.

## 2020-09-28 ENCOUNTER — OFFICE VISIT (OUTPATIENT)
Dept: SURGERY | Facility: CLINIC | Age: 75
End: 2020-09-28
Payer: COMMERCIAL

## 2020-09-28 DIAGNOSIS — C43.71 MALIGNANT MELANOMA OF RIGHT LOWER EXTREMITY INCLUDING HIP (H): ICD-10-CM

## 2020-09-28 DIAGNOSIS — C43.9 MELANOMA OF SKIN (H): Primary | ICD-10-CM

## 2020-09-28 DIAGNOSIS — C43.71 MALIGNANT MELANOMA OF RIGHT LOWER EXTREMITY INCLUDING HIP (H): Primary | ICD-10-CM

## 2020-09-28 PROCEDURE — 99214 OFFICE O/P EST MOD 30 MIN: CPT | Performed by: SURGERY

## 2020-09-28 RX ORDER — SODIUM CHLORIDE 9 MG/ML
1000 INJECTION, SOLUTION INTRAVENOUS CONTINUOUS PRN
Status: CANCELLED
Start: 2020-10-01

## 2020-09-28 RX ORDER — DIPHENHYDRAMINE HYDROCHLORIDE 50 MG/ML
50 INJECTION INTRAMUSCULAR; INTRAVENOUS
Status: CANCELLED
Start: 2020-10-01

## 2020-09-28 RX ORDER — ALBUTEROL SULFATE 0.83 MG/ML
2.5 SOLUTION RESPIRATORY (INHALATION)
Status: CANCELLED | OUTPATIENT
Start: 2020-10-01

## 2020-09-28 RX ORDER — ALBUTEROL SULFATE 90 UG/1
1-2 AEROSOL, METERED RESPIRATORY (INHALATION)
Status: CANCELLED
Start: 2020-10-01

## 2020-09-28 RX ORDER — HEPARIN SODIUM (PORCINE) LOCK FLUSH IV SOLN 100 UNIT/ML 100 UNIT/ML
5 SOLUTION INTRAVENOUS
Status: CANCELLED | OUTPATIENT
Start: 2020-10-01

## 2020-09-28 RX ORDER — MEPERIDINE HYDROCHLORIDE 25 MG/ML
25 INJECTION INTRAMUSCULAR; INTRAVENOUS; SUBCUTANEOUS EVERY 30 MIN PRN
Status: CANCELLED | OUTPATIENT
Start: 2020-10-01

## 2020-09-28 RX ORDER — EPINEPHRINE 1 MG/ML
0.3 INJECTION, SOLUTION INTRAMUSCULAR; SUBCUTANEOUS EVERY 5 MIN PRN
Status: CANCELLED | OUTPATIENT
Start: 2020-10-01

## 2020-09-28 RX ORDER — LORAZEPAM 2 MG/ML
0.5 INJECTION INTRAMUSCULAR EVERY 4 HOURS PRN
Status: CANCELLED
Start: 2020-10-01

## 2020-09-28 RX ORDER — METHYLPREDNISOLONE SODIUM SUCCINATE 125 MG/2ML
125 INJECTION, POWDER, LYOPHILIZED, FOR SOLUTION INTRAMUSCULAR; INTRAVENOUS
Status: CANCELLED
Start: 2020-10-01

## 2020-09-28 RX ORDER — NALOXONE HYDROCHLORIDE 0.4 MG/ML
.1-.4 INJECTION, SOLUTION INTRAMUSCULAR; INTRAVENOUS; SUBCUTANEOUS
Status: CANCELLED | OUTPATIENT
Start: 2020-10-01

## 2020-09-28 RX ORDER — HEPARIN SODIUM,PORCINE 10 UNIT/ML
5 VIAL (ML) INTRAVENOUS
Status: CANCELLED | OUTPATIENT
Start: 2020-10-01

## 2020-09-28 NOTE — NURSING NOTE
Yadira Hoang's goals for this visit include:   Chief Complaint   Patient presents with     RECHECK     melanoma right foot       She requests these members of her care team be copied on today's visit information: no    PCP: Erika Blanton    Referring Provider:  No referring provider defined for this encounter.    There were no vitals taken for this visit.    Do you need any medication refills at today's visit? No    Marion Bonilla LPN

## 2020-09-28 NOTE — LETTER
9/28/2020         RE: Yadira Hoang  [address] [facility] [address]        Dear Colleague,    Thank you for referring your patient, Yadira Hoang, to the [facility]. Please see a copy of my visit note below.    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 Wide local excision of right heel melanoma with 2 cm margins and right femoral sentinel lymph node biopsy (2/10/2020)  2. Re-excision of right heel (2/20/2020)  3. Nivolumab (5/27/2020 to ongoing)    HPI:    Since her last visit, a new liver metastasis was seen on PET/CT in August 2020.  She has a PET/CT pending in October 2020; she is considering a potential clinical trial.    Overall, she gets fatigued with her infusions particularly after the nivolumab treatments.  She continues to have pain in her heel, that shoots up the medial aspect of the leg, particularly if she lands too hard on the foot.  She now ambulates with a cane mostly.  She does use a wheelchair when attending medical appointments as she cannot walk too far.      Physical Exam  Lymphadenopathy:      Lower Body: Right inguinal adenopathy (Palpable mobile node just lateral to the inguinal SLNB incision. No skin changes.) present.   Skin:     Comments: WLE site on the right foot is well healed.  No nodularity or pigmentation.  No lower extremity swelling.        INVESTIGATIONS:    PET/CT (8/21/2020) showed:  FINDINGS:   HEAD/NECK:  There is no  suspicious FDG uptake in the neck.   The paranasal sinuses are clear. The mastoid air cells are clear. The mucosal pharyngeal  space, the , prevertebral and carotid spaces are within normal limits. No masses, mass effect or pathologically enlarged lymph nodes are evident. The thyroid gland is unremarkable. The right internal jugular vein is prominent in size, which is of unclear clinical significance and similar to prior study 2/22/2020  CHEST:  There is no suspicious FDG uptake in the chest.   The central tracheobronchial tree is patent. No pleural effusion or pneumothorax. No focal consolidation. Mild dependent atelectasis. Unchanged scattered pulmonary nodules, including sentinel 8 x 7 mm nodule with SUV max 2.0 (series 5, image 225), not significantly changed in size when employing similar measuring technique.    Heart size is within normal limits. No pericardial effusion. Normal caliber of the thoracic aorta and main pulmonary artery. Moderate coronary artery calcifications. No enlarged or hypermetabolic lymph nodes in the mediastinum, kit, or axillae. Small hiatal hernia.  ABDOMEN AND PELVIS:  Hypermetabolic hypoenhancing 1.0 cm nodule in segment 2 of the liver with SUV max 11.7 (series 5, image 253). In retrospect, there may have been subtle focal FDG uptake in this location with SUV max 4.3.  Increased size of a right external iliac lymph node to 2.1 x 1.2 cm with SUV max 30.2 (series 5, image 380), previously 1.7 x 0.5 cm. 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 Increased size of a right inguinal lymph node to 2.8 x 2.5 cm with SUV max 19.7 (series 5, image 428), previously 1.7 x 1.3 cm. New 1.2 x 0.9 cm right inguinal lymph node with SUV max 10.5 (series 5, image 440).  The gallbladder, pancreas, spleen, and adrenal glands are unremarkable. Symmetric nephrographic enhancement of the kidneys. No hydronephrosis. Bladder is partially distended and unremarkable. Uterus is unremarkable. Normal caliber of the small and large bowel. No free fluid or fluid collection. Normal caliber of the abdominal aorta.  Moderate atherosclerotic calcifications of the aorta.  LOWER EXTREMITIES:   Postsurgical changes of resection of right heel mass, with soft tissue thickening at the right heel and medial forefoot. There is generalized low levels of FDG uptake associated with the areas of soft tissue thickening, for example in the medial forefoot with SUV max 2.4 (series 5, image 788), previously 2.6. This is likely postsurgical in etiology, possibly mild inflammation. No focal hypermetabolic lesion within the foot. 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  BONES:   There is no abnormal FDG uptake in the skeleton. Increased FDG uptake in the tissues lateral to the right humeral head associated with calcific densities suggesting calcific tendinitis, versus other inflammation with heterotopic ossification. Multilevel degenerative changes in the spine. Mild convex right curvature of the lumbar spine with apex at L3.  IMPRESSION: In this patient with a history of melanoma on Nivolumab, there is unconfirmed progression (iRecist) since 5/22/2020.    1. Increased hypermetabolic adenopathy:  1a. New hypermetabolic right inguinal lymph node. Another hypermetabolic right inguinal lymph node has increased in size.  1b. Larger hypermetabolic right external iliac lymph node.  2. Larger hypermetabolic nodule in segment 2 of the liver, most consistent with metastasis.  3. Unchanged pulmonary nodules, largest is 8 x 7 mm in the right lower lobe just above the hemidiaphragm. This does not demonstrate significant FDG uptake and is indeterminant. Attention on follow-up.  4. Postsurgical changes to the right foot, with unchanged mild soft tissue thickening and generalized FDG uptake most consistent with postsurgical etiology. No focal hypermetabolism in the right foot to suggest local recurrence.  5. Increased inflammation along the lateral aspect of the right humeral head, may be exacerbated by immunotherapy.    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    I reviewed the PET/CT images with Yadira Hoang and her .  I reviewed with them the implications of distant metastatic disease.  We reviewed that her primary melanoma was extremely high risk to begin with.  We reviewed that there is no role for surgical intervention in this setting.  I recommended that they follow up with Dr Cartagena regarding systemic therapy.  Yadira Hoang became tearful and felt overwhelmed with all of the testing that needs to happen for her to participate in a clinical trial.  She has good family support but felt no one understands what she is going through with her cancer.  I suggested she follow up with Alec Angel, the psychologist at the cancer center.  She is agreeable to this and feels it would be helpful.  We reviewed the progress she has made in her recovery since we last spoke; specifically, she was not walking much really in May and now is ambulating with only a cane.  Unfortunately, I do not think there is a whole lot that can be done about the pain in the medial aspect of the foot. She has lost a lot of the soft tissue there and likely there is neuropathic nature to the pain as well.  She does need to be careful about where she steps as a result.    The above was discussed with the patient and all questions were answered.    Total time spent with the patient was 30 minutes, of which more than 90% was counseling.     PLAN:  1. Follow up with Dr Cartagena regarding systemic therapy   2. No role for surgical intervention   3. Follow up with psychologist Alec Angel.    Gabriela Dorsey MD MSc [facility] FACS    Division of Surgical Oncology  [facility]     Again, thank you for allowing me to participate in the care of your patient.        Sincerely,        Gabriela Dorsey MD

## 2020-09-28 NOTE — PROGRESS NOTES
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 Wide local excision of right heel melanoma with 2 cm margins and right femoral sentinel lymph node biopsy (2/10/2020)  2. Re-excision of right heel (2/20/2020)  3. Nivolumab (5/27/2020 to ongoing)    HPI:    Since her last visit, a new liver metastasis was seen on PET/CT in August 2020.  She has a PET/CT pending in October 2020; she is considering a potential clinical trial.    Overall, she gets fatigued with her infusions particularly after the nivolumab treatments.  She continues to have pain in her heel, that shoots up the medial aspect of the leg, particularly if she lands too hard on the foot.  She now ambulates with a cane mostly.  She does use a wheelchair when attending medical appointments as she cannot walk too far.      Physical Exam  Lymphadenopathy:      Lower Body: Right inguinal adenopathy (Palpable mobile node just lateral to the inguinal SLNB incision. No skin changes.) present.   Skin:     Comments: WLE site on the right foot is well healed.  No nodularity or pigmentation.  No lower extremity swelling.        INVESTIGATIONS:    PET/CT (8/21/2020) showed:  FINDINGS:   HEAD/NECK:  There is no  suspicious FDG uptake in the neck.   The paranasal sinuses are clear. The mastoid air cells are clear. The mucosal pharyngeal space, the , prevertebral and carotid spaces are within normal limits. No masses, mass effect or pathologically enlarged lymph nodes are evident. The thyroid gland is unremarkable. The right internal jugular vein is prominent in size,  which is of unclear clinical significance and similar to prior study 2/22/2020  CHEST:  There is no suspicious FDG uptake in the chest.   The central tracheobronchial tree is patent. No pleural effusion or pneumothorax. No focal consolidation. Mild dependent atelectasis. Unchanged scattered pulmonary nodules, including sentinel 8 x 7 mm nodule with SUV max 2.0 (series 5, image 225), not significantly changed in size when employing similar measuring technique.    Heart size is within normal limits. No pericardial effusion. Normal caliber of the thoracic aorta and main pulmonary artery. Moderate coronary artery calcifications. No enlarged or hypermetabolic lymph nodes in the mediastinum, kit, or axillae. Small hiatal hernia.  ABDOMEN AND PELVIS:  Hypermetabolic hypoenhancing 1.0 cm nodule in segment 2 of the liver with SUV max 11.7 (series 5, image 253). In retrospect, there may have been subtle focal FDG uptake in this location with SUV max 4.3.  Increased size of a right external iliac lymph node to 2.1 x 1.2 cm with SUV max 30.2 (series 5, image 380), previously 1.7 x 0.5 cm. 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 Increased size of a right inguinal lymph node to 2.8 x 2.5 cm with SUV max 19.7 (series 5, image 428), previously 1.7 x 1.3 cm. New 1.2 x 0.9 cm right inguinal lymph node with SUV max 10.5 (series 5, image 440).  The gallbladder, pancreas, spleen, and adrenal glands are unremarkable. Symmetric nephrographic enhancement of the kidneys. No hydronephrosis. Bladder is partially distended and unremarkable. Uterus is unremarkable. Normal caliber of the small and large bowel. No free fluid or fluid collection. Normal caliber of the abdominal aorta. Moderate atherosclerotic calcifications of the aorta.  LOWER EXTREMITIES:   Postsurgical changes of resection of right heel mass, with soft tissue thickening at the right heel and medial forefoot. There is generalized low levels of FDG uptake  associated with the areas of soft tissue thickening, for example in the medial forefoot with SUV max 2.4 (series 5, image 788), previously 2.6. This is likely postsurgical in etiology, possibly mild inflammation. No focal hypermetabolic lesion within the foot. 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  BONES:   There is no abnormal FDG uptake in the skeleton. Increased FDG uptake in the tissues lateral to the right humeral head associated with calcific densities suggesting calcific tendinitis, versus other inflammation with heterotopic ossification. Multilevel degenerative changes in the spine. Mild convex right curvature of the lumbar spine with apex at L3.  IMPRESSION: In this patient with a history of melanoma on Nivolumab, there is unconfirmed progression (iRecist) since 5/22/2020.    1. Increased hypermetabolic adenopathy:  1a. New hypermetabolic right inguinal lymph node. Another hypermetabolic right inguinal lymph node has increased in size.  1b. Larger hypermetabolic right external iliac lymph node.  2. Larger hypermetabolic nodule in segment 2 of the liver, most consistent with metastasis.  3. Unchanged pulmonary nodules, largest is 8 x 7 mm in the right lower lobe just above the hemidiaphragm. This does not demonstrate significant FDG uptake and is indeterminant. Attention on follow-up.  4. Postsurgical changes to the right foot, with unchanged mild soft tissue thickening and generalized FDG uptake most consistent with postsurgical etiology. No focal hypermetabolism in the right foot to suggest local recurrence.  5. Increased inflammation along the lateral aspect of the right humeral head, may be exacerbated by immunotherapy.    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    I reviewed the PET/CT images with Yadira Hoang and her .  I reviewed with them the implications of distant metastatic disease.  We reviewed that her primary  melanoma was extremely high risk to begin with.  We reviewed that there is no role for surgical intervention in this setting.  I recommended that they follow up with Dr Cartagena regarding systemic therapy.  Yadira Hoang became tearful and felt overwhelmed with all of the testing that needs to happen for her to participate in a clinical trial.  She has good family support but felt no one understands what she is going through with her cancer.  I suggested she follow up with Alec Angel, the psychologist at the cancer center.  She is agreeable to this and feels it would be helpful.  We reviewed the progress she has made in her recovery since we last spoke; specifically, she was not walking much really in May and now is ambulating with only a cane.  Unfortunately, I do not think there is a whole lot that can be done about the pain in the medial aspect of the foot. She has lost a lot of the soft tissue there and likely there is neuropathic nature to the pain as well.  She does need to be careful about where she steps as a result.    The above was discussed with the patient and all questions were answered.    Total time spent with the patient was 30 minutes, of which more than 90% was counseling.     PLAN:  1. Follow up with Dr Cartagena regarding systemic therapy   2. No role for surgical intervention   3. Follow up with psychologist Alec Angel.    Gabriela Dorsey MD MSc [facility] FACS    Division of Surgical Oncology  [facility]

## 2020-10-01 ENCOUNTER — APPOINTMENT (OUTPATIENT)
Dept: LAB | Facility: CLINIC | Age: 75
End: 2020-10-01
Attending: INTERNAL MEDICINE
Payer: COMMERCIAL

## 2020-10-01 ENCOUNTER — INFUSION THERAPY VISIT (OUTPATIENT)
Dept: ONCOLOGY | Facility: CLINIC | Age: 75
End: 2020-10-01
Attending: INTERNAL MEDICINE
Payer: COMMERCIAL

## 2020-10-01 VITALS
BODY MASS INDEX: 22.27 KG/M2 | HEART RATE: 98 BPM | WEIGHT: 142.2 LBS | RESPIRATION RATE: 18 BRPM | DIASTOLIC BLOOD PRESSURE: 79 MMHG | SYSTOLIC BLOOD PRESSURE: 123 MMHG | OXYGEN SATURATION: 98 % | TEMPERATURE: 98.2 F

## 2020-10-01 DIAGNOSIS — C43.9 MELANOMA OF SKIN (H): Primary | ICD-10-CM

## 2020-10-01 DIAGNOSIS — C43.71 MALIGNANT MELANOMA OF RIGHT LOWER EXTREMITY INCLUDING HIP (H): ICD-10-CM

## 2020-10-01 LAB
ALBUMIN SERPL-MCNC: 3.7 G/DL (ref 3.4–5)
ALP SERPL-CCNC: 68 U/L (ref 40–150)
ALT SERPL W P-5'-P-CCNC: 21 U/L (ref 0–50)
ANION GAP SERPL CALCULATED.3IONS-SCNC: 4 MMOL/L (ref 3–14)
AST SERPL W P-5'-P-CCNC: 15 U/L (ref 0–45)
BILIRUB SERPL-MCNC: 0.4 MG/DL (ref 0.2–1.3)
BUN SERPL-MCNC: 20 MG/DL (ref 7–30)
CALCIUM SERPL-MCNC: 9.5 MG/DL (ref 8.5–10.1)
CHLORIDE SERPL-SCNC: 103 MMOL/L (ref 94–109)
CO2 SERPL-SCNC: 30 MMOL/L (ref 20–32)
CREAT SERPL-MCNC: 0.63 MG/DL (ref 0.52–1.04)
GFR SERPL CREATININE-BSD FRML MDRD: 87 ML/MIN/{1.73_M2}
GLUCOSE SERPL-MCNC: 71 MG/DL (ref 70–99)
POTASSIUM SERPL-SCNC: 3.8 MMOL/L (ref 3.4–5.3)
PROT SERPL-MCNC: 7.6 G/DL (ref 6.8–8.8)
SODIUM SERPL-SCNC: 137 MMOL/L (ref 133–144)
TSH SERPL DL<=0.005 MIU/L-ACNC: 2.01 MU/L (ref 0.4–4)

## 2020-10-01 PROCEDURE — 250N000011 HC RX IP 250 OP 636: Performed by: INTERNAL MEDICINE

## 2020-10-01 PROCEDURE — 84443 ASSAY THYROID STIM HORMONE: CPT | Performed by: PATHOLOGY

## 2020-10-01 PROCEDURE — 258N000003 HC RX IP 258 OP 636: Performed by: INTERNAL MEDICINE

## 2020-10-01 PROCEDURE — 80053 COMPREHEN METABOLIC PANEL: CPT | Performed by: PATHOLOGY

## 2020-10-01 PROCEDURE — 96413 CHEMO IV INFUSION 1 HR: CPT

## 2020-10-01 RX ORDER — LIDOCAINE HYDROCHLORIDE AND EPINEPHRINE 10; 10 MG/ML; UG/ML
1.5 INJECTION, SOLUTION INFILTRATION; PERINEURAL ONCE
Status: DISCONTINUED | OUTPATIENT
Start: 2020-10-02 | End: 2020-10-02

## 2020-10-01 RX ORDER — LIDOCAINE HYDROCHLORIDE AND EPINEPHRINE 10; 10 MG/ML; UG/ML
1.5 INJECTION, SOLUTION INFILTRATION; PERINEURAL ONCE
Status: DISCONTINUED | OUTPATIENT
Start: 2020-10-01 | End: 2020-10-01

## 2020-10-01 RX ADMIN — SODIUM CHLORIDE 480 MG: 9 INJECTION, SOLUTION INTRAVENOUS at 10:57

## 2020-10-01 RX ADMIN — SODIUM CHLORIDE 250 ML: 9 INJECTION, SOLUTION INTRAVENOUS at 10:53

## 2020-10-01 ASSESSMENT — PAIN SCALES - GENERAL: PAINLEVEL: NO PAIN (0)

## 2020-10-01 NOTE — PROGRESS NOTES
Infusion Nursing Note:  Yadira Hoang presents today for Cycle 5 Nivolumab.    Patient seen by provider today: No   present during visit today: Not Applicable.    Note: Pt arrives to infusion today feeling well. No new complaints or concerns.     Intravenous Access:  Peripheral IV placed.    Treatment Conditions:  Lab Results   Component Value Date     10/01/2020                   Lab Results   Component Value Date    POTASSIUM 3.8 10/01/2020           No results found for: MAG         Lab Results   Component Value Date    CR 0.63 10/01/2020                   Lab Results   Component Value Date    GABY 9.5 10/01/2020                Lab Results   Component Value Date    BILITOTAL 0.4 10/01/2020           Lab Results   Component Value Date    ALBUMIN 3.7 10/01/2020                    Lab Results   Component Value Date    ALT 21 10/01/2020           Lab Results   Component Value Date    AST 15 10/01/2020     Results reviewed, labs MET treatment parameters, ok to proceed with treatment.    Post Infusion Assessment:  Patient tolerated infusion without incident.  Blood return noted pre and post infusion.  Site patent and intact, free from redness, edema or discomfort.  No evidence of extravasations.  Access discontinued per protocol.     Discharge Plan:   Patient declined prescription refills.  Copy of AVS reviewed with patient and/or family.  Patient will return 10/16 for next appointment with Dr. Franklin.  Patient discharged in stable condition accompanied by: self.  Departure Mode: Wheelchair.  Face to Face time: 0.    Tata Evans RN

## 2020-10-01 NOTE — PATIENT INSTRUCTIONS
[facility] Triage and after hours / weekends / holidays:  [phone]    Please call the triage or after hours line if you experience a temperature greater than or equal to 100.5, shaking chills, have uncontrolled nausea, vomiting and/or diarrhea, dizziness, shortness of breath, chest pain, bleeding, unexplained bruising, or if you have any other new/concerning symptoms, questions or concerns.      If you are having any concerning symptoms or wish to speak to a provider before your next infusion visit, please call your care coordinator or triage to notify them so we can adequately serve you.     If you need a refill on a narcotic prescription or other medication, please call before your infusion appointment.                 October 2020 Sunday Monday Tuesday Wednesday Thursday Friday Saturday                       1    [facility]ON[facility] LAB DRAW   9:00 AM   (15 min.)    MASONIC LAB DRAW   [facility] ONC INFUSION 60   9:30 AM   (60 min.)    ONCOLOGY INFUSION   [facility] 2    [facility] NEW   1:15 PM   (30 min.)   Gloria Estrada MD   [facility] 3       4     5     6    TELEPHONE VISIT RETURN  12:30 PM   (20 min.)   Erika Blanton MD PhD   [facility]    EXTREMITY INITIAL   2:35 PM   (40 min.)   Gisselle Bonilla, PT   [facility] 7     8     9    LAB  11:50 AM   (10 min.)   LAB FIRST FLOOR [facility]    PET ONCOLOGY WHOLE BODY  12:00 PM   (45 min.)   [email or web address]   [facility] 10       11     12     13    TELEPHONE VISIT NEW   1:00 PM   (30 min.)   Martin Mckay MD   [facility] 14     15     16    RETURN   9:30 AM   (30 min.)   KENNEDY Uriarte MD   [facility] RETURN   3:15 PM   (30 min.)   Gi Kowalski MD   [facility]  [facility] 17       18     19     20     21    TELEPHONE VISIT RETURN  11:00 AM   (60 min.)   Alec Angel PsyD M [facility] 22     23     24       25     26    TECH  10:30 AM   (15 min.)   Alec Rolle MD M [facility] 27     28    TELEPHONE VISIT RETURN  11:00 AM   (60 min.)   Alec Angel PsyD M [facility] 29     30 31 November 2020 Sunday Monday Tuesday Wednesday Thursday Friday Saturday   1     2     3     4    TELEPHONE VISIT RETURN  11:00 AM   (60 min.)   Alec Angel PsyD M [facility] 5     6     7       8     9     10     11    TELEPHONE VISIT RETURN  11:00 AM   (60 min.)   Alec Angel PsyD M [facility] 12     13     14       15     16     17     18     19     20     21       22     23     24     25     26     27     28       29     30                                             Recent Results (from the past 24 hour(s))   Comprehensive metabolic panel    Collection Time: 10/01/20  9:32 AM   Result Value Ref Range    Sodium 137 133 - 144 mmol/L    Potassium 3.8 3.4 - 5.3 mmol/L    Chloride 103 94 - 109 mmol/L    Carbon Dioxide 30 20 - 32 mmol/L    Anion Gap 4 3 - 14 mmol/L    Glucose 71 70 - 99 mg/dL    Urea Nitrogen 20 7 - 30 mg/dL    Creatinine 0.63 0.52 - 1.04 mg/dL    GFR Estimate 87 >60 mL/min/[1.73_m2]    GFR Estimate If Black >90 >60 mL/min/[1.73_m2]    Calcium 9.5 8.5 - 10.1 mg/dL    Bilirubin Total 0.4 0.2 - 1.3 mg/dL    Albumin 3.7 3.4 - 5.0 g/dL    Protein Total 7.6 6.8 - 8.8 g/dL    Alkaline Phosphatase 68 40 - 150 U/L    ALT 21 0 - 50 U/L    AST 15 0 - 45 U/L   TSH with free T4 reflex    Collection Time: 10/01/20  9:32 AM   Result Value Ref Range    TSH 2.01 0.40 - 4.00 mU/L

## 2020-10-01 NOTE — PROGRESS NOTES
[facility] Dermatology Note    Dermatology Problem List:  1. Metastatic melanoma, originated in right heel  - S/p punch biopsy By Dr. Alvarado 1/9/20 showing melanoma at least 3.4mm deep, ulcerated, no mitoses, perineural invasion, TIL present and nonbrisk, no LVI.  - S/p WLE with 2cm margins (revelead melanoma to depth of 6.6mm with deep margin close, microsatellites were present) and right femoral SLNB 2/10/20 (1/2 nodes positive, largest deposit 7mm)  - S/p re-excision 2/20/20  - Flap completed 3/4/30, skin graft 4/2/20  - Adjuvant nivolumab started 5/2020  - New liver met noted on PET-CT 8/21/20. Considering clinical trials  2. AK, right cheek: cryo    Encounter Date: Oct 2, 2020    CC:  Chief Complaint   Patient presents with     New Patient     RIGHT CHEEK LESION; PAINFUL AND ITCHY        History of Present Illness:  Ms. Yadira Hoang is a 75 year old female with metastatic melanoma on immunotherapy and considering a clinical trial who presents for new skin lesion on the right cheek.    She notes that it developed sometime after her last PET scan which was in August. The spot both hurts and is itchy at times. She denies having picked at the spot. She is worried it could be another melanoma specially.     Declines full skin check today. She notes she is very overwhelmed with her current diagnosis of metastatic melanoma and the possibility of needing to go to a clinical trial.     No other concerns addressed today.    Past Medical History:   Patient Active Problem List   Diagnosis     Malignant melanoma of right lower extremity including hip (H)     Melanoma of skin (H)     GUERLINE on CPAP     Chronic congestion of paranasal sinus     Weakness of foot     Neck pain, chronic     Myofascial pain     Intractable migraine without aura and without status migrainosus     History of multiple concussions     Foot pain, bilateral     Fibromyositis     Difficulty walking     Chronic pain disorder     Chronic  fatigue     Bilateral occipital neuralgia     Articular disc disorder of temporomandibular joint     S/P flap graft     Osteopenia     Injury of head     Hyperlipidemia     ALEXUS (generalized anxiety disorder)     Major depression, recurrent, chronic (H)     Chronic right shoulder pain     Past Medical History:   Diagnosis Date     Concussion      ALEXUS (generalized anxiety disorder) 5/7/2020     Major depression, recurrent, chronic (H) 5/7/2020     Melanoma (H)      Nonsenile cataract      Sleep apnea     CPAP     Past Surgical History:   Procedure Laterality Date     BIOPSY NODE SENTINEL Right 2/10/2020    Procedure: [place] Lymph Node Mapping And Biopsy;  Surgeon: Gabriela Dorsey MD;  Location: [facility] OR     EXCISE LESION LOWER EXTREMITY Right 2/10/2020    Procedure: Wide Local Excision of RIGHT heel melanoma;  Surgeon: Gabriela Dorsey MD;  Location: [facility] OR     EXCISE LESION LOWER EXTREMITY Right 2/20/2020    Procedure: Wide local Re-excision RIGHT heel Wound vac application;  Surgeon: Gabriela Dorsey MD;  [facility]     GRAFT SKIN SPLIT THICKNESS FROM EXTREMITY Right 4/2/2020    Procedure: split skin graft from right thigh;  Surgeon: NIKA Crabtree MD;  [facility]     IRRIGATION AND DEBRIDEMENT FOOT, COMBINED Right 4/2/2020    Procedure: right foot/heel debridement;  Surgeon: NIKA Crabtree MD;  [facility]     ORTHOPEDIC SURGERY Right     heel surgery x2     RECONSTRUCT FOOT Right 3/4/2020    Procedure: Right heel reconstruction with regional flap, biologic dressing and SPY;  Surgeon: NIKA Crabtree MD;  [facility] OR     TUBAL LIGATION         Social History:  Patient reports that she has never smoked. She has never used smokeless tobacco. She reports previous alcohol use. She reports that she does not use drugs. .    Family History:  Family History   Problem Relation Age of Onset     Glaucoma No family hx of      Macular Degeneration No family hx of   "      Medications:  Current Outpatient Medications   Medication Sig Dispense Refill     cholecalciferol (VITAMIN D3) 125 MCG (5000 UT) TABS tablet Take 2,000 Units by mouth every other day        diclofenac (VOLTAREN) 1 % topical gel Apply 4 g topically 4 times daily 50 g 4     Lidocaine (LIDOCARE) 4 % Patch Place 1 patch onto the skin every 24 hours To prevent lidocaine toxicity, patient should be patch free for 12 hrs daily. 10 patch 1     mirtazapine (REMERON) 15 MG tablet Take 1 tablet (15 mg) by mouth At Bedtime 30 tablet 3     polyethylene glycol (MIRALAX) powder Take 17 g by mouth       sertraline (ZOLOFT) 25 MG tablet Take 1 tablet (25 mg) by mouth daily (Patient not taking: Reported on 10/1/2020) 30 tablet 4       Allergies   Allergen Reactions     Atorvastatin      Statins all swelling     Hmg-Coa-R Inhibitors Swelling       Review of Systems:  -Constitutional: The patient is feeling generally well today.  -Skin: As above in HPI. No additional skin concerns.    Physical exam:  Vitals: BP (!) 144/78   Pulse 112   Resp 16   Ht 1.702 m (5' 7\")   Wt 64.5 kg (142 lb 3.2 oz)   SpO2 98%   BMI 22.27 kg/m    GEN: This is a well developed, well-nourished female in no acute distress, in a pleasant mood.    SKIN: Focused examination of the face, neck, upper chest, upper extremities including the hands was performed.  - Canales Type II  - Scattered brown macules on sun exposed areas. Minimal.  - Brown scaly macule consistent with an AK on the right cheek.  - Dilated pores of phill on the right upper cutaneous lip and left cheek.  - There are dome shaped bright red papules on the upper extremities.   - There are waxy stuck on tan to brown papules on the upper arms.  - No other lesions of concern on areas examined.       Impression/Plan:    1. Melanoma, stage IV. Alley is currently very overwhelmed with her diagnosis. She worries about her remaining time being short, and does not want to spend time in more " doctor visits then needed. This is why she declined skin check today. I did briefly mention the role of dermatology and skin checks in screening for additional primary melanomas as around 10% of melanoma patients develop a second melanoma over time. I let her know that should she get to a more stable point, we should start doing routine skin checks. I also let her know that I am available should she develop any skin rashes with treatment of her melanoma. She noted understanding.    2.   Actinic keratosis, right cheek: Discussed precancerous nature of these lesions. Recommended treatment to prevent progression to a squamous cell carcinoma. Advised patient to call for follow up if not resolved in 1 month.   - Cryotherapy procedure note: After verbal consent and discussion of risks and benefits including but no limited to dyspigmentation/scar, blister, and pain, 1 AK was treated with 1-2mm freeze border for 2 cycles with liquid nitrogen. Post cryotherapy instructions were provided.     3.   Benign findings including: seborrheic keratoses, cherry angioma, dilated pores of phill  - No further intervention required. Patient to report changes.   - Patient reassured of the benign nature of these lesions.    Follow-up PRN.    Staff Involved:  Staff only    Gloria Estrada MD    Department of Dermatology  [facility]: Phone: [phone], Fax:[phone]  [facility]: Phone: [phone], Fax: [phone]

## 2020-10-01 NOTE — NURSING NOTE
Chief Complaint   Patient presents with     Blood Draw     labs drawn via PIV placed by RN in lab     /79 (BP Location: Left arm, Patient Position: Chair, Cuff Size: Adult Regular)   Pulse 98   Temp 98.2  F (36.8  C) (Oral)   Resp 18   Wt 64.5 kg (142 lb 3.2 oz)   SpO2 98%   BMI 22.27 kg/m      PIV placed right upper forearm by RN in lab for infusion and labs. Labs drawn and sent. Pt tolerated well.   Pt checked in for next appointment.    Ada Shafer RN

## 2020-10-02 ENCOUNTER — PRE VISIT (OUTPATIENT)
Dept: SURGERY | Facility: CLINIC | Age: 75
End: 2020-10-02

## 2020-10-02 ENCOUNTER — OFFICE VISIT (OUTPATIENT)
Dept: SURGERY | Facility: CLINIC | Age: 75
End: 2020-10-02
Attending: DERMATOLOGY
Payer: COMMERCIAL

## 2020-10-02 VITALS
OXYGEN SATURATION: 98 % | HEIGHT: 67 IN | DIASTOLIC BLOOD PRESSURE: 78 MMHG | HEART RATE: 112 BPM | WEIGHT: 142.2 LBS | RESPIRATION RATE: 16 BRPM | SYSTOLIC BLOOD PRESSURE: 144 MMHG | BODY MASS INDEX: 22.32 KG/M2

## 2020-10-02 DIAGNOSIS — L82.1 SEBORRHEIC KERATOSIS: ICD-10-CM

## 2020-10-02 DIAGNOSIS — D18.01 CHERRY ANGIOMA: ICD-10-CM

## 2020-10-02 DIAGNOSIS — C43.9 METASTATIC MELANOMA (H): Primary | ICD-10-CM

## 2020-10-02 DIAGNOSIS — L57.0 ACTINIC KERATOSIS: ICD-10-CM

## 2020-10-02 DIAGNOSIS — D23.9 DILATED PORE OF WINER: ICD-10-CM

## 2020-10-02 PROCEDURE — G0463 HOSPITAL OUTPT CLINIC VISIT: HCPCS

## 2020-10-02 PROCEDURE — 17000 DESTRUCT PREMALG LESION: CPT | Performed by: DERMATOLOGY

## 2020-10-02 PROCEDURE — 99202 OFFICE O/P NEW SF 15 MIN: CPT | Mod: 25 | Performed by: DERMATOLOGY

## 2020-10-02 ASSESSMENT — PAIN SCALES - GENERAL: PAINLEVEL: NO PAIN (0)

## 2020-10-02 ASSESSMENT — MIFFLIN-ST. JEOR: SCORE: 1167.63

## 2020-10-02 NOTE — LETTER
10/2/2020         RE: Yadira Hoang  [address] [facility] [address]        Dear Colleague,    Thank you for referring your patient, Yadira Hoang, to the [facility]. Please see a copy of my visit note below.    [facility] Dermatology Note    Dermatology Problem List:  1. Metastatic melanoma, originated in right heel  - S/p punch biopsy By Dr. Alvarado 1/9/20 showing melanoma at least 3.4mm deep, ulcerated, no mitoses, perineural invasion, TIL present and nonbrisk, no LVI.  - S/p WLE with 2cm margins (revelead melanoma to depth of 6.6mm with deep margin close, microsatellites were present) and right femoral SLNB 2/10/20 (1/2 nodes positive, largest deposit 7mm)  - S/p re-excision 2/20/20  - Flap completed 3/4/30, skin graft 4/2/20  - Adjuvant nivolumab started 5/2020  - New liver met noted on PET-CT 8/21/20. Considering clinical trials  2. AK, right cheek: cryo    Encounter Date: Oct 2, 2020    CC:  Chief Complaint   Patient presents with     New Patient     RIGHT CHEEK LESION; PAINFUL AND ITCHY        History of Present Illness:  Ms. Yadira Hoang is a 75 year old female with metastatic melanoma on immunotherapy and considering a clinical trial who presents for new skin lesion on the right cheek.    She notes that it developed sometime after her last PET scan which was in August. The spot both hurts and is itchy at times. She denies having picked at the spot. She is worried it could be another melanoma specially.     Declines full skin check today. She notes she is very overwhelmed with her current diagnosis of metastatic melanoma and the possibility of needing to go to a clinical trial.     No other concerns addressed today.    Past Medical History:   Patient Active Problem List   Diagnosis     Malignant melanoma of right lower extremity including hip (H)     Melanoma of skin (H)     GUERLINE on CPAP     Chronic congestion of paranasal sinus     Weakness  of foot     Neck pain, chronic     Myofascial pain     Intractable migraine without aura and without status migrainosus     History of multiple concussions     Foot pain, bilateral     Fibromyositis     Difficulty walking     Chronic pain disorder     Chronic fatigue     Bilateral occipital neuralgia     Articular disc disorder of temporomandibular joint     S/P flap graft     Osteopenia     Injury of head     Hyperlipidemia     ALEXUS (generalized anxiety disorder)     Major depression, recurrent, chronic (H)     Chronic right shoulder pain     Past Medical History:   Diagnosis Date     Concussion      ALEXUS (generalized anxiety disorder) 5/7/2020     Major depression, recurrent, chronic (H) 5/7/2020     Melanoma (H)      Nonsenile cataract      Sleep apnea     CPAP     Past Surgical History:   Procedure Laterality Date     BIOPSY NODE SENTINEL Right 2/10/2020    Procedure: [place] Lymph Node Mapping And Biopsy;  Surgeon: Gabriela Dorsey MD;  [facility]: [facility]     EXCISE LESION LOWER EXTREMITY Right 2/10/2020    Procedure: Wide Local Excision of RIGHT heel melanoma;  Surgeon: Gabriela Dorsey MD;  [facility]: [facility] OR     EXCISE LESION LOWER EXTREMITY Right 2/20/2020    Procedure: Wide local Re-excision RIGHT heel Wound vac application;  Surgeon: Gabriela Dorsey MD;  [facility]     GRAFT SKIN SPLIT THICKNESS FROM EXTREMITY Right 4/2/2020    Procedure: split skin graft from right thigh;  Surgeon: NIKA Crabtree MD;  [facility]     IRRIGATION AND DEBRIDEMENT FOOT, COMBINED Right 4/2/2020    Procedure: right foot/heel debridement;  Surgeon: NIKA Crabtree MD;  [facility]     ORTHOPEDIC SURGERY Right     heel surgery x2     RECONSTRUCT FOOT Right 3/4/2020    Procedure: Right heel reconstruction with regional flap, biologic dressing and SPY;  Surgeon: NIKA Crabtree MD;  [facility]     TUBAL LIGATION         Social History:  Patient reports that she has never smoked. She  "has never used smokeless tobacco. She reports previous alcohol use. She reports that she does not use drugs. .    Family History:  Family History   Problem Relation Age of Onset     Glaucoma No family hx of      Macular Degeneration No family hx of        Medications:  Current Outpatient Medications   Medication Sig Dispense Refill     cholecalciferol (VITAMIN D3) 125 MCG (5000 UT) TABS tablet Take 2,000 Units by mouth every other day        diclofenac (VOLTAREN) 1 % topical gel Apply 4 g topically 4 times daily 50 g 4     Lidocaine (LIDOCARE) 4 % Patch Place 1 patch onto the skin every 24 hours To prevent lidocaine toxicity, patient should be patch free for 12 hrs daily. 10 patch 1     mirtazapine (REMERON) 15 MG tablet Take 1 tablet (15 mg) by mouth At Bedtime 30 tablet 3     polyethylene glycol (MIRALAX) powder Take 17 g by mouth       sertraline (ZOLOFT) 25 MG tablet Take 1 tablet (25 mg) by mouth daily (Patient not taking: Reported on 10/1/2020) 30 tablet 4       Allergies   Allergen Reactions     Atorvastatin      Statins all swelling     Hmg-Coa-R Inhibitors Swelling       Review of Systems:  -Constitutional: The patient is feeling generally well today.  -Skin: As above in HPI. No additional skin concerns.    Physical exam:  Vitals: BP (!) 144/78   Pulse 112   Resp 16   Ht 1.702 m (5' 7\")   Wt 64.5 kg (142 lb 3.2 oz)   SpO2 98%   BMI 22.27 kg/m    GEN: This is a well developed, well-nourished female in no acute distress, in a pleasant mood.    SKIN: Focused examination of the face, neck, upper chest, upper extremities including the hands was performed.  - Canales Type II  - Scattered brown macules on sun exposed areas. Minimal.  - Brown scaly macule consistent with an AK on the right cheek.  - Dilated pores of phill on the right upper cutaneous lip and left cheek.  - There are dome shaped bright red papules on the upper extremities.   - There are waxy stuck on tan to brown papules on the " upper arms.  - No other lesions of concern on areas examined.       Impression/Plan:    1. Melanoma, stage IV. Alley is currently very overwhelmed with her diagnosis. She worries about her remaining time being short, and does not want to spend time in more doctor visits then needed. This is why she declined skin check today. I did briefly mention the role of dermatology and skin checks in screening for additional primary melanomas as around 10% of melanoma patients develop a second melanoma over time. I let her know that should she get to a more stable point, we should start doing routine skin checks. I also let her know that I am available should she develop any skin rashes with treatment of her melanoma. She noted understanding.    2.   Actinic keratosis, right cheek: Discussed precancerous nature of these lesions. Recommended treatment to prevent progression to a squamous cell carcinoma. Advised patient to call for follow up if not resolved in 1 month.   - Cryotherapy procedure note: After verbal consent and discussion of risks and benefits including but no limited to dyspigmentation/scar, blister, and pain, 1 AK was treated with 1-2mm freeze border for 2 cycles with liquid nitrogen. Post cryotherapy instructions were provided.     3.   Benign findings including: seborrheic keratoses, cherry angioma, dilated pores of phill  - No further intervention required. Patient to report changes.   - Patient reassured of the benign nature of these lesions.    Follow-up PRN.    Staff Involved:  Staff only    Gloria Estrada MD    Department of Dermatology  [facility]: Phone: [phone], Fax:[phone]  [facility]: Phone: [phone], Fax: [phone]

## 2020-10-02 NOTE — TELEPHONE ENCOUNTER
FUTURE VISIT INFORMATION      FUTURE VISIT INFORMATION:    Date: 10.2.20    Time: 1:30    Location:  [facility]  REFERRAL INFORMATION:    Referring provider:  Meghan Perdomo PA-C    Referring providers clinic:  [facility]    Reason for visit/diagnosis  NEW referred Leanne, right cheek lesion, painful and itchy    RECORDS REQUESTED FROM:       Clinic name Comments Records Status Imaging Status   [facility] 9.28.20, 5.28.20, 5.19.20  2.10.20- [facility], Path # [id number]  1.27.20  Dr. Gabriela Dorsey Epic Photos in [facility]   [facility] 9.21.20, 7.13.20  Meghan Perdomo [facility] N/A   eal Oncology 9.3.20, 8.27.20, 5.24.20, 4.23.20  Dr. Gi Franklin [facility] N/A   [facility] 8.7.20, 6.17.20, 3.4.20, 2.27.20  [facility] 2.17.20  Dr. Fallon [facility] N/A   [facility] 1.9.20  Dr. Jessica Meadows  Path # [id number] [facility] N/A

## 2020-10-02 NOTE — NURSING NOTE
"Oncology Rooming Note    October 2, 2020 1:26 PM   Yadira Hoang is a 75 year old female who presents for:    Chief Complaint   Patient presents with     New Patient     RIGHT CHEEK LESION; PAINFUL AND ITCHY      Initial Vitals: BP (!) 144/78   Pulse 112   Resp 16   Ht 1.702 m (5' 7\")   Wt 64.5 kg (142 lb 3.2 oz)   SpO2 98%   BMI 22.27 kg/m   Estimated body mass index is 22.27 kg/m  as calculated from the following:    Height as of this encounter: 1.702 m (5' 7\").    Weight as of this encounter: 64.5 kg (142 lb 3.2 oz). Body surface area is 1.75 meters squared.  No Pain (0) Comment: Data Unavailable   No LMP recorded. Patient is postmenopausal.  Allergies reviewed: Yes  Medications reviewed: Yes    Medications: Medication refills not needed today.  Pharmacy name entered into [employer]: [facility] [address] [place] [address] [address] [place] [address]    Clinical concerns: No new concerns today  Dr Estrada  was notified.      Carla Castillo            "

## 2020-10-06 ENCOUNTER — VIRTUAL VISIT (OUTPATIENT)
Dept: PEDIATRICS | Facility: CLINIC | Age: 75
End: 2020-10-06
Payer: COMMERCIAL

## 2020-10-06 ENCOUNTER — THERAPY VISIT (OUTPATIENT)
Dept: PHYSICAL THERAPY | Facility: CLINIC | Age: 75
End: 2020-10-06
Payer: COMMERCIAL

## 2020-10-06 DIAGNOSIS — B37.31 YEAST INFECTION OF THE VAGINA: Primary | ICD-10-CM

## 2020-10-06 DIAGNOSIS — Z13.220 SCREENING FOR HYPERLIPIDEMIA: ICD-10-CM

## 2020-10-06 DIAGNOSIS — M25.561 CHRONIC PAIN OF RIGHT KNEE: ICD-10-CM

## 2020-10-06 DIAGNOSIS — M54.16 LUMBAR RADICULOPATHY: ICD-10-CM

## 2020-10-06 DIAGNOSIS — C43.71 MALIGNANT MELANOMA OF RIGHT LOWER EXTREMITY INCLUDING HIP (H): ICD-10-CM

## 2020-10-06 DIAGNOSIS — G89.29 CHRONIC PAIN OF RIGHT KNEE: ICD-10-CM

## 2020-10-06 PROCEDURE — 97162 PT EVAL MOD COMPLEX 30 MIN: CPT | Mod: GP | Performed by: PHYSICAL THERAPIST

## 2020-10-06 PROCEDURE — 97530 THERAPEUTIC ACTIVITIES: CPT | Mod: GP | Performed by: PHYSICAL THERAPIST

## 2020-10-06 PROCEDURE — 97110 THERAPEUTIC EXERCISES: CPT | Mod: GP | Performed by: PHYSICAL THERAPIST

## 2020-10-06 PROCEDURE — 99213 OFFICE O/P EST LOW 20 MIN: CPT | Mod: 95 | Performed by: INTERNAL MEDICINE

## 2020-10-06 RX ORDER — ESTRADIOL 0.1 MG/G
CREAM VAGINAL
COMMUNITY
Start: 2020-09-25 | End: 2021-05-13

## 2020-10-06 RX ORDER — FLUCONAZOLE 150 MG/1
150 TABLET ORAL ONCE
Qty: 1 TABLET | Refills: 3 | Status: SHIPPED | OUTPATIENT
Start: 2020-10-06 | End: 2020-10-06

## 2020-10-06 ASSESSMENT — ANXIETY QUESTIONNAIRES
6. BECOMING EASILY ANNOYED OR IRRITABLE: SEVERAL DAYS
IF YOU CHECKED OFF ANY PROBLEMS ON THIS QUESTIONNAIRE, HOW DIFFICULT HAVE THESE PROBLEMS MADE IT FOR YOU TO DO YOUR WORK, TAKE CARE OF THINGS AT HOME, OR GET ALONG WITH OTHER PEOPLE: NOT DIFFICULT AT ALL
5. BEING SO RESTLESS THAT IT IS HARD TO SIT STILL: NOT AT ALL
1. FEELING NERVOUS, ANXIOUS, OR ON EDGE: NEARLY EVERY DAY
GAD7 TOTAL SCORE: 9
7. FEELING AFRAID AS IF SOMETHING AWFUL MIGHT HAPPEN: NOT AT ALL
2. NOT BEING ABLE TO STOP OR CONTROL WORRYING: SEVERAL DAYS
3. WORRYING TOO MUCH ABOUT DIFFERENT THINGS: NEARLY EVERY DAY

## 2020-10-06 ASSESSMENT — PATIENT HEALTH QUESTIONNAIRE - PHQ9
SUM OF ALL RESPONSES TO PHQ QUESTIONS 1-9: 6
5. POOR APPETITE OR OVEREATING: SEVERAL DAYS

## 2020-10-06 NOTE — PROGRESS NOTES
[facility] Initial Evaluation -- Lower Extremity    Evaluation Date: October 6, 2020  Yadira Hoang is a 75 year old female with a R knee condition.   Referral: CRISTIN Perdomo PA-C  Work mechanical stresses:    Employment status: retired  Leisure mechanical stresses: crafts   Functional disability score: did not complete  VAS score (0-10): at best 1/10, usually 10/10  Patient goals/expectations:  Alleviate R LE symptoms so that she can stand, walk and do stairs.  Crafts are up stairs and wants to be able to do her crafting.      HISTORY:    Present symptoms: anterior knee, down the lateral calf.  Feels symptoms in the medial foot and into the proximal medial ankle s/p foot surgery (melanoma surgery).  Pain quality (sharp/shooting/stabbing/aching/burning/cramping):  Aching, sharp, stabbing, significant pain    Present since (onset date): June/July 2020    Symptoms (improving/unchanging/worsening):  unchanging.      Symptoms commenced as a result of: descending stairs   Condition occurred in the following environment: in PT     Symptoms at onset: the whole R LE - thigh, anterior knee and lateral ankle    Paresthesia (yes/no):  Numbness in toes - intermittent (improving)  Spinal history: yes since surgery has been worse with increased sitting.    Cough/Sneeze (pos/neg):  negative    Constant symptoms: low back pain, R LE   Intermittent symptoms: none    Symptoms are worse with the following: Sometimes Rising (tends to take the pressure off the R LE when getting up), Always Standing, Always Walking, Always Stairs, Always On the move, Time of day - Sometimes as the day  (if too active)   Symptoms are better with the following: Always Sitting, Always When still and Always Sleeping (prone/sup/side R/L) - sides with legs stacked is best, then supine    Continued use makes the pain (better/worse/no effect): worse    Disturbed night (yes/no): no      Pain at rest (yes/no):  yes    Site  (back/hip/knee/ankle/foot):  Low back, knee    Other questions (swelling/clicking/locking/giving way/falling):  Knee, lower leg and foot mild swelling     Previous episodes: a few years ago had leg pain  Previous treatments: injections for the R knee - helpful (a set of 3, 1 time a month in about spring/summer 2019)    Specific Questions:  General health (excellent/good/fair/poor):  good  Pertinent medical history includes: Cancer, Concussions/Dizziness, Depression, Osteoarthritis and Sleep disorder/apnea  Medications (nil/NSAIDS/analg/steroids/anticoag/other):  Other - infusions 1x/month   Medical allergies:  none  Imaging (none/Xray/MRI/other):  none  Recent or major surgery (yes/no):  R foot for skin cancer in February 2020, had to take more tissue 10 days later.  Reconstruction/graft March 2020 and then again in April 2020.  Night pain (yes/no):  no  Accidents (yes/no):  no  Unexplained weight loss (yes/no):  no  Barriers at home: may use walker or cane at home; poor tolerance for activity  Other red flags: none    Sites for physical examination (back/hip/knee/ankle/foot/other): back, knee    EXAMINATION    Posture:  Sitting (good/fair/poor): poor     Correction of Posture (better/worse/no effect/NA): better  Standing (good/fair/poor): good/fair  Other observations:  Using cane in the L hand.  Not able to walk far thus uses wheel chair for distance    Neurological: (NA/motor/sensory/reflexes/dural): symmetrical and brisk LE reflexes.  (-) seated SLR    Baselines (pain or functional activity): knee pain with extension in standing, min loss knee flexion    Extremities (Hip / Knee / Ankle / Foot): knee    Movement Loss Param Mod Min Nil Pain   Flexion   x  Pulling across anterior R knee   Extension    x Strain anterior knee; felt stronger for the L knee   Abduction        Adduction        Internal Rotation        External Rotation        Dorsiflexion        Plantarflexion        Inversion        Eversion           Passive Movement (+/- over pressure)/(PDM/ERP):  Pulling over anterior R knee with empty end feel, min loss, full passive flexion on L - heel to buttock.  Passive hyper extension about 4 deg B  Resisted Test Response (pain): strong without increased pain  Other Tests: none    Spine:  Movement loss: FIS nil loss, EIS mod loss with inc R knee pain, R SGIS min/mod loss with inc LBP, L SGIS min/mod loss with increased LBP  Effect of repeated movements: not assessed (arms too weak to do pressups)  Effect of static positioning: decreased R LE symptoms (? Centralizing), inc passive knee flexion ROM after.   Spine testing (not relevant/relevant/secondary problem): relevant    Baseline Symptoms: not assessed as looking at lower back  Repeated Tests Symptom Response Mechanical Response   Active/Passive movement, resisted test, functional test During -  Produce, Abolish, Increase, Decrease, NE After -  Better, Worse, NB, NW, NE Effect -   ? or ? ROM, strength or key functional test No   Effect                                       Effect of static positioning                  Provisional Classification (Extremity/Spine):  Extremity - Inconclusive/Other - assessing lumbar spine as dec R LE symptoms, improved knee ROM (minimal change) able to do lumbar EIS without knee pain.      Principle of Management:   Education:  Discussed centralization/peripheralization of symptoms.  Avoid slouched sitting posture. If inc symptoms, try exercise before sitting down to determine if any impact on symptoms.   Avoid sitting with R LE in extension or up on a pillow to determine effect on leg symptoms.    Equipment provided:  Rolled towel to support thoracic back  Exercise and dosage:  Prone lying with R LE in abduction 3' followed by 3' propped up onto elbows.     ASSESSMENT/PLAN:    Patient is a 75 year old female with R knee and R Lower extremity and low back complaints.    Patient has the following significant findings with corresponding  treatment plan.                Diagnosis 1:  Chronic R knee/lower leg symptoms (? Referral from low back)    Pain -  manual therapy, self management, education, directional preference exercise, home program and modalities as needed  Decreased ROM/flexibility - manual therapy, therapeutic exercise and home program  Decreased strength - therapeutic exercise, therapeutic activities and home program  Decreased proprioception - neuro re-education, therapeutic activities and home program  Impaired gait - home program, therapeutic activities, therapeutic exercises  Decreased function - therapeutic activities and home program  Impaired posture - neuro re-education, therapeutic activities and home program    Therapy Evaluation Codes:   1) History comprised of:   Personal factors that impact the plan of care:      None.    Comorbidity factors that impact the plan of care are:      Cancer, Concussion, Depression and Osteoarthritis.     Medications impacting care: infusion.  2) Examination of Body Systems comprised of:   Body structures and functions that impact the plan of care:      Knee and Lumbar spine.   Activity limitations that impact the plan of care are:      Stairs, Standing and Walking.  3) Clinical presentation characteristics are:   Stable/Uncomplicated.  Cumulative Therapy Evaluation is: Moderate complexity.    Previous and current functional limitations:  (See Goal Flow Sheet for this information)    Short term and Long term goals: (See Goal Flow Sheet for this information)     Communication ability:  Patient appears to be able to clearly communicate and understand verbal and written communication and follow directions correctly.  Treatment Explanation - The following has been discussed with the patient:   RX ordered/plan of care  Anticipated outcomes  Possible risks and side effects  This patient would benefit from PT intervention to resume normal activities.   Rehab potential is good.    Frequency:  1 X week,  once daily  Duration:  for 8-12 weeks  Discharge Plan:  Achieve all LTG.  Independent in home treatment program.  Reach maximal therapeutic benefit.    Please refer to the daily flowsheet for treatment today, total treatment time and time spent performing 1:1 timed codes.

## 2020-10-06 NOTE — PROGRESS NOTES
"Yadira Hoang is a 75 year old female who is being evaluated via a billable telephone visit.      The patient has been notified of following:     \"This telephone visit will be conducted via a call between you and your physician/provider. We have found that certain health care needs can be provided without the need for a physical exam.  This service lets us provide the care you need with a short phone conversation.  If a prescription is necessary we can send it directly to your pharmacy.  If lab work is needed we can place an order for that and you can then stop by our lab to have the test done at a later time.    Telephone visits are billed at different rates depending on your insurance coverage. During this emergency period, for some insurers they may be billed the same as an in-person visit.  Please reach out to your insurance provider with any questions.    If during the course of the call the physician/provider feels a telephone visit is not appropriate, you will not be charged for this service.\"    Patient has given verbal consent for Telephone visit?  Yes    What phone number would you like to be contacted at? [phone]    How would you like to obtain your AVS? Mail a copy    Subjective     Yadira Hoang is a 75 year old female who presents via phone visit today for the following health issues:    HPI     Vaginal Symptoms  Onset/Duration: 2 weeks to a month ago   Description:  Vaginal Discharge: none   Itching (Pruritis): YES  Burning sensation:  YES  Odor: no  Accompanying Signs & Symptoms:  Urinary symptoms: no  Abdominal pain: no  Fever: no  History:   Sexually active: Yes  New Partner: no  Possibility of Pregnancy:  no  Recent antibiotic use: no  Previous vaginitis issues: YES  Precipitating or alleviating factors: None, patient is wondering if this is a side effect from her infusions?  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 " She is receiving adjuvant nivolumab infusion. She was told that yeast infection is a common side effect. She has had vaginal yeast infection twice now. Symptoms are the same. She would like Diflucan for treatment.         PATIENT WOULD LIKE A CHOLESTEROL RECHECK    Arthritis of the knee. Extra strength Tylenol helps. Otherwise, she is doing okay.      Fatigued from infusion. Some days don't sleep. We discussed OTC tylenol PM may help.       ROS:  Constitutional, HEENT, cardiovascular, pulmonary, gi and gu systems are negative, except as otherwise noted.            cholecalciferol (VITAMIN D3) 125 MCG (5000 UT) TABS tablet, Take 2,000 Units by mouth every other day        diclofenac (VOLTAREN) 1 % topical gel, Apply 4 g topically 4 times daily       estradiol (ESTRACE) 0.1 MG/GM vaginal cream,        Lidocaine (LIDOCARE) 4 % Patch, Place 1 patch onto the skin every 24 hours To prevent lidocaine toxicity, patient should be patch free for 12 hrs daily. (Patient not taking: Reported on 10/13/2020)       mirtazapine (REMERON) 15 MG tablet, Take 1 tablet (15 mg) by mouth At Bedtime (Patient not taking: Reported on 10/13/2020)       polyethylene glycol (MIRALAX) powder, Take 17 g by mouth       sertraline (ZOLOFT) 25 MG tablet, Take 1 tablet (25 mg) by mouth daily (Patient not taking: Reported on 10/1/2020)         2 mL bupivacaine (MARCAINE) preservative free injection 0.25% (10 mL vial)       2 mL bupivacaine (MARCAINE) preservative free injection 0.25% (10 mL vial)           Patient Active Problem List   Diagnosis     Malignant melanoma of right lower extremity including hip (H)     Melanoma of skin (H)     GUERLINE on CPAP     Chronic congestion of paranasal sinus     Weakness of foot     Neck pain, chronic     Myofascial pain     Intractable migraine without aura and without status migrainosus     History of multiple concussions     Foot pain, bilateral     Fibromyositis     Difficulty walking     Chronic pain disorder      Chronic fatigue     Bilateral occipital neuralgia     Articular disc disorder of temporomandibular joint     S/P flap graft     Osteopenia     Injury of head     Hyperlipidemia     ALEXUS (generalized anxiety disorder)     Major depression, recurrent, chronic (H)     Chronic right shoulder pain     Lumbar radiculopathy     Chronic pain of right knee     Past Surgical History:   Procedure Laterality Date     BIOPSY NODE SENTINEL Right 2/10/2020    Procedure: [place] Lymph Node Mapping And Biopsy;  Surgeon: Gabriela Dorsey MD;  Location: [facility] OR     EXCISE LESION LOWER EXTREMITY Right 2/10/2020    Procedure: Wide Local Excision of RIGHT heel melanoma;  Surgeon: Gabriela Dorsey MD;  Location: [facility] OR     EXCISE LESION LOWER EXTREMITY Right 2/20/2020    Procedure: Wide local Re-excision RIGHT heel Wound vac application;  Surgeon: Gabriela Dorsey MD;  Location: [facility] OR     GRAFT SKIN SPLIT THICKNESS FROM EXTREMITY Right 4/2/2020    Procedure: split skin graft from right thigh;  Surgeon: NIKA Crabtree MD;  Location: [facility] OR     IRRIGATION AND DEBRIDEMENT FOOT, COMBINED Right 4/2/2020    Procedure: right foot/heel debridement;  Surgeon: NIKA Crabtree MD;  Location: [facility] OR     ORTHOPEDIC SURGERY Right     heel surgery x2     RECONSTRUCT FOOT Right 3/4/2020    Procedure: Right heel reconstruction with regional flap, biologic dressing and SPY;  Surgeon: NIKA Crabtree MD;  Location: [facility] OR     TUBAL LIGATION         Social History     Tobacco Use     Smoking status: Never Smoker     Smokeless tobacco: Never Used   Substance Use Topics     Alcohol use: Not Currently     Frequency: Never     Family History   Problem Relation Age of Onset     Glaucoma No family hx of      Macular Degeneration No family hx of              Problem list, Medication list, Allergies, and Medical/Social/Surgical histories reviewed in [facility] and updated as appropriate.    OBJECTIVE:                                                     Vitals not obtained.  GENERAL: pleasant alert and no acute distress in conversation        Diagnostic test results:  No results found for any visits on 10/06/20.      ASSESSMENT/PLAN:                                                      75 year old female with the following diagnoses and treatment plan:      ICD-10-CM    1. Yeast infection of the vagina  B37.3 fluconazole (DIFLUCAN) 150 MG tablet   2. Screening for hyperlipidemia  Z13.220 Lipid panel reflex to direct LDL Fasting   3. Malignant melanoma of right lower extremity including hip (H)  C43.71        -- yeast infection, side effect from immunotherpay for metastatic melanoma treatment. I gave her diflucan with refills as it is a recurrent issue. She may self treat.   -- Lipid panel ordered per request.     See Patient Instructions on After Visit Summary.    Patient will follow up if worsening or symptoms not improving as discussed.    Type of Service: Phone visit  Phone call duration: 14 minutes    Erika Blanton MD-PhD  [facility]     (Note: Chart documentation was done in part with Dragon Voice Recognition software. Although reviewed after completion, some word and grammatical errors may remain.)        Phone time: 14 minutes.

## 2020-10-07 ASSESSMENT — ANXIETY QUESTIONNAIRES: GAD7 TOTAL SCORE: 9

## 2020-10-09 ENCOUNTER — ANCILLARY PROCEDURE (OUTPATIENT)
Dept: PET IMAGING | Facility: CLINIC | Age: 75
End: 2020-10-09
Attending: INTERNAL MEDICINE
Payer: COMMERCIAL

## 2020-10-09 DIAGNOSIS — C43.9 MELANOMA OF SKIN (H): ICD-10-CM

## 2020-10-09 DIAGNOSIS — C43.71 MALIGNANT MELANOMA OF RIGHT LOWER EXTREMITY INCLUDING HIP (H): ICD-10-CM

## 2020-10-09 DIAGNOSIS — Z13.220 SCREENING FOR HYPERLIPIDEMIA: ICD-10-CM

## 2020-10-09 LAB
ALBUMIN SERPL-MCNC: 3.9 G/DL (ref 3.4–5)
ALP SERPL-CCNC: 68 U/L (ref 40–150)
ALT SERPL W P-5'-P-CCNC: 22 U/L (ref 0–50)
ANION GAP SERPL CALCULATED.3IONS-SCNC: 5 MMOL/L (ref 3–14)
AST SERPL W P-5'-P-CCNC: 14 U/L (ref 0–45)
BASOPHILS # BLD AUTO: 0 10E9/L (ref 0–0.2)
BASOPHILS NFR BLD AUTO: 0.4 %
BILIRUB SERPL-MCNC: 0.5 MG/DL (ref 0.2–1.3)
BUN SERPL-MCNC: 22 MG/DL (ref 7–30)
CALCIUM SERPL-MCNC: 9.2 MG/DL (ref 8.5–10.1)
CHLORIDE SERPL-SCNC: 106 MMOL/L (ref 94–109)
CHOLEST SERPL-MCNC: 235 MG/DL
CO2 SERPL-SCNC: 29 MMOL/L (ref 20–32)
CREAT SERPL-MCNC: 0.56 MG/DL (ref 0.52–1.04)
DIFFERENTIAL METHOD BLD: NORMAL
EOSINOPHIL # BLD AUTO: 0.1 10E9/L (ref 0–0.7)
EOSINOPHIL NFR BLD AUTO: 1.1 %
ERYTHROCYTE [DISTWIDTH] IN BLOOD BY AUTOMATED COUNT: 12.3 % (ref 10–15)
GFR SERPL CREATININE-BSD FRML MDRD: >90 ML/MIN/{1.73_M2}
GLUCOSE SERPL-MCNC: 88 MG/DL (ref 70–99)
HCT VFR BLD AUTO: 39.3 % (ref 35–47)
HDLC SERPL-MCNC: 57 MG/DL
HGB BLD-MCNC: 12.9 G/DL (ref 11.7–15.7)
IMM GRANULOCYTES # BLD: 0 10E9/L (ref 0–0.4)
IMM GRANULOCYTES NFR BLD: 0.2 %
LDLC SERPL CALC-MCNC: 163 MG/DL
LYMPHOCYTES # BLD AUTO: 1.5 10E9/L (ref 0.8–5.3)
LYMPHOCYTES NFR BLD AUTO: 27.1 %
MCH RBC QN AUTO: 30.3 PG (ref 26.5–33)
MCHC RBC AUTO-ENTMCNC: 32.8 G/DL (ref 31.5–36.5)
MCV RBC AUTO: 92 FL (ref 78–100)
MONOCYTES # BLD AUTO: 0.3 10E9/L (ref 0–1.3)
MONOCYTES NFR BLD AUTO: 5.1 %
NEUTROPHILS # BLD AUTO: 3.6 10E9/L (ref 1.6–8.3)
NEUTROPHILS NFR BLD AUTO: 66.1 %
NONHDLC SERPL-MCNC: 178 MG/DL
PLATELET # BLD AUTO: 278 10E9/L (ref 150–450)
POTASSIUM SERPL-SCNC: 3.6 MMOL/L (ref 3.4–5.3)
PROT SERPL-MCNC: 7.4 G/DL (ref 6.8–8.8)
RBC # BLD AUTO: 4.26 10E12/L (ref 3.8–5.2)
SODIUM SERPL-SCNC: 140 MMOL/L (ref 133–144)
TRIGL SERPL-MCNC: 74 MG/DL
WBC # BLD AUTO: 5.5 10E9/L (ref 4–11)

## 2020-10-09 PROCEDURE — 71260 CT THORAX DX C+: CPT | Mod: 59

## 2020-10-09 PROCEDURE — 36415 COLL VENOUS BLD VENIPUNCTURE: CPT | Performed by: INTERNAL MEDICINE

## 2020-10-09 PROCEDURE — A9552 F18 FDG: HCPCS | Performed by: INTERNAL MEDICINE

## 2020-10-09 PROCEDURE — 74177 CT ABD & PELVIS W/CONTRAST: CPT | Mod: 59

## 2020-10-09 PROCEDURE — 80061 LIPID PANEL: CPT | Performed by: INTERNAL MEDICINE

## 2020-10-09 PROCEDURE — 80053 COMPREHEN METABOLIC PANEL: CPT | Performed by: INTERNAL MEDICINE

## 2020-10-09 PROCEDURE — 78816 PET IMAGE W/CT FULL BODY: CPT | Mod: PS | Performed by: RADIOLOGY

## 2020-10-09 PROCEDURE — 85025 COMPLETE CBC W/AUTO DIFF WBC: CPT | Performed by: INTERNAL MEDICINE

## 2020-10-09 RX ORDER — IOPAMIDOL 755 MG/ML
50-135 INJECTION, SOLUTION INTRAVASCULAR ONCE
Status: COMPLETED | OUTPATIENT
Start: 2020-10-09 | End: 2020-10-09

## 2020-10-09 RX ADMIN — IOPAMIDOL 92 ML: 755 INJECTION, SOLUTION INTRAVASCULAR at 12:58

## 2020-10-13 ENCOUNTER — VIRTUAL VISIT (OUTPATIENT)
Dept: GASTROENTEROLOGY | Facility: CLINIC | Age: 75
End: 2020-10-13
Payer: COMMERCIAL

## 2020-10-13 DIAGNOSIS — R10.13 DYSPEPSIA: Primary | ICD-10-CM

## 2020-10-13 DIAGNOSIS — R19.5 LOOSE STOOLS: ICD-10-CM

## 2020-10-13 PROCEDURE — 99203 OFFICE O/P NEW LOW 30 MIN: CPT | Mod: 95 | Performed by: INTERNAL MEDICINE

## 2020-10-13 NOTE — PROGRESS NOTES
"Yadira Hoang is a 75 year old female who is being evaluated via a billable telephone visit.      The patient has been notified of following:     \"This telephone visit will be conducted via a call between you and your physician/provider. We have found that certain health care needs can be provided without the need for a physical exam.  This service lets us provide the care you need with a short phone conversation.  If a prescription is necessary we can send it directly to your pharmacy.  If lab work is needed we can place an order for that and you can then stop by our lab to have the test done at a later time.    Telephone visits are billed at different rates depending on your insurance coverage. During this emergency period, for some insurers they may be billed the same as an in-person visit.  Please reach out to your insurance provider with any questions.    If during the course of the call the physician/provider feels a telephone visit is not appropriate, you will not be charged for this service.\"    Patient has given verbal consent for Telephone visit?  Yes    What phone number would you like to be contacted at? [phone]    How would you like to obtain your AVS? Mail a copy    Phone call duration:  minutes    Aguilar Asekw MA      "

## 2020-10-13 NOTE — PATIENT INSTRUCTIONS
It was nice to talk to you today.  As we discussed, I have ordered some blood test and a stool test in order to check if there is any underlying issues leading to your symptoms.  Your CT scan that I reviewed does not show any gallbladder problem there so we can hold off for now on further scans.  I have placed the order for the upper endoscopy which will be with me at [place] with conscious sedation.  I also sent a prescription for omeprazole to your pharmacy.  Take 1 pill daily, ideally around 30 minutes prior to your first meal of the day.  Otherwise, try to liberalize your diet and keep a food journal of your symptoms.

## 2020-10-15 ENCOUNTER — TELEPHONE (OUTPATIENT)
Dept: OPHTHALMOLOGY | Facility: CLINIC | Age: 75
End: 2020-10-15

## 2020-10-15 NOTE — TELEPHONE ENCOUNTER
Health Call Center    Phone Message    May a detailed message be left on voicemail: yes     Reason for Call: Symptoms or Concerns     If patient has red-flag symptoms, warm transfer to triage line    Current symptom or concern: Patient is calling requesting to have Dr. Rolle due to her dry eyes-     Patient states she is experiencing eye swelling in left eye and dryness. Patient states the drops are not working.    Patient is scheduled on 10/26/20 with Dr. Rolle and is wondering what is advised for her in the meantime.    Pharmacy: [facility] in [place]        Action Taken: Message routed to:  Adult Clinics: Orthopedics p [id number]    Travel Screening: Not Applicable

## 2020-10-15 NOTE — PROGRESS NOTES
GASTROENTEROLOGY Telephone Follow up visit    CC/REFERRING MD:    Erika Blanton    HISTORY OF PRESENT ILLNESS:    Yadira Hoang is a 75 year old female who is being evaluated via a billable telephone visit.      We did a phone visit today to evaluate her abdominal pain.  She reports a longstanding history of abdominal pain which is described as postprandial in nature.  She reports it is more of a discomfort or ill feeling after eating.  These symptoms have been present since she was 19 years old.  She reports that they are highly correlated with eating spicy foods, greasy foods etc.  She reports that she was previously diagnosed with irritable bowel as well as reflux.  She notes that she is intolerant to many foods and spices and if she has any of these things such as pepper, her abdominal discomfort will come on within minutes of their consumption and the symptoms will last the entire day.  She reports that she also gets loose stools which occur 2-3 times per day.  She reports that she took a food allergy testing and she was told she is dairy/lactose intolerant along with being intolerant of dozens of other foods.  Notably, she has recently been diagnosed with metastatic melanoma.  She notes that her motivation for the visit today is she wants to be able to eat more so that she is stronger for her treatment of her cancer.  She has had some GI evaluation as noted before.  Her last colonoscopy was in 2013 and that was unremarkable.  She had an upper endoscopy January 2019 which noted a small hiatal hernia as well as an irregular Z-line and that was unremarkable.  Family history is notable for a brother with pancreatic cancer.  There are no other family members who have had pancreatic cancer.    I have reviewed and updated the patient's Past Medical History, Social History, Family History and Medication List.    ALLERGIES  Atorvastatin and Hmg-coa-r inhibitors    PERTINENT STUDIES have been  reviewed.    ASSESSMENT/PLAN:    Yadira Hoang is a 75 year old female who presents for evaluation of postprandial abdominal discomfort.  Her symptoms sound most suggestive of functional dyspepsia given that they occur with certain foods.  It is reassuring that she has some relief of symptoms with bowel movements and that she denies any significant weight loss.  Given the postprandial abdominal pain along with loose stools, we will obtain pancreatic enzymes as well as stool elastase testing.  I do not see any evidence of any pancreatic pathology on her recent PET scan.  Though she has a family history of pancreatic cancer, I do not think that endoscopic ultrasound would be helpful.  We will do another upper endoscopy given that she reports her symptoms are worse recently given that melanoma can metastasize to the GI lumen.  I have recommended that she do a trial of proton pump inhibitor therapy and have given her prescription for this.  Finally, she is very worried about this food allergy/intolerance testing that she received.  I do not have the report of this but she is not reporting any symptoms of an actual food allergy such as rash, swelling, difficulty breathing etc.  Therefore, I do not think there is any danger of her trying to liberalize her dietary intake and only avoid the foods that are symptomatic for her but I do not think she needs to avoid any certain foods just because an intolerance test for that food was positive.      Phone call duration: 38 minutes    RTC 3 months    Thank you for this consultation.  It was a pleasure to participate in the care of this patient; please contact us with any further questions.      This note was created with voice recognition software, and while reviewed for accuracy, typos may remain.     Martin Mckay MD  Division of Gastroenterology, Hepatology and Nutrition  [facility]  [phone]

## 2020-10-16 ENCOUNTER — ONCOLOGY VISIT (OUTPATIENT)
Dept: ONCOLOGY | Facility: CLINIC | Age: 75
End: 2020-10-16
Attending: INTERNAL MEDICINE
Payer: COMMERCIAL

## 2020-10-16 VITALS
RESPIRATION RATE: 16 BRPM | SYSTOLIC BLOOD PRESSURE: 126 MMHG | WEIGHT: 144.5 LBS | HEIGHT: 67 IN | TEMPERATURE: 98.3 F | DIASTOLIC BLOOD PRESSURE: 76 MMHG | OXYGEN SATURATION: 96 % | BODY MASS INDEX: 22.68 KG/M2 | HEART RATE: 104 BPM

## 2020-10-16 DIAGNOSIS — C43.71 MALIGNANT MELANOMA OF RIGHT LOWER EXTREMITY INCLUDING HIP (H): ICD-10-CM

## 2020-10-16 DIAGNOSIS — C43.9 MELANOMA OF SKIN (H): Primary | ICD-10-CM

## 2020-10-16 PROCEDURE — 99215 OFFICE O/P EST HI 40 MIN: CPT | Performed by: INTERNAL MEDICINE

## 2020-10-16 PROCEDURE — G0463 HOSPITAL OUTPT CLINIC VISIT: HCPCS

## 2020-10-16 ASSESSMENT — MIFFLIN-ST. JEOR: SCORE: 1178.08

## 2020-10-16 ASSESSMENT — PAIN SCALES - GENERAL: PAINLEVEL: NO PAIN (0)

## 2020-10-16 NOTE — PROGRESS NOTES
MEDICAL ONCOLOGY PROGRESS NOTE  Melanoma Clinic  Oct 16, 2020     CHIEF COMPLAINT: acral melanoma, stage IV, s/p primary resection and reconstruction    Melanoma History:  1. She noted a painful lesion on the sole of the right foot for a few months, since last summer.  It initially was small then became raised.  It spontaneously bled. She is not entirely sure of its appearance initially.  She denies noting any masses behind the knee or in the groin.  2. 1/9/2020, punch biopsy was performed by Dr Jessica Meadows on .  It showed melanoma, at least 3.4 mm deep with ulceration and 0 mitoses, and perineural invasion present. TILs were non-brisk and LVI not identified. pT3b.  3. 2/10/2020, she has right femoral sentinel lymph node biopsy and wide local excision (Specimen #: [id number]) and the right heel lesion extends to a depth of 6.6 mm, and invasive melanoma is present at 0.2 mm from the deep margin. Microsatellites are also present. There was 1 (of 2) lymph nodes involved. The lymph node tissue exhibits extensive subcapsular and parenchymal clusters of melanoma cells, highlighted on Melan-A immunostain. The largest cluster is 7 millimeters in greatest diameter. pT4b pN2c.  4. 2/22/2020, she had PET-CT, which showed intense FDG uptake in a right inguinal lymph node, concerning for an additional focus of metastatic disease. There is also an indeterminate right external iliac lymph node with mild FDG uptake.  5. 3/4/2020, she has medial plantar artery  fasciocutaneous instep flap and Integra placement to the donor site. On 4/2/2020, she has additional reconstruction with skin grafting.  6. 5/27/2020, she starts nivolumab.  7. 10/9/2020,PET/CT shows evidence of progressive disease with increased enlarging hepatic metastasis with increased metabolism, increased right inguinal and iliac lymphadenopathy, and focal increased FDG uptake of the right T2 transverse process and in the right side of S1, likely new  metastases.        HISTORY OF PRESENT ILLNESS  Yadira Hoang is a 75 year old female with stage IV acral melanoma. Ms. Hoang has recovered from the heel surgeries. However, the leg is still painful with walking. She prefers a wheelchair today in clinic.    She also has a history of myofascial pain, depression, and anxiety contributing. She feels drained physically and has increasing fatigue. She notes more aches and pains in various joints. She previously has had lidocaine/bupivicaine injections into the right shoudler, and now she can move her right arm better.     Her main complaints on nivolumab has been the joint pains and significant fatigue on the nivolumab. Today She denies shortness of breath or cough. No fevers or chills. No diarrhea or abdominal pain.     Labs from 8/21/2020 are normal. ECOG performance status is 1.    Current Outpatient Medications   Medication     cholecalciferol (VITAMIN D3) 125 MCG (5000 UT) TABS tablet     diclofenac (VOLTAREN) 1 % topical gel     estradiol (ESTRACE) 0.1 MG/GM vaginal cream     omeprazole (PRILOSEC) 20 MG DR capsule     polyethylene glycol (MIRALAX) powder     Lidocaine (LIDOCARE) 4 % Patch     mirtazapine (REMERON) 15 MG tablet     sertraline (ZOLOFT) 25 MG tablet     Current Facility-Administered Medications   Medication     2 mL bupivacaine (MARCAINE) preservative free injection 0.25% (10 mL vial)     2 mL bupivacaine (MARCAINE) preservative free injection 0.25% (10 mL vial)       PHYSICAL EXAMINATION  GENERAL: Healthy, alert and no distress  EYES: Eyes grossly normal to inspection.  No discharge or erythema, or obvious scleral/conjunctival abnormalities.  HENT: Normal cephalic/atraumatic.  External ears, nose and mouth without ulcers or lesions.  No nasal drainage visible.  NECK: No asymmetry, visible masses or scars  RESP: No audible wheeze, cough, or visible cyanosis.  No visible retractions or increased work of breathing.    ABDOMEN: soft, nontender,  without hepatosplenomegaly or masses and no organomegaly or masses  SKIN: Visible skin clear. No significant rash, abnormal pigmentation or lesions.  NEURO: Cranial nerves grossly intact.  Mentation and speech appropriate for age.  PSYCH: Mentation appears normal, affect normal/bright, judgement and insight intact, normal speech and appearance well-groomed.    IMAGING  PET-CT, 10/9/2020  IMPRESSION: In this patient with known malignant melanoma of the right  hip there is evidence of progressive disease;  1. Increased enlarging hepatic metastasis with increased metabolism.  2. Increased right inguinal and iliac lymphadenopathy with increased  metabolism.  3. Focal increased FDG uptake of the right T2 transverse process and  in the right side of S1. Likely  new metastases.   4. Small hiatal hernia.    ASSESSMENT AND PLAN  #1 Acral melanoma, right heel primary, pT4b pN3c M1, Stage IV  It was a pleasure to see with Ms. Hoang. She is a 75 year old woman with evidence of a new liver metastasis on recent PET-CT.    We discussed the diagnosis of stage IV melanoma and that she is primarily refractory to anti-PD1 monotherapy, a prior scan in August suggested the same and this is confirmation of progression.    We reviewed standard treatment options as well as clinical trial options going forward if in fact future scans confirm progressiosn.  She does have worsening joint pains on nivolumab, therefore, trial options exploring less toxic options than ipi/nivo (standard treatment) would be very reasonable. We reviewed the option of chemoimmunotherapy (carboplatin/taxol/pembro), ipilimumab monotherapy versus ipi/nivo. We also discussed the phase 1b/2 Nektar study, which is currently enrolling patients in Cohort A Nektar 214/262 versus Cohort B nivolumab + Nektar 214/262. We also discussed the phase 1 studies including FATE-NK and QUILT study.  She will consider the options discussed and we will discuss again by  phone.    Questions answered.       Gi Cartagena M.D.   of Medicine  Hematology, Oncology and Transplantation

## 2020-10-16 NOTE — LETTER
10/16/2020     RE: Yadira Hoang  [address] [facility] [facility] [address]    Dear Colleague,    Thank you for referring your patient, Yadira Hoang, to the [facility]. Please see a copy of my visit note below.    MEDICAL ONCOLOGY PROGRESS NOTE  Melanoma Clinic  Oct 16, 2020     CHIEF COMPLAINT: acral melanoma, stage IV, s/p primary resection and reconstruction    Melanoma History:  1. She noted a painful lesion on the sole of the right foot for a few months, since last summer.  It initially was small then became raised.  It spontaneously bled. She is not entirely sure of its appearance initially.  She denies noting any masses behind the knee or in the groin.  2. 1/9/2020, punch biopsy was performed by Dr Jessica Meadows on .  It showed melanoma, at least 3.4 mm deep with ulceration and 0 mitoses, and perineural invasion present. TILs were non-brisk and LVI not identified. pT3b.  3. 2/10/2020, she has right femoral sentinel lymph node biopsy and wide local excision (Specimen #: [id number]) and the right heel lesion extends to a depth of 6.6 mm, and invasive melanoma is present at 0.2 mm from the deep margin. Microsatellites are also present. There was 1 (of 2) lymph nodes involved. The lymph node tissue exhibits extensive subcapsular and parenchymal clusters of melanoma cells, highlighted on Melan-A immunostain. The largest cluster is 7 millimeters in greatest diameter. pT4b pN2c.  4. 2/22/2020, she had PET-CT, which showed intense FDG uptake in a right inguinal lymph node, concerning for an additional focus of metastatic disease. There is also an indeterminate right external iliac lymph node with mild FDG uptake.  5. 3/4/2020, she has medial plantar artery  fasciocutaneous instep flap and Integra placement to the donor site. On 4/2/2020, she has additional reconstruction with skin grafting.  6. 5/27/2020, she starts nivolumab.  7. 10/9/2020,PET/CT shows evidence of  progressive disease with increased enlarging hepatic metastasis with increased metabolism, increased right inguinal and iliac lymphadenopathy, and focal increased FDG uptake of the right T2 transverse process and in the right side of S1, likely new metastases.        HISTORY OF PRESENT ILLNESS  Yadira Hoang is a 75 year old female with stage IV acral melanoma. Ms. Hoang has recovered from the heel surgeries. However, the leg is still painful with walking. She prefers a wheelchair today in clinic.    She also has a history of myofascial pain, depression, and anxiety contributing. She feels drained physically and has increasing fatigue. She notes more aches and pains in various joints. She previously has had lidocaine/bupivicaine injections into the right shoudler, and now she can move her right arm better.     Her main complaints on nivolumab has been the joint pains and significant fatigue on the nivolumab. Today She denies shortness of breath or cough. No fevers or chills. No diarrhea or abdominal pain.     Labs from 8/21/2020 are normal. ECOG performance status is 1.    Current Outpatient Medications   Medication     cholecalciferol (VITAMIN D3) 125 MCG (5000 UT) TABS tablet     diclofenac (VOLTAREN) 1 % topical gel     estradiol (ESTRACE) 0.1 MG/GM vaginal cream     omeprazole (PRILOSEC) 20 MG DR capsule     polyethylene glycol (MIRALAX) powder     Lidocaine (LIDOCARE) 4 % Patch     mirtazapine (REMERON) 15 MG tablet     sertraline (ZOLOFT) 25 MG tablet     Current Facility-Administered Medications   Medication     2 mL bupivacaine (MARCAINE) preservative free injection 0.25% (10 mL vial)     2 mL bupivacaine (MARCAINE) preservative free injection 0.25% (10 mL vial)       PHYSICAL EXAMINATION  GENERAL: Healthy, alert and no distress  EYES: Eyes grossly normal to inspection.  No discharge or erythema, or obvious scleral/conjunctival abnormalities.  HENT: Normal cephalic/atraumatic.  External ears, nose  and mouth without ulcers or lesions.  No nasal drainage visible.  NECK: No asymmetry, visible masses or scars  RESP: No audible wheeze, cough, or visible cyanosis.  No visible retractions or increased work of breathing.    ABDOMEN: soft, nontender, without hepatosplenomegaly or masses and no organomegaly or masses  SKIN: Visible skin clear. No significant rash, abnormal pigmentation or lesions.  NEURO: Cranial nerves grossly intact.  Mentation and speech appropriate for age.  PSYCH: Mentation appears normal, affect normal/bright, judgement and insight intact, normal speech and appearance well-groomed.    IMAGING  PET-CT, 10/9/2020  IMPRESSION: In this patient with known malignant melanoma of the right  hip there is evidence of progressive disease;  1. Increased enlarging hepatic metastasis with increased metabolism.  2. Increased right inguinal and iliac lymphadenopathy with increased  metabolism.  3. Focal increased FDG uptake of the right T2 transverse process and  in the right side of S1. Likely  new metastases.   4. Small hiatal hernia.    ASSESSMENT AND PLAN  #1 Acral melanoma, right heel primary, pT4b pN3c M1, Stage IV  It was a pleasure to see with Ms. Hoang. She is a 75 year old woman with evidence of a new liver metastasis on recent PET-CT.    We discussed the diagnosis of stage IV melanoma and that she is primarily refractory to anti-PD1 monotherapy, a prior scan in August suggested the same and this is confirmation of progression.    We reviewed standard treatment options as well as clinical trial options going forward if in fact future scans confirm progressiosn.  She does have worsening joint pains on nivolumab, therefore, trial options exploring less toxic options than ipi/nivo (standard treatment) would be very reasonable. We reviewed the option of chemoimmunotherapy (carboplatin/taxol/pembro), ipilimumab monotherapy versus ipi/nivo. We also discussed the phase 1b/2 Nektar study, which is currently  enrolling patients in Cohort A Nektar 214/262 versus Cohort B nivolumab + Nektar 214/262. We also discussed the phase 1 studies including FATE-NK and QUILT study.  She will consider the options discussed and we will discuss again by phone.    Questions answered.       Gi Cartagena M.D.   of Medicine  Hematology, Oncology and Transplantation

## 2020-10-16 NOTE — NURSING NOTE
"Oncology Rooming Note    October 16, 2020 3:18 PM   Yadira Hoang is a 75 year old female who presents for:    Chief Complaint   Patient presents with     Oncology Clinic Visit     Melanoma of skin (H)     Initial Vitals: /76   Pulse 104   Temp 98.3  F (36.8  C)   Resp 16   Ht 1.702 m (5' 7\")   Wt 65.5 kg (144 lb 8 oz)   SpO2 96%   BMI 22.63 kg/m   Estimated body mass index is 22.63 kg/m  as calculated from the following:    Height as of this encounter: 1.702 m (5' 7\").    Weight as of this encounter: 65.5 kg (144 lb 8 oz). Body surface area is 1.76 meters squared.  No Pain (0) Comment: Data Unavailable   No LMP recorded. Patient is postmenopausal.  Allergies reviewed: Yes  Medications reviewed: Yes    Medications: Medication refills not needed today.  Pharmacy name entered into [employer]:    [facility] [place], [place] - [address], [id number]  [facility] [address] [place] [address] [address] [facility]    Clinical concerns: No new concerns today. Dr. Esteban Franklin was notified.      Brian Lyons MA            "

## 2020-10-19 NOTE — RESULT ENCOUNTER NOTE
Dear Yadira,   Your recent lab results showed the following:  -- lipid panel was elevated.   -- your 10 year cardiac risk is 17.5%. it would be beneficial for you to take a statin medication to lower the cholesterol and thereby low your cardiac risk. If you are interested in starting one, let me know .     Please call or Mychart to our office if you have further questions.     Erika Blanton MD-PhD

## 2020-10-19 NOTE — TELEPHONE ENCOUNTER
Left voicemail for patient stating Dr. Rolle recommends trying preservative free artificial tears instead of regular ATs. If already using preservative free, then switch to a different brand as not all brands work the same for each person. Can also use cold compresses on eyes for 10-15 minutes at a time to help reduce swelling/irritation.    Melanie Jeans, Ophthalmic Assistant  11:48 AM 10/19/2020

## 2020-10-20 ENCOUNTER — THERAPY VISIT (OUTPATIENT)
Dept: PHYSICAL THERAPY | Facility: CLINIC | Age: 75
End: 2020-10-20
Payer: COMMERCIAL

## 2020-10-20 DIAGNOSIS — M25.511 CHRONIC RIGHT SHOULDER PAIN: ICD-10-CM

## 2020-10-20 DIAGNOSIS — M54.16 LUMBAR RADICULOPATHY: ICD-10-CM

## 2020-10-20 DIAGNOSIS — M25.561 CHRONIC PAIN OF RIGHT KNEE: ICD-10-CM

## 2020-10-20 DIAGNOSIS — G89.29 CHRONIC PAIN OF RIGHT KNEE: ICD-10-CM

## 2020-10-20 DIAGNOSIS — G89.29 CHRONIC RIGHT SHOULDER PAIN: ICD-10-CM

## 2020-10-20 PROCEDURE — 97110 THERAPEUTIC EXERCISES: CPT | Mod: 95 | Performed by: PHYSICAL THERAPIST

## 2020-10-21 ENCOUNTER — VIRTUAL VISIT (OUTPATIENT)
Dept: ONCOLOGY | Facility: CLINIC | Age: 75
End: 2020-10-21
Attending: PSYCHOLOGIST
Payer: COMMERCIAL

## 2020-10-21 ENCOUNTER — HOSPITAL ENCOUNTER (OUTPATIENT)
Dept: MRI IMAGING | Facility: CLINIC | Age: 75
Discharge: HOME | End: 2020-10-21
Attending: INTERNAL MEDICINE | Admitting: INTERNAL MEDICINE
Payer: COMMERCIAL

## 2020-10-21 DIAGNOSIS — C43.9 MELANOMA OF SKIN (H): ICD-10-CM

## 2020-10-21 DIAGNOSIS — F32.A DEPRESSION: Primary | ICD-10-CM

## 2020-10-21 DIAGNOSIS — C43.71 MALIGNANT MELANOMA OF RIGHT LOWER EXTREMITY INCLUDING HIP (H): ICD-10-CM

## 2020-10-21 PROCEDURE — 255N000002 HC RX 255 OP 636: Performed by: INTERNAL MEDICINE

## 2020-10-21 PROCEDURE — A9585 GADOBUTROL INJECTION: HCPCS | Performed by: INTERNAL MEDICINE

## 2020-10-21 PROCEDURE — 90832 PSYTX W PT 30 MINUTES: CPT | Mod: 95 | Performed by: PSYCHOLOGIST

## 2020-10-21 PROCEDURE — 70553 MRI BRAIN STEM W/O & W/DYE: CPT

## 2020-10-21 RX ORDER — GADOBUTROL 604.72 MG/ML
7.5 INJECTION INTRAVENOUS ONCE
Status: COMPLETED | OUTPATIENT
Start: 2020-10-21 | End: 2020-10-21

## 2020-10-21 RX ADMIN — GADOBUTROL 6.5 ML: 604.72 INJECTION INTRAVENOUS at 13:49

## 2020-10-21 NOTE — PROGRESS NOTES
"Yadira Hoang is a 75 year old female who is being evaluated via a billable telephone visit. Phone call duration: 30 minutes     The patient has been notified of following:     \"This telephone visit will be conducted via a call between you and your physician/provider. We have found that certain health care needs can be provided without the need for a physical exam.  This service lets us provide the care you need with a short phone conversation.  If a prescription is necessary we can send it directly to your pharmacy.  If lab work is needed we can place an order for that and you can then stop by our lab to have the test done at a later time.    Telephone visits are billed at different rates depending on your insurance coverage. During this emergency period, for some insurers they may be billed the same as an in-person visit.  Please reach out to your insurance provider with any questions.    If during the course of the call the physician/provider feels a telephone visit is not appropriate, you will not be charged for this service.\"    Patient has given verbal consent for Telephone visit? Yes    Yadira Hoang is a 75 year old female who presents for Depression  The patient was seen for a 30 minute appointment on 10/21/2020.    Subjective: She was feeling \"a bit down today.\" She is concerned about going onto a new clinical trial. To manage her concern she is developing a list of questions to ask the research team. We discussed ways to manage her emotional state and good questions to ask her team.     Objective: Affect was appropriate to the content of the therapeutic conversation.     Diagnosis:   Encounter Diagnosis   Name Primary?     Depression Yes     Recommendations/Plan:  1. Develop a list of questions for her team  2. Call for additional appointments as needed.     Thank you for this opportunity to participate in your care of this patient.    Alec Angel Psy.D., L.P.  Director, Oncology Supportive " Care

## 2020-10-21 NOTE — PROGRESS NOTES
Appropriate assistive devices provided during their visit. Yes (Yes, No, N/A) Tech (list device)    Exam table and/or cart  placed in the lowest position. Yes (Yes, No, N/A)    Brakes on tables/carts/wheelchairs used at all times. Yes (Yes, No, N/A)    Non slip footwear applied. NA (Yes, No, NA)    Patient was accompanied by staff throughout visit. Yes (Yes, No, N/A)    Equipment safety straps used. NA (Yes, No, N/A)    Assist with toileting. Yes (Yes, No, N/A)

## 2020-10-23 ENCOUNTER — VIRTUAL VISIT (OUTPATIENT)
Dept: ONCOLOGY | Facility: CLINIC | Age: 75
End: 2020-10-23
Attending: INTERNAL MEDICINE
Payer: COMMERCIAL

## 2020-10-23 DIAGNOSIS — C43.71 MALIGNANT MELANOMA OF RIGHT LOWER EXTREMITY INCLUDING HIP (H): Primary | ICD-10-CM

## 2020-10-23 DIAGNOSIS — C43.9 MELANOMA OF SKIN (H): ICD-10-CM

## 2020-10-23 PROCEDURE — 99214 OFFICE O/P EST MOD 30 MIN: CPT | Mod: 95 | Performed by: INTERNAL MEDICINE

## 2020-10-23 PROCEDURE — 999N001193 HC VIDEO/TELEPHONE VISIT; NO CHARGE

## 2020-10-23 NOTE — LETTER
10/23/2020         RE: Yadira Hoang  [address] [facility] [address]        Dear Colleague,    Thank you for referring your patient, Yadira Hoang, to the [facility]. Please see a copy of my visit note below.    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 She noted a painful lesion on the sole of the right foot for a few months, since last summer.  It initially was small then became raised.  It spontaneously bled. She is not entirely sure of its appearance initially.  She denies noting any masses behind the knee or in the groin.  2. 1/9/2020, punch biopsy was performed by Dr Jessica Meadows. Patology showed melanoma, at least 3.4 mm deep with ulceration and 0 mitoses, and perineural invasion present. TILs were non-brisk and LVI not identified. pT3b.  3. 2/10/2020, she has right femoral sentinel lymph node biopsy and wide local excision (Specimen #: [id number]) and the right heel lesion extends to a depth of 6.6 mm, and invasive melanoma is present at 0.2 mm from the deep margin. Microsatellites are also present. There was 1 (of 2) lymph nodes involved. The lymph node tissue exhibits extensive subcapsular and parenchymal clusters of melanoma cells, highlighted on Melan-A immunostain. The largest cluster is 7 millimeters in greatest diameter. pT4b pN2c. PD-L1 staining is negative, <1%. No mutation in BRAF, KIT, or NRAS.  4. 2/22/2020, she had PET-CT, which showed intense FDG uptake in a right inguinal lymph node, concerning for an additional focus of metastatic disease. There is also an indeterminate right external iliac lymph node with mild FDG uptake.  5. 3/4/2020, she has medial plantar artery  fasciocutaneous instep flap and Integra placement to the donor site. On 4/2/2020, she has additional  reconstruction with skin grafting.  6. 5/27/2020, she starts nivolumab.     HISTORY OF PRESENT ILLNESS  Yadira Hoang is a 75 year old female with stage IV acral melanoma. She has primary anti-PD1 CPI refractory disease.     She is here to review her treatment options going forward.    ECOG performance status is 1.    Current Outpatient Medications   Medication     cholecalciferol (VITAMIN D3) 125 MCG (5000 UT) TABS tablet     diclofenac (VOLTAREN) 1 % topical gel     estradiol (ESTRACE) 0.1 MG/GM vaginal cream     Lidocaine (LIDOCARE) 4 % Patch     mirtazapine (REMERON) 15 MG tablet     omeprazole (PRILOSEC) 20 MG DR capsule     polyethylene glycol (MIRALAX) powder     sertraline (ZOLOFT) 25 MG tablet     Current Facility-Administered Medications   Medication     2 mL bupivacaine (MARCAINE) preservative free injection 0.25% (10 mL vial)     2 mL bupivacaine (MARCAINE) preservative free injection 0.25% (10 mL vial)       PHYSICAL EXAMINATION  None performed as this was telephone visit.    ASSESSMENT AND PLAN  #1 Acral melanoma, right heel primary, pT4b pN3c M1, Stage IV  It was a pleasure to talk with Ms. Hoang. She is a 75 year old woman with acral melanoma, right inguinal and pelvic lymph node metastases, new liver metastases and bone metastases on recent PET-CT. Her tumor is PD-L1 negative and there are no targetable mutations.    She has demonstrated to have metastatic disease refractory to anti-PD1 monotherapy. We again reviewed standard treatment options as well as clinical trial options going forward.     We discussed the issue of clinical trial participation on the Nektar study and my concerns that she would not be a good candidate given the pegylated-IL2 drug and prominent hypotension and syncopal episodes seen on that study. Similarly, we also talked about chemoimmunotherapy using carboplatin and taxol plus pembrolizumab. Both strategies are associated with response rates in the 35-50% range in  "this setting. However, patient would like to avoid chemotherapy at present.    We also reviewed induction with Ipilimumab and nivolumab combination therapy. This would be a two drug regimen capable of inducing additional response from checkpoint inhibition, with response rates in the 20% range, but would leave open several other treatment options down the line if necessary, including clinical trial participation.    She agrees to proceed with Ipilimumab 1mg/kg and nivolumab 3mg/kg. We will plan to start as soon as next week.    #2 Arthritis, immunotherapy associated  She does have worsening joint pains on nivolumab. We will watch closely for worsening of immune mediated side effects and worsening of arthritis. For significant worsening would need to hold immunotherapy and possibly use steroids or other immunosuppressive medications like Plaquenil. We will monitor.    Multiple questions answered.       Gi Cartagena M.D.   of Medicine  Hematology, Oncology and Transplantation        Yadira Hoang is a 75 year old female who is being evaluated via a billable telephone visit.      The patient has been notified of following:     \"This telephone visit will be conducted via a call between you and your physician/provider. We have found that certain health care needs can be provided without the need for a physical exam.  This service lets us provide the care you need with a short phone conversation.  If a prescription is necessary we can send it directly to your pharmacy.  If lab work is needed we can place an order for that and you can then stop by our lab to have the test done at a later time.    Telephone visits are billed at different rates depending on your insurance coverage. During this emergency period, for some insurers they may be billed the same as an in-person visit.  Please reach out to your insurance provider with any questions.    If during the course of the call the " "physician/provider feels a telephone visit is not appropriate, you will not be charged for this service.\"    Patient has given verbal consent for Telephone visit?  Yes    What phone number would you like to be contacted at? [phone]    How would you like to obtain your AVS? MyChart     Vitals - Patient Reported  Weight (Patient Reported): 62.6 kg (138 lb)  Height (Patient Reported): 170.2 cm (5' 7.01\")  BMI (Based on Pt Reported Ht/Wt): 21.61  Pain Score: Moderate Pain (5)  Pain Loc: (right leg)    Capo Rowe LPN    Phone call duration: 21 minutes            Again, thank you for allowing me to participate in the care of your patient.        Sincerely,        Gi Franklin MD    "

## 2020-10-23 NOTE — PROGRESS NOTES
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 She noted a painful lesion on the sole of the right foot for a few months, since last summer.  It initially was small then became raised.  It spontaneously bled. She is not entirely sure of its appearance initially.  She denies noting any masses behind the knee or in the groin.  2. 1/9/2020, punch biopsy was performed by Dr Jessica Meadows. Patology showed melanoma, at least 3.4 mm deep with ulceration and 0 mitoses, and perineural invasion present. TILs were non-brisk and LVI not identified. pT3b.  3. 2/10/2020, she has right femoral sentinel lymph node biopsy and wide local excision (Specimen #: [id number]) and the right heel lesion extends to a depth of 6.6 mm, and invasive melanoma is present at 0.2 mm from the deep margin. Microsatellites are also present. There was 1 (of 2) lymph nodes involved. The lymph node tissue exhibits extensive subcapsular and parenchymal clusters of melanoma cells, highlighted on Melan-A immunostain. The largest cluster is 7 millimeters in greatest diameter. pT4b pN2c. PD-L1 staining is negative, <1%. No mutation in BRAF, KIT, or NRAS.  4. 2/22/2020, she had PET-CT, which showed intense FDG uptake in a right inguinal lymph node, concerning for an additional focus of metastatic disease. There is also an indeterminate right external iliac lymph node with mild FDG uptake.  5. 3/4/2020, she has medial plantar artery  fasciocutaneous instep flap and Integra placement to the donor site. On 4/2/2020, she has additional reconstruction with skin grafting.  6. 5/27/2020, she starts nivolumab.     HISTORY OF PRESENT ILLNESS  Yadira Hoang is a 75 year old female with stage IV acral melanoma. She has primary anti-PD1 CPI refractory disease.     She is here to review her  treatment options going forward.    ECOG performance status is 1.    Current Outpatient Medications   Medication     cholecalciferol (VITAMIN D3) 125 MCG (5000 UT) TABS tablet     diclofenac (VOLTAREN) 1 % topical gel     estradiol (ESTRACE) 0.1 MG/GM vaginal cream     Lidocaine (LIDOCARE) 4 % Patch     mirtazapine (REMERON) 15 MG tablet     omeprazole (PRILOSEC) 20 MG DR capsule     polyethylene glycol (MIRALAX) powder     sertraline (ZOLOFT) 25 MG tablet     Current Facility-Administered Medications   Medication     2 mL bupivacaine (MARCAINE) preservative free injection 0.25% (10 mL vial)     2 mL bupivacaine (MARCAINE) preservative free injection 0.25% (10 mL vial)       PHYSICAL EXAMINATION  None performed as this was telephone visit.    ASSESSMENT AND PLAN  #1 Acral melanoma, right heel primary, pT4b pN3c M1, Stage IV  It was a pleasure to talk with Ms. Hoang. She is a 75 year old woman with acral melanoma, right inguinal and pelvic lymph node metastases, new liver metastases and bone metastases on recent PET-CT. Her tumor is PD-L1 negative and there are no targetable mutations.    She has demonstrated to have metastatic disease refractory to anti-PD1 monotherapy. We again reviewed standard treatment options as well as clinical trial options going forward.     We discussed the issue of clinical trial participation on the Nektar study and my concerns that she would not be a good candidate given the pegylated-IL2 drug and prominent hypotension and syncopal episodes seen on that study. Similarly, we also talked about chemoimmunotherapy using carboplatin and taxol plus pembrolizumab. Both strategies are associated with response rates in the 35-50% range in this setting. However, patient would like to avoid chemotherapy at present.    We also reviewed induction with Ipilimumab and nivolumab combination therapy. This would be a two drug regimen capable of inducing additional response from checkpoint inhibition,  "with response rates in the 20% range, but would leave open several other treatment options down the line if necessary, including clinical trial participation.    She agrees to proceed with Ipilimumab 1mg/kg and nivolumab 3mg/kg. We will plan to start as soon as next week.    #2 Arthritis, immunotherapy associated  She does have worsening joint pains on nivolumab. We will watch closely for worsening of immune mediated side effects and worsening of arthritis. For significant worsening would need to hold immunotherapy and possibly use steroids or other immunosuppressive medications like Plaquenil. We will monitor.    Multiple questions answered.       Gi Cartagena M.D.   of Medicine  Hematology, Oncology and Transplantation        Yadira Hoang is a 75 year old female who is being evaluated via a billable telephone visit.      The patient has been notified of following:     \"This telephone visit will be conducted via a call between you and your physician/provider. We have found that certain health care needs can be provided without the need for a physical exam.  This service lets us provide the care you need with a short phone conversation.  If a prescription is necessary we can send it directly to your pharmacy.  If lab work is needed we can place an order for that and you can then stop by our lab to have the test done at a later time.    Telephone visits are billed at different rates depending on your insurance coverage. During this emergency period, for some insurers they may be billed the same as an in-person visit.  Please reach out to your insurance provider with any questions.    If during the course of the call the physician/provider feels a telephone visit is not appropriate, you will not be charged for this service.\"    Patient has given verbal consent for Telephone visit?  Yes    What phone number would you like to be contacted at? [phone]    How would you like to " "obtain your AVS? [place]     Vitals - Patient Reported  Weight (Patient Reported): 62.6 kg (138 lb)  Height (Patient Reported): 170.2 cm (5' 7.01\")  BMI (Based on Pt Reported Ht/Wt): 21.61  Pain Score: Moderate Pain (5)  Pain Loc: (right leg)    Capo Rowe LPN    Phone call duration: 21 minutes        "

## 2020-10-26 ENCOUNTER — OFFICE VISIT (OUTPATIENT)
Dept: OPHTHALMOLOGY | Facility: CLINIC | Age: 75
End: 2020-10-26
Payer: COMMERCIAL

## 2020-10-26 DIAGNOSIS — H04.123 DRY EYES, BILATERAL: Primary | ICD-10-CM

## 2020-10-26 PROCEDURE — 92012 INTRM OPH EXAM EST PATIENT: CPT | Performed by: OPHTHALMOLOGY

## 2020-10-26 ASSESSMENT — VISUAL ACUITY
METHOD: SNELLEN - LINEAR
OD_CC: 20/20
OS_CC: 20/20
OD_CC+: -1
OS_CC+: -1
CORRECTION_TYPE: GLASSES

## 2020-10-26 ASSESSMENT — EXTERNAL EXAM - LEFT EYE: OS_EXAM: NORMAL

## 2020-10-26 ASSESSMENT — SLIT LAMP EXAM - LIDS
COMMENTS: MEIBOMIAN GLAND DYSFUNCTION
COMMENTS: MEIBOMIAN GLAND DYSFUNCTION

## 2020-10-26 ASSESSMENT — EXTERNAL EXAM - RIGHT EYE: OD_EXAM: NORMAL

## 2020-10-26 NOTE — NURSING NOTE
Chief Complaints and History of Present Illnesses   Patient presents with     Annual Eye Exam      Chief Complaint(s) and History of Present Illness(es)     Annual Eye Exam     Laterality: both eyes    Associated symptoms: tearing              Comments     Annual eye exam each eye.  Thinks her tear ducts are plugged, they are swollen.  Eye meds: AT's each eye   DANICA Lopez 10/26/2020 10:41 AM

## 2020-10-28 ENCOUNTER — INFUSION THERAPY VISIT (OUTPATIENT)
Dept: ONCOLOGY | Facility: CLINIC | Age: 75
End: 2020-10-28
Attending: INTERNAL MEDICINE
Payer: COMMERCIAL

## 2020-10-28 VITALS
SYSTOLIC BLOOD PRESSURE: 139 MMHG | DIASTOLIC BLOOD PRESSURE: 79 MMHG | WEIGHT: 143.1 LBS | BODY MASS INDEX: 22.41 KG/M2 | TEMPERATURE: 98.1 F | HEART RATE: 88 BPM | OXYGEN SATURATION: 96 % | RESPIRATION RATE: 16 BRPM

## 2020-10-28 DIAGNOSIS — C43.9 MELANOMA OF SKIN (H): Primary | ICD-10-CM

## 2020-10-28 DIAGNOSIS — C43.71 MALIGNANT MELANOMA OF RIGHT LOWER EXTREMITY INCLUDING HIP (H): ICD-10-CM

## 2020-10-28 DIAGNOSIS — R10.13 DYSPEPSIA: ICD-10-CM

## 2020-10-28 LAB
ALBUMIN SERPL-MCNC: 3.5 G/DL (ref 3.4–5)
ALP SERPL-CCNC: 65 U/L (ref 40–150)
ALT SERPL W P-5'-P-CCNC: 19 U/L (ref 0–50)
AMYLASE SERPL-CCNC: 45 U/L (ref 30–110)
ANION GAP SERPL CALCULATED.3IONS-SCNC: 5 MMOL/L (ref 3–14)
AST SERPL W P-5'-P-CCNC: 13 U/L (ref 0–45)
BASOPHILS # BLD AUTO: 0 10E9/L (ref 0–0.2)
BASOPHILS NFR BLD AUTO: 0.2 %
BILIRUB SERPL-MCNC: 0.4 MG/DL (ref 0.2–1.3)
BUN SERPL-MCNC: 21 MG/DL (ref 7–30)
CALCIUM SERPL-MCNC: 9.1 MG/DL (ref 8.5–10.1)
CHLORIDE SERPL-SCNC: 107 MMOL/L (ref 94–109)
CO2 SERPL-SCNC: 29 MMOL/L (ref 20–32)
CREAT SERPL-MCNC: 0.62 MG/DL (ref 0.52–1.04)
DIFFERENTIAL METHOD BLD: NORMAL
EOSINOPHIL # BLD AUTO: 0.1 10E9/L (ref 0–0.7)
EOSINOPHIL NFR BLD AUTO: 1.1 %
ERYTHROCYTE [DISTWIDTH] IN BLOOD BY AUTOMATED COUNT: 12.4 % (ref 10–15)
GFR SERPL CREATININE-BSD FRML MDRD: 87 ML/MIN/{1.73_M2}
GLUCOSE SERPL-MCNC: 92 MG/DL (ref 70–99)
HCT VFR BLD AUTO: 37.4 % (ref 35–47)
HGB BLD-MCNC: 12.7 G/DL (ref 11.7–15.7)
IMM GRANULOCYTES # BLD: 0 10E9/L (ref 0–0.4)
IMM GRANULOCYTES NFR BLD: 0.2 %
LIPASE SERPL-CCNC: 81 U/L (ref 73–393)
LYMPHOCYTES # BLD AUTO: 1.4 10E9/L (ref 0.8–5.3)
LYMPHOCYTES NFR BLD AUTO: 25.8 %
MCH RBC QN AUTO: 31.4 PG (ref 26.5–33)
MCHC RBC AUTO-ENTMCNC: 34 G/DL (ref 31.5–36.5)
MCV RBC AUTO: 92 FL (ref 78–100)
MONOCYTES # BLD AUTO: 0.4 10E9/L (ref 0–1.3)
MONOCYTES NFR BLD AUTO: 6.7 %
NEUTROPHILS # BLD AUTO: 3.6 10E9/L (ref 1.6–8.3)
NEUTROPHILS NFR BLD AUTO: 66 %
NRBC # BLD AUTO: 0 10*3/UL
NRBC BLD AUTO-RTO: 0 /100
PLATELET # BLD AUTO: 316 10E9/L (ref 150–450)
POTASSIUM SERPL-SCNC: 3.8 MMOL/L (ref 3.4–5.3)
PROT SERPL-MCNC: 7.2 G/DL (ref 6.8–8.8)
RBC # BLD AUTO: 4.05 10E12/L (ref 3.8–5.2)
SODIUM SERPL-SCNC: 141 MMOL/L (ref 133–144)
TSH SERPL DL<=0.005 MIU/L-ACNC: 1.25 MU/L (ref 0.4–4)
WBC # BLD AUTO: 5.4 10E9/L (ref 4–11)

## 2020-10-28 PROCEDURE — 99207 PR NO CHARGE LOS: CPT

## 2020-10-28 PROCEDURE — 96413 CHEMO IV INFUSION 1 HR: CPT

## 2020-10-28 PROCEDURE — 80053 COMPREHEN METABOLIC PANEL: CPT | Performed by: INTERNAL MEDICINE

## 2020-10-28 PROCEDURE — 85025 COMPLETE CBC W/AUTO DIFF WBC: CPT | Performed by: INTERNAL MEDICINE

## 2020-10-28 PROCEDURE — 82150 ASSAY OF AMYLASE: CPT | Performed by: INTERNAL MEDICINE

## 2020-10-28 PROCEDURE — 82784 ASSAY IGA/IGD/IGG/IGM EACH: CPT | Performed by: INTERNAL MEDICINE

## 2020-10-28 PROCEDURE — 250N000011 HC RX IP 250 OP 636: Mod: JW | Performed by: INTERNAL MEDICINE

## 2020-10-28 PROCEDURE — 83516 IMMUNOASSAY NONANTIBODY: CPT | Performed by: INTERNAL MEDICINE

## 2020-10-28 PROCEDURE — 84443 ASSAY THYROID STIM HORMONE: CPT | Performed by: INTERNAL MEDICINE

## 2020-10-28 PROCEDURE — 258N000003 HC RX IP 258 OP 636: Performed by: INTERNAL MEDICINE

## 2020-10-28 PROCEDURE — 96417 CHEMO IV INFUS EACH ADDL SEQ: CPT

## 2020-10-28 PROCEDURE — 250N000011 HC RX IP 250 OP 636: Performed by: INTERNAL MEDICINE

## 2020-10-28 PROCEDURE — 83690 ASSAY OF LIPASE: CPT | Performed by: INTERNAL MEDICINE

## 2020-10-28 RX ORDER — ALBUTEROL SULFATE 0.83 MG/ML
2.5 SOLUTION RESPIRATORY (INHALATION)
Status: CANCELLED | OUTPATIENT
Start: 2020-10-28

## 2020-10-28 RX ORDER — LORAZEPAM 2 MG/ML
0.5 INJECTION INTRAMUSCULAR EVERY 4 HOURS PRN
Status: CANCELLED
Start: 2020-10-28

## 2020-10-28 RX ORDER — ALBUTEROL SULFATE 90 UG/1
1-2 AEROSOL, METERED RESPIRATORY (INHALATION)
Status: CANCELLED
Start: 2020-10-28

## 2020-10-28 RX ORDER — LORAZEPAM 0.5 MG/1
0.5 TABLET ORAL EVERY 4 HOURS PRN
Qty: 30 TABLET | Refills: 3 | Status: SHIPPED | OUTPATIENT
Start: 2020-10-28 | End: 2021-02-12

## 2020-10-28 RX ORDER — DIPHENHYDRAMINE HYDROCHLORIDE 50 MG/ML
50 INJECTION INTRAMUSCULAR; INTRAVENOUS
Status: CANCELLED
Start: 2020-10-28

## 2020-10-28 RX ORDER — METHYLPREDNISOLONE SODIUM SUCCINATE 125 MG/2ML
125 INJECTION, POWDER, LYOPHILIZED, FOR SOLUTION INTRAMUSCULAR; INTRAVENOUS
Status: CANCELLED
Start: 2020-10-28

## 2020-10-28 RX ORDER — HEPARIN SODIUM,PORCINE 10 UNIT/ML
5 VIAL (ML) INTRAVENOUS
Status: CANCELLED | OUTPATIENT
Start: 2020-10-28

## 2020-10-28 RX ORDER — SODIUM CHLORIDE 9 MG/ML
1000 INJECTION, SOLUTION INTRAVENOUS CONTINUOUS PRN
Status: CANCELLED
Start: 2020-10-28

## 2020-10-28 RX ORDER — EPINEPHRINE 1 MG/ML
0.3 INJECTION, SOLUTION, CONCENTRATE INTRAVENOUS EVERY 5 MIN PRN
Status: CANCELLED | OUTPATIENT
Start: 2020-10-28

## 2020-10-28 RX ORDER — MEPERIDINE HYDROCHLORIDE 25 MG/ML
25 INJECTION INTRAMUSCULAR; INTRAVENOUS; SUBCUTANEOUS EVERY 30 MIN PRN
Status: CANCELLED | OUTPATIENT
Start: 2020-10-28

## 2020-10-28 RX ORDER — NALOXONE HYDROCHLORIDE 0.4 MG/ML
.1-.4 INJECTION, SOLUTION INTRAMUSCULAR; INTRAVENOUS; SUBCUTANEOUS
Status: CANCELLED | OUTPATIENT
Start: 2020-10-28

## 2020-10-28 RX ORDER — PROCHLORPERAZINE MALEATE 10 MG
10 TABLET ORAL EVERY 6 HOURS PRN
Qty: 30 TABLET | Refills: 3 | Status: SHIPPED | OUTPATIENT
Start: 2020-10-28 | End: 2021-02-12

## 2020-10-28 RX ORDER — HEPARIN SODIUM (PORCINE) LOCK FLUSH IV SOLN 100 UNIT/ML 100 UNIT/ML
5 SOLUTION INTRAVENOUS
Status: CANCELLED | OUTPATIENT
Start: 2020-10-28

## 2020-10-28 RX ADMIN — SODIUM CHLORIDE 200 MG: 9 INJECTION, SOLUTION INTRAVENOUS at 13:29

## 2020-10-28 RX ADMIN — SODIUM CHLORIDE 66 MG: 9 INJECTION, SOLUTION INTRAVENOUS at 14:34

## 2020-10-28 RX ADMIN — SODIUM CHLORIDE 250 ML: 9 INJECTION, SOLUTION INTRAVENOUS at 13:30

## 2020-10-28 ASSESSMENT — PAIN SCALES - GENERAL: PAINLEVEL: NO PAIN (0)

## 2020-10-28 NOTE — PROGRESS NOTES
Chief Complaint   Patient presents with     Chemotherapy     Cycle 1 Day 1 Nivolumab and Ipilimumab     Blood Draw     Vitals, blood drawn and PIV placed by LPN. Pt checked into [facility].      CHARBEL Garibay LPN

## 2020-10-28 NOTE — PATIENT INSTRUCTIONS
Contact Numbers  [facility]: [phone] (for symptom and scheduling needs)    Please call the [facility] Triage line if you experience a temperature greater than or equal to 100.4, shaking chills, have uncontrolled nausea, vomiting and/or diarrhea, dizziness, shortness of breath, chest pain, bleeding, unexplained bruising, or if you have any other new/concerning symptoms, questions or concerns.     If you are having any concerning symptoms or wish to speak to a provider before your next infusion visit, please call your care coordinator or triage to notify them so we can adequately serve you.     If you need a refill on a narcotic prescription or other medication, please call triage before your infusion appointment.          October 2020 Sunday Monday Tuesday Wednesday Thursday Friday Saturday                       1    [facility] LAB DRAW   9:00 AM   (15 min.)    [facility] LAB DRAW   [facility] ONC INFUSION 60   9:30 AM   (60 min.)    ONCOLOGY INFUSION   [facility] 2    [facility] NEW   1:15 PM   (30 min.)   Gloria Estrada MD   [facility] 3       4     5     6    TELEPHONE VISIT RETURN  12:30 PM   (20 min.)   Erika Blanton MD PhD   [facility]    EXTREMITY INITIAL   2:35 PM   (40 min.)   Gisselle Bonilla, PT   [facility] 7     8     9    LAB  11:50 AM   (10 min.)   LAB FIRST FLOOR [facility]    PET ONCOLOGY WHOLE BODY  12:00 PM   (45 min.)   [email or web address]   [facility] 10       11     12     13    TELEPHONE VISIT NEW   1:00 PM   (30 min.)   Martin Mckay MD   [facility] 14     15     16    [facility] RETURN   3:15 PM   (30 min.)   Gi Kowalski MD   [facility] 17       18     19     20    CA FOLLOW UP   2:10 PM   (60  min.)   Gisselle Bonilla, PT   [facility] 21    TELEPHONE VISIT RETURN  11:00 AM   (60 min.)   Alec Angel PsyD [facility]    MR BRAIN WWO   1:00 PM   (45 min.)   [email or web address]   [facility] 22     23    TELEPHONE VISIT RETURN   3:30 PM   (30 min.)   Gi Kowalski MD   [facility] 24       25     26    TECH  10:30 AM   (15 min.)   Alec Rolle MD   [facility] 27     28    [facility] LAB DRAW  12:00 PM   (15 min.)    [facility] LAB DRAW   [facility]    [facility] ONC INFUSION 120  12:30 PM   (120 min.)   [facility] ONCOLOGY INFUSION   [facility] 29 30 31 November 2020 Sunday Monday Tuesday Wednesday Thursday Friday Saturday   1     2     3     4    TELEPHONE VISIT RETURN  11:00 AM   (60 min.)   Alec Angel PsyD [facility] 5    EXTREMITY FOLLOW UP   9:40 AM   (40 min.)   Gisselle Bonilla, PT   [facility] 6     7       8     9     10     11    TELEPHONE VISIT RETURN  11:00 AM   (60 min.)   Alec Angel PsyD [facility] 12     13    EXTREMITY FOLLOW UP   1:30 PM   (40 min.)   Gisselle Bonilla, PT   [facility] 14       15     16     17     18     19    EXTREMITY FOLLOW UP   9:40 AM   (40 min.)   Gisselle Bonilla, PT   [facility] 20     21       22     23     24     25     26     27     28       29     30                                              Lab Results:  Recent Results (from the past 12 hour(s))   Comprehensive metabolic panel    Collection Time: 10/28/20 12:06 PM   Result Value Ref Range    Sodium 141 133 - 144 mmol/L    Potassium 3.8 3.4 - 5.3 mmol/L    Chloride  107 94 - 109 mmol/L    Carbon Dioxide 29 20 - 32 mmol/L    Anion Gap 5 3 - 14 mmol/L    Glucose 92 70 - 99 mg/dL    Urea Nitrogen 21 7 - 30 mg/dL    Creatinine 0.62 0.52 - 1.04 mg/dL    GFR Estimate 87 >60 mL/min/[1.73_m2]    GFR Estimate If Black >90 >60 mL/min/[1.73_m2]    Calcium 9.1 8.5 - 10.1 mg/dL    Bilirubin Total 0.4 0.2 - 1.3 mg/dL    Albumin 3.5 3.4 - 5.0 g/dL    Protein Total 7.2 6.8 - 8.8 g/dL    Alkaline Phosphatase 65 40 - 150 U/L    ALT 19 0 - 50 U/L    AST 13 0 - 45 U/L   TSH with free T4 reflex    Collection Time: 10/28/20 12:06 PM   Result Value Ref Range    TSH 1.25 0.40 - 4.00 mU/L   CBC with platelets differential    Collection Time: 10/28/20 12:06 PM   Result Value Ref Range    WBC 5.4 4.0 - 11.0 10e9/L    RBC Count 4.05 3.8 - 5.2 10e12/L    Hemoglobin 12.7 11.7 - 15.7 g/dL    Hematocrit 37.4 35.0 - 47.0 %    MCV 92 78 - 100 fl    MCH 31.4 26.5 - 33.0 pg    MCHC 34.0 31.5 - 36.5 g/dL    RDW 12.4 10.0 - 15.0 %    Platelet Count 316 150 - 450 10e9/L    Diff Method Automated Method     % Neutrophils 66.0 %    % Lymphocytes 25.8 %    % Monocytes 6.7 %    % Eosinophils 1.1 %    % Basophils 0.2 %    % Immature Granulocytes 0.2 %    Nucleated RBCs 0 0 /100    Absolute Neutrophil 3.6 1.6 - 8.3 10e9/L    Absolute Lymphocytes 1.4 0.8 - 5.3 10e9/L    Absolute Monocytes 0.4 0.0 - 1.3 10e9/L    Absolute Eosinophils 0.1 0.0 - 0.7 10e9/L    Absolute Basophils 0.0 0.0 - 0.2 10e9/L    Abs Immature Granulocytes 0.0 0 - 0.4 10e9/L    Absolute Nucleated RBC 0.0    Lipase    Collection Time: 10/28/20 12:06 PM   Result Value Ref Range    Lipase 81 73 - 393 U/L   Amylase    Collection Time: 10/28/20 12:06 PM   Result Value Ref Range    Amylase 45 30 - 110 U/L

## 2020-10-28 NOTE — PROGRESS NOTES
Infusion Nursing Note:  Yadira Hoang presents today for Cycle 1 Day 1 Nivolumab and Ipilimumab.    Patient seen by provider today: No   present during visit today: Not Applicable.    Note: Patient arrives to infusion today feeling well. She has received Nivolumab here before but this is her first infusion of Ipilimumab. Medication handout provided to patient as well as education on basic side effects. Patient declines any new questions or concerns at this time.     Intravenous Access:  Peripheral IV placed.    Treatment Conditions:  Lab Results   Component Value Date    HGB 12.7 10/28/2020     Lab Results   Component Value Date    WBC 5.4 10/28/2020      Lab Results   Component Value Date    ANEU 3.6 10/28/2020     Lab Results   Component Value Date     10/28/2020      Lab Results   Component Value Date     10/28/2020                   Lab Results   Component Value Date    POTASSIUM 3.8 10/28/2020           No results found for: MAG         Lab Results   Component Value Date    CR 0.62 10/28/2020                   Lab Results   Component Value Date    GABY 9.1 10/28/2020                Lab Results   Component Value Date    BILITOTAL 0.4 10/28/2020           Lab Results   Component Value Date    ALBUMIN 3.5 10/28/2020                    Lab Results   Component Value Date    ALT 19 10/28/2020           Lab Results   Component Value Date    AST 13 10/28/2020       Results reviewed, labs MET treatment parameters, ok to proceed with treatment.    Post Infusion Assessment:  Patient tolerated infusion without incident.  Blood return noted pre and post infusion.  Site patent and intact, free from redness, edema or discomfort.  No evidence of extravasations.  Access discontinued per protocol.     Discharge Plan:   Prescription refills given for Ativan and Compazine. Patient counseled on medications by pharmacy.   Discharge instructions reviewed with: Patient.  Patient and/or family verbalized  understanding of discharge instructions and all questions answered.  Copy of AVS reviewed with patient and/or family. No future appointments scheduled at this time. In Basket sent to scheduling and REMY Calvin to have future appointments scheduled in [place] per patient request and to follow up with patient. Patient aware to call if she doesn't hear anything by the end of the week.    Patient discharged in stable condition accompanied by: self.  Departure Mode: Wheelchair.    Jessica Narvaez RN

## 2020-10-29 LAB
IGA SERPL-MCNC: 326 MG/DL (ref 84–499)
TTG IGA SER-ACNC: 1 U/ML
TTG IGG SER-ACNC: <1 U/ML

## 2020-11-04 ENCOUNTER — VIRTUAL VISIT (OUTPATIENT)
Dept: ONCOLOGY | Facility: CLINIC | Age: 75
End: 2020-11-04
Attending: PSYCHOLOGIST
Payer: COMMERCIAL

## 2020-11-04 DIAGNOSIS — F32.9 REACTIVE DEPRESSION: Primary | ICD-10-CM

## 2020-11-04 PROCEDURE — 90832 PSYTX W PT 30 MINUTES: CPT | Mod: 95 | Performed by: PSYCHOLOGIST

## 2020-11-04 NOTE — PROGRESS NOTES
"Yadira Hoang is a 75 year old female who is being evaluated via a billable telephone visit.  Phone call duration: 30 minutes    The patient has been notified of following:     \"This telephone visit will be conducted via a call between you and your physician/provider. We have found that certain health care needs can be provided without the need for a physical exam.  This service lets us provide the care you need with a short phone conversation.  If a prescription is necessary we can send it directly to your pharmacy.  If lab work is needed we can place an order for that and you can then stop by our lab to have the test done at a later time.    Telephone visits are billed at different rates depending on your insurance coverage. During this emergency period, for some insurers they may be billed the same as an in-person visit.  Please reach out to your insurance provider with any questions.    If during the course of the call the physician/provider feels a telephone visit is not appropriate, you will not be charged for this service.\"    Patient has given verbal consent for Telephone visit? yes    Confidential Summary of Oncology Psychology Followup Visit    Yadira Hoang is a 75 year old female who presents for Depression  The patient was seen for a 30 minute appointment on 11/4/2020.    Subjective: She reported feeling fatigued again and feels that her level of sadness is increasing. We discussed keeping busy both physically and mentally and exercising happiness each day. She provided personal examples of how she could implement these ideas.     Objective: Affect was appropriate to the content of the therapeutic conversation.     Diagnosis:   Encounter Diagnosis   Name Primary?     Reactive depression Yes     Recommendations/Plan:  1. Maintain activity and exercise happiness each day.   2. Call for additional appointments    Thank you for this opportunity to participate in your care of this " patient.    Alec Angel Psy.D., L.P.  Director, Oncology Supportive Care

## 2020-11-05 ENCOUNTER — THERAPY VISIT (OUTPATIENT)
Dept: PHYSICAL THERAPY | Facility: CLINIC | Age: 75
End: 2020-11-05
Payer: COMMERCIAL

## 2020-11-05 DIAGNOSIS — G89.29 CHRONIC PAIN OF RIGHT KNEE: ICD-10-CM

## 2020-11-05 DIAGNOSIS — M54.16 LUMBAR RADICULOPATHY: ICD-10-CM

## 2020-11-05 DIAGNOSIS — G89.29 CHRONIC RIGHT SHOULDER PAIN: ICD-10-CM

## 2020-11-05 DIAGNOSIS — M25.561 CHRONIC PAIN OF RIGHT KNEE: ICD-10-CM

## 2020-11-05 DIAGNOSIS — M25.511 CHRONIC RIGHT SHOULDER PAIN: ICD-10-CM

## 2020-11-05 PROCEDURE — 97110 THERAPEUTIC EXERCISES: CPT | Mod: GP | Performed by: PHYSICAL THERAPIST

## 2020-11-05 PROCEDURE — 97530 THERAPEUTIC ACTIVITIES: CPT | Mod: GP | Performed by: PHYSICAL THERAPIST

## 2020-11-11 ENCOUNTER — VIRTUAL VISIT (OUTPATIENT)
Dept: ONCOLOGY | Facility: CLINIC | Age: 75
End: 2020-11-11
Attending: PSYCHOLOGIST
Payer: COMMERCIAL

## 2020-11-11 DIAGNOSIS — F32.9 REACTIVE DEPRESSION: Primary | ICD-10-CM

## 2020-11-11 PROCEDURE — 98968 PH1 ASSMT&MGMT NQHP 21-30: CPT | Mod: 95 | Performed by: PSYCHOLOGIST

## 2020-11-11 NOTE — PROGRESS NOTES
"Yadira Hoang is a 75 year old female who is being evaluated via a billable telephone visit.  Phone call duration: 30 minutes    The patient has been notified of following:     \"This telephone visit will be conducted via a call between you and your physician/provider. We have found that certain health care needs can be provided without the need for a physical exam.  This service lets us provide the care you need with a short phone conversation.  If a prescription is necessary we can send it directly to your pharmacy.  If lab work is needed we can place an order for that and you can then stop by our lab to have the test done at a later time.    Telephone visits are billed at different rates depending on your insurance coverage. During this emergency period, for some insurers they may be billed the same as an in-person visit.  Please reach out to your insurance provider with any questions.    If during the course of the call the physician/provider feels a telephone visit is not appropriate, you will not be charged for this service.\"    Patient has given verbal consent for Telephone visit? yes    Confidential Summary of Oncology Psychology Followup Visit    Yadira Hoang is a 75 year old female who presents for depressed mood. The patient was seen for a 30 minute appointment on 11/11/2020.    Subjective: She is feeling depressed due to concern over her fatigue and additional treatment side-effects. This appointment helped her to focus on her strengths and how to get back to where she wants to be emotionally.     Objective: Affect was appropriate to the content of the therapeutic conversation.     Diagnosis:   Encounter Diagnosis   Name Primary?     Reactive depression Yes     Recommendations/Plan:  1. Keep engaging in her activities that help her to manage her stress  2. Return for additional follow-up    Thank you for this opportunity to participate in your care of this patient.    Alec Angel, " Psy.D., L.P.  Director, Oncology Supportive Care

## 2020-11-12 ENCOUNTER — TELEPHONE (OUTPATIENT)
Dept: ONCOLOGY | Facility: CLINIC | Age: 75
End: 2020-11-12

## 2020-11-12 NOTE — TELEPHONE ENCOUNTER
"Pt called in to triage reporting R cheek lesion reappeared a 1 week ago, painful intermittently. Stated she saw Dr. Estrada 10/2 for cryotherapy of this lesion and was told to come in again if she experienced more pain. Unable to describe what lesion looks like or size it currently is, just stated \"its darker then my skin\". Requesting a call back if she should be seen in clinic again.  "

## 2020-11-13 ENCOUNTER — THERAPY VISIT (OUTPATIENT)
Dept: PHYSICAL THERAPY | Facility: CLINIC | Age: 75
End: 2020-11-13
Payer: COMMERCIAL

## 2020-11-13 DIAGNOSIS — M54.16 LUMBAR RADICULOPATHY: ICD-10-CM

## 2020-11-13 DIAGNOSIS — G89.29 CHRONIC RIGHT SHOULDER PAIN: ICD-10-CM

## 2020-11-13 DIAGNOSIS — M25.561 CHRONIC PAIN OF RIGHT KNEE: ICD-10-CM

## 2020-11-13 DIAGNOSIS — G89.29 CHRONIC PAIN OF RIGHT KNEE: ICD-10-CM

## 2020-11-13 DIAGNOSIS — M25.511 CHRONIC RIGHT SHOULDER PAIN: ICD-10-CM

## 2020-11-13 PROCEDURE — 97110 THERAPEUTIC EXERCISES: CPT | Mod: GP | Performed by: PHYSICAL THERAPIST

## 2020-11-13 NOTE — TELEPHONE ENCOUNTER
Voicemail left asking patient to call the clinic to schedule an appointment for a spot check with any dermatologist    Tony Pillai CMA

## 2020-11-13 NOTE — TELEPHONE ENCOUNTER
Schedule in next available medical derm appointment.    Gloria Estrada MD    Department of Dermatology  [facility]: Phone: [phone], Fax:[phone]  [facility]: Phone: [phone], Fax: [phone]

## 2020-11-17 DIAGNOSIS — C43.71 MALIGNANT MELANOMA OF RIGHT LOWER EXTREMITY INCLUDING HIP (H): ICD-10-CM

## 2020-11-17 DIAGNOSIS — C43.9 MELANOMA OF SKIN (H): Primary | ICD-10-CM

## 2020-11-17 RX ORDER — DIPHENHYDRAMINE HYDROCHLORIDE 50 MG/ML
50 INJECTION INTRAMUSCULAR; INTRAVENOUS
Status: CANCELLED
Start: 2020-11-18

## 2020-11-17 RX ORDER — MEPERIDINE HYDROCHLORIDE 25 MG/ML
25 INJECTION INTRAMUSCULAR; INTRAVENOUS; SUBCUTANEOUS EVERY 30 MIN PRN
Status: CANCELLED | OUTPATIENT
Start: 2020-11-18

## 2020-11-17 RX ORDER — ALBUTEROL SULFATE 0.83 MG/ML
2.5 SOLUTION RESPIRATORY (INHALATION)
Status: CANCELLED | OUTPATIENT
Start: 2020-11-18

## 2020-11-17 RX ORDER — LORAZEPAM 2 MG/ML
0.5 INJECTION INTRAMUSCULAR EVERY 4 HOURS PRN
Status: CANCELLED
Start: 2020-11-18

## 2020-11-17 RX ORDER — EPINEPHRINE 1 MG/ML
0.3 INJECTION, SOLUTION INTRAMUSCULAR; SUBCUTANEOUS EVERY 5 MIN PRN
Status: CANCELLED | OUTPATIENT
Start: 2020-11-18

## 2020-11-17 RX ORDER — NALOXONE HYDROCHLORIDE 0.4 MG/ML
.1-.4 INJECTION, SOLUTION INTRAMUSCULAR; INTRAVENOUS; SUBCUTANEOUS
Status: CANCELLED | OUTPATIENT
Start: 2020-11-18

## 2020-11-17 RX ORDER — ALBUTEROL SULFATE 90 UG/1
1-2 AEROSOL, METERED RESPIRATORY (INHALATION)
Status: CANCELLED
Start: 2020-11-18

## 2020-11-17 RX ORDER — HEPARIN SODIUM (PORCINE) LOCK FLUSH IV SOLN 100 UNIT/ML 100 UNIT/ML
5 SOLUTION INTRAVENOUS
Status: CANCELLED | OUTPATIENT
Start: 2020-11-18

## 2020-11-17 RX ORDER — METHYLPREDNISOLONE SODIUM SUCCINATE 125 MG/2ML
125 INJECTION, POWDER, LYOPHILIZED, FOR SOLUTION INTRAMUSCULAR; INTRAVENOUS
Status: CANCELLED
Start: 2020-11-18

## 2020-11-17 RX ORDER — SODIUM CHLORIDE 9 MG/ML
1000 INJECTION, SOLUTION INTRAVENOUS CONTINUOUS PRN
Status: CANCELLED
Start: 2020-11-18

## 2020-11-17 RX ORDER — HEPARIN SODIUM,PORCINE 10 UNIT/ML
5 VIAL (ML) INTRAVENOUS
Status: CANCELLED | OUTPATIENT
Start: 2020-11-18

## 2020-11-18 ENCOUNTER — INFUSION THERAPY VISIT (OUTPATIENT)
Dept: INFUSION THERAPY | Facility: CLINIC | Age: 75
End: 2020-11-18
Payer: COMMERCIAL

## 2020-11-18 VITALS
OXYGEN SATURATION: 99 % | HEART RATE: 80 BPM | RESPIRATION RATE: 16 BRPM | SYSTOLIC BLOOD PRESSURE: 123 MMHG | WEIGHT: 143.2 LBS | DIASTOLIC BLOOD PRESSURE: 76 MMHG | BODY MASS INDEX: 22.43 KG/M2 | TEMPERATURE: 98 F

## 2020-11-18 DIAGNOSIS — C43.9 MELANOMA OF SKIN (H): Primary | ICD-10-CM

## 2020-11-18 DIAGNOSIS — C43.71 MALIGNANT MELANOMA OF RIGHT LOWER EXTREMITY INCLUDING HIP (H): ICD-10-CM

## 2020-11-18 DIAGNOSIS — C43.9 MELANOMA OF SKIN (H): ICD-10-CM

## 2020-11-18 LAB
ALBUMIN SERPL-MCNC: 3.7 G/DL (ref 3.4–5)
ALP SERPL-CCNC: 75 U/L (ref 40–150)
ALT SERPL W P-5'-P-CCNC: 26 U/L (ref 0–50)
ANION GAP SERPL CALCULATED.3IONS-SCNC: 1 MMOL/L (ref 3–14)
AST SERPL W P-5'-P-CCNC: 15 U/L (ref 0–45)
BASOPHILS # BLD AUTO: 0 10E9/L (ref 0–0.2)
BASOPHILS NFR BLD AUTO: 0.3 %
BILIRUB SERPL-MCNC: 0.3 MG/DL (ref 0.2–1.3)
BUN SERPL-MCNC: 26 MG/DL (ref 7–30)
CALCIUM SERPL-MCNC: 9.7 MG/DL (ref 8.5–10.1)
CHLORIDE SERPL-SCNC: 104 MMOL/L (ref 94–109)
CO2 SERPL-SCNC: 35 MMOL/L (ref 20–32)
CREAT SERPL-MCNC: 1.1 MG/DL (ref 0.52–1.04)
DIFFERENTIAL METHOD BLD: NORMAL
EOSINOPHIL # BLD AUTO: 0.2 10E9/L (ref 0–0.7)
EOSINOPHIL NFR BLD AUTO: 3 %
ERYTHROCYTE [DISTWIDTH] IN BLOOD BY AUTOMATED COUNT: 12.1 % (ref 10–15)
GFR SERPL CREATININE-BSD FRML MDRD: 49 ML/MIN/{1.73_M2}
GLUCOSE SERPL-MCNC: 93 MG/DL (ref 70–99)
HCT VFR BLD AUTO: 38.8 % (ref 35–47)
HGB BLD-MCNC: 12.8 G/DL (ref 11.7–15.7)
IMM GRANULOCYTES # BLD: 0 10E9/L (ref 0–0.4)
IMM GRANULOCYTES NFR BLD: 0.2 %
LYMPHOCYTES # BLD AUTO: 1.7 10E9/L (ref 0.8–5.3)
LYMPHOCYTES NFR BLD AUTO: 28.5 %
MCH RBC QN AUTO: 30.7 PG (ref 26.5–33)
MCHC RBC AUTO-ENTMCNC: 33 G/DL (ref 31.5–36.5)
MCV RBC AUTO: 93 FL (ref 78–100)
MONOCYTES # BLD AUTO: 0.4 10E9/L (ref 0–1.3)
MONOCYTES NFR BLD AUTO: 6.3 %
NEUTROPHILS # BLD AUTO: 3.7 10E9/L (ref 1.6–8.3)
NEUTROPHILS NFR BLD AUTO: 61.7 %
PLATELET # BLD AUTO: 353 10E9/L (ref 150–450)
POTASSIUM SERPL-SCNC: 4.3 MMOL/L (ref 3.4–5.3)
PROT SERPL-MCNC: 7.6 G/DL (ref 6.8–8.8)
RBC # BLD AUTO: 4.17 10E12/L (ref 3.8–5.2)
SODIUM SERPL-SCNC: 140 MMOL/L (ref 133–144)
TSH SERPL DL<=0.005 MIU/L-ACNC: 1.34 MU/L (ref 0.4–4)
WBC # BLD AUTO: 6.1 10E9/L (ref 4–11)

## 2020-11-18 PROCEDURE — 80050 GENERAL HEALTH PANEL: CPT | Performed by: INTERNAL MEDICINE

## 2020-11-18 PROCEDURE — 99207 PR NO CHARGE LOS: CPT

## 2020-11-18 PROCEDURE — 36415 COLL VENOUS BLD VENIPUNCTURE: CPT | Performed by: INTERNAL MEDICINE

## 2020-11-18 PROCEDURE — 82024 ASSAY OF ACTH: CPT | Performed by: INTERNAL MEDICINE

## 2020-11-18 PROCEDURE — 96413 CHEMO IV INFUSION 1 HR: CPT | Performed by: INTERNAL MEDICINE

## 2020-11-18 RX ADMIN — Medication 250 ML: at 15:05

## 2020-11-18 ASSESSMENT — PAIN SCALES - GENERAL: PAINLEVEL: NO PAIN (0)

## 2020-11-18 NOTE — PROGRESS NOTES
"Infusion Nursing Note:  Yadira Hoang presents today for C2D1 Opdivo.    Patient seen by provider today: No   present during visit today: Not Applicable.    Note: Upon assessing emotional health, patient states she is not ready to die yet.  States she doesn't want to leave her  alone, and wants to continue to make memories with her grandkids.  Patient talked about her family's closeness with one another and family values.  Listened to patient share her story.  Validated feelings.    Patient also expressed concern that her insurance company is not covering [place] at this time.  States she wants the \"best chance possible\" to live.    Intravenous Access:  Peripheral IV placed.    Treatment Conditions:  Lab Results   Component Value Date     11/18/2020                   Lab Results   Component Value Date    POTASSIUM 4.3 11/18/2020           No results found for: MAG         Lab Results   Component Value Date    CR 1.10 11/18/2020                   Lab Results   Component Value Date    GABY 9.7 11/18/2020                Lab Results   Component Value Date    BILITOTAL 0.3 11/18/2020           Lab Results   Component Value Date    ALBUMIN 3.7 11/18/2020                    Lab Results   Component Value Date    ALT 26 11/18/2020           Lab Results   Component Value Date    AST 15 11/18/2020       Results reviewed, labs MET treatment parameters, ok to proceed with treatment.      Post Infusion Assessment:  Patient tolerated infusion without incident.  Blood return noted pre and post infusion.  Site patent and intact, free from redness, edema or discomfort.  No evidence of extravasations.  Access discontinued per protocol.       Discharge Plan:   AVS to patient via MYCHART.  Patient will return 12/9/2020 for next appointment.   Patient discharged in stable condition accompanied by: self.  is a .  Departure Mode: Wheelchair.    Marguerite Mccloud RN                      "

## 2020-11-19 ENCOUNTER — OFFICE VISIT (OUTPATIENT)
Dept: DERMATOLOGY | Facility: CLINIC | Age: 75
End: 2020-11-19
Payer: COMMERCIAL

## 2020-11-19 ENCOUNTER — PATIENT OUTREACH (OUTPATIENT)
Dept: CARE COORDINATION | Facility: CLINIC | Age: 75
End: 2020-11-19

## 2020-11-19 DIAGNOSIS — D48.5 NEOPLASM OF UNCERTAIN BEHAVIOR OF SKIN: Primary | ICD-10-CM

## 2020-11-19 DIAGNOSIS — Z85.820 HISTORY OF MELANOMA: ICD-10-CM

## 2020-11-19 DIAGNOSIS — L70.8 DILATED PORE OF WINER OF CHEEK: ICD-10-CM

## 2020-11-19 LAB — ACTH PLAS-MCNC: <10 PG/ML

## 2020-11-19 PROCEDURE — 99213 OFFICE O/P EST LOW 20 MIN: CPT | Mod: 25 | Performed by: STUDENT IN AN ORGANIZED HEALTH CARE EDUCATION/TRAINING PROGRAM

## 2020-11-19 PROCEDURE — 88305 TISSUE EXAM BY PATHOLOGIST: CPT | Performed by: DERMATOLOGY

## 2020-11-19 PROCEDURE — 11102 TANGNTL BX SKIN SINGLE LES: CPT | Mod: GC | Performed by: STUDENT IN AN ORGANIZED HEALTH CARE EDUCATION/TRAINING PROGRAM

## 2020-11-19 ASSESSMENT — PAIN SCALES - GENERAL: PAINLEVEL: NO PAIN (0)

## 2020-11-19 NOTE — PROGRESS NOTES
Social Work Note: Telephone Call  Oncology Clinic    Data/Intervention:  Patient Name:  Yadira Hoang  /Age:  1945 (75 year old)    Call From:  RODOLFO  Reason for Call:  Psychosocial outreach    Assessment:  RODOLFO called and spoke to Yadira this afternoon, reintroducing self and SW services. Yadira was pleasant on the phone, she states she hadn't slept well last night following treatment and was just waking up from a little nap. RODOLFO checked in with how Yadira has been doing. She has worked with SW in the past and SW wanted to offer ongoing support and resources as needed. Yadira confirms she has followed up on support groups that were previously provided. She remains ambivalent about how helpful they are as she recognizes everyone has different cancers, treatments, and experiences. Right now, Yadira states that her primary struggle is related to Covid and not being able to spend time with family. She worries about getting sick and is staying home and keeping her distance, but this is quite a burden for her. SW listened and provided supportive counseling. Yadira is not interested in additional resources at this time, but rodolfo did discuss value to reaching out to support system via phone, etc. Yadira acknowledges she is not always one to just  the phone. SW agreed it can be difficult, but as this is a unique time it can be quite helpful to find new ways of connecting. Yadira agreed to think about this.     RODOLFO provided contact information again and encouraged Yadira to reach out if she ever wants to talk or has any needs/questions.     Plan:  SW will remain available as needed and appropriate.     JORGE Pelaez, [facility]  Clinical , Adult Oncology  Phone: [phone]

## 2020-11-19 NOTE — PROGRESS NOTES
[facility] Dermatology Note      Dermatology Problem List:  1. Metastatic melanoma, originated in right heel  - S/p punch biopsy By Dr. Alvarado 1/9/20 showing melanoma at least 3.4mm deep, ulcerated, no mitoses, perineural invasion, TIL present and nonbrisk, no LVI.  - S/p WLE with 2cm margins (revelead melanoma to depth of 6.6mm with deep margin close, microsatellites were present) and right femoral SLNB 2/10/20 (1/2 nodes positive, largest deposit 7mm)  - S/p re-excision 2/20/20  - Flap completed 3/4/30, skin graft 4/2/20  - Adjuvant nivolumab started 5/2020  - New liver met noted on PET-CT 8/21/20. Considering clinical trials  2. AK, right cheek: cryo  # NUB, right cheek/ sp shave biopsy 11/19/20    Encounter Date: Nov 19, 2020    CC:   Chief Complaint   Patient presents with     Derm Problem     Yadira is here today for a spot on her cheek and on her upper arm s/p LN2         History of Present Illness:  Ms. Yadira Hoang is a 75 year old female who presents as a follow-up for right cheek lesion. The patient was last seen October by Dr. Estrada when she had an AK frozen on the right cheek. She has a history of metastatic melanoma on nivolumab and follows with heme/onc. She just had her last infusion yesterday. She notes some residual dysesthesia along the area previous frozen in October and she believes it feels exactly as her previous melanoma did and she is convinced it is skin cancer. She is very anxious. She admits to poor sleep. she states she has a current therapist she talks to. The patient is well today in their baseline state of health, with no additional skin concerns.      Past Medical History:   Patient Active Problem List   Diagnosis     Malignant melanoma of right lower extremity including hip (H)     Melanoma of skin (H)     GUERLINE on CPAP     Chronic congestion of paranasal sinus     Weakness of foot     Neck pain, chronic     Myofascial pain     Intractable migraine  without aura and without status migrainosus     History of multiple concussions     Foot pain, bilateral     Fibromyositis     Difficulty walking     Chronic pain disorder     Chronic fatigue     Bilateral occipital neuralgia     Articular disc disorder of temporomandibular joint     S/P flap graft     Osteopenia     Injury of head     Hyperlipidemia LDL goal <100     ALEXUS (generalized anxiety disorder)     Major depression, recurrent, chronic (H)     Chronic right shoulder pain     Lumbar radiculopathy     Chronic pain of right knee     Past Medical History:   Diagnosis Date     Concussion      ALEXUS (generalized anxiety disorder) 5/7/2020     Major depression, recurrent, chronic (H) 5/7/2020     Melanoma (H)      Nonsenile cataract      Sleep apnea     CPAP     Past Surgical History:   Procedure Laterality Date     BIOPSY NODE SENTINEL Right 2/10/2020    Procedure: [place] Lymph Node Mapping And Biopsy;  Surgeon: Gabriela Dorsey MD;  Location: [facility]     EXCISE LESION LOWER EXTREMITY Right 2/10/2020    Procedure: Wide Local Excision of RIGHT heel melanoma;  Surgeon: Gabriela Dorsey MD;  Location: [facility] OR     EXCISE LESION LOWER EXTREMITY Right 2/20/2020    Procedure: Wide local Re-excision RIGHT heel Wound vac application;  Surgeon: Gabriela Dorsey MD;  [facility]     GRAFT SKIN SPLIT THICKNESS FROM EXTREMITY Right 4/2/2020    Procedure: split skin graft from right thigh;  Surgeon: NIKA Crabtree MD;  [facility]     IRRIGATION AND DEBRIDEMENT FOOT, COMBINED Right 4/2/2020    Procedure: right foot/heel debridement;  Surgeon: NIKA Crabtree MD;  [facility]     ORTHOPEDIC SURGERY Right     heel surgery x2     RECONSTRUCT FOOT Right 3/4/2020    Procedure: Right heel reconstruction with regional flap, biologic dressing and SPY;  Surgeon: NIKA Crabtree MD;  [facility]     TUBAL LIGATION         Social History:  Patient reports that she has never smoked. She has  never used smokeless tobacco. She reports previous alcohol use. She reports that she does not use drugs.    Family History:  Family History   Problem Relation Age of Onset     Glaucoma No family hx of      Macular Degeneration No family hx of      Melanoma No family hx of      Skin Cancer No family hx of        Medications:  Current Outpatient Medications   Medication Sig Dispense Refill     cholecalciferol (VITAMIN D3) 125 MCG (5000 UT) TABS tablet Take 2,000 Units by mouth every other day        estradiol (ESTRACE) 0.1 MG/GM vaginal cream        polyethylene glycol (MIRALAX) powder Take 17 g by mouth       diclofenac (VOLTAREN) 1 % topical gel Apply 4 g topically 4 times daily (Patient not taking: Reported on 10/23/2020) 50 g 4     Lidocaine (LIDOCARE) 4 % Patch Place 1 patch onto the skin every 24 hours To prevent lidocaine toxicity, patient should be patch free for 12 hrs daily. (Patient not taking: Reported on 10/13/2020) 10 patch 1     LORazepam (ATIVAN) 0.5 MG tablet Take 1 tablet (0.5 mg) by mouth every 4 hours as needed (Anxiety, Nausea/Vomiting or Sleep) (Patient not taking: Reported on 11/18/2020) 30 tablet 3     mirtazapine (REMERON) 15 MG tablet Take 1 tablet (15 mg) by mouth At Bedtime (Patient not taking: Reported on 10/13/2020) 30 tablet 3     omeprazole (PRILOSEC) 20 MG DR capsule Take 1 capsule (20 mg) by mouth daily (Patient not taking: Reported on 10/23/2020) 90 capsule 1     prochlorperazine (COMPAZINE) 10 MG tablet Take 1 tablet (10 mg) by mouth every 6 hours as needed (Nausea/Vomiting) (Patient not taking: Reported on 11/18/2020) 30 tablet 3     sertraline (ZOLOFT) 25 MG tablet Take 1 tablet (25 mg) by mouth daily (Patient not taking: Reported on 10/1/2020) 30 tablet 4        Allergies   Allergen Reactions     Atorvastatin      Statins all swelling     Hmg-Coa-R Inhibitors Swelling         Review of Systems:  -As per HPI  -Constitutional: Otherwise feeling well today, in usual state of  health.  -Skin: As above in HPI. No additional skin concerns.    Physical exam:  Vitals: There were no vitals taken for this visit.  GEN: This is a well developed, well-nourished female in no acute distress, in a pleasant mood.    SKIN: Focused examination of the face was performed.  -Canales skin type: II  -flat 4 mm pigmented macule with granular pigment not arranged around the hair follicle  -dilated pore on left cheek  -No other lesions of concern on areas examined.     Labs:  NA    Impression/Plan:  1. Neoplasm of uncertain behavior on the right cheek. The differential diagnosis includes lentigo vs Tone vs BLK vs less likely mis.   - Shave biopsy:  After discussion of benefits and risks including but not limited to bleeding/bruising, pain/swelling, infection, scar, incomplete removal, nerve damage/numbness, recurrence, and non-diagnostic biopsy, written consent, verbal consent and photographs were obtained. Time-out was performed. The area was cleaned with isopropyl alcohol. 0.5ml of 1% lidocaine with 1:100,000 epinephrine was injected to obtain adequate anesthesia. A shave biopsy was performed. Hemostasis was achieved with aluminium chloride. Vaseline and a sterile dressing were applied. The patient tolerated the procedure and no complications were noted. The patient was provided with verbal and written post care instructions.    2. Dilated pore, left cheek. Monitor       staffed the patient.    Staff Involved:  Resident(Lazaro)/Staff    Patient was seen and examined with the dermatology resident. I agree with the history, review of systems, physical examination, assessments and plan. I was present for the key portion of the shave biopsy.    Radha Burks MD  Professor and  Chair  Department of Dermatology  [facility]

## 2020-11-19 NOTE — NURSING NOTE
Lidocaine-epinephrine 1-1:224772 % injection   1.5mL once for one use, starting 11/19/2020 ending 11/19/2020,  2mL disp, R-0, injection  Injected by Dr. Willis

## 2020-11-19 NOTE — LETTER
11/19/2020       RE: Yadira Hoang  [address] [place] [facility] [address]     Dear Colleague,    Thank you for referring your patient, Yadira Hoang, to the [facility]ERMATOLOG[facility] [place] at [facility]. Please see a copy of my visit note below.    [facility] Dermatology Note      Dermatology Problem List:  1. Metastatic melanoma, originated in right heel  - S/p punch biopsy By Dr. Alvarado 1/9/20 showing melanoma at least 3.4mm deep, ulcerated, no mitoses, perineural invasion, TIL present and nonbrisk, no LVI.  - S/p WLE with 2cm margins (revelead melanoma to depth of 6.6mm with deep margin close, microsatellites were present) and right femoral SLNB 2/10/20 (1/2 nodes positive, largest deposit 7mm)  - S/p re-excision 2/20/20  - Flap completed 3/4/30, skin graft 4/2/20  - Adjuvant nivolumab started 5/2020  - New liver met noted on PET-CT 8/21/20. Considering clinical trials  2. AK, right cheek: cryo  # NUB, right cheek/ sp shave biopsy 11/19/20    Encounter Date: Nov 19, 2020    CC:   Chief Complaint   Patient presents with     Derm Problem     Yadira is here today for a spot on her cheek and on her upper arm s/p LN2         History of Present Illness:  Ms. Yadira Hoang is a 75 year old female who presents as a follow-up for right cheek lesion. The patient was last seen October by Dr. Estrada when she had an AK frozen on the right cheek. She has a history of metastatic melanoma on nivolumab and follows with heme/onc. She just had her last infusion yesterday. She notes some residual dysesthesia along the area previous frozen in October and she believes it feels exactly as her previous melanoma did and she is convinced it is skin cancer. She is very anxious. She admits to poor sleep. she states she has a current therapist she talks to. The patient is well today in their baseline state of health, with no additional skin  concerns.      Past Medical History:   Patient Active Problem List   Diagnosis     Malignant melanoma of right lower extremity including hip (H)     Melanoma of skin (H)     GUERLINE on CPAP     Chronic congestion of paranasal sinus     Weakness of foot     Neck pain, chronic     Myofascial pain     Intractable migraine without aura and without status migrainosus     History of multiple concussions     Foot pain, bilateral     Fibromyositis     Difficulty walking     Chronic pain disorder     Chronic fatigue     Bilateral occipital neuralgia     Articular disc disorder of temporomandibular joint     S/P flap graft     Osteopenia     Injury of head     Hyperlipidemia LDL goal <100     ALEXUS (generalized anxiety disorder)     Major depression, recurrent, chronic (H)     Chronic right shoulder pain     Lumbar radiculopathy     Chronic pain of right knee     Past Medical History:   Diagnosis Date     Concussion      ALEXUS (generalized anxiety disorder) 5/7/2020     Major depression, recurrent, chronic (H) 5/7/2020     Melanoma (H)      Nonsenile cataract      Sleep apnea     CPAP     Past Surgical History:   Procedure Laterality Date     BIOPSY NODE SENTINEL Right 2/10/2020    Procedure: [place] Lymph Node Mapping And Biopsy;  Surgeon: Gabriela Dorsey MD;  [facility]: [facility]     EXCISE LESION LOWER EXTREMITY Right 2/10/2020    Procedure: Wide Local Excision of RIGHT heel melanoma;  Surgeon: Gabriela Dorsey MD;  [facility]     EXCISE LESION LOWER EXTREMITY Right 2/20/2020    Procedure: Wide local Re-excision RIGHT heel Wound vac application;  Surgeon: Gabriela Dorsey MD;  [facility]     GRAFT SKIN SPLIT THICKNESS FROM EXTREMITY Right 4/2/2020    Procedure: split skin graft from right thigh;  Surgeon: NIKA Crabtree MD;  [facility]     IRRIGATION AND DEBRIDEMENT FOOT, COMBINED Right 4/2/2020    Procedure: right foot/heel debridement;  Surgeon: NIKA Crabtree MD;  [facility]      ORTHOPEDIC SURGERY Right     heel surgery x2     RECONSTRUCT FOOT Right 3/4/2020    Procedure: Right heel reconstruction with regional flap, biologic dressing and SPY;  Surgeon: NIKA Crabtree MD;  [facility]     TUBAL LIGATION         Social History:  Patient reports that she has never smoked. She has never used smokeless tobacco. She reports previous alcohol use. She reports that she does not use drugs.    Family History:  Family History   Problem Relation Age of Onset     Glaucoma No family hx of      Macular Degeneration No family hx of      Melanoma No family hx of      Skin Cancer No family hx of        Medications:  Current Outpatient Medications   Medication Sig Dispense Refill     cholecalciferol (VITAMIN D3) 125 MCG (5000 UT) TABS tablet Take 2,000 Units by mouth every other day        estradiol (ESTRACE) 0.1 MG/GM vaginal cream        polyethylene glycol (MIRALAX) powder Take 17 g by mouth       diclofenac (VOLTAREN) 1 % topical gel Apply 4 g topically 4 times daily (Patient not taking: Reported on 10/23/2020) 50 g 4     Lidocaine (LIDOCARE) 4 % Patch Place 1 patch onto the skin every 24 hours To prevent lidocaine toxicity, patient should be patch free for 12 hrs daily. (Patient not taking: Reported on 10/13/2020) 10 patch 1     LORazepam (ATIVAN) 0.5 MG tablet Take 1 tablet (0.5 mg) by mouth every 4 hours as needed (Anxiety, Nausea/Vomiting or Sleep) (Patient not taking: Reported on 11/18/2020) 30 tablet 3     mirtazapine (REMERON) 15 MG tablet Take 1 tablet (15 mg) by mouth At Bedtime (Patient not taking: Reported on 10/13/2020) 30 tablet 3     omeprazole (PRILOSEC) 20 MG DR capsule Take 1 capsule (20 mg) by mouth daily (Patient not taking: Reported on 10/23/2020) 90 capsule 1     prochlorperazine (COMPAZINE) 10 MG tablet Take 1 tablet (10 mg) by mouth every 6 hours as needed (Nausea/Vomiting) (Patient not taking: Reported on 11/18/2020) 30 tablet 3     sertraline (ZOLOFT) 25 MG tablet Take  1 tablet (25 mg) by mouth daily (Patient not taking: Reported on 10/1/2020) 30 tablet 4        Allergies   Allergen Reactions     Atorvastatin      Statins all swelling     Hmg-Coa-R Inhibitors Swelling         Review of Systems:  -As per HPI  -Constitutional: Otherwise feeling well today, in usual state of health.  -Skin: As above in HPI. No additional skin concerns.    Physical exam:  Vitals: There were no vitals taken for this visit.  GEN: This is a well developed, well-nourished female in no acute distress, in a pleasant mood.    SKIN: Focused examination of the face was performed.  -Canales skin type: II  -flat 4 mm pigmented macule with granular pigment not arranged around the hair follicle  -dilated pore on left cheek  -No other lesions of concern on areas examined.     Labs:  NA    Impression/Plan:  1. Neoplasm of uncertain behavior on the right cheek. The differential diagnosis includes lentigo vs Tone vs BLK vs less likely mis.   - Shave biopsy:  After discussion of benefits and risks including but not limited to bleeding/bruising, pain/swelling, infection, scar, incomplete removal, nerve damage/numbness, recurrence, and non-diagnostic biopsy, written consent, verbal consent and photographs were obtained. Time-out was performed. The area was cleaned with isopropyl alcohol. 0.5ml of 1% lidocaine with 1:100,000 epinephrine was injected to obtain adequate anesthesia. A shave biopsy was performed. Hemostasis was achieved with aluminium chloride. Vaseline and a sterile dressing were applied. The patient tolerated the procedure and no complications were noted. The patient was provided with verbal and written post care instructions.    2. Dilated pore, left cheek. Monitor       staffed the patient.    Staff Involved:  Resident(Lazaro)/Staff    Patient was seen and examined with the dermatology resident. I agree with the history, review of systems, physical examination, assessments and plan. I was  present for the key portion of the shave biopsy.    Radha Burks MD  Professor and  Chair  Department of Dermatology  [facility]

## 2020-11-19 NOTE — PATIENT INSTRUCTIONS

## 2020-11-19 NOTE — NURSING NOTE
Dermatology Rooming Note    Deisi COURT Hoang's goals for this visit include:   Chief Complaint   Patient presents with     Derm Problem     Yadira is here today for a spot on her cheek and on her upper arm s/p LN2     Tony Pillai, [facility]

## 2020-11-21 LAB — COPATH REPORT: NORMAL

## 2020-11-24 NOTE — PROGRESS NOTES
Dry Eyes  - Improved with AT's     MGD (meibomian gland disease), unspecified laterality  - Recommend Lipiflow     Allergic conjunctivitis  - OTC allergy eye drops     BLL laxity with orbital fat prolapse  - Offered consultation with oculoplastics. Patient declined     Return for scheduled annual in one month (DFE)    Attending Physician Attestation:  Complete documentation of historical and exam elements from today's encounter can be found in the full encounter summary report (not reduplicated in this progress note).  I personally obtained the chief complaint(s) and history of present illness.  I confirmed and edited as necessary the review of systems, past medical/surgical history, family history, social history, and examination findings as documented by others; and I examined the patient myself.  I personally reviewed the relevant tests, images, and reports as documented above.  I formulated and edited as necessary the assessment and plan and discussed the findings and management plan with the patient and family. - Alec Rolle MD

## 2020-12-02 ENCOUNTER — VIRTUAL VISIT (OUTPATIENT)
Dept: ONCOLOGY | Facility: CLINIC | Age: 75
End: 2020-12-02
Attending: PSYCHOLOGIST
Payer: COMMERCIAL

## 2020-12-02 DIAGNOSIS — F32.9 REACTIVE DEPRESSION: Primary | ICD-10-CM

## 2020-12-02 PROCEDURE — 90832 PSYTX W PT 30 MINUTES: CPT | Mod: 95 | Performed by: PSYCHOLOGIST

## 2020-12-02 PROCEDURE — 999N001193 HC VIDEO/TELEPHONE VISIT; NO CHARGE

## 2020-12-02 NOTE — RESULT ENCOUNTER NOTE
Great! I called to let her know she can get this spot frozen if it comes back after the biopsy heals!    Respectfully,    Ciera Willis  PGY-4 Dermatology Resident  [facility] Department of Dermatology

## 2020-12-02 NOTE — PROGRESS NOTES
"Yadira Hoang is a 75 year old female who is being evaluated via a billable telephone visit.  Phone call duration: 30 minutes    The patient has been notified of following:     \"This telephone visit will be conducted via a call between you and your physician/provider. We have found that certain health care needs can be provided without the need for a physical exam.  This service lets us provide the care you need with a short phone conversation.  If a prescription is necessary we can send it directly to your pharmacy.  If lab work is needed we can place an order for that and you can then stop by our lab to have the test done at a later time.    Telephone visits are billed at different rates depending on your insurance coverage. During this emergency period, for some insurers they may be billed the same as an in-person visit.  Please reach out to your insurance provider with any questions.    If during the course of the call the physician/provider feels a telephone visit is not appropriate, you will not be charged for this service.\"    Patient has given verbal consent for Telephone visit? yes    Confidential Summary of Oncology Psychology Followup Visit    Yadira Hoang is a 75 year old female who presents for depressed mood. The patient was seen for a 30 minute appointment on 12/2/2020.    Subjective: She was sad today due to issues related to insurance concerns, fatigue, and lower activity levels. These issues were discussed at length.    Objective: Affect was appropriate to the content of the therapeutic conversation.     Diagnosis:   Encounter Diagnosis   Name Primary?     Reactive depression Yes     Recommendations/Plan:  1. Implement activity pacing to increase activity levels  2. Return for follow-up     Thank you for this opportunity to participate in your care of this patient.    Alec Angel Psy.D., L.P.  Director, Oncology Supportive Care   "

## 2020-12-02 NOTE — LETTER
"    12/2/2020         RE: Yadira Hoang  [address] [place] [facility] [address]        Dear Colleague,    Thank you for referring your patient, Yadira Hoang, to the [facility] [place]. Please see a copy of my visit note below.    Yadira Hoang is a 75 year old female who is being evaluated via a billable telephone visit.  Phone call duration: 30 minutes    The patient has been notified of following:     \"This telephone visit will be conducted via a call between you and your physician/provider. We have found that certain health care needs can be provided without the need for a physical exam.  This service lets us provide the care you need with a short phone conversation.  If a prescription is necessary we can send it directly to your pharmacy.  If lab work is needed we can place an order for that and you can then stop by our lab to have the test done at a later time.    Telephone visits are billed at different rates depending on your insurance coverage. During this emergency period, for some insurers they may be billed the same as an in-person visit.  Please reach out to your insurance provider with any questions.    If during the course of the call the physician/provider feels a telephone visit is not appropriate, you will not be charged for this service.\"    Patient has given verbal consent for Telephone visit? yes    Confidential Summary of Oncology Psychology Followup Visit    Yadira Hoang is a 75 year old female who presents for depressed mood. The patient was seen for a 30 minute appointment on 12/2/2020.    Subjective: She was sad today due to issues related to insurance concerns, fatigue, and lower activity levels. These issues were discussed at length.    Objective: Affect was appropriate to the content of the therapeutic conversation.     Diagnosis:   Encounter Diagnosis   Name Primary?     Reactive depression Yes     Recommendations/Plan:  1. Implement " activity pacing to increase activity levels  2. Return for follow-up     Thank you for this opportunity to participate in your care of this patient.    Antonella Strong.CHANTELLE., L.P.  Director, Oncology Supportive Care       Again, thank you for allowing me to participate in the care of your patient.        Sincerely,        Alec Angel PsyD

## 2020-12-02 NOTE — LETTER
"    12/2/2020         RE: Yadiar Hoang  [address] [facility] [address]        Dear Colleague,    Thank you for referring your patient, Yadira Hoang, to the [facility] [place]. Please see a copy of my visit note below.    Yadira Hoang is a 75 year old female who is being evaluated via a billable telephone visit.  Phone call duration: 30 minutes    The patient has been notified of following:     \"This telephone visit will be conducted via a call between you and your physician/provider. We have found that certain health care needs can be provided without the need for a physical exam.  This service lets us provide the care you need with a short phone conversation.  If a prescription is necessary we can send it directly to your pharmacy.  If lab work is needed we can place an order for that and you can then stop by our lab to have the test done at a later time.    Telephone visits are billed at different rates depending on your insurance coverage. During this emergency period, for some insurers they may be billed the same as an in-person visit.  Please reach out to your insurance provider with any questions.    If during the course of the call the physician/provider feels a telephone visit is not appropriate, you will not be charged for this service.\"    Patient has given verbal consent for Telephone visit? yes    Confidential Summary of Oncology Psychology Followup Visit    Yadira Hoang is a 75 year old female who presents for depressed mood. The patient was seen for a 30 minute appointment on 12/2/2020.    Subjective: She was sad today due to issues related to insurance concerns, fatigue, and lower activity levels. These issues were discussed at length.    Objective: Affect was appropriate to the content of the therapeutic conversation.     Diagnosis:   Encounter Diagnosis   Name Primary?     Reactive depression Yes     Recommendations/Plan:  1. Implement " activity pacing to increase activity levels  2. Return for follow-up     Thank you for this opportunity to participate in your care of this patient.    Antonella Strong.CHANTELLE., L.P.  Director, Oncology Supportive Care       Again, thank you for allowing me to participate in the care of your patient.        Sincerely,        Alec Angel PsyD

## 2020-12-04 ENCOUNTER — TELEPHONE (OUTPATIENT)
Dept: DERMATOLOGY | Facility: CLINIC | Age: 75
End: 2020-12-04

## 2020-12-04 NOTE — TELEPHONE ENCOUNTER
[facility] Call Center    Phone Message    May a detailed message be left on voicemail: yes     Reason for Call: Other: Pt returning call to Dr. Willis     Action Taken: Message routed to:  [facility] ([facility] Derm    Travel Screening: Not Applicable

## 2020-12-06 DIAGNOSIS — C43.9 MELANOMA OF SKIN (H): Primary | ICD-10-CM

## 2020-12-06 DIAGNOSIS — C43.71 MALIGNANT MELANOMA OF RIGHT LOWER EXTREMITY INCLUDING HIP (H): ICD-10-CM

## 2020-12-06 RX ORDER — LORAZEPAM 2 MG/ML
0.5 INJECTION INTRAMUSCULAR EVERY 4 HOURS PRN
Status: CANCELLED
Start: 2020-12-09

## 2020-12-06 RX ORDER — HEPARIN SODIUM,PORCINE 10 UNIT/ML
5 VIAL (ML) INTRAVENOUS
Status: CANCELLED | OUTPATIENT
Start: 2020-12-09

## 2020-12-06 RX ORDER — METHYLPREDNISOLONE SODIUM SUCCINATE 125 MG/2ML
125 INJECTION, POWDER, LYOPHILIZED, FOR SOLUTION INTRAMUSCULAR; INTRAVENOUS
Status: CANCELLED
Start: 2020-12-09

## 2020-12-06 RX ORDER — SODIUM CHLORIDE 9 MG/ML
1000 INJECTION, SOLUTION INTRAVENOUS CONTINUOUS PRN
Status: CANCELLED
Start: 2020-12-09

## 2020-12-06 RX ORDER — MEPERIDINE HYDROCHLORIDE 25 MG/ML
25 INJECTION INTRAMUSCULAR; INTRAVENOUS; SUBCUTANEOUS EVERY 30 MIN PRN
Status: CANCELLED | OUTPATIENT
Start: 2020-12-09

## 2020-12-06 RX ORDER — NALOXONE HYDROCHLORIDE 0.4 MG/ML
.1-.4 INJECTION, SOLUTION INTRAMUSCULAR; INTRAVENOUS; SUBCUTANEOUS
Status: CANCELLED | OUTPATIENT
Start: 2020-12-09

## 2020-12-06 RX ORDER — ALBUTEROL SULFATE 90 UG/1
1-2 AEROSOL, METERED RESPIRATORY (INHALATION)
Status: CANCELLED
Start: 2020-12-09

## 2020-12-06 RX ORDER — HEPARIN SODIUM (PORCINE) LOCK FLUSH IV SOLN 100 UNIT/ML 100 UNIT/ML
5 SOLUTION INTRAVENOUS
Status: CANCELLED | OUTPATIENT
Start: 2020-12-09

## 2020-12-06 RX ORDER — DIPHENHYDRAMINE HYDROCHLORIDE 50 MG/ML
50 INJECTION INTRAMUSCULAR; INTRAVENOUS
Status: CANCELLED
Start: 2020-12-09

## 2020-12-06 RX ORDER — ALBUTEROL SULFATE 0.83 MG/ML
2.5 SOLUTION RESPIRATORY (INHALATION)
Status: CANCELLED | OUTPATIENT
Start: 2020-12-09

## 2020-12-06 RX ORDER — EPINEPHRINE 1 MG/ML
0.3 INJECTION, SOLUTION INTRAMUSCULAR; SUBCUTANEOUS EVERY 5 MIN PRN
Status: CANCELLED | OUTPATIENT
Start: 2020-12-09

## 2020-12-09 ENCOUNTER — INFUSION THERAPY VISIT (OUTPATIENT)
Dept: INFUSION THERAPY | Facility: CLINIC | Age: 75
End: 2020-12-09
Payer: COMMERCIAL

## 2020-12-09 VITALS
HEART RATE: 84 BPM | BODY MASS INDEX: 22.52 KG/M2 | WEIGHT: 143.8 LBS | TEMPERATURE: 98.1 F | DIASTOLIC BLOOD PRESSURE: 81 MMHG | OXYGEN SATURATION: 96 % | SYSTOLIC BLOOD PRESSURE: 131 MMHG

## 2020-12-09 DIAGNOSIS — C43.71 MALIGNANT MELANOMA OF RIGHT LOWER EXTREMITY INCLUDING HIP (H): ICD-10-CM

## 2020-12-09 DIAGNOSIS — C43.9 MELANOMA OF SKIN (H): Primary | ICD-10-CM

## 2020-12-09 LAB
ALBUMIN SERPL-MCNC: 3.9 G/DL (ref 3.4–5)
ALP SERPL-CCNC: 65 U/L (ref 40–150)
ALT SERPL W P-5'-P-CCNC: 22 U/L (ref 0–50)
ANION GAP SERPL CALCULATED.3IONS-SCNC: 3 MMOL/L (ref 3–14)
AST SERPL W P-5'-P-CCNC: 12 U/L (ref 0–45)
BILIRUB SERPL-MCNC: 0.4 MG/DL (ref 0.2–1.3)
BUN SERPL-MCNC: 23 MG/DL (ref 7–30)
CALCIUM SERPL-MCNC: 9.1 MG/DL (ref 8.5–10.1)
CHLORIDE SERPL-SCNC: 106 MMOL/L (ref 94–109)
CO2 SERPL-SCNC: 32 MMOL/L (ref 20–32)
CREAT SERPL-MCNC: 0.65 MG/DL (ref 0.52–1.04)
GFR SERPL CREATININE-BSD FRML MDRD: 86 ML/MIN/{1.73_M2}
GLUCOSE SERPL-MCNC: 93 MG/DL (ref 70–99)
POTASSIUM SERPL-SCNC: 3.9 MMOL/L (ref 3.4–5.3)
PROT SERPL-MCNC: 7.5 G/DL (ref 6.8–8.8)
SODIUM SERPL-SCNC: 141 MMOL/L (ref 133–144)
TSH SERPL DL<=0.005 MIU/L-ACNC: 1.6 MU/L (ref 0.4–4)

## 2020-12-09 PROCEDURE — 36415 COLL VENOUS BLD VENIPUNCTURE: CPT | Performed by: INTERNAL MEDICINE

## 2020-12-09 PROCEDURE — 99207 PR NO CHARGE LOS: CPT

## 2020-12-09 PROCEDURE — 80053 COMPREHEN METABOLIC PANEL: CPT | Performed by: INTERNAL MEDICINE

## 2020-12-09 PROCEDURE — 96413 CHEMO IV INFUSION 1 HR: CPT | Performed by: NURSE PRACTITIONER

## 2020-12-09 PROCEDURE — 96417 CHEMO IV INFUS EACH ADDL SEQ: CPT | Performed by: NURSE PRACTITIONER

## 2020-12-09 PROCEDURE — 84443 ASSAY THYROID STIM HORMONE: CPT | Performed by: INTERNAL MEDICINE

## 2020-12-09 RX ADMIN — Medication 250 ML: at 14:59

## 2020-12-09 ASSESSMENT — PAIN SCALES - GENERAL: PAINLEVEL: EXTREME PAIN (8)

## 2020-12-09 NOTE — PROGRESS NOTES
Infusion Nursing Note:  Yadira Hoang presents today for C3D1 Opdivo/Yervoy.    Patient seen by provider today: No   present during visit today: Not Applicable.    Note: Pt admits to terrible fatigue and R foot pain from previous surgery for her cancer that she rates as a 9/10.  See flow sheet for assessment    Intravenous Access:  Peripheral IV placed.    Treatment Conditions:  Lab Results   Component Value Date     12/09/2020                   Lab Results   Component Value Date    POTASSIUM 3.9 12/09/2020           No results found for: MAG         Lab Results   Component Value Date    CR 0.65 12/09/2020                   Lab Results   Component Value Date    GABY 9.1 12/09/2020                Lab Results   Component Value Date    BILITOTAL 0.4 12/09/2020           Lab Results   Component Value Date    ALBUMIN 3.9 12/09/2020                    Lab Results   Component Value Date    ALT 22 12/09/2020           Lab Results   Component Value Date    AST 12 12/09/2020       Results reviewed, labs MET treatment parameters, ok to proceed with treatment.      Post Infusion Assessment:  Patient tolerated infusion without incident.  Blood return noted pre and post infusion.  Site patent and intact, free from redness, edema or discomfort.  No evidence of extravasations.  Access discontinued per protocol.       Discharge Plan:   Patient discharged in stable condition accompanied by: self.  Departure Mode: Wheelchair.  Pt will RTC 12/30/20 for C4D1 Opdivo/Yervoy .    Williams Purdy RN

## 2020-12-11 ENCOUNTER — PATIENT OUTREACH (OUTPATIENT)
Dept: CARE COORDINATION | Facility: CLINIC | Age: 75
End: 2020-12-11

## 2020-12-11 NOTE — PROGRESS NOTES
"Social Work Note: Telephone Call  Oncology Clinic    Data/Intervention:  Patient Name:  Yadira Hoang  /Age:  1945 (75 year old)    Call From:  BELGICA  Reason for Call:  Psychosocial follow up    Assessment:  SW called and spoke to Yadira this morning. Yadira and SW spent time speaking about how she has been coping and how things have been going in the past few weeks. Yadira states that overall, \"I'm doing pretty good for having cancer.\" That said, Yadira spoke about having some quite difficult days and struggling at times, particularly with Covid and being away from family. SW provided supportive counseling. She spoke about her history of anxiety and depression and working with counselors in the past. SW encouraged Yadira to continue to utilize those resources and to reach out with any further needs. No resource needs indicated at this time. She did have some concerns related to her insurance coverage for one of her infusions, but this has been resolved. Yadira denies any additional needs or concerns.     Plan:  BELGICA will continue to check in with Yadira periodically and will remain available for support and resources.     JORGE Pelaez, [facility]  Clinical , Adult Oncology  Phone: [phone]          "

## 2020-12-16 ENCOUNTER — VIRTUAL VISIT (OUTPATIENT)
Dept: ONCOLOGY | Facility: CLINIC | Age: 75
End: 2020-12-16
Attending: PSYCHOLOGIST
Payer: COMMERCIAL

## 2020-12-16 DIAGNOSIS — F32.9 REACTIVE DEPRESSION: Primary | ICD-10-CM

## 2020-12-16 PROCEDURE — 90832 PSYTX W PT 30 MINUTES: CPT | Mod: 95 | Performed by: PSYCHOLOGIST

## 2020-12-16 PROCEDURE — 999N001193 HC VIDEO/TELEPHONE VISIT; NO CHARGE

## 2020-12-16 NOTE — LETTER
"    12/16/2020         RE: Yadira Hoang  [address] [facility] [address]        Dear Colleague,    Thank you for referring your patient, Yadira Hoang, to the [facility] [place]. Please see a copy of my visit note below.    Yadira Hoang is a 75 year old female who is being evaluated via a billable telephone visit.  Phone call duration: 30 minutes    The patient has been notified of following:     \"This telephone visit will be conducted via a call between you and your physician/provider. We have found that certain health care needs can be provided without the need for a physical exam.  This service lets us provide the care you need with a short phone conversation.  If a prescription is necessary we can send it directly to your pharmacy.  If lab work is needed we can place an order for that and you can then stop by our lab to have the test done at a later time.    Telephone visits are billed at different rates depending on your insurance coverage. During this emergency period, for some insurers they may be billed the same as an in-person visit.  Please reach out to your insurance provider with any questions.    If during the course of the call the physician/provider feels a telephone visit is not appropriate, you will not be charged for this service.\"    Patient has given verbal consent for Telephone visit? Yes    Confidential Summary of Oncology Psychology Followup Visit    Yadira Hoang is a 75 year old female who presents for Depression  The patient was seen for a 30 minute appointment on 12/16/2020.    Subjective: She still expresses \"what is not working for me.\" This appointment was spent helping her to express her emotional concerns and work to change her cognitive focus toward what does work for her.     Objective: Affect was appropriate to the content of the therapeutic conversation.     Diagnosis:   Encounter Diagnosis   Name Primary?     Reactive " depression Yes     -Recommendations/Plan:  1. Keep finding opportunities to express happiness.   2. Return for follow-up    Thank you for this opportunity to participate in your care of this patient.    Antonella Strong.CHANTELLE., L.P.  Director, Oncology Supportive Care         Again, thank you for allowing me to participate in the care of your patient.        Sincerely,        Alec Angel PsyD

## 2020-12-16 NOTE — PROGRESS NOTES
"Yadira Hoang is a 75 year old female who is being evaluated via a billable telephone visit.  Phone call duration: 30 minutes    The patient has been notified of following:     \"This telephone visit will be conducted via a call between you and your physician/provider. We have found that certain health care needs can be provided without the need for a physical exam.  This service lets us provide the care you need with a short phone conversation.  If a prescription is necessary we can send it directly to your pharmacy.  If lab work is needed we can place an order for that and you can then stop by our lab to have the test done at a later time.    Telephone visits are billed at different rates depending on your insurance coverage. During this emergency period, for some insurers they may be billed the same as an in-person visit.  Please reach out to your insurance provider with any questions.    If during the course of the call the physician/provider feels a telephone visit is not appropriate, you will not be charged for this service.\"    Patient has given verbal consent for Telephone visit? Yes    Confidential Summary of Oncology Psychology Followup Visit    Yadira Hoang is a 75 year old female who presents for Depression  The patient was seen for a 30 minute appointment on 12/16/2020.    Subjective: She still expresses \"what is not working for me.\" This appointment was spent helping her to express her emotional concerns and work to change her cognitive focus toward what does work for her.     Objective: Affect was appropriate to the content of the therapeutic conversation.     Diagnosis:   Encounter Diagnosis   Name Primary?     Reactive depression Yes     -Recommendations/Plan:  1. Keep finding opportunities to express happiness.   2. Return for follow-up    Thank you for this opportunity to participate in your care of this patient.    Alec Angel Psy.D., L.P.  Director, Oncology Supportive Care "

## 2020-12-16 NOTE — LETTER
"    12/16/2020         RE: Yadira Hoang  [address] [place] [facility] [address]        Dear Colleague,    Thank you for referring your patient, Yadira Hoang, to the [facility] [place]. Please see a copy of my visit note below.    Yadira Hoang is a 75 year old female who is being evaluated via a billable telephone visit.  Phone call duration: 30 minutes    The patient has been notified of following:     \"This telephone visit will be conducted via a call between you and your physician/provider. We have found that certain health care needs can be provided without the need for a physical exam.  This service lets us provide the care you need with a short phone conversation.  If a prescription is necessary we can send it directly to your pharmacy.  If lab work is needed we can place an order for that and you can then stop by our lab to have the test done at a later time.    Telephone visits are billed at different rates depending on your insurance coverage. During this emergency period, for some insurers they may be billed the same as an in-person visit.  Please reach out to your insurance provider with any questions.    If during the course of the call the physician/provider feels a telephone visit is not appropriate, you will not be charged for this service.\"    Patient has given verbal consent for Telephone visit? Yes    Confidential Summary of Oncology Psychology Followup Visit    Yadira Hoang is a 75 year old female who presents for Depression  The patient was seen for a 30 minute appointment on 12/16/2020.    Subjective: She still expresses \"what is not working for me.\" This appointment was spent helping her to express her emotional concerns and work to change her cognitive focus toward what does work for her.     Objective: Affect was appropriate to the content of the therapeutic conversation.     Diagnosis:   Encounter Diagnosis   Name Primary?     Reactive " depression Yes     -Recommendations/Plan:  1. Keep finding opportunities to express happiness.   2. Return for follow-up    Thank you for this opportunity to participate in your care of this patient.    Antonella Strong.CHANTELLE., L.P.  Director, Oncology Supportive Care         Again, thank you for allowing me to participate in the care of your patient.        Sincerely,        Alec Angel PsyD

## 2020-12-28 DIAGNOSIS — C43.9 MELANOMA OF SKIN (H): Primary | ICD-10-CM

## 2020-12-28 DIAGNOSIS — C43.71 MALIGNANT MELANOMA OF RIGHT LOWER EXTREMITY INCLUDING HIP (H): ICD-10-CM

## 2020-12-28 RX ORDER — LORAZEPAM 2 MG/ML
0.5 INJECTION INTRAMUSCULAR EVERY 4 HOURS PRN
Status: CANCELLED
Start: 2020-12-30

## 2020-12-28 RX ORDER — ALBUTEROL SULFATE 0.83 MG/ML
2.5 SOLUTION RESPIRATORY (INHALATION)
Status: CANCELLED | OUTPATIENT
Start: 2020-12-30

## 2020-12-28 RX ORDER — NALOXONE HYDROCHLORIDE 0.4 MG/ML
.1-.4 INJECTION, SOLUTION INTRAMUSCULAR; INTRAVENOUS; SUBCUTANEOUS
Status: CANCELLED | OUTPATIENT
Start: 2020-12-30

## 2020-12-28 RX ORDER — EPINEPHRINE 1 MG/ML
0.3 INJECTION, SOLUTION INTRAMUSCULAR; SUBCUTANEOUS EVERY 5 MIN PRN
Status: CANCELLED | OUTPATIENT
Start: 2020-12-30

## 2020-12-28 RX ORDER — SODIUM CHLORIDE 9 MG/ML
1000 INJECTION, SOLUTION INTRAVENOUS CONTINUOUS PRN
Status: CANCELLED
Start: 2020-12-30

## 2020-12-28 RX ORDER — DIPHENHYDRAMINE HYDROCHLORIDE 50 MG/ML
50 INJECTION INTRAMUSCULAR; INTRAVENOUS
Status: CANCELLED
Start: 2020-12-30

## 2020-12-28 RX ORDER — MEPERIDINE HYDROCHLORIDE 25 MG/ML
25 INJECTION INTRAMUSCULAR; INTRAVENOUS; SUBCUTANEOUS EVERY 30 MIN PRN
Status: CANCELLED | OUTPATIENT
Start: 2020-12-30

## 2020-12-28 RX ORDER — HEPARIN SODIUM,PORCINE 10 UNIT/ML
5 VIAL (ML) INTRAVENOUS
Status: CANCELLED | OUTPATIENT
Start: 2020-12-30

## 2020-12-28 RX ORDER — ALBUTEROL SULFATE 90 UG/1
1-2 AEROSOL, METERED RESPIRATORY (INHALATION)
Status: CANCELLED
Start: 2020-12-30

## 2020-12-28 RX ORDER — HEPARIN SODIUM (PORCINE) LOCK FLUSH IV SOLN 100 UNIT/ML 100 UNIT/ML
5 SOLUTION INTRAVENOUS
Status: CANCELLED | OUTPATIENT
Start: 2020-12-30

## 2020-12-28 RX ORDER — METHYLPREDNISOLONE SODIUM SUCCINATE 125 MG/2ML
125 INJECTION, POWDER, LYOPHILIZED, FOR SOLUTION INTRAMUSCULAR; INTRAVENOUS
Status: CANCELLED
Start: 2020-12-30

## 2020-12-30 ENCOUNTER — INFUSION THERAPY VISIT (OUTPATIENT)
Dept: INFUSION THERAPY | Facility: CLINIC | Age: 75
End: 2020-12-30
Payer: COMMERCIAL

## 2020-12-30 VITALS
SYSTOLIC BLOOD PRESSURE: 121 MMHG | OXYGEN SATURATION: 97 % | TEMPERATURE: 98.1 F | WEIGHT: 143.4 LBS | BODY MASS INDEX: 22.46 KG/M2 | RESPIRATION RATE: 18 BRPM | DIASTOLIC BLOOD PRESSURE: 64 MMHG | HEART RATE: 89 BPM

## 2020-12-30 DIAGNOSIS — C43.9 MELANOMA OF SKIN (H): Primary | ICD-10-CM

## 2020-12-30 DIAGNOSIS — C43.71 MALIGNANT MELANOMA OF RIGHT LOWER EXTREMITY INCLUDING HIP (H): ICD-10-CM

## 2020-12-30 LAB
ALBUMIN SERPL-MCNC: 3.8 G/DL (ref 3.4–5)
ALP SERPL-CCNC: 64 U/L (ref 40–150)
ALT SERPL W P-5'-P-CCNC: 24 U/L (ref 0–50)
ANION GAP SERPL CALCULATED.3IONS-SCNC: 1 MMOL/L (ref 3–14)
AST SERPL W P-5'-P-CCNC: 14 U/L (ref 0–45)
BILIRUB SERPL-MCNC: 0.3 MG/DL (ref 0.2–1.3)
BUN SERPL-MCNC: 22 MG/DL (ref 7–30)
CALCIUM SERPL-MCNC: 9.4 MG/DL (ref 8.5–10.1)
CHLORIDE SERPL-SCNC: 104 MMOL/L (ref 94–109)
CO2 SERPL-SCNC: 35 MMOL/L (ref 20–32)
CREAT SERPL-MCNC: 1.07 MG/DL (ref 0.52–1.04)
GFR SERPL CREATININE-BSD FRML MDRD: 50 ML/MIN/{1.73_M2}
GLUCOSE SERPL-MCNC: 101 MG/DL (ref 70–99)
POTASSIUM SERPL-SCNC: 4 MMOL/L (ref 3.4–5.3)
PROT SERPL-MCNC: 7.4 G/DL (ref 6.8–8.8)
SODIUM SERPL-SCNC: 140 MMOL/L (ref 133–144)
TSH SERPL DL<=0.005 MIU/L-ACNC: 1.8 MU/L (ref 0.4–4)

## 2020-12-30 PROCEDURE — 36415 COLL VENOUS BLD VENIPUNCTURE: CPT | Performed by: INTERNAL MEDICINE

## 2020-12-30 PROCEDURE — 99207 PR NO CHARGE LOS: CPT

## 2020-12-30 PROCEDURE — 84443 ASSAY THYROID STIM HORMONE: CPT | Performed by: INTERNAL MEDICINE

## 2020-12-30 PROCEDURE — 96413 CHEMO IV INFUSION 1 HR: CPT | Performed by: NURSE PRACTITIONER

## 2020-12-30 PROCEDURE — 96417 CHEMO IV INFUS EACH ADDL SEQ: CPT | Performed by: NURSE PRACTITIONER

## 2020-12-30 PROCEDURE — 80053 COMPREHEN METABOLIC PANEL: CPT | Performed by: INTERNAL MEDICINE

## 2020-12-30 RX ADMIN — Medication 250 ML: at 15:00

## 2020-12-30 ASSESSMENT — PAIN SCALES - GENERAL: PAINLEVEL: NO PAIN (0)

## 2020-12-30 NOTE — PROGRESS NOTES
Infusion Nursing Note:  Yadira Hoang presents today for Opdivo/Yervoy.    Patient seen by provider today: No   present during visit today: Not Applicable.    Note: Pt states she is doing well, denies any reason to hold her therapy today.  States her  is driving her home.    Intravenous Access:  Peripheral IV placed.    Treatment Conditions:  Lab Results   Component Value Date    HGB 12.8 11/18/2020     Lab Results   Component Value Date    WBC 6.1 11/18/2020      Lab Results   Component Value Date    ANEU 3.7 11/18/2020     Lab Results   Component Value Date     11/18/2020      Results reviewed, labs MET treatment parameters, ok to proceed with treatment.      Post Infusion Assessment:  Patient tolerated infusion without incident.  Blood return noted pre and post infusion.  Site patent and intact, free from redness, edema or discomfort.  No evidence of extravasations.  Access discontinued per protocol.       Discharge Plan:   Return on 01/19/2021 for a video visit with Meghan Perdomo and then infusion on 01/20/2021  Discharge instructions reviewed with: Patient.  Patient and/or family verbalized understanding of discharge instructions and all questions answered.  Patient discharged in stable condition accompanied by: self.  Departure Mode: Ambulatory.    Sahara Tran, RN

## 2021-01-01 ENCOUNTER — TELEPHONE (OUTPATIENT)
Dept: GASTROENTEROLOGY | Facility: CLINIC | Age: 76
End: 2021-01-01
Payer: COMMERCIAL

## 2021-01-01 ENCOUNTER — OFFICE VISIT (OUTPATIENT)
Dept: SURGERY | Facility: CLINIC | Age: 76
End: 2021-01-01
Attending: DERMATOLOGY
Payer: COMMERCIAL

## 2021-01-01 ENCOUNTER — VIRTUAL VISIT (OUTPATIENT)
Dept: PSYCHOLOGY | Facility: CLINIC | Age: 76
End: 2021-01-01
Attending: PSYCHOLOGIST
Payer: COMMERCIAL

## 2021-01-01 ENCOUNTER — VIRTUAL VISIT (OUTPATIENT)
Dept: FAMILY MEDICINE | Facility: CLINIC | Age: 76
End: 2021-01-01
Payer: COMMERCIAL

## 2021-01-01 ENCOUNTER — DOCUMENTATION ONLY (OUTPATIENT)
Dept: DERMATOLOGY | Facility: CLINIC | Age: 76
End: 2021-01-01
Payer: COMMERCIAL

## 2021-01-01 ENCOUNTER — OFFICE VISIT (OUTPATIENT)
Dept: ONCOLOGY | Facility: CLINIC | Age: 76
End: 2021-01-01
Attending: PHYSICIAN ASSISTANT
Payer: COMMERCIAL

## 2021-01-01 ENCOUNTER — LAB (OUTPATIENT)
Dept: LAB | Facility: CLINIC | Age: 76
End: 2021-01-01
Payer: COMMERCIAL

## 2021-01-01 ENCOUNTER — ANCILLARY PROCEDURE (OUTPATIENT)
Dept: ULTRASOUND IMAGING | Facility: CLINIC | Age: 76
End: 2021-01-01
Attending: PHYSICIAN ASSISTANT
Payer: COMMERCIAL

## 2021-01-01 ENCOUNTER — OFFICE VISIT (OUTPATIENT)
Dept: ORTHOPEDICS | Facility: CLINIC | Age: 76
End: 2021-01-01
Payer: COMMERCIAL

## 2021-01-01 ENCOUNTER — TELEPHONE (OUTPATIENT)
Dept: ORTHOPEDICS | Facility: CLINIC | Age: 76
End: 2021-01-01

## 2021-01-01 ENCOUNTER — TELEPHONE (OUTPATIENT)
Dept: FAMILY MEDICINE | Facility: CLINIC | Age: 76
End: 2021-01-01
Payer: COMMERCIAL

## 2021-01-01 ENCOUNTER — ONCOLOGY VISIT (OUTPATIENT)
Dept: ONCOLOGY | Facility: CLINIC | Age: 76
End: 2021-01-01
Attending: DERMATOLOGY
Payer: COMMERCIAL

## 2021-01-01 ENCOUNTER — ANCILLARY PROCEDURE (OUTPATIENT)
Dept: GENERAL RADIOLOGY | Facility: CLINIC | Age: 76
End: 2021-01-01
Attending: FAMILY MEDICINE
Payer: COMMERCIAL

## 2021-01-01 ENCOUNTER — ANCILLARY PROCEDURE (OUTPATIENT)
Dept: MRI IMAGING | Facility: CLINIC | Age: 76
End: 2021-01-01
Attending: INTERNAL MEDICINE
Payer: COMMERCIAL

## 2021-01-01 VITALS
OXYGEN SATURATION: 97 % | SYSTOLIC BLOOD PRESSURE: 126 MMHG | RESPIRATION RATE: 16 BRPM | WEIGHT: 143.8 LBS | HEIGHT: 67 IN | DIASTOLIC BLOOD PRESSURE: 84 MMHG | TEMPERATURE: 97.9 F | BODY MASS INDEX: 22.57 KG/M2 | HEART RATE: 110 BPM

## 2021-01-01 VITALS
WEIGHT: 143.08 LBS | SYSTOLIC BLOOD PRESSURE: 136 MMHG | BODY MASS INDEX: 22.99 KG/M2 | DIASTOLIC BLOOD PRESSURE: 86 MMHG | HEIGHT: 66 IN | OXYGEN SATURATION: 96 % | TEMPERATURE: 97.9 F | RESPIRATION RATE: 18 BRPM | HEART RATE: 107 BPM

## 2021-01-01 VITALS — WEIGHT: 143 LBS | BODY MASS INDEX: 22.74 KG/M2

## 2021-01-01 VITALS
WEIGHT: 143.1 LBS | BODY MASS INDEX: 22.75 KG/M2 | TEMPERATURE: 97.9 F | SYSTOLIC BLOOD PRESSURE: 136 MMHG | RESPIRATION RATE: 18 BRPM | OXYGEN SATURATION: 96 % | HEART RATE: 107 BPM | DIASTOLIC BLOOD PRESSURE: 86 MMHG

## 2021-01-01 DIAGNOSIS — C43.71 MALIGNANT MELANOMA OF RIGHT LOWER EXTREMITY INCLUDING HIP (H): ICD-10-CM

## 2021-01-01 DIAGNOSIS — R11.0 NAUSEA: ICD-10-CM

## 2021-01-01 DIAGNOSIS — C79.51 SECONDARY MALIGNANT NEOPLASM OF BONE (H): ICD-10-CM

## 2021-01-01 DIAGNOSIS — C43.9 METASTATIC MELANOMA (H): Primary | ICD-10-CM

## 2021-01-01 DIAGNOSIS — C43.71 MALIGNANT MELANOMA OF RIGHT LOWER EXTREMITY INCLUDING HIP (H): Primary | ICD-10-CM

## 2021-01-01 DIAGNOSIS — L80 VITILIGO: ICD-10-CM

## 2021-01-01 DIAGNOSIS — M79.605 BILATERAL LEG PAIN: ICD-10-CM

## 2021-01-01 DIAGNOSIS — M79.662 PAIN IN BOTH LOWER LEGS: ICD-10-CM

## 2021-01-01 DIAGNOSIS — J01.10 ACUTE NON-RECURRENT FRONTAL SINUSITIS: Primary | ICD-10-CM

## 2021-01-01 DIAGNOSIS — M79.661 PAIN IN BOTH LOWER LEGS: ICD-10-CM

## 2021-01-01 DIAGNOSIS — M79.604 BILATERAL LEG PAIN: ICD-10-CM

## 2021-01-01 DIAGNOSIS — C43.9 MELANOMA OF SKIN (H): Primary | ICD-10-CM

## 2021-01-01 DIAGNOSIS — F43.20 ADJUSTMENT DISORDER, UNSPECIFIED TYPE: Primary | ICD-10-CM

## 2021-01-01 DIAGNOSIS — R10.13 DYSPEPSIA: ICD-10-CM

## 2021-01-01 DIAGNOSIS — D18.01 CHERRY ANGIOMA: ICD-10-CM

## 2021-01-01 DIAGNOSIS — L57.8 SUN-DAMAGED SKIN: ICD-10-CM

## 2021-01-01 DIAGNOSIS — D23.9 DILATED PORE OF WINER: ICD-10-CM

## 2021-01-01 DIAGNOSIS — K29.00 OTHER ACUTE GASTRITIS WITHOUT HEMORRHAGE: Primary | ICD-10-CM

## 2021-01-01 DIAGNOSIS — Z11.59 ENCOUNTER FOR SCREENING FOR OTHER VIRAL DISEASES: ICD-10-CM

## 2021-01-01 DIAGNOSIS — C43.9 MELANOMA OF SKIN (H): ICD-10-CM

## 2021-01-01 DIAGNOSIS — D22.9 MULTIPLE BENIGN NEVI: ICD-10-CM

## 2021-01-01 DIAGNOSIS — R11.2 NAUSEA AND VOMITING, INTRACTABILITY OF VOMITING NOT SPECIFIED, UNSPECIFIED VOMITING TYPE: Primary | ICD-10-CM

## 2021-01-01 DIAGNOSIS — M79.604 BILATERAL LEG PAIN: Primary | ICD-10-CM

## 2021-01-01 DIAGNOSIS — K21.9 GASTROESOPHAGEAL REFLUX DISEASE, UNSPECIFIED WHETHER ESOPHAGITIS PRESENT: ICD-10-CM

## 2021-01-01 DIAGNOSIS — L81.4 SOLAR LENTIGO: ICD-10-CM

## 2021-01-01 DIAGNOSIS — M79.605 BILATERAL LEG PAIN: Primary | ICD-10-CM

## 2021-01-01 DIAGNOSIS — L29.9 PRURITUS: ICD-10-CM

## 2021-01-01 DIAGNOSIS — C79.51 SECONDARY MALIGNANT NEOPLASM OF BONE (H): Primary | ICD-10-CM

## 2021-01-01 DIAGNOSIS — L72.0 MILIA: ICD-10-CM

## 2021-01-01 DIAGNOSIS — R63.0 POOR APPETITE: Primary | ICD-10-CM

## 2021-01-01 DIAGNOSIS — F41.8 DEPRESSION WITH ANXIETY: ICD-10-CM

## 2021-01-01 LAB
CK SERPL-CCNC: 35 U/L (ref 30–225)
CRP SERPL-MCNC: 14.7 MG/L (ref 0–8)
ERYTHROCYTE [SEDIMENTATION RATE] IN BLOOD BY WESTERGREN METHOD: 36 MM/HR (ref 0–30)

## 2021-01-01 PROCEDURE — 70553 MRI BRAIN STEM W/O & W/DYE: CPT | Performed by: RADIOLOGY

## 2021-01-01 PROCEDURE — 99214 OFFICE O/P EST MOD 30 MIN: CPT | Performed by: DERMATOLOGY

## 2021-01-01 PROCEDURE — 90834 PSYTX W PT 45 MINUTES: CPT | Mod: 95 | Performed by: PSYCHOLOGIST

## 2021-01-01 PROCEDURE — G0463 HOSPITAL OUTPT CLINIC VISIT: HCPCS

## 2021-01-01 PROCEDURE — 85652 RBC SED RATE AUTOMATED: CPT

## 2021-01-01 PROCEDURE — 99213 OFFICE O/P EST LOW 20 MIN: CPT | Mod: 95 | Performed by: INTERNAL MEDICINE

## 2021-01-01 PROCEDURE — 82550 ASSAY OF CK (CPK): CPT

## 2021-01-01 PROCEDURE — 72100 X-RAY EXAM L-S SPINE 2/3 VWS: CPT | Mod: GC | Performed by: RADIOLOGY

## 2021-01-01 PROCEDURE — 93970 EXTREMITY STUDY: CPT | Mod: GC | Performed by: RADIOLOGY

## 2021-01-01 PROCEDURE — 99214 OFFICE O/P EST MOD 30 MIN: CPT | Mod: 95 | Performed by: INTERNAL MEDICINE

## 2021-01-01 PROCEDURE — 99213 OFFICE O/P EST LOW 20 MIN: CPT | Performed by: FAMILY MEDICINE

## 2021-01-01 PROCEDURE — 999N001193 HC VIDEO/TELEPHONE VISIT; NO CHARGE

## 2021-01-01 PROCEDURE — 99213 OFFICE O/P EST LOW 20 MIN: CPT | Mod: 95 | Performed by: NURSE PRACTITIONER

## 2021-01-01 PROCEDURE — 99215 OFFICE O/P EST HI 40 MIN: CPT | Performed by: INTERNAL MEDICINE

## 2021-01-01 PROCEDURE — A9585 GADOBUTROL INJECTION: HCPCS | Mod: JW | Performed by: RADIOLOGY

## 2021-01-01 PROCEDURE — 36415 COLL VENOUS BLD VENIPUNCTURE: CPT

## 2021-01-01 PROCEDURE — 99214 OFFICE O/P EST MOD 30 MIN: CPT | Performed by: INTERNAL MEDICINE

## 2021-01-01 PROCEDURE — 86140 C-REACTIVE PROTEIN: CPT

## 2021-01-01 RX ORDER — OMEPRAZOLE 40 MG/1
40 CAPSULE, DELAYED RELEASE ORAL DAILY
Qty: 90 CAPSULE | Refills: 1 | Status: SHIPPED | OUTPATIENT
Start: 2021-01-01 | End: 2022-01-01

## 2021-01-01 RX ORDER — TRAMADOL HYDROCHLORIDE 50 MG/1
50 TABLET ORAL EVERY 6 HOURS PRN
Qty: 20 TABLET | Refills: 0 | Status: SHIPPED | OUTPATIENT
Start: 2021-01-01 | End: 2022-01-01

## 2021-01-01 RX ORDER — OMEPRAZOLE 40 MG/1
40 CAPSULE, DELAYED RELEASE ORAL
Qty: 30 CAPSULE | Refills: 1 | Status: SHIPPED | OUTPATIENT
Start: 2021-01-01 | End: 2021-01-01

## 2021-01-01 RX ORDER — SUCRALFATE 1 G/1
1 TABLET ORAL 4 TIMES DAILY PRN
Qty: 60 TABLET | Refills: 0 | Status: SHIPPED | OUTPATIENT
Start: 2021-01-01 | End: 2022-01-01

## 2021-01-01 RX ORDER — GABAPENTIN 300 MG/1
300 CAPSULE ORAL AT BEDTIME
Qty: 30 CAPSULE | Refills: 1 | Status: SHIPPED | OUTPATIENT
Start: 2021-01-01 | End: 2022-01-01

## 2021-01-01 RX ORDER — OMEGA-3 FATTY ACIDS/FISH OIL 300-1000MG
CAPSULE ORAL
COMMUNITY
End: 2022-01-01

## 2021-01-01 RX ORDER — OMEPRAZOLE 40 MG/1
40 CAPSULE, DELAYED RELEASE ORAL 2 TIMES DAILY PRN
Qty: 120 CAPSULE | Refills: 1 | Status: SHIPPED | OUTPATIENT
Start: 2021-01-01 | End: 2021-01-01

## 2021-01-01 RX ORDER — SERTRALINE HYDROCHLORIDE 25 MG/1
50 TABLET, FILM COATED ORAL DAILY
Qty: 90 TABLET | Refills: 0 | COMMUNITY
Start: 2021-01-01 | End: 2022-01-01

## 2021-01-01 RX ORDER — LIDOCAINE HYDROCHLORIDE AND EPINEPHRINE 10; 10 MG/ML; UG/ML
1.5 INJECTION, SOLUTION INFILTRATION; PERINEURAL ONCE
Status: DISCONTINUED | OUTPATIENT
Start: 2021-01-01 | End: 2021-01-01

## 2021-01-01 RX ORDER — BENZONATATE 100 MG/1
100 CAPSULE ORAL 3 TIMES DAILY PRN
Qty: 30 CAPSULE | Refills: 0 | Status: SHIPPED | OUTPATIENT
Start: 2021-01-01 | End: 2021-01-01

## 2021-01-01 RX ORDER — GADOBUTROL 604.72 MG/ML
7.5 INJECTION INTRAVENOUS ONCE
Status: COMPLETED | OUTPATIENT
Start: 2021-01-01 | End: 2021-01-01

## 2021-01-01 RX ADMIN — GADOBUTROL 6.5 ML: 604.72 INJECTION INTRAVENOUS at 09:53

## 2021-01-01 ASSESSMENT — PAIN SCALES - GENERAL
PAINLEVEL: EXTREME PAIN (8)
PAINLEVEL: EXTREME PAIN (9)
PAINLEVEL: EXTREME PAIN (9)

## 2021-01-01 ASSESSMENT — MIFFLIN-ST. JEOR
SCORE: 1161.96
SCORE: 1158.62

## 2021-01-06 ENCOUNTER — VIRTUAL VISIT (OUTPATIENT)
Dept: ONCOLOGY | Facility: CLINIC | Age: 76
End: 2021-01-06
Attending: PSYCHOLOGIST
Payer: COMMERCIAL

## 2021-01-06 DIAGNOSIS — F32.9 REACTIVE DEPRESSION: Primary | ICD-10-CM

## 2021-01-06 PROCEDURE — 98968 PH1 ASSMT&MGMT NQHP 21-30: CPT | Mod: 95 | Performed by: PSYCHOLOGIST

## 2021-01-06 PROCEDURE — 999N001193 HC VIDEO/TELEPHONE VISIT; NO CHARGE

## 2021-01-06 NOTE — PROGRESS NOTES
"The patient has been notified of the following:      \"We have found that certain health care needs can be provided without the need for a face to face visit.  This service lets us provide the care you need with a phone conversation.       I will have full access to your [facility] medical record during this entire phone call.   I will be taking notes for your medical record.      Since this is like an office visit, we will bill your insurance company for this service.       There are potential benefits and risks of telephone visits (e.g. limits to patient confidentiality) that differ from in-person visits.?  Confidentiality still applies for telephone services, and nobody will record the visit.  It is important to be in a quiet, private space that is free of distractions (including cell phone or other devices) during the visit.??      If during the course of the call I believe a telephone visit is not appropriate, you will not be charged for this service\"     Consent has been obtained for this service by care team member: Yes     Confidential Summary of Oncology Psychology Followup Visit    Yadira Hoang is a 75 year old female who presents for Depression  The patient was seen for a 30 minute appointment on 1/6/2021.    Subjective: Still experiencing frustration and irritability due to functional changes. She says she feels fatigue levels make it hard for her to engage in her usual level of activity. This was discussed at length.     Objective: Affect was appropriate to the content of the therapeutic conversation.     Diagnosis:   Encounter Diagnosis   Name Primary?     Reactive depression Yes     Recommendations/Plan:  1. Keep using activity pacing to help maintain activity levels during treatment  2. Return for follow-up appointments    Thank you for this opportunity to participate in your care of this patient.    Alec Angel Psy.D., L.P.  Director, Oncology Supportive Care   "

## 2021-01-08 DIAGNOSIS — C43.9 MELANOMA OF SKIN (H): Primary | ICD-10-CM

## 2021-01-08 RX ORDER — CAPSAICIN 0.025 %
CREAM (GRAM) TOPICAL
Qty: 50 G | Refills: 3 | Status: SHIPPED | OUTPATIENT
Start: 2021-01-08 | End: 2021-02-12

## 2021-01-14 PROBLEM — M54.16 LUMBAR RADICULOPATHY: Status: RESOLVED | Noted: 2020-10-06 | Resolved: 2021-01-14

## 2021-01-14 PROBLEM — M25.561 CHRONIC PAIN OF RIGHT KNEE: Status: RESOLVED | Noted: 2020-10-06 | Resolved: 2021-01-14

## 2021-01-14 PROBLEM — G89.29 CHRONIC RIGHT SHOULDER PAIN: Status: RESOLVED | Noted: 2020-09-24 | Resolved: 2021-01-14

## 2021-01-14 PROBLEM — M25.511 CHRONIC RIGHT SHOULDER PAIN: Status: RESOLVED | Noted: 2020-09-24 | Resolved: 2021-01-14

## 2021-01-14 PROBLEM — G89.29 CHRONIC PAIN OF RIGHT KNEE: Status: RESOLVED | Noted: 2020-10-06 | Resolved: 2021-01-14

## 2021-01-14 NOTE — PROGRESS NOTES
Discharge Note    Progress reporting period is from initial evaluation date (please see noted date below) to Nov 13, 2020.  Linked Episodes   Type: Episode: Status: Noted: Resolved: Last update: Updated by:   PHYSICAL THERAPY R shoulder pain 9/24/2020 Active 9/24/2020 11/13/2020  1:29 PM Gisselle Bonilla, PT      Comments:   PHYSICAL THERAPY R chronic knee pain 10/6/2020 Active 10/6/2020  11/13/2020  1:29 PM Gisselle Bonilla, PT      Comments:       Yadira Ochoa failed to follow up and current status is unknown.  Please see information below for last relevant information on current status.  Patient seen for 5 visits.    SUBJECTIVE  Subjective changes noted by patient:  Pt notes that she has had a bad week. She had her infusion and found that she has been crying a lot.  Janeen not do anything yesterday.  Notes not able to use a thick pillow as that bothers the back of her head.  moves around too much to notice if it made a difference on her side or not.  DId not tolerate the rolled towel to support her neck.  Can not do her neck exercises as the movement of the neck seems to bother her concussion.  Does note that she is having intermittent symptoms for the lateral arm. That has been better since switched her cane to the L hand.   Notes that her back, thigh and lower leg has been better with prone lying and decreased activity/sitting more due to the dizziness with infusion this week.  notes that she is much better with regards to the L knee (no longer feeling L knee pain).  .  Current pain level is (5/10 lateral R arm and UT with movement).     Previous pain level was  10/10.   Changes in function:  Yes (See Goal flowsheet attached for changes in current functional level)  Adverse reaction to treatment or activity: None    OBJECTIVE  Changes noted in objective findings: LROM Min loss EIS, min loss FIS with pulling R lower back.  Tends to sit with majority of weight on the L buttock and R LE in a little more of  an extended position.  able to raise arm with more ease, can still feel minimal lateral shoulder and arm pain.       ASSESSMENT/PLAN  Diagnosis: R shoulder   Updated problem list and treatment plan:   Pain - HEP  Decreased ROM/flexibility - HEP  Decreased function - HEP  Decreased strength - HEP  Impaired gait - HEP  Decreased proprioception - HEP  STG/LTGs have been met or progress has been made towards goals:  Yes, please see goal flowsheet for most current information  Assessment of Progress: current status is unknown.    Last current status: Pt is progressing as expected(for the knee w prone lying: shoulder improving with time)   Self Management Plans:  HEP  I have re-evaluated this patient and find that the nature, scope, duration and intensity of the therapy is appropriate for the medical condition of the patient.  Yadira Ochoa continues to require the following intervention to meet STG and LTG's:  HEP.    Recommendations:  Discharge with current home program.  Patient to follow up with MD as needed.    Please refer to the daily flowsheet for treatment today, total treatment time and time spent performing 1:1 timed codes.

## 2021-01-19 ENCOUNTER — VIRTUAL VISIT (OUTPATIENT)
Dept: ONCOLOGY | Facility: CLINIC | Age: 76
End: 2021-01-19
Attending: PHYSICIAN ASSISTANT
Payer: COMMERCIAL

## 2021-01-19 DIAGNOSIS — C43.71 MALIGNANT MELANOMA OF RIGHT LOWER EXTREMITY INCLUDING HIP (H): ICD-10-CM

## 2021-01-19 DIAGNOSIS — F33.9 MAJOR DEPRESSION, RECURRENT, CHRONIC (H): ICD-10-CM

## 2021-01-19 DIAGNOSIS — C79.51 SECONDARY MALIGNANT NEOPLASM OF BONE (H): ICD-10-CM

## 2021-01-19 DIAGNOSIS — C43.9 MELANOMA OF SKIN (H): Primary | ICD-10-CM

## 2021-01-19 DIAGNOSIS — L29.9 PRURITUS: ICD-10-CM

## 2021-01-19 PROCEDURE — 999N001193 HC VIDEO/TELEPHONE VISIT; NO CHARGE

## 2021-01-19 PROCEDURE — 99214 OFFICE O/P EST MOD 30 MIN: CPT | Mod: 95 | Performed by: PHYSICIAN ASSISTANT

## 2021-01-19 RX ORDER — HEPARIN SODIUM (PORCINE) LOCK FLUSH IV SOLN 100 UNIT/ML 100 UNIT/ML
5 SOLUTION INTRAVENOUS
Status: CANCELLED | OUTPATIENT
Start: 2021-01-20

## 2021-01-19 RX ORDER — SODIUM CHLORIDE 9 MG/ML
1000 INJECTION, SOLUTION INTRAVENOUS CONTINUOUS PRN
Status: CANCELLED
Start: 2021-01-20

## 2021-01-19 RX ORDER — HEPARIN SODIUM,PORCINE 10 UNIT/ML
5 VIAL (ML) INTRAVENOUS
Status: CANCELLED | OUTPATIENT
Start: 2021-01-20

## 2021-01-19 RX ORDER — LORAZEPAM 2 MG/ML
0.5 INJECTION INTRAMUSCULAR EVERY 4 HOURS PRN
Status: CANCELLED
Start: 2021-01-20

## 2021-01-19 RX ORDER — ALBUTEROL SULFATE 0.83 MG/ML
2.5 SOLUTION RESPIRATORY (INHALATION)
Status: CANCELLED | OUTPATIENT
Start: 2021-01-20

## 2021-01-19 RX ORDER — DIPHENHYDRAMINE HYDROCHLORIDE 50 MG/ML
50 INJECTION INTRAMUSCULAR; INTRAVENOUS
Status: CANCELLED
Start: 2021-01-20

## 2021-01-19 RX ORDER — MEPERIDINE HYDROCHLORIDE 25 MG/ML
25 INJECTION INTRAMUSCULAR; INTRAVENOUS; SUBCUTANEOUS EVERY 30 MIN PRN
Status: CANCELLED | OUTPATIENT
Start: 2021-01-20

## 2021-01-19 RX ORDER — METHYLPREDNISOLONE SODIUM SUCCINATE 125 MG/2ML
125 INJECTION, POWDER, LYOPHILIZED, FOR SOLUTION INTRAMUSCULAR; INTRAVENOUS
Status: CANCELLED
Start: 2021-01-20

## 2021-01-19 RX ORDER — EPINEPHRINE 1 MG/ML
0.3 INJECTION, SOLUTION INTRAMUSCULAR; SUBCUTANEOUS EVERY 5 MIN PRN
Status: CANCELLED | OUTPATIENT
Start: 2021-01-20

## 2021-01-19 RX ORDER — ALBUTEROL SULFATE 90 UG/1
1-2 AEROSOL, METERED RESPIRATORY (INHALATION)
Status: CANCELLED
Start: 2021-01-20

## 2021-01-19 RX ORDER — NALOXONE HYDROCHLORIDE 0.4 MG/ML
.1-.4 INJECTION, SOLUTION INTRAMUSCULAR; INTRAVENOUS; SUBCUTANEOUS
Status: CANCELLED | OUTPATIENT
Start: 2021-01-20

## 2021-01-19 NOTE — PROGRESS NOTES
Yadira is a 75 year old who is being evaluated via a billable telephone visit.      What phone number would you like to be contacted at? [phone]   How would you like to obtain your AVS? Mail a copy  Phone call duration: 21 minutes    20 minutes spent on the date of the encounter doing chart review and documentation       Pt requests cream for itching.     Melanie Fishman CMA on 1/19/2021 at 12:05 PM      [facility] Video Visit  Jan 19, 2021    HPI  Yadira Hoang is a 75 year old female who presents for video call to follow up on her acral melanoma, stage IV, per oncologic history below:    Oncologic History: Acral melanoma, stage IV, s/p primary resection and reconstruction  1. She noted a painful lesion on the sole of the right foot for a few months, since summer 2019.  It initially was small then became raised.  It spontaneously bled. She is not entirely sure of its appearance initially.  She denies noting any masses behind the knee or in the groin.  2. 1/9/2020, punch biopsy was performed by Dr Jessica Alvarado.  It showed melanoma, at least 3.4 mm deep with ulceration and 0 mitoses, and perineural invasion present. TILs were non-brisk and LVI not identified. pT3b.  3. 2/10/2020, she has right femoral sentinel lymph node biopsy and wide local excision (Specimen #: [id number]) and the right heel lesion extends to a depth of 6.6 mm, and invasive melanoma is present at 0.2 mm from the deep margin. Microsatellites are also present. There was 1 (of 2) lymph nodes involved. The lymph node tissue exhibits extensive subcapsular and parenchymal clusters of melanoma cells, highlighted on Melan-A immunostain. The largest cluster is 7 millimeters in greatest diameter. pT4b pN2c.  4. 2/22/2020, she had PET-CT, which showed intense FDG uptake in a right inguinal lymph node, concerning for an additional focus of metastatic disease. There is also an indeterminate right external iliac lymph node with mild FDG uptake.  5.  3/4/2020, she has medial plantar artery  fasciocutaneous instep flap and Integra placement to the donor site. On 4/2/2020, she has additional reconstruction with skin grafting.  6. 5/27/2020, initiated on nivolumab immunotherapy.  Had C2 6/24/20. C3 on 7/22/20. C4 on 9/3/2020.  7. 8/21/2020 evidence of a new liver metastasis on PET-CT.    Interval History:  -Has had some intermittent headaches.   -Notes some intermittent abdominal discomfort.   -Denies needing to take anything for pain.  -Has been trying different shoes due to right foot pain.   -Has been using Panoway and Voltaren gel. Has been using Aquaphor for dry skin.   -Denies dyspnea.  -Denies diarrhea. Has constipation, managed with MiraLax once/day as needed.   -Has itchy skin inside elbows, abdomen, neck to above breast, and shoulders. Notes a rash right below neck. Has not been consistently using anything for the itchy skin.   -Has some fatigue.   -Feels immune therapy is working as lump on leg is getting softer and smaller.      Current Outpatient Medications   Medication Sig Dispense Refill     capsaicin (ZOSTRIX) 0.025 % external cream Apply as needed to affected area. (Patient not taking: Reported on 1/19/2021) 50 g 3     cholecalciferol (VITAMIN D3) 125 MCG (5000 UT) TABS tablet Take 2,000 Units by mouth every other day        diclofenac (VOLTAREN) 1 % topical gel Apply 4 g topically 4 times daily (Patient not taking: Reported on 10/23/2020) 50 g 4     estradiol (ESTRACE) 0.1 MG/GM vaginal cream        Lidocaine (LIDOCARE) 4 % Patch Place 1 patch onto the skin every 24 hours To prevent lidocaine toxicity, patient should be patch free for 12 hrs daily. (Patient not taking: Reported on 10/13/2020) 10 patch 1     LORazepam (ATIVAN) 0.5 MG tablet Take 1 tablet (0.5 mg) by mouth every 4 hours as needed (Anxiety, Nausea/Vomiting or Sleep) (Patient not taking: Reported on 1/19/2021) 30 tablet 3     mirtazapine (REMERON) 15 MG tablet Take 1 tablet  "(15 mg) by mouth At Bedtime (Patient not taking: Reported on 10/13/2020) 30 tablet 3     omeprazole (PRILOSEC) 20 MG DR capsule Take 1 capsule (20 mg) by mouth daily (Patient not taking: Reported on 10/23/2020) 90 capsule 1     polyethylene glycol (MIRALAX) powder Take 17 g by mouth       prochlorperazine (COMPAZINE) 10 MG tablet Take 1 tablet (10 mg) by mouth every 6 hours as needed (Nausea/Vomiting) (Patient not taking: Reported on 11/18/2020) 30 tablet 3     sertraline (ZOLOFT) 25 MG tablet Take 1 tablet (25 mg) by mouth daily (Patient not taking: Reported on 10/1/2020) 30 tablet 4     Objective:  There were no vitals taken for this visit.  General: alert and no distress. Sounds mildly anxious on the phone.   Psych: Alert and oriented times; coherent speech, normal rate and volume, able to articulate logical thoughts, able to abstract reason, no tangential thoughts, no hallucinations or delusions  Patient's affect is appropriate.   Pulm: Speaking in full sentences, unlabored, no audible wheezes or cough.  The rest of a comprehensive physical examination is deferred due to PHE (public health emergency) video restrictions\"    Lab Studies:  Will review tomorrow.     Assessment/Plan:  Acral melanoma, stage IV, s/p primary resection and reconstruction  -patient has most recently been treated with ipi/nivo. She is tolerating this reasonably well with some fatigue and pruritus. She will continue with cycle 4 of ipi/nivo tomorrow. She will follow-up with Dr. Franklin in 3 weeks with a PET/CT. We discussed the plan to switch to maintenance Opdivo assuming she is having a good response.     Pruritus.   Secondary to immune therapy. Recommend Aveeno lotion, oatmeal baths, and topical and/or oral 50 mg Benadryl at bedtime. May use hydrocortisone cream sparingly.     Depression/Anxiety  -Patient is following with Dr. Angel. She has Remeron and Zoloft on her medication, list, but it appears she is not taking these. Will " continue to monitor.      Arthritis, immunotherapy associated  -Stable. Patient will continue to use Voltaren gel and Panoway.     Meghan Perdomo PA-C  [facility]  [address] [place] [address] [phone]

## 2021-01-19 NOTE — LETTER
1/19/2021         RE: Yadira Hoang  [address] [facility] [address]        Dear Colleague,    Thank you for referring your patient, Yadira Hoang, to the [facility]. Please see a copy of my visit note below.    Yadira is a 75 year old who is being evaluated via a billable telephone visit.      What phone number would you like to be contacted at? [phone]   How would you like to obtain your AVS? Mail a copy  Phone call duration: 21 minutes    20 minutes spent on the date of the encounter doing chart review and documentation       Pt requests cream for itching.     Melanie Fishman, JAGDISH on 1/19/2021 at 12:05 PM      [facility] Video Visit  Jan 19, 2021    HPI  Yadira Hoang is a 75 year old female who presents for video call to follow up on her acral melanoma, stage IV, per oncologic history below:    Oncologic History: Acral melanoma, stage IV, s/p primary resection and reconstruction  1. She noted a painful lesion on the sole of the right foot for a few months, since summer 2019.  It initially was small then became raised.  It spontaneously bled. She is not entirely sure of its appearance initially.  She denies noting any masses behind the knee or in the groin.  2. 1/9/2020, punch biopsy was performed by Dr Jessica Alvarado.  It showed melanoma, at least 3.4 mm deep with ulceration and 0 mitoses, and perineural invasion present. TILs were non-brisk and LVI not identified. pT3b.  3. 2/10/2020, she has right femoral sentinel lymph node biopsy and wide local excision (Specimen #: [id number]) and the right heel lesion extends to a depth of 6.6 mm, and invasive melanoma is present at 0.2 mm from the deep margin. Microsatellites are also present. There was 1 (of 2) lymph nodes involved. The lymph node tissue exhibits extensive subcapsular and parenchymal clusters of melanoma cells, highlighted on Melan-A immunostain. The largest cluster is 7 millimeters in greatest  diameter. pT4b pN2c.  4. 2/22/2020, she had PET-CT, which showed intense FDG uptake in a right inguinal lymph node, concerning for an additional focus of metastatic disease. There is also an indeterminate right external iliac lymph node with mild FDG uptake.  5. 3/4/2020, she has medial plantar artery  fasciocutaneous instep flap and Integra placement to the donor site. On 4/2/2020, she has additional reconstruction with skin grafting.  6. 5/27/2020, initiated on nivolumab immunotherapy.  Had C2 6/24/20. C3 on 7/22/20. C4 on 9/3/2020.  7. 8/21/2020 evidence of a new liver metastasis on PET-CT.    Interval History:  -Has had some intermittent headaches.   -Notes some intermittent abdominal discomfort.   -Denies needing to take anything for pain.  -Has been trying different shoes due to right foot pain.   -Has been using Panoway and Voltaren gel. Has been using Aquaphor for dry skin.   -Denies dyspnea.  -Denies diarrhea. Has constipation, managed with MiraLax once/day as needed.   -Has itchy skin inside elbows, abdomen, neck to above breast, and shoulders. Notes a rash right below neck. Has not been consistently using anything for the itchy skin.   -Has some fatigue.   -Feels immune therapy is working as lump on leg is getting softer and smaller.      Current Outpatient Medications   Medication Sig Dispense Refill     capsaicin (ZOSTRIX) 0.025 % external cream Apply as needed to affected area. (Patient not taking: Reported on 1/19/2021) 50 g 3     cholecalciferol (VITAMIN D3) 125 MCG (5000 UT) TABS tablet Take 2,000 Units by mouth every other day        diclofenac (VOLTAREN) 1 % topical gel Apply 4 g topically 4 times daily (Patient not taking: Reported on 10/23/2020) 50 g 4     estradiol (ESTRACE) 0.1 MG/GM vaginal cream        Lidocaine (LIDOCARE) 4 % Patch Place 1 patch onto the skin every 24 hours To prevent lidocaine toxicity, patient should be patch free for 12 hrs daily. (Patient not taking: Reported  "on 10/13/2020) 10 patch 1     LORazepam (ATIVAN) 0.5 MG tablet Take 1 tablet (0.5 mg) by mouth every 4 hours as needed (Anxiety, Nausea/Vomiting or Sleep) (Patient not taking: Reported on 1/19/2021) 30 tablet 3     mirtazapine (REMERON) 15 MG tablet Take 1 tablet (15 mg) by mouth At Bedtime (Patient not taking: Reported on 10/13/2020) 30 tablet 3     omeprazole (PRILOSEC) 20 MG DR capsule Take 1 capsule (20 mg) by mouth daily (Patient not taking: Reported on 10/23/2020) 90 capsule 1     polyethylene glycol (MIRALAX) powder Take 17 g by mouth       prochlorperazine (COMPAZINE) 10 MG tablet Take 1 tablet (10 mg) by mouth every 6 hours as needed (Nausea/Vomiting) (Patient not taking: Reported on 11/18/2020) 30 tablet 3     sertraline (ZOLOFT) 25 MG tablet Take 1 tablet (25 mg) by mouth daily (Patient not taking: Reported on 10/1/2020) 30 tablet 4     Objective:  There were no vitals taken for this visit.  General: alert and no distress. Sounds mildly anxious on the phone.   Psych: Alert and oriented times; coherent speech, normal rate and volume, able to articulate logical thoughts, able to abstract reason, no tangential thoughts, no hallucinations or delusions  Patient's affect is appropriate.   Pulm: Speaking in full sentences, unlabored, no audible wheezes or cough.  The rest of a comprehensive physical examination is deferred due to PHE (public health emergency) video restrictions\"    Lab Studies:  Will review tomorrow.     Assessment/Plan:  Acral melanoma, stage IV, s/p primary resection and reconstruction  -patient has most recently been treated with ipi/nivo. She is tolerating this reasonably well with some fatigue and pruritus. She will continue with cycle 4 of ipi/nivo tomorrow. She will follow-up with Dr. Franklin in 3 weeks with a PET/CT. We discussed the plan to switch to maintenance Opdivo assuming she is having a good response.     Pruritus.   Secondary to immune therapy. Recommend Aveeno lotion, oatmeal " baths, and topical and/or oral 50 mg Benadryl at bedtime. May use hydrocortisone cream sparingly.     Depression/Anxiety  -Patient is following with Dr. Angel. She has Remeron and Zoloft on her medication, list, but it appears she is not taking these. Will continue to monitor.      Arthritis, immunotherapy associated  -Stable. Patient will continue to use Voltaren gel and Panoway.     Meghan Perdomo PA-C  [facility]  [address] [place], [place] [address] [phone]          Again, thank you for allowing me to participate in the care of your patient.        Sincerely,        Meghan Perdomo PA-C

## 2021-01-19 NOTE — LETTER
1/19/2021      RE: Yadira Hoang  [address] [facility] [address]       Yadira is a 75 year old who is being evaluated via a billable telephone visit.      What phone number would you like to be contacted at? [phone]   How would you like to obtain your AVS? Mail a copy  Phone call duration: 21 minutes    20 minutes spent on the date of the encounter doing chart review and documentation       Pt requests cream for itching.     Melanie Fishman CMA on 1/19/2021 at 12:05 PM      [facility] Video Visit  Jan 19, 2021    HPI  Yadira Hoang is a 75 year old female who presents for video call to follow up on her acral melanoma, stage IV, per oncologic history below:    Oncologic History: Acral melanoma, stage IV, s/p primary resection and reconstruction  1. She noted a painful lesion on the sole of the right foot for a few months, since summer 2019.  It initially was small then became raised.  It spontaneously bled. She is not entirely sure of its appearance initially.  She denies noting any masses behind the knee or in the groin.  2. 1/9/2020, punch biopsy was performed by Dr Jessica Alvarado.  It showed melanoma, at least 3.4 mm deep with ulceration and 0 mitoses, and perineural invasion present. TILs were non-brisk and LVI not identified. pT3b.  3. 2/10/2020, she has right femoral sentinel lymph node biopsy and wide local excision (Specimen #: [id number]) and the right heel lesion extends to a depth of 6.6 mm, and invasive melanoma is present at 0.2 mm from the deep margin. Microsatellites are also present. There was 1 (of 2) lymph nodes involved. The lymph node tissue exhibits extensive subcapsular and parenchymal clusters of melanoma cells, highlighted on Melan-A immunostain. The largest cluster is 7 millimeters in greatest diameter. pT4b pN2c.  4. 2/22/2020, she had PET-CT, which showed intense FDG uptake in a right inguinal lymph node, concerning for an additional focus of metastatic disease. There  is also an indeterminate right external iliac lymph node with mild FDG uptake.  5. 3/4/2020, she has medial plantar artery  fasciocutaneous instep flap and Integra placement to the donor site. On 4/2/2020, she has additional reconstruction with skin grafting.  6. 5/27/2020, initiated on nivolumab immunotherapy.  Had C2 6/24/20. C3 on 7/22/20. C4 on 9/3/2020.  7. 8/21/2020 evidence of a new liver metastasis on PET-CT.    Interval History:  -Has had some intermittent headaches.   -Notes some intermittent abdominal discomfort.   -Denies needing to take anything for pain.  -Has been trying different shoes due to right foot pain.   -Has been using Panoway and Voltaren gel. Has been using Aquaphor for dry skin.   -Denies dyspnea.  -Denies diarrhea. Has constipation, managed with MiraLax once/day as needed.   -Has itchy skin inside elbows, abdomen, neck to above breast, and shoulders. Notes a rash right below neck. Has not been consistently using anything for the itchy skin.   -Has some fatigue.   -Feels immune therapy is working as lump on leg is getting softer and smaller.      Current Outpatient Medications   Medication Sig Dispense Refill     capsaicin (ZOSTRIX) 0.025 % external cream Apply as needed to affected area. (Patient not taking: Reported on 1/19/2021) 50 g 3     cholecalciferol (VITAMIN D3) 125 MCG (5000 UT) TABS tablet Take 2,000 Units by mouth every other day        diclofenac (VOLTAREN) 1 % topical gel Apply 4 g topically 4 times daily (Patient not taking: Reported on 10/23/2020) 50 g 4     estradiol (ESTRACE) 0.1 MG/GM vaginal cream        Lidocaine (LIDOCARE) 4 % Patch Place 1 patch onto the skin every 24 hours To prevent lidocaine toxicity, patient should be patch free for 12 hrs daily. (Patient not taking: Reported on 10/13/2020) 10 patch 1     LORazepam (ATIVAN) 0.5 MG tablet Take 1 tablet (0.5 mg) by mouth every 4 hours as needed (Anxiety, Nausea/Vomiting or Sleep) (Patient not taking:  "Reported on 1/19/2021) 30 tablet 3     mirtazapine (REMERON) 15 MG tablet Take 1 tablet (15 mg) by mouth At Bedtime (Patient not taking: Reported on 10/13/2020) 30 tablet 3     omeprazole (PRILOSEC) 20 MG DR capsule Take 1 capsule (20 mg) by mouth daily (Patient not taking: Reported on 10/23/2020) 90 capsule 1     polyethylene glycol (MIRALAX) powder Take 17 g by mouth       prochlorperazine (COMPAZINE) 10 MG tablet Take 1 tablet (10 mg) by mouth every 6 hours as needed (Nausea/Vomiting) (Patient not taking: Reported on 11/18/2020) 30 tablet 3     sertraline (ZOLOFT) 25 MG tablet Take 1 tablet (25 mg) by mouth daily (Patient not taking: Reported on 10/1/2020) 30 tablet 4     Objective:  There were no vitals taken for this visit.  General: alert and no distress. Sounds mildly anxious on the phone.   Psych: Alert and oriented times; coherent speech, normal rate and volume, able to articulate logical thoughts, able to abstract reason, no tangential thoughts, no hallucinations or delusions  Patient's affect is appropriate.   Pulm: Speaking in full sentences, unlabored, no audible wheezes or cough.  The rest of a comprehensive physical examination is deferred due to PHE (public health emergency) video restrictions\"    Lab Studies:  Will review tomorrow.     Assessment/Plan:  Acral melanoma, stage IV, s/p primary resection and reconstruction  -patient has most recently been treated with ipi/nivo. She is tolerating this reasonably well with some fatigue and pruritus. She will continue with cycle 4 of ipi/nivo tomorrow. She will follow-up with Dr. Franklin in 3 weeks with a PET/CT. We discussed the plan to switch to maintenance Opdivo assuming she is having a good response.     Pruritus.   Secondary to immune therapy. Recommend Aveeno lotion, oatmeal baths, and topical and/or oral 50 mg Benadryl at bedtime. May use hydrocortisone cream sparingly.     Depression/Anxiety  -Patient is following with Dr. Angel. She has Remeron " and Zoloft on her medication, list, but it appears she is not taking these. Will continue to monitor.      Arthritis, immunotherapy associated  -Stable. Patient will continue to use Voltaren gel and Panoway.     Meghan Perdomo PA-C  [facility]  [address] [place] [id number]  [phone]          Meghan Perdomo PA-C

## 2021-01-20 ENCOUNTER — INFUSION THERAPY VISIT (OUTPATIENT)
Dept: INFUSION THERAPY | Facility: CLINIC | Age: 76
End: 2021-01-20
Payer: COMMERCIAL

## 2021-01-20 VITALS
SYSTOLIC BLOOD PRESSURE: 125 MMHG | BODY MASS INDEX: 22.24 KG/M2 | RESPIRATION RATE: 16 BRPM | OXYGEN SATURATION: 97 % | WEIGHT: 142 LBS | TEMPERATURE: 98.3 F | DIASTOLIC BLOOD PRESSURE: 76 MMHG | HEART RATE: 91 BPM

## 2021-01-20 DIAGNOSIS — C43.71 MALIGNANT MELANOMA OF RIGHT LOWER EXTREMITY INCLUDING HIP (H): ICD-10-CM

## 2021-01-20 DIAGNOSIS — C43.9 MELANOMA OF SKIN (H): Primary | ICD-10-CM

## 2021-01-20 LAB
ALBUMIN SERPL-MCNC: 3.7 G/DL (ref 3.4–5)
ALP SERPL-CCNC: 59 U/L (ref 40–150)
ALT SERPL W P-5'-P-CCNC: 22 U/L (ref 0–50)
ANION GAP SERPL CALCULATED.3IONS-SCNC: 5 MMOL/L (ref 3–14)
AST SERPL W P-5'-P-CCNC: 11 U/L (ref 0–45)
BILIRUB SERPL-MCNC: 0.3 MG/DL (ref 0.2–1.3)
BUN SERPL-MCNC: 24 MG/DL (ref 7–30)
CALCIUM SERPL-MCNC: 9.5 MG/DL (ref 8.5–10.1)
CHLORIDE SERPL-SCNC: 105 MMOL/L (ref 94–109)
CO2 SERPL-SCNC: 32 MMOL/L (ref 20–32)
CREAT SERPL-MCNC: 0.67 MG/DL (ref 0.52–1.04)
GFR SERPL CREATININE-BSD FRML MDRD: 85 ML/MIN/{1.73_M2}
GLUCOSE SERPL-MCNC: 98 MG/DL (ref 70–99)
POTASSIUM SERPL-SCNC: 3.7 MMOL/L (ref 3.4–5.3)
PROT SERPL-MCNC: 7.1 G/DL (ref 6.8–8.8)
SODIUM SERPL-SCNC: 142 MMOL/L (ref 133–144)
TSH SERPL DL<=0.005 MIU/L-ACNC: 1.97 MU/L (ref 0.4–4)

## 2021-01-20 PROCEDURE — 96413 CHEMO IV INFUSION 1 HR: CPT | Performed by: INTERNAL MEDICINE

## 2021-01-20 PROCEDURE — 84443 ASSAY THYROID STIM HORMONE: CPT | Performed by: PHYSICIAN ASSISTANT

## 2021-01-20 PROCEDURE — 99207 PR NO CHARGE LOS: CPT

## 2021-01-20 PROCEDURE — 96417 CHEMO IV INFUS EACH ADDL SEQ: CPT | Performed by: INTERNAL MEDICINE

## 2021-01-20 PROCEDURE — 36415 COLL VENOUS BLD VENIPUNCTURE: CPT | Performed by: PHYSICIAN ASSISTANT

## 2021-01-20 PROCEDURE — 80053 COMPREHEN METABOLIC PANEL: CPT | Performed by: PHYSICIAN ASSISTANT

## 2021-01-20 RX ADMIN — Medication 250 ML: at 15:04

## 2021-01-20 ASSESSMENT — PAIN SCALES - GENERAL: PAINLEVEL: MODERATE PAIN (5)

## 2021-01-20 NOTE — PROGRESS NOTES
"Infusion Nursing Note:  Yadira Hoang presents today for C5 - Opdivo/Yervoy    Patient seen by provider today: No   present during visit today: Not Applicable.    Note: Pt spoke to KEITH Conh yesterday about itching at times and will continue taking meds PRN - states it isn't getting worse, \"I react to most drugs like this\". Worried about having to change therapy if her upcoming scan report isn't favorable.  Patient did meet criteria for an asymptomatic covid-19 PCR test in infusion today. Patient declined the covid-19 test.    Intravenous Access:  Peripheral IV placed.    Treatment Conditions:  Lab Results   Component Value Date     01/20/2021                   Lab Results   Component Value Date    POTASSIUM 3.7 01/20/2021           No results found for: MAG         Lab Results   Component Value Date    CR 0.67 01/20/2021                   Lab Results   Component Value Date    GABY 9.5 01/20/2021                Lab Results   Component Value Date    BILITOTAL 0.3 01/20/2021           Lab Results   Component Value Date    ALBUMIN 3.7 01/20/2021                    Lab Results   Component Value Date    ALT 22 01/20/2021           Lab Results   Component Value Date    AST 11 01/20/2021       Results reviewed, labs MET treatment parameters, ok to proceed with treatment.      Post Infusion Assessment:  Patient tolerated infusion without incident.  Blood return noted pre and post infusion.  Site patent and intact, free from redness, edema or discomfort.  No evidence of extravasations.  Access discontinued per protocol.       Discharge Plan:   Return 02/17/20201 for C6,D1  Discharge instructions reviewed with: Patient.  Patient and/or family verbalized understanding of discharge instructions and all questions answered.  Patient discharged in stable condition accompanied by: self.  Departure Mode: Ambulatory.    Sahara Tran RN                      "

## 2021-01-27 ENCOUNTER — VIRTUAL VISIT (OUTPATIENT)
Dept: ONCOLOGY | Facility: CLINIC | Age: 76
End: 2021-01-27
Attending: PSYCHOLOGIST
Payer: COMMERCIAL

## 2021-01-27 DIAGNOSIS — F32.9 REACTIVE DEPRESSION: Primary | ICD-10-CM

## 2021-01-27 PROCEDURE — 999N001193 HC VIDEO/TELEPHONE VISIT; NO CHARGE

## 2021-01-27 PROCEDURE — 99203 OFFICE O/P NEW LOW 30 MIN: CPT | Mod: 95 | Performed by: PSYCHOLOGIST

## 2021-01-27 NOTE — PROGRESS NOTES
"Yadira Hoang is a 75 year old female who is being evaluated via a billable telephone visit. Phone call duration: 30 minutes      The patient has been notified of following:      \"This telephone visit will be conducted via a call between you and your physician/provider. We have found that certain health care needs can be provided without the need for a physical exam.  This service lets us provide the care you need with a short phone conversation.  If a prescription is necessary we can send it directly to your pharmacy.  If lab work is needed we can place an order for that and you can then stop by our lab to have the test done at a later time.     Telephone visits are billed at different rates depending on your insurance coverage. During this emergency period, for some insurers they may be billed the same as an in-person visit.  Please reach out to your insurance provider with any questions.     If during the course of the call the physician/provider feels a telephone visit is not appropriate, you will not be charged for this service.\"     Patient has given verbal consent for Telephone visit? Yes    Confidential Summary of Oncology Psychology Followup Visit    Yadira Hoang is a 75 year old female who presents for depressed mood secondary to her cancer journey. The patient was seen for a 30 minute appointment on 1/27/2021.    Subjective: She was sad and depressed today. She is struggling with her physical symptoms and needed this appointment to release this negative energy. She was able to state the behavioral interventions previously discussed and how she is trying to use those tools to help her.     Objective: Affect was appropriate to the content of the therapeutic conversation.     Diagnosis:   Reactive Depression    Recommendations/Plan:  1. Continue to implement the behavioral interventions  2. Return for follow-up as needed.     Thank you for this opportunity to participate in your care of this " patient.    Alec Angel Psy.D., L.P.  Director, Oncology Supportive Care

## 2021-02-12 ENCOUNTER — ANCILLARY PROCEDURE (OUTPATIENT)
Dept: PET IMAGING | Facility: CLINIC | Age: 76
End: 2021-02-12
Attending: PHYSICIAN ASSISTANT
Payer: COMMERCIAL

## 2021-02-12 ENCOUNTER — ONCOLOGY VISIT (OUTPATIENT)
Dept: ONCOLOGY | Facility: CLINIC | Age: 76
End: 2021-02-12
Attending: INTERNAL MEDICINE
Payer: COMMERCIAL

## 2021-02-12 ENCOUNTER — APPOINTMENT (OUTPATIENT)
Dept: LAB | Facility: CLINIC | Age: 76
End: 2021-02-12
Attending: INTERNAL MEDICINE
Payer: COMMERCIAL

## 2021-02-12 VITALS
HEIGHT: 67 IN | RESPIRATION RATE: 16 BRPM | HEART RATE: 108 BPM | OXYGEN SATURATION: 99 % | BODY MASS INDEX: 22.81 KG/M2 | WEIGHT: 145.3 LBS | DIASTOLIC BLOOD PRESSURE: 74 MMHG | TEMPERATURE: 98.2 F | SYSTOLIC BLOOD PRESSURE: 131 MMHG

## 2021-02-12 DIAGNOSIS — F41.1 GAD (GENERALIZED ANXIETY DISORDER): ICD-10-CM

## 2021-02-12 DIAGNOSIS — L29.9 GENERALIZED PRURITUS: ICD-10-CM

## 2021-02-12 DIAGNOSIS — R53.82 CHRONIC FATIGUE: ICD-10-CM

## 2021-02-12 DIAGNOSIS — F33.9 MAJOR DEPRESSION, RECURRENT, CHRONIC (H): ICD-10-CM

## 2021-02-12 DIAGNOSIS — C43.9 MELANOMA OF SKIN (H): ICD-10-CM

## 2021-02-12 DIAGNOSIS — C43.71 MALIGNANT MELANOMA OF RIGHT LOWER EXTREMITY INCLUDING HIP (H): Primary | ICD-10-CM

## 2021-02-12 DIAGNOSIS — C43.71 MALIGNANT MELANOMA OF RIGHT LOWER EXTREMITY INCLUDING HIP (H): ICD-10-CM

## 2021-02-12 LAB
ALBUMIN SERPL-MCNC: 3.6 G/DL (ref 3.4–5)
ALP SERPL-CCNC: 61 U/L (ref 40–150)
ALT SERPL W P-5'-P-CCNC: 22 U/L (ref 0–50)
ANION GAP SERPL CALCULATED.3IONS-SCNC: 5 MMOL/L (ref 3–14)
AST SERPL W P-5'-P-CCNC: 12 U/L (ref 0–45)
BASOPHILS # BLD AUTO: 0 10E9/L (ref 0–0.2)
BASOPHILS NFR BLD AUTO: 0.4 %
BILIRUB SERPL-MCNC: 0.3 MG/DL (ref 0.2–1.3)
BUN SERPL-MCNC: 18 MG/DL (ref 7–30)
CALCIUM SERPL-MCNC: 9 MG/DL (ref 8.5–10.1)
CHLORIDE SERPL-SCNC: 105 MMOL/L (ref 94–109)
CO2 SERPL-SCNC: 31 MMOL/L (ref 20–32)
CREAT SERPL-MCNC: 0.52 MG/DL (ref 0.52–1.04)
DIFFERENTIAL METHOD BLD: NORMAL
EOSINOPHIL # BLD AUTO: 0.1 10E9/L (ref 0–0.7)
EOSINOPHIL NFR BLD AUTO: 2.4 %
ERYTHROCYTE [DISTWIDTH] IN BLOOD BY AUTOMATED COUNT: 12.5 % (ref 10–15)
GFR SERPL CREATININE-BSD FRML MDRD: >90 ML/MIN/{1.73_M2}
GLUCOSE SERPL-MCNC: 184 MG/DL (ref 70–99)
HCT VFR BLD AUTO: 37.6 % (ref 35–47)
HGB BLD-MCNC: 12.2 G/DL (ref 11.7–15.7)
IMM GRANULOCYTES # BLD: 0 10E9/L (ref 0–0.4)
IMM GRANULOCYTES NFR BLD: 0.2 %
LYMPHOCYTES # BLD AUTO: 1.3 10E9/L (ref 0.8–5.3)
LYMPHOCYTES NFR BLD AUTO: 28.7 %
MCH RBC QN AUTO: 30 PG (ref 26.5–33)
MCHC RBC AUTO-ENTMCNC: 32.4 G/DL (ref 31.5–36.5)
MCV RBC AUTO: 93 FL (ref 78–100)
MONOCYTES # BLD AUTO: 0.3 10E9/L (ref 0–1.3)
MONOCYTES NFR BLD AUTO: 6 %
NEUTROPHILS # BLD AUTO: 2.9 10E9/L (ref 1.6–8.3)
NEUTROPHILS NFR BLD AUTO: 62.3 %
NRBC # BLD AUTO: 0 10*3/UL
NRBC BLD AUTO-RTO: 0 /100
PLATELET # BLD AUTO: 314 10E9/L (ref 150–450)
POTASSIUM SERPL-SCNC: 3.6 MMOL/L (ref 3.4–5.3)
PROT SERPL-MCNC: 7.4 G/DL (ref 6.8–8.8)
RBC # BLD AUTO: 4.06 10E12/L (ref 3.8–5.2)
SODIUM SERPL-SCNC: 141 MMOL/L (ref 133–144)
TSH SERPL DL<=0.005 MIU/L-ACNC: 1.57 MU/L (ref 0.4–4)
WBC # BLD AUTO: 4.6 10E9/L (ref 4–11)

## 2021-02-12 PROCEDURE — G0463 HOSPITAL OUTPT CLINIC VISIT: HCPCS | Mod: 25

## 2021-02-12 PROCEDURE — 85025 COMPLETE CBC W/AUTO DIFF WBC: CPT | Performed by: INTERNAL MEDICINE

## 2021-02-12 PROCEDURE — A9552 F18 FDG: HCPCS

## 2021-02-12 PROCEDURE — 74177 CT ABD & PELVIS W/CONTRAST: CPT | Mod: 59

## 2021-02-12 PROCEDURE — 99214 OFFICE O/P EST MOD 30 MIN: CPT | Performed by: INTERNAL MEDICINE

## 2021-02-12 PROCEDURE — 78816 PET IMAGE W/CT FULL BODY: CPT | Mod: PS

## 2021-02-12 PROCEDURE — 84443 ASSAY THYROID STIM HORMONE: CPT | Performed by: INTERNAL MEDICINE

## 2021-02-12 PROCEDURE — 82024 ASSAY OF ACTH: CPT | Performed by: INTERNAL MEDICINE

## 2021-02-12 PROCEDURE — 36415 COLL VENOUS BLD VENIPUNCTURE: CPT

## 2021-02-12 PROCEDURE — 71260 CT THORAX DX C+: CPT | Mod: 59

## 2021-02-12 PROCEDURE — 80053 COMPREHEN METABOLIC PANEL: CPT | Performed by: INTERNAL MEDICINE

## 2021-02-12 RX ORDER — ALBUTEROL SULFATE 0.83 MG/ML
2.5 SOLUTION RESPIRATORY (INHALATION)
Status: CANCELLED | OUTPATIENT
Start: 2021-02-15

## 2021-02-12 RX ORDER — HEPARIN SODIUM,PORCINE 10 UNIT/ML
5 VIAL (ML) INTRAVENOUS
Status: CANCELLED | OUTPATIENT
Start: 2021-02-15

## 2021-02-12 RX ORDER — ALBUTEROL SULFATE 90 UG/1
1-2 AEROSOL, METERED RESPIRATORY (INHALATION)
Status: CANCELLED
Start: 2021-02-15

## 2021-02-12 RX ORDER — IOPAMIDOL 755 MG/ML
10-135 INJECTION, SOLUTION INTRAVASCULAR ONCE
Status: COMPLETED | OUTPATIENT
Start: 2021-02-12 | End: 2021-02-12

## 2021-02-12 RX ORDER — MEPERIDINE HYDROCHLORIDE 25 MG/ML
25 INJECTION INTRAMUSCULAR; INTRAVENOUS; SUBCUTANEOUS EVERY 30 MIN PRN
Status: CANCELLED | OUTPATIENT
Start: 2021-02-15

## 2021-02-12 RX ORDER — DIPHENHYDRAMINE HYDROCHLORIDE 50 MG/ML
50 INJECTION INTRAMUSCULAR; INTRAVENOUS
Status: CANCELLED
Start: 2021-02-15

## 2021-02-12 RX ORDER — EPINEPHRINE 1 MG/ML
0.3 INJECTION, SOLUTION INTRAMUSCULAR; SUBCUTANEOUS EVERY 5 MIN PRN
Status: CANCELLED | OUTPATIENT
Start: 2021-02-15

## 2021-02-12 RX ORDER — HEPARIN SODIUM (PORCINE) LOCK FLUSH IV SOLN 100 UNIT/ML 100 UNIT/ML
5 SOLUTION INTRAVENOUS
Status: CANCELLED | OUTPATIENT
Start: 2021-02-15

## 2021-02-12 RX ORDER — NALOXONE HYDROCHLORIDE 0.4 MG/ML
.1-.4 INJECTION, SOLUTION INTRAMUSCULAR; INTRAVENOUS; SUBCUTANEOUS
Status: CANCELLED | OUTPATIENT
Start: 2021-02-15

## 2021-02-12 RX ORDER — METHYLPREDNISOLONE SODIUM SUCCINATE 125 MG/2ML
125 INJECTION, POWDER, LYOPHILIZED, FOR SOLUTION INTRAMUSCULAR; INTRAVENOUS
Status: CANCELLED
Start: 2021-02-15

## 2021-02-12 RX ORDER — SODIUM CHLORIDE 9 MG/ML
1000 INJECTION, SOLUTION INTRAVENOUS CONTINUOUS PRN
Status: CANCELLED
Start: 2021-02-15

## 2021-02-12 RX ORDER — LORAZEPAM 2 MG/ML
0.5 INJECTION INTRAMUSCULAR EVERY 4 HOURS PRN
Status: CANCELLED
Start: 2021-02-15

## 2021-02-12 RX ADMIN — IOPAMIDOL 86 ML: 755 INJECTION, SOLUTION INTRAVASCULAR at 12:33

## 2021-02-12 ASSESSMENT — MIFFLIN-ST. JEOR: SCORE: 1181.83

## 2021-02-12 ASSESSMENT — PAIN SCALES - GENERAL: PAINLEVEL: NO PAIN (0)

## 2021-02-12 NOTE — NURSING NOTE
Chief Complaint   Patient presents with     Blood Draw     Labs drawn via  by RN. VS taken     Labs drawn with vpt by rn.  Pt tolerated well.  VS taken.  Pt checked in for next appt.    Greg Jaime RN

## 2021-02-12 NOTE — PROGRESS NOTES
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 She noted a painful lesion on the sole of the right foot for a few months, since last summer.  It initially was small then became raised.  It spontaneously bled. She is not entirely sure of its appearance initially.  She denies noting any masses behind the knee or in the groin.  2. 1/9/2020, punch biopsy was performed by Dr Jessica Meadows. Patology showed melanoma, at least 3.4 mm deep with ulceration and 0 mitoses, and perineural invasion present. TILs were non-brisk and LVI not identified. pT3b.  3. 2/10/2020, she has right femoral sentinel lymph node biopsy and wide local excision (Specimen #: [id number]) and the right heel lesion extends to a depth of 6.6 mm, and invasive melanoma is present at 0.2 mm from the deep margin. Microsatellites are also present. There was 1 (of 2) lymph nodes involved. The lymph node tissue exhibits extensive subcapsular and parenchymal clusters of melanoma cells, highlighted on Melan-A immunostain. The largest cluster is 7 millimeters in greatest diameter. pT4b pN2c. PD-L1 staining is negative, <1%. No mutation in BRAF, KIT, or NRAS.  4. 2/22/2020, she had PET-CT, which showed intense FDG uptake in a right inguinal lymph node, concerning for an additional focus of metastatic disease. There is also an indeterminate right external iliac lymph node with mild FDG uptake.  5. 3/4/2020, she has medial plantar artery  fasciocutaneous instep flap and Integra placement to the donor site. On 4/2/2020, she has additional reconstruction with skin grafting.  6. 5/22/2020, PET-CT shows a hypermetabolic right inguinal and right external iliac chain lymph node and stable pulmonary nodules.  7. 5/27/2020, she starts nivolumab for presumed low volume lymph node predominant metastatic  disease.  8. 8/21/2020, PET-CT shows increase in hypermetabolic adenopathy, and a hypermetabolic nodule in segment 2 of the liver. Given low volume of disease she prefers to continue therapy with short interval follow-up.  9. 10/9/2020, PET-CT shows increasing size of hepatic metastases with increased hypermetabolism, increased right inguinal and iliac lymphadenopathy, and new FDG uptake right T2 transverse process and right side of S1. MRI-brain obtained 10/21/2020 is negative for brain metastasis.  10. 10/28/2020, she starts ipilimumab 1mg/kg and nivolumab 3mg/kg.     HISTORY OF PRESENT ILLNESS  Yadira Hoang is a 75 year old female with stage IV acral melanoma. She has primary anti-PD1 CPI refractory disease and went on to receive 2nd line Ipi/Nivo. She presents prior to cycle 6 of therapy, for her 2nd nivolumab maintenance infusion, and for restaging evaluation.    PET-CT obtained 2/12/20221 shows improvement in metastatic disease. Liver shows reduction in size of all previously seen hypermetabolic lesions, no longer showing FDG uptake. There is also a lesion in the hepatic dome measuring 9 mm with SUV 5.8, which appears new. Several right inguinal and iliac lymph nodes have decreased in size and FDG uptake, but one right inguinal node appears slightly larger. There is also a new hypermetabolic lesion int he left shoulder overlying the deltoid muscle.     On immunotherapy she endorses extreme fatigue, chills, and itching. She is using the capsaicin cream with some improvements. She also endorses constipation, no diarrhea. No shortness of breath or cough. She notes side effects were more noticeable and bothersome on dual checkpoint therapy, but now on maintenance monotherapy, chills and fatigue are better.    ECOG performance status is 1.    Current Outpatient Medications   Medication     capsaicin (ZOSTRIX) 0.025 % external cream     cholecalciferol (VITAMIN D3) 125 MCG (5000 UT) TABS tablet     diclofenac  "(VOLTAREN) 1 % topical gel     estradiol (ESTRACE) 0.1 MG/GM vaginal cream     Lidocaine (LIDOCARE) 4 % Patch     LORazepam (ATIVAN) 0.5 MG tablet     mirtazapine (REMERON) 15 MG tablet     omeprazole (PRILOSEC) 20 MG DR capsule     polyethylene glycol (MIRALAX) powder     prochlorperazine (COMPAZINE) 10 MG tablet     sertraline (ZOLOFT) 25 MG tablet     Current Facility-Administered Medications   Medication     2 mL bupivacaine (MARCAINE) preservative free injection 0.25% (10 mL vial)     2 mL bupivacaine (MARCAINE) preservative free injection 0.25% (10 mL vial)       PHYSICAL EXAMINATION  /74   Pulse 108   Temp 98.2  F (36.8  C) (Oral)   Resp 16   Ht 1.702 m (5' 7.01\")   Wt 65.9 kg (145 lb 4.8 oz)   SpO2 99%   BMI 22.75 kg/m    GENERAL: Healthy, alert and no distress  EYES: Eyes grossly normal to inspection.  No discharge or erythema, or obvious scleral/conjunctival abnormalities.  HENT: Normal cephalic/atraumatic.  External ears, nose and mouth without ulcers or lesions.  No nasal drainage visible.  NECK: No asymmetry, visible masses or scars  RESP: No audible wheeze, cough, or visible cyanosis.  No visible retractions or increased work of breathing.    SKIN: Visible skin clear. No significant rash, abnormal pigmentation or lesions.  NEURO: Cranial nerves grossly intact.  Mentation and speech appropriate for age.  PSYCH: Mentation appears normal, affect normal/bright, judgement and insight intact, normal speech and appearance well-groomed.      LABS  Oncology Visit on 02/12/2021   Component Date Value Ref Range Status     Sodium 02/12/2021 141  133 - 144 mmol/L Final     Potassium 02/12/2021 3.6  3.4 - 5.3 mmol/L Final     Chloride 02/12/2021 105  94 - 109 mmol/L Final     Carbon Dioxide 02/12/2021 31  20 - 32 mmol/L Final     Anion Gap 02/12/2021 5  3 - 14 mmol/L Final     Glucose 02/12/2021 184* 70 - 99 mg/dL Final     Urea Nitrogen 02/12/2021 18  7 - 30 mg/dL Final     Creatinine 02/12/2021 0.52  " 0.52 - 1.04 mg/dL Final     GFR Estimate 02/12/2021 >90  >60 mL/min/[1.73_m2] Final     GFR Estimate If Black 02/12/2021 >90  >60 mL/min/[1.73_m2] Final     Calcium 02/12/2021 9.0  8.5 - 10.1 mg/dL Final     Bilirubin Total 02/12/2021 0.3  0.2 - 1.3 mg/dL Final     Albumin 02/12/2021 3.6  3.4 - 5.0 g/dL Final     Protein Total 02/12/2021 7.4  6.8 - 8.8 g/dL Final     Alkaline Phosphatase 02/12/2021 61  40 - 150 U/L Final     ALT 02/12/2021 22  0 - 50 U/L Final     AST 02/12/2021 12  0 - 45 U/L Final     WBC 02/12/2021 4.6  4.0 - 11.0 10e9/L Final     RBC Count 02/12/2021 4.06  3.8 - 5.2 10e12/L Final     Hemoglobin 02/12/2021 12.2  11.7 - 15.7 g/dL Final     Hematocrit 02/12/2021 37.6  35.0 - 47.0 % Final     MCV 02/12/2021 93  78 - 100 fl Final     MCH 02/12/2021 30.0  26.5 - 33.0 pg Final     MCHC 02/12/2021 32.4  31.5 - 36.5 g/dL Final     RDW 02/12/2021 12.5  10.0 - 15.0 % Final     Platelet Count 02/12/2021 314  150 - 450 10e9/L Final     Diff Method 02/12/2021 Automated Method   Final     % Neutrophils 02/12/2021 62.3  % Final     % Lymphocytes 02/12/2021 28.7  % Final     % Monocytes 02/12/2021 6.0  % Final     % Eosinophils 02/12/2021 2.4  % Final     % Basophils 02/12/2021 0.4  % Final     % Immature Granulocytes 02/12/2021 0.2  % Final     Nucleated RBCs 02/12/2021 0  0 /100 Final     Absolute Neutrophil 02/12/2021 2.9  1.6 - 8.3 10e9/L Final     Absolute Lymphocytes 02/12/2021 1.3  0.8 - 5.3 10e9/L Final     Absolute Monocytes 02/12/2021 0.3  0.0 - 1.3 10e9/L Final     Absolute Eosinophils 02/12/2021 0.1  0.0 - 0.7 10e9/L Final     Absolute Basophils 02/12/2021 0.0  0.0 - 0.2 10e9/L Final     Abs Immature Granulocytes 02/12/2021 0.0  0 - 0.4 10e9/L Final     Absolute Nucleated RBC 02/12/2021 0.0   Final     Adrenal Corticotropin 02/12/2021 <10  <47 pg/mL Final     TSH 02/12/2021 1.57  0.40 - 4.00 mU/L Final       IMAGING  PET-CT, 2/12/2021  IMPRESSION: In this patient with a history of metastatic  acral  melanoma, currently on immunotherapy with Ipilimumab and Nivolumab.  There are findings of mixed response since the previous PET/CT on  10/9/2020, although the overall extent of metastatic disease has  improved:      1. There is a new hypermetabolic lesion in the hepatic dome. The  remainder of the previously seen liver lesions are no longer  hypermetabolic, and have decreased in size.  2. Slightly increased size and FDG uptake within a single right  inguinal lymph node.  Several other right inguinal and right iliac  chain lymph nodes have decreased in size and FDG uptake since the  previous exam.  3. Hypermetabolic subcutaneous lesion in the left shoulder overlying  the deltoid muscle, new from the previous exam.  4. Several non-FDG avid pulmonary nodules are stable in size. No new  or enlarging pulmonary nodules are identified. Continued attention on  follow-up.       2/12/2021       10/9/2020            ASSESSMENT AND PLAN:    #1 Acral melanoma, right heel primary, pT4b pN3c M1, Stage IV  It was a pleasure to talk with Ms. Hoang. She is a 75 year old woman with metastatic acral melanoma to right inguinal and iliac lymph nodes, liver and bone. She progressed on first line nivolumab, but shows clear evidence of response to 2nd line ipilimumab and nivolumab. We will continue on nivolumab maintenance therapy and watch closely.     -continue nivolumab every 4 weeks with SUSI visit prior to each visit  -RTC clinic in late April/early May to see me with repeat PET-CT scan     #2 Arthritis, immunotherapy associated, grade 1  She does have joint pain, worst in the small joints on nivolumab. Previously affecting her large joints.   We will watch closely for worsening of immune mediated side effects and worsening of arthritis. She does not feel she requires steroids.    -continue using Voltaren gel  -OTC Aleve or Ibuprofen as needed    #3 Pruritis, grade 1  She finds this is manageable with the capsaicin cream.  Encouraged emmolient creams containing oatmeal (ie Aveeno). May also consider Rx for hydroxyzine if this worsens. For now given side effect of fatigue, she declines.    -continue capsaicin cream    #4 Fatigue, multifactorial  History of GUERLINE (treated), on immunotherapy, taking sedating psych medications (Ie Remeron). Continue to monitor.    #5 Depression and anxiety  Currently stable on sertraline, mirtazepine and as needed lorazepam    Multiple questions answered.       Gi Cartagena M.D.   of Medicine  Hematology, Oncology and Transplantation

## 2021-02-12 NOTE — LETTER
2/12/2021         RE: Yadira Hoang  [address] [facility] [address]        Dear Colleague,    Thank you for referring your patient, Yadira Hoang, to the [facility]. Please see a copy of my visit note below.    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 She noted a painful lesion on the sole of the right foot for a few months, since last summer.  It initially was small then became raised.  It spontaneously bled. She is not entirely sure of its appearance initially.  She denies noting any masses behind the knee or in the groin.  2. 1/9/2020, punch biopsy was performed by Dr Jessica Meadows. Patology showed melanoma, at least 3.4 mm deep with ulceration and 0 mitoses, and perineural invasion present. TILs were non-brisk and LVI not identified. pT3b.  3. 2/10/2020, she has right femoral sentinel lymph node biopsy and wide local excision (Specimen #: [id number]) and the right heel lesion extends to a depth of 6.6 mm, and invasive melanoma is present at 0.2 mm from the deep margin. Microsatellites are also present. There was 1 (of 2) lymph nodes involved. The lymph node tissue exhibits extensive subcapsular and parenchymal clusters of melanoma cells, highlighted on Melan-A immunostain. The largest cluster is 7 millimeters in greatest diameter. pT4b pN2c. PD-L1 staining is negative, <1%. No mutation in BRAF, KIT, or NRAS.  4. 2/22/2020, she had PET-CT, which showed intense FDG uptake in a right inguinal lymph node, concerning for an additional focus of metastatic disease. There is also an indeterminate right external iliac lymph node with mild FDG uptake.  5. 3/4/2020, she has medial plantar artery  fasciocutaneous instep flap and Integra placement to the donor site. On 4/2/2020, she has  additional reconstruction with skin grafting.  6. 5/22/2020, PET-CT shows a hypermetabolic right inguinal and right external iliac chain lymph node and stable pulmonary nodules.  7. 5/27/2020, she starts nivolumab for presumed low volume lymph node predominant metastatic disease.  8. 8/21/2020, PET-CT shows increase in hypermetabolic adenopathy, and a hypermetabolic nodule in segment 2 of the liver. Given low volume of disease she prefers to continue therapy with short interval follow-up.  9. 10/9/2020, PET-CT shows increasing size of hepatic metastases with increased hypermetabolism, increased right inguinal and iliac lymphadenopathy, and new FDG uptake right T2 transverse process and right side of S1. MRI-brain obtained 10/21/2020 is negative for brain metastasis.  10. 10/28/2020, she starts ipilimumab 1mg/kg and nivolumab 3mg/kg.     HISTORY OF PRESENT ILLNESS  Yadira Hoang is a 75 year old female with stage IV acral melanoma. She has primary anti-PD1 CPI refractory disease and went on to receive 2nd line Ipi/Nivo. She presents prior to cycle 6 of therapy, for her 2nd nivolumab maintenance infusion, and for restaging evaluation.    PET-CT obtained 2/12/20221 shows improvement in metastatic disease. Liver shows reduction in size of all previously seen hypermetabolic lesions, no longer showing FDG uptake. There is also a lesion in the hepatic dome measuring 9 mm with SUV 5.8, which appears new. Several right inguinal and iliac lymph nodes have decreased in size and FDG uptake, but one right inguinal node appears slightly larger. There is also a new hypermetabolic lesion int he left shoulder overlying the deltoid muscle.     On immunotherapy she endorses extreme fatigue, chills, and itching. She is using the capsaicin cream with some improvements. She also endorses constipation, no diarrhea. No shortness of breath or cough. She notes side effects were more noticeable and bothersome on dual checkpoint therapy,  "but now on maintenance monotherapy, chills and fatigue are better.    ECOG performance status is 1.    Current Outpatient Medications   Medication     capsaicin (ZOSTRIX) 0.025 % external cream     cholecalciferol (VITAMIN D3) 125 MCG (5000 UT) TABS tablet     diclofenac (VOLTAREN) 1 % topical gel     estradiol (ESTRACE) 0.1 MG/GM vaginal cream     Lidocaine (LIDOCARE) 4 % Patch     LORazepam (ATIVAN) 0.5 MG tablet     mirtazapine (REMERON) 15 MG tablet     omeprazole (PRILOSEC) 20 MG DR capsule     polyethylene glycol (MIRALAX) powder     prochlorperazine (COMPAZINE) 10 MG tablet     sertraline (ZOLOFT) 25 MG tablet     Current Facility-Administered Medications   Medication     2 mL bupivacaine (MARCAINE) preservative free injection 0.25% (10 mL vial)     2 mL bupivacaine (MARCAINE) preservative free injection 0.25% (10 mL vial)       PHYSICAL EXAMINATION  /74   Pulse 108   Temp 98.2  F (36.8  C) (Oral)   Resp 16   Ht 1.702 m (5' 7.01\")   Wt 65.9 kg (145 lb 4.8 oz)   SpO2 99%   BMI 22.75 kg/m    GENERAL: Healthy, alert and no distress  EYES: Eyes grossly normal to inspection.  No discharge or erythema, or obvious scleral/conjunctival abnormalities.  HENT: Normal cephalic/atraumatic.  External ears, nose and mouth without ulcers or lesions.  No nasal drainage visible.  NECK: No asymmetry, visible masses or scars  RESP: No audible wheeze, cough, or visible cyanosis.  No visible retractions or increased work of breathing.    SKIN: Visible skin clear. No significant rash, abnormal pigmentation or lesions.  NEURO: Cranial nerves grossly intact.  Mentation and speech appropriate for age.  PSYCH: Mentation appears normal, affect normal/bright, judgement and insight intact, normal speech and appearance well-groomed.      LABS  Oncology Visit on 02/12/2021   Component Date Value Ref Range Status     Sodium 02/12/2021 141  133 - 144 mmol/L Final     Potassium 02/12/2021 3.6  3.4 - 5.3 mmol/L Final     Chloride " 02/12/2021 105  94 - 109 mmol/L Final     Carbon Dioxide 02/12/2021 31  20 - 32 mmol/L Final     Anion Gap 02/12/2021 5  3 - 14 mmol/L Final     Glucose 02/12/2021 184* 70 - 99 mg/dL Final     Urea Nitrogen 02/12/2021 18  7 - 30 mg/dL Final     Creatinine 02/12/2021 0.52  0.52 - 1.04 mg/dL Final     GFR Estimate 02/12/2021 >90  >60 mL/min/[1.73_m2] Final     GFR Estimate If Black 02/12/2021 >90  >60 mL/min/[1.73_m2] Final     Calcium 02/12/2021 9.0  8.5 - 10.1 mg/dL Final     Bilirubin Total 02/12/2021 0.3  0.2 - 1.3 mg/dL Final     Albumin 02/12/2021 3.6  3.4 - 5.0 g/dL Final     Protein Total 02/12/2021 7.4  6.8 - 8.8 g/dL Final     Alkaline Phosphatase 02/12/2021 61  40 - 150 U/L Final     ALT 02/12/2021 22  0 - 50 U/L Final     AST 02/12/2021 12  0 - 45 U/L Final     WBC 02/12/2021 4.6  4.0 - 11.0 10e9/L Final     RBC Count 02/12/2021 4.06  3.8 - 5.2 10e12/L Final     Hemoglobin 02/12/2021 12.2  11.7 - 15.7 g/dL Final     Hematocrit 02/12/2021 37.6  35.0 - 47.0 % Final     MCV 02/12/2021 93  78 - 100 fl Final     MCH 02/12/2021 30.0  26.5 - 33.0 pg Final     MCHC 02/12/2021 32.4  31.5 - 36.5 g/dL Final     RDW 02/12/2021 12.5  10.0 - 15.0 % Final     Platelet Count 02/12/2021 314  150 - 450 10e9/L Final     Diff Method 02/12/2021 Automated Method   Final     % Neutrophils 02/12/2021 62.3  % Final     % Lymphocytes 02/12/2021 28.7  % Final     % Monocytes 02/12/2021 6.0  % Final     % Eosinophils 02/12/2021 2.4  % Final     % Basophils 02/12/2021 0.4  % Final     % Immature Granulocytes 02/12/2021 0.2  % Final     Nucleated RBCs 02/12/2021 0  0 /100 Final     Absolute Neutrophil 02/12/2021 2.9  1.6 - 8.3 10e9/L Final     Absolute Lymphocytes 02/12/2021 1.3  0.8 - 5.3 10e9/L Final     Absolute Monocytes 02/12/2021 0.3  0.0 - 1.3 10e9/L Final     Absolute Eosinophils 02/12/2021 0.1  0.0 - 0.7 10e9/L Final     Absolute Basophils 02/12/2021 0.0  0.0 - 0.2 10e9/L Final     Abs Immature Granulocytes 02/12/2021 0.0  0 -  0.4 10e9/L Final     Absolute Nucleated RBC 02/12/2021 0.0   Final     Adrenal Corticotropin 02/12/2021 <10  <47 pg/mL Final     TSH 02/12/2021 1.57  0.40 - 4.00 mU/L Final       IMAGING  PET-CT, 2/12/2021  IMPRESSION: In this patient with a history of metastatic acral  melanoma, currently on immunotherapy with Ipilimumab and Nivolumab.  There are findings of mixed response since the previous PET/CT on  10/9/2020, although the overall extent of metastatic disease has  improved:      1. There is a new hypermetabolic lesion in the hepatic dome. The  remainder of the previously seen liver lesions are no longer  hypermetabolic, and have decreased in size.  2. Slightly increased size and FDG uptake within a single right  inguinal lymph node.  Several other right inguinal and right iliac  chain lymph nodes have decreased in size and FDG uptake since the  previous exam.  3. Hypermetabolic subcutaneous lesion in the left shoulder overlying  the deltoid muscle, new from the previous exam.  4. Several non-FDG avid pulmonary nodules are stable in size. No new  or enlarging pulmonary nodules are identified. Continued attention on  follow-up.       2/12/2021       10/9/2020            ASSESSMENT AND PLAN:    #1 Acral melanoma, right heel primary, pT4b pN3c M1, Stage IV  It was a pleasure to talk with Ms. Hoang. She is a 75 year old woman with metastatic acral melanoma to right inguinal and iliac lymph nodes, liver and bone. She progressed on first line nivolumab, but shows clear evidence of response to 2nd line ipilimumab and nivolumab. We will continue on nivolumab maintenance therapy and watch closely.     -continue nivolumab every 4 weeks with SUSI visit prior to each visit  -RTC clinic in late April/early May to see me with repeat PET-CT scan     #2 Arthritis, immunotherapy associated, grade 1  She does have joint pain, worst in the small joints on nivolumab. Previously affecting her large joints.   We will watch closely  for worsening of immune mediated side effects and worsening of arthritis. She does not feel she requires steroids.    -continue using Voltaren gel  -OTC Aleve or Ibuprofen as needed    #3 Pruritis, grade 1  She finds this is manageable with the capsaicin cream. Encouraged emmolient creams containing oatmeal (ie Aveeno). May also consider Rx for hydroxyzine if this worsens. For now given side effect of fatigue, she declines.    -continue capsaicin cream    #4 Fatigue, multifactorial  History of GUERLINE (treated), on immunotherapy, taking sedating psych medications (Ie Remeron). Continue to monitor.    #5 Depression and anxiety  Currently stable on sertraline, mirtazepine and as needed lorazepam    Multiple questions answered.       Gi Cartagena M.D.   of Medicine  Hematology, Oncology and Transplantation

## 2021-02-12 NOTE — NURSING NOTE
"Oncology Rooming Note    February 12, 2021 3:38 PM   Yadira Hoang is a 75 year old female who presents for:    Chief Complaint   Patient presents with     Blood Draw     Labs drawn via  by RN. VS taken     Oncology Clinic Visit     RETURN - MELANOMA     Initial Vitals: /74   Pulse 108   Temp 98.2  F (36.8  C) (Oral)   Resp 16   Ht 1.702 m (5' 7.01\")   Wt 65.9 kg (145 lb 4.8 oz)   SpO2 99%   BMI 22.75 kg/m   Estimated body mass index is 22.75 kg/m  as calculated from the following:    Height as of this encounter: 1.702 m (5' 7.01\").    Weight as of this encounter: 65.9 kg (145 lb 4.8 oz). Body surface area is 1.77 meters squared.  No Pain (0) Comment: Data Unavailable   No LMP recorded. Patient is postmenopausal.  Allergies reviewed: Yes  Medications reviewed: Yes    Medications: Medication refills not needed today.  Pharmacy name entered into [facility]:    [facility] - [place], [place] - [address], [id number]  [facility] [address] [place] [address] [address] [facility]    Clinical concerns: No new concerns. Rigoberto was notified.      Capo Rowe LPN            "

## 2021-02-15 ENCOUNTER — INFUSION THERAPY VISIT (OUTPATIENT)
Dept: INFUSION THERAPY | Facility: CLINIC | Age: 76
End: 2021-02-15
Attending: PHYSICIAN ASSISTANT
Payer: COMMERCIAL

## 2021-02-15 VITALS
DIASTOLIC BLOOD PRESSURE: 88 MMHG | WEIGHT: 143 LBS | BODY MASS INDEX: 22.39 KG/M2 | OXYGEN SATURATION: 98 % | TEMPERATURE: 98.1 F | RESPIRATION RATE: 18 BRPM | SYSTOLIC BLOOD PRESSURE: 149 MMHG | HEART RATE: 92 BPM

## 2021-02-15 DIAGNOSIS — C43.9 MELANOMA OF SKIN (H): ICD-10-CM

## 2021-02-15 DIAGNOSIS — M62.81 GENERALIZED MUSCLE WEAKNESS: Primary | ICD-10-CM

## 2021-02-15 DIAGNOSIS — C43.71 MALIGNANT MELANOMA OF RIGHT LOWER EXTREMITY INCLUDING HIP (H): ICD-10-CM

## 2021-02-15 DIAGNOSIS — C43.9 MELANOMA OF SKIN (H): Primary | ICD-10-CM

## 2021-02-15 DIAGNOSIS — R53.83 FATIGUE: ICD-10-CM

## 2021-02-15 LAB
ACTH PLAS-MCNC: <10 PG/ML
ALBUMIN SERPL-MCNC: 3.6 G/DL (ref 3.4–5)
ALP SERPL-CCNC: 59 U/L (ref 40–150)
ALT SERPL W P-5'-P-CCNC: 22 U/L (ref 0–50)
ANION GAP SERPL CALCULATED.3IONS-SCNC: 2 MMOL/L (ref 3–14)
AST SERPL W P-5'-P-CCNC: 14 U/L (ref 0–45)
BILIRUB SERPL-MCNC: 0.3 MG/DL (ref 0.2–1.3)
BUN SERPL-MCNC: 22 MG/DL (ref 7–30)
CALCIUM SERPL-MCNC: 9.7 MG/DL (ref 8.5–10.1)
CHLORIDE SERPL-SCNC: 108 MMOL/L (ref 94–109)
CO2 SERPL-SCNC: 32 MMOL/L (ref 20–32)
CREAT SERPL-MCNC: 0.66 MG/DL (ref 0.52–1.04)
GFR SERPL CREATININE-BSD FRML MDRD: 85 ML/MIN/{1.73_M2}
GLUCOSE SERPL-MCNC: 84 MG/DL (ref 70–99)
POTASSIUM SERPL-SCNC: 4.4 MMOL/L (ref 3.4–5.3)
PROT SERPL-MCNC: 7.2 G/DL (ref 6.8–8.8)
SODIUM SERPL-SCNC: 142 MMOL/L (ref 133–144)
TSH SERPL DL<=0.005 MIU/L-ACNC: 2.45 MU/L (ref 0.4–4)

## 2021-02-15 PROCEDURE — 99207 PR NO CHARGE LOS: CPT

## 2021-02-15 PROCEDURE — 96413 CHEMO IV INFUSION 1 HR: CPT | Performed by: NURSE PRACTITIONER

## 2021-02-15 PROCEDURE — 84443 ASSAY THYROID STIM HORMONE: CPT | Performed by: INTERNAL MEDICINE

## 2021-02-15 PROCEDURE — 80053 COMPREHEN METABOLIC PANEL: CPT | Performed by: INTERNAL MEDICINE

## 2021-02-15 PROCEDURE — 36415 COLL VENOUS BLD VENIPUNCTURE: CPT | Performed by: INTERNAL MEDICINE

## 2021-02-15 RX ADMIN — Medication 250 ML: at 10:14

## 2021-02-15 NOTE — PROGRESS NOTES
Infusion Nursing Note:  Yadira Hoang presents today for C6D1 Opdivo.    Patient seen by provider today: No   present during visit today: Not Applicable.    Note: N/A.  Patient declined the covid-19 test.    Yadira states she sometimes wakes up as tired as when she went to bed.  She has not had a rehab referral.  Cancer rehab referral put in for Dr Franklin to sign    Intravenous Access:  Peripheral IV placed.    Treatment Conditions:  Lab Results   Component Value Date     02/15/2021                   Lab Results   Component Value Date    POTASSIUM 4.4 02/15/2021           No results found for: MAG         Lab Results   Component Value Date    CR 0.66 02/15/2021                   Lab Results   Component Value Date    GABY 9.7 02/15/2021                Lab Results   Component Value Date    BILITOTAL 0.3 02/15/2021           Lab Results   Component Value Date    ALBUMIN 3.6 02/15/2021                    Lab Results   Component Value Date    ALT 22 02/15/2021           Lab Results   Component Value Date    AST 14 02/15/2021       Results reviewed, labs MET treatment parameters, ok to proceed with treatment.      Post Infusion Assessment:  Patient tolerated infusion without incident.       Discharge Plan:   Patient discharged in stable condition accompanied by: self and  to drive.    Fiordaliza Angeles RN

## 2021-02-23 PROBLEM — L29.9 GENERALIZED PRURITUS: Status: ACTIVE | Noted: 2021-02-23

## 2021-02-23 RX ORDER — MIRTAZAPINE 15 MG/1
TABLET, FILM COATED ORAL
COMMUNITY
Start: 2020-09-21 | End: 2021-03-22

## 2021-02-23 RX ORDER — SERTRALINE HYDROCHLORIDE 25 MG/1
25 TABLET, FILM COATED ORAL DAILY
COMMUNITY
Start: 2020-08-21 | End: 2021-03-22

## 2021-02-23 RX ORDER — PROCHLORPERAZINE MALEATE 10 MG
TABLET ORAL
COMMUNITY
Start: 2020-10-28 | End: 2021-03-22

## 2021-03-01 ENCOUNTER — TELEPHONE (OUTPATIENT)
Dept: SURGERY | Facility: CLINIC | Age: 76
End: 2021-03-01

## 2021-03-01 NOTE — TELEPHONE ENCOUNTER
Received communication from Phoebe MARTINS requesting  staff reach out to the pt as pt  reports some foot swelling and pain. RN called pt and pt states that she has had some ongoing swelling and pain to her right foot. S/P excision of right heel melanoma 2/10/2020 and re-excision on 2/20/2010. Pt states that the swelling and pain is not new but pt reports that nothing she has tried is helping. Pt reports elevating her foot in bed but then was told by the PT to stop doing that. Pt commented that she thinks maybe the shoes she has been wearing are causing increased pain to her foot. RN advised pt to wear non-restrictive but supportive foot wear and keep foot elevated when at home if this provides relief for the pt and helps with the swelling. RN notified pt that pt has an upcoming appointment on 3/17 but RN can offer a sooner appointment. Pt agrees with this plan.Appointment moved up to next Monday 3/8 at 3pm. Pt advised if worsening symptoms occur to call the clinic back prior to her appointment on 3/8. Pt verbalized understanding and thanked RN for the call...Melissa Ellsworth RN

## 2021-03-02 NOTE — TELEPHONE ENCOUNTER
reached out to RN advising that unless pt would like to see  from a surgical standpoint pt does not need to follow up with .  also included  on this message and asked for his recommendation.  requesting Daniela his RN to set up an SUSI visit to sort out her pain and discuss if it could be immunotherapy related. RN called pt to notify of message above. Pt states understanding and will await further communication from 's team. Appt with  cancelled on 3/8..Melissa Ellsworth RN

## 2021-03-03 ENCOUNTER — VIRTUAL VISIT (OUTPATIENT)
Dept: ONCOLOGY | Facility: CLINIC | Age: 76
End: 2021-03-03
Attending: PHYSICIAN ASSISTANT
Payer: COMMERCIAL

## 2021-03-03 DIAGNOSIS — C43.71 MALIGNANT MELANOMA OF RIGHT LOWER EXTREMITY INCLUDING HIP (H): ICD-10-CM

## 2021-03-03 DIAGNOSIS — M79.89 RIGHT LEG SWELLING: Primary | ICD-10-CM

## 2021-03-03 DIAGNOSIS — M25.571 PAIN IN JOINT, ANKLE AND FOOT, RIGHT: ICD-10-CM

## 2021-03-03 PROCEDURE — 99214 OFFICE O/P EST MOD 30 MIN: CPT | Mod: 95 | Performed by: PHYSICIAN ASSISTANT

## 2021-03-03 PROCEDURE — 999N001193 HC VIDEO/TELEPHONE VISIT; NO CHARGE

## 2021-03-03 NOTE — LETTER
"  3/3/2021       RE: Yadira Hoang  [address] [facility] [address]      Dear Colleague,    Thank you for referring your patient, Yadira Hoang, to the [facility]. Please see a copy of my visit note below.    Yadiar is a 75 year old who is being evaluated via a billable telephone visit.      What phone number would you like to be contacted at? [phone]  How would you like to obtain your AVS? Mail a copy     I have reviewed and updated the patient's allergies and medication list.    Concerns: none  Refills: none     Vitals - Patient Reported  Weight (Patient Reported): 62.6 kg (138 lb)  Height (Patient Reported): 170.2 cm (5' 7\")  BMI (Based on Pt Reported Ht/Wt): 21.61  Pain Score: Extreme Pain (8)  Pain Loc: Foot    Jessica Valladares CMA    Phone call duration: 16 minutes    Oncology/Hematology Visit Note  Mar 3, 2021    Reason for Visit: Follow up of metastatic melanoma, add on right foot pain    History of Present Illness: Yadira Hoang is a 75 year old female with metastatic melanoma. She initially presented with a painful lesion on her right foot that was biopsied Jan 2020 and found to be melanoma. She underwent multiple surgeries in 2020 for resection and reconstruction. May 2020 PET showed metastatic disease so she was started on Nivolumab, had progression so added Ipilimumab October 2020. She is now on Nivo maintenance and has had positive response to treatment. Please see Dr. Franklin note for more details.     I was asked to see her today as an add on for right foot pain.     Interval History:  Yadira was called today for oncology follow-up. She has had pain in her right foot since her multiple surgeries last year, but notes starting this last fall it began to increase. She attributed this to trying to wear different shoes. The pain acutely worsened a few weeks ago when she thinks she may have twisted her ankle. She is able to walk but notes the pain " limits this. She feels like her heel is uneven which also makes it harder to walk and she does better with a walker/cane. She favors her left foot so it has started to bother her a bit as well. She denies any redness or warmth to right foot or ankle. She does feel like she can feel every nerve in her foot and now up into the right ankle. She is doing topical agents and oils for pain relief. She denies any other joint pain. She is clear her pain started before Ipi was added in the fall. She also admits to right leg swelling, which also has been present since her initial surgeries last year, but it seems a bit worse. She thinks this may be from trying to walk more. She was doing elevation and ice but PT told her to stop this. She denies any fevers.     Current Outpatient Medications   Medication Sig Dispense Refill     cholecalciferol (VITAMIN D3) 125 MCG (5000 UT) TABS tablet Take 2,000 Units by mouth every other day        diclofenac (VOLTAREN) 1 % topical gel        estradiol (ESTRACE) 0.1 MG/GM vaginal cream        mirtazapine (REMERON) 15 MG tablet        omeprazole (PRILOSEC) 20 MG DR capsule TAKE 1 CAPSULE BY MOUTH ONCE DAILY       polyethylene glycol (MIRALAX) powder Take 17 g by mouth       prochlorperazine (COMPAZINE) 10 MG tablet        sertraline (ZOLOFT) 25 MG tablet Take 25 mg by mouth daily       Physical Examination:  There were no vitals taken for this visit.  Wt Readings from Last 10 Encounters:   02/15/21 64.9 kg (143 lb)   02/12/21 65.9 kg (145 lb 4.8 oz)   01/20/21 64.4 kg (142 lb)   12/30/20 65 kg (143 lb 6.4 oz)   12/09/20 65.2 kg (143 lb 12.8 oz)   11/18/20 65 kg (143 lb 3.2 oz)   10/28/20 64.9 kg (143 lb 1.6 oz)   10/16/20 65.5 kg (144 lb 8 oz)   10/02/20 64.5 kg (142 lb 3.2 oz)   10/01/20 64.5 kg (142 lb 3.2 oz)     Unable to do physical exam 2/2 telephone visit. Well sounding in no distress. Normal speech and thought process. Good voice quality. No audible wheezing or cough.    Laboratory  Data:  No labs to review      Assessment and Plan:  1. Right Foot/Ankle Pain  Has been present since her surgeries in 2020 but began to worsen in fall 2020 and now continues to increase. The pain is very specific to her right foot/ankle with no other joint involvement so do not feel this is immune mediated arthralgias. She denies any redness, warmth, fevers to suggest infection. She did worry about recent jngury and worsening swelling so imaging is warranted. Will get right lower extremity US, right foot and ankle xray, and then re-assess. Likely will need her to see ortho for brace pending results. Will also discuss with Dr. Franklin since patient states she had this pain when she saw him in February and he saw her in person for an exam.    10 minutes spent on the date of the encounter doing chart review, documentation and discussion with other provider(s), in addition to 16 minutes spent on the phone with the patient.     Woody Dalal PA-C  [facility]  [address] [place] [address]  [phone]

## 2021-03-03 NOTE — PROGRESS NOTES
"Yadira is a 75 year old who is being evaluated via a billable telephone visit.      What phone number would you like to be contacted at? [phone]  How would you like to obtain your AVS? Mail a copy     I have reviewed and updated the patient's allergies and medication list.    Concerns: none  Refills: none     Vitals - Patient Reported  Weight (Patient Reported): 62.6 kg (138 lb)  Height (Patient Reported): 170.2 cm (5' 7\")  BMI (Based on Pt Reported Ht/Wt): 21.61  Pain Score: Extreme Pain (8)  Pain Loc: Foot    Jessica aVlladares CMA    Phone call duration: 16 minutes    Oncology/Hematology Visit Note  Mar 3, 2021    Reason for Visit: Follow up of metastatic melanoma, add on right foot pain    History of Present Illness: Yadira Hoang is a 75 year old female with metastatic melanoma. She initially presented with a painful lesion on her right foot that was biopsied Jan 2020 and found to be melanoma. She underwent multiple surgeries in 2020 for resection and reconstruction. May 2020 PET showed metastatic disease so she was started on Nivolumab, had progression so added Ipilimumab October 2020. She is now on Nivo maintenance and has had positive response to treatment. Please see Dr. Franklin note for more details.     I was asked to see her today as an add on for right foot pain.     Interval History:  Yadira was called today for oncology follow-up. She has had pain in her right foot since her multiple surgeries last year, but notes starting this last fall it began to increase. She attributed this to trying to wear different shoes. The pain acutely worsened a few weeks ago when she thinks she may have twisted her ankle. She is able to walk but notes the pain limits this. She feels like her heel is uneven which also makes it harder to walk and she does better with a walker/cane. She favors her left foot so it has started to bother her a bit as well. She denies any redness or warmth to right foot or ankle. She does " feel like she can feel every nerve in her foot and now up into the right ankle. She is doing topical agents and oils for pain relief. She denies any other joint pain. She is clear her pain started before Ipi was added in the fall. She also admits to right leg swelling, which also has been present since her initial surgeries last year, but it seems a bit worse. She thinks this may be from trying to walk more. She was doing elevation and ice but PT told her to stop this. She denies any fevers.     Current Outpatient Medications   Medication Sig Dispense Refill     cholecalciferol (VITAMIN D3) 125 MCG (5000 UT) TABS tablet Take 2,000 Units by mouth every other day        diclofenac (VOLTAREN) 1 % topical gel        estradiol (ESTRACE) 0.1 MG/GM vaginal cream        mirtazapine (REMERON) 15 MG tablet        omeprazole (PRILOSEC) 20 MG DR capsule TAKE 1 CAPSULE BY MOUTH ONCE DAILY       polyethylene glycol (MIRALAX) powder Take 17 g by mouth       prochlorperazine (COMPAZINE) 10 MG tablet        sertraline (ZOLOFT) 25 MG tablet Take 25 mg by mouth daily       Physical Examination:  There were no vitals taken for this visit.  Wt Readings from Last 10 Encounters:   02/15/21 64.9 kg (143 lb)   02/12/21 65.9 kg (145 lb 4.8 oz)   01/20/21 64.4 kg (142 lb)   12/30/20 65 kg (143 lb 6.4 oz)   12/09/20 65.2 kg (143 lb 12.8 oz)   11/18/20 65 kg (143 lb 3.2 oz)   10/28/20 64.9 kg (143 lb 1.6 oz)   10/16/20 65.5 kg (144 lb 8 oz)   10/02/20 64.5 kg (142 lb 3.2 oz)   10/01/20 64.5 kg (142 lb 3.2 oz)     Unable to do physical exam 2/2 telephone visit. Well sounding in no distress. Normal speech and thought process. Good voice quality. No audible wheezing or cough.    Laboratory Data:  No labs to review      Assessment and Plan:  1. Right Foot/Ankle Pain  Has been present since her surgeries in 2020 but began to worsen in fall 2020 and now continues to increase. The pain is very specific to her right foot/ankle with no other joint  involvement so do not feel this is immune mediated arthralgias. She denies any redness, warmth, fevers to suggest infection. She did worry about recent jngury and worsening swelling so imaging is warranted. Will get right lower extremity US, right foot and ankle xray, and then re-assess. Likely will need her to see ortho for brace pending results. Will also discuss with Dr. Franklin since patient states she had this pain when she saw him in February and he saw her in person for an exam.    10 minutes spent on the date of the encounter doing chart review, documentation and discussion with other provider(s), in addition to 16 minutes spent on the phone with the patient.     Addendum: US with no DVT and Xray without fracture. She does have sclerosis and degenerative changes, will have her see orthopedics to discuss bracing vs other management options. Patient agreeable with this plan.    Woody Dalal PA-C  [facility]  [address] [place], [place] [address] [phone]

## 2021-03-04 ENCOUNTER — ANCILLARY PROCEDURE (OUTPATIENT)
Dept: GENERAL RADIOLOGY | Facility: CLINIC | Age: 76
End: 2021-03-04
Attending: PHYSICIAN ASSISTANT
Payer: COMMERCIAL

## 2021-03-04 ENCOUNTER — ANCILLARY PROCEDURE (OUTPATIENT)
Dept: ULTRASOUND IMAGING | Facility: CLINIC | Age: 76
End: 2021-03-04
Attending: PHYSICIAN ASSISTANT
Payer: COMMERCIAL

## 2021-03-04 DIAGNOSIS — M25.571 PAIN IN JOINT, ANKLE AND FOOT, RIGHT: ICD-10-CM

## 2021-03-04 DIAGNOSIS — C43.71 MALIGNANT MELANOMA OF RIGHT LOWER EXTREMITY INCLUDING HIP (H): ICD-10-CM

## 2021-03-04 DIAGNOSIS — M79.89 RIGHT LEG SWELLING: ICD-10-CM

## 2021-03-04 PROCEDURE — 73630 X-RAY EXAM OF FOOT: CPT | Mod: RT | Performed by: RADIOLOGY

## 2021-03-04 PROCEDURE — 73610 X-RAY EXAM OF ANKLE: CPT | Mod: RT | Performed by: RADIOLOGY

## 2021-03-04 PROCEDURE — 93971 EXTREMITY STUDY: CPT | Mod: RT

## 2021-03-08 ENCOUNTER — TELEPHONE (OUTPATIENT)
Dept: ORTHOPEDICS | Facility: CLINIC | Age: 76
End: 2021-03-08

## 2021-03-10 ENCOUNTER — OFFICE VISIT (OUTPATIENT)
Dept: PODIATRY | Facility: CLINIC | Age: 76
End: 2021-03-10
Payer: COMMERCIAL

## 2021-03-10 VITALS — HEART RATE: 92 BPM | SYSTOLIC BLOOD PRESSURE: 114 MMHG | DIASTOLIC BLOOD PRESSURE: 70 MMHG

## 2021-03-10 DIAGNOSIS — M25.571 PAIN IN JOINT, ANKLE AND FOOT, RIGHT: ICD-10-CM

## 2021-03-10 DIAGNOSIS — C43.71 MALIGNANT MELANOMA OF RIGHT LOWER EXTREMITY INCLUDING HIP (H): ICD-10-CM

## 2021-03-10 PROCEDURE — 99243 OFF/OP CNSLTJ NEW/EST LOW 30: CPT | Performed by: PODIATRIST

## 2021-03-10 NOTE — LETTER
3/10/2021         RE: Yadira Hoang  [address] [facility] [address]        Dear Colleague,    Thank you for referring your patient, Yadira Hoang, to the [facility]. Please see a copy of my visit note below.    Subjective:    Patient is seen today in consult from Woody Monzon with history of right foot pain.  Patient has a history of metastatic melanoma biopsied on right foot in January 2020.  She underwent multiple surgeries for resection and reconstruction.  Initially after her surgeries she was walking in some type of boot.  She then graduated walking in a sock around her house.  She states about a month ago she started wearing a shoe.  She is getting pain on her foot.  Patient has tried different shoes.  She also twisted her ankle approximately a month ago.  She feels as though her plantar fat pad on her heel is mobile.  She denies edema ecchymosis or erythema.  She is having no drainage from her skin graft or other surgical areas.  She states she also gets swelling on her right leg.  She was elevating which helped resolve this but apparently physical therapy had her stop doing this.  Patient has history of osteopenia.    ROS:  A 10-point review of systems was performed and is positive for that noted in the HPI and as seen below.  All other areas are negative.        Allergies   Allergen Reactions     Atorvastatin      Statins all swelling     Hmg-Coa-R Inhibitors Swelling       Current Outpatient Medications   Medication Sig Dispense Refill     cholecalciferol (VITAMIN D3) 125 MCG (5000 UT) TABS tablet Take 2,000 Units by mouth every other day        diclofenac (VOLTAREN) 1 % topical gel        estradiol (ESTRACE) 0.1 MG/GM vaginal cream        mirtazapine (REMERON) 15 MG tablet        omeprazole (PRILOSEC) 20 MG DR capsule TAKE 1 CAPSULE BY MOUTH ONCE DAILY       polyethylene glycol (MIRALAX) powder Take 17 g by mouth       prochlorperazine (COMPAZINE)  10 MG tablet        sertraline (ZOLOFT) 25 MG tablet Take 25 mg by mouth daily         Patient Active Problem List   Diagnosis     Malignant melanoma of right lower extremity including hip (H)     Melanoma of skin (H)     GUERLINE (obstructive sleep apnea)     Chronic congestion of paranasal sinus     Weakness of foot     Neck pain, chronic     Myofascial pain     Intractable migraine without aura and without status migrainosus     History of multiple concussions     Foot pain, bilateral     Fibromyositis     Difficulty walking     Chronic pain disorder     Chronic fatigue     Bilateral occipital neuralgia     Articular disc disorder of temporomandibular joint     S/P flap graft     Osteopenia     Injury of head     Hyperlipidemia LDL goal <100     ALEXUS (generalized anxiety disorder)     Major depression, recurrent, chronic (H)     Secondary malignant neoplasm of bone (H)     Generalized pruritus       Past Medical History:   Diagnosis Date     Concussion      ALEXUS (generalized anxiety disorder) 5/7/2020     Major depression, recurrent, chronic (H) 5/7/2020     Melanoma (H)      Nonsenile cataract      Sleep apnea     CPAP       Past Surgical History:   Procedure Laterality Date     BIOPSY NODE SENTINEL Right 2/10/2020    Procedure: [place] Lymph Node Mapping And Biopsy;  Surgeon: Gabriela Dorsey MD;  Location: [facility] OR     EXCISE LESION LOWER EXTREMITY Right 2/10/2020    Procedure: Wide Local Excision of RIGHT heel melanoma;  Surgeon: Gabriela Dorsey MD;  Location: [facility] OR     EXCISE LESION LOWER EXTREMITY Right 2/20/2020    Procedure: Wide local Re-excision RIGHT heel Wound vac application;  Surgeon: Gabriela Dorsey MD;  Location: [facility] OR     GRAFT SKIN SPLIT THICKNESS FROM EXTREMITY Right 4/2/2020    Procedure: split skin graft from right thigh;  Surgeon: NIKA Crabtree MD;  Location: [facility] OR     IRRIGATION AND DEBRIDEMENT FOOT, COMBINED Right 4/2/2020    Procedure: right foot/heel debridement;   Surgeon: NIKA Crabtree MD;  Location: [facility] OR     ORTHOPEDIC SURGERY Right     heel surgery x2     RECONSTRUCT FOOT Right 3/4/2020    Procedure: Right heel reconstruction with regional flap, biologic dressing and SPY;  Surgeon: NIKA Crabtree MD;  Location: [facility] OR     TUBAL LIGATION         Family History   Problem Relation Age of Onset     Glaucoma No family hx of      Macular Degeneration No family hx of      Melanoma No family hx of      Skin Cancer No family hx of        Social History     Tobacco Use     Smoking status: Never Smoker     Smokeless tobacco: Never Used   Substance Use Topics     Alcohol use: Not Currently     Frequency: Never         Objective:    Vitals: /70   Pulse 92   BMI: There is no height or weight on file to calculate BMI.  Height: Data Unavailable    Constitutional/ general:  Pt is in no apparent distress, appears well-nourished.  Cooperative with history and physical exam.     Psych:  The patient answered questions appropriately.  Normal affect.  Seems to have reasonable expectations, in terms of treatment.     Eyes:  Visual scanning/ tracking without deficit.     Ears:  Response to auditory stimuli is normal.  No hearing aid devices.  Auricles in proper alignment.     Lymphatic:  Popliteal lymph nodes not enlarged.     Lungs:  Non labored breathing, non labored speech. No cough.  No audible wheezing. Even, quiet breathing.       Vascular:  Pedal pulses are palpable bilaterally for both the DP and PT arteries.  CFT < 3 sec.  No edema.  Pedal hair growth noted.     Neuro:  Alert and oriented x 3. Coordinated gait.  Light touch sensation is intact to the L4, L5, S1 distributions. No obvious deficits.  No evidence of neurological-based weakness, spasticity, or contracture in the lower extremities.     Derm: Normal texture and turgor.  No erythema, ecchymosis, or cyanosis.  No open lesions.     Musculoskeletal:    Lower extremity muscle strength is normal.  Patient is  ambulatory without an assistive device or brace.  No gross deformities.   Normal arch with weightbearing.   ROM is within normal limits.   On the right foot on plantar arch we can see where a significant portion of soft tissue has been removed and there is a skin graft here.  There is very little cushion.  There is pain on palpation here.  The fat pad under the heel is quite mobile and we can see where this was removed and reattached.  Is painful on palpation all around.  There is no pain with stressing or palpation of her tibialis anterior or EHL tendons.  Some pain around her ankle but very difficult to localize.  She has edema in both of her legs and is slightly increased on the right versus the left.    Radiographic Exam:  X-Ray Findings:  I personally reviewed the films.  Osteopenia.  Perhaps some patchy sclerosis of the talar dome    Assessment: Right lower extremity pain    Plan:  X-rays from past personally reviewed.  Discussed with patient how pain in arch and plantar heel due to loss of natural cushioning.  It may be very difficult to give patient relief from this.  We wrote her prescription to talk to the orthotist about either getting a very soft supportive orthotic or possibly even an AFO.  I discussed with the patient she should continue to elevate her leg to decrease the edema.  She can also wrap this with an Ace bandage.  I am not sure she would be able to tolerate compression stockings with the pain in her arch and heel.  If pain around her ankle persists discussed we could get MRI to evaluate for any occult bone lesions especially in light of her osteopenia.  She will call me if she would like to set this up.  Patient will try first getting more support for her foot to see if this is helpful.  We also try to CAM Walker on her foot today see if this would help relieve some of her temporary pain but she did not like it.  I do not think she would be able to tolerate an ankle brace at all as it would  come right across her skin graft.  Thank you for allowing me participate in the care of this patient.        Hang Doss DPM DPM, FACFAS        Again, thank you for allowing me to participate in the care of your patient.        Sincerely,        Hang Doss DPM

## 2021-03-10 NOTE — PROGRESS NOTES
"Yadira is a 75 year old who is being evaluated via a billable telephone visit.      What phone number would you like to be contacted at? [phone]  How would you like to obtain your AVS? Montserrat     I have reviewed and updated the patient's allergies and medication list.    Concerns: none  Refills: none     Vitals - Patient Reported  Weight (Patient Reported): 62.6 kg (138 lb)  Height (Patient Reported): 170.2 cm (5' 7\")  BMI (Based on Pt Reported Ht/Wt): 21.61  Pain Score: No Pain (0)    Jessica Valladares CMA    Phone call duration: 26 minutes      Oncology/Hematology Visit Note  Mar 12, 2021    Reason for Visit: Follow up of stage IV melanoma    History of Present Illness: Yadira Hoang is a 75 year old female who presents for video call to follow up on her acral melanoma, stage IV, per oncologic history below:     Oncologic History: Acral melanoma, stage IV, s/p primary resection and reconstruction  1. She noted a painful lesion on the sole of the right foot for a few months, since summer 2019.  It initially was small then became raised.  It spontaneously bled. She is not entirely sure of its appearance initially.  She denies noting any masses behind the knee or in the groin.  2. 1/9/2020, punch biopsy was performed by Dr Jessica Alvarado.  It showed melanoma, at least 3.4 mm deep with ulceration and 0 mitoses, and perineural invasion present. TILs were non-brisk and LVI not identified. pT3b.  3. 2/10/2020, she has right femoral sentinel lymph node biopsy and wide local excision (Specimen #: [id number]) and the right heel lesion extends to a depth of 6.6 mm, and invasive melanoma is present at 0.2 mm from the deep margin. Microsatellites are also present. There was 1 (of 2) lymph nodes involved. The lymph node tissue exhibits extensive subcapsular and parenchymal clusters of melanoma cells, highlighted on Melan-A immunostain. The largest cluster is 7 millimeters in greatest diameter. pT4b pN2c.  4. 2/22/2020, she had " "PET-CT, which showed intense FDG uptake in a right inguinal lymph node, concerning for an additional focus of metastatic disease. There is also an indeterminate right external iliac lymph node with mild FDG uptake.  5. 3/4/2020, she has medial plantar artery  fasciocutaneous instep flap and Integra placement to the donor site. On 4/2/2020, she has additional reconstruction with skin grafting.  6. 5/27/2020, initiated on nivolumab immunotherapy.  Had C2 6/24/20. C3 on 7/22/20. C4 on 9/3/2020.  7. 8/21/2020 evidence of a new liver metastasis on PET-CT.  9. 10/9/2020, PET-CT shows increasing size of hepatic metastases with increased hypermetabolism, increased right inguinal and iliac lymphadenopathy, and new FDG uptake right T2 transverse process and right side of S1. MRI-brain obtained 10/21/2020 is negative for brain metastasis.  10. 10/28/2020, she starts ipilimumab 1mg/kg and nivolumab 3mg/kg.    She had response to ipi/nivo and is now on nivo maintenance.     She was called today for oncology follow-up.       Interval History:  Ms. Hoang was called today for oncology follow-up. She admits to fatigue that comes and goes but is overall manageable and she states she \"can live with it\" since she hasn't had many other side effects from nivolumab. She continues to have pain in her foot. She doesn't want to wear CAM boot because she is worried about falling. She would like to try compression stockings and get a new brace (she has one that she is wearing that seems to be helping). She thinks she has an orthotic at home that she could try. She would be interested in seeing [facility]opedics at the [facility] for additional assistance.     Her dry itchy skin is much improved with doing oatmeal bath. She is wondering about what lotion to buy, otherwise no skin concerns. She would like to have a hearing test done. She is also wondering about getting her COVID vaccine.    She denies fevers, chest pain, SOB, cough, nausea, " vomiting, abdominal pain, bowel or bladder concerns. Eating and drinking well.     Current Outpatient Medications   Medication Sig Dispense Refill     cholecalciferol (VITAMIN D3) 125 MCG (5000 UT) TABS tablet Take 2,000 Units by mouth every other day        diclofenac (VOLTAREN) 1 % topical gel        estradiol (ESTRACE) 0.1 MG/GM vaginal cream        mirtazapine (REMERON) 15 MG tablet        omeprazole (PRILOSEC) 20 MG DR capsule TAKE 1 CAPSULE BY MOUTH ONCE DAILY       polyethylene glycol (MIRALAX) powder Take 17 g by mouth       prochlorperazine (COMPAZINE) 10 MG tablet        sertraline (ZOLOFT) 25 MG tablet Take 25 mg by mouth daily       Physical Examination:  There were no vitals taken for this visit.  Wt Readings from Last 10 Encounters:   02/15/21 64.9 kg (143 lb)   02/12/21 65.9 kg (145 lb 4.8 oz)   01/20/21 64.4 kg (142 lb)   12/30/20 65 kg (143 lb 6.4 oz)   12/09/20 65.2 kg (143 lb 12.8 oz)   11/18/20 65 kg (143 lb 3.2 oz)   10/28/20 64.9 kg (143 lb 1.6 oz)   10/16/20 65.5 kg (144 lb 8 oz)   10/02/20 64.5 kg (142 lb 3.2 oz)   10/01/20 64.5 kg (142 lb 3.2 oz)     Unable to do physical exam 2/2 telephone visit. Well sounding in no distress. Normal speech and thought process. Good voice quality. No audible wheezing or cough.    Laboratory Data:  To be done with infusion on Monday      Assessment and Plan:  1. Onc  Acral melanoma, right heel primary, pT4b pN3c M1, Stage IV. 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 She progressed on first line nivolumab, but shows clear evidence of response to 2nd line ipilimumab and nivolumab. We will continue on nivolumab maintenance therapy and watch closely. Tolerating well with fatigue and dry skin but no other side effects.      Continue nivolumab every 4 weeks with SUSI visit prior to each visit, Late April/early May to see Dr. Franklin with repeat PET-CT scan    OK for COVID vaccine when she wants to schedule, discussed ways to  do this     2. MSK  Right foot pain 2/2 prior surgery (no concern for immune mediated arthralgias given specific to post surgical pain in right foot/ankle). Saw ortho at  but would like to see at [facility] as well. Continue brace and orthotic she has at home. Will write prescription for compression stockings.    Can use Aleve or Ibuprofen PRN along with Voltaren gel.     3. Derm  Dry skin and pruritis improved with oatmeal bath and discussed Aveeno lotion. Has capsaicin cream PRN. Overall skin improved.    4. Psych/Neuro  Multifactorial fatigue overall manageable, TSH and cortisol have been WNL   Continue Sertraline, Remeron, PRN Lorazepam for depression and anxiety, not discussed in detail.    5. ENT  Audiology referral placed per patient request.    5 minutes spent on the date of the encounter doing chart review, in addition to 26 minutes spent on the phone with the patient. Documentation performed after encounter date.    Woody Dalal PA-C  [facility]  [address], [place] [address] [phone]

## 2021-03-11 NOTE — PROGRESS NOTES
Subjective:    Patient is seen today in consult from Woody Monzon with history of right foot pain.  Patient has a history of metastatic melanoma biopsied on right foot in January 2020.  She underwent multiple surgeries for resection and reconstruction.  Initially after her surgeries she was walking in some type of boot.  She then graduated walking in a sock around her house.  She states about a month ago she started wearing a shoe.  She is getting pain on her foot.  Patient has tried different shoes.  She also twisted her ankle approximately a month ago.  She feels as though her plantar fat pad on her heel is mobile.  She denies edema ecchymosis or erythema.  She is having no drainage from her skin graft or other surgical areas.  She states she also gets swelling on her right leg.  She was elevating which helped resolve this but apparently physical therapy had her stop doing this.  Patient has history of osteopenia.    ROS:  A 10-point review of systems was performed and is positive for that noted in the HPI and as seen below.  All other areas are negative.        Allergies   Allergen Reactions     Atorvastatin      Statins all swelling     Hmg-Coa-R Inhibitors Swelling       Current Outpatient Medications   Medication Sig Dispense Refill     cholecalciferol (VITAMIN D3) 125 MCG (5000 UT) TABS tablet Take 2,000 Units by mouth every other day        diclofenac (VOLTAREN) 1 % topical gel        estradiol (ESTRACE) 0.1 MG/GM vaginal cream        mirtazapine (REMERON) 15 MG tablet        omeprazole (PRILOSEC) 20 MG DR capsule TAKE 1 CAPSULE BY MOUTH ONCE DAILY       polyethylene glycol (MIRALAX) powder Take 17 g by mouth       prochlorperazine (COMPAZINE) 10 MG tablet        sertraline (ZOLOFT) 25 MG tablet Take 25 mg by mouth daily         Patient Active Problem List   Diagnosis     Malignant melanoma of right lower extremity including hip (H)     Melanoma of skin (H)     GUERLINE (obstructive sleep apnea)      Chronic congestion of paranasal sinus     Weakness of foot     Neck pain, chronic     Myofascial pain     Intractable migraine without aura and without status migrainosus     History of multiple concussions     Foot pain, bilateral     Fibromyositis     Difficulty walking     Chronic pain disorder     Chronic fatigue     Bilateral occipital neuralgia     Articular disc disorder of temporomandibular joint     S/P flap graft     Osteopenia     Injury of head     Hyperlipidemia LDL goal <100     ALEXUS (generalized anxiety disorder)     Major depression, recurrent, chronic (H)     Secondary malignant neoplasm of bone (H)     Generalized pruritus       Past Medical History:   Diagnosis Date     Concussion      ALEXUS (generalized anxiety disorder) 5/7/2020     Major depression, recurrent, chronic (H) 5/7/2020     Melanoma (H)      Nonsenile cataract      Sleep apnea     CPAP       Past Surgical History:   Procedure Laterality Date     BIOPSY NODE SENTINEL Right 2/10/2020    Procedure: [place] Lymph Node Mapping And Biopsy;  Surgeon: Gabriela Dorsey MD;  Location: [facility] OR     EXCISE LESION LOWER EXTREMITY Right 2/10/2020    Procedure: Wide Local Excision of RIGHT heel melanoma;  Surgeon: Gabriela Dorsey MD;  Location: [facility] OR     EXCISE LESION LOWER EXTREMITY Right 2/20/2020    Procedure: Wide local Re-excision RIGHT heel Wound vac application;  Surgeon: Gabriela Dorsey MD;  Location: [facility]     GRAFT SKIN SPLIT THICKNESS FROM EXTREMITY Right 4/2/2020    Procedure: split skin graft from right thigh;  Surgeon: NIKA Crabtree MD;  Location: [facility] OR     IRRIGATION AND DEBRIDEMENT FOOT, COMBINED Right 4/2/2020    Procedure: right foot/heel debridement;  Surgeon: NIKA Crabtree MD;  Location: [facility] OR     ORTHOPEDIC SURGERY Right     heel surgery x2     RECONSTRUCT FOOT Right 3/4/2020    Procedure: Right heel reconstruction with regional flap, biologic dressing and SPY;  Surgeon: NIKA Crabtree MD;   Location: [facility] OR     TUBAL LIGATION         Family History   Problem Relation Age of Onset     Glaucoma No family hx of      Macular Degeneration No family hx of      Melanoma No family hx of      Skin Cancer No family hx of        Social History     Tobacco Use     Smoking status: Never Smoker     Smokeless tobacco: Never Used   Substance Use Topics     Alcohol use: Not Currently     Frequency: Never         Objective:    Vitals: /70   Pulse 92   BMI: There is no height or weight on file to calculate BMI.  Height: Data Unavailable    Constitutional/ general:  Pt is in no apparent distress, appears well-nourished.  Cooperative with history and physical exam.     Psych:  The patient answered questions appropriately.  Normal affect.  Seems to have reasonable expectations, in terms of treatment.     Eyes:  Visual scanning/ tracking without deficit.     Ears:  Response to auditory stimuli is normal.  No hearing aid devices.  Auricles in proper alignment.     Lymphatic:  Popliteal lymph nodes not enlarged.     Lungs:  Non labored breathing, non labored speech. No cough.  No audible wheezing. Even, quiet breathing.       Vascular:  Pedal pulses are palpable bilaterally for both the DP and PT arteries.  CFT < 3 sec.  No edema.  Pedal hair growth noted.     Neuro:  Alert and oriented x 3. Coordinated gait.  Light touch sensation is intact to the L4, L5, S1 distributions. No obvious deficits.  No evidence of neurological-based weakness, spasticity, or contracture in the lower extremities.     Derm: Normal texture and turgor.  No erythema, ecchymosis, or cyanosis.  No open lesions.     Musculoskeletal:    Lower extremity muscle strength is normal.  Patient is ambulatory without an assistive device or brace.  No gross deformities.   Normal arch with weightbearing.   ROM is within normal limits.   On the right foot on plantar arch we can see where a significant portion of soft tissue has been removed and there is a skin  graft here.  There is very little cushion.  There is pain on palpation here.  The fat pad under the heel is quite mobile and we can see where this was removed and reattached.  Is painful on palpation all around.  There is no pain with stressing or palpation of her tibialis anterior or EHL tendons.  Some pain around her ankle but very difficult to localize.  She has edema in both of her legs and is slightly increased on the right versus the left.    Radiographic Exam:  X-Ray Findings:  I personally reviewed the films.  Osteopenia.  Perhaps some patchy sclerosis of the talar dome    Assessment: Right lower extremity pain    Plan:  X-rays from past personally reviewed.  Discussed with patient how pain in arch and plantar heel due to loss of natural cushioning.  It may be very difficult to give patient relief from this.  We wrote her prescription to talk to the orthotist about either getting a very soft supportive orthotic or possibly even an AFO.  I discussed with the patient she should continue to elevate her leg to decrease the edema.  She can also wrap this with an Ace bandage.  I am not sure she would be able to tolerate compression stockings with the pain in her arch and heel.  If pain around her ankle persists discussed we could get MRI to evaluate for any occult bone lesions especially in light of her osteopenia.  She will call me if she would like to set this up.  Patient will try first getting more support for her foot to see if this is helpful.  We also try to CAM Walker on her foot today see if this would help relieve some of her temporary pain but she did not like it.  I do not think she would be able to tolerate an ankle brace at all as it would come right across her skin graft.  Thank you for allowing me participate in the care of this patient.        Hang Doss, SHAGGY DPM, FACFAS

## 2021-03-12 ENCOUNTER — VIRTUAL VISIT (OUTPATIENT)
Dept: ONCOLOGY | Facility: CLINIC | Age: 76
End: 2021-03-12
Attending: PHYSICIAN ASSISTANT
Payer: COMMERCIAL

## 2021-03-12 DIAGNOSIS — H90.3 BILATERAL SENSORINEURAL HEARING LOSS: ICD-10-CM

## 2021-03-12 DIAGNOSIS — M25.571 PAIN IN JOINT, ANKLE AND FOOT, RIGHT: ICD-10-CM

## 2021-03-12 DIAGNOSIS — C43.9 MELANOMA OF SKIN (H): Primary | ICD-10-CM

## 2021-03-12 DIAGNOSIS — C43.71 MALIGNANT MELANOMA OF RIGHT LOWER EXTREMITY INCLUDING HIP (H): ICD-10-CM

## 2021-03-12 DIAGNOSIS — M79.89 RIGHT LEG SWELLING: ICD-10-CM

## 2021-03-12 PROCEDURE — 99214 OFFICE O/P EST MOD 30 MIN: CPT | Mod: 95 | Performed by: PHYSICIAN ASSISTANT

## 2021-03-12 PROCEDURE — 999N001193 HC VIDEO/TELEPHONE VISIT; NO CHARGE

## 2021-03-12 NOTE — Clinical Note
"    3/12/2021         RE: Yadira Hoang  [address] [facility] [address]        Dear Colleague,    Thank you for referring your patient, Yadira Hoang, to the [facility]. Please see a copy of my visit note below.    Yadira is a 75 year old who is being evaluated via a billable telephone visit.      What phone number would you like to be contacted at? [phone]  How would you like to obtain your AVS? [place]serrat     I have reviewed and updated the patient's allergies and medication list.    Concerns: none  Refills: none     Vitals - Patient Reported  Weight (Patient Reported): 62.6 kg (138 lb)  Height (Patient Reported): 170.2 cm (5' 7\")  BMI (Based on Pt Reported Ht/Wt): 21.61  Pain Score: No Pain (0)    Jessica Valladares CMA    Phone call duration: *** minutes      Oncology/Hematology Visit Note  Mar 12, 2021    Reason for Visit: Follow up of stage IV melanoma    History of Present Illness: Yadira Hoang is a 75 year old female who presents for video call to follow up on her acral melanoma, stage IV, per oncologic history below:     Oncologic History: Acral melanoma, stage IV, s/p primary resection and reconstruction  1. She noted a painful lesion on the sole of the right foot for a few months, since summer 2019.  It initially was small then became raised.  It spontaneously bled. She is not entirely sure of its appearance initially.  She denies noting any masses behind the knee or in the groin.  2. 1/9/2020, punch biopsy was performed by Dr Jessica Alvarado.  It showed melanoma, at least 3.4 mm deep with ulceration and 0 mitoses, and perineural invasion present. TILs were non-brisk and LVI not identified. pT3b.  3. 2/10/2020, she has right femoral sentinel lymph node biopsy and wide local excision (Specimen #: [id number]) and the right heel lesion extends to a depth of 6.6 mm, and invasive melanoma is present at 0.2 mm from the deep margin. Microsatellites are also " present. There was 1 (of 2) lymph nodes involved. The lymph node tissue exhibits extensive subcapsular and parenchymal clusters of melanoma cells, highlighted on Melan-A immunostain. The largest cluster is 7 millimeters in greatest diameter. pT4b pN2c.  4. 2/22/2020, she had PET-CT, which showed intense FDG uptake in a right inguinal lymph node, concerning for an additional focus of metastatic disease. There is also an indeterminate right external iliac lymph node with mild FDG uptake.  5. 3/4/2020, she has medial plantar artery  fasciocutaneous instep flap and Integra placement to the donor site. On 4/2/2020, she has additional reconstruction with skin grafting.  6. 5/27/2020, initiated on nivolumab immunotherapy.  Had C2 6/24/20. C3 on 7/22/20. C4 on 9/3/2020.  7. 8/21/2020 evidence of a new liver metastasis on PET-CT.  9. 10/9/2020, PET-CT shows increasing size of hepatic metastases with increased hypermetabolism, increased right inguinal and iliac lymphadenopathy, and new FDG uptake right T2 transverse process and right side of S1. MRI-brain obtained 10/21/2020 is negative for brain metastasis.  10. 10/28/2020, she starts ipilimumab 1mg/kg and nivolumab 3mg/kg.    She had response to ipi/nivo and is now on nivo maintenance.     She was called today for oncology follow-up.       Interval History:  -fatigued today, can live with it  -Doesn't feel sick  -ortho appointment, worry falling CAM boot. Will try orthodic at home  -compression stocking prescription  -brace-will send in picture, ideally closed ankle  [facility]   -COVID vaccine  -audiogram  -oatmeal bath and aveeno, skin back to normal    Review of Systems:  Patient denies fevers, chills, night sweats, unexplained weight changes, headaches, dizziness, vision or hearing changes, new lumps or bumps, chest pain, shortness of breath, cough, abdominal pain, nausea, vomiting, changes to bowel or bladder, swelling of extremities, bleeding issues, or  rash.    Current Outpatient Medications   Medication Sig Dispense Refill     cholecalciferol (VITAMIN D3) 125 MCG (5000 UT) TABS tablet Take 2,000 Units by mouth every other day        diclofenac (VOLTAREN) 1 % topical gel        estradiol (ESTRACE) 0.1 MG/GM vaginal cream        mirtazapine (REMERON) 15 MG tablet        omeprazole (PRILOSEC) 20 MG DR capsule TAKE 1 CAPSULE BY MOUTH ONCE DAILY       polyethylene glycol (MIRALAX) powder Take 17 g by mouth       prochlorperazine (COMPAZINE) 10 MG tablet        sertraline (ZOLOFT) 25 MG tablet Take 25 mg by mouth daily         Physical Examination:  There were no vitals taken for this visit.  Wt Readings from Last 10 Encounters:   02/15/21 64.9 kg (143 lb)   02/12/21 65.9 kg (145 lb 4.8 oz)   01/20/21 64.4 kg (142 lb)   12/30/20 65 kg (143 lb 6.4 oz)   12/09/20 65.2 kg (143 lb 12.8 oz)   11/18/20 65 kg (143 lb 3.2 oz)   10/28/20 64.9 kg (143 lb 1.6 oz)   10/16/20 65.5 kg (144 lb 8 oz)   10/02/20 64.5 kg (142 lb 3.2 oz)   10/01/20 64.5 kg (142 lb 3.2 oz)     Constitutional: Well-appearing female in no acute distress.  Eyes: EOMI, PERRL. No scleral icterus.  ENT: Oral mucosa is moist without lesions or thrush.   Lymphatic: Neck is supple without cervical or supraclavicular lymphadenopathy. No axillary lymphadenopathy.  Cardiovascular: Regular rate and rhythm. No murmurs, gallops, or rubs. No peripheral edema.  Respiratory: Clear to auscultation bilaterally. No wheezes or crackles.  Gastrointestinal: Bowel sounds present. Abdomen soft, non-tender. No palpable hepatosplenomegaly or masses.   Neurologic: Cranial nerves II through XII are grossly intact.  Skin: No rashes, petechiae, or bruising noted on exposed skin.    Laboratory Data:        Assessment and Plan:          Woody Dalal PA-C  [facility] [address] [address]  [phone]        Again, thank you for allowing me to participate in the care of your patient.         Sincerely,        KEITH Quiles

## 2021-03-12 NOTE — LETTER
"    3/12/2021         RE: Yadira Hoang  [address] [facility] [address]      Yadira is a 75 year old who is being evaluated via a billable telephone visit.      What phone number would you like to be contacted at? [phone]  How would you like to obtain your AVS? [place]     I have reviewed and updated the patient's allergies and medication list.    Concerns: none  Refills: none     Vitals - Patient Reported  Weight (Patient Reported): 62.6 kg (138 lb)  Height (Patient Reported): 170.2 cm (5' 7\")  BMI (Based on Pt Reported Ht/Wt): 21.61  Pain Score: No Pain (0)    Jessica Valladares CMA    Phone call duration: 26 minutes      Oncology/Hematology Visit Note  Mar 12, 2021    Reason for Visit: Follow up of stage IV melanoma    History of Present Illness: Yadira Hoang is a 75 year old female who presents for video call to follow up on her acral melanoma, stage IV, per oncologic history below:     Oncologic History: Acral melanoma, stage IV, s/p primary resection and reconstruction  1. She noted a painful lesion on the sole of the right foot for a few months, since summer 2019.  It initially was small then became raised.  It spontaneously bled. She is not entirely sure of its appearance initially.  She denies noting any masses behind the knee or in the groin.  2. 1/9/2020, punch biopsy was performed by Dr Jessica Alvarado.  It showed melanoma, at least 3.4 mm deep with ulceration and 0 mitoses, and perineural invasion present. TILs were non-brisk and LVI not identified. pT3b.  3. 2/10/2020, she has right femoral sentinel lymph node biopsy and wide local excision (Specimen #: [id number]) and the right heel lesion extends to a depth of 6.6 mm, and invasive melanoma is present at 0.2 mm from the deep margin. Microsatellites are also present. There was 1 (of 2) lymph nodes involved. The lymph node tissue exhibits extensive subcapsular and parenchymal clusters of melanoma cells, highlighted on Melan-A immunostain. The " "largest cluster is 7 millimeters in greatest diameter. pT4b pN2c.  4. 2/22/2020, she had PET-CT, which showed intense FDG uptake in a right inguinal lymph node, concerning for an additional focus of metastatic disease. There is also an indeterminate right external iliac lymph node with mild FDG uptake.  5. 3/4/2020, she has medial plantar artery  fasciocutaneous instep flap and Integra placement to the donor site. On 4/2/2020, she has additional reconstruction with skin grafting.  6. 5/27/2020, initiated on nivolumab immunotherapy.  Had C2 6/24/20. C3 on 7/22/20. C4 on 9/3/2020.  7. 8/21/2020 evidence of a new liver metastasis on PET-CT.  9. 10/9/2020, PET-CT shows increasing size of hepatic metastases with increased hypermetabolism, increased right inguinal and iliac lymphadenopathy, and new FDG uptake right T2 transverse process and right side of S1. MRI-brain obtained 10/21/2020 is negative for brain metastasis.  10. 10/28/2020, she starts ipilimumab 1mg/kg and nivolumab 3mg/kg.    She had response to ipi/nivo and is now on nivo maintenance.     She was called today for oncology follow-up.       Interval History:  Ms. Hoang was called today for oncology follow-up. She admits to fatigue that comes and goes but is overall manageable and she states she \"can live with it\" since she hasn't had many other side effects from nivolumab. She continues to have pain in her foot. She doesn't want to wear CAM boot because she is worried about falling. She would like to try compression stockings and get a new brace (she has one that she is wearing that seems to be helping). She thinks she has an orthotic at home that she could try. She would be interested in seeing orthopedics at the [facility] for additional assistance.     Her dry itchy skin is much improved with doing oatmeal bath. She is wondering about what lotion to buy, otherwise no skin concerns. She would like to have a hearing test done. She is also wondering " about getting her COVID vaccine.    She denies fevers, chest pain, SOB, cough, nausea, vomiting, abdominal pain, bowel or bladder concerns. Eating and drinking well.     Current Outpatient Medications   Medication Sig Dispense Refill     cholecalciferol (VITAMIN D3) 125 MCG (5000 UT) TABS tablet Take 2,000 Units by mouth every other day        diclofenac (VOLTAREN) 1 % topical gel        estradiol (ESTRACE) 0.1 MG/GM vaginal cream        mirtazapine (REMERON) 15 MG tablet        omeprazole (PRILOSEC) 20 MG DR capsule TAKE 1 CAPSULE BY MOUTH ONCE DAILY       polyethylene glycol (MIRALAX) powder Take 17 g by mouth       prochlorperazine (COMPAZINE) 10 MG tablet        sertraline (ZOLOFT) 25 MG tablet Take 25 mg by mouth daily       Physical Examination:  There were no vitals taken for this visit.  Wt Readings from Last 10 Encounters:   02/15/21 64.9 kg (143 lb)   02/12/21 65.9 kg (145 lb 4.8 oz)   01/20/21 64.4 kg (142 lb)   12/30/20 65 kg (143 lb 6.4 oz)   12/09/20 65.2 kg (143 lb 12.8 oz)   11/18/20 65 kg (143 lb 3.2 oz)   10/28/20 64.9 kg (143 lb 1.6 oz)   10/16/20 65.5 kg (144 lb 8 oz)   10/02/20 64.5 kg (142 lb 3.2 oz)   10/01/20 64.5 kg (142 lb 3.2 oz)     Unable to do physical exam 2/2 telephone visit. Well sounding in no distress. Normal speech and thought process. Good voice quality. No audible wheezing or cough.    Laboratory Data:  To be done with infusion on Monday      Assessment and Plan:  1. Onc  Acral melanoma, right heel primary, pT4b pN3c M1, Stage IV. 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 She progressed on first line nivolumab, but shows clear evidence of response to 2nd line ipilimumab and nivolumab. We will continue on nivolumab maintenance therapy and watch closely. Tolerating well with fatigue and dry skin but no other side effects.      Continue nivolumab every 4 weeks with SUSI visit prior to each visit, Late April/early May to see Dr. Franklin with  repeat PET-CT scan    OK for COVID vaccine when she wants to schedule, discussed ways to do this     2. MSK  Right foot pain 2/2 prior surgery (no concern for immune mediated arthralgias given specific to post surgical pain in right foot/ankle). Saw ortho at  but would like to see at [facility] as well. Continue brace and orthotic she has at home. Will write prescription for compression stockings.    Can use Aleve or Ibuprofen PRN along with Voltaren gel.     3. Derm  Dry skin and pruritis improved with oatmeal bath and discussed Aveeno lotion. Has capsaicin cream PRN. Overall skin improved.    4. Psych/Neuro  Multifactorial fatigue overall manageable, TSH and cortisol have been WNL   Continue Sertraline, Remeron, PRN Lorazepam for depression and anxiety, not discussed in detail.    5. ENT  Audiology referral placed per patient request.    5 minutes spent on the date of the encounter doing chart review, in addition to 26 minutes spent on the phone with the patient. Documentation performed after encounter date.    Woody Dalal PA-C  [facility]  [address] [place] [address]  [phone]          KEITH Quiles

## 2021-03-14 DIAGNOSIS — C43.71 MALIGNANT MELANOMA OF RIGHT LOWER EXTREMITY INCLUDING HIP (H): ICD-10-CM

## 2021-03-14 DIAGNOSIS — C43.9 MELANOMA OF SKIN (H): Primary | ICD-10-CM

## 2021-03-14 RX ORDER — ALBUTEROL SULFATE 90 UG/1
1-2 AEROSOL, METERED RESPIRATORY (INHALATION)
Status: CANCELLED
Start: 2021-03-15

## 2021-03-14 RX ORDER — SODIUM CHLORIDE 9 MG/ML
1000 INJECTION, SOLUTION INTRAVENOUS CONTINUOUS PRN
Status: CANCELLED
Start: 2021-03-15

## 2021-03-14 RX ORDER — EPINEPHRINE 1 MG/ML
0.3 INJECTION, SOLUTION, CONCENTRATE INTRAVENOUS EVERY 5 MIN PRN
Status: CANCELLED | OUTPATIENT
Start: 2021-03-15

## 2021-03-14 RX ORDER — HEPARIN SODIUM (PORCINE) LOCK FLUSH IV SOLN 100 UNIT/ML 100 UNIT/ML
5 SOLUTION INTRAVENOUS
Status: CANCELLED | OUTPATIENT
Start: 2021-03-15

## 2021-03-14 RX ORDER — HEPARIN SODIUM,PORCINE 10 UNIT/ML
5 VIAL (ML) INTRAVENOUS
Status: CANCELLED | OUTPATIENT
Start: 2021-03-15

## 2021-03-14 RX ORDER — MEPERIDINE HYDROCHLORIDE 25 MG/ML
25 INJECTION INTRAMUSCULAR; INTRAVENOUS; SUBCUTANEOUS EVERY 30 MIN PRN
Status: CANCELLED | OUTPATIENT
Start: 2021-03-15

## 2021-03-14 RX ORDER — DIPHENHYDRAMINE HYDROCHLORIDE 50 MG/ML
50 INJECTION INTRAMUSCULAR; INTRAVENOUS
Status: CANCELLED
Start: 2021-03-15

## 2021-03-14 RX ORDER — ALBUTEROL SULFATE 0.83 MG/ML
2.5 SOLUTION RESPIRATORY (INHALATION)
Status: CANCELLED | OUTPATIENT
Start: 2021-03-15

## 2021-03-14 RX ORDER — LORAZEPAM 2 MG/ML
0.5 INJECTION INTRAMUSCULAR EVERY 4 HOURS PRN
Status: CANCELLED
Start: 2021-03-15

## 2021-03-14 RX ORDER — NALOXONE HYDROCHLORIDE 0.4 MG/ML
.1-.4 INJECTION, SOLUTION INTRAMUSCULAR; INTRAVENOUS; SUBCUTANEOUS
Status: CANCELLED | OUTPATIENT
Start: 2021-03-15

## 2021-03-15 ENCOUNTER — INFUSION THERAPY VISIT (OUTPATIENT)
Dept: INFUSION THERAPY | Facility: CLINIC | Age: 76
End: 2021-03-15
Payer: COMMERCIAL

## 2021-03-15 ENCOUNTER — TELEPHONE (OUTPATIENT)
Dept: AUDIOLOGY | Facility: CLINIC | Age: 76
End: 2021-03-15

## 2021-03-15 VITALS
HEART RATE: 88 BPM | SYSTOLIC BLOOD PRESSURE: 116 MMHG | WEIGHT: 141.7 LBS | OXYGEN SATURATION: 98 % | RESPIRATION RATE: 18 BRPM | TEMPERATURE: 98.3 F | BODY MASS INDEX: 22.19 KG/M2 | DIASTOLIC BLOOD PRESSURE: 63 MMHG

## 2021-03-15 DIAGNOSIS — R53.83 FATIGUE: ICD-10-CM

## 2021-03-15 DIAGNOSIS — M62.81 GENERALIZED MUSCLE WEAKNESS: ICD-10-CM

## 2021-03-15 DIAGNOSIS — C43.9 MELANOMA OF SKIN (H): Primary | ICD-10-CM

## 2021-03-15 DIAGNOSIS — C43.71 MALIGNANT MELANOMA OF RIGHT LOWER EXTREMITY INCLUDING HIP (H): ICD-10-CM

## 2021-03-15 LAB
ALBUMIN SERPL-MCNC: 3.6 G/DL (ref 3.4–5)
ALP SERPL-CCNC: 55 U/L (ref 40–150)
ALT SERPL W P-5'-P-CCNC: 29 U/L (ref 0–50)
ANION GAP SERPL CALCULATED.3IONS-SCNC: 2 MMOL/L (ref 3–14)
AST SERPL W P-5'-P-CCNC: 13 U/L (ref 0–45)
BASOPHILS # BLD AUTO: 0 10E9/L (ref 0–0.2)
BASOPHILS NFR BLD AUTO: 0.6 %
BILIRUB SERPL-MCNC: 0.3 MG/DL (ref 0.2–1.3)
BUN SERPL-MCNC: 26 MG/DL (ref 7–30)
CALCIUM SERPL-MCNC: 9.4 MG/DL (ref 8.5–10.1)
CHLORIDE SERPL-SCNC: 106 MMOL/L (ref 94–109)
CO2 SERPL-SCNC: 34 MMOL/L (ref 20–32)
CREAT SERPL-MCNC: 0.76 MG/DL (ref 0.52–1.04)
DIFFERENTIAL METHOD BLD: NORMAL
EOSINOPHIL # BLD AUTO: 0.1 10E9/L (ref 0–0.7)
EOSINOPHIL NFR BLD AUTO: 2.7 %
ERYTHROCYTE [DISTWIDTH] IN BLOOD BY AUTOMATED COUNT: 12.4 % (ref 10–15)
GFR SERPL CREATININE-BSD FRML MDRD: 76 ML/MIN/{1.73_M2}
GLUCOSE SERPL-MCNC: 77 MG/DL (ref 70–99)
HCT VFR BLD AUTO: 38.5 % (ref 35–47)
HGB BLD-MCNC: 12.7 G/DL (ref 11.7–15.7)
IMM GRANULOCYTES # BLD: 0 10E9/L (ref 0–0.4)
IMM GRANULOCYTES NFR BLD: 0.2 %
LYMPHOCYTES # BLD AUTO: 1.5 10E9/L (ref 0.8–5.3)
LYMPHOCYTES NFR BLD AUTO: 29.1 %
MCH RBC QN AUTO: 30.5 PG (ref 26.5–33)
MCHC RBC AUTO-ENTMCNC: 33 G/DL (ref 31.5–36.5)
MCV RBC AUTO: 93 FL (ref 78–100)
MONOCYTES # BLD AUTO: 0.3 10E9/L (ref 0–1.3)
MONOCYTES NFR BLD AUTO: 6.1 %
NEUTROPHILS # BLD AUTO: 3.2 10E9/L (ref 1.6–8.3)
NEUTROPHILS NFR BLD AUTO: 61.3 %
PLATELET # BLD AUTO: 333 10E9/L (ref 150–450)
POTASSIUM SERPL-SCNC: 4 MMOL/L (ref 3.4–5.3)
PROT SERPL-MCNC: 7.1 G/DL (ref 6.8–8.8)
RBC # BLD AUTO: 4.16 10E12/L (ref 3.8–5.2)
SODIUM SERPL-SCNC: 142 MMOL/L (ref 133–144)
TSH SERPL DL<=0.005 MIU/L-ACNC: 1.98 MU/L (ref 0.4–4)
WBC # BLD AUTO: 5.2 10E9/L (ref 4–11)

## 2021-03-15 PROCEDURE — 36415 COLL VENOUS BLD VENIPUNCTURE: CPT | Performed by: INTERNAL MEDICINE

## 2021-03-15 PROCEDURE — 99207 PR NO CHARGE LOS: CPT

## 2021-03-15 PROCEDURE — 96413 CHEMO IV INFUSION 1 HR: CPT | Performed by: OBSTETRICS & GYNECOLOGY

## 2021-03-15 PROCEDURE — 80050 GENERAL HEALTH PANEL: CPT | Performed by: INTERNAL MEDICINE

## 2021-03-15 RX ADMIN — Medication 250 ML: at 13:42

## 2021-03-15 ASSESSMENT — PAIN SCALES - GENERAL: PAINLEVEL: NO PAIN (0)

## 2021-03-15 NOTE — PROGRESS NOTES
Infusion Nursing Note:  Yadira Hoang presents today for C7D1 Nivolumab .    Patient seen by provider today: No   present during visit today: Not Applicable.    Note: N/A.  Patient declined the covid-19 test.    Intravenous Access:  Peripheral IV placed.    Treatment Conditions:  Lab Results   Component Value Date     03/15/2021                   Lab Results   Component Value Date    POTASSIUM 4.0 03/15/2021           No results found for: MAG         Lab Results   Component Value Date    CR 0.76 03/15/2021                   Lab Results   Component Value Date    GABY 9.4 03/15/2021                Lab Results   Component Value Date    BILITOTAL 0.3 03/15/2021           Lab Results   Component Value Date    ALBUMIN 3.6 03/15/2021                    Lab Results   Component Value Date    ALT 29 03/15/2021           Lab Results   Component Value Date    AST 13 03/15/2021       Results reviewed, labs MET treatment parameters, ok to proceed with treatment.      Post Infusion Assessment:  Patient tolerated infusion without incident.       Discharge Plan:   Patient discharged in stable condition accompanied by: self.    Fiordaliza Angeles RN

## 2021-03-15 NOTE — TELEPHONE ENCOUNTER
Talked with patient and she wanted to wait to schedule HEV until after she schedules other appts. I gave her the number to call to schedule.     Please schedule HEV with next available audiologist.

## 2021-03-22 ENCOUNTER — VIRTUAL VISIT (OUTPATIENT)
Dept: FAMILY MEDICINE | Facility: CLINIC | Age: 76
End: 2021-03-22
Payer: COMMERCIAL

## 2021-03-22 DIAGNOSIS — J01.90 ACUTE SINUSITIS WITH SYMPTOMS > 10 DAYS: Primary | ICD-10-CM

## 2021-03-22 PROCEDURE — 99213 OFFICE O/P EST LOW 20 MIN: CPT | Mod: 95 | Performed by: FAMILY MEDICINE

## 2021-03-22 NOTE — PATIENT INSTRUCTIONS
Patient Education     Acute Bacterial Rhinosinusitis (ABRS)    Acute bacterial rhinosinusitis (ABRS) is an infection of your nasal cavity and sinuses. It s caused by bacteria. Acute means that you ve had symptoms for less than 4 weeks, but possibly up to 12 weeks.  Understanding your sinuses  The nasal cavity is the large air-filled space behind your nose. The sinuses are a group of spaces formed by the bones of your face. They connect with your nasal cavity. ABRS causes the tissue lining these spaces to become inflamed. Mucus may not drain normally. This leads to facial pain and other symptoms.  What causes ABRS?  ABRS most often follows an upper respiratory infection caused by a virus. Bacteria then infect the lining of your nasal cavity and sinuses. But you can also get ABRS if you have:    Nasal allergies    Long-term nasal swelling and congestion not caused by allergies    Blockage in the nose  Symptoms of ABRS  The symptoms of ABRS may be different for each person and include:    Nasal congestion or blockage    Pain or pressure in the face    Thick, colored drainage from the nose  Other symptoms may include:    Runny nose    Fluid draining from the nose down the throat (postnasal drip)    Headache    Cough    Pain    Fever  Diagnosing ABRS  ABRS may be diagnosed if you ve had an upper respiratory infection like a cold and cough for 10 or more days without improvement or with worsening symptoms. Your healthcare provider will ask about your symptoms and your medical history. The provider will check your vital signs, including your temperature. You ll have a physical exam. The healthcare provider will check your ears, nose, and throat. You likely won t need any tests. If ABRS comes back, you may have a culture or other tests.  Treatment for ABRS  Treatment may include:    Antibiotic medicine. This is for symptoms that last for at least 10 to 14 days.    Nasal corticosteroid medicine. Drops or spray used in the  nose can lessen swelling and congestion.    Over-the-counter pain medicine. This is to lessen sinus pain and pressure.    Nasal decongestant medicine. Spray or drops may help to lessen congestion. Do not use them for more than a few days.    Salt wash (saline irrigation). This can help to loosen mucus.  Possible complications of ABRS  ABRS may come back or become long-term (chronic). In rare cases, ABRS may cause complications such as:     Inflamed tissue around the brain and spinal cord (meningitis)    Inflamed tissue around the eyes (orbital cellulitis)    Inflamed bones around the sinuses (osteitis)  These problems may need to be treated in a hospital with intravenous (IV) antibiotic medicine or surgery.  When to call the healthcare provider  Call your healthcare provider if you have any of the following:    Symptoms that don t get better, or get worse    Symptoms that don t get better after 3 to 5 days on antibiotics    Trouble seeing    Swelling around your eyes    Confusion or trouble staying awake   Sebas last reviewed this educational content on 5/1/2017 2000-2020 [employer]. All rights reserved. This information is not intended as a substitute for professional medical care. Always follow your healthcare professional's instructions.

## 2021-03-22 NOTE — PROGRESS NOTES
Yadira is a 75 year old who is being evaluated via a billable telephone visit.      What phone number would you like to be contacted at? [phone]  How would you like to obtain your AVS? MyChart    Assessment & Plan   1. Acute sinusitis with symptoms > 10 days: Declines covid testing. Will isolate at home. Wants to treat for a sinus infection. Given duration will prescribe Augmentin. Patient does get monoclonal antibody infusions. Recommended calling her oncology team and letting them know she is being treated for an infection. Discussed other symptomatic cares such as saline rinses, tea with honey, etc.  - amoxicillin-clavulanate (AUGMENTIN) 875-125 MG tablet; Take 1 tablet by mouth 2 times daily for 10 days  Dispense: 20 tablet; Refill: 0      Return in about 1 week (around 3/29/2021), or if symptoms worsen or fail to improve.    Hiram Medrano MD  [facility]    This chart is completed utilizing dictation software; typos and/or incorrect word substitutions may unintentionally occur.    Subjective   Yadira is a 75 year old who presents for the following health issues    HPI     Acute Illness  Acute illness concerns:  Cough   Onset/Duration: 1 week   Symptoms:  Fever: no  Chills/Sweats: no  Headache (location?): YES  Sinus Pressure: YES  Conjunctivitis:  no  Ear Pain: no  Rhinorrhea: YES  Congestion: YES  Sore Throat: YES  Cough: YES. Deep barking cough.   Wheeze: YES  Decreased Appetite: YES  Nausea: no  Vomiting: no  Diarrhea: no  Dysuria/Freq.: no  Dysuria or Hematuria: no  Fatigue/Achiness: YES.both. Patient has cancer.   Therapies tried and outcome: Benadryl and jefferson pot.       At home with her . No sick contacts. Declines covid testing. Symptoms as above.    Review of Systems   Constitutional, HEENT, lymph, derm, msk, cardiovascular, pulmonary, gi and gu systems are negative, except as otherwise noted.      Objective           Vitals:  No vitals were obtained  today due to virtual visit.    Physical Exam   healthy, alert and no distress  PSYCH: Alert and oriented times 3; coherent speech, normal   rate and volume, able to articulate logical thoughts, able   to abstract reason, no tangential thoughts, no hallucinations   or delusions  Her affect is normal  RESP: No cough, no audible wheezing, able to talk in full sentences  Remainder of exam unable to be completed due to telephone visits    Labs: none        Phone call duration: 12 minutes

## 2021-03-25 ENCOUNTER — DOCUMENTATION ONLY (OUTPATIENT)
Dept: CARE COORDINATION | Facility: CLINIC | Age: 76
End: 2021-03-25

## 2021-03-26 DIAGNOSIS — C79.51 SECONDARY MALIGNANT NEOPLASM OF BONE (H): Primary | ICD-10-CM

## 2021-03-26 RX ORDER — BENZONATATE 100 MG/1
100 CAPSULE ORAL 3 TIMES DAILY PRN
Qty: 30 CAPSULE | Refills: 1 | Status: SHIPPED | OUTPATIENT
Start: 2021-03-26 | End: 2021-04-27

## 2021-04-08 ENCOUNTER — VIRTUAL VISIT (OUTPATIENT)
Dept: ONCOLOGY | Facility: CLINIC | Age: 76
End: 2021-04-08
Attending: PHYSICIAN ASSISTANT
Payer: COMMERCIAL

## 2021-04-08 DIAGNOSIS — C43.71 MALIGNANT MELANOMA OF RIGHT LOWER EXTREMITY INCLUDING HIP (H): ICD-10-CM

## 2021-04-08 DIAGNOSIS — C43.9 MELANOMA OF SKIN (H): Primary | ICD-10-CM

## 2021-04-08 PROCEDURE — 99215 OFFICE O/P EST HI 40 MIN: CPT | Mod: 95 | Performed by: PHYSICIAN ASSISTANT

## 2021-04-08 PROCEDURE — 999N001193 HC VIDEO/TELEPHONE VISIT; NO CHARGE

## 2021-04-08 RX ORDER — MEPERIDINE HYDROCHLORIDE 25 MG/ML
25 INJECTION INTRAMUSCULAR; INTRAVENOUS; SUBCUTANEOUS EVERY 30 MIN PRN
Status: CANCELLED | OUTPATIENT
Start: 2021-04-12

## 2021-04-08 RX ORDER — HEPARIN SODIUM (PORCINE) LOCK FLUSH IV SOLN 100 UNIT/ML 100 UNIT/ML
5 SOLUTION INTRAVENOUS
Status: CANCELLED | OUTPATIENT
Start: 2021-04-12

## 2021-04-08 RX ORDER — ALBUTEROL SULFATE 0.83 MG/ML
2.5 SOLUTION RESPIRATORY (INHALATION)
Status: CANCELLED | OUTPATIENT
Start: 2021-04-12

## 2021-04-08 RX ORDER — ALBUTEROL SULFATE 90 UG/1
1-2 AEROSOL, METERED RESPIRATORY (INHALATION)
Status: CANCELLED
Start: 2021-04-12

## 2021-04-08 RX ORDER — SODIUM CHLORIDE 9 MG/ML
1000 INJECTION, SOLUTION INTRAVENOUS CONTINUOUS PRN
Status: CANCELLED
Start: 2021-04-12

## 2021-04-08 RX ORDER — DIPHENHYDRAMINE HYDROCHLORIDE 50 MG/ML
50 INJECTION INTRAMUSCULAR; INTRAVENOUS
Status: CANCELLED
Start: 2021-04-12

## 2021-04-08 RX ORDER — EPINEPHRINE 1 MG/ML
0.3 INJECTION, SOLUTION INTRAMUSCULAR; SUBCUTANEOUS EVERY 5 MIN PRN
Status: CANCELLED | OUTPATIENT
Start: 2021-04-12

## 2021-04-08 RX ORDER — HEPARIN SODIUM,PORCINE 10 UNIT/ML
5 VIAL (ML) INTRAVENOUS
Status: CANCELLED | OUTPATIENT
Start: 2021-04-12

## 2021-04-08 RX ORDER — NALOXONE HYDROCHLORIDE 0.4 MG/ML
.1-.4 INJECTION, SOLUTION INTRAMUSCULAR; INTRAVENOUS; SUBCUTANEOUS
Status: CANCELLED | OUTPATIENT
Start: 2021-04-12

## 2021-04-08 RX ORDER — LORAZEPAM 2 MG/ML
0.5 INJECTION INTRAMUSCULAR EVERY 4 HOURS PRN
Status: CANCELLED
Start: 2021-04-12

## 2021-04-08 RX ORDER — METHYLPREDNISOLONE SODIUM SUCCINATE 125 MG/2ML
125 INJECTION, POWDER, LYOPHILIZED, FOR SOLUTION INTRAMUSCULAR; INTRAVENOUS
Status: CANCELLED
Start: 2021-04-12

## 2021-04-08 NOTE — PROGRESS NOTES
Yadira is a 75 year old who is being evaluated via a billable telephone visit.      What phone number would you like to be contacted at? [phone]  How would you like to obtain your AVS? [employer]  Phone call duration: 25 minutes    Pt's phone drops calls. Call back same number to reconnect with patient.       Melanie Fishman CMA on 4/8/2021 at 9:33 AM    20 minutes spent on the date of the encounter doing chart review and documentation, in addition to 25 minutes spent on the phone with the patient.     Oncology/Hematology Visit Note  Apr 8, 2021    Reason for Visit: Follow up of stage IV melanoma    History of Present Illness: Yadira Hoang is a 75 year old female who presents for video call to follow up on her acral melanoma, stage IV, per oncologic history below:     Oncologic History: Acral melanoma, stage IV, s/p primary resection and reconstruction  1. She noted a painful lesion on the sole of the right foot for a few months, since summer 2019.  It initially was small then became raised.  It spontaneously bled. She is not entirely sure of its appearance initially.  She denies noting any masses behind the knee or in the groin.  2. 1/9/2020, punch biopsy was performed by Dr Jessica Alvarado.  It showed melanoma, at least 3.4 mm deep with ulceration and 0 mitoses, and perineural invasion present. TILs were non-brisk and LVI not identified. pT3b.  3. 2/10/2020, she has right femoral sentinel lymph node biopsy and wide local excision (Specimen #: [id number]) and the right heel lesion extends to a depth of 6.6 mm, and invasive melanoma is present at 0.2 mm from the deep margin. Microsatellites are also present. There was 1 (of 2) lymph nodes involved. The lymph node tissue exhibits extensive subcapsular and parenchymal clusters of melanoma cells, highlighted on Melan-A immunostain. The largest cluster is 7 millimeters in greatest diameter. pT4b pN2c.  4. 2/22/2020, she had PET-CT, which showed intense FDG uptake in a right  inguinal lymph node, concerning for an additional focus of metastatic disease. There is also an indeterminate right external iliac lymph node with mild FDG uptake.  5. 3/4/2020, she has medial plantar artery  fasciocutaneous instep flap and Integra placement to the donor site. On 4/2/2020, she has additional reconstruction with skin grafting.  6. 5/27/2020, initiated on nivolumab immunotherapy.  Had C2 6/24/20. C3 on 7/22/20. C4 on 9/3/2020.  7. 8/21/2020 evidence of a new liver metastasis on PET-CT.  9. 10/9/2020, PET-CT shows increasing size of hepatic metastases with increased hypermetabolism, increased right inguinal and iliac lymphadenopathy, and new FDG uptake right T2 transverse process and right side of S1. MRI-brain obtained 10/21/2020 is negative for brain metastasis.  10. 10/28/2020, she starts ipilimumab 1mg/kg and nivolumab 3mg/kg.    She had response to ipi/nivo and is now on nivo maintenance.     She was called today for oncology follow-up.       Interval History:  -Has good and bad days.   -Previously was not sleeping well. Now sleeping a bit better, but very fatigued.  -Reports energy is very low, but is keeping busy. Has been preparing for a garage sale.   -Feels energy is about the same as last month. Has been taking occasional naps, usually around 30 minutes/day.   -Has been walking long driveway about 30 minutes per day.   -Continues to have foot pain. Seeing ortho next week. Hard to wear shoes due to pain. Both feet get cold so hard to wear sandals. Not taking any medicine for pain. Has been using some essential oils.   -Continues to have leg swelling that is unchanged, worse after a lot of walking. Swelling is from the foot to the knee.  -When close to being due for her next infusion, she feels something different in body.   -Has less itchiness lately. Uses occasional lotion.   -Denies any dyspnea or issues with bowels.   -Finds online support group to be helpful.  -Has sinus  "drainage that started a few month ago, using Lizet Pot. Using Tessalon Perles for cough that is improving. Sinus drainage is yellow. Denies any sinus pain. Feels cough symptoms are improving. Started Augmentin and took 1 dose, but developed nausea, so she stopped it. Took it with food. Has not used Sudafed or Flonase.     Current Outpatient Medications   Medication Sig Dispense Refill     benzonatate (TESSALON) 100 MG capsule Take 1 capsule (100 mg) by mouth 3 times daily as needed for cough 30 capsule 1     cholecalciferol (VITAMIN D3) 125 MCG (5000 UT) TABS tablet Take 2,000 Units by mouth every other day        estradiol (ESTRACE) 0.1 MG/GM vaginal cream        Objective:  There were no vitals taken for this visit.  General: alert and no distress  Psych: Alert and oriented times; coherent speech, normal rate and volume, able to articulate logical thoughts, able to abstract reason, no tangential thoughts, no hallucinations or delusions  Patient's affect is appropriate.   Pulm: Speaking in full sentences, unlabored, no audible wheezes or cough.  The rest of a comprehensive physical examination is deferred due to PHE (public health emergency) video restrictions\"    Laboratory Data:  To be done with infusion on Monday    Assessment and Plan:  1. Onc  Acral melanoma, right heel primary, pT4b pN3c M1, Stage IV. 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 She progressed on first line nivolumab, but shows clear evidence of response to 2nd line ipilimumab and nivolumab. We will continue on nivolumab maintenance therapy and watch closely. Tolerating well with fatigue and dry skin but no other side effects. Continue nivolumab every 4 weeks. Will plan for follow-up with Dr. Franklin in early May with a PET/CT. She will call sooner for concerns    2. MSK  Right foot pain 2/2 prior surgery (no concern for immune mediated arthralgias given specific to post surgical pain in right " foot/ankle). Saw ortho at  but would like to see at [facility] as well. Continue brace and orthotic she has at home. She has an appointment scheduled for next week. Can use Aleve or Ibuprofen PRN along with Voltaren gel.     3. Derm  Dry skin and pruritis improved with oatmeal bath and discussed Aveeno lotion. Has capsaicin cream PRN. Overall skin improved.    4. Psych/Neuro  Multifactorial fatigue, TSH and cortisol have been WNL   Continue Sertraline, Remeron, PRN Lorazepam for depression and anxiety, not discussed in detail. She declined a prescription for Ritalin. She has a referral for cancer rehab and she was given the phone number to call to get this scheduled.       5. ENT  Sinusitis. Discussed that she may take her antiemetic 30 minutes prior to the Augmentin. She also plans to talk with her PCP's nurse.     6. ID  Immunization. Patient remains uncertain if she wishes to receive the COVID vaccine.     Meghan Perdomo PA-C  [facility]  [address] [place], [place] [address] [phone]

## 2021-04-08 NOTE — LETTER
4/8/2021         RE: Yadira Hoang  [address] [facility] [address]        Dear Colleague,    Thank you for referring your patient, Yadira Hoang, to the [facility]. Please see a copy of my visit note below.    Oncology/Hematology Visit Note  Apr 8, 2021    Reason for Visit: Follow up of stage IV melanoma    History of Present Illness: Yadira Hoang is a 75 year old female who presents for video call to follow up on her acral melanoma, stage IV, per oncologic history below:     Oncologic History: Acral melanoma, stage IV, s/p primary resection and reconstruction  1. She noted a painful lesion on the sole of the right foot for a few months, since summer 2019.  It initially was small then became raised.  It spontaneously bled. She is not entirely sure of its appearance initially.  She denies noting any masses behind the knee or in the groin.  2. 1/9/2020, punch biopsy was performed by Dr Jessica Alvarado.  It showed melanoma, at least 3.4 mm deep with ulceration and 0 mitoses, and perineural invasion present. TILs were non-brisk and LVI not identified. pT3b.  3. 2/10/2020, she has right femoral sentinel lymph node biopsy and wide local excision (Specimen #: [id number]) and the right heel lesion extends to a depth of 6.6 mm, and invasive melanoma is present at 0.2 mm from the deep margin. Microsatellites are also present. There was 1 (of 2) lymph nodes involved. The lymph node tissue exhibits extensive subcapsular and parenchymal clusters of melanoma cells, highlighted on Melan-A immunostain. The largest cluster is 7 millimeters in greatest diameter. pT4b pN2c.  4. 2/22/2020, she had PET-CT, which showed intense FDG uptake in a right inguinal lymph node, concerning for an additional focus of metastatic disease. There is also an indeterminate right external iliac lymph node with mild FDG uptake.  5. 3/4/2020, she has medial plantar artery  fasciocutaneous instep  flap and Integra placement to the donor site. On 4/2/2020, she has additional reconstruction with skin grafting.  6. 5/27/2020, initiated on nivolumab immunotherapy.  Had C2 6/24/20. C3 on 7/22/20. C4 on 9/3/2020.  7. 8/21/2020 evidence of a new liver metastasis on PET-CT.  9. 10/9/2020, PET-CT shows increasing size of hepatic metastases with increased hypermetabolism, increased right inguinal and iliac lymphadenopathy, and new FDG uptake right T2 transverse process and right side of S1. MRI-brain obtained 10/21/2020 is negative for brain metastasis.  10. 10/28/2020, she starts ipilimumab 1mg/kg and nivolumab 3mg/kg.    She had response to ipi/nivo and is now on nivo maintenance.     She was called today for oncology follow-up.       Interval History:  -Has good and bad days.   -Previously was not sleeping well. Now sleeping a bit better, but very fatigued.  -Reports energy is very low, but is keeping busy. Has been preparing for a garage sale.   -Feels energy is about the same as last month. Has been taking occasional naps, usually around 30 minutes/day.   -Has been walking long driveway about 30 minutes per day.   -Continues to have foot pain. Seeing ortho next week. Hard to wear shoes due to pain. Both feet get cold so hard to wear sandals. Not taking any medicine for pain. Has been using some essential oils.   -Continues to have leg swelling that is unchanged, worse after a lot of walking. Swelling is from the foot to the knee.  -When close to being due for her next infusion, she feels something different in body.   -Has less itchiness lately. Uses occasional lotion.   -Denies any dyspnea or issues with bowels.   -Finds online support group to be helpful.  -Has sinus drainage that started a few month ago, using Lizet Pot. Using Tessalon Perles for cough that is improving. Sinus drainage is yellow. Denies any sinus pain. Feels cough symptoms are improving. Started Augmentin and took 1 dose, but developed nausea,  "so she stopped it. Took it with food. Has not used Sudafed or Flonase.     Current Outpatient Medications   Medication Sig Dispense Refill     benzonatate (TESSALON) 100 MG capsule Take 1 capsule (100 mg) by mouth 3 times daily as needed for cough 30 capsule 1     cholecalciferol (VITAMIN D3) 125 MCG (5000 UT) TABS tablet Take 2,000 Units by mouth every other day        estradiol (ESTRACE) 0.1 MG/GM vaginal cream        Objective:  There were no vitals taken for this visit.  General: alert and no distress  Psych: Alert and oriented times; coherent speech, normal rate and volume, able to articulate logical thoughts, able to abstract reason, no tangential thoughts, no hallucinations or delusions  Patient's affect is appropriate.   Pulm: Speaking in full sentences, unlabored, no audible wheezes or cough.  The rest of a comprehensive physical examination is deferred due to PHE (public health emergency) video restrictions\"    Laboratory Data:  To be done with infusion on Monday    Assessment and Plan:  1. Onc  Acral melanoma, right heel primary, pT4b pN3c M1, Stage IV. 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 She progressed on first line nivolumab, but shows clear evidence of response to 2nd line ipilimumab and nivolumab. We will continue on nivolumab maintenance therapy and watch closely. Tolerating well with fatigue and dry skin but no other side effects. Continue nivolumab every 4 weeks. Will plan for follow-up with Dr. Franklin in early May with a PET/CT. She will call sooner for concerns    2. MSK  Right foot pain 2/2 prior surgery (no concern for immune mediated arthralgias given specific to post surgical pain in right foot/ankle). Saw ortho at  but would like to see at [facility] as well. Continue brace and orthotic she has at home. She has an appointment scheduled for next week. Can use Aleve or Ibuprofen PRN along with Voltaren gel.     3. Derm  Dry skin and pruritis improved " with oatmeal bath and discussed Aveeno lotion. Has capsaicin cream PRN. Overall skin improved.    4. Psych/Neuro  Multifactorial fatigue, TSH and cortisol have been WNL   Continue Sertraline, Remeron, PRN Lorazepam for depression and anxiety, not discussed in detail. She declined a prescription for Ritalin. She has a referral for cancer rehab and she was given the phone number to call to get this scheduled.       5. ENT  Sinusitis. Discussed that she may take her antiemetic 30 minutes prior to the Augmentin. She also plans to talk with her PCP's nurse.     6. ID  Immunization. Patient remains uncertain if she wishes to receive the COVID vaccine.     Meghan Perdomo PA-C  [facility]  [address], [place] [id number] [phone]        Again, thank you for allowing me to participate in the care of your patient.      Sincerely,    Meghan Perdomo PA-C

## 2021-04-12 ENCOUNTER — INFUSION THERAPY VISIT (OUTPATIENT)
Dept: INFUSION THERAPY | Facility: CLINIC | Age: 76
End: 2021-04-12
Payer: COMMERCIAL

## 2021-04-12 VITALS
TEMPERATURE: 98.3 F | HEART RATE: 98 BPM | SYSTOLIC BLOOD PRESSURE: 123 MMHG | DIASTOLIC BLOOD PRESSURE: 79 MMHG | BODY MASS INDEX: 22.38 KG/M2 | OXYGEN SATURATION: 98 % | RESPIRATION RATE: 18 BRPM | WEIGHT: 142.9 LBS

## 2021-04-12 DIAGNOSIS — C43.9 MELANOMA OF SKIN (H): Primary | ICD-10-CM

## 2021-04-12 DIAGNOSIS — C43.71 MALIGNANT MELANOMA OF RIGHT LOWER EXTREMITY INCLUDING HIP (H): ICD-10-CM

## 2021-04-12 LAB
ACTH PLAS-MCNC: 13 PG/ML
ALBUMIN SERPL-MCNC: 3.7 G/DL (ref 3.4–5)
ALP SERPL-CCNC: 63 U/L (ref 40–150)
ALT SERPL W P-5'-P-CCNC: 22 U/L (ref 0–50)
ANION GAP SERPL CALCULATED.3IONS-SCNC: 1 MMOL/L (ref 3–14)
AST SERPL W P-5'-P-CCNC: 14 U/L (ref 0–45)
BASOPHILS # BLD AUTO: 0 10E9/L (ref 0–0.2)
BASOPHILS NFR BLD AUTO: 0.4 %
BILIRUB SERPL-MCNC: 0.3 MG/DL (ref 0.2–1.3)
BUN SERPL-MCNC: 23 MG/DL (ref 7–30)
CALCIUM SERPL-MCNC: 9.2 MG/DL (ref 8.5–10.1)
CHLORIDE SERPL-SCNC: 107 MMOL/L (ref 94–109)
CO2 SERPL-SCNC: 32 MMOL/L (ref 20–32)
CREAT SERPL-MCNC: 0.62 MG/DL (ref 0.52–1.04)
DIFFERENTIAL METHOD BLD: NORMAL
EOSINOPHIL # BLD AUTO: 0.2 10E9/L (ref 0–0.7)
EOSINOPHIL NFR BLD AUTO: 3 %
ERYTHROCYTE [DISTWIDTH] IN BLOOD BY AUTOMATED COUNT: 12.7 % (ref 10–15)
GFR SERPL CREATININE-BSD FRML MDRD: 87 ML/MIN/{1.73_M2}
GLUCOSE SERPL-MCNC: 82 MG/DL (ref 70–99)
HCT VFR BLD AUTO: 38.2 % (ref 35–47)
HGB BLD-MCNC: 12.8 G/DL (ref 11.7–15.7)
IMM GRANULOCYTES # BLD: 0 10E9/L (ref 0–0.4)
IMM GRANULOCYTES NFR BLD: 0.2 %
LYMPHOCYTES # BLD AUTO: 1.2 10E9/L (ref 0.8–5.3)
LYMPHOCYTES NFR BLD AUTO: 21.3 %
MCH RBC QN AUTO: 30.9 PG (ref 26.5–33)
MCHC RBC AUTO-ENTMCNC: 33.5 G/DL (ref 31.5–36.5)
MCV RBC AUTO: 92 FL (ref 78–100)
MONOCYTES # BLD AUTO: 0.3 10E9/L (ref 0–1.3)
MONOCYTES NFR BLD AUTO: 5.4 %
NEUTROPHILS # BLD AUTO: 3.9 10E9/L (ref 1.6–8.3)
NEUTROPHILS NFR BLD AUTO: 69.7 %
PLATELET # BLD AUTO: 315 10E9/L (ref 150–450)
POTASSIUM SERPL-SCNC: 4.1 MMOL/L (ref 3.4–5.3)
PROT SERPL-MCNC: 7.5 G/DL (ref 6.8–8.8)
RBC # BLD AUTO: 4.14 10E12/L (ref 3.8–5.2)
SODIUM SERPL-SCNC: 140 MMOL/L (ref 133–144)
TSH SERPL DL<=0.005 MIU/L-ACNC: 2.23 MU/L (ref 0.4–4)
WBC # BLD AUTO: 5.6 10E9/L (ref 4–11)

## 2021-04-12 PROCEDURE — 82024 ASSAY OF ACTH: CPT | Performed by: INTERNAL MEDICINE

## 2021-04-12 PROCEDURE — 36415 COLL VENOUS BLD VENIPUNCTURE: CPT | Performed by: PHYSICIAN ASSISTANT

## 2021-04-12 PROCEDURE — 80050 GENERAL HEALTH PANEL: CPT | Performed by: PHYSICIAN ASSISTANT

## 2021-04-12 PROCEDURE — 99207 PR NO CHARGE LOS: CPT

## 2021-04-12 PROCEDURE — 96413 CHEMO IV INFUSION 1 HR: CPT | Performed by: PHYSICIAN ASSISTANT

## 2021-04-12 RX ADMIN — Medication 250 ML: at 10:32

## 2021-04-12 ASSESSMENT — PAIN SCALES - GENERAL: PAINLEVEL: NO PAIN (0)

## 2021-04-12 NOTE — PROGRESS NOTES
Infusion Nursing Note:  Yadira Hoang presents today for C8 of Opdivo.    Patient seen by provider today: No - Had a v.v with Meghan Oharaveda 04/08/2021   present during visit today: Not Applicable.    Note:   Patient did meet criteria for an asymptomatic covid-19 PCR test in infusion today. Patient declined the covid-19 test.    Intravenous Access:  Peripheral IV placed.    Treatment Conditions:  Lab Results   Component Value Date    HGB 12.8 04/12/2021     Lab Results   Component Value Date    WBC 5.6 04/12/2021      Lab Results   Component Value Date    ANEU 3.9 04/12/2021     Lab Results   Component Value Date     04/12/2021      Lab Results   Component Value Date     04/12/2021                   Lab Results   Component Value Date    POTASSIUM 4.1 04/12/2021           No results found for: MAG         Lab Results   Component Value Date    CR 0.62 04/12/2021                   Lab Results   Component Value Date    GABY 9.2 04/12/2021                Lab Results   Component Value Date    BILITOTAL 0.3 04/12/2021           Lab Results   Component Value Date    ALBUMIN 3.7 04/12/2021                    Lab Results   Component Value Date    ALT 22 04/12/2021           Lab Results   Component Value Date    AST 14 04/12/2021       Results reviewed, labs MET treatment parameters, ok to proceed with treatment.      Post Infusion Assessment:  Patient tolerated infusion without incident.  Blood return noted pre and post infusion.  Site patent and intact, free from redness, edema or discomfort.  No evidence of extravasations.  Access discontinued per protocol.       Discharge Plan:   Return 05/07/2021 with Dr Orellana - pt due for infusion 05/10/2021 - instructed to make more appointments.  Discharge instructions reviewed with: Patient.  Patient and/or family verbalized understanding of discharge instructions and all questions answered.  Patient discharged in stable condition accompanied by:  self.  Departure Mode: Ambulatory.    Sahara Tran, RN

## 2021-04-13 ENCOUNTER — OFFICE VISIT (OUTPATIENT)
Dept: ORTHOPEDICS | Facility: CLINIC | Age: 76
End: 2021-04-13
Attending: PHYSICIAN ASSISTANT
Payer: COMMERCIAL

## 2021-04-13 VITALS — BODY MASS INDEX: 21.66 KG/M2 | WEIGHT: 138 LBS | HEIGHT: 67 IN

## 2021-04-13 DIAGNOSIS — C43.9 MELANOMA OF SKIN (H): ICD-10-CM

## 2021-04-13 DIAGNOSIS — M25.571 PAIN IN JOINT, ANKLE AND FOOT, RIGHT: ICD-10-CM

## 2021-04-13 PROCEDURE — 99203 OFFICE O/P NEW LOW 30 MIN: CPT | Performed by: ORTHOPAEDIC SURGERY

## 2021-04-13 ASSESSMENT — MIFFLIN-ST. JEOR: SCORE: 1148.59

## 2021-04-13 NOTE — LETTER
4/13/2021         RE: Yadira Hoang  [address] [facility] [address]        Dear Colleague,    Thank you for referring your patient, Yadira Hoang, to the [facility] [place]. Please see a copy of my visit note below.    CHIEF COMPLAINT:  Status post right foot multiple surgeries for melanoma with skin grafting and a free flap performed on early 2020.      HISTORY OF PRESENT ILLNESS:  Ms. Hoang is a 75-year-old female who presents today for evaluation of her right foot.  The patient reports to have had a history of melanoma.  She underwent an excision of the melanoma along the plantar medial aspect of the foot, which was followed by a free flap and skin grafting of the foot.  The patient reports to have a very hypersensitive foot and presents today for discussion of treatment options.      She is very clear about the fact that the heel was extremely indurated but now has soften enough.  However, sometimes she will have a large amount of hypersensitivity where just a simple stroke will make it very painful.  She reports also to be very intolerant to shoes secondary to the pressure against the arch area, which is where the resection took place.  She reports to be able to wear some sandals but apparently have a strap across the anterior portion of the ankle, which also makes it very sensitive to her.      Reports to have also some pain along the lateral aspect of the lower leg and thigh as well as occasional burning and numbness along the toes, although this seems to be more bilateral.      The patient presents for discussion of treatment options with regards to the right foot pain.      PAST MEDICAL HISTORY:  Fatigue, chronic pain disorder, high cholesterol, sleep apnea, depression, among others.      PAST SURGICAL HISTORY:  Reviewed today.      DRUG ALLERGIES:  Atorvastatin.      CURRENT MEDICATIONS:  Please refer to encounter form.      PHYSICAL EXAMINATION:  On  today's visit, she presents as a pleasant female in no apparent distress with a height of 5 feet 7 inches and a weight of 138 pounds.  Denies to have any constitutional symptoms.      On today's visit, she presents with full range of motion of the right ankle, hindfoot and midfoot joints.  CMS intact.  Skin intact although presents with a skin graft located along the medial arch of the foot.  The graft is very solid and viable and on today's exam is not hypersensitive.  The patient presents with unremarkable forefoot exam as well.      RADIOGRAPHIC EVALUATION:  Plain x-rays were reviewed today which were grossly nondiagnostic.      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  Possible radiculopathy.      I discussed with the patient that if, in fact, she has hypersensitivity across the foot secondary to the surgery most likely that will be managed through medical means in the form of Neurontin or Lyrica.  However, I do not believe that we can offer her any type of surgical intervention to improve it.      If on the other hand, she presents with some component of radiculopathy, given the fact that she has also symptoms to a lesser degree in the left lower extremity, hopefully, we can improve that either through injections or a different approach.      On today's visit, she is going to proceed with an MRI of the lumbar spine in order to rule out any lumbar radiculopathy.  In the presence of a normal MRI, we will refer the patient to the Pain Clinic for pain management, as I do not believe that she presents with any structural abnormality that could be improved through surgical means.      All questions were answered.  The patient was pleased with the discussion.  The patient will follow up accordingly.      TT 30 minutes, CT 20 minutes.       Gasper Reyes MD

## 2021-04-13 NOTE — PROGRESS NOTES
CHIEF COMPLAINT:  Status post right foot multiple surgeries for melanoma with skin grafting and a free flap performed on early 2020.      HISTORY OF PRESENT ILLNESS:  Ms. Hoang is a 75-year-old female who presents today for evaluation of her right foot.  The patient reports to have had a history of melanoma.  She underwent an excision of the melanoma along the plantar medial aspect of the foot, which was followed by a free flap and skin grafting of the foot.  The patient reports to have a very hypersensitive foot and presents today for discussion of treatment options.      She is very clear about the fact that the heel was extremely indurated but now has soften enough.  However, sometimes she will have a large amount of hypersensitivity where just a simple stroke will make it very painful.  She reports also to be very intolerant to shoes secondary to the pressure against the arch area, which is where the resection took place.  She reports to be able to wear some sandals but apparently have a strap across the anterior portion of the ankle, which also makes it very sensitive to her.      Reports to have also some pain along the lateral aspect of the lower leg and thigh as well as occasional burning and numbness along the toes, although this seems to be more bilateral.      The patient presents for discussion of treatment options with regards to the right foot pain.      PAST MEDICAL HISTORY:  Fatigue, chronic pain disorder, high cholesterol, sleep apnea, depression, among others.      PAST SURGICAL HISTORY:  Reviewed today.      DRUG ALLERGIES:  Atorvastatin.      CURRENT MEDICATIONS:  Please refer to encounter form.      PHYSICAL EXAMINATION:  On today's visit, she presents as a pleasant female in no apparent distress with a height of 5 feet 7 inches and a weight of 138 pounds.  Denies to have any constitutional symptoms.      On today's visit, she presents with full range of motion of the right ankle, hindfoot  and midfoot joints.  CMS intact.  Skin intact although presents with a skin graft located along the medial arch of the foot.  The graft is very solid and viable and on today's exam is not hypersensitive.  The patient presents with unremarkable forefoot exam as well.      RADIOGRAPHIC EVALUATION:  Plain x-rays were reviewed today which were grossly nondiagnostic.      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  Possible radiculopathy.      I discussed with the patient that if, in fact, she has hypersensitivity across the foot secondary to the surgery most likely that will be managed through medical means in the form of Neurontin or Lyrica.  However, I do not believe that we can offer her any type of surgical intervention to improve it.      If on the other hand, she presents with some component of radiculopathy, given the fact that she has also symptoms to a lesser degree in the left lower extremity, hopefully, we can improve that either through injections or a different approach.      On today's visit, she is going to proceed with an MRI of the lumbar spine in order to rule out any lumbar radiculopathy.  In the presence of a normal MRI, we will refer the patient to the Pain Clinic for pain management, as I do not believe that she presents with any structural abnormality that could be improved through surgical means.      All questions were answered.  The patient was pleased with the discussion.  The patient will follow up accordingly.      TT 30 minutes, CT 20 minutes.

## 2021-04-13 NOTE — NURSING NOTE
"Reason For Visit:   Chief Complaint   Patient presents with     Consult     right foot and ankle pain        Ht 1.702 m (5' 7\")   Wt 62.6 kg (138 lb)   BMI 21.61 kg/m      Pain Assessment  Patient Currently in Pain: Yes  0-10 Pain Scale: 8  Primary Pain Location: Foot    Taty Kumar, ATC    "

## 2021-04-25 ENCOUNTER — HEALTH MAINTENANCE LETTER (OUTPATIENT)
Age: 76
End: 2021-04-25

## 2021-04-26 DIAGNOSIS — C79.51 SECONDARY MALIGNANT NEOPLASM OF BONE (H): Primary | ICD-10-CM

## 2021-04-26 DIAGNOSIS — R29.898 WEAKNESS OF FOOT: ICD-10-CM

## 2021-04-27 ENCOUNTER — OFFICE VISIT (OUTPATIENT)
Dept: FAMILY MEDICINE | Facility: CLINIC | Age: 76
End: 2021-04-27
Payer: COMMERCIAL

## 2021-04-27 VITALS
TEMPERATURE: 98 F | OXYGEN SATURATION: 97 % | WEIGHT: 143 LBS | BODY MASS INDEX: 22.4 KG/M2 | RESPIRATION RATE: 14 BRPM | DIASTOLIC BLOOD PRESSURE: 72 MMHG | SYSTOLIC BLOOD PRESSURE: 138 MMHG | HEART RATE: 90 BPM

## 2021-04-27 DIAGNOSIS — F33.0 MILD RECURRENT MAJOR DEPRESSION (H): ICD-10-CM

## 2021-04-27 DIAGNOSIS — C43.71 MALIGNANT MELANOMA OF RIGHT LOWER EXTREMITY INCLUDING HIP (H): ICD-10-CM

## 2021-04-27 DIAGNOSIS — R09.82 POSTNASAL DRIP: Primary | ICD-10-CM

## 2021-04-27 DIAGNOSIS — E78.5 HYPERLIPIDEMIA LDL GOAL <100: ICD-10-CM

## 2021-04-27 DIAGNOSIS — E28.39 ESTROGEN DEFICIENCY: ICD-10-CM

## 2021-04-27 DIAGNOSIS — F41.1 GAD (GENERALIZED ANXIETY DISORDER): ICD-10-CM

## 2021-04-27 DIAGNOSIS — R05.9 COUGH: ICD-10-CM

## 2021-04-27 DIAGNOSIS — Z78.9 STATIN INTOLERANCE: ICD-10-CM

## 2021-04-27 PROCEDURE — 96127 BRIEF EMOTIONAL/BEHAV ASSMT: CPT | Performed by: INTERNAL MEDICINE

## 2021-04-27 PROCEDURE — 99203 OFFICE O/P NEW LOW 30 MIN: CPT | Performed by: INTERNAL MEDICINE

## 2021-04-27 RX ORDER — FLUTICASONE PROPIONATE 50 MCG
2 SPRAY, SUSPENSION (ML) NASAL DAILY
Qty: 16 G | Refills: 3 | Status: SHIPPED | OUTPATIENT
Start: 2021-04-27 | End: 2021-08-06

## 2021-04-27 ASSESSMENT — PATIENT HEALTH QUESTIONNAIRE - PHQ9
5. POOR APPETITE OR OVEREATING: SEVERAL DAYS
SUM OF ALL RESPONSES TO PHQ QUESTIONS 1-9: 13

## 2021-04-27 ASSESSMENT — ANXIETY QUESTIONNAIRES
3. WORRYING TOO MUCH ABOUT DIFFERENT THINGS: SEVERAL DAYS
1. FEELING NERVOUS, ANXIOUS, OR ON EDGE: MORE THAN HALF THE DAYS
GAD7 TOTAL SCORE: 6
6. BECOMING EASILY ANNOYED OR IRRITABLE: SEVERAL DAYS
7. FEELING AFRAID AS IF SOMETHING AWFUL MIGHT HAPPEN: NOT AT ALL
5. BEING SO RESTLESS THAT IT IS HARD TO SIT STILL: NOT AT ALL
IF YOU CHECKED OFF ANY PROBLEMS ON THIS QUESTIONNAIRE, HOW DIFFICULT HAVE THESE PROBLEMS MADE IT FOR YOU TO DO YOUR WORK, TAKE CARE OF THINGS AT HOME, OR GET ALONG WITH OTHER PEOPLE: VERY DIFFICULT
2. NOT BEING ABLE TO STOP OR CONTROL WORRYING: SEVERAL DAYS

## 2021-04-27 NOTE — PATIENT INSTRUCTIONS
Bone density: Call [phone] to schedule at [facility]

## 2021-04-27 NOTE — PROGRESS NOTES
Assessment & Plan     Yadira Ochoa was seen today for uri and uti.    Diagnoses and all orders for this visit:    Postnasal drip  Comments:  Tiral of flonase  Orders:  -     fluticasone (FLONASE) 50 MCG/ACT nasal spray; Spray 2 sprays into both nostrils daily    Cough  Comments:  may be from postnasal drip. better. she has PET scan in one week.   Orders:  -     XR Chest 2 Views; Future    Hyperlipidemia LDL goal <100  Comments:  10 year ASCVD risk 20%.  Statin recommended, but history of statin intolerance.  We will recheck this coming October.  Orders:  -     STATIN NOT PRESCRIBED (INTENTIONAL); Please choose reason not prescribed from choices below.  -     Lipid panel reflex to direct LDL Fasting; Future    Malignant melanoma of right lower extremity including hip (H)  Comments:  follows with oncology,  monthly infusion, PET scan 5/7/2021    Estrogen deficiency  -     DX Hip/Pelvis/Spine; Future    Mild recurrent major depression (H)  -     AR BEHAV ASSMT W/SCORE & DOCD/STAND INSTRUMENT    ALEXUS (generalized anxiety disorder)  -     AR BEHAV ASSMT W/SCORE & DOCD/STAND INSTRUMENT    Hyperlipidemia LDL goal <100  -     STATIN NOT PRESCRIBED (INTENTIONAL); Please choose reason not prescribed from choices below.  -     Lipid panel reflex to direct LDL Fasting; Future    Statin intolerance  -     STATIN NOT PRESCRIBED (INTENTIONAL); Please choose reason not prescribed from choices below.        Return in about 24 weeks (around 10/12/2021) for wellness visit, Fasting Lab appointment.    I spent a total of 33 minutes on the day of the visit.  doing chart review, history and exam, documentation and further activities as noted above       Erika Blanton MD PhD  [facility]    Aylin May is a 75 year old who presents for the following health issues     HPI     Acute Illness  Acute illness concerns: she  Coughed a month ago. Bad sinus congestion. She used netti pot. She still has sinus draining.  "  Onset/Duration: over 1 month  Symptoms:  Fever: no  Chills/Sweats: no  Headache (location?): yes  Sinus Pressure: no  Conjunctivitis:  no  Ear Pain: no  Rhinorrhea: no  Congestion: YES  Sore Throat: no  Cough: YES - deep, productive, improved. Only sometimes. White/clear. Has dust allergy. She feels the cough is deep down inside her lungs. She reported she has cough and sinus for 20-30 years. It keeps getting worse. Had sinus surgery one point. When she coughs, she feels her chest is tight. She is feeling better now. She feels the last episode 1 month ago has gotten better.    Wheeze: no  Decreased Appetite: no  Nausea: no  Vomiting: no  Diarrhea: no  Dysuria/Freq.: no  Dysuria or Hematuria: no  Fatigue/Achiness: YES  Sick/Strep Exposure: no  Therapies tried and outcome: tessalon    She feels like she is having an yeast infection.   Yesterday and today her stomach doesn't feel good.   Has no energy. More forgetful since chemo for melanoma. \"chemo brain\".     Vaginal Symptoms  Onset/Duration: on and off issue in the last few month. Not constant. Today is a good day. No itching or burning. She never had vaginal discharge. Sometimes she notices it after interbourse, then goes away in a couple of days.   Description:  Vaginal Discharge: none   Itching (Pruritis): YES  Burning sensation:  YES  Odor: no  Accompanying Signs & Symptoms:  Urinary symptoms: no  Abdominal pain: no  Fever: no  History:   Sexually active: no  New Partner: no  Possibility of Pregnancy:  no  Recent antibiotic use: no  Previous vaginitis issues: no  Precipitating or alleviating factors: None  Therapies tried and outcome: none    Patient has had a longstanding history of depression and anxiety.  She sees a counselor every 2 weeks.  She is sensitive to medication, does not want to take any medicine for this.  She completed PHQ-9 ALEXUS-7 questionnaires today.  PHQ-9 score is 13, ALEXUS-7 score is 6.  He feels that he is very fatigued low energy in part " because of the chemo treatment, not worsening depression.    Review of Systems   Constitutional, HEENT, cardiovascular, pulmonary, gi and gu systems are negative, except as otherwise noted.      Objective    /72   Pulse 90   Temp 98  F (36.7  C) (Tympanic)   Resp 14   Wt 64.9 kg (143 lb)   SpO2 97%   BMI 22.40 kg/m    Body mass index is 22.4 kg/m .  Physical Exam   GENERAL: healthy, alert and no distress  PSYCH: mentation appears normal, affect normal/bright

## 2021-04-28 ENCOUNTER — TELEPHONE (OUTPATIENT)
Dept: ORTHOPEDICS | Facility: CLINIC | Age: 76
End: 2021-04-28

## 2021-04-28 ENCOUNTER — ANCILLARY PROCEDURE (OUTPATIENT)
Dept: MRI IMAGING | Facility: CLINIC | Age: 76
End: 2021-04-28
Attending: ORTHOPAEDIC SURGERY
Payer: COMMERCIAL

## 2021-04-28 DIAGNOSIS — M25.571 PAIN IN JOINT, ANKLE AND FOOT, RIGHT: ICD-10-CM

## 2021-04-28 DIAGNOSIS — M54.16 LUMBAR RADICULOPATHY: Primary | ICD-10-CM

## 2021-04-28 PROCEDURE — 72148 MRI LUMBAR SPINE W/O DYE: CPT | Performed by: RADIOLOGY

## 2021-04-28 ASSESSMENT — ANXIETY QUESTIONNAIRES: GAD7 TOTAL SCORE: 6

## 2021-04-28 NOTE — TELEPHONE ENCOUNTER
RN called and spoke with patient. Patient is a wee bit confused but RN able to re-direct her. Explained to her Dr. Bowling's plan.  Patient declined Gabapentin due to side effects of drowsiness. Patient however accepted the lumbar injection. RN gave her imaging scheduling number. Patient will call to schedule. Patient stated she may be busy for the next few weeks and won't be able to do the injection until late May. RN stated that is fine. RN reminded patient to call RN once the injection is completed so RN can schedule an appt for her to be seen by Dr. Bowling and have upright lumbar XR. Patient expressed understanding.    Kirstin Maurice RN          ----- Message from Martin Bowling MD sent at 4/28/2021  1:46 PM CDT -----  Regarding: RE: Lumbar MRI  She definitely has severe spine imaging findings.  I would be happy to evaluate her.  Juno, can you arrange a visit with upright X-Rays?  I would also recommend she try an L4-5 interlaminar GIFTY and gabapentin if she can tolerate the sedative side effects.  Juno, can you order these for her and I can have a phone visit with her to discuss if she wishes prior to intiating, or she can see me in clinic whichever is easier for her.    Maximiliano  ----- Message -----  From: Taty Kumar ATC  Sent: 4/28/2021  12:23 PM CDT  To: Gasper Reyes MD, #  Subject: Lumbar MRI                                       Hi Dr. Bowling,    Dr. 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  Possible radiculopathy. Dr. Reyes had the patient complete an MRI of the lumbar spine. Dr. Reyes has reviewed and is wondering if you see anything in her imaging that would be consistent with what she is feeling. The MRI and Dr. Reyes's clinic note are in the patients chart if you wouldn't mind taking some time to review and letting Dr. Reyes know your thought, he is copied on this message so you can reply all.    Thanks for your  time,    Taty GAONA with Dr. Reyes

## 2021-05-07 ENCOUNTER — APPOINTMENT (OUTPATIENT)
Dept: LAB | Facility: CLINIC | Age: 76
End: 2021-05-07
Attending: INTERNAL MEDICINE
Payer: COMMERCIAL

## 2021-05-07 ENCOUNTER — ONCOLOGY VISIT (OUTPATIENT)
Dept: ONCOLOGY | Facility: CLINIC | Age: 76
End: 2021-05-07
Attending: INTERNAL MEDICINE
Payer: COMMERCIAL

## 2021-05-07 ENCOUNTER — ANCILLARY PROCEDURE (OUTPATIENT)
Dept: PET IMAGING | Facility: CLINIC | Age: 76
End: 2021-05-07
Attending: INTERNAL MEDICINE
Payer: COMMERCIAL

## 2021-05-07 VITALS
BODY MASS INDEX: 22.4 KG/M2 | WEIGHT: 143 LBS | TEMPERATURE: 98.5 F | HEART RATE: 87 BPM | OXYGEN SATURATION: 95 % | RESPIRATION RATE: 18 BRPM | DIASTOLIC BLOOD PRESSURE: 73 MMHG | SYSTOLIC BLOOD PRESSURE: 120 MMHG

## 2021-05-07 DIAGNOSIS — C43.71 MALIGNANT MELANOMA OF RIGHT LOWER EXTREMITY INCLUDING HIP (H): ICD-10-CM

## 2021-05-07 DIAGNOSIS — C43.9 MELANOMA OF SKIN (H): Primary | ICD-10-CM

## 2021-05-07 DIAGNOSIS — C43.9 MELANOMA OF SKIN (H): ICD-10-CM

## 2021-05-07 LAB
ALBUMIN SERPL-MCNC: 3.8 G/DL (ref 3.4–5)
ALP SERPL-CCNC: 60 U/L (ref 40–150)
ALT SERPL W P-5'-P-CCNC: 21 U/L (ref 0–50)
ANION GAP SERPL CALCULATED.3IONS-SCNC: 3 MMOL/L (ref 3–14)
AST SERPL W P-5'-P-CCNC: 10 U/L (ref 0–45)
BASOPHILS # BLD AUTO: 0 10E9/L (ref 0–0.2)
BASOPHILS NFR BLD AUTO: 0.6 %
BILIRUB SERPL-MCNC: 0.4 MG/DL (ref 0.2–1.3)
BUN SERPL-MCNC: 18 MG/DL (ref 7–30)
CALCIUM SERPL-MCNC: 9.3 MG/DL (ref 8.5–10.1)
CHLORIDE SERPL-SCNC: 106 MMOL/L (ref 94–109)
CO2 SERPL-SCNC: 32 MMOL/L (ref 20–32)
CREAT SERPL-MCNC: 0.67 MG/DL (ref 0.52–1.04)
DIFFERENTIAL METHOD BLD: NORMAL
EOSINOPHIL # BLD AUTO: 0.2 10E9/L (ref 0–0.7)
EOSINOPHIL NFR BLD AUTO: 3.9 %
ERYTHROCYTE [DISTWIDTH] IN BLOOD BY AUTOMATED COUNT: 12.6 % (ref 10–15)
GFR SERPL CREATININE-BSD FRML MDRD: 85 ML/MIN/{1.73_M2}
GLUCOSE SERPL-MCNC: 111 MG/DL (ref 70–99)
HCT VFR BLD AUTO: 37.3 % (ref 35–47)
HGB BLD-MCNC: 12.4 G/DL (ref 11.7–15.7)
IMM GRANULOCYTES # BLD: 0 10E9/L (ref 0–0.4)
IMM GRANULOCYTES NFR BLD: 0.2 %
LYMPHOCYTES # BLD AUTO: 1.1 10E9/L (ref 0.8–5.3)
LYMPHOCYTES NFR BLD AUTO: 23.2 %
MCH RBC QN AUTO: 30.8 PG (ref 26.5–33)
MCHC RBC AUTO-ENTMCNC: 33.2 G/DL (ref 31.5–36.5)
MCV RBC AUTO: 93 FL (ref 78–100)
MONOCYTES # BLD AUTO: 0.4 10E9/L (ref 0–1.3)
MONOCYTES NFR BLD AUTO: 7.7 %
NEUTROPHILS # BLD AUTO: 3.2 10E9/L (ref 1.6–8.3)
NEUTROPHILS NFR BLD AUTO: 64.4 %
NRBC # BLD AUTO: 0 10*3/UL
NRBC BLD AUTO-RTO: 0 /100
PLATELET # BLD AUTO: 299 10E9/L (ref 150–450)
POTASSIUM SERPL-SCNC: 3.8 MMOL/L (ref 3.4–5.3)
PROT SERPL-MCNC: 7.2 G/DL (ref 6.8–8.8)
RBC # BLD AUTO: 4.03 10E12/L (ref 3.8–5.2)
SODIUM SERPL-SCNC: 141 MMOL/L (ref 133–144)
TSH SERPL DL<=0.005 MIU/L-ACNC: 1.63 MU/L (ref 0.4–4)
WBC # BLD AUTO: 4.9 10E9/L (ref 4–11)

## 2021-05-07 PROCEDURE — 36415 COLL VENOUS BLD VENIPUNCTURE: CPT

## 2021-05-07 PROCEDURE — 78816 PET IMAGE W/CT FULL BODY: CPT | Mod: PS | Performed by: RADIOLOGY

## 2021-05-07 PROCEDURE — 99215 OFFICE O/P EST HI 40 MIN: CPT | Performed by: INTERNAL MEDICINE

## 2021-05-07 PROCEDURE — 74177 CT ABD & PELVIS W/CONTRAST: CPT | Mod: 59 | Performed by: RADIOLOGY

## 2021-05-07 PROCEDURE — 82024 ASSAY OF ACTH: CPT | Performed by: INTERNAL MEDICINE

## 2021-05-07 PROCEDURE — 84443 ASSAY THYROID STIM HORMONE: CPT | Performed by: INTERNAL MEDICINE

## 2021-05-07 PROCEDURE — 71260 CT THORAX DX C+: CPT | Mod: 59 | Performed by: RADIOLOGY

## 2021-05-07 PROCEDURE — 80053 COMPREHEN METABOLIC PANEL: CPT | Performed by: INTERNAL MEDICINE

## 2021-05-07 PROCEDURE — 85025 COMPLETE CBC W/AUTO DIFF WBC: CPT | Performed by: INTERNAL MEDICINE

## 2021-05-07 PROCEDURE — A9552 F18 FDG: HCPCS | Performed by: RADIOLOGY

## 2021-05-07 PROCEDURE — G0463 HOSPITAL OUTPT CLINIC VISIT: HCPCS

## 2021-05-07 RX ORDER — IOPAMIDOL 755 MG/ML
50-135 INJECTION, SOLUTION INTRAVASCULAR ONCE
Status: COMPLETED | OUTPATIENT
Start: 2021-05-07 | End: 2021-05-07

## 2021-05-07 RX ORDER — NALOXONE HYDROCHLORIDE 0.4 MG/ML
.1-.4 INJECTION, SOLUTION INTRAMUSCULAR; INTRAVENOUS; SUBCUTANEOUS
Status: CANCELLED | OUTPATIENT
Start: 2021-05-10

## 2021-05-07 RX ORDER — DIPHENHYDRAMINE HYDROCHLORIDE 50 MG/ML
50 INJECTION INTRAMUSCULAR; INTRAVENOUS
Status: CANCELLED
Start: 2021-05-10

## 2021-05-07 RX ORDER — METHYLPREDNISOLONE SODIUM SUCCINATE 125 MG/2ML
125 INJECTION, POWDER, LYOPHILIZED, FOR SOLUTION INTRAMUSCULAR; INTRAVENOUS
Status: CANCELLED
Start: 2021-05-10

## 2021-05-07 RX ORDER — MEPERIDINE HYDROCHLORIDE 25 MG/ML
25 INJECTION INTRAMUSCULAR; INTRAVENOUS; SUBCUTANEOUS EVERY 30 MIN PRN
Status: CANCELLED | OUTPATIENT
Start: 2021-05-10

## 2021-05-07 RX ORDER — ALBUTEROL SULFATE 0.83 MG/ML
2.5 SOLUTION RESPIRATORY (INHALATION)
Status: CANCELLED | OUTPATIENT
Start: 2021-05-10

## 2021-05-07 RX ORDER — ALBUTEROL SULFATE 90 UG/1
1-2 AEROSOL, METERED RESPIRATORY (INHALATION)
Status: CANCELLED
Start: 2021-05-10

## 2021-05-07 RX ORDER — SODIUM CHLORIDE 9 MG/ML
1000 INJECTION, SOLUTION INTRAVENOUS CONTINUOUS PRN
Status: CANCELLED
Start: 2021-05-10

## 2021-05-07 RX ORDER — HEPARIN SODIUM,PORCINE 10 UNIT/ML
5 VIAL (ML) INTRAVENOUS
Status: CANCELLED | OUTPATIENT
Start: 2021-05-10

## 2021-05-07 RX ORDER — HEPARIN SODIUM (PORCINE) LOCK FLUSH IV SOLN 100 UNIT/ML 100 UNIT/ML
5 SOLUTION INTRAVENOUS
Status: CANCELLED | OUTPATIENT
Start: 2021-05-10

## 2021-05-07 RX ORDER — EPINEPHRINE 1 MG/ML
0.3 INJECTION, SOLUTION INTRAMUSCULAR; SUBCUTANEOUS EVERY 5 MIN PRN
Status: CANCELLED | OUTPATIENT
Start: 2021-05-10

## 2021-05-07 RX ADMIN — IOPAMIDOL 88 ML: 755 INJECTION, SOLUTION INTRAVASCULAR at 11:37

## 2021-05-07 ASSESSMENT — PAIN SCALES - GENERAL: PAINLEVEL: NO PAIN (0)

## 2021-05-07 NOTE — NURSING NOTE
"Oncology Rooming Note    May 7, 2021 3:46 PM   Yadira Hoang is a 75 year old female who presents for:    Chief Complaint   Patient presents with     Lab Only     labs drawn by RN in lab      RECHECK     Melanoma of skin (H)      Initial Vitals: /73   Pulse 87   Temp 98.5  F (36.9  C) (Tympanic)   Resp 18   Wt 64.9 kg (143 lb)   SpO2 95%   BMI 22.40 kg/m   Estimated body mass index is 22.4 kg/m  as calculated from the following:    Height as of 4/13/21: 1.702 m (5' 7\").    Weight as of this encounter: 64.9 kg (143 lb). Body surface area is 1.75 meters squared.  No Pain (0) Comment: Data Unavailable   No LMP recorded. Patient is postmenopausal.  Allergies reviewed: Yes  Medications reviewed: Yes    Medications: Medication refills not needed today.  Pharmacy name entered into [facility]:    [facility] [place], [place] - [address], [id number]  [facility] [address] [place] [address] [address] [facility]    Clinical concerns: No new needs or concerns.       Melanie Fishman CMA              "

## 2021-05-07 NOTE — NURSING NOTE
Chief Complaint   Patient presents with     Lab Only     labs drawn by RN in lab      /73   Pulse 87   Temp 98.5  F (36.9  C) (Tympanic)   Resp 18   Wt 64.9 kg (143 lb)   SpO2 95%   BMI 22.40 kg/m      Labs drawn via right antecubital venipuncture. Pt tolerated well & checked in for next appointment.    Jeannine Carmichael RN on 5/7/2021 at 3:29 PM

## 2021-05-07 NOTE — LETTER
5/7/2021         RE: Yadira Hoang  [address] [facility] [address]        Dear Colleague,    Thank you for referring your patient, Yadira Hoang, to the [facility]. Please see a copy of my visit note below.    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 She noted a painful lesion on the sole of the right foot for a few months, since last summer.  It initially was small then became raised.  It spontaneously bled. She is not entirely sure of its appearance initially.  She denies noting any masses behind the knee or in the groin.  2. 1/9/2020, punch biopsy was performed by Dr Jessica Meadows. Patology showed melanoma, at least 3.4 mm deep with ulceration and 0 mitoses, and perineural invasion present. TILs were non-brisk and LVI not identified. pT3b.  3. 2/10/2020, she has right femoral sentinel lymph node biopsy and wide local excision (Specimen #: [id number]) and the right heel lesion extends to a depth of 6.6 mm, and invasive melanoma is present at 0.2 mm from the deep margin. Microsatellites are also present. There was 1 (of 2) lymph nodes involved. The lymph node tissue exhibits extensive subcapsular and parenchymal clusters of melanoma cells, highlighted on Melan-A immunostain. The largest cluster is 7 millimeters in greatest diameter. pT4b pN2c. PD-L1 staining is negative, <1%. No mutation in BRAF, KIT, or NRAS.  4. 2/22/2020, she had PET-CT, which showed intense FDG uptake in a right inguinal lymph node, concerning for an additional focus of metastatic disease. There is also an indeterminate right external iliac lymph node with mild FDG uptake.  5. 3/4/2020, she has medial plantar artery  fasciocutaneous instep flap and Integra placement to the donor site. On 4/2/2020, she has additional  reconstruction with skin grafting.  6. 5/22/2020, PET-CT shows a hypermetabolic right inguinal and right external iliac chain lymph node and stable pulmonary nodules.  7. 5/27/2020, she starts nivolumab for presumed low volume lymph node predominant metastatic disease.  8. 8/21/2020, PET-CT shows increase in hypermetabolic adenopathy, and a hypermetabolic nodule in segment 2 of the liver. Given low volume of disease she prefers to continue therapy with short interval follow-up.  9. 10/9/2020, PET-CT shows increasing size of hepatic metastases with increased hypermetabolism, increased right inguinal and iliac lymphadenopathy, and new FDG uptake right T2 transverse process and right side of S1. MRI-brain obtained 10/21/2020 is negative for brain metastasis.  10. 10/28/2020, she starts ipilimumab 1mg/kg and nivolumab 3mg/kg.     HISTORY OF PRESENT ILLNESS  Yadira Hoang is a 75 year old female with stage IV acral melanoma. She had primary anti-PD1 CPI refractory disease on nivolumab, and she then went on to receive 2nd line Ipi/Nivo. She presents prior to cycle 9 of therapy, prior to her 5th nivolumab maintenance infusion, and for restaging evaluation.    PET-CT shows evidence for a mixed response to treatment. Worrisome is an increase in size and hypermetabolism in the right iliac chain lymph node. Otherwise, the exam appears stable.    On immunotherapy she endorses fatigue, chills, and itching. She has had constipation, no diarrhea. No shortness of breath or cough. She did develop chills and fatigue on dual checkpoint inhibitors, but this is now much better. TSH is normal.    ECOG performance status is 1.    Current Outpatient Medications   Medication     cholecalciferol (VITAMIN D3) 125 MCG (5000 UT) TABS tablet     estradiol (ESTRACE) 0.1 MG/GM vaginal cream     fluticasone (FLONASE) 50 MCG/ACT nasal spray     STATIN NOT PRESCRIBED (INTENTIONAL)     Current Facility-Administered Medications   Medication     2  mL bupivacaine (MARCAINE) preservative free injection 0.25% (10 mL vial)     2 mL bupivacaine (MARCAINE) preservative free injection 0.25% (10 mL vial)       PHYSICAL EXAMINATION  /73   Pulse 87   Temp 98.5  F (36.9  C) (Tympanic)   Resp 18   Wt 64.9 kg (143 lb)   SpO2 95%   BMI 22.40 kg/m    GENERAL: Healthy, alert and no distress  EYES: Eyes grossly normal to inspection.  No discharge or erythema, or obvious scleral/conjunctival abnormalities.  HENT: Normal cephalic/atraumatic.  External ears, nose and mouth without ulcers or lesions.  No nasal drainage visible.  NECK: No asymmetry, visible masses or scars  RESP: No audible wheeze, cough, or visible cyanosis.  No visible retractions or increased work of breathing.    SKIN: Visible skin clear. No significant rash, abnormal pigmentation or lesions.  NEURO: Cranial nerves grossly intact.  Mentation and speech appropriate for age.  PSYCH: Mentation appears normal, affect normal/bright, judgement and insight intact, normal speech and appearance well-groomed.      LABS  Oncology Visit on 05/07/2021   Component Date Value Ref Range Status     TSH 05/07/2021 1.63  0.40 - 4.00 mU/L Final     WBC 05/07/2021 4.9  4.0 - 11.0 10e9/L Final     RBC Count 05/07/2021 4.03  3.8 - 5.2 10e12/L Final     Hemoglobin 05/07/2021 12.4  11.7 - 15.7 g/dL Final     Hematocrit 05/07/2021 37.3  35.0 - 47.0 % Final     MCV 05/07/2021 93  78 - 100 fl Final     MCH 05/07/2021 30.8  26.5 - 33.0 pg Final     MCHC 05/07/2021 33.2  31.5 - 36.5 g/dL Final     RDW 05/07/2021 12.6  10.0 - 15.0 % Final     Platelet Count 05/07/2021 299  150 - 450 10e9/L Final     Diff Method 05/07/2021 Automated Method   Final     % Neutrophils 05/07/2021 64.4  % Final     % Lymphocytes 05/07/2021 23.2  % Final     % Monocytes 05/07/2021 7.7  % Final     % Eosinophils 05/07/2021 3.9  % Final     % Basophils 05/07/2021 0.6  % Final     % Immature Granulocytes 05/07/2021 0.2  % Final     Nucleated RBCs  05/07/2021 0  0 /100 Final     Absolute Neutrophil 05/07/2021 3.2  1.6 - 8.3 10e9/L Final     Absolute Lymphocytes 05/07/2021 1.1  0.8 - 5.3 10e9/L Final     Absolute Monocytes 05/07/2021 0.4  0.0 - 1.3 10e9/L Final     Absolute Eosinophils 05/07/2021 0.2  0.0 - 0.7 10e9/L Final     Absolute Basophils 05/07/2021 0.0  0.0 - 0.2 10e9/L Final     Abs Immature Granulocytes 05/07/2021 0.0  0 - 0.4 10e9/L Final     Absolute Nucleated RBC 05/07/2021 0.0   Final     Sodium 05/07/2021 141  133 - 144 mmol/L Final     Potassium 05/07/2021 3.8  3.4 - 5.3 mmol/L Final     Chloride 05/07/2021 106  94 - 109 mmol/L Final     Carbon Dioxide 05/07/2021 32  20 - 32 mmol/L Final     Anion Gap 05/07/2021 3  3 - 14 mmol/L Final     Glucose 05/07/2021 111* 70 - 99 mg/dL Final     Urea Nitrogen 05/07/2021 18  7 - 30 mg/dL Final     Creatinine 05/07/2021 0.67  0.52 - 1.04 mg/dL Final     GFR Estimate 05/07/2021 85  >60 mL/min/[1.73_m2] Final     GFR Estimate If Black 05/07/2021 >90  >60 mL/min/[1.73_m2] Final     Calcium 05/07/2021 9.3  8.5 - 10.1 mg/dL Final     Bilirubin Total 05/07/2021 0.4  0.2 - 1.3 mg/dL Final     Albumin 05/07/2021 3.8  3.4 - 5.0 g/dL Final     Protein Total 05/07/2021 7.2  6.8 - 8.8 g/dL Final     Alkaline Phosphatase 05/07/2021 60  40 - 150 U/L Final     ALT 05/07/2021 21  0 - 50 U/L Final     AST 05/07/2021 10  0 - 45 U/L Final         IMAGING  CT Chest/Abdomen/Pelvis w Contrast  PET Oncology Whole Body  Narrative: Combined Report of:    PET and CT on  5/7/2021 12:01 PM :    1. PET of the neck, chest, abdomen, and pelvis.  2. PET CT Fusion for Attenuation Correction and Anatomical  Localization:    3. Diagnostic CT scan of the chest, abdomen, and pelvis with  intravenous contrast for interpretation.  3. CT of the chest, abdomen and pelvis obtained for diagnostic  interpretation.  4. 3D MIP and PET-CT fused images were processed on an independent  workstation and archived to PACS and reviewed by a  radiologist.    Technique:    1. PET: The patient received 12.44 mCi of F-18-FDG; the serum glucose  was 87 prior to administration, body weight was 64.9 kg. Images were  evaluated in the axial, sagittal, and coronal planes as well as the  rotational whole body MIP. Images were acquired from the Vertex to the  Feet.    UPTAKE WAS MEASURED AT 67 MINUTES.     BACKGROUND:  Liver SUV max= 3.65,   Aorta Blood SUV Max: 2.54.     2. CT: Volumetric acquisition for clinical interpretation of the  chest, abdomen, and pelvis acquired at 3 mm sections  after the  uneventful administration of intravenous contrast. The chest, abdomen,  and pelvis were evaluated at 5 mm sections in bone, soft tissue, and  lung windows.      The patient received 88 cc of Isovue 370 intravenously for the  examination.    --    3. 3D MIP and PET-CT fused images were processed on an independent  workstation and archived to PACS and reviewed by a radiologist.    INDICATION: Melanoma, stage >= IIB, monitor; Malignant melanoma of  right lower extremity including hip (H); Melanoma of skin (H)    ADDITIONAL INFORMATION OBTAINED FROM EMR: 75-year-old female with  history of apical melanoma of the right heel, stage IV with right  inguinal/iliac lymph node, liver, and osseous metastases, currently on  nivolumab maintenance therapy.    COMPARISON: 2/12/2021, 10/9/2020    FINDINGS:     HEAD/NECK:  There is no  suspicious FDG uptake in the neck.     The paranasal sinuses are clear. The mastoid air cells are clear. The  mucosal pharyngeal space, the , prevertebral and carotid  spaces are within normal limits. No masses, mass effect or  pathologically enlarged lymph nodes are evident. The thyroid gland is  normal.    CHEST:  There is no suspicious FDG uptake in the chest.     There are no pathologically enlarged mediastinal, hilar or axillary  lymph nodes.  There are no suspicious lung nodules or evidence for infection.  There  is a 6 mm  non-hypermetabolic right lower lobe lung nodule series 9  image 73 unchanged from 2/22/2020. There is no significant pericardial  or pleural effusions.    ABDOMEN AND PELVIS:  Persistent hypermetabolic right inguinal and iliac chain lymph nodes.  The largest demonstrates an SUV max of 20.66 (previously 19.2) and  currently measures 2.7 x 3.3 cm.  The iliac chain lymph nodes are  increasing in hypermetabolism in size. For example (series 5 image  375), right inguinal node total metabolic volume 1.7 cc, total lesion  glycolysis 16.3, (prior total metabolic volume 1 cc, total lesion  glycolysis was 8.4.)    No new areas of abnormal FDG uptake in the  abdomen or pelvis.    Resolution of subtle mild hypermetabolism in presumed hepatic  metastases in the liver are unchanged, for example a 9 mm hypodensity  in the hepatic dome (series 5, image 239, previously 9 mm) . Scattered  smaller hepatic hypodensities are also stable. No new hepatic lesions.  Portal vasculature is patent. The gallbladder, pancreas, spleen,  adrenals, and kidneys are unremarkable. Normal caliber small bowel.  The colon is normal. Major abdominal vasculature is patent. Mild  aortoiliac atherosclerosis.    LOWER EXTREMITIES:   No abnormal masses or hypermetabolic lesions.    BONES:   There are no suspicious lytic or blastic osseous lesions.  There is no  abnormal FDG uptake in the skeleton. Resolved hypermetabolism in the  subcutaneous tissues of the left shoulder.  Impression: IMPRESSION: In this patient with history of metastatic melanoma:   1. Worse: Slight increase in hypermetabolism and size of right iliac  chain lymph nodes.   1a. Unchanged hypermetabolic right inguinal lymph nodes.   2. Better:   2a. Resolved subcutaneous hypermetabolic nodule in the left shoulder.  2b. Unchanged size and appearance of the 9 mm hepatic dome  hypodensity, suspicious for metastasis; however, slight decrease in  mild hypermetabolism. Suspect this and other small  lesions in liver  are treated.       I have personally reviewed the examination and initial interpretation  and I agree with the findings.    WES FORTE MD        ASSESSMENT AND PLAN:    #1 Acral melanoma, right heel primary, pT4b pN3c M1, Stage IV  It was a pleasure to talk with Ms. Hoang. She is a 75 year old woman with metastatic acral melanoma to right inguinal and iliac lymph nodes, liver and bone. She progressed on first line nivolumab, had evidence of response to 2nd line ipilimumab and nivolumab, however, on nivolumab maintenance therapy she has started to show regrowth, particularly in the right inguinal node. The concern gere is that additional nivolumab will be associated with additional growth in this and soon will show in the other tumors.    -We will switch to ipilimumab, every 3 weeks for a maximum of 4 doses, then may again attempt anti-PD1 therapy with Keytruda to sustain immunologic activity.     #2 Arthritis, immunotherapy associated, grade 1  We will watch closely for worsening of immune mediated side effects and worsening of arthritis. She does not feel she requires steroids.    -continue using Voltaren gel  -OTC Aleve or Ibuprofen as needed    #3 Pruritis, grade 1  She finds this is manageable with the capsaicin cream. Encouraged emmolient creams containing oatmeal (ie Aveeno). May also consider Rx for hydroxyzine if this worsens. For now given side effect of fatigue, she declines.    -continue capsaicin cream    #4 Fatigue, multifactorial  History of GUERLINE (treated), on immunotherapy. Continue to monitor.    #5 History of depression and anxiety  Currently off treatment. Observe.    Multiple questions answered.       Gi Cartagena M.D.   of Medicine  Hematology, Oncology and Transplantation          Again, thank you for allowing me to participate in the care of your patient.        Sincerely,        Gi Franklin MD

## 2021-05-07 NOTE — PROGRESS NOTES
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 She noted a painful lesion on the sole of the right foot for a few months, since last summer.  It initially was small then became raised.  It spontaneously bled. She is not entirely sure of its appearance initially.  She denies noting any masses behind the knee or in the groin.  2. 1/9/2020, punch biopsy was performed by Dr Jessica Meadows. Patology showed melanoma, at least 3.4 mm deep with ulceration and 0 mitoses, and perineural invasion present. TILs were non-brisk and LVI not identified. pT3b.  3. 2/10/2020, she has right femoral sentinel lymph node biopsy and wide local excision (Specimen #: [id number]) and the right heel lesion extends to a depth of 6.6 mm, and invasive melanoma is present at 0.2 mm from the deep margin. Microsatellites are also present. There was 1 (of 2) lymph nodes involved. The lymph node tissue exhibits extensive subcapsular and parenchymal clusters of melanoma cells, highlighted on Melan-A immunostain. The largest cluster is 7 millimeters in greatest diameter. pT4b pN2c. PD-L1 staining is negative, <1%. No mutation in BRAF, KIT, or NRAS.  4. 2/22/2020, she had PET-CT, which showed intense FDG uptake in a right inguinal lymph node, concerning for an additional focus of metastatic disease. There is also an indeterminate right external iliac lymph node with mild FDG uptake.  5. 3/4/2020, she has medial plantar artery  fasciocutaneous instep flap and Integra placement to the donor site. On 4/2/2020, she has additional reconstruction with skin grafting.  6. 5/22/2020, PET-CT shows a hypermetabolic right inguinal and right external iliac chain lymph node and stable pulmonary nodules.  7. 5/27/2020, she starts nivolumab for presumed low volume lymph node predominant metastatic  disease.  8. 8/21/2020, PET-CT shows increase in hypermetabolic adenopathy, and a hypermetabolic nodule in segment 2 of the liver. Given low volume of disease she prefers to continue therapy with short interval follow-up.  9. 10/9/2020, PET-CT shows increasing size of hepatic metastases with increased hypermetabolism, increased right inguinal and iliac lymphadenopathy, and new FDG uptake right T2 transverse process and right side of S1. MRI-brain obtained 10/21/2020 is negative for brain metastasis.  10. 10/28/2020, she starts ipilimumab 1mg/kg and nivolumab 3mg/kg.     HISTORY OF PRESENT ILLNESS  Yadira Hoang is a 75 year old female with stage IV acral melanoma. She had primary anti-PD1 CPI refractory disease on nivolumab, and she then went on to receive 2nd line Ipi/Nivo. She presents prior to cycle 9 of therapy, prior to her 5th nivolumab maintenance infusion, and for restaging evaluation.    PET-CT shows evidence for a mixed response to treatment. Worrisome is an increase in size and hypermetabolism in the right iliac chain lymph node. Otherwise, the exam appears stable.    On immunotherapy she endorses fatigue, chills, and itching. She has had constipation, no diarrhea. No shortness of breath or cough. She did develop chills and fatigue on dual checkpoint inhibitors, but this is now much better. TSH is normal.    ECOG performance status is 1.    Current Outpatient Medications   Medication     cholecalciferol (VITAMIN D3) 125 MCG (5000 UT) TABS tablet     estradiol (ESTRACE) 0.1 MG/GM vaginal cream     fluticasone (FLONASE) 50 MCG/ACT nasal spray     STATIN NOT PRESCRIBED (INTENTIONAL)     Current Facility-Administered Medications   Medication     2 mL bupivacaine (MARCAINE) preservative free injection 0.25% (10 mL vial)     2 mL bupivacaine (MARCAINE) preservative free injection 0.25% (10 mL vial)       PHYSICAL EXAMINATION  /73   Pulse 87   Temp 98.5  F (36.9  C) (Tympanic)   Resp 18   Wt 64.9  kg (143 lb)   SpO2 95%   BMI 22.40 kg/m    GENERAL: Healthy, alert and no distress  EYES: Eyes grossly normal to inspection.  No discharge or erythema, or obvious scleral/conjunctival abnormalities.  HENT: Normal cephalic/atraumatic.  External ears, nose and mouth without ulcers or lesions.  No nasal drainage visible.  NECK: No asymmetry, visible masses or scars  RESP: No audible wheeze, cough, or visible cyanosis.  No visible retractions or increased work of breathing.    SKIN: Visible skin clear. No significant rash, abnormal pigmentation or lesions.  NEURO: Cranial nerves grossly intact.  Mentation and speech appropriate for age.  PSYCH: Mentation appears normal, affect normal/bright, judgement and insight intact, normal speech and appearance well-groomed.      LABS  Oncology Visit on 05/07/2021   Component Date Value Ref Range Status     TSH 05/07/2021 1.63  0.40 - 4.00 mU/L Final     WBC 05/07/2021 4.9  4.0 - 11.0 10e9/L Final     RBC Count 05/07/2021 4.03  3.8 - 5.2 10e12/L Final     Hemoglobin 05/07/2021 12.4  11.7 - 15.7 g/dL Final     Hematocrit 05/07/2021 37.3  35.0 - 47.0 % Final     MCV 05/07/2021 93  78 - 100 fl Final     MCH 05/07/2021 30.8  26.5 - 33.0 pg Final     MCHC 05/07/2021 33.2  31.5 - 36.5 g/dL Final     RDW 05/07/2021 12.6  10.0 - 15.0 % Final     Platelet Count 05/07/2021 299  150 - 450 10e9/L Final     Diff Method 05/07/2021 Automated Method   Final     % Neutrophils 05/07/2021 64.4  % Final     % Lymphocytes 05/07/2021 23.2  % Final     % Monocytes 05/07/2021 7.7  % Final     % Eosinophils 05/07/2021 3.9  % Final     % Basophils 05/07/2021 0.6  % Final     % Immature Granulocytes 05/07/2021 0.2  % Final     Nucleated RBCs 05/07/2021 0  0 /100 Final     Absolute Neutrophil 05/07/2021 3.2  1.6 - 8.3 10e9/L Final     Absolute Lymphocytes 05/07/2021 1.1  0.8 - 5.3 10e9/L Final     Absolute Monocytes 05/07/2021 0.4  0.0 - 1.3 10e9/L Final     Absolute Eosinophils 05/07/2021 0.2  0.0 - 0.7  10e9/L Final     Absolute Basophils 05/07/2021 0.0  0.0 - 0.2 10e9/L Final     Abs Immature Granulocytes 05/07/2021 0.0  0 - 0.4 10e9/L Final     Absolute Nucleated RBC 05/07/2021 0.0   Final     Sodium 05/07/2021 141  133 - 144 mmol/L Final     Potassium 05/07/2021 3.8  3.4 - 5.3 mmol/L Final     Chloride 05/07/2021 106  94 - 109 mmol/L Final     Carbon Dioxide 05/07/2021 32  20 - 32 mmol/L Final     Anion Gap 05/07/2021 3  3 - 14 mmol/L Final     Glucose 05/07/2021 111* 70 - 99 mg/dL Final     Urea Nitrogen 05/07/2021 18  7 - 30 mg/dL Final     Creatinine 05/07/2021 0.67  0.52 - 1.04 mg/dL Final     GFR Estimate 05/07/2021 85  >60 mL/min/[1.73_m2] Final     GFR Estimate If Black 05/07/2021 >90  >60 mL/min/[1.73_m2] Final     Calcium 05/07/2021 9.3  8.5 - 10.1 mg/dL Final     Bilirubin Total 05/07/2021 0.4  0.2 - 1.3 mg/dL Final     Albumin 05/07/2021 3.8  3.4 - 5.0 g/dL Final     Protein Total 05/07/2021 7.2  6.8 - 8.8 g/dL Final     Alkaline Phosphatase 05/07/2021 60  40 - 150 U/L Final     ALT 05/07/2021 21  0 - 50 U/L Final     AST 05/07/2021 10  0 - 45 U/L Final         IMAGING  CT Chest/Abdomen/Pelvis w Contrast  PET Oncology Whole Body  Narrative: Combined Report of:    PET and CT on  5/7/2021 12:01 PM :    1. PET of the neck, chest, abdomen, and pelvis.  2. PET CT Fusion for Attenuation Correction and Anatomical  Localization:    3. Diagnostic CT scan of the chest, abdomen, and pelvis with  intravenous contrast for interpretation.  3. CT of the chest, abdomen and pelvis obtained for diagnostic  interpretation.  4. 3D MIP and PET-CT fused images were processed on an independent  workstation and archived to PACS and reviewed by a radiologist.    Technique:    1. PET: The patient received 12.44 mCi of F-18-FDG; the serum glucose  was 87 prior to administration, body weight was 64.9 kg. Images were  evaluated in the axial, sagittal, and coronal planes as well as the  rotational whole body MIP. Images were  acquired from the Vertex to the  Feet.    UPTAKE WAS MEASURED AT 67 MINUTES.     BACKGROUND:  Liver SUV max= 3.65,   Aorta Blood SUV Max: 2.54.     2. CT: Volumetric acquisition for clinical interpretation of the  chest, abdomen, and pelvis acquired at 3 mm sections  after the  uneventful administration of intravenous contrast. The chest, abdomen,  and pelvis were evaluated at 5 mm sections in bone, soft tissue, and  lung windows.      The patient received 88 cc of Isovue 370 intravenously for the  examination.    --    3. 3D MIP and PET-CT fused images were processed on an independent  workstation and archived to PACS and reviewed by a radiologist.    INDICATION: Melanoma, stage >= IIB, monitor; Malignant melanoma of  right lower extremity including hip (H); Melanoma of skin (H)    ADDITIONAL INFORMATION OBTAINED FROM EMR: 75-year-old female with  history of apical melanoma of the right heel, stage IV with right  inguinal/iliac lymph node, liver, and osseous metastases, currently on  nivolumab maintenance therapy.    COMPARISON: 2/12/2021, 10/9/2020    FINDINGS:     HEAD/NECK:  There is no  suspicious FDG uptake in the neck.     The paranasal sinuses are clear. The mastoid air cells are clear. The  mucosal pharyngeal space, the , prevertebral and carotid  spaces are within normal limits. No masses, mass effect or  pathologically enlarged lymph nodes are evident. The thyroid gland is  normal.    CHEST:  There is no suspicious FDG uptake in the chest.     There are no pathologically enlarged mediastinal, hilar or axillary  lymph nodes.  There are no suspicious lung nodules or evidence for infection.  There  is a 6 mm non-hypermetabolic right lower lobe lung nodule series 9  image 73 unchanged from 2/22/2020. There is no significant pericardial  or pleural effusions.    ABDOMEN AND PELVIS:  Persistent hypermetabolic right inguinal and iliac chain lymph nodes.  The largest demonstrates an SUV max of 20.66  (previously 19.2) and  currently measures 2.7 x 3.3 cm.  The iliac chain lymph nodes are  increasing in hypermetabolism in size. For example (series 5 image  375), right inguinal node total metabolic volume 1.7 cc, total lesion  glycolysis 16.3, (prior total metabolic volume 1 cc, total lesion  glycolysis was 8.4.)    No new areas of abnormal FDG uptake in the  abdomen or pelvis.    Resolution of subtle mild hypermetabolism in presumed hepatic  metastases in the liver are unchanged, for example a 9 mm hypodensity  in the hepatic dome (series 5, image 239, previously 9 mm) . Scattered  smaller hepatic hypodensities are also stable. No new hepatic lesions.  Portal vasculature is patent. The gallbladder, pancreas, spleen,  adrenals, and kidneys are unremarkable. Normal caliber small bowel.  The colon is normal. Major abdominal vasculature is patent. Mild  aortoiliac atherosclerosis.    LOWER EXTREMITIES:   No abnormal masses or hypermetabolic lesions.    BONES:   There are no suspicious lytic or blastic osseous lesions.  There is no  abnormal FDG uptake in the skeleton. Resolved hypermetabolism in the  subcutaneous tissues of the left shoulder.  Impression: IMPRESSION: In this patient with history of metastatic melanoma:   1. Worse: Slight increase in hypermetabolism and size of right iliac  chain lymph nodes.   1a. Unchanged hypermetabolic right inguinal lymph nodes.   2. Better:   2a. Resolved subcutaneous hypermetabolic nodule in the left shoulder.  2b. Unchanged size and appearance of the 9 mm hepatic dome  hypodensity, suspicious for metastasis; however, slight decrease in  mild hypermetabolism. Suspect this and other small lesions in liver  are treated.       I have personally reviewed the examination and initial interpretation  and I agree with the findings.    WES FORTE MD        ASSESSMENT AND PLAN:    #1 Acral melanoma, right heel primary, pT4b pN3c M1, Stage IV  It was a pleasure to talk with Ms.  Viktor. She is a 75 year old woman with metastatic acral melanoma to right inguinal and iliac lymph nodes, liver and bone. She progressed on first line nivolumab, had evidence of response to 2nd line ipilimumab and nivolumab, however, on nivolumab maintenance therapy she has started to show regrowth, particularly in the right inguinal node. The concern gere is that additional nivolumab will be associated with additional growth in this and soon will show in the other tumors.    -We will switch to ipilimumab, every 3 weeks for a maximum of 4 doses, then may again attempt anti-PD1 therapy with Keytruda to sustain immunologic activity.     #2 Arthritis, immunotherapy associated, grade 1  We will watch closely for worsening of immune mediated side effects and worsening of arthritis. She does not feel she requires steroids.    -continue using Voltaren gel  -OTC Aleve or Ibuprofen as needed    #3 Pruritis, grade 1  She finds this is manageable with the capsaicin cream. Encouraged emmolient creams containing oatmeal (ie Aveeno). May also consider Rx for hydroxyzine if this worsens. For now given side effect of fatigue, she declines.    -continue capsaicin cream    #4 Fatigue, multifactorial  History of GUERLINE (treated), on immunotherapy. Continue to monitor.    #5 History of depression and anxiety  Currently off treatment. Observe.    Multiple questions answered.       Gi Cartagena M.D.   of Medicine  Hematology, Oncology and Transplantation

## 2021-05-10 ENCOUNTER — INFUSION THERAPY VISIT (OUTPATIENT)
Dept: INFUSION THERAPY | Facility: CLINIC | Age: 76
End: 2021-05-10
Payer: COMMERCIAL

## 2021-05-10 VITALS
DIASTOLIC BLOOD PRESSURE: 77 MMHG | SYSTOLIC BLOOD PRESSURE: 128 MMHG | WEIGHT: 141 LBS | TEMPERATURE: 97.4 F | OXYGEN SATURATION: 97 % | RESPIRATION RATE: 18 BRPM | HEART RATE: 89 BPM | BODY MASS INDEX: 22.08 KG/M2

## 2021-05-10 DIAGNOSIS — C43.9 MELANOMA OF SKIN (H): Primary | ICD-10-CM

## 2021-05-10 DIAGNOSIS — C43.71 MALIGNANT MELANOMA OF RIGHT LOWER EXTREMITY INCLUDING HIP (H): ICD-10-CM

## 2021-05-10 LAB
ACTH PLAS-MCNC: 13 PG/ML
ALBUMIN SERPL-MCNC: 3.9 G/DL (ref 3.4–5)
ALP SERPL-CCNC: 62 U/L (ref 40–150)
ALT SERPL W P-5'-P-CCNC: 25 U/L (ref 0–50)
ANION GAP SERPL CALCULATED.3IONS-SCNC: 2 MMOL/L (ref 3–14)
AST SERPL W P-5'-P-CCNC: 15 U/L (ref 0–45)
BILIRUB SERPL-MCNC: 0.5 MG/DL (ref 0.2–1.3)
BUN SERPL-MCNC: 23 MG/DL (ref 7–30)
CALCIUM SERPL-MCNC: 9.4 MG/DL (ref 8.5–10.1)
CHLORIDE SERPL-SCNC: 106 MMOL/L (ref 94–109)
CO2 SERPL-SCNC: 32 MMOL/L (ref 20–32)
CREAT SERPL-MCNC: 0.72 MG/DL (ref 0.52–1.04)
GFR SERPL CREATININE-BSD FRML MDRD: 81 ML/MIN/{1.73_M2}
GLUCOSE SERPL-MCNC: 90 MG/DL (ref 70–99)
POTASSIUM SERPL-SCNC: 4.2 MMOL/L (ref 3.4–5.3)
PROT SERPL-MCNC: 7.6 G/DL (ref 6.8–8.8)
SODIUM SERPL-SCNC: 140 MMOL/L (ref 133–144)
TSH SERPL DL<=0.005 MIU/L-ACNC: 2.11 MU/L (ref 0.4–4)

## 2021-05-10 PROCEDURE — 84443 ASSAY THYROID STIM HORMONE: CPT | Performed by: INTERNAL MEDICINE

## 2021-05-10 PROCEDURE — 36415 COLL VENOUS BLD VENIPUNCTURE: CPT | Performed by: INTERNAL MEDICINE

## 2021-05-10 PROCEDURE — 99207 PR NO CHARGE LOS: CPT

## 2021-05-10 PROCEDURE — 80053 COMPREHEN METABOLIC PANEL: CPT | Performed by: INTERNAL MEDICINE

## 2021-05-10 PROCEDURE — 96413 CHEMO IV INFUSION 1 HR: CPT | Performed by: NURSE PRACTITIONER

## 2021-05-10 NOTE — PROGRESS NOTES
Infusion Nursing Note:  Yadira Hoang presents today for C1D1 Yervoy.  Yadira has had Yervoy in the past.      Patient seen by provider today: Saw Dr Franklin on 5/7/2021.     present during visit today: Not Applicable.    Note: Patient was scheduled for Opdivo today.  She saw Dr Franklin on Friday afternoon 5/7.  Dr Franklin changed the orders to Yervoy.  It was unknown to staff.   Dr Franklin was paged: he spoke patient on the phone, his note was updated and Yervoy was given after financial cleared the infusion.      Patient declined the covid-19 test.    Intravenous Access:  Peripheral IV placed.    Treatment Conditions:  Lab Results   Component Value Date    HGB 12.4 05/07/2021     Lab Results   Component Value Date    WBC 4.9 05/07/2021      Lab Results   Component Value Date    ANEU 3.2 05/07/2021     Lab Results   Component Value Date     05/07/2021      Lab Results   Component Value Date     05/10/2021                   Lab Results   Component Value Date    POTASSIUM 4.2 05/10/2021           No results found for: MAG         Lab Results   Component Value Date    CR 0.72 05/10/2021                   Lab Results   Component Value Date    GABY 9.4 05/10/2021                Lab Results   Component Value Date    BILITOTAL 0.5 05/10/2021           Lab Results   Component Value Date    ALBUMIN 3.9 05/10/2021                    Lab Results   Component Value Date    ALT 25 05/10/2021           Lab Results   Component Value Date    AST 15 05/10/2021       Results reviewed, labs MET treatment parameters, ok to proceed with treatment.      Post Infusion Assessment:  Patient tolerated infusion without incident.       Discharge Plan:   Patient discharged in stable condition accompanied by: self with  to drive.      Patient will need updated appointments, it has been added today to the scheduling que by Dr Franklin today.  Patient is expecting scheduling to contact her.      Fiordaliza Angeles  RN

## 2021-05-13 ENCOUNTER — TELEPHONE (OUTPATIENT)
Dept: FAMILY MEDICINE | Facility: CLINIC | Age: 76
End: 2021-05-13

## 2021-05-13 DIAGNOSIS — N95.2 ATROPHIC VAGINITIS: Primary | ICD-10-CM

## 2021-05-13 RX ORDER — ESTRADIOL 0.1 MG/G
CREAM VAGINAL
Qty: 42.5 G | Refills: 3 | Status: SHIPPED | OUTPATIENT
Start: 2021-05-13 | End: 2021-08-06

## 2021-05-13 NOTE — TELEPHONE ENCOUNTER
Routing refill request to provider for review/approval because:  Medication is reported/historical    Colleen Hough BSN, RN

## 2021-05-13 NOTE — TELEPHONE ENCOUNTER
.Reason for call:  Medication   If this is a refill request, has the caller requested the refill from the pharmacy already? No  Will the patient be using a [facility]? No  Name of the pharmacy and phone number for the current request: [facility]    Name of the medication requested: estradiol     Other request: fill script    Phone number to reach patient:  Home number on file [phone] (home)    Best Time:  anytime    Can we leave a detailed message on this number?  YES    Travel screening: Negative

## 2021-05-14 ENCOUNTER — TELEPHONE (OUTPATIENT)
Dept: FAMILY MEDICINE | Facility: CLINIC | Age: 76
End: 2021-05-14

## 2021-05-14 DIAGNOSIS — N95.2 ATROPHIC VAGINITIS: ICD-10-CM

## 2021-05-14 RX ORDER — ESTRADIOL 0.1 MG/G
2 CREAM VAGINAL
Qty: 42.5 G | Refills: 0 | Status: SHIPPED | OUTPATIENT
Start: 2021-05-17 | End: 2021-08-24

## 2021-05-14 RX ORDER — ESTRADIOL 0.1 MG/G
CREAM VAGINAL
Qty: 42.5 G | Refills: 3 | Status: CANCELLED | OUTPATIENT
Start: 2021-05-17

## 2021-05-14 NOTE — TELEPHONE ENCOUNTER
Patient called. She is having trouble getting the  estradiol (ESTRACE) 0.1 MG/GM vaginal cream filled at the [facility].    See this was sent on 5/13/21.    Called [facility].       Provider would need to add the dose per insertion, such as 1 gram per dose or 2 grams per week, for insurance reasons.    Called patient back. She is ok with waiting until Dr. Blanton's return for this clarification.    Mirella Jimenez RN, [facility]

## 2021-05-14 NOTE — TELEPHONE ENCOUNTER
Prescription sent for Estrace cream with the dosing prescribing information.  Please update patient

## 2021-05-21 DIAGNOSIS — C79.51 SECONDARY MALIGNANT NEOPLASM OF BONE (H): Primary | ICD-10-CM

## 2021-05-21 RX ORDER — HYDROXYZINE PAMOATE 25 MG/1
25 CAPSULE ORAL EVERY 6 HOURS
Qty: 30 CAPSULE | Refills: 1 | Status: SHIPPED | OUTPATIENT
Start: 2021-05-21 | End: 2021-08-06

## 2021-05-24 DIAGNOSIS — C43.71 MALIGNANT MELANOMA OF RIGHT LOWER EXTREMITY INCLUDING HIP (H): ICD-10-CM

## 2021-05-24 DIAGNOSIS — C43.9 MELANOMA OF SKIN (H): ICD-10-CM

## 2021-05-24 LAB
ALBUMIN SERPL-MCNC: 3.6 G/DL (ref 3.4–5)
ALP SERPL-CCNC: 63 U/L (ref 40–150)
ALT SERPL W P-5'-P-CCNC: 33 U/L (ref 0–50)
ANION GAP SERPL CALCULATED.3IONS-SCNC: <1 MMOL/L (ref 3–14)
AST SERPL W P-5'-P-CCNC: 21 U/L (ref 0–45)
BASOPHILS # BLD AUTO: 0 10E9/L (ref 0–0.2)
BASOPHILS NFR BLD AUTO: 0.5 %
BILIRUB SERPL-MCNC: 0.3 MG/DL (ref 0.2–1.3)
BUN SERPL-MCNC: 22 MG/DL (ref 7–30)
CALCIUM SERPL-MCNC: 9.1 MG/DL (ref 8.5–10.1)
CHLORIDE SERPL-SCNC: 108 MMOL/L (ref 94–109)
CO2 SERPL-SCNC: 34 MMOL/L (ref 20–32)
CREAT SERPL-MCNC: 0.68 MG/DL (ref 0.52–1.04)
DIFFERENTIAL METHOD BLD: NORMAL
EOSINOPHIL # BLD AUTO: 0.3 10E9/L (ref 0–0.7)
EOSINOPHIL NFR BLD AUTO: 8.2 %
ERYTHROCYTE [DISTWIDTH] IN BLOOD BY AUTOMATED COUNT: 12.8 % (ref 10–15)
GFR SERPL CREATININE-BSD FRML MDRD: 85 ML/MIN/{1.73_M2}
GLUCOSE SERPL-MCNC: 120 MG/DL (ref 70–99)
HCT VFR BLD AUTO: 38.1 % (ref 35–47)
HGB BLD-MCNC: 12.7 G/DL (ref 11.7–15.7)
IMM GRANULOCYTES # BLD: 0 10E9/L (ref 0–0.4)
IMM GRANULOCYTES NFR BLD: 0.2 %
LYMPHOCYTES # BLD AUTO: 1 10E9/L (ref 0.8–5.3)
LYMPHOCYTES NFR BLD AUTO: 23.7 %
MCH RBC QN AUTO: 30.6 PG (ref 26.5–33)
MCHC RBC AUTO-ENTMCNC: 33.3 G/DL (ref 31.5–36.5)
MCV RBC AUTO: 92 FL (ref 78–100)
MONOCYTES # BLD AUTO: 0.4 10E9/L (ref 0–1.3)
MONOCYTES NFR BLD AUTO: 9.4 %
NEUTROPHILS # BLD AUTO: 2.4 10E9/L (ref 1.6–8.3)
NEUTROPHILS NFR BLD AUTO: 58 %
PLATELET # BLD AUTO: 247 10E9/L (ref 150–450)
POTASSIUM SERPL-SCNC: 4.2 MMOL/L (ref 3.4–5.3)
PROT SERPL-MCNC: 7 G/DL (ref 6.8–8.8)
RBC # BLD AUTO: 4.15 10E12/L (ref 3.8–5.2)
SODIUM SERPL-SCNC: 141 MMOL/L (ref 133–144)
TSH SERPL DL<=0.005 MIU/L-ACNC: 1.74 MU/L (ref 0.4–4)
WBC # BLD AUTO: 4.1 10E9/L (ref 4–11)

## 2021-05-24 PROCEDURE — 80050 GENERAL HEALTH PANEL: CPT | Performed by: INTERNAL MEDICINE

## 2021-05-24 PROCEDURE — 36415 COLL VENOUS BLD VENIPUNCTURE: CPT | Performed by: INTERNAL MEDICINE

## 2021-05-24 PROCEDURE — 82024 ASSAY OF ACTH: CPT | Performed by: INTERNAL MEDICINE

## 2021-05-25 DIAGNOSIS — C79.51 SECONDARY MALIGNANT NEOPLASM OF BONE (H): Primary | ICD-10-CM

## 2021-05-25 LAB — ACTH PLAS-MCNC: 11 PG/ML

## 2021-05-25 RX ORDER — FEXOFENADINE HCL 180 MG/1
180 TABLET ORAL
Qty: 30 TABLET | Refills: 3 | Status: SHIPPED | OUTPATIENT
Start: 2021-05-25 | End: 2021-08-06

## 2021-05-25 RX ORDER — CETIRIZINE HYDROCHLORIDE 10 MG/1
10 TABLET ORAL AT BEDTIME
Qty: 30 TABLET | Refills: 3 | Status: SHIPPED | OUTPATIENT
Start: 2021-05-25 | End: 2021-06-04

## 2021-05-27 ENCOUNTER — VIRTUAL VISIT (OUTPATIENT)
Dept: ONCOLOGY | Facility: CLINIC | Age: 76
End: 2021-05-27
Attending: INTERNAL MEDICINE
Payer: COMMERCIAL

## 2021-05-27 DIAGNOSIS — C43.9 MELANOMA OF SKIN (H): Primary | ICD-10-CM

## 2021-05-27 DIAGNOSIS — C43.9 ACRAL LENTIGINOUS MELANOMA (H): ICD-10-CM

## 2021-05-27 PROCEDURE — 99214 OFFICE O/P EST MOD 30 MIN: CPT | Mod: 95 | Performed by: INTERNAL MEDICINE

## 2021-05-27 PROCEDURE — 999N001193 HC VIDEO/TELEPHONE VISIT; NO CHARGE

## 2021-05-27 NOTE — LETTER
"    5/27/2021         RE: Yadira Hoang  [address] [facility] [address]        Dear Colleague,    Thank you for referring your patient, Yadira Hoang, to the [facility]. Please see a copy of my visit note below.    Yadira is a 75 year old who is being evaluated via a billable video visit.      How would you like to obtain your AVS? MyChart  If the video visit is dropped, the invitation should be resent by: Send to e-mail at: [email or web address]   If unable to connect via email suggests contacting her via phone #[phone].  Will anyone else be joining your video visit? No   Vitals - Patient Reported  Weight (Patient Reported): 62.6 kg (138 lb)  Height (Patient Reported): 170.2 cm (5' 7\")  BMI (Based on Pt Reported Ht/Wt): 21.61  Pain Score: Severe Pain (7)  Pain Loc: Foot(right)    Heidy Ellis CMA on 5/27/2021 at 2:04 PM        Phone Call Duration: 30 minutes    MEDICAL ONCOLOGY PROGRESS NOTE  Melanoma Clinic  May 27, 2021     CHIEF COMPLAINT: acral melanoma, stage IV    Melanoma History:  1. She noted a painful lesion on the sole of the right foot for a few months, since last summer.  It initially was small then became raised.  It spontaneously bled. She is not entirely sure of its appearance initially.  She denies noting any masses behind the knee or in the groin.  2. 1/9/2020, punch biopsy was performed by Dr Jessica Meadows. Patology showed melanoma, at least 3.4 mm deep with ulceration and 0 mitoses, and perineural invasion present. TILs were non-brisk and LVI not identified. pT3b.  3. 2/10/2020, she has right femoral sentinel lymph node biopsy and wide local excision (Specimen #: [id number]) and the right heel lesion extends to a depth of 6.6 mm, and invasive melanoma is present at 0.2 mm from the deep margin. Microsatellites are also present. There was 1 (of 2) lymph nodes involved. The lymph node tissue exhibits extensive subcapsular and parenchymal " clusters of melanoma cells, highlighted on Melan-A immunostain. The largest cluster is 7 millimeters in greatest diameter. pT4b pN2c. PD-L1 staining is negative, <1%. No mutation in BRAF, KIT, or NRAS.  4. 2/22/2020, she had PET-CT, which showed intense FDG uptake in a right inguinal lymph node, concerning for an additional focus of metastatic disease. There is also an indeterminate right external iliac lymph node with mild FDG uptake.  5. 3/4/2020, she has medial plantar artery  fasciocutaneous instep flap and Integra placement to the donor site. On 4/2/2020, she has additional reconstruction with skin grafting.  6. 5/22/2020, PET-CT shows a hypermetabolic right inguinal and right external iliac chain lymph node and stable pulmonary nodules.  7. 5/27/2020, she starts nivolumab for presumed low volume lymph node predominant metastatic disease.  8. 8/21/2020, PET-CT shows increase in hypermetabolic adenopathy, and a hypermetabolic nodule in segment 2 of the liver. Given low volume of disease she prefers to continue therapy with short interval follow-up.  9. 10/9/2020, PET-CT shows increasing size of hepatic metastases with increased hypermetabolism, increased right inguinal and iliac lymphadenopathy, and new FDG uptake right T2 transverse process and right side of S1. MRI-brain obtained 10/21/2020 is negative for brain metastasis.  10. 10/28/2020, she starts ipilimumab 1mg/kg and nivolumab 3mg/kg, then nivolumab maintenance through cycle 8.  11. 2/12/21, PET-CT shows evidence of partial response with several liver lesions no longer hypermetabolic and smaller in size, only one hypermetabolic lesion in the hepatic dome. Slightly increased size and FDG uptake in a single right inguinal lymph node, remainder of inguinal and right iliac chain lymph nodes have decreased in size and FDG uptake since prior exam.   12. 5/7/2021, PET-CT shows mixed response with increased size and hypermetabolism in the right iliac  chain lymphadenopathy.  13. 5/10/2021, she starts ipilimumab 3 mg/kg every 3 weeks.     HISTORY OF PRESENT ILLNESS  Yadira Hoang is a 75 year old female with stage IV acral melanoma. 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 She presents via telephone visit prior to her 2nd cycle of ipilimumab.    On immunotherapy she endorses fatigue, chills, and itching. She has had constipation, no diarrhea. No shortness of breath or cough. She did develop chills and fatigue on dual checkpoint inhibitors, but this is now much better. TSH is normal.    ECOG performance status is 1.    Current Outpatient Medications   Medication     cholecalciferol (VITAMIN D3) 125 MCG (5000 UT) TABS tablet     estradiol (ESTRACE) 0.1 MG/GM vaginal cream     estradiol (ESTRACE) 0.1 MG/GM vaginal cream     fexofenadine (ALLEGRA) 180 MG tablet     fluticasone (FLONASE) 50 MCG/ACT nasal spray     hydrOXYzine (VISTARIL) 25 MG capsule     STATIN NOT PRESCRIBED (INTENTIONAL)     triamcinolone (KENALOG) 0.1 % external cream     No current facility-administered medications for this visit.        PHYSICAL EXAMINATION  There were no vitals taken for this visit.  Telephone visit, no exam    LABS  Orders Only on 05/24/2021   Component Date Value Ref Range Status     Adrenal Corticotropin 05/24/2021 11  <47 pg/mL Final     TSH 05/24/2021 1.74  0.40 - 4.00 mU/L Final     Sodium 05/24/2021 141  133 - 144 mmol/L Final     Potassium 05/24/2021 4.2  3.4 - 5.3 mmol/L Final     Chloride 05/24/2021 108  94 - 109 mmol/L Final     Carbon Dioxide 05/24/2021 34* 20 - 32 mmol/L Final     Anion Gap 05/24/2021 <1* 3 - 14 mmol/L Final     Glucose 05/24/2021 120* 70 - 99 mg/dL Final     Urea Nitrogen 05/24/2021 22  7 - 30 mg/dL Final     Creatinine 05/24/2021 0.68  0.52 - 1.04 mg/dL Final     GFR Estimate 05/24/2021 85  >60 mL/min/[1.73_m2] Final     GFR Estimate If Black  05/24/2021 >90  >60 mL/min/[1.73_m2] Final     Calcium 05/24/2021 9.1  8.5 - 10.1 mg/dL Final     Bilirubin Total 05/24/2021 0.3  0.2 - 1.3 mg/dL Final     Albumin 05/24/2021 3.6  3.4 - 5.0 g/dL Final     Protein Total 05/24/2021 7.0  6.8 - 8.8 g/dL Final     Alkaline Phosphatase 05/24/2021 63  40 - 150 U/L Final     ALT 05/24/2021 33  0 - 50 U/L Final     AST 05/24/2021 21  0 - 45 U/L Final     WBC 05/24/2021 4.1  4.0 - 11.0 10e9/L Final     RBC Count 05/24/2021 4.15  3.8 - 5.2 10e12/L Final     Hemoglobin 05/24/2021 12.7  11.7 - 15.7 g/dL Final     Hematocrit 05/24/2021 38.1  35.0 - 47.0 % Final     MCV 05/24/2021 92  78 - 100 fl Final     MCH 05/24/2021 30.6  26.5 - 33.0 pg Final     MCHC 05/24/2021 33.3  31.5 - 36.5 g/dL Final     RDW 05/24/2021 12.8  10.0 - 15.0 % Final     Platelet Count 05/24/2021 247  150 - 450 10e9/L Final     Diff Method 05/24/2021 Automated Method   Final     % Neutrophils 05/24/2021 58.0  % Final     % Lymphocytes 05/24/2021 23.7  % Final     % Monocytes 05/24/2021 9.4  % Final     % Eosinophils 05/24/2021 8.2  % Final     % Basophils 05/24/2021 0.5  % Final     % Immature Granulocytes 05/24/2021 0.2  % Final     Absolute Neutrophil 05/24/2021 2.4  1.6 - 8.3 10e9/L Final     Absolute Lymphocytes 05/24/2021 1.0  0.8 - 5.3 10e9/L Final     Absolute Monocytes 05/24/2021 0.4  0.0 - 1.3 10e9/L Final     Absolute Eosinophils 05/24/2021 0.3  0.0 - 0.7 10e9/L Final     Absolute Basophils 05/24/2021 0.0  0.0 - 0.2 10e9/L Final     Abs Immature Granulocytes 05/24/2021 0.0  0 - 0.4 10e9/L Final         ASSESSMENT AND PLAN:    #1 Acral melanoma, right heel primary, pT4b pN3c M1, Stage IV  It was a pleasure to talk with MsShelbi Beckerromelia. She is a 75 year old woman with metastatic acral melanoma to right inguinal and iliac lymph nodes, liver and bone. She progressed on first line nivolumab, had esponse to 2nd line ipilimumab and nivolumab, however, once on nivolumab maintenance therapy she started to show  regrowth in the right inguinal node. She switched over to ipilimumab every 3 weeks in the third line, for a maximum of 4 doses. She knows to call if there is any change in the size of the inguinal lymph node. For evidence of response, continue ipilimumab. For progression, plan for anti-PD1 therapy with Keytruda  or another rounds of dual CPI, either Ipi/Nivo or Ipi/Pembro .  -Continue ipilimumab 3 mg/kg every 3 weeks.  -Labs and Scans prior to 7/30 visit  -Dermatology referral [facility]     #2 Arthritis, immunotherapy associated, grade 1  We will watch closely for worsening of immune mediated side effects and worsening of arthritis. She does not feel she requires steroids.  -continue using Voltaren gel  -OTC Aleve or Ibuprofen as needed    #3 Pruritis, grade 2  She finds this is manageable with the capsaicin cream. Encouraged emmolient creams containing oatmeal (ie Aveeno). She is taking Hydroxyzine and Allegra.  -Hydroxyzine twice daily  -Allegra twice daily  -continue capsaicin cream  -Dermatology referral [facility]    #4 Fatigue, multifactorial  History of GUERLINE (treated), and she is also on immunotherapy with known side effect of fatigue. Continue to monitor.    #5 History of depression and anxiety  Currently off treatment. Observe.    Multiple questions answered.     Gi Cartagena M.D.   of Medicine  Hematology, Oncology and Transplantation          Again, thank you for allowing me to participate in the care of your patient.        Sincerely,        Gi Franklin MD

## 2021-05-27 NOTE — PROGRESS NOTES
"Yadira is a 75 year old who is being evaluated via a billable video visit.      How would you like to obtain your AVS? MyChart  If the video visit is dropped, the invitation should be resent by: Send to e-mail at: [email or web address]   If unable to connect via email suggests contacting her via phone #[phone].  Will anyone else be joining your video visit? No   Vitals - Patient Reported  Weight (Patient Reported): 62.6 kg (138 lb)  Height (Patient Reported): 170.2 cm (5' 7\")  BMI (Based on Pt Reported Ht/Wt): 21.61  Pain Score: Severe Pain (7)  Pain Loc: Foot(right)    Heidy FELICITA Ellis CMA on 5/27/2021 at 2:04 PM        Phone Call Duration: 30 minutes    MEDICAL ONCOLOGY PROGRESS NOTE  Melanoma Clinic  May 27, 2021     CHIEF COMPLAINT: acral melanoma, stage IV    Melanoma History:  1. She noted a painful lesion on the sole of the right foot for a few months, since last summer.  It initially was small then became raised.  It spontaneously bled. She is not entirely sure of its appearance initially.  She denies noting any masses behind the knee or in the groin.  2. 1/9/2020, punch biopsy was performed by Dr Jessica Meadows. Patology showed melanoma, at least 3.4 mm deep with ulceration and 0 mitoses, and perineural invasion present. TILs were non-brisk and LVI not identified. pT3b.  3. 2/10/2020, she has right femoral sentinel lymph node biopsy and wide local excision (Specimen #: [id number]) and the right heel lesion extends to a depth of 6.6 mm, and invasive melanoma is present at 0.2 mm from the deep margin. Microsatellites are also present. There was 1 (of 2) lymph nodes involved. The lymph node tissue exhibits extensive subcapsular and parenchymal clusters of melanoma cells, highlighted on Melan-A immunostain. The largest cluster is 7 millimeters in greatest diameter. pT4b pN2c. PD-L1 staining is negative, <1%. No mutation in BRAF, KIT, or NRAS.  4. 2/22/2020, she had PET-CT, which showed intense FDG uptake in a " right inguinal lymph node, concerning for an additional focus of metastatic disease. There is also an indeterminate right external iliac lymph node with mild FDG uptake.  5. 3/4/2020, she has medial plantar artery  fasciocutaneous instep flap and Integra placement to the donor site. On 4/2/2020, she has additional reconstruction with skin grafting.  6. 5/22/2020, PET-CT shows a hypermetabolic right inguinal and right external iliac chain lymph node and stable pulmonary nodules.  7. 5/27/2020, she starts nivolumab for presumed low volume lymph node predominant metastatic disease.  8. 8/21/2020, PET-CT shows increase in hypermetabolic adenopathy, and a hypermetabolic nodule in segment 2 of the liver. Given low volume of disease she prefers to continue therapy with short interval follow-up.  9. 10/9/2020, PET-CT shows increasing size of hepatic metastases with increased hypermetabolism, increased right inguinal and iliac lymphadenopathy, and new FDG uptake right T2 transverse process and right side of S1. MRI-brain obtained 10/21/2020 is negative for brain metastasis.  10. 10/28/2020, she starts ipilimumab 1mg/kg and nivolumab 3mg/kg, then nivolumab maintenance through cycle 8.  11. 2/12/21, PET-CT shows evidence of partial response with several liver lesions no longer hypermetabolic and smaller in size, only one hypermetabolic lesion in the hepatic dome. Slightly increased size and FDG uptake in a single right inguinal lymph node, remainder of inguinal and right iliac chain lymph nodes have decreased in size and FDG uptake since prior exam.   12. 5/7/2021, PET-CT shows mixed response with increased size and hypermetabolism in the right iliac chain lymphadenopathy.  13. 5/10/2021, she starts ipilimumab 3 mg/kg every 3 weeks.     HISTORY OF PRESENT ILLNESS  Yadira Hoang is a 75 year old female with stage IV acral melanoma. 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 She presents via telephone visit prior to her 2nd cycle of ipilimumab.    On immunotherapy she endorses fatigue, chills, and itching. She has had constipation, no diarrhea. No shortness of breath or cough. She did develop chills and fatigue on dual checkpoint inhibitors, but this is now much better. TSH is normal.    ECOG performance status is 1.    Current Outpatient Medications   Medication     cholecalciferol (VITAMIN D3) 125 MCG (5000 UT) TABS tablet     estradiol (ESTRACE) 0.1 MG/GM vaginal cream     estradiol (ESTRACE) 0.1 MG/GM vaginal cream     fexofenadine (ALLEGRA) 180 MG tablet     fluticasone (FLONASE) 50 MCG/ACT nasal spray     hydrOXYzine (VISTARIL) 25 MG capsule     STATIN NOT PRESCRIBED (INTENTIONAL)     triamcinolone (KENALOG) 0.1 % external cream     No current facility-administered medications for this visit.        PHYSICAL EXAMINATION  There were no vitals taken for this visit.  Telephone visit, no exam    LABS  Orders Only on 05/24/2021   Component Date Value Ref Range Status     Adrenal Corticotropin 05/24/2021 11  <47 pg/mL Final     TSH 05/24/2021 1.74  0.40 - 4.00 mU/L Final     Sodium 05/24/2021 141  133 - 144 mmol/L Final     Potassium 05/24/2021 4.2  3.4 - 5.3 mmol/L Final     Chloride 05/24/2021 108  94 - 109 mmol/L Final     Carbon Dioxide 05/24/2021 34* 20 - 32 mmol/L Final     Anion Gap 05/24/2021 <1* 3 - 14 mmol/L Final     Glucose 05/24/2021 120* 70 - 99 mg/dL Final     Urea Nitrogen 05/24/2021 22  7 - 30 mg/dL Final     Creatinine 05/24/2021 0.68  0.52 - 1.04 mg/dL Final     GFR Estimate 05/24/2021 85  >60 mL/min/[1.73_m2] Final     GFR Estimate If Black 05/24/2021 >90  >60 mL/min/[1.73_m2] Final     Calcium 05/24/2021 9.1  8.5 - 10.1 mg/dL Final     Bilirubin Total 05/24/2021 0.3  0.2 - 1.3 mg/dL Final     Albumin 05/24/2021 3.6  3.4 - 5.0 g/dL Final     Protein Total 05/24/2021 7.0  6.8 - 8.8 g/dL Final     Alkaline  Phosphatase 05/24/2021 63  40 - 150 U/L Final     ALT 05/24/2021 33  0 - 50 U/L Final     AST 05/24/2021 21  0 - 45 U/L Final     WBC 05/24/2021 4.1  4.0 - 11.0 10e9/L Final     RBC Count 05/24/2021 4.15  3.8 - 5.2 10e12/L Final     Hemoglobin 05/24/2021 12.7  11.7 - 15.7 g/dL Final     Hematocrit 05/24/2021 38.1  35.0 - 47.0 % Final     MCV 05/24/2021 92  78 - 100 fl Final     MCH 05/24/2021 30.6  26.5 - 33.0 pg Final     MCHC 05/24/2021 33.3  31.5 - 36.5 g/dL Final     RDW 05/24/2021 12.8  10.0 - 15.0 % Final     Platelet Count 05/24/2021 247  150 - 450 10e9/L Final     Diff Method 05/24/2021 Automated Method   Final     % Neutrophils 05/24/2021 58.0  % Final     % Lymphocytes 05/24/2021 23.7  % Final     % Monocytes 05/24/2021 9.4  % Final     % Eosinophils 05/24/2021 8.2  % Final     % Basophils 05/24/2021 0.5  % Final     % Immature Granulocytes 05/24/2021 0.2  % Final     Absolute Neutrophil 05/24/2021 2.4  1.6 - 8.3 10e9/L Final     Absolute Lymphocytes 05/24/2021 1.0  0.8 - 5.3 10e9/L Final     Absolute Monocytes 05/24/2021 0.4  0.0 - 1.3 10e9/L Final     Absolute Eosinophils 05/24/2021 0.3  0.0 - 0.7 10e9/L Final     Absolute Basophils 05/24/2021 0.0  0.0 - 0.2 10e9/L Final     Abs Immature Granulocytes 05/24/2021 0.0  0 - 0.4 10e9/L Final         ASSESSMENT AND PLAN:    #1 Acral melanoma, right heel primary, pT4b pN3c M1, Stage IV  It was a pleasure to talk with Ms. Hoang. She is a 75 year old woman with metastatic acral melanoma to right inguinal and iliac lymph nodes, liver and bone. She progressed on first line nivolumab, had esponse to 2nd line ipilimumab and nivolumab, however, once on nivolumab maintenance therapy she started to show regrowth in the right inguinal node. She switched over to ipilimumab every 3 weeks in the third line, for a maximum of 4 doses. She knows to call if there is any change in the size of the inguinal lymph node. For evidence of response, continue ipilimumab. For  progression, plan for anti-PD1 therapy with Keytruda  or another rounds of dual CPI, either Ipi/Nivo or Ipi/Pembro .  -Continue ipilimumab 3 mg/kg every 3 weeks.  -Labs and Scans prior to 7/30 visit  -Dermatology referral [facility]     #2 Arthritis, immunotherapy associated, grade 1  We will watch closely for worsening of immune mediated side effects and worsening of arthritis. She does not feel she requires steroids.  -continue using Voltaren gel  -OTC Aleve or Ibuprofen as needed    #3 Pruritis, grade 2  She finds this is manageable with the capsaicin cream. Encouraged emmolient creams containing oatmeal (ie Aveeno). She is taking Hydroxyzine and Allegra.  -Hydroxyzine twice daily  -Allegra twice daily  -continue capsaicin cream  -Dermatology referral [facility]    #4 Fatigue, multifactorial  History of GUERLINE (treated), and she is also on immunotherapy with known side effect of fatigue. Continue to monitor.    #5 History of depression and anxiety  Currently off treatment. Observe.    Multiple questions answered.     Gi Cartagena M.D.   of Medicine  Hematology, Oncology and Transplantation

## 2021-06-01 RX ORDER — HEPARIN SODIUM (PORCINE) LOCK FLUSH IV SOLN 100 UNIT/ML 100 UNIT/ML
5 SOLUTION INTRAVENOUS
Status: CANCELLED | OUTPATIENT
Start: 2021-06-02

## 2021-06-01 RX ORDER — HEPARIN SODIUM,PORCINE 10 UNIT/ML
5 VIAL (ML) INTRAVENOUS
Status: CANCELLED | OUTPATIENT
Start: 2021-06-02

## 2021-06-01 RX ORDER — EPINEPHRINE 1 MG/ML
0.3 INJECTION, SOLUTION INTRAMUSCULAR; SUBCUTANEOUS EVERY 5 MIN PRN
Status: CANCELLED | OUTPATIENT
Start: 2021-06-02

## 2021-06-01 RX ORDER — NALOXONE HYDROCHLORIDE 0.4 MG/ML
.1-.4 INJECTION, SOLUTION INTRAMUSCULAR; INTRAVENOUS; SUBCUTANEOUS
Status: CANCELLED | OUTPATIENT
Start: 2021-06-02

## 2021-06-01 RX ORDER — METHYLPREDNISOLONE SODIUM SUCCINATE 125 MG/2ML
125 INJECTION, POWDER, LYOPHILIZED, FOR SOLUTION INTRAMUSCULAR; INTRAVENOUS
Status: CANCELLED
Start: 2021-06-02

## 2021-06-01 RX ORDER — ALBUTEROL SULFATE 90 UG/1
1-2 AEROSOL, METERED RESPIRATORY (INHALATION)
Status: CANCELLED
Start: 2021-06-02

## 2021-06-01 RX ORDER — SODIUM CHLORIDE 9 MG/ML
1000 INJECTION, SOLUTION INTRAVENOUS CONTINUOUS PRN
Status: CANCELLED
Start: 2021-06-02

## 2021-06-01 RX ORDER — MEPERIDINE HYDROCHLORIDE 25 MG/ML
25 INJECTION INTRAMUSCULAR; INTRAVENOUS; SUBCUTANEOUS EVERY 30 MIN PRN
Status: CANCELLED | OUTPATIENT
Start: 2021-06-02

## 2021-06-01 RX ORDER — DIPHENHYDRAMINE HYDROCHLORIDE 50 MG/ML
50 INJECTION INTRAMUSCULAR; INTRAVENOUS
Status: CANCELLED
Start: 2021-06-02

## 2021-06-01 RX ORDER — ALBUTEROL SULFATE 0.83 MG/ML
2.5 SOLUTION RESPIRATORY (INHALATION)
Status: CANCELLED | OUTPATIENT
Start: 2021-06-02

## 2021-06-02 ENCOUNTER — INFUSION THERAPY VISIT (OUTPATIENT)
Dept: INFUSION THERAPY | Facility: CLINIC | Age: 76
End: 2021-06-02
Payer: COMMERCIAL

## 2021-06-02 VITALS
BODY MASS INDEX: 22.38 KG/M2 | SYSTOLIC BLOOD PRESSURE: 126 MMHG | OXYGEN SATURATION: 95 % | RESPIRATION RATE: 18 BRPM | HEART RATE: 92 BPM | DIASTOLIC BLOOD PRESSURE: 74 MMHG | TEMPERATURE: 98.4 F | WEIGHT: 142.9 LBS

## 2021-06-02 DIAGNOSIS — C43.71 MALIGNANT MELANOMA OF RIGHT LOWER EXTREMITY INCLUDING HIP (H): ICD-10-CM

## 2021-06-02 DIAGNOSIS — C43.9 MELANOMA OF SKIN (H): Primary | ICD-10-CM

## 2021-06-02 LAB
ALBUMIN SERPL-MCNC: 3.7 G/DL (ref 3.4–5)
ALP SERPL-CCNC: 63 U/L (ref 40–150)
ALT SERPL W P-5'-P-CCNC: 34 U/L (ref 0–50)
ANION GAP SERPL CALCULATED.3IONS-SCNC: 1 MMOL/L (ref 3–14)
AST SERPL W P-5'-P-CCNC: 20 U/L (ref 0–45)
BILIRUB SERPL-MCNC: 0.4 MG/DL (ref 0.2–1.3)
BUN SERPL-MCNC: 23 MG/DL (ref 7–30)
CALCIUM SERPL-MCNC: 9.3 MG/DL (ref 8.5–10.1)
CHLORIDE SERPL-SCNC: 107 MMOL/L (ref 94–109)
CO2 SERPL-SCNC: 34 MMOL/L (ref 20–32)
CREAT SERPL-MCNC: 0.87 MG/DL (ref 0.52–1.04)
GFR SERPL CREATININE-BSD FRML MDRD: 64 ML/MIN/{1.73_M2}
GLUCOSE SERPL-MCNC: 82 MG/DL (ref 70–99)
POTASSIUM SERPL-SCNC: 4.2 MMOL/L (ref 3.4–5.3)
PROT SERPL-MCNC: 7.1 G/DL (ref 6.8–8.8)
SODIUM SERPL-SCNC: 142 MMOL/L (ref 133–144)
TSH SERPL DL<=0.005 MIU/L-ACNC: 2.07 MU/L (ref 0.4–4)

## 2021-06-02 PROCEDURE — 96413 CHEMO IV INFUSION 1 HR: CPT | Performed by: NURSE PRACTITIONER

## 2021-06-02 PROCEDURE — 84443 ASSAY THYROID STIM HORMONE: CPT | Performed by: INTERNAL MEDICINE

## 2021-06-02 PROCEDURE — 80053 COMPREHEN METABOLIC PANEL: CPT | Performed by: INTERNAL MEDICINE

## 2021-06-02 PROCEDURE — 99207 PR NO CHARGE LOS: CPT

## 2021-06-02 PROCEDURE — 36415 COLL VENOUS BLD VENIPUNCTURE: CPT | Performed by: INTERNAL MEDICINE

## 2021-06-02 NOTE — PROGRESS NOTES
Infusion Nursing Note:  Yadira Hoang presents today for C2D1 Paulette.    Patient seen by provider today: No   present during visit today: Not Applicable.    Note: Rash remains on chest and arms.  Itching also the same    Patient  declined the covid-19 test.    Intravenous Access:  Peripheral IV placed.    Treatment Conditions:  Lab Results   Component Value Date    HGB 12.7 05/24/2021     Lab Results   Component Value Date    WBC 4.1 05/24/2021      Lab Results   Component Value Date    ANEU 2.4 05/24/2021     Lab Results   Component Value Date     05/24/2021      Lab Results   Component Value Date     06/02/2021                   Lab Results   Component Value Date    POTASSIUM 4.2 06/02/2021           No results found for: MAG         Lab Results   Component Value Date    CR 0.87 06/02/2021                   Lab Results   Component Value Date    GABY 9.3 06/02/2021                Lab Results   Component Value Date    BILITOTAL 0.4 06/02/2021           Lab Results   Component Value Date    ALBUMIN 3.7 06/02/2021                    Lab Results   Component Value Date    ALT 34 06/02/2021           Lab Results   Component Value Date    AST 20 06/02/2021       Results reviewed, labs MET treatment parameters, ok to proceed with treatment.      Post Infusion Assessment:  Patient tolerated infusion without incident.       Discharge Plan:   Patient discharged in stable condition accompanied by: self.      Fiordaliza Angeles RN

## 2021-06-03 RX ORDER — LIDOCAINE HYDROCHLORIDE AND EPINEPHRINE 10; 10 MG/ML; UG/ML
1.5 INJECTION, SOLUTION INFILTRATION; PERINEURAL ONCE
Status: DISCONTINUED | OUTPATIENT
Start: 2021-06-04 | End: 2021-06-04

## 2021-06-03 NOTE — PROGRESS NOTES
[facility] Dermatology Note  Encounter Date: Jun 4, 2021  Office Visit     Dermatology Problem List:  1. Metastatic melanoma, originated in right heel  - S/p punch biopsy By Dr. Alvarado 1/9/20 showing melanoma at least 3.4mm deep, ulcerated, no mitoses, perineural invasion, TIL present and nonbrisk, no LVI.  - S/p WLE with 2cm margins (revelead melanoma to depth of 6.6mm with deep margin close, microsatellites were present) and right femoral SLNB 2/10/20 (1/2 nodes positive, largest deposit 7mm)  - S/p re-excision 2/20/20  - Flap completed 3/4/30, skin graft 4/2/20  - Adjuvant nivolumab started 5/2020  - New liver met noted on PET-CT 8/21/20. 10/9/2020 PET-CT shows increasing size of hepatic metastases with increased hypermetabolism, increased right inguinal and iliac lymphadenopathy, and new FDG uptake right T2 transverse process and right side of S1. 10/28/20 started on ipi and nivolumab combination. Eventually went to nivolumab maintenance but had worsening of R iliac chain node. Now on ipiliumab.  2. AK, right cheek: cryo and biopsy 11/19/20    Social history: .  Family history: Not assessed today.  ____________________________________________    Assessment & Plan:     # Melanoma, stage IV, originating at R heel, currently on immunotherapy (ipilimumab). No evidence of recurrence cutaneously. Discussed risk of melanoma recurrence (with local or distant disease) and risk of additional primary melanomas. Discussed role of dermatology in care - screening for local recurrence, new primaries, and management of cutaneous toxicities from treatment. Explained routine schedule for follow up examinations in office and need for self skin checks monthly.   - ABCDs of melanoma were discussed and self skin checks were advised.   - Sun precaution was advised including the use of sunscreens of SPF 30 or higher, sun protective clothing, and avoidance of tanning beds.  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 Discussed use of topical steroid only when skin is itchy/symptomatic.   - TAC 0.1% cream BID PRN  - Moisturizer BID  - Gentle soaps    # Sun damaged skin with solar lentigines: Chronic, stable.  - Recommend sunscreens SPF #30 or greater, protective clothing and avoidance of tanning beds.    # Benign skin findings including: cherry angioma, dilated pores of phill and milia- minor, self limited problem  - No further intervention required. Patient to report changes.   - Patient reassured of the benign nature of these lesions.    # Multiple clinically benign nevi: Chronic, stable  - No further intervention required. Patient to report changes.   - Patient reassured of the benign nature of these lesions.    # Subcutaneous nodule, L upper arm: Getting smaller in size per patient. Will monitor for now.    Procedures Performed:   None    Follow-up: 3 months in person    Staff:     Gloria Estrada MD    Department of Dermatology  [facility]: Phone: [phone], Fax:[phone]  [facility]: Phone: [phone], Fax: [phone]    ____________________________________________    CC: Oncology Clinic Visit (MELANOMA OF SKIN)    HPI:  Ms. Yadira Hoang is a(n) 76 year old female who presents today as a return patient for skin check.    Last visit 10/2/20. Declined skin check at that visit. AK on the right cheek was treated with cryotherapy. After visit, spot recurred. Saw Dr. Willis on 11/19/20 - biopsy showed AK.     Today, Yadira notes concerns about a brown spot on the left cheek.     She also notes a bump underneath the skin on the left upper arm. This was initially large and  "tender, it has now gotten better.    Has had pruritus related to immunotherapy. Currently using capsaicin cream, taking allegra and hydroxyzine according to oncology note. Yadira does not remember using capsaicin cream and thinks the pill she is taking starts with \"cep\". Very itchy. Scratching often. Not sleeping well because of it.     Patient is otherwise feeling well, without additional skin concerns. Denies any fevers, chills, cough, shortness of breath, or weight loss.       Labs Reviewed:  N/A    Physical Exam:  Vitals: /74 (BP Location: Right arm, Patient Position: Sitting, Cuff Size: Adult Regular)   Pulse 113   Temp 98.1  F (36.7  C) (Oral)   Resp 16   Wt 64.1 kg (141 lb 4.8 oz)   SpO2 94%   BMI 22.13 kg/m    SKIN: Total skin excluding the undergarment areas was performed. The exam included the head/face, neck, both arms, chest, back, abdomen, both legs, digits and/or nails.   - Canales Type II  - Scattered brown macules on sun exposed areas. Minimal.  - Dilated pores of phill on the right upper cutaneous lip and left cheek. Milia scattered on face.  - There are dome shaped bright red papules on the upper extremities and trunk.   - Firm fixed subcutaneous nodule that is around 1.5cm in diameter and very subtle at the left upper arm. Feels submuscular.  - Faint eczematous dermatitis with linear erosions on the upper extremities, neck, upper back, and lateral flanks.  - Well healed skin graft at right heel and medial foot. No pigment recurrence or nodularity.  - No other lesions of concern on areas examined.   LYMPH NODES: No submandibular, cervical, supraclavicular, axillary lymphadenopathy palpable on examination. There is 2cm palpable fixed subcutaneous nodule in R inguinal crease.       Medications:  Current Outpatient Medications   Medication     cholecalciferol (VITAMIN D3) 125 MCG (5000 UT) TABS tablet     estradiol (ESTRACE) 0.1 MG/GM vaginal cream     estradiol (ESTRACE) 0.1 MG/GM " vaginal cream     fexofenadine (ALLEGRA) 180 MG tablet     fluticasone (FLONASE) 50 MCG/ACT nasal spray     STATIN NOT PRESCRIBED (INTENTIONAL)     cetirizine (ZYRTEC) 10 MG tablet     hydrOXYzine (VISTARIL) 25 MG capsule     Current Facility-Administered Medications   Medication     2 mL bupivacaine (MARCAINE) preservative free injection 0.25% (10 mL vial)     2 mL bupivacaine (MARCAINE) preservative free injection 0.25% (10 mL vial)     lidocaine 1% with EPINEPHrine 1:100,000 injection 1.5 mL     lidocaine 1% with EPINEPHrine 1:100,000 injection 1.5 mL      Past Medical History:   Patient Active Problem List   Diagnosis     Malignant melanoma of right lower extremity including hip (H)     Melanoma of skin (H)     GUERLINE (obstructive sleep apnea)     Chronic congestion of paranasal sinus     Weakness of foot     Neck pain, chronic     Myofascial pain     Intractable migraine without aura and without status migrainosus     History of multiple concussions     Foot pain, bilateral     Fibromyositis     Difficulty walking     Chronic pain disorder     Chronic fatigue     Bilateral occipital neuralgia     Articular disc disorder of temporomandibular joint     S/P flap graft     Osteopenia     Injury of head     Hyperlipidemia LDL goal <100     ALEXUS (generalized anxiety disorder)     Major depression, recurrent, chronic (H)     Secondary malignant neoplasm of bone (H)     Generalized pruritus     Past Medical History:   Diagnosis Date     Concussion      ALEXUS (generalized anxiety disorder) 5/7/2020     Major depression, recurrent, chronic (H) 5/7/2020     Melanoma (H)      Nonsenile cataract      Sleep apnea     CPAP       CC Referred Self, MD  No address on file on close of this encounter.

## 2021-06-04 ENCOUNTER — OFFICE VISIT (OUTPATIENT)
Dept: SURGERY | Facility: CLINIC | Age: 76
End: 2021-06-04
Attending: DERMATOLOGY
Payer: COMMERCIAL

## 2021-06-04 VITALS
DIASTOLIC BLOOD PRESSURE: 74 MMHG | BODY MASS INDEX: 22.13 KG/M2 | RESPIRATION RATE: 16 BRPM | OXYGEN SATURATION: 94 % | WEIGHT: 141.3 LBS | SYSTOLIC BLOOD PRESSURE: 116 MMHG | TEMPERATURE: 98.1 F | HEART RATE: 113 BPM

## 2021-06-04 DIAGNOSIS — L20.84 INTRINSIC ECZEMA: ICD-10-CM

## 2021-06-04 DIAGNOSIS — D22.9 MULTIPLE BENIGN NEVI: ICD-10-CM

## 2021-06-04 DIAGNOSIS — C43.9 METASTATIC MELANOMA (H): Primary | ICD-10-CM

## 2021-06-04 DIAGNOSIS — D18.01 CHERRY ANGIOMA: ICD-10-CM

## 2021-06-04 DIAGNOSIS — L57.8 SUN-DAMAGED SKIN: ICD-10-CM

## 2021-06-04 DIAGNOSIS — L81.4 SOLAR LENTIGO: ICD-10-CM

## 2021-06-04 PROCEDURE — 99214 OFFICE O/P EST MOD 30 MIN: CPT | Performed by: DERMATOLOGY

## 2021-06-04 PROCEDURE — G0463 HOSPITAL OUTPT CLINIC VISIT: HCPCS

## 2021-06-04 RX ORDER — TRIAMCINOLONE ACETONIDE 1 MG/G
CREAM TOPICAL 2 TIMES DAILY
Qty: 453.6 G | Refills: 11 | Status: SHIPPED | OUTPATIENT
Start: 2021-06-04 | End: 2022-01-01

## 2021-06-04 ASSESSMENT — PAIN SCALES - GENERAL: PAINLEVEL: NO PAIN (0)

## 2021-06-04 NOTE — LETTER
6/4/2021         RE: Yadira Hoang  [address] [place] [facility] [address]        Dear Colleague,    Thank you for referring your patient, Yadira Hoang, to the [facility]. Please see a copy of my visit note below.    [facility] Dermatology Note  Encounter Date: Jun 4, 2021  Office Visit     Dermatology Problem List:  1. Metastatic melanoma, originated in right heel  - S/p punch biopsy By Dr. Alvarado 1/9/20 showing melanoma at least 3.4mm deep, ulcerated, no mitoses, perineural invasion, TIL present and nonbrisk, no LVI.  - S/p WLE with 2cm margins (revelead melanoma to depth of 6.6mm with deep margin close, microsatellites were present) and right femoral SLNB 2/10/20 (1/2 nodes positive, largest deposit 7mm)  - S/p re-excision 2/20/20  - Flap completed 3/4/30, skin graft 4/2/20  - Adjuvant nivolumab started 5/2020  - New liver met noted on PET-CT 8/21/20. 10/9/2020 PET-CT shows increasing size of hepatic metastases with increased hypermetabolism, increased right inguinal and iliac lymphadenopathy, and new FDG uptake right T2 transverse process and right side of S1. 10/28/20 started on ipi and nivolumab combination. Eventually went to nivolumab maintenance but had worsening of R iliac chain node. Now on ipiliumab.  2. AK, right cheek: cryo and biopsy 11/19/20    Social history: .  Family history: Not assessed today.  ____________________________________________    Assessment & Plan:     # Melanoma, stage IV, originating at R heel, currently on immunotherapy (ipilimumab). No evidence of recurrence cutaneously. Discussed risk of melanoma recurrence (with local or distant disease) and risk of additional primary melanomas. Discussed role of dermatology in care - screening for local recurrence, new primaries, and management of cutaneous toxicities from treatment. Explained routine schedule for follow up examinations in office and need for self skin  checks monthly.   - ABCDs of melanoma were discussed and self skin checks were advised.   - Sun precaution was advised including the use of sunscreens of SPF 30 or higher, sun protective clothing, and avoidance of tanning beds.  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 Discussed use of topical steroid only when skin is itchy/symptomatic.   - TAC 0.1% cream BID PRN  - Moisturizer BID  - Gentle soaps    # Sun damaged skin with solar lentigines: Chronic, stable.  - Recommend sunscreens SPF #30 or greater, protective clothing and avoidance of tanning beds.    # Benign skin findings including: cherry angioma, dilated pores of phill and milia- minor, self limited problem  - No further intervention required. Patient to report changes.   - Patient reassured of the benign nature of these lesions.    # Multiple clinically benign nevi: Chronic, stable  - No further intervention required. Patient to report changes.   - Patient reassured of the benign nature of these lesions.    # Subcutaneous nodule, L upper arm: Getting smaller in size per patient. Will monitor for now.    Procedures Performed:   None    Follow-up: 3 months in person    Staff:     Gloria Estrada MD    Department of Dermatology  [facility]: Phone: [phone], Fax:[phone]  [facility]: Phone: [phone], Fax: [phone]    ____________________________________________    CC: Oncology Clinic Visit (MELANOMA OF SKIN)    HPI:  Ms. Yadira Hoang is a(n) 76 year old female who presents today as a return patient for skin check.    Last visit 10/2/20. Declined skin check at that visit. AK on the right cheek was treated with  "cryotherapy. After visit, spot recurred. Saw Dr. Willis on 11/19/20 - biopsy showed AK.     Today, Yadira notes concerns about a brown spot on the left cheek.     She also notes a bump underneath the skin on the left upper arm. This was initially large and tender, it has now gotten better.    Has had pruritus related to immunotherapy. Currently using capsaicin cream, taking allegra and hydroxyzine according to oncology note. Yadira does not remember using capsaicin cream and thinks the pill she is taking starts with \"cep\". Very itchy. Scratching often. Not sleeping well because of it.     Patient is otherwise feeling well, without additional skin concerns. Denies any fevers, chills, cough, shortness of breath, or weight loss.       Labs Reviewed:  N/A    Physical Exam:  Vitals: /74 (BP Location: Right arm, Patient Position: Sitting, Cuff Size: Adult Regular)   Pulse 113   Temp 98.1  F (36.7  C) (Oral)   Resp 16   Wt 64.1 kg (141 lb 4.8 oz)   SpO2 94%   BMI 22.13 kg/m    SKIN: Total skin excluding the undergarment areas was performed. The exam included the head/face, neck, both arms, chest, back, abdomen, both legs, digits and/or nails.   - Canales Type II  - Scattered brown macules on sun exposed areas. Minimal.  - Dilated pores of phill on the right upper cutaneous lip and left cheek. Milia scattered on face.  - There are dome shaped bright red papules on the upper extremities and trunk.   - Firm fixed subcutaneous nodule that is around 1.5cm in diameter and very subtle at the left upper arm. Feels submuscular.  - Faint eczematous dermatitis with linear erosions on the upper extremities, neck, upper back, and lateral flanks.  - Well healed skin graft at right heel and medial foot. No pigment recurrence or nodularity.  - No other lesions of concern on areas examined.   LYMPH NODES: No submandibular, cervical, supraclavicular, axillary lymphadenopathy palpable on examination. There is 2cm palpable fixed " subcutaneous nodule in R inguinal crease.       Medications:  Current Outpatient Medications   Medication     cholecalciferol (VITAMIN D3) 125 MCG (5000 UT) TABS tablet     estradiol (ESTRACE) 0.1 MG/GM vaginal cream     estradiol (ESTRACE) 0.1 MG/GM vaginal cream     fexofenadine (ALLEGRA) 180 MG tablet     fluticasone (FLONASE) 50 MCG/ACT nasal spray     STATIN NOT PRESCRIBED (INTENTIONAL)     cetirizine (ZYRTEC) 10 MG tablet     hydrOXYzine (VISTARIL) 25 MG capsule     Current Facility-Administered Medications   Medication     2 mL bupivacaine (MARCAINE) preservative free injection 0.25% (10 mL vial)     2 mL bupivacaine (MARCAINE) preservative free injection 0.25% (10 mL vial)     lidocaine 1% with EPINEPHrine 1:100,000 injection 1.5 mL     lidocaine 1% with EPINEPHrine 1:100,000 injection 1.5 mL      Past Medical History:   Patient Active Problem List   Diagnosis     Malignant melanoma of right lower extremity including hip (H)     Melanoma of skin (H)     GUERLINE (obstructive sleep apnea)     Chronic congestion of paranasal sinus     Weakness of foot     Neck pain, chronic     Myofascial pain     Intractable migraine without aura and without status migrainosus     History of multiple concussions     Foot pain, bilateral     Fibromyositis     Difficulty walking     Chronic pain disorder     Chronic fatigue     Bilateral occipital neuralgia     Articular disc disorder of temporomandibular joint     S/P flap graft     Osteopenia     Injury of head     Hyperlipidemia LDL goal <100     ALEXUS (generalized anxiety disorder)     Major depression, recurrent, chronic (H)     Secondary malignant neoplasm of bone (H)     Generalized pruritus     Past Medical History:   Diagnosis Date     Concussion      ALEXUS (generalized anxiety disorder) 5/7/2020     Major depression, recurrent, chronic (H) 5/7/2020     Melanoma (H)      Nonsenile cataract      Sleep apnea     CPAP       CC Referred Self, MD  No address on file on close of this  encounter.                Again, thank you for allowing me to participate in the care of your patient.        Sincerely,        Gloria Estrada MD

## 2021-06-04 NOTE — PATIENT INSTRUCTIONS
Eczema care plan:    For soap in the shower, I recommend either Dove bar soap unscented for sensitive skin, Cetaphil gentle facial cleanser, or Vanicream gentle facial cleanser.     At least once daily no matter what, apply a cream moisturizer from the neck to the toes. The best time to do this is immediately after showering. My moisturizer recommendations are the creams from either CeraVe or Vanicream.     If your skin is itchy, apply the topical steroid (triamcinolone 0.1% cream) twice a day on the skin that is itchy and then apply the moisturizer. If your skin is not itchy, just apply the moisturizer, no steroid.

## 2021-06-04 NOTE — NURSING NOTE
"Oncology Rooming Note    June 4, 2021 12:58 PM   Yadira Hoang is a 76 year old female who presents for:    Chief Complaint   Patient presents with     Oncology Clinic Visit     MELANOMA OF SKIN     Initial Vitals: /74 (BP Location: Right arm, Patient Position: Sitting, Cuff Size: Adult Regular)   Pulse 113   Temp 98.1  F (36.7  C) (Oral)   Resp 16   Wt 64.1 kg (141 lb 4.8 oz)   SpO2 94%   BMI 22.13 kg/m   Estimated body mass index is 22.13 kg/m  as calculated from the following:    Height as of 4/13/21: 1.702 m (5' 7\").    Weight as of this encounter: 64.1 kg (141 lb 4.8 oz). Body surface area is 1.74 meters squared.  No Pain (0) Comment: Data Unavailable   No LMP recorded. Patient is postmenopausal.  Allergies reviewed: Yes  Medications reviewed: Yes    Medications: Medication refills not needed today.  Pharmacy name entered into [facility]:    [facility] - [place], [place] - [address], [address]  [facility] [address] [place] [address] [address] [facility]    Clinical concerns: No new concerns.        Fouzia Camargo CMA              "

## 2021-06-18 ENCOUNTER — VIRTUAL VISIT (OUTPATIENT)
Dept: ONCOLOGY | Facility: CLINIC | Age: 76
End: 2021-06-18
Attending: INTERNAL MEDICINE
Payer: COMMERCIAL

## 2021-06-18 DIAGNOSIS — L29.9 PRURITUS: ICD-10-CM

## 2021-06-18 DIAGNOSIS — C43.9 ACRAL LENTIGINOUS MELANOMA (H): Primary | ICD-10-CM

## 2021-06-18 DIAGNOSIS — M79.671 RIGHT FOOT PAIN: ICD-10-CM

## 2021-06-18 DIAGNOSIS — R53.82 CHRONIC FATIGUE: ICD-10-CM

## 2021-06-18 PROCEDURE — 999N001193 HC VIDEO/TELEPHONE VISIT; NO CHARGE

## 2021-06-18 PROCEDURE — 99214 OFFICE O/P EST MOD 30 MIN: CPT | Mod: 95 | Performed by: PHYSICIAN ASSISTANT

## 2021-06-18 RX ORDER — ALBUTEROL SULFATE 0.83 MG/ML
2.5 SOLUTION RESPIRATORY (INHALATION)
Status: CANCELLED | OUTPATIENT
Start: 2021-06-21

## 2021-06-18 RX ORDER — EPINEPHRINE 1 MG/ML
0.3 INJECTION, SOLUTION INTRAMUSCULAR; SUBCUTANEOUS EVERY 5 MIN PRN
Status: CANCELLED | OUTPATIENT
Start: 2021-06-21

## 2021-06-18 RX ORDER — HEPARIN SODIUM,PORCINE 10 UNIT/ML
5 VIAL (ML) INTRAVENOUS
Status: CANCELLED | OUTPATIENT
Start: 2021-06-21

## 2021-06-18 RX ORDER — ALBUTEROL SULFATE 90 UG/1
1-2 AEROSOL, METERED RESPIRATORY (INHALATION)
Status: CANCELLED
Start: 2021-06-21

## 2021-06-18 RX ORDER — HEPARIN SODIUM (PORCINE) LOCK FLUSH IV SOLN 100 UNIT/ML 100 UNIT/ML
5 SOLUTION INTRAVENOUS
Status: CANCELLED | OUTPATIENT
Start: 2021-06-21

## 2021-06-18 RX ORDER — SODIUM CHLORIDE 9 MG/ML
1000 INJECTION, SOLUTION INTRAVENOUS CONTINUOUS PRN
Status: CANCELLED
Start: 2021-06-21

## 2021-06-18 RX ORDER — MEPERIDINE HYDROCHLORIDE 25 MG/ML
25 INJECTION INTRAMUSCULAR; INTRAVENOUS; SUBCUTANEOUS EVERY 30 MIN PRN
Status: CANCELLED | OUTPATIENT
Start: 2021-06-21

## 2021-06-18 RX ORDER — DIPHENHYDRAMINE HYDROCHLORIDE 50 MG/ML
50 INJECTION INTRAMUSCULAR; INTRAVENOUS
Status: CANCELLED
Start: 2021-06-21

## 2021-06-18 RX ORDER — PREGABALIN 25 MG/1
25 CAPSULE ORAL AT BEDTIME
Qty: 30 CAPSULE | Refills: 0 | Status: SHIPPED | OUTPATIENT
Start: 2021-06-18 | End: 2021-08-06

## 2021-06-18 RX ORDER — NALOXONE HYDROCHLORIDE 0.4 MG/ML
.1-.4 INJECTION, SOLUTION INTRAMUSCULAR; INTRAVENOUS; SUBCUTANEOUS
Status: CANCELLED | OUTPATIENT
Start: 2021-06-21

## 2021-06-18 RX ORDER — METHYLPREDNISOLONE SODIUM SUCCINATE 125 MG/2ML
125 INJECTION, POWDER, LYOPHILIZED, FOR SOLUTION INTRAMUSCULAR; INTRAVENOUS
Status: CANCELLED
Start: 2021-06-21

## 2021-06-18 NOTE — LETTER
6/18/2021         RE: Yadira Hoang  [address] [facility] [address]        Oncology/Hematology Visit Note  Jun 18, 2021   Oncologist: Dr. Gi Franklin     Reason for Visit: Follow up of stage IV melanoma    History of Present Illness: Yadira Hoang is a 76 year old female who presents for telephone call to follow up on her acral melanoma, stage IV, per oncologic history below:     Oncologic History: Acral melanoma, stage IV, s/p primary resection and reconstruction  1. She noted a painful lesion on the sole of the right foot for a few months, since summer 2019.  It initially was small then became raised.  It spontaneously bled. She is not entirely sure of its appearance initially.  She denies noting any masses behind the knee or in the groin.  2. 1/9/2020, punch biopsy was performed by Dr Jessica Alvarado.  It showed melanoma, at least 3.4 mm deep with ulceration and 0 mitoses, and perineural invasion present. TILs were non-brisk and LVI not identified. pT3b.  3. 2/10/2020, she has right femoral sentinel lymph node biopsy and wide local excision (Specimen #: [id number]) and the right heel lesion extends to a depth of 6.6 mm, and invasive melanoma is present at 0.2 mm from the deep margin. Microsatellites are also present. There was 1 (of 2) lymph nodes involved. The lymph node tissue exhibits extensive subcapsular and parenchymal clusters of melanoma cells, highlighted on Melan-A immunostain. The largest cluster is 7 millimeters in greatest diameter. pT4b pN2c.  4. 2/22/2020, she had PET-CT, which showed intense FDG uptake in a right inguinal lymph node, concerning for an additional focus of metastatic disease. There is also an indeterminate right external iliac lymph node with mild FDG uptake.  5. 3/4/2020, she has medial plantar artery  fasciocutaneous instep flap and Integra placement to the donor site. On 4/2/2020, she has additional reconstruction with skin grafting.  6.  5/27/2020, initiated on nivolumab immunotherapy.  Had C2 6/24/20. C3 on 7/22/20. C4 on 9/3/2020.  7. 8/21/2020 evidence of a new liver metastasis on PET-CT.  9. 10/9/2020, PET-CT shows increasing size of hepatic metastases with increased hypermetabolism, increased right inguinal and iliac lymphadenopathy, and new FDG uptake right T2 transverse process and right side of S1. MRI-brain obtained 10/21/2020 is negative for brain metastasis.  10. 10/28/2020, she starts ipilimumab 1mg/kg and nivolumab 3mg/kg, then nivolumab maintenance through cycle 8.  11. 2/12/21, PET-CT shows evidence of partial response with several liver lesions no longer hypermetabolic and smaller in size, only one hypermetabolic lesion in the hepatic dome. Slightly increased size and FDG uptake in a single right inguinal lymph node, remainder of inguinal and right iliac chain lymph nodes have decreased in size and FDG uptake since prior exam.   12. 5/7/2021, PET-CT shows mixed response with increased size and hypermetabolism in the right iliac chain lymphadenopathy.  13. 5/10/2021, she starts ipilimumab 3 mg/kg every 3 weeks.    She was called today for oncology follow-up.     Interval History:  Yadira is feeling ok. Fatigue improved. She tries to stay busy around the house. Has good and bad days, more good days than bad. Still takes occasional naps during the day 30 minutes to 1hr.   She got TAC from derm for pruritus which has been helpful but still has generalized pruritus. Appetite is not really good, but she eats as tolerated and reports adequate fluid intake. Weight is stable.  Foot pain seems to be getting worse. Applying panaway oil on foot. Restless leg is coming back, not daily.   CPAP as tolerated. Sometimes has headaches with CPAP use, now resolved.   Has allergies, uses jefferson pot.   Occasional chills, now new.     No fevers, CP, SOB, abd pain, nausea/vomiting, constipation/diarrhea.    Current Outpatient Medications   Medication Sig  Dispense Refill     cholecalciferol (VITAMIN D3) 125 MCG (5000 UT) TABS tablet Take 2,000 Units by mouth every other day        estradiol (ESTRACE) 0.1 MG/GM vaginal cream Place 2 g vaginally twice a week 42.5 g 0     estradiol (ESTRACE) 0.1 MG/GM vaginal cream Place vaginally twice a week 42.5 g 3     fexofenadine (ALLEGRA) 180 MG tablet Take 1 tablet (180 mg) by mouth daily before breakfast 30 tablet 3     fluticasone (FLONASE) 50 MCG/ACT nasal spray Spray 2 sprays into both nostrils daily 16 g 3     hydrOXYzine (VISTARIL) 25 MG capsule Take 1 capsule (25 mg) by mouth every 6 hours (Patient not taking: Reported on 6/2/2021) 30 capsule 1     STATIN NOT PRESCRIBED (INTENTIONAL) Please choose reason not prescribed from choices below.       triamcinolone (KENALOG) 0.1 % external cream Apply topically 2 times daily 453.6 g 11     Objective:  There were no vitals taken for this visit.   Physical Exam: N/A, telephone visit  Voice is clear and strong. No audible stridor, wheezing, or respiratory distress. The remainder of PE was deferred due to PHE.      Laboratory Data: Labs reviewed today.     Assessment and Plan:  1. Onc  Acral melanoma, right heel primary, pT4b pN3c M1, Stage IV. 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 She progressed on first line nivolumab, but shows clear evidence of response to 2nd line ipilimumab and nivolumab. However, once on nivolumab maintenance therapy she started to show regrowth in the right inguinal node. She switched over to ipilimumab every 3 weeks in the third line on 5/10/21, for a maximum of 4 doses. She knows to call if there is any change in the size of the inguinal lymph node. For evidence of response, continue ipilimumab. For progression, plan for anti-PD1 therapy with Keytruda  or another rounds of dual CPI, either Ipi/Nivo or Ipi/Pembro .  -Continue ipilimumab 3 mg/kg every 3 weeks. Next cycle (#3) scheduled on 6/21/21, will proceed  pending lab work is ok.   -RTC in 3 weeks for visit with Meghan, labs, and cycle #4 Ipi.   -Repeat scans and f/u with Dr. Franklin scheduled on 7/30/21.    -Continues to follow with Dr. Fry (Derm).     2. MSK  Right foot pain 2/2 prior surgery (no concern for immune mediated arthralgias given specific to post surgical pain in right foot/ankle). Saw ortho at  but would like to see at [facility] as well. Continue brace and orthotic she has at home. She saw ortho Dr. Reyes on 4/13/21. Had MRI lumbar spine done 4/28/21 which showed Multilevel lumbar spondylosis greatest at L3-4 and L4-5 with moderate severe neural foraminal narrowing on the left at L3-4 and on the right at L4-5. No significant spinal canal stenosis throughout. Per ortho notes, she declined gabapentin due to sedative effect and was given scheduling # for lumbar injection but she has not called to make appt.     She feels her foot/ankle pain is getting worse. Using panaway oil PRN. Will help her make follow-up appointment with Ortho. In the mean time, she is willing to try Lyrica 25mg at bedtime.    Arthritis (knees), immunotherapy associated, grade 1   We will watch closely for worsening of immune mediated side effects and worsening of arthritis. She does not feel she requires steroids.  -Continue using Voltaren gel PRN  -OTC Aleve or Ibuprofen as needed.   -Stable and under control now.     3. Derm  Dry skin and pruritis. Not using oatmeal baths and Aveeno as she did not find them helpful.    Following Dr. Fry (Derm). Recent visit on 6/4/21. Now using TAC and Vanicream which she finds helpful. Continue to monitor and follow-up with Derm.     4. Psych/Neuro  Multifactorial fatigue, TSH and cortisol have been WNL. History of GUERLINE (treated), and she is also on immunotherapy with known side effect of fatigue. Fatigue has improved. Continue to monitor.     History of depression and anxiety. Previously, referral placed for cancer rehab and she was given  "the phone number to call to get this scheduled. Currently off treatment. Continue to monitor.    5. ENT  Sinus drainage, using jefferson pot.     6. ID  Immunization. She had 1st moderna covid vaccine on 6/4/21.     Phone duration: 30 minutes  15 minutes spent on the date of the encounter doing chart review, review of test results, interpretation of tests and documentation.     Patient seen and plan discussed with Meghan Perdomo PA-C.   PEACE See PA-C  [facility] [place] [address]  [phone]      Yadira is a 76 year old who is being evaluated via a billable telephone visit.      What phone number would you like to be contacted at? [phone]  How would you like to obtain your AVS? Mail a copy     I have reviewed and updated the patient's allergies and medication list.    Concerns: none  Refills: lalito     Vitals - Patient Reported  Weight (Patient Reported): 63.5 kg (140 lb)  Height (Patient Reported): 170.2 cm (5' 7\")  BMI (Based on Pt Reported Ht/Wt): 21.93  Pain Score: Mild Pain (3)  Pain Loc: Foot    Jessica Valladares, JAGDISH Perdomo PA-C  "

## 2021-06-18 NOTE — PROGRESS NOTES
Oncology/Hematology Visit Note  Jun 18, 2021   Oncologist: Dr. Gi Franklin     Reason for Visit: Follow up of stage IV melanoma    History of Present Illness: Yadira Hoang is a 76 year old female who presents for telephone call to follow up on her acral melanoma, stage IV, per oncologic history below:     Oncologic History: Acral melanoma, stage IV, s/p primary resection and reconstruction  1. She noted a painful lesion on the sole of the right foot for a few months, since summer 2019.  It initially was small then became raised.  It spontaneously bled. She is not entirely sure of its appearance initially.  She denies noting any masses behind the knee or in the groin.  2. 1/9/2020, punch biopsy was performed by Dr Jessica Alvarado.  It showed melanoma, at least 3.4 mm deep with ulceration and 0 mitoses, and perineural invasion present. TILs were non-brisk and LVI not identified. pT3b.  3. 2/10/2020, she has right femoral sentinel lymph node biopsy and wide local excision (Specimen #: [id number]) and the right heel lesion extends to a depth of 6.6 mm, and invasive melanoma is present at 0.2 mm from the deep margin. Microsatellites are also present. There was 1 (of 2) lymph nodes involved. The lymph node tissue exhibits extensive subcapsular and parenchymal clusters of melanoma cells, highlighted on Melan-A immunostain. The largest cluster is 7 millimeters in greatest diameter. pT4b pN2c.  4. 2/22/2020, she had PET-CT, which showed intense FDG uptake in a right inguinal lymph node, concerning for an additional focus of metastatic disease. There is also an indeterminate right external iliac lymph node with mild FDG uptake.  5. 3/4/2020, she has medial plantar artery  fasciocutaneous instep flap and Integra placement to the donor site. On 4/2/2020, she has additional reconstruction with skin grafting.  6. 5/27/2020, initiated on nivolumab immunotherapy.  Had C2 6/24/20. C3 on 7/22/20. C4 on  9/3/2020.  7. 8/21/2020 evidence of a new liver metastasis on PET-CT.  9. 10/9/2020, PET-CT shows increasing size of hepatic metastases with increased hypermetabolism, increased right inguinal and iliac lymphadenopathy, and new FDG uptake right T2 transverse process and right side of S1. MRI-brain obtained 10/21/2020 is negative for brain metastasis.  10. 10/28/2020, she starts ipilimumab 1mg/kg and nivolumab 3mg/kg, then nivolumab maintenance through cycle 8.  11. 2/12/21, PET-CT shows evidence of partial response with several liver lesions no longer hypermetabolic and smaller in size, only one hypermetabolic lesion in the hepatic dome. Slightly increased size and FDG uptake in a single right inguinal lymph node, remainder of inguinal and right iliac chain lymph nodes have decreased in size and FDG uptake since prior exam.   12. 5/7/2021, PET-CT shows mixed response with increased size and hypermetabolism in the right iliac chain lymphadenopathy.  13. 5/10/2021, she starts ipilimumab 3 mg/kg every 3 weeks.    She was called today for oncology follow-up.     Interval History:  Yadira is feeling ok. Fatigue improved. She tries to stay busy around the house. Has good and bad days, more good days than bad. Still takes occasional naps during the day 30 minutes to 1hr.   She got TAC from derm for pruritus which has been helpful but still has generalized pruritus. Appetite is not really good, but she eats as tolerated and reports adequate fluid intake. Weight is stable.  Foot pain seems to be getting worse. Applying panaway oil on foot. Restless leg is coming back, not daily.   CPAP as tolerated. Sometimes has headaches with CPAP use, now resolved.   Has allergies, uses jefferson pot.   Occasional chills, now new.     No fevers, CP, SOB, abd pain, nausea/vomiting, constipation/diarrhea.    Current Outpatient Medications   Medication Sig Dispense Refill     cholecalciferol (VITAMIN D3) 125 MCG (5000 UT) TABS tablet Take 2,000  Units by mouth every other day        estradiol (ESTRACE) 0.1 MG/GM vaginal cream Place 2 g vaginally twice a week 42.5 g 0     estradiol (ESTRACE) 0.1 MG/GM vaginal cream Place vaginally twice a week 42.5 g 3     fexofenadine (ALLEGRA) 180 MG tablet Take 1 tablet (180 mg) by mouth daily before breakfast 30 tablet 3     fluticasone (FLONASE) 50 MCG/ACT nasal spray Spray 2 sprays into both nostrils daily 16 g 3     hydrOXYzine (VISTARIL) 25 MG capsule Take 1 capsule (25 mg) by mouth every 6 hours (Patient not taking: Reported on 6/2/2021) 30 capsule 1     STATIN NOT PRESCRIBED (INTENTIONAL) Please choose reason not prescribed from choices below.       triamcinolone (KENALOG) 0.1 % external cream Apply topically 2 times daily 453.6 g 11     Objective:  There were no vitals taken for this visit.   Physical Exam: N/A, telephone visit  Voice is clear and strong. No audible stridor, wheezing, or respiratory distress. The remainder of PE was deferred due to PHE.      Laboratory Data: Labs reviewed today.     Assessment and Plan:  1. Onc  Acral melanoma, right heel primary, pT4b pN3c M1, Stage IV. 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 She progressed on first line nivolumab, but shows clear evidence of response to 2nd line ipilimumab and nivolumab. However, once on nivolumab maintenance therapy she started to show regrowth in the right inguinal node. She switched over to ipilimumab every 3 weeks in the third line on 5/10/21, for a maximum of 4 doses. She knows to call if there is any change in the size of the inguinal lymph node. For evidence of response, continue ipilimumab. For progression, plan for anti-PD1 therapy with Keytruda  or another rounds of dual CPI, either Ipi/Nivo or Ipi/Pembro .  -Continue ipilimumab 3 mg/kg every 3 weeks. Next cycle (#3) scheduled on 6/21/21, will proceed pending lab work is ok.   -RTC in 3 weeks for visit with Meghan, labs, and cycle #4 Ipi.    -Repeat scans and f/u with Dr. Franklin scheduled on 7/30/21.    -Continues to follow with Dr. Fry (Derm).     2. MSK  Right foot pain 2/2 prior surgery (no concern for immune mediated arthralgias given specific to post surgical pain in right foot/ankle). Saw ortho at  but would like to see at [facility] as well. Continue brace and orthotic she has at home. She saw ortho Dr. Reyes on 4/13/21. Had MRI lumbar spine done 4/28/21 which showed Multilevel lumbar spondylosis greatest at L3-4 and L4-5 with moderate severe neural foraminal narrowing on the left at L3-4 and on the right at L4-5. No significant spinal canal stenosis throughout. Per ortho notes, she declined gabapentin due to sedative effect and was given scheduling # for lumbar injection but she has not called to make appt.     She feels her foot/ankle pain is getting worse. Using panaway oil PRN. Will help her make follow-up appointment with Ortho. In the mean time, she is willing to try Lyrica 25mg at bedtime.    Arthritis (knees), immunotherapy associated, grade 1   We will watch closely for worsening of immune mediated side effects and worsening of arthritis. She does not feel she requires steroids.  -Continue using Voltaren gel PRN  -OTC Aleve or Ibuprofen as needed.   -Stable and under control now.     3. Derm  Dry skin and pruritis. Not using oatmeal baths and Aveeno as she did not find them helpful.    Following Dr. Fry (Derm). Recent visit on 6/4/21. Now using TAC and Vanicream which she finds helpful. Continue to monitor and follow-up with Derm.     4. Psych/Neuro  Multifactorial fatigue, TSH and cortisol have been WNL. History of GUERLINE (treated), and she is also on immunotherapy with known side effect of fatigue. Fatigue has improved. Continue to monitor.     History of depression and anxiety. Previously, referral placed for cancer rehab and she was given the phone number to call to get this scheduled. Currently off treatment. Continue to  monitor.    5. ENT  Sinus drainage, using jefferson pot.     6. ID  Immunization. She had 1st moderna covid vaccine on 6/4/21.     Phone duration: 30 minutes  15 minutes spent on the date of the encounter doing chart review, review of test results, interpretation of tests and documentation.     Patient seen and plan discussed with Meghan Perdomo PA-C.   PEACE See PA-C  [facility] [place] [id number] [place], [place] [address] [phone]

## 2021-06-18 NOTE — PROGRESS NOTES
"Yadira is a 76 year old who is being evaluated via a billable telephone visit.      What phone number would you like to be contacted at? [phone]  How would you like to obtain your AVS? Mail a copy     I have reviewed and updated the patient's allergies and medication list.    Concerns: none  Refills: lalito     Vitals - Patient Reported  Weight (Patient Reported): 63.5 kg (140 lb)  Height (Patient Reported): 170.2 cm (5' 7\")  BMI (Based on Pt Reported Ht/Wt): 21.93  Pain Score: Mild Pain (3)  Pain Loc: Foot    Jessica Valladares CMA        "

## 2021-06-21 ENCOUNTER — INFUSION THERAPY VISIT (OUTPATIENT)
Dept: INFUSION THERAPY | Facility: CLINIC | Age: 76
End: 2021-06-21
Attending: INTERNAL MEDICINE
Payer: COMMERCIAL

## 2021-06-21 VITALS
SYSTOLIC BLOOD PRESSURE: 118 MMHG | DIASTOLIC BLOOD PRESSURE: 67 MMHG | BODY MASS INDEX: 21.88 KG/M2 | WEIGHT: 139.7 LBS | HEART RATE: 79 BPM | OXYGEN SATURATION: 96 % | RESPIRATION RATE: 18 BRPM | TEMPERATURE: 98.5 F

## 2021-06-21 DIAGNOSIS — C43.9 MELANOMA OF SKIN (H): Primary | ICD-10-CM

## 2021-06-21 DIAGNOSIS — C43.71 MALIGNANT MELANOMA OF RIGHT LOWER EXTREMITY INCLUDING HIP (H): ICD-10-CM

## 2021-06-21 LAB
ALBUMIN SERPL-MCNC: 3.6 G/DL (ref 3.4–5)
ALP SERPL-CCNC: 62 U/L (ref 40–150)
ALT SERPL W P-5'-P-CCNC: 30 U/L (ref 0–50)
ANION GAP SERPL CALCULATED.3IONS-SCNC: 2 MMOL/L (ref 3–14)
AST SERPL W P-5'-P-CCNC: 19 U/L (ref 0–45)
BILIRUB SERPL-MCNC: 0.4 MG/DL (ref 0.2–1.3)
BUN SERPL-MCNC: 23 MG/DL (ref 7–30)
CALCIUM SERPL-MCNC: 9.2 MG/DL (ref 8.5–10.1)
CHLORIDE SERPL-SCNC: 107 MMOL/L (ref 94–109)
CO2 SERPL-SCNC: 32 MMOL/L (ref 20–32)
CREAT SERPL-MCNC: 0.69 MG/DL (ref 0.52–1.04)
GFR SERPL CREATININE-BSD FRML MDRD: 84 ML/MIN/{1.73_M2}
GLUCOSE SERPL-MCNC: 83 MG/DL (ref 70–99)
POTASSIUM SERPL-SCNC: 4.2 MMOL/L (ref 3.4–5.3)
PROT SERPL-MCNC: 7.5 G/DL (ref 6.8–8.8)
SODIUM SERPL-SCNC: 141 MMOL/L (ref 133–144)
TSH SERPL DL<=0.005 MIU/L-ACNC: 2.13 MU/L (ref 0.4–4)

## 2021-06-21 PROCEDURE — 96413 CHEMO IV INFUSION 1 HR: CPT | Performed by: NURSE PRACTITIONER

## 2021-06-21 PROCEDURE — 84443 ASSAY THYROID STIM HORMONE: CPT | Performed by: PHYSICIAN ASSISTANT

## 2021-06-21 PROCEDURE — 80053 COMPREHEN METABOLIC PANEL: CPT | Performed by: PHYSICIAN ASSISTANT

## 2021-06-21 PROCEDURE — 36415 COLL VENOUS BLD VENIPUNCTURE: CPT | Performed by: PHYSICIAN ASSISTANT

## 2021-06-21 PROCEDURE — 99207 PR NO CHARGE LOS: CPT

## 2021-06-21 ASSESSMENT — PAIN SCALES - GENERAL: PAINLEVEL: NO PAIN (0)

## 2021-06-21 NOTE — PROGRESS NOTES
"Infusion Nursing Note:  Yadira Hoang presents today for C3 of Yervoy.    Patient seen by provider today: No   present during visit today: Not Applicable.    Note: Pt states she is doing well has had a rash to the torso and upper legs that has improved with the use of steroid cream and lotion. Pt states she is \"hoping this medication works as Opdivo didn't help me\".  Emotional support provided, pt states\"I try to see the positive every day.\"  Patient did meet criteria for an asymptomatic covid-19 PCR test in infusion today. Patient declined the covid-19 test. First Moderna brand COVID vaccine given on 06/04/2021    Intravenous Access:  Peripheral IV placed.    Treatment Conditions:  Lab Results   Component Value Date     06/21/2021                   Lab Results   Component Value Date    POTASSIUM 4.2 06/21/2021           No results found for: MAG         Lab Results   Component Value Date    CR 0.69 06/21/2021                   Lab Results   Component Value Date    GABY 9.2 06/21/2021                Lab Results   Component Value Date    BILITOTAL 0.4 06/21/2021           Lab Results   Component Value Date    ALBUMIN 3.6 06/21/2021                    Lab Results   Component Value Date    ALT 30 06/21/2021           Lab Results   Component Value Date    AST 19 06/21/2021       Results reviewed, labs MET treatment parameters, ok to proceed with treatment.      Post Infusion Assessment:  Patient tolerated infusion without incident.  Blood return noted pre and post infusion.  Site patent and intact, free from redness, edema or discomfort.  No evidence of extravasations.  Access discontinued per protocol.       Discharge Plan:   Return with Meghan De Anda NP on 07/09/2021 and return for lab work /Yervoy on 07/12/2021  Discharge instructions reviewed with: Patient.  Patient and/or family verbalized understanding of discharge instructions and all questions answered.  Patient discharged in stable " condition accompanied by: self.  Departure Mode: Ambulatory.      Sahara Tran RN

## 2021-06-30 ENCOUNTER — HOSPITAL ENCOUNTER (EMERGENCY)
Facility: CLINIC | Age: 76
Discharge: HOME | End: 2021-06-30
Attending: FAMILY MEDICINE | Admitting: FAMILY MEDICINE
Payer: COMMERCIAL

## 2021-06-30 ENCOUNTER — TELEPHONE (OUTPATIENT)
Dept: ONCOLOGY | Facility: CLINIC | Age: 76
End: 2021-06-30

## 2021-06-30 VITALS
OXYGEN SATURATION: 98 % | RESPIRATION RATE: 18 BRPM | DIASTOLIC BLOOD PRESSURE: 65 MMHG | TEMPERATURE: 97.7 F | SYSTOLIC BLOOD PRESSURE: 111 MMHG | BODY MASS INDEX: 21.66 KG/M2 | HEIGHT: 67 IN | WEIGHT: 138 LBS | HEART RATE: 83 BPM

## 2021-06-30 DIAGNOSIS — C79.51 SECONDARY MALIGNANT NEOPLASM OF BONE (H): Primary | ICD-10-CM

## 2021-06-30 DIAGNOSIS — R11.2 NAUSEA AND VOMITING, INTRACTABILITY OF VOMITING NOT SPECIFIED, UNSPECIFIED VOMITING TYPE: ICD-10-CM

## 2021-06-30 DIAGNOSIS — C79.51 SECONDARY MALIGNANT NEOPLASM OF BONE (H): ICD-10-CM

## 2021-06-30 LAB
ALBUMIN SERPL-MCNC: 3.4 G/DL (ref 3.4–5)
ALP SERPL-CCNC: 62 U/L (ref 40–150)
ALT SERPL W P-5'-P-CCNC: 26 U/L (ref 0–50)
ANION GAP SERPL CALCULATED.3IONS-SCNC: 5 MMOL/L (ref 3–14)
AST SERPL W P-5'-P-CCNC: 26 U/L (ref 0–45)
BASOPHILS # BLD AUTO: 0 10E9/L (ref 0–0.2)
BASOPHILS NFR BLD AUTO: 0.4 %
BILIRUB SERPL-MCNC: 0.7 MG/DL (ref 0.2–1.3)
BUN SERPL-MCNC: 17 MG/DL (ref 7–30)
CALCIUM SERPL-MCNC: 9.2 MG/DL (ref 8.5–10.1)
CHLORIDE SERPL-SCNC: 100 MMOL/L (ref 94–109)
CO2 SERPL-SCNC: 30 MMOL/L (ref 20–32)
CREAT SERPL-MCNC: 0.67 MG/DL (ref 0.52–1.04)
DIFFERENTIAL METHOD BLD: ABNORMAL
EOSINOPHIL # BLD AUTO: 0.1 10E9/L (ref 0–0.7)
EOSINOPHIL NFR BLD AUTO: 1.3 %
ERYTHROCYTE [DISTWIDTH] IN BLOOD BY AUTOMATED COUNT: 13 % (ref 10–15)
GFR SERPL CREATININE-BSD FRML MDRD: 85 ML/MIN/{1.73_M2}
GLUCOSE SERPL-MCNC: 108 MG/DL (ref 70–99)
HCT VFR BLD AUTO: 39.2 % (ref 35–47)
HGB BLD-MCNC: 13 G/DL (ref 11.7–15.7)
IMM GRANULOCYTES # BLD: 0 10E9/L (ref 0–0.4)
IMM GRANULOCYTES NFR BLD: 0.4 %
LIPASE SERPL-CCNC: 83 U/L (ref 73–393)
LYMPHOCYTES # BLD AUTO: 0.5 10E9/L (ref 0.8–5.3)
LYMPHOCYTES NFR BLD AUTO: 9.4 %
MCH RBC QN AUTO: 30.2 PG (ref 26.5–33)
MCHC RBC AUTO-ENTMCNC: 33.2 G/DL (ref 31.5–36.5)
MCV RBC AUTO: 91 FL (ref 78–100)
MONOCYTES # BLD AUTO: 0.2 10E9/L (ref 0–1.3)
MONOCYTES NFR BLD AUTO: 3.3 %
NEUTROPHILS # BLD AUTO: 4.6 10E9/L (ref 1.6–8.3)
NEUTROPHILS NFR BLD AUTO: 85.2 %
NRBC # BLD AUTO: 0 10*3/UL
NRBC BLD AUTO-RTO: 0 /100
PLATELET # BLD AUTO: 226 10E9/L (ref 150–450)
POTASSIUM SERPL-SCNC: 3.3 MMOL/L (ref 3.4–5.3)
PROT SERPL-MCNC: 7.3 G/DL (ref 6.8–8.8)
RBC # BLD AUTO: 4.31 10E12/L (ref 3.8–5.2)
SODIUM SERPL-SCNC: 135 MMOL/L (ref 133–144)
WBC # BLD AUTO: 5.4 10E9/L (ref 4–11)

## 2021-06-30 PROCEDURE — 99284 EMERGENCY DEPT VISIT MOD MDM: CPT | Mod: 25 | Performed by: FAMILY MEDICINE

## 2021-06-30 PROCEDURE — 85025 COMPLETE CBC W/AUTO DIFF WBC: CPT | Performed by: FAMILY MEDICINE

## 2021-06-30 PROCEDURE — 250N000013 HC RX MED GY IP 250 OP 250 PS 637: Performed by: FAMILY MEDICINE

## 2021-06-30 PROCEDURE — 83690 ASSAY OF LIPASE: CPT | Performed by: FAMILY MEDICINE

## 2021-06-30 PROCEDURE — 99284 EMERGENCY DEPT VISIT MOD MDM: CPT | Performed by: FAMILY MEDICINE

## 2021-06-30 PROCEDURE — 96361 HYDRATE IV INFUSION ADD-ON: CPT | Performed by: FAMILY MEDICINE

## 2021-06-30 PROCEDURE — 80053 COMPREHEN METABOLIC PANEL: CPT | Performed by: FAMILY MEDICINE

## 2021-06-30 PROCEDURE — 250N000011 HC RX IP 250 OP 636: Performed by: FAMILY MEDICINE

## 2021-06-30 PROCEDURE — 258N000003 HC RX IP 258 OP 636: Performed by: FAMILY MEDICINE

## 2021-06-30 PROCEDURE — 96374 THER/PROPH/DIAG INJ IV PUSH: CPT | Performed by: FAMILY MEDICINE

## 2021-06-30 RX ORDER — SODIUM CHLORIDE 9 MG/ML
INJECTION, SOLUTION INTRAVENOUS CONTINUOUS
Status: DISCONTINUED | OUTPATIENT
Start: 2021-06-30 | End: 2021-06-30 | Stop reason: HOSPADM

## 2021-06-30 RX ORDER — ONDANSETRON 2 MG/ML
4 INJECTION INTRAMUSCULAR; INTRAVENOUS EVERY 30 MIN PRN
Status: DISCONTINUED | OUTPATIENT
Start: 2021-06-30 | End: 2021-06-30 | Stop reason: HOSPADM

## 2021-06-30 RX ORDER — ONDANSETRON 8 MG/1
8 TABLET, FILM COATED ORAL EVERY 8 HOURS PRN
Qty: 30 TABLET | Refills: 3 | Status: SHIPPED | OUTPATIENT
Start: 2021-06-30 | End: 2021-08-06

## 2021-06-30 RX ORDER — ONDANSETRON 4 MG/1
4 TABLET, ORALLY DISINTEGRATING ORAL EVERY 8 HOURS PRN
Qty: 10 TABLET | Refills: 0 | Status: SHIPPED | OUTPATIENT
Start: 2021-06-30 | End: 2021-08-06

## 2021-06-30 RX ORDER — POTASSIUM CHLORIDE 1500 MG/1
20 TABLET, EXTENDED RELEASE ORAL ONCE
Status: COMPLETED | OUTPATIENT
Start: 2021-06-30 | End: 2021-06-30

## 2021-06-30 RX ORDER — ONDANSETRON 4 MG/1
4 TABLET, ORALLY DISINTEGRATING ORAL EVERY 8 HOURS PRN
Qty: 10 TABLET | Refills: 0 | Status: SHIPPED | OUTPATIENT
Start: 2021-06-30 | End: 2021-06-30

## 2021-06-30 RX ORDER — PROCHLORPERAZINE MALEATE 5 MG
5 TABLET ORAL EVERY 6 HOURS PRN
Qty: 30 TABLET | Refills: 3 | Status: SHIPPED | OUTPATIENT
Start: 2021-06-30 | End: 2021-09-01

## 2021-06-30 RX ADMIN — POTASSIUM CHLORIDE 20 MEQ: 1500 TABLET, EXTENDED RELEASE ORAL at 15:19

## 2021-06-30 RX ADMIN — SODIUM CHLORIDE 1000 ML: 9 INJECTION, SOLUTION INTRAVENOUS at 13:40

## 2021-06-30 RX ADMIN — ONDANSETRON 4 MG: 2 INJECTION INTRAMUSCULAR; INTRAVENOUS at 13:43

## 2021-06-30 ASSESSMENT — MIFFLIN-ST. JEOR: SCORE: 1143.59

## 2021-06-30 NOTE — TELEPHONE ENCOUNTER
"[facility] Telephone Triage Note    Assessment:   Yadira (pt)reporting the following symptoms: Nausea & Vomiting  When did nausea/vomiting start: Monday 6/28  Vomiting 2-3x each day since Monday.   \"even if tries to eat a tiny bit, then threw up\"  Medications taken for n/v include: No home meds  Other symptoms: lightheaded, dizzy, headaches  Pt has increase sleeping  Unable to keep any food/fluids down and everything tastes terrible.   Reduced urine output  Pt states, \"Feeling shakey and thinks will faint at anytime and doesn't want to faint\"  Pt was tearful on the phone.     Continues to have general body aches/pain, stomach hurts when tried to eat something.    Pt stated \"left message for her RNCC Daniela but did not hear back\" This writer educated in future if any active symptoms to not wait for call back from RNCC, for symptoms  to call Triage, call same day so able to assess earlier and provided triage phone number.     Recommendations:   This writer advised for pt to go to ER for evaluation and initiate txt for possible dehydration and other work up.     Gave hand off report to [place] ER nurse Reddy.       Follow-Up:  Patient voiced understanding of advice and/or instructions given, denied need to call 911 but will have  take her in to the ER.   "

## 2021-06-30 NOTE — ED TRIAGE NOTES
Pt has been nauseated, vomiting and not tolerating food or fluids, not eating much x4 days. Denies pain or fever.

## 2021-06-30 NOTE — ED PROVIDER NOTES
History     Chief Complaint   Patient presents with     Nausea & Vomiting     HPI  Yadira Hoang is a 76 year old female who presents with nausea and vomiting this been going on for the last 3 days.  Patient states that she has been unable to keep anything down.  As soon as she eats or drinks anything she will throw right up.  Patient is currently being treated for metastatic cancer.  Patient's last cycle of chemo was about 10 days ago.  Patient has not had problems with nausea in any of her other previous treatment cycles.  She states that she does have some very mild lower abdominal discomfort.  Patient denies any dysuria or hematuria.  Her last bowel movement was today and was normal.  Denies any back pain.  Patient denies any chest pain or shortness of breath.  Patient did not have any nausea medicine at home to try.  Allergies:  Allergies   Allergen Reactions     Atorvastatin      Statins all swelling     Hmg-Coa-R Inhibitors Swelling       Problem List:    Patient Active Problem List    Diagnosis Date Noted     Generalized pruritus 02/23/2021     Priority: Medium     Associated with immunotherapy treatments       Secondary malignant neoplasm of bone (H) 01/19/2021     Priority: Medium     ALEXUS (generalized anxiety disorder) 05/07/2020     Priority: Medium     Major depression, recurrent, chronic (H) 05/07/2020     Priority: Medium     S/P flap graft 03/25/2020     Priority: Medium     Added automatically from request for surgery [id number]       Chronic congestion of paranasal sinus 02/07/2020     Priority: Medium     Melanoma of skin (H) 02/04/2020     Priority: Medium     Added automatically from request for surgery [id number]       Malignant melanoma of right lower extremity including hip (H) 01/27/2020     Priority: Medium     Chronic fatigue 01/06/2020     Priority: Medium     Chronic pain disorder 11/13/2018     Priority: Medium     Myofascial pain 10/31/2018     Priority: Medium     Neck pain, chronic  05/30/2017     Priority: Medium     Intractable migraine without aura and without status migrainosus 05/30/2017     Priority: Medium     History of multiple concussions 05/30/2017     Priority: Medium     Bilateral occipital neuralgia 05/30/2017     Priority: Medium     Weakness of foot 05/02/2017     Priority: Medium     Foot pain, bilateral 05/02/2017     Priority: Medium     Difficulty walking 05/02/2017     Priority: Medium     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  2011, headaches x2 yr, biofeedback, resolved       Hyperlipidemia LDL goal <100 03/25/2014     Priority: Medium     The 10-year ASCVD risk score (Bahman WELLS Jr., et al., 2013) is: 20.5%    Values used to calculate the score:      Age: 75 years      Sex: Female      Is Non- : No      Diabetic: No      Tobacco smoker: No      Systolic Blood Pressure: 138 mmHg      Is BP treated: No      HDL Cholesterol: 57 mg/dL      Total Cholesterol: 235 mg/dL       GUERLINE (obstructive sleep apnea) 08/08/2005     Priority: Medium     Has CPAP    AHI 5.2/RDI 5.9 desats 83% [place], mild insomnia,    Formatting of this note might be different from the original.  AHI 5.2/RDI 5.9 desats 83% [place], mild insomnia,          Past Medical History:    Past Medical History:   Diagnosis Date     Concussion      ALEXUS (generalized anxiety disorder) 5/7/2020     Major depression, recurrent, chronic (H) 5/7/2020     Melanoma (H)      Nonsenile cataract      Sleep apnea        Past Surgical History:    Past Surgical History:   Procedure Laterality Date     BIOPSY NODE SENTINEL Right 2/10/2020    Procedure: [place] Lymph Node Mapping And Biopsy;  Surgeon: Sunita  Gabriela Hansen MD;  Location: [facility] OR     EXCISE LESION LOWER EXTREMITY Right 2/10/2020    Procedure: Wide Local Excision of RIGHT heel melanoma;  Surgeon: Gabriela Dorsey MD;  Location: [facility] OR     EXCISE LESION LOWER EXTREMITY Right 2/20/2020    Procedure: Wide local Re-excision RIGHT heel Wound vac application;  Surgeon: Gabriela Dorsey MD;  Location: [facility]     GRAFT SKIN SPLIT THICKNESS FROM EXTREMITY Right 4/2/2020    Procedure: split skin graft from right thigh;  Surgeon: NIKA Crabtree MD;  Location: [facility] OR     IRRIGATION AND DEBRIDEMENT FOOT, COMBINED Right 4/2/2020    Procedure: right foot/heel debridement;  Surgeon: NIKA Crabtree MD;  Location: [facility] OR     ORTHOPEDIC SURGERY Right     heel surgery x2     RECONSTRUCT FOOT Right 3/4/2020    Procedure: Right heel reconstruction with regional flap, biologic dressing and SPY;  Surgeon: NIKA Crabtree MD;  Location: [facility] OR     TUBAL LIGATION         Family History:    Family History   Problem Relation Age of Onset     Glaucoma No family hx of      Macular Degeneration No family hx of      Melanoma No family hx of      Skin Cancer No family hx of        Social History:  Marital Status:   [2]  Social History     Tobacco Use     Smoking status: Never Smoker     Smokeless tobacco: Never Used   Substance Use Topics     Alcohol use: Not Currently     Frequency: Never     Drug use: Never        Medications:    cholecalciferol (VITAMIN D3) 125 MCG (5000 UT) TABS tablet  estradiol (ESTRACE) 0.1 MG/GM vaginal cream  hydrOXYzine (VISTARIL) 25 MG capsule  estradiol (ESTRACE) 0.1 MG/GM vaginal cream  fexofenadine (ALLEGRA) 180 MG tablet  fluticasone (FLONASE) 50 MCG/ACT nasal spray  pregabalin (LYRICA) 25 MG capsule  STATIN NOT PRESCRIBED (INTENTIONAL)  triamcinolone (KENALOG) 0.1 % external cream          Review of Systems   All other systems reviewed and are negative.      Physical Exam   BP: 120/72  Pulse: 84  Temp: 97.7  F (36.5  " C)  Resp: 18  Height: 170.2 cm (5' 7\")  Weight: 62.6 kg (138 lb)  SpO2: 98 %      Physical Exam  Vitals signs and nursing note reviewed.   Constitutional:       General: She is not in acute distress.     Appearance: She is well-developed. She is not diaphoretic.   Eyes:      Conjunctiva/sclera: Conjunctivae normal.   Neck:      Musculoskeletal: Normal range of motion and neck supple.   Cardiovascular:      Rate and Rhythm: Normal rate and regular rhythm.      Heart sounds: Normal heart sounds. No murmur. No friction rub. No gallop.    Pulmonary:      Effort: Pulmonary effort is normal. No respiratory distress.      Breath sounds: Normal breath sounds. No wheezing or rales.   Chest:      Chest wall: No tenderness.   Abdominal:      General: Bowel sounds are normal. There is no distension.      Palpations: Abdomen is soft. There is no mass.      Tenderness: There is no abdominal tenderness. There is no guarding.   Musculoskeletal: Normal range of motion.         General: No tenderness.   Skin:     General: Skin is warm and dry.      Capillary Refill: Capillary refill takes less than 2 seconds.      Findings: No rash.   Neurological:      Mental Status: She is alert and oriented to person, place, and time.   Psychiatric:         Judgment: Judgment normal.         ED Course        Procedures        Results for orders placed or performed during the hospital encounter of 06/30/21 (from the past 24 hour(s))   CBC with platelets differential   Result Value Ref Range    WBC 5.4 4.0 - 11.0 10e9/L    RBC Count 4.31 3.8 - 5.2 10e12/L    Hemoglobin 13.0 11.7 - 15.7 g/dL    Hematocrit 39.2 35.0 - 47.0 %    MCV 91 78 - 100 fl    MCH 30.2 26.5 - 33.0 pg    MCHC 33.2 31.5 - 36.5 g/dL    RDW 13.0 10.0 - 15.0 %    Platelet Count 226 150 - 450 10e9/L    Diff Method Automated Method     % Neutrophils 85.2 %    % Lymphocytes 9.4 %    % Monocytes 3.3 %    % Eosinophils 1.3 %    % Basophils 0.4 %    % Immature Granulocytes 0.4 %    " Nucleated RBCs 0 0 /100    Absolute Neutrophil 4.6 1.6 - 8.3 10e9/L    Absolute Lymphocytes 0.5 (L) 0.8 - 5.3 10e9/L    Absolute Monocytes 0.2 0.0 - 1.3 10e9/L    Absolute Eosinophils 0.1 0.0 - 0.7 10e9/L    Absolute Basophils 0.0 0.0 - 0.2 10e9/L    Abs Immature Granulocytes 0.0 0 - 0.4 10e9/L    Absolute Nucleated RBC 0.0    Comprehensive metabolic panel   Result Value Ref Range    Sodium 135 133 - 144 mmol/L    Potassium 3.3 (L) 3.4 - 5.3 mmol/L    Chloride 100 94 - 109 mmol/L    Carbon Dioxide 30 20 - 32 mmol/L    Anion Gap 5 3 - 14 mmol/L    Glucose 108 (H) 70 - 99 mg/dL    Urea Nitrogen 17 7 - 30 mg/dL    Creatinine 0.67 0.52 - 1.04 mg/dL    GFR Estimate 85 >60 mL/min/[1.73_m2]    GFR Estimate If Black >90 >60 mL/min/[1.73_m2]    Calcium 9.2 8.5 - 10.1 mg/dL    Bilirubin Total 0.7 0.2 - 1.3 mg/dL    Albumin 3.4 3.4 - 5.0 g/dL    Protein Total 7.3 6.8 - 8.8 g/dL    Alkaline Phosphatase 62 40 - 150 U/L    ALT 26 0 - 50 U/L    AST 26 0 - 45 U/L   Lipase   Result Value Ref Range    Lipase 83 73 - 393 U/L       Medications   0.9% sodium chloride BOLUS (1,000 mLs Intravenous New Bag 6/30/21 1340)     Followed by   sodium chloride 0.9% infusion (has no administration in time range)   ondansetron (ZOFRAN) injection 4 mg (4 mg Intravenous Given 6/30/21 1343)     Labs are reviewed and were unremarkable including normal liver function testing.  Patient's potassium was slightly low which is most likely from the vomiting.  We gave her some potassium here which should have corrected that.  She feels much better after the Zofran and IV fluids.  At this point I think this could be related to her chemo or may be some type of viral process.  I do not see any indication for advanced imaging at this time with stable vitals and normal labs.  We will have patient follow-up with oncology via phone in the next couple days.  Patient will return if her symptoms do worsen.    Assessments & Plan (with Medical Decision Making)  Nausea and  vomiting     I have reviewed the nursing notes.    I have reviewed the findings, diagnosis, plan and need for follow up with the patient.              6/30/2021   [facility] EMERGENCY DEPT     Ramsey Cooney MD  06/30/21 0730

## 2021-07-03 ENCOUNTER — NURSE TRIAGE (OUTPATIENT)
Dept: NURSING | Facility: CLINIC | Age: 76
End: 2021-07-03

## 2021-07-03 NOTE — TELEPHONE ENCOUNTER
Patient reports on Wed she was not eating and drinking anything and was given IVF for dehydration. Thurs, she vomited. Yesterday and today, can hardly eat.  She reports no more nausea but has lost appetite from cancer.    Disposition: home care    Reviewed care advice with caller per RN triage protocol guideline.  Advised to call back with worsening symptoms, concerns or questions. Caller verbalized understanding.            Reason for Disposition    [1] MILD or MODERATE nausea AND [2] NO symptoms of dehydration or new weight loss    Additional Information    Negative: Nausea is a chronic symptom (recurrent or ongoing AND present > 4 weeks)    Negative: Vomiting is a chronic symptom (recurrent or ongoing AND present > 4 weeks)    Negative: [1] MILD or MODERATE vomiting  (e.g., 1 - 5 times/day) AND [2] present > 48 hours (2 days)    Negative: Vomiting a prescription medication    Negative: Fever present > 3 days (72 hours)    Negative: [1] Vomiting AND [2] abdomen looks much more swollen than usual    Negative: [1] Vomiting AND [2] contains bile (green color)    Negative: [1] Vomiting AND [2] high-risk adult (e.g., brain tumor, V-P shunt, dialysis)    Negative: [1] Constant abdominal pain AND [2] present > 2 hours    Negative: [1] Fever > 101 F (38.3 C) AND [2] age > 60    Negative: [1] Fever > 100.0 F (37.8 C) AND [2] bedridden (e.g., nursing home patient, CVA, chronic illness, recovering from surgery)    Negative: [1] Fever > 101 F (38.3 C) AND [2] co-morbidity risk factor for serious infection (e.g., COPD, heart failure, liver failure, renal failure with dialysis )    Negative: [1] Fever > 100.0 F (37.8 C) AND [2] diabetes mellitus or weak immune system (e.g., HIV positive, cancer chemo, splenectomy, organ transplant, chronic steroids)    Negative: Taking any of the following medications: digoxin (Lanoxin), lithium, theophylline, phenytoin (Dilantin)    Negative: SEVERE vomiting (e.g., 6 or more times / day)     "Negative: [1] MODERATE vomiting (e.g., 3 - 5 times/day) AND [2] age > 60    Negative: [1] Vomiting AND [2] severe headache (e.g., excruciating) (Exception: same as previous migraines)    Negative: [1] Drinking very little AND [2] dehydration suspected (e.g., no urine > 12 hours, very dry mouth, very lightheaded)    Negative: Weight loss > 5 lbs (2.3 kg) in one week (or 5 pounds under target weight)    Negative: Patient sounds very sick or weak to the triager    Negative: [1] Vomiting AND [2] contains red blood or black (\"coffee ground\") material  (Exception: few red streaks in vomit that only happened once)    Negative: [1] Vomiting AND [2] recent head injury (within last 3 days)    Negative: [1] Vomiting AND [2] recent abdominal injury (within last 3 days)    Negative: [1] Vomiting AND [2] insulin-dependent diabetes (Type I) AND [3] glucose > 400 mg/dl (22 mmol/l)    Negative: [1] Neutropenia known or suspected (e.g., recent cancer chemotherapy) AND [2] fever > 100.4 F (38.0 C)    Negative: [1] Neutropenia known or suspected (e.g., recent cancer chemotherapy) AND [2] vomiting    Negative: Shock suspected (e.g., cold/pale/clammy skin, too weak to stand, low BP, rapid pulse)    Negative: Difficult to awaken or acting confused (e.g., disoriented, slurred speech)    Negative: Sounds like a life-threatening emergency to the triager    Protocols used: CANCER - NAUSEA AND VOMITING-A-AH      "

## 2021-07-06 ENCOUNTER — VIRTUAL VISIT (OUTPATIENT)
Dept: ONCOLOGY | Facility: CLINIC | Age: 76
End: 2021-07-06
Payer: COMMERCIAL

## 2021-07-06 DIAGNOSIS — C79.51 SECONDARY MALIGNANT NEOPLASM OF BONE (H): Primary | ICD-10-CM

## 2021-07-06 DIAGNOSIS — R53.81 PHYSICAL DECONDITIONING: ICD-10-CM

## 2021-07-06 DIAGNOSIS — C43.71 MALIGNANT MELANOMA OF RIGHT LOWER EXTREMITY INCLUDING HIP (H): ICD-10-CM

## 2021-07-06 PROCEDURE — 97803 MED NUTRITION INDIV SUBSEQ: CPT | Mod: 95 | Performed by: DIETITIAN, REGISTERED

## 2021-07-06 NOTE — PROGRESS NOTES
"The patient has been notified of the following:      \"We have found that certain health care needs can be provided without the need for a face to face visit.  This service lets us provide the care you need with a phone conversation.       I will have full access to your [place] medical record during this entire phone call.   I will be taking notes for your medical record.      Since this is like an office visit, we will bill your insurance company for this service.       There are potential benefits and risks of telephone visits (e.g. limits to patient confidentiality) that differ from in-person visits.?  Confidentiality still applies for telephone services, and nobody will record the visit.  It is important to be in a quiet, private space that is free of distractions (including cell phone or other devices) during the visit.??      If during the course of the call I believe a telephone visit is not appropriate, you will not be charged for this service\"     Consent has been obtained for this service by care team member: Yes     CLINICAL NUTRITION SERVICES - REASSESSMENT NOTE   EVALUATION OF PREVIOUS PLAN OF CARE:   Time Spent: 30 minutes  Visit Type: phone  Referring Physician: Leanne 9/1    Current diet: bland diet  Current appetite/intake: poor, slight improvement      Monitoring from previous assessment:   -Food intake - minimal due to lack of appetite and nausea and some vomiting.  Has not vomited since Thursday.  She tells me that she has not been taking anti-nausea meds as her nausea has also improved.    B - cereal with very little milk due to dairy allergy OR zucchini pancakes with berries or syrup with protein smoothie from mix in a bag with casa (15g pro, 190 pradeep) OR 1 egg with toast  L -  Hot dish (rice, chicken, vegetables)  D - Stirfry (chicken rice, veggies)  Fluids - Water  She has been focused on smaller, more frequent meals but still has a tough time finding food choices with her   -Fluid/beverage " intake - IVF still feeling weak and shaky; 6-8 cups water/day (has recently increased volume)  -Weight trends - down 4 lb x past 6 weeks   Wt Readings from Last 5 Encounters:   06/30/21 62.6 kg (138 lb)   06/21/21 63.4 kg (139 lb 11.2 oz)   06/04/21 64.1 kg (141 lb 4.8 oz)   06/02/21 64.8 kg (142 lb 14.4 oz)   05/10/21 64 kg (141 lb)     Previous Nutrition Diagnosis:   Food and nutrition-related knowledge deficit related to nutrition needs during cancer treatment as evidenced by pt with questions regarding food choices with food allergy/intolerances  Evaluation: Completed   NEW FINDINGS:   N/V   CURRENT NUTRITION DIAGNOSIS   Inadequate oral intake related to decreased appetite with N/V as evidenced by pt reports eating <50% of usual intake over the past 2 months.    INTERVENTIONS   Composition of Meals and Snacks and General/healthful diet - reviewed bland food choices, high calorie/high protein food choices.  Reviewed ways to add calories to meals and snacks.   Reviewed ONS to try - Pro Performance Bulk 1340 with Fairlife whole milk (lactose free); suggested adding to shakes/smoothies to increase pradeep/pro of her daily smoothie.   Encouraged small, frequent meals. Reviewed hydration. Encouraged water and sugar free electrolyte solutions (G2, Gatorade zero, Powerade et).   Reviewed sx management.  Encouraged to take anti-emetic if nausea comes back. Reviewed bowel aids and food choices if/when constipated or diarrhea.   Goals  1.  Aim for 5-6 small frequent meals  2.  Aim for 1600kcal and 75g protein/day  3. Weight maintenance      Follow-Up Plans: Pt has RD contact information for questions.       Kimberly Leiva, VEEN, LD

## 2021-07-06 NOTE — LETTER
"    7/6/2021         RE: Yadira Hoang  [address] [facility] [address]        Dear Colleague,    Thank you for referring your patient, Yadira Hoang, to the [facility] [place]. Please see a copy of my visit note below.    The patient has been notified of the following:      \"We have found that certain health care needs can be provided without the need for a face to face visit.  This service lets us provide the care you need with a phone conversation.       I will have full access to your [place] medical record during this entire phone call.   I will be taking notes for your medical record.      Since this is like an office visit, we will bill your insurance company for this service.       There are potential benefits and risks of telephone visits (e.g. limits to patient confidentiality) that differ from in-person visits.?  Confidentiality still applies for telephone services, and nobody will record the visit.  It is important to be in a quiet, private space that is free of distractions (including cell phone or other devices) during the visit.??      If during the course of the call I believe a telephone visit is not appropriate, you will not be charged for this service\"     Consent has been obtained for this service by care team member: Yes     CLINICAL NUTRITION SERVICES - REASSESSMENT NOTE   EVALUATION OF PREVIOUS PLAN OF CARE:   Time Spent: 30 minutes  Visit Type: phone  Referring Physician: Leanne 9/1    Current diet: bland diet  Current appetite/intake: poor, slight improvement      Monitoring from previous assessment:   -Food intake - minimal due to lack of appetite and nausea and some vomiting.  Has not vomited since Thursday.  She tells me that she has not been taking anti-nausea meds as her nausea has also improved.    B - cereal with very little milk due to dairy allergy OR zucchini pancakes with berries or syrup with protein smoothie from mix in a bag with " casa (15g pro, 190 pradeep) OR 1 egg with toast  L -  Hot dish (rice, chicken, vegetables)  D - Stirfry (chicken rice, veggies)  Fluids - Water  She has been focused on smaller, more frequent meals but still has a tough time finding food choices with her   -Fluid/beverage intake - IVF still feeling weak and shaky; 6-8 cups water/day (has recently increased volume)  -Weight trends - down 4 lb x past 6 weeks   Wt Readings from Last 5 Encounters:   06/30/21 62.6 kg (138 lb)   06/21/21 63.4 kg (139 lb 11.2 oz)   06/04/21 64.1 kg (141 lb 4.8 oz)   06/02/21 64.8 kg (142 lb 14.4 oz)   05/10/21 64 kg (141 lb)     Previous Nutrition Diagnosis:   Food and nutrition-related knowledge deficit related to nutrition needs during cancer treatment as evidenced by pt with questions regarding food choices with food allergy/intolerances  Evaluation: Completed   NEW FINDINGS:   N/V   CURRENT NUTRITION DIAGNOSIS   Inadequate oral intake related to decreased appetite with N/V as evidenced by pt reports eating <50% of usual intake over the past 2 months.    INTERVENTIONS   Composition of Meals and Snacks and General/healthful diet - reviewed bland food choices, high calorie/high protein food choices.  Reviewed ways to add calories to meals and snacks.   Reviewed ONS to try - Pro Performance Bulk 1340 with Fairlife whole milk (lactose free); suggested adding to shakes/smoothies to increase pradeep/pro of her daily smoothie.   Encouraged small, frequent meals. Reviewed hydration. Encouraged water and sugar free electrolyte solutions (G2, Gatorade zero, Powerade et).   Reviewed sx management.  Encouraged to take anti-emetic if nausea comes back. Reviewed bowel aids and food choices if/when constipated or diarrhea.   Goals  1.  Aim for 5-6 small frequent meals  2.  Aim for 1600kcal and 75g protein/day  3. Weight maintenance      Follow-Up Plans: Pt has RD contact information for questions.       Kimberly Leiva RDN, LD      Again, thank  you for allowing me to participate in the care of your patient.        Sincerely,        Kimberly Leiva RD

## 2021-07-09 ENCOUNTER — ONCOLOGY VISIT (OUTPATIENT)
Dept: ONCOLOGY | Facility: CLINIC | Age: 76
End: 2021-07-09
Attending: INTERNAL MEDICINE
Payer: COMMERCIAL

## 2021-07-09 VITALS
TEMPERATURE: 98 F | BODY MASS INDEX: 20.88 KG/M2 | DIASTOLIC BLOOD PRESSURE: 69 MMHG | SYSTOLIC BLOOD PRESSURE: 114 MMHG | RESPIRATION RATE: 16 BRPM | HEART RATE: 89 BPM | WEIGHT: 133 LBS | OXYGEN SATURATION: 98 % | HEIGHT: 67 IN

## 2021-07-09 DIAGNOSIS — R53.82 CHRONIC FATIGUE: ICD-10-CM

## 2021-07-09 DIAGNOSIS — C43.71 MALIGNANT MELANOMA OF RIGHT LOWER EXTREMITY INCLUDING HIP (H): ICD-10-CM

## 2021-07-09 DIAGNOSIS — M79.671 RIGHT FOOT PAIN: ICD-10-CM

## 2021-07-09 DIAGNOSIS — C43.9 MELANOMA OF SKIN (H): Primary | ICD-10-CM

## 2021-07-09 DIAGNOSIS — L70.8 ACNEIFORM RASH: ICD-10-CM

## 2021-07-09 DIAGNOSIS — R53.81 PHYSICAL DECONDITIONING: ICD-10-CM

## 2021-07-09 DIAGNOSIS — L29.9 PRURITUS: ICD-10-CM

## 2021-07-09 PROCEDURE — G0463 HOSPITAL OUTPT CLINIC VISIT: HCPCS

## 2021-07-09 PROCEDURE — 99215 OFFICE O/P EST HI 40 MIN: CPT | Performed by: PHYSICIAN ASSISTANT

## 2021-07-09 RX ORDER — METHYLPREDNISOLONE SODIUM SUCCINATE 125 MG/2ML
125 INJECTION, POWDER, LYOPHILIZED, FOR SOLUTION INTRAMUSCULAR; INTRAVENOUS
Status: CANCELLED
Start: 2021-07-12

## 2021-07-09 RX ORDER — DIPHENHYDRAMINE HYDROCHLORIDE 50 MG/ML
50 INJECTION INTRAMUSCULAR; INTRAVENOUS
Status: CANCELLED
Start: 2021-07-12

## 2021-07-09 RX ORDER — EPINEPHRINE 1 MG/ML
0.3 INJECTION, SOLUTION INTRAMUSCULAR; SUBCUTANEOUS EVERY 5 MIN PRN
Status: CANCELLED | OUTPATIENT
Start: 2021-07-12

## 2021-07-09 RX ORDER — HEPARIN SODIUM (PORCINE) LOCK FLUSH IV SOLN 100 UNIT/ML 100 UNIT/ML
5 SOLUTION INTRAVENOUS
Status: CANCELLED | OUTPATIENT
Start: 2021-07-12

## 2021-07-09 RX ORDER — SODIUM CHLORIDE 9 MG/ML
1000 INJECTION, SOLUTION INTRAVENOUS CONTINUOUS PRN
Status: CANCELLED
Start: 2021-07-12

## 2021-07-09 RX ORDER — HEPARIN SODIUM,PORCINE 10 UNIT/ML
5 VIAL (ML) INTRAVENOUS
Status: CANCELLED | OUTPATIENT
Start: 2021-07-12

## 2021-07-09 RX ORDER — MEPERIDINE HYDROCHLORIDE 25 MG/ML
25 INJECTION INTRAMUSCULAR; INTRAVENOUS; SUBCUTANEOUS EVERY 30 MIN PRN
Status: CANCELLED | OUTPATIENT
Start: 2021-07-12

## 2021-07-09 RX ORDER — ALBUTEROL SULFATE 90 UG/1
1-2 AEROSOL, METERED RESPIRATORY (INHALATION)
Status: CANCELLED
Start: 2021-07-12

## 2021-07-09 RX ORDER — CLINDAMYCIN PHOSPHATE 10 MG/G
GEL TOPICAL 2 TIMES DAILY
Qty: 60 G | Refills: 3 | Status: SHIPPED | OUTPATIENT
Start: 2021-07-09 | End: 2021-08-06

## 2021-07-09 RX ORDER — NALOXONE HYDROCHLORIDE 0.4 MG/ML
.1-.4 INJECTION, SOLUTION INTRAMUSCULAR; INTRAVENOUS; SUBCUTANEOUS
Status: CANCELLED | OUTPATIENT
Start: 2021-07-12

## 2021-07-09 RX ORDER — SERTRALINE HYDROCHLORIDE 25 MG/1
25 TABLET, FILM COATED ORAL DAILY
Qty: 30 TABLET | Refills: 4 | Status: SHIPPED | OUTPATIENT
Start: 2021-07-09 | End: 2021-10-29

## 2021-07-09 RX ORDER — ALBUTEROL SULFATE 0.83 MG/ML
2.5 SOLUTION RESPIRATORY (INHALATION)
Status: CANCELLED | OUTPATIENT
Start: 2021-07-12

## 2021-07-09 ASSESSMENT — MIFFLIN-ST. JEOR: SCORE: 1121.03

## 2021-07-09 ASSESSMENT — PAIN SCALES - GENERAL: PAINLEVEL: NO PAIN (0)

## 2021-07-09 NOTE — PROGRESS NOTES
Oncology/Hematology Visit Note  Jul 9, 2021   Oncologist: Dr. Gi Franklin     Reason for Visit: Follow up of stage IV melanoma    History of Present Illness: Yadira Hoang is a 76 year old female who presents for telephone call to follow up on her acral melanoma, stage IV, per oncologic history below:     Oncologic History: Acral melanoma, stage IV, s/p primary resection and reconstruction  1. She noted a painful lesion on the sole of the right foot for a few months, since summer 2019.  It initially was small then became raised.  It spontaneously bled. She is not entirely sure of its appearance initially.  She denies noting any masses behind the knee or in the groin.  2. 1/9/2020, punch biopsy was performed by Dr Jessica Alvarado.  It showed melanoma, at least 3.4 mm deep with ulceration and 0 mitoses, and perineural invasion present. TILs were non-brisk and LVI not identified. pT3b.  3. 2/10/2020, she has right femoral sentinel lymph node biopsy and wide local excision (Specimen #: [id number]) and the right heel lesion extends to a depth of 6.6 mm, and invasive melanoma is present at 0.2 mm from the deep margin. Microsatellites are also present. There was 1 (of 2) lymph nodes involved. The lymph node tissue exhibits extensive subcapsular and parenchymal clusters of melanoma cells, highlighted on Melan-A immunostain. The largest cluster is 7 millimeters in greatest diameter. pT4b pN2c.  4. 2/22/2020, she had PET-CT, which showed intense FDG uptake in a right inguinal lymph node, concerning for an additional focus of metastatic disease. There is also an indeterminate right external iliac lymph node with mild FDG uptake.  5. 3/4/2020, she has medial plantar artery  fasciocutaneous instep flap and Integra placement to the donor site. On 4/2/2020, she has additional reconstruction with skin grafting.  6. 5/27/2020, initiated on nivolumab immunotherapy.  Had C2 6/24/20. C3 on 7/22/20. C4 on 9/3/2020.  7.  8/21/2020 evidence of a new liver metastasis on PET-CT.  9. 10/9/2020, PET-CT shows increasing size of hepatic metastases with increased hypermetabolism, increased right inguinal and iliac lymphadenopathy, and new FDG uptake right T2 transverse process and right side of S1. MRI-brain obtained 10/21/2020 is negative for brain metastasis.  10. 10/28/2020, she starts ipilimumab 1mg/kg and nivolumab 3mg/kg, then nivolumab maintenance through cycle 8.  11. 2/12/21, PET-CT shows evidence of partial response with several liver lesions no longer hypermetabolic and smaller in size, only one hypermetabolic lesion in the hepatic dome. Slightly increased size and FDG uptake in a single right inguinal lymph node, remainder of inguinal and right iliac chain lymph nodes have decreased in size and FDG uptake since prior exam.   12. 5/7/2021, PET-CT shows mixed response with increased size and hypermetabolism in the right iliac chain lymphadenopathy.  13. 5/10/2021, she starts ipilimumab 3 mg/kg every 3 weeks.    Interval History:  Patient reports that since her recent ER visit on June 30, she has had less nausea.  Her last episode of vomiting was July 1.  She continues to have some intermittent nausea managed with her antiemetics.  She reports eating a bit better and did meet with our dietitian recently.  She does have intermittent lightheadedness but this has been a little better recently.  She reports ongoing weakness.  She has not been getting exercise and has been spending a lot of time sleeping.  She sometimes feels chilled with her infusions.  She reports some headaches associated with fatigue.  She reports good fluid intake.  She notes her sleep quality varies.  She wonders if she can take more than 10 mg of melatonin.  She has constipation.  She is having one bowel movement per day but it is hard.  She reports that her mood is crabby.  She continues to have itchy skin and notes itchiness with pimples on her thighs.  She is  unsure about the size of her right groin mass, but feels it is quite firm.  She denies any new lumps or bumps.  She denies other concerns.    Review of Systems:  Patient denies any of the following except if noted above: fevers, vision or hearing changes, chest pain, dyspnea, abdominal pain, diarrhea, urinary concerns, numbness, or tingling.    Current Outpatient Medications   Medication Sig Dispense Refill     cholecalciferol (VITAMIN D3) 125 MCG (5000 UT) TABS tablet Take 2,000 Units by mouth every other day        clindamycin (CLINDAMAX) 1 % external gel Apply topically 2 times daily 60 g 3     estradiol (ESTRACE) 0.1 MG/GM vaginal cream Place 2 g vaginally twice a week 42.5 g 0     estradiol (ESTRACE) 0.1 MG/GM vaginal cream Place vaginally twice a week 42.5 g 3     fexofenadine (ALLEGRA) 180 MG tablet Take 1 tablet (180 mg) by mouth daily before breakfast 30 tablet 3     fluticasone (FLONASE) 50 MCG/ACT nasal spray Spray 2 sprays into both nostrils daily 16 g 3     hydrOXYzine (VISTARIL) 25 MG capsule Take 1 capsule (25 mg) by mouth every 6 hours 30 capsule 1     ondansetron (ZOFRAN ODT) 4 MG ODT tab Take 1 tablet (4 mg) by mouth every 8 hours as needed for nausea 10 tablet 0     ondansetron (ZOFRAN) 8 MG tablet Take 1 tablet (8 mg) by mouth every 8 hours as needed for nausea 30 tablet 3     pregabalin (LYRICA) 25 MG capsule Take 1 capsule (25 mg) by mouth At Bedtime 30 capsule 0     prochlorperazine (COMPAZINE) 5 MG tablet Take 1 tablet (5 mg) by mouth every 6 hours as needed for nausea or vomiting 30 tablet 3     sertraline (ZOLOFT) 25 MG tablet Take 1 tablet (25 mg) by mouth daily 30 tablet 4     STATIN NOT PRESCRIBED (INTENTIONAL) Please choose reason not prescribed from choices below.       triamcinolone (KENALOG) 0.1 % external cream Apply topically 2 times daily 453.6 g 11     Physical Exam:  General: The patient is a pleasant female in no acute distress.  /69 (BP Location: Right arm, Patient  "Position: Chair, Cuff Size: Adult Regular)   Pulse 89   Temp 98  F (36.7  C)   Resp 16   Ht 1.702 m (5' 7.01\")   Wt 60.3 kg (133 lb)   SpO2 98%   BMI 20.83 kg/m    Wt Readings from Last 10 Encounters:   07/09/21 60.3 kg (133 lb)   06/30/21 62.6 kg (138 lb)   06/21/21 63.4 kg (139 lb 11.2 oz)   06/04/21 64.1 kg (141 lb 4.8 oz)   06/02/21 64.8 kg (142 lb 14.4 oz)   05/10/21 64 kg (141 lb)   05/07/21 64.9 kg (143 lb)   04/27/21 64.9 kg (143 lb)   04/13/21 62.6 kg (138 lb)   04/12/21 64.8 kg (142 lb 14.4 oz)   HEENT: EOMI, PERRL. Sclerae are anicteric. Oral mucosa is pink and moist with no lesions or thrush.   Lymph: Neck is supple with no lymphadenopathy in the cervical or supraclavicular areas. Right inguinal mass is nontender and measures 2.5 x 4 cm.   Heart: Regular rate and rhythm.   Lungs: Clear to auscultation bilaterally.   Abdomen: Bowel sounds present, soft, nontender with no palpable hepatosplenomegaly or masses.   Extremities: No lower extremity edema noted bilaterally.   Neuro: Cranial nerves II through XII are grossly intact.  Skin: Several pinpoint pustules noted on anterior thighs. No other rashes, petechiae, or bruising noted on exposed skin.    Laboratory Data:   Most Recent 3 CBC's:  Recent Labs   Lab Test 06/30/21  1340 05/24/21  1150 05/07/21  1525   WBC 5.4 4.1 4.9   HGB 13.0 12.7 12.4   MCV 91 92 93    247 299    Most Recent 3 BMP's:  Recent Labs   Lab Test 06/30/21  1340 06/21/21  0924 06/02/21  1128    141 142   POTASSIUM 3.3* 4.2 4.2   CHLORIDE 100 107 107   CO2 30 32 34*   BUN 17 23 23   CR 0.67 0.69 0.87   ANIONGAP 5 2* 1*   GABY 9.2 9.2 9.3   * 83 82    Most Recent 2 LFT's:  Recent Labs   Lab Test 06/30/21  1340 06/21/21  0924   AST 26 19   ALT 26 30   ALKPHOS 62 62   BILITOTAL 0.7 0.4    Most Recent TSH and T4:  Recent Labs   Lab Test 06/21/21  0924   TSH 2.13     I reviewed the above labs today.    Assessment and Plan:  1. Onc  Acral melanoma, right heel primary, " pT4b pN3c M1, Stage IV. 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 She progressed on first line nivolumab, but shows clear evidence of response to 2nd line ipilimumab and nivolumab. However, once on nivolumab maintenance therapy she started to show regrowth in the right inguinal node. She switched over to ipilimumab every 3 weeks in the third line on 5/10/21, for a maximum of 4 doses. She knows to call if there is any change in the size of the inguinal lymph node. For evidence of response, continue ipilimumab. For progression, plan for anti-PD1 therapy with Keytruda  or another rounds of dual CPI, either Ipi/Nivo or Ipi/Pembro .  -Continue ipilimumab 3 mg/kg every 3 weeks. She will continue with cycle 4 next week. She will follow-up with Dr. Franklin the end of July with a PET/CT. Next follow-up with Dr. Estrada is in mid-September. She will call sooner for concerns.      2. MSK  Right foot pain 2/2 prior surgery (no concern for immune mediated arthralgias given specific to post surgical pain in right foot/ankle). Has had work up with orthopedics. Again, recommend trying Lyrica 25 mg at bedtime.     Arthritis (knees), immunotherapy associated, grade 1   We will watch closely for worsening of immune mediated side effects and worsening of arthritis. Stable.  -Continue using Voltaren gel PRN  -OTC Aleve or Ibuprofen as needed.   -Stable and under control now.     3. Derm  Dry skin and pruritis. Not using oatmeal baths and Aveeno as she did not find them helpful. Following with Dr. Fry (Derm). Finds some relief from triamcinolone cream and Vanicream. Continue to monitor and follow-up with Derm.     Acneiform rash/follicultis. Will trial topical clindamycin to thighs.     4. Psych/Neuro  Multifactorial fatigue, TSH and cortisol have been WNL. History of GUERLINE (treated), and she is also on immunotherapy with known side effect of fatigue. Fatigue is stable. Recommend daily  exercise with walking. Declined going back to cancer rehab. Continue to monitor.     History of depression and anxiety. Patient agrees to resume sertraline 25 mg daily.     Insomnia. Discussed okay to increase melatonin to 15 mg daily.     5. GI  Constipation. Recommend MiraLax once/day. Will continue to push fluids.     Meghan Perdomo PA-C  [facility]  [address], [place] [id number] [phone]    54 minutes spent on the date of the encounter doing chart review, review of test results, interpretation of tests, patient visit and documentation

## 2021-07-09 NOTE — PATIENT INSTRUCTIONS
Difficulty sleeping: Increase melatonin to 3 per day.  Fatigue: Go for daily walks.  Constipation: Start MiraLax once/day  Acne rash on thighs: Start clindamycin gel twice/day to rash.  Right foot pain and itchy skin: Start Lyrica (prescribed last time).  Crabbiness: Resume Zoloft (sertraline) 25 mg a few days after starting Lyrica

## 2021-07-09 NOTE — LETTER
7/9/2021         RE: Yadira Hoang  [address] [facility] [address]      Oncology/Hematology Visit Note  Jul 9, 2021   Oncologist: Dr. Gi Franklin     Reason for Visit: Follow up of stage IV melanoma    History of Present Illness: Yadira Hoang is a 76 year old female who presents for telephone call to follow up on her acral melanoma, stage IV, per oncologic history below:     Oncologic History: Acral melanoma, stage IV, s/p primary resection and reconstruction  1. She noted a painful lesion on the sole of the right foot for a few months, since summer 2019.  It initially was small then became raised.  It spontaneously bled. She is not entirely sure of its appearance initially.  She denies noting any masses behind the knee or in the groin.  2. 1/9/2020, punch biopsy was performed by Dr Jessica Alvarado.  It showed melanoma, at least 3.4 mm deep with ulceration and 0 mitoses, and perineural invasion present. TILs were non-brisk and LVI not identified. pT3b.  3. 2/10/2020, she has right femoral sentinel lymph node biopsy and wide local excision (Specimen #: [id number]) and the right heel lesion extends to a depth of 6.6 mm, and invasive melanoma is present at 0.2 mm from the deep margin. Microsatellites are also present. There was 1 (of 2) lymph nodes involved. The lymph node tissue exhibits extensive subcapsular and parenchymal clusters of melanoma cells, highlighted on Melan-A immunostain. The largest cluster is 7 millimeters in greatest diameter. pT4b pN2c.  4. 2/22/2020, she had PET-CT, which showed intense FDG uptake in a right inguinal lymph node, concerning for an additional focus of metastatic disease. There is also an indeterminate right external iliac lymph node with mild FDG uptake.  5. 3/4/2020, she has medial plantar artery  fasciocutaneous instep flap and Integra placement to the donor site. On 4/2/2020, she has additional reconstruction with skin grafting.  6. 5/27/2020,  initiated on nivolumab immunotherapy.  Had C2 6/24/20. C3 on 7/22/20. C4 on 9/3/2020.  7. 8/21/2020 evidence of a new liver metastasis on PET-CT.  9. 10/9/2020, PET-CT shows increasing size of hepatic metastases with increased hypermetabolism, increased right inguinal and iliac lymphadenopathy, and new FDG uptake right T2 transverse process and right side of S1. MRI-brain obtained 10/21/2020 is negative for brain metastasis.  10. 10/28/2020, she starts ipilimumab 1mg/kg and nivolumab 3mg/kg, then nivolumab maintenance through cycle 8.  11. 2/12/21, PET-CT shows evidence of partial response with several liver lesions no longer hypermetabolic and smaller in size, only one hypermetabolic lesion in the hepatic dome. Slightly increased size and FDG uptake in a single right inguinal lymph node, remainder of inguinal and right iliac chain lymph nodes have decreased in size and FDG uptake since prior exam.   12. 5/7/2021, PET-CT shows mixed response with increased size and hypermetabolism in the right iliac chain lymphadenopathy.  13. 5/10/2021, she starts ipilimumab 3 mg/kg every 3 weeks.    Interval History:  Patient reports that since her recent ER visit on June 30, she has had less nausea.  Her last episode of vomiting was July 1.  She continues to have some intermittent nausea managed with her antiemetics.  She reports eating a bit better and did meet with our dietitian recently.  She does have intermittent lightheadedness but this has been a little better recently.  She reports ongoing weakness.  She has not been getting exercise and has been spending a lot of time sleeping.  She sometimes feels chilled with her infusions.  She reports some headaches associated with fatigue.  She reports good fluid intake.  She notes her sleep quality varies.  She wonders if she can take more than 10 mg of melatonin.  She has constipation.  She is having one bowel movement per day but it is hard.  She reports that her mood is crabby.   She continues to have itchy skin and notes itchiness with pimples on her thighs.  She is unsure about the size of her right groin mass, but feels it is quite firm.  She denies any new lumps or bumps.  She denies other concerns.    Review of Systems:  Patient denies any of the following except if noted above: fevers, vision or hearing changes, chest pain, dyspnea, abdominal pain, diarrhea, urinary concerns, numbness, or tingling.    Current Outpatient Medications   Medication Sig Dispense Refill     cholecalciferol (VITAMIN D3) 125 MCG (5000 UT) TABS tablet Take 2,000 Units by mouth every other day        clindamycin (CLINDAMAX) 1 % external gel Apply topically 2 times daily 60 g 3     estradiol (ESTRACE) 0.1 MG/GM vaginal cream Place 2 g vaginally twice a week 42.5 g 0     estradiol (ESTRACE) 0.1 MG/GM vaginal cream Place vaginally twice a week 42.5 g 3     fexofenadine (ALLEGRA) 180 MG tablet Take 1 tablet (180 mg) by mouth daily before breakfast 30 tablet 3     fluticasone (FLONASE) 50 MCG/ACT nasal spray Spray 2 sprays into both nostrils daily 16 g 3     hydrOXYzine (VISTARIL) 25 MG capsule Take 1 capsule (25 mg) by mouth every 6 hours 30 capsule 1     ondansetron (ZOFRAN ODT) 4 MG ODT tab Take 1 tablet (4 mg) by mouth every 8 hours as needed for nausea 10 tablet 0     ondansetron (ZOFRAN) 8 MG tablet Take 1 tablet (8 mg) by mouth every 8 hours as needed for nausea 30 tablet 3     pregabalin (LYRICA) 25 MG capsule Take 1 capsule (25 mg) by mouth At Bedtime 30 capsule 0     prochlorperazine (COMPAZINE) 5 MG tablet Take 1 tablet (5 mg) by mouth every 6 hours as needed for nausea or vomiting 30 tablet 3     sertraline (ZOLOFT) 25 MG tablet Take 1 tablet (25 mg) by mouth daily 30 tablet 4     STATIN NOT PRESCRIBED (INTENTIONAL) Please choose reason not prescribed from choices below.       triamcinolone (KENALOG) 0.1 % external cream Apply topically 2 times daily 453.6 g 11     Physical Exam:  General: The patient is  "a pleasant female in no acute distress.  /69 (BP Location: Right arm, Patient Position: Chair, Cuff Size: Adult Regular)   Pulse 89   Temp 98  F (36.7  C)   Resp 16   Ht 1.702 m (5' 7.01\")   Wt 60.3 kg (133 lb)   SpO2 98%   BMI 20.83 kg/m    Wt Readings from Last 10 Encounters:   07/09/21 60.3 kg (133 lb)   06/30/21 62.6 kg (138 lb)   06/21/21 63.4 kg (139 lb 11.2 oz)   06/04/21 64.1 kg (141 lb 4.8 oz)   06/02/21 64.8 kg (142 lb 14.4 oz)   05/10/21 64 kg (141 lb)   05/07/21 64.9 kg (143 lb)   04/27/21 64.9 kg (143 lb)   04/13/21 62.6 kg (138 lb)   04/12/21 64.8 kg (142 lb 14.4 oz)   HEENT: EOMI, PERRL. Sclerae are anicteric. Oral mucosa is pink and moist with no lesions or thrush.   Lymph: Neck is supple with no lymphadenopathy in the cervical or supraclavicular areas. Right inguinal mass is nontender and measures 2.5 x 4 cm.   Heart: Regular rate and rhythm.   Lungs: Clear to auscultation bilaterally.   Abdomen: Bowel sounds present, soft, nontender with no palpable hepatosplenomegaly or masses.   Extremities: No lower extremity edema noted bilaterally.   Neuro: Cranial nerves II through XII are grossly intact.  Skin: Several pinpoint pustules noted on anterior thighs. No other rashes, petechiae, or bruising noted on exposed skin.    Laboratory Data:   Most Recent 3 CBC's:  Recent Labs   Lab Test 06/30/21  1340 05/24/21  1150 05/07/21  1525   WBC 5.4 4.1 4.9   HGB 13.0 12.7 12.4   MCV 91 92 93    247 299    Most Recent 3 BMP's:  Recent Labs   Lab Test 06/30/21  1340 06/21/21  0924 06/02/21  1128    141 142   POTASSIUM 3.3* 4.2 4.2   CHLORIDE 100 107 107   CO2 30 32 34*   BUN 17 23 23   CR 0.67 0.69 0.87   ANIONGAP 5 2* 1*   GABY 9.2 9.2 9.3   * 83 82    Most Recent 2 LFT's:  Recent Labs   Lab Test 06/30/21  1340 06/21/21  0924   AST 26 19   ALT 26 30   ALKPHOS 62 62   BILITOTAL 0.7 0.4    Most Recent TSH and T4:  Recent Labs   Lab Test 06/21/21  0924   TSH 2.13     I reviewed the " above labs today.    Assessment and Plan:  1. Onc  Acral melanoma, right heel primary, pT4b pN3c M1, Stage IV. 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 She progressed on first line nivolumab, but shows clear evidence of response to 2nd line ipilimumab and nivolumab. However, once on nivolumab maintenance therapy she started to show regrowth in the right inguinal node. She switched over to ipilimumab every 3 weeks in the third line on 5/10/21, for a maximum of 4 doses. She knows to call if there is any change in the size of the inguinal lymph node. For evidence of response, continue ipilimumab. For progression, plan for anti-PD1 therapy with Keytruda  or another rounds of dual CPI, either Ipi/Nivo or Ipi/Pembro .  -Continue ipilimumab 3 mg/kg every 3 weeks. She will continue with cycle 4 next week. She will follow-up with Dr. Franklin the end of July with a PET/CT. Next follow-up with Dr. Estrada is in mid-September. She will call sooner for concerns.      2. MSK  Right foot pain 2/2 prior surgery (no concern for immune mediated arthralgias given specific to post surgical pain in right foot/ankle). Has had work up with orthopedics. Again, recommend trying Lyrica 25 mg at bedtime.     Arthritis (knees), immunotherapy associated, grade 1   We will watch closely for worsening of immune mediated side effects and worsening of arthritis. Stable.  -Continue using Voltaren gel PRN  -OTC Aleve or Ibuprofen as needed.   -Stable and under control now.     3. Derm  Dry skin and pruritis. Not using oatmeal baths and Aveeno as she did not find them helpful. Following with Dr. Fry (Derm). Finds some relief from triamcinolone cream and Vanicream. Continue to monitor and follow-up with Derm.     Acneiform rash/follicultis. Will trial topical clindamycin to thighs.     4. Psych/Neuro  Multifactorial fatigue, TSH and cortisol have been WNL. History of GUERLINE (treated), and she is also on  immunotherapy with known side effect of fatigue. Fatigue is stable. Recommend daily exercise with walking. Declined going back to cancer rehab. Continue to monitor.     History of depression and anxiety. Patient agrees to resume sertraline 25 mg daily.     Insomnia. Discussed okay to increase melatonin to 15 mg daily.     5. GI  Constipation. Recommend MiraLax once/day. Will continue to push fluids.     Meghan Perdomo PA-C  [facility] [address] [id number] [phone]    54 minutes spent on the date of the encounter doing chart review, review of test results, interpretation of tests, patient visit and documentation                     Meghan Perdomo PA-C

## 2021-07-09 NOTE — NURSING NOTE
"Oncology Rooming Note    July 9, 2021 3:25 PM   Yadira Hoang is a 76 year old female who presents for:    Chief Complaint   Patient presents with     Oncology Clinic Visit     [facility] RETURN - [facility]     Initial Vitals: /69 (BP Location: Right arm, Patient Position: Chair, Cuff Size: Adult Regular)   Pulse 89   Temp 98  F (36.7  C)   Resp 16   Ht 1.702 m (5' 7.01\")   Wt 60.3 kg (133 lb)   SpO2 98%   BMI 20.83 kg/m   Estimated body mass index is 20.83 kg/m  as calculated from the following:    Height as of this encounter: 1.702 m (5' 7.01\").    Weight as of this encounter: 60.3 kg (133 lb). Body surface area is 1.69 meters squared.  No Pain (0) Comment: Data Unavailable   No LMP recorded. Patient is postmenopausal.  Allergies reviewed: Yes  Medications reviewed: Yes    Medications: Medication refills not needed today.  Pharmacy name entered into [facility]:    [facility] - [place], [place] - [address], [id number]  [facility] [address] [place], [place] - [address] [id number] - [place], [place] - [address] [facility] [address]    Clinical concerns: No new concerns. Leanne was notified.      Capo Rowe, TIM            "

## 2021-07-12 ENCOUNTER — INFUSION THERAPY VISIT (OUTPATIENT)
Dept: INFUSION THERAPY | Facility: CLINIC | Age: 76
End: 2021-07-12
Attending: INTERNAL MEDICINE
Payer: COMMERCIAL

## 2021-07-12 ENCOUNTER — LAB (OUTPATIENT)
Dept: LAB | Facility: CLINIC | Age: 76
End: 2021-07-12
Attending: INTERNAL MEDICINE
Payer: COMMERCIAL

## 2021-07-12 ENCOUNTER — PATIENT OUTREACH (OUTPATIENT)
Dept: ONCOLOGY | Facility: CLINIC | Age: 76
End: 2021-07-12

## 2021-07-12 VITALS
SYSTOLIC BLOOD PRESSURE: 119 MMHG | WEIGHT: 137.3 LBS | TEMPERATURE: 97.9 F | OXYGEN SATURATION: 95 % | RESPIRATION RATE: 18 BRPM | DIASTOLIC BLOOD PRESSURE: 72 MMHG | HEART RATE: 83 BPM | BODY MASS INDEX: 21.5 KG/M2

## 2021-07-12 DIAGNOSIS — C43.9 MELANOMA OF SKIN (H): Primary | ICD-10-CM

## 2021-07-12 DIAGNOSIS — C43.71 MALIGNANT MELANOMA OF RIGHT LOWER EXTREMITY INCLUDING HIP (H): ICD-10-CM

## 2021-07-12 LAB
ALBUMIN SERPL-MCNC: 3.1 G/DL (ref 3.4–5)
ALP SERPL-CCNC: 73 U/L (ref 40–150)
ALT SERPL W P-5'-P-CCNC: 37 U/L (ref 0–50)
ANION GAP SERPL CALCULATED.3IONS-SCNC: 2 MMOL/L (ref 3–14)
AST SERPL W P-5'-P-CCNC: 16 U/L (ref 0–45)
BILIRUB SERPL-MCNC: 0.4 MG/DL (ref 0.2–1.3)
BUN SERPL-MCNC: 27 MG/DL (ref 7–30)
CALCIUM SERPL-MCNC: 9.2 MG/DL (ref 8.5–10.1)
CHLORIDE BLD-SCNC: 103 MMOL/L (ref 94–109)
CO2 SERPL-SCNC: 32 MMOL/L (ref 20–32)
CREAT SERPL-MCNC: 0.76 MG/DL (ref 0.52–1.04)
GFR SERPL CREATININE-BSD FRML MDRD: 76 ML/MIN/1.73M2
GLUCOSE BLD-MCNC: 75 MG/DL (ref 70–99)
HOLD SPECIMEN: NORMAL
HOLD SPECIMEN: NORMAL
POTASSIUM BLD-SCNC: 4.2 MMOL/L (ref 3.4–5.3)
PROT SERPL-MCNC: 7.5 G/DL (ref 6.8–8.8)
SODIUM SERPL-SCNC: 137 MMOL/L (ref 133–144)
TSH SERPL DL<=0.005 MIU/L-ACNC: 1.72 MU/L (ref 0.4–4)

## 2021-07-12 PROCEDURE — 84443 ASSAY THYROID STIM HORMONE: CPT | Performed by: PHYSICIAN ASSISTANT

## 2021-07-12 PROCEDURE — 99207 PR NO CHARGE LOS: CPT

## 2021-07-12 PROCEDURE — 96413 CHEMO IV INFUSION 1 HR: CPT | Performed by: NURSE PRACTITIONER

## 2021-07-12 PROCEDURE — 36415 COLL VENOUS BLD VENIPUNCTURE: CPT

## 2021-07-12 PROCEDURE — 80053 COMPREHEN METABOLIC PANEL: CPT | Performed by: PHYSICIAN ASSISTANT

## 2021-07-12 RX ADMIN — Medication 250 ML: at 10:33

## 2021-07-12 ASSESSMENT — PAIN SCALES - GENERAL: PAINLEVEL: NO PAIN (0)

## 2021-07-12 NOTE — PROGRESS NOTES
Infusion Nursing Note:  Yadira Hoang presents today for C4 yervoy.    Patient seen by provider today: No - saw Meghan Perdomo on 7/9/21   present during visit today: Not Applicable.    Intravenous Access:  Peripheral IV placed.    Treatment Conditions:  Lab Results   Component Value Date     07/12/2021     06/30/2021                   Lab Results   Component Value Date    POTASSIUM 4.2 07/12/2021    POTASSIUM 3.3 06/30/2021           No results found for: MAG         Lab Results   Component Value Date    CR 0.76 07/12/2021    CR 0.67 06/30/2021                   Lab Results   Component Value Date    GABY 9.2 07/12/2021    GABY 9.2 06/30/2021                Lab Results   Component Value Date    BILITOTAL 0.4 07/12/2021    BILITOTAL 0.7 06/30/2021           Lab Results   Component Value Date    ALBUMIN 3.1 07/12/2021    ALBUMIN 3.4 06/30/2021                    Lab Results   Component Value Date    ALT 37 07/12/2021    ALT 26 06/30/2021           Lab Results   Component Value Date    AST 16 07/12/2021    AST 26 06/30/2021       Results reviewed, labs MET treatment parameters, ok to proceed with treatment.  TSH 1.72 today.      Post Infusion Assessment:  Patient tolerated infusion without incident.  Blood return noted pre and post infusion.  Site patent and intact, free from redness, edema or discomfort.  No evidence of extravasations.  Access discontinued per protocol.       Discharge Plan:   Patient discharged in stable condition accompanied by: self.  Departure Mode: Ambulatory.  Verbally reviewed testing on 7/30 with Dr Franklin visit to follow.      Daniela Hu RN

## 2021-07-12 NOTE — PROGRESS NOTES
Patient calls to report continued constipation. She has tried miralax and has not had results, even though she had thought she would be able to. Also pushing fluids.    I suggested to her that she try incorporating senna which can be purchased over the counter, but she said that this is a medication that does not work for her and that she would like something else prescribed. She notes that at her visit with Meghan last week she mentioned she could send something over for her, but I was not able to see what that medication is. Note sent to Meghan.    Nkechi Salinas, LAKSHMI

## 2021-07-13 NOTE — PROGRESS NOTES
I spoke to Yadira and recommended she increase her miralax to twice or three times a day if she does not want to try senna per KEITH Cohn. We again talked about trying senna even though it didn't seem to work a few years ago, and she is agreeable to it today. She will take 2 this morning and two this evening and call us tomorrow if she does not have results.    Nkechi Salinas RN

## 2021-07-14 ENCOUNTER — VIRTUAL VISIT (OUTPATIENT)
Dept: ONCOLOGY | Facility: CLINIC | Age: 76
End: 2021-07-14
Attending: DIETITIAN, REGISTERED
Payer: COMMERCIAL

## 2021-07-14 DIAGNOSIS — C43.9 MELANOMA OF SKIN (H): Primary | ICD-10-CM

## 2021-07-14 DIAGNOSIS — C43.71 MALIGNANT MELANOMA OF RIGHT LOWER EXTREMITY INCLUDING HIP (H): ICD-10-CM

## 2021-07-14 PROCEDURE — 97803 MED NUTRITION INDIV SUBSEQ: CPT | Mod: TEL,GT | Performed by: DIETITIAN, REGISTERED

## 2021-07-14 PROCEDURE — 999N000109 HC STATISTIC OP CR VISIT

## 2021-07-14 PROCEDURE — 97802 MEDICAL NUTRITION INDIV IN: CPT | Mod: TEL,GT | Performed by: DIETITIAN, REGISTERED

## 2021-07-14 NOTE — LETTER
"    7/14/2021         RE: Yadira Hoang  [address] [facility] [address]        Dear Colleague,    Thank you for referring your patient, Yadira Hoang, to the [facility]. Please see a copy of my visit note below.    The patient has been notified of the following:      \"We have found that certain health care needs can be provided without the need for a face to face visit.  This service lets us provide the care you need with a phone conversation.       I will have full access to your [place] medical record during this entire phone call.   I will be taking notes for your medical record.      Since this is like an office visit, we will bill your insurance company for this service.       There are potential benefits and risks of telephone visits (e.g. limits to patient confidentiality) that differ from in-person visits.?  Confidentiality still applies for telephone services, and nobody will record the visit.  It is important to be in a quiet, private space that is free of distractions (including cell phone or other devices) during the visit.??      If during the course of the call I believe a telephone visit is not appropriate, you will not be charged for this service\"     Consent has been obtained for this service by care team member: Yes     CLINICAL NUTRITION SERVICES - REASSESSMENT NOTE   EVALUATION OF PREVIOUS PLAN OF CARE:   Referring Physician: Meghan Perdomo 7/9/21  Time spent with patient: 30 minutes.    Current diet: bland  Current appetite/intake: improved with no further N/V, but now constipated      Monitoring from previous assessment:   -Food intake - Yadira tells me that her intake has improved since she no longer has nausea and vomiting. She has obtained PP bulk 1340 but has not yet tried it as she has been constipated.  She is using MiraLax which has not helped.  She was advised to try Senna but is not very receptive to using this as it didn't work for her the " last time she was constipated.  She is looking for alternative bowel aids from RD.    She plans to start using bulk 1340 powder made into smoothies once her bowels improve.  She has been eating small, frquent meals and does feel like this has given her more energy.   -Fluid/beverage intake - she feels she is drinking plenty of water, >8 cups/day  -Liquid meal replacement or supplement - None at this time  -Weight trends - stable   Wt Readings from Last 6 Encounters:   07/12/21 62.3 kg (137 lb 4.8 oz)   07/09/21 60.3 kg (133 lb)   06/30/21 62.6 kg (138 lb)   06/21/21 63.4 kg (139 lb 11.2 oz)   06/04/21 64.1 kg (141 lb 4.8 oz)   06/02/21 64.8 kg (142 lb 14.4 oz)     Previous Goals:   1.  Aim for 5-6 small frequent meals - goal met  2.  Aim for 1600kcal and 75g protein/day - goal not met  3. Weight maintenance - goal met  Evaluation: Not met   Previous Nutrition Diagnosis:   Inadequate oral intake related to decreased appetite with N/V as evidenced by pt reports eating <50% of usual intake over the past 2 months.    Evaluation: Completed   NEW FINDINGS:   Constipated   CURRENT NUTRITION DIAGNOSIS   Inadequate oral intake related to decreased appetite and constipation as evidenced by pt not taking protein supplement, pt report low protein intake   INTERVENTIONS   Recommendations / Nutrition Prescription   1. Try Magnesium Citrate - 1 bottle today  2. If this does not work, try Senna as advised by RN/MD   Implementation  Composition of Meals and Snacks and General/healthful diet - reviewed bland food choices, high calorie/high protein food choices.  Reviewed ways to add calories to meals and snacks.   Reviewed bowel aid - advised to try Mg Citrate as alternative to Senna if she's not preferring Senna.  Suggested to try Senna as directed by MD/RN if she does not have success with Mg Citrate.   Reviewed ONS to try - Pro Performance Bulk 1340 with Fairlife whole milk (lactose free); suggested adding to shakes/smoothies to  increase pradeep/pro of her daily smoothie.   Encouraged small, frequent meals. Reviewed hydration. Encouraged water and sugar free electrolyte solutions (G2, Gatorade zero, Powerade et).   Goals  1.  Continue 5-6 small frequent meals  2.  Aim for 1600kcal and 75g protein/day  3. Weight maintenance      Follow-Up Plans: Pt has RD contact information for questions.       Kimberly Leiva RDN, LD        Again, thank you for allowing me to participate in the care of your patient.        Sincerely,        Kimberly Leiva RD

## 2021-07-14 NOTE — PROGRESS NOTES
"The patient has been notified of the following:      \"We have found that certain health care needs can be provided without the need for a face to face visit.  This service lets us provide the care you need with a phone conversation.       I will have full access to your [place] medical record during this entire phone call.   I will be taking notes for your medical record.      Since this is like an office visit, we will bill your insurance company for this service.       There are potential benefits and risks of telephone visits (e.g. limits to patient confidentiality) that differ from in-person visits.?  Confidentiality still applies for telephone services, and nobody will record the visit.  It is important to be in a quiet, private space that is free of distractions (including cell phone or other devices) during the visit.??      If during the course of the call I believe a telephone visit is not appropriate, you will not be charged for this service\"     Consent has been obtained for this service by care team member: Yes     CLINICAL NUTRITION SERVICES - REASSESSMENT NOTE   EVALUATION OF PREVIOUS PLAN OF CARE:   Referring Physician: Meghan Perdomo 7/9/21  Time spent with patient: 30 minutes.    Current diet: bland  Current appetite/intake: improved with no further N/V, but now constipated      Monitoring from previous assessment:   -Food intake - Yadira tells me that her intake has improved since she no longer has nausea and vomiting. She has obtained PP bulk 1340 but has not yet tried it as she has been constipated.  She is using MiraLax which has not helped.  She was advised to try Senna but is not very receptive to using this as it didn't work for her the last time she was constipated.  She is looking for alternative bowel aids from .    She plans to start using bulk 1340 powder made into smoothies once her bowels improve.  She has been eating small, frquent meals and does feel like this has given her more " energy.   -Fluid/beverage intake - she feels she is drinking plenty of water, >8 cups/day  -Liquid meal replacement or supplement - None at this time  -Weight trends - stable   Wt Readings from Last 6 Encounters:   07/12/21 62.3 kg (137 lb 4.8 oz)   07/09/21 60.3 kg (133 lb)   06/30/21 62.6 kg (138 lb)   06/21/21 63.4 kg (139 lb 11.2 oz)   06/04/21 64.1 kg (141 lb 4.8 oz)   06/02/21 64.8 kg (142 lb 14.4 oz)     Previous Goals:   1.  Aim for 5-6 small frequent meals - goal met  2.  Aim for 1600kcal and 75g protein/day - goal not met  3. Weight maintenance - goal met  Evaluation: Not met   Previous Nutrition Diagnosis:   Inadequate oral intake related to decreased appetite with N/V as evidenced by pt reports eating <50% of usual intake over the past 2 months.    Evaluation: Completed   NEW FINDINGS:   Constipated   CURRENT NUTRITION DIAGNOSIS   Inadequate oral intake related to decreased appetite and constipation as evidenced by pt not taking protein supplement, pt report low protein intake   INTERVENTIONS   Recommendations / Nutrition Prescription   1. Try Magnesium Citrate - 1 bottle today  2. If this does not work, try Senna as advised by RN/MD   Implementation  Composition of Meals and Snacks and General/healthful diet - reviewed bland food choices, high calorie/high protein food choices.  Reviewed ways to add calories to meals and snacks.   Reviewed bowel aid - advised to try Mg Citrate as alternative to Senna if she's not preferring Senna.  Suggested to try Senna as directed by MD/RN if she does not have success with Mg Citrate.   Reviewed ONS to try - Pro Performance Bulk 1340 with Fairlife whole milk (lactose free); suggested adding to shakes/smoothies to increase pradeep/pro of her daily smoothie.   Encouraged small, frequent meals. Reviewed hydration. Encouraged water and sugar free electrolyte solutions (G2, Gatorade zero, Powerade et).   Goals  1.  Continue 5-6 small frequent meals  2.  Aim for 1600kcal  and 75g protein/day  3. Weight maintenance      Follow-Up Plans: Pt has RD contact information for questions.       Kimberly Leiva RDN, LD

## 2021-07-26 ENCOUNTER — TELEPHONE (OUTPATIENT)
Dept: ONCOLOGY | Facility: CLINIC | Age: 76
End: 2021-07-26

## 2021-07-26 NOTE — PROGRESS NOTES
Nutrition Services:     Received phone call from Yadira laguna.   She tells me that she has been having an upset stomach and wonders if it's from the MiraLax.    After RD consult on 7/14/21, she took Mg Citrate (1/2 bottle) two days in a row as suggested.    She tells me that this worked very well to relieve her constipation.   In additional to the Mg Citrate, she has continued to take MiraLax.      Interventions:   -Advised to keep a food, bowel aid and sx log to help associated upset stomach with possible foods/bowel aids.  --Advised to reduce MiraLax to taking only 1/2 cap or 1 cap full daily versus 2 cap /day as MiraLax can cause upset stomach and nausea.     She agrees to tracking and reducing her MiraLax.   She will call RD with update prn.     Kimberly Leiva, SINTIA, LDN  [facility]  [phone]

## 2021-07-30 ENCOUNTER — APPOINTMENT (OUTPATIENT)
Dept: LAB | Facility: CLINIC | Age: 76
End: 2021-07-30
Payer: COMMERCIAL

## 2021-07-30 ENCOUNTER — HOSPITAL ENCOUNTER (OUTPATIENT)
Dept: PET IMAGING | Facility: CLINIC | Age: 76
Setting detail: NUCLEAR MEDICINE
Discharge: HOME | End: 2021-07-30
Attending: INTERNAL MEDICINE | Admitting: INTERNAL MEDICINE
Payer: COMMERCIAL

## 2021-07-30 ENCOUNTER — ONCOLOGY VISIT (OUTPATIENT)
Dept: ONCOLOGY | Facility: CLINIC | Age: 76
End: 2021-07-30
Attending: INTERNAL MEDICINE
Payer: COMMERCIAL

## 2021-07-30 VITALS
HEART RATE: 94 BPM | DIASTOLIC BLOOD PRESSURE: 75 MMHG | BODY MASS INDEX: 21.77 KG/M2 | RESPIRATION RATE: 14 BRPM | WEIGHT: 139 LBS | SYSTOLIC BLOOD PRESSURE: 136 MMHG | TEMPERATURE: 98.4 F | OXYGEN SATURATION: 96 %

## 2021-07-30 DIAGNOSIS — C43.71 MALIGNANT MELANOMA OF RIGHT LOWER EXTREMITY INCLUDING HIP (H): Primary | ICD-10-CM

## 2021-07-30 DIAGNOSIS — C43.9 MELANOMA OF SKIN (H): ICD-10-CM

## 2021-07-30 DIAGNOSIS — C43.9 ACRAL LENTIGINOUS MELANOMA (H): ICD-10-CM

## 2021-07-30 PROCEDURE — 343N000001 HC RX 343: Performed by: INTERNAL MEDICINE

## 2021-07-30 PROCEDURE — 71260 CT THORAX DX C+: CPT | Mod: 26

## 2021-07-30 PROCEDURE — G0463 HOSPITAL OUTPT CLINIC VISIT: HCPCS | Performed by: INTERNAL MEDICINE

## 2021-07-30 PROCEDURE — 99215 OFFICE O/P EST HI 40 MIN: CPT | Performed by: INTERNAL MEDICINE

## 2021-07-30 PROCEDURE — 78816 PET IMAGE W/CT FULL BODY: CPT | Mod: 26

## 2021-07-30 PROCEDURE — A9552 F18 FDG: HCPCS | Performed by: INTERNAL MEDICINE

## 2021-07-30 PROCEDURE — 250N000011 HC RX IP 250 OP 636: Performed by: INTERNAL MEDICINE

## 2021-07-30 PROCEDURE — 71260 CT THORAX DX C+: CPT

## 2021-07-30 PROCEDURE — 78816 PET IMAGE W/CT FULL BODY: CPT | Mod: PS

## 2021-07-30 PROCEDURE — 74177 CT ABD & PELVIS W/CONTRAST: CPT | Mod: 26

## 2021-07-30 RX ORDER — IOPAMIDOL 755 MG/ML
50-135 INJECTION, SOLUTION INTRAVASCULAR ONCE
Status: COMPLETED | OUTPATIENT
Start: 2021-07-30 | End: 2021-07-30

## 2021-07-30 RX ADMIN — IOPAMIDOL 84 ML: 755 INJECTION, SOLUTION INTRAVENOUS at 09:44

## 2021-07-30 RX ADMIN — FLUDEOXYGLUCOSE F-18 12.17 MCI.: 500 INJECTION, SOLUTION INTRAVENOUS at 09:44

## 2021-07-30 ASSESSMENT — PAIN SCALES - GENERAL: PAINLEVEL: NO PAIN (0)

## 2021-07-30 NOTE — PROGRESS NOTES
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 She noted a painful lesion on the sole of the right foot for a few months, since last summer.  It initially was small then became raised.  It spontaneously bled. She is not entirely sure of its appearance initially.  She denies noting any masses behind the knee or in the groin.  2. 1/9/2020, punch biopsy was performed by Dr Jessica Meadows. Patology showed melanoma, at least 3.4 mm deep with ulceration and 0 mitoses, and perineural invasion present. TILs were non-brisk and LVI not identified. pT3b.  3. 2/10/2020, she has right femoral sentinel lymph node biopsy and wide local excision (Specimen #: [id number]) and the right heel lesion extends to a depth of 6.6 mm, and invasive melanoma is present at 0.2 mm from the deep margin. Microsatellites are also present. There was 1 (of 2) lymph nodes involved. The lymph node tissue exhibits extensive subcapsular and parenchymal clusters of melanoma cells, highlighted on Melan-A immunostain. The largest cluster is 7 millimeters in greatest diameter. pT4b pN2c. PD-L1 staining is negative, <1%. No mutation in BRAF, KIT, or NRAS.  4. 2/22/2020, she had PET-CT, which showed intense FDG uptake in a right inguinal lymph node, concerning for an additional focus of metastatic disease. There is also an indeterminate right external iliac lymph node with mild FDG uptake.  5. 3/4/2020, she has medial plantar artery  fasciocutaneous instep flap and Integra placement to the donor site. On 4/2/2020, she has additional reconstruction with skin grafting.  6. 5/22/2020, PET-CT shows a hypermetabolic right inguinal and right external iliac chain lymph node and stable pulmonary nodules.  7. 5/27/2020, she starts nivolumab for presumed low volume lymph node predominant metastatic  disease.  8. 8/21/2020, PET-CT shows increase in hypermetabolic adenopathy, and a hypermetabolic nodule in segment 2 of the liver. Given low volume of disease she prefers to continue therapy with short interval follow-up.  9. 10/9/2020, PET-CT shows increasing size of hepatic metastases with increased hypermetabolism, increased right inguinal and iliac lymphadenopathy, and new FDG uptake right T2 transverse process and right side of S1. MRI-brain obtained 10/21/2020 is negative for brain metastasis.  10. 10/28/2020, she starts ipilimumab 1mg/kg and nivolumab 3mg/kg. She then goes on to maintenance nivolumab through 4/12/21.  11. 5/10/2021, she starts ipilimumab 3 mg/kg monotherapy q3w for 4 cycles.       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 She had primary anti-PD1 CPI refractory disease on nivolumab, and she then went on to receive 2nd line Ipi/Nivo and maintenance nivolumab. Given progression she has been recently maintained in ipilimumab. She presents for restaging evaluation.    PET-CT shows multiple hypermetabolic enlarged inguinal and right iliac chain lymph nodes have slightly increased in size compared with prior study in May. Liver lesions are unchanged.    ECOG performance status is 1.    Current Outpatient Medications   Medication     cholecalciferol (VITAMIN D3) 125 MCG (5000 UT) TABS tablet     estradiol (ESTRACE) 0.1 MG/GM vaginal cream     ondansetron (ZOFRAN ODT) 4 MG ODT tab     prochlorperazine (COMPAZINE) 5 MG tablet     sertraline (ZOLOFT) 25 MG tablet     clindamycin (CLINDAMAX) 1 % external gel     estradiol (ESTRACE) 0.1 MG/GM vaginal cream     fexofenadine (ALLEGRA) 180 MG tablet     fluticasone (FLONASE) 50 MCG/ACT nasal spray     hydrOXYzine (VISTARIL) 25 MG capsule     ondansetron (ZOFRAN) 8 MG tablet     pregabalin (LYRICA) 25 MG capsule     STATIN NOT PRESCRIBED (INTENTIONAL)     triamcinolone (KENALOG) 0.1 % external cream      No current facility-administered medications for this visit.       PHYSICAL EXAMINATION  /75   Pulse 94   Temp 98.4  F (36.9  C) (Oral)   Resp 14   Wt 63 kg (139 lb)   SpO2 96%   BMI 21.77 kg/m    GENERAL: Healthy, alert and no distress  EYES: Eyes grossly normal to inspection.  No discharge or erythema, or obvious scleral/conjunctival abnormalities.  HENT: Normal cephalic/atraumatic.  External ears, nose and mouth without ulcers or lesions.  No nasal drainage visible.  NECK: No asymmetry, visible masses or scars  RESP: No audible wheeze, cough, or visible cyanosis.  No visible retractions or increased work of breathing.    SKIN: Visible skin clear. No significant rash, abnormal pigmentation or lesions.  NEURO: Cranial nerves grossly intact.  Mentation and speech appropriate for age.  PSYCH: Mentation appears normal, affect normal/bright, judgement and insight intact, normal speech and appearance well-groomed.      LABS  Oncology Visit on 05/07/2021   Component Date Value Ref Range Status     TSH 05/07/2021 1.63  0.40 - 4.00 mU/L Final     WBC 05/07/2021 4.9  4.0 - 11.0 10e9/L Final     RBC Count 05/07/2021 4.03  3.8 - 5.2 10e12/L Final     Hemoglobin 05/07/2021 12.4  11.7 - 15.7 g/dL Final     Hematocrit 05/07/2021 37.3  35.0 - 47.0 % Final     MCV 05/07/2021 93  78 - 100 fl Final     MCH 05/07/2021 30.8  26.5 - 33.0 pg Final     MCHC 05/07/2021 33.2  31.5 - 36.5 g/dL Final     RDW 05/07/2021 12.6  10.0 - 15.0 % Final     Platelet Count 05/07/2021 299  150 - 450 10e9/L Final     Diff Method 05/07/2021 Automated Method   Final     % Neutrophils 05/07/2021 64.4  % Final     % Lymphocytes 05/07/2021 23.2  % Final     % Monocytes 05/07/2021 7.7  % Final     % Eosinophils 05/07/2021 3.9  % Final     % Basophils 05/07/2021 0.6  % Final     % Immature Granulocytes 05/07/2021 0.2  % Final     Nucleated RBCs 05/07/2021 0  0 /100 Final     Absolute Neutrophil 05/07/2021 3.2  1.6 - 8.3 10e9/L Final      Absolute Lymphocytes 05/07/2021 1.1  0.8 - 5.3 10e9/L Final     Absolute Monocytes 05/07/2021 0.4  0.0 - 1.3 10e9/L Final     Absolute Eosinophils 05/07/2021 0.2  0.0 - 0.7 10e9/L Final     Absolute Basophils 05/07/2021 0.0  0.0 - 0.2 10e9/L Final     Abs Immature Granulocytes 05/07/2021 0.0  0 - 0.4 10e9/L Final     Absolute Nucleated RBC 05/07/2021 0.0   Final     Sodium 05/07/2021 141  133 - 144 mmol/L Final     Potassium 05/07/2021 3.8  3.4 - 5.3 mmol/L Final     Chloride 05/07/2021 106  94 - 109 mmol/L Final     Carbon Dioxide 05/07/2021 32  20 - 32 mmol/L Final     Anion Gap 05/07/2021 3  3 - 14 mmol/L Final     Glucose 05/07/2021 111* 70 - 99 mg/dL Final     Urea Nitrogen 05/07/2021 18  7 - 30 mg/dL Final     Creatinine 05/07/2021 0.67  0.52 - 1.04 mg/dL Final     GFR Estimate 05/07/2021 85  >60 mL/min/[1.73_m2] Final     GFR Estimate If Black 05/07/2021 >90  >60 mL/min/[1.73_m2] Final     Calcium 05/07/2021 9.3  8.5 - 10.1 mg/dL Final     Bilirubin Total 05/07/2021 0.4  0.2 - 1.3 mg/dL Final     Albumin 05/07/2021 3.8  3.4 - 5.0 g/dL Final     Protein Total 05/07/2021 7.2  6.8 - 8.8 g/dL Final     Alkaline Phosphatase 05/07/2021 60  40 - 150 U/L Final     ALT 05/07/2021 21  0 - 50 U/L Final     AST 05/07/2021 10  0 - 45 U/L Final         IMAGING  PET Oncology Whole Body  CT Chest/Abdomen/Pelvis w Contrast  Narrative: Combined Report of:    PET and CT on  7/30/2021 11:24 AM :    1. PET of the neck, chest, abdomen, and pelvis.  2. PET CT Fusion for Attenuation Correction and Anatomical  Localization:    3. Diagnostic CT scan of the chest, abdomen, and pelvis with  intravenous contrast for interpretation.  3. CT of the chest, abdomen and pelvis obtained for diagnostic  interpretation.  4. 3D MIP and PET-CT fused images were processed on an independent  workstation and archived to PACS and reviewed by a radiologist.    Technique:    1. PET: The patient received 12.17 cm mCi of F-18-FDG; the serum  glucose was 95  prior to administration, body weight was 62.3 kg.  Images were evaluated in the axial, sagittal, and coronal planes as  well as the rotational whole body MIP. Images were acquired from the  Vertex to the Feet.    UPTAKE WAS MEASURED AT 60 MINUTES.     BACKGROUND:  Liver SUV max= 3.11,   Aorta Blood SUV Max: 2.96.     2. CT: Volumetric acquisition for clinical interpretation of the  chest, abdomen, and pelvis acquired at 3 mm sections . The chest,  abdomen, and pelvis were evaluated at 5 mm sections in bone, soft  tissue, and lung windows.      The patient received 84 cc of Isovue 370 intravenously for the  examination.    --    3. 3D MIP and PET-CT fused images were processed on an independent  workstation and archived to PACS and reviewed by a radiologist.    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    COMPARISON: PET CT 5/7/2021, 2/12/2021    FINDINGS:     HEAD/NECK:  There is no  suspicious FDG uptake in the neck.     The paranasal sinuses are clear. The mastoid air cells are clear.     The mucosal pharyngeal space, the , prevertebral and carotid  spaces are within normal limits.     No masses, mass effect or pathologically enlarged lymph nodes are  evident. The thyroid gland is unremarkable.    Atherosclerosis of bilateral vertebral arteries. Focal dilation of the  right subclavian vein.    CHEST:  There is no suspicious FDG uptake in the chest.     The heart is grossly normal in size. Aorta and pulmonary artery are  normal in caliber and appearance. No central pulmonary embolism.  Moderate coronary artery vascular calcifications. No mediastinal,  hilar or axillary lymphadenopathy.    The central tracheobronchial tree is clear. No focal consolidative  opacity. No pleural effusion. No pneumothorax. Mild bibasilar  atelectasis. No suspicious  pulmonary nodules. Calcified right-sided  granuloma.    ABDOMEN AND PELVIS:  There is no suspicious FDG uptake in the abdomen or pelvis.    Several unchanged small subcentimeter hypodensities in the right liver  dome, favored to represent simple treated disease versus cyst. The  gallbladder is nondistended. No cholelithiasis. No intrahepatic or  extrahepatic biliary duct dilation. The spleen, pancreas and adrenal  glands are unremarkable.    Bilaterally symmetrically enhancing kidneys. No renal lesion. No  hydronephrosis. No hydroureter. The bladder is unremarkable. Pelvic  organs are normal.    Mild atherosclerotic calcifications of the distal abdominal aorta. The  abdominal aorta and major abdominal vessels are patent without  evidence of any residual dilation clinically significant stenosis.     Several prominent FDG avid right iliac and inguinal lymph nodes.  Increased size of right iliac lymph node measuring 2.6 x 1.8 cm with  SUV max of 14.49. Multiple FDG avid right inguinal lymph nodes, most  prominent with a maximum SUV of 19.74, previously 20.66.    LOWER EXTREMITIES:   No abnormal masses or hypermetabolic lesions.    BONES:   There are no suspicious lytic or blastic osseous lesions.  There is no  abnormal FDG uptake in the skeleton.  Impression: IMPRESSION: In this patient with history of metastatic melanoma there  is minimal progression of disease:    1. Multiple hypermetabolic enlarged inguinal and right iliac chain  lymph nodes, with slight increase in size of the right iliac chain  hypermetabolic lymph node compared with prior study 5/7/2021.  2. Unchanged subcentimeter hypodense hepatic lesions, may represent  treated disease.     I have personally reviewed the examination and initial interpretation  and I agree with the findings.    CARMEN GIL MD         SYSTEM ID:  [id number]        ASSESSMENT AND PLAN:    #1 Acral melanoma, right heel primary, pT4b pN3c M1, Stage IV  It was a pleasure to talk  with Ms. Hoang. 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 She progressed on first line nivolumab, and had evidence of response to 2nd line ipilimumab and nivolumab. On nivolumab maintenance therapy she has started to show regrowth, particularly in the right inguinal node. There is concern that additional nivolumab will be associated with additional growth and then will show in the other tumors. She tried ipilimumab monotherapy and had only stable disease with increased itch. We will try ipilimumab plus nivolumab again .    -We will augment her treatment with dual checkpoint inhibition    #2 Arthritis, immunotherapy associated, grade 1  We will watch closely for worsening of immune mediated side effects and worsening of arthritis on anti-PD1 therapy. -Continue Ibuprofen 600 mg 4X per day or Aleve 220 mg twice daily. Take with food or it will irritate the stomach.  -continue using Voltaren gel    #3 Pruritis, grade 1  She finds this is manageable with the capsaicin cream. Encouraged emmolient creams containing oatmeal (ie Aveeno).   -Fexofenadine 180 mg once to twice daily, cetirizine (Zyrtec) 10 mg once to twice daily, Hydroxyzine (Vistaril) as needed  -continue capsaicin cream as needed  -oatmeal baths as needed    #4 Fatigue, multifactorial  History of GUERLINE (treated), on immunotherapy. Continue to monitor.    #5 History of depression and anxiety  Currently off treatment. Observe.    Multiple questions answered.       Gi Cartagena M.D.   of Medicine  Hematology, Oncology and Transplantation

## 2021-07-30 NOTE — NURSING NOTE
"Oncology Rooming Note    July 30, 2021 4:18 PM   Yadira Hoang is a 76 year old female who presents for:    Chief Complaint   Patient presents with     Oncology Clinic Visit     Return; Melanoma of skin     Initial Vitals: /75   Pulse 94   Temp 98.4  F (36.9  C) (Oral)   Resp 14   Wt 63 kg (139 lb)   SpO2 96%   BMI 21.77 kg/m   Estimated body mass index is 21.77 kg/m  as calculated from the following:    Height as of 7/9/21: 1.702 m (5' 7.01\").    Weight as of this encounter: 63 kg (139 lb). Body surface area is 1.73 meters squared.  No Pain (0) Comment: Data Unavailable   No LMP recorded. Patient is postmenopausal.  Allergies reviewed: Yes  Medications reviewed: Yes    Medications: Medication refills not needed today.  Pharmacy name entered into InfoRemate:    [facility] [place], [place] - [address], [id number]  [facility] [address] [place] [id number] Replaced by [id number] - [place], [place] - [address] [facility] [address]    Clinical concerns: pt has a list of questions for provider. Dr Franklin was notified.      Latonia Noel CMA              "

## 2021-07-30 NOTE — LETTER
7/30/2021         RE: Yadira Hoang  [address] [facility] [address]        Dear Colleague,    Thank you for referring your patient, Yadira Hoang, to the [facility]. Please see a copy of my visit note below.    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 She noted a painful lesion on the sole of the right foot for a few months, since last summer.  It initially was small then became raised.  It spontaneously bled. She is not entirely sure of its appearance initially.  She denies noting any masses behind the knee or in the groin.  2. 1/9/2020, punch biopsy was performed by Dr Jessica Meadows. Patology showed melanoma, at least 3.4 mm deep with ulceration and 0 mitoses, and perineural invasion present. TILs were non-brisk and LVI not identified. pT3b.  3. 2/10/2020, she has right femoral sentinel lymph node biopsy and wide local excision (Specimen #: [id number]) and the right heel lesion extends to a depth of 6.6 mm, and invasive melanoma is present at 0.2 mm from the deep margin. Microsatellites are also present. There was 1 (of 2) lymph nodes involved. The lymph node tissue exhibits extensive subcapsular and parenchymal clusters of melanoma cells, highlighted on Melan-A immunostain. The largest cluster is 7 millimeters in greatest diameter. pT4b pN2c. PD-L1 staining is negative, <1%. No mutation in BRAF, KIT, or NRAS.  4. 2/22/2020, she had PET-CT, which showed intense FDG uptake in a right inguinal lymph node, concerning for an additional focus of metastatic disease. There is also an indeterminate right external iliac lymph node with mild FDG uptake.  5. 3/4/2020, she has medial plantar artery  fasciocutaneous instep flap and Integra placement to the donor site. On 4/2/2020, she has  additional reconstruction with skin grafting.  6. 5/22/2020, PET-CT shows a hypermetabolic right inguinal and right external iliac chain lymph node and stable pulmonary nodules.  7. 5/27/2020, she starts nivolumab for presumed low volume lymph node predominant metastatic disease.  8. 8/21/2020, PET-CT shows increase in hypermetabolic adenopathy, and a hypermetabolic nodule in segment 2 of the liver. Given low volume of disease she prefers to continue therapy with short interval follow-up.  9. 10/9/2020, PET-CT shows increasing size of hepatic metastases with increased hypermetabolism, increased right inguinal and iliac lymphadenopathy, and new FDG uptake right T2 transverse process and right side of S1. MRI-brain obtained 10/21/2020 is negative for brain metastasis.  10. 10/28/2020, she starts ipilimumab 1mg/kg and nivolumab 3mg/kg. She then goes on to maintenance nivolumab through 4/12/21.  11. 5/10/2021, she starts ipilimumab 3 mg/kg monotherapy q3w for 4 cycles.       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 She had primary anti-PD1 CPI refractory disease on nivolumab, and she then went on to receive 2nd line Ipi/Nivo and maintenance nivolumab. Given progression she has been recently maintained in ipilimumab. She presents for restaging evaluation.    PET-CT shows multiple hypermetabolic enlarged inguinal and right iliac chain lymph nodes have slightly increased in size compared with prior study in May. Liver lesions are unchanged.    ECOG performance status is 1.    Current Outpatient Medications   Medication     cholecalciferol (VITAMIN D3) 125 MCG (5000 UT) TABS tablet     estradiol (ESTRACE) 0.1 MG/GM vaginal cream     ondansetron (ZOFRAN ODT) 4 MG ODT tab     prochlorperazine (COMPAZINE) 5 MG tablet     sertraline (ZOLOFT) 25 MG tablet     clindamycin (CLINDAMAX) 1 % external gel     estradiol (ESTRACE) 0.1 MG/GM vaginal cream     fexofenadine  (ALLEGRA) 180 MG tablet     fluticasone (FLONASE) 50 MCG/ACT nasal spray     hydrOXYzine (VISTARIL) 25 MG capsule     ondansetron (ZOFRAN) 8 MG tablet     pregabalin (LYRICA) 25 MG capsule     STATIN NOT PRESCRIBED (INTENTIONAL)     triamcinolone (KENALOG) 0.1 % external cream     No current facility-administered medications for this visit.       PHYSICAL EXAMINATION  /75   Pulse 94   Temp 98.4  F (36.9  C) (Oral)   Resp 14   Wt 63 kg (139 lb)   SpO2 96%   BMI 21.77 kg/m    GENERAL: Healthy, alert and no distress  EYES: Eyes grossly normal to inspection.  No discharge or erythema, or obvious scleral/conjunctival abnormalities.  HENT: Normal cephalic/atraumatic.  External ears, nose and mouth without ulcers or lesions.  No nasal drainage visible.  NECK: No asymmetry, visible masses or scars  RESP: No audible wheeze, cough, or visible cyanosis.  No visible retractions or increased work of breathing.    SKIN: Visible skin clear. No significant rash, abnormal pigmentation or lesions.  NEURO: Cranial nerves grossly intact.  Mentation and speech appropriate for age.  PSYCH: Mentation appears normal, affect normal/bright, judgement and insight intact, normal speech and appearance well-groomed.      LABS  Oncology Visit on 05/07/2021   Component Date Value Ref Range Status     TSH 05/07/2021 1.63  0.40 - 4.00 mU/L Final     WBC 05/07/2021 4.9  4.0 - 11.0 10e9/L Final     RBC Count 05/07/2021 4.03  3.8 - 5.2 10e12/L Final     Hemoglobin 05/07/2021 12.4  11.7 - 15.7 g/dL Final     Hematocrit 05/07/2021 37.3  35.0 - 47.0 % Final     MCV 05/07/2021 93  78 - 100 fl Final     MCH 05/07/2021 30.8  26.5 - 33.0 pg Final     MCHC 05/07/2021 33.2  31.5 - 36.5 g/dL Final     RDW 05/07/2021 12.6  10.0 - 15.0 % Final     Platelet Count 05/07/2021 299  150 - 450 10e9/L Final     Diff Method 05/07/2021 Automated Method   Final     % Neutrophils 05/07/2021 64.4  % Final     % Lymphocytes 05/07/2021 23.2  % Final     % Monocytes  05/07/2021 7.7  % Final     % Eosinophils 05/07/2021 3.9  % Final     % Basophils 05/07/2021 0.6  % Final     % Immature Granulocytes 05/07/2021 0.2  % Final     Nucleated RBCs 05/07/2021 0  0 /100 Final     Absolute Neutrophil 05/07/2021 3.2  1.6 - 8.3 10e9/L Final     Absolute Lymphocytes 05/07/2021 1.1  0.8 - 5.3 10e9/L Final     Absolute Monocytes 05/07/2021 0.4  0.0 - 1.3 10e9/L Final     Absolute Eosinophils 05/07/2021 0.2  0.0 - 0.7 10e9/L Final     Absolute Basophils 05/07/2021 0.0  0.0 - 0.2 10e9/L Final     Abs Immature Granulocytes 05/07/2021 0.0  0 - 0.4 10e9/L Final     Absolute Nucleated RBC 05/07/2021 0.0   Final     Sodium 05/07/2021 141  133 - 144 mmol/L Final     Potassium 05/07/2021 3.8  3.4 - 5.3 mmol/L Final     Chloride 05/07/2021 106  94 - 109 mmol/L Final     Carbon Dioxide 05/07/2021 32  20 - 32 mmol/L Final     Anion Gap 05/07/2021 3  3 - 14 mmol/L Final     Glucose 05/07/2021 111* 70 - 99 mg/dL Final     Urea Nitrogen 05/07/2021 18  7 - 30 mg/dL Final     Creatinine 05/07/2021 0.67  0.52 - 1.04 mg/dL Final     GFR Estimate 05/07/2021 85  >60 mL/min/[1.73_m2] Final     GFR Estimate If Black 05/07/2021 >90  >60 mL/min/[1.73_m2] Final     Calcium 05/07/2021 9.3  8.5 - 10.1 mg/dL Final     Bilirubin Total 05/07/2021 0.4  0.2 - 1.3 mg/dL Final     Albumin 05/07/2021 3.8  3.4 - 5.0 g/dL Final     Protein Total 05/07/2021 7.2  6.8 - 8.8 g/dL Final     Alkaline Phosphatase 05/07/2021 60  40 - 150 U/L Final     ALT 05/07/2021 21  0 - 50 U/L Final     AST 05/07/2021 10  0 - 45 U/L Final         IMAGING  PET Oncology Whole Body  CT Chest/Abdomen/Pelvis w Contrast  Narrative: Combined Report of:    PET and CT on  7/30/2021 11:24 AM :    1. PET of the neck, chest, abdomen, and pelvis.  2. PET CT Fusion for Attenuation Correction and Anatomical  Localization:    3. Diagnostic CT scan of the chest, abdomen, and pelvis with  intravenous contrast for interpretation.  3. CT of the chest, abdomen and pelvis  obtained for diagnostic  interpretation.  4. 3D MIP and PET-CT fused images were processed on an independent  workstation and archived to PACS and reviewed by a radiologist.    Technique:    1. PET: The patient received 12.17 cm mCi of F-18-FDG; the serum  glucose was 95 prior to administration, body weight was 62.3 kg.  Images were evaluated in the axial, sagittal, and coronal planes as  well as the rotational whole body MIP. Images were acquired from the  Vertex to the Feet.    UPTAKE WAS MEASURED AT 60 MINUTES.     BACKGROUND:  Liver SUV max= 3.11,   Aorta Blood SUV Max: 2.96.     2. CT: Volumetric acquisition for clinical interpretation of the  chest, abdomen, and pelvis acquired at 3 mm sections . The chest,  abdomen, and pelvis were evaluated at 5 mm sections in bone, soft  tissue, and lung windows.      The patient received 84 cc of Isovue 370 intravenously for the  examination.    --    3. 3D MIP and PET-CT fused images were processed on an independent  workstation and archived to PACS and reviewed by a radiologist.    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    COMPARISON: PET CT 5/7/2021, 2/12/2021    FINDINGS:     HEAD/NECK:  There is no  suspicious FDG uptake in the neck.     The paranasal sinuses are clear. The mastoid air cells are clear.     The mucosal pharyngeal space, the , prevertebral and carotid  spaces are within normal limits.     No masses, mass effect or pathologically enlarged lymph nodes are  evident. The thyroid gland is unremarkable.    Atherosclerosis of bilateral vertebral arteries. Focal dilation of the  right subclavian vein.    CHEST:  There is no suspicious FDG uptake in the chest.     The heart is grossly normal in size. Aorta and pulmonary artery are  normal in caliber and appearance. No central pulmonary  embolism.  Moderate coronary artery vascular calcifications. No mediastinal,  hilar or axillary lymphadenopathy.    The central tracheobronchial tree is clear. No focal consolidative  opacity. No pleural effusion. No pneumothorax. Mild bibasilar  atelectasis. No suspicious pulmonary nodules. Calcified right-sided  granuloma.    ABDOMEN AND PELVIS:  There is no suspicious FDG uptake in the abdomen or pelvis.    Several unchanged small subcentimeter hypodensities in the right liver  dome, favored to represent simple treated disease versus cyst. The  gallbladder is nondistended. No cholelithiasis. No intrahepatic or  extrahepatic biliary duct dilation. The spleen, pancreas and adrenal  glands are unremarkable.    Bilaterally symmetrically enhancing kidneys. No renal lesion. No  hydronephrosis. No hydroureter. The bladder is unremarkable. Pelvic  organs are normal.    Mild atherosclerotic calcifications of the distal abdominal aorta. The  abdominal aorta and major abdominal vessels are patent without  evidence of any residual dilation clinically significant stenosis.     Several prominent FDG avid right iliac and inguinal lymph nodes.  Increased size of right iliac lymph node measuring 2.6 x 1.8 cm with  SUV max of 14.49. Multiple FDG avid right inguinal lymph nodes, most  prominent with a maximum SUV of 19.74, previously 20.66.    LOWER EXTREMITIES:   No abnormal masses or hypermetabolic lesions.    BONES:   There are no suspicious lytic or blastic osseous lesions.  There is no  abnormal FDG uptake in the skeleton.  Impression: IMPRESSION: In this patient with history of metastatic melanoma there  is minimal progression of disease:    1. Multiple hypermetabolic enlarged inguinal and right iliac chain  lymph nodes, with slight increase in size of the right iliac chain  hypermetabolic lymph node compared with prior study 5/7/2021.  2. Unchanged subcentimeter hypodense hepatic lesions, may represent  treated disease.      I have personally reviewed the examination and initial interpretation  and I agree with the findings.    CARMEN GIL MD         SYSTEM ID:  [id number]        ASSESSMENT AND PLAN:    #1 Acral melanoma, right heel primary, pT4b pN3c M1, Stage IV  It was a pleasure to talk with Ms. Hoang. 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 She progressed on first line nivolumab, and had evidence of response to 2nd line ipilimumab and nivolumab. On nivolumab maintenance therapy she has started to show regrowth, particularly in the right inguinal node. There is concern that additional nivolumab will be associated with additional growth and then will show in the other tumors. She tried ipilimumab monotherapy and had only stable disease with increased itch. We will try ipilimumab plus nivolumab again .    -We will augment her treatment with dual checkpoint inhibition    #2 Arthritis, immunotherapy associated, grade 1  We will watch closely for worsening of immune mediated side effects and worsening of arthritis on anti-PD1 therapy. -Continue Ibuprofen 600 mg 4X per day or Aleve 220 mg twice daily. Take with food or it will irritate the stomach.  -continue using Voltaren gel    #3 Pruritis, grade 1  She finds this is manageable with the capsaicin cream. Encouraged emmolient creams containing oatmeal (ie Aveeno).   -Fexofenadine 180 mg once to twice daily, cetirizine (Zyrtec) 10 mg once to twice daily, Hydroxyzine (Vistaril) as needed  -continue capsaicin cream as needed  -oatmeal baths as needed    #4 Fatigue, multifactorial  History of GUERLINE (treated), on immunotherapy. Continue to monitor.    #5 History of depression and anxiety  Currently off treatment. Observe.    Multiple questions answered.       Gi Cartagena M.D.   of Medicine  Hematology, Oncology and Transplantation          Again, thank you for allowing me to  participate in the care of your patient.        Sincerely,        Gi Franklin MD

## 2021-08-02 ENCOUNTER — INFUSION THERAPY VISIT (OUTPATIENT)
Dept: INFUSION THERAPY | Facility: CLINIC | Age: 76
End: 2021-08-02
Attending: INTERNAL MEDICINE
Payer: COMMERCIAL

## 2021-08-02 ENCOUNTER — LAB (OUTPATIENT)
Dept: LAB | Facility: CLINIC | Age: 76
End: 2021-08-02
Attending: INTERNAL MEDICINE
Payer: COMMERCIAL

## 2021-08-02 VITALS
HEART RATE: 80 BPM | SYSTOLIC BLOOD PRESSURE: 118 MMHG | DIASTOLIC BLOOD PRESSURE: 67 MMHG | OXYGEN SATURATION: 98 % | RESPIRATION RATE: 16 BRPM | WEIGHT: 136.9 LBS | TEMPERATURE: 98 F | BODY MASS INDEX: 21.44 KG/M2

## 2021-08-02 DIAGNOSIS — C43.71 MALIGNANT MELANOMA OF RIGHT LOWER EXTREMITY INCLUDING HIP (H): ICD-10-CM

## 2021-08-02 DIAGNOSIS — Z11.59 NEED FOR HEPATITIS C SCREENING TEST: ICD-10-CM

## 2021-08-02 DIAGNOSIS — C43.9 MELANOMA OF SKIN (H): Primary | ICD-10-CM

## 2021-08-02 LAB
ALBUMIN SERPL-MCNC: 3.4 G/DL (ref 3.4–5)
ALP SERPL-CCNC: 62 U/L (ref 40–150)
ALT SERPL W P-5'-P-CCNC: 26 U/L (ref 0–50)
ANION GAP SERPL CALCULATED.3IONS-SCNC: <1 MMOL/L (ref 3–14)
AST SERPL W P-5'-P-CCNC: 15 U/L (ref 0–45)
BILIRUB SERPL-MCNC: 0.3 MG/DL (ref 0.2–1.3)
BUN SERPL-MCNC: 21 MG/DL (ref 7–30)
CALCIUM SERPL-MCNC: 9.6 MG/DL (ref 8.5–10.1)
CHLORIDE BLD-SCNC: 109 MMOL/L (ref 94–109)
CO2 SERPL-SCNC: 33 MMOL/L (ref 20–32)
CREAT SERPL-MCNC: 0.68 MG/DL (ref 0.52–1.04)
GFR SERPL CREATININE-BSD FRML MDRD: 85 ML/MIN/1.73M2
GLUCOSE BLD-MCNC: 83 MG/DL (ref 70–99)
HOLD SPECIMEN: NORMAL
HOLD SPECIMEN: NORMAL
POTASSIUM BLD-SCNC: 3.9 MMOL/L (ref 3.4–5.3)
PROT SERPL-MCNC: 7.2 G/DL (ref 6.8–8.8)
SODIUM SERPL-SCNC: 142 MMOL/L (ref 133–144)
TSH SERPL DL<=0.005 MIU/L-ACNC: 2.38 MU/L (ref 0.4–4)

## 2021-08-02 PROCEDURE — 36415 COLL VENOUS BLD VENIPUNCTURE: CPT | Performed by: INTERNAL MEDICINE

## 2021-08-02 PROCEDURE — 84443 ASSAY THYROID STIM HORMONE: CPT | Performed by: INTERNAL MEDICINE

## 2021-08-02 PROCEDURE — 99207 PR NO CHARGE LOS: CPT

## 2021-08-02 PROCEDURE — 96413 CHEMO IV INFUSION 1 HR: CPT | Performed by: NURSE PRACTITIONER

## 2021-08-02 PROCEDURE — 86803 HEPATITIS C AB TEST: CPT

## 2021-08-02 PROCEDURE — 80053 COMPREHEN METABOLIC PANEL: CPT | Performed by: INTERNAL MEDICINE

## 2021-08-02 PROCEDURE — 96417 CHEMO IV INFUS EACH ADDL SEQ: CPT | Performed by: NURSE PRACTITIONER

## 2021-08-02 RX ORDER — NALOXONE HYDROCHLORIDE 0.4 MG/ML
0.2 INJECTION, SOLUTION INTRAMUSCULAR; INTRAVENOUS; SUBCUTANEOUS
Status: CANCELLED | OUTPATIENT
Start: 2021-08-02

## 2021-08-02 RX ORDER — ALBUTEROL SULFATE 0.83 MG/ML
2.5 SOLUTION RESPIRATORY (INHALATION)
Status: CANCELLED | OUTPATIENT
Start: 2021-08-02

## 2021-08-02 RX ORDER — HEPARIN SODIUM,PORCINE 10 UNIT/ML
5 VIAL (ML) INTRAVENOUS
Status: CANCELLED | OUTPATIENT
Start: 2021-08-02

## 2021-08-02 RX ORDER — LORAZEPAM 2 MG/ML
0.5 INJECTION INTRAMUSCULAR EVERY 4 HOURS PRN
Status: CANCELLED | OUTPATIENT
Start: 2021-08-02

## 2021-08-02 RX ORDER — HEPARIN SODIUM (PORCINE) LOCK FLUSH IV SOLN 100 UNIT/ML 100 UNIT/ML
5 SOLUTION INTRAVENOUS
Status: CANCELLED | OUTPATIENT
Start: 2021-08-02

## 2021-08-02 RX ORDER — MEPERIDINE HYDROCHLORIDE 25 MG/ML
25 INJECTION INTRAMUSCULAR; INTRAVENOUS; SUBCUTANEOUS EVERY 30 MIN PRN
Status: CANCELLED | OUTPATIENT
Start: 2021-08-02

## 2021-08-02 RX ORDER — EPINEPHRINE 1 MG/ML
0.3 INJECTION, SOLUTION INTRAMUSCULAR; SUBCUTANEOUS EVERY 5 MIN PRN
Status: CANCELLED | OUTPATIENT
Start: 2021-08-02

## 2021-08-02 RX ORDER — DIPHENHYDRAMINE HYDROCHLORIDE 50 MG/ML
50 INJECTION INTRAMUSCULAR; INTRAVENOUS
Status: CANCELLED
Start: 2021-08-02

## 2021-08-02 RX ORDER — ALBUTEROL SULFATE 90 UG/1
1-2 AEROSOL, METERED RESPIRATORY (INHALATION)
Status: CANCELLED
Start: 2021-08-02

## 2021-08-02 RX ORDER — METHYLPREDNISOLONE SODIUM SUCCINATE 125 MG/2ML
125 INJECTION, POWDER, LYOPHILIZED, FOR SOLUTION INTRAMUSCULAR; INTRAVENOUS
Status: CANCELLED
Start: 2021-08-02

## 2021-08-02 RX ADMIN — Medication 250 ML: at 10:34

## 2021-08-02 ASSESSMENT — PAIN SCALES - GENERAL: PAINLEVEL: NO PAIN (0)

## 2021-08-02 NOTE — PROGRESS NOTES
Infusion Nursing Note:  Yadira Hoang presents today for Yervoy/Opdivo.    Patient seen by provider today: No   present during visit today: Not Applicable.    Note: patient struggling with intake.  Her appetite has diminished.  She has spoken with dietician and is working with supplements to increase calories and protein.  She also notes fatigue with this regimen, but will use energy sparing and rest when needed.      Intravenous Access:  Peripheral IV placed.    Treatment Conditions:  Results reviewed, labs MET treatment parameters, ok to proceed with treatment.      Post Infusion Assessment:  Patient tolerated infusion without incident.       Discharge Plan:   RTC in 3 wks.  Patient appts pending scheduling per MD checkout 7/30/21.      CHRISTOPHE BEST RN

## 2021-08-06 ENCOUNTER — OFFICE VISIT (OUTPATIENT)
Dept: FAMILY MEDICINE | Facility: CLINIC | Age: 76
End: 2021-08-06
Payer: COMMERCIAL

## 2021-08-06 VITALS
HEIGHT: 67 IN | OXYGEN SATURATION: 96 % | TEMPERATURE: 98.4 F | SYSTOLIC BLOOD PRESSURE: 116 MMHG | DIASTOLIC BLOOD PRESSURE: 68 MMHG | HEART RATE: 97 BPM | WEIGHT: 137.8 LBS | BODY MASS INDEX: 21.63 KG/M2 | RESPIRATION RATE: 20 BRPM

## 2021-08-06 DIAGNOSIS — Z11.59 NEED FOR HEPATITIS C SCREENING TEST: ICD-10-CM

## 2021-08-06 DIAGNOSIS — D84.9 IMMUNODEFICIENCY, UNSPECIFIED (H): ICD-10-CM

## 2021-08-06 DIAGNOSIS — C43.71 MALIGNANT MELANOMA OF RIGHT LOWER EXTREMITY INCLUDING HIP (H): ICD-10-CM

## 2021-08-06 DIAGNOSIS — R10.13 EPIGASTRIC PAIN: Primary | ICD-10-CM

## 2021-08-06 PROCEDURE — 99214 OFFICE O/P EST MOD 30 MIN: CPT | Performed by: INTERNAL MEDICINE

## 2021-08-06 RX ORDER — OMEPRAZOLE 20 MG/1
20 TABLET, DELAYED RELEASE ORAL DAILY
Qty: 30 TABLET | Refills: 0 | Status: SHIPPED | OUTPATIENT
Start: 2021-08-06 | End: 2021-08-23

## 2021-08-06 ASSESSMENT — ANXIETY QUESTIONNAIRES
GAD7 TOTAL SCORE: 10
7. FEELING AFRAID AS IF SOMETHING AWFUL MIGHT HAPPEN: NOT AT ALL
8. IF YOU CHECKED OFF ANY PROBLEMS, HOW DIFFICULT HAVE THESE MADE IT FOR YOU TO DO YOUR WORK, TAKE CARE OF THINGS AT HOME, OR GET ALONG WITH OTHER PEOPLE?: SOMEWHAT DIFFICULT

## 2021-08-06 ASSESSMENT — MIFFLIN-ST. JEOR: SCORE: 1142.69

## 2021-08-06 NOTE — PROGRESS NOTES
Assessment & Plan     Yadira Ochoa was seen today for abdominal pain.    Diagnoses and all orders for this visit:    Epigastric pain  Comments:  Peptic ulcer versus gastritis versus other.  Orders:  -     Helicobacter pylori Antigen Stool; Future  -     omeprazole (PRILOSEC OTC) 20 MG EC tablet; Take 1 tablet (20 mg) by mouth daily    Need for hepatitis C screening test  -     Hepatitis C Screen Reflex to HCV RNA Quant and Genotype; Future    Immunodeficiency, unspecified (H)  Comments:  No active issue    Malignant melanoma of right lower extremity including hip (H)  Comments:  PET scan did not reveal any cancer recurrence    Other orders  -     REVIEW OF HEALTH MAINTENANCE PROTOCOL ORDERS          Return in about 1 month (around 9/7/2021) for wellness visit.  Follow-up on epigastric pain.    Erika Blantno MD PhD  [facility]    Aylin May is a 76 year old who presents for the following health issues     History of malignant melanoma. Has had surgery. Since then, she has lost her appetite. She got dehydrated and had to get IV fluids in June. Has met with nutritionist. Advised to take 1600 kcal per day, take Pro-Performance for higher protein.  Patient reported she normally does not eat too much.  She can imagine eating 1600 pradeep/day.    Sometimes it hurts after she eats, sometimes it just hurts. No diarrhea. Has intermittent constipation.     She had a recent PET scan on July 30, 2021.  No abnormal uptake in the stomach or intra-abdominal region.    She has also had routine monitoring labs through oncology.  CBC CMP TSH were all unremarkable.  No anemia noted.       Abdominal/Flank Pain  Onset/Duration: Abdominal for a few months  Description:   Character: Sharp and Fullness  Location: epigastric region  Radiation: None  Intensity: severe  Progression of Symptoms:  worsening and constant  Accompanying Signs & Symptoms:  Fever/Chills: no  Gas/Bloating: YES  Nausea: no  Vomitting:  "no  Diarrhea: no  Constipation: YES  Dysuria or Hematuria: no  History:   Trauma: no  Previous similar pain: no  Previous tests done: none  Precipitating factors:   Does the pain change with:     Food: YES    Bowel Movement: YES    Urination: no   Other factors:  no  Therapies tried and outcome: None  No LMP recorded. Patient is postmenopausal.      Review of Systems   Constitutional, HEENT, cardiovascular, pulmonary, gi and gu systems are negative, except as otherwise noted.      Objective    /68 (BP Location: Right arm, Patient Position: Sitting, Cuff Size: Adult Regular)   Pulse 97   Temp 98.4  F (36.9  C) (Tympanic)   Resp 20   Ht 1.702 m (5' 7\")   Wt 62.5 kg (137 lb 12.8 oz)   SpO2 96%   BMI 21.58 kg/m    Body mass index is 21.58 kg/m .  Physical Exam   GENERAL: healthy, alert and no distress  ABDOMEN: soft, mild epigastric tenderness no hepatosplenomegaly, no masses and bowel sounds normal  PSYCH: mentation appears normal, affect down    Component      Latest Ref Rng & Units 6/30/2021 7/12/2021 8/2/2021   WBC      4.0 - 11.0 10e9/L 5.4     RBC Count      3.8 - 5.2 10e12/L 4.31     Hemoglobin      11.7 - 15.7 g/dL 13.0     Hematocrit      35.0 - 47.0 % 39.2     MCV      78 - 100 fl 91     MCH      26.5 - 33.0 pg 30.2     MCHC      31.5 - 36.5 g/dL 33.2     RDW      10.0 - 15.0 % 13.0     Platelet Count      150 - 450 10e9/L 226     Diff Method       Automated Method     % Neutrophils      % 85.2     % Lymphocytes      % 9.4     % Monocytes      % 3.3     % Eosinophils      % 1.3     % Basophils      % 0.4     % Immature Granulocytes      % 0.4     Nucleated RBCs      0 /100 0     Absolute Neutrophil      1.6 - 8.3 10e9/L 4.6     Absolute Lymphocytes      0.8 - 5.3 10e9/L 0.5 (L)     Absolute Monocytes      0.0 - 1.3 10e9/L 0.2     Absolute Eosinophils      0.0 - 0.7 10e9/L 0.1     Absolute Basophils      0.0 - 0.2 10e9/L 0.0     Abs Immature Granulocytes      0 - 0.4 10e9/L 0.0     Absolute " Nucleated RBC       0.0     Sodium      133 - 144 mmol/L 135 137 142   Potassium      3.4 - 5.3 mmol/L 3.3 (L) 4.2 3.9   Chloride      94 - 109 mmol/L 100 103 109   Carbon Dioxide      20 - 32 mmol/L 30 32 33 (H)   Anion Gap      3 - 14 mmol/L 5 2 (L) <1 (L)   Glucose      70 - 99 mg/dL 108 (H) 75 83   Urea Nitrogen      7 - 30 mg/dL 17 27 21   Creatinine      0.52 - 1.04 mg/dL 0.67 0.76 0.68   GFR Estimate      >60 mL/min/1.73m2 85 76 85   GFR Estimate If Black      >60 mL/min/1.73:m2 >90     Calcium      8.5 - 10.1 mg/dL 9.2 9.2 9.6   Bilirubin Total      0.2 - 1.3 mg/dL 0.7 0.4 0.3   Albumin      3.4 - 5.0 g/dL 3.4 3.1 (L) 3.4   Protein Total      6.8 - 8.8 g/dL 7.3 7.5 7.2   Alkaline Phosphatase      40 - 150 U/L 62     ALT      0 - 50 U/L 26 37 26   AST      0 - 45 U/L 26 16 15   Alkaline Phosphatase      40 - 150 U/L  73 62   Lipase      73 - 393 U/L 83     TSH      0.40 - 4.00 mU/L  1.72 2.38       Recent Results (from the past 744 hour(s))   PET Oncology Whole Body    Narrative    Combined Report of:    PET and CT on  7/30/2021 11:24 AM :    1. PET of the neck, chest, abdomen, and pelvis.  2. PET CT Fusion for Attenuation Correction and Anatomical  Localization:    3. Diagnostic CT scan of the chest, abdomen, and pelvis with  intravenous contrast for interpretation.  3. CT of the chest, abdomen and pelvis obtained for diagnostic  interpretation.  4. 3D MIP and PET-CT fused images were processed on an independent  workstation and archived to PACS and reviewed by a radiologist.    Technique:    1. PET: The patient received 12.17 cm mCi of F-18-FDG; the serum  glucose was 95 prior to administration, body weight was 62.3 kg.  Images were evaluated in the axial, sagittal, and coronal planes as  well as the rotational whole body MIP. Images were acquired from the  Vertex to the Feet.    UPTAKE WAS MEASURED AT 60 MINUTES.     BACKGROUND:  Liver SUV max= 3.11,   Aorta Blood SUV Max: 2.96.     2. CT: Volumetric  acquisition for clinical interpretation of the  chest, abdomen, and pelvis acquired at 3 mm sections . The chest,  abdomen, and pelvis were evaluated at 5 mm sections in bone, soft  tissue, and lung windows.      The patient received 84 cc of Isovue 370 intravenously for the  examination.    --    3. 3D MIP and PET-CT fused images were processed on an independent  workstation and archived to PACS and reviewed by a radiologist.    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    COMPARISON: PET CT 5/7/2021, 2/12/2021    FINDINGS:     HEAD/NECK:  There is no  suspicious FDG uptake in the neck.     The paranasal sinuses are clear. The mastoid air cells are clear.     The mucosal pharyngeal space, the , prevertebral and carotid  spaces are within normal limits.     No masses, mass effect or pathologically enlarged lymph nodes are  evident. The thyroid gland is unremarkable.    Atherosclerosis of bilateral vertebral arteries. Focal dilation of the  right subclavian vein.    CHEST:  There is no suspicious FDG uptake in the chest.     The heart is grossly normal in size. Aorta and pulmonary artery are  normal in caliber and appearance. No central pulmonary embolism.  Moderate coronary artery vascular calcifications. No mediastinal,  hilar or axillary lymphadenopathy.    The central tracheobronchial tree is clear. No focal consolidative  opacity. No pleural effusion. No pneumothorax. Mild bibasilar  atelectasis. No suspicious pulmonary nodules. Calcified right-sided  granuloma.    ABDOMEN AND PELVIS:  There is no suspicious FDG uptake in the abdomen or pelvis.    Several unchanged small subcentimeter hypodensities in the right liver  dome, favored to represent simple treated disease versus cyst. The  gallbladder is nondistended. No cholelithiasis. No intrahepatic  or  extrahepatic biliary duct dilation. The spleen, pancreas and adrenal  glands are unremarkable.    Bilaterally symmetrically enhancing kidneys. No renal lesion. No  hydronephrosis. No hydroureter. The bladder is unremarkable. Pelvic  organs are normal.    Mild atherosclerotic calcifications of the distal abdominal aorta. The  abdominal aorta and major abdominal vessels are patent without  evidence of any residual dilation clinically significant stenosis.     Several prominent FDG avid right iliac and inguinal lymph nodes.  Increased size of right iliac lymph node measuring 2.6 x 1.8 cm with  SUV max of 14.49. Multiple FDG avid right inguinal lymph nodes, most  prominent with a maximum SUV of 19.74, previously 20.66.    LOWER EXTREMITIES:   No abnormal masses or hypermetabolic lesions.    BONES:   There are no suspicious lytic or blastic osseous lesions.  There is no  abnormal FDG uptake in the skeleton.      Impression    IMPRESSION: In this patient with history of metastatic melanoma there  is minimal progression of disease:    1. Multiple hypermetabolic enlarged inguinal and right iliac chain  lymph nodes, with slight increase in size of the right iliac chain  hypermetabolic lymph node compared with prior study 5/7/2021.  2. Unchanged subcentimeter hypodense hepatic lesions, may represent  treated disease.     I have personally reviewed the examination and initial interpretation  and I agree with the findings.    CARMEN GIL MD         SYSTEM ID:  [id number]   CT Chest/Abdomen/Pelvis w Contrast    Narrative    Combined Report of:    PET and CT on  7/30/2021 11:24 AM :    1. PET of the neck, chest, abdomen, and pelvis.  2. PET CT Fusion for Attenuation Correction and Anatomical  Localization:    3. Diagnostic CT scan of the chest, abdomen, and pelvis with  intravenous contrast for interpretation.  3. CT of the chest, abdomen and pelvis obtained for diagnostic  interpretation.  4. 3D MIP and PET-CT fused images  were processed on an independent  workstation and archived to PACS and reviewed by a radiologist.    Technique:    1. PET: The patient received 12.17 cm mCi of F-18-FDG; the serum  glucose was 95 prior to administration, body weight was 62.3 kg.  Images were evaluated in the axial, sagittal, and coronal planes as  well as the rotational whole body MIP. Images were acquired from the  Vertex to the Feet.    UPTAKE WAS MEASURED AT 60 MINUTES.     BACKGROUND:  Liver SUV max= 3.11,   Aorta Blood SUV Max: 2.96.     2. CT: Volumetric acquisition for clinical interpretation of the  chest, abdomen, and pelvis acquired at 3 mm sections . The chest,  abdomen, and pelvis were evaluated at 5 mm sections in bone, soft  tissue, and lung windows.      The patient received 84 cc of Isovue 370 intravenously for the  examination.    --    3. 3D MIP and PET-CT fused images were processed on an independent  workstation and archived to PACS and reviewed by a radiologist.    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    COMPARISON: PET CT 5/7/2021, 2/12/2021    FINDINGS:     HEAD/NECK:  There is no  suspicious FDG uptake in the neck.     The paranasal sinuses are clear. The mastoid air cells are clear.     The mucosal pharyngeal space, the , prevertebral and carotid  spaces are within normal limits.     No masses, mass effect or pathologically enlarged lymph nodes are  evident. The thyroid gland is unremarkable.    Atherosclerosis of bilateral vertebral arteries. Focal dilation of the  right subclavian vein.    CHEST:  There is no suspicious FDG uptake in the chest.     The heart is grossly normal in size. Aorta and pulmonary artery are  normal in caliber and appearance. No central pulmonary embolism.  Moderate coronary artery vascular calcifications. No mediastinal,  hilar or  axillary lymphadenopathy.    The central tracheobronchial tree is clear. No focal consolidative  opacity. No pleural effusion. No pneumothorax. Mild bibasilar  atelectasis. No suspicious pulmonary nodules. Calcified right-sided  granuloma.    ABDOMEN AND PELVIS:  There is no suspicious FDG uptake in the abdomen or pelvis.    Several unchanged small subcentimeter hypodensities in the right liver  dome, favored to represent simple treated disease versus cyst. The  gallbladder is nondistended. No cholelithiasis. No intrahepatic or  extrahepatic biliary duct dilation. The spleen, pancreas and adrenal  glands are unremarkable.    Bilaterally symmetrically enhancing kidneys. No renal lesion. No  hydronephrosis. No hydroureter. The bladder is unremarkable. Pelvic  organs are normal.    Mild atherosclerotic calcifications of the distal abdominal aorta. The  abdominal aorta and major abdominal vessels are patent without  evidence of any residual dilation clinically significant stenosis.     Several prominent FDG avid right iliac and inguinal lymph nodes.  Increased size of right iliac lymph node measuring 2.6 x 1.8 cm with  SUV max of 14.49. Multiple FDG avid right inguinal lymph nodes, most  prominent with a maximum SUV of 19.74, previously 20.66.    LOWER EXTREMITIES:   No abnormal masses or hypermetabolic lesions.    BONES:   There are no suspicious lytic or blastic osseous lesions.  There is no  abnormal FDG uptake in the skeleton.      Impression    IMPRESSION: In this patient with history of metastatic melanoma there  is minimal progression of disease:    1. Multiple hypermetabolic enlarged inguinal and right iliac chain  lymph nodes, with slight increase in size of the right iliac chain  hypermetabolic lymph node compared with prior study 5/7/2021.  2. Unchanged subcentimeter hypodense hepatic lesions, may represent  treated disease.     I have personally reviewed the examination and initial interpretation  and I  agree with the findings.    CARMEN GIL MD         SYSTEM ID:  [id number]

## 2021-08-06 NOTE — PATIENT INSTRUCTIONS
GERD (Gastro Esophageal Reflux Disorder)  Treatment  Anti-reflux medication and dietary precautions are the first line of defense. Functional voice therapy is useful to teach techniques for reducing effortful compensation and instruct the individual in improved vocal hygiene.  At the [facility], we do not hand out a list of foods and beverages that must be avoided. Rather, we educate about types of foods and beverages known to cause reflux and encourage the individual to systematically investigate which foods stimulate their own reflux. Also, the individual is encouraged to manage reflux under the care of a Gastroenterologist (gastrointestinal specialist).  Interested readers are encouraged to look at the website of the [facility] at [facility] for a more in depth discussion of GERD and the voice (see our Links page).  Lifestyle changes that may help reduce symptoms of GERD/LPRD  eat smaller meals more frequently throughout the day, rather than three large meals   elevate the head of your bed 2-3 inches (don't just use extra pillows for your head)   avoid clothes that fit tightly around the waist   avoid lying down within 2-3 hours of eating (don't eat dinner late at night)   Types of foods known to trigger increased stomach acid  spicy food (such as chili or jalapeño peppers, Greek or Szechuan spices)   acidic foods such as tomato products or citrus products   greasy foods   caffeine   alcohol   carbonation   roughage, such as popcorn and peanuts, or raw vegetables   dairy products   strong mint such as peppermint candies   chocolate   onion  garlic    Reading this list might make you think you can only eat bread and oatmeal for the rest of your life. Fortunately, most individuals are not triggered by everything on this list. For example, for every person who is lactose intolerant and has problems with dairy products, there may be someone else whose digestive system is calmed by  milk. Also, in our experience, few persons are triggered by peppermints or chocolate. Therefore, we recommend that you experiment with your own dietary habits, changing one food class at a time. Also, if you have recently started taking anti-reflux medications, you may want to wait for several months, to see how the medication works without any change in your dietary habits.

## 2021-08-08 LAB — HCV AB SERPL QL IA: NONREACTIVE

## 2021-08-10 ENCOUNTER — LAB (OUTPATIENT)
Dept: LAB | Facility: CLINIC | Age: 76
End: 2021-08-10
Payer: COMMERCIAL

## 2021-08-10 DIAGNOSIS — R10.13 EPIGASTRIC PAIN: ICD-10-CM

## 2021-08-10 PROCEDURE — 87338 HPYLORI STOOL AG IA: CPT

## 2021-08-11 LAB — H PYLORI AG STL QL IA: NEGATIVE

## 2021-08-11 NOTE — RESULT ENCOUNTER NOTE
Dear Yadira,   Your recent test results showed the following:  --No H. pylori infection.  This is good news.  Continue with omeprazole and follow-up as planned    Please call or Mychart to our office if you have further questions.     Erika Blanton MD-PhD

## 2021-08-17 DIAGNOSIS — C43.71 MALIGNANT MELANOMA OF RIGHT LOWER EXTREMITY INCLUDING HIP (H): ICD-10-CM

## 2021-08-17 DIAGNOSIS — C43.9 MELANOMA OF SKIN (H): ICD-10-CM

## 2021-08-17 DIAGNOSIS — C79.51 SECONDARY MALIGNANT NEOPLASM OF BONE (H): Primary | ICD-10-CM

## 2021-08-23 ENCOUNTER — TELEPHONE (OUTPATIENT)
Dept: FAMILY MEDICINE | Facility: CLINIC | Age: 76
End: 2021-08-23

## 2021-08-23 ENCOUNTER — LAB (OUTPATIENT)
Dept: LAB | Facility: CLINIC | Age: 76
End: 2021-08-23
Payer: COMMERCIAL

## 2021-08-23 ENCOUNTER — INFUSION THERAPY VISIT (OUTPATIENT)
Dept: INFUSION THERAPY | Facility: CLINIC | Age: 76
End: 2021-08-23
Payer: COMMERCIAL

## 2021-08-23 VITALS
OXYGEN SATURATION: 95 % | SYSTOLIC BLOOD PRESSURE: 122 MMHG | RESPIRATION RATE: 12 BRPM | BODY MASS INDEX: 21.57 KG/M2 | TEMPERATURE: 98.1 F | WEIGHT: 137.7 LBS | DIASTOLIC BLOOD PRESSURE: 73 MMHG | HEART RATE: 96 BPM

## 2021-08-23 DIAGNOSIS — N95.2 ATROPHIC VAGINITIS: ICD-10-CM

## 2021-08-23 DIAGNOSIS — C43.9 MELANOMA OF SKIN (H): Primary | ICD-10-CM

## 2021-08-23 DIAGNOSIS — C43.71 MALIGNANT MELANOMA OF RIGHT LOWER EXTREMITY INCLUDING HIP (H): ICD-10-CM

## 2021-08-23 DIAGNOSIS — R47.02 DYSPHASIA: Primary | ICD-10-CM

## 2021-08-23 LAB
ALBUMIN SERPL-MCNC: 3.7 G/DL (ref 3.4–5)
ALP SERPL-CCNC: 71 U/L (ref 40–150)
ALT SERPL W P-5'-P-CCNC: 150 U/L (ref 0–50)
ANION GAP SERPL CALCULATED.3IONS-SCNC: 3 MMOL/L (ref 3–14)
AST SERPL W P-5'-P-CCNC: 70 U/L (ref 0–45)
BILIRUB SERPL-MCNC: 0.4 MG/DL (ref 0.2–1.3)
BUN SERPL-MCNC: 23 MG/DL (ref 7–30)
CALCIUM SERPL-MCNC: 10 MG/DL (ref 8.5–10.1)
CHLORIDE BLD-SCNC: 105 MMOL/L (ref 94–109)
CO2 SERPL-SCNC: 34 MMOL/L (ref 20–32)
CREAT SERPL-MCNC: 0.76 MG/DL (ref 0.52–1.04)
GFR SERPL CREATININE-BSD FRML MDRD: 76 ML/MIN/1.73M2
GLUCOSE BLD-MCNC: 156 MG/DL (ref 70–99)
HOLD SPECIMEN: NORMAL
HOLD SPECIMEN: NORMAL
POTASSIUM BLD-SCNC: 4.3 MMOL/L (ref 3.4–5.3)
PROT SERPL-MCNC: 7.7 G/DL (ref 6.8–8.8)
SODIUM SERPL-SCNC: 142 MMOL/L (ref 133–144)
TSH SERPL DL<=0.005 MIU/L-ACNC: 2.58 MU/L (ref 0.4–4)

## 2021-08-23 PROCEDURE — 99207 PR NO CHARGE LOS: CPT

## 2021-08-23 PROCEDURE — 36415 COLL VENOUS BLD VENIPUNCTURE: CPT | Performed by: INTERNAL MEDICINE

## 2021-08-23 PROCEDURE — 80053 COMPREHEN METABOLIC PANEL: CPT | Performed by: INTERNAL MEDICINE

## 2021-08-23 PROCEDURE — 96417 CHEMO IV INFUS EACH ADDL SEQ: CPT | Performed by: NURSE PRACTITIONER

## 2021-08-23 PROCEDURE — 84443 ASSAY THYROID STIM HORMONE: CPT | Performed by: INTERNAL MEDICINE

## 2021-08-23 PROCEDURE — 96413 CHEMO IV INFUSION 1 HR: CPT | Performed by: NURSE PRACTITIONER

## 2021-08-23 RX ORDER — LORAZEPAM 2 MG/ML
0.5 INJECTION INTRAMUSCULAR EVERY 4 HOURS PRN
Status: CANCELLED | OUTPATIENT
Start: 2021-08-23

## 2021-08-23 RX ORDER — HEPARIN SODIUM,PORCINE 10 UNIT/ML
5 VIAL (ML) INTRAVENOUS
Status: CANCELLED | OUTPATIENT
Start: 2021-08-23

## 2021-08-23 RX ORDER — NALOXONE HYDROCHLORIDE 0.4 MG/ML
0.2 INJECTION, SOLUTION INTRAMUSCULAR; INTRAVENOUS; SUBCUTANEOUS
Status: CANCELLED | OUTPATIENT
Start: 2021-08-23

## 2021-08-23 RX ORDER — METHYLPREDNISOLONE SODIUM SUCCINATE 125 MG/2ML
125 INJECTION, POWDER, LYOPHILIZED, FOR SOLUTION INTRAMUSCULAR; INTRAVENOUS
Status: CANCELLED
Start: 2021-08-23

## 2021-08-23 RX ORDER — ALBUTEROL SULFATE 0.83 MG/ML
2.5 SOLUTION RESPIRATORY (INHALATION)
Status: CANCELLED | OUTPATIENT
Start: 2021-08-23

## 2021-08-23 RX ORDER — EPINEPHRINE 1 MG/ML
0.3 INJECTION, SOLUTION INTRAMUSCULAR; SUBCUTANEOUS EVERY 5 MIN PRN
Status: CANCELLED | OUTPATIENT
Start: 2021-08-23

## 2021-08-23 RX ORDER — HEPARIN SODIUM (PORCINE) LOCK FLUSH IV SOLN 100 UNIT/ML 100 UNIT/ML
5 SOLUTION INTRAVENOUS
Status: CANCELLED | OUTPATIENT
Start: 2021-08-23

## 2021-08-23 RX ORDER — MEPERIDINE HYDROCHLORIDE 25 MG/ML
25 INJECTION INTRAMUSCULAR; INTRAVENOUS; SUBCUTANEOUS EVERY 30 MIN PRN
Status: CANCELLED | OUTPATIENT
Start: 2021-08-23

## 2021-08-23 RX ORDER — DIPHENHYDRAMINE HYDROCHLORIDE 50 MG/ML
50 INJECTION INTRAMUSCULAR; INTRAVENOUS
Status: CANCELLED
Start: 2021-08-23

## 2021-08-23 RX ORDER — FAMOTIDINE 40 MG/1
40 TABLET, FILM COATED ORAL 2 TIMES DAILY
Qty: 60 TABLET | Refills: 1 | Status: SHIPPED | OUTPATIENT
Start: 2021-08-23 | End: 2021-09-07

## 2021-08-23 RX ORDER — ALBUTEROL SULFATE 90 UG/1
1-2 AEROSOL, METERED RESPIRATORY (INHALATION)
Status: CANCELLED
Start: 2021-08-23

## 2021-08-23 RX ADMIN — Medication 250 ML: at 13:57

## 2021-08-23 NOTE — PROGRESS NOTES
Infusion Nursing Note:  Yadira Hoang presents today for C2D1 Opdivo/Yervoy.   Patient seen by provider today: No   present during visit today: Not Applicable.    Note:   Having decreased appetite.    Had difficulty recalling medication list.    Taking two Tylenol per day due to headaches. States this could attribute to elevated ALT/AST.    Paged Dr Franklin whom states okay to proceed with today's elevated liver functions.    Intravenous Access:  Peripheral IV placed.    Treatment Conditions:  Lab Results   Component Value Date    HGB 13.0 06/30/2021     Lab Results   Component Value Date    WBC 5.4 06/30/2021      Lab Results   Component Value Date    ANEU 4.6 06/30/2021     Lab Results   Component Value Date     06/30/2021      Lab Results   Component Value Date     08/23/2021     06/30/2021                   Lab Results   Component Value Date    POTASSIUM 4.3 08/23/2021    POTASSIUM 3.3 06/30/2021           No results found for: MAG         Lab Results   Component Value Date    CR 0.76 08/23/2021    CR 0.67 06/30/2021                   Lab Results   Component Value Date    GABY 10.0 08/23/2021    GABY 9.2 06/30/2021                Lab Results   Component Value Date    BILITOTAL 0.4 08/23/2021    BILITOTAL 0.7 06/30/2021           Lab Results   Component Value Date    ALBUMIN 3.7 08/23/2021    ALBUMIN 3.4 06/30/2021                    Lab Results   Component Value Date     08/23/2021    ALT 26 06/30/2021           Lab Results   Component Value Date    AST 70 08/23/2021    AST 26 06/30/2021       Results reviewed, labs did NOT meet treatment parameters: Paged Dr Franklin and received consent to proceed with today's infusion.      Post Infusion Assessment:  Patient tolerated infusion without incident.  Blood return noted pre and post infusion.  Site patent and intact, free from redness, edema or discomfort.  No evidence of extravasations.  Access discontinued per protocol.        Discharge Plan:   AVS to patient via MYCHART.  Patient will return 9/13/21 for next appointment.   Patient discharged in stable condition accompanied by: self.  Departure Mode: Ambulatory.      Marguerite Mccloud RN

## 2021-08-23 NOTE — TELEPHONE ENCOUNTER
Patient has follow up with you on 9/7/21 for her epigastric pain.  You had started her on omeprazole 20mg; take one tablet by mouth daily.  She states that every time that she takes it she has diarrhea.  She states can't take the medication when has to go to her oncology appointment/infusion appts.  She states can have 3-4 episodes of very loose stools daily on days she takes the omeprazole.  No known blood in stools.  No vomiting.  Patient states that she can still feel the epigastric discomfort; states takes omeprazole in morning and by evening she can feel the discomfort returning.  On days when has appts/oncology infusions she states has the epigastric/stomach pain most of day because can't take the omeprazole due to diarrhea.  Asking if there is anything else she can do for the ongoing epigastric/upper stomach pain.  She has infusion today and out 11:30 to 3:30.  If changing prescription can leave that information on her voicemail; if wanting to speak with patient will need to call after 3:30.  [facility].  Alae Knutson RN

## 2021-08-23 NOTE — TELEPHONE ENCOUNTER
Please call patient I sent her Pepcid to try.  Pepcid needs to be taken twice a day.  If this does not help, I recommend doing a diagnostic upper endoscopy to evaluate the abdominal pain

## 2021-08-24 RX ORDER — ESTRADIOL 0.1 MG/G
2 CREAM VAGINAL
Qty: 42.5 G | Refills: 1 | Status: SHIPPED | OUTPATIENT
Start: 2021-08-26 | End: 2022-01-01

## 2021-08-24 NOTE — TELEPHONE ENCOUNTER
Informed pt of message below     She also stated she needs a refill on her estradiol. Pharmacy selected    Zhane Leiva MA

## 2021-08-26 ENCOUNTER — TELEPHONE (OUTPATIENT)
Dept: ONCOLOGY | Facility: CLINIC | Age: 76
End: 2021-08-26

## 2021-08-26 NOTE — TELEPHONE ENCOUNTER
Nutrition Services - phone call    Received phone call from Yadira laguna indicating that she has not been able to tolerate the Pro Performance Bulk 1340 shake as it has been giving her diarrhea.     She does not correlate the diarrhea with previously suggested Mg Citrate for her ongoing constipation.      Upon taking Mg Citrate, she was able to get her bowels under control, having a soft BM a few times/day.      She stopped taking the PP Bulk powder and has normal BM.     She is looking for ideas for nutrition shakes to help her nutrition as her appetite has been low and has an upset stomach.     Her MD Rx Pepcid on 8/23.  She is waiting for this to kick in.     She has allergies to fish, sea food, soy, gluten, corn, onions and garlic.  She also avoids processed foods.    Weight trends: stable   Wt Readings from Last 6 Encounters:   08/23/21 62.5 kg (137 lb 11.2 oz)   08/06/21 62.5 kg (137 lb 12.8 oz)   08/02/21 62.1 kg (136 lb 14.4 oz)   07/30/21 63 kg (139 lb)   07/12/21 62.3 kg (137 lb 4.8 oz)   07/09/21 60.3 kg (133 lb)         Interventions:  Provided nutrition shake and milk recommendations ([place] whole milk), mixed in with fruit, nut butters, ground flax etc.   Provided list of bland foods to try such as mashed potatoes, dairy free mac n cheese (made with Daiya cheese), cream of rice cereal etc.      Encouraged to call with further nutrition questions or concerns.     Kimberly Leiva RDN, PANFILON  [facility]  [phone]

## 2021-08-31 ENCOUNTER — TELEPHONE (OUTPATIENT)
Dept: FAMILY MEDICINE | Facility: CLINIC | Age: 76
End: 2021-08-31

## 2021-08-31 ENCOUNTER — VIRTUAL VISIT (OUTPATIENT)
Dept: FAMILY MEDICINE | Facility: CLINIC | Age: 76
End: 2021-08-31
Payer: COMMERCIAL

## 2021-08-31 DIAGNOSIS — C43.71 MALIGNANT MELANOMA OF RIGHT LOWER EXTREMITY INCLUDING HIP (H): ICD-10-CM

## 2021-08-31 DIAGNOSIS — R19.7 DIARRHEA, UNSPECIFIED TYPE: ICD-10-CM

## 2021-08-31 DIAGNOSIS — R53.1 WEAKNESS: ICD-10-CM

## 2021-08-31 DIAGNOSIS — R10.13 ABDOMINAL PAIN, EPIGASTRIC: Primary | ICD-10-CM

## 2021-08-31 DIAGNOSIS — D84.9 IMMUNODEFICIENCY, UNSPECIFIED (H): ICD-10-CM

## 2021-08-31 DIAGNOSIS — F41.1 GAD (GENERALIZED ANXIETY DISORDER): ICD-10-CM

## 2021-08-31 PROCEDURE — 99215 OFFICE O/P EST HI 40 MIN: CPT | Mod: 95 | Performed by: FAMILY MEDICINE

## 2021-08-31 NOTE — TELEPHONE ENCOUNTER
"Patient called to office regarding her ongoing stomach discomfort and diarrhea.    Has had abdominal pain/epigastric pain ongoing now for last several weeks. Was seen by PCP on 8/6/21 for this problem, prescribed Omeprazole for stomach. H.Pylori test was negative.  Patient started developing diarrhea, did not think she was tolerating Omeprazole well, was changed to Famotidine on 8/23/21.  Has still been experiencing diarrhea.  Last Saturday \"was a really bad day\". Lots of running to bathroom with diarrhea.  Having 4 + episodes per day. Stool varies between watery and soft, depends on what patient eats, if anything.  Does seem related to food.  Has no appetite, does not feel like eating at all. Makes herself eat something.  Drinking lots of water all throughout the day, staying well hydrated, does not think she is dehydrated.  Denies any blood in stool, no vomiting.  Does have nausea and GERD-like symptoms, burping up acid, epigastric pain, affected by food or lack of eating (empty stomach).  Epigastric pain does come and go, feels she is weak because she is not eating much.  Does not seem to be tolerating GERD medications (Omeprazole, Famotidine) or stops taking before become effective.    Has scheduled OV with PCP on 9/7/21 but called today to say she can't wait this long to discuss this problem.  Is not getting any better, doesn't know what to do. Stopped taking Famotidine as of Saturday cause she felt was contributing to the diarrhea, however the diarrhea is still continuing since stopping the medication.    Patient notes she feel miserable, would like sooner visit to discuss problem and what to do next.    PCP is out of office all week on CME, scheduled patient for virtual video visit for this morning at 11:40 am with Dr. Dutta.  Patient agreed with this plan.    **Possibly consider sending patient to [facility] clinic at [place] for work-up of abdominal pain/potential imaging or fluids, if feel is " appropriate. ( [phone])        Stacey Iniguez RN  [facility]

## 2021-08-31 NOTE — PROGRESS NOTES
Yadira is a 76 year old who is being evaluated via a billable video visit.  This was changed to telephone visit as we were unable to connect via video.    How would you like to obtain your AVS? MyChart        Assessment & Plan     (R10.13) Abdominal pain, epigastric  (primary encounter diagnosis)  (R53.1) Weakness  (R19.7) Diarrhea, unspecified type  (C43.71) Malignant melanoma of right lower extremity including hip (H)  (F41.1) ALEXUS (generalized anxiety disorder)  (D84.9) Immunodeficiency, unspecified (H)    76-year-old female with complicated past medical history who is a new patient to me with progressive epigastric abdominal pain, anorexia and diarrhea.  She is still able to drink fluids and eat each morning.  She has not responded to acid reflux medications and developed diarrhea following these trials.  Review of recent laboratories shows LFTs were mildly elevated the last time they were checked when previously they had been normal.  Last imaging was the end of July including PET scan and CT of chest and abdomen.  At that time there was no progression of her cancer although she did have lesions noted in the liver.  She certainly needs further work-up with in person visit, labs and imaging.  May benefit from IV fluids again.  We reviewed our acute diagnostic center in [place] could be very helpful in this situation and would like her to get seen today.  She absolutely declines further evaluation today as she feels that she cannot leave the house.  We reviewed if her symptoms are that severe I would recommend going to the emergency room at this point but she declined noting that her symptoms usually are better in the morning and she wants to be mentally prepared to leave the house.  She does have her  home who is available to drive her.  She does agree to be seen tomorrow morning at the [facility] and does agree to go to the emergency room today if her symptoms are worsening.  [facility] center called and provider and  "staff updated on situation. They will call patient and arrange early appt for her tomorrow.         Return in about 1 year (around 8/31/2022) for acute diagnostic clinic at [facility]. Go to ER today if symptoms are worsening. .    Pao Dutta MD  [facility] HILARIO May is a 76 year old who presents for the following health issues   HPI     Since visit with DR. Blanton. Diarrhea started on PPI so med changed to H2 blocker. Continued to get the diarrhea and have stomach pain and stopped med Sat night (off now for 3 days).     Diarrhea- several episodes a day. 4+. Watery (like pee). Waking her at night to stool at times.  Mild relief of abd pain after stooling. Still having the formed stool mostly in the morning  Drinking primarily water and doesn't feel like eating. Appetite started to decrease in July   Feeling very weak  Feels like she Can't go any place and like she is  always in the bathrrom  No vomiting. occ nausea.   Will try to eat and feel full/like can't eat. Can usually eat breakfast pretty well but then not much intake later in the day.   Nutritionist gave protein drink- but got diarrhea from this. Out of previous protein drinks that she was tolerating better (has to drive to [place] for these)  Primarily drinking green tea and water.   Pain is up to 8-10/10  Eating makes it worse. Heating pad on her stomach seems to help some.     Overall sx's are gradually worsening in the last week.     Abd pain is upper abd and does not radiate  No breathing difficulty, cp    No fever, chills  No new headache (does have some headaches from her infusions).   Infusions have not changed in the last 1 year  No recent abx.   No pain or swelling in legs except foot she has had swelling in after surgery.   No travel  July 30th got IVF due to dehydration in clinic    \"getting to be too much\". Feel better in the morning.   Just can't go today. Just don't have the energy. " "Getting worse the last week.       TRIAGE RN Note Today per Epic:  Has had abdominal pain/epigastric pain ongoing now for last several weeks. Was seen by PCP on 8/6/21 for this problem, prescribed Omeprazole for stomach. H.Pylori test was negative.  Patient started developing diarrhea, did not think she was tolerating Omeprazole well, was changed to Famotidine on 8/23/21.  Has still been experiencing diarrhea.  Last Saturday \"was a really bad day\". Lots of running to bathroom with diarrhea.  Having 4 + episodes per day. Stool varies between watery and soft, depends on what patient eats, if anything.  Does seem related to food.  Has no appetite, does not feel like eating at all. Makes herself eat something.  Drinking lots of water all throughout the day, staying well hydrated, does not think she is dehydrated.  Denies any blood in stool, no vomiting.  Does have nausea and GERD-like symptoms, burping up acid, epigastric pain, affected by food or lack of eating (empty stomach).  Epigastric pain does come and go, feels she is weak because she is not eating much.  Does not seem to be tolerating GERD medications (Omeprazole, Famotidine) or stops taking before become effective.     Has scheduled OV with PCP on 9/7/21 but called today to say she can't wait this long to discuss this problem.  Is not getting any better, doesn't know what to do. Stopped taking Famotidine as of Saturday cause she felt was contributing to the diarrhea, however the diarrhea is still continuing since stopping the medication.     Patient notes she feel miserable, would like sooner visit to discuss problem and what to do next.     PCP is out of office all week on CME, scheduled patient for virtual video visit for this morning at 11:40 am with Dr. Dutta.  Patient agreed with this plan.     **Possibly consider sending patient to [facility] clinic at [place] for work-up of abdominal pain/potential imaging or fluids, if feel is appropriate. ( " [phone])             Objective           Vitals:  No vitals were obtained today due to virtual visit.    Physical Exam   alert and no distress  PSYCH: Alert and oriented times 3; coherent speech, normal   rate and volume, able to articulate logical thoughts, able   to abstract reason, no tangential thoughts, no hallucinations   or delusions  Her affect is anxious and tearful  RESP: No cough, no audible wheezing, able to talk in full sentences  Remainder of exam unable to be completed due to telephone visits            Video-Visit Details    Type of service:  Video Visit    Video End Time:unable to complete video visit    Originating Location (pt. Location): Home    Distant Location (provider location):  [facility]     Platform used for Video Visit: Unable to complete video visit     Total time on phone call: 27 min

## 2021-09-01 ENCOUNTER — ANCILLARY PROCEDURE (OUTPATIENT)
Dept: CT IMAGING | Facility: CLINIC | Age: 76
End: 2021-09-01
Attending: NURSE PRACTITIONER
Payer: COMMERCIAL

## 2021-09-01 ENCOUNTER — OFFICE VISIT (OUTPATIENT)
Dept: PEDIATRICS | Facility: CLINIC | Age: 76
End: 2021-09-01
Payer: COMMERCIAL

## 2021-09-01 VITALS
OXYGEN SATURATION: 96 % | HEART RATE: 92 BPM | DIASTOLIC BLOOD PRESSURE: 72 MMHG | TEMPERATURE: 98.2 F | SYSTOLIC BLOOD PRESSURE: 122 MMHG | RESPIRATION RATE: 18 BRPM

## 2021-09-01 DIAGNOSIS — R10.13 EPIGASTRIC PAIN: ICD-10-CM

## 2021-09-01 DIAGNOSIS — C43.71 MALIGNANT MELANOMA OF RIGHT LOWER EXTREMITY INCLUDING HIP (H): ICD-10-CM

## 2021-09-01 DIAGNOSIS — R11.0 NAUSEA: ICD-10-CM

## 2021-09-01 DIAGNOSIS — F41.9 ANXIETY: ICD-10-CM

## 2021-09-01 DIAGNOSIS — R19.7 DIARRHEA, UNSPECIFIED TYPE: Primary | ICD-10-CM

## 2021-09-01 LAB
ALBUMIN SERPL-MCNC: 3.5 G/DL (ref 3.4–5)
ALBUMIN UR-MCNC: 30 MG/DL
ALP SERPL-CCNC: 69 U/L (ref 40–150)
ALT SERPL W P-5'-P-CCNC: 292 U/L (ref 0–50)
ANION GAP SERPL CALCULATED.3IONS-SCNC: 4 MMOL/L (ref 3–14)
APPEARANCE UR: ABNORMAL
AST SERPL W P-5'-P-CCNC: 155 U/L (ref 0–45)
BACTERIA #/AREA URNS HPF: ABNORMAL /HPF
BILIRUB SERPL-MCNC: 0.5 MG/DL (ref 0.2–1.3)
BILIRUB UR QL STRIP: NEGATIVE
BUN SERPL-MCNC: 13 MG/DL (ref 7–30)
CALCIUM SERPL-MCNC: 9.7 MG/DL (ref 8.5–10.1)
CAOX CRY #/AREA URNS HPF: ABNORMAL /HPF
CHLORIDE BLD-SCNC: 106 MMOL/L (ref 94–109)
CO2 SERPL-SCNC: 30 MMOL/L (ref 20–32)
COLOR UR AUTO: YELLOW
CREAT SERPL-MCNC: 0.57 MG/DL (ref 0.52–1.04)
GFR SERPL CREATININE-BSD FRML MDRD: 90 ML/MIN/1.73M2
GLUCOSE BLD-MCNC: 78 MG/DL (ref 70–99)
GLUCOSE UR STRIP-MCNC: NEGATIVE MG/DL
HGB UR QL STRIP: ABNORMAL
HOLD SPECIMEN: NORMAL
INR PPP: 1.09 (ref 0.85–1.15)
KETONES UR STRIP-MCNC: NEGATIVE MG/DL
LEUKOCYTE ESTERASE UR QL STRIP: ABNORMAL
LIPASE SERPL-CCNC: 75 U/L (ref 73–393)
MUCOUS THREADS #/AREA URNS LPF: PRESENT /LPF
NITRATE UR QL: NEGATIVE
PH UR STRIP: 5.5 [PH] (ref 5–7)
POTASSIUM BLD-SCNC: 3.8 MMOL/L (ref 3.4–5.3)
PROT SERPL-MCNC: 7.6 G/DL (ref 6.8–8.8)
RBC #/AREA URNS AUTO: ABNORMAL /HPF
SODIUM SERPL-SCNC: 140 MMOL/L (ref 133–144)
SP GR UR STRIP: 1.04 (ref 1–1.03)
SQUAMOUS #/AREA URNS AUTO: ABNORMAL /LPF
UROBILINOGEN UR STRIP-MCNC: NORMAL MG/DL
WBC #/AREA URNS AUTO: ABNORMAL /HPF

## 2021-09-01 PROCEDURE — 96374 THER/PROPH/DIAG INJ IV PUSH: CPT | Performed by: NURSE PRACTITIONER

## 2021-09-01 PROCEDURE — 83690 ASSAY OF LIPASE: CPT | Performed by: NURSE PRACTITIONER

## 2021-09-01 PROCEDURE — 99417 PROLNG OP E/M EACH 15 MIN: CPT | Performed by: NURSE PRACTITIONER

## 2021-09-01 PROCEDURE — 81001 URINALYSIS AUTO W/SCOPE: CPT | Performed by: NURSE PRACTITIONER

## 2021-09-01 PROCEDURE — 80053 COMPREHEN METABOLIC PANEL: CPT | Performed by: NURSE PRACTITIONER

## 2021-09-01 PROCEDURE — 74178 CT ABD&PLV WO CNTR FLWD CNTR: CPT | Mod: GC | Performed by: RADIOLOGY

## 2021-09-01 PROCEDURE — 36415 COLL VENOUS BLD VENIPUNCTURE: CPT | Performed by: NURSE PRACTITIONER

## 2021-09-01 PROCEDURE — 96361 HYDRATE IV INFUSION ADD-ON: CPT | Performed by: NURSE PRACTITIONER

## 2021-09-01 PROCEDURE — 85610 PROTHROMBIN TIME: CPT | Performed by: NURSE PRACTITIONER

## 2021-09-01 PROCEDURE — 99215 OFFICE O/P EST HI 40 MIN: CPT | Mod: 25 | Performed by: NURSE PRACTITIONER

## 2021-09-01 RX ORDER — ONDANSETRON 2 MG/ML
4 INJECTION INTRAMUSCULAR; INTRAVENOUS ONCE
Status: COMPLETED | OUTPATIENT
Start: 2021-09-01 | End: 2021-09-01

## 2021-09-01 RX ORDER — IOPAMIDOL 755 MG/ML
84 INJECTION, SOLUTION INTRAVASCULAR ONCE
Status: COMPLETED | OUTPATIENT
Start: 2021-09-01 | End: 2021-09-01

## 2021-09-01 RX ADMIN — ONDANSETRON 4 MG: 2 INJECTION INTRAMUSCULAR; INTRAVENOUS at 10:49

## 2021-09-01 RX ADMIN — Medication 1000 ML: at 10:45

## 2021-09-01 RX ADMIN — IOPAMIDOL 84 ML: 755 INJECTION, SOLUTION INTRAVASCULAR at 11:18

## 2021-09-01 ASSESSMENT — PAIN SCALES - GENERAL
PAINLEVEL: SEVERE PAIN (6)
PAINLEVEL: NO PAIN (0)

## 2021-09-01 NOTE — PATIENT INSTRUCTIONS
For stomach pain:  Omeprazole 30-60 minutes before breakfast once a day  Sucralfate before each meal  Pepcid at bedtime.

## 2021-09-01 NOTE — PROGRESS NOTES
"    Assessment & Plan       ICD-10-CM    1. Diarrhea, unspecified type  R19.7 0.9% sodium chloride BOLUS     CBC with platelets differential     Clostridium difficile toxin B PCR     Comprehensive metabolic panel     INR     Lipase     Enteric Bacteria and Virus Panel by YADIRA Stool     Enteric Bacteria and Virus Panel by YADIRA Stool     Lipase     INR     Comprehensive metabolic panel   2. Nausea  R11.0 ondansetron (ZOFRAN) injection 4 mg     0.9% sodium chloride BOLUS     CBC with platelets differential     Comprehensive metabolic panel     UA with Microscopic reflex to Culture     CT Abdomen Pelvis w/o & w Contrast     CT Abdomen Pelvis w/o & w Contrast     UA with Microscopic reflex to Culture     Comprehensive metabolic panel     Urine Microscopic   3. Epigastric pain  R10.13 Amylase     CBC with platelets differential     Comprehensive metabolic panel     INR     Lipase     UA with Microscopic reflex to Culture     CT Abdomen Pelvis w/o & w Contrast     CT Abdomen Pelvis w/o & w Contrast     UA with Microscopic reflex to Culture     Lipase     INR     Comprehensive metabolic panel     Urine Microscopic   4. Malignant melanoma of right lower extremity including hip (H)  C43.71 Continue treatment per oncologist. Discuss LFT and diarrhea with your team   5. Anxiety  F41.9 Continue Zoloft   At the time of discharge patient reports feeling better, \"not so weak\". No acute adbominal processes to account for the diarrhea. Liver function tests are worsening and she will follow-up with oncologist about this as her chemo drugs can cause hepatitis. There is no worsening of liver lesions on today's scan. Exam and symptoms do not support gallbladder disease. Need to rule out any infectious causes of diarrhea so patient will collect and return stool samples. If these come back negative would trial Imodium. Suggested patient discuss appetite stimulant with oncologist, take her nausea medication regularly, and drink more water. UA " suggests dehydration so IV fluids are given today.    Nivolumab has a 17-64% of causing diarrhea and this may be the culprit.    82 minutes spent on the date of the encounter doing chart review, history and exam, documentation and further activities per the note  83891}   Return stool samples today or tomorrow.  Return in about 1 week (around 9/8/2021) for annual wellness exam.    My Tan CNP  [facility] MARLINE May is a 76 year old who presents for the following health issues diarrhea for 3 weeks. She has not been eating and has lost 4 # in the last 3 weeks. 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  She is currently receiving infusion therapy to treat the cancer.  She was seen a few weeks ago and started on omeprazole and then changed to famotidine.  She thought these were to treat the diarrhea but according to the notes say it was to treat GERD.  She is drinking fluids but has not eaten anything of substance in days.  She is very fatigued and complains of dizziness when standing or changing positions.     Past Medical History:   Diagnosis Date     Concussion      ALEXUS (generalized anxiety disorder) 5/7/2020     Major depression, recurrent, chronic (H) 5/7/2020     Melanoma (H)      Nonsenile cataract      Sleep apnea     CPAP     Patient Active Problem List    Immunodeficiency, unspecified (H)         Priority: Medium [2]         Date Noted: 08/06/2021      Secondary malignant neoplasm of bone (H)         Priority: Medium [2]         Date Noted: 01/19/2021     Melanoma of skin (H)         Priority: Medium [2]         Date Noted: 02/04/2020            Added automatically from request for surgery            [id number]      Malignant melanoma of right lower extremity including hip (H)         Priority: Medium [2]         Date Noted: 01/27/2020      Chronic fatigue         Priority: Medium [2]         Date Noted: 01/06/2020       "Chronic pain disorder         Priority: Medium [2]         Date Noted: 11/13/2018      Neck pain, chronic         Priority: Medium [2]         Date Noted: 05/30/2017      Intractable migraine without aura and without status migrainosus         Priority: Medium [2]         Date Noted: 05/30/2017      Articular disc disorder of temporomandibular joint         Priority: Medium [2]         Date Noted: 05/19/2015      Osteopenia         Priority: Medium [2]         Date Noted: 03/31/2014    Abdominal/Flank Pain  Onset/Duration: approx 3 weeks   Description:   Character: Cramping  Location: mid abd pain   Radiation: None  Intensity: severe  Progression of Symptoms:  worsening  Accompanying Signs & Symptoms:  Fever/Chills: no  Gas/Bloating: YES  Nausea: YES  Vomitting: no  Diarrhea: YES X3-4 pr more a day   Constipation: YES \"sometimes at night\"   Dysuria or Hematuria: no  History:   Trauma: no  Previous similar pain: no  Previous tests done: none  Precipitating factors:   Does the pain change with:     Food: YES \"gets worse after I eat.\"     Bowel Movement: YES    Urination: YES   Other factors: None   Therapies tried and outcome: Heating pad help with the pain   No LMP recorded. Patient is postmenopausal.    Review of Systems   10 point review of systems is negative except as noted in the HPI.      Objective    /72 (BP Location: Right arm, Patient Position: Semi-Ramírez's, Cuff Size: Adult Regular)   Pulse 92   Temp 98.2  F (36.8  C) (Oral)   Resp 18   SpO2 96%   There is no height or weight on file to calculate BMI.  Physical Exam   GENERAL: alert, pale and anxious  EYES: Eyes grossly normal to inspection, PERRL and conjunctivae and sclerae normal  HENT: ear canals and TM's normal, nose and mouth without ulcers or lesions  NECK: no adenopathy, no asymmetry, masses, or scars and thyroid normal to palpation  RESP: lungs clear to auscultation - no rales, rhonchi or wheezes  CV: regular rate and rhythm, normal S1 " S2, no S3 or S4, no murmur, click or rub, no peripheral edema and peripheral pulses strong  ABDOMEN: bowel sounds normal, soft, slight epigastric pain with palpation otherwise normal abdomen  MS: no gross musculoskeletal defects noted, no edema  SKIN: no suspicious lesions or rashes  NEURO: Normal strength and tone, mentation intact and speech normal  PSYCH: anxious    Recent Results (from the past 24 hour(s))   Extra Purple Top Tube    Collection Time: 09/01/21 10:24 AM   Result Value Ref Range    Hold Specimen JIC    Lipase    Collection Time: 09/01/21 10:37 AM   Result Value Ref Range    Lipase 75 73 - 393 U/L   INR    Collection Time: 09/01/21 10:37 AM   Result Value Ref Range    INR 1.09 0.85 - 1.15   Comprehensive metabolic panel    Collection Time: 09/01/21 10:37 AM   Result Value Ref Range    Sodium 140 133 - 144 mmol/L    Potassium 3.8 3.4 - 5.3 mmol/L    Chloride 106 94 - 109 mmol/L    Carbon Dioxide (CO2) 30 20 - 32 mmol/L    Anion Gap 4 3 - 14 mmol/L    Urea Nitrogen 13 7 - 30 mg/dL    Creatinine 0.57 0.52 - 1.04 mg/dL    Calcium 9.7 8.5 - 10.1 mg/dL    Glucose 78 70 - 99 mg/dL    Alkaline Phosphatase 69 40 - 150 U/L     (H) 0 - 45 U/L     (H) 0 - 50 U/L    Protein Total 7.6 6.8 - 8.8 g/dL    Albumin 3.5 3.4 - 5.0 g/dL    Bilirubin Total 0.5 0.2 - 1.3 mg/dL    GFR Estimate 90 >60 mL/min/1.73m2   UA with Microscopic reflex to Culture    Collection Time: 09/01/21 11:21 AM    Specimen: Urine, Midstream   Result Value Ref Range    Color Urine Yellow Colorless, Straw, Light Yellow, Yellow    Appearance Urine Slightly Cloudy (A) Clear    Glucose Urine Negative Negative mg/dL    Bilirubin Urine Negative Negative    Ketones Urine Negative Negative mg/dL    Specific Gravity Urine 1.045 (H) 1.003 - 1.035    Blood Urine Trace (A) Negative    pH Urine 5.5 5.0 - 7.0    Protein Albumin Urine 30  (A) Negative mg/dL    Nitrite Urine Negative Negative    Leukocyte Esterase Urine Small (A) Negative     Urobilinogen Urine Normal Normal, 2.0 mg/dL   Urine Microscopic    Collection Time: 09/01/21 11:21 AM   Result Value Ref Range    Bacteria Urine Few (A) None Seen /HPF    RBC Urine 0-2 0-2 /HPF /HPF    WBC Urine 0-5 0-5 /HPF /HPF    Squamous Epithelials Urine Moderate (A) None Seen /LPF    Mucus Urine Present (A) None Seen /LPF    Calcium Oxalate Crystals Urine Many (A) None Seen /HPF           CT scan of Abdomen:  1. No acute intra-abdominal pathology suspected.     2. Stable hypoattenuating lesions within the liver, possibly treated  metastasis from known melanoma.     3. Incidental colonic diverticulosis without evidence of acute  diverticulitis and degenerative scoliosis.

## 2021-09-01 NOTE — PROGRESS NOTES
"SUBJECTIVE:   Yadira Hoang is a 76 year old female who presents for Preventive Visit.    {Split Bill scripting  The purpose of this visit is to discuss your medical history and prevent health problems before you are sick. You may be responsible for a co-pay, coinsurance, or deductible if your visit today includes services such as checking on a sore throat, having an x-ray or lab test, or treating and evaluating a new or existing condition :384358}  Patient has been advised of split billing requirements and indicates understanding: {Yes and No:176262}   Are you in the first 12 months of your Medicare coverage?  { :528692::\"No\"}    HPI  Do you feel safe in your environment? { :686062}    Have you ever done Advance Care Planning? (For example, a Health Directive, POLST, or a discussion with a medical provider or your loved ones about your wishes): { :124773}    {Hearing Test Done (Optional):670413}   Fall risk  { :700901}  {If any of the above assessments are answered yes, consider ordering appropriate referrals (Optional):335749::\"click delete button to remove this line now\"}  Cognitive Screening { :320075}    {Do you have sleep apnea, excessive snoring or daytime drowsiness? (Optional):581929}    Reviewed and updated as needed this visit by clinical staff                 Reviewed and updated as needed this visit by Provider                Social History     Tobacco Use     Smoking status: Never Smoker     Smokeless tobacco: Never Used   Substance Use Topics     Alcohol use: Not Currently     {Rooming Staff- Complete this question if Prescreen response is not shown below for today's visit. If you drink alcohol do you typically have >3 drinks per day or >7 drinks per week? (Optional):441932}    No flowsheet data found.{add AUDIT responses (Optional) (A score of 7 for adult men is an indication of hazardous drinking; a score of 8 or more is an indication of an alcohol use disorder.  A score of 7 or more for " "adult women is an indication of hazardous drinking or an alchohol use disorder):104583}    {Outside tests to abstract? :380905}    {additional problems to add (Optional):377456}    Current providers sharing in care for this patient include: {Rooming staff:  Please update Care Team in Rooming Activity, refresh this note and then delete this statement}  Patient Care Team:  Erika Blanton MD PhD as PCP - General (Internal Medicine)  Jessica Meadows DPM as Referring Physician  Gi Kowalski MD as MD (Hematology & Oncology)  Meghan Perdomo PA-C as Physician Assistant (Physician Assistant)  Martin Mckay MD as Assigned Gastroenterology Provider  Gi Kowalski MD as Assigned Cancer Care Provider  Gasper Reyes MD as Assigned Musculoskeletal Provider  Erika Blanton MD PhD as Assigned PCP  NIKA Crabtree MD as Assigned Surgical Provider    The following health maintenance items are reviewed in Epic and correct as of today:  Health Maintenance Due   Topic Date Due     DEXA  Never done     URINE DRUG SCREEN  Never done     ADVANCE CARE PLANNING  Never done     DEPRESSION ACTION PLAN  Never done     MEDICARE ANNUAL WELLNESS VISIT  Never done     ZOSTER IMMUNIZATION (2 of 3) 02/26/2011     COVID-19 Vaccine (3 - Moderna risk 3-dose series) 07/30/2021     INFLUENZA VACCINE (1) 09/01/2021     {Chronicprobdata (optional):609773}  {Decision Support (Optional):637347}    {Mammo DS 75+ :263236}  Pertinent mammograms are reviewed under the imaging tab.    Review of Systems  {ROS COMP (Optional):542419}    OBJECTIVE:   There were no vitals taken for this visit. Estimated body mass index is 21.57 kg/m  as calculated from the following:    Height as of 8/6/21: 1.702 m (5' 7\").    Weight as of 8/23/21: 62.5 kg (137 lb 11.2 oz).  Physical Exam  {Exam (Optional) :756227}    {Diagnostic Test Results (Optional):692748::\"Diagnostic Test Results:\",\"Labs reviewed in Epic\"}    ASSESSMENT / " "PLAN:   {Dia Picklist:311977}    Patient has been advised of split billing requirements and indicates understanding: {YES / NO:800490::\"Yes\"}  COUNSELING:  {Medicare Counselin}    Estimated body mass index is 21.57 kg/m  as calculated from the following:    Height as of 21: 1.702 m (5' 7\").    Weight as of 21: 62.5 kg (137 lb 11.2 oz).    {Weight Management Plan (ACO) Complete if BMI is abnormal-  Ages 18-64  BMI >24.9.  Age 65+ with BMI <23 or >30 (Optional):960676}    She reports that she has never smoked. She has never used smokeless tobacco.      Appropriate preventive services were discussed with this patient, including applicable screening as appropriate for cardiovascular disease, diabetes, osteopenia/osteoporosis, and glaucoma.  As appropriate for age/gender, discussed screening for colorectal cancer, prostate cancer, breast cancer, and cervical cancer. Checklist reviewing preventive services available has been given to the patient.    Reviewed patients plan of care and provided an AVS. The {CarePlan:195739} for Yadira Ochoa meets the Care Plan requirement. This Care Plan has been established and reviewed with the {PATIENT, FAMILY MEMBER, CAREGIVER:605416}.    Counseling Resources:  ATP IV Guidelines  Pooled Cohorts Equation Calculator  Breast Cancer Risk Calculator  Breast Cancer: Medication to Reduce Risk  FRAX Risk Assessment  ICSI Preventive Guidelines  Dietary Guidelines for Americans,   CheckInPage's MyPlate  ASA Prophylaxis  Lung CA Screening    Erika Blanton MD PhD  [facility]    Identified Health Risks:  "

## 2021-09-01 NOTE — PATIENT INSTRUCTIONS
Collect stool samples and return as soon as possible.    Call oncologist and discuss liver function test results with them (, ). Ask about an appetite stimulant.    If results of all stool tests are negative you may trial Imodium for the diarrhea. This is available over the counter.    Take Zofran (nausea medication) at least once a day for a few days and see if this does not improve your appetite.    Increase water intake.    Keep appointment next week with your primary care provider to follow up.      Patient Education     Diarrhea with Uncertain Cause (Adult)    Diarrhea is when stools are loose and watery. This can be caused by:    Viral infections    Bacterial infections    Food poisoning    Parasites    Irritable bowel syndrome (IBS)    Inflammatory bowel diseases such as ulcerative colitis, Crohn's disease, and celiac disease    Food intolerance, such as to lactose, the sugar found in milk and milk products    Reaction to medicines like antibiotics, laxatives, cancer drugs, and antacids  Along with diarrhea, you may also have:    Abdominal pain and cramping    Nausea and vomiting    Loss of bowel control    Fever and chills    Bloody stools  In some cases, antibiotics may help to treat diarrhea. You may have a stool sample test. This is done to see what is causing your diarrhea, and if antibiotics will help treat it. The results of a stool sample test may take up to 2 days. The healthcare provider may not give you antibiotics until he or she has the stool test results.  Diarrhea can cause dehydration. This is the loss of too much water and other fluids from the body. When this occurs, body fluid must be replaced. This can be done with oral rehydration solutions. Oral rehydration solutions are available at drugstores and grocery stores without a prescription. Sports drinks are not the best choice if you are very dehydrated. They have too much sugar and not enough electrolytes.  Home care  Follow  all instructions given by your healthcare provider. Rest at home for the next 24 hours, or until you feel better. Avoid caffeine, tobacco, and alcohol. These can make diarrhea, cramping, and pain worse.  If taking medicines:    Over-the-counter nausea and diarrhea medicines are generally OK unless you experience fever or blood stool. Check with your doctor first in those circumstances.    You may use acetaminophen or NSAID medicines like ibuprofen or naproxen to reduce pain and fever. Don t use these if you have chronic liver or kidney disease, or ever had a stomach ulcer or gastrointestinal bleeding. Don't use NSAID medicines if you are already taking one for another condition (like arthritis) or are on daily aspirin therapy (such as for heart disease or after a stroke). Talk with your healthcare provider first.    If antibiotics were prescribed, be sure you take them until they are finished. Don t stop taking them even when you feel better. Antibiotics must be taken as a full course.  To prevent the spread of illness:    Remember that washing with soap and water and using alcohol-based  is the best way to prevent the spread of infection. Dry your hands with a single use towel (like a paper towel).    Clean the toilet after each use.    Wash your hands before eating.    Wash your hands before and after preparing food. Keep in mind that people with diarrhea or vomiting should not prepare food for others.    Wash your hands after using cutting boards, countertops, and knives that have been in contact with raw foods.    Wash and then peel fruits and vegetables.    Keep uncooked meats away from cooked and ready-to-eat foods.    Use a food thermometer when cooking. Cook poultry to at least 165 F (74 C). Cook ground meat (beef, veal, pork, lamb) to at least 160 F (71 C). Cook fresh beef, veal, lamb, and pork to at least 145 F (63 C).    Don t eat raw or undercooked eggs (poached or robert side up), poultry, meat,  or unpasteurized milk and juices.  Food and drinks  The main goal while treating vomiting or diarrhea is to prevent dehydration. This is done by taking small amounts of liquids often.    Keep in mind that liquids are more important than food right now.    Drink only small amounts of liquids at a time.    Don t force yourself to eat, especially if you are having cramping, vomiting, or diarrhea. Don t eat large amounts at a time, even if you are hungry.    If you eat, avoid fatty, greasy, spicy, or fried foods.    Don t eat dairy foods or drink milk if you have diarrhea. These can make diarrhea worse.  During the first 24 hours you can try:    Oral rehydration solutions.  Sports drinks may be used if you are not too dehydrated and are otherwise healthy.    Soft drinks without caffeine    Ginger ale    Water (plain or flavored)    Decaf tea or coffee    Clear broth, consommé, or bouillon    Gelatin, popsicles, or frozen fruit juice bars  The second 24 hours, if you are feeling better, you can add:    Hot cereal, plain toast, bread, rolls, or crackers    Plain noodles, rice, mashed potatoes, chicken noodle soup, or rice soup    Unsweetened canned fruit (no pineapple)    Bananas  As you recover:    Limit fat intake to less than 15 grams per day. Don t eat margarine, butter, oils, mayonnaise, sauces, gravies, fried foods, peanut butter, meat, poultry, or fish.    Limit fiber. Don t eat raw or cooked vegetables, fresh fruits except bananas, or bran cereals.    Limit caffeine and chocolate.    Limit dairy.    Don t use spices or seasonings except salt.    Go back to your normal diet over time, as you feel better and your symptoms improve.    If the symptoms come back, go back to a simple diet or clear liquids.  Follow-up care  Follow up with your healthcare provider, or as advised. If a stool sample was taken or cultures were done, call the healthcare provider for the results as instructed.  Call 911  Call 911 if you have  any of these symptoms:    Trouble breathing    Confusion    Extreme drowsiness or trouble walking    Loss of consciousness    Rapid heart rate    Chest pain    Stiff neck    Seizure  When to seek medical advice  Call your healthcare provider right away if any of these occur:    Abdominal pain that gets worse    Constant lower right abdominal pain    Continued vomiting and inability to keep liquids down    Diarrhea more than 5 times a day    Blood in vomit or stool    Dark urine or no urine for 8 hours, dry mouth and tongue, tiredness, weakness, or dizziness    Drowsiness    New rash    You don t get better in 2 to 3 days    Fever of 100.4 F (38 C) or higher, or as directed by your healthcare provider  [employer] last reviewed this educational content on 6/1/2018 2000-2021 [employer]. All rights reserved. This information is not intended as a substitute for professional medical care. Always follow your healthcare professional's instructions.           Patient Education     Cancer Care: Controlling Nausea and Vomiting  Nausea and vomiting are common side effects of chemotherapy and radiation therapy. Side effects occur when treatment changes some normal cells as well as cancer cells. In this case, it affects the cells lining your stomach. And it affects the part of your brain that controls vomiting.   Nausea is feeling that you need to throw up. Vomiting is when you do throw up. This is when your body forces food that is in your stomach out through your mouth.   Nausea and vomiting are common. They can be caused by many things, such as:     Stomach virus    Food poisoning    Stomach pain    Blockages in the digestive system    Constipation    Infection    Anxiety and stress  Other common causes are:    Head injury    Infection in the brain or inside the ear    Migraines    Brain tumor    Brain bruise or injury    Motion sickness    Alcohol    Pain medicines such as morphine    Certain treatments, such  as chemotherapy and radiation therapy    Poisonous things (toxins), such as plants or liquids that you swallow by accident    Advanced types of cancer    Movement problems    Extra pressure in the fluid that surrounds the brain and the spinal cord  Sometimes belly pain and cramps happen along with nausea and vomiting. The symptoms can be mild and go away on their own. Other times they can be severe. They may need to be treated.   Nausea and vomiting with cancer  Nausea and vomiting can happen before, during, or after cancer treatments. But it can be controlled. Don t think it is a normal part of cancer and cancer treatment. If not treated, it can become serious. It can change the fluid and chemical balance in your body. It could even keep you from getting cancer treatment. Call your healthcare provider right away if any of these occur:     You have nausea or vomiting that lasts 24 hours or more.    You can t take your antiemetics or they are not working. These medicines help ease or stop nausea or vomiting.    You have trouble keeping fluids down.    You become dizzy, lightheaded, or confused.    You have very dark urine or you stop urinating.  Talk with your healthcare provider about your treatment and the best way to handle any nausea and vomiting. Be sure you know how and when to use antiemetics. Also know when to call your healthcare provider.   Medicines can help  Nausea or vomiting can often be treated with medicines called antiemetics. You may take these before or after your cancer treatment. You may have to try different kinds or combinations of these medicines to feel better. But in most cases, nausea and vomiting can be eased.      Taken before meals, medicines can help ease nausea.      Eating tips    If you have medicines to control nausea, take them before meals as directed.    Don't eat fatty or greasy foods if you have nausea.    Eat small meals throughout the day.    Ask someone to sit with you while  you eat. This can help to keep you from thinking about nausea.    Eat foods at room temperature or colder to limit strong smells.    Eat dry foods, such as toast, crackers, or pretzels. Also eat cool, light foods, such as applesauce. Harmon foods, such as oatmeal or skinned chicken, are good, too.    Try to keep taking in clear fluids in small sips, or as ice chips, gelatin, or ice pops.    Other ways to feel better    Get a little fresh air. Take a short walk.    Talk to a friend, listen to music, or watch TV.    Take a few deep, slow breaths.    Eat by candlelight or in surroundings that you find relaxing.    Use a method to help you relax, such as guided imagery. Imagine yourself in a beautiful, restful scene. Or daydream about the place you d most like to be.    [employer] last reviewed this educational content on 5/1/2019 2000-2021 [employer]. All rights reserved. This information is not intended as a substitute for professional medical care. Always follow your healthcare professional's instructions.

## 2021-09-02 ENCOUNTER — LAB (OUTPATIENT)
Dept: LAB | Facility: CLINIC | Age: 76
End: 2021-09-02
Payer: COMMERCIAL

## 2021-09-02 ENCOUNTER — TELEPHONE (OUTPATIENT)
Dept: FAMILY MEDICINE | Facility: CLINIC | Age: 76
End: 2021-09-02

## 2021-09-02 DIAGNOSIS — R19.7 DIARRHEA, UNSPECIFIED TYPE: ICD-10-CM

## 2021-09-02 DIAGNOSIS — R19.7 DIARRHEA, UNSPECIFIED TYPE: Primary | ICD-10-CM

## 2021-09-02 PROCEDURE — 87506 IADNA-DNA/RNA PROBE TQ 6-11: CPT

## 2021-09-02 NOTE — TELEPHONE ENCOUNTER
Reason for Call:  Other call back    Detailed comments: Pt states that her  took 2 stool samples to the lab to be tested. Pt states that her  was told that one of the stool tests were not going to work due to it being in the wrong container.    Phone Number Patient can be reached at: Cell number on file:    Telephone Information:   Mobile [phone]       Best Time: anytime    Can we leave a detailed message on this number? YES    Call taken on 9/2/2021 at 4:29 PM by Lalita Jacobo

## 2021-09-03 ENCOUNTER — LAB (OUTPATIENT)
Dept: LAB | Facility: CLINIC | Age: 76
End: 2021-09-03
Payer: COMMERCIAL

## 2021-09-03 DIAGNOSIS — R19.7 DIARRHEA, UNSPECIFIED TYPE: ICD-10-CM

## 2021-09-03 LAB — C DIFF TOX B STL QL: NEGATIVE

## 2021-09-03 PROCEDURE — 87493 C DIFF AMPLIFIED PROBE: CPT

## 2021-09-03 NOTE — TELEPHONE ENCOUNTER
Called lab. The sample pt left with them for c. Diff has urine in it, it needs to be plain stool  Called pt and let her know that she will need to leave them another sample of just stool in a sterile cup.

## 2021-09-07 ENCOUNTER — OFFICE VISIT (OUTPATIENT)
Dept: FAMILY MEDICINE | Facility: CLINIC | Age: 76
End: 2021-09-07
Payer: COMMERCIAL

## 2021-09-07 VITALS
BODY MASS INDEX: 22 KG/M2 | DIASTOLIC BLOOD PRESSURE: 66 MMHG | HEIGHT: 66 IN | WEIGHT: 136.9 LBS | SYSTOLIC BLOOD PRESSURE: 127 MMHG | HEART RATE: 93 BPM | TEMPERATURE: 98.5 F | OXYGEN SATURATION: 96 % | RESPIRATION RATE: 16 BRPM

## 2021-09-07 DIAGNOSIS — R11.0 NAUSEA: ICD-10-CM

## 2021-09-07 DIAGNOSIS — T88.7XXA MEDICATION SIDE EFFECTS: Primary | ICD-10-CM

## 2021-09-07 DIAGNOSIS — C43.71 MALIGNANT MELANOMA OF RIGHT LOWER EXTREMITY INCLUDING HIP (H): ICD-10-CM

## 2021-09-07 DIAGNOSIS — C43.9 MELANOMA OF SKIN (H): Primary | ICD-10-CM

## 2021-09-07 DIAGNOSIS — F32.A DEPRESSION, UNSPECIFIED DEPRESSION TYPE: ICD-10-CM

## 2021-09-07 DIAGNOSIS — R47.02 DYSPHASIA: ICD-10-CM

## 2021-09-07 DIAGNOSIS — Z23 FLU VACCINE NEED: ICD-10-CM

## 2021-09-07 DIAGNOSIS — N95.2 ATROPHIC VAGINITIS: ICD-10-CM

## 2021-09-07 PROCEDURE — 99215 OFFICE O/P EST HI 40 MIN: CPT | Performed by: INTERNAL MEDICINE

## 2021-09-07 RX ORDER — ONDANSETRON 4 MG/1
4 TABLET, ORALLY DISINTEGRATING ORAL EVERY 8 HOURS PRN
COMMUNITY
Start: 2021-09-07 | End: 2021-10-22

## 2021-09-07 RX ORDER — SUCRALFATE 1 G/1
1 TABLET ORAL 4 TIMES DAILY PRN
Qty: 90 TABLET | Refills: 0 | Status: SHIPPED | OUTPATIENT
Start: 2021-09-07 | End: 2021-09-17

## 2021-09-07 RX ORDER — OMEPRAZOLE 20 MG/1
20 TABLET, DELAYED RELEASE ORAL EVERY MORNING
Qty: 30 TABLET | Refills: 0 | Status: SHIPPED | OUTPATIENT
Start: 2021-09-07 | End: 2021-10-22

## 2021-09-07 RX ORDER — FAMOTIDINE 40 MG/1
40 TABLET, FILM COATED ORAL AT BEDTIME
COMMUNITY
Start: 2021-09-07 | End: 2021-09-17

## 2021-09-07 ASSESSMENT — PAIN SCALES - GENERAL: PAINLEVEL: EXTREME PAIN (8)

## 2021-09-07 ASSESSMENT — MIFFLIN-ST. JEOR: SCORE: 1114.78

## 2021-09-07 NOTE — PROGRESS NOTES
Assessment & Plan     Yadira Ochoa was seen today for follow up.    Diagnoses and all orders for this visit:    Medication side effects    Flu vaccine need  -     Cancel: FLUZONE HIGH DOSE 65+  [03050]    Dysphasia  -     sucralfate (CARAFATE) 1 GM tablet; Take 1 tablet (1 g) by mouth 4 times daily as needed for nausea  -     omeprazole (PRILOSEC OTC) 20 MG EC tablet; Take 1 tablet (20 mg) by mouth every morning 30-60 minutes before eating    Malignant melanoma of right lower extremity including hip (H)    Depression, unspecified depression type    Nausea    Patient is experiencing significant side effects from her chemotherapy regimen.  It seems that her symptoms are the worst the first 1 to 2 weeks after the infusion.  Symptoms will lessen some onto the third week.  For the time being we will plan for symptom management but having her take omeprazole in the morning, Pepcid at bedtime, superficially with meals.    Patient's abdominal CT showed progression of disease, I did not discuss this with patient.  She will follow up with oncology in the near future regarding this.    Depression is getting worse.  We discussed medication adjustment, but patient does not feel she could handle any more medication change at this time.      We will have her follow-up in clinic in 2 weeks.  If her stomach symptoms improved, we will can consider changing sertraline to Remeron.  This may help with her appetite and depression.    She is a candidate for influenza vaccine and COVID-19 vaccine booster dose.  Patient was not up to getting vaccines today.  We will address this at the next visit as well.      Return in about 2 weeks (around 9/21/2021) for Annual Wellness Visit, Clinic follow up.    I spent a total of 41 minutes on the day of the visit.  doing chart review, history and exam, documentation and further activities as noted above      Erika Blanton MD PhD  [facility]   Yadira is a 76 year old  who presents for the following health issues     HPI     Concern - Follow up Stomach pain    Description: The pain has gotten worse  Intensity: moderate    On combination infusion for melanoma.   Started in July, she couldn't eat much. Then sometime in august, started getting diarrhea really bad. Now average 3-4 diarrhea stool a day.     Today she only went once. This is rare.    A lot of stomach pain started about 2-3 weeks ago. Last week a little worse. heating pad helps. , nauseated sometimes. Not able to eat much.     Patient had recent evaluation including blood work stool testing and abdominal CT on 9/1/2021.  No acute pathology identified.    Patient has history of malignant melanoma, undergoing chemo infusion.  In reviewing her infusions and her stomach pain symptom, her stomach symptoms started after she started infusion of combination Opdivo in your voice.   8/2/2021 first combination infusion   8/6/2021 evaluation of epigastric pain  8/23/2021 second infusion  8/31/2021 evaluation of epigastric pain and diarrhea  9/1/2021 abdominal CT and stool testing.    In looking up the side effects of Yang in your voice, both can cause abdominal pain nausea, fatigue. Yervoy also causes diarrhea up to 54% of patients.  Patient reported she has medication for nausea and diarrhea from her oncologist.  Although I do not see that on her chart.    Patient reported always had depression.  She has a therapist that talks to her every 2 weeks.  She does not feel she has anybody she can talk to about her cancer.  She is on sertraline 25 mg.  Patient reported in the past Remeron brand worked best for her, but it was not covered by her health insurance sometime ago.  She did not do well with the generic mirtazapine.      Review of Systems   Constitutional, HEENT, cardiovascular, pulmonary, gi and gu systems are negative, except as otherwise noted.      Objective    /66 (BP Location: Right arm, Patient Position: Sitting,  "Cuff Size: Adult Regular)   Pulse 93   Temp 98.5  F (36.9  C) (Oral)   Resp 16   Ht 1.664 m (5' 5.5\")   Wt 62.1 kg (136 lb 14.4 oz)   SpO2 96%   BMI 22.43 kg/m    Body mass index is 22.43 kg/m .  Physical Exam   GENERAL: healthy, alert and no distress  ABDOMEN: soft, nontender, without hepatosplenomegaly or masses  MS: no gross musculoskeletal defects noted, no edema  SKIN: no suspicious lesions or rashes  NEURO: Normal strength and tone, mentation intact and speech normal  PSYCH: mentation appears normal, affect is depressed, teary.    Lab on 09/03/2021   Component Date Value Ref Range Status     C Difficile Toxin B by PCR 09/03/2021 Negative  Negative Final    A negative result does not exclude actual disease due to C. difficile and may be due to improper collection, handling and storage of the specimen or the number of organisms in the specimen is below the detection limit of the assay.     Component      Latest Ref Rng & Units 9/1/2021 9/2/2021   Sodium      133 - 144 mmol/L 140    Potassium      3.4 - 5.3 mmol/L 3.8    Chloride      94 - 109 mmol/L 106    Carbon Dioxide      20 - 32 mmol/L 30    Anion Gap      3 - 14 mmol/L 4    Urea Nitrogen      7 - 30 mg/dL 13    Creatinine      0.52 - 1.04 mg/dL 0.57    Calcium      8.5 - 10.1 mg/dL 9.7    Glucose      70 - 99 mg/dL 78    Alkaline Phosphatase      40 - 150 U/L 69    AST      0 - 45 U/L 155 (H)    ALT      0 - 50 U/L 292 (H)    Protein Total      6.8 - 8.8 g/dL 7.6    Albumin      3.4 - 5.0 g/dL 3.5    Bilirubin Total      0.2 - 1.3 mg/dL 0.5    GFR Estimate      >60 mL/min/1.73m2 90    Color Urine      Colorless, Straw, Light Yellow, Yellow Yellow    Appearance Urine      Clear Slightly Cloudy (A)    Glucose Urine      Negative mg/dL Negative    Bilirubin Urine      Negative Negative    Ketones Urine      Negative mg/dL Negative    Specific Gravity Urine      1.003 - 1.035 1.045 (H)    Blood Urine      Negative Trace (A)    pH Urine      5.0 - 7.0 " 5.5    Protein Albumin Urine      Negative mg/dL 30 (A)    Nitrite Urine      Negative Negative    Leukocyte Esterase Urine      Negative Small (A)    Urobilinogen mg/dL      Normal, 2.0 mg/dL Normal    Campylobacter group by YADIRA      Not Detected  Not Detected   Salmonella species by YADIRA      Not Detected  Not Detected   Shigella species by YADIRA      Not Detected  Not Detected   Vibrio group by YADIAR      Not Detected  Not Detected   Rotavirus A by YADIRA      Not Detected  Not Detected   Shiga toxin 1 gene by YADIRA      Not Detected  Not Detected   Shiga toxin 2 gene by YADIRA      Not Detected  Not Detected   Norovirus I and II by YADIRA      Not Detected  Not Detected   Yersinia enterocolitica by YADIRA      Not Detected  Not Detected   Bacteria Urine      None Seen /HPF Few (A)    RBC Urine      0-2 /HPF /HPF 0-2    WBC Urine      0-5 /HPF /HPF 0-5    Squamous Epithelial /LPF Urine      None Seen /LPF Moderate (A)    Mucus Urine      None Seen /LPF Present (A)    Calcium Oxalate      None Seen /HPF Many (A)    Lipase      73 - 393 U/L 75    INR      0.85 - 1.15 1.09        Recent Results (from the past 744 hour(s))   CT Abdomen Pelvis w/o & w Contrast    Narrative    EXAM: CT ABDOMEN PELVIS W/O & W CONTRAST, 9/1/2021 11:19 AM    HISTORY: Abdominal pain, acute, nonlocalized; Nausea; Epigastric pain.  History of metastatic melanoma.    COMPARISON: PET/CT 7/30/2021, 5/7/2021, and 2/12/2020.    TECHNIQUE:  Helical CT images from the lung bases through the  symphysis pubis were obtained  without and with contrast using CT  urogram protocol. Contrast dose: 84 ml isovue 370    FINDINGS:    LUNG BASES:  No acute airspace disease, pleural or pericardial  effusion. Small hiatal hernia.    ABDOMEN/PELVIS: Stable hypoattenuating foci throughout the liver. No  new suspicious hepatic lesions. Hepatic vasculature is patent. No  acute abnormality of the gallbladder, pancreas, spleen, mildly  thickened adrenal glands. Unremarkable urinary  bladder. Retroverted  uterus. No adnexal mass. No abnormally dilated or thickened loops of  bowel. Distal colonic diverticulosis without evidence of acute  diverticulitis. No free air in the abdomen/pelvis. Nonaneurysmal  abdominal aorta with atherosclerotic changes throughout. Slight  increase in size of several prominent mesenteric lymph nodes without  pathologic enlargement. Increased soft tissue nodularity along the  anterior and medial aspects of the right external iliac vasculature  (series 3 images [id number]) and increased soft tissue  abnormality/lymphadenopathy in the anterior proximal right thigh.    BONES AND SOFT TISSUES: No acute skeletal lesion or new suspicious  skeletal lesion. Stable partially sclerotic focus in the posterior  right iliac bone. Degenerative lumbar spondylosis the most pronounced  at L3-4 and L4-5 and scoliosis with rightward retrolisthesis of L3 on  L4 and L4 on L5. Degenerative changes of the hip joints with joint  space loss and subchondral cysts.      Impression    IMPRESSION:     1. No acute intra-abdominal pathology suspected.    2. Increased soft tissue abnormality/lymphadenopathy adjacent to the  right external iliac vasculature and in the anterior proximal right  thigh, suspicious for disease progression. Minimally increased size of  several prominent mesenteric lymph nodes, indeterminate.    3. Stable hypoattenuating lesions within the liver, possibly treated  metastasis from known melanoma.    4. Incidental colonic diverticulosis without evidence of acute  diverticulitis and degenerative scoliosis.    I have personally reviewed the examination and initial interpretation  and I agree with the findings.    DINORAH MCKEON,          SYSTEM ID:  [id number]

## 2021-09-09 ENCOUNTER — TELEPHONE (OUTPATIENT)
Dept: FAMILY MEDICINE | Facility: CLINIC | Age: 76
End: 2021-09-09

## 2021-09-09 DIAGNOSIS — R11.2 NAUSEA AND VOMITING, INTRACTABILITY OF VOMITING NOT SPECIFIED, UNSPECIFIED VOMITING TYPE: Primary | ICD-10-CM

## 2021-09-09 RX ORDER — ONDANSETRON 4 MG/1
4 TABLET, ORALLY DISINTEGRATING ORAL EVERY 8 HOURS PRN
Qty: 60 TABLET | Refills: 1 | Status: SHIPPED | OUTPATIENT
Start: 2021-09-09 | End: 2022-01-01

## 2021-09-09 NOTE — TELEPHONE ENCOUNTER
"Patient calling.  She saw Erika Ho on 9/7/2021 and was prescribed Sucralfate.  Has nausea daily but \"never vomited.\"  However, since starting the sucralfate, she continues to have nausea but now with vomiting.  Nausea is constant.  Vomiting has been once a day at random times, does not seem to correlate with meals or med times.  She has an appointment with oncology and \"they want to know what is going on\"    Ok to leave detailed message on patient's VM    Rohit Wilhelm RN  [facility]    "

## 2021-09-09 NOTE — TELEPHONE ENCOUNTER
Her symptoms are likely from side effects of her cancer drugs. I sent her some zofran to take 3 times a day to see if they help.   We can have her repeat an endoscopy to evaluate as well. Her last EGD in 2019 showed only a small hiatal hernia, no other pathology.

## 2021-09-09 NOTE — TELEPHONE ENCOUNTER
Called and spoke with patient. Relayed information, as noted below by provider.  Patient voiced understanding and agreed with plan.  She is also following up with Oncology tomorrow and she will further discuss her nausea and vomiting and see what they have to say, will touch base with provider after this visit to work on plan.  No further questions at this time.    Stacey Iniguez RN  [facility]

## 2021-09-10 ENCOUNTER — ONCOLOGY VISIT (OUTPATIENT)
Dept: ONCOLOGY | Facility: CLINIC | Age: 76
End: 2021-09-10
Attending: PHYSICIAN ASSISTANT
Payer: COMMERCIAL

## 2021-09-10 VITALS
OXYGEN SATURATION: 96 % | SYSTOLIC BLOOD PRESSURE: 119 MMHG | HEART RATE: 99 BPM | TEMPERATURE: 98.5 F | WEIGHT: 136.8 LBS | BODY MASS INDEX: 22.42 KG/M2 | DIASTOLIC BLOOD PRESSURE: 70 MMHG

## 2021-09-10 DIAGNOSIS — R79.89 ELEVATED LFTS: ICD-10-CM

## 2021-09-10 DIAGNOSIS — C43.9 MELANOMA OF SKIN (H): Primary | ICD-10-CM

## 2021-09-10 LAB
ALBUMIN SERPL-MCNC: 3 G/DL (ref 3.4–5)
ALP SERPL-CCNC: 146 U/L (ref 40–150)
ALT SERPL W P-5'-P-CCNC: 1173 U/L (ref 0–50)
ANION GAP SERPL CALCULATED.3IONS-SCNC: 7 MMOL/L (ref 3–14)
AST SERPL W P-5'-P-CCNC: 727 U/L (ref 0–45)
BASOPHILS # BLD AUTO: 0 10E3/UL (ref 0–0.2)
BASOPHILS NFR BLD AUTO: 0 %
BILIRUB SERPL-MCNC: 0.8 MG/DL (ref 0.2–1.3)
BUN SERPL-MCNC: 15 MG/DL (ref 7–30)
CALCIUM SERPL-MCNC: 9.1 MG/DL (ref 8.5–10.1)
CHLORIDE BLD-SCNC: 105 MMOL/L (ref 94–109)
CO2 SERPL-SCNC: 31 MMOL/L (ref 20–32)
CREAT SERPL-MCNC: 0.67 MG/DL (ref 0.52–1.04)
EOSINOPHIL # BLD AUTO: 0.5 10E3/UL (ref 0–0.7)
EOSINOPHIL NFR BLD AUTO: 9 %
ERYTHROCYTE [DISTWIDTH] IN BLOOD BY AUTOMATED COUNT: 13.8 % (ref 10–15)
GFR SERPL CREATININE-BSD FRML MDRD: 85 ML/MIN/1.73M2
GLUCOSE BLD-MCNC: 92 MG/DL (ref 70–99)
HCT VFR BLD AUTO: 34.1 % (ref 35–47)
HGB BLD-MCNC: 11.2 G/DL (ref 11.7–15.7)
IMM GRANULOCYTES # BLD: 0 10E3/UL
IMM GRANULOCYTES NFR BLD: 0 %
LYMPHOCYTES # BLD AUTO: 1.3 10E3/UL (ref 0.8–5.3)
LYMPHOCYTES NFR BLD AUTO: 23 %
MCH RBC QN AUTO: 30.4 PG (ref 26.5–33)
MCHC RBC AUTO-ENTMCNC: 32.8 G/DL (ref 31.5–36.5)
MCV RBC AUTO: 92 FL (ref 78–100)
MONOCYTES # BLD AUTO: 0.6 10E3/UL (ref 0–1.3)
MONOCYTES NFR BLD AUTO: 10 %
NEUTROPHILS # BLD AUTO: 3.3 10E3/UL (ref 1.6–8.3)
NEUTROPHILS NFR BLD AUTO: 58 %
NRBC # BLD AUTO: 0 10E3/UL
NRBC BLD AUTO-RTO: 0 /100
PLATELET # BLD AUTO: 263 10E3/UL (ref 150–450)
POTASSIUM BLD-SCNC: 3.3 MMOL/L (ref 3.4–5.3)
PROT SERPL-MCNC: 7.1 G/DL (ref 6.8–8.8)
RBC # BLD AUTO: 3.69 10E6/UL (ref 3.8–5.2)
SODIUM SERPL-SCNC: 143 MMOL/L (ref 133–144)
WBC # BLD AUTO: 5.8 10E3/UL (ref 4–11)

## 2021-09-10 PROCEDURE — G0463 HOSPITAL OUTPT CLINIC VISIT: HCPCS

## 2021-09-10 PROCEDURE — 80053 COMPREHEN METABOLIC PANEL: CPT | Performed by: PHYSICIAN ASSISTANT

## 2021-09-10 PROCEDURE — 85025 COMPLETE CBC W/AUTO DIFF WBC: CPT | Performed by: PHYSICIAN ASSISTANT

## 2021-09-10 PROCEDURE — 99215 OFFICE O/P EST HI 40 MIN: CPT | Performed by: PHYSICIAN ASSISTANT

## 2021-09-10 PROCEDURE — 36415 COLL VENOUS BLD VENIPUNCTURE: CPT | Performed by: PHYSICIAN ASSISTANT

## 2021-09-10 RX ORDER — PREDNISONE 20 MG/1
TABLET ORAL
Qty: 53 TABLET | Refills: 0 | Status: SHIPPED | OUTPATIENT
Start: 2021-09-10 | End: 2021-10-01

## 2021-09-10 ASSESSMENT — PAIN SCALES - GENERAL: PAINLEVEL: NO PAIN (0)

## 2021-09-10 NOTE — PROGRESS NOTES
Oncology/Hematology Visit Note  Sep 10, 2021   Oncologist: Dr. Gi Franklin     Reason for Visit: Follow up of stage IV melanoma    History of Present Illness: Yadira Hoang is a 76 year old female who presents for telephone call to follow up on her acral melanoma, stage IV, per oncologic history below:     Oncologic History: Acral melanoma, stage IV, s/p primary resection and reconstruction  1. She noted a painful lesion on the sole of the right foot for a few months, since summer 2019.  It initially was small then became raised.  It spontaneously bled. She is not entirely sure of its appearance initially.  She denies noting any masses behind the knee or in the groin.  2. 1/9/2020, punch biopsy was performed by Dr Jessica Alvarado.  It showed melanoma, at least 3.4 mm deep with ulceration and 0 mitoses, and perineural invasion present. TILs were non-brisk and LVI not identified. pT3b.  3. 2/10/2020, she has right femoral sentinel lymph node biopsy and wide local excision (Specimen #: [id number]) and the right heel lesion extends to a depth of 6.6 mm, and invasive melanoma is present at 0.2 mm from the deep margin. Microsatellites are also present. There was 1 (of 2) lymph nodes involved. The lymph node tissue exhibits extensive subcapsular and parenchymal clusters of melanoma cells, highlighted on Melan-A immunostain. The largest cluster is 7 millimeters in greatest diameter. pT4b pN2c.  4. 2/22/2020, she had PET-CT, which showed intense FDG uptake in a right inguinal lymph node, concerning for an additional focus of metastatic disease. There is also an indeterminate right external iliac lymph node with mild FDG uptake.  5. 3/4/2020, she has medial plantar artery  fasciocutaneous instep flap and Integra placement to the donor site. On 4/2/2020, she has additional reconstruction with skin grafting.  6. 5/27/2020, initiated on nivolumab immunotherapy.  Had C2 6/24/20. C3 on 7/22/20. C4 on  9/3/2020.  7. 8/21/2020 evidence of a new liver metastasis on PET-CT.  9. 10/9/2020, PET-CT shows increasing size of hepatic metastases with increased hypermetabolism, increased right inguinal and iliac lymphadenopathy, and new FDG uptake right T2 transverse process and right side of S1. MRI-brain obtained 10/21/2020 is negative for brain metastasis.  10. 10/28/2020, she starts ipilimumab 1mg/kg and nivolumab 3mg/kg, then nivolumab maintenance through cycle 8.  11. 2/12/21, PET-CT shows evidence of partial response with several liver lesions no longer hypermetabolic and smaller in size, only one hypermetabolic lesion in the hepatic dome. Slightly increased size and FDG uptake in a single right inguinal lymph node, remainder of inguinal and right iliac chain lymph nodes have decreased in size and FDG uptake since prior exam.   12. 5/7/2021, PET-CT shows mixed response with increased size and hypermetabolism in the right iliac chain lymphadenopathy.  13. 5/10/2021, she starts ipilimumab 3 mg/kg every 3 weeks.    Interval History:  Yadira presents today with ongoing difficulty with diarrhea, appetite loss and nausea. These symptoms have been present since July. Typically has 3-4 diarrhea stools per day although in the last few days this has decreased to 1-2 bowel movements. Appetite is poor. She is drinking fluids adequately. Reports diffuse, sharp abdominal pains which she describes as constant. Denies cramping with diarrhea. Saw her PCP this week and was prescribed carafate, famotidine and omeprazole.She believes one of these medications may have caused her to vomit multiple times this week. CT scan of the abdomen and pelvis was performed 9/1/21 and was negative for acute pathology or significant disease progression. SDenies pruritis, skin rash, breathing changes, dysuria or blood in urine or stool. She has not felt like herself for the past 2 months due to these ongoing symptoms.    Review of Systems:  Skin: negative  for, rash, itching  Eyes: negative for, visual blurring, double vision  Ears/Nose/Throat: negative for, hearing loss, vertigo  Respiratory: No shortness of breath, dyspnea on exertion, cough, or hemoptysis  Cardiovascular: negative for, palpitations and dyspnea on exertion  Gastrointestinal: positive for nausea, vomiting, heartburn and diarrhea  Genitourinary: negative for, dysuria, frequency and urgency  Musculoskeletal: negative for, joint pain, joint swelling and joint stiffness  Neurologic: negative for and headaches  Psychiatric: positive for excessive stress related to symptoms  Hematologic/Lymphatic/Immunologic: negative for, chills and fever  Endocrine: negative for, hot flashes and night sweats      Current Outpatient Medications   Medication Sig Dispense Refill     predniSONE (DELTASONE) 20 MG tablet Take 3 tablets (60 mg) by mouth daily for 5 days, THEN 2.5 tablets (50 mg) daily for 5 days, THEN 2 tablets (40 mg) daily for 5 days, THEN 1.5 tablets (30 mg) daily for 5 days, THEN 1 tablet (20 mg) daily for 5 days, THEN 0.5 tablets (10 mg) daily for 5 days. 53 tablet 0     cholecalciferol (VITAMIN D3) 125 MCG (5000 UT) TABS tablet Take 2,000 Units by mouth every other day        estradiol (ESTRACE) 0.1 MG/GM vaginal cream Place 2 g vaginally twice a week 42.5 g 1     famotidine (PEPCID) 40 MG tablet Take 1 tablet (40 mg) by mouth At Bedtime       omeprazole (PRILOSEC OTC) 20 MG EC tablet Take 1 tablet (20 mg) by mouth every morning 30-60 minutes before eating 30 tablet 0     ondansetron (ZOFRAN-ODT) 4 MG ODT tab Take 1 tablet (4 mg) by mouth every 8 hours as needed for nausea 60 tablet 1     ondansetron (ZOFRAN-ODT) 4 MG ODT tab Take 1 tablet (4 mg) by mouth every 8 hours as needed for nausea       sertraline (ZOLOFT) 25 MG tablet Take 1 tablet (25 mg) by mouth daily 30 tablet 4     sucralfate (CARAFATE) 1 GM tablet Take 1 tablet (1 g) by mouth 4 times daily as needed for nausea 90 tablet 0     triamcinolone  (KENALOG) 0.1 % external cream Apply topically 2 times daily 453.6 g 11     Physical Exam:  /70   Pulse 99   Temp 98.5  F (36.9  C) (Oral)   Wt 62.1 kg (136 lb 12.8 oz)   SpO2 96%   BMI 22.42 kg/m    Wt Readings from Last 10 Encounters:   09/10/21 62.1 kg (136 lb 12.8 oz)   09/07/21 62.1 kg (136 lb 14.4 oz)   08/23/21 62.5 kg (137 lb 11.2 oz)   08/06/21 62.5 kg (137 lb 12.8 oz)   08/02/21 62.1 kg (136 lb 14.4 oz)   07/30/21 63 kg (139 lb)   07/12/21 62.3 kg (137 lb 4.8 oz)   07/09/21 60.3 kg (133 lb)   06/30/21 62.6 kg (138 lb)   06/21/21 63.4 kg (139 lb 11.2 oz)   General: Pleasant female in no acute distress.  HEENT: EOMI, PERRL. Sclerae are anicteric. Oral mucosa is pink and moist with no lesions or thrush.   Lymph: Neck is supple with no lymphadenopathy in the cervical or supraclavicular areas.   Heart: Regular rate and rhythm.   Lungs: Clear to auscultation bilaterally.   Abdomen: Bowel sounds present, soft, nontender with light and deep palpation. No hepatomegaly or splenomegaly palpated.  Extremities: No lower extremity edema noted bilaterally.   Neuro: Cranial nerves II through XII are grossly intact.  Skin: Several pinpoint pustules noted on anterior thighs. No other rashes, petechiae, or bruising noted on exposed skin.    Laboratory Data:   Most Recent 3 CBC's:  Recent Labs   Lab Test 09/10/21  1022 06/30/21  1340 05/24/21  1150   WBC 5.8 5.4 4.1   HGB 11.2* 13.0 12.7   MCV 92 91 92    226 247    Most Recent 3 BMP's:  Recent Labs   Lab Test 09/10/21  1022 09/01/21  1037 08/23/21  1239    140 142   POTASSIUM 3.3* 3.8 4.3   CHLORIDE 105 106 105   CO2 31 30 34*   BUN 15 13 23   CR 0.67 0.57 0.76   ANIONGAP 7 4 3   GABY 9.1 9.7 10.0   GLC 92 78 156*    Most Recent 2 LFT's:  Recent Labs   Lab Test 09/10/21  1022 09/01/21  1037   * 155*   ALT 1,173* 292*   ALKPHOS 146 69   BILITOTAL 0.8 0.5    Most Recent TSH and T4:  Recent Labs   Lab Test 08/23/21  1239   TSH 2.58     I reviewed  the above labs today.    Assessment and Plan:  1. Onc  Acral melanoma, right heel primary, pT4b pN3c M1, Stage IV. 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 She progressed on first line nivolumab, but shows clear evidence of response to 2nd line ipilimumab and nivolumab. However, once on nivolumab maintenance therapy she started to show regrowth in the right inguinal node. She switched over to ipilimumab every 3 weeks in the third line on 5/10/21, for a maximum of 4 doses. She knows to call if there is any change in the size of the inguinal lymph node. For evidence of response, continue ipilimumab. For progression, plan for anti-PD1 therapy with Keytruda  or another rounds of dual CPI, either Ipi/Nivo or Ipi/Pembro .    -Progressive nausea, reflux symptoms and diarrhea now with significantly elevated transaminases (16-23 times upper limit of normal)   -Begin prednisone taper (60 mg daily x 5 days and decrease by 10 mg every 10 days) for immune-medicated hepatitis/esophagitis. Go to ED this weekend if symptoms worse.   -Hold immunotherapy. Will plan to restart once her LFT's have normalized and her prednisone dose is down to 10 mg or less.   -Repeat CMP on Monday 9/13/21    2. MSK  Right foot pain 2/2 prior surgery (no concern for immune mediated arthralgias given specific to post surgical pain in right foot/ankle). Has had work up with orthopedics.   -Not currently requiring medication for this.    3. Derm  Dry skin and pruritis has resolved     4. Psych/Neuro  Multifactorial fatigue, TSH and cortisol have been WNL. History of GUERLINE (treated), and she is also on immunotherapy with known side effect of fatigue. Fatigue has increased with ongoing GI symptoms.     History of depression and anxiety. Patient agrees to resume sertraline 25 mg daily     Patient seen and plan of care discussed with PEACE Cohn CNP Sarah Poveda PA-C  [facility]  [address]  [place] [address]  [phone]    60 minutes spent on the date of the encounter doing chart review, review of test results, interpretation of tests, patient visit and documentation

## 2021-09-10 NOTE — NURSING NOTE
Patient labs drawn in clinic via venipuncture. Patient tolerated well. See flowsheet for details.      Saul Barker MA on 9/10/2021 at 10:24 AM

## 2021-09-10 NOTE — LETTER
9/10/2021         RE: Yadira Hoang  [address] [facility] [address]      Oncology/Hematology Visit Note  Sep 10, 2021   Oncologist: Dr. Gi Franklin     Reason for Visit: Follow up of stage IV melanoma    History of Present Illness: Yadira Hoang is a 76 year old female who presents for telephone call to follow up on her acral melanoma, stage IV, per oncologic history below:     Oncologic History: Acral melanoma, stage IV, s/p primary resection and reconstruction  1. She noted a painful lesion on the sole of the right foot for a few months, since summer 2019.  It initially was small then became raised.  It spontaneously bled. She is not entirely sure of its appearance initially.  She denies noting any masses behind the knee or in the groin.  2. 1/9/2020, punch biopsy was performed by Dr Jessica Alvarado.  It showed melanoma, at least 3.4 mm deep with ulceration and 0 mitoses, and perineural invasion present. TILs were non-brisk and LVI not identified. pT3b.  3. 2/10/2020, she has right femoral sentinel lymph node biopsy and wide local excision (Specimen #: [id number]) and the right heel lesion extends to a depth of 6.6 mm, and invasive melanoma is present at 0.2 mm from the deep margin. Microsatellites are also present. There was 1 (of 2) lymph nodes involved. The lymph node tissue exhibits extensive subcapsular and parenchymal clusters of melanoma cells, highlighted on Melan-A immunostain. The largest cluster is 7 millimeters in greatest diameter. pT4b pN2c.  4. 2/22/2020, she had PET-CT, which showed intense FDG uptake in a right inguinal lymph node, concerning for an additional focus of metastatic disease. There is also an indeterminate right external iliac lymph node with mild FDG uptake.  5. 3/4/2020, she has medial plantar artery  fasciocutaneous instep flap and Integra placement to the donor site. On 4/2/2020, she has additional reconstruction with skin grafting.  6.  5/27/2020, initiated on nivolumab immunotherapy.  Had C2 6/24/20. C3 on 7/22/20. C4 on 9/3/2020.  7. 8/21/2020 evidence of a new liver metastasis on PET-CT.  9. 10/9/2020, PET-CT shows increasing size of hepatic metastases with increased hypermetabolism, increased right inguinal and iliac lymphadenopathy, and new FDG uptake right T2 transverse process and right side of S1. MRI-brain obtained 10/21/2020 is negative for brain metastasis.  10. 10/28/2020, she starts ipilimumab 1mg/kg and nivolumab 3mg/kg, then nivolumab maintenance through cycle 8.  11. 2/12/21, PET-CT shows evidence of partial response with several liver lesions no longer hypermetabolic and smaller in size, only one hypermetabolic lesion in the hepatic dome. Slightly increased size and FDG uptake in a single right inguinal lymph node, remainder of inguinal and right iliac chain lymph nodes have decreased in size and FDG uptake since prior exam.   12. 5/7/2021, PET-CT shows mixed response with increased size and hypermetabolism in the right iliac chain lymphadenopathy.  13. 5/10/2021, she starts ipilimumab 3 mg/kg every 3 weeks.    Interval History:  Yadira presents today with ongoing difficulty with diarrhea, appetite loss and nausea. These symptoms have been present since July. Typically has 3-4 diarrhea stools per day although in the last few days this has decreased to 1-2 bowel movements. Appetite is poor. She is drinking fluids adequately. Reports diffuse, sharp abdominal pains which she describes as constant. Denies cramping with diarrhea. Saw her PCP this week and was prescribed carafate, famotidine and omeprazole.She believes one of these medications may have caused her to vomit multiple times this week. CT scan of the abdomen and pelvis was performed 9/1/21 and was negative for acute pathology or significant disease progression. SDenies pruritis, skin rash, breathing changes, dysuria or blood in urine or stool. She has not felt like herself for  the past 2 months due to these ongoing symptoms.    Review of Systems:  Skin: negative for, rash, itching  Eyes: negative for, visual blurring, double vision  Ears/Nose/Throat: negative for, hearing loss, vertigo  Respiratory: No shortness of breath, dyspnea on exertion, cough, or hemoptysis  Cardiovascular: negative for, palpitations and dyspnea on exertion  Gastrointestinal: positive for nausea, vomiting, heartburn and diarrhea  Genitourinary: negative for, dysuria, frequency and urgency  Musculoskeletal: negative for, joint pain, joint swelling and joint stiffness  Neurologic: negative for and headaches  Psychiatric: positive for excessive stress related to symptoms  Hematologic/Lymphatic/Immunologic: negative for, chills and fever  Endocrine: negative for, hot flashes and night sweats      Current Outpatient Medications   Medication Sig Dispense Refill     predniSONE (DELTASONE) 20 MG tablet Take 3 tablets (60 mg) by mouth daily for 5 days, THEN 2.5 tablets (50 mg) daily for 5 days, THEN 2 tablets (40 mg) daily for 5 days, THEN 1.5 tablets (30 mg) daily for 5 days, THEN 1 tablet (20 mg) daily for 5 days, THEN 0.5 tablets (10 mg) daily for 5 days. 53 tablet 0     cholecalciferol (VITAMIN D3) 125 MCG (5000 UT) TABS tablet Take 2,000 Units by mouth every other day        estradiol (ESTRACE) 0.1 MG/GM vaginal cream Place 2 g vaginally twice a week 42.5 g 1     famotidine (PEPCID) 40 MG tablet Take 1 tablet (40 mg) by mouth At Bedtime       omeprazole (PRILOSEC OTC) 20 MG EC tablet Take 1 tablet (20 mg) by mouth every morning 30-60 minutes before eating 30 tablet 0     ondansetron (ZOFRAN-ODT) 4 MG ODT tab Take 1 tablet (4 mg) by mouth every 8 hours as needed for nausea 60 tablet 1     ondansetron (ZOFRAN-ODT) 4 MG ODT tab Take 1 tablet (4 mg) by mouth every 8 hours as needed for nausea       sertraline (ZOLOFT) 25 MG tablet Take 1 tablet (25 mg) by mouth daily 30 tablet 4     sucralfate (CARAFATE) 1 GM tablet Take 1  tablet (1 g) by mouth 4 times daily as needed for nausea 90 tablet 0     triamcinolone (KENALOG) 0.1 % external cream Apply topically 2 times daily 453.6 g 11     Physical Exam:  /70   Pulse 99   Temp 98.5  F (36.9  C) (Oral)   Wt 62.1 kg (136 lb 12.8 oz)   SpO2 96%   BMI 22.42 kg/m    Wt Readings from Last 10 Encounters:   09/10/21 62.1 kg (136 lb 12.8 oz)   09/07/21 62.1 kg (136 lb 14.4 oz)   08/23/21 62.5 kg (137 lb 11.2 oz)   08/06/21 62.5 kg (137 lb 12.8 oz)   08/02/21 62.1 kg (136 lb 14.4 oz)   07/30/21 63 kg (139 lb)   07/12/21 62.3 kg (137 lb 4.8 oz)   07/09/21 60.3 kg (133 lb)   06/30/21 62.6 kg (138 lb)   06/21/21 63.4 kg (139 lb 11.2 oz)   General: Pleasant female in no acute distress.  HEENT: EOMI, PERRL. Sclerae are anicteric. Oral mucosa is pink and moist with no lesions or thrush.   Lymph: Neck is supple with no lymphadenopathy in the cervical or supraclavicular areas.   Heart: Regular rate and rhythm.   Lungs: Clear to auscultation bilaterally.   Abdomen: Bowel sounds present, soft, nontender with light and deep palpation. No hepatomegaly or splenomegaly palpated.  Extremities: No lower extremity edema noted bilaterally.   Neuro: Cranial nerves II through XII are grossly intact.  Skin: Several pinpoint pustules noted on anterior thighs. No other rashes, petechiae, or bruising noted on exposed skin.    Laboratory Data:   Most Recent 3 CBC's:  Recent Labs   Lab Test 09/10/21  1022 06/30/21  1340 05/24/21  1150   WBC 5.8 5.4 4.1   HGB 11.2* 13.0 12.7   MCV 92 91 92    226 247    Most Recent 3 BMP's:  Recent Labs   Lab Test 09/10/21  1022 09/01/21  1037 08/23/21  1239    140 142   POTASSIUM 3.3* 3.8 4.3   CHLORIDE 105 106 105   CO2 31 30 34*   BUN 15 13 23   CR 0.67 0.57 0.76   ANIONGAP 7 4 3   GABY 9.1 9.7 10.0   GLC 92 78 156*    Most Recent 2 LFT's:  Recent Labs   Lab Test 09/10/21  1022 09/01/21  1037   * 155*   ALT 1,173* 292*   ALKPHOS 146 69   BILITOTAL 0.8 0.5     Most Recent TSH and T4:  Recent Labs   Lab Test 08/23/21  1239   TSH 2.58     I reviewed the above labs today.    Assessment and Plan:  1. Onc  Acral melanoma, right heel primary, pT4b pN3c M1, Stage IV. 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 She progressed on first line nivolumab, but shows clear evidence of response to 2nd line ipilimumab and nivolumab. However, once on nivolumab maintenance therapy she started to show regrowth in the right inguinal node. She switched over to ipilimumab every 3 weeks in the third line on 5/10/21, for a maximum of 4 doses. She knows to call if there is any change in the size of the inguinal lymph node. For evidence of response, continue ipilimumab. For progression, plan for anti-PD1 therapy with Keytruda  or another rounds of dual CPI, either Ipi/Nivo or Ipi/Pembro .    -Progressive nausea, reflux symptoms and diarrhea now with significantly elevated transaminases (16-23 times upper limit of normal)   -Begin prednisone taper (60 mg daily x 5 days and decrease by 10 mg every 10 days) for immune-medicated hepatitis/esophagitis. Go to ED this weekend if symptoms worse.   -Hold immunotherapy. Will plan to restart once her LFT's have normalized and her prednisone dose is down to 10 mg or less.   -Repeat CMP on Monday 9/13/21    2. MSK  Right foot pain 2/2 prior surgery (no concern for immune mediated arthralgias given specific to post surgical pain in right foot/ankle). Has had work up with orthopedics.   -Not currently requiring medication for this.    3. Derm  Dry skin and pruritis has resolved     4. Psych/Neuro  Multifactorial fatigue, TSH and cortisol have been WNL. History of GUERLINE (treated), and she is also on immunotherapy with known side effect of fatigue. Fatigue has increased with ongoing GI symptoms.     History of depression and anxiety. Patient agrees to resume sertraline 25 mg daily     Patient seen and plan of care discussed with  PEACE Cohn CNP Sarah Poveda PA-C  [facility] [place] [address] [place], [place] [id number] [phone]    60 minutes spent on the date of the encounter doing chart review, review of test results, interpretation of tests, patient visit and documentation                   Meghan Perdomo PA-C

## 2021-09-10 NOTE — Clinical Note
9/10/2021         RE: Yadira Hoang  [address] [facility] [address]        Dear Colleague,    Thank you for referring your patient, Yadira Hoang, to the [facility]. Please see a copy of my visit note below.    Oncology/Hematology Visit Note  Sep 10, 2021   Oncologist: Dr. Gi Franklin     Reason for Visit: Follow up of stage IV melanoma    History of Present Illness: Yadira Hoang is a 76 year old female who presents for telephone call to follow up on her acral melanoma, stage IV, per oncologic history below:     Oncologic History: Acral melanoma, stage IV, s/p primary resection and reconstruction  1. She noted a painful lesion on the sole of the right foot for a few months, since summer 2019.  It initially was small then became raised.  It spontaneously bled. She is not entirely sure of its appearance initially.  She denies noting any masses behind the knee or in the groin.  2. 1/9/2020, punch biopsy was performed by Dr Jessica Alvarado.  It showed melanoma, at least 3.4 mm deep with ulceration and 0 mitoses, and perineural invasion present. TILs were non-brisk and LVI not identified. pT3b.  3. 2/10/2020, she has right femoral sentinel lymph node biopsy and wide local excision (Specimen #: [id number]) and the right heel lesion extends to a depth of 6.6 mm, and invasive melanoma is present at 0.2 mm from the deep margin. Microsatellites are also present. There was 1 (of 2) lymph nodes involved. The lymph node tissue exhibits extensive subcapsular and parenchymal clusters of melanoma cells, highlighted on Melan-A immunostain. The largest cluster is 7 millimeters in greatest diameter. pT4b pN2c.  4. 2/22/2020, she had PET-CT, which showed intense FDG uptake in a right inguinal lymph node, concerning for an additional focus of metastatic disease. There is also an indeterminate right external iliac lymph node with mild FDG uptake.  5. 3/4/2020, she has medial  plantar artery  fasciocutaneous instep flap and Integra placement to the donor site. On 4/2/2020, she has additional reconstruction with skin grafting.  6. 5/27/2020, initiated on nivolumab immunotherapy.  Had C2 6/24/20. C3 on 7/22/20. C4 on 9/3/2020.  7. 8/21/2020 evidence of a new liver metastasis on PET-CT.  9. 10/9/2020, PET-CT shows increasing size of hepatic metastases with increased hypermetabolism, increased right inguinal and iliac lymphadenopathy, and new FDG uptake right T2 transverse process and right side of S1. MRI-brain obtained 10/21/2020 is negative for brain metastasis.  10. 10/28/2020, she starts ipilimumab 1mg/kg and nivolumab 3mg/kg, then nivolumab maintenance through cycle 8.  11. 2/12/21, PET-CT shows evidence of partial response with several liver lesions no longer hypermetabolic and smaller in size, only one hypermetabolic lesion in the hepatic dome. Slightly increased size and FDG uptake in a single right inguinal lymph node, remainder of inguinal and right iliac chain lymph nodes have decreased in size and FDG uptake since prior exam.   12. 5/7/2021, PET-CT shows mixed response with increased size and hypermetabolism in the right iliac chain lymphadenopathy.  13. 5/10/2021, she starts ipilimumab 3 mg/kg every 3 weeks.    Interval History:  Yadira presents today with ongoing difficulty with diarrhea, appetite loss and nausea. These symptoms have been present since July. Typically has 3-4 diarrhea stools per day although in the last few days this has decreased to 1-2 bowel movements. Appetite is poor. She is drinking fluids adequately. Reports diffuse, sharp abdominal pains which she describes as constant. Denies cramping with diarrhea. Saw her PCP this week and was prescribed carafate, famotidine and omeprazole.She believes one of these medications may have caused her to vomit multiple times this week. CT scan of the abdomen and pelvis was performed 9/1/21 and was negative for  acute pathology or significant disease progression. SDenies pruritis, skin rash, breathing changes, dysuria or blood in urine or stool. She has not felt like herself for the past 2 months due to these ongoing symptoms.    Review of Systems:  Skin: negative for, rash, itching  Eyes: negative for, visual blurring, double vision  Ears/Nose/Throat: negative for, hearing loss, vertigo  Respiratory: No shortness of breath, dyspnea on exertion, cough, or hemoptysis  Cardiovascular: negative for, palpitations and dyspnea on exertion  Gastrointestinal: positive for nausea, vomiting, heartburn and diarrhea  Genitourinary: negative for, dysuria, frequency and urgency  Musculoskeletal: negative for, joint pain, joint swelling and joint stiffness  Neurologic: negative for and headaches  Psychiatric: positive for excessive stress related to symptoms  Hematologic/Lymphatic/Immunologic: negative for, chills and fever  Endocrine: negative for, hot flashes and night sweats      Current Outpatient Medications   Medication Sig Dispense Refill     predniSONE (DELTASONE) 20 MG tablet Take 3 tablets (60 mg) by mouth daily for 5 days, THEN 2.5 tablets (50 mg) daily for 5 days, THEN 2 tablets (40 mg) daily for 5 days, THEN 1.5 tablets (30 mg) daily for 5 days, THEN 1 tablet (20 mg) daily for 5 days, THEN 0.5 tablets (10 mg) daily for 5 days. 53 tablet 0     cholecalciferol (VITAMIN D3) 125 MCG (5000 UT) TABS tablet Take 2,000 Units by mouth every other day        estradiol (ESTRACE) 0.1 MG/GM vaginal cream Place 2 g vaginally twice a week 42.5 g 1     famotidine (PEPCID) 40 MG tablet Take 1 tablet (40 mg) by mouth At Bedtime       omeprazole (PRILOSEC OTC) 20 MG EC tablet Take 1 tablet (20 mg) by mouth every morning 30-60 minutes before eating 30 tablet 0     ondansetron (ZOFRAN-ODT) 4 MG ODT tab Take 1 tablet (4 mg) by mouth every 8 hours as needed for nausea 60 tablet 1     ondansetron (ZOFRAN-ODT) 4 MG ODT tab Take 1 tablet (4 mg) by  mouth every 8 hours as needed for nausea       sertraline (ZOLOFT) 25 MG tablet Take 1 tablet (25 mg) by mouth daily 30 tablet 4     sucralfate (CARAFATE) 1 GM tablet Take 1 tablet (1 g) by mouth 4 times daily as needed for nausea 90 tablet 0     triamcinolone (KENALOG) 0.1 % external cream Apply topically 2 times daily 453.6 g 11     Physical Exam:  /70   Pulse 99   Temp 98.5  F (36.9  C) (Oral)   Wt 62.1 kg (136 lb 12.8 oz)   SpO2 96%   BMI 22.42 kg/m    Wt Readings from Last 10 Encounters:   09/10/21 62.1 kg (136 lb 12.8 oz)   09/07/21 62.1 kg (136 lb 14.4 oz)   08/23/21 62.5 kg (137 lb 11.2 oz)   08/06/21 62.5 kg (137 lb 12.8 oz)   08/02/21 62.1 kg (136 lb 14.4 oz)   07/30/21 63 kg (139 lb)   07/12/21 62.3 kg (137 lb 4.8 oz)   07/09/21 60.3 kg (133 lb)   06/30/21 62.6 kg (138 lb)   06/21/21 63.4 kg (139 lb 11.2 oz)   General: Pleasant female in no acute distress.  HEENT: EOMI, PERRL. Sclerae are anicteric. Oral mucosa is pink and moist with no lesions or thrush.   Lymph: Neck is supple with no lymphadenopathy in the cervical or supraclavicular areas.   Heart: Regular rate and rhythm.   Lungs: Clear to auscultation bilaterally.   Abdomen: Bowel sounds present, soft, nontender with light and deep palpation. No hepatomegaly or splenomegaly palpated.  Extremities: No lower extremity edema noted bilaterally.   Neuro: Cranial nerves II through XII are grossly intact.  Skin: Several pinpoint pustules noted on anterior thighs. No other rashes, petechiae, or bruising noted on exposed skin.    Laboratory Data:   Most Recent 3 CBC's:  Recent Labs   Lab Test 09/10/21  1022 06/30/21  1340 05/24/21  1150   WBC 5.8 5.4 4.1   HGB 11.2* 13.0 12.7   MCV 92 91 92    226 247    Most Recent 3 BMP's:  Recent Labs   Lab Test 09/10/21  1022 09/01/21  1037 08/23/21  1239    140 142   POTASSIUM 3.3* 3.8 4.3   CHLORIDE 105 106 105   CO2 31 30 34*   BUN 15 13 23   CR 0.67 0.57 0.76   ANIONGAP 7 4 3   GABY 9.1 9.7  10.0   GLC 92 78 156*    Most Recent 2 LFT's:  Recent Labs   Lab Test 09/10/21  1022 09/01/21  1037   * 155*   ALT 1,173* 292*   ALKPHOS 146 69   BILITOTAL 0.8 0.5    Most Recent TSH and T4:  Recent Labs   Lab Test 08/23/21  1239   TSH 2.58     I reviewed the above labs today.    Assessment and Plan:  1. Onc  Acral melanoma, right heel primary, pT4b pN3c M1, Stage IV. 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 She progressed on first line nivolumab, but shows clear evidence of response to 2nd line ipilimumab and nivolumab. However, once on nivolumab maintenance therapy she started to show regrowth in the right inguinal node. She switched over to ipilimumab every 3 weeks in the third line on 5/10/21, for a maximum of 4 doses. She knows to call if there is any change in the size of the inguinal lymph node. For evidence of response, continue ipilimumab. For progression, plan for anti-PD1 therapy with Keytruda  or another rounds of dual CPI, either Ipi/Nivo or Ipi/Pembro .    -Progressive nausea, reflux symptoms and diarrhea now with significantly elevated transaminases (16-23 times upper limit of normal)   -Begin prednisone taper (60 mg daily x 5 days and decrease by 10 mg every 10 days) for immune-medicated hepatitis/esophagitis   -Hold immunotherapy  -Repeat CMP on Monday 9/13/21    2. MSK  Right foot pain 2/2 prior surgery (no concern for immune mediated arthralgias given specific to post surgical pain in right foot/ankle). Has had work up with orthopedics.   -Not currently requiring medication for this.    3. Derm  Dry skin and pruritis has resolved     4. Psych/Neuro  Multifactorial fatigue, TSH and cortisol have been WNL. History of GUERLINE (treated), and she is also on immunotherapy with known side effect of fatigue. Fatigue has increased with ongoing GI symptoms.     History of depression and anxiety. Patient agrees to resume sertraline 25 mg daily       Meghan Milian  CNP  [facility] [address] [address]  [phone]      Meghan Perdomo PA-C  [facility] [address] [address]  [phone]    60 minutes spent on the date of the encounter doing chart review, review of test results, interpretation of tests, patient visit and documentation                 Again, thank you for allowing me to participate in the care of your patient.        Sincerely,        Meghan Perdomo PA-C

## 2021-09-13 ENCOUNTER — LAB (OUTPATIENT)
Dept: LAB | Facility: CLINIC | Age: 76
End: 2021-09-13

## 2021-09-13 ENCOUNTER — TELEPHONE (OUTPATIENT)
Dept: ONCOLOGY | Facility: CLINIC | Age: 76
End: 2021-09-13

## 2021-09-13 DIAGNOSIS — C43.71 MALIGNANT MELANOMA OF RIGHT LOWER EXTREMITY INCLUDING HIP (H): ICD-10-CM

## 2021-09-13 DIAGNOSIS — C43.9 MELANOMA OF SKIN (H): Primary | ICD-10-CM

## 2021-09-13 LAB
ALBUMIN SERPL-MCNC: 3 G/DL (ref 3.4–5)
ALP SERPL-CCNC: 131 U/L (ref 40–150)
ALT SERPL W P-5'-P-CCNC: 736 U/L (ref 0–50)
ANION GAP SERPL CALCULATED.3IONS-SCNC: 2 MMOL/L (ref 3–14)
AST SERPL W P-5'-P-CCNC: 191 U/L (ref 0–45)
BILIRUB SERPL-MCNC: 0.4 MG/DL (ref 0.2–1.3)
BUN SERPL-MCNC: 24 MG/DL (ref 7–30)
CALCIUM SERPL-MCNC: 9 MG/DL (ref 8.5–10.1)
CHLORIDE BLD-SCNC: 108 MMOL/L (ref 94–109)
CO2 SERPL-SCNC: 33 MMOL/L (ref 20–32)
CREAT SERPL-MCNC: 0.73 MG/DL (ref 0.52–1.04)
GFR SERPL CREATININE-BSD FRML MDRD: 80 ML/MIN/1.73M2
GLUCOSE BLD-MCNC: 136 MG/DL (ref 70–99)
HOLD SPECIMEN: NORMAL
HOLD SPECIMEN: NORMAL
POTASSIUM BLD-SCNC: 3.3 MMOL/L (ref 3.4–5.3)
PROT SERPL-MCNC: 6.7 G/DL (ref 6.8–8.8)
SODIUM SERPL-SCNC: 143 MMOL/L (ref 133–144)
TSH SERPL DL<=0.005 MIU/L-ACNC: 2.76 MU/L (ref 0.4–4)

## 2021-09-13 PROCEDURE — 36415 COLL VENOUS BLD VENIPUNCTURE: CPT | Performed by: PHYSICIAN ASSISTANT

## 2021-09-13 PROCEDURE — 80053 COMPREHEN METABOLIC PANEL: CPT | Performed by: PHYSICIAN ASSISTANT

## 2021-09-13 PROCEDURE — 84443 ASSAY THYROID STIM HORMONE: CPT | Performed by: PHYSICIAN ASSISTANT

## 2021-09-13 NOTE — TELEPHONE ENCOUNTER
DATE:  9/13/21  Drawn at 9:30am  TIME OF RECEIPT FROM LAB:  10:29am  LAB TEST: ALT   LAB VALUE:  736    RESULTS PAGED TO (PROVIDER):  Meghan Perdomo PA-C  TIME LAB VALUE REPORTED TO PROVIDER: 10:33am.     10:42am Per Meghan,  Liver Test appears to be improving.   Lab recheck on 9/17/21 prior to the 1pm appt with Gloria Fry  Continue on the steroids that should have been started since Friday 9/10.     Called and relayed information to Pt, who verbalized understanding. In agreement with Friday 9/17 would be available at 12:00pm for labs prior to Gloria's   Pt continues to take her prescribe prednisone which first dose was on Friday 9/10.     Scheduling ntoified.    Pt does report:  Started feeling intermittent lightheaded when started on steroids, mainly with activity, sitting to standing/walking. Lightheadedness goes away by afternoon after eating/drinking.   This writer educated to take time getting up in the morning, have a glass of water by bedstand, sit at edge of bed and drink water and wait 15minutes or so before getting out of bed. Call triage back if lightheadness continues, worsens, does not resolve on own, or other new symptoms.    Instructed patient to seek care immediately for worsening symptoms, including: fever, chest pain, shortness of breath, dizziness.    12:00pm Mgehan Perdomo PA-C is aware or reported symptoms of lightheadness, .We can monitor for now.

## 2021-09-16 RX ORDER — LIDOCAINE HYDROCHLORIDE AND EPINEPHRINE 10; 10 MG/ML; UG/ML
1.5 INJECTION, SOLUTION INFILTRATION; PERINEURAL ONCE
Status: DISCONTINUED | OUTPATIENT
Start: 2021-09-17 | End: 2021-09-17

## 2021-09-16 NOTE — PROGRESS NOTES
[facility] Dermatology Note  Encounter Date: Sep 17, 2021  Office Visit     Dermatology Problem List:  Skin checks q3 months through 1/22  1. Metastatic melanoma, originated in right heel  - S/p punch biopsy By Dr. Alvarado 1/9/20 showing melanoma at least 3.4mm deep, ulcerated, no mitoses, perineural invasion, TIL present and nonbrisk, no LVI.  - S/p WLE with 2cm margins (revelead melanoma to depth of 6.6mm with deep margin close, microsatellites were present) and right femoral SLNB 2/10/20 (1/2 nodes positive, largest deposit 7mm)  - S/p re-excision 2/20/20  - Flap completed 3/4/30, skin graft 4/2/20  - Adjuvant nivolumab started 5/2020. Progressed. 10/28/20 started on ipi and nivolumab combination. Went to ipi monotherapy 5/2021. Developed severe transaminitis. Stopped ipi 9/2021 and started oral steroids.  2. AK, right cheek: cryo and biopsy 11/19/20     Social history: . Has 4 children.  Family history: Brother had pancreatic cancer. No melanoma in the family.  ____________________________________________    Assessment & Plan:     # Melanoma, stage IV, originating at R heel, currently off of immunotherapy (ipilimumab) due to autoimmune adverse events (transaminitis). No evidence of recurrence cutaneously, known disease in R inguinal nodes. Discussed risk of melanoma recurrence (with local or distant disease) and risk of additional primary melanomas. Discussed role of dermatology in care - screening for local recurrence, new primaries, and management of cutaneous toxicities from treatment. Explained routine schedule for follow up examinations in office and need for self skin checks monthly.   - ABCDs of melanoma were discussed and self skin checks were advised.   - Sun precaution was advised including the use of sunscreens of SPF 30 or higher, sun protective clothing, and avoidance of tanning beds.  - Reviewed last onc note from 9/10/21. Severe transaminitis, immunotherapy now on hold  and on oral steroids to treat.  - Reviewed labs as below.    # Vitiligo, chest and back: Treatment related versus due to melanoma. Not bothersome to the patient. No treatment at this time.     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 Discussed the pathogenesis of this condition and emphasized the importance of gentle skin care and cream based moisturizer for long term treatment. Discussed use of topical steroid only when skin is itchy/symptomatic.   - TAC 0.1% cream BID PRN  - Moisturizer BID  - Gentle soaps     # Sun damaged skin with solar lentigines: Chronic, stable.  - Recommend sunscreens SPF #30 or greater, protective clothing and avoidance of tanning beds.     # Benign skin findings including: cherry angioma, dilated pores of phill and milia - minor, self limited problems  - No further intervention required. Patient to report changes.   - Patient reassured of the benign nature of these lesions.     # Multiple clinically benign nevi: Chronic, stable  - No further intervention required. Patient to report changes.   - Patient reassured of the benign nature of these lesions.    Procedures Performed:   None    Follow-up: 3 month(s) in-person, or earlier for new or changing lesions    Staff:     Gloria Estrada MD    Department of Dermatology  [facility]: Phone: [phone], Fax:[phone]  [facility]: Phone: [phone], Fax: [phone]    ____________________________________________    CC: Blood Draw (vpt blood draw, vitals taken, checked into next appointment.) and Oncology Clinic Visit (Metastatic melanoma )    HPI:  Ms. Yadira Hoang is a(n) 76 year old female who presents today as a return patient for skin exam.    Last skin check 6/4/21. At that time, eczematous dermatitis related  to immunotherapy was treated with gentle skin care and TAC 0.1% cream. A subcutaneous nodule was noted on the L upper arm. Patient noted this was decreasing in size, so plan was to monitor.     Since last visit, developed severe transaminitis and immunotherapy has been held while oral steroids were started.    Today Yadira has no skin concerns. Itching has resolved.     Patient is otherwise feeling well, without additional skin concerns. Denies any fevers, chills, cough, shortness of breath, or weight loss.       Labs Reviewed:  Component      Latest Ref Rng & Units 9/10/2021 9/13/2021 9/17/2021   Sodium      133 - 144 mmol/L 143 143 144   Potassium      3.4 - 5.3 mmol/L 3.3 (L) 3.3 (L) 3.4   Chloride      94 - 109 mmol/L 105 108 107   Carbon Dioxide      20 - 32 mmol/L 31 33 (H) 32   Anion Gap      3 - 14 mmol/L 7 2 (L) 5   Urea Nitrogen      7 - 30 mg/dL 15 24 20   Creatinine      0.52 - 1.04 mg/dL 0.67 0.73 0.78   Calcium      8.5 - 10.1 mg/dL 9.1 9.0 8.2 (L)   Glucose      70 - 99 mg/dL 92 136 (H) 163 (H)   Alkaline Phosphatase      40 - 150 U/L 146 131 99   AST      0 - 45 U/L 727 (HH) 191 (H) 70 (H)   ALT      0 - 50 U/L 1,173 (HH) 736 (HH) 351 (H)   Protein Total      6.8 - 8.8 g/dL 7.1 6.7 (L) 6.4 (L)   Albumin      3.4 - 5.0 g/dL 3.0 (L) 3.0 (L) 2.8 (L)   Bilirubin Total      0.2 - 1.3 mg/dL 0.8 0.4 0.4   GFR Estimate      >60 mL/min/1.73m2 85 80 74       Physical Exam:  Vitals: /75   Pulse 87   Temp 98.6  F (37  C) (Oral)   Resp 16   Wt 66 kg (145 lb 8 oz)   SpO2 96%   BMI 23.84 kg/m    SKIN: Total skin excluding the undergarment areas was performed. The exam included the head/face, neck, both arms, chest, back, abdomen, both legs, digits and/or nails. Buttocks also examined.  - Canales Type II  - Scattered brown macules on sun exposed areas. Minimal.  - Depigmented macules and patches on the chest chest and upper back.  - Dilated pores of phill on the right upper cutaneous lip and left  cheek. Milia scattered on face.  - There are dome shaped bright red papules on the upper extremities and trunk.   - Very few true nevi. Less than 15 on exam. None are atypical.  - Skin is xerotic.  - Well healed skin graft at right heel and medial foot. No pigment recurrence or nodularity.  - No other lesions of concern on areas examined.   LYMPH NODES: No submandibular, cervical, supraclavicular, axillary lymphadenopathy palpable on examination. There is 2cm palpable fixed subcutaneous nodule in R inguinal crease.       Medications:  Current Outpatient Medications   Medication     cholecalciferol (VITAMIN D3) 125 MCG (5000 UT) TABS tablet     estradiol (ESTRACE) 0.1 MG/GM vaginal cream     omeprazole (PRILOSEC OTC) 20 MG EC tablet     omeprazole (PRILOSEC) 20 MG DR capsule     ondansetron (ZOFRAN-ODT) 4 MG ODT tab     ondansetron (ZOFRAN-ODT) 4 MG ODT tab     predniSONE (DELTASONE) 20 MG tablet     sertraline (ZOLOFT) 25 MG tablet     triamcinolone (KENALOG) 0.1 % external cream     famotidine (PEPCID) 40 MG tablet     sucralfate (CARAFATE) 1 GM tablet     Current Facility-Administered Medications   Medication     lidocaine 1% with EPINEPHrine 1:100,000 injection 1.5 mL     lidocaine 1% with EPINEPHrine 1:100,000 injection 1.5 mL      Past Medical History:   Patient Active Problem List   Diagnosis     Malignant melanoma of right lower extremity including hip (H)     Melanoma of skin (H)     GUERLINE (obstructive sleep apnea)     Chronic congestion of paranasal sinus     Weakness of foot     Neck pain, chronic     Myofascial pain     Intractable migraine without aura and without status migrainosus     History of multiple concussions     Foot pain, bilateral     Fibromyositis     Difficulty walking     Chronic pain disorder     Chronic fatigue     Bilateral occipital neuralgia     Articular disc disorder of temporomandibular joint     S/P flap graft     Osteopenia     Injury of head     Hyperlipidemia LDL goal <100      ALEXUS (generalized anxiety disorder)     Major depression, recurrent, chronic (H)     Secondary malignant neoplasm of bone (H)     Generalized pruritus     Immunodeficiency, unspecified (H)     Past Medical History:   Diagnosis Date     Concussion      ALEXUS (generalized anxiety disorder) 5/7/2020     Major depression, recurrent, chronic (H) 5/7/2020     Melanoma (H)      Nonsenile cataract      Sleep apnea     CPAP       CC Referred Self, MD  No address on file on close of this encounter.

## 2021-09-17 ENCOUNTER — OFFICE VISIT (OUTPATIENT)
Dept: SURGERY | Facility: CLINIC | Age: 76
End: 2021-09-17
Attending: DERMATOLOGY
Payer: COMMERCIAL

## 2021-09-17 ENCOUNTER — APPOINTMENT (OUTPATIENT)
Dept: LAB | Facility: CLINIC | Age: 76
End: 2021-09-17
Attending: INTERNAL MEDICINE
Payer: COMMERCIAL

## 2021-09-17 ENCOUNTER — PATIENT OUTREACH (OUTPATIENT)
Dept: ONCOLOGY | Facility: CLINIC | Age: 76
End: 2021-09-17

## 2021-09-17 VITALS
WEIGHT: 145.5 LBS | TEMPERATURE: 98.6 F | SYSTOLIC BLOOD PRESSURE: 131 MMHG | DIASTOLIC BLOOD PRESSURE: 75 MMHG | BODY MASS INDEX: 23.84 KG/M2 | OXYGEN SATURATION: 96 % | HEART RATE: 87 BPM | RESPIRATION RATE: 16 BRPM

## 2021-09-17 DIAGNOSIS — L57.8 SUN-DAMAGED SKIN: ICD-10-CM

## 2021-09-17 DIAGNOSIS — R79.89 ELEVATED LFTS: ICD-10-CM

## 2021-09-17 DIAGNOSIS — D22.9 MULTIPLE BENIGN NEVI: ICD-10-CM

## 2021-09-17 DIAGNOSIS — C43.9 MELANOMA OF SKIN (H): ICD-10-CM

## 2021-09-17 DIAGNOSIS — L81.4 SOLAR LENTIGO: ICD-10-CM

## 2021-09-17 DIAGNOSIS — L20.84 INTRINSIC ECZEMA: ICD-10-CM

## 2021-09-17 DIAGNOSIS — D18.01 CHERRY ANGIOMA: ICD-10-CM

## 2021-09-17 DIAGNOSIS — C43.9 METASTATIC MELANOMA (H): Primary | ICD-10-CM

## 2021-09-17 DIAGNOSIS — L80 VITILIGO: ICD-10-CM

## 2021-09-17 DIAGNOSIS — L72.0 MILIA: ICD-10-CM

## 2021-09-17 DIAGNOSIS — R79.89 ELEVATED LFTS: Primary | ICD-10-CM

## 2021-09-17 DIAGNOSIS — C43.71 MALIGNANT MELANOMA OF RIGHT LOWER EXTREMITY INCLUDING HIP (H): ICD-10-CM

## 2021-09-17 DIAGNOSIS — D23.9 DILATED PORE OF WINER: ICD-10-CM

## 2021-09-17 LAB
ALBUMIN SERPL-MCNC: 2.8 G/DL (ref 3.4–5)
ALP SERPL-CCNC: 99 U/L (ref 40–150)
ALT SERPL W P-5'-P-CCNC: 351 U/L (ref 0–50)
ANION GAP SERPL CALCULATED.3IONS-SCNC: 5 MMOL/L (ref 3–14)
AST SERPL W P-5'-P-CCNC: 70 U/L (ref 0–45)
BASOPHILS # BLD AUTO: 0 10E3/UL (ref 0–0.2)
BASOPHILS NFR BLD AUTO: 0 %
BILIRUB DIRECT SERPL-MCNC: 0.2 MG/DL (ref 0–0.2)
BILIRUB SERPL-MCNC: 0.4 MG/DL (ref 0.2–1.3)
BUN SERPL-MCNC: 20 MG/DL (ref 7–30)
CALCIUM SERPL-MCNC: 8.2 MG/DL (ref 8.5–10.1)
CHLORIDE BLD-SCNC: 107 MMOL/L (ref 94–109)
CO2 SERPL-SCNC: 32 MMOL/L (ref 20–32)
CREAT SERPL-MCNC: 0.78 MG/DL (ref 0.52–1.04)
EOSINOPHIL # BLD AUTO: 0 10E3/UL (ref 0–0.7)
EOSINOPHIL NFR BLD AUTO: 0 %
ERYTHROCYTE [DISTWIDTH] IN BLOOD BY AUTOMATED COUNT: 14.6 % (ref 10–15)
GFR SERPL CREATININE-BSD FRML MDRD: 74 ML/MIN/1.73M2
GLUCOSE BLD-MCNC: 163 MG/DL (ref 70–99)
HCT VFR BLD AUTO: 34 % (ref 35–47)
HGB BLD-MCNC: 10.8 G/DL (ref 11.7–15.7)
IMM GRANULOCYTES # BLD: 0 10E3/UL
IMM GRANULOCYTES NFR BLD: 1 %
LYMPHOCYTES # BLD AUTO: 1 10E3/UL (ref 0.8–5.3)
LYMPHOCYTES NFR BLD AUTO: 12 %
MCH RBC QN AUTO: 30.1 PG (ref 26.5–33)
MCHC RBC AUTO-ENTMCNC: 31.8 G/DL (ref 31.5–36.5)
MCV RBC AUTO: 95 FL (ref 78–100)
MONOCYTES # BLD AUTO: 0.2 10E3/UL (ref 0–1.3)
MONOCYTES NFR BLD AUTO: 3 %
NEUTROPHILS # BLD AUTO: 6.6 10E3/UL (ref 1.6–8.3)
NEUTROPHILS NFR BLD AUTO: 84 %
NRBC # BLD AUTO: 0 10E3/UL
NRBC BLD AUTO-RTO: 0 /100
PLATELET # BLD AUTO: 362 10E3/UL (ref 150–450)
POTASSIUM BLD-SCNC: 3.4 MMOL/L (ref 3.4–5.3)
PROT SERPL-MCNC: 6.4 G/DL (ref 6.8–8.8)
RBC # BLD AUTO: 3.59 10E6/UL (ref 3.8–5.2)
SODIUM SERPL-SCNC: 144 MMOL/L (ref 133–144)
TSH SERPL DL<=0.005 MIU/L-ACNC: 0.95 MU/L (ref 0.4–4)
WBC # BLD AUTO: 7.9 10E3/UL (ref 4–11)

## 2021-09-17 PROCEDURE — 84443 ASSAY THYROID STIM HORMONE: CPT | Performed by: DERMATOLOGY

## 2021-09-17 PROCEDURE — G0463 HOSPITAL OUTPT CLINIC VISIT: HCPCS

## 2021-09-17 PROCEDURE — 82024 ASSAY OF ACTH: CPT | Performed by: DERMATOLOGY

## 2021-09-17 PROCEDURE — 99214 OFFICE O/P EST MOD 30 MIN: CPT | Performed by: DERMATOLOGY

## 2021-09-17 PROCEDURE — 82248 BILIRUBIN DIRECT: CPT | Performed by: DERMATOLOGY

## 2021-09-17 PROCEDURE — 85025 COMPLETE CBC W/AUTO DIFF WBC: CPT | Performed by: DERMATOLOGY

## 2021-09-17 PROCEDURE — 36415 COLL VENOUS BLD VENIPUNCTURE: CPT | Performed by: DERMATOLOGY

## 2021-09-17 PROCEDURE — 80053 COMPREHEN METABOLIC PANEL: CPT | Performed by: DERMATOLOGY

## 2021-09-17 ASSESSMENT — PAIN SCALES - GENERAL: PAINLEVEL: NO PAIN (0)

## 2021-09-17 NOTE — NURSING NOTE
"Oncology Rooming Note    September 17, 2021 12:54 PM   Yadira Hoang is a 76 year old female who presents for:    Chief Complaint   Patient presents with     Blood Draw     vpt blood draw, vitals taken, checked into next appointment.     Oncology Clinic Visit     Metastatic melanoma      Initial Vitals: /75   Pulse 87   Temp 98.6  F (37  C) (Oral)   Resp 16   Wt 66 kg (145 lb 8 oz)   SpO2 96%   BMI 23.84 kg/m   Estimated body mass index is 23.84 kg/m  as calculated from the following:    Height as of 9/7/21: 1.664 m (5' 5.5\").    Weight as of this encounter: 66 kg (145 lb 8 oz). Body surface area is 1.75 meters squared.  No Pain (0) Comment: Data Unavailable   No LMP recorded. Patient is postmenopausal.  Allergies reviewed: Yes  Medications reviewed: Yes    Medications: Medication refills not needed today.  Pharmacy name entered into [facility]:    [facility] - [place], [place] - [address], [address]  [facility] [address] [place] [address] [id number] - [place], [place] - [address] AT [facility] [address] ([facility] - [facility] [address] [facility]    Clinical concerns: None       Susan Moreira LPN            "

## 2021-09-17 NOTE — NURSING NOTE
Chief Complaint   Patient presents with     Blood Draw     vpt blood draw, vitals taken, checked into next appointment.     Venipuncture labs drawn from right arm.    Teodora Malik MA

## 2021-09-17 NOTE — PROGRESS NOTES
Called Yadira Perdomo's PA-SHANELLE request and let her know that her liver function tests are continuing to improve.  She should continue on the steroids and have labs rechecked again on 9/22 and 9/30.  [facility]

## 2021-09-17 NOTE — LETTER
9/17/2021         RE: Yadira Hoang  [address] [facility] [address]        Dear Colleague,    Thank you for referring your patient, Yadira Hoang, to the [facility]. Please see a copy of my visit note below.    [facility] Dermatology Note  Encounter Date: Sep 17, 2021  Office Visit     Dermatology Problem List:  Skin checks q3 months through 1/22  1. Metastatic melanoma, originated in right heel  - S/p punch biopsy By Dr. Alvarado 1/9/20 showing melanoma at least 3.4mm deep, ulcerated, no mitoses, perineural invasion, TIL present and nonbrisk, no LVI.  - S/p WLE with 2cm margins (revelead melanoma to depth of 6.6mm with deep margin close, microsatellites were present) and right femoral SLNB 2/10/20 (1/2 nodes positive, largest deposit 7mm)  - S/p re-excision 2/20/20  - Flap completed 3/4/30, skin graft 4/2/20  - Adjuvant nivolumab started 5/2020. Progressed. 10/28/20 started on ipi and nivolumab combination. Went to ipi monotherapy 5/2021. Developed severe transaminitis. Stopped ipi 9/2021 and started oral steroids.  2. AK, right cheek: cryo and biopsy 11/19/20     Social history: . Has 4 children.  Family history: Brother had pancreatic cancer. No melanoma in the family.  ____________________________________________    Assessment & Plan:     # Melanoma, stage IV, originating at R heel, currently off of immunotherapy (ipilimumab) due to autoimmune adverse events (transaminitis). No evidence of recurrence cutaneously, known disease in R inguinal nodes. Discussed risk of melanoma recurrence (with local or distant disease) and risk of additional primary melanomas. Discussed role of dermatology in care - screening for local recurrence, new primaries, and management of cutaneous toxicities from treatment. Explained routine schedule for follow up examinations in office and need for self skin checks monthly.   - ABCDs of melanoma were discussed  and self skin checks were advised.   - Sun precaution was advised including the use of sunscreens of SPF 30 or higher, sun protective clothing, and avoidance of tanning beds.  - Reviewed last onc note from 9/10/21. Severe transaminitis, immunotherapy now on hold and on oral steroids to treat.  - Reviewed labs as below.    # Vitiligo, chest and back: Treatment related versus due to melanoma. Not bothersome to the patient. No treatment at this time.     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 Discussed the pathogenesis of this condition and emphasized the importance of gentle skin care and cream based moisturizer for long term treatment. Discussed use of topical steroid only when skin is itchy/symptomatic.   - TAC 0.1% cream BID PRN  - Moisturizer BID  - Gentle soaps     # Sun damaged skin with solar lentigines: Chronic, stable.  - Recommend sunscreens SPF #30 or greater, protective clothing and avoidance of tanning beds.     # Benign skin findings including: cherry angioma, dilated pores of phill and milia - minor, self limited problems  - No further intervention required. Patient to report changes.   - Patient reassured of the benign nature of these lesions.     # Multiple clinically benign nevi: Chronic, stable  - No further intervention required. Patient to report changes.   - Patient reassured of the benign nature of these lesions.    Procedures Performed:   None    Follow-up: 3 month(s) in-person, or earlier for new or changing lesions    Staff:     Gloria Estrada MD    Department of Dermatology  [facility]: Phone: [phone], Fax:[phone]  [facility]: Phone: [phone], Fax: [phone]    ____________________________________________    CC: Blood Draw (vpt blood draw, vitals taken,  checked into next appointment.) and Oncology Clinic Visit (Metastatic melanoma )    HPI:  Ms. Yadira Hoang is a(n) 76 year old female who presents today as a return patient for skin exam.    Last skin check 6/4/21. At that time, eczematous dermatitis related to immunotherapy was treated with gentle skin care and TAC 0.1% cream. A subcutaneous nodule was noted on the L upper arm. Patient noted this was decreasing in size, so plan was to monitor.     Since last visit, developed severe transaminitis and immunotherapy has been held while oral steroids were started.    Today Yadira has no skin concerns. Itching has resolved.     Patient is otherwise feeling well, without additional skin concerns. Denies any fevers, chills, cough, shortness of breath, or weight loss.       Labs Reviewed:  Component      Latest Ref Rng & Units 9/10/2021 9/13/2021 9/17/2021   Sodium      133 - 144 mmol/L 143 143 144   Potassium      3.4 - 5.3 mmol/L 3.3 (L) 3.3 (L) 3.4   Chloride      94 - 109 mmol/L 105 108 107   Carbon Dioxide      20 - 32 mmol/L 31 33 (H) 32   Anion Gap      3 - 14 mmol/L 7 2 (L) 5   Urea Nitrogen      7 - 30 mg/dL 15 24 20   Creatinine      0.52 - 1.04 mg/dL 0.67 0.73 0.78   Calcium      8.5 - 10.1 mg/dL 9.1 9.0 8.2 (L)   Glucose      70 - 99 mg/dL 92 136 (H) 163 (H)   Alkaline Phosphatase      40 - 150 U/L 146 131 99   AST      0 - 45 U/L 727 (HH) 191 (H) 70 (H)   ALT      0 - 50 U/L 1,173 (HH) 736 (HH) 351 (H)   Protein Total      6.8 - 8.8 g/dL 7.1 6.7 (L) 6.4 (L)   Albumin      3.4 - 5.0 g/dL 3.0 (L) 3.0 (L) 2.8 (L)   Bilirubin Total      0.2 - 1.3 mg/dL 0.8 0.4 0.4   GFR Estimate      >60 mL/min/1.73m2 85 80 74       Physical Exam:  Vitals: /75   Pulse 87   Temp 98.6  F (37  C) (Oral)   Resp 16   Wt 66 kg (145 lb 8 oz)   SpO2 96%   BMI 23.84 kg/m    SKIN: Total skin excluding the undergarment areas was performed. The exam included the head/face, neck, both arms, chest, back, abdomen, both legs,  digits and/or nails. Buttocks also examined.  - Canales Type II  - Scattered brown macules on sun exposed areas. Minimal.  - Depigmented macules and patches on the chest chest and upper back.  - Dilated pores of phill on the right upper cutaneous lip and left cheek. Milia scattered on face.  - There are dome shaped bright red papules on the upper extremities and trunk.   - Very few true nevi. Less than 15 on exam. None are atypical.  - Skin is xerotic.  - Well healed skin graft at right heel and medial foot. No pigment recurrence or nodularity.  - No other lesions of concern on areas examined.   LYMPH NODES: No submandibular, cervical, supraclavicular, axillary lymphadenopathy palpable on examination. There is 2cm palpable fixed subcutaneous nodule in R inguinal crease.       Medications:  Current Outpatient Medications   Medication     cholecalciferol (VITAMIN D3) 125 MCG (5000 UT) TABS tablet     estradiol (ESTRACE) 0.1 MG/GM vaginal cream     omeprazole (PRILOSEC OTC) 20 MG EC tablet     omeprazole (PRILOSEC) 20 MG DR capsule     ondansetron (ZOFRAN-ODT) 4 MG ODT tab     ondansetron (ZOFRAN-ODT) 4 MG ODT tab     predniSONE (DELTASONE) 20 MG tablet     sertraline (ZOLOFT) 25 MG tablet     triamcinolone (KENALOG) 0.1 % external cream     famotidine (PEPCID) 40 MG tablet     sucralfate (CARAFATE) 1 GM tablet     Current Facility-Administered Medications   Medication     lidocaine 1% with EPINEPHrine 1:100,000 injection 1.5 mL     lidocaine 1% with EPINEPHrine 1:100,000 injection 1.5 mL      Past Medical History:   Patient Active Problem List   Diagnosis     Malignant melanoma of right lower extremity including hip (H)     Melanoma of skin (H)     GUERLINE (obstructive sleep apnea)     Chronic congestion of paranasal sinus     Weakness of foot     Neck pain, chronic     Myofascial pain     Intractable migraine without aura and without status migrainosus     History of multiple concussions     Foot pain, bilateral      Fibromyositis     Difficulty walking     Chronic pain disorder     Chronic fatigue     Bilateral occipital neuralgia     Articular disc disorder of temporomandibular joint     S/P flap graft     Osteopenia     Injury of head     Hyperlipidemia LDL goal <100     ALEXUS (generalized anxiety disorder)     Major depression, recurrent, chronic (H)     Secondary malignant neoplasm of bone (H)     Generalized pruritus     Immunodeficiency, unspecified (H)     Past Medical History:   Diagnosis Date     Concussion      ALEXUS (generalized anxiety disorder) 5/7/2020     Major depression, recurrent, chronic (H) 5/7/2020     Melanoma (H)      Nonsenile cataract      Sleep apnea     CPAP       CC Referred Self, MD  No address on file on close of this encounter.                Again, thank you for allowing me to participate in the care of your patient.        Sincerely,        Gloria Estrada MD

## 2021-09-20 LAB — ACTH PLAS-MCNC: <10 PG/ML

## 2021-09-21 NOTE — PROGRESS NOTES
Assessment & Plan     Diagnoses and all orders for this visit:    Elevated blood pressure reading without diagnosis of hypertension    Malignant melanoma of right lower extremity including hip (H)  -     Adrenal corticotropin  -     TSH with free T4 reflex  -     Comprehensive metabolic panel  -     CBC with platelets differential    Abnormal liver enzymes    Melanoma of skin (H)  -     Adrenal corticotropin  -     TSH with free T4 reflex    Mild recurrent major depression (H)  Comments:  Not in remission.  Low-dose sertraline.  Patient declined increasing dose.    ALEXUS (generalized anxiety disorder)  Comments:  Low-dose sertraline.  Patient declined increasing dose.      Overall patient is doing quite well feeling better.  Her PHQ-9 score is still borderline.  She is on sertraline 25 mg.  She does not want to increase dose.  We will discuss this again at her wellness visit next month.      Return for wellness visit as scheduled..    Erika Blanton MD PhD  [facility]    Aylin May is a 76 year old who presents for the following health issues     History of chemo-induced nausea and abdominal pain and elevated liver enzymes. She took a break from the infusion and went on steroid tapering dose. She is feeling much better. Eating better, energy kicks in in he afternoon and evening. Morning is still tired. Overall feeling better, able to do things.  More motivation.  She feels somewhat back to her normal self, but not quite.    Night time gets up twice to urinate. Able to fall back to sleep.     Plans to restart infusion October 11.  She need to get liver enzyme labs done today.    Her blood pressure is borderline elevated today.  Patient reported home blood pressure normal.  Previous blood pressure readings have been normal except for today.  Patient reported feeling stressed because of her son was in a car accident.          History of Present Illness       She eats 2-3 servings of fruits  "and vegetables daily.She consumes 0 sweetened beverage(s) daily.She exercises with enough effort to increase her heart rate 20 to 29 minutes per day.  She exercises with enough effort to increase her heart rate 3 or less days per week.   She is taking medications regularly.         Review of Systems   Constitutional, HEENT, cardiovascular, pulmonary, gi and gu systems are negative, except as otherwise noted.      Objective    BP (!) 142/70   Pulse 70   Temp 98.5  F (36.9  C)   Ht 1.689 m (5' 6.5\")   Wt 64.3 kg (141 lb 11.2 oz)   SpO2 97%   BMI 22.53 kg/m    Body mass index is 22.53 kg/m .  Physical Exam   GENERAL: healthy, alert and no distress  PSYCH: mentation appears normal, affect normal/bright    PHQ-9 SCORE 4/27/2021 8/6/2021 9/22/2021   PHQ-9 Total Score MyChart - 3 (Minimal depression) 10 (Moderate depression)   PHQ-9 Total Score 13 - 10     ALEXUS-7 SCORE 10/6/2020 4/27/2021 8/6/2021   Total Score - - 10 (moderate anxiety)   Total Score 9 6 -         Answers for HPI/ROS submitted by the patient on 9/22/2021  If you checked off any problems, how difficult have these problems made it for you to do your work, take care of things at home, or get along with other people?: Somewhat difficult  PHQ9 TOTAL SCORE: 10      "

## 2021-09-22 ENCOUNTER — OFFICE VISIT (OUTPATIENT)
Dept: FAMILY MEDICINE | Facility: CLINIC | Age: 76
End: 2021-09-22
Payer: COMMERCIAL

## 2021-09-22 VITALS
BODY MASS INDEX: 22.24 KG/M2 | DIASTOLIC BLOOD PRESSURE: 70 MMHG | TEMPERATURE: 98.5 F | HEART RATE: 70 BPM | SYSTOLIC BLOOD PRESSURE: 142 MMHG | HEIGHT: 67 IN | WEIGHT: 141.7 LBS | OXYGEN SATURATION: 97 %

## 2021-09-22 DIAGNOSIS — C43.71 MALIGNANT MELANOMA OF RIGHT LOWER EXTREMITY INCLUDING HIP (H): ICD-10-CM

## 2021-09-22 DIAGNOSIS — F33.0 MILD RECURRENT MAJOR DEPRESSION (H): ICD-10-CM

## 2021-09-22 DIAGNOSIS — R74.8 ABNORMAL LIVER ENZYMES: ICD-10-CM

## 2021-09-22 DIAGNOSIS — C43.9 MELANOMA OF SKIN (H): ICD-10-CM

## 2021-09-22 DIAGNOSIS — F41.1 GAD (GENERALIZED ANXIETY DISORDER): ICD-10-CM

## 2021-09-22 DIAGNOSIS — R03.0 ELEVATED BLOOD PRESSURE READING WITHOUT DIAGNOSIS OF HYPERTENSION: Primary | ICD-10-CM

## 2021-09-22 LAB
ALBUMIN SERPL-MCNC: 3.3 G/DL (ref 3.4–5)
ALP SERPL-CCNC: 82 U/L (ref 40–150)
ALT SERPL W P-5'-P-CCNC: 233 U/L (ref 0–50)
ANION GAP SERPL CALCULATED.3IONS-SCNC: 4 MMOL/L (ref 3–14)
AST SERPL W P-5'-P-CCNC: 54 U/L (ref 0–45)
BASOPHILS # BLD AUTO: 0 10E3/UL (ref 0–0.2)
BASOPHILS NFR BLD AUTO: 0 %
BILIRUB SERPL-MCNC: 0.7 MG/DL (ref 0.2–1.3)
BUN SERPL-MCNC: 18 MG/DL (ref 7–30)
CALCIUM SERPL-MCNC: 9.2 MG/DL (ref 8.5–10.1)
CHLORIDE BLD-SCNC: 103 MMOL/L (ref 94–109)
CO2 SERPL-SCNC: 33 MMOL/L (ref 20–32)
CREAT SERPL-MCNC: 0.73 MG/DL (ref 0.52–1.04)
EOSINOPHIL # BLD AUTO: 0.1 10E3/UL (ref 0–0.7)
EOSINOPHIL NFR BLD AUTO: 1 %
ERYTHROCYTE [DISTWIDTH] IN BLOOD BY AUTOMATED COUNT: 15.5 % (ref 10–15)
GFR SERPL CREATININE-BSD FRML MDRD: 80 ML/MIN/1.73M2
GLUCOSE BLD-MCNC: 95 MG/DL (ref 70–99)
HCT VFR BLD AUTO: 38.6 % (ref 35–47)
HGB BLD-MCNC: 12.3 G/DL (ref 11.7–15.7)
LYMPHOCYTES # BLD AUTO: 1.1 10E3/UL (ref 0.8–5.3)
LYMPHOCYTES NFR BLD AUTO: 10 %
MCH RBC QN AUTO: 30.5 PG (ref 26.5–33)
MCHC RBC AUTO-ENTMCNC: 31.9 G/DL (ref 31.5–36.5)
MCV RBC AUTO: 96 FL (ref 78–100)
MONOCYTES # BLD AUTO: 0.3 10E3/UL (ref 0–1.3)
MONOCYTES NFR BLD AUTO: 3 %
NEUTROPHILS # BLD AUTO: 8.9 10E3/UL (ref 1.6–8.3)
NEUTROPHILS NFR BLD AUTO: 86 %
PLATELET # BLD AUTO: 340 10E3/UL (ref 150–450)
POTASSIUM BLD-SCNC: 4 MMOL/L (ref 3.4–5.3)
PROT SERPL-MCNC: 7 G/DL (ref 6.8–8.8)
RBC # BLD AUTO: 4.03 10E6/UL (ref 3.8–5.2)
SODIUM SERPL-SCNC: 140 MMOL/L (ref 133–144)
TSH SERPL DL<=0.005 MIU/L-ACNC: 1.44 MU/L (ref 0.4–4)
WBC # BLD AUTO: 10.4 10E3/UL (ref 4–11)

## 2021-09-22 PROCEDURE — 99214 OFFICE O/P EST MOD 30 MIN: CPT | Performed by: INTERNAL MEDICINE

## 2021-09-22 PROCEDURE — 36415 COLL VENOUS BLD VENIPUNCTURE: CPT | Performed by: INTERNAL MEDICINE

## 2021-09-22 PROCEDURE — 82024 ASSAY OF ACTH: CPT | Performed by: INTERNAL MEDICINE

## 2021-09-22 PROCEDURE — 96127 BRIEF EMOTIONAL/BEHAV ASSMT: CPT | Performed by: INTERNAL MEDICINE

## 2021-09-22 PROCEDURE — 80050 GENERAL HEALTH PANEL: CPT | Performed by: INTERNAL MEDICINE

## 2021-09-22 ASSESSMENT — PATIENT HEALTH QUESTIONNAIRE - PHQ9
SUM OF ALL RESPONSES TO PHQ QUESTIONS 1-9: 10
10. IF YOU CHECKED OFF ANY PROBLEMS, HOW DIFFICULT HAVE THESE PROBLEMS MADE IT FOR YOU TO DO YOUR WORK, TAKE CARE OF THINGS AT HOME, OR GET ALONG WITH OTHER PEOPLE: SOMEWHAT DIFFICULT
SUM OF ALL RESPONSES TO PHQ QUESTIONS 1-9: 10

## 2021-09-22 ASSESSMENT — MIFFLIN-ST. JEOR: SCORE: 1152.44

## 2021-09-23 LAB — ACTH PLAS-MCNC: <10 PG/ML

## 2021-09-23 ASSESSMENT — PATIENT HEALTH QUESTIONNAIRE - PHQ9: SUM OF ALL RESPONSES TO PHQ QUESTIONS 1-9: 10

## 2021-09-30 ENCOUNTER — LAB (OUTPATIENT)
Dept: LAB | Facility: CLINIC | Age: 76
End: 2021-09-30
Payer: COMMERCIAL

## 2021-09-30 DIAGNOSIS — R79.89 ELEVATED LFTS: ICD-10-CM

## 2021-09-30 LAB
ALBUMIN SERPL-MCNC: 3.6 G/DL (ref 3.4–5)
ALP SERPL-CCNC: 66 U/L (ref 40–150)
ALT SERPL W P-5'-P-CCNC: 406 U/L (ref 0–50)
AST SERPL W P-5'-P-CCNC: 139 U/L (ref 0–45)
BILIRUB DIRECT SERPL-MCNC: 0.2 MG/DL (ref 0–0.2)
BILIRUB SERPL-MCNC: 0.6 MG/DL (ref 0.2–1.3)
PROT SERPL-MCNC: 7.1 G/DL (ref 6.8–8.8)

## 2021-09-30 PROCEDURE — 36415 COLL VENOUS BLD VENIPUNCTURE: CPT

## 2021-09-30 PROCEDURE — 80076 HEPATIC FUNCTION PANEL: CPT

## 2021-10-01 DIAGNOSIS — R79.89 ELEVATED LFTS: ICD-10-CM

## 2021-10-01 DIAGNOSIS — C79.51 SECONDARY MALIGNANT NEOPLASM OF BONE (H): Primary | ICD-10-CM

## 2021-10-01 RX ORDER — PREDNISONE 20 MG/1
30 TABLET ORAL DAILY
Qty: 10 TABLET | Refills: 0 | Status: SHIPPED | OUTPATIENT
Start: 2021-10-01 | End: 2021-10-08

## 2021-10-08 ENCOUNTER — VIRTUAL VISIT (OUTPATIENT)
Dept: ONCOLOGY | Facility: CLINIC | Age: 76
End: 2021-10-08
Attending: PHYSICIAN ASSISTANT
Payer: COMMERCIAL

## 2021-10-08 DIAGNOSIS — C43.9 MELANOMA OF SKIN (H): Primary | ICD-10-CM

## 2021-10-08 DIAGNOSIS — F41.1 GAD (GENERALIZED ANXIETY DISORDER): ICD-10-CM

## 2021-10-08 DIAGNOSIS — F33.9 MAJOR DEPRESSION, RECURRENT, CHRONIC (H): ICD-10-CM

## 2021-10-08 DIAGNOSIS — R79.89 ELEVATED LFTS: ICD-10-CM

## 2021-10-08 PROCEDURE — 99215 OFFICE O/P EST HI 40 MIN: CPT | Mod: 95 | Performed by: PHYSICIAN ASSISTANT

## 2021-10-08 PROCEDURE — 999N001193 HC VIDEO/TELEPHONE VISIT; NO CHARGE

## 2021-10-08 NOTE — LETTER
10/8/2021         RE: Yadira Hoang  [address] [facility] [address]      Yadira is a 76 year old who is being evaluated via a billable video visit.      How would you like to obtain your AVS? MyChart  If the video visit is dropped, the invitation should be resent by: Text to cell phone: [phone]  Will anyone else be joining your video visit? No    Video-Visit Details    Type of service:  Video Visit    Video Start Time: 4:21 PM    Video End Time:4:43 PM    Originating Location (pt. Location): Home    Distant Location (provider location):  [facility]     Platform used for Video Visit: [facility]    Oncology/Hematology Visit Note  Oct 8, 2021   Oncologist: Dr. Gi Franklin     Reason for Visit: Follow up of stage IV melanoma    History of Present Illness: Yadira Hoang is a 76 year old female who presents for telephone call to follow up on her acral melanoma, stage IV, per oncologic history below:     Oncologic History: Acral melanoma, stage IV, s/p primary resection and reconstruction  1. She noted a painful lesion on the sole of the right foot for a few months, since summer 2019.  It initially was small then became raised.  It spontaneously bled. She is not entirely sure of its appearance initially.  She denies noting any masses behind the knee or in the groin.  2. 1/9/2020, punch biopsy was performed by Dr Jessica Alvarado.  It showed melanoma, at least 3.4 mm deep with ulceration and 0 mitoses, and perineural invasion present. TILs were non-brisk and LVI not identified. pT3b.  3. 2/10/2020, she has right femoral sentinel lymph node biopsy and wide local excision (Specimen #: [id number]) and the right heel lesion extends to a depth of 6.6 mm, and invasive melanoma is present at 0.2 mm from the deep margin. Microsatellites are also present. There was 1 (of 2) lymph nodes involved. The lymph node tissue exhibits extensive subcapsular and parenchymal clusters of melanoma  cells, highlighted on Melan-A immunostain. The largest cluster is 7 millimeters in greatest diameter. pT4b pN2c.  4. 2/22/2020, she had PET-CT, which showed intense FDG uptake in a right inguinal lymph node, concerning for an additional focus of metastatic disease. There is also an indeterminate right external iliac lymph node with mild FDG uptake.  5. 3/4/2020, she has medial plantar artery  fasciocutaneous instep flap and Integra placement to the donor site. On 4/2/2020, she has additional reconstruction with skin grafting.  6. 5/27/2020, initiated on nivolumab immunotherapy.  Had C2 6/24/20. C3 on 7/22/20. C4 on 9/3/2020.  7. 8/21/2020 evidence of a new liver metastasis on PET-CT.  9. 10/9/2020, PET-CT shows increasing size of hepatic metastases with increased hypermetabolism, increased right inguinal and iliac lymphadenopathy, and new FDG uptake right T2 transverse process and right side of S1. MRI-brain obtained 10/21/2020 is negative for brain metastasis.  10. 10/28/2020, she starts ipilimumab 1mg/kg and nivolumab 3mg/kg, then nivolumab maintenance through cycle 8.  11. 2/12/21, PET-CT shows evidence of partial response with several liver lesions no longer hypermetabolic and smaller in size, only one hypermetabolic lesion in the hepatic dome. Slightly increased size and FDG uptake in a single right inguinal lymph node, remainder of inguinal and right iliac chain lymph nodes have decreased in size and FDG uptake since prior exam.   12. 5/7/2021, PET-CT shows mixed response with increased size and hypermetabolism in the right iliac chain lymphadenopathy.  13. 5/10/2021, she starts ipilimumab 3 mg/kg every 3 weeks and receives 4 cycles.  14. 7/30/21, PET/CT shows mild disease progression.   15. 8/2/21, start ipi/nivo and received 2 cycles, last on 8/23/21.  16. 8/23/21, develops elevated LFT's.   17. 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     Interval History:  -Has been  feeling lightheaded more often in the last few days.   -Doing well drinking fluids.  -Has been eating fairly well lately.  -Denies any abdominal pain, nausea or vomiting, or constipation.   -Has been having 2-3 stools formed stools per day.   -Has had some heartburn. Has been taking omeprazole in the mornings.   -Feels more nervous on the steroids.   -Reports itching has resolved.   -Headaches have resolved.  -Reports decreased fatigue.   -Not sleeping well with melatonin.  -Feels lymph node is bigger and harder.   -Feels that feet are cold frequently.   -Talks with counselor every 2 weeks.     Current Outpatient Medications   Medication Sig Dispense Refill     cholecalciferol (VITAMIN D3) 125 MCG (5000 UT) TABS tablet Take 2,000 Units by mouth every other day        estradiol (ESTRACE) 0.1 MG/GM vaginal cream Place 2 g vaginally twice a week 42.5 g 1     omeprazole (PRILOSEC OTC) 20 MG EC tablet Take 1 tablet (20 mg) by mouth every morning 30-60 minutes before eating 30 tablet 0     omeprazole (PRILOSEC) 20 MG DR capsule TAKE 1 CAPSULE BY MOUTH ONCE DAILY IN THE MORNING 30 60 MINUTES BEFORE EATING       ondansetron (ZOFRAN-ODT) 4 MG ODT tab Take 1 tablet (4 mg) by mouth every 8 hours as needed for nausea 60 tablet 1     ondansetron (ZOFRAN-ODT) 4 MG ODT tab Take 1 tablet (4 mg) by mouth every 8 hours as needed for nausea       predniSONE (DELTASONE) 20 MG tablet Take 1.5 tablets (30 mg) by mouth daily for 7 days 10 tablet 0     sertraline (ZOLOFT) 25 MG tablet Take 1 tablet (25 mg) by mouth daily 30 tablet 4     triamcinolone (KENALOG) 0.1 % external cream Apply topically 2 times daily 453.6 g 11     Objective:  General: patient appears well in no acute distress, alert and oriented, speech clear and fluid  Skin: no visualized rash or lesions on visualized skin  Resp: Appears to be breathing comfortably without accessory muscle usage, speaking in full sentences, no audible wheezes or cough.  Psych: Coherent speech,  normal rate and volume, able to articulate logical thoughts, able to abstract reason, no tangential thoughts, no hallucinations or delusions  Patient's affect is appropriate.    Laboratory Data:   Most Recent 3 CBC's:  Recent Labs   Lab Test 09/22/21  1015 09/17/21  1151 09/10/21  1022   WBC 10.4 7.9 5.8   HGB 12.3 10.8* 11.2*   MCV 96 95 92    362 263    Most Recent 3 BMP's:  Recent Labs   Lab Test 09/22/21  1015 09/17/21  1151 09/13/21  0917    144 143   POTASSIUM 4.0 3.4 3.3*   CHLORIDE 103 107 108   CO2 33* 32 33*   BUN 18 20 24   CR 0.73 0.78 0.73   ANIONGAP 4 5 2*   GABY 9.2 8.2* 9.0   GLC 95 163* 136*   Most Recent TSH and T4:  Recent Labs   Lab Test 09/22/21  1015   TSH 1.44      8/23/2021 12:39 9/1/2021 10:37 9/10/2021 10:22 9/13/2021 09:17 9/17/2021 11:51 9/22/2021 10:15 9/30/2021 13:35   AST 70 (H) 155 (H) 727 (HH) 191 (H) 70 (H) 54 (H) 139 (H)    (H) 292 (H) 1,173 (HH) 736 (HH) 351 (H) 233 (H) 406 (H)   I reviewed the above labs today.    Assessment and Plan:  Acral melanoma, right heel primary, pT4b pN3c M1, Stage IV. 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 She progressed on first line nivolumab, but shows clear evidence of response to 2nd line ipilimumab and nivolumab. However, once on nivolumab maintenance therapy she started to show regrowth in the right inguinal node. She switched over to ipilimumab every 3 weeks in the third line on 5/10/21, for a maximum of 4 doses. She then was switched to ipi/nivo after her imaging showed mild disease progression. She received 2 cycles and developed immune mediated hepatitis. Treatment will remain on hold for now. She will have repeat imaging in early November.     Dry skin and pruritis. Resolved with steroids.     Lightheadedness. Recommend checking BP at home when episodes occur. If persists, may need to consider a brain MRI. Last brain MRI was negative for brain mets on 10/21/20.     Acid reflux.  Recommend increasing omeprazole to 40 mg daily.     Immune mediated hepatitis. Will recheck labs on 10/11. She will remain on prednisone at 30 mg daily until then.     Insomnia. Patient may try Benadryl or Unisom.     Cold feet. Possible a circulation issue. Will use warm socks and a heating pad.     Mood. Has been feeling more isolated lately. She will continue on sertraline. Will request social work get her support group resources. Will also set her up with our palliative care .     Meghan Perdomo PA-C  [facility]  [address] [place] [id number]  [phone]    40 minutes spent on the date of the encounter doing chart review, review of test results, interpretation of tests, patient visit and documentation           Meghan Perdomo PA-C

## 2021-10-08 NOTE — Clinical Note
10/8/2021         RE: Yadira Hoang  [address] [facility] [address]        Dear Colleague,    Thank you for referring your patient, Yadira Hoang, to the [facility]. Please see a copy of my visit note below.    Yadira is a 76 year old who is being evaluated via a billable video visit.      How would you like to obtain your AVS? MyChart  If the video visit is dropped, the invitation should be resent by: Text to cell phone: [phone]  Will anyone else be joining your video visit? No  {If patient encounters technical issues they should call [phone]}      Video-Visit Details    Type of service:  Video Visit    Video Start Time: 4:21 PM    Video End Time:4:43 PM    Originating Location (pt. Location): Home    Distant Location (provider location):  [facility]     Platform used for Video Visit: Audingo     Oncology/Hematology Visit Note  Oct 8, 2021   Oncologist: Dr. Gi Franklin     Reason for Visit: Follow up of stage IV melanoma    History of Present Illness: Yadira Hoang is a 76 year old female who presents for telephone call to follow up on her acral melanoma, stage IV, per oncologic history below:     Oncologic History: Acral melanoma, stage IV, s/p primary resection and reconstruction  1. She noted a painful lesion on the sole of the right foot for a few months, since summer 2019.  It initially was small then became raised.  It spontaneously bled. She is not entirely sure of its appearance initially.  She denies noting any masses behind the knee or in the groin.  2. 1/9/2020, punch biopsy was performed by Dr Jessica Alvarado.  It showed melanoma, at least 3.4 mm deep with ulceration and 0 mitoses, and perineural invasion present. TILs were non-brisk and LVI not identified. pT3b.  3. 2/10/2020, she has right femoral sentinel lymph node biopsy and wide local excision (Specimen #: [id number]) and the right heel  lesion extends to a depth of 6.6 mm, and invasive melanoma is present at 0.2 mm from the deep margin. Microsatellites are also present. There was 1 (of 2) lymph nodes involved. The lymph node tissue exhibits extensive subcapsular and parenchymal clusters of melanoma cells, highlighted on Melan-A immunostain. The largest cluster is 7 millimeters in greatest diameter. pT4b pN2c.  4. 2/22/2020, she had PET-CT, which showed intense FDG uptake in a right inguinal lymph node, concerning for an additional focus of metastatic disease. There is also an indeterminate right external iliac lymph node with mild FDG uptake.  5. 3/4/2020, she has medial plantar artery  fasciocutaneous instep flap and Integra placement to the donor site. On 4/2/2020, she has additional reconstruction with skin grafting.  6. 5/27/2020, initiated on nivolumab immunotherapy.  Had C2 6/24/20. C3 on 7/22/20. C4 on 9/3/2020.  7. 8/21/2020 evidence of a new liver metastasis on PET-CT.  9. 10/9/2020, PET-CT shows increasing size of hepatic metastases with increased hypermetabolism, increased right inguinal and iliac lymphadenopathy, and new FDG uptake right T2 transverse process and right side of S1. MRI-brain obtained 10/21/2020 is negative for brain metastasis.  10. 10/28/2020, she starts ipilimumab 1mg/kg and nivolumab 3mg/kg, then nivolumab maintenance through cycle 8.  11. 2/12/21, PET-CT shows evidence of partial response with several liver lesions no longer hypermetabolic and smaller in size, only one hypermetabolic lesion in the hepatic dome. Slightly increased size and FDG uptake in a single right inguinal lymph node, remainder of inguinal and right iliac chain lymph nodes have decreased in size and FDG uptake since prior exam.   12. 5/7/2021, PET-CT shows mixed response with increased size and hypermetabolism in the right iliac chain lymphadenopathy.  13. 5/10/2021, she starts ipilimumab 3 mg/kg every 3 weeks.    Interval History:  -Has  been feeling lightheaded more often in the last few days.   -Doing well drinking fluids.  -Has been eating fairly well lately.  -Denies any abdominal pain, nausea or vomiting, or constipation.   -Has been having 2-3 stools formed stools per day.   -Has had some heartburn. Has been taking omeprazole in the mornings.   -Feels more nervous on the steroids.   -Reports itching has resolved.   -Headaches have resolved.  -Reports decreased fatigue.   -Not sleeping well with melatonin.  -Feels lymph node is bigger and harder.   -Feels that feet are cold frequently.   -Talks with counselor every 2 weeks.       Current Outpatient Medications   Medication Sig Dispense Refill     cholecalciferol (VITAMIN D3) 125 MCG (5000 UT) TABS tablet Take 2,000 Units by mouth every other day        estradiol (ESTRACE) 0.1 MG/GM vaginal cream Place 2 g vaginally twice a week 42.5 g 1     omeprazole (PRILOSEC OTC) 20 MG EC tablet Take 1 tablet (20 mg) by mouth every morning 30-60 minutes before eating 30 tablet 0     omeprazole (PRILOSEC) 20 MG DR capsule TAKE 1 CAPSULE BY MOUTH ONCE DAILY IN THE MORNING 30 60 MINUTES BEFORE EATING       ondansetron (ZOFRAN-ODT) 4 MG ODT tab Take 1 tablet (4 mg) by mouth every 8 hours as needed for nausea 60 tablet 1     ondansetron (ZOFRAN-ODT) 4 MG ODT tab Take 1 tablet (4 mg) by mouth every 8 hours as needed for nausea       predniSONE (DELTASONE) 20 MG tablet Take 1.5 tablets (30 mg) by mouth daily for 7 days 10 tablet 0     sertraline (ZOLOFT) 25 MG tablet Take 1 tablet (25 mg) by mouth daily 30 tablet 4     triamcinolone (KENALOG) 0.1 % external cream Apply topically 2 times daily 453.6 g 11     Physical Exam:  There were no vitals taken for this visit.  Wt Readings from Last 10 Encounters:   09/22/21 64.3 kg (141 lb 11.2 oz)   09/17/21 66 kg (145 lb 8 oz)   09/10/21 62.1 kg (136 lb 12.8 oz)   09/07/21 62.1 kg (136 lb 14.4 oz)   08/23/21 62.5 kg (137 lb 11.2 oz)   08/06/21 62.5 kg (137 lb 12.8 oz)    08/02/21 62.1 kg (136 lb 14.4 oz)   07/30/21 63 kg (139 lb)   07/12/21 62.3 kg (137 lb 4.8 oz)   07/09/21 60.3 kg (133 lb)   General: Pleasant female in no acute distress.  HEENT: EOMI, PERRL. Sclerae are anicteric. Oral mucosa is pink and moist with no lesions or thrush.   Lymph: Neck is supple with no lymphadenopathy in the cervical or supraclavicular areas.   Heart: Regular rate and rhythm.   Lungs: Clear to auscultation bilaterally.   Abdomen: Bowel sounds present, soft, nontender with light and deep palpation. No hepatomegaly or splenomegaly palpated.  Extremities: No lower extremity edema noted bilaterally.   Neuro: Cranial nerves II through XII are grossly intact.  Skin: Several pinpoint pustules noted on anterior thighs. No other rashes, petechiae, or bruising noted on exposed skin.    Laboratory Data:   Most Recent 3 CBC's:  Recent Labs   Lab Test 09/22/21  1015 09/17/21  1151 09/10/21  1022   WBC 10.4 7.9 5.8   HGB 12.3 10.8* 11.2*   MCV 96 95 92    362 263    Most Recent 3 BMP's:  Recent Labs   Lab Test 09/22/21  1015 09/17/21  1151 09/13/21  0917    144 143   POTASSIUM 4.0 3.4 3.3*   CHLORIDE 103 107 108   CO2 33* 32 33*   BUN 18 20 24   CR 0.73 0.78 0.73   ANIONGAP 4 5 2*   AGBY 9.2 8.2* 9.0   GLC 95 163* 136*    Most Recent 2 LFT's:  Recent Labs   Lab Test 09/30/21  1335 09/22/21  1015   * 54*   * 233*   ALKPHOS 66 82   BILITOTAL 0.6 0.7    Most Recent TSH and T4:  Recent Labs   Lab Test 09/22/21  1015   TSH 1.44     I reviewed the above labs today.    Assessment and Plan:  1. Onc  Acral melanoma, right heel primary, pT4b pN3c M1, Stage IV. 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 She progressed on first line nivolumab, but shows clear evidence of response to 2nd line ipilimumab and nivolumab. However, once on nivolumab maintenance therapy she started to show regrowth in the right inguinal node. She switched over to ipilimumab every  3 weeks in the third line on 5/10/21, for a maximum of 4 doses. She knows to call if there is any change in the size of the inguinal lymph node. For evidence of response, continue ipilimumab. For progression, plan for anti-PD1 therapy with Keytruda  or another rounds of dual CPI, either Ipi/Nivo or Ipi/Pembro .    -Progressive nausea, reflux symptoms and diarrhea now with significantly elevated transaminases (16-23 times upper limit of normal)   -Begin prednisone taper (60 mg daily x 5 days and decrease by 10 mg every 10 days) for immune-medicated hepatitis/esophagitis. Go to ED this weekend if symptoms worse.   -Hold immunotherapy. Will plan to restart once her LFT's have normalized and her prednisone dose is down to 10 mg or less.   -Repeat CMP on Monday 9/13/21    2. MSK  Right foot pain 2/2 prior surgery (no concern for immune mediated arthralgias given specific to post surgical pain in right foot/ankle). Has had work up with orthopedics.   -Not currently requiring medication for this.    3. Derm  Dry skin and pruritis has resolved     4. Psych/Neuro  Multifactorial fatigue, TSH and cortisol have been WNL. History of GUERLINE (treated), and she is also on immunotherapy with known side effect of fatigue. Fatigue has increased with ongoing GI symptoms.     History of depression and anxiety. Patient agrees to resume sertraline 25 mg daily       BP cuff   Increase 2/day omeprazole   10/11 9am labs at [facility], cancel infusion  Prednisone 30 mg  benadryl, or unisom  Warm socks and heating pad  Diet whole grains  Support groups-  Palliative care       Meghan Perdomo PA-C  [facility]  [address] [place] [id number]  [phone]    *** minutes spent on the date of the encounter doing chart review, review of test results, interpretation of tests, patient visit and documentation               Again, thank you for allowing me to participate in the care of your patient.         Sincerely,        Meghan Perdomo PA-C

## 2021-10-10 ENCOUNTER — HEALTH MAINTENANCE LETTER (OUTPATIENT)
Age: 76
End: 2021-10-10

## 2021-10-11 ENCOUNTER — LAB (OUTPATIENT)
Dept: LAB | Facility: CLINIC | Age: 76
End: 2021-10-11
Payer: COMMERCIAL

## 2021-10-11 DIAGNOSIS — R79.89 ELEVATED LFTS: Primary | ICD-10-CM

## 2021-10-11 DIAGNOSIS — R79.89 ELEVATED LFTS: ICD-10-CM

## 2021-10-11 DIAGNOSIS — C43.9 MELANOMA OF SKIN (H): ICD-10-CM

## 2021-10-11 DIAGNOSIS — C43.71 MALIGNANT MELANOMA OF RIGHT LOWER EXTREMITY INCLUDING HIP (H): ICD-10-CM

## 2021-10-11 LAB
ALBUMIN SERPL-MCNC: 3.1 G/DL (ref 3.4–5)
ALP SERPL-CCNC: 61 U/L (ref 40–150)
ALT SERPL W P-5'-P-CCNC: 424 U/L (ref 0–50)
ANION GAP SERPL CALCULATED.3IONS-SCNC: 5 MMOL/L (ref 3–14)
AST SERPL W P-5'-P-CCNC: 109 U/L (ref 0–45)
BASOPHILS # BLD AUTO: 0 10E3/UL (ref 0–0.2)
BASOPHILS NFR BLD AUTO: 0 %
BILIRUB DIRECT SERPL-MCNC: 0.2 MG/DL (ref 0–0.2)
BILIRUB SERPL-MCNC: 0.6 MG/DL (ref 0.2–1.3)
BUN SERPL-MCNC: 21 MG/DL (ref 7–30)
CALCIUM SERPL-MCNC: 8.8 MG/DL (ref 8.5–10.1)
CHLORIDE BLD-SCNC: 107 MMOL/L (ref 94–109)
CO2 SERPL-SCNC: 32 MMOL/L (ref 20–32)
CREAT SERPL-MCNC: 0.67 MG/DL (ref 0.52–1.04)
EOSINOPHIL # BLD AUTO: 0.1 10E3/UL (ref 0–0.7)
EOSINOPHIL NFR BLD AUTO: 2 %
ERYTHROCYTE [DISTWIDTH] IN BLOOD BY AUTOMATED COUNT: 15.5 % (ref 10–15)
GFR SERPL CREATININE-BSD FRML MDRD: 85 ML/MIN/1.73M2
GLUCOSE BLD-MCNC: 98 MG/DL (ref 70–99)
HCT VFR BLD AUTO: 38.5 % (ref 35–47)
HGB BLD-MCNC: 12.8 G/DL (ref 11.7–15.7)
IMM GRANULOCYTES # BLD: 0 10E3/UL
IMM GRANULOCYTES NFR BLD: 0 %
LYMPHOCYTES # BLD AUTO: 2.1 10E3/UL (ref 0.8–5.3)
LYMPHOCYTES NFR BLD AUTO: 32 %
MCH RBC QN AUTO: 31.3 PG (ref 26.5–33)
MCHC RBC AUTO-ENTMCNC: 33.2 G/DL (ref 31.5–36.5)
MCV RBC AUTO: 94 FL (ref 78–100)
MONOCYTES # BLD AUTO: 0.6 10E3/UL (ref 0–1.3)
MONOCYTES NFR BLD AUTO: 9 %
NEUTROPHILS # BLD AUTO: 3.9 10E3/UL (ref 1.6–8.3)
NEUTROPHILS NFR BLD AUTO: 57 %
NRBC # BLD AUTO: 0 10E3/UL
NRBC BLD AUTO-RTO: 0 /100
PLATELET # BLD AUTO: 244 10E3/UL (ref 150–450)
POTASSIUM BLD-SCNC: 3.4 MMOL/L (ref 3.4–5.3)
PROT SERPL-MCNC: 6.4 G/DL (ref 6.8–8.8)
RBC # BLD AUTO: 4.09 10E6/UL (ref 3.8–5.2)
SODIUM SERPL-SCNC: 144 MMOL/L (ref 133–144)
TSH SERPL DL<=0.005 MIU/L-ACNC: 2.05 MU/L (ref 0.4–4)
WBC # BLD AUTO: 6.8 10E3/UL (ref 4–11)

## 2021-10-11 PROCEDURE — 36415 COLL VENOUS BLD VENIPUNCTURE: CPT

## 2021-10-11 PROCEDURE — 82248 BILIRUBIN DIRECT: CPT

## 2021-10-11 PROCEDURE — 80050 GENERAL HEALTH PANEL: CPT

## 2021-10-11 RX ORDER — PREDNISONE 20 MG/1
40 TABLET ORAL DAILY
Qty: 60 TABLET | Refills: 0 | Status: SHIPPED | OUTPATIENT
Start: 2021-10-11 | End: 2021-01-01

## 2021-10-14 ENCOUNTER — PATIENT OUTREACH (OUTPATIENT)
Dept: CARE COORDINATION | Facility: CLINIC | Age: 76
End: 2021-10-14

## 2021-10-14 DIAGNOSIS — C43.9 MELANOMA OF SKIN (H): Primary | ICD-10-CM

## 2021-10-14 NOTE — PROGRESS NOTES
"Social Work Note: Telephone Call  Oncology Clinic    Data/Intervention:  Patient Name:  Yadira Hoang  /Age:  1945 (76 year old)    Call From:  BELGICA  Reason for Call:  Support resources    Assessment:  BELGICA received referral that Yadira was in need of support group resources. BELGICA called and spoke to Yadira this afternoon, reintroducing self and reason for call. BELGICA and Yadira spoke for some time about how she has been doing. She states that today, \"I'm not doing very good.\" Yadira is spoke about being on the steroid and feeling shaky and unable to sleep. She understands she can take benedryl to sleep, but states that when she does that she is too tired during the day which she doesn't like. BELGICA did encourage Yadira to continue to speak to her providers about these concerns.     Yadira opened up about feeling overwhelmed at times with the diagnosis and all the medications. She endorses having a strong support system, but recognizes, \"they just don't understand what I'm going through.\" BELGICA validated these feelings and provided some brief supportive counseling. BELGICA discussed some support group and mentor programs that may be available to meet those needs, helping Yadira connect with people who are going through similar experiences. Yadira very interested in these ideas. She talked about feeling alone despite the support system around her and looks forward to some new connections. BELGICA offered to send resource information via [employer] and Yadira agreed. The following information sent to Yadira:        Support group options:   https://www.gildasclubtwincities.org/  https://www.cancercare.org/support_groups/132-melanoma_patient_support_group    [place] Programs:   https://imermanangels.org/  https://4thangel.ccf.org/    And here is just a link to a variety of things from skincancer.org  https://www.skincancer.org/treatment-resources/support-resources/#emsection    BELGICA provided contact information and encouraged Yadira to call back with any " questions or needs.     Plan:  SW will remain available for ongoing support and resources.     JORGE Pelaez, [facility]  Clinical , Adult Oncology  Phone: [phone]

## 2021-10-18 ENCOUNTER — LAB (OUTPATIENT)
Dept: LAB | Facility: CLINIC | Age: 76
End: 2021-10-18
Payer: COMMERCIAL

## 2021-10-18 ENCOUNTER — ANCILLARY PROCEDURE (OUTPATIENT)
Dept: ULTRASOUND IMAGING | Facility: CLINIC | Age: 76
End: 2021-10-18
Attending: PHYSICIAN ASSISTANT
Payer: COMMERCIAL

## 2021-10-18 DIAGNOSIS — C79.51 SECONDARY MALIGNANT NEOPLASM OF BONE (H): Primary | ICD-10-CM

## 2021-10-18 DIAGNOSIS — R79.89 ELEVATED LFTS: ICD-10-CM

## 2021-10-18 DIAGNOSIS — C43.9 MELANOMA OF SKIN (H): ICD-10-CM

## 2021-10-18 DIAGNOSIS — C43.71 MALIGNANT MELANOMA OF RIGHT LOWER EXTREMITY INCLUDING HIP (H): ICD-10-CM

## 2021-10-18 LAB
ALBUMIN SERPL-MCNC: 3.1 G/DL (ref 3.4–5)
ALP SERPL-CCNC: 56 U/L (ref 40–150)
ALT SERPL W P-5'-P-CCNC: 246 U/L (ref 0–50)
ANION GAP SERPL CALCULATED.3IONS-SCNC: 4 MMOL/L (ref 3–14)
AST SERPL W P-5'-P-CCNC: 67 U/L (ref 0–45)
BASOPHILS # BLD AUTO: 0 10E3/UL (ref 0–0.2)
BASOPHILS NFR BLD AUTO: 1 %
BILIRUB DIRECT SERPL-MCNC: 0.2 MG/DL (ref 0–0.2)
BILIRUB SERPL-MCNC: 0.6 MG/DL (ref 0.2–1.3)
BUN SERPL-MCNC: 26 MG/DL (ref 7–30)
CALCIUM SERPL-MCNC: 8.2 MG/DL (ref 8.5–10.1)
CHLORIDE BLD-SCNC: 108 MMOL/L (ref 94–109)
CO2 SERPL-SCNC: 31 MMOL/L (ref 20–32)
CREAT SERPL-MCNC: 0.7 MG/DL (ref 0.52–1.04)
EOSINOPHIL # BLD AUTO: 0.1 10E3/UL (ref 0–0.7)
EOSINOPHIL NFR BLD AUTO: 1 %
ERYTHROCYTE [DISTWIDTH] IN BLOOD BY AUTOMATED COUNT: 15.3 % (ref 10–15)
GFR SERPL CREATININE-BSD FRML MDRD: 84 ML/MIN/1.73M2
GLUCOSE BLD-MCNC: 109 MG/DL (ref 70–99)
HCT VFR BLD AUTO: 40 % (ref 35–47)
HGB BLD-MCNC: 13.2 G/DL (ref 11.7–15.7)
IMM GRANULOCYTES # BLD: 0 10E3/UL
IMM GRANULOCYTES NFR BLD: 0 %
LYMPHOCYTES # BLD AUTO: 2.6 10E3/UL (ref 0.8–5.3)
LYMPHOCYTES NFR BLD AUTO: 30 %
MCH RBC QN AUTO: 31 PG (ref 26.5–33)
MCHC RBC AUTO-ENTMCNC: 33 G/DL (ref 31.5–36.5)
MCV RBC AUTO: 94 FL (ref 78–100)
MONOCYTES # BLD AUTO: 0.7 10E3/UL (ref 0–1.3)
MONOCYTES NFR BLD AUTO: 8 %
NEUTROPHILS # BLD AUTO: 5.2 10E3/UL (ref 1.6–8.3)
NEUTROPHILS NFR BLD AUTO: 60 %
NRBC # BLD AUTO: 0 10E3/UL
NRBC BLD AUTO-RTO: 0 /100
PLATELET # BLD AUTO: 273 10E3/UL (ref 150–450)
POTASSIUM BLD-SCNC: 2.9 MMOL/L (ref 3.4–5.3)
PROT SERPL-MCNC: 6.6 G/DL (ref 6.8–8.8)
RBC # BLD AUTO: 4.26 10E6/UL (ref 3.8–5.2)
SODIUM SERPL-SCNC: 143 MMOL/L (ref 133–144)
TSH SERPL DL<=0.005 MIU/L-ACNC: 2.5 MU/L (ref 0.4–4)
WBC # BLD AUTO: 8.6 10E3/UL (ref 4–11)

## 2021-10-18 PROCEDURE — 82024 ASSAY OF ACTH: CPT

## 2021-10-18 PROCEDURE — 36415 COLL VENOUS BLD VENIPUNCTURE: CPT

## 2021-10-18 PROCEDURE — 76882 US LMTD JT/FCL EVL NVASC XTR: CPT | Mod: RT | Performed by: RADIOLOGY

## 2021-10-18 PROCEDURE — 82248 BILIRUBIN DIRECT: CPT | Performed by: PHYSICIAN ASSISTANT

## 2021-10-18 PROCEDURE — 80050 GENERAL HEALTH PANEL: CPT

## 2021-10-18 RX ORDER — POTASSIUM CHLORIDE 1500 MG/1
TABLET, EXTENDED RELEASE ORAL
Qty: 8 TABLET | Refills: 0 | Status: SHIPPED | OUTPATIENT
Start: 2021-10-18 | End: 2022-01-01

## 2021-10-19 ENCOUNTER — PATIENT OUTREACH (OUTPATIENT)
Dept: ONCOLOGY | Facility: CLINIC | Age: 76
End: 2021-10-19

## 2021-10-19 LAB — ACTH PLAS-MCNC: <10 PG/ML

## 2021-10-19 NOTE — PROGRESS NOTES
Update:    Discussed ultrasound with Dr. Franklin.  Patient appears to be progressing.  Currently on 40 mg. Prednisone daily and unable to have immunotherapy.  Patient will see Dr. Franklin on Friday at 10 am in clinic with labs before to discuss chemotherapy as next option and dosing on prednisone taper.  Recent labs indicate low k+ of 2.9.  Rx sent to pharmacy per Dr. Franklin. This will be reviewed Friday as well.  Patient aware of plan and open to considering chemotherapy.    Daniela Guardado MBA, MSN, RN, ONC  RN Care Coordinator  [facility]

## 2021-10-22 ENCOUNTER — ONCOLOGY VISIT (OUTPATIENT)
Dept: ONCOLOGY | Facility: CLINIC | Age: 76
End: 2021-10-22
Attending: INTERNAL MEDICINE
Payer: COMMERCIAL

## 2021-10-22 ENCOUNTER — APPOINTMENT (OUTPATIENT)
Dept: LAB | Facility: CLINIC | Age: 76
End: 2021-10-22
Attending: INTERNAL MEDICINE
Payer: COMMERCIAL

## 2021-10-22 VITALS
RESPIRATION RATE: 16 BRPM | OXYGEN SATURATION: 97 % | HEART RATE: 86 BPM | WEIGHT: 137.8 LBS | TEMPERATURE: 98 F | SYSTOLIC BLOOD PRESSURE: 156 MMHG | BODY MASS INDEX: 21.91 KG/M2 | DIASTOLIC BLOOD PRESSURE: 79 MMHG

## 2021-10-22 DIAGNOSIS — C43.9 MELANOMA OF SKIN (H): ICD-10-CM

## 2021-10-22 DIAGNOSIS — K75.9 HEPATITIS: Primary | ICD-10-CM

## 2021-10-22 DIAGNOSIS — C43.71 MALIGNANT MELANOMA OF RIGHT LOWER EXTREMITY INCLUDING HIP (H): ICD-10-CM

## 2021-10-22 LAB
ALBUMIN SERPL-MCNC: 3.1 G/DL (ref 3.4–5)
ALP SERPL-CCNC: 59 U/L (ref 40–150)
ALT SERPL W P-5'-P-CCNC: 171 U/L (ref 0–50)
ANION GAP SERPL CALCULATED.3IONS-SCNC: 3 MMOL/L (ref 3–14)
AST SERPL W P-5'-P-CCNC: 43 U/L (ref 0–45)
BASOPHILS # BLD AUTO: 0 10E3/UL (ref 0–0.2)
BASOPHILS NFR BLD AUTO: 0 %
BILIRUB SERPL-MCNC: 0.6 MG/DL (ref 0.2–1.3)
BUN SERPL-MCNC: 31 MG/DL (ref 7–30)
CALCIUM SERPL-MCNC: 9.5 MG/DL (ref 8.5–10.1)
CHLORIDE BLD-SCNC: 106 MMOL/L (ref 94–109)
CO2 SERPL-SCNC: 33 MMOL/L (ref 20–32)
CREAT SERPL-MCNC: 0.74 MG/DL (ref 0.52–1.04)
EOSINOPHIL # BLD AUTO: 0.1 10E3/UL (ref 0–0.7)
EOSINOPHIL NFR BLD AUTO: 1 %
ERYTHROCYTE [DISTWIDTH] IN BLOOD BY AUTOMATED COUNT: 15.6 % (ref 10–15)
GFR SERPL CREATININE-BSD FRML MDRD: 78 ML/MIN/1.73M2
GLUCOSE BLD-MCNC: 81 MG/DL (ref 70–99)
HCT VFR BLD AUTO: 43.3 % (ref 35–47)
HGB BLD-MCNC: 13.8 G/DL (ref 11.7–15.7)
IMM GRANULOCYTES # BLD: 0 10E3/UL
IMM GRANULOCYTES NFR BLD: 0 %
LYMPHOCYTES # BLD AUTO: 2.6 10E3/UL (ref 0.8–5.3)
LYMPHOCYTES NFR BLD AUTO: 29 %
MCH RBC QN AUTO: 30.5 PG (ref 26.5–33)
MCHC RBC AUTO-ENTMCNC: 31.9 G/DL (ref 31.5–36.5)
MCV RBC AUTO: 96 FL (ref 78–100)
MONOCYTES # BLD AUTO: 0.7 10E3/UL (ref 0–1.3)
MONOCYTES NFR BLD AUTO: 8 %
NEUTROPHILS # BLD AUTO: 5.5 10E3/UL (ref 1.6–8.3)
NEUTROPHILS NFR BLD AUTO: 62 %
NRBC # BLD AUTO: 0 10E3/UL
NRBC BLD AUTO-RTO: 0 /100
PLATELET # BLD AUTO: 264 10E3/UL (ref 150–450)
POTASSIUM BLD-SCNC: 3.5 MMOL/L (ref 3.4–5.3)
PROT SERPL-MCNC: 7.1 G/DL (ref 6.8–8.8)
RBC # BLD AUTO: 4.53 10E6/UL (ref 3.8–5.2)
SODIUM SERPL-SCNC: 142 MMOL/L (ref 133–144)
TSH SERPL DL<=0.005 MIU/L-ACNC: 2.3 MU/L (ref 0.4–4)
WBC # BLD AUTO: 8.9 10E3/UL (ref 4–11)

## 2021-10-22 PROCEDURE — 84443 ASSAY THYROID STIM HORMONE: CPT | Performed by: INTERNAL MEDICINE

## 2021-10-22 PROCEDURE — 36415 COLL VENOUS BLD VENIPUNCTURE: CPT | Performed by: INTERNAL MEDICINE

## 2021-10-22 PROCEDURE — G0463 HOSPITAL OUTPT CLINIC VISIT: HCPCS

## 2021-10-22 PROCEDURE — 80053 COMPREHEN METABOLIC PANEL: CPT | Performed by: INTERNAL MEDICINE

## 2021-10-22 PROCEDURE — 82024 ASSAY OF ACTH: CPT | Performed by: INTERNAL MEDICINE

## 2021-10-22 PROCEDURE — 85025 COMPLETE CBC W/AUTO DIFF WBC: CPT | Performed by: INTERNAL MEDICINE

## 2021-10-22 PROCEDURE — 99215 OFFICE O/P EST HI 40 MIN: CPT | Performed by: INTERNAL MEDICINE

## 2021-10-22 ASSESSMENT — PAIN SCALES - GENERAL: PAINLEVEL: NO PAIN (0)

## 2021-10-22 NOTE — NURSING NOTE
"Oncology Rooming Note    October 22, 2021 9:50 AM   Yadira Hoang is a 76 year old female who presents for:    Chief Complaint   Patient presents with     Blood Draw     Labs drawn via  by rn in lab. VS taken.     Oncology Clinic Visit     metastatic melanoma     Initial Vitals: BP (!) 156/79 (BP Location: Right arm, Patient Position: Sitting, Cuff Size: Adult Small)   Pulse 86   Temp 98  F (36.7  C) (Oral)   Resp 16   Wt 62.5 kg (137 lb 12.8 oz)   SpO2 97%   BMI 21.91 kg/m   Estimated body mass index is 21.91 kg/m  as calculated from the following:    Height as of 9/22/21: 1.689 m (5' 6.5\").    Weight as of this encounter: 62.5 kg (137 lb 12.8 oz). Body surface area is 1.71 meters squared.  No Pain (0) Comment: Data Unavailable   No LMP recorded. Patient is postmenopausal.  Allergies reviewed: Yes  Medications reviewed: Yes    Medications: Medication refills not needed today.  Pharmacy name entered into [facility]:    [facility] - [place], [place] - [address], [address]  [id number] - [place], [place] [address] [id number] - [place], [place] - [address] [facility] [address] ([facility] - [place], [place] - [address]    Clinical concerns: none       Jessica Valladares CMA            "

## 2021-10-22 NOTE — NURSING NOTE
Chief Complaint   Patient presents with     Blood Draw     Labs drawn via  by rn in lab. VS taken.     Labs collected from venipuncture by RN. Vitals taken. Checked in for appointment(s).    Oj Paul RN

## 2021-10-22 NOTE — PROGRESS NOTES
MEDICAL ONCOLOGY PROGRESS NOTE  [facility]  Oct 22, 2021     CHIEF COMPLAINT: Lymph node swelling, acral melanoma, on steroids for autoimmune hepatitis    Melanoma History:  1. She noted a painful lesion on the sole of the right foot for a few months, since last summer.  It initially was small then became raised.  It spontaneously bled. She is not entirely sure of its appearance initially.  She denies noting any masses behind the knee or in the groin.  2. 1/9/2020, punch biopsy was performed by Dr Jessica Meadows. Patology showed melanoma, at least 3.4 mm deep with ulceration and 0 mitoses, and perineural invasion present. TILs were non-brisk and LVI not identified. pT3b.  3. 2/10/2020, she has right femoral sentinel lymph node biopsy and wide local excision (Specimen #: [id number]) and the right heel lesion extends to a depth of 6.6 mm, and invasive melanoma is present at 0.2 mm from the deep margin. Microsatellites are also present. There was 1 (of 2) lymph nodes involved. The lymph node tissue exhibits extensive subcapsular and parenchymal clusters of melanoma cells, highlighted on Melan-A immunostain. The largest cluster is 7 millimeters in greatest diameter. pT4b pN2c. PD-L1 staining is negative, <1%. No mutation in BRAF, KIT, or NRAS.  4. 2/22/2020, she had PET-CT, which showed intense FDG uptake in a right inguinal lymph node, concerning for an additional focus of metastatic disease. There is also an indeterminate right external iliac lymph node with mild FDG uptake.  5. 3/4/2020, she has medial plantar artery  fasciocutaneous instep flap and Integra placement to the donor site. On 4/2/2020, she has additional reconstruction with skin grafting.  6. 5/22/2020, PET-CT shows a hypermetabolic right inguinal and right external iliac chain lymph node and stable pulmonary nodules.  7. 5/27/2020, she starts nivolumab for presumed low volume lymph node predominant metastatic disease.  8. 8/21/2020, PET-CT  shows increase in hypermetabolic adenopathy, and a hypermetabolic nodule in segment 2 of the liver. Given low volume of disease she prefers to continue therapy with short interval follow-up.  9. 10/9/2020, PET-CT shows increasing size of hepatic metastases with increased hypermetabolism, increased right inguinal and iliac lymphadenopathy, and new FDG uptake right T2 transverse process and right side of S1. MRI-brain obtained 10/21/2020 is negative for brain metastasis.  10. 10/28/2020, she starts ipilimumab 1mg/kg and nivolumab 3mg/kg. She then goes on to maintenance nivolumab through 4/12/21.  11. 5/10/2021, she starts ipilimumab 3 mg/kg monotherapy q3w for 4 cycles.  12. 7/30/21, PET/CT shows mild disease progression.   13. 8/2/21, start ipilimumab 3mg/kg and nivolumab 1mg/kg and received 2 cycles, last on 8/23/21.  14. 8/23/21, develops elevated LFT's.   15. 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     -She has been eating fairly well lately.  -Denies any abdominal pain, nausea or vomiting, or constipation.   -Has been having 2-3 formed stools per day.   -Feels more nervous on the steroids.   -Feels she can't remember things for more than a few moments on steroids  -Reports itching has resolved.   -Headaches have resolved.  -arthritis has resolved  -Not sleeping well, wakes up a lot during the night  -Feels lymph node is smaller over the last week.  -Feels that feet are cold frequently.   -Talks with counselor every 2 weeks.     ECOG performance status is 1.    Current Outpatient Medications   Medication     cholecalciferol (VITAMIN D3) 125 MCG (5000 UT) TABS tablet     estradiol (ESTRACE) 0.1 MG/GM vaginal cream     omeprazole (PRILOSEC) 20 MG DR capsule     ondansetron (ZOFRAN-ODT) 4 MG ODT tab     potassium chloride ER (KLOR-CON M) 20 MEQ CR tablet     predniSONE (DELTASONE) 20  MG tablet     sertraline (ZOLOFT) 25 MG tablet     triamcinolone (KENALOG) 0.1 % external cream     No current facility-administered medications for this visit.       PHYSICAL EXAMINATION  BP (!) 156/79 (BP Location: Right arm, Patient Position: Sitting, Cuff Size: Adult Small)   Pulse 86   Temp 98  F (36.7  C) (Oral)   Resp 16   Wt 62.5 kg (137 lb 12.8 oz)   SpO2 97%   BMI 21.91 kg/m    GENERAL: Healthy, alert and no distress  EYES: Eyes grossly normal to inspection.  No discharge or erythema, or obvious scleral/conjunctival abnormalities.  HENT: Normal cephalic/atraumatic.  External ears, nose and mouth without ulcers or lesions.  No nasal drainage visible.  NECK: No asymmetry, visible masses or scars  RESP: No audible wheeze, cough, or visible cyanosis.  No visible retractions or increased work of breathing.  LYMPH: The inferiormost right inguinal lymph node is palpable under the skin, but feels smaller. A superior lymph node feels enlarged.  SKIN: Visible skin clear. No significant rash, abnormal pigmentation or lesions.  NEURO: Cranial nerves grossly intact.  Mentation and speech appropriate for age.  PSYCH: Mentation appears normal, affect normal/bright, judgement and insight intact, normal speech and appearance well-groomed.      LABS  Oncology Visit on 10/22/2021   Component Date Value Ref Range Status     Sodium 10/22/2021 142  133 - 144 mmol/L Final     Potassium 10/22/2021 3.5  3.4 - 5.3 mmol/L Final     Chloride 10/22/2021 106  94 - 109 mmol/L Final     Carbon Dioxide (CO2) 10/22/2021 33* 20 - 32 mmol/L Final     Anion Gap 10/22/2021 3  3 - 14 mmol/L Final     Urea Nitrogen 10/22/2021 31* 7 - 30 mg/dL Final     Creatinine 10/22/2021 0.74  0.52 - 1.04 mg/dL Final     Calcium 10/22/2021 9.5  8.5 - 10.1 mg/dL Final     Glucose 10/22/2021 81  70 - 99 mg/dL Final     Alkaline Phosphatase 10/22/2021 59  40 - 150 U/L Final     AST 10/22/2021 43  0 - 45 U/L Final     ALT 10/22/2021 171* 0 - 50 U/L Final      Protein Total 10/22/2021 7.1  6.8 - 8.8 g/dL Final     Albumin 10/22/2021 3.1* 3.4 - 5.0 g/dL Final     Bilirubin Total 10/22/2021 0.6  0.2 - 1.3 mg/dL Final     GFR Estimate 10/22/2021 78  >60 mL/min/1.73m2 Final     TSH 10/22/2021 2.30  0.40 - 4.00 mU/L Final     WBC Count 10/22/2021 8.9  4.0 - 11.0 10e3/uL Final     RBC Count 10/22/2021 4.53  3.80 - 5.20 10e6/uL Final     Hemoglobin 10/22/2021 13.8  11.7 - 15.7 g/dL Final     Hematocrit 10/22/2021 43.3  35.0 - 47.0 % Final     MCV 10/22/2021 96  78 - 100 fL Final     MCH 10/22/2021 30.5  26.5 - 33.0 pg Final     MCHC 10/22/2021 31.9  31.5 - 36.5 g/dL Final     RDW 10/22/2021 15.6* 10.0 - 15.0 % Final     Platelet Count 10/22/2021 264  150 - 450 10e3/uL Final     % Neutrophils 10/22/2021 62  % Final     % Lymphocytes 10/22/2021 29  % Final     % Monocytes 10/22/2021 8  % Final     % Eosinophils 10/22/2021 1  % Final     % Basophils 10/22/2021 0  % Final     % Immature Granulocytes 10/22/2021 0  % Final     NRBCs per 100 WBC 10/22/2021 0  <1 /100 Final     Absolute Neutrophils 10/22/2021 5.5  1.6 - 8.3 10e3/uL Final     Absolute Lymphocytes 10/22/2021 2.6  0.8 - 5.3 10e3/uL Final     Absolute Monocytes 10/22/2021 0.7  0.0 - 1.3 10e3/uL Final     Absolute Eosinophils 10/22/2021 0.1  0.0 - 0.7 10e3/uL Final     Absolute Basophils 10/22/2021 0.0  0.0 - 0.2 10e3/uL Final     Absolute Immature Granulocytes 10/22/2021 0.0  <=0.0 10e3/uL Final     Absolute NRBCs 10/22/2021 0.0  10e3/uL Final   Lab on 10/18/2021   Component Date Value Ref Range Status     Sodium 10/18/2021 143  133 - 144 mmol/L Final     Potassium 10/18/2021 2.9* 3.4 - 5.3 mmol/L Final     Chloride 10/18/2021 108  94 - 109 mmol/L Final     Carbon Dioxide (CO2) 10/18/2021 31  20 - 32 mmol/L Final     Anion Gap 10/18/2021 4  3 - 14 mmol/L Final     Urea Nitrogen 10/18/2021 26  7 - 30 mg/dL Final     Creatinine 10/18/2021 0.70  0.52 - 1.04 mg/dL Final     Calcium 10/18/2021 8.2* 8.5 - 10.1 mg/dL Final      Glucose 10/18/2021 109* 70 - 99 mg/dL Final     Alkaline Phosphatase 10/18/2021 56  40 - 150 U/L Final     AST 10/18/2021 67* 0 - 45 U/L Final     ALT 10/18/2021 246* 0 - 50 U/L Final     Protein Total 10/18/2021 6.6* 6.8 - 8.8 g/dL Final     Albumin 10/18/2021 3.1* 3.4 - 5.0 g/dL Final     Bilirubin Total 10/18/2021 0.6  0.2 - 1.3 mg/dL Final     GFR Estimate 10/18/2021 84  >60 mL/min/1.73m2 Final     TSH 10/18/2021 2.50  0.40 - 4.00 mU/L Final     Adrenal Corticotropin 10/18/2021 <10  <47 pg/mL Final     WBC Count 10/18/2021 8.6  4.0 - 11.0 10e3/uL Final     RBC Count 10/18/2021 4.26  3.80 - 5.20 10e6/uL Final     Hemoglobin 10/18/2021 13.2  11.7 - 15.7 g/dL Final     Hematocrit 10/18/2021 40.0  35.0 - 47.0 % Final     MCV 10/18/2021 94  78 - 100 fL Final     MCH 10/18/2021 31.0  26.5 - 33.0 pg Final     MCHC 10/18/2021 33.0  31.5 - 36.5 g/dL Final     RDW 10/18/2021 15.3* 10.0 - 15.0 % Final     Platelet Count 10/18/2021 273  150 - 450 10e3/uL Final     % Neutrophils 10/18/2021 60  % Final     % Lymphocytes 10/18/2021 30  % Final     % Monocytes 10/18/2021 8  % Final     % Eosinophils 10/18/2021 1  % Final     % Basophils 10/18/2021 1  % Final     % Immature Granulocytes 10/18/2021 0  % Final     NRBCs per 100 WBC 10/18/2021 0  <1 /100 Final     Absolute Neutrophils 10/18/2021 5.2  1.6 - 8.3 10e3/uL Final     Absolute Lymphocytes 10/18/2021 2.6  0.8 - 5.3 10e3/uL Final     Absolute Monocytes 10/18/2021 0.7  0.0 - 1.3 10e3/uL Final     Absolute Eosinophils 10/18/2021 0.1  0.0 - 0.7 10e3/uL Final     Absolute Basophils 10/18/2021 0.0  0.0 - 0.2 10e3/uL Final     Absolute Immature Granulocytes 10/18/2021 0.0  <=0.0 10e3/uL Final     Absolute NRBCs 10/18/2021 0.0  10e3/uL Final     Bilirubin Direct 10/18/2021 0.2  0.0 - 0.2 mg/dL Final     I reviewed labs today, which show further improvement in the AST and ALT. AST is now normal and ALT is just 3X ULN. Potassium improved to 3.5.        ASSESSMENT AND PLAN:    #1  Acral melanoma, right heel primary, pT4b pN3c M1, Stage IV  It was a pleasure to meet with Ms. Hoang. 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 She progressed on first line nivolumab, and had response to 2nd line ipilimumab and nivolumab. On nivolumab maintenance therapy she showed regrowth. She tried ipilimumab monotherapy and had stable disease with increased itch. She most recently restarted a second induction with ipilimumab/nivolumab on 8/2/21. She received her 2nd cycle of 8/23/21. She had interval scan 9/1/21, which showed about 10% growth, as expected. She then developed progressive nausea, reflux symptoms, diarrhea, and elevated transaminases (16-23 times upper limit of normal) and required steroid taper. She was reassured that Ipi/Nivo should still be working in the body and also that at least one of the lymph nodes is palpably smaller, which bodes well overall. Discussed consideration of intralesional TVEC versus clinical trials. She is open to either possibility, but prefers the most active approach possible.    -Continue prednisone at current dose of 40 mg daily  -Continue to hold immunotherapy  -Repeat CMP on Monday/Tuesday 10/26/21  -Follow-up as scheduled    #2 Fatigue, multifactorial  #3 Insomnia, secondary to steroids  History of GUERLINE (treated),on steroids and difficulty sleeping. Fatigued but also suffering from insomnia. Due to risk of confusion with gabanergic drugs and poor tolerance of trazodone. Will continue to monitor.    #4 History of depression and anxiety  Continue on sertraline 25 mg daily.    Multiple questions answered.       Gi Cartagena M.D.   of Medicine  Hematology, Oncology and Transplantation

## 2021-10-22 NOTE — LETTER
10/22/2021         RE: Yadira Hoang  [address] [facility] [address]        Dear Colleague,    Thank you for referring your patient, Yadira Hoang, to the [facility]. Please see a copy of my visit note below.    MEDICAL ONCOLOGY PROGRESS NOTE  [facility]oma Clinic  Oct 22, 2021     CHIEF COMPLAINT: Lymph node swelling, acral melanoma, on steroids for autoimmune hepatitis    Melanoma History:  1. She noted a painful lesion on the sole of the right foot for a few months, since last summer.  It initially was small then became raised.  It spontaneously bled. She is not entirely sure of its appearance initially.  She denies noting any masses behind the knee or in the groin.  2. 1/9/2020, punch biopsy was performed by Dr Jessica Meadows. Patology showed melanoma, at least 3.4 mm deep with ulceration and 0 mitoses, and perineural invasion present. TILs were non-brisk and LVI not identified. pT3b.  3. 2/10/2020, she has right femoral sentinel lymph node biopsy and wide local excision (Specimen #: [id number]) and the right heel lesion extends to a depth of 6.6 mm, and invasive melanoma is present at 0.2 mm from the deep margin. Microsatellites are also present. There was 1 (of 2) lymph nodes involved. The lymph node tissue exhibits extensive subcapsular and parenchymal clusters of melanoma cells, highlighted on Melan-A immunostain. The largest cluster is 7 millimeters in greatest diameter. pT4b pN2c. PD-L1 staining is negative, <1%. No mutation in BRAF, KIT, or NRAS.  4. 2/22/2020, she had PET-CT, which showed intense FDG uptake in a right inguinal lymph node, concerning for an additional focus of metastatic disease. There is also an indeterminate right external iliac lymph node with mild FDG uptake.  5. 3/4/2020, she has medial plantar artery  fasciocutaneous instep flap and Integra placement to the donor site. On 4/2/2020, she has additional reconstruction with skin  grafting.  6. 5/22/2020, PET-CT shows a hypermetabolic right inguinal and right external iliac chain lymph node and stable pulmonary nodules.  7. 5/27/2020, she starts nivolumab for presumed low volume lymph node predominant metastatic disease.  8. 8/21/2020, PET-CT shows increase in hypermetabolic adenopathy, and a hypermetabolic nodule in segment 2 of the liver. Given low volume of disease she prefers to continue therapy with short interval follow-up.  9. 10/9/2020, PET-CT shows increasing size of hepatic metastases with increased hypermetabolism, increased right inguinal and iliac lymphadenopathy, and new FDG uptake right T2 transverse process and right side of S1. MRI-brain obtained 10/21/2020 is negative for brain metastasis.  10. 10/28/2020, she starts ipilimumab 1mg/kg and nivolumab 3mg/kg. She then goes on to maintenance nivolumab through 4/12/21.  11. 5/10/2021, she starts ipilimumab 3 mg/kg monotherapy q3w for 4 cycles.  12. 7/30/21, PET/CT shows mild disease progression.   13. 8/2/21, start ipilimumab 3mg/kg and nivolumab 1mg/kg and received 2 cycles, last on 8/23/21.  14. 8/23/21, develops elevated LFT's.   15. 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     -She has been eating fairly well lately.  -Denies any abdominal pain, nausea or vomiting, or constipation.   -Has been having 2-3 formed stools per day.   -Feels more nervous on the steroids.   -Feels she can't remember things for more than a few moments on steroids  -Reports itching has resolved.   -Headaches have resolved.  -arthritis has resolved  -Not sleeping well, wakes up a lot during the night  -Feels lymph node is smaller over the last week.  -Feels that feet are cold frequently.   -Talks with counselor every 2 weeks.     ECOG performance status is 1.    Current Outpatient Medications   Medication      cholecalciferol (VITAMIN D3) 125 MCG (5000 UT) TABS tablet     estradiol (ESTRACE) 0.1 MG/GM vaginal cream     omeprazole (PRILOSEC) 20 MG DR capsule     ondansetron (ZOFRAN-ODT) 4 MG ODT tab     potassium chloride ER (KLOR-CON M) 20 MEQ CR tablet     predniSONE (DELTASONE) 20 MG tablet     sertraline (ZOLOFT) 25 MG tablet     triamcinolone (KENALOG) 0.1 % external cream     No current facility-administered medications for this visit.     PHYSICAL EXAMINATION  BP (!) 156/79 (BP Location: Right arm, Patient Position: Sitting, Cuff Size: Adult Small)   Pulse 86   Temp 98  F (36.7  C) (Oral)   Resp 16   Wt 62.5 kg (137 lb 12.8 oz)   SpO2 97%   BMI 21.91 kg/m    GENERAL: Healthy, alert and no distress  EYES: Eyes grossly normal to inspection.  No discharge or erythema, or obvious scleral/conjunctival abnormalities.  HENT: Normal cephalic/atraumatic.  External ears, nose and mouth without ulcers or lesions.  No nasal drainage visible.  NECK: No asymmetry, visible masses or scars  RESP: No audible wheeze, cough, or visible cyanosis.  No visible retractions or increased work of breathing.  LYMPH: The inferiormost right inguinal lymph node is palpable under the skin, but feels smaller. A superior lymph node feels enlarged.  SKIN: Visible skin clear. No significant rash, abnormal pigmentation or lesions.  NEURO: Cranial nerves grossly intact.  Mentation and speech appropriate for age.  PSYCH: Mentation appears normal, affect normal/bright, judgement and insight intact, normal speech and appearance well-groomed.    LABS  Oncology Visit on 10/22/2021   Component Date Value Ref Range Status     Sodium 10/22/2021 142  133 - 144 mmol/L Final     Potassium 10/22/2021 3.5  3.4 - 5.3 mmol/L Final     Chloride 10/22/2021 106  94 - 109 mmol/L Final     Carbon Dioxide (CO2) 10/22/2021 33* 20 - 32 mmol/L Final     Anion Gap 10/22/2021 3  3 - 14 mmol/L Final     Urea Nitrogen 10/22/2021 31* 7 - 30 mg/dL Final     Creatinine  10/22/2021 0.74  0.52 - 1.04 mg/dL Final     Calcium 10/22/2021 9.5  8.5 - 10.1 mg/dL Final     Glucose 10/22/2021 81  70 - 99 mg/dL Final     Alkaline Phosphatase 10/22/2021 59  40 - 150 U/L Final     AST 10/22/2021 43  0 - 45 U/L Final     ALT 10/22/2021 171* 0 - 50 U/L Final     Protein Total 10/22/2021 7.1  6.8 - 8.8 g/dL Final     Albumin 10/22/2021 3.1* 3.4 - 5.0 g/dL Final     Bilirubin Total 10/22/2021 0.6  0.2 - 1.3 mg/dL Final     GFR Estimate 10/22/2021 78  >60 mL/min/1.73m2 Final     TSH 10/22/2021 2.30  0.40 - 4.00 mU/L Final     WBC Count 10/22/2021 8.9  4.0 - 11.0 10e3/uL Final     RBC Count 10/22/2021 4.53  3.80 - 5.20 10e6/uL Final     Hemoglobin 10/22/2021 13.8  11.7 - 15.7 g/dL Final     Hematocrit 10/22/2021 43.3  35.0 - 47.0 % Final     MCV 10/22/2021 96  78 - 100 fL Final     MCH 10/22/2021 30.5  26.5 - 33.0 pg Final     MCHC 10/22/2021 31.9  31.5 - 36.5 g/dL Final     RDW 10/22/2021 15.6* 10.0 - 15.0 % Final     Platelet Count 10/22/2021 264  150 - 450 10e3/uL Final     % Neutrophils 10/22/2021 62  % Final     % Lymphocytes 10/22/2021 29  % Final     % Monocytes 10/22/2021 8  % Final     % Eosinophils 10/22/2021 1  % Final     % Basophils 10/22/2021 0  % Final     % Immature Granulocytes 10/22/2021 0  % Final     NRBCs per 100 WBC 10/22/2021 0  <1 /100 Final     Absolute Neutrophils 10/22/2021 5.5  1.6 - 8.3 10e3/uL Final     Absolute Lymphocytes 10/22/2021 2.6  0.8 - 5.3 10e3/uL Final     Absolute Monocytes 10/22/2021 0.7  0.0 - 1.3 10e3/uL Final     Absolute Eosinophils 10/22/2021 0.1  0.0 - 0.7 10e3/uL Final     Absolute Basophils 10/22/2021 0.0  0.0 - 0.2 10e3/uL Final     Absolute Immature Granulocytes 10/22/2021 0.0  <=0.0 10e3/uL Final     Absolute NRBCs 10/22/2021 0.0  10e3/uL Final   Lab on 10/18/2021   Component Date Value Ref Range Status     Sodium 10/18/2021 143  133 - 144 mmol/L Final     Potassium 10/18/2021 2.9* 3.4 - 5.3 mmol/L Final     Chloride 10/18/2021 108  94 - 109  mmol/L Final     Carbon Dioxide (CO2) 10/18/2021 31  20 - 32 mmol/L Final     Anion Gap 10/18/2021 4  3 - 14 mmol/L Final     Urea Nitrogen 10/18/2021 26  7 - 30 mg/dL Final     Creatinine 10/18/2021 0.70  0.52 - 1.04 mg/dL Final     Calcium 10/18/2021 8.2* 8.5 - 10.1 mg/dL Final     Glucose 10/18/2021 109* 70 - 99 mg/dL Final     Alkaline Phosphatase 10/18/2021 56  40 - 150 U/L Final     AST 10/18/2021 67* 0 - 45 U/L Final     ALT 10/18/2021 246* 0 - 50 U/L Final     Protein Total 10/18/2021 6.6* 6.8 - 8.8 g/dL Final     Albumin 10/18/2021 3.1* 3.4 - 5.0 g/dL Final     Bilirubin Total 10/18/2021 0.6  0.2 - 1.3 mg/dL Final     GFR Estimate 10/18/2021 84  >60 mL/min/1.73m2 Final     TSH 10/18/2021 2.50  0.40 - 4.00 mU/L Final     Adrenal Corticotropin 10/18/2021 <10  <47 pg/mL Final     WBC Count 10/18/2021 8.6  4.0 - 11.0 10e3/uL Final     RBC Count 10/18/2021 4.26  3.80 - 5.20 10e6/uL Final     Hemoglobin 10/18/2021 13.2  11.7 - 15.7 g/dL Final     Hematocrit 10/18/2021 40.0  35.0 - 47.0 % Final     MCV 10/18/2021 94  78 - 100 fL Final     MCH 10/18/2021 31.0  26.5 - 33.0 pg Final     MCHC 10/18/2021 33.0  31.5 - 36.5 g/dL Final     RDW 10/18/2021 15.3* 10.0 - 15.0 % Final     Platelet Count 10/18/2021 273  150 - 450 10e3/uL Final     % Neutrophils 10/18/2021 60  % Final     % Lymphocytes 10/18/2021 30  % Final     % Monocytes 10/18/2021 8  % Final     % Eosinophils 10/18/2021 1  % Final     % Basophils 10/18/2021 1  % Final     % Immature Granulocytes 10/18/2021 0  % Final     NRBCs per 100 WBC 10/18/2021 0  <1 /100 Final     Absolute Neutrophils 10/18/2021 5.2  1.6 - 8.3 10e3/uL Final     Absolute Lymphocytes 10/18/2021 2.6  0.8 - 5.3 10e3/uL Final     Absolute Monocytes 10/18/2021 0.7  0.0 - 1.3 10e3/uL Final     Absolute Eosinophils 10/18/2021 0.1  0.0 - 0.7 10e3/uL Final     Absolute Basophils 10/18/2021 0.0  0.0 - 0.2 10e3/uL Final     Absolute Immature Granulocytes 10/18/2021 0.0  <=0.0 10e3/uL Final      Absolute NRBCs 10/18/2021 0.0  10e3/uL Final     Bilirubin Direct 10/18/2021 0.2  0.0 - 0.2 mg/dL Final     I reviewed labs today, which show further improvement in the AST and ALT. AST is now normal and ALT is just 3X ULN. Potassium improved to 3.5.    ASSESSMENT AND PLAN:    #1 Acral melanoma, right heel primary, pT4b pN3c M1, Stage IV  It was a pleasure to meet with Ms. Hoang. 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 She progressed on first line nivolumab, and had response to 2nd line ipilimumab and nivolumab. On nivolumab maintenance therapy she showed regrowth. She tried ipilimumab monotherapy and had stable disease with increased itch. She most recently restarted a second induction with ipilimumab/nivolumab on 8/2/21. She received her 2nd cycle of 8/23/21. She had interval scan 9/1/21, which showed about 10% growth, as expected. She then developed progressive nausea, reflux symptoms, diarrhea, and elevated transaminases (16-23 times upper limit of normal) and required steroid taper. She was reassured that Ipi/Nivo should still be working in the body and also that at least one of the lymph nodes is palpably smaller, which bodes well overall. Discussed consideration of intralesional TVEC versus clinical trials. She is open to either possibility, but prefers the most active approach possible.    -Continue prednisone at current dose of 40 mg daily  -Continue to hold immunotherapy  -Repeat CMP on Monday/Tuesday 10/26/21  -Follow-up as scheduled    #2 Fatigue, multifactorial  #3 Insomnia, secondary to steroids  History of GUERLINE (treated),on steroids and difficulty sleeping. Fatigued but also suffering from insomnia. Due to risk of confusion with gabanergic drugs and poor tolerance of trazodone. Will continue to monitor.    #4 History of depression and anxiety  Continue on sertraline 25 mg daily.    Multiple questions answered.     Evidio  MABEL Cartagena.   of Medicine  Hematology, Oncology and Transplantation

## 2021-10-25 LAB — ACTH PLAS-MCNC: <10 PG/ML

## 2021-10-26 ENCOUNTER — LAB (OUTPATIENT)
Dept: LAB | Facility: CLINIC | Age: 76
End: 2021-10-26
Payer: COMMERCIAL

## 2021-10-26 DIAGNOSIS — E78.5 HYPERLIPIDEMIA LDL GOAL <100: ICD-10-CM

## 2021-10-26 DIAGNOSIS — C43.71 MALIGNANT MELANOMA OF RIGHT LOWER EXTREMITY INCLUDING HIP (H): ICD-10-CM

## 2021-10-26 DIAGNOSIS — C43.9 MELANOMA OF SKIN (H): ICD-10-CM

## 2021-10-26 LAB
ALBUMIN SERPL-MCNC: 3.1 G/DL (ref 3.4–5)
ALP SERPL-CCNC: 51 U/L (ref 40–150)
ALT SERPL W P-5'-P-CCNC: 127 U/L (ref 0–50)
ANION GAP SERPL CALCULATED.3IONS-SCNC: 3 MMOL/L (ref 3–14)
AST SERPL W P-5'-P-CCNC: 44 U/L (ref 0–45)
BASOPHILS # BLD AUTO: 0 10E3/UL (ref 0–0.2)
BASOPHILS NFR BLD AUTO: 0 %
BILIRUB SERPL-MCNC: 0.6 MG/DL (ref 0.2–1.3)
BUN SERPL-MCNC: 27 MG/DL (ref 7–30)
CALCIUM SERPL-MCNC: 8.5 MG/DL (ref 8.5–10.1)
CHLORIDE BLD-SCNC: 105 MMOL/L (ref 94–109)
CHOLEST SERPL-MCNC: 253 MG/DL
CO2 SERPL-SCNC: 34 MMOL/L (ref 20–32)
CREAT SERPL-MCNC: 0.78 MG/DL (ref 0.52–1.04)
EOSINOPHIL # BLD AUTO: 0.1 10E3/UL (ref 0–0.7)
EOSINOPHIL NFR BLD AUTO: 2 %
ERYTHROCYTE [DISTWIDTH] IN BLOOD BY AUTOMATED COUNT: 15.2 % (ref 10–15)
FASTING STATUS PATIENT QL REPORTED: ABNORMAL
GFR SERPL CREATININE-BSD FRML MDRD: 74 ML/MIN/1.73M2
GLUCOSE BLD-MCNC: 88 MG/DL (ref 70–99)
HCT VFR BLD AUTO: 41.1 % (ref 35–47)
HDLC SERPL-MCNC: 83 MG/DL
HGB BLD-MCNC: 13.5 G/DL (ref 11.7–15.7)
HOLD SPECIMEN: NORMAL
IMM GRANULOCYTES # BLD: 0.1 10E3/UL
IMM GRANULOCYTES NFR BLD: 1 %
LDLC SERPL CALC-MCNC: 148 MG/DL
LYMPHOCYTES # BLD AUTO: 2.5 10E3/UL (ref 0.8–5.3)
LYMPHOCYTES NFR BLD AUTO: 28 %
MCH RBC QN AUTO: 30.6 PG (ref 26.5–33)
MCHC RBC AUTO-ENTMCNC: 32.8 G/DL (ref 31.5–36.5)
MCV RBC AUTO: 93 FL (ref 78–100)
MONOCYTES # BLD AUTO: 0.6 10E3/UL (ref 0–1.3)
MONOCYTES NFR BLD AUTO: 7 %
NEUTROPHILS # BLD AUTO: 5.5 10E3/UL (ref 1.6–8.3)
NEUTROPHILS NFR BLD AUTO: 62 %
NONHDLC SERPL-MCNC: 170 MG/DL
NRBC # BLD AUTO: 0 10E3/UL
NRBC BLD AUTO-RTO: 0 /100
PLATELET # BLD AUTO: 285 10E3/UL (ref 150–450)
POTASSIUM BLD-SCNC: 3.9 MMOL/L (ref 3.4–5.3)
PROT SERPL-MCNC: 6.6 G/DL (ref 6.8–8.8)
RBC # BLD AUTO: 4.41 10E6/UL (ref 3.8–5.2)
SODIUM SERPL-SCNC: 142 MMOL/L (ref 133–144)
TRIGL SERPL-MCNC: 111 MG/DL
TSH SERPL DL<=0.005 MIU/L-ACNC: 2.25 MU/L (ref 0.4–4)
WBC # BLD AUTO: 8.8 10E3/UL (ref 4–11)

## 2021-10-26 PROCEDURE — 80061 LIPID PANEL: CPT

## 2021-10-26 PROCEDURE — 80050 GENERAL HEALTH PANEL: CPT

## 2021-10-26 PROCEDURE — 36415 COLL VENOUS BLD VENIPUNCTURE: CPT

## 2021-10-26 PROCEDURE — 82024 ASSAY OF ACTH: CPT

## 2021-10-27 LAB — ACTH PLAS-MCNC: <10 PG/ML

## 2021-10-29 ENCOUNTER — ANCILLARY PROCEDURE (OUTPATIENT)
Dept: PET IMAGING | Facility: CLINIC | Age: 76
End: 2021-10-29
Attending: INTERNAL MEDICINE
Payer: COMMERCIAL

## 2021-10-29 ENCOUNTER — LAB (OUTPATIENT)
Dept: LAB | Facility: CLINIC | Age: 76
End: 2021-10-29
Payer: COMMERCIAL

## 2021-10-29 ENCOUNTER — VIRTUAL VISIT (OUTPATIENT)
Dept: ONCOLOGY | Facility: CLINIC | Age: 76
End: 2021-10-29
Attending: PHYSICIAN ASSISTANT
Payer: COMMERCIAL

## 2021-10-29 DIAGNOSIS — C43.9 MELANOMA OF SKIN (H): ICD-10-CM

## 2021-10-29 DIAGNOSIS — K21.9 GASTROESOPHAGEAL REFLUX DISEASE, UNSPECIFIED WHETHER ESOPHAGITIS PRESENT: ICD-10-CM

## 2021-10-29 DIAGNOSIS — C43.71 MALIGNANT MELANOMA OF RIGHT LOWER EXTREMITY INCLUDING HIP (H): Primary | ICD-10-CM

## 2021-10-29 DIAGNOSIS — C79.51 SECONDARY MALIGNANT NEOPLASM OF BONE (H): ICD-10-CM

## 2021-10-29 DIAGNOSIS — R42 LIGHTHEADEDNESS: ICD-10-CM

## 2021-10-29 DIAGNOSIS — G47.00 INSOMNIA, UNSPECIFIED TYPE: ICD-10-CM

## 2021-10-29 DIAGNOSIS — C43.71 MALIGNANT MELANOMA OF RIGHT LOWER EXTREMITY INCLUDING HIP (H): ICD-10-CM

## 2021-10-29 DIAGNOSIS — K75.9 HEPATITIS: ICD-10-CM

## 2021-10-29 LAB
ALBUMIN SERPL-MCNC: 3.2 G/DL (ref 3.4–5)
ALP SERPL-CCNC: 51 U/L (ref 40–150)
ALT SERPL W P-5'-P-CCNC: 106 U/L (ref 0–50)
ANION GAP SERPL CALCULATED.3IONS-SCNC: 2 MMOL/L (ref 3–14)
AST SERPL W P-5'-P-CCNC: 31 U/L (ref 0–45)
BASOPHILS # BLD AUTO: 0 10E3/UL (ref 0–0.2)
BASOPHILS NFR BLD AUTO: 0 %
BILIRUB SERPL-MCNC: 0.5 MG/DL (ref 0.2–1.3)
BUN SERPL-MCNC: 27 MG/DL (ref 7–30)
CALCIUM SERPL-MCNC: 8.8 MG/DL (ref 8.5–10.1)
CHLORIDE BLD-SCNC: 102 MMOL/L (ref 94–109)
CO2 SERPL-SCNC: 34 MMOL/L (ref 20–32)
CREAT SERPL-MCNC: 0.76 MG/DL (ref 0.52–1.04)
EOSINOPHIL # BLD AUTO: 0.2 10E3/UL (ref 0–0.7)
EOSINOPHIL NFR BLD AUTO: 2 %
ERYTHROCYTE [DISTWIDTH] IN BLOOD BY AUTOMATED COUNT: 15.4 % (ref 10–15)
GFR SERPL CREATININE-BSD FRML MDRD: 76 ML/MIN/1.73M2
GLUCOSE BLD-MCNC: 114 MG/DL (ref 70–99)
HCT VFR BLD AUTO: 41.9 % (ref 35–47)
HGB BLD-MCNC: 13.8 G/DL (ref 11.7–15.7)
HOLD SPECIMEN: NORMAL
IMM GRANULOCYTES # BLD: 0.1 10E3/UL
IMM GRANULOCYTES NFR BLD: 1 %
LYMPHOCYTES # BLD AUTO: 2 10E3/UL (ref 0.8–5.3)
LYMPHOCYTES NFR BLD AUTO: 21 %
MCH RBC QN AUTO: 30.8 PG (ref 26.5–33)
MCHC RBC AUTO-ENTMCNC: 32.9 G/DL (ref 31.5–36.5)
MCV RBC AUTO: 94 FL (ref 78–100)
MONOCYTES # BLD AUTO: 0.6 10E3/UL (ref 0–1.3)
MONOCYTES NFR BLD AUTO: 7 %
NEUTROPHILS # BLD AUTO: 6.4 10E3/UL (ref 1.6–8.3)
NEUTROPHILS NFR BLD AUTO: 69 %
NRBC # BLD AUTO: 0 10E3/UL
NRBC BLD AUTO-RTO: 0 /100
PLATELET # BLD AUTO: 274 10E3/UL (ref 150–450)
POTASSIUM BLD-SCNC: 3.5 MMOL/L (ref 3.4–5.3)
PROT SERPL-MCNC: 6.7 G/DL (ref 6.8–8.8)
RBC # BLD AUTO: 4.48 10E6/UL (ref 3.8–5.2)
SODIUM SERPL-SCNC: 138 MMOL/L (ref 133–144)
TSH SERPL DL<=0.005 MIU/L-ACNC: 1.85 MU/L (ref 0.4–4)
WBC # BLD AUTO: 9.2 10E3/UL (ref 4–11)

## 2021-10-29 PROCEDURE — 78816 PET IMAGE W/CT FULL BODY: CPT | Mod: PS | Performed by: STUDENT IN AN ORGANIZED HEALTH CARE EDUCATION/TRAINING PROGRAM

## 2021-10-29 PROCEDURE — 74177 CT ABD & PELVIS W/CONTRAST: CPT | Mod: 59 | Performed by: STUDENT IN AN ORGANIZED HEALTH CARE EDUCATION/TRAINING PROGRAM

## 2021-10-29 PROCEDURE — A9552 F18 FDG: HCPCS | Performed by: STUDENT IN AN ORGANIZED HEALTH CARE EDUCATION/TRAINING PROGRAM

## 2021-10-29 PROCEDURE — 80050 GENERAL HEALTH PANEL: CPT

## 2021-10-29 PROCEDURE — 71260 CT THORAX DX C+: CPT | Mod: 59 | Performed by: STUDENT IN AN ORGANIZED HEALTH CARE EDUCATION/TRAINING PROGRAM

## 2021-10-29 PROCEDURE — 99215 OFFICE O/P EST HI 40 MIN: CPT | Mod: 95 | Performed by: PHYSICIAN ASSISTANT

## 2021-10-29 PROCEDURE — 999N001193 HC VIDEO/TELEPHONE VISIT; NO CHARGE

## 2021-10-29 PROCEDURE — 36415 COLL VENOUS BLD VENIPUNCTURE: CPT

## 2021-10-29 PROCEDURE — 82024 ASSAY OF ACTH: CPT

## 2021-10-29 RX ORDER — TRAZODONE HYDROCHLORIDE 50 MG/1
25-50 TABLET, FILM COATED ORAL AT BEDTIME
Qty: 30 TABLET | Refills: 1 | Status: SHIPPED | OUTPATIENT
Start: 2021-10-29 | End: 2021-01-01

## 2021-10-29 RX ORDER — IOPAMIDOL 755 MG/ML
10-135 INJECTION, SOLUTION INTRAVASCULAR ONCE
Status: COMPLETED | OUTPATIENT
Start: 2021-10-29 | End: 2021-10-29

## 2021-10-29 RX ORDER — SERTRALINE HYDROCHLORIDE 25 MG/1
25 TABLET, FILM COATED ORAL DAILY
Qty: 90 TABLET | Refills: 3 | Status: SHIPPED | OUTPATIENT
Start: 2021-10-29 | End: 2021-01-01

## 2021-10-29 RX ORDER — OMEPRAZOLE 40 MG/1
40 CAPSULE, DELAYED RELEASE ORAL
Qty: 30 CAPSULE | Refills: 1 | Status: SHIPPED | OUTPATIENT
Start: 2021-10-29 | End: 2021-01-01

## 2021-10-29 RX ADMIN — IOPAMIDOL 84 ML: 755 INJECTION, SOLUTION INTRAVASCULAR at 09:50

## 2021-10-29 NOTE — PROGRESS NOTES
Yadira is a 76 year old who is being evaluated via a billable video visit.      How would you like to obtain your AVS? MyChart  If the video visit is dropped, the invitation should be resent by: Send to e-mail at: [email or web address]  Will anyone else be joining your video visit? No    Pain in legs, stating they are very tight. She's wondering if this is related to her cancer.      Video-Visit Details    Type of service:  Video Visit    Video Start Time: 1:37 PM    Video End Time: 2:02 PM    Originating Location (pt. Location): Home    Distant Location (provider location):  [facility]     Platform used for Video Visit: [facility]     Oncology/Hematology Visit Note  Oct 29, 2021   Oncologist: Dr. Gi Franklin     Reason for Visit: Follow up of stage IV melanoma    History of Present Illness: Yadira Hoang is a 76 year old female who presents for telephone call to follow up on her acral melanoma, stage IV, per oncologic history below:     Oncologic History: Acral melanoma, stage IV, s/p primary resection and reconstruction  1. She noted a painful lesion on the sole of the right foot for a few months, since summer 2019.  It initially was small then became raised.  It spontaneously bled. She is not entirely sure of its appearance initially.  She denies noting any masses behind the knee or in the groin.  2. 1/9/2020, punch biopsy was performed by Dr Jessica Alvarado.  It showed melanoma, at least 3.4 mm deep with ulceration and 0 mitoses, and perineural invasion present. TILs were non-brisk and LVI not identified. pT3b.  3. 2/10/2020, she has right femoral sentinel lymph node biopsy and wide local excision (Specimen #: [id number]) and the right heel lesion extends to a depth of 6.6 mm, and invasive melanoma is present at 0.2 mm from the deep margin. Microsatellites are also present. There was 1 (of 2) lymph nodes involved. The lymph node tissue exhibits extensive subcapsular and parenchymal  clusters of melanoma cells, highlighted on Melan-A immunostain. The largest cluster is 7 millimeters in greatest diameter. pT4b pN2c.  4. 2/22/2020, she had PET-CT, which showed intense FDG uptake in a right inguinal lymph node, concerning for an additional focus of metastatic disease. There is also an indeterminate right external iliac lymph node with mild FDG uptake.  5. 3/4/2020, she has medial plantar artery  fasciocutaneous instep flap and Integra placement to the donor site. On 4/2/2020, she has additional reconstruction with skin grafting.  6. 5/27/2020, initiated on nivolumab immunotherapy.  Had C2 6/24/20. C3 on 7/22/20. C4 on 9/3/2020.  7. 8/21/2020 evidence of a new liver metastasis on PET-CT.  9. 10/9/2020, PET-CT shows increasing size of hepatic metastases with increased hypermetabolism, increased right inguinal and iliac lymphadenopathy, and new FDG uptake right T2 transverse process and right side of S1. MRI-brain obtained 10/21/2020 is negative for brain metastasis.  10. 10/28/2020, she starts ipilimumab 1mg/kg and nivolumab 3mg/kg, then nivolumab maintenance through cycle 8.  11. 2/12/21, PET-CT shows evidence of partial response with several liver lesions no longer hypermetabolic and smaller in size, only one hypermetabolic lesion in the hepatic dome. Slightly increased size and FDG uptake in a single right inguinal lymph node, remainder of inguinal and right iliac chain lymph nodes have decreased in size and FDG uptake since prior exam.   12. 5/7/2021, PET-CT shows mixed response with increased size and hypermetabolism in the right iliac chain lymphadenopathy.  13. 5/10/2021, she starts ipilimumab 3 mg/kg every 3 weeks and receives 4 cycles.  14. 7/30/21, PET/CT shows mild disease progression.   15. 8/2/21, start ipi/nivo and received 2 cycles, last on 8/23/21.  16. 8/23/21, develops elevated LFT's.   17. 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     Interval  History:  -Feels shaky on the steroids and also feel forgetful.   -Currently on prednisone 40 mg.   -Does not have pain in groin or thigh.   -Denies any itchy skin.   -Lightheadedness has resolved.   -Wondering about blood pressure.  -Takes Benadryl for sleep, but feels sleepy the next day.     Current Outpatient Medications   Medication Sig Dispense Refill     cholecalciferol (VITAMIN D3) 125 MCG (5000 UT) TABS tablet Take 2,000 Units by mouth every other day        estradiol (ESTRACE) 0.1 MG/GM vaginal cream Place 2 g vaginally twice a week 42.5 g 1     omeprazole (PRILOSEC) 20 MG DR capsule TAKE 1 CAPSULE BY MOUTH ONCE DAILY IN THE MORNING 30 60 MINUTES BEFORE EATING       ondansetron (ZOFRAN-ODT) 4 MG ODT tab Take 1 tablet (4 mg) by mouth every 8 hours as needed for nausea 60 tablet 1     potassium chloride ER (KLOR-CON M) 20 MEQ CR tablet Take 20 mEq every 2 hours for four doses daily for 2 days. 8 tablet 0     predniSONE (DELTASONE) 20 MG tablet Take 2 tablets (40 mg) by mouth daily 60 tablet 0     sertraline (ZOLOFT) 25 MG tablet Take 1 tablet (25 mg) by mouth daily 30 tablet 4     triamcinolone (KENALOG) 0.1 % external cream Apply topically 2 times daily 453.6 g 11     Objective:  General: patient appears well in no acute distress, alert and oriented, speech clear and fluid  Skin: no visualized rash or lesions on visualized skin  Resp: Appears to be breathing comfortably without accessory muscle usage, speaking in full sentences, no audible wheezes or cough.  Psych: Coherent speech, normal rate and volume, able to articulate logical thoughts, able to abstract reason, no tangential thoughts, no hallucinations or delusions  Patient's affect is appropriate.    Laboratory Data:   Most Recent 3 CBC's:  Recent Labs   Lab Test 10/29/21  1156 10/26/21  0902 10/22/21  0945   WBC 9.2 8.8 8.9   HGB 13.8 13.5 13.8   MCV 94 93 96    285 264    Most Recent 3 BMP's:  Recent Labs   Lab Test 10/29/21  1156  10/26/21  0902 10/22/21  0945    142 142   POTASSIUM 3.5 3.9 3.5   CHLORIDE 102 105 106   CO2 34* 34* 33*   BUN 27 27 31*   CR 0.76 0.78 0.74   ANIONGAP 2* 3 3   GABY 8.8 8.5 9.5   * 88 81   Most Recent TSH and T4:  Recent Labs   Lab Test 10/29/21  1156   TSH 1.85   I reviewed the above labs today.    Assessment and Plan:  Acral melanoma, right heel primary, pT4b pN3c M1, Stage IV. 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 She progressed on first line nivolumab, but shows clear evidence of response to 2nd line ipilimumab and nivolumab. However, once on nivolumab maintenance therapy she started to showed regrowth in the right inguinal node. She switched over to ipilimumab every 3 weeks in the third line on 5/10/21, for a maximum of 4 doses. She then was switched to ipi/nivo after her imaging showed mild disease progression. She received 2 cycles and developed immune mediated hepatitis. Treatment will remain on hold for now. Upon my personal review of the PET/CT images today, there appears to be mild disease progression in her right thigh lesion. I reviewed this with Dr. Franklin as well and will plan to consider treatment with TVEC + nivolumab versus a virus clinical trial. I reviewed this with Dr. Estrada from dermatology as well. If we opt to treat with TVEC, we should reach out to Dr. Estrada in the next 1-2 weeks about getting her added on to clinic.     Acid reflux. Recommend continuing on omeprazole at 40 mg daily while on steroids. Will refill today.     Immune mediated hepatitis. Improving. Will decrease prednisone down to 30 mg daily. Will recheck a hepatic panel when she returns to clinic next week. If labs continue to be improving/stable, would continue to decrease by 10 mg every 7 days and continue with weekly lab checks.     Insomnia. Will try trazodone at 25 mg at bedtime prn.    Mood. She ran out of sertraline and will resume this. She will meet  with a health psychologist next week.     Vaccination. Recommend she obtain her influenza vaccine when she is here next week. She does not plan to get a booster COVID-19 vaccine. I did recommend she obtain this.     Lightheadedness. Better lately, but will order a blood pressure cuff for home monitoring.     Meghan Perdomo PA-C  [facility]  [address] [place] [address]  [phone]    55 minutes spent on the date of the encounter doing chart review, review of test results, interpretation of tests, patient visit and documentation

## 2021-10-29 NOTE — LETTER
10/29/2021         RE: Yadira Hoang  [address] [facility] [address]        Dear Colleague,    Thank you for referring your patient, Yadira Hoang, to the [facility]. Please see a copy of my visit note below.    Yadira is a 76 year old who is being evaluated via a billable video visit.      How would you like to obtain your AVS? MyChart  If the video visit is dropped, the invitation should be resent by: Send to e-mail at: [email or web address]  Will anyone else be joining your video visit? No    Pain in legs, stating they are very tight. She's wondering if this is related to her cancer.      Video-Visit Details    Type of service:  Video Visit    Video Start Time: 1:37 PM    Video End Time: 2:02 PM    Originating Location (pt. Location): Home    Distant Location (provider location):  [facility]     Platform used for Video Visit: [email or web address]     Oncology/Hematology Visit Note  Oct 29, 2021   Oncologist: Dr. Gi Franklin     Reason for Visit: Follow up of stage IV melanoma    History of Present Illness: Yadira Hoang is a 76 year old female who presents for telephone call to follow up on her acral melanoma, stage IV, per oncologic history below:     Oncologic History: Acral melanoma, stage IV, s/p primary resection and reconstruction  1. She noted a painful lesion on the sole of the right foot for a few months, since summer 2019.  It initially was small then became raised.  It spontaneously bled. She is not entirely sure of its appearance initially.  She denies noting any masses behind the knee or in the groin.  2. 1/9/2020, punch biopsy was performed by Dr Jessica Alvarado.  It showed melanoma, at least 3.4 mm deep with ulceration and 0 mitoses, and perineural invasion present. TILs were non-brisk and LVI not identified. pT3b.  3. 2/10/2020, she has right femoral sentinel lymph node biopsy and wide local excision (Specimen #: [id number])  and the right heel lesion extends to a depth of 6.6 mm, and invasive melanoma is present at 0.2 mm from the deep margin. Microsatellites are also present. There was 1 (of 2) lymph nodes involved. The lymph node tissue exhibits extensive subcapsular and parenchymal clusters of melanoma cells, highlighted on Melan-A immunostain. The largest cluster is 7 millimeters in greatest diameter. pT4b pN2c.  4. 2/22/2020, she had PET-CT, which showed intense FDG uptake in a right inguinal lymph node, concerning for an additional focus of metastatic disease. There is also an indeterminate right external iliac lymph node with mild FDG uptake.  5. 3/4/2020, she has medial plantar artery  fasciocutaneous instep flap and Integra placement to the donor site. On 4/2/2020, she has additional reconstruction with skin grafting.  6. 5/27/2020, initiated on nivolumab immunotherapy.  Had C2 6/24/20. C3 on 7/22/20. C4 on 9/3/2020.  7. 8/21/2020 evidence of a new liver metastasis on PET-CT.  9. 10/9/2020, PET-CT shows increasing size of hepatic metastases with increased hypermetabolism, increased right inguinal and iliac lymphadenopathy, and new FDG uptake right T2 transverse process and right side of S1. MRI-brain obtained 10/21/2020 is negative for brain metastasis.  10. 10/28/2020, she starts ipilimumab 1mg/kg and nivolumab 3mg/kg, then nivolumab maintenance through cycle 8.  11. 2/12/21, PET-CT shows evidence of partial response with several liver lesions no longer hypermetabolic and smaller in size, only one hypermetabolic lesion in the hepatic dome. Slightly increased size and FDG uptake in a single right inguinal lymph node, remainder of inguinal and right iliac chain lymph nodes have decreased in size and FDG uptake since prior exam.   12. 5/7/2021, PET-CT shows mixed response with increased size and hypermetabolism in the right iliac chain lymphadenopathy.  13. 5/10/2021, she starts ipilimumab 3 mg/kg every 3 weeks and  receives 4 cycles.  14. 7/30/21, PET/CT shows mild disease progression.   15. 8/2/21, start ipi/nivo and received 2 cycles, last on 8/23/21.  16. 8/23/21, develops elevated LFT's.   17. 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     Interval History:  -Feels shaky on the steroids and also feel forgetful.   -Currently on prednisone 40 mg.   -Does not have pain in groin or thigh.   -Denies any itchy skin.   -Lightheadedness has resolved.   -Wondering about blood pressure.  -Takes Benadryl for sleep, but feels sleepy the next day.     Current Outpatient Medications   Medication Sig Dispense Refill     cholecalciferol (VITAMIN D3) 125 MCG (5000 UT) TABS tablet Take 2,000 Units by mouth every other day        estradiol (ESTRACE) 0.1 MG/GM vaginal cream Place 2 g vaginally twice a week 42.5 g 1     omeprazole (PRILOSEC) 20 MG DR capsule TAKE 1 CAPSULE BY MOUTH ONCE DAILY IN THE MORNING 30 60 MINUTES BEFORE EATING       ondansetron (ZOFRAN-ODT) 4 MG ODT tab Take 1 tablet (4 mg) by mouth every 8 hours as needed for nausea 60 tablet 1     potassium chloride ER (KLOR-CON M) 20 MEQ CR tablet Take 20 mEq every 2 hours for four doses daily for 2 days. 8 tablet 0     predniSONE (DELTASONE) 20 MG tablet Take 2 tablets (40 mg) by mouth daily 60 tablet 0     sertraline (ZOLOFT) 25 MG tablet Take 1 tablet (25 mg) by mouth daily 30 tablet 4     triamcinolone (KENALOG) 0.1 % external cream Apply topically 2 times daily 453.6 g 11     Objective:  General: patient appears well in no acute distress, alert and oriented, speech clear and fluid  Skin: no visualized rash or lesions on visualized skin  Resp: Appears to be breathing comfortably without accessory muscle usage, speaking in full sentences, no audible wheezes or cough.  Psych: Coherent speech, normal rate and volume, able to articulate logical thoughts, able to abstract reason, no tangential thoughts, no hallucinations or delusions  Patient's affect is  appropriate.    Laboratory Data:   Most Recent 3 CBC's:  Recent Labs   Lab Test 10/29/21  1156 10/26/21  0902 10/22/21  0945   WBC 9.2 8.8 8.9   HGB 13.8 13.5 13.8   MCV 94 93 96    285 264    Most Recent 3 BMP's:  Recent Labs   Lab Test 10/29/21  1156 10/26/21  0902 10/22/21  0945    142 142   POTASSIUM 3.5 3.9 3.5   CHLORIDE 102 105 106   CO2 34* 34* 33*   BUN 27 27 31*   CR 0.76 0.78 0.74   ANIONGAP 2* 3 3   GABY 8.8 8.5 9.5   * 88 81   Most Recent TSH and T4:  Recent Labs   Lab Test 10/29/21  1156   TSH 1.85   I reviewed the above labs today.    Assessment and Plan:  Acral melanoma, right heel primary, pT4b pN3c M1, Stage IV. 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 She progressed on first line nivolumab, but shows clear evidence of response to 2nd line ipilimumab and nivolumab. However, once on nivolumab maintenance therapy she started to showed regrowth in the right inguinal node. She switched over to ipilimumab every 3 weeks in the third line on 5/10/21, for a maximum of 4 doses. She then was switched to ipi/nivo after her imaging showed mild disease progression. She received 2 cycles and developed immune mediated hepatitis. Treatment will remain on hold for now. Upon my personal review of the PET/CT images today, there appears to be mild disease progression in her right thigh lesion. I reviewed this with Dr. Franklin as well and will plan to consider treatment with TVEC + nivolumab versus a virus clinical trial. I reviewed this with Dr. Estrada from dermatology as well. If we opt to treat with TVEC, we should reach out to Dr. Estrada in the next 1-2 weeks about getting her added on to clinic.     Acid reflux. Recommend continuing on omeprazole at 40 mg daily while on steroids. Will refill today.     Immune mediated hepatitis. Improving. Will decrease prednisone down to 30 mg daily. Will recheck a hepatic panel when she returns to clinic next  week. If labs continue to be improving/stable, would continue to decrease by 10 mg every 7 days and continue with weekly lab checks.     Insomnia. Will try trazodone at 25 mg at bedtime prn.    Mood. She ran out of sertraline and will resume this. She will meet with a health psychologist next week.     Vaccination. Recommend she obtain her influenza vaccine when she is here next week. She does not plan to get a booster COVID-19 vaccine. I did recommend she obtain this.     Lightheadedness. Better lately, but will order a blood pressure cuff for home monitoring.     55 minutes spent on the date of the encounter doing chart review, review of test results, interpretation of tests, patient visit and documentation           Again, thank you for allowing me to participate in the care of your patient.      Sincerely,    Meghan Perdomo PA-C

## 2021-10-29 NOTE — PATIENT INSTRUCTIONS
Prednisone  30 mg (1.5 tablets) daily x 7 days, then 20 mg (1 tablet) daily x 1 week, then 10 mg (1/2 tablet) daily x 7 days.  Weekly labs

## 2021-11-01 LAB — ACTH PLAS-MCNC: <10 PG/ML

## 2021-11-04 ENCOUNTER — VIRTUAL VISIT (OUTPATIENT)
Dept: PSYCHOLOGY | Facility: CLINIC | Age: 76
End: 2021-11-04
Attending: PSYCHOLOGIST
Payer: COMMERCIAL

## 2021-11-04 DIAGNOSIS — F41.1 GAD (GENERALIZED ANXIETY DISORDER): ICD-10-CM

## 2021-11-04 DIAGNOSIS — F33.9 MAJOR DEPRESSION, RECURRENT, CHRONIC (H): ICD-10-CM

## 2021-11-04 DIAGNOSIS — C43.9 MELANOMA OF SKIN (H): ICD-10-CM

## 2021-11-04 PROCEDURE — 90791 PSYCH DIAGNOSTIC EVALUATION: CPT | Mod: 95 | Performed by: PSYCHOLOGIST

## 2021-11-04 NOTE — PROGRESS NOTES
"INITIAL PSYCHOLOGY INTERVIEW, TREATMENT PLAN and PROCESS NOTE based on a virtual encounter per covid 19 protocol.  Call and billing for call consented.  Provider called client at her home at 4-4:50 pm (50 min).   Referred by: Self  Identifying information. Ms Hoang is a 77yo  woman and mother of four children; she has 8 grand-children.. She was born and raised in [place]. The couple have been  51 years. 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  Her 79yo  is a retired . Their son (45yo) and his 1yo daughter (\"over-active\") live with them (mother does \"not want her\").  Her daughter, the eldest, lives in [place].  She was highly cooperative  Presenting problem.  \"I have cancer, a rare cancer on my heel, one surgery. ..2nd surgery\".  History of presenting problem.    NM#1. Cancer: melanoma, 1-9-2021.  Ms Hoang described she has had \"4 surgeries in 8 weeks\".  She believes she had had cancer probably \"a good year before (it was diagnosed), saw two different two physician assistants. . saw dermatologist. . almost stage IV\"  Receives home care - bandage is changed every 3 days.  She continues to have \"some pain.  Uses \"a wheel chair, then walk\".  \"Infusions, blood count went up - put on steroids.  Got emotionally screwed up.. .felt like I lost hafl my brain.(on steroids)\".  She will see her oncoogist tomorrow.  She \"didn't know cancer was so emotional , , lymphnode on the liver. . mentioned chemotherapy (got light headed). .afraid of going out. .may be able to go back on my infusions. .infusions in [place]\".  Treatment plan is uncertain at this time.   She notes she will \"get wiped out when I go.  Felipe comes in with me. I've been nervous.  Hit me I do have cancer when she mentioned chemotherapy\".      IMPRESSION.  Struggling with with diagnosis and recovery from surgeries - feeling highly emotional.  Apprehensive about potential treatment plan.  Additional family and personal history.  1) " "Family of origin.  \"Good family - attended [employer], at 45yo learned I had a learning disability\".  Youngest of 6 children. 2) Self. Grew up not talking - in middle '20's started again\".   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  2)   - having trouble with It (move to [facility]).  He \"goes outside often\". Has rheumatoid arthritis.  3) Family. Sister  of Alzheimer's.  Brother  of Pancreatic cancer.  Daughter = .   Health.  Weight - \"144 for a long time\", 132# currently\".  Food intolerances - allergies, sensitivities.  .  High cholesterol. \"always had stomach problems growing up\"  Sister - cancer,  Brother - pancreaic cancd.  2nd sister with pancreatic cancer..  Oldest sister also with memory loss.  Infusion:  \"First didn't work, 2nd chem, then both\".  Counseling.   \"Depression and anxiety all my life\". \"tried many\" medications.  Saw counselor \"for many years - talked every two weeks. so uplifting. .changed my life a lot - since \".   Mental status.  Ms Hoang presented herself in a clear, straight-forward manner.  She was both spontaneous and responded to questions.  Thought content focused on her cancer history and on her  emotional response to her diagnosis and treatment.  Attention, concentration, thought processes, memory, orientation judgement, range of information were satisfactory. Affect: Difficult to assess remotely.  Mood:  Nervous, apprehensive, somewhat over-whelmed.  The overall impression is of an individual who is feeling highly emotional as she encounters a new cancer diagnosis, the consequencies of initial surgeries, and the apprehension associate with pending treatment plans.  Diagnosis    Axis I.   Adjustment rx  Axis II.   Deferred  Axis III.   In active cancer treatment  Axis IV. Psychosocial stress:  HIGH  Axis V.  GAF past year:  ?   GAF CURRENT:  80?  PLAN.  Virtual follow up on Dec 2 and  at 11am. "  I have requested these appts.  She is receptive to this plan.  Note:  Identify mood more clearly

## 2021-11-05 ENCOUNTER — ONCOLOGY VISIT (OUTPATIENT)
Dept: ONCOLOGY | Facility: CLINIC | Age: 76
End: 2021-11-05
Attending: INTERNAL MEDICINE
Payer: COMMERCIAL

## 2021-11-05 ENCOUNTER — APPOINTMENT (OUTPATIENT)
Dept: LAB | Facility: CLINIC | Age: 76
End: 2021-11-05
Attending: PSYCHOLOGIST
Payer: COMMERCIAL

## 2021-11-05 VITALS
TEMPERATURE: 98.8 F | BODY MASS INDEX: 22.42 KG/M2 | WEIGHT: 141 LBS | SYSTOLIC BLOOD PRESSURE: 166 MMHG | OXYGEN SATURATION: 96 % | DIASTOLIC BLOOD PRESSURE: 81 MMHG | HEART RATE: 114 BPM | RESPIRATION RATE: 16 BRPM

## 2021-11-05 DIAGNOSIS — C43.9 MELANOMA OF SKIN (H): ICD-10-CM

## 2021-11-05 DIAGNOSIS — C43.71 MALIGNANT MELANOMA OF RIGHT LOWER EXTREMITY INCLUDING HIP (H): ICD-10-CM

## 2021-11-05 LAB
ALBUMIN SERPL-MCNC: 2.9 G/DL (ref 3.4–5)
ALP SERPL-CCNC: 55 U/L (ref 40–150)
ALT SERPL W P-5'-P-CCNC: 108 U/L (ref 0–50)
AST SERPL W P-5'-P-CCNC: 36 U/L (ref 0–45)
BILIRUB DIRECT SERPL-MCNC: 0.2 MG/DL (ref 0–0.2)
BILIRUB SERPL-MCNC: 0.6 MG/DL (ref 0.2–1.3)
PROT SERPL-MCNC: 6.7 G/DL (ref 6.8–8.8)
TSH SERPL DL<=0.005 MIU/L-ACNC: 3.23 MU/L (ref 0.4–4)

## 2021-11-05 PROCEDURE — 82040 ASSAY OF SERUM ALBUMIN: CPT | Performed by: INTERNAL MEDICINE

## 2021-11-05 PROCEDURE — 36415 COLL VENOUS BLD VENIPUNCTURE: CPT | Performed by: INTERNAL MEDICINE

## 2021-11-05 PROCEDURE — 99214 OFFICE O/P EST MOD 30 MIN: CPT | Performed by: INTERNAL MEDICINE

## 2021-11-05 PROCEDURE — 80076 HEPATIC FUNCTION PANEL: CPT | Performed by: INTERNAL MEDICINE

## 2021-11-05 PROCEDURE — 82024 ASSAY OF ACTH: CPT | Performed by: INTERNAL MEDICINE

## 2021-11-05 PROCEDURE — 84443 ASSAY THYROID STIM HORMONE: CPT | Performed by: INTERNAL MEDICINE

## 2021-11-05 PROCEDURE — G0463 HOSPITAL OUTPT CLINIC VISIT: HCPCS

## 2021-11-05 ASSESSMENT — PAIN SCALES - GENERAL: PAINLEVEL: EXTREME PAIN (8)

## 2021-11-05 NOTE — LETTER
11/5/2021         RE: Yadira Hoang  [address] [facility] [address]        Dear Colleague,    Thank you for referring your patient, Yadira Hoang, to the [facility]. Please see a copy of my visit note below.    MEDICAL ONCOLOGY PROGRESS NOTE  Melanoma Clinic  Nov 5, 2021     CHIEF COMPLAINT: Acral melanoma, on steroids for autoimmune hepatitis    Melanoma History:  1. She noted a painful lesion on the sole of the right foot for a few months, since last summer.  It initially was small then became raised.  It spontaneously bled. She is not entirely sure of its appearance initially.  She denies noting any masses behind the knee or in the groin.  2. 1/9/2020, punch biopsy was performed by Dr Jessica Meadows. Patology showed melanoma, at least 3.4 mm deep with ulceration and 0 mitoses, and perineural invasion present. TILs were non-brisk and LVI not identified. pT3b.  3. 2/10/2020, she has right femoral sentinel lymph node biopsy and wide local excision (Specimen #: [id number]) and the right heel lesion extends to a depth of 6.6 mm, and invasive melanoma is present at 0.2 mm from the deep margin. Microsatellites are also present. There was 1 (of 2) lymph nodes involved. The lymph node tissue exhibits extensive subcapsular and parenchymal clusters of melanoma cells, highlighted on Melan-A immunostain. The largest cluster is 7 millimeters in greatest diameter. pT4b pN2c. PD-L1 staining is negative, <1%. No mutation in BRAF, KIT, or NRAS.  4. 2/22/2020, she had PET-CT, which showed intense FDG uptake in a right inguinal lymph node, concerning for an additional focus of metastatic disease. There is also an indeterminate right external iliac lymph node with mild FDG uptake.  5. 3/4/2020, she has medial plantar artery  fasciocutaneous instep flap and Integra placement to the donor site. On 4/2/2020, she has additional reconstruction with skin grafting.  6. 5/22/2020,  "PET-CT shows a hypermetabolic right inguinal and right external iliac chain lymph node and stable pulmonary nodules.  7. 5/27/2020, she starts nivolumab for presumed low volume lymph node predominant metastatic disease.  8. 8/21/2020, PET-CT shows increase in hypermetabolic adenopathy, and a hypermetabolic nodule in segment 2 of the liver. Given low volume of disease she prefers to continue therapy with short interval follow-up.  9. 10/9/2020, PET-CT shows increasing size of hepatic metastases with increased hypermetabolism, increased right inguinal and iliac lymphadenopathy, and new FDG uptake right T2 transverse process and right side of S1. MRI-brain obtained 10/21/2020 is negative for brain metastasis.  10. 10/28/2020, she starts ipilimumab 1mg/kg and nivolumab 3mg/kg. She then goes on to maintenance nivolumab through 4/12/21.  11. 5/10/2021, she starts ipilimumab 3 mg/kg monotherapy q3w for 4 cycles.  12. 7/30/21, PET/CT shows mild disease progression.   13. 8/2/21, start ipilimumab 3mg/kg and nivolumab 1mg/kg and received 2 cycles, last on 8/23/21.  14. 8/23/21, develops elevated LFT's.   15. 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 She presents for clinical review and review of recent restaging scans and her overall recovery from CPI toxicity.     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  She is currently on 40 mg of prednisone and goes down to 30 mg tomorrow. She is eating fairly well lately. Denies any abdominal pain, nausea or vomiting, or constipation. Has had some heartburn. Has been taking omeprazole in the mornings. She feels more nervous on the steroids, like she \"can't think straight\" and wants to be off of steroids.    However, on steroids she reports itching has resolved. She has a pain that intermittent " spreads down both of her legs, but otherwise, headaches have resolved. Arthritis has resolved and she reports decreased fatigue. She has some difficulty sleeping and melatonin does not seem to help. Given mood disturbances from the steroids she talks with counselor every 2 weeks.    ECOG performance status is 1.    Current Outpatient Medications   Medication Sig Dispense Refill     benzonatate (TESSALON) 100 MG capsule Take 1 capsule (100 mg) by mouth 3 times daily as needed for cough (Patient not taking: Reported on 12/1/2021) 30 capsule 0     cholecalciferol (VITAMIN D3) 125 MCG (5000 UT) TABS tablet Take 2,000 Units by mouth every other day        estradiol (ESTRACE) 0.1 MG/GM vaginal cream Place 2 g vaginally twice a week 42.5 g 1     gabapentin (NEURONTIN) 300 MG capsule Take 1 capsule (300 mg) by mouth At Bedtime 30 capsule 1     omeprazole (PRILOSEC) 40 MG DR capsule Take 1 capsule (40 mg) by mouth daily 90 capsule 1     ondansetron (ZOFRAN-ODT) 4 MG ODT tab Take 1 tablet (4 mg) by mouth every 8 hours as needed for nausea 60 tablet 1     potassium chloride ER (KLOR-CON M) 20 MEQ CR tablet Take 20 mEq every 2 hours for four doses daily for 2 days. (Patient not taking: Reported on 12/1/2021) 8 tablet 0     predniSONE (DELTASONE) 20 MG tablet Take 2 tablets (40 mg) by mouth daily (Patient not taking: Reported on 12/1/2021) 60 tablet 0     sertraline (ZOLOFT) 25 MG tablet Take 2 tablets (50 mg) by mouth daily 90 tablet 0     traMADol (ULTRAM) 50 MG tablet Take 1 tablet (50 mg) by mouth every 6 hours as needed for severe pain (Patient not taking: Reported on 12/1/2021) 20 tablet 0     traZODone (DESYREL) 50 MG tablet Take 0.5-1 tablets (25-50 mg) by mouth At Bedtime (Patient not taking: Reported on 12/1/2021) 30 tablet 1     triamcinolone (KENALOG) 0.1 % external cream Apply topically 2 times daily (Patient not taking: Reported on 12/1/2021) 453.6 g 11         PHYSICAL EXAMINATION  BP (!) 166/81   Pulse 114    Temp 98.8  F (37.1  C) (Oral)   Resp 16   Wt 64 kg (141 lb)   SpO2 96%   BMI 22.42 kg/m    GENERAL: Healthy, alert and no distress  EYES: Eyes grossly normal to inspection.  No discharge or erythema, or obvious scleral/conjunctival abnormalities.  HENT: Normal cephalic/atraumatic.  External ears, nose and mouth without ulcers or lesions.  No nasal drainage visible.  NECK: No asymmetry, visible masses or scars  RESP: No audible wheeze, cough, or visible cyanosis.  No visible retractions or increased work of breathing.  LYMPH: Conglomerate of right femoral lymph nodes measures about 6 cm in size..   SKIN: Visible skin clear. No significant rash, abnormal pigmentation or lesions.  NEURO: Cranial nerves grossly intact.  Mentation and speech appropriate for age.  PSYCH: Mentation appears normal, she is anxious and intermittently tearful. Judgement and insight intact, normal speech and appearance well-groomed.      LABS  Oncology Visit on 11/05/2021   Component Date Value Ref Range Status     TSH 11/05/2021 3.23  0.40 - 4.00 mU/L Final     Adrenal Corticotropin 11/05/2021 20  <47 pg/mL Final     Bilirubin Total 11/05/2021 0.6  0.2 - 1.3 mg/dL Final     Bilirubin Direct 11/05/2021 0.2  0.0 - 0.2 mg/dL Final     Protein Total 11/05/2021 6.7* 6.8 - 8.8 g/dL Final     Albumin 11/05/2021 2.9* 3.4 - 5.0 g/dL Final     Alkaline Phosphatase 11/05/2021 55  40 - 150 U/L Final     AST 11/05/2021 36  0 - 45 U/L Final     ALT 11/05/2021 108* 0 - 50 U/L Final       IMAGING  Combined Report of:  PET and CT on  10/29/2021 11:13 AM :     1. PET of the neck, chest, abdomen, and pelvis.  2. PET CT Fusion for Attenuation Correction and Anatomical  Localization:    3. Diagnostic CT scan of the chest, abdomen, and pelvis with  intravenous contrast for interpretation.  3. CT of the chest, abdomen and pelvis obtained for diagnostic  interpretation.  4. 3D MIP and PET-CT fused images were processed on an independent  workstation and archived  to PACS and reviewed by a radiologist.     Technique:     1. PET: The patient received 11.99 mCi of F-18-FDG; the serum glucose  was 81 prior to administration, body weight was 62.5 kg. Images were  evaluated in the axial, sagittal, and coronal planes as well as the  rotational whole body MIP. Images were acquired from the Vertex to the  Feet.     UPTAKE WAS MEASURED AT 67 MINUTES.      BACKGROUND:  Liver SUV max= 3.59,   Aorta Blood SUV Max: 2.55.      2. CT: Volumetric acquisition for clinical interpretation of the  chest, abdomen, and pelvis acquired at 3 mm sections . The chest,  abdomen, and pelvis were evaluated at 5 mm sections in bone, soft  tissue, and lung windows.       The patient received 84 cc of Isovue 370 intravenously for the  examination.       3. 3D MIP and PET-CT fused images were processed on an independent  workstation and archived to PACS and reviewed by a radiologist.     INDICATION: Secondary malignant neoplasm of bone (H); Melanoma of skin  (H); Malignant melanoma of right lower extremity including hip (H)     ADDITIONAL INFORMATION OBTAINED FROM EMR: History of immunotherapy.     COMPARISON: CT 9/1/2021.     FINDINGS:      HEAD/NECK:  Round 0.9 cm hypodense focus along the anterior right palatine tonsil  (series 5, image 99). This was not seen on 7/30/2021 or 5/7/2021, but  a similar finding is seen on 2/12/2021. Given waxing/waning  appearance, this may represent variable ectasia of a salivary duct.  Mild uptake to the right palatine tonsil posterior to this hypodensity  with SUV max 6.5, previously 5.7 on 7/302/2021. Favor this is  physiologic or inflammatory uptake to the palatine tonsil.     The paranasal sinuses are clear. The mastoid air cells are clear. The  mucosal pharyngeal space, the , prevertebral and carotid  spaces are within normal limits. No masses, mass effect or  pathologically enlarged lymph nodes are evident. The thyroid gland  is  unremarkable.     CHEST:  There is no suspicious FDG uptake in the chest.      There are no pathologically enlarged mediastinal, hilar or axillary  lymph nodes. There are no suspicious new lung nodules or evidence for  infection. Bibasilar atelectasis. There is no significant pericardial  or pleural effusions.      ABDOMEN AND PELVIS:  New 1.1 cm hypermetabolic lesion in the segment 2 with SUV max of 14.4  (Series 4 image 251), likely liver metastasis. Additional unchanged  small subcentimeter hypodensities in the right liver dome, may  represent treated lesions versus other lesions such as cysts.      Increase in size of the right external iliac and inguinal lymph nodes  since prior CT 9/1/2021 and PET CT 7/30/2021 as well as increased FDG  uptake since recent PET/CT, for example a right inguinal node measures  3 cm with SUV max of 22 (Series 4 image 438), previously measured 1.9  cm with SUV max of 17.3; and right external iliac node measures 3.5 cm  with SUV max of 31 (Series 4 image 380), previously 16.3.     There is no splenomegaly or evidence for splenic or pancreatic mass  lesion. There are no suspicious adrenal mass lesions or opaque  gallbladder calculi. There is symmetric nephrographic renal phase  without hydronephrosis. There is no evidence for bowel obstruction or  free fluid. Colonic diverticulosis without evidence of acute  diverticulitis.     LOWER EXTREMITIES:   No abnormal masses or hypermetabolic lesions.     BONES:   There are no suspicious lytic or blastic osseous lesions. There is no  abnormal FDG uptake in the skeleton. Multilevel spondylolysis.                                                                      IMPRESSION: In this patient with metastatic melanoma status post  immunotherapy treatment, evidence of disease progression since PET/CT  9/1/2021:  1. Increased in size and FDG uptake of the right external iliac and  inguinal adenopathy.  2. New metastasis in the segment 2 of the  liver.   3. Waxing/waning hypodensity along the right palatine tonsil, not seen  on 7/30/2021 or 5/7/2021, but similar to 2/12/2021, favor variable  ectasia of a salivary duct which could be due to underlying  inflammation. There is mild uptake to the adjacent palatine tonsil  which is also favored inflammatory. Attention on follow-up.     I have personally reviewed the examination and initial interpretation  and I agree with the findings.     MICHAEL LOPEZ MD         ASSESSMENT AND PLAN:    #1 Acral melanoma, right heel primary, pT4b pN3c M1, Stage IV  It was a pleasure to meet with Ms. Hoang. 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 She progressed on first line nivolumab, and had evidence of response to 2nd line ipilimumab and nivolumab. On nivolumab maintenance therapy she showed regrowth. She tried ipilimumab monotherapy and had stable disease with significant pruritis, then had reinduction with ipilimumab/nivolumab on 8/2/21. She received her 2nd cycle on 8/23/21 and interval scan 9/1/21 showed about 10% growth. She then developed progressive nausea, reflux symptoms, diarrhea, and elevated transaminases ([id number] ULNl). On the most recent PET-CT from 10/29/21 during her steroid taper, she has had additional growth and increase in FDG avidity in the right external iliac and inguinal lymph nodes, as well as development of an FDG avid metastasis in hepatic segment 2.     -Continue prednisone taper  -Continue to hold immunotherapy  -Consider TVEC +/- nivolumab or clinical trial  -MRI-brain with RTC in 6 weeks and continue to discuss options and next steps    #2 Fatigue, multifactorial  #3 Insomnia, secondary to steroids  History of GUERLINE (treated),on steroids and difficulty sleeping. Fatigued but also suffering from insomnia. Continue to monitor.    #4 History of depression and anxiety  She will continue on sertraline 25 mg daily. She has  support of her therapist and they are checking in every 2 weeks.    Multiple questions answered.     Gi Cartagena M.D.   of Medicine  Hematology, Oncology and Transplantation

## 2021-11-05 NOTE — NURSING NOTE
Chief Complaint   Patient presents with     Blood Draw     Labs drawn via  by RN in lab. VS taken       Labs collected from venipuncture by RN. Vitals taken. Checked in for appointment(s).        Kimberly Calderon RN

## 2021-11-05 NOTE — PROGRESS NOTES
MEDICAL ONCOLOGY PROGRESS NOTE  Melanoma Clinic  Nov 5, 2021     CHIEF COMPLAINT: Acral melanoma, on steroids for autoimmune hepatitis    Melanoma History:  1. She noted a painful lesion on the sole of the right foot for a few months, since last summer.  It initially was small then became raised.  It spontaneously bled. She is not entirely sure of its appearance initially.  She denies noting any masses behind the knee or in the groin.  2. 1/9/2020, punch biopsy was performed by Dr Jessica Meadows. Patology showed melanoma, at least 3.4 mm deep with ulceration and 0 mitoses, and perineural invasion present. TILs were non-brisk and LVI not identified. pT3b.  3. 2/10/2020, she has right femoral sentinel lymph node biopsy and wide local excision (Specimen #: [id number]) and the right heel lesion extends to a depth of 6.6 mm, and invasive melanoma is present at 0.2 mm from the deep margin. Microsatellites are also present. There was 1 (of 2) lymph nodes involved. The lymph node tissue exhibits extensive subcapsular and parenchymal clusters of melanoma cells, highlighted on Melan-A immunostain. The largest cluster is 7 millimeters in greatest diameter. pT4b pN2c. PD-L1 staining is negative, <1%. No mutation in BRAF, KIT, or NRAS.  4. 2/22/2020, she had PET-CT, which showed intense FDG uptake in a right inguinal lymph node, concerning for an additional focus of metastatic disease. There is also an indeterminate right external iliac lymph node with mild FDG uptake.  5. 3/4/2020, she has medial plantar artery  fasciocutaneous instep flap and Integra placement to the donor site. On 4/2/2020, she has additional reconstruction with skin grafting.  6. 5/22/2020, PET-CT shows a hypermetabolic right inguinal and right external iliac chain lymph node and stable pulmonary nodules.  7. 5/27/2020, she starts nivolumab for presumed low volume lymph node predominant metastatic disease.  8. 8/21/2020, PET-CT shows increase in  "hypermetabolic adenopathy, and a hypermetabolic nodule in segment 2 of the liver. Given low volume of disease she prefers to continue therapy with short interval follow-up.  9. 10/9/2020, PET-CT shows increasing size of hepatic metastases with increased hypermetabolism, increased right inguinal and iliac lymphadenopathy, and new FDG uptake right T2 transverse process and right side of S1. MRI-brain obtained 10/21/2020 is negative for brain metastasis.  10. 10/28/2020, she starts ipilimumab 1mg/kg and nivolumab 3mg/kg. She then goes on to maintenance nivolumab through 4/12/21.  11. 5/10/2021, she starts ipilimumab 3 mg/kg monotherapy q3w for 4 cycles.  12. 7/30/21, PET/CT shows mild disease progression.   13. 8/2/21, start ipilimumab 3mg/kg and nivolumab 1mg/kg and received 2 cycles, last on 8/23/21.  14. 8/23/21, develops elevated LFT's.   15. 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 She presents for clinical review and review of recent restaging scans and her overall recovery from CPI toxicity.     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  She is currently on 40 mg of prednisone and goes down to 30 mg tomorrow. She is eating fairly well lately. Denies any abdominal pain, nausea or vomiting, or constipation. Has had some heartburn. Has been taking omeprazole in the mornings. She feels more nervous on the steroids, like she \"can't think straight\" and wants to be off of steroids.    However, on steroids she reports itching has resolved. She has a pain that intermittent spreads down both of her legs, but otherwise, headaches have resolved. Arthritis has resolved and she reports decreased fatigue. She has some difficulty sleeping and melatonin does not seem to help. Given mood disturbances from the steroids she talks with " counselor every 2 weeks.    ECOG performance status is 1.    Current Outpatient Medications   Medication Sig Dispense Refill     benzonatate (TESSALON) 100 MG capsule Take 1 capsule (100 mg) by mouth 3 times daily as needed for cough (Patient not taking: Reported on 12/1/2021) 30 capsule 0     cholecalciferol (VITAMIN D3) 125 MCG (5000 UT) TABS tablet Take 2,000 Units by mouth every other day        estradiol (ESTRACE) 0.1 MG/GM vaginal cream Place 2 g vaginally twice a week 42.5 g 1     gabapentin (NEURONTIN) 300 MG capsule Take 1 capsule (300 mg) by mouth At Bedtime 30 capsule 1     omeprazole (PRILOSEC) 40 MG DR capsule Take 1 capsule (40 mg) by mouth daily 90 capsule 1     ondansetron (ZOFRAN-ODT) 4 MG ODT tab Take 1 tablet (4 mg) by mouth every 8 hours as needed for nausea 60 tablet 1     potassium chloride ER (KLOR-CON M) 20 MEQ CR tablet Take 20 mEq every 2 hours for four doses daily for 2 days. (Patient not taking: Reported on 12/1/2021) 8 tablet 0     predniSONE (DELTASONE) 20 MG tablet Take 2 tablets (40 mg) by mouth daily (Patient not taking: Reported on 12/1/2021) 60 tablet 0     sertraline (ZOLOFT) 25 MG tablet Take 2 tablets (50 mg) by mouth daily 90 tablet 0     traMADol (ULTRAM) 50 MG tablet Take 1 tablet (50 mg) by mouth every 6 hours as needed for severe pain (Patient not taking: Reported on 12/1/2021) 20 tablet 0     traZODone (DESYREL) 50 MG tablet Take 0.5-1 tablets (25-50 mg) by mouth At Bedtime (Patient not taking: Reported on 12/1/2021) 30 tablet 1     triamcinolone (KENALOG) 0.1 % external cream Apply topically 2 times daily (Patient not taking: Reported on 12/1/2021) 453.6 g 11         PHYSICAL EXAMINATION  BP (!) 166/81   Pulse 114   Temp 98.8  F (37.1  C) (Oral)   Resp 16   Wt 64 kg (141 lb)   SpO2 96%   BMI 22.42 kg/m    GENERAL: Healthy, alert and no distress  EYES: Eyes grossly normal to inspection.  No discharge or erythema, or obvious scleral/conjunctival abnormalities.  HENT:  Normal cephalic/atraumatic.  External ears, nose and mouth without ulcers or lesions.  No nasal drainage visible.  NECK: No asymmetry, visible masses or scars  RESP: No audible wheeze, cough, or visible cyanosis.  No visible retractions or increased work of breathing.  LYMPH: Conglomerate of right femoral lymph nodes measures about 6 cm in size..   SKIN: Visible skin clear. No significant rash, abnormal pigmentation or lesions.  NEURO: Cranial nerves grossly intact.  Mentation and speech appropriate for age.  PSYCH: Mentation appears normal, she is anxious and intermittently tearful. Judgement and insight intact, normal speech and appearance well-groomed.      LABS  Oncology Visit on 11/05/2021   Component Date Value Ref Range Status     TSH 11/05/2021 3.23  0.40 - 4.00 mU/L Final     Adrenal Corticotropin 11/05/2021 20  <47 pg/mL Final     Bilirubin Total 11/05/2021 0.6  0.2 - 1.3 mg/dL Final     Bilirubin Direct 11/05/2021 0.2  0.0 - 0.2 mg/dL Final     Protein Total 11/05/2021 6.7* 6.8 - 8.8 g/dL Final     Albumin 11/05/2021 2.9* 3.4 - 5.0 g/dL Final     Alkaline Phosphatase 11/05/2021 55  40 - 150 U/L Final     AST 11/05/2021 36  0 - 45 U/L Final     ALT 11/05/2021 108* 0 - 50 U/L Final       IMAGING  Combined Report of:  PET and CT on  10/29/2021 11:13 AM :     1. PET of the neck, chest, abdomen, and pelvis.  2. PET CT Fusion for Attenuation Correction and Anatomical  Localization:    3. Diagnostic CT scan of the chest, abdomen, and pelvis with  intravenous contrast for interpretation.  3. CT of the chest, abdomen and pelvis obtained for diagnostic  interpretation.  4. 3D MIP and PET-CT fused images were processed on an independent  workstation and archived to PACS and reviewed by a radiologist.     Technique:     1. PET: The patient received 11.99 mCi of F-18-FDG; the serum glucose  was 81 prior to administration, body weight was 62.5 kg. Images were  evaluated in the axial, sagittal, and coronal planes as  well as the  rotational whole body MIP. Images were acquired from the Vertex to the  Feet.     UPTAKE WAS MEASURED AT 67 MINUTES.      BACKGROUND:  Liver SUV max= 3.59,   Aorta Blood SUV Max: 2.55.      2. CT: Volumetric acquisition for clinical interpretation of the  chest, abdomen, and pelvis acquired at 3 mm sections . The chest,  abdomen, and pelvis were evaluated at 5 mm sections in bone, soft  tissue, and lung windows.       The patient received 84 cc of Isovue 370 intravenously for the  examination.       3. 3D MIP and PET-CT fused images were processed on an independent  workstation and archived to PACS and reviewed by a radiologist.     INDICATION: Secondary malignant neoplasm of bone (H); Melanoma of skin  (H); Malignant melanoma of right lower extremity including hip (H)     ADDITIONAL INFORMATION OBTAINED FROM EMR: History of immunotherapy.     COMPARISON: CT 9/1/2021.     FINDINGS:      HEAD/NECK:  Round 0.9 cm hypodense focus along the anterior right palatine tonsil  (series 5, image 99). This was not seen on 7/30/2021 or 5/7/2021, but  a similar finding is seen on 2/12/2021. Given waxing/waning  appearance, this may represent variable ectasia of a salivary duct.  Mild uptake to the right palatine tonsil posterior to this hypodensity  with SUV max 6.5, previously 5.7 on 7/302/2021. Favor this is  physiologic or inflammatory uptake to the palatine tonsil.     The paranasal sinuses are clear. The mastoid air cells are clear. The  mucosal pharyngeal space, the , prevertebral and carotid  spaces are within normal limits. No masses, mass effect or  pathologically enlarged lymph nodes are evident. The thyroid gland is  unremarkable.     CHEST:  There is no suspicious FDG uptake in the chest.      There are no pathologically enlarged mediastinal, hilar or axillary  lymph nodes. There are no suspicious new lung nodules or evidence for  infection. Bibasilar atelectasis. There is no significant  pericardial  or pleural effusions.      ABDOMEN AND PELVIS:  New 1.1 cm hypermetabolic lesion in the segment 2 with SUV max of 14.4  (Series 4 image 251), likely liver metastasis. Additional unchanged  small subcentimeter hypodensities in the right liver dome, may  represent treated lesions versus other lesions such as cysts.      Increase in size of the right external iliac and inguinal lymph nodes  since prior CT 9/1/2021 and PET CT 7/30/2021 as well as increased FDG  uptake since recent PET/CT, for example a right inguinal node measures  3 cm with SUV max of 22 (Series 4 image 438), previously measured 1.9  cm with SUV max of 17.3; and right external iliac node measures 3.5 cm  with SUV max of 31 (Series 4 image 380), previously 16.3.     There is no splenomegaly or evidence for splenic or pancreatic mass  lesion. There are no suspicious adrenal mass lesions or opaque  gallbladder calculi. There is symmetric nephrographic renal phase  without hydronephrosis. There is no evidence for bowel obstruction or  free fluid. Colonic diverticulosis without evidence of acute  diverticulitis.     LOWER EXTREMITIES:   No abnormal masses or hypermetabolic lesions.     BONES:   There are no suspicious lytic or blastic osseous lesions. There is no  abnormal FDG uptake in the skeleton. Multilevel spondylolysis.                                                                      IMPRESSION: In this patient with metastatic melanoma status post  immunotherapy treatment, evidence of disease progression since PET/CT  9/1/2021:  1. Increased in size and FDG uptake of the right external iliac and  inguinal adenopathy.  2. New metastasis in the segment 2 of the liver.   3. Waxing/waning hypodensity along the right palatine tonsil, not seen  on 7/30/2021 or 5/7/2021, but similar to 2/12/2021, favor variable  ectasia of a salivary duct which could be due to underlying  inflammation. There is mild uptake to the adjacent palatine  tonsil  which is also favored inflammatory. Attention on follow-up.     I have personally reviewed the examination and initial interpretation  and I agree with the findings.     MICHAEL LOPEZ MD         ASSESSMENT AND PLAN:    #1 Acral melanoma, right heel primary, pT4b pN3c M1, Stage IV  It was a pleasure to meet with Ms. Hoang. 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 She progressed on first line nivolumab, and had evidence of response to 2nd line ipilimumab and nivolumab. On nivolumab maintenance therapy she showed regrowth. She tried ipilimumab monotherapy and had stable disease with significant pruritis, then had reinduction with ipilimumab/nivolumab on 8/2/21. She received her 2nd cycle on 8/23/21 and interval scan 9/1/21 showed about 10% growth. She then developed progressive nausea, reflux symptoms, diarrhea, and elevated transaminases ([id number] ULNl). On the most recent PET-CT from 10/29/21 during her steroid taper, she has had additional growth and increase in FDG avidity in the right external iliac and inguinal lymph nodes, as well as development of an FDG avid metastasis in hepatic segment 2.     -Continue prednisone taper  -Continue to hold immunotherapy  -Consider TVEC +/- nivolumab or clinical trial  -MRI-brain with RTC in 6 weeks and continue to discuss options and next steps    #2 Fatigue, multifactorial  #3 Insomnia, secondary to steroids  History of GUERLINE (treated),on steroids and difficulty sleeping. Fatigued but also suffering from insomnia. Continue to monitor.    #4 History of depression and anxiety  She will continue on sertraline 25 mg daily. She has support of her therapist and they are checking in every 2 weeks.    Multiple questions answered.     Gi Cartagena M.D.   of Medicine  Hematology, Oncology and Transplantation

## 2021-11-05 NOTE — NURSING NOTE
"Oncology Rooming Note    November 5, 2021 1:16 PM   Yadira Hoang is a 76 year old female who presents for:    Chief Complaint   Patient presents with     Blood Draw     Labs drawn via  by RN in lab. VS taken     Oncology Clinic Visit     metastatic melanoma     Initial Vitals: BP (!) 166/81   Pulse 114   Temp 98.8  F (37.1  C) (Oral)   Resp 16   Wt 64 kg (141 lb)   SpO2 96%   BMI 22.42 kg/m   Estimated body mass index is 22.42 kg/m  as calculated from the following:    Height as of 9/22/21: 1.689 m (5' 6.5\").    Weight as of this encounter: 64 kg (141 lb). Body surface area is 1.73 meters squared.  Extreme Pain (8) Comment: Data Unavailable   No LMP recorded. Patient is postmenopausal.  Allergies reviewed: Yes  Medications reviewed: Yes    Medications: Medication refills not needed today.  Pharmacy name entered into 1bib:    [facility] - [place], [place] - [address], [id number]  [facility] [address] [place] [address] [id number] [id number] - [place], [place] - [address] AT [facility] [address] ([facility] - [place], [place] - [address]    Clinical concerns: bilateral leg pain, worse at night especially in the lower legs.       Jessica Valladares CMA            "

## 2021-11-08 LAB — ACTH PLAS-MCNC: 20 PG/ML

## 2021-11-10 NOTE — RESULT ENCOUNTER NOTE
Dear Yadira,   Your recent test results showed the following:  -- lipid panel improved some from last year. 10 year heart risk remains high at 31.7%. since you cannot tolerate statin, it may be beneficial to take a non statin cholesterol lowering medication Zetia. If you want to try this, I can send a prescription for you.   -- I will be out of the office for the remainder of this week. I can send it next week if you'd like to try.     Please call or Mychart to our office if you have further questions.     Erika Blanton MD-PhD

## 2021-11-18 NOTE — PROGRESS NOTES
Yadira is a 76 year old who is being evaluated via a billable telephone visit.      What phone number would you like to be contacted at? cell  How would you like to obtain your AVS? MyChart    Assessment & Plan     Acute non-recurrent frontal sinusitis  Gets sinus infections every fall. Last one was spring 2021. Start antibiotic and cough medication.  If not improved can consider COVID-19 swab to rule it out  - amoxicillin-clavulanate (AUGMENTIN) 875-125 MG tablet; Take 1 tablet by mouth 2 times daily for 10 days  - benzonatate (TESSALON) 100 MG capsule; Take 1 capsule (100 mg) by mouth 3 times daily as needed for cough    Gastroesophageal reflux disease, unspecified whether esophagitis present  Is having some upset stomach, would like this increased to twice a day as needed incase she needs it around dinner time  - omeprazole (PRILOSEC) 40 MG DR capsule; Take 1 capsule (40 mg) by mouth 2 times daily as needed (upset stomach)      Return in about 1 week (around 11/25/2021), or if symptoms worsen or fail to improve.    YVONNE Cunningham, NP-C  [facility]   Yadira is a 76 year old who presents for the following health issues  accompanied by her self.    HPI     Sinus symptoms: states every year for many years gets sinus infection and bad cough. Last year she got some medication for it. She has drainage constantly for the past 2-3 weeks. Symptoms don't start out bad but then worsen and then the cough starts. No fever/chills. She can still taste and smell. She has had the COVID-19 vaccine. No chest pain. Has some pain in forehead and cheeks.     She was placed on a steroid due to her WBC being high due to cancer.       Review of Systems   Constitutional, HEENT, cardiovascular, pulmonary, gi and gu systems are negative, except as otherwise noted.      Objective           Vitals:  No vitals were obtained today due to virtual visit.    Physical Exam   healthy, alert and no distress  PSYCH:  Alert and oriented times 3; coherent speech, normal   rate and volume, able to articulate logical thoughts, able   to abstract reason, no tangential thoughts, no hallucinations   or delusions  Her affect is normal  RESP: No cough, no audible wheezing, able to talk in full sentences  Remainder of exam unable to be completed due to telephone visits      Phone call duration: 10 minutes

## 2021-12-01 NOTE — LETTER
12/1/2021         RE: Yadira Hoang  [address] [facility] [address]        Dear Colleague,    Thank you for referring your patient, Yadira Hoang, to the [facility]. Please see a copy of my visit note below.    REASON FOR VISIT:    CANCER STAGE: Cancer Staging  Malignant melanoma of right lower extremity including hip (H)  Staging form: Melanoma of the Skin, AJCC 8th Edition  - Clinical: Stage IIB (cT3b, cN0, cM0) - Signed by Gabriela Dorsey MD on 1/27/2020  - Pathologic: Stage IIIC (pT4b, pN2c, cM0) - Signed by Gabriela Dorsey MD on 2/18/2020        HISTORY OF PRESENT ILLNESS:    Melanoma History:  1. She noted a painful lesion on the sole of the right foot for a few months, since last summer.  It initially was small then became raised.  It spontaneously bled. She is not entirely sure of its appearance initially.  She denies noting any masses behind the knee or in the groin.  2. 1/9/2020, punch biopsy was performed by Dr Jessica Meadows. Patology showed melanoma, at least 3.4 mm deep with ulceration and 0 mitoses, and perineural invasion present. TILs were non-brisk and LVI not identified. pT3b.  3. 2/10/2020, she has right femoral sentinel lymph node biopsy and wide local excision (Specimen #: [id number]) and the right heel lesion extends to a depth of 6.6 mm, and invasive melanoma is present at 0.2 mm from the deep margin. Microsatellites are also present. There was 1 (of 2) lymph nodes involved. The lymph node tissue exhibits extensive subcapsular and parenchymal clusters of melanoma cells, highlighted on Melan-A immunostain. The largest cluster is 7 millimeters in greatest diameter. pT4b pN2c. PD-L1 staining is negative, <1%. No mutation in BRAF, KIT, or NRAS.  4. 2/22/2020, she had PET-CT, which showed intense FDG uptake in a right inguinal lymph node, concerning for an additional focus of metastatic disease. There is also an indeterminate right  "external iliac lymph node with mild FDG uptake.  5. 3/4/2020, she has medial plantar artery  fasciocutaneous instep flap and Integra placement to the donor site. On 4/2/2020, she has additional reconstruction with skin grafting.  6. 5/22/2020, PET-CT shows a hypermetabolic right inguinal and right external iliac chain lymph node and stable pulmonary nodules.  7. 5/27/2020, she starts nivolumab for presumed low volume lymph node predominant metastatic disease.  8. 8/21/2020, PET-CT shows increase in hypermetabolic adenopathy, and a hypermetabolic nodule in segment 2 of the liver. Given low volume of disease she prefers to continue therapy with short interval follow-up.  9. 10/9/2020, PET-CT shows increasing size of hepatic metastases with increased hypermetabolism, increased right inguinal and iliac lymphadenopathy, and new FDG uptake right T2 transverse process and right side of S1. MRI-brain obtained 10/21/2020 is negative for brain metastasis.  10. 10/28/2020, she starts ipilimumab 1mg/kg and nivolumab 3mg/kg. She then goes on to maintenance nivolumab through 4/12/21.  11. 5/10/2021, she starts ipilimumab 3 mg/kg monotherapy q3w for 4 cycles.  12. 7/30/21, PET/CT shows mild disease progression.   13. 8/2/21, start ipilimumab 3mg/kg and nivolumab 1mg/kg and received 2 cycles, last on 8/23/21.  14. 8/23/21, develops elevated LFT's.   15. 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      Yadira presents in DTC clinic.  She doesn't really have a lot of complaints.  She continues to have some pain in her L foot, but this is pretty stable.  She otherwise is doing well. She is eating and drinking well.  She does not think she has lost any weight recently.  She does feel kind of \"out of it\" since she started prednisone.  She is now off prednisone for the past few weeks, but does not feel like she is back to baseline. She has a fair bit of anxiety and she is not sleeping well.  She also feels " somewhat depressed.  She does not note any other pain.  She is able to do things, but notes that she doesn't really do much.  Her  does most of the things for her.  She is able to walk independently, but she is not very active.  She has no headaches, visual changes, or any other neurologic deficits.  She is having pretty normal bowel movements.        Review Of Systems  10-point review of systems were negative except as noted in HPI.        EXAM:  not currently breastfeeding.  GEN: alert and oriented x 3, nad  She appears wel. I did not otherwise examine her today.     Current Outpatient Medications   Medication Sig Dispense Refill     benzonatate (TESSALON) 100 MG capsule Take 1 capsule (100 mg) by mouth 3 times daily as needed for cough 30 capsule 0     cholecalciferol (VITAMIN D3) 125 MCG (5000 UT) TABS tablet Take 2,000 Units by mouth every other day        estradiol (ESTRACE) 0.1 MG/GM vaginal cream Place 2 g vaginally twice a week 42.5 g 1     omeprazole (PRILOSEC) 40 MG DR capsule Take 1 capsule (40 mg) by mouth 2 times daily as needed (upset stomach) 120 capsule 1     ondansetron (ZOFRAN-ODT) 4 MG ODT tab Take 1 tablet (4 mg) by mouth every 8 hours as needed for nausea 60 tablet 1     potassium chloride ER (KLOR-CON M) 20 MEQ CR tablet Take 20 mEq every 2 hours for four doses daily for 2 days. 8 tablet 0     predniSONE (DELTASONE) 20 MG tablet Take 2 tablets (40 mg) by mouth daily 60 tablet 0     sertraline (ZOLOFT) 25 MG tablet Take 1 tablet (25 mg) by mouth daily 90 tablet 3     traMADol (ULTRAM) 50 MG tablet Take 1 tablet (50 mg) by mouth every 6 hours as needed for severe pain 20 tablet 0     traZODone (DESYREL) 50 MG tablet Take 0.5-1 tablets (25-50 mg) by mouth At Bedtime 30 tablet 1     triamcinolone (KENALOG) 0.1 % external cream Apply topically 2 times daily 453.6 g 11           Recent Labs   Lab Test 10/29/21  1156   WBC 9.2   HGB 13.8         @labrcent[na,potassium,chloride,co2,bun,cr@  Recent Labs   Lab Test 11/05/21  1236   PROTTOTAL 6.7*   ALBUMIN 2.9*   BILITOTAL 0.6   AST 36   *   ALKPHOS 55         Recent Results (from the past 744 hour(s))   US Lower Extremity Venous Duplex Bilateral    Narrative    EXAMINATION: DOPPLER VENOUS ULTRASOUND OF BILATERAL LOWER EXTREMITIES,  11/18/2021 5:09 PM     COMPARISON: None.    HISTORY: Secondary malignant neoplasm of bone, rule out DVT    TECHNIQUE:  Gray-scale evaluation with compression, spectral flow and  color Doppler assessment of the deep venous system of both legs from  groin to knee, and then at the ankles.    FINDINGS:  In both lower extremities, the common femoral, femoral, popliteal and  posterior tibial veins demonstrate normal compressibility and blood  flow. Large abnormal right inguinal lymph nodes similar to 10/18/2021  ultrasound.      Impression    IMPRESSION:  1.  No evidence of deep venous thrombosis in either lower extremity.  Partly visualized abnormal enlarged right inguinal lymph node.    I have personally reviewed the examination and initial interpretation  and I agree with the findings.    BHAVIN PADILLA MD         SYSTEM ID:  [id number]           Assessment/Plan  ECOG PS1  Metastatic melanoma - She has had some mild disease progression as evidenced on her most recent PET scan in 10/29.  This is relatively minor.  She continues to feel overall well. Her ECOG PS is 1.  We discussed the DTC clinic and the purpose of visit here.  We discussed the [id number] which is an oncolytic vaccinia virus expressing Il-7 and Il-12.  She is now off of steroids for immune-related hepatitis for the past 2 weeks and her LFT are now normal. I think overall she is likely a good candidate.  She has a palpable R groin lesion which could be used for intratumoral injection.  We discussed that we could enroll her anytime she was ready to do so.  She is also considering treatment with combination of Tvec  and Opdivo.  I don't know that there is an advantage either way.  Certainly there is more previous experience with Tvec alone and in combination with ICI.  Perhaps the risk of recurrent hepatitis might be more with Tvec/Opdivo because of the systemic PD1 blockade, but I am not sure that it is too risky.  We discussed that the rationale with both treatments are relatively similar - use the virus to replicate initially, but then result in an immune response against the tumor.    We did discuss potential side effects of [id number] - risk of fevers, chills, viral syndrome - but we also discussed that since this is a [facility] trial so there is some unknowns with regards to this.  I did give her the ICF form for her review.  We did not go over this in detail, but did point out the study calendar in terms of what is involved as well as the potential risks of treatments.     She has a meeting with Dr. Franklin on 12/17.  I think it probably most appropriate to discuss the options with him at that time.  If Dr. Franklin and her decide she wishes to go onto the [id number] trial, we can facilitate timing of enrollment.  I would also discuss with Dr. Franklin whether or not we think we can do injections in clinic - I think we can in which case she would go on cohort A for which slots are immediately available.     I spent 50 minutes with the patient, reviewing chart and imaging, and documentation of this note.      Garry Ortiz   of Medicine  Division of Hematology, Oncology, and Transplantation

## 2021-12-01 NOTE — PROGRESS NOTES
REASON FOR VISIT:    CANCER STAGE: Cancer Staging  Malignant melanoma of right lower extremity including hip (H)  Staging form: Melanoma of the Skin, AJCC 8th Edition  - Clinical: Stage IIB (cT3b, cN0, cM0) - Signed by Gabriela Dorsey MD on 1/27/2020  - Pathologic: Stage IIIC (pT4b, pN2c, cM0) - Signed by Gabriela Dorsey MD on 2/18/2020        HISTORY OF PRESENT ILLNESS:    Melanoma History:  1. She noted a painful lesion on the sole of the right foot for a few months, since last summer.  It initially was small then became raised.  It spontaneously bled. She is not entirely sure of its appearance initially.  She denies noting any masses behind the knee or in the groin.  2. 1/9/2020, punch biopsy was performed by Dr Jessica Meadows. Patology showed melanoma, at least 3.4 mm deep with ulceration and 0 mitoses, and perineural invasion present. TILs were non-brisk and LVI not identified. pT3b.  3. 2/10/2020, she has right femoral sentinel lymph node biopsy and wide local excision (Specimen #: [id number]) and the right heel lesion extends to a depth of 6.6 mm, and invasive melanoma is present at 0.2 mm from the deep margin. Microsatellites are also present. There was 1 (of 2) lymph nodes involved. The lymph node tissue exhibits extensive subcapsular and parenchymal clusters of melanoma cells, highlighted on Melan-A immunostain. The largest cluster is 7 millimeters in greatest diameter. pT4b pN2c. PD-L1 staining is negative, <1%. No mutation in BRAF, KIT, or NRAS.  4. 2/22/2020, she had PET-CT, which showed intense FDG uptake in a right inguinal lymph node, concerning for an additional focus of metastatic disease. There is also an indeterminate right external iliac lymph node with mild FDG uptake.  5. 3/4/2020, she has medial plantar artery  fasciocutaneous instep flap and Integra placement to the donor site. On 4/2/2020, she has additional reconstruction with skin grafting.  6. 5/22/2020, PET-CT shows  "a hypermetabolic right inguinal and right external iliac chain lymph node and stable pulmonary nodules.  7. 5/27/2020, she starts nivolumab for presumed low volume lymph node predominant metastatic disease.  8. 8/21/2020, PET-CT shows increase in hypermetabolic adenopathy, and a hypermetabolic nodule in segment 2 of the liver. Given low volume of disease she prefers to continue therapy with short interval follow-up.  9. 10/9/2020, PET-CT shows increasing size of hepatic metastases with increased hypermetabolism, increased right inguinal and iliac lymphadenopathy, and new FDG uptake right T2 transverse process and right side of S1. MRI-brain obtained 10/21/2020 is negative for brain metastasis.  10. 10/28/2020, she starts ipilimumab 1mg/kg and nivolumab 3mg/kg. She then goes on to maintenance nivolumab through 4/12/21.  11. 5/10/2021, she starts ipilimumab 3 mg/kg monotherapy q3w for 4 cycles.  12. 7/30/21, PET/CT shows mild disease progression.   13. 8/2/21, start ipilimumab 3mg/kg and nivolumab 1mg/kg and received 2 cycles, last on 8/23/21.  14. 8/23/21, develops elevated LFT's.   15. 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      Yadira presents in DTC clinic.  She doesn't really have a lot of complaints.  She continues to have some pain in her L foot, but this is pretty stable.  She otherwise is doing well. She is eating and drinking well.  She does not think she has lost any weight recently.  She does feel kind of \"out of it\" since she started prednisone.  She is now off prednisone for the past few weeks, but does not feel like she is back to baseline. She has a fair bit of anxiety and she is not sleeping well.  She also feels somewhat depressed.  She does not note any other pain.  She is able to do things, but notes that she doesn't really do much.  Her  does most of the things for her.  She is able to walk independently, but she is not very active.  She has no headaches, visual " changes, or any other neurologic deficits.  She is having pretty normal bowel movements.        Review Of Systems  10-point review of systems were negative except as noted in HPI.        EXAM:  not currently breastfeeding.  GEN: alert and oriented x 3, nad  She appears wel. I did not otherwise examine her today.     Current Outpatient Medications   Medication Sig Dispense Refill     benzonatate (TESSALON) 100 MG capsule Take 1 capsule (100 mg) by mouth 3 times daily as needed for cough 30 capsule 0     cholecalciferol (VITAMIN D3) 125 MCG (5000 UT) TABS tablet Take 2,000 Units by mouth every other day        estradiol (ESTRACE) 0.1 MG/GM vaginal cream Place 2 g vaginally twice a week 42.5 g 1     omeprazole (PRILOSEC) 40 MG DR capsule Take 1 capsule (40 mg) by mouth 2 times daily as needed (upset stomach) 120 capsule 1     ondansetron (ZOFRAN-ODT) 4 MG ODT tab Take 1 tablet (4 mg) by mouth every 8 hours as needed for nausea 60 tablet 1     potassium chloride ER (KLOR-CON M) 20 MEQ CR tablet Take 20 mEq every 2 hours for four doses daily for 2 days. 8 tablet 0     predniSONE (DELTASONE) 20 MG tablet Take 2 tablets (40 mg) by mouth daily 60 tablet 0     sertraline (ZOLOFT) 25 MG tablet Take 1 tablet (25 mg) by mouth daily 90 tablet 3     traMADol (ULTRAM) 50 MG tablet Take 1 tablet (50 mg) by mouth every 6 hours as needed for severe pain 20 tablet 0     traZODone (DESYREL) 50 MG tablet Take 0.5-1 tablets (25-50 mg) by mouth At Bedtime 30 tablet 1     triamcinolone (KENALOG) 0.1 % external cream Apply topically 2 times daily 453.6 g 11           Recent Labs   Lab Test 10/29/21  1156   WBC 9.2   HGB 13.8        @labrcent[na,potassium,chloride,co2,bun,cr@  Recent Labs   Lab Test 11/05/21  1236   PROTTOTAL 6.7*   ALBUMIN 2.9*   BILITOTAL 0.6   AST 36   *   ALKPHOS 55         Recent Results (from the past 744 hour(s))   US Lower Extremity Venous Duplex Bilateral    Narrative    EXAMINATION: DOPPLER VENOUS  ULTRASOUND OF BILATERAL LOWER EXTREMITIES,  11/18/2021 5:09 PM     COMPARISON: None.    HISTORY: Secondary malignant neoplasm of bone, rule out DVT    TECHNIQUE:  Gray-scale evaluation with compression, spectral flow and  color Doppler assessment of the deep venous system of both legs from  groin to knee, and then at the ankles.    FINDINGS:  In both lower extremities, the common femoral, femoral, popliteal and  posterior tibial veins demonstrate normal compressibility and blood  flow. Large abnormal right inguinal lymph nodes similar to 10/18/2021  ultrasound.      Impression    IMPRESSION:  1.  No evidence of deep venous thrombosis in either lower extremity.  Partly visualized abnormal enlarged right inguinal lymph node.    I have personally reviewed the examination and initial interpretation  and I agree with the findings.    BHAVIN PADILLA MD         SYSTEM ID:  [id number]           Assessment/Plan  ECOG PS1  Metastatic melanoma - She has had some mild disease progression as evidenced on her most recent PET scan in 10/29.  This is relatively minor.  She continues to feel overall well. Her ECOG PS is 1.  We discussed the DTC clinic and the purpose of visit here.  We discussed the [id number] which is an oncolytic vaccinia virus expressing Il-7 and Il-12.  She is now off of steroids for immune-related hepatitis for the past 2 weeks and her LFT are now normal. I think overall she is likely a good candidate.  She has a palpable R groin lesion which could be used for intratumoral injection.  We discussed that we could enroll her anytime she was ready to do so.  She is also considering treatment with combination of Tvec and Opdivo.  I don't know that there is an advantage either way.  Certainly there is more previous experience with Tvec alone and in combination with ICI.  Perhaps the risk of recurrent hepatitis might be more with Tvec/Opdivo because of the systemic PD1 blockade, but I am not sure that it is too risky.  We  discussed that the rationale with both treatments are relatively similar - use the virus to replicate initially, but then result in an immune response against the tumor.    We did discuss potential side effects of [id number] - risk of fevers, chills, viral syndrome - but we also discussed that since this is a [facility] trial so there is some unknowns with regards to this.  I did give her the ICF form for her review.  We did not go over this in detail, but did point out the study calendar in terms of what is involved as well as the potential risks of treatments.     She has a meeting with Dr. Franklin on 12/17.  I think it probably most appropriate to discuss the options with him at that time.  If Dr. Franklin and her decide she wishes to go onto the [id number] trial, we can facilitate timing of enrollment.  I would also discuss with Dr. Franklin whether or not we think we can do injections in clinic - I think we can in which case she would go on cohort A for which slots are immediately available.       I spent 50 minutes with the patient, reviewing chart and imaging, and documentation of this note.    Garry Ortiz   of Medicine  Division of Hematology, Oncology, and Transplantation

## 2021-12-02 NOTE — PROGRESS NOTES
"Psychology Process Note based on virtual encounter per covid 19 protocol.  Call and billing for call consented.  Provider called client at 11-11:50am (50 min).  [facility]     INTERV> Pr solving, Support  Pr STRESS  S  \"screwed up my stomachs. .don't have good outcome with nurse practioners. . Don't know what's wrong with my stomach\". \"don't know who to talk with. . Called primary care, no appt until Dec 15\"  O  Off steroids.  First surgery in January 2020(?).  Alicia with oncoligist.  Dec 17th appt w oncologist. Met with clinical trial person yesterday - injections every 2 wks, 4 times - continue or discontinue.  MANY Uncertainties and unknowns = stressful. . Started primary care when dx with cancer -2020.  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  Hesitates to make phonec alls.  A   Encouraged to advocate for self.  Uncertain - apprehensive.  Unaware of whom to communicate with re symptoms.She made a written list of the following steps that we discussed in detail:   1.  Who do I call when I have issue = Who? How do I reach this person? 2. Call triage re present symptoms,.  3. Look at the sw note from 10-, print. (Have someone help /e.g., daughter-in-law) her look up resources. 4. Put Levy in phone book - [phone] 5. Discuss with oncologist whom to call with questions and how. 6. Make appt with Primary Care - use as resource as necessary.  NOTE.  Feeling over-welmed, uncertain and alone in treatment process - this does not help her cope with her disease/treatment and is highly stressful.    GOAL Strengthen support and communication.  PLAN  VIrtual follow Dec 20 @9am, January 10 @1pm. - I will request these appts.   "

## 2021-12-07 NOTE — PROGRESS NOTES
"Yadira is a 76 year old who is being evaluated via a billable telephone visit.      What phone number would you like to be contacted at? [phone]   How would you like to obtain your AVS? [facility]    Assessment & Plan     Diagnoses and all orders for this visit:    Nausea and vomiting, intractability of vomiting not specified, unspecified vomiting type    Gastroesophageal reflux disease, unspecified whether esophagitis present  -     omeprazole (PRILOSEC) 40 MG DR capsule; Take 1 capsule (40 mg) by mouth daily    Malignant melanoma of right lower extremity including hip (H)    Depression with anxiety  Comments:  unable to obtain PhQ9/ALEXUS 7. Pt was overwhelmed.        Patient was overwhelmed with her melanoma associated treatment, she was not able to process question and information regarding her medications.  I was unable to obtain PHQ-9 today.  I had permission to speak with her daughter Oly.  I discussed with her that the 3 medications I would like the patient to take consistently is Zofran as needed for nausea, omeprazole 40 mg daily for upset stomach, sertraline 25 mg daily (if she has not been taking it in the last few months), or 50 mg daily (if she has been taking 25 mg consistently).  We will do a virtual visit follow-up in 2 weeks, daughter will be with her to go through her symptoms and medications together.    Return in about 2 weeks (around 12/21/2021) for Virtual visit.    Erika Blanton MD PhD  [facility]   Yadira is a 76 year old who presents for the following health issues     HPI     Patient reported she was put on steroid longer than expected. It \"screw\" up her brain and stomach.   Depression/anxeity is way out of control.   Something happened to her legs, very painful. Her oncologist gave her medication but it didn't help. She asked them again, waiting to hear reply.  Oncology stopped infusion. In January, she may enroll in clinical trial.   Needs zofran " "refilled.  Sometimes she feels throwing up and sometimes she doesn't.   Sometimes she feels she feels she has urine infection and sometimes she doesn't feel it. - she used to get them but hasn't had it for a long time. She feels \"her bottom itches and hurts\"  She drinks a lot of water. Urinates frequently. No dysuria.   Once a while stomach hurts, sometimes it doesn't.     \"I got medications here. I don't know what to do with them\"        Review of Systems         Objective           Vitals:  No vitals were obtained today due to virtual visit.    Physical Exam   Gen: pt sounded flustered and not knowing what to do with the pill bottles she has. She sounded overwhelmed that she was not able to process.         Phone call duration: 14 minutes with patient and  Minutes with daughter Oly with pt's permission.   "

## 2021-12-08 NOTE — PROGRESS NOTES
[facility]  SPORTS MEDICINE CLINIC VISIT     Dec 8, 2021        ASSESSMENT & PLAN    77 yo with melanoma undergoing treatment here with severe bilateral anterior leg pain. It is not clear based on her description and history that this is a dermatomal pattern, though seems to loosely fits an L4 distribution. Given her previous history of suspected L4 radiculopathy and MRI from earlier this year, this may be a reasonable conclusion.     Reviewed imaging and assessment with patient in detail  Discussed translaminar L4/5 translaminar injection. Will discuss with oncology.   Provided referral to PT  Initiate gabapentin 300mg QHS      Maxi Mullen MD  [facility] SPORTS MEDICINE [facility]    -----  Chief Complaint   Patient presents with     Consult     bilateral leg pain        SUBJECTIVE  Yadira Hoang is a/an 76 year old female who is seen in consultation at the request of  Meghan Perdomo PA-C for evaluation of  Bilateral severe leg pain.     The patient is seen with their .    Onset: a few month(s) ago. Reports insidious onset without acute precipitating event.  Location of Pain: bilateral leg pain. Localizes loosely to anterior thighs and down anterior leg to shins   Worsened by: standing, walking. Pain at night  Better with: nothing, sometimes ice  Treatments tried: ice, prednisone, tramadol. Nothing has helped  Associated symptoms: no distal numbness or tingling; denies swelling or warmth    Had right foot pain in April of this year that was suspected to be due to radiculopathy. MRI ordered at that time supported possibly L4 radiculopathy. Translaminar L4/5 injection ordered by Dr. Bowling but symptoms improved and was never performed    Social History/Occupation: retired       REVIEW OF SYSTEMS:    Do you have fever, chills, weight loss? No    Do you have any vision problems? No    Do you have any chest pain or edema? No    Do you have any shortness of breath or  wheezing?  No    Do you have stomach problems? No    Do you have any numbness or focal weakness? No    Do you have diabetes? No    Do you have problems with bleeding or clotting? No    Do you have an rashes or other skin lesions? No    OBJECTIVE:  Wt 64.9 kg (143 lb)   BMI 22.74 kg/m       General: alert, pleasant, no distress. sitting comfortably in chair  Back/Spine: no tenderness to palpation of spinous processes, or paraspinous musculature of lumbar spine. No increase in pain with flexion and extension,   Neuro: SILT on bilateral lower extremities, can stand on toes and heel walk without difficulty, patellar and achilles reflexes 2+ and symmetric,     RADIOLOGY:    MRI lumbar spine dated 4/28/2021. Per radiology report:   Impression: Multilevel lumbar spondylosis greatest at L3-4 and L4-5  with moderate severe neural foraminal narrowing on the left at L3-4  and on the right at L4-5. No significant spinal canal stenosis  throughout.

## 2021-12-08 NOTE — LETTER
12/8/2021         RE: Yadira Hoang  [address] [facility] [address]        Dear Colleague,    Thank you for referring your patient, Yadira Hoang, to the [facility] SPORTS MEDICINE CLINIC [place]. Please see a copy of my visit note below.      [facility]  SPORTS MEDICINE CLINIC VISIT     Dec 8, 2021        ASSESSMENT & PLAN    75 yo with melanoma undergoing treatment here with severe bilateral anterior leg pain. It is not clear based on her description and history that this is a dermatomal pattern, though seems to loosely fits an L4 distribution. Given her previous history of suspected L4 radiculopathy and MRI from earlier this year, this may be a reasonable conclusion.     Reviewed imaging and assessment with patient in detail  Discussed translaminar L4/5 translaminar injection. Will discuss with oncology.   Provided referral to PT  Initiate gabapentin 300mg QHS      Maxi Mullen MD  [facility]    -----  Chief Complaint   Patient presents with     Consult     bilateral leg pain        SUBJECTIVE  Yadira Hoang is a/an 76 year old female who is seen in consultation at the request of  Meghan Perdomo PA-C for evaluation of  Bilateral severe leg pain.     The patient is seen with their .    Onset: a few month(s) ago. Reports insidious onset without acute precipitating event.  Location of Pain: bilateral leg pain. Localizes loosely to anterior thighs and down anterior leg to shins   Worsened by: standing, walking. Pain at night  Better with: nothing, sometimes ice  Treatments tried: ice, prednisone, tramadol. Nothing has helped  Associated symptoms: no distal numbness or tingling; denies swelling or warmth    Had right foot pain in April of this year that was suspected to be due to radiculopathy. MRI ordered at that time supported possibly L4 radiculopathy. Translaminar L4/5 injection ordered by Dr. Bowling but  symptoms improved and was never performed    Social History/Occupation: retired       REVIEW OF SYSTEMS:    Do you have fever, chills, weight loss? No    Do you have any vision problems? No    Do you have any chest pain or edema? No    Do you have any shortness of breath or wheezing?  No    Do you have stomach problems? No    Do you have any numbness or focal weakness? No    Do you have diabetes? No    Do you have problems with bleeding or clotting? No    Do you have an rashes or other skin lesions? No    OBJECTIVE:  Wt 64.9 kg (143 lb)   BMI 22.74 kg/m       General: alert, pleasant, no distress. sitting comfortably in chair  Back/Spine: no tenderness to palpation of spinous processes, or paraspinous musculature of lumbar spine. No increase in pain with flexion and extension,   Neuro: SILT on bilateral lower extremities, can stand on toes and heel walk without difficulty, patellar and achilles reflexes 2+ and symmetric,     RADIOLOGY:    MRI lumbar spine dated 4/28/2021. Per radiology report:   Impression: Multilevel lumbar spondylosis greatest at L3-4 and L4-5  with moderate severe neural foraminal narrowing on the left at L3-4  and on the right at L4-5. No significant spinal canal stenosis  throughout.             Again, thank you for allowing me to participate in the care of your patient.        Sincerely,        Maxi Mullen MD

## 2021-12-14 NOTE — TELEPHONE ENCOUNTER
"Patient called stating that none of the medications that were prescribed are helping. She states that she is is hardly able to eat. The Zofran did not help and she states that it is a sick stomach not an upset stomach. States that she \"doesn't know what to do anymore.\"    States that she also thinks that she has a yeast infection, there is no discharge present but it is very irritated. She is wondering what you would recommend for both of these things.     Shayla Whitfield RN [facility]    "

## 2021-12-15 NOTE — TELEPHONE ENCOUNTER
Please call patient: The next step is to do upper endoscopy, to have Gastroenterologist put a camera down the stomach to see what is going on. I put in a referral for her. Call [phone] to schedule at [facility] and [facility]      In the meantime, another meds to try in addition to omeprazole is sucralfate. I sent her a prescription.

## 2021-12-16 NOTE — TELEPHONE ENCOUNTER
Screening Questions  1. Are you active on mychart? Y    2. What insurance is in the chart? MEDICARE & BCBS    2.  Ordering/Referring Provider: DEBBIE JAIMES    3. BMI 21.6, If greater than 40 review exclusion criteria    4.  Respiratory Screening (If yes to any of the following Hospital setting only):     Do you use daily home oxygen? N  Do you have mod to severe Obstructive Sleep Apnea? Y - USES CPAP   Do you have Pulmonary Hypertension? N   Do you have UNCONTROLLED asthma? N    5. Have you had a heart or lung transplant (If yes, please review exclusion criteria) ? N    6. Are you currently on dialysis or have chronic kidney disease? N    7. Have you had a stroke or Transient ischemic attack (TIA) within 6 months? N    8. In the past 6 months, have you had any heart related issues including cardiomyopathy or heart attack? N                 If yes, did it require cardiac stenting or other implantable device?      9. Do you have any implantable devices in your body (pacemaker, defib, LVAD)? N    10. Do you take nitroglycerin? If yes, how often? N    11. Are you currently taking any blood thinners?N    12. Are you a diabetic? N    13. (Females) Are you currently pregnant?   If yes, how many weeks?      15. Are you taking any prescription pain medications on a routine schedule? N If yes, MAC sedation.    16. Do you have any chemical dependencies such as alcohol, street drugs, or methadone? N  If yes, MAC sedation.    17. Do you have any history of post-traumatic stress syndrome, severe anxiety or history of psychosis? N    18. Do you transfer independently? Y    19.  Do you have any issues with constipation?     20. Preferred Pharmacy for Pre Prescription     Scheduling Details    Which Colonoscopy Prep was Sent?:   Procedure Scheduled: EGD  Surgeon: FLORINDA  Date of Procedure: 1/5/2022  Location: [facility]  Caller (Please ask for phone number if not scheduled by patient): [email or web address]      Sedation Type: CS  Conscious Sedation- Needs   for 6 hours after the procedure  MAC/General-Needs  for 24 hours after procedure    Pre-op Required at [facility], [place], [place] and OR for MAC sedation:   (if yes advise patient they will need a pre-op prior to procedure)      Is patient on blood thinners? -N (If yes- inform patient to follow up with PCP or provider for follow up instructions)     Informed patient they will need an adult  Y  Cannot take any type of public or medical transportation alone    [facility] or [facility]: EXTERNALLY    Confirmed Nurse will call to complete assessment Y    Additional comments:

## 2021-12-16 NOTE — TELEPHONE ENCOUNTER
Spoke to pt and relayed provider's message.   Said she will schedule appointment as soon as she can.    Rose Ontiveros RN  [facility]

## 2021-12-16 NOTE — PROGRESS NOTES
[facility] Dermatology Note  Encounter Date: Dec 17, 2021  Office Visit     Dermatology Problem List:  Skin checks q3 months through 1/22  1. Metastatic melanoma, originated at right heel  - S/p punch biopsy By Dr. Alvarado 1/9/20 showing melanoma at least 3.4mm deep, ulcerated, no mitoses, perineural invasion, TIL present and nonbrisk, no LVI.  - S/p WLE with 2cm margins (revelead melanoma to depth of 6.6mm with deep margin close, microsatellites were present) and right femoral SLNB 2/10/20 (1/2 nodes positive, largest deposit 7mm)  - S/p re-excision 2/20/20  - Flap completed 3/4/30, skin graft 4/2/20  - Adjuvant nivolumab started 5/2020. Progressed. 10/28/20 started on ipi and nivolumab combination. Went to ipi monotherapy 5/2021. Back to dual immunotherapy 8/2021. Developed severe transaminitis. Stopped immunotherapy 9/2021 and started oral steroids. Plan for TVec 1/2022 if approved by insurance and possible mono immunotherapy.  2. AK, right cheek: cryo and biopsy 11/19/20     Social history: . Has 4 children.  Family history: Brother had pancreatic cancer. No melanoma in the family.  ____________________________________________    Assessment & Plan:     # Melanoma, stage IV, originating at R heel, currently off of immunotherapy (ipilimumab) due to autoimmune adverse events (transaminitis). No evidence of recurrence cutaneously, known disease in R inguinal nodes. Discussed risk of melanoma recurrence (with local or distant disease) and risk of additional primary melanomas. Discussed role of dermatology in care - screening for local recurrence, new primaries, and management of cutaneous toxicities from treatment. Explained routine schedule for follow up examinations in office and need for self skin checks monthly.   - ABCDs of melanoma were discussed and self skin checks were advised.   - Sun precaution was advised including the use of sunscreens of SPF 30 or higher, sun protective  clothing, and avoidance of tanning beds.  - Reviewed last onc note from 12/1/21. Onc considering clinical trial versus Tvec and immunotherapy. Discussed with Dr. Franklin - Tvec preferred. Tvec prior authorization started this week, no answer yet. Discussed what to expect with Tvec injections today. Instructions given in AVS. Questions answered in detail. Will contact patient to schedule when we hear from insurance. Discussed risks and expectations for treatment.    # Vitiligo, chest and back: Treatment related versus due to melanoma. Not bothersome to the patient. No treatment at this time.     # Pruritus, likely triggered by immunotherapy: Resolved at present due to holding of immunotherapy and oral steroids. Now itch flared since off steroids. Discussed the pathogenesis of this condition and emphasized the importance of gentle skin care and cream based moisturizer for long term treatment. Discussed use of topical steroid only when skin is itchy/symptomatic.   - TAC 0.1% cream BID PRN  - Moisturizer BID  - Gentle soaps     # Sun damaged skin with solar lentigines: Chronic, stable.  - Recommend sunscreens SPF #30 or greater, protective clothing and avoidance of tanning beds.     # Benign skin findings including: cherry angioma, dilated pores of phill and milia - minor, self limited problems  - No further intervention required. Patient to report changes.   - Patient reassured of the benign nature of these lesions.     # Multiple clinically benign nevi: Chronic, stable  - No further intervention required. Patient to report changes.   - Patient reassured of the benign nature of these lesions.    Procedures Performed:   None    Follow-up: 3 month(s) in-person, or earlier for new or changing lesions    Staff and Scribe:     Scribe Disclosure:  I, Abhi Stark, am serving as a scribe to document services personally performed by Gloria Estrada MD based on data collection and the provider's statements to me.     Provider  "Disclosure:   The documentation recorded by the scribe accurately reflects the services I personally performed and the decisions made by me.    Gloria Estrada MD    Department of Dermatology  [facility]: Phone: [phone], Fax:[phone]  [facility]: Phone: [phone], Fax: [phone]  ____________________________________________    CC: Oncology Clinic Visit (MELANOMA OF SKIN)    HPI:  Ms. Yadira Hoang is a(n) 76 year old female who presents today as a return patient for skin exam.    Last skin check 9/17/21. At that time, no concerning lesions were identified. Eczema had resolved with holding immunotherapy and starting steroids for transaminitis.    Today Yadira notes some concern regarding the scarring on her right heal s/p excision as she states that she was told by her surgeon that it would heal more. Patient additionally reports that she hasn't been eating too well recently, however notes that this is most likely due to her abdominal issues.    Patient is otherwise feeling well, without additional skin concerns. Denies any fevers, chills, cough, shortness of breath, or weight loss.    Labs Reviewed:  NA    Physical Exam:  Vitals: /86   Pulse 107   Temp 97.9  F (36.6  C) (Oral)   Resp 18   Ht 1.689 m (5' 6.5\")   Wt 64.9 kg (143 lb 1.3 oz)   SpO2 96%   BMI 22.75 kg/m    SKIN: Total skin excluding the undergarment areas was performed. The exam included the head/face, neck, both arms, chest, back, abdomen, both legs, digits and/or nails. Buttocks also examined.  - Canales Type II  - Scattered brown macules on sun exposed areas. Minimal.  - Some excoriations on the central chest, however no eczematous dermatitis.  - Depigmented macules and patches on the chest chest and upper back.  - Dilated pores of phill on the right upper cutaneous lip and left cheek. " Milia scattered on face.  - There are dome shaped bright red papules on the upper extremities and trunk.   - Very few true nevi. Less than 15 on exam. None are atypical.  - Skin is xerotic.  - Well healed skin graft at right heel and medial foot. No pigment recurrence or nodularity.  - No other lesions of concern on areas examined.   LYMPH NODES: No submandibular, cervical, supraclavicular, axillary lymphadenopathy palpable on examination. There is 2cm palpable fixed subcutaneous nodule in R inguinal chain, just inferior to the crease.     Medications:  Current Outpatient Medications   Medication     cholecalciferol (VITAMIN D3) 125 MCG (5000 UT) TABS tablet     estradiol (ESTRACE) 0.1 MG/GM vaginal cream     gabapentin (NEURONTIN) 300 MG capsule     omega 3 1000 MG CAPS     omeprazole (PRILOSEC) 40 MG DR capsule     ondansetron (ZOFRAN-ODT) 4 MG ODT tab     sertraline (ZOLOFT) 25 MG tablet     sucralfate (CARAFATE) 1 GM tablet     vitamin D3 (CHOLECALCIFEROL) 125 MCG (5000 UT) tablet     benzonatate (TESSALON) 100 MG capsule     potassium chloride ER (KLOR-CON M) 20 MEQ CR tablet     traMADol (ULTRAM) 50 MG tablet     triamcinolone (KENALOG) 0.1 % external cream     Current Facility-Administered Medications   Medication     lidocaine 1% with EPINEPHrine 1:100,000 injection 1.5 mL     lidocaine 1% with EPINEPHrine 1:100,000 injection 1.5 mL     [START ON 12/24/2021] talimogene laherparepvec (IMLYGIC) 10^6 PFU/mL injection 2 mL      Past Medical History:   Patient Active Problem List   Diagnosis     Malignant melanoma of right lower extremity including hip (H)     Melanoma of skin (H)     GUERLINE (obstructive sleep apnea)     Chronic congestion of paranasal sinus     Weakness of foot     Neck pain, chronic     Myofascial pain     Intractable migraine without aura and without status migrainosus     History of multiple concussions     Foot pain, bilateral     Fibromyositis     Difficulty walking     Chronic pain disorder      Chronic fatigue     Bilateral occipital neuralgia     Articular disc disorder of temporomandibular joint     S/P flap graft     Osteopenia     Injury of head     Hyperlipidemia LDL goal <100     ALEXUS (generalized anxiety disorder)     Major depression, recurrent, chronic (H)     Secondary malignant neoplasm of bone (H)     Generalized pruritus     Immunodeficiency, unspecified (H)     Past Medical History:   Diagnosis Date     Concussion      ALEXUS (generalized anxiety disorder) 5/7/2020     Major depression, recurrent, chronic (H) 5/7/2020     Melanoma (H)      Nonsenile cataract      Sleep apnea     CPAP     CC Referred Self, MD  No address on file on close of this encounter.

## 2021-12-17 NOTE — PROGRESS NOTES
MEDICAL ONCOLOGY PROGRESS NOTE  Melanoma Clinic  Dec 17, 2021     CHIEF COMPLAINT: Acral melanoma, on steroids for autoimmune hepatitis    Melanoma History:  1. She noted a painful lesion on the sole of the right foot for a few months, since last summer.  It initially was small then became raised.  It spontaneously bled. She is not entirely sure of its appearance initially.  She denies noting any masses behind the knee or in the groin.  2. 1/9/2020, punch biopsy was performed by Dr Jessica Meadows. Patology showed melanoma, at least 3.4 mm deep with ulceration and 0 mitoses, and perineural invasion present. TILs were non-brisk and LVI not identified. pT3b.  3. 2/10/2020, she has right femoral sentinel lymph node biopsy and wide local excision (Specimen #: [id number]) and the right heel lesion extends to a depth of 6.6 mm, and invasive melanoma is present at 0.2 mm from the deep margin. Microsatellites are also present. There was 1 (of 2) lymph nodes involved. The lymph node tissue exhibits extensive subcapsular and parenchymal clusters of melanoma cells, highlighted on Melan-A immunostain. The largest cluster is 7 millimeters in greatest diameter. pT4b pN2c. PD-L1 staining is negative, <1%. No mutation in BRAF, KIT, or NRAS.  4. 2/22/2020, she had PET-CT, which showed intense FDG uptake in a right inguinal lymph node, concerning for an additional focus of metastatic disease. There is also an indeterminate right external iliac lymph node with mild FDG uptake.  5. 3/4/2020, she has medial plantar artery  fasciocutaneous instep flap and Integra placement to the donor site. On 4/2/2020, she has additional reconstruction with skin grafting.  6. 5/22/2020, PET-CT shows a hypermetabolic right inguinal and right external iliac chain lymph node and stable pulmonary nodules.  7. 5/27/2020, she starts nivolumab for presumed low volume lymph node predominant metastatic disease.  8. 8/21/2020, PET-CT shows increase in  "hypermetabolic adenopathy, and a hypermetabolic nodule in segment 2 of the liver. Given low volume of disease she prefers to continue therapy with short interval follow-up.  9. 10/9/2020, PET-CT shows increasing size of hepatic metastases with increased hypermetabolism, increased right inguinal and iliac lymphadenopathy, and new FDG uptake right T2 transverse process and right side of S1. MRI-brain obtained 10/21/2020 is negative for brain metastasis.  10. 10/28/2020, she starts ipilimumab 1mg/kg and nivolumab 3mg/kg. She then goes on to maintenance nivolumab through 4/12/21.  11. 5/10/2021, she starts ipilimumab 3 mg/kg monotherapy q3w for 4 cycles.  12. 7/30/21, PET/CT shows mild disease progression.   13. 8/2/21, start ipilimumab 3mg/kg and nivolumab 1mg/kg and received 2 cycles, last on 8/23/21.  14. 8/23/21, develops elevated LFT's.   15. 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    She has a pain that intermittent spreads down both of her legs. She notes the pain in both legs and on the right side goes through into the ankle. She stopped prednisone around Thanksgiving. The pain into her legs started up around that time. She was given a prescription for gabapentin for the shooting pain into the legs, but it makes her sleepy during the day. She didn't take it today so as to be more awake, but will resume tonight. She thinks it does not help very much with the pain. Pain is severe enough to interrupt her walking. She frequently has to sit down to take a break due to the pain.    Otherwise, we discussed next line of treatment. She met with [facility] clinic and she is apprehensive about a first in human study. She prefers to try TVEC, which is has a clear safety record.    She has a \"sick stomach\" and she will have an upper endoscopy in January. Her headaches have resolved. Arthritis has " largely resolved and she reports decreased fatigue. She has some difficulty sleeping and melatonin does not seem to help.     We reviewed the MRI-brain together, which is negative for metastatic disease.    ECOG performance status is 1.    Current Outpatient Medications   Medication Sig Dispense Refill     benzonatate (TESSALON) 100 MG capsule Take 1 capsule (100 mg) by mouth 3 times daily as needed for cough (Patient not taking: Reported on 12/1/2021) 30 capsule 0     cholecalciferol (VITAMIN D3) 125 MCG (5000 UT) TABS tablet Take 2,000 Units by mouth every other day        estradiol (ESTRACE) 0.1 MG/GM vaginal cream Place 2 g vaginally twice a week 42.5 g 1     gabapentin (NEURONTIN) 300 MG capsule Take 1 capsule (300 mg) by mouth At Bedtime 30 capsule 1     omeprazole (PRILOSEC) 40 MG DR capsule Take 1 capsule (40 mg) by mouth daily 90 capsule 1     ondansetron (ZOFRAN-ODT) 4 MG ODT tab Take 1 tablet (4 mg) by mouth every 8 hours as needed for nausea 60 tablet 1     potassium chloride ER (KLOR-CON M) 20 MEQ CR tablet Take 20 mEq every 2 hours for four doses daily for 2 days. (Patient not taking: Reported on 12/1/2021) 8 tablet 0     predniSONE (DELTASONE) 20 MG tablet Take 2 tablets (40 mg) by mouth daily (Patient not taking: Reported on 12/1/2021) 60 tablet 0     sertraline (ZOLOFT) 25 MG tablet Take 2 tablets (50 mg) by mouth daily 90 tablet 0     sucralfate (CARAFATE) 1 GM tablet Take 1 tablet (1 g) by mouth 4 times daily as needed for nausea 60 tablet 0     traMADol (ULTRAM) 50 MG tablet Take 1 tablet (50 mg) by mouth every 6 hours as needed for severe pain (Patient not taking: Reported on 12/1/2021) 20 tablet 0     traZODone (DESYREL) 50 MG tablet Take 0.5-1 tablets (25-50 mg) by mouth At Bedtime (Patient not taking: Reported on 12/1/2021) 30 tablet 1     triamcinolone (KENALOG) 0.1 % external cream Apply topically 2 times daily (Patient not taking: Reported on 12/1/2021) 453.6 g 11         PHYSICAL  EXAMINATION  /86   Pulse 107   Temp 97.9  F (36.6  C) (Oral)   Resp 18   Wt 64.9 kg (143 lb 1.6 oz)   SpO2 96%   BMI 22.75 kg/m    GENERAL: Healthy, alert and no distress  EYES: Eyes grossly normal to inspection.  No discharge or erythema, or obvious scleral/conjunctival abnormalities.  HENT: Normal cephalic/atraumatic.  External ears, nose and mouth without ulcers or lesions.  No nasal drainage visible.  NECK: No asymmetry, visible masses or scars  RESP: No audible wheeze, cough, or visible cyanosis.  No visible retractions or increased work of breathing.  LYMPH: Conglomerate of right femoral lymph nodes measures about 6 cm in size..   SKIN: Visible skin clear. No significant rash, abnormal pigmentation or lesions.  NEURO: Cranial nerves grossly intact.  Mentation and speech appropriate for age.  PSYCH: Mentation appears normal, she is anxious and intermittently tearful. Judgement and insight intact, normal speech and appearance well-groomed.      LABS  No visits with results within 1 Week(s) from this visit.   Latest known visit with results is:   Lab on 11/24/2021   Component Date Value Ref Range Status     Erythrocyte Sedimentation Rate 11/24/2021 36* 0 - 30 mm/hr Final     CRP Inflammation 11/24/2021 14.7* 0.0 - 8.0 mg/L Final     CK 11/24/2021 35  30 - 225 U/L Final         IMAGING  MR Brain w/o & w Contrast  Narrative:  MR BRAIN W/O & W CONTRAST 12/13/2021 10:08 AM    Provided History: Memory Loss; Melanoma, stage >= IIB, monitor;  Melanoma of skin (H); Malignant melanoma of right lower extremity  including hip (H).  ICD-10: Melanoma of skin (H); Malignant melanoma of right lower  extremity including hip (H)    Comparison: 10/21/2020.    Technique: Multiplanar T1-weighted, axial FLAIR, and susceptibility  images were obtained without intravenous contrast. Following  intravenous gadolinium-based contrast administration, axial  T2-weighted, diffusion, and T1-weighted images (in multiple  planes)  were obtained.    Contrast: 6.5 cc Gadavist    Findings:  There is no mass effect, midline shift, or evidence of intracranial  hemorrhage. The ventricles are proportionate to the cerebral sulci.  There is a mild amount of periventricular and subcortical white matter  T2 hyperintensities that are nonspecific. These have increased since  the prior study and may represent changes from prior therapy. Normal  major vascular intracranial flow-voids.    Postcontrast images demonstrate no abnormal intracranial enhancement.    No abnormality of the skull marrow signal. The visualized portions of  paranasal sinuses, and mastoid air cells are relatively clear. The  orbits are grossly unremarkable.  Impression: Impression:     1. No abnormal enhancement or evidence for intracranial metastatic  disease.  2. Increased number of periventricular and subcortical white matter T2  hyperintensities that are nonspecific. These may relate to prior  therapy.    WATSON NOLASCO MD         SYSTEM ID:  [id number]        ASSESSMENT AND PLAN:    #1 Acral melanoma, right heel primary, pT4b pN3c M1, Stage IV  #2 CPI associated hepatitis, grade 3, resolved  It was a pleasure to meet with Ms. Hoang. 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 She progressed on first line nivolumab, and had evidence of response to 2nd line ipilimumab and nivolumab. On nivolumab maintenance therapy she showed regrowth. She tried ipilimumab monotherapy and had stable disease with significant pruritis, so had reinduction with ipilimumab/nivolumab on 8/2/21. She received her 2nd cycle on 8/23/21 and interval scan 9/1/21 showed about 10% growth. She then developed progressive nausea, reflux symptoms, diarrhea, and elevated transaminases (16-23X ULNl). On PET-CT from 10/29/21 during steroid taper, she has had additional growth and increase in FDG avidity in the right external iliac and  inguinal lymph nodes, as well as development of an FDG avid metastasis in hepatic segment 2.     -Continue to hold immunotherapy  -We will plan TVEC with addition of nivolumab after first few doses  -PET-CT in 1 month with return to clinic    #3 Fatigue, multifactorial  #4 Insomnia, secondary to steroids  History of GUERLINE (treated). Fatigued during the day but also suffering from insomnia. Continue to monitor.    #5 History of depression and anxiety  She will continue on sertraline 25 mg daily. She has support of her therapist and they are checking in every 2 weeks.    #6 Leg pain  Four wheeled walker with 6 inch wheels and a seat requested today.    Multiple questions answered.     Gi Cartagena M.D.   of Medicine  Hematology, Oncology and Transplantation

## 2021-12-17 NOTE — NURSING NOTE
"Oncology Rooming Note    December 17, 2021 2:15 PM   Yadira Hoang is a 76 year old female who presents for:    Chief Complaint   Patient presents with     Oncology Clinic Visit     MELANOMA OF SKIN     Initial Vitals: /86   Pulse 107   Temp 97.9  F (36.6  C) (Oral)   Resp 18   Ht 1.689 m (5' 6.5\")   Wt 64.9 kg (143 lb 1.3 oz)   SpO2 96%   BMI 22.75 kg/m   Estimated body mass index is 22.75 kg/m  as calculated from the following:    Height as of this encounter: 1.689 m (5' 6.5\").    Weight as of this encounter: 64.9 kg (143 lb 1.3 oz). Body surface area is 1.74 meters squared.  Extreme Pain (9) Comment: Data Unavailable   No LMP recorded. Patient is postmenopausal.  Allergies reviewed: Yes  Medications reviewed: Yes    Medications: Medication refills not needed today.  Pharmacy name entered into [facility]:    [facility] - [place], [place] - [address], [address]  [facility] [address] [place] [address] [address] [id number] - [place], [place] - [address] [place] [address] ([facility] - [place], [place] - [address] [facility] [facility]    Clinical concerns: None.        Fouzia Camargo CMA              "

## 2021-12-17 NOTE — NURSING NOTE
"Oncology Rooming Note    December 17, 2021 12:29 PM   Yadira Hoang is a 76 year old female who presents for:    Chief Complaint   Patient presents with     Oncology Clinic Visit     Metastatic melanoma      Initial Vitals: /86   Pulse 107   Temp 97.9  F (36.6  C) (Oral)   Resp 18   Wt 64.9 kg (143 lb 1.6 oz)   SpO2 96%   BMI 22.75 kg/m   Estimated body mass index is 22.75 kg/m  as calculated from the following:    Height as of 12/1/21: 1.689 m (5' 6.5\").    Weight as of this encounter: 64.9 kg (143 lb 1.6 oz). Body surface area is 1.75 meters squared.  Extreme Pain (9) Comment: Data Unavailable   No LMP recorded. Patient is postmenopausal.  Allergies reviewed: Yes  Medications reviewed: Yes    Medications: Medication refills not needed today.  Pharmacy name entered into [facility]:    [facility] - [place], [place] - [address], [address]  [facility] [address] [place] [address] [address] [id number] [place], [place] - [address] [facility] [address] ([facility] - [facility] [address]    Clinical concerns: Been having stomach issues after taking the steroids she was prescribed     Would also like to discuss about getting a referral for a walker    Susan Moreira LPN            "

## 2021-12-17 NOTE — LETTER
12/17/2021         RE: Yadira Hoang  [address] [place] [facility] [address]        Dear Colleague,    Thank you for referring your patient, Yadira Hoang, to the [facility]. Please see a copy of my visit note below.    [facility] Dermatology Note  Encounter Date: Dec 17, 2021  Office Visit     Dermatology Problem List:  Skin checks q3 months through 1/22  1. Metastatic melanoma, originated at right heel  - S/p punch biopsy By Dr. Alvarado 1/9/20 showing melanoma at least 3.4mm deep, ulcerated, no mitoses, perineural invasion, TIL present and nonbrisk, no LVI.  - S/p WLE with 2cm margins (revelead melanoma to depth of 6.6mm with deep margin close, microsatellites were present) and right femoral SLNB 2/10/20 (1/2 nodes positive, largest deposit 7mm)  - S/p re-excision 2/20/20  - Flap completed 3/4/30, skin graft 4/2/20  - Adjuvant nivolumab started 5/2020. Progressed. 10/28/20 started on ipi and nivolumab combination. Went to ipi monotherapy 5/2021. Back to dual immunotherapy 8/2021. Developed severe transaminitis. Stopped immunotherapy 9/2021 and started oral steroids. Plan for TVec 1/2022 if approved by insurance and possible mono immunotherapy.  2. AK, right cheek: cryo and biopsy 11/19/20     Social history: . Has 4 children.  Family history: Brother had pancreatic cancer. No melanoma in the family.  ____________________________________________    Assessment & Plan:     # Melanoma, stage IV, originating at R heel, currently off of immunotherapy (ipilimumab) due to autoimmune adverse events (transaminitis). No evidence of recurrence cutaneously, known disease in R inguinal nodes. Discussed risk of melanoma recurrence (with local or distant disease) and risk of additional primary melanomas. Discussed role of dermatology in care - screening for local recurrence, new primaries, and management of cutaneous toxicities from treatment. Explained  routine schedule for follow up examinations in office and need for self skin checks monthly.   - ABCDs of melanoma were discussed and self skin checks were advised.   - Sun precaution was advised including the use of sunscreens of SPF 30 or higher, sun protective clothing, and avoidance of tanning beds.  - Reviewed last onc note from 12/1/21. Onc considering clinical trial versus Tvec and immunotherapy. Discussed with Dr. Franklin - Tvec preferred. Tvec prior authorization started this week, no answer yet. Discussed what to expect with Tvec injections today. Instructions given in AVS. Questions answered in detail. Will contact patient to schedule when we hear from insurance. Discussed risks and expectations for treatment.    # Vitiligo, chest and back: Treatment related versus due to melanoma. Not bothersome to the patient. No treatment at this time.     # Pruritus, likely triggered by immunotherapy: Resolved at present due to holding of immunotherapy and oral steroids. Now itch flared since off steroids. Discussed the pathogenesis of this condition and emphasized the importance of gentle skin care and cream based moisturizer for long term treatment. Discussed use of topical steroid only when skin is itchy/symptomatic.   - TAC 0.1% cream BID PRN  - Moisturizer BID  - Gentle soaps     # Sun damaged skin with solar lentigines: Chronic, stable.  - Recommend sunscreens SPF #30 or greater, protective clothing and avoidance of tanning beds.     # Benign skin findings including: cherry angioma, dilated pores of phill and milia - minor, self limited problems  - No further intervention required. Patient to report changes.   - Patient reassured of the benign nature of these lesions.     # Multiple clinically benign nevi: Chronic, stable  - No further intervention required. Patient to report changes.   - Patient reassured of the benign nature of these lesions.    Procedures Performed:   None    Follow-up: 3 month(s) in-person,  "or earlier for new or changing lesions    Staff and Scribe:     Scribe Disclosure:  I, Abhi Lincoln, am serving as a scribe to document services personally performed by Gloria Estrada MD based on data collection and the provider's statements to me.     Provider Disclosure:   The documentation recorded by the scribe accurately reflects the services I personally performed and the decisions made by me.    Gloria Estrada MD    Department of Dermatology  [facility]: Phone: [phone], Fax:[phone]  [facility]: Phone: [phone], Fax: [phone]  ____________________________________________    CC: Oncology Clinic Visit (MELANOMA OF SKIN)    HPI:  Ms. Yadira Hoang is a(n) 76 year old female who presents today as a return patient for skin exam.    Last skin check 9/17/21. At that time, no concerning lesions were identified. Eczema had resolved with holding immunotherapy and starting steroids for transaminitis.    Today Yadira notes some concern regarding the scarring on her right heal s/p excision as she states that she was told by her surgeon that it would heal more. Patient additionally reports that she hasn't been eating too well recently, however notes that this is most likely due to her abdominal issues.    Patient is otherwise feeling well, without additional skin concerns. Denies any fevers, chills, cough, shortness of breath, or weight loss.    Labs Reviewed:  NA    Physical Exam:  Vitals: /86   Pulse 107   Temp 97.9  F (36.6  C) (Oral)   Resp 18   Ht 1.689 m (5' 6.5\")   Wt 64.9 kg (143 lb 1.3 oz)   SpO2 96%   BMI 22.75 kg/m    SKIN: Total skin excluding the undergarment areas was performed. The exam included the head/face, neck, both arms, chest, back, abdomen, both legs, digits and/or nails. Buttocks also examined.  - Canales Type II  - Scattered brown " macules on sun exposed areas. Minimal.  - Some excoriations on the central chest, however no eczematous dermatitis.  - Depigmented macules and patches on the chest chest and upper back.  - Dilated pores of phill on the right upper cutaneous lip and left cheek. Milia scattered on face.  - There are dome shaped bright red papules on the upper extremities and trunk.   - Very few true nevi. Less than 15 on exam. None are atypical.  - Skin is xerotic.  - Well healed skin graft at right heel and medial foot. No pigment recurrence or nodularity.  - No other lesions of concern on areas examined.   LYMPH NODES: No submandibular, cervical, supraclavicular, axillary lymphadenopathy palpable on examination. There is 2cm palpable fixed subcutaneous nodule in R inguinal chain, just inferior to the crease.     Medications:  Current Outpatient Medications   Medication     cholecalciferol (VITAMIN D3) 125 MCG (5000 UT) TABS tablet     estradiol (ESTRACE) 0.1 MG/GM vaginal cream     gabapentin (NEURONTIN) 300 MG capsule     omega 3 1000 MG CAPS     omeprazole (PRILOSEC) 40 MG DR capsule     ondansetron (ZOFRAN-ODT) 4 MG ODT tab     sertraline (ZOLOFT) 25 MG tablet     sucralfate (CARAFATE) 1 GM tablet     vitamin D3 (CHOLECALCIFEROL) 125 MCG (5000 UT) tablet     benzonatate (TESSALON) 100 MG capsule     potassium chloride ER (KLOR-CON M) 20 MEQ CR tablet     traMADol (ULTRAM) 50 MG tablet     triamcinolone (KENALOG) 0.1 % external cream     Current Facility-Administered Medications   Medication     lidocaine 1% with EPINEPHrine 1:100,000 injection 1.5 mL     lidocaine 1% with EPINEPHrine 1:100,000 injection 1.5 mL     [START ON 12/24/2021] talimogene laherparepvec (IMLYGIC) 10^6 PFU/mL injection 2 mL      Past Medical History:   Patient Active Problem List   Diagnosis     Malignant melanoma of right lower extremity including hip (H)     Melanoma of skin (H)     GUERLINE (obstructive sleep apnea)     Chronic congestion of paranasal sinus      Weakness of foot     Neck pain, chronic     Myofascial pain     Intractable migraine without aura and without status migrainosus     History of multiple concussions     Foot pain, bilateral     Fibromyositis     Difficulty walking     Chronic pain disorder     Chronic fatigue     Bilateral occipital neuralgia     Articular disc disorder of temporomandibular joint     S/P flap graft     Osteopenia     Injury of head     Hyperlipidemia LDL goal <100     ALEXUS (generalized anxiety disorder)     Major depression, recurrent, chronic (H)     Secondary malignant neoplasm of bone (H)     Generalized pruritus     Immunodeficiency, unspecified (H)     Past Medical History:   Diagnosis Date     Concussion      ALEXUS (generalized anxiety disorder) 5/7/2020     Major depression, recurrent, chronic (H) 5/7/2020     Melanoma (H)      Nonsenile cataract      Sleep apnea     CPAP     CC Referred Self, MD  No address on file on close of this encounter.              Again, thank you for allowing me to participate in the care of your patient.        Sincerely,        Gloria Estrada MD

## 2021-12-17 NOTE — LETTER
12/17/2021         RE: Yadira Hoang  [address] [facility] [address]        Dear Colleague,    Thank you for referring your patient, Yadira Hoang, to the [facility]. Please see a copy of my visit note below.    MEDICAL ONCOLOGY PROGRESS NOTE  Melanoma Clinic  Dec 17, 2021     CHIEF COMPLAINT: Acral melanoma, on steroids for autoimmune hepatitis    Melanoma History:  1. She noted a painful lesion on the sole of the right foot for a few months, since last summer.  It initially was small then became raised.  It spontaneously bled. She is not entirely sure of its appearance initially.  She denies noting any masses behind the knee or in the groin.  2. 1/9/2020, punch biopsy was performed by Dr Jessica Meadows. Patology showed melanoma, at least 3.4 mm deep with ulceration and 0 mitoses, and perineural invasion present. TILs were non-brisk and LVI not identified. pT3b.  3. 2/10/2020, she has right femoral sentinel lymph node biopsy and wide local excision (Specimen #: [id number]) and the right heel lesion extends to a depth of 6.6 mm, and invasive melanoma is present at 0.2 mm from the deep margin. Microsatellites are also present. There was 1 (of 2) lymph nodes involved. The lymph node tissue exhibits extensive subcapsular and parenchymal clusters of melanoma cells, highlighted on Melan-A immunostain. The largest cluster is 7 millimeters in greatest diameter. pT4b pN2c. PD-L1 staining is negative, <1%. No mutation in BRAF, KIT, or NRAS.  4. 2/22/2020, she had PET-CT, which showed intense FDG uptake in a right inguinal lymph node, concerning for an additional focus of metastatic disease. There is also an indeterminate right external iliac lymph node with mild FDG uptake.  5. 3/4/2020, she has medial plantar artery  fasciocutaneous instep flap and Integra placement to the donor site. On 4/2/2020, she has additional reconstruction with skin grafting.  6.  5/22/2020, PET-CT shows a hypermetabolic right inguinal and right external iliac chain lymph node and stable pulmonary nodules.  7. 5/27/2020, she starts nivolumab for presumed low volume lymph node predominant metastatic disease.  8. 8/21/2020, PET-CT shows increase in hypermetabolic adenopathy, and a hypermetabolic nodule in segment 2 of the liver. Given low volume of disease she prefers to continue therapy with short interval follow-up.  9. 10/9/2020, PET-CT shows increasing size of hepatic metastases with increased hypermetabolism, increased right inguinal and iliac lymphadenopathy, and new FDG uptake right T2 transverse process and right side of S1. MRI-brain obtained 10/21/2020 is negative for brain metastasis.  10. 10/28/2020, she starts ipilimumab 1mg/kg and nivolumab 3mg/kg. She then goes on to maintenance nivolumab through 4/12/21.  11. 5/10/2021, she starts ipilimumab 3 mg/kg monotherapy q3w for 4 cycles.  12. 7/30/21, PET/CT shows mild disease progression.   13. 8/2/21, start ipilimumab 3mg/kg and nivolumab 1mg/kg and received 2 cycles, last on 8/23/21.  14. 8/23/21, develops elevated LFT's.   15. 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    She has a pain that intermittent spreads down both of her legs. She notes the pain in both legs and on the right side goes through into the ankle. She stopped prednisone around Thanksgiving. The pain into her legs started up around that time. She was given a prescription for gabapentin for the shooting pain into the legs, but it makes her sleepy during the day. She didn't take it today so as to be more awake, but will resume tonight. She thinks it does not help very much with the pain. Pain is severe enough to interrupt her walking. She frequently has to sit down to take a break due to the pain.    Otherwise, we discussed next line of  "treatment. She met with [facility] clinic and she is apprehensive about a first in human study. She prefers to try TVEC, which is has a clear safety record.    She has a \"sick stomach\" and she will have an upper endoscopy in January. Her headaches have resolved. Arthritis has largely resolved and she reports decreased fatigue. She has some difficulty sleeping and melatonin does not seem to help.     We reviewed the MRI-brain together, which is negative for metastatic disease.    ECOG performance status is 1.    Current Outpatient Medications   Medication Sig Dispense Refill     benzonatate (TESSALON) 100 MG capsule Take 1 capsule (100 mg) by mouth 3 times daily as needed for cough (Patient not taking: Reported on 12/1/2021) 30 capsule 0     cholecalciferol (VITAMIN D3) 125 MCG (5000 UT) TABS tablet Take 2,000 Units by mouth every other day        estradiol (ESTRACE) 0.1 MG/GM vaginal cream Place 2 g vaginally twice a week 42.5 g 1     gabapentin (NEURONTIN) 300 MG capsule Take 1 capsule (300 mg) by mouth At Bedtime 30 capsule 1     omeprazole (PRILOSEC) 40 MG DR capsule Take 1 capsule (40 mg) by mouth daily 90 capsule 1     ondansetron (ZOFRAN-ODT) 4 MG ODT tab Take 1 tablet (4 mg) by mouth every 8 hours as needed for nausea 60 tablet 1     potassium chloride ER (KLOR-CON M) 20 MEQ CR tablet Take 20 mEq every 2 hours for four doses daily for 2 days. (Patient not taking: Reported on 12/1/2021) 8 tablet 0     predniSONE (DELTASONE) 20 MG tablet Take 2 tablets (40 mg) by mouth daily (Patient not taking: Reported on 12/1/2021) 60 tablet 0     sertraline (ZOLOFT) 25 MG tablet Take 2 tablets (50 mg) by mouth daily 90 tablet 0     sucralfate (CARAFATE) 1 GM tablet Take 1 tablet (1 g) by mouth 4 times daily as needed for nausea 60 tablet 0     traMADol (ULTRAM) 50 MG tablet Take 1 tablet (50 mg) by mouth every 6 hours as needed for severe pain (Patient not taking: Reported on 12/1/2021) 20 tablet 0     traZODone (DESYREL) 50 MG " tablet Take 0.5-1 tablets (25-50 mg) by mouth At Bedtime (Patient not taking: Reported on 12/1/2021) 30 tablet 1     triamcinolone (KENALOG) 0.1 % external cream Apply topically 2 times daily (Patient not taking: Reported on 12/1/2021) 453.6 g 11         PHYSICAL EXAMINATION  /86   Pulse 107   Temp 97.9  F (36.6  C) (Oral)   Resp 18   Wt 64.9 kg (143 lb 1.6 oz)   SpO2 96%   BMI 22.75 kg/m    GENERAL: Healthy, alert and no distress  EYES: Eyes grossly normal to inspection.  No discharge or erythema, or obvious scleral/conjunctival abnormalities.  HENT: Normal cephalic/atraumatic.  External ears, nose and mouth without ulcers or lesions.  No nasal drainage visible.  NECK: No asymmetry, visible masses or scars  RESP: No audible wheeze, cough, or visible cyanosis.  No visible retractions or increased work of breathing.  LYMPH: Conglomerate of right femoral lymph nodes measures about 6 cm in size..   SKIN: Visible skin clear. No significant rash, abnormal pigmentation or lesions.  NEURO: Cranial nerves grossly intact.  Mentation and speech appropriate for age.  PSYCH: Mentation appears normal, she is anxious and intermittently tearful. Judgement and insight intact, normal speech and appearance well-groomed.      LABS  No visits with results within 1 Week(s) from this visit.   Latest known visit with results is:   Lab on 11/24/2021   Component Date Value Ref Range Status     Erythrocyte Sedimentation Rate 11/24/2021 36* 0 - 30 mm/hr Final     CRP Inflammation 11/24/2021 14.7* 0.0 - 8.0 mg/L Final     CK 11/24/2021 35  30 - 225 U/L Final         IMAGING  MR Brain w/o & w Contrast  Narrative:  MR BRAIN W/O & W CONTRAST 12/13/2021 10:08 AM    Provided History: Memory Loss; Melanoma, stage >= IIB, monitor;  Melanoma of skin (H); Malignant melanoma of right lower extremity  including hip (H).  ICD-10: Melanoma of skin (H); Malignant melanoma of right lower  extremity including hip (H)    Comparison:  10/21/2020.    Technique: Multiplanar T1-weighted, axial FLAIR, and susceptibility  images were obtained without intravenous contrast. Following  intravenous gadolinium-based contrast administration, axial  T2-weighted, diffusion, and T1-weighted images (in multiple planes)  were obtained.    Contrast: 6.5 cc Gadavist    Findings:  There is no mass effect, midline shift, or evidence of intracranial  hemorrhage. The ventricles are proportionate to the cerebral sulci.  There is a mild amount of periventricular and subcortical white matter  T2 hyperintensities that are nonspecific. These have increased since  the prior study and may represent changes from prior therapy. Normal  major vascular intracranial flow-voids.    Postcontrast images demonstrate no abnormal intracranial enhancement.    No abnormality of the skull marrow signal. The visualized portions of  paranasal sinuses, and mastoid air cells are relatively clear. The  orbits are grossly unremarkable.  Impression: Impression:     1. No abnormal enhancement or evidence for intracranial metastatic  disease.  2. Increased number of periventricular and subcortical white matter T2  hyperintensities that are nonspecific. These may relate to prior  therapy.    WATSON NOLASCO MD         SYSTEM ID:  [id number]        ASSESSMENT AND PLAN:    #1 Acral melanoma, right heel primary, pT4b pN3c M1, Stage IV  #2 CPI associated hepatitis, grade 3, resolved  It was a pleasure to meet with Ms. Hoang. 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 She progressed on first line nivolumab, and had evidence of response to 2nd line ipilimumab and nivolumab. On nivolumab maintenance therapy she showed regrowth. She tried ipilimumab monotherapy and had stable disease with significant pruritis, so had reinduction with ipilimumab/nivolumab on 8/2/21. She received her 2nd cycle on 8/23/21 and interval scan 9/1/21 showed  about 10% growth. She then developed progressive nausea, reflux symptoms, diarrhea, and elevated transaminases (16-23X ULNl). On PET-CT from 10/29/21 during steroid taper, she has had additional growth and increase in FDG avidity in the right external iliac and inguinal lymph nodes, as well as development of an FDG avid metastasis in hepatic segment 2.     -Continue to hold immunotherapy  -We will plan TVEC with addition of nivolumab after first few doses  -PET-CT in 1 month with return to clinic    #3 Fatigue, multifactorial  #4 Insomnia, secondary to steroids  History of GUERLINE (treated). Fatigued during the day but also suffering from insomnia. Continue to monitor.    #5 History of depression and anxiety  She will continue on sertraline 25 mg daily. She has support of her therapist and they are checking in every 2 weeks.    #6 Leg pain  Four wheeled walker with 6 inch wheels and a seat requested today.    Multiple questions answered.     Gi Cartagena M.D.   of Medicine  Hematology, Oncology and Transplantation

## 2021-12-20 NOTE — NURSING NOTE
"Oncology Rooming Note    December 20, 2021 7:35 AM   Ydaira Hoang is a 76 year old female who presents for:    Chief Complaint   Patient presents with     Oncology Clinic Visit     MALIGNANT MELANOMA OF RIGHT LOWER EXTREMITY INCLUDING HIP      Initial Vitals: /84   Pulse 110   Temp 97.9  F (36.6  C) (Oral)   Resp 16   Ht 1.689 m (5' 6.5\")   Wt 65.2 kg (143 lb 12.8 oz)   SpO2 97%   BMI 22.86 kg/m   Estimated body mass index is 22.86 kg/m  as calculated from the following:    Height as of this encounter: 1.689 m (5' 6.5\").    Weight as of this encounter: 65.2 kg (143 lb 12.8 oz). Body surface area is 1.75 meters squared.  Extreme Pain (8) Comment: Data Unavailable   No LMP recorded. Patient is postmenopausal.  Allergies reviewed: Yes  Medications reviewed: Yes    Medications: Medication refills not needed today.  Pharmacy name entered into [facility]:    [facility] - [place], [place] - [address], [address]  [facility] [address] [id number] - [place], [place] - [address] [facility] [address] ([facility] - [facility] [address]    Clinical concerns: No new concerns today      Carla Castillo CMA December 20, 2021  7:35 AM             "

## 2021-12-20 NOTE — PROGRESS NOTES
"Psychology Process Note based on virtual encounter per covid 19 protocol.  Provider called client at her home at 9-9:50am (50 min).  Call and billing for call consented.  INDIV THERAPY.  Interv: pr solving, support.  MO STRESS  S  \"legs painful. . fatigued, feeling pretty normal. . was crying all the time, painful but better. .use a walker. ..I plan to go into remission . .just on Friday (treatment planning) . . ()not a clinical trial). . injections - 3 weeks apart (initial), then 2 weeks apart about 6 months. .waiting for the insurance, will take 2 hours. .$100,000. . (expects to start in January). . Stopped infusions, steroids screwed me up. .(take good care of self?) \"hate to sleep during the day, but taking some short naps. .usiong wobarbd find books. .keep my brain working. .'sick' stomach. .Jan 5th (appt to look at stomach)  O  Sleep - goes in streaks.  On gabapentin.  8 grandchildren: ages 2 -19. Son and 3yo now live with her - father is 44yo.. Many food intolerances.  [facility] - potential for walking.  Somewhat socially isolated - few lasting friendships.  A  Identifies rest, cog , working on nutrition.  Prefers [place] for care.  Aware needs more contact with people.  Did belong to [employer].  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  Will celebrate [place] on Fri.  GOAL  Self care.  PLAN.  Virtual follow up in 2 weeks.  "

## 2021-12-21 NOTE — TELEPHONE ENCOUNTER
[facility] Call Center    Phone Message    May a detailed message be left on voicemail: yes     Reason for Call: Other: Patient is getting very little pain relief from the Gabepentin and makes her feel very tired. Patient would like to know if there is something else she could try. [facility] [place]. [phone].      Action Taken: Message routed to:  [facility] ([facility] Ortho    Travel Screening: Not Applicable

## 2021-12-21 NOTE — PROGRESS NOTES
Yadira is a 76 year old who is being evaluated via a billable telephone visit.      What phone number would you like to be contacted at? [phone]   How would you like to obtain your AVS? [facility]    Assessment & Plan     Diagnoses and all orders for this visit:    Poor appetite    Bilateral leg pain  Comments:  advised pt to follow up with sports medicine and oncology regarding what may be an acceptable treatment option.     Malignant melanoma of right lower extremity including hip (H)  Comments:  follows with oncology.        Patient is to primary concern today is poor appetite and leg pain.  She does not have significant stomach pain per se.  Just does not have appetite.  She reported that she is able to eat breakfast well, but not lunch and supper.  She is currently not on any treatment for melanoma, waiting for enrollment in clinical trial, insurance approval.  I am wondering if a poor appetite is permanently cancer induced cachexia.  She has upper endoscopy scheduled for January 5, 2022.  At this juncture may be best not to introduce a new medication to help with her appetite.  We for the endoscopy report.  If there is no treatable pathology, she may benefit from taking medication to help with her appetite.  Will defer this to her oncologist given her cancer status, potential enrollment in clinical trial.    Bilateral leg pain: MRI did show DJD with moderate to severe foraminal narrowing in 2 levels.  Epidural injection may be potential treatment option but again not sure if this will be a contraindication with her candidacy for clinical trial.  Advised patient to follow-up with sports medicine and oncology on this.    Return in about 3 weeks (around 1/11/2022) for Virtual visit to follow up on Endoscopy report and appetite.      I spent a total of 29 minutes on the day of the visit.  doing chart review, history and exam, documentation and further activities as noted above         Erika Blanton MD PhD  [facility]  [facility] HILARIO May is a 76 year old who presents for the following health issues     HPI   Patient with metastatic melanoma of the right lower extremity, scheduled appointment to follow-up on stomach issue.    Had a virtual visit with patient via telephone on December 7, 2021.  Patient had concern about her stomach discomfort, sometimes feel like throwing up other times she does not.  At the time she was quite overwhelmed with melanoma diagnosis and associated treatment.  She was not able to process information.  At the time she told me she had all her medications, but did not know what to do with it.  Sounded like she was not taking some of her medications.  At the time asked her to restart omeprazole for presumed gastritis induced by steroid.  I also gave her a refill of Zofran.  Patient called on December 14 reporting that nothing was helping her.  She has a sick stomach but not an upset stomach and that she did not know what to do anymore.  I gave her sucralfate to try and set up this appointment to review her symptoms.      Patient reported her stomach may hurt once a while, but not bad. They here in the day there.  She cannot characterize it further.    She still doesn't feel like eating. Her stomach doesn't feel right. She thinks it is from the high dose steroid. It screwed up her whole body.  She has no problem eating breakfast.  But when he comes time for lunch and supper, she does not feel like eating.  Not because she has pain or nausea, just does not feel like eating.    Her legs are painful. She was evaluated by sports medicine on 12/8/2021. She was given gabapentin. It made her sleepy. Her pain is 9/10, sometimes 10/10.  Patient reported that she cannot get injection therapy due to melanoma treatment.    Melanoma of right LE: Patient is waiting for insurance clearance for treatment in a clinical trial.    Review of Systems   Constitutional, HEENT, cardiovascular, pulmonary,  gi and gu systems are negative, except as otherwise noted.      Objective           Vitals:  No vitals were obtained today due to virtual visit.    Physical Exam   healthy, alert and no distress  PSYCH: Alert and oriented times 3; coherent speech, normal   rate and volume, able to articulate logical thoughts, able   to abstract reason, no tangential thoughts, no hallucinations   or delusions  Her affect is normal  RESP: No cough, no audible wheezing, able to talk in full sentences  Remainder of exam unable to be completed due to telephone visits    Recent Results (from the past 744 hour(s))   XR Lumbar Spine 2/3 Views    Narrative    3 views lumbar spine radiographs 12/8/2021 11:14 AM    History: Bilateral leg pain; Bilateral leg pain     Comparison: CT abdomen and pelvis 9/1/2021,  MRI 4/28/2021    Findings:    Standing  AP, lateral including lumbosacral spot film lateral view  views of the lumbar spine were obtained.    5  lumbar type vertebral bodies are assumed for the purpose of this  dictation.    There is no acute osseous abnormality.      There is multilevel degenerative changes of the lumbar spine, most  pronounced at L3-4 and L4-L5 with moderate disc space loss. There is  also lower lumbar predominant facet arthropathy. Dextroscoliotic  curvature of the thoracolumbar spine. Degenerative changes of the  hips.    Vascular calcifications. The visualized bowel gas pattern is  non-obstructive. Pelvic phleboliths.      Impression    Impression:  1. Rotoscoliosis of the visualized spine.  2. Moderate degenerative disc disease with multilevel disc space  narrowing, greatest at L3-4 and L4-5.    I have personally reviewed the examination and initial interpretation  and I agree with the findings.    LEYDI HATCH MD         SYSTEM ID:  [id number]   MR Brain w/o & w Contrast    Narrative     MR BRAIN W/O & W CONTRAST 12/13/2021 10:08 AM    Provided History: Memory Loss; Melanoma, stage >= IIB, monitor;  Melanoma of  skin (H); Malignant melanoma of right lower extremity  including hip (H).  ICD-10: Melanoma of skin (H); Malignant melanoma of right lower  extremity including hip (H)    Comparison: 10/21/2020.    Technique: Multiplanar T1-weighted, axial FLAIR, and susceptibility  images were obtained without intravenous contrast. Following  intravenous gadolinium-based contrast administration, axial  T2-weighted, diffusion, and T1-weighted images (in multiple planes)  were obtained.    Contrast: 6.5 cc Gadavist    Findings:  There is no mass effect, midline shift, or evidence of intracranial  hemorrhage. The ventricles are proportionate to the cerebral sulci.  There is a mild amount of periventricular and subcortical white matter  T2 hyperintensities that are nonspecific. These have increased since  the prior study and may represent changes from prior therapy. Normal  major vascular intracranial flow-voids.    Postcontrast images demonstrate no abnormal intracranial enhancement.    No abnormality of the skull marrow signal. The visualized portions of  paranasal sinuses, and mastoid air cells are relatively clear. The  orbits are grossly unremarkable.      Impression    Impression:     1. No abnormal enhancement or evidence for intracranial metastatic  disease.  2. Increased number of periventricular and subcortical white matter T2  hyperintensities that are nonspecific. These may relate to prior  therapy.    WATSON NOLASCO MD         SYSTEM ID:  [id number]       Phone call duration: 16 minutes

## 2021-12-23 NOTE — TELEPHONE ENCOUNTER
spoke with patients' Oncologist and it is recommended that she receives an GIFTY that was ordered from  in March of this year. Patient understood and would call to get one scheduled.

## 2021-12-27 NOTE — TELEPHONE ENCOUNTER
Attempted to complete pre assessment for EGD scheduled on 1/5/22.     Patient states that they are having a lumbar injection the day before EGD and are wondering if they can reschedule EGD further out.     Transferred call to endoscopy scheduling to facilitate rescheduling of procedure per request.     Mirella Walden RN

## 2021-12-27 NOTE — TELEPHONE ENCOUNTER
Caller: Yadira Ochoa RUBI Hoang    Procedure: EGD    Date, Location, and Surgeon of Procedure Cancelled: 1/5/2022, [facility]    Ordering Provider:Erika Blanton    Reason for cancel (please be detailed, any staff messages or encounters to note?): Per pt- Pt is having an injection in her back and is unsure if she can tolerate to lay on her side or back afterwards and would like to move to a new date.         Rescheduled: Yes      If rescheduled:    Date: 1/12/2022   Location: [facility]   Note any change or update to original order/sedation: no

## 2022-01-01 ENCOUNTER — OFFICE VISIT (OUTPATIENT)
Dept: DERMATOLOGY | Facility: CLINIC | Age: 77
End: 2022-01-01
Payer: COMMERCIAL

## 2022-01-01 ENCOUNTER — PATIENT OUTREACH (OUTPATIENT)
Dept: CARE COORDINATION | Facility: CLINIC | Age: 77
End: 2022-01-01

## 2022-01-01 ENCOUNTER — VIRTUAL VISIT (OUTPATIENT)
Dept: ONCOLOGY | Facility: CLINIC | Age: 77
End: 2022-01-01

## 2022-01-01 ENCOUNTER — TELEPHONE (OUTPATIENT)
Dept: GASTROENTEROLOGY | Facility: CLINIC | Age: 77
End: 2022-01-01
Payer: COMMERCIAL

## 2022-01-01 ENCOUNTER — TELEPHONE (OUTPATIENT)
Dept: PALLIATIVE CARE | Facility: CLINIC | Age: 77
End: 2022-01-01

## 2022-01-01 ENCOUNTER — VIRTUAL VISIT (OUTPATIENT)
Dept: ONCOLOGY | Facility: CLINIC | Age: 77
End: 2022-01-01
Attending: PHYSICIAN ASSISTANT
Payer: COMMERCIAL

## 2022-01-01 ENCOUNTER — APPOINTMENT (OUTPATIENT)
Dept: LAB | Facility: CLINIC | Age: 77
End: 2022-01-01
Attending: INTERNAL MEDICINE
Payer: COMMERCIAL

## 2022-01-01 ENCOUNTER — DOCUMENTATION ONLY (OUTPATIENT)
Dept: DERMATOLOGY | Facility: CLINIC | Age: 77
End: 2022-01-01
Payer: COMMERCIAL

## 2022-01-01 ENCOUNTER — OFFICE VISIT (OUTPATIENT)
Dept: RADIATION ONCOLOGY | Facility: CLINIC | Age: 77
End: 2022-01-01
Attending: INTERNAL MEDICINE
Payer: COMMERCIAL

## 2022-01-01 ENCOUNTER — THERAPY VISIT (OUTPATIENT)
Dept: PHYSICAL THERAPY | Facility: CLINIC | Age: 77
End: 2022-01-01
Payer: COMMERCIAL

## 2022-01-01 ENCOUNTER — LAB (OUTPATIENT)
Dept: LAB | Facility: CLINIC | Age: 77
End: 2022-01-01
Attending: PHYSICIAN ASSISTANT
Payer: COMMERCIAL

## 2022-01-01 ENCOUNTER — TELEPHONE (OUTPATIENT)
Dept: FAMILY MEDICINE | Facility: CLINIC | Age: 77
End: 2022-01-01

## 2022-01-01 ENCOUNTER — TELEPHONE (OUTPATIENT)
Dept: GASTROENTEROLOGY | Facility: CLINIC | Age: 77
End: 2022-01-01

## 2022-01-01 ENCOUNTER — E-CONSULT (OUTPATIENT)
Dept: GASTROENTEROLOGY | Facility: CLINIC | Age: 77
End: 2022-01-01
Payer: COMMERCIAL

## 2022-01-01 ENCOUNTER — APPOINTMENT (OUTPATIENT)
Dept: RADIATION ONCOLOGY | Facility: CLINIC | Age: 77
End: 2022-01-01
Payer: COMMERCIAL

## 2022-01-01 ENCOUNTER — ANESTHESIA (OUTPATIENT)
Dept: GASTROENTEROLOGY | Facility: CLINIC | Age: 77
End: 2022-01-01
Payer: COMMERCIAL

## 2022-01-01 ENCOUNTER — HOSPITAL ENCOUNTER (OUTPATIENT)
Dept: OCCUPATIONAL THERAPY | Facility: CLINIC | Age: 77
Setting detail: THERAPIES SERIES
Discharge: HOME | End: 2022-06-27
Attending: PHYSICIAN ASSISTANT
Payer: COMMERCIAL

## 2022-01-01 ENCOUNTER — OFFICE VISIT (OUTPATIENT)
Dept: AUDIOLOGY | Facility: CLINIC | Age: 77
End: 2022-01-01
Payer: COMMERCIAL

## 2022-01-01 ENCOUNTER — INFUSION THERAPY VISIT (OUTPATIENT)
Dept: ONCOLOGY | Facility: CLINIC | Age: 77
End: 2022-01-01
Attending: INTERNAL MEDICINE
Payer: COMMERCIAL

## 2022-01-01 ENCOUNTER — VIRTUAL VISIT (OUTPATIENT)
Dept: ONCOLOGY | Facility: CLINIC | Age: 77
End: 2022-01-01
Attending: SOCIAL WORKER
Payer: COMMERCIAL

## 2022-01-01 ENCOUNTER — TELEPHONE (OUTPATIENT)
Dept: ONCOLOGY | Facility: CLINIC | Age: 77
End: 2022-01-01

## 2022-01-01 ENCOUNTER — VIRTUAL VISIT (OUTPATIENT)
Dept: FAMILY MEDICINE | Facility: CLINIC | Age: 77
End: 2022-01-01
Payer: COMMERCIAL

## 2022-01-01 ENCOUNTER — TELEPHONE (OUTPATIENT)
Dept: DERMATOLOGY | Facility: CLINIC | Age: 77
End: 2022-01-01

## 2022-01-01 ENCOUNTER — HOSPITAL ENCOUNTER (EMERGENCY)
Facility: CLINIC | Age: 77
Discharge: HOME | End: 2022-09-26
Attending: EMERGENCY MEDICINE | Admitting: EMERGENCY MEDICINE
Payer: COMMERCIAL

## 2022-01-01 ENCOUNTER — HOSPITAL ENCOUNTER (OUTPATIENT)
Dept: OCCUPATIONAL THERAPY | Facility: CLINIC | Age: 77
Setting detail: THERAPIES SERIES
Discharge: HOME | End: 2022-08-22
Attending: PHYSICIAN ASSISTANT
Payer: COMMERCIAL

## 2022-01-01 ENCOUNTER — HOSPITAL ENCOUNTER (OUTPATIENT)
Dept: MRI IMAGING | Facility: CLINIC | Age: 77
Discharge: HOME | End: 2022-06-02
Attending: PHYSICIAN ASSISTANT | Admitting: PHYSICIAN ASSISTANT
Payer: COMMERCIAL

## 2022-01-01 ENCOUNTER — PRE VISIT (OUTPATIENT)
Dept: RADIATION ONCOLOGY | Facility: CLINIC | Age: 77
End: 2022-01-01

## 2022-01-01 ENCOUNTER — VIRTUAL VISIT (OUTPATIENT)
Dept: ONCOLOGY | Facility: CLINIC | Age: 77
End: 2022-01-01
Payer: COMMERCIAL

## 2022-01-01 ENCOUNTER — INFUSION THERAPY VISIT (OUTPATIENT)
Dept: INFUSION THERAPY | Facility: CLINIC | Age: 77
End: 2022-01-01
Payer: COMMERCIAL

## 2022-01-01 ENCOUNTER — PRE VISIT (OUTPATIENT)
Dept: DERMATOLOGY | Facility: CLINIC | Age: 77
End: 2022-01-01

## 2022-01-01 ENCOUNTER — VIRTUAL VISIT (OUTPATIENT)
Dept: FAMILY MEDICINE | Facility: CLINIC | Age: 77
End: 2022-01-01

## 2022-01-01 ENCOUNTER — HEALTH MAINTENANCE LETTER (OUTPATIENT)
Age: 77
End: 2022-01-01

## 2022-01-01 ENCOUNTER — HOSPITAL ENCOUNTER (OUTPATIENT)
Dept: OCCUPATIONAL THERAPY | Facility: CLINIC | Age: 77
Setting detail: THERAPIES SERIES
Discharge: HOME | End: 2022-07-12
Attending: INTERNAL MEDICINE
Payer: COMMERCIAL

## 2022-01-01 ENCOUNTER — HOSPITAL ENCOUNTER (OUTPATIENT)
Dept: PHYSICAL THERAPY | Facility: CLINIC | Age: 77
Setting detail: THERAPIES SERIES
Discharge: HOME | End: 2022-07-13
Attending: OTOLARYNGOLOGY
Payer: COMMERCIAL

## 2022-01-01 ENCOUNTER — VIRTUAL VISIT (OUTPATIENT)
Dept: GASTROENTEROLOGY | Facility: CLINIC | Age: 77
End: 2022-01-01
Payer: COMMERCIAL

## 2022-01-01 ENCOUNTER — HOSPITAL ENCOUNTER (OUTPATIENT)
Dept: OCCUPATIONAL THERAPY | Facility: CLINIC | Age: 77
Setting detail: THERAPIES SERIES
Discharge: HOME | End: 2022-06-11
Attending: PHYSICIAN ASSISTANT
Payer: COMMERCIAL

## 2022-01-01 ENCOUNTER — VIRTUAL VISIT (OUTPATIENT)
Dept: ONCOLOGY | Facility: CLINIC | Age: 77
End: 2022-01-01
Attending: REGISTERED NURSE
Payer: COMMERCIAL

## 2022-01-01 ENCOUNTER — HOSPITAL ENCOUNTER (OUTPATIENT)
Dept: GENERAL RADIOLOGY | Facility: CLINIC | Age: 77
End: 2022-01-04
Attending: ORTHOPAEDIC SURGERY
Payer: COMMERCIAL

## 2022-01-01 ENCOUNTER — VIRTUAL VISIT (OUTPATIENT)
Dept: PHARMACY | Facility: CLINIC | Age: 77
End: 2022-01-01
Attending: PHYSICIAN ASSISTANT
Payer: COMMERCIAL

## 2022-01-01 ENCOUNTER — HOSPITAL ENCOUNTER (EMERGENCY)
Facility: CLINIC | Age: 77
Discharge: HOME | End: 2022-10-12
Attending: NURSE PRACTITIONER | Admitting: NURSE PRACTITIONER
Payer: COMMERCIAL

## 2022-01-01 ENCOUNTER — ALLIED HEALTH/NURSE VISIT (OUTPATIENT)
Dept: DERMATOLOGY | Facility: CLINIC | Age: 77
End: 2022-01-01
Payer: COMMERCIAL

## 2022-01-01 ENCOUNTER — HOSPITAL ENCOUNTER (EMERGENCY)
Facility: CLINIC | Age: 77
End: 2022-01-01
Payer: COMMERCIAL

## 2022-01-01 ENCOUNTER — HOSPITAL ENCOUNTER (OUTPATIENT)
Facility: AMBULATORY SURGERY CENTER | Age: 77
Discharge: HOME | End: 2022-06-20
Attending: INTERNAL MEDICINE | Admitting: INTERNAL MEDICINE
Payer: COMMERCIAL

## 2022-01-01 ENCOUNTER — TELEPHONE (OUTPATIENT)
Dept: DERMATOLOGY | Facility: CLINIC | Age: 77
End: 2022-01-01
Payer: COMMERCIAL

## 2022-01-01 ENCOUNTER — TELEPHONE (OUTPATIENT)
Dept: ORTHOPEDICS | Facility: CLINIC | Age: 77
End: 2022-01-01

## 2022-01-01 ENCOUNTER — OFFICE VISIT (OUTPATIENT)
Dept: URGENT CARE | Facility: URGENT CARE | Age: 77
End: 2022-01-01
Payer: COMMERCIAL

## 2022-01-01 ENCOUNTER — OFFICE VISIT (OUTPATIENT)
Dept: OTOLARYNGOLOGY | Facility: CLINIC | Age: 77
End: 2022-01-01
Attending: PHYSICIAN ASSISTANT
Payer: COMMERCIAL

## 2022-01-01 ENCOUNTER — VIRTUAL VISIT (OUTPATIENT)
Dept: ONCOLOGY | Facility: CLINIC | Age: 77
End: 2022-01-01
Attending: HOSPITALIST
Payer: COMMERCIAL

## 2022-01-01 ENCOUNTER — PATIENT OUTREACH (OUTPATIENT)
Dept: ONCOLOGY | Facility: CLINIC | Age: 77
End: 2022-01-01
Payer: COMMERCIAL

## 2022-01-01 ENCOUNTER — HOSPITAL ENCOUNTER (OUTPATIENT)
Dept: PHYSICAL THERAPY | Facility: CLINIC | Age: 77
Setting detail: THERAPIES SERIES
Discharge: HOME | End: 2022-09-13
Attending: PSYCHIATRY & NEUROLOGY
Payer: COMMERCIAL

## 2022-01-01 ENCOUNTER — OFFICE VISIT (OUTPATIENT)
Dept: RADIATION ONCOLOGY | Facility: CLINIC | Age: 77
End: 2022-01-01
Payer: COMMERCIAL

## 2022-01-01 ENCOUNTER — HOSPITAL ENCOUNTER (OUTPATIENT)
Dept: OCCUPATIONAL THERAPY | Facility: CLINIC | Age: 77
Setting detail: THERAPIES SERIES
Discharge: HOME | End: 2022-07-25
Attending: INTERNAL MEDICINE
Payer: COMMERCIAL

## 2022-01-01 ENCOUNTER — LAB (OUTPATIENT)
Dept: LAB | Facility: CLINIC | Age: 77
End: 2022-01-01
Payer: COMMERCIAL

## 2022-01-01 ENCOUNTER — OFFICE VISIT (OUTPATIENT)
Dept: AUDIOLOGY | Facility: CLINIC | Age: 77
End: 2022-01-01
Attending: INTERNAL MEDICINE
Payer: COMMERCIAL

## 2022-01-01 ENCOUNTER — TELEPHONE (OUTPATIENT)
Dept: FAMILY MEDICINE | Facility: CLINIC | Age: 77
End: 2022-01-01
Payer: COMMERCIAL

## 2022-01-01 ENCOUNTER — ANCILLARY PROCEDURE (OUTPATIENT)
Dept: PET IMAGING | Facility: CLINIC | Age: 77
End: 2022-01-01
Attending: PHYSICIAN ASSISTANT
Payer: COMMERCIAL

## 2022-01-01 ENCOUNTER — HOSPITAL ENCOUNTER (OUTPATIENT)
Facility: CLINIC | Age: 77
Discharge: HOME | End: 2022-01-12
Attending: INTERNAL MEDICINE | Admitting: INTERNAL MEDICINE
Payer: COMMERCIAL

## 2022-01-01 ENCOUNTER — HOSPITAL ENCOUNTER (OUTPATIENT)
Dept: OCCUPATIONAL THERAPY | Facility: CLINIC | Age: 77
Setting detail: THERAPIES SERIES
Discharge: HOME | End: 2022-09-02
Attending: PHYSICIAN ASSISTANT
Payer: COMMERCIAL

## 2022-01-01 ENCOUNTER — HOSPITAL ENCOUNTER (OUTPATIENT)
Facility: CLINIC | Age: 77
Discharge: HOME | End: 2022-01-04
Admitting: PHYSICIAN ASSISTANT
Payer: COMMERCIAL

## 2022-01-01 ENCOUNTER — TELEPHONE (OUTPATIENT)
Dept: AUDIOLOGY | Facility: CLINIC | Age: 77
End: 2022-01-01

## 2022-01-01 ENCOUNTER — HOSPITAL ENCOUNTER (OUTPATIENT)
Dept: PHYSICAL THERAPY | Facility: CLINIC | Age: 77
Setting detail: THERAPIES SERIES
Discharge: HOME | End: 2022-08-09
Attending: PSYCHIATRY & NEUROLOGY
Payer: COMMERCIAL

## 2022-01-01 ENCOUNTER — LAB (OUTPATIENT)
Dept: LAB | Facility: CLINIC | Age: 77
End: 2022-01-01

## 2022-01-01 ENCOUNTER — HOSPITAL ENCOUNTER (OUTPATIENT)
Facility: CLINIC | Age: 77
Discharge: HOME | End: 2022-09-12
Attending: SPECIALIST | Admitting: SPECIALIST
Payer: COMMERCIAL

## 2022-01-01 ENCOUNTER — ANESTHESIA EVENT (OUTPATIENT)
Dept: GASTROENTEROLOGY | Facility: CLINIC | Age: 77
End: 2022-01-01
Payer: COMMERCIAL

## 2022-01-01 ENCOUNTER — OFFICE VISIT (OUTPATIENT)
Dept: FAMILY MEDICINE | Facility: OTHER | Age: 77
End: 2022-01-01
Payer: COMMERCIAL

## 2022-01-01 ENCOUNTER — OFFICE VISIT (OUTPATIENT)
Dept: FAMILY MEDICINE | Facility: CLINIC | Age: 77
End: 2022-01-01
Payer: COMMERCIAL

## 2022-01-01 ENCOUNTER — HOSPITAL ENCOUNTER (OUTPATIENT)
Dept: OCCUPATIONAL THERAPY | Facility: CLINIC | Age: 77
Setting detail: THERAPIES SERIES
Discharge: HOME | End: 2022-08-01
Attending: PHYSICIAN ASSISTANT
Payer: COMMERCIAL

## 2022-01-01 ENCOUNTER — NURSE TRIAGE (OUTPATIENT)
Dept: NURSING | Facility: CLINIC | Age: 77
End: 2022-01-01

## 2022-01-01 ENCOUNTER — OFFICE VISIT (OUTPATIENT)
Dept: PEDIATRICS | Facility: CLINIC | Age: 77
End: 2022-01-01
Attending: EMERGENCY MEDICINE
Payer: COMMERCIAL

## 2022-01-01 ENCOUNTER — ONCOLOGY VISIT (OUTPATIENT)
Dept: ONCOLOGY | Facility: CLINIC | Age: 77
End: 2022-01-01
Payer: COMMERCIAL

## 2022-01-01 ENCOUNTER — OFFICE VISIT (OUTPATIENT)
Dept: NEUROLOGY | Facility: CLINIC | Age: 77
End: 2022-01-01
Payer: COMMERCIAL

## 2022-01-01 ENCOUNTER — ANCILLARY PROCEDURE (OUTPATIENT)
Dept: MRI IMAGING | Facility: CLINIC | Age: 77
End: 2022-01-01
Attending: INTERNAL MEDICINE
Payer: COMMERCIAL

## 2022-01-01 ENCOUNTER — ANCILLARY PROCEDURE (OUTPATIENT)
Dept: CT IMAGING | Facility: CLINIC | Age: 77
End: 2022-01-01
Attending: PREVENTIVE MEDICINE
Payer: COMMERCIAL

## 2022-01-01 ENCOUNTER — INFUSION THERAPY VISIT (OUTPATIENT)
Dept: INFUSION THERAPY | Facility: CLINIC | Age: 77
End: 2022-01-01
Attending: INTERNAL MEDICINE
Payer: COMMERCIAL

## 2022-01-01 ENCOUNTER — HOSPITAL ENCOUNTER (OUTPATIENT)
Dept: OCCUPATIONAL THERAPY | Facility: CLINIC | Age: 77
Setting detail: THERAPIES SERIES
Discharge: HOME | End: 2022-08-30
Attending: PHYSICIAN ASSISTANT
Payer: COMMERCIAL

## 2022-01-01 ENCOUNTER — HOSPITAL ENCOUNTER (OUTPATIENT)
Dept: PHYSICAL THERAPY | Facility: CLINIC | Age: 77
Setting detail: THERAPIES SERIES
Discharge: HOME | End: 2022-07-05
Attending: OTOLARYNGOLOGY
Payer: COMMERCIAL

## 2022-01-01 ENCOUNTER — HOSPITAL ENCOUNTER (OUTPATIENT)
Dept: PET IMAGING | Facility: CLINIC | Age: 77
Setting detail: NUCLEAR MEDICINE
Discharge: HOME | End: 2022-09-15
Attending: STUDENT IN AN ORGANIZED HEALTH CARE EDUCATION/TRAINING PROGRAM | Admitting: STUDENT IN AN ORGANIZED HEALTH CARE EDUCATION/TRAINING PROGRAM
Payer: COMMERCIAL

## 2022-01-01 ENCOUNTER — HOSPITAL ENCOUNTER (OUTPATIENT)
Dept: OCCUPATIONAL THERAPY | Facility: CLINIC | Age: 77
Setting detail: THERAPIES SERIES
Discharge: HOME | End: 2022-07-29
Attending: PHYSICIAN ASSISTANT
Payer: COMMERCIAL

## 2022-01-01 ENCOUNTER — HOSPITAL ENCOUNTER (OUTPATIENT)
Dept: OCCUPATIONAL THERAPY | Facility: CLINIC | Age: 77
Setting detail: THERAPIES SERIES
Discharge: HOME | End: 2022-07-22
Attending: INTERNAL MEDICINE
Payer: COMMERCIAL

## 2022-01-01 ENCOUNTER — NURSE TRIAGE (OUTPATIENT)
Dept: ONCOLOGY | Facility: CLINIC | Age: 77
End: 2022-01-01

## 2022-01-01 ENCOUNTER — ANCILLARY PROCEDURE (OUTPATIENT)
Dept: PET IMAGING | Facility: CLINIC | Age: 77
End: 2022-01-01
Attending: INTERNAL MEDICINE
Payer: COMMERCIAL

## 2022-01-01 ENCOUNTER — HOSPITAL ENCOUNTER (OUTPATIENT)
Dept: OCCUPATIONAL THERAPY | Facility: CLINIC | Age: 77
Setting detail: THERAPIES SERIES
Discharge: HOME | End: 2022-06-21
Attending: PHYSICIAN ASSISTANT
Payer: COMMERCIAL

## 2022-01-01 ENCOUNTER — TELEPHONE (OUTPATIENT)
Dept: MEDSURG UNIT | Facility: CLINIC | Age: 77
End: 2022-01-01
Payer: COMMERCIAL

## 2022-01-01 ENCOUNTER — APPOINTMENT (OUTPATIENT)
Dept: GENERAL RADIOLOGY | Facility: CLINIC | Age: 77
End: 2022-01-01
Attending: EMERGENCY MEDICINE
Payer: COMMERCIAL

## 2022-01-01 ENCOUNTER — THERAPY VISIT (OUTPATIENT)
Dept: PHYSICAL THERAPY | Facility: CLINIC | Age: 77
End: 2022-01-01
Attending: FAMILY MEDICINE
Payer: COMMERCIAL

## 2022-01-01 ENCOUNTER — VIRTUAL VISIT (OUTPATIENT)
Dept: PSYCHOLOGY | Facility: CLINIC | Age: 77
End: 2022-01-01
Attending: PSYCHOLOGIST
Payer: COMMERCIAL

## 2022-01-01 ENCOUNTER — TELEPHONE (OUTPATIENT)
Dept: NEUROLOGY | Facility: CLINIC | Age: 77
End: 2022-01-01

## 2022-01-01 ENCOUNTER — DOCUMENTATION ONLY (OUTPATIENT)
Dept: RADIATION ONCOLOGY | Facility: CLINIC | Age: 77
End: 2022-01-01

## 2022-01-01 VITALS
HEART RATE: 102 BPM | RESPIRATION RATE: 18 BRPM | DIASTOLIC BLOOD PRESSURE: 73 MMHG | TEMPERATURE: 97.6 F | OXYGEN SATURATION: 96 % | SYSTOLIC BLOOD PRESSURE: 132 MMHG

## 2022-01-01 VITALS
BODY MASS INDEX: 20.05 KG/M2 | SYSTOLIC BLOOD PRESSURE: 100 MMHG | OXYGEN SATURATION: 98 % | HEART RATE: 110 BPM | TEMPERATURE: 98.4 F | WEIGHT: 128 LBS | DIASTOLIC BLOOD PRESSURE: 62 MMHG

## 2022-01-01 VITALS
SYSTOLIC BLOOD PRESSURE: 135 MMHG | DIASTOLIC BLOOD PRESSURE: 74 MMHG | OXYGEN SATURATION: 98 % | HEART RATE: 101 BPM | RESPIRATION RATE: 18 BRPM

## 2022-01-01 VITALS
TEMPERATURE: 97.6 F | DIASTOLIC BLOOD PRESSURE: 64 MMHG | WEIGHT: 140.43 LBS | OXYGEN SATURATION: 96 % | HEIGHT: 67 IN | BODY MASS INDEX: 22.04 KG/M2 | HEART RATE: 63 BPM | SYSTOLIC BLOOD PRESSURE: 118 MMHG | RESPIRATION RATE: 18 BRPM

## 2022-01-01 VITALS
OXYGEN SATURATION: 97 % | TEMPERATURE: 97.2 F | WEIGHT: 143.3 LBS | HEART RATE: 87 BPM | RESPIRATION RATE: 18 BRPM | SYSTOLIC BLOOD PRESSURE: 113 MMHG | BODY MASS INDEX: 22.44 KG/M2 | DIASTOLIC BLOOD PRESSURE: 67 MMHG

## 2022-01-01 VITALS
RESPIRATION RATE: 16 BRPM | HEART RATE: 124 BPM | OXYGEN SATURATION: 94 % | SYSTOLIC BLOOD PRESSURE: 100 MMHG | DIASTOLIC BLOOD PRESSURE: 66 MMHG | TEMPERATURE: 98.5 F | WEIGHT: 132.4 LBS | BODY MASS INDEX: 20.74 KG/M2

## 2022-01-01 VITALS
RESPIRATION RATE: 16 BRPM | TEMPERATURE: 98.3 F | HEART RATE: 108 BPM | DIASTOLIC BLOOD PRESSURE: 70 MMHG | SYSTOLIC BLOOD PRESSURE: 121 MMHG | OXYGEN SATURATION: 94 %

## 2022-01-01 VITALS
SYSTOLIC BLOOD PRESSURE: 131 MMHG | OXYGEN SATURATION: 98 % | WEIGHT: 135 LBS | TEMPERATURE: 98 F | RESPIRATION RATE: 16 BRPM | HEART RATE: 97 BPM | DIASTOLIC BLOOD PRESSURE: 70 MMHG | BODY MASS INDEX: 21.14 KG/M2

## 2022-01-01 VITALS
OXYGEN SATURATION: 97 % | SYSTOLIC BLOOD PRESSURE: 112 MMHG | TEMPERATURE: 98.2 F | RESPIRATION RATE: 18 BRPM | DIASTOLIC BLOOD PRESSURE: 66 MMHG | HEART RATE: 99 BPM | BODY MASS INDEX: 21.61 KG/M2 | WEIGHT: 138 LBS

## 2022-01-01 VITALS
BODY MASS INDEX: 22.35 KG/M2 | SYSTOLIC BLOOD PRESSURE: 119 MMHG | OXYGEN SATURATION: 98 % | WEIGHT: 142.7 LBS | HEART RATE: 89 BPM | DIASTOLIC BLOOD PRESSURE: 78 MMHG | TEMPERATURE: 97.4 F

## 2022-01-01 VITALS — SYSTOLIC BLOOD PRESSURE: 136 MMHG | OXYGEN SATURATION: 97 % | DIASTOLIC BLOOD PRESSURE: 76 MMHG | HEART RATE: 106 BPM

## 2022-01-01 VITALS
HEART RATE: 88 BPM | RESPIRATION RATE: 16 BRPM | SYSTOLIC BLOOD PRESSURE: 123 MMHG | DIASTOLIC BLOOD PRESSURE: 72 MMHG | OXYGEN SATURATION: 97 %

## 2022-01-01 VITALS — SYSTOLIC BLOOD PRESSURE: 119 MMHG | BODY MASS INDEX: 22.35 KG/M2 | WEIGHT: 142.7 LBS | DIASTOLIC BLOOD PRESSURE: 78 MMHG

## 2022-01-01 VITALS
HEIGHT: 67 IN | WEIGHT: 141.4 LBS | SYSTOLIC BLOOD PRESSURE: 138 MMHG | BODY MASS INDEX: 22.15 KG/M2 | SYSTOLIC BLOOD PRESSURE: 136 MMHG | DIASTOLIC BLOOD PRESSURE: 88 MMHG | HEART RATE: 100 BPM | OXYGEN SATURATION: 98 % | WEIGHT: 136 LBS | HEART RATE: 91 BPM | TEMPERATURE: 98.1 F | RESPIRATION RATE: 16 BRPM | OXYGEN SATURATION: 98 % | BODY MASS INDEX: 21.35 KG/M2 | DIASTOLIC BLOOD PRESSURE: 79 MMHG

## 2022-01-01 VITALS
OXYGEN SATURATION: 92 % | TEMPERATURE: 97.7 F | DIASTOLIC BLOOD PRESSURE: 58 MMHG | RESPIRATION RATE: 16 BRPM | SYSTOLIC BLOOD PRESSURE: 111 MMHG | HEART RATE: 82 BPM

## 2022-01-01 VITALS
BODY MASS INDEX: 20.28 KG/M2 | HEART RATE: 110 BPM | WEIGHT: 129.5 LBS | TEMPERATURE: 97.6 F | OXYGEN SATURATION: 96 % | SYSTOLIC BLOOD PRESSURE: 106 MMHG | DIASTOLIC BLOOD PRESSURE: 59 MMHG | RESPIRATION RATE: 18 BRPM

## 2022-01-01 VITALS
HEART RATE: 118 BPM | OXYGEN SATURATION: 96 % | TEMPERATURE: 98.2 F | RESPIRATION RATE: 18 BRPM | WEIGHT: 134 LBS | SYSTOLIC BLOOD PRESSURE: 115 MMHG | BODY MASS INDEX: 20.99 KG/M2 | DIASTOLIC BLOOD PRESSURE: 68 MMHG

## 2022-01-01 VITALS
SYSTOLIC BLOOD PRESSURE: 136 MMHG | RESPIRATION RATE: 16 BRPM | HEART RATE: 102 BPM | TEMPERATURE: 97.6 F | BODY MASS INDEX: 21.32 KG/M2 | OXYGEN SATURATION: 98 % | WEIGHT: 136.1 LBS | DIASTOLIC BLOOD PRESSURE: 73 MMHG

## 2022-01-01 VITALS
BODY MASS INDEX: 21.47 KG/M2 | SYSTOLIC BLOOD PRESSURE: 125 MMHG | WEIGHT: 137.1 LBS | HEART RATE: 85 BPM | TEMPERATURE: 98.3 F | OXYGEN SATURATION: 96 % | DIASTOLIC BLOOD PRESSURE: 75 MMHG

## 2022-01-01 VITALS
DIASTOLIC BLOOD PRESSURE: 80 MMHG | HEART RATE: 88 BPM | SYSTOLIC BLOOD PRESSURE: 126 MMHG | HEART RATE: 100 BPM | SYSTOLIC BLOOD PRESSURE: 126 MMHG | DIASTOLIC BLOOD PRESSURE: 76 MMHG

## 2022-01-01 VITALS
HEART RATE: 78 BPM | DIASTOLIC BLOOD PRESSURE: 55 MMHG | RESPIRATION RATE: 16 BRPM | SYSTOLIC BLOOD PRESSURE: 98 MMHG | OXYGEN SATURATION: 95 % | TEMPERATURE: 97.6 F

## 2022-01-01 VITALS
DIASTOLIC BLOOD PRESSURE: 61 MMHG | OXYGEN SATURATION: 97 % | RESPIRATION RATE: 16 BRPM | HEART RATE: 106 BPM | BODY MASS INDEX: 20.83 KG/M2 | WEIGHT: 133 LBS | SYSTOLIC BLOOD PRESSURE: 104 MMHG | TEMPERATURE: 97.5 F

## 2022-01-01 VITALS
RESPIRATION RATE: 18 BRPM | BODY MASS INDEX: 20.56 KG/M2 | TEMPERATURE: 97.8 F | DIASTOLIC BLOOD PRESSURE: 65 MMHG | WEIGHT: 131 LBS | OXYGEN SATURATION: 95 % | HEIGHT: 67 IN | HEART RATE: 103 BPM | SYSTOLIC BLOOD PRESSURE: 113 MMHG

## 2022-01-01 VITALS
WEIGHT: 131.9 LBS | OXYGEN SATURATION: 97 % | RESPIRATION RATE: 18 BRPM | SYSTOLIC BLOOD PRESSURE: 117 MMHG | DIASTOLIC BLOOD PRESSURE: 67 MMHG | TEMPERATURE: 97.6 F | HEART RATE: 99 BPM | BODY MASS INDEX: 20.66 KG/M2

## 2022-01-01 VITALS
HEART RATE: 117 BPM | BODY MASS INDEX: 20.36 KG/M2 | TEMPERATURE: 97.7 F | SYSTOLIC BLOOD PRESSURE: 111 MMHG | OXYGEN SATURATION: 98 % | WEIGHT: 130 LBS | RESPIRATION RATE: 16 BRPM | DIASTOLIC BLOOD PRESSURE: 61 MMHG

## 2022-01-01 VITALS
HEART RATE: 107 BPM | RESPIRATION RATE: 18 BRPM | OXYGEN SATURATION: 95 % | SYSTOLIC BLOOD PRESSURE: 131 MMHG | DIASTOLIC BLOOD PRESSURE: 77 MMHG

## 2022-01-01 VITALS
BODY MASS INDEX: 21.63 KG/M2 | OXYGEN SATURATION: 96 % | DIASTOLIC BLOOD PRESSURE: 74 MMHG | TEMPERATURE: 98.2 F | WEIGHT: 138.1 LBS | SYSTOLIC BLOOD PRESSURE: 122 MMHG | RESPIRATION RATE: 12 BRPM | HEART RATE: 94 BPM

## 2022-01-01 VITALS
HEART RATE: 97 BPM | OXYGEN SATURATION: 97 % | TEMPERATURE: 97.3 F | DIASTOLIC BLOOD PRESSURE: 70 MMHG | SYSTOLIC BLOOD PRESSURE: 105 MMHG

## 2022-01-01 VITALS
OXYGEN SATURATION: 97 % | HEART RATE: 102 BPM | DIASTOLIC BLOOD PRESSURE: 73 MMHG | SYSTOLIC BLOOD PRESSURE: 116 MMHG | TEMPERATURE: 97.6 F

## 2022-01-01 VITALS
RESPIRATION RATE: 14 BRPM | OXYGEN SATURATION: 96 % | SYSTOLIC BLOOD PRESSURE: 117 MMHG | DIASTOLIC BLOOD PRESSURE: 70 MMHG | TEMPERATURE: 97.9 F | BODY MASS INDEX: 22.27 KG/M2 | HEART RATE: 97 BPM | WEIGHT: 142.2 LBS

## 2022-01-01 VITALS — DIASTOLIC BLOOD PRESSURE: 67 MMHG | HEART RATE: 88 BPM | SYSTOLIC BLOOD PRESSURE: 126 MMHG

## 2022-01-01 VITALS — SYSTOLIC BLOOD PRESSURE: 109 MMHG | DIASTOLIC BLOOD PRESSURE: 74 MMHG | HEART RATE: 86 BPM

## 2022-01-01 VITALS
DIASTOLIC BLOOD PRESSURE: 65 MMHG | RESPIRATION RATE: 16 BRPM | BODY MASS INDEX: 20.63 KG/M2 | HEART RATE: 100 BPM | WEIGHT: 131.7 LBS | OXYGEN SATURATION: 97 % | SYSTOLIC BLOOD PRESSURE: 101 MMHG

## 2022-01-01 VITALS — HEART RATE: 100 BPM | DIASTOLIC BLOOD PRESSURE: 77 MMHG | SYSTOLIC BLOOD PRESSURE: 126 MMHG

## 2022-01-01 VITALS
TEMPERATURE: 98 F | SYSTOLIC BLOOD PRESSURE: 123 MMHG | BODY MASS INDEX: 21.46 KG/M2 | OXYGEN SATURATION: 97 % | WEIGHT: 137 LBS | DIASTOLIC BLOOD PRESSURE: 72 MMHG | HEART RATE: 89 BPM

## 2022-01-01 VITALS — WEIGHT: 136 LBS | BODY MASS INDEX: 21.3 KG/M2

## 2022-01-01 VITALS
SYSTOLIC BLOOD PRESSURE: 134 MMHG | OXYGEN SATURATION: 97 % | HEART RATE: 104 BPM | WEIGHT: 142.2 LBS | BODY MASS INDEX: 22.27 KG/M2 | TEMPERATURE: 98.9 F | DIASTOLIC BLOOD PRESSURE: 74 MMHG

## 2022-01-01 VITALS
DIASTOLIC BLOOD PRESSURE: 67 MMHG | RESPIRATION RATE: 16 BRPM | HEART RATE: 93 BPM | TEMPERATURE: 98 F | BODY MASS INDEX: 22.21 KG/M2 | SYSTOLIC BLOOD PRESSURE: 109 MMHG | OXYGEN SATURATION: 98 % | WEIGHT: 141.8 LBS

## 2022-01-01 VITALS — HEIGHT: 67 IN | BODY MASS INDEX: 21.35 KG/M2 | WEIGHT: 136 LBS

## 2022-01-01 VITALS — SYSTOLIC BLOOD PRESSURE: 142 MMHG | DIASTOLIC BLOOD PRESSURE: 84 MMHG | HEART RATE: 94 BPM

## 2022-01-01 VITALS
SYSTOLIC BLOOD PRESSURE: 100 MMHG | HEART RATE: 111 BPM | RESPIRATION RATE: 22 BRPM | TEMPERATURE: 97.9 F | DIASTOLIC BLOOD PRESSURE: 60 MMHG | BODY MASS INDEX: 21.19 KG/M2 | OXYGEN SATURATION: 97 % | HEIGHT: 67 IN | WEIGHT: 135 LBS

## 2022-01-01 VITALS
DIASTOLIC BLOOD PRESSURE: 72 MMHG | WEIGHT: 139.8 LBS | HEART RATE: 100 BPM | RESPIRATION RATE: 16 BRPM | TEMPERATURE: 99.3 F | BODY MASS INDEX: 21.9 KG/M2 | OXYGEN SATURATION: 97 % | SYSTOLIC BLOOD PRESSURE: 128 MMHG

## 2022-01-01 DIAGNOSIS — M62.81 GENERALIZED MUSCLE WEAKNESS: ICD-10-CM

## 2022-01-01 DIAGNOSIS — C43.71 MALIGNANT MELANOMA OF RIGHT LOWER EXTREMITY INCLUDING HIP (H): Primary | ICD-10-CM

## 2022-01-01 DIAGNOSIS — R51.9 NONINTRACTABLE EPISODIC HEADACHE, UNSPECIFIED HEADACHE TYPE: ICD-10-CM

## 2022-01-01 DIAGNOSIS — C43.71 MALIGNANT MELANOMA OF RIGHT LOWER EXTREMITY INCLUDING HIP (H): ICD-10-CM

## 2022-01-01 DIAGNOSIS — C79.51 SECONDARY MALIGNANT NEOPLASM OF BONE (H): ICD-10-CM

## 2022-01-01 DIAGNOSIS — Z48.02 VISIT FOR SUTURE REMOVAL: Primary | ICD-10-CM

## 2022-01-01 DIAGNOSIS — C43.9 MELANOMA OF SKIN (H): ICD-10-CM

## 2022-01-01 DIAGNOSIS — B37.9 CANDIDA ALBICANS INFECTION: ICD-10-CM

## 2022-01-01 DIAGNOSIS — M54.81 CERVICO-OCCIPITAL NEURALGIA OF LEFT SIDE: ICD-10-CM

## 2022-01-01 DIAGNOSIS — C43.9 MELANOMA OF SKIN (H): Primary | ICD-10-CM

## 2022-01-01 DIAGNOSIS — C79.31 BRAIN METASTASIS: Primary | ICD-10-CM

## 2022-01-01 DIAGNOSIS — R00.2 PALPITATIONS: ICD-10-CM

## 2022-01-01 DIAGNOSIS — R10.9 PAIN, GASTRIC: ICD-10-CM

## 2022-01-01 DIAGNOSIS — N95.2 ATROPHIC VAGINITIS: ICD-10-CM

## 2022-01-01 DIAGNOSIS — Z51.81 ENCOUNTER FOR MEDICATION MONITORING: ICD-10-CM

## 2022-01-01 DIAGNOSIS — Z11.59 ENCOUNTER FOR SCREENING FOR OTHER VIRAL DISEASES: ICD-10-CM

## 2022-01-01 DIAGNOSIS — D64.9 ANEMIA, UNSPECIFIED TYPE: ICD-10-CM

## 2022-01-01 DIAGNOSIS — M79.605 BILATERAL LEG PAIN: Primary | ICD-10-CM

## 2022-01-01 DIAGNOSIS — Z51.5 PALLIATIVE CARE PATIENT: ICD-10-CM

## 2022-01-01 DIAGNOSIS — M54.50 CHRONIC BILATERAL LOW BACK PAIN WITHOUT SCIATICA: ICD-10-CM

## 2022-01-01 DIAGNOSIS — C43.9 METASTATIC MELANOMA (H): Primary | ICD-10-CM

## 2022-01-01 DIAGNOSIS — G89.29 CHRONIC BILATERAL LOW BACK PAIN WITHOUT SCIATICA: ICD-10-CM

## 2022-01-01 DIAGNOSIS — K59.00 CONSTIPATION: ICD-10-CM

## 2022-01-01 DIAGNOSIS — K59.00 CONSTIPATION, UNSPECIFIED CONSTIPATION TYPE: ICD-10-CM

## 2022-01-01 DIAGNOSIS — K59.03 THERAPEUTIC OPIOID INDUCED CONSTIPATION: ICD-10-CM

## 2022-01-01 DIAGNOSIS — E78.5 HYPERLIPIDEMIA LDL GOAL <100: ICD-10-CM

## 2022-01-01 DIAGNOSIS — H90.3 BILATERAL SENSORINEURAL HEARING LOSS: Primary | ICD-10-CM

## 2022-01-01 DIAGNOSIS — C79.51 SECONDARY MALIGNANT NEOPLASM OF BONE (H): Primary | ICD-10-CM

## 2022-01-01 DIAGNOSIS — F41.1 GAD (GENERALIZED ANXIETY DISORDER): ICD-10-CM

## 2022-01-01 DIAGNOSIS — D84.9 IMMUNODEFICIENCY, UNSPECIFIED (H): ICD-10-CM

## 2022-01-01 DIAGNOSIS — H81.12 BENIGN PAROXYSMAL POSITIONAL VERTIGO OF LEFT EAR: ICD-10-CM

## 2022-01-01 DIAGNOSIS — M79.604 BILATERAL LEG PAIN: Primary | ICD-10-CM

## 2022-01-01 DIAGNOSIS — R63.0 DECREASE IN APPETITE: Primary | ICD-10-CM

## 2022-01-01 DIAGNOSIS — H90.3 SENSORINEURAL HEARING LOSS (SNHL) OF BOTH EARS: Primary | ICD-10-CM

## 2022-01-01 DIAGNOSIS — R09.82 POSTNASAL DRIP: ICD-10-CM

## 2022-01-01 DIAGNOSIS — M79.604 BILATERAL LEG PAIN: ICD-10-CM

## 2022-01-01 DIAGNOSIS — B37.81 CANDIDA ESOPHAGITIS (H): Primary | ICD-10-CM

## 2022-01-01 DIAGNOSIS — R62.7 FAILURE TO THRIVE IN ADULT: ICD-10-CM

## 2022-01-01 DIAGNOSIS — D64.9 ANEMIA, UNSPECIFIED TYPE: Primary | ICD-10-CM

## 2022-01-01 DIAGNOSIS — G89.29 CHRONIC BILATERAL LOW BACK PAIN WITH LEFT-SIDED SCIATICA: Primary | ICD-10-CM

## 2022-01-01 DIAGNOSIS — F33.9 MAJOR DEPRESSION, RECURRENT, CHRONIC (H): Primary | ICD-10-CM

## 2022-01-01 DIAGNOSIS — M79.605 BILATERAL LEG PAIN: ICD-10-CM

## 2022-01-01 DIAGNOSIS — M79.89 RIGHT LEG SWELLING: ICD-10-CM

## 2022-01-01 DIAGNOSIS — I82.401 ACUTE DEEP VEIN THROMBOSIS (DVT) OF RIGHT LOWER EXTREMITY, UNSPECIFIED VEIN (H): Primary | ICD-10-CM

## 2022-01-01 DIAGNOSIS — R11.0 NAUSEA: ICD-10-CM

## 2022-01-01 DIAGNOSIS — G89.3 CANCER ASSOCIATED PAIN: ICD-10-CM

## 2022-01-01 DIAGNOSIS — R10.13 EPIGASTRIC PAIN: ICD-10-CM

## 2022-01-01 DIAGNOSIS — L81.4 SOLAR LENTIGO: ICD-10-CM

## 2022-01-01 DIAGNOSIS — D49.2 NEOPLASM OF UNSPECIFIED BEHAVIOR OF BONE, SOFT TISSUE, AND SKIN: Primary | ICD-10-CM

## 2022-01-01 DIAGNOSIS — F41.9 ANXIETY: ICD-10-CM

## 2022-01-01 DIAGNOSIS — M54.9 ACUTE BILATERAL BACK PAIN, UNSPECIFIED BACK LOCATION: Primary | ICD-10-CM

## 2022-01-01 DIAGNOSIS — F43.20 ADJUSTMENT DISORDER, UNSPECIFIED TYPE: Primary | ICD-10-CM

## 2022-01-01 DIAGNOSIS — F33.9 MAJOR DEPRESSION, RECURRENT, CHRONIC (H): ICD-10-CM

## 2022-01-01 DIAGNOSIS — Z79.899 ENCOUNTER FOR LONG-TERM (CURRENT) USE OF MEDICATIONS: Primary | ICD-10-CM

## 2022-01-01 DIAGNOSIS — M89.8X9 BONE PAIN: Primary | ICD-10-CM

## 2022-01-01 DIAGNOSIS — H81.12 BENIGN PAROXYSMAL POSITIONAL VERTIGO OF LEFT EAR: Primary | ICD-10-CM

## 2022-01-01 DIAGNOSIS — G89.4 CHRONIC PAIN DISORDER: ICD-10-CM

## 2022-01-01 DIAGNOSIS — G89.29 CHRONIC ABDOMINAL PAIN: ICD-10-CM

## 2022-01-01 DIAGNOSIS — C43.9 METASTATIC MELANOMA (H): ICD-10-CM

## 2022-01-01 DIAGNOSIS — I89.0 LYMPHEDEMA: Primary | ICD-10-CM

## 2022-01-01 DIAGNOSIS — N32.81 OVERACTIVE BLADDER: ICD-10-CM

## 2022-01-01 DIAGNOSIS — B37.81 ESOPHAGEAL CANDIDIASIS (H): Primary | ICD-10-CM

## 2022-01-01 DIAGNOSIS — R63.0 ANOREXIA: ICD-10-CM

## 2022-01-01 DIAGNOSIS — E83.52 HYPERCALCEMIA: Primary | ICD-10-CM

## 2022-01-01 DIAGNOSIS — J32.9 CHRONIC CONGESTION OF PARANASAL SINUS: ICD-10-CM

## 2022-01-01 DIAGNOSIS — G44.329 CHRONIC POST-TRAUMATIC HEADACHE, NOT INTRACTABLE: Primary | ICD-10-CM

## 2022-01-01 DIAGNOSIS — B37.81 CANDIDIASIS, ESOPHAGEAL (H): Primary | ICD-10-CM

## 2022-01-01 DIAGNOSIS — B37.81 ESOPHAGEAL CANDIDIASIS (H): ICD-10-CM

## 2022-01-01 DIAGNOSIS — E86.0 DEHYDRATION: ICD-10-CM

## 2022-01-01 DIAGNOSIS — R53.1 WEAKNESS: ICD-10-CM

## 2022-01-01 DIAGNOSIS — M54.16 LUMBAR RADICULOPATHY: ICD-10-CM

## 2022-01-01 DIAGNOSIS — R63.0 DECREASED APPETITE: ICD-10-CM

## 2022-01-01 DIAGNOSIS — F43.21 GRIEF: ICD-10-CM

## 2022-01-01 DIAGNOSIS — Z85.820 HISTORY OF MELANOMA: ICD-10-CM

## 2022-01-01 DIAGNOSIS — N89.8 VAGINAL ITCHING: Primary | ICD-10-CM

## 2022-01-01 DIAGNOSIS — K59.01 SLOW TRANSIT CONSTIPATION: ICD-10-CM

## 2022-01-01 DIAGNOSIS — F43.23 ADJUSTMENT DISORDER WITH MIXED ANXIETY AND DEPRESSED MOOD: Primary | ICD-10-CM

## 2022-01-01 DIAGNOSIS — G89.3 CANCER ASSOCIATED PAIN: Primary | ICD-10-CM

## 2022-01-01 DIAGNOSIS — R10.9 CHRONIC ABDOMINAL PAIN: ICD-10-CM

## 2022-01-01 DIAGNOSIS — B37.81 CANDIDA ESOPHAGITIS (H): ICD-10-CM

## 2022-01-01 DIAGNOSIS — D22.9 MULTIPLE BENIGN NEVI: ICD-10-CM

## 2022-01-01 DIAGNOSIS — R74.8 ELEVATED LIVER ENZYMES: ICD-10-CM

## 2022-01-01 DIAGNOSIS — L57.0 ACTINIC KERATOSIS: Primary | ICD-10-CM

## 2022-01-01 DIAGNOSIS — K59.9 MOTILITY DISORDER OF INTESTINE: ICD-10-CM

## 2022-01-01 DIAGNOSIS — R10.84 ABDOMINAL PAIN, GENERALIZED: Primary | ICD-10-CM

## 2022-01-01 DIAGNOSIS — M89.8X9 BONE PAIN: ICD-10-CM

## 2022-01-01 DIAGNOSIS — C79.31 BRAIN METASTASIS: ICD-10-CM

## 2022-01-01 DIAGNOSIS — M54.42 CHRONIC BILATERAL LOW BACK PAIN WITH LEFT-SIDED SCIATICA: Primary | ICD-10-CM

## 2022-01-01 DIAGNOSIS — L82.1 SEBORRHEIC KERATOSIS: ICD-10-CM

## 2022-01-01 DIAGNOSIS — T40.2X5A THERAPEUTIC OPIOID INDUCED CONSTIPATION: ICD-10-CM

## 2022-01-01 DIAGNOSIS — R19.7 DIARRHEA, UNSPECIFIED TYPE: ICD-10-CM

## 2022-01-01 DIAGNOSIS — N94.9 VAGINAL SYMPTOM: Primary | ICD-10-CM

## 2022-01-01 DIAGNOSIS — I82.401 ACUTE DEEP VEIN THROMBOSIS (DVT) OF RIGHT LOWER EXTREMITY, UNSPECIFIED VEIN (H): ICD-10-CM

## 2022-01-01 DIAGNOSIS — Z51.5 ENCOUNTER FOR PALLIATIVE CARE: ICD-10-CM

## 2022-01-01 DIAGNOSIS — R05.9 COUGH: ICD-10-CM

## 2022-01-01 DIAGNOSIS — R00.2 PALPITATIONS: Primary | ICD-10-CM

## 2022-01-01 DIAGNOSIS — J32.9 SINUSITIS, CHRONIC: ICD-10-CM

## 2022-01-01 DIAGNOSIS — E28.39 ESTROGEN DEFICIENCY: ICD-10-CM

## 2022-01-01 DIAGNOSIS — E86.0 DEHYDRATION: Primary | ICD-10-CM

## 2022-01-01 DIAGNOSIS — R25.1 TREMOR: ICD-10-CM

## 2022-01-01 DIAGNOSIS — D18.01 CHERRY ANGIOMA: ICD-10-CM

## 2022-01-01 DIAGNOSIS — Z53.9 ERRONEOUS ENCOUNTER--DISREGARD: ICD-10-CM

## 2022-01-01 DIAGNOSIS — R25.1 SHAKY: ICD-10-CM

## 2022-01-01 DIAGNOSIS — I89.0 LYMPHEDEMA: ICD-10-CM

## 2022-01-01 DIAGNOSIS — H04.123 DRY EYES: ICD-10-CM

## 2022-01-01 DIAGNOSIS — R10.84 ABDOMINAL PAIN, GENERALIZED: ICD-10-CM

## 2022-01-01 DIAGNOSIS — H90.3 BILATERAL SENSORINEURAL HEARING LOSS: ICD-10-CM

## 2022-01-01 LAB
ABO/RH(D): NORMAL
ACTH PLAS-MCNC: 10 PG/ML
ACTH PLAS-MCNC: <10 PG/ML
ACTH PLAS-MCNC: <10 PG/ML
ALBUMIN SERPL BCG-MCNC: 3.2 G/DL (ref 3.5–5.2)
ALBUMIN SERPL BCG-MCNC: 3.2 G/DL (ref 3.5–5.2)
ALBUMIN SERPL BCG-MCNC: 3.6 G/DL (ref 3.5–5.2)
ALBUMIN SERPL-MCNC: 2.4 G/DL (ref 3.4–5)
ALBUMIN SERPL-MCNC: 2.6 G/DL (ref 3.4–5)
ALBUMIN SERPL-MCNC: 2.6 G/DL (ref 3.4–5)
ALBUMIN SERPL-MCNC: 2.9 G/DL (ref 3.4–5)
ALBUMIN SERPL-MCNC: 3.1 G/DL (ref 3.4–5)
ALBUMIN SERPL-MCNC: 3.1 G/DL (ref 3.4–5)
ALBUMIN SERPL-MCNC: 3.2 G/DL (ref 3.4–5)
ALBUMIN SERPL-MCNC: 3.2 G/DL (ref 3.4–5)
ALBUMIN SERPL-MCNC: 3.3 G/DL (ref 3.4–5)
ALBUMIN SERPL-MCNC: 3.6 G/DL (ref 3.4–5)
ALBUMIN SERPL-MCNC: 3.7 G/DL (ref 3.4–5)
ALBUMIN UR-MCNC: 30 MG/DL
ALBUMIN UR-MCNC: NEGATIVE MG/DL
ALP SERPL-CCNC: 115 U/L (ref 35–104)
ALP SERPL-CCNC: 117 U/L (ref 40–150)
ALP SERPL-CCNC: 118 U/L (ref 40–150)
ALP SERPL-CCNC: 120 U/L (ref 40–150)
ALP SERPL-CCNC: 63 U/L (ref 40–150)
ALP SERPL-CCNC: 63 U/L (ref 40–150)
ALP SERPL-CCNC: 64 U/L (ref 40–150)
ALP SERPL-CCNC: 64 U/L (ref 40–150)
ALP SERPL-CCNC: 74 U/L (ref 40–150)
ALP SERPL-CCNC: 74 U/L (ref 40–150)
ALP SERPL-CCNC: 76 U/L (ref 40–150)
ALP SERPL-CCNC: 83 U/L (ref 40–150)
ALP SERPL-CCNC: 86 U/L (ref 35–104)
ALP SERPL-CCNC: 89 U/L (ref 35–104)
ALT SERPL W P-5'-P-CCNC: 11 U/L (ref 10–35)
ALT SERPL W P-5'-P-CCNC: 13 U/L (ref 10–35)
ALT SERPL W P-5'-P-CCNC: 15 U/L (ref 10–35)
ALT SERPL W P-5'-P-CCNC: 18 U/L (ref 0–50)
ALT SERPL W P-5'-P-CCNC: 20 U/L (ref 0–50)
ALT SERPL W P-5'-P-CCNC: 22 U/L (ref 0–50)
ALT SERPL W P-5'-P-CCNC: 26 U/L (ref 0–50)
ALT SERPL W P-5'-P-CCNC: 30 U/L (ref 0–50)
ALT SERPL W P-5'-P-CCNC: 33 U/L (ref 0–50)
ALT SERPL W P-5'-P-CCNC: 40 U/L (ref 0–50)
ALT SERPL W P-5'-P-CCNC: 41 U/L (ref 0–50)
ALT SERPL W P-5'-P-CCNC: 45 U/L (ref 0–50)
ANION GAP SERPL CALCULATED.3IONS-SCNC: 10 MMOL/L (ref 3–14)
ANION GAP SERPL CALCULATED.3IONS-SCNC: 12 MMOL/L (ref 7–15)
ANION GAP SERPL CALCULATED.3IONS-SCNC: 14 MMOL/L (ref 7–15)
ANION GAP SERPL CALCULATED.3IONS-SCNC: 14 MMOL/L (ref 7–15)
ANION GAP SERPL CALCULATED.3IONS-SCNC: 2 MMOL/L (ref 3–14)
ANION GAP SERPL CALCULATED.3IONS-SCNC: 4 MMOL/L (ref 3–14)
ANION GAP SERPL CALCULATED.3IONS-SCNC: 5 MMOL/L (ref 3–14)
ANION GAP SERPL CALCULATED.3IONS-SCNC: 6 MMOL/L (ref 3–14)
ANION GAP SERPL CALCULATED.3IONS-SCNC: 7 MMOL/L (ref 3–14)
ANION GAP SERPL CALCULATED.3IONS-SCNC: 8 MMOL/L (ref 3–14)
ANION GAP SERPL CALCULATED.3IONS-SCNC: 8 MMOL/L (ref 3–14)
ANTIBODY SCREEN: NEGATIVE
APPEARANCE UR: ABNORMAL
APPEARANCE UR: CLEAR
AST SERPL W P-5'-P-CCNC: 108 U/L (ref 10–35)
AST SERPL W P-5'-P-CCNC: 128 U/L (ref 10–35)
AST SERPL W P-5'-P-CCNC: 140 U/L (ref 0–45)
AST SERPL W P-5'-P-CCNC: 170 U/L (ref 0–45)
AST SERPL W P-5'-P-CCNC: 226 U/L (ref 0–45)
AST SERPL W P-5'-P-CCNC: 29 U/L (ref 0–45)
AST SERPL W P-5'-P-CCNC: 29 U/L (ref 0–45)
AST SERPL W P-5'-P-CCNC: 30 U/L (ref 0–45)
AST SERPL W P-5'-P-CCNC: 32 U/L (ref 0–45)
AST SERPL W P-5'-P-CCNC: 36 U/L (ref 0–45)
AST SERPL W P-5'-P-CCNC: 37 U/L (ref 0–45)
AST SERPL W P-5'-P-CCNC: 48 U/L (ref 0–45)
AST SERPL W P-5'-P-CCNC: 70 U/L (ref 10–35)
AST SERPL W P-5'-P-CCNC: 74 U/L (ref 0–45)
BACTERIA #/AREA URNS HPF: ABNORMAL /HPF
BACTERIA BRO BRUSH AEROBE CULT: ABNORMAL
BACTERIA BRO BRUSH AEROBE CULT: ABNORMAL
BASOPHILS # BLD AUTO: 0 10E3/UL (ref 0–0.2)
BASOPHILS # BLD MANUAL: 0 10E3/UL (ref 0–0.2)
BASOPHILS # BLD MANUAL: 0 10E3/UL (ref 0–0.2)
BASOPHILS # BLD MANUAL: 0.1 10E3/UL (ref 0–0.2)
BASOPHILS # BLD MANUAL: 0.1 10E3/UL (ref 0–0.2)
BASOPHILS NFR BLD AUTO: 0 %
BASOPHILS NFR BLD AUTO: 1 %
BASOPHILS NFR BLD MANUAL: 0 %
BASOPHILS NFR BLD MANUAL: 0 %
BASOPHILS NFR BLD MANUAL: 1 %
BASOPHILS NFR BLD MANUAL: 1 %
BILIRUB SERPL-MCNC: 0.3 MG/DL (ref 0.2–1.3)
BILIRUB SERPL-MCNC: 0.4 MG/DL
BILIRUB SERPL-MCNC: 0.4 MG/DL (ref 0.2–1.3)
BILIRUB SERPL-MCNC: 0.5 MG/DL (ref 0.2–1.3)
BILIRUB SERPL-MCNC: 0.5 MG/DL (ref 0.2–1.3)
BILIRUB SERPL-MCNC: 0.6 MG/DL (ref 0.2–1.3)
BILIRUB SERPL-MCNC: 0.6 MG/DL (ref 0.2–1.3)
BILIRUB UR QL STRIP: NEGATIVE
BILIRUB UR QL STRIP: NEGATIVE
BUN SERPL-MCNC: 14.5 MG/DL (ref 8–23)
BUN SERPL-MCNC: 14.6 MG/DL (ref 8–23)
BUN SERPL-MCNC: 15 MG/DL (ref 7–30)
BUN SERPL-MCNC: 18 MG/DL (ref 7–30)
BUN SERPL-MCNC: 18 MG/DL (ref 7–30)
BUN SERPL-MCNC: 20 MG/DL (ref 7–30)
BUN SERPL-MCNC: 21 MG/DL (ref 7–30)
BUN SERPL-MCNC: 25 MG/DL (ref 7–30)
BUN SERPL-MCNC: 25.9 MG/DL (ref 8–23)
BUN SERPL-MCNC: 26 MG/DL (ref 7–30)
BUN SERPL-MCNC: 27 MG/DL (ref 7–30)
BUN SERPL-MCNC: 30 MG/DL (ref 7–30)
CALCIUM SERPL-MCNC: 10.5 MG/DL (ref 8.8–10.2)
CALCIUM SERPL-MCNC: 8.8 MG/DL (ref 8.5–10.1)
CALCIUM SERPL-MCNC: 9.1 MG/DL (ref 8.5–10.1)
CALCIUM SERPL-MCNC: 9.1 MG/DL (ref 8.5–10.1)
CALCIUM SERPL-MCNC: 9.2 MG/DL (ref 8.5–10.1)
CALCIUM SERPL-MCNC: 9.3 MG/DL (ref 8.5–10.1)
CALCIUM SERPL-MCNC: 9.4 MG/DL (ref 8.5–10.1)
CALCIUM SERPL-MCNC: 9.4 MG/DL (ref 8.5–10.1)
CALCIUM SERPL-MCNC: 9.5 MG/DL (ref 8.5–10.1)
CALCIUM SERPL-MCNC: 9.5 MG/DL (ref 8.5–10.1)
CALCIUM SERPL-MCNC: 9.6 MG/DL (ref 8.5–10.1)
CALCIUM SERPL-MCNC: 9.7 MG/DL (ref 8.8–10.2)
CALCIUM SERPL-MCNC: 9.9 MG/DL (ref 8.5–10.1)
CALCIUM SERPL-MCNC: 9.9 MG/DL (ref 8.8–10.2)
CHLORIDE BLD-SCNC: 100 MMOL/L (ref 94–109)
CHLORIDE BLD-SCNC: 101 MMOL/L (ref 94–109)
CHLORIDE BLD-SCNC: 102 MMOL/L (ref 94–109)
CHLORIDE BLD-SCNC: 103 MMOL/L (ref 94–109)
CHLORIDE BLD-SCNC: 104 MMOL/L (ref 94–109)
CHLORIDE BLD-SCNC: 105 MMOL/L (ref 94–109)
CHLORIDE BLD-SCNC: 107 MMOL/L (ref 94–109)
CHLORIDE BLD-SCNC: 99 MMOL/L (ref 94–109)
CHLORIDE SERPL-SCNC: 95 MMOL/L (ref 98–107)
CHLORIDE SERPL-SCNC: 96 MMOL/L (ref 98–107)
CHLORIDE SERPL-SCNC: 98 MMOL/L (ref 98–107)
CHOLEST SERPL-MCNC: 204 MG/DL
CLUE CELLS: ABNORMAL
CO2 SERPL-SCNC: 28 MMOL/L (ref 20–32)
CO2 SERPL-SCNC: 28 MMOL/L (ref 20–32)
CO2 SERPL-SCNC: 29 MMOL/L (ref 20–32)
CO2 SERPL-SCNC: 30 MMOL/L (ref 20–32)
CO2 SERPL-SCNC: 31 MMOL/L (ref 20–32)
CO2 SERPL-SCNC: 32 MMOL/L (ref 20–32)
CO2 SERPL-SCNC: 32 MMOL/L (ref 20–32)
CO2 SERPL-SCNC: 35 MMOL/L (ref 20–32)
COLOR UR AUTO: YELLOW
COLOR UR AUTO: YELLOW
CREAT SERPL-MCNC: 0.42 MG/DL (ref 0.52–1.04)
CREAT SERPL-MCNC: 0.47 MG/DL (ref 0.52–1.04)
CREAT SERPL-MCNC: 0.49 MG/DL (ref 0.52–1.04)
CREAT SERPL-MCNC: 0.52 MG/DL (ref 0.51–0.95)
CREAT SERPL-MCNC: 0.54 MG/DL (ref 0.51–0.95)
CREAT SERPL-MCNC: 0.55 MG/DL (ref 0.52–1.04)
CREAT SERPL-MCNC: 0.59 MG/DL (ref 0.52–1.04)
CREAT SERPL-MCNC: 0.6 MG/DL (ref 0.52–1.04)
CREAT SERPL-MCNC: 0.62 MG/DL (ref 0.52–1.04)
CREAT SERPL-MCNC: 0.62 MG/DL (ref 0.52–1.04)
CREAT SERPL-MCNC: 0.64 MG/DL (ref 0.52–1.04)
CREAT SERPL-MCNC: 0.66 MG/DL (ref 0.52–1.04)
CREAT SERPL-MCNC: 0.71 MG/DL (ref 0.52–1.04)
CREAT SERPL-MCNC: 0.72 MG/DL (ref 0.51–0.95)
DEPRECATED CALCIDIOL+CALCIFEROL SERPL-MC: 90 UG/L (ref 20–75)
DEPRECATED HCO3 PLAS-SCNC: 26 MMOL/L (ref 22–29)
DEPRECATED HCO3 PLAS-SCNC: 27 MMOL/L (ref 22–29)
DEPRECATED HCO3 PLAS-SCNC: 29 MMOL/L (ref 22–29)
EOSINOPHIL # BLD AUTO: 0 10E3/UL (ref 0–0.7)
EOSINOPHIL # BLD AUTO: 0.1 10E3/UL (ref 0–0.7)
EOSINOPHIL # BLD AUTO: 0.2 10E3/UL (ref 0–0.7)
EOSINOPHIL # BLD AUTO: 0.2 10E3/UL (ref 0–0.7)
EOSINOPHIL # BLD AUTO: 0.3 10E3/UL (ref 0–0.7)
EOSINOPHIL # BLD MANUAL: 0 10E3/UL (ref 0–0.7)
EOSINOPHIL # BLD MANUAL: 0 10E3/UL (ref 0–0.7)
EOSINOPHIL # BLD MANUAL: 0.1 10E3/UL (ref 0–0.7)
EOSINOPHIL # BLD MANUAL: 0.1 10E3/UL (ref 0–0.7)
EOSINOPHIL NFR BLD AUTO: 0 %
EOSINOPHIL NFR BLD AUTO: 1 %
EOSINOPHIL NFR BLD AUTO: 1 %
EOSINOPHIL NFR BLD AUTO: 2 %
EOSINOPHIL NFR BLD AUTO: 2 %
EOSINOPHIL NFR BLD AUTO: 3 %
EOSINOPHIL NFR BLD AUTO: 4 %
EOSINOPHIL NFR BLD AUTO: 4 %
EOSINOPHIL NFR BLD MANUAL: 0 %
EOSINOPHIL NFR BLD MANUAL: 0 %
EOSINOPHIL NFR BLD MANUAL: 1 %
EOSINOPHIL NFR BLD MANUAL: 1 %
ERYTHROCYTE [DISTWIDTH] IN BLOOD BY AUTOMATED COUNT: 12.8 % (ref 10–15)
ERYTHROCYTE [DISTWIDTH] IN BLOOD BY AUTOMATED COUNT: 13.1 % (ref 10–15)
ERYTHROCYTE [DISTWIDTH] IN BLOOD BY AUTOMATED COUNT: 13.2 % (ref 10–15)
ERYTHROCYTE [DISTWIDTH] IN BLOOD BY AUTOMATED COUNT: 13.5 % (ref 10–15)
ERYTHROCYTE [DISTWIDTH] IN BLOOD BY AUTOMATED COUNT: 13.6 % (ref 10–15)
ERYTHROCYTE [DISTWIDTH] IN BLOOD BY AUTOMATED COUNT: 14.5 % (ref 10–15)
ERYTHROCYTE [DISTWIDTH] IN BLOOD BY AUTOMATED COUNT: 14.5 % (ref 10–15)
ERYTHROCYTE [DISTWIDTH] IN BLOOD BY AUTOMATED COUNT: 15.1 % (ref 10–15)
ERYTHROCYTE [DISTWIDTH] IN BLOOD BY AUTOMATED COUNT: 16.6 % (ref 10–15)
ERYTHROCYTE [DISTWIDTH] IN BLOOD BY AUTOMATED COUNT: 17 % (ref 10–15)
ERYTHROCYTE [DISTWIDTH] IN BLOOD BY AUTOMATED COUNT: 17 % (ref 10–15)
ERYTHROCYTE [DISTWIDTH] IN BLOOD BY AUTOMATED COUNT: 17.4 % (ref 10–15)
FASTING STATUS PATIENT QL REPORTED: YES
GFR SERPL CREATININE-BSD FRML MDRD: 85 ML/MIN/1.73M2
GFR SERPL CREATININE-BSD FRML MDRD: 87 ML/MIN/1.73M2
GFR SERPL CREATININE-BSD FRML MDRD: 89 ML/MIN/1.73M2
GFR SERPL CREATININE-BSD FRML MDRD: 90 ML/MIN/1.73M2
GFR SERPL CREATININE-BSD FRML MDRD: >90 ML/MIN/1.73M2
GLUCOSE BLD-MCNC: 103 MG/DL (ref 70–99)
GLUCOSE BLD-MCNC: 104 MG/DL (ref 70–99)
GLUCOSE BLD-MCNC: 108 MG/DL (ref 70–99)
GLUCOSE BLD-MCNC: 115 MG/DL (ref 70–99)
GLUCOSE BLD-MCNC: 116 MG/DL (ref 70–99)
GLUCOSE BLD-MCNC: 85 MG/DL (ref 70–99)
GLUCOSE BLD-MCNC: 86 MG/DL (ref 70–99)
GLUCOSE BLD-MCNC: 87 MG/DL (ref 70–99)
GLUCOSE BLD-MCNC: 93 MG/DL (ref 70–99)
GLUCOSE BLD-MCNC: 94 MG/DL (ref 70–99)
GLUCOSE BLD-MCNC: 95 MG/DL (ref 70–99)
GLUCOSE SERPL-MCNC: 113 MG/DL (ref 70–99)
GLUCOSE SERPL-MCNC: 128 MG/DL (ref 70–99)
GLUCOSE SERPL-MCNC: 82 MG/DL (ref 70–99)
GLUCOSE UR STRIP-MCNC: NEGATIVE MG/DL
GLUCOSE UR STRIP-MCNC: NEGATIVE MG/DL
HCT VFR BLD AUTO: 22.8 % (ref 35–47)
HCT VFR BLD AUTO: 26.1 % (ref 35–47)
HCT VFR BLD AUTO: 26.4 % (ref 35–47)
HCT VFR BLD AUTO: 27.9 % (ref 35–47)
HCT VFR BLD AUTO: 32 % (ref 35–47)
HCT VFR BLD AUTO: 32.8 % (ref 35–47)
HCT VFR BLD AUTO: 33.7 % (ref 35–47)
HCT VFR BLD AUTO: 34.9 % (ref 35–47)
HCT VFR BLD AUTO: 37.9 % (ref 35–47)
HCT VFR BLD AUTO: 38 % (ref 35–47)
HCT VFR BLD AUTO: 38.6 % (ref 35–47)
HCT VFR BLD AUTO: 40.7 % (ref 35–47)
HDLC SERPL-MCNC: 55 MG/DL
HGB BLD-MCNC: 10.3 G/DL (ref 11.7–15.7)
HGB BLD-MCNC: 10.7 G/DL (ref 11.7–15.7)
HGB BLD-MCNC: 10.9 G/DL (ref 11.7–15.7)
HGB BLD-MCNC: 11.5 G/DL (ref 11.7–15.7)
HGB BLD-MCNC: 12.5 G/DL (ref 11.7–15.7)
HGB BLD-MCNC: 12.7 G/DL (ref 11.7–15.7)
HGB BLD-MCNC: 12.7 G/DL (ref 11.7–15.7)
HGB BLD-MCNC: 13.4 G/DL (ref 11.7–15.7)
HGB BLD-MCNC: 7.5 G/DL (ref 11.7–15.7)
HGB BLD-MCNC: 8.2 G/DL (ref 11.7–15.7)
HGB BLD-MCNC: 8.4 G/DL (ref 11.7–15.7)
HGB BLD-MCNC: 8.7 G/DL (ref 11.7–15.7)
HGB UR QL STRIP: ABNORMAL
HGB UR QL STRIP: NEGATIVE
HOLD SPECIMEN: NORMAL
HYALINE CASTS: 10 /LPF
IMM GRANULOCYTES # BLD: 0 10E3/UL
IMM GRANULOCYTES # BLD: 0.1 10E3/UL
IMM GRANULOCYTES NFR BLD: 0 %
IMM GRANULOCYTES NFR BLD: 1 %
KETONES UR STRIP-MCNC: NEGATIVE MG/DL
KETONES UR STRIP-MCNC: NEGATIVE MG/DL
LDLC SERPL CALC-MCNC: 136 MG/DL
LEUKOCYTE ESTERASE UR QL STRIP: NEGATIVE
LEUKOCYTE ESTERASE UR QL STRIP: NEGATIVE
LIPASE SERPL-CCNC: 94 U/L (ref 73–393)
LYMPHOCYTES # BLD AUTO: 0.9 10E3/UL (ref 0.8–5.3)
LYMPHOCYTES # BLD AUTO: 0.9 10E3/UL (ref 0.8–5.3)
LYMPHOCYTES # BLD AUTO: 1 10E3/UL (ref 0.8–5.3)
LYMPHOCYTES # BLD AUTO: 1.1 10E3/UL (ref 0.8–5.3)
LYMPHOCYTES # BLD AUTO: 1.4 10E3/UL (ref 0.8–5.3)
LYMPHOCYTES # BLD AUTO: 1.5 10E3/UL (ref 0.8–5.3)
LYMPHOCYTES # BLD AUTO: 1.6 10E3/UL (ref 0.8–5.3)
LYMPHOCYTES # BLD AUTO: 1.6 10E3/UL (ref 0.8–5.3)
LYMPHOCYTES # BLD MANUAL: 0.4 10E3/UL (ref 0.8–5.3)
LYMPHOCYTES # BLD MANUAL: 0.5 10E3/UL (ref 0.8–5.3)
LYMPHOCYTES # BLD MANUAL: 0.6 10E3/UL (ref 0.8–5.3)
LYMPHOCYTES # BLD MANUAL: 0.9 10E3/UL (ref 0.8–5.3)
LYMPHOCYTES NFR BLD AUTO: 12 %
LYMPHOCYTES NFR BLD AUTO: 16 %
LYMPHOCYTES NFR BLD AUTO: 17 %
LYMPHOCYTES NFR BLD AUTO: 19 %
LYMPHOCYTES NFR BLD AUTO: 25 %
LYMPHOCYTES NFR BLD AUTO: 26 %
LYMPHOCYTES NFR BLD AUTO: 27 %
LYMPHOCYTES NFR BLD AUTO: 30 %
LYMPHOCYTES NFR BLD MANUAL: 15 %
LYMPHOCYTES NFR BLD MANUAL: 6 %
LYMPHOCYTES NFR BLD MANUAL: 9 %
LYMPHOCYTES NFR BLD MANUAL: 9 %
MCH RBC QN AUTO: 29.4 PG (ref 26.5–33)
MCH RBC QN AUTO: 29.7 PG (ref 26.5–33)
MCH RBC QN AUTO: 29.7 PG (ref 26.5–33)
MCH RBC QN AUTO: 29.8 PG (ref 26.5–33)
MCH RBC QN AUTO: 29.8 PG (ref 26.5–33)
MCH RBC QN AUTO: 30.1 PG (ref 26.5–33)
MCH RBC QN AUTO: 30.2 PG (ref 26.5–33)
MCH RBC QN AUTO: 30.3 PG (ref 26.5–33)
MCH RBC QN AUTO: 30.4 PG (ref 26.5–33)
MCH RBC QN AUTO: 30.9 PG (ref 26.5–33)
MCH RBC QN AUTO: 31 PG (ref 26.5–33)
MCH RBC QN AUTO: 31.6 PG (ref 26.5–33)
MCHC RBC AUTO-ENTMCNC: 31.2 G/DL (ref 31.5–36.5)
MCHC RBC AUTO-ENTMCNC: 31.4 G/DL (ref 31.5–36.5)
MCHC RBC AUTO-ENTMCNC: 31.8 G/DL (ref 31.5–36.5)
MCHC RBC AUTO-ENTMCNC: 32.2 G/DL (ref 31.5–36.5)
MCHC RBC AUTO-ENTMCNC: 32.3 G/DL (ref 31.5–36.5)
MCHC RBC AUTO-ENTMCNC: 32.6 G/DL (ref 31.5–36.5)
MCHC RBC AUTO-ENTMCNC: 32.9 G/DL (ref 31.5–36.5)
MCHC RBC AUTO-ENTMCNC: 33 G/DL (ref 31.5–36.5)
MCHC RBC AUTO-ENTMCNC: 33.5 G/DL (ref 31.5–36.5)
MCV RBC AUTO: 90 FL (ref 78–100)
MCV RBC AUTO: 92 FL (ref 78–100)
MCV RBC AUTO: 93 FL (ref 78–100)
MCV RBC AUTO: 94 FL (ref 78–100)
MCV RBC AUTO: 96 FL (ref 78–100)
MCV RBC AUTO: 96 FL (ref 78–100)
METAMYELOCYTES # BLD MANUAL: 0.1 10E3/UL
METAMYELOCYTES # BLD MANUAL: 0.2 10E3/UL
METAMYELOCYTES # BLD MANUAL: 0.6 10E3/UL
METAMYELOCYTES NFR BLD MANUAL: 10 %
METAMYELOCYTES NFR BLD MANUAL: 2 %
METAMYELOCYTES NFR BLD MANUAL: 3 %
MONOCYTES # BLD AUTO: 0.3 10E3/UL (ref 0–1.3)
MONOCYTES # BLD AUTO: 0.4 10E3/UL (ref 0–1.3)
MONOCYTES # BLD AUTO: 0.5 10E3/UL (ref 0–1.3)
MONOCYTES # BLD AUTO: 0.6 10E3/UL (ref 0–1.3)
MONOCYTES # BLD AUTO: 0.6 10E3/UL (ref 0–1.3)
MONOCYTES # BLD AUTO: 0.7 10E3/UL (ref 0–1.3)
MONOCYTES # BLD MANUAL: 0.2 10E3/UL (ref 0–1.3)
MONOCYTES # BLD MANUAL: 0.2 10E3/UL (ref 0–1.3)
MONOCYTES # BLD MANUAL: 0.3 10E3/UL (ref 0–1.3)
MONOCYTES # BLD MANUAL: 0.6 10E3/UL (ref 0–1.3)
MONOCYTES NFR BLD AUTO: 10 %
MONOCYTES NFR BLD AUTO: 6 %
MONOCYTES NFR BLD AUTO: 6 %
MONOCYTES NFR BLD AUTO: 8 %
MONOCYTES NFR BLD AUTO: 9 %
MONOCYTES NFR BLD AUTO: 9 %
MONOCYTES NFR BLD MANUAL: 10 %
MONOCYTES NFR BLD MANUAL: 3 %
MONOCYTES NFR BLD MANUAL: 4 %
MONOCYTES NFR BLD MANUAL: 4 %
MUCOUS THREADS #/AREA URNS LPF: PRESENT /LPF
MUCOUS THREADS #/AREA URNS LPF: PRESENT /LPF
MYELOCYTES # BLD MANUAL: 0.1 10E3/UL
MYELOCYTES # BLD MANUAL: 0.3 10E3/UL
MYELOCYTES NFR BLD MANUAL: 1 %
MYELOCYTES NFR BLD MANUAL: 4 %
NEUTROPHILS # BLD AUTO: 3.1 10E3/UL (ref 1.6–8.3)
NEUTROPHILS # BLD AUTO: 3.4 10E3/UL (ref 1.6–8.3)
NEUTROPHILS # BLD AUTO: 3.6 10E3/UL (ref 1.6–8.3)
NEUTROPHILS # BLD AUTO: 3.8 10E3/UL (ref 1.6–8.3)
NEUTROPHILS # BLD AUTO: 4.1 10E3/UL (ref 1.6–8.3)
NEUTROPHILS # BLD AUTO: 4.1 10E3/UL (ref 1.6–8.3)
NEUTROPHILS # BLD AUTO: 4.3 10E3/UL (ref 1.6–8.3)
NEUTROPHILS # BLD AUTO: 5.4 10E3/UL (ref 1.6–8.3)
NEUTROPHILS # BLD MANUAL: 4.3 10E3/UL (ref 1.6–8.3)
NEUTROPHILS # BLD MANUAL: 4.3 10E3/UL (ref 1.6–8.3)
NEUTROPHILS # BLD MANUAL: 5.1 10E3/UL (ref 1.6–8.3)
NEUTROPHILS # BLD MANUAL: 5.2 10E3/UL (ref 1.6–8.3)
NEUTROPHILS NFR BLD AUTO: 59 %
NEUTROPHILS NFR BLD AUTO: 60 %
NEUTROPHILS NFR BLD AUTO: 63 %
NEUTROPHILS NFR BLD AUTO: 64 %
NEUTROPHILS NFR BLD AUTO: 71 %
NEUTROPHILS NFR BLD AUTO: 71 %
NEUTROPHILS NFR BLD AUTO: 72 %
NEUTROPHILS NFR BLD AUTO: 77 %
NEUTROPHILS NFR BLD MANUAL: 70 %
NEUTROPHILS NFR BLD MANUAL: 78 %
NEUTROPHILS NFR BLD MANUAL: 79 %
NEUTROPHILS NFR BLD MANUAL: 88 %
NITRATE UR QL: NEGATIVE
NITRATE UR QL: NEGATIVE
NONHDLC SERPL-MCNC: 149 MG/DL
NRBC # BLD AUTO: 0 10E3/UL
NRBC # BLD AUTO: 0.1 10E3/UL
NRBC BLD AUTO-RTO: 0 /100
NRBC BLD MANUAL-RTO: 1 %
OTHER CELLS # BLD MANUAL: 0.1 10E3/UL
OTHER CELLS NFR BLD MANUAL: 1 %
PATH REPORT.COMMENTS IMP SPEC: ABNORMAL
PATH REPORT.COMMENTS IMP SPEC: NORMAL
PATH REPORT.COMMENTS IMP SPEC: YES
PATH REPORT.COMMENTS IMP SPEC: YES
PATH REPORT.FINAL DX SPEC: ABNORMAL
PATH REPORT.FINAL DX SPEC: ABNORMAL
PATH REPORT.FINAL DX SPEC: NORMAL
PATH REPORT.FINAL DX SPEC: NORMAL
PATH REPORT.GROSS SPEC: ABNORMAL
PATH REPORT.GROSS SPEC: ABNORMAL
PATH REPORT.GROSS SPEC: NORMAL
PATH REPORT.GROSS SPEC: NORMAL
PATH REPORT.MICROSCOPIC SPEC OTHER STN: ABNORMAL
PATH REPORT.MICROSCOPIC SPEC OTHER STN: ABNORMAL
PATH REPORT.MICROSCOPIC SPEC OTHER STN: NORMAL
PATH REPORT.MICROSCOPIC SPEC OTHER STN: NORMAL
PATH REPORT.RELEVANT HX SPEC: ABNORMAL
PATH REPORT.RELEVANT HX SPEC: ABNORMAL
PATH REPORT.RELEVANT HX SPEC: NORMAL
PATH REPORT.RELEVANT HX SPEC: NORMAL
PH UR STRIP: 5 [PH] (ref 5–7)
PH UR STRIP: 5.5 [PH] (ref 5–7)
PHOTO IMAGE: NORMAL
PHOTO IMAGE: NORMAL
PLAT MORPH BLD: ABNORMAL
PLATELET # BLD AUTO: 232 10E3/UL (ref 150–450)
PLATELET # BLD AUTO: 243 10E3/UL (ref 150–450)
PLATELET # BLD AUTO: 259 10E3/UL (ref 150–450)
PLATELET # BLD AUTO: 267 10E3/UL (ref 150–450)
PLATELET # BLD AUTO: 269 10E3/UL (ref 150–450)
PLATELET # BLD AUTO: 277 10E3/UL (ref 150–450)
PLATELET # BLD AUTO: 286 10E3/UL (ref 150–450)
PLATELET # BLD AUTO: 352 10E3/UL (ref 150–450)
PLATELET # BLD AUTO: 364 10E3/UL (ref 150–450)
PLATELET # BLD AUTO: 365 10E3/UL (ref 150–450)
PLATELET # BLD AUTO: 373 10E3/UL (ref 150–450)
PLATELET # BLD AUTO: 386 10E3/UL (ref 150–450)
POLYCHROMASIA BLD QL SMEAR: SLIGHT
POTASSIUM BLD-SCNC: 3.7 MMOL/L (ref 3.4–5.3)
POTASSIUM BLD-SCNC: 3.9 MMOL/L (ref 3.4–5.3)
POTASSIUM BLD-SCNC: 4 MMOL/L (ref 3.4–5.3)
POTASSIUM BLD-SCNC: 4.2 MMOL/L (ref 3.4–5.3)
POTASSIUM SERPL-SCNC: 3.9 MMOL/L (ref 3.4–5.3)
POTASSIUM SERPL-SCNC: 3.9 MMOL/L (ref 3.4–5.3)
POTASSIUM SERPL-SCNC: 4.2 MMOL/L (ref 3.4–5.3)
PROT SERPL-MCNC: 6.5 G/DL (ref 6.4–8.3)
PROT SERPL-MCNC: 6.5 G/DL (ref 6.8–8.8)
PROT SERPL-MCNC: 6.7 G/DL (ref 6.8–8.8)
PROT SERPL-MCNC: 6.8 G/DL (ref 6.4–8.3)
PROT SERPL-MCNC: 6.8 G/DL (ref 6.8–8.8)
PROT SERPL-MCNC: 6.8 G/DL (ref 6.8–8.8)
PROT SERPL-MCNC: 7.1 G/DL (ref 6.4–8.3)
PROT SERPL-MCNC: 7.1 G/DL (ref 6.8–8.8)
PROT SERPL-MCNC: 7.2 G/DL (ref 6.8–8.8)
PROT SERPL-MCNC: 7.2 G/DL (ref 6.8–8.8)
PROT SERPL-MCNC: 7.3 G/DL (ref 6.8–8.8)
PROT SERPL-MCNC: 7.3 G/DL (ref 6.8–8.8)
PROT SERPL-MCNC: 7.4 G/DL (ref 6.8–8.8)
PROT SERPL-MCNC: 7.6 G/DL (ref 6.8–8.8)
PTH-INTACT SERPL-MCNC: 9 PG/ML (ref 15–65)
RADIOLOGIST FLAGS: ABNORMAL
RBC # BLD AUTO: 2.49 10E6/UL (ref 3.8–5.2)
RBC # BLD AUTO: 2.79 10E6/UL (ref 3.8–5.2)
RBC # BLD AUTO: 2.83 10E6/UL (ref 3.8–5.2)
RBC # BLD AUTO: 2.92 10E6/UL (ref 3.8–5.2)
RBC # BLD AUTO: 3.46 10E6/UL (ref 3.8–5.2)
RBC # BLD AUTO: 3.52 10E6/UL (ref 3.8–5.2)
RBC # BLD AUTO: 3.53 10E6/UL (ref 3.8–5.2)
RBC # BLD AUTO: 3.71 10E6/UL (ref 3.8–5.2)
RBC # BLD AUTO: 4.02 10E6/UL (ref 3.8–5.2)
RBC # BLD AUTO: 4.12 10E6/UL (ref 3.8–5.2)
RBC # BLD AUTO: 4.21 10E6/UL (ref 3.8–5.2)
RBC # BLD AUTO: 4.51 10E6/UL (ref 3.8–5.2)
RBC #/AREA URNS AUTO: ABNORMAL /HPF
RBC MORPH BLD: ABNORMAL
RBC URINE: 1 /HPF
SARS-COV-2 RNA RESP QL NAA+PROBE: NEGATIVE
SODIUM SERPL-SCNC: 136 MMOL/L (ref 136–145)
SODIUM SERPL-SCNC: 136 MMOL/L (ref 136–145)
SODIUM SERPL-SCNC: 137 MMOL/L (ref 133–144)
SODIUM SERPL-SCNC: 138 MMOL/L (ref 133–144)
SODIUM SERPL-SCNC: 138 MMOL/L (ref 133–144)
SODIUM SERPL-SCNC: 139 MMOL/L (ref 133–144)
SODIUM SERPL-SCNC: 139 MMOL/L (ref 136–145)
SODIUM SERPL-SCNC: 140 MMOL/L (ref 133–144)
SODIUM SERPL-SCNC: 142 MMOL/L (ref 133–144)
SODIUM SERPL-SCNC: 142 MMOL/L (ref 133–144)
SODIUM SERPL-SCNC: 143 MMOL/L (ref 133–144)
SP GR UR STRIP: 1.01 (ref 1–1.03)
SP GR UR STRIP: 1.03 (ref 1–1.03)
SPECIMEN EXPIRATION DATE: NORMAL
SQUAMOUS #/AREA URNS AUTO: ABNORMAL /LPF
SQUAMOUS EPITHELIAL: 7 /HPF
TRICHOMONAS, WET PREP: ABNORMAL
TRIGL SERPL-MCNC: 67 MG/DL
TSH SERPL DL<=0.005 MIU/L-ACNC: 1.52 MU/L (ref 0.4–4)
TSH SERPL DL<=0.005 MIU/L-ACNC: 1.7 UIU/ML (ref 0.3–4.2)
TSH SERPL DL<=0.005 MIU/L-ACNC: 1.76 MU/L (ref 0.4–4)
TSH SERPL DL<=0.005 MIU/L-ACNC: 2.03 MU/L (ref 0.4–4)
TSH SERPL DL<=0.005 MIU/L-ACNC: 2.45 MU/L (ref 0.4–4)
TSH SERPL DL<=0.005 MIU/L-ACNC: 2.56 MU/L (ref 0.4–4)
UPPER GI ENDOSCOPY: NORMAL
UROBILINOGEN UR STRIP-MCNC: 4 MG/DL
UROBILINOGEN UR STRIP-MCNC: NORMAL MG/DL
VARIANT LYMPHS BLD QL SMEAR: PRESENT
WBC # BLD AUTO: 5.3 10E3/UL (ref 4–11)
WBC # BLD AUTO: 5.4 10E3/UL (ref 4–11)
WBC # BLD AUTO: 5.5 10E3/UL (ref 4–11)
WBC # BLD AUTO: 5.7 10E3/UL (ref 4–11)
WBC # BLD AUTO: 5.7 10E3/UL (ref 4–11)
WBC # BLD AUTO: 5.9 10E3/UL (ref 4–11)
WBC # BLD AUTO: 6 10E3/UL (ref 4–11)
WBC # BLD AUTO: 6.2 10E3/UL (ref 4–11)
WBC # BLD AUTO: 6.4 10E3/UL (ref 4–11)
WBC # BLD AUTO: 7.1 10E3/UL (ref 4–11)
WBC #/AREA URNS AUTO: ABNORMAL /HPF
WBC URINE: 2 /HPF
WBC'S/HIGH POWER FIELD, WET PREP: ABNORMAL
YEAST, WET PREP: ABNORMAL

## 2022-01-01 PROCEDURE — 78816 PET IMAGE W/CT FULL BODY: CPT | Mod: 26 | Performed by: RADIOLOGY

## 2022-01-01 PROCEDURE — 250N000013 HC RX MED GY IP 250 OP 250 PS 637: Performed by: INTERNAL MEDICINE

## 2022-01-01 PROCEDURE — 80053 COMPREHEN METABOLIC PANEL: CPT

## 2022-01-01 PROCEDURE — 80053 COMPREHEN METABOLIC PANEL: CPT | Performed by: INTERNAL MEDICINE

## 2022-01-01 PROCEDURE — 97112 NEUROMUSCULAR REEDUCATION: CPT | Mod: GP

## 2022-01-01 PROCEDURE — 96405 CHEMO INTRALESIONAL UP TO 7: CPT | Performed by: DERMATOLOGY

## 2022-01-01 PROCEDURE — 99214 OFFICE O/P EST MOD 30 MIN: CPT | Performed by: PHYSICIAN ASSISTANT

## 2022-01-01 PROCEDURE — 77412 RADIATION TX DELIVERY LVL 3: CPT | Performed by: SURGERY

## 2022-01-01 PROCEDURE — G0463 HOSPITAL OUTPT CLINIC VISIT: HCPCS

## 2022-01-01 PROCEDURE — 90834 PSYTX W PT 45 MINUTES: CPT | Mod: 95 | Performed by: PSYCHOLOGIST

## 2022-01-01 PROCEDURE — 250N000011 HC RX IP 250 OP 636: Performed by: REGISTERED NURSE

## 2022-01-01 PROCEDURE — 77014 PR CT GUIDE FOR PLACEMENT RADIATION THERAPY FIELDS: CPT | Performed by: RADIOLOGY

## 2022-01-01 PROCEDURE — 99417 PROLNG OP E/M EACH 15 MIN: CPT | Performed by: PREVENTIVE MEDICINE

## 2022-01-01 PROCEDURE — 77014 PR CT GUIDE FOR PLACEMENT RADIATION THERAPY FIELDS: CPT | Performed by: SURGERY

## 2022-01-01 PROCEDURE — 250N000009 HC RX 250: Performed by: SPECIALIST

## 2022-01-01 PROCEDURE — 90837 PSYTX W PT 60 MINUTES: CPT | Mod: 95 | Performed by: SOCIAL WORKER

## 2022-01-01 PROCEDURE — 99215 OFFICE O/P EST HI 40 MIN: CPT | Mod: 95 | Performed by: INTERNAL MEDICINE

## 2022-01-01 PROCEDURE — 84450 TRANSFERASE (AST) (SGOT): CPT

## 2022-01-01 PROCEDURE — 99417 PROLNG OP E/M EACH 15 MIN: CPT | Performed by: STUDENT IN AN ORGANIZED HEALTH CARE EDUCATION/TRAINING PROGRAM

## 2022-01-01 PROCEDURE — 99207 E-CONSULT TO GASTROENTEROLOGY (ADULT OUTPT PROVIDER TO SPECIALIST WRITTEN QUESTION & RESPONSE): CPT | Performed by: INTERNAL MEDICINE

## 2022-01-01 PROCEDURE — 90834 PSYTX W PT 45 MINUTES: CPT | Mod: 95 | Performed by: SOCIAL WORKER

## 2022-01-01 PROCEDURE — 96361 HYDRATE IV INFUSION ADD-ON: CPT

## 2022-01-01 PROCEDURE — 99214 OFFICE O/P EST MOD 30 MIN: CPT | Mod: 95 | Performed by: HOSPITALIST

## 2022-01-01 PROCEDURE — 99215 OFFICE O/P EST HI 40 MIN: CPT | Mod: 25 | Performed by: PREVENTIVE MEDICINE

## 2022-01-01 PROCEDURE — 99207 PR NO CHARGE NURSE ONLY: CPT | Performed by: PHYSICIAN ASSISTANT

## 2022-01-01 PROCEDURE — 93000 ELECTROCARDIOGRAM COMPLETE: CPT | Performed by: INTERNAL MEDICINE

## 2022-01-01 PROCEDURE — G8907 PT DOC NO EVENTS ON DISCHARG: HCPCS

## 2022-01-01 PROCEDURE — V5160 DISPENSING FEE BINAURAL: HCPCS | Performed by: AUDIOLOGIST

## 2022-01-01 PROCEDURE — 84443 ASSAY THYROID STIM HORMONE: CPT

## 2022-01-01 PROCEDURE — 99207 REFERRAL TO ACUTE AND DIAGNOSTIC SERVICES: CPT | Performed by: EMERGENCY MEDICINE

## 2022-01-01 PROCEDURE — 81001 URINALYSIS AUTO W/SCOPE: CPT | Performed by: PREVENTIVE MEDICINE

## 2022-01-01 PROCEDURE — 88305 TISSUE EXAM BY PATHOLOGIST: CPT | Mod: GC | Performed by: PATHOLOGY

## 2022-01-01 PROCEDURE — 85007 BL SMEAR W/DIFF WBC COUNT: CPT

## 2022-01-01 PROCEDURE — G0463 HOSPITAL OUTPT CLINIC VISIT: HCPCS | Mod: GT,95

## 2022-01-01 PROCEDURE — 74177 CT ABD & PELVIS W/CONTRAST: CPT | Mod: 59 | Performed by: STUDENT IN AN ORGANIZED HEALTH CARE EDUCATION/TRAINING PROGRAM

## 2022-01-01 PROCEDURE — G8918 PT W/O PREOP ORDER IV AB PRO: HCPCS

## 2022-01-01 PROCEDURE — 13152 CMPLX RPR E/N/E/L 2.6-7.5 CM: CPT | Performed by: DERMATOLOGY

## 2022-01-01 PROCEDURE — 258N000003 HC RX IP 258 OP 636: Performed by: PHYSICIAN ASSISTANT

## 2022-01-01 PROCEDURE — 36415 COLL VENOUS BLD VENIPUNCTURE: CPT | Performed by: INTERNAL MEDICINE

## 2022-01-01 PROCEDURE — 343N000001 HC RX 343: Performed by: STUDENT IN AN ORGANIZED HEALTH CARE EDUCATION/TRAINING PROGRAM

## 2022-01-01 PROCEDURE — 36415 COLL VENOUS BLD VENIPUNCTURE: CPT

## 2022-01-01 PROCEDURE — 97803 MED NUTRITION INDIV SUBSEQ: CPT | Mod: 95 | Performed by: DIETITIAN, REGISTERED

## 2022-01-01 PROCEDURE — 99215 OFFICE O/P EST HI 40 MIN: CPT | Performed by: INTERNAL MEDICINE

## 2022-01-01 PROCEDURE — 36415 COLL VENOUS BLD VENIPUNCTURE: CPT | Performed by: PHYSICIAN ASSISTANT

## 2022-01-01 PROCEDURE — 99214 OFFICE O/P EST MOD 30 MIN: CPT | Mod: 95 | Performed by: PHYSICIAN ASSISTANT

## 2022-01-01 PROCEDURE — 97140 MANUAL THERAPY 1/> REGIONS: CPT | Mod: GO

## 2022-01-01 PROCEDURE — 99605 MTMS BY PHARM NP 15 MIN: CPT | Performed by: PHARMACIST

## 2022-01-01 PROCEDURE — 97110 THERAPEUTIC EXERCISES: CPT | Mod: GP

## 2022-01-01 PROCEDURE — 99283 EMERGENCY DEPT VISIT LOW MDM: CPT | Performed by: EMERGENCY MEDICINE

## 2022-01-01 PROCEDURE — 83970 ASSAY OF PARATHORMONE: CPT

## 2022-01-01 PROCEDURE — 96374 THER/PROPH/DIAG INJ IV PUSH: CPT | Performed by: PREVENTIVE MEDICINE

## 2022-01-01 PROCEDURE — 74177 CT ABD & PELVIS W/CONTRAST: CPT | Mod: 59

## 2022-01-01 PROCEDURE — 80053 COMPREHEN METABOLIC PANEL: CPT | Performed by: NURSE PRACTITIONER

## 2022-01-01 PROCEDURE — 82040 ASSAY OF SERUM ALBUMIN: CPT

## 2022-01-01 PROCEDURE — 96413 CHEMO IV INFUSION 1 HR: CPT | Performed by: INTERNAL MEDICINE

## 2022-01-01 PROCEDURE — 71260 CT THORAX DX C+: CPT | Mod: 26 | Performed by: RADIOLOGY

## 2022-01-01 PROCEDURE — 80053 COMPREHEN METABOLIC PANEL: CPT | Performed by: PHYSICIAN ASSISTANT

## 2022-01-01 PROCEDURE — 88305 TISSUE EXAM BY PATHOLOGIST: CPT | Mod: TC | Performed by: SPECIALIST

## 2022-01-01 PROCEDURE — 250N000009 HC RX 250: Performed by: ORTHOPAEDIC SURGERY

## 2022-01-01 PROCEDURE — 88342 IMHCHEM/IMCYTCHM 1ST ANTB: CPT | Performed by: DERMATOLOGY

## 2022-01-01 PROCEDURE — 99207 PR NO CHARGE LOS: CPT | Performed by: AUDIOLOGIST

## 2022-01-01 PROCEDURE — 78816 PET IMAGE W/CT FULL BODY: CPT | Mod: PS

## 2022-01-01 PROCEDURE — 74177 CT ABD & PELVIS W/CONTRAST: CPT | Mod: 26 | Performed by: RADIOLOGY

## 2022-01-01 PROCEDURE — 99214 OFFICE O/P EST MOD 30 MIN: CPT | Performed by: INTERNAL MEDICINE

## 2022-01-01 PROCEDURE — 255N000002 HC RX 255 OP 636: Performed by: ORTHOPAEDIC SURGERY

## 2022-01-01 PROCEDURE — A9552 F18 FDG: HCPCS | Performed by: STUDENT IN AN ORGANIZED HEALTH CARE EDUCATION/TRAINING PROGRAM

## 2022-01-01 PROCEDURE — 99283 EMERGENCY DEPT VISIT LOW MDM: CPT

## 2022-01-01 PROCEDURE — 88305 TISSUE EXAM BY PATHOLOGIST: CPT | Mod: TC | Performed by: DERMATOLOGY

## 2022-01-01 PROCEDURE — 43235 EGD DIAGNOSTIC BRUSH WASH: CPT | Performed by: INTERNAL MEDICINE

## 2022-01-01 PROCEDURE — 99215 OFFICE O/P EST HI 40 MIN: CPT | Performed by: PHYSICIAN ASSISTANT

## 2022-01-01 PROCEDURE — 85025 COMPLETE CBC W/AUTO DIFF WBC: CPT

## 2022-01-01 PROCEDURE — 97166 OT EVAL MOD COMPLEX 45 MIN: CPT | Mod: GO

## 2022-01-01 PROCEDURE — 85025 COMPLETE CBC W/AUTO DIFF WBC: CPT | Performed by: PHYSICIAN ASSISTANT

## 2022-01-01 PROCEDURE — 99215 OFFICE O/P EST HI 40 MIN: CPT | Performed by: STUDENT IN AN ORGANIZED HEALTH CARE EDUCATION/TRAINING PROGRAM

## 2022-01-01 PROCEDURE — 85007 BL SMEAR W/DIFF WBC COUNT: CPT | Performed by: NURSE PRACTITIONER

## 2022-01-01 PROCEDURE — 99282 EMERGENCY DEPT VISIT SF MDM: CPT | Performed by: EMERGENCY MEDICINE

## 2022-01-01 PROCEDURE — 74019 RADEX ABDOMEN 2 VIEWS: CPT

## 2022-01-01 PROCEDURE — 97112 NEUROMUSCULAR REEDUCATION: CPT | Mod: GP | Performed by: PHYSICAL THERAPIST

## 2022-01-01 PROCEDURE — 96413 CHEMO IV INFUSION 1 HR: CPT | Performed by: NURSE PRACTITIONER

## 2022-01-01 PROCEDURE — 64483 NJX AA&/STRD TFRM EPI L/S 1: CPT

## 2022-01-01 PROCEDURE — 85025 COMPLETE CBC W/AUTO DIFF WBC: CPT | Performed by: INTERNAL MEDICINE

## 2022-01-01 PROCEDURE — 82306 VITAMIN D 25 HYDROXY: CPT

## 2022-01-01 PROCEDURE — 77336 RADIATION PHYSICS CONSULT: CPT | Performed by: SURGERY

## 2022-01-01 PROCEDURE — 250N000011 HC RX IP 250 OP 636: Performed by: STUDENT IN AN ORGANIZED HEALTH CARE EDUCATION/TRAINING PROGRAM

## 2022-01-01 PROCEDURE — A9585 GADOBUTROL INJECTION: HCPCS | Performed by: PHYSICIAN ASSISTANT

## 2022-01-01 PROCEDURE — 96413 CHEMO IV INFUSION 1 HR: CPT

## 2022-01-01 PROCEDURE — 82040 ASSAY OF SERUM ALBUMIN: CPT | Performed by: PHYSICIAN ASSISTANT

## 2022-01-01 PROCEDURE — 87106 FUNGI IDENTIFICATION YEAST: CPT | Performed by: INTERNAL MEDICINE

## 2022-01-01 PROCEDURE — 77427 RADIATION TX MANAGEMENT X5: CPT | Performed by: SURGERY

## 2022-01-01 PROCEDURE — 88305 TISSUE EXAM BY PATHOLOGIST: CPT | Performed by: DERMATOLOGY

## 2022-01-01 PROCEDURE — 99215 OFFICE O/P EST HI 40 MIN: CPT | Mod: GC | Performed by: INTERNAL MEDICINE

## 2022-01-01 PROCEDURE — 85027 COMPLETE CBC AUTOMATED: CPT

## 2022-01-01 PROCEDURE — 99214 OFFICE O/P EST MOD 30 MIN: CPT | Mod: 95 | Performed by: INTERNAL MEDICINE

## 2022-01-01 PROCEDURE — G0500 MOD SEDAT ENDO SERVICE >5YRS: HCPCS | Performed by: INTERNAL MEDICINE

## 2022-01-01 PROCEDURE — 83690 ASSAY OF LIPASE: CPT | Performed by: PREVENTIVE MEDICINE

## 2022-01-01 PROCEDURE — 84443 ASSAY THYROID STIM HORMONE: CPT | Performed by: INTERNAL MEDICINE

## 2022-01-01 PROCEDURE — 85004 AUTOMATED DIFF WBC COUNT: CPT | Performed by: INTERNAL MEDICINE

## 2022-01-01 PROCEDURE — 99215 OFFICE O/P EST HI 40 MIN: CPT | Mod: 95 | Performed by: REGISTERED NURSE

## 2022-01-01 PROCEDURE — 99205 OFFICE O/P NEW HI 60 MIN: CPT | Performed by: PSYCHIATRY & NEUROLOGY

## 2022-01-01 PROCEDURE — 99203 OFFICE O/P NEW LOW 30 MIN: CPT | Performed by: OTOLARYNGOLOGY

## 2022-01-01 PROCEDURE — G0463 HOSPITAL OUTPT CLINIC VISIT: HCPCS | Mod: PN,RTG | Performed by: INTERNAL MEDICINE

## 2022-01-01 PROCEDURE — 71260 CT THORAX DX C+: CPT | Mod: 59

## 2022-01-01 PROCEDURE — 17000 DESTRUCT PREMALG LESION: CPT | Performed by: DERMATOLOGY

## 2022-01-01 PROCEDURE — 97163 PT EVAL HIGH COMPLEX 45 MIN: CPT | Mod: GP

## 2022-01-01 PROCEDURE — 96360 HYDRATION IV INFUSION INIT: CPT

## 2022-01-01 PROCEDURE — 99207 PR NO CHARGE LOS: CPT

## 2022-01-01 PROCEDURE — 74177 CT ABD & PELVIS W/CONTRAST: CPT | Mod: GC | Performed by: RADIOLOGY

## 2022-01-01 PROCEDURE — 96365 THER/PROPH/DIAG IV INF INIT: CPT

## 2022-01-01 PROCEDURE — 99417 PROLNG OP E/M EACH 15 MIN: CPT | Mod: 24 | Performed by: PHYSICIAN ASSISTANT

## 2022-01-01 PROCEDURE — C9399 UNCLASSIFIED DRUGS OR BIOLOG: HCPCS | Performed by: REGISTERED NURSE

## 2022-01-01 PROCEDURE — 99213 OFFICE O/P EST LOW 20 MIN: CPT | Mod: 25 | Performed by: DERMATOLOGY

## 2022-01-01 PROCEDURE — 70553 MRI BRAIN STEM W/O & W/DYE: CPT

## 2022-01-01 PROCEDURE — 36415 COLL VENOUS BLD VENIPUNCTURE: CPT | Performed by: NURSE PRACTITIONER

## 2022-01-01 PROCEDURE — V5010 ASSESSMENT FOR HEARING AID: HCPCS | Performed by: AUDIOLOGIST

## 2022-01-01 PROCEDURE — 85027 COMPLETE CBC AUTOMATED: CPT | Performed by: INTERNAL MEDICINE

## 2022-01-01 PROCEDURE — 250N000011 HC RX IP 250 OP 636: Performed by: INTERNAL MEDICINE

## 2022-01-01 PROCEDURE — 255N000002 HC RX 255 OP 636: Performed by: PHYSICIAN ASSISTANT

## 2022-01-01 PROCEDURE — 99451 NTRPROF PH1/NTRNET/EHR 5/>: CPT | Performed by: INTERNAL MEDICINE

## 2022-01-01 PROCEDURE — 99215 OFFICE O/P EST HI 40 MIN: CPT | Mod: 24 | Performed by: PHYSICIAN ASSISTANT

## 2022-01-01 PROCEDURE — 87210 SMEAR WET MOUNT SALINE/INK: CPT | Performed by: PHYSICIAN ASSISTANT

## 2022-01-01 PROCEDURE — 250N000013 HC RX MED GY IP 250 OP 250 PS 637: Performed by: EMERGENCY MEDICINE

## 2022-01-01 PROCEDURE — 43239 EGD BIOPSY SINGLE/MULTIPLE: CPT | Performed by: SPECIALIST

## 2022-01-01 PROCEDURE — 86901 BLOOD TYPING SEROLOGIC RH(D): CPT | Performed by: INTERNAL MEDICINE

## 2022-01-01 PROCEDURE — 74177 CT ABD & PELVIS W/CONTRAST: CPT

## 2022-01-01 PROCEDURE — 11642 EXC F/E/E/N/L MAL+MRG 1.1-2: CPT | Performed by: DERMATOLOGY

## 2022-01-01 PROCEDURE — V5020 CONFORMITY EVALUATION: HCPCS | Mod: RT | Performed by: AUDIOLOGIST

## 2022-01-01 PROCEDURE — 250N000011 HC RX IP 250 OP 636: Performed by: ORTHOPAEDIC SURGERY

## 2022-01-01 PROCEDURE — V5261 HEARING AID, DIGIT, BIN, BTE: HCPCS | Performed by: AUDIOLOGIST

## 2022-01-01 PROCEDURE — 370N000017 HC ANESTHESIA TECHNICAL FEE, PER MIN: Performed by: SPECIALIST

## 2022-01-01 PROCEDURE — 82024 ASSAY OF ACTH: CPT | Performed by: INTERNAL MEDICINE

## 2022-01-01 PROCEDURE — U0005 INFEC AGEN DETEC AMPLI PROBE: HCPCS

## 2022-01-01 PROCEDURE — 99205 OFFICE O/P NEW HI 60 MIN: CPT | Mod: 25 | Performed by: SURGERY

## 2022-01-01 PROCEDURE — 85027 COMPLETE CBC AUTOMATED: CPT | Performed by: NURSE PRACTITIONER

## 2022-01-01 PROCEDURE — 97530 THERAPEUTIC ACTIVITIES: CPT | Mod: GP

## 2022-01-01 PROCEDURE — 87102 FUNGUS ISOLATION CULTURE: CPT | Performed by: INTERNAL MEDICINE

## 2022-01-01 PROCEDURE — 81003 URINALYSIS AUTO W/O SCOPE: CPT | Performed by: NURSE PRACTITIONER

## 2022-01-01 PROCEDURE — 92591 PR HEARING AID EXAM BINAURAL: CPT | Performed by: AUDIOLOGIST

## 2022-01-01 PROCEDURE — 10000001 PR ERRONEOUS ENCOUNTER--DISREGARD: Performed by: INTERNAL MEDICINE

## 2022-01-01 PROCEDURE — 77412 RADIATION TX DELIVERY LVL 3: CPT | Performed by: RADIOLOGY

## 2022-01-01 PROCEDURE — 97162 PT EVAL MOD COMPLEX 30 MIN: CPT | Mod: GP | Performed by: PHYSICAL THERAPIST

## 2022-01-01 PROCEDURE — 96361 HYDRATE IV INFUSION ADD-ON: CPT | Performed by: PREVENTIVE MEDICINE

## 2022-01-01 PROCEDURE — A9552 F18 FDG: HCPCS

## 2022-01-01 PROCEDURE — 250N000011 HC RX IP 250 OP 636: Performed by: NURSE ANESTHETIST, CERTIFIED REGISTERED

## 2022-01-01 PROCEDURE — 88341 IMHCHEM/IMCYTCHM EA ADD ANTB: CPT | Performed by: DERMATOLOGY

## 2022-01-01 PROCEDURE — 258N000003 HC RX IP 258 OP 636: Performed by: REGISTERED NURSE

## 2022-01-01 PROCEDURE — 78816 PET IMAGE W/CT FULL BODY: CPT | Mod: GC

## 2022-01-01 PROCEDURE — 88305 TISSUE EXAM BY PATHOLOGIST: CPT | Mod: 26 | Performed by: DERMATOLOGY

## 2022-01-01 PROCEDURE — 70553 MRI BRAIN STEM W/O & W/DYE: CPT | Performed by: RADIOLOGY

## 2022-01-01 PROCEDURE — 84443 ASSAY THYROID STIM HORMONE: CPT | Performed by: PHYSICIAN ASSISTANT

## 2022-01-01 PROCEDURE — 80061 LIPID PANEL: CPT

## 2022-01-01 PROCEDURE — 71260 CT THORAX DX C+: CPT | Mod: 59 | Performed by: STUDENT IN AN ORGANIZED HEALTH CARE EDUCATION/TRAINING PROGRAM

## 2022-01-01 PROCEDURE — 82024 ASSAY OF ACTH: CPT | Performed by: PHYSICIAN ASSISTANT

## 2022-01-01 PROCEDURE — 250N000009 HC RX 250: Performed by: NURSE ANESTHETIST, CERTIFIED REGISTERED

## 2022-01-01 PROCEDURE — 88342 IMHCHEM/IMCYTCHM 1ST ANTB: CPT | Mod: GC | Performed by: PATHOLOGY

## 2022-01-01 PROCEDURE — 92557 COMPREHENSIVE HEARING TEST: CPT | Performed by: AUDIOLOGIST

## 2022-01-01 PROCEDURE — 80053 COMPREHEN METABOLIC PANEL: CPT | Performed by: PREVENTIVE MEDICINE

## 2022-01-01 PROCEDURE — 250N000011 HC RX IP 250 OP 636: Performed by: PHYSICIAN ASSISTANT

## 2022-01-01 PROCEDURE — 99207 PR DROP WITH A PROCEDURE: CPT | Performed by: SURGERY

## 2022-01-01 PROCEDURE — 88305 TISSUE EXAM BY PATHOLOGIST: CPT | Mod: 26 | Performed by: PATHOLOGY

## 2022-01-01 PROCEDURE — 78816 PET IMAGE W/CT FULL BODY: CPT | Mod: GC | Performed by: STUDENT IN AN ORGANIZED HEALTH CARE EDUCATION/TRAINING PROGRAM

## 2022-01-01 PROCEDURE — 43239 EGD BIOPSY SINGLE/MULTIPLE: CPT

## 2022-01-01 PROCEDURE — 85025 COMPLETE CBC W/AUTO DIFF WBC: CPT | Performed by: PREVENTIVE MEDICINE

## 2022-01-01 PROCEDURE — 99213 OFFICE O/P EST LOW 20 MIN: CPT | Performed by: PHYSICIAN ASSISTANT

## 2022-01-01 PROCEDURE — 85004 AUTOMATED DIFF WBC COUNT: CPT

## 2022-01-01 PROCEDURE — 99204 OFFICE O/P NEW MOD 45 MIN: CPT | Performed by: HOSPITALIST

## 2022-01-01 PROCEDURE — 36415 COLL VENOUS BLD VENIPUNCTURE: CPT | Performed by: PREVENTIVE MEDICINE

## 2022-01-01 PROCEDURE — G0463 HOSPITAL OUTPT CLINIC VISIT: HCPCS | Mod: PN,RTG | Performed by: PHYSICIAN ASSISTANT

## 2022-01-01 PROCEDURE — 99607 MTMS BY PHARM ADDL 15 MIN: CPT | Performed by: PHARMACIST

## 2022-01-01 PROCEDURE — A9585 GADOBUTROL INJECTION: HCPCS | Performed by: RADIOLOGY

## 2022-01-01 PROCEDURE — V5011 HEARING AID FITTING/CHECKING: HCPCS | Mod: RT | Performed by: AUDIOLOGIST

## 2022-01-01 PROCEDURE — 85007 BL SMEAR W/DIFF WBC COUNT: CPT | Performed by: INTERNAL MEDICINE

## 2022-01-01 PROCEDURE — 90791 PSYCH DIAGNOSTIC EVALUATION: CPT | Mod: 95 | Performed by: SOCIAL WORKER

## 2022-01-01 PROCEDURE — 92550 TYMPANOMETRY & REFLEX THRESH: CPT | Performed by: AUDIOLOGIST

## 2022-01-01 PROCEDURE — 258N000003 HC RX IP 258 OP 636: Performed by: NURSE PRACTITIONER

## 2022-01-01 PROCEDURE — 97116 GAIT TRAINING THERAPY: CPT | Mod: GP | Performed by: PHYSICAL THERAPIST

## 2022-01-01 PROCEDURE — 92593 PR HEARING AID CHECK, BINAURAL: CPT | Performed by: AUDIOLOGIST

## 2022-01-01 PROCEDURE — 86850 RBC ANTIBODY SCREEN: CPT | Performed by: INTERNAL MEDICINE

## 2022-01-01 PROCEDURE — 258N000003 HC RX IP 258 OP 636: Performed by: NURSE ANESTHETIST, CERTIFIED REGISTERED

## 2022-01-01 PROCEDURE — U0003 INFECTIOUS AGENT DETECTION BY NUCLEIC ACID (DNA OR RNA); SEVERE ACUTE RESPIRATORY SYNDROME CORONAVIRUS 2 (SARS-COV-2) (CORONAVIRUS DISEASE [COVID-19]), AMPLIFIED PROBE TECHNIQUE, MAKING USE OF HIGH THROUGHPUT TECHNOLOGIES AS DESCRIBED BY CMS-2020-01-R: HCPCS

## 2022-01-01 PROCEDURE — 99215 OFFICE O/P EST HI 40 MIN: CPT | Mod: 95 | Performed by: PHYSICIAN ASSISTANT

## 2022-01-01 PROCEDURE — 77280 THER RAD SIMULAJ FIELD SMPL: CPT | Performed by: SURGERY

## 2022-01-01 PROCEDURE — 11104 PUNCH BX SKIN SINGLE LESION: CPT | Performed by: PHYSICIAN ASSISTANT

## 2022-01-01 PROCEDURE — 99282 EMERGENCY DEPT VISIT SF MDM: CPT | Performed by: NURSE PRACTITIONER

## 2022-01-01 RX ORDER — FLUCONAZOLE 150 MG/1
150 TABLET ORAL ONCE
Qty: 1 TABLET | Refills: 0 | Status: SHIPPED | OUTPATIENT
Start: 2022-01-01 | End: 2022-01-01

## 2022-01-01 RX ORDER — HEPARIN SODIUM (PORCINE) LOCK FLUSH IV SOLN 100 UNIT/ML 100 UNIT/ML
5 SOLUTION INTRAVENOUS
Status: CANCELLED | OUTPATIENT
Start: 2022-01-01

## 2022-01-01 RX ORDER — EPINEPHRINE 1 MG/ML
0.3 INJECTION, SOLUTION INTRAMUSCULAR; SUBCUTANEOUS EVERY 5 MIN PRN
Status: CANCELLED | OUTPATIENT
Start: 2022-01-01

## 2022-01-01 RX ORDER — ONDANSETRON 4 MG/1
4 TABLET, ORALLY DISINTEGRATING ORAL EVERY 6 HOURS PRN
Status: DISCONTINUED | OUTPATIENT
Start: 2022-01-01 | End: 2022-01-01 | Stop reason: HOSPADM

## 2022-01-01 RX ORDER — ONDANSETRON 2 MG/ML
4 INJECTION INTRAMUSCULAR; INTRAVENOUS ONCE
Status: COMPLETED | OUTPATIENT
Start: 2022-01-01 | End: 2022-01-01

## 2022-01-01 RX ORDER — HEPARIN SODIUM,PORCINE 10 UNIT/ML
5 VIAL (ML) INTRAVENOUS
Status: CANCELLED | OUTPATIENT
Start: 2022-01-01

## 2022-01-01 RX ORDER — DEXAMETHASONE 2 MG/1
2 TABLET ORAL DAILY
Qty: 14 TABLET | Refills: 1 | Status: SHIPPED | OUTPATIENT
Start: 2022-01-01 | End: 2022-01-01

## 2022-01-01 RX ORDER — APIXABAN 5 MG (74)
KIT ORAL
Qty: 74 EACH | Refills: 0 | Status: SHIPPED | OUTPATIENT
Start: 2022-01-01 | End: 2022-01-01

## 2022-01-01 RX ORDER — BETAMETHASONE SODIUM PHOSPHATE AND BETAMETHASONE ACETATE 3; 3 MG/ML; MG/ML
5 INJECTION, SUSPENSION INTRA-ARTICULAR; INTRALESIONAL; INTRAMUSCULAR; SOFT TISSUE ONCE
Status: COMPLETED | OUTPATIENT
Start: 2022-01-01 | End: 2022-01-01

## 2022-01-01 RX ORDER — ALBUTEROL SULFATE 90 UG/1
1-2 AEROSOL, METERED RESPIRATORY (INHALATION)
Status: CANCELLED
Start: 2022-01-01

## 2022-01-01 RX ORDER — ALBUTEROL SULFATE 0.83 MG/ML
2.5 SOLUTION RESPIRATORY (INHALATION)
Status: CANCELLED | OUTPATIENT
Start: 2022-01-01

## 2022-01-01 RX ORDER — MEPERIDINE HYDROCHLORIDE 25 MG/ML
25 INJECTION INTRAMUSCULAR; INTRAVENOUS; SUBCUTANEOUS EVERY 30 MIN PRN
Status: CANCELLED | OUTPATIENT
Start: 2022-01-01

## 2022-01-01 RX ORDER — FENTANYL 12.5 UG/1
1 PATCH TRANSDERMAL
Qty: 15 PATCH | Refills: 0 | Status: SHIPPED | OUTPATIENT
Start: 2022-01-01

## 2022-01-01 RX ORDER — SODIUM CHLORIDE, SODIUM LACTATE, POTASSIUM CHLORIDE, CALCIUM CHLORIDE 600; 310; 30; 20 MG/100ML; MG/100ML; MG/100ML; MG/100ML
INJECTION, SOLUTION INTRAVENOUS CONTINUOUS
Status: DISCONTINUED | OUTPATIENT
Start: 2022-01-01 | End: 2022-01-01 | Stop reason: HOSPADM

## 2022-01-01 RX ORDER — DIPHENHYDRAMINE HYDROCHLORIDE 50 MG/ML
50 INJECTION INTRAMUSCULAR; INTRAVENOUS
Status: CANCELLED
Start: 2022-01-01

## 2022-01-01 RX ORDER — FENTANYL 12.5 UG/1
1 PATCH TRANSDERMAL
Qty: 3 PATCH | Refills: 0 | Status: SHIPPED | OUTPATIENT
Start: 2022-01-01 | End: 2022-01-01

## 2022-01-01 RX ORDER — VORICONAZOLE 200 MG/1
200 TABLET, FILM COATED ORAL 2 TIMES DAILY
Qty: 28 TABLET | Refills: 0 | Status: SHIPPED | OUTPATIENT
Start: 2022-01-01 | End: 2022-01-01

## 2022-01-01 RX ORDER — FLUCONAZOLE 200 MG/1
TABLET ORAL
Qty: 15 TABLET | Refills: 0 | Status: SHIPPED | OUTPATIENT
Start: 2022-01-01 | End: 2022-01-01

## 2022-01-01 RX ORDER — LIDOCAINE 40 MG/G
CREAM TOPICAL
Status: DISCONTINUED | OUTPATIENT
Start: 2022-01-01 | End: 2022-01-01 | Stop reason: HOSPADM

## 2022-01-01 RX ORDER — METHYLPREDNISOLONE SODIUM SUCCINATE 125 MG/2ML
125 INJECTION, POWDER, LYOPHILIZED, FOR SOLUTION INTRAMUSCULAR; INTRAVENOUS
Status: CANCELLED
Start: 2022-01-01

## 2022-01-01 RX ORDER — IOPAMIDOL 408 MG/ML
10 INJECTION, SOLUTION INTRATHECAL ONCE
Status: COMPLETED | OUTPATIENT
Start: 2022-01-01 | End: 2022-01-01

## 2022-01-01 RX ORDER — FAMOTIDINE 20 MG/1
20 TABLET, FILM COATED ORAL 2 TIMES DAILY PRN
Qty: 60 TABLET | Refills: 1 | Status: SHIPPED | OUTPATIENT
Start: 2022-01-01

## 2022-01-01 RX ORDER — SIMETHICONE 40MG/0.6ML
SUSPENSION, DROPS(FINAL DOSAGE FORM)(ML) ORAL PRN
Status: COMPLETED | OUTPATIENT
Start: 2022-01-01 | End: 2022-01-01

## 2022-01-01 RX ORDER — BENZONATATE 100 MG/1
100 CAPSULE ORAL 3 TIMES DAILY PRN
Qty: 10 CAPSULE | Refills: 0 | Status: SHIPPED | OUTPATIENT
Start: 2022-01-01 | End: 2022-01-01

## 2022-01-01 RX ORDER — NALOXONE HYDROCHLORIDE 0.4 MG/ML
0.4 INJECTION, SOLUTION INTRAMUSCULAR; INTRAVENOUS; SUBCUTANEOUS
Status: DISCONTINUED | OUTPATIENT
Start: 2022-01-01 | End: 2022-01-01 | Stop reason: HOSPADM

## 2022-01-01 RX ORDER — TRAMADOL HYDROCHLORIDE 50 MG/1
50 TABLET ORAL EVERY 6 HOURS PRN
Qty: 40 TABLET | Refills: 0 | Status: SHIPPED | OUTPATIENT
Start: 2022-01-01 | End: 2022-01-01

## 2022-01-01 RX ORDER — ONDANSETRON 4 MG/1
4 TABLET, ORALLY DISINTEGRATING ORAL EVERY 30 MIN PRN
Status: DISCONTINUED | OUTPATIENT
Start: 2022-01-01 | End: 2022-01-01 | Stop reason: HOSPADM

## 2022-01-01 RX ORDER — NALOXONE HYDROCHLORIDE 0.4 MG/ML
0.2 INJECTION, SOLUTION INTRAMUSCULAR; INTRAVENOUS; SUBCUTANEOUS
Status: DISCONTINUED | OUTPATIENT
Start: 2022-01-01 | End: 2022-01-01 | Stop reason: HOSPADM

## 2022-01-01 RX ORDER — LIDOCAINE 50 MG/G
1 PATCH TOPICAL EVERY 24 HOURS
Qty: 15 PATCH | Refills: 0 | Status: SHIPPED | OUTPATIENT
Start: 2022-01-01 | End: 2022-01-01

## 2022-01-01 RX ORDER — ONDANSETRON 2 MG/ML
4 INJECTION INTRAMUSCULAR; INTRAVENOUS
Status: DISCONTINUED | OUTPATIENT
Start: 2022-01-01 | End: 2022-01-01 | Stop reason: HOSPADM

## 2022-01-01 RX ORDER — PROPRANOLOL HYDROCHLORIDE 20 MG/1
20 TABLET ORAL 2 TIMES DAILY
Qty: 60 TABLET | Refills: 0 | Status: SHIPPED | OUTPATIENT
Start: 2022-01-01 | End: 2022-01-01

## 2022-01-01 RX ORDER — PROCHLORPERAZINE MALEATE 5 MG
5 TABLET ORAL EVERY 6 HOURS PRN
Status: DISCONTINUED | OUTPATIENT
Start: 2022-01-01 | End: 2022-01-01 | Stop reason: HOSPADM

## 2022-01-01 RX ORDER — FLUMAZENIL 0.1 MG/ML
0.2 INJECTION, SOLUTION INTRAVENOUS
Status: ACTIVE | OUTPATIENT
Start: 2022-01-01 | End: 2022-01-01

## 2022-01-01 RX ORDER — ONDANSETRON 8 MG/1
8 TABLET, FILM COATED ORAL EVERY 8 HOURS PRN
Qty: 30 TABLET | Refills: 3 | Status: SHIPPED | OUTPATIENT
Start: 2022-01-01 | End: 2022-01-01

## 2022-01-01 RX ORDER — LORAZEPAM 2 MG/ML
0.5 INJECTION INTRAMUSCULAR EVERY 4 HOURS PRN
Status: CANCELLED | OUTPATIENT
Start: 2022-01-01

## 2022-01-01 RX ORDER — LIDOCAINE HYDROCHLORIDE 20 MG/ML
INJECTION, SOLUTION INFILTRATION; PERINEURAL PRN
Status: DISCONTINUED | OUTPATIENT
Start: 2022-01-01 | End: 2022-01-01

## 2022-01-01 RX ORDER — FLUCONAZOLE 200 MG/1
400 TABLET ORAL DAILY
Qty: 56 TABLET | Refills: 0 | Status: SHIPPED | OUTPATIENT
Start: 2022-01-01 | End: 2022-01-01

## 2022-01-01 RX ORDER — FENTANYL CITRATE 50 UG/ML
INJECTION, SOLUTION INTRAMUSCULAR; INTRAVENOUS PRN
Status: DISCONTINUED | OUTPATIENT
Start: 2022-01-01 | End: 2022-01-01 | Stop reason: HOSPADM

## 2022-01-01 RX ORDER — CARBOXYMETHYLCELLULOSE SODIUM 10 MG/ML
1 GEL OPHTHALMIC 3 TIMES DAILY
COMMUNITY

## 2022-01-01 RX ORDER — OLANZAPINE 2.5 MG/1
2.5 TABLET, FILM COATED ORAL AT BEDTIME
Qty: 30 TABLET | Refills: 3 | Status: SHIPPED | OUTPATIENT
Start: 2022-01-01 | End: 2022-01-01

## 2022-01-01 RX ORDER — MULTIPLE VITAMINS W/ MINERALS TAB 9MG-400MCG
1 TAB ORAL DAILY
COMMUNITY

## 2022-01-01 RX ORDER — ONDANSETRON 2 MG/ML
4 INJECTION INTRAMUSCULAR; INTRAVENOUS EVERY 6 HOURS PRN
Status: DISCONTINUED | OUTPATIENT
Start: 2022-01-01 | End: 2022-01-01 | Stop reason: HOSPADM

## 2022-01-01 RX ORDER — FLUTICASONE PROPIONATE 50 MCG
2 SPRAY, SUSPENSION (ML) NASAL DAILY
Qty: 16 G | Refills: 0 | Status: SHIPPED | OUTPATIENT
Start: 2022-01-01 | End: 2022-01-01

## 2022-01-01 RX ORDER — OXYBUTYNIN CHLORIDE 5 MG/1
5 TABLET ORAL 2 TIMES DAILY PRN
Qty: 30 TABLET | Refills: 0 | Status: SHIPPED | OUTPATIENT
Start: 2022-01-01 | End: 2022-01-01

## 2022-01-01 RX ORDER — ZOLEDRONIC ACID 0.04 MG/ML
4 INJECTION, SOLUTION INTRAVENOUS ONCE
Status: CANCELLED
Start: 2022-01-01 | End: 2022-01-01

## 2022-01-01 RX ORDER — IOPAMIDOL 755 MG/ML
10-135 INJECTION, SOLUTION INTRAVASCULAR ONCE
Status: COMPLETED | OUTPATIENT
Start: 2022-01-01 | End: 2022-01-01

## 2022-01-01 RX ORDER — OXYCODONE HYDROCHLORIDE 5 MG/1
5 TABLET ORAL EVERY 6 HOURS PRN
Qty: 56 TABLET | Refills: 0 | Status: SHIPPED | OUTPATIENT
Start: 2022-01-01

## 2022-01-01 RX ORDER — MIRTAZAPINE 7.5 MG/1
7.5 TABLET, FILM COATED ORAL AT BEDTIME
Qty: 30 TABLET | Refills: 3 | Status: SHIPPED | OUTPATIENT
Start: 2022-01-01 | End: 2022-01-01

## 2022-01-01 RX ORDER — NICOTINE POLACRILEX 4 MG
15-30 LOZENGE BUCCAL
Status: DISCONTINUED | OUTPATIENT
Start: 2022-01-01 | End: 2022-01-01 | Stop reason: HOSPADM

## 2022-01-01 RX ORDER — IOPAMIDOL 755 MG/ML
75 INJECTION, SOLUTION INTRAVASCULAR ONCE
Status: COMPLETED | OUTPATIENT
Start: 2022-01-01 | End: 2022-01-01

## 2022-01-01 RX ORDER — ONDANSETRON 8 MG/1
8 TABLET, FILM COATED ORAL EVERY 8 HOURS PRN
Qty: 30 TABLET | Refills: 3 | Status: SHIPPED | OUTPATIENT
Start: 2022-01-01

## 2022-01-01 RX ORDER — ESTRADIOL 0.1 MG/G
2 CREAM VAGINAL
Qty: 42.5 G | Refills: 1 | Status: SHIPPED | OUTPATIENT
Start: 2022-01-01 | End: 2022-01-01

## 2022-01-01 RX ORDER — BISACODYL 5 MG/1
5 TABLET, DELAYED RELEASE ORAL DAILY
Qty: 30 TABLET | Refills: 0 | Status: SHIPPED | OUTPATIENT
Start: 2022-01-01 | End: 2022-01-01

## 2022-01-01 RX ORDER — POLYETHYLENE GLYCOL 3350 17 G/17G
1 POWDER, FOR SOLUTION ORAL DAILY
COMMUNITY

## 2022-01-01 RX ORDER — ESTRADIOL 0.1 MG/G
2 CREAM VAGINAL
Qty: 42.5 G | Refills: 1 | Status: SHIPPED | OUTPATIENT
Start: 2022-01-01

## 2022-01-01 RX ORDER — NALOXONE HYDROCHLORIDE 0.4 MG/ML
0.2 INJECTION, SOLUTION INTRAMUSCULAR; INTRAVENOUS; SUBCUTANEOUS
Status: CANCELLED | OUTPATIENT
Start: 2022-01-01

## 2022-01-01 RX ORDER — PROPOFOL 10 MG/ML
INJECTION, EMULSION INTRAVENOUS PRN
Status: DISCONTINUED | OUTPATIENT
Start: 2022-01-01 | End: 2022-01-01

## 2022-01-01 RX ORDER — OXYCODONE HYDROCHLORIDE 5 MG/1
5-10 TABLET ORAL EVERY 4 HOURS PRN
Qty: 20 TABLET | Refills: 0 | Status: SHIPPED | OUTPATIENT
Start: 2022-01-01 | End: 2022-01-01

## 2022-01-01 RX ORDER — FENTANYL 12.5 UG/1
1 PATCH TRANSDERMAL
Qty: 5 PATCH | Refills: 0 | Status: SHIPPED | OUTPATIENT
Start: 2022-01-01 | End: 2022-01-01

## 2022-01-01 RX ORDER — DICYCLOMINE HYDROCHLORIDE 10 MG/1
10 CAPSULE ORAL
Qty: 120 CAPSULE | Refills: 1 | Status: SHIPPED | OUTPATIENT
Start: 2022-01-01

## 2022-01-01 RX ORDER — MIRTAZAPINE 15 MG/1
15 TABLET, FILM COATED ORAL AT BEDTIME
Qty: 30 TABLET | Refills: 1 | Status: SHIPPED | OUTPATIENT
Start: 2022-01-01

## 2022-01-01 RX ORDER — FLUMAZENIL 0.1 MG/ML
0.2 INJECTION, SOLUTION INTRAVENOUS
Status: DISCONTINUED | OUTPATIENT
Start: 2022-01-01 | End: 2022-01-01 | Stop reason: HOSPADM

## 2022-01-01 RX ORDER — ZOLEDRONIC ACID 0.04 MG/ML
4 INJECTION, SOLUTION INTRAVENOUS ONCE
Status: COMPLETED | OUTPATIENT
Start: 2022-01-01 | End: 2022-01-01

## 2022-01-01 RX ORDER — GADOBUTROL 604.72 MG/ML
7.5 INJECTION INTRAVENOUS ONCE
Status: COMPLETED | OUTPATIENT
Start: 2022-01-01 | End: 2022-01-01

## 2022-01-01 RX ORDER — FENTANYL CITRATE 50 UG/ML
INJECTION, SOLUTION INTRAMUSCULAR; INTRAVENOUS PRN
Status: COMPLETED | OUTPATIENT
Start: 2022-01-01 | End: 2022-01-01

## 2022-01-01 RX ORDER — LIDOCAINE HYDROCHLORIDE 10 MG/ML
30 INJECTION, SOLUTION EPIDURAL; INFILTRATION; INTRACAUDAL; PERINEURAL ONCE
Status: COMPLETED | OUTPATIENT
Start: 2022-01-01 | End: 2022-01-01

## 2022-01-01 RX ORDER — SERTRALINE HYDROCHLORIDE 25 MG/1
TABLET, FILM COATED ORAL
Qty: 90 TABLET | Refills: 0 | Status: SHIPPED | OUTPATIENT
Start: 2022-01-01 | End: 2022-01-01

## 2022-01-01 RX ORDER — DEXTROSE MONOHYDRATE 25 G/50ML
25-50 INJECTION, SOLUTION INTRAVENOUS
Status: DISCONTINUED | OUTPATIENT
Start: 2022-01-01 | End: 2022-01-01 | Stop reason: HOSPADM

## 2022-01-01 RX ORDER — AMOXICILLIN 250 MG
1 CAPSULE ORAL 2 TIMES DAILY
Qty: 60 TABLET | Refills: 0 | Status: SHIPPED | OUTPATIENT
Start: 2022-01-01

## 2022-01-01 RX ORDER — ONDANSETRON 2 MG/ML
4 INJECTION INTRAMUSCULAR; INTRAVENOUS EVERY 30 MIN PRN
Status: DISCONTINUED | OUTPATIENT
Start: 2022-01-01 | End: 2022-01-01 | Stop reason: HOSPADM

## 2022-01-01 RX ORDER — NYSTATIN 100000/ML
500000 SUSPENSION, ORAL (FINAL DOSE FORM) ORAL 4 TIMES DAILY
Qty: 240 ML | Refills: 3 | Status: SHIPPED | OUTPATIENT
Start: 2022-01-01 | End: 2022-01-01

## 2022-01-01 RX ORDER — TRAMADOL HYDROCHLORIDE 50 MG/1
50 TABLET ORAL EVERY 6 HOURS PRN
Qty: 30 TABLET | Refills: 0 | Status: SHIPPED | OUTPATIENT
Start: 2022-01-01 | End: 2022-01-01 | Stop reason: ALTCHOICE

## 2022-01-01 RX ORDER — CHLORAL HYDRATE 500 MG
2 CAPSULE ORAL DAILY
COMMUNITY

## 2022-01-01 RX ADMIN — FENTANYL CITRATE 50 MCG: 50 INJECTION, SOLUTION INTRAMUSCULAR; INTRAVENOUS at 09:28

## 2022-01-01 RX ADMIN — Medication 1000 ML: at 15:11

## 2022-01-01 RX ADMIN — ONDANSETRON 4 MG: 2 INJECTION INTRAMUSCULAR; INTRAVENOUS at 15:13

## 2022-01-01 RX ADMIN — IOPAMIDOL 84 ML: 755 INJECTION, SOLUTION INTRAVASCULAR at 13:48

## 2022-01-01 RX ADMIN — IOPAMIDOL 2 ML: 408 INJECTION, SOLUTION INTRATHECAL at 13:36

## 2022-01-01 RX ADMIN — Medication 250 ML: at 13:50

## 2022-01-01 RX ADMIN — IOPAMIDOL 86 ML: 755 INJECTION, SOLUTION INTRAVASCULAR at 09:45

## 2022-01-01 RX ADMIN — LIDOCAINE HYDROCHLORIDE 60 MG: 20 INJECTION, SOLUTION INFILTRATION; PERINEURAL at 08:02

## 2022-01-01 RX ADMIN — IOPAMIDOL 74 ML: 755 INJECTION, SOLUTION INTRAVENOUS at 14:03

## 2022-01-01 RX ADMIN — LIDOCAINE HYDROCHLORIDE 4 ML: 10 INJECTION, SOLUTION EPIDURAL; INFILTRATION; INTRACAUDAL; PERINEURAL at 13:38

## 2022-01-01 RX ADMIN — SIMETHICONE 133 MG: 20 SUSPENSION/ DROPS ORAL at 09:12

## 2022-01-01 RX ADMIN — FENTANYL CITRATE 50 MCG: 50 INJECTION, SOLUTION INTRAMUSCULAR; INTRAVENOUS at 09:33

## 2022-01-01 RX ADMIN — SODIUM CHLORIDE, POTASSIUM CHLORIDE, SODIUM LACTATE AND CALCIUM CHLORIDE: 600; 310; 30; 20 INJECTION, SOLUTION INTRAVENOUS at 07:36

## 2022-01-01 RX ADMIN — SODIUM CHLORIDE 1000 ML: 9 INJECTION, SOLUTION INTRAVENOUS at 11:23

## 2022-01-01 RX ADMIN — PROPOFOL 20 MG: 10 INJECTION, EMULSION INTRAVENOUS at 08:12

## 2022-01-01 RX ADMIN — DOCUSATE SODIUM 226 ML: 50 LIQUID ORAL at 12:40

## 2022-01-01 RX ADMIN — Medication 250 ML: at 12:26

## 2022-01-01 RX ADMIN — BETAMETHASONE SODIUM PHOSPHATE AND BETAMETHASONE ACETATE 3 ML: 3; 3 INJECTION, SUSPENSION INTRA-ARTICULAR; INTRALESIONAL; INTRAMUSCULAR at 13:37

## 2022-01-01 RX ADMIN — IOPAMIDOL 75 ML: 755 INJECTION, SOLUTION INTRAVASCULAR at 16:14

## 2022-01-01 RX ADMIN — Medication 250 ML: at 08:45

## 2022-01-01 RX ADMIN — PROPOFOL 20 MG: 10 INJECTION, EMULSION INTRAVENOUS at 08:15

## 2022-01-01 RX ADMIN — ZOLEDRONIC ACID 4 MG: 0.04 INJECTION, SOLUTION INTRAVENOUS at 08:23

## 2022-01-01 RX ADMIN — LIDOCAINE HYDROCHLORIDE 1 ML: 10 INJECTION, SOLUTION EPIDURAL; INFILTRATION; INTRACAUDAL; PERINEURAL at 07:35

## 2022-01-01 RX ADMIN — GADOBUTROL 6 ML: 604.72 INJECTION INTRAVENOUS at 11:41

## 2022-01-01 RX ADMIN — GADOBUTROL 6 ML: 604.72 INJECTION INTRAVENOUS at 17:37

## 2022-01-01 RX ADMIN — Medication 250 ML: at 09:18

## 2022-01-01 RX ADMIN — MIDAZOLAM 1 MG: 1 INJECTION INTRAMUSCULAR; INTRAVENOUS at 09:33

## 2022-01-01 RX ADMIN — SODIUM CHLORIDE 1000 ML: 9 INJECTION, SOLUTION INTRAVENOUS at 16:35

## 2022-01-01 RX ADMIN — PROPOFOL 30 MG: 10 INJECTION, EMULSION INTRAVENOUS at 08:09

## 2022-01-01 RX ADMIN — PROPOFOL 20 MG: 10 INJECTION, EMULSION INTRAVENOUS at 08:17

## 2022-01-01 RX ADMIN — FLUDEOXYGLUCOSE F-18 10.12 MCI.: 500 INJECTION, SOLUTION INTRAVENOUS at 12:57

## 2022-01-01 RX ADMIN — PROPOFOL 20 MG: 10 INJECTION, EMULSION INTRAVENOUS at 08:14

## 2022-01-01 RX ADMIN — SODIUM CHLORIDE 2000 ML: 9 INJECTION, SOLUTION INTRAVENOUS at 08:12

## 2022-01-01 RX ADMIN — MIDAZOLAM 1 MG: 1 INJECTION INTRAMUSCULAR; INTRAVENOUS at 09:29

## 2022-01-01 RX ADMIN — PROPOFOL 50 MG: 10 INJECTION, EMULSION INTRAVENOUS at 08:05

## 2022-01-01 RX ADMIN — SODIUM CHLORIDE 480 MG: 9 INJECTION, SOLUTION INTRAVENOUS at 13:00

## 2022-01-01 RX ADMIN — SODIUM CHLORIDE 250 ML: 9 INJECTION, SOLUTION INTRAVENOUS at 12:44

## 2022-01-01 ASSESSMENT — ENCOUNTER SYMPTOMS
PHOTOPHOBIA: 0
SHORTNESS OF BREATH: 0
SPUTUM PRODUCTION: 0
DIZZINESS: 1
COUGH: 0
TREMORS: 0
HEMOPTYSIS: 0
DOUBLE VISION: 0
NAUSEA: 0
CONSTITUTIONAL NEGATIVE: 1
BLURRED VISION: 0
HEARTBURN: 0
SORE THROAT: 0
BRUISES/BLEEDS EASILY: 0
STRIDOR: 0
SINUS PAIN: 1
HEADACHES: 1
VOMITING: 0
TINGLING: 0

## 2022-01-01 ASSESSMENT — PAIN SCALES - GENERAL
PAINLEVEL: NO PAIN (0)
PAINLEVEL: SEVERE PAIN (7)
PAINLEVEL: MILD PAIN (3)
PAINLEVEL: NO PAIN (0)
PAINLEVEL: NO PAIN (0)
PAINLEVEL: SEVERE PAIN (6)
PAINLEVEL: MILD PAIN (2)
PAINLEVEL: MODERATE PAIN (5)
PAINLEVEL: MODERATE PAIN (4)
PAINLEVEL: NO PAIN (0)
PAINLEVEL: SEVERE PAIN (6)
PAINLEVEL: WORST PAIN (10)
PAINLEVEL: SEVERE PAIN (7)
PAINLEVEL: NO PAIN (0)
PAINLEVEL: MODERATE PAIN (5)
PAINLEVEL: EXTREME PAIN (9)
PAINLEVEL: MODERATE PAIN (4)
PAINLEVEL: NO PAIN (0)
PAINLEVEL: SEVERE PAIN (7)
PAINLEVEL: EXTREME PAIN (8)
PAINLEVEL: EXTREME PAIN (8)
PAINLEVEL: NO PAIN (0)
PAINLEVEL: WORST PAIN (10)
PAINLEVEL: NO PAIN (0)
PAINLEVEL: EXTREME PAIN (8)
PAINLEVEL: MODERATE PAIN (4)
PAINLEVEL: NO PAIN (0)
PAINLEVEL: NO PAIN (0)
PAINLEVEL: EXTREME PAIN (8)

## 2022-01-01 ASSESSMENT — PATIENT HEALTH QUESTIONNAIRE - PHQ9
10. IF YOU CHECKED OFF ANY PROBLEMS, HOW DIFFICULT HAVE THESE PROBLEMS MADE IT FOR YOU TO DO YOUR WORK, TAKE CARE OF THINGS AT HOME, OR GET ALONG WITH OTHER PEOPLE: VERY DIFFICULT
SUM OF ALL RESPONSES TO PHQ QUESTIONS 1-9: 15
SUM OF ALL RESPONSES TO PHQ QUESTIONS 1-9: 11
SUM OF ALL RESPONSES TO PHQ QUESTIONS 1-9: 15

## 2022-01-01 ASSESSMENT — ACTIVITIES OF DAILY LIVING (ADL)
ADLS_ACUITY_SCORE: 35

## 2022-01-01 ASSESSMENT — MIFFLIN-ST. JEOR: SCORE: 1154.63

## 2022-01-03 NOTE — TELEPHONE ENCOUNTER
Eleni,    That's incorrect. As Samara noted, this will be administed in the dermatology department of the [facility]. I saw the patient for a skin check in the oncology department.    Gloria Estrada MD    Department of Dermatology  [facility]: Phone: [phone], Fax:[phone]  [facility] [place]: Phone: [phone], Fax: [phone]

## 2022-01-04 NOTE — PROGRESS NOTES
Care Suites Discharge Nursing Note    Patient Information  Name: Yadira Hoang  Age: 76 year old    Discharge Education:  Discharge instructions reviewed: Yes  Additional education/resources provided: Per Radiology  Patient/patient representative verbalizes understanding: Yes  Patient discharging on new medications: No  Medication education completed: Yes    Discharge Plans:   Discharge location: home  Discharge ride contacted: Yes  Approximate discharge time:1417    Discharge Criteria:  Discharge criteria met and vital signs stable: Yes.  Site CDI    Patient Belongs:  Patient belongings returned to patient: Yes    Garrett Espinosa RN

## 2022-01-04 NOTE — DISCHARGE INSTRUCTIONS

## 2022-01-04 NOTE — PROCEDURES
[facility]    Procedure: L4-L5 GIFTY    Date/Time: 1/4/2022 2:05 PM  Performed by: Willam Thomas PA-C  Authorized by: Willam Thomas PA-C       UNIVERSAL PROTOCOL   Site Marked: Yes  Prior Images Obtained and Reviewed:  Yes  Required items: Required blood products, implants, devices and special equipment available    Patient identity confirmed:  Verbally with patient  Patient was reevaluated immediately before administering moderate or deep sedation or anesthesia  Confirmation Checklist:  Patient's identity using two indicators, relevant allergies, procedure was appropriate and matched the consent or emergent situation and correct equipment/implants were available  Time out: Immediately prior to the procedure a time out was called    Universal Protocol: the Joint Commission Universal Protocol was followed    Preparation: Patient was prepped and draped in usual sterile fashion       ANESTHESIA    Local Anesthetic: Lidocaine 1% without epinephrine      SEDATION    Patient Sedated: No    See dictated procedure note for full details.    PROCEDURE    Patient Tolerance:  Patient tolerated the procedure well with no immediate complications  Length of time physician/provider present for 1:1 monitoring during sedation: 0

## 2022-01-06 NOTE — TELEPHONE ENCOUNTER
Pre assessment questions completed for upcoming EGD procedure scheduled on 1.12.2022    COVID test scheduled 1.8.2022    Reviewed procedural arrival time 1330 and facility location [facility].    Designated  policy reviewed. Instructed to have someone stay 6 hours post procedure.     Anticoagulation/blood thinners? no    Electronic implanted devices? no    Reviewed EGD prep instructions with patient.     Patient verbalized understanding and had no questions or concerns at this time.    Alva Germain RN

## 2022-01-10 NOTE — PROGRESS NOTES
"Psychology Process Note based on virtual encounter per covid 19 protocol.  Provider called client at her home at 1-1:50pm (50 min)   Call and blling fpr call onsented.  INDIV [employer]ERAPY  Interv: pr solving support.  Pr Stress  S  \"wasn't doing good because of all the pain in my legs. .last week (Tuesday) gave me a shot in the back. .really tired. .went away but came back. . Don't see him until Jan 27th. .injection (end of the month)\"  \"I can hardly do anything\"    O  Hearing problem now.  Back pain.  Discouraged.  Fatigued/  A  Isolated and frustrated.   Goal  1) Check if ins company received physician's request  2) Consider PT appt. - double check with oncologist now that she has a shot with some felief.   PLAN  Virtual follow up   "

## 2022-01-11 NOTE — TELEPHONE ENCOUNTER
Left message asking pt to move procedure, do to MD family emergency, from start time of 1430to 0930 with arrival at 0830 and the MD would change to Dr Fernandez

## 2022-01-12 NOTE — H&P
Yadira Hoang  [id number]  female  76 year old      Reason for procedure/surgery: dyspepsia,    Patient Active Problem List   Diagnosis     Malignant melanoma of right lower extremity including hip (H)     Melanoma of skin (H)     GUERLINE (obstructive sleep apnea)     Chronic congestion of paranasal sinus     Weakness of foot     Neck pain, chronic     Myofascial pain     Intractable migraine without aura and without status migrainosus     History of multiple concussions     Foot pain, bilateral     Fibromyositis     Difficulty walking     Chronic pain disorder     Chronic fatigue     Bilateral occipital neuralgia     Articular disc disorder of temporomandibular joint     S/P flap graft     Osteopenia     Injury of head     Hyperlipidemia LDL goal <100     ALEXUS (generalized anxiety disorder)     Major depression, recurrent, chronic (H)     Secondary malignant neoplasm of bone (H)     Generalized pruritus     Immunodeficiency, unspecified (H)       Past Surgical History:    Past Surgical History:   Procedure Laterality Date     BIOPSY NODE SENTINEL Right 2/10/2020    Procedure: [place] Lymph Node Mapping And Biopsy;  Surgeon: Gabriela Dorsey MD;  Location: [facility] OR     EXCISE LESION LOWER EXTREMITY Right 2/10/2020    Procedure: Wide Local Excision of RIGHT heel melanoma;  Surgeon: Gabriela Dorsey MD;  Location: [facility] OR     EXCISE LESION LOWER EXTREMITY Right 2/20/2020    Procedure: Wide local Re-excision RIGHT heel Wound vac application;  Surgeon: Gabriela Dorsey MD;  Location: [facility]     GRAFT SKIN SPLIT THICKNESS FROM EXTREMITY Right 4/2/2020    Procedure: split skin graft from right thigh;  Surgeon: NIKA Crabtree MD;  Location: [facility]     IRRIGATION AND DEBRIDEMENT FOOT, COMBINED Right 4/2/2020    Procedure: right foot/heel debridement;  Surgeon: NIKA Crabtree MD;  Location: [facility] OR     ORTHOPEDIC SURGERY Right     heel surgery x2     RECONSTRUCT FOOT Right 3/4/2020    Procedure:  "Right heel reconstruction with regional flap, biologic dressing and SPY;  Surgeon: NIKA Crabtree MD;  Location: [facility] OR     TUBAL LIGATION         Past Medical History:   Past Medical History:   Diagnosis Date     Concussion      ALEXUS (generalized anxiety disorder) 5/7/2020     Major depression, recurrent, chronic (H) 5/7/2020     Melanoma (H)      Nonsenile cataract      Sleep apnea     CPAP       Social History:   Social History     Tobacco Use     Smoking status: Never Smoker     Smokeless tobacco: Never Used   Substance Use Topics     Alcohol use: Not Currently       Family History:   Family History   Problem Relation Age of Onset     Glaucoma No family hx of      Macular Degeneration No family hx of      Melanoma No family hx of      Skin Cancer No family hx of        Allergies:   Allergies   Allergen Reactions     Atorvastatin      Statins all swelling     Hmg-Coa-R Inhibitors Swelling       Active Medications:   No current outpatient medications on file.       Systemic Review:   CONSTITUTIONAL: NEGATIVE for fever, chills, change in weight  ENT/MOUTH: NEGATIVE for ear, mouth and throat problems  RESP: NEGATIVE for significant cough or SOB  CV: NEGATIVE for chest pain, palpitations or peripheral edema    Physical Examination:   Vital Signs: /70   Pulse 71   Temp 97.8  F (36.6  C) (Oral)   Resp 17   Ht 1.702 m (5' 7\")   Wt 63.7 kg (140 lb 6.9 oz)   SpO2 100%   BMI 21.99 kg/m    GENERAL: healthy, alert and no distress  NECK: no adenopathy, no asymmetry, masses, or scars  RESP: lungs clear to auscultation - no rales, rhonchi or wheezes  CV: regular rate and rhythm, normal S1 S2, no S3 or S4, no murmur, click or rub, no peripheral edema and peripheral pulses strong  ABDOMEN: soft, nontender, no hepatosplenomegaly, no masses and bowel sounds normal  MS: no gross musculoskeletal defects noted, no edema    Plan: Appropriate to proceed as scheduled.      Dayo Fernandez MD  1/12/2022    PCP:  Harvinder, " Erika

## 2022-01-13 NOTE — TELEPHONE ENCOUNTER
Letter below.              January 13, 2022    To Whom it May Concern,     I am writing on behalf of Yadira VENEGAS Susanaloni to document the medical necessity of T-Vec injections for metastatic melanoma. This letter provides information about the patient's medical history and diagnosis, and a statement summarizing my treatment rationale.    Yadira has known metastatic melanoma. Contrary to the letter received from you previously, she has not been treated and continues to have metastatic melanoma. She is currently not on any treatment as she developed severe transaminitis with immunotherapy. T-Vec is the only FDA approved treatment option. Without treatment, Yadira will not survive.    Please, also see the attached medical record and be sure to read in detail. She does not have cutaneous disease, but rather metastatic disease. T-Vec is indicated for metastatic disease.     We strive to provide our patients with outstanding care. Therefore, I request you please contact me at the number below if you have any questions regarding coverage or our treatment rationale        Sincerely,     Gloria Estrada MD    Department of Dermatology  [facility]: Phone: [phone], Fax:[phone]  [facility]: Phone: [phone], Fax: [phone]

## 2022-01-13 NOTE — TELEPHONE ENCOUNTER
Where in the chart is this located?    Gloria Estrada MD    Department of Dermatology  [facility]: Phone: [phone], Fax:[phone]  [facility]: Phone: [phone], Fax: [phone]

## 2022-01-13 NOTE — TELEPHONE ENCOUNTER
The information in the letter is blatantly false. The patient has untreated metastatic melanoma. She has not had the melanoma removed as this is not possible. What do we need to do to fix this?    Gloria Estrada MD    Department of Dermatology  [facility]: Phone: [phone], Fax:[phone]  [facility]: Phone: [phone], Fax: [phone]

## 2022-01-14 NOTE — TELEPHONE ENCOUNTER
[facility] nursing staff, can someone help Eleni with this? This is urgent - patient is not on any treatment at present.    Gloria Estrada MD    Department of Dermatology  [facility]: Phone: [phone], Fax:[phone]  [facility]: Phone: [phone], Fax: [phone]

## 2022-01-16 NOTE — PROGRESS NOTES
To Whom it May Concern,      I am writing on behalf of Yadira VENEGAS Viktor to document the medical necessity of T-Vec injections for metastatic melanoma. This letter provides information about the patient's medical history and diagnosis, and a statement summarizing my treatment rationale.     Yadira has known metastatic melanoma. Contrary to the letter received from you previously, she has not been treated and continues to have metastatic melanoma. She is currently not on any treatment as she developed severe transaminitis with immunotherapy. T-Vec is the only FDA approved treatment option. Without treatment, Yadira will not survive.     Please, also see the attached medical record and be sure to read in detail. She does not have cutaneous disease, but rather metastatic disease. T-Vec is indicated for metastatic disease.      We strive to provide our patients with outstanding care. Therefore, I request you please contact me at the number below if you have any questions regarding coverage or our treatment rationale           Sincerely,      Gloria Estrada MD    Department of Dermatology  [facility]: Phone: [phone], Fax:[phone]  [facility]: Phone: [phone], Fax: [phone]

## 2022-01-18 NOTE — PROGRESS NOTES
Yadira Ochoa is a 76 year old who is being evaluated via a billable telephone visit.      What phone number would you like to be contacted at? [phone]   How would you like to obtain your AVS? MyChart    Assessment & Plan     Diagnoses and all orders for this visit:    Esophageal candidiasis (H)    Shaky  -     propranolol (INDERAL) 20 MG tablet; Take 1 tablet (20 mg) by mouth 2 times daily    Tremor  -     propranolol (INDERAL) 20 MG tablet; Take 1 tablet (20 mg) by mouth 2 times daily    Palpitations  -     EKG 12-lead complete w/read - Clinics    ALEXUS (generalized anxiety disorder)    Metastatic melanoma (H)  Comments:  Progressing, follows with oncology.    Immunodeficiency, unspecified (H)    Major depression, recurrent, chronic (H)  Comments:  Ongoing.  On sertraline 50 mg.  May be causing some tremor.  Monitor for now.       Preliminary report of the KOH prep from EGD showed Candida albicans.  He has not been finalized.  Discussed with patient his diagnosis and treatment options with clotrimazole atrocious versus oral fluconazole.  The latter is probably better tolerated.  We will wait for the final report before writing prescription.    Patient reported exertional palpitation, inducing anxiety, and shakiness.  Her last EKG was in February 2020, normal.  We will obtain another ECG to take a look at baseline rhythm.  Consider Holter monitor to further characterize the palpitations.    Patient reported shakiness in the hands, possibly drug-induced tremor from sertraline, dose was increased fairly recently from 25 to 50 mg.  Could also be related to generalized anxiety.  If ECG looks normal, we will have her do propranolol to help with anxiety.  This may also help with shakiness.  Could consider taper down on sertraline if the shakiness is controlled with propranolol and if this helps with her anxiety.    Return for ECG report.    Erika Blanton MD PhD  [facility]   Yadira Ochoa is  a 76 year old who presents for the following health issues     HPI     Follow up on EGD.   Pt reported her throat feels different, something is going on.   Has had nausea, poor appetite for sometime. Not much mid chest pain.   Pt will have a PET scan on Friday.   Her stomach just doesn't feel right. Can't describe what it feels. It started from taking high dose steroid for treatment for malignant melanoma.     She has chronic back pain with leg pain. She had oral steroid for the back that helped but had epidural injection on 1/4/2022, it didn't help.     Pt sometimes feels shaky in her hands throughout the days, comes and goes. The fork or spoon shaking. Started a few months ago and got worse. She has mentioned this to her oncologist in the past.     Sometimes when she does too much, her heart will start beating faster and feels shaky, like doing laundry with a big load. It will calm down if she sits for a while.     She has high anxiety about melanoma, lymph node is growing. There is plan for trying a new medication but waiting for insurance to approve. She is not on any treatment right now.       Review of Systems   Constitutional, HEENT, cardiovascular, pulmonary, gi and gu systems are negative, except as otherwise noted.      Objective           Vitals:  No vitals were obtained today due to virtual visit.    Physical Exam   healthy, alert and no distress  PSYCH: Alert and oriented times 3; coherent speech, normal   rate and volume, able to articulate logical thoughts, able   to abstract reason, no tangential thoughts, no hallucinations   or delusions  Sounds anxious  RESP: No cough, no audible wheezing, able to talk in full sentences  Remainder of exam unable to be completed due to telephone visits    Admission on 01/12/2022, Discharged on 01/12/2022   Component Date Value Ref Range Status     Culture 01/12/2022 Candida albicans*  Preliminary     Upper GI Endoscopy 01/12/2022    Final                    Value:M  [address] [place] [address] [phone]     Endoscopy Department  _______________________________________________________________________________  Patient Name: Yadira Hoang      Procedure Date: 1/12/2022 9:10 AM  MRN: [id number]                       Account Number: [id number]  YOB: 1945              Admit Type: Outpatient  Age: 76                               Room: [facility]  Gender: Female                        Note Status: Finalized  Attending MD: Dayo Fernandez MD        Total Sedation Time:   _______________________________________________________________________________     Procedure:             Upper GI endoscopy  Indications:           Dyspepsia, now resolved, hx steroid use (translumbar                          injection)  Providers:             Dayo Fernandez MD, Jesu Jansen, LAKSHMI, Jeny Means RN  Referring MD:          Erika Blanton MD  Medicines:             Midazolam 2 mg IV, Fentanyl 100 micrograms                           IV  Complications:         No immediate complications.  _______________________________________________________________________________  Procedure:             Pre-Anesthesia Assessment:                         - Prior to the procedure, a History and Physical was                          performed, and patient medications and allergies were                          reviewed. The patient is competent. The risks and                          benefits of the procedure and the sedation options and                          risks were discussed with the patient. All questions                          were answered and informed consent was obtained.                          Patient identification and proposed procedure were                          verified by the physician and the nurse in the                          pre-procedure area in the procedure room. Mental                          Status Examination: alert  and oriented. Airway                          Examination: normal oropharynge                          al airway and neck                          mobility. Respiratory Examination: clear to                          auscultation. CV Examination: normal. Prophylactic                          Antibiotics: The patient does not require prophylactic                          antibiotics. Prior Anticoagulants: The patient has                          taken no anticoagulant or antiplatelet agents. ASA                          Grade Assessment: II - A patient with mild systemic                          disease. After reviewing the risks and benefits, the                          patient was deemed in satisfactory condition to                          undergo the procedure. The anesthesia plan was to use                          moderate sedation / analgesia (conscious sedation).                          Immediately prior to administration of medications,                          the patient was re-assessed for adequacy to receive                          sedatives. The heart rate, respiratory ra                          te, oxygen                          saturations, blood pressure, adequacy of pulmonary                          ventilation, and response to care were monitored                          throughout the procedure. The physical status of the                          patient was re-assessed after the procedure.                         After obtaining informed consent, the endoscope was                          passed under direct vision. Throughout the procedure,                          the patient's blood pressure, pulse, and oxygen                          saturations were monitored continuously. The Endoscope                          was introduced through the mouth, and advanced to the                          second part of duodenum. The upper GI endoscopy was                          accomplished without  difficulty. The patient tolerated                          the procedure well.                                                                                   Findings:       Whit                          e nummular lesions were noted in the entire esophagus. Brushings for        KOH prep were obtained.       The examined esophagus was mildly tortuous.       The entire examined stomach was normal.       The examined duodenum was normal.                                                                                   Moderate Sedation:       Moderate (conscious) sedation was administered by the endoscopy nurse        and supervised by the endoscopist. The following parameters were        monitored: oxygen saturation, heart rate, blood pressure, and response        to care. Total physician intraservice time was 14 minutes.  Impression:            - White nummular lesions in esophageal mucosa.                          Brushings performed.                         - Tortuous esophagus.                         - Normal stomach.                         - Normal examined duodenum.  Recommendation:        - Discharge patient to home.                         - Resume previous diet.                                                   - Await pathology results.                         - Return to primary care physician as previously                          scheduled.                                                                                     ________________  Dayo Fernandez MD  1/12/2022 9:47:29 AM  I was physically present for the entire viewing portion of the exam.  __________________________  Signature of teaching physician  [id number] Fernandez MD  Number of Addenda: 0    Note Initiated On: 1/12/2022 9:10 AM  Scope In:  Scope Out:           Phone call duration: 25 minutes

## 2022-01-19 NOTE — PROGRESS NOTES
Called patient and let her know, per Dr. Fernandez:    Esophageal brushings consistent with candida.     Will treat with fluconazole 400 mg pox1 then 200 mg every 24 hours for a further 13 days (total 14 days of therapy).     Prescription sent to [facility].    Patient verbalized understanding and had no further questions or concerns.    Yin HAUSER LPN  [facility]ology [facility]

## 2022-01-20 NOTE — PROGRESS NOTES
Assessment & Plan     Yadira Ochoa was seen today for heart problem.    Diagnoses and all orders for this visit:    Palpitations  -     EKG 12-lead complete w/read - Clinics  -     Leadless EKG Monitor 3 to 7 Days; Future    Postnasal drip  -     fluticasone (FLONASE) 50 MCG/ACT nasal spray; Spray 2 sprays into both nostrils daily    Cough  -     benzonatate (TESSALON) 100 MG capsule; Take 1 capsule (100 mg) by mouth 3 times daily as needed for cough         Return for pending on result. .    Erika Blanton MD PhD  [facility]   Yadira Ochoa is a 76 year old who presents for the following health issues     HPI   Patient is here today for EKG. Had virtual visit yesterday, reported symptoms of palpitation and here for ECG. Palpitation is daily with exertion. Denies chest pain. The palpation makes her anxious.     She is a little stressed today. Found out her insurance would not cover the new chemo drug. Her oncologist is appealing this.     Pt also reported postnasal drip and night time cough for a long time. She took two courses of antibiotic but not helping. Sometimes gets gobs of stuff in the mouth that will come out. It is always clear.     Review of Systems   Constitutional, HEENT, cardiovascular, pulmonary, gi and gu systems are negative, except as otherwise noted.      Objective    /74   Pulse 104   Temp 98.9  F (37.2  C) (Oral)   Wt 64.5 kg (142 lb 3.2 oz)   SpO2 97%   BMI 22.27 kg/m    Body mass index is 22.27 kg/m .  Physical Exam   GENERAL: healthy, alert and no distress  CV: regular rate and rhythm, normal S1 S2, no S3 or S4, no murmur, click or rub, no peripheral edema and peripheral pulses strong  MS: no gross musculoskeletal defects noted, no edema    ECG: sinus rhythm.

## 2022-01-20 NOTE — PATIENT INSTRUCTIONS
To schedule the heart monitor:  Call [phone] to schedule at [facility]

## 2022-01-20 NOTE — TELEPHONE ENCOUNTER
Patient called in with this message today.  Patient has appointment today with  at 340pm - Can this be addressed at this appointment or would you like separate visit?    Ellyn Jamison RN, BSN  [facility]

## 2022-01-20 NOTE — TELEPHONE ENCOUNTER
Would like an antibiotic for a sinus infection.  Patient states she was given an RX without being seen previously.

## 2022-01-21 NOTE — TELEPHONE ENCOUNTER
Please call patient and let her know TVec was just approved. Schedule her in my clinic next Friday sometime before 8:30 (will be an overbook). Let her know to expect to be in the building for 3 hours because pharmacy will not start preparing TVec until she is here due to its very high cost. Injections only take 10 minutes, the processing is what takes so long. I will let pharmacy know via email TVec is needed for next Friday.    Gloria Estrada MD    Department of Dermatology  [facility]: Phone: [phone], Fax:[phone]  [facility]: Phone: [phone], Fax: [phone]

## 2022-01-21 NOTE — ADDENDUM NOTE
Addended by: DEBBIE JAIMES on: 1/21/2022 08:27 AM     Modules accepted: Orders, Level of Service

## 2022-01-23 NOTE — TELEPHONE ENCOUNTER
I called and left a  asking for Yadira to give us a call back to schedule a appointment with Dr. Estrada for a TVEC injection.    AHMET Weeks

## 2022-01-24 NOTE — TELEPHONE ENCOUNTER
[facility] Call Center    Phone Message    May a detailed message be left on voicemail: yes     Reason for Call: Pt received a message to call back. Pt stated she is a cancer Pt and will be doing a new treatment. Please review and call back. Thanks    Action Taken: Message routed to:  [facility] ([facility]): Derm    Travel Screening: Not Applicable

## 2022-01-25 NOTE — PROGRESS NOTES
MEDICAL ONCOLOGY PROGRESS NOTE  Melanoma Clinic  Jan 27, 2022     CHIEF COMPLAINT: Acral melanoma, on steroids for autoimmune hepatitis    Melanoma History:  1. She noted a painful lesion on the sole of the right foot for a few months, since last summer.  It initially was small then became raised.  It spontaneously bled. She is not entirely sure of its appearance initially.  She denies noting any masses behind the knee or in the groin.  2. 1/9/2020, punch biopsy was performed by Dr Jessica Meadows. Patology showed melanoma, at least 3.4 mm deep with ulceration and 0 mitoses, and perineural invasion present. TILs were non-brisk and LVI not identified. pT3b.  3. 2/10/2020, she has right femoral sentinel lymph node biopsy and wide local excision (Specimen #: [id number]) and the right heel lesion extends to a depth of 6.6 mm, and invasive melanoma is present at 0.2 mm from the deep margin. Microsatellites are also present. There was 1 (of 2) lymph nodes involved. The lymph node tissue exhibits extensive subcapsular and parenchymal clusters of melanoma cells, highlighted on Melan-A immunostain. The largest cluster is 7 millimeters in greatest diameter. pT4b pN2c. PD-L1 staining is negative, <1%. No mutation in BRAF, KIT, or NRAS.  4. 2/22/2020, she had PET-CT, which showed intense FDG uptake in a right inguinal lymph node, concerning for an additional focus of metastatic disease. There is also an indeterminate right external iliac lymph node with mild FDG uptake.  5. 3/4/2020, she has medial plantar artery  fasciocutaneous instep flap and Integra placement to the donor site. On 4/2/2020, she has additional reconstruction with skin grafting.  6. 5/22/2020, PET-CT shows a hypermetabolic right inguinal and right external iliac chain lymph node and stable pulmonary nodules.  7. 5/27/2020, she starts nivolumab for presumed low volume lymph node predominant metastatic disease.  8. 8/21/2020, PET-CT shows increase in  hypermetabolic adenopathy, and a hypermetabolic nodule in segment 2 of the liver. Given low volume of disease she prefers to continue therapy with short interval follow-up.  9. 10/9/2020, PET-CT shows increasing size of hepatic metastases with increased hypermetabolism, increased right inguinal and iliac lymphadenopathy, and new FDG uptake right T2 transverse process and right side of S1. MRI-brain obtained 10/21/2020 is negative for brain metastasis.  10. 10/28/2020, she starts ipilimumab 1mg/kg and nivolumab 3mg/kg. She then goes on to maintenance nivolumab through 4/12/21.  11. 5/10/2021, she starts ipilimumab 3 mg/kg monotherapy q3w for 4 cycles.  12. 7/30/21, PET/CT shows mild disease progression.   13. 8/2/21, start ipilimumab 3mg/kg and nivolumab 1mg/kg and received 2 cycles, last on 8/23/21.  14. 8/23/21, develops elevated LFT's.   15. 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    Today she again notes leg pain that intermittently spreads down both of her legs. She notes the pain on the right side goes through into the ankle.     She stopped prednisone around Thanksgiving. The pain into her legs started up around that time. She was given a prescription for gabapentin for the shooting pain into the legs, but it makes her sleepy during the day. She thinks it does not help very much with the pain. Pain is severe enough to interrupt her walking. She frequently has to sit down to take a break due to the pain.    Back pain is very painful. She has a history of back problems, but hasn't seen someone for her back in some time. She does not want surgery.    ECOG performance status is 1.    Current Outpatient Medications   Medication Sig Dispense Refill     benzonatate (TESSALON) 100 MG capsule Take 1 capsule (100 mg) by mouth 3 times daily as needed for cough 10 capsule 0      cholecalciferol (VITAMIN D3) 125 MCG (5000 UT) TABS tablet Take 2,000 Units by mouth every other day  (Patient not taking: Reported on 1/20/2022)       estradiol (ESTRACE) 0.1 MG/GM vaginal cream Place 2 g vaginally twice a week (Patient not taking: Reported on 1/20/2022) 42.5 g 1     fluconazole (DIFLUCAN) 200 MG tablet 400 mg by mouth for one day then 200 mg every 24 hours for a further 13 days. (Patient not taking: Reported on 1/20/2022) 15 tablet 0     fluticasone (FLONASE) 50 MCG/ACT nasal spray Spray 2 sprays into both nostrils daily 16 g 0     gabapentin (NEURONTIN) 300 MG capsule Take 1 capsule (300 mg) by mouth At Bedtime (Patient not taking: Reported on 1/20/2022) 30 capsule 1     omega 3 1000 MG CAPS 1 capsule (Patient not taking: Reported on 1/20/2022)       omeprazole (PRILOSEC) 40 MG DR capsule Take 1 capsule (40 mg) by mouth daily (Patient not taking: Reported on 1/20/2022) 90 capsule 1     ondansetron (ZOFRAN-ODT) 4 MG ODT tab Take 1 tablet (4 mg) by mouth every 8 hours as needed for nausea (Patient not taking: Reported on 1/20/2022) 60 tablet 1     potassium chloride ER (KLOR-CON M) 20 MEQ CR tablet Take 20 mEq every 2 hours for four doses daily for 2 days. (Patient not taking: Reported on 1/20/2022) 8 tablet 0     propranolol (INDERAL) 20 MG tablet Take 1 tablet (20 mg) by mouth 2 times daily (Patient not taking: Reported on 1/20/2022) 60 tablet 0     sertraline (ZOLOFT) 25 MG tablet Take 2 tablets (50 mg) by mouth daily (Patient not taking: Reported on 1/20/2022) 90 tablet 0     sucralfate (CARAFATE) 1 GM tablet Take 1 tablet (1 g) by mouth 4 times daily as needed for nausea (Patient not taking: Reported on 1/20/2022) 60 tablet 0     traMADol (ULTRAM) 50 MG tablet Take 1 tablet (50 mg) by mouth every 6 hours as needed for severe pain (Patient not taking: Reported on 1/20/2022) 20 tablet 0     triamcinolone (KENALOG) 0.1 % external cream Apply topically 2 times daily (Patient not taking: Reported  on 1/20/2022) 453.6 g 11     vitamin D3 (CHOLECALCIFEROL) 125 MCG (5000 UT) tablet 2 capsule (Patient not taking: Reported on 1/20/2022)           PHYSICAL EXAMINATION  There were no vitals taken for this visit.  GENERAL: Healthy, alert and no distress  EYES: Eyes grossly normal to inspection.  No discharge or erythema, or obvious scleral/conjunctival abnormalities.  HENT: Normal cephalic/atraumatic.  External ears, nose and mouth without ulcers or lesions.  No nasal drainage visible.  NECK: No asymmetry, visible masses or scars  RESP: No audible wheeze, cough, or visible cyanosis.  No visible retractions or increased work of breathing.  LYMPH: Conglomerate of right femoral lymph nodes measures about 6 cm in size.  SKIN: Visible skin clear. No significant rash, abnormal pigmentation or lesions.  NEURO: Cranial nerves grossly intact.  Mentation and speech appropriate for age. Gait antalgic. Uses a walker.  PSYCH: Mentation appears normal, she is anxious and intermittently tearful. Judgement and insight intact, normal speech and appearance well-groomed.      LABS  No visits with results within 1 Week(s) from this visit.   Latest known visit with results is:   Lab on 11/24/2021   Component Date Value Ref Range Status     Erythrocyte Sedimentation Rate 11/24/2021 36* 0 - 30 mm/hr Final     CRP Inflammation 11/24/2021 14.7* 0.0 - 8.0 mg/L Final     CK 11/24/2021 35  30 - 225 U/L Final         IMAGING  PET Oncology Whole Body  Narrative: Combined Report of:    PET and CT on  1/21/2022 11:26 AM :    1. PET of the neck, chest, abdomen, and pelvis.  2. PET CT Fusion for Attenuation Correction and Anatomical  Localization:    3. Diagnostic CT scan of the chest, abdomen, and pelvis with  intravenous contrast for interpretation.  3. CT of the chest, abdomen and pelvis obtained for diagnostic  interpretation.  4. 3D MIP and PET-CT fused images were processed on an independent  workstation and archived to PACS and reviewed by a  radiologist.    Technique:    1. PET: The patient received 13.02 mCi of F-18-FDG; the serum glucose  was 103 prior to administration, body weight was 63.7 kg. Images were  evaluated in the axial, sagittal, and coronal planes as well as the  rotational whole body MIP. Images were acquired from the Vertex to the  Feet.    UPTAKE WAS MEASURED AT 75 MINUTES.     BACKGROUND:  Liver SUV max= 3.7,   Aorta Blood SUV Max: 2.0.     2. CT: Volumetric acquisition for clinical interpretation of the  chest, abdomen, and pelvis acquired at 3 mm sections . The chest,  abdomen, and pelvis were evaluated at 5 mm sections in bone, soft  tissue, and lung windows.      The patient received 86 cc of Isovue 370 intravenously for the  examination.    --    3. 3D MIP and PET-CT fused images were processed on an independent  workstation and archived to PACS and reviewed by a radiologist.    INDICATION: Melanoma, stage >= IIB, monitor; Malignant melanoma of  right lower extremity including hip (H)    ADDITIONAL INFORMATION OBTAINED FROM EMR: 75 yo female with history  metastatic melanoma, right heel primary with mets to lymph nodes,  liver, bones. Status post immunotherapy.     COMPARISON: PET-CT 10/29/2021    FINDINGS:     HEAD/NECK:  There is no  suspicious FDG uptake in the neck.     Mild diffuse mucosal thickening of the paranasal sinuses. The mastoid  air cells are clear.     The mucosal pharyngeal space, the , prevertebral and carotid  spaces are within normal limits.     No masses, mass effect or pathologically enlarged lymph nodes are  evident. The thyroid gland is unremarkable.    CHEST:  New hypermetabolic groundglass and consolidative opacities in the  posterior right upper lobe with max SUV 8.4 (series 3 image 173) and  in the anterior right middle lobe with max SUV of 7.0 (series 3 image  199).    The ascending aorta and main pulmonary artery are normal in caliber.  The heart is normal in size. No significant  pericardial effusion. No  hilar, mediastinal, or axillary lymphadenopathy. Small hiatal hernia.    The central tracheobronchial tree is patent. Bibasilar atelectasis. No  pleural effusion or pneumothorax.     ABDOMEN AND PELVIS:  Hypermetabolic lesion in hepatic segment 2 measures 1.8 x 1.9 cm with  SUV max of 24.6 (series 3 image 242), previously measuring 1.5 x 1.5  cm with SUV max of 14.4. New hypermetabolic lesion in hepatic segment  6 measures 1.3 x 1.1 cm with max SUV of 5.1 (series 3 image 296).     New hypermetabolic portacaval lymph node measures 1.1 x 0.7 cm with  max SUV of 4.7 (series 3 image 263).    Increased size of enlarged and hypermetabolic right external iliac and  right inguinal lymph nodes. For example, right external iliac  conglomeration measures 4.8 x 4.2 cm with max SUV of 25.6 (series 3  image 374), previously measuring 3.5 x 3.6 cm with max SUV of 31.0.  Right inguinal conglomeration measures 5.3 x 3.7 with max SUV of 28.8  (series 3 image 421), previously measuring 3.9 x 2.9 cm with max SUV  of 20.7.     There is no splenomegaly or evidence for splenic or pancreatic mass  lesion. There are no suspicious adrenal mass lesions or opaque  gallbladder calculi. There is symmetric nephrographic renal phase  without hydronephrosis. Colonic diverticulosis without evidence for  diverticulitis. No bowel obstruction or free fluid.     LOWER EXTREMITIES:   No abnormal masses or hypermetabolic lesions.    BONES:   There are no suspicious lytic or blastic osseous lesions.  There is no  abnormal FDG uptake in the skeleton. Multilevel degenerative changes  of the spine.  Impression: IMPRESSION: In this patient with metastatic melanoma status post  immunotherapy treatment, there is evidence for disease progression:    1. Increased size of the hypermetabolic right inguinal and right  external iliac lymphadenopathy.  2. New metastasis in segment 6 of the liver. Increased size and  hypermetabolism of segment  2 metastatic lesion.  3. New hypermetabolic portacaval lymphadenopathy.  4. New mildly hypermetabolic pulmonary opacities in the right upper  and right middle lobes which may represent infectious/inflammatory  etiology versus malignancy. In setting of progressive disease,  findings are suspicious for pulmonary metastases.      I have personally reviewed the examination and initial interpretation  and I agree with the findings.    CARMEN GIL MD         SYSTEM ID:  [id number]        ASSESSMENT AND PLAN:    #1 Acral melanoma, right heel primary, pT4b pN3c M1, Stage IV  #2 CPI associated hepatitis, grade 3, resolved  It was a pleasure to meet with Ms. Hoang. 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 She progressed on first line nivolumab, and had evidence of response to 2nd line ipilimumab and nivolumab. On nivolumab maintenance therapy she showed regrowth. She tried ipilimumab monotherapy and had stable disease with significant pruritis, so had reinduction with ipilimumab/nivolumab on 8/2/21. She received her 2nd cycle on 8/23/21 and interval scan 9/1/21 showed about 10% growth. She then developed progressive nausea, reflux symptoms, diarrhea, and elevated transaminases (16-23X ULNl). On PET-CT she has had additional growth and increase in FDG avidity in the right external iliac and inguinal lymph nodes, as well as a new lesion in segment 6 and a growth in the FDG avid metastasis in hepatic segment 2.     -We will plan TVEC to start tomorrow.  -addition of nivolumab (240 mg q2w) after first few doses, reasssess in 2 months  -Repeat PET-CT in 3-4 months    #3 Fatigue, multifactorial  #4 Insomnia, secondary to steroids  History of GUERLINE (treated). Fatigued during the day but also suffering from insomnia. Continue to monitor.    #5 History of depression and anxiety  She will continue on sertraline 25 mg daily. She has support of her therapist and  they are checking in every 2 weeks.    #6 Back and leg pain  She continues to note pain, which seems consistent with low back pain and sciatica type symptoms. However, there is a generalized pain component she also describes, which I am uncertain about. I believe at least part of her pain is inflammatory in nature. She had facet joint injection 1/4/22, though, which she did not find to be helpful. For now, I would recommend she continue with Ibuprofen 600 mg 4 times daily, or Aleve 220 mg twice daily. Will try lidoderm patches as well. She may then also take Tramadol or something like oxycodone for additional pain control. She declines additional narcotic pain medication at this time.    Multiple questions answered.     Gi Cartagena M.D.   of Medicine  Hematology, Oncology and Transplantation

## 2022-01-27 NOTE — NURSING NOTE
"Oncology Rooming Note    January 27, 2022 3:21 PM   Yadira Hoang is a 76 year old female who presents for:    Chief Complaint   Patient presents with     Blood Draw     labs drawn with  by rn.  vs taken     Oncology Clinic Visit     METASTATIC MELANOMA     Initial Vitals: /72 (BP Location: Right arm, Patient Position: Sitting, Cuff Size: Adult Regular)   Pulse 100   Temp 99.3  F (37.4  C) (Tympanic)   Resp 16   Wt 63.4 kg (139 lb 12.8 oz)   SpO2 97%   BMI 21.90 kg/m   Estimated body mass index is 21.9 kg/m  as calculated from the following:    Height as of 1/12/22: 1.702 m (5' 7\").    Weight as of this encounter: 63.4 kg (139 lb 12.8 oz). Body surface area is 1.73 meters squared.  Worst Pain (10) Comment: Data Unavailable   No LMP recorded. Patient is postmenopausal.  Allergies reviewed: Yes  Medications reviewed: Yes    Medications: Medication refills not needed today.  Pharmacy name entered into [facility]:    [facility] - [place], [place] - [address], [id number]  [facility] [address] [id number] - [place], [place] - [address] [facility] [address] [facility] [address] ([facility] - [place], [place] - [address]    Clinical concerns: Has some questions for MD. Also states she believes leg pain came from steroids and that she does not want to take oral steroids anymore.       Fouzia Camargo, JAGDISH            "

## 2022-01-27 NOTE — NURSING NOTE
Chief Complaint   Patient presents with     Blood Draw     labs drawn with  by rn.  vs taken     Labs drawn with vpt by rn.  Pt tolerated well.  VS taken.  Pt checked in for next appt.    Tere Soliman RN

## 2022-01-27 NOTE — LETTER
1/27/2022         RE: Yadira Hoang  [address] [facility] [address]        Dear Colleague,    Thank you for referring your patient, Yadira Hoang, to the [facility]. Please see a copy of my visit note below.    MEDICAL ONCOLOGY PROGRESS NOTE  Melanoma Clinic  Jan 27, 2022     CHIEF COMPLAINT: Acral melanoma, on steroids for autoimmune hepatitis    Melanoma History:  1. She noted a painful lesion on the sole of the right foot for a few months, since last summer.  It initially was small then became raised.  It spontaneously bled. She is not entirely sure of its appearance initially.  She denies noting any masses behind the knee or in the groin.  2. 1/9/2020, punch biopsy was performed by Dr Jessica Meadows. Patology showed melanoma, at least 3.4 mm deep with ulceration and 0 mitoses, and perineural invasion present. TILs were non-brisk and LVI not identified. pT3b.  3. 2/10/2020, she has right femoral sentinel lymph node biopsy and wide local excision (Specimen #: [id number]) and the right heel lesion extends to a depth of 6.6 mm, and invasive melanoma is present at 0.2 mm from the deep margin. Microsatellites are also present. There was 1 (of 2) lymph nodes involved. The lymph node tissue exhibits extensive subcapsular and parenchymal clusters of melanoma cells, highlighted on Melan-A immunostain. The largest cluster is 7 millimeters in greatest diameter. pT4b pN2c. PD-L1 staining is negative, <1%. No mutation in BRAF, KIT, or NRAS.  4. 2/22/2020, she had PET-CT, which showed intense FDG uptake in a right inguinal lymph node, concerning for an additional focus of metastatic disease. There is also an indeterminate right external iliac lymph node with mild FDG uptake.  5. 3/4/2020, she has medial plantar artery  fasciocutaneous instep flap and Integra placement to the donor site. On 4/2/2020, she has additional reconstruction with skin grafting.  6.  5/22/2020, PET-CT shows a hypermetabolic right inguinal and right external iliac chain lymph node and stable pulmonary nodules.  7. 5/27/2020, she starts nivolumab for presumed low volume lymph node predominant metastatic disease.  8. 8/21/2020, PET-CT shows increase in hypermetabolic adenopathy, and a hypermetabolic nodule in segment 2 of the liver. Given low volume of disease she prefers to continue therapy with short interval follow-up.  9. 10/9/2020, PET-CT shows increasing size of hepatic metastases with increased hypermetabolism, increased right inguinal and iliac lymphadenopathy, and new FDG uptake right T2 transverse process and right side of S1. MRI-brain obtained 10/21/2020 is negative for brain metastasis.  10. 10/28/2020, she starts ipilimumab 1mg/kg and nivolumab 3mg/kg. She then goes on to maintenance nivolumab through 4/12/21.  11. 5/10/2021, she starts ipilimumab 3 mg/kg monotherapy q3w for 4 cycles.  12. 7/30/21, PET/CT shows mild disease progression.   13. 8/2/21, start ipilimumab 3mg/kg and nivolumab 1mg/kg and received 2 cycles, last on 8/23/21.  14. 8/23/21, develops elevated LFT's.   15. 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    Today she again notes leg pain that intermittently spreads down both of her legs. She notes the pain on the right side goes through into the ankle.     She stopped prednisone around Thanksgiving. The pain into her legs started up around that time. She was given a prescription for gabapentin for the shooting pain into the legs, but it makes her sleepy during the day. She thinks it does not help very much with the pain. Pain is severe enough to interrupt her walking. She frequently has to sit down to take a break due to the pain.    Back pain is very painful. She has a history of back problems, but hasn't seen someone for her back in some  time. She does not want surgery.    ECOG performance status is 1.    Current Outpatient Medications   Medication Sig Dispense Refill     benzonatate (TESSALON) 100 MG capsule Take 1 capsule (100 mg) by mouth 3 times daily as needed for cough 10 capsule 0     cholecalciferol (VITAMIN D3) 125 MCG (5000 UT) TABS tablet Take 2,000 Units by mouth every other day  (Patient not taking: Reported on 1/20/2022)       estradiol (ESTRACE) 0.1 MG/GM vaginal cream Place 2 g vaginally twice a week (Patient not taking: Reported on 1/20/2022) 42.5 g 1     fluconazole (DIFLUCAN) 200 MG tablet 400 mg by mouth for one day then 200 mg every 24 hours for a further 13 days. (Patient not taking: Reported on 1/20/2022) 15 tablet 0     fluticasone (FLONASE) 50 MCG/ACT nasal spray Spray 2 sprays into both nostrils daily 16 g 0     gabapentin (NEURONTIN) 300 MG capsule Take 1 capsule (300 mg) by mouth At Bedtime (Patient not taking: Reported on 1/20/2022) 30 capsule 1     omega 3 1000 MG CAPS 1 capsule (Patient not taking: Reported on 1/20/2022)       omeprazole (PRILOSEC) 40 MG DR capsule Take 1 capsule (40 mg) by mouth daily (Patient not taking: Reported on 1/20/2022) 90 capsule 1     ondansetron (ZOFRAN-ODT) 4 MG ODT tab Take 1 tablet (4 mg) by mouth every 8 hours as needed for nausea (Patient not taking: Reported on 1/20/2022) 60 tablet 1     potassium chloride ER (KLOR-CON M) 20 MEQ CR tablet Take 20 mEq every 2 hours for four doses daily for 2 days. (Patient not taking: Reported on 1/20/2022) 8 tablet 0     propranolol (INDERAL) 20 MG tablet Take 1 tablet (20 mg) by mouth 2 times daily (Patient not taking: Reported on 1/20/2022) 60 tablet 0     sertraline (ZOLOFT) 25 MG tablet Take 2 tablets (50 mg) by mouth daily (Patient not taking: Reported on 1/20/2022) 90 tablet 0     sucralfate (CARAFATE) 1 GM tablet Take 1 tablet (1 g) by mouth 4 times daily as needed for nausea (Patient not taking: Reported on 1/20/2022) 60 tablet 0     traMADol  (ULTRAM) 50 MG tablet Take 1 tablet (50 mg) by mouth every 6 hours as needed for severe pain (Patient not taking: Reported on 1/20/2022) 20 tablet 0     triamcinolone (KENALOG) 0.1 % external cream Apply topically 2 times daily (Patient not taking: Reported on 1/20/2022) 453.6 g 11     vitamin D3 (CHOLECALCIFEROL) 125 MCG (5000 UT) tablet 2 capsule (Patient not taking: Reported on 1/20/2022)           PHYSICAL EXAMINATION  There were no vitals taken for this visit.  GENERAL: Healthy, alert and no distress  EYES: Eyes grossly normal to inspection.  No discharge or erythema, or obvious scleral/conjunctival abnormalities.  HENT: Normal cephalic/atraumatic.  External ears, nose and mouth without ulcers or lesions.  No nasal drainage visible.  NECK: No asymmetry, visible masses or scars  RESP: No audible wheeze, cough, or visible cyanosis.  No visible retractions or increased work of breathing.  LYMPH: Conglomerate of right femoral lymph nodes measures about 6 cm in size.  SKIN: Visible skin clear. No significant rash, abnormal pigmentation or lesions.  NEURO: Cranial nerves grossly intact.  Mentation and speech appropriate for age. Gait antalgic. Uses a walker.  PSYCH: Mentation appears normal, she is anxious and intermittently tearful. Judgement and insight intact, normal speech and appearance well-groomed.      LABS  No visits with results within 1 Week(s) from this visit.   Latest known visit with results is:   Lab on 11/24/2021   Component Date Value Ref Range Status     Erythrocyte Sedimentation Rate 11/24/2021 36* 0 - 30 mm/hr Final     CRP Inflammation 11/24/2021 14.7* 0.0 - 8.0 mg/L Final     CK 11/24/2021 35  30 - 225 U/L Final         IMAGING  PET Oncology Whole Body  Narrative: Combined Report of:    PET and CT on  1/21/2022 11:26 AM :    1. PET of the neck, chest, abdomen, and pelvis.  2. PET CT Fusion for Attenuation Correction and Anatomical  Localization:    3. Diagnostic CT scan of the chest, abdomen,  and pelvis with  intravenous contrast for interpretation.  3. CT of the chest, abdomen and pelvis obtained for diagnostic  interpretation.  4. 3D MIP and PET-CT fused images were processed on an independent  workstation and archived to PACS and reviewed by a radiologist.    Technique:    1. PET: The patient received 13.02 mCi of F-18-FDG; the serum glucose  was 103 prior to administration, body weight was 63.7 kg. Images were  evaluated in the axial, sagittal, and coronal planes as well as the  rotational whole body MIP. Images were acquired from the Vertex to the  Feet.    UPTAKE WAS MEASURED AT 75 MINUTES.     BACKGROUND:  Liver SUV max= 3.7,   Aorta Blood SUV Max: 2.0.     2. CT: Volumetric acquisition for clinical interpretation of the  chest, abdomen, and pelvis acquired at 3 mm sections . The chest,  abdomen, and pelvis were evaluated at 5 mm sections in bone, soft  tissue, and lung windows.      The patient received 86 cc of Isovue 370 intravenously for the  examination.    --    3. 3D MIP and PET-CT fused images were processed on an independent  workstation and archived to PACS and reviewed by a radiologist.    INDICATION: Melanoma, stage >= IIB, monitor; Malignant melanoma of  right lower extremity including hip (H)    ADDITIONAL INFORMATION OBTAINED FROM EMR: 75 yo female with history  metastatic melanoma, right heel primary with mets to lymph nodes,  liver, bones. Status post immunotherapy.     COMPARISON: PET-CT 10/29/2021    FINDINGS:     HEAD/NECK:  There is no  suspicious FDG uptake in the neck.     Mild diffuse mucosal thickening of the paranasal sinuses. The mastoid  air cells are clear.     The mucosal pharyngeal space, the , prevertebral and carotid  spaces are within normal limits.     No masses, mass effect or pathologically enlarged lymph nodes are  evident. The thyroid gland is unremarkable.    CHEST:  New hypermetabolic groundglass and consolidative opacities in the  posterior right  upper lobe with max SUV 8.4 (series 3 image 173) and  in the anterior right middle lobe with max SUV of 7.0 (series 3 image  199).    The ascending aorta and main pulmonary artery are normal in caliber.  The heart is normal in size. No significant pericardial effusion. No  hilar, mediastinal, or axillary lymphadenopathy. Small hiatal hernia.    The central tracheobronchial tree is patent. Bibasilar atelectasis. No  pleural effusion or pneumothorax.     ABDOMEN AND PELVIS:  Hypermetabolic lesion in hepatic segment 2 measures 1.8 x 1.9 cm with  SUV max of 24.6 (series 3 image 242), previously measuring 1.5 x 1.5  cm with SUV max of 14.4. New hypermetabolic lesion in hepatic segment  6 measures 1.3 x 1.1 cm with max SUV of 5.1 (series 3 image 296).     New hypermetabolic portacaval lymph node measures 1.1 x 0.7 cm with  max SUV of 4.7 (series 3 image 263).    Increased size of enlarged and hypermetabolic right external iliac and  right inguinal lymph nodes. For example, right external iliac  conglomeration measures 4.8 x 4.2 cm with max SUV of 25.6 (series 3  image 374), previously measuring 3.5 x 3.6 cm with max SUV of 31.0.  Right inguinal conglomeration measures 5.3 x 3.7 with max SUV of 28.8  (series 3 image 421), previously measuring 3.9 x 2.9 cm with max SUV  of 20.7.     There is no splenomegaly or evidence for splenic or pancreatic mass  lesion. There are no suspicious adrenal mass lesions or opaque  gallbladder calculi. There is symmetric nephrographic renal phase  without hydronephrosis. Colonic diverticulosis without evidence for  diverticulitis. No bowel obstruction or free fluid.     LOWER EXTREMITIES:   No abnormal masses or hypermetabolic lesions.    BONES:   There are no suspicious lytic or blastic osseous lesions.  There is no  abnormal FDG uptake in the skeleton. Multilevel degenerative changes  of the spine.  Impression: IMPRESSION: In this patient with metastatic melanoma status post  immunotherapy  treatment, there is evidence for disease progression:    1. Increased size of the hypermetabolic right inguinal and right  external iliac lymphadenopathy.  2. New metastasis in segment 6 of the liver. Increased size and  hypermetabolism of segment 2 metastatic lesion.  3. New hypermetabolic portacaval lymphadenopathy.  4. New mildly hypermetabolic pulmonary opacities in the right upper  and right middle lobes which may represent infectious/inflammatory  etiology versus malignancy. In setting of progressive disease,  findings are suspicious for pulmonary metastases.      I have personally reviewed the examination and initial interpretation  and I agree with the findings.    CARMEN GIL MD         SYSTEM ID:  [id number]        ASSESSMENT AND PLAN:    #1 Acral melanoma, right heel primary, pT4b pN3c M1, Stage IV  #2 CPI associated hepatitis, grade 3, resolved  It was a pleasure to meet with MsShelbi Hoang. 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 She progressed on first line nivolumab, and had evidence of response to 2nd line ipilimumab and nivolumab. On nivolumab maintenance therapy she showed regrowth. She tried ipilimumab monotherapy and had stable disease with significant pruritis, so had reinduction with ipilimumab/nivolumab on 8/2/21. She received her 2nd cycle on 8/23/21 and interval scan 9/1/21 showed about 10% growth. She then developed progressive nausea, reflux symptoms, diarrhea, and elevated transaminases (16-23X ULNl). On PET-CT she has had additional growth and increase in FDG avidity in the right external iliac and inguinal lymph nodes, as well as a new lesion in segment 6 and a growth in the FDG avid metastasis in hepatic segment 2.     -We will plan TVEC to start tomorrow.  -addition of nivolumab (240 mg q2w) after first few doses, reasssess in 2 months  -Repeat PET-CT in 3-4 months    #3 Fatigue, multifactorial  #4 Insomnia,  secondary to steroids  History of GUERLINE (treated). Fatigued during the day but also suffering from insomnia. Continue to monitor.    #5 History of depression and anxiety  She will continue on sertraline 25 mg daily. She has support of her therapist and they are checking in every 2 weeks.    #6 Back and leg pain  She continues to note pain, which seems consistent with low back pain and sciatica type symptoms. However, there is a generalized pain component she also describes, which I am uncertain about. I believe at least part of her pain is inflammatory in nature. She had facet joint injection 1/4/22, though, which she did not find to be helpful. For now, I would recommend she continue with Ibuprofen 600 mg 4 times daily, or Aleve 220 mg twice daily. Will try lidoderm patches as well. She may then also take Tramadol or something like oxycodone for additional pain control. She declines additional narcotic pain medication at this time.    Multiple questions answered.     Gi Cartagena M.D.   of Medicine  Hematology, Oncology and Transplantation

## 2022-01-28 NOTE — LETTER
1/28/2022       RE: Yadira Hoang  [address] [facility] [address]     Dear Colleague,    Thank you for referring your patient, Yadira Hoang, to the [facility] DERMATOLOGY [facility]INIC [place] at [facility]. Please see a copy of my visit note below.    [facility] Dermatology Note  Encounter Date: Jan 28, 2022  Office Visit     Dermatology Problem List:  Last skin check 12/17/21, q6 months through 1/25  1. Metastatic melanoma, originated at right heel  - S/p punch biopsy By Dr. Alvarado 1/9/20 showing melanoma at least 3.4mm deep, ulcerated, no mitoses, perineural invasion, TIL present and nonbrisk, no LVI.  - S/p WLE with 2cm margins (revelead melanoma to depth of 6.6mm with deep margin close, microsatellites were present) and right femoral SLNB 2/10/20 (1/2 nodes positive, largest deposit 7mm)  - S/p re-excision 2/20/20  - Flap completed 3/4/30, skin graft 4/2/20  - Adjuvant nivolumab started 5/2020. Progressed. 10/28/20 started on ipi and nivolumab combination. Went to ipi monotherapy 5/2021. Back to dual immunotherapy 8/2021. Developed severe transaminitis. Stopped immunotherapy 9/2021 and started oral steroids. TVec started 1/28/22. Plan for addition of mono immunotherapy.  2. AK, right cheek: cryo and biopsy 11/19/20     Social history: . Has 4 children.  Family history: Brother had pancreatic cancer. No melanoma in the family.  ____________________________________________    Assessment & Plan:     # Melanoma, stage IV, originating at R heel, currently off of immunotherapy due to autoimmune adverse events (transaminitis). Known disease in R inguinal nodes and liver. P;an for first TVEC injection today. Discussed what to expect with Tvec injections, including flu like symptoms and rare cutaneous HSV infections. Discussed that tumors often get bigger before showing response. Discussed that median time to improvement in  clinical trials was 4 months. Discussed schedule for follow up injections. Questions answered in detail. Instructions for after care given in AVS. Patient instructed to leave waterproof dressing on for one week and given supplies to replace if needed.   - Reviewed last onc note from 1/27/22. Plan to try to start immunotherapy monotherapy after a few TVEC injections.    Procedures Performed:   TVEC: Topical numbing (Lidocaine 4% ointment) was applied to the skin 30 minutes prior to injection. Cleansed with alcohol. Staff doned appropriated PPE, including gowns, caps, face masks, shields, and masks prior to injections. The skin was cleansed with alcohol. 2cc of 10^6 pfu/cc were injected into the lesion in a retrograde fashion with fanning technique. After injection, pressure was held on the skin for 30 second. Then telfa and tegaderm were placed. Tegaderm cleansed with alcohol, then second layer applied. Patient instructed to leave waterproof dressing on for one week.    Follow-up: Next injection in 3 weeks.    Staff:     Gloria Estrada MD    Department of Dermatology  [facility]: Phone: [phone], Fax:[phone]  [facility]: Phone: [phone], Fax: [phone]    ____________________________________________    CC: Derm Problem (Yadira is here today for a TVEK injection. )    HPI:  Ms. Yadira Hoang is a(n) 76 year old female who presents today as a return patient for TVEC injection.    She is accompanied by her . She is nervous about injection today.     Patient is otherwise feeling well, without additional skin concerns.     Labs Reviewed:  N/A    Physical Exam:  Vitals: /76 (BP Location: Left arm)   Pulse 88   SKIN: Focused exam of the groin.  - Just under 5cm firm nodule at the R groin inferior to the inguinal crease.  - No other lesions of concern on areas  examined.       Medications:  Current Outpatient Medications   Medication     cholecalciferol (VITAMIN D3) 125 MCG (5000 UT) TABS tablet     estradiol (ESTRACE) 0.1 MG/GM vaginal cream     fluconazole (DIFLUCAN) 200 MG tablet     fluticasone (FLONASE) 50 MCG/ACT nasal spray     omega 3 1000 MG CAPS     benzonatate (TESSALON) 100 MG capsule     lidocaine (LIDODERM) 5 % patch     omeprazole (PRILOSEC) 40 MG DR capsule     ondansetron (ZOFRAN-ODT) 4 MG ODT tab     propranolol (INDERAL) 20 MG tablet     sertraline (ZOLOFT) 25 MG tablet     sucralfate (CARAFATE) 1 GM tablet     traMADol (ULTRAM) 50 MG tablet     triamcinolone (KENALOG) 0.1 % external cream     Current Facility-Administered Medications   Medication     talimogene laherparepvec (IMLYGIC) 10^6 PFU/mL injection 2 mL      Past Medical History:   Patient Active Problem List   Diagnosis     Malignant melanoma of right lower extremity including hip (H)     Melanoma of skin (H)     GUERLINE (obstructive sleep apnea)     Chronic congestion of paranasal sinus     Weakness of foot     Neck pain, chronic     Myofascial pain     Intractable migraine without aura and without status migrainosus     History of multiple concussions     Foot pain, bilateral     Fibromyositis     Difficulty walking     Chronic pain disorder     Chronic fatigue     Bilateral occipital neuralgia     Articular disc disorder of temporomandibular joint     S/P flap graft     Osteopenia     Injury of head     Hyperlipidemia LDL goal <100     ALEXUS (generalized anxiety disorder)     Major depression, recurrent, chronic (H)     Secondary malignant neoplasm of bone (H)     Generalized pruritus     Immunodeficiency, unspecified (H)     Past Medical History:   Diagnosis Date     Concussion      ALEXUS (generalized anxiety disorder) 5/7/2020     Major depression, recurrent, chronic (H) 5/7/2020     Melanoma (H)      Nonsenile cataract      Sleep apnea     CPAP       CC No referring provider defined for this  encounter. on close of this encounter.

## 2022-01-28 NOTE — PATIENT INSTRUCTIONS
MANAGING YOUR CARE DURING AND AFTER TREATMENT    Important things to know while being treated with IMLYGIC :  IMLYGIC  virus can spread to other areas of your body or to people close to you (household members, caregivers, sex partners, or people you share a bed with).  To avoid spreading IMLYGIC  to other areas of your body or to others:    Avoid direct contact between your treatment sites, dressings, or body fluids and people close to you (for example, use condoms when engaging in sexual activity, and avoid kissing close contacts if either has an open mouth sore)    Don't touch or scratch the parts of your body that have been injected with IMLYGIC     Wear gloves while putting on or changing your dressings    Keep treatment sites covered with airtight and watertight dressings for at least 1 week after each treatment (or longer if the treatment site is weeping or oozing)    Replace dressings right away with a clean dressing if they become loose or come off    Place all used dressings and cleaning materials in a sealed plastic bag, and throw them away in the garbage.      What are the possible side effects of IMLYGIC ?    Common side effects of IMLYGIC  include tiredness, chills, fever, nausea, flu-like symptoms, and pain at treatment site.  Tell your doctor right away if you get any of the following:  - Pain, burning, or tingling in a blister around the mouth or genitals or on the fingers or ears.  - Eye pain, light sensitivity, discharge from the eyes, or blurry vision.  - Weakness in arms or legs.  - Extreme drowsiness (feeling sleepy).  - Mental confusion.    If you think you have a herpes infection, inform your doctor. You or your doctor should call [facility] at  [phone] ([phone]) for follow-up testing, if needed.  Tell your doctor if you have any side effect that bothers you or does not go away.  These are not all the possible side effects of IMLYGIC .  If you would like more information, talk  with your doctor.    You are encouraged to report negative side effects of prescription drugs to the FDA. Visit www.fda.gov/medwatch, or  call 5-457-PLC-3593.

## 2022-01-28 NOTE — PROGRESS NOTES
[facility] Dermatology Note  Encounter Date: Jan 28, 2022  Office Visit     Dermatology Problem List:  Last skin check 12/17/21, q6 months through 1/25  1. Metastatic melanoma, originated at right heel  - S/p punch biopsy By Dr. Alvarado 1/9/20 showing melanoma at least 3.4mm deep, ulcerated, no mitoses, perineural invasion, TIL present and nonbrisk, no LVI.  - S/p WLE with 2cm margins (revelead melanoma to depth of 6.6mm with deep margin close, microsatellites were present) and right femoral SLNB 2/10/20 (1/2 nodes positive, largest deposit 7mm)  - S/p re-excision 2/20/20  - Flap completed 3/4/30, skin graft 4/2/20  - Adjuvant nivolumab started 5/2020. Progressed. 10/28/20 started on ipi and nivolumab combination. Went to ipi monotherapy 5/2021. Back to dual immunotherapy 8/2021. Developed severe transaminitis. Stopped immunotherapy 9/2021 and started oral steroids. TVec started 1/28/22. Plan for addition of mono immunotherapy.  2. AK, right cheek: cryo and biopsy 11/19/20     Social history: . Has 4 children.  Family history: Brother had pancreatic cancer. No melanoma in the family.  ____________________________________________    Assessment & Plan:     # Melanoma, stage IV, originating at R heel, currently off of immunotherapy due to autoimmune adverse events (transaminitis). Known disease in R inguinal nodes and liver. P;an for first TVEC injection today. Discussed what to expect with Tvec injections, including flu like symptoms and rare cutaneous HSV infections. Discussed that tumors often get bigger before showing response. Discussed that median time to improvement in clinical trials was 4 months. Discussed schedule for follow up injections. Questions answered in detail. Instructions for after care given in AVS. Patient instructed to leave waterproof dressing on for one week and given supplies to replace if needed.   - Reviewed last onc note from 1/27/22. Plan to try to start  immunotherapy monotherapy after a few TVEC injections.    Procedures Performed:   TVEC: Topical numbing (Lidocaine 4% ointment) was applied to the skin 30 minutes prior to injection. Cleansed with alcohol. Staff doned appropriated PPE, including gowns, caps, face masks, shields, and masks prior to injections. The skin was cleansed with alcohol. 2cc of 10^6 pfu/cc were injected into the lesion in a retrograde fashion with fanning technique. After injection, pressure was held on the skin for 30 second. Then telfa and tegaderm were placed. Tegaderm cleansed with alcohol, then second layer applied. Patient instructed to leave waterproof dressing on for one week.    Follow-up: Next injection in 3 weeks.    Staff:     Gloria Estrada MD    Department of Dermatology  [facility]: Phone: [phone], Fax:[phone]  [facility]: Phone: [phone], Fax: [phone]    ____________________________________________    CC: Derm Problem (Yadira is here today for a TVEK injection. )    HPI:  Ms. Yadira Hoang is a(n) 76 year old female who presents today as a return patient for TVEC injection.    She is accompanied by her . She is nervous about injection today.     Patient is otherwise feeling well, without additional skin concerns.     Labs Reviewed:  N/A    Physical Exam:  Vitals: /76 (BP Location: Left arm)   Pulse 88   SKIN: Focused exam of the groin.  - Just under 5cm firm nodule at the R groin inferior to the inguinal crease.  - No other lesions of concern on areas examined.       Medications:  Current Outpatient Medications   Medication     cholecalciferol (VITAMIN D3) 125 MCG (5000 UT) TABS tablet     estradiol (ESTRACE) 0.1 MG/GM vaginal cream     fluconazole (DIFLUCAN) 200 MG tablet     fluticasone (FLONASE) 50 MCG/ACT nasal spray     omega 3 1000 MG CAPS     benzonatate  (TESSALON) 100 MG capsule     lidocaine (LIDODERM) 5 % patch     omeprazole (PRILOSEC) 40 MG DR capsule     ondansetron (ZOFRAN-ODT) 4 MG ODT tab     propranolol (INDERAL) 20 MG tablet     sertraline (ZOLOFT) 25 MG tablet     sucralfate (CARAFATE) 1 GM tablet     traMADol (ULTRAM) 50 MG tablet     triamcinolone (KENALOG) 0.1 % external cream     Current Facility-Administered Medications   Medication     talimogene laherparepvec (IMLYGIC) 10^6 PFU/mL injection 2 mL      Past Medical History:   Patient Active Problem List   Diagnosis     Malignant melanoma of right lower extremity including hip (H)     Melanoma of skin (H)     GUERLINE (obstructive sleep apnea)     Chronic congestion of paranasal sinus     Weakness of foot     Neck pain, chronic     Myofascial pain     Intractable migraine without aura and without status migrainosus     History of multiple concussions     Foot pain, bilateral     Fibromyositis     Difficulty walking     Chronic pain disorder     Chronic fatigue     Bilateral occipital neuralgia     Articular disc disorder of temporomandibular joint     S/P flap graft     Osteopenia     Injury of head     Hyperlipidemia LDL goal <100     ALEXUS (generalized anxiety disorder)     Major depression, recurrent, chronic (H)     Secondary malignant neoplasm of bone (H)     Generalized pruritus     Immunodeficiency, unspecified (H)     Past Medical History:   Diagnosis Date     Concussion      ALEXUS (generalized anxiety disorder) 5/7/2020     Major depression, recurrent, chronic (H) 5/7/2020     Melanoma (H)      Nonsenile cataract      Sleep apnea     CPAP       CC No referring provider defined for this encounter. on close of this encounter.

## 2022-01-28 NOTE — NURSING NOTE
Drug Administration Record    Prior to injection, verified patient identity using patient's name and date of birth.  Due to injection administration, patient instructed to remain in clinic for 15 minutes  afterwards, and to report any adverse reaction to me immediately.    Drug Name: talimogene laherparepvec  Dose: 2 mL  Route administered: ID  NDC #: 00837-511-61  Amount of waste(mL):0    LOT #: [id number]  : Pharmacopeia  EXPIRATION DATE: 02/28/2023

## 2022-01-28 NOTE — NURSING NOTE
Dermatology Rooming Note    Yadira Hoang's goals for this visit include:   Chief Complaint   Patient presents with     Derm Problem     Yadira is here today for a TVEK injection.      AHMET Samuel

## 2022-01-31 NOTE — TELEPHONE ENCOUNTER
I called and spoke with Yadira and advised from the AVS she needs to keep the area covered for 1 week. Yadira notes that apart of the bandage is hurting a area on her leg and she would just like to change that area. I explained to Yadira that her notes say to keep the area covered but If she decides to change just that part of the dressing that would be at her discretion. Yadira understood and had no further questions.      AHMET Weeks

## 2022-01-31 NOTE — TELEPHONE ENCOUNTER
[facility] Call Center    Phone Message    May a detailed message be left on voicemail: yes     Reason for Call: Other: Pt had her first injection with Dr Estrada last week, and would like a call to discuss how to do wound care. Please call pt today, thanks!     Action Taken: Message routed to:  [facility] ([facility] Dermatology    Travel Screening: Not Applicable

## 2022-02-08 NOTE — TELEPHONE ENCOUNTER
[facility] Call Center    Phone Message    May a detailed message be left on voicemail: yes     Reason for Call: Other: Pt of Dr Estrada is wondering if the injection she just received from Dr Estrada interacts with any of her other meds, specifically her nasal sinus spray, cough medicine and another one she takes for her tremors. (she didn't know the name of these medications) Please call pt back to discuss, thanks!     Action Taken: Message routed to: [facility] Dermat[facility]- Dr Estrada    Travel Screening: Not applicable

## 2022-02-08 NOTE — TELEPHONE ENCOUNTER
Patient called stating that over the past couple of weeks she has not been able to eat hardly anything. She states that she is not nauseas just cant eat. And believes she has another yeast infection. She was in agreement to a phone appointment with Dr. Blanton to discuss both of these issues.    Shayla Whitfield RN [facility]

## 2022-02-10 NOTE — TELEPHONE ENCOUNTER
LVM for patient to call back with more information about the injection and the pain she is having; Is the pain in the same spot? Is it radiating? has she taken anything for it? Etc. Sports number was provided.

## 2022-02-10 NOTE — TELEPHONE ENCOUNTER
Health Call Center    Phone Message    May a detailed message be left on voicemail: yes     Reason for Call: Other: Pt calling in re: wanting to speak to someone from Dr Mullen's care team re: the spinal inj she had done on january 4th.  Pt would like call back at [phone]. Pt stating injection is not or has not helped with pain.       Action Taken: Message routed to:  [facility] orthopedics  - Dr. Maxi Manning    Travel Screening: Not Applicable

## 2022-02-10 NOTE — PROGRESS NOTES
Yadira is a 76 year old who is being evaluated via a billable telephone visit.      What phone number would you like to be contacted at? [phone]   [facility]    Assessment & Plan     Diagnoses and all orders for this visit:    Vaginal itching  Comments:  empiric treatment with diflucan. if not better, need in person visit for exam.   Orders:  -     fluconazole (DIFLUCAN) 150 MG tablet; Take 1 tablet (150 mg) by mouth once for 1 dose    Malignant melanoma of right lower extremity including hip (H)  Comments:  I will contact with her oncology team regarding her anorexia and recommendation for treatment/nutrition counseling.     Chronic congestion of paranasal sinus  Comments:  no contraindication for Flonase. advised pt to start using.    Anorexia  Comments:  will discuss with oncology regarding medication/nutrition counseling     Lumbar radiculopathy  Comments:  follow up with Dr. Mullen    Esophageal candidiasis (H)  Comments:  by pt report, her esophagus feels better. still feels her stomach doesn't feel good. I will check with GI if she needs follow up for clearance of candida.          Return in about 2 months (around 4/10/2022) for chronic disease review.    I spent a total of 37 minutes on the day of the visit.  doing chart review, history and exam, documentation and further activities as noted above       Erika Blanton MD PhD  [facility]   Yadira is a 76 year old who presents for the following health issues     1. Under nourished. Lost 5 lbs. She is wondering about getting IV fluids. She is dairy intolerant so can't take Ensure.   2. Cough: I gave her Benzonate and Flonase. She took Benzonate for a few days. It didn't help. She quit taking it. She hasn't tried Flonase. She called [facility] about if she could use Flonase but she didn't hear back. She uses netti pot. It helps some. She has postnasal drip. She doesn't want to take anything that may  interfere with her cancer treatment.     3. Low energy. Can't do anything.     4. Her right leg pain, where the melanoma was removed. She was given steroids and it caused severe pain. Her specialist told her it was from her back. She got epidural injection. It didn't help and the leg pain is getting worse. Before she got the shot in her back, she could go to bed, now after the shot, she couldn't get comfortable. She doesn't know what to do. Last injection was 1/4/2022.     5. She had EGD and esophageal brushing showed candida. She was treated with Fluconazole for 2 weeks. She reported her stomach is better. She now feels vaginal itching and burning. Similar to prior vaginal yeast infection. No dysuria. No prior UTI.    6. She is getting a new chemo treatment for metastatic melanoma. She doesn't have the side effects.     7. It takes a long time for people to get back to her. She doesn't know what to do.        Review of Systems   Constitutional, HEENT, cardiovascular, pulmonary, gi and gu systems are negative, except as otherwise noted.      Objective           Vitals:  No vitals were obtained today due to virtual visit.    Physical Exam   healthy, alert and no distress  PSYCH: Alert and oriented times 3; coherent speech, normal   rate and volume, able to articulate logical thoughts, able   to abstract reason, no tangential thoughts, no hallucinations   or delusions  Her affect is normal  RESP: No cough, no audible wheezing, able to talk in full sentences  Remainder of exam unable to be completed due to telephone visits      Phone call duration: 20 minutes

## 2022-02-10 NOTE — PATIENT INSTRUCTIONS
Additional instructions:    Follow up with Dr. Mullen regarding the back pain and leg pain.     I will send Dr. Walter Franklin a note about medication for appetite or medical nutritionist.

## 2022-02-10 NOTE — TELEPHONE ENCOUNTER
"Followed up with patient to relay Dr. Hughes's message:    \"That it shouldn't interact with fluticasone nasal spray (because that's local) or the propranolol (per med review, it's that one for essential tremors) or the tessalon pearls for the cough.     Per review of the article, the most common side effects were local inflammation, erythema, and fever.\"        "

## 2022-02-11 NOTE — TELEPHONE ENCOUNTER
[facility] Call Center    Phone Message    May a detailed message be left on voicemail: yes     Reason for Call: Other: Patient is calling back to describe her symptoms.      Says pain is worse, radiating to hips, hands & legs.    Patient is not taking anything for pain and is having difficulty standing and walking.    Please call patient to back to triage symptoms further.,    Action Taken: Message routed to:  [facility] sports    Travel Screening: Not Applicable

## 2022-02-16 NOTE — NURSING NOTE
"Oncology Rooming Note    February 16, 2022 9:40 AM   Yadira Hoang is a 76 year old female who presents for:    Chief Complaint   Patient presents with     Blood Draw     Labs drawn via  by RN. VS  taken.     Oncology Clinic Visit     Met Melanoma      Initial Vitals: /88 (BP Location: Left arm, Patient Position: Sitting, Cuff Size: Adult Regular)   Pulse 91   Temp 98.1  F (36.7  C) (Oral)   Resp 16   Wt 64.1 kg (141 lb 6.4 oz)   SpO2 98%   BMI 22.15 kg/m   Estimated body mass index is 22.15 kg/m  as calculated from the following:    Height as of 1/12/22: 1.702 m (5' 7\").    Weight as of this encounter: 64.1 kg (141 lb 6.4 oz). Body surface area is 1.74 meters squared.  Extreme Pain (8) Comment: Data Unavailable   No LMP recorded. Patient is postmenopausal.  Allergies reviewed: Yes  Medications reviewed: Yes    Medications: Medication refills not needed today.  Pharmacy name entered into [facility]:    [facility] - [place], [place] - [address], [address]  [facility] [address] [place] [address] [address] [id number] - [place], [place] - [address] [facility] [address] ([facility] - [place], [place] - [address]    Clinical concerns: Pt would like some meds for her cough.       Christopher Fine            "

## 2022-02-16 NOTE — LETTER
2/16/2022         RE: Yadira Hoang  [address] [facility] [address]        Dear Colleague,    Thank you for referring your patient, Yadira Hoang, to the [facility]. Please see a copy of my visit note below.    MEDICAL ONCOLOGY PROGRESS NOTE  Melanoma Clinic  Feb 16, 2022     CHIEF COMPLAINT: Acral melanoma, on steroids for autoimmune hepatitis    Melanoma History:  1. She noted a painful lesion on the sole of the right foot for a few months, since last summer.  It initially was small then became raised.  It spontaneously bled. She is not entirely sure of its appearance initially.  She denies noting any masses behind the knee or in the groin.  2. 1/9/2020, punch biopsy was performed by Dr Jessica Meadows. Patology showed melanoma, at least 3.4 mm deep with ulceration and 0 mitoses, and perineural invasion present. TILs were non-brisk and LVI not identified. pT3b.  3. 2/10/2020, she has right femoral sentinel lymph node biopsy and wide local excision (Specimen #: [id number]) and the right heel lesion extends to a depth of 6.6 mm, and invasive melanoma is present at 0.2 mm from the deep margin. Microsatellites are also present. There was 1 (of 2) lymph nodes involved. The lymph node tissue exhibits extensive subcapsular and parenchymal clusters of melanoma cells, highlighted on Melan-A immunostain. The largest cluster is 7 millimeters in greatest diameter. pT4b pN2c. PD-L1 staining is negative, <1%. No mutation in BRAF, KIT, or NRAS.  4. 2/22/2020, she had PET-CT, which showed intense FDG uptake in a right inguinal lymph node, concerning for an additional focus of metastatic disease. There is also an indeterminate right external iliac lymph node with mild FDG uptake.  5. 3/4/2020, she has medial plantar artery  fasciocutaneous instep flap and Integra placement to the donor site. On 4/2/2020, she has additional reconstruction with skin grafting.  6.  Done    .  New Medications Ordered This Visit   Medications   • levonorgestrel-ethinyl estradiol (SHANIKA SANTIAGO,LESSINA) 0.1-20 MG-MCG per tablet     Sig: Take 1 tablet by mouth Daily.     Dispense:  28 tablet     Refill:  2      5/22/2020, PET-CT shows a hypermetabolic right inguinal and right external iliac chain lymph node and stable pulmonary nodules.  7. 5/27/2020, she starts nivolumab for presumed low volume lymph node predominant metastatic disease.  8. 8/21/2020, PET-CT shows increase in hypermetabolic adenopathy, and a hypermetabolic nodule in segment 2 of the liver. Given low volume of disease she prefers to continue therapy with short interval follow-up.  9. 10/9/2020, PET-CT shows increasing size of hepatic metastases with increased hypermetabolism, increased right inguinal and iliac lymphadenopathy, and new FDG uptake right T2 transverse process and right side of S1. MRI-brain obtained 10/21/2020 is negative for brain metastasis.  10. 10/28/2020, she starts ipilimumab 1mg/kg and nivolumab 3mg/kg. She then goes on to maintenance nivolumab through 4/12/21.  11. 5/10/2021, she starts ipilimumab 3 mg/kg monotherapy q3w for 4 cycles.  12. 7/30/21, PET/CT shows mild disease progression.   13. 8/2/21, start ipilimumab 3mg/kg and nivolumab 1mg/kg and received 2 cycles, last on 8/23/21.  14. 8/23/21, develops elevated LFT's.   15. 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   16. 1/28/22, started TVEC    Presents today as an add on for anorexia and weight loss.      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    She recently saw her PCP for a sinus infection, vaginal yeast infection and weight loss. She was given fluconazole but has not taken it as she is upset she did not get a test for yeast infection first. She also was given flonase which does help, though does not take away her symptoms completely.     She has been having ongoing difficulties with appetite and weight loss since last summer. She has lost a total of 5 lbs during these months. She is still eating, consistently breakfast but the rest of the day is unknown. In the morning she will eat cereal  and toast, smoothie. She has a lot of food intolerances.  She states her taste and texture off with eating. Some pain with eating as well. PPI and carafate have not worked.     She continues to have lots of pain in legs and back, some in hands. Nothing helps. Did get injection in back which made things worse.       Current Outpatient Medications   Medication Sig Dispense Refill     cholecalciferol (VITAMIN D3) 125 MCG (5000 UT) TABS tablet Take 2,000 Units by mouth every other day        estradiol (ESTRACE) 0.1 MG/GM vaginal cream Place 2 g vaginally twice a week 42.5 g 1     fluconazole (DIFLUCAN) 200 MG tablet 400 mg by mouth for one day then 200 mg every 24 hours for a further 13 days. 15 tablet 0     fluticasone (FLONASE) 50 MCG/ACT nasal spray Spray 2 sprays into both nostrils daily 16 g 0     OLANZapine (ZYPREXA) 2.5 MG tablet Take 1 tablet (2.5 mg) by mouth At Bedtime 30 tablet 3     lidocaine (LIDODERM) 5 % patch Place 1 patch onto the skin every 24 hours To prevent lidocaine toxicity, patient should be patch free for 12 hrs daily. (Patient not taking: Reported on 1/28/2022) 15 patch 0     omega 3 1000 MG CAPS 1 capsule (Patient not taking: Reported on 2/16/2022)       sertraline (ZOLOFT) 25 MG tablet Take 2 tablets (50 mg) by mouth daily (Patient not taking: Reported on 1/20/2022) 90 tablet 0     triamcinolone (KENALOG) 0.1 % external cream Apply topically 2 times daily (Patient not taking: Reported on 1/28/2022) 453.6 g 11         PHYSICAL EXAMINATION  /88 (BP Location: Left arm, Patient Position: Sitting, Cuff Size: Adult Regular)   Pulse 91   Temp 98.1  F (36.7  C) (Oral)   Resp 16   Wt 64.1 kg (141 lb 6.4 oz)   SpO2 98%   BMI 22.15 kg/m      General: Patient appears well in no acute distress.   Skin: No visualized rash or lesions on visualized skin  Eyes: EOMI, no erythema, sclera icterus or discharge noted  Resp: Appears to be breathing comfortably without accessory muscle usage, speaking  in full sentences, no cough  MSK: Appears to have normal range of motion based on visualized movements  Neurologic: No apparent tremors, facial movements symmetric  Psych: affect normal, did avoid eye contact, alert and oriented    LABS  I personally reviewed labs today   Most Recent 3 CBC's:Recent Labs   Lab Test 02/16/22  0931 01/27/22  1453 10/29/21  1156   WBC 5.9 6.0 9.2   HGB 12.7 12.5 13.8   MCV 92 92 94    364 274     Most Recent 3 BMP's:  Recent Labs   Lab Test 02/16/22  0931 01/27/22  1453 10/29/21  1156    140 138   POTASSIUM 3.7 3.9 3.5   CHLORIDE 107 105 102   CO2 32 29 34*   BUN 21 20 27   CR 0.62 0.62 0.76   ANIONGAP 4 6 2*   GABY 9.3 9.1 8.8   GLC 95 87 114*     Most Recent 2 LFT's:  Recent Labs   Lab Test 02/16/22  0931 01/27/22  1453   AST 30 32   ALT 41 45   ALKPHOS 63 64   BILITOTAL 0.3 0.3         IMAGING  PET Oncology Whole Body  Narrative: Combined Report of:    PET and CT on  1/21/2022 11:26 AM :    1. PET of the neck, chest, abdomen, and pelvis.  2. PET CT Fusion for Attenuation Correction and Anatomical  Localization:    3. Diagnostic CT scan of the chest, abdomen, and pelvis with  intravenous contrast for interpretation.  3. CT of the chest, abdomen and pelvis obtained for diagnostic  interpretation.  4. 3D MIP and PET-CT fused images were processed on an independent  workstation and archived to PACS and reviewed by a radiologist.    Technique:    1. PET: The patient received 13.02 mCi of F-18-FDG; the serum glucose  was 103 prior to administration, body weight was 63.7 kg. Images were  evaluated in the axial, sagittal, and coronal planes as well as the  rotational whole body MIP. Images were acquired from the Vertex to the  Feet.    UPTAKE WAS MEASURED AT 75 MINUTES.     BACKGROUND:  Liver SUV max= 3.7,   Aorta Blood SUV Max: 2.0.     2. CT: Volumetric acquisition for clinical interpretation of the  chest, abdomen, and pelvis acquired at 3 mm sections . The chest,  abdomen,  and pelvis were evaluated at 5 mm sections in bone, soft  tissue, and lung windows.      The patient received 86 cc of Isovue 370 intravenously for the  examination.    --    3. 3D MIP and PET-CT fused images were processed on an independent  workstation and archived to PACS and reviewed by a radiologist.    INDICATION: Melanoma, stage >= IIB, monitor; Malignant melanoma of  right lower extremity including hip (H)    ADDITIONAL INFORMATION OBTAINED FROM EMR: 77 yo female with history  metastatic melanoma, right heel primary with mets to lymph nodes,  liver, bones. Status post immunotherapy.     COMPARISON: PET-CT 10/29/2021    FINDINGS:     HEAD/NECK:  There is no  suspicious FDG uptake in the neck.     Mild diffuse mucosal thickening of the paranasal sinuses. The mastoid  air cells are clear.     The mucosal pharyngeal space, the , prevertebral and carotid  spaces are within normal limits.     No masses, mass effect or pathologically enlarged lymph nodes are  evident. The thyroid gland is unremarkable.    CHEST:  New hypermetabolic groundglass and consolidative opacities in the  posterior right upper lobe with max SUV 8.4 (series 3 image 173) and  in the anterior right middle lobe with max SUV of 7.0 (series 3 image  199).    The ascending aorta and main pulmonary artery are normal in caliber.  The heart is normal in size. No significant pericardial effusion. No  hilar, mediastinal, or axillary lymphadenopathy. Small hiatal hernia.    The central tracheobronchial tree is patent. Bibasilar atelectasis. No  pleural effusion or pneumothorax.     ABDOMEN AND PELVIS:  Hypermetabolic lesion in hepatic segment 2 measures 1.8 x 1.9 cm with  SUV max of 24.6 (series 3 image 242), previously measuring 1.5 x 1.5  cm with SUV max of 14.4. New hypermetabolic lesion in hepatic segment  6 measures 1.3 x 1.1 cm with max SUV of 5.1 (series 3 image 296).     New hypermetabolic portacaval lymph node measures 1.1 x 0.7 cm  with  max SUV of 4.7 (series 3 image 263).    Increased size of enlarged and hypermetabolic right external iliac and  right inguinal lymph nodes. For example, right external iliac  conglomeration measures 4.8 x 4.2 cm with max SUV of 25.6 (series 3  image 374), previously measuring 3.5 x 3.6 cm with max SUV of 31.0.  Right inguinal conglomeration measures 5.3 x 3.7 with max SUV of 28.8  (series 3 image 421), previously measuring 3.9 x 2.9 cm with max SUV  of 20.7.     There is no splenomegaly or evidence for splenic or pancreatic mass  lesion. There are no suspicious adrenal mass lesions or opaque  gallbladder calculi. There is symmetric nephrographic renal phase  without hydronephrosis. Colonic diverticulosis without evidence for  diverticulitis. No bowel obstruction or free fluid.     LOWER EXTREMITIES:   No abnormal masses or hypermetabolic lesions.    BONES:   There are no suspicious lytic or blastic osseous lesions.  There is no  abnormal FDG uptake in the skeleton. Multilevel degenerative changes  of the spine.  Impression: IMPRESSION: In this patient with metastatic melanoma status post  immunotherapy treatment, there is evidence for disease progression:    1. Increased size of the hypermetabolic right inguinal and right  external iliac lymphadenopathy.  2. New metastasis in segment 6 of the liver. Increased size and  hypermetabolism of segment 2 metastatic lesion.  3. New hypermetabolic portacaval lymphadenopathy.  4. New mildly hypermetabolic pulmonary opacities in the right upper  and right middle lobes which may represent infectious/inflammatory  etiology versus malignancy. In setting of progressive disease,  findings are suspicious for pulmonary metastases.      I have personally reviewed the examination and initial interpretation  and I agree with the findings.    CARMEN GIL MD         SYSTEM ID:  [id number]        ASSESSMENT AND PLAN:    #Anorexia  #Weight loss  -ongoing since she was on steroids  last summer. Has lost about 5 lbs in the last 6 months, weight has been stable lately. Has little to no appetite. PPI and carafate were not helpful. Zofran did not help.  -she is very concerned about her lack of appetite. Treatment currently should not be causing these symptoms. Will try appetite stimulant, Zyprexa. Will have her start at 2.5 mg at bedtime. Can increase if needed   -recommend she start a multivitamin  -has met with nutritionist in the past and was not happy with the visit     #Acral melanoma, right heel primary, pT4b pN3c M1, Stage IV  #CPI associated hepatitis, grade 3, resolved  It was a pleasure to meet with Ms. Hoang. 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 She progressed on first line nivolumab, and had evidence of response to 2nd line ipilimumab and nivolumab. On nivolumab maintenance therapy she showed regrowth. She tried ipilimumab monotherapy and had stable disease with significant pruritis, so had reinduction with ipilimumab/nivolumab on 8/2/21. She received her 2nd cycle on 8/23/21 and interval scan 9/1/21 showed about 10% growth. She then developed progressive nausea, reflux symptoms, diarrhea, and elevated transaminases ([id number] ULNl). On PET-CT she has had additional growth and increase in FDG avidity in the right external iliac and inguinal lymph nodes, as well as a new lesion in segment 6 and a growth in the FDG avid metastasis in hepatic segment 2.     -Started TVEC on 1/28/22  -potentially will add nivolumab (240 mg q2w) after first few doses, reasssess in 2 months when she sees Dr Franklin  -Repeat PET-CT in 3-4 months  -will have her follow-up with Meghan Perdomo in 3-4 weeks to check in     #History of depression and anxiety  -She stopped her sertraline 25 mg daily as she did not know if it interacted with her other treatment. Ok to restart.   -She has support of her therapist    #Back and leg pain  She  continues to note pain, which seems consistent with low back pain and sciatica type symptoms. However, there is a generalized pain component she also describes, which wer are uncertain about. She had facet joint injection 1/4/22, which she did not find to be helpful.   - lidoderm patches minimally helpful. Tramadol provided no benefit.     #yeast infection  -should follow-up with PCP about this. She has not started her fluconazole yet    Maddy Burnett PA-C  [facility] [address] [id number] [phone]          Again, thank you for allowing me to participate in the care of your patient.        Sincerely,        Maddy Burnett PA-C

## 2022-02-16 NOTE — NURSING NOTE
Chief Complaint   Patient presents with     Blood Draw     Labs drawn via  by RN. VS  taken.     Oncology Clinic Visit     Met Melanoma      Labs drawn with vpt by rn.  Pt tolerated well.  VS taken.  Pt checked in for next appt.    Greg Jaime RN

## 2022-02-16 NOTE — PROGRESS NOTES
MEDICAL ONCOLOGY PROGRESS NOTE  [facility]  Feb 16, 2022     CHIEF COMPLAINT: Acral melanoma, on steroids for autoimmune hepatitis    Melanoma History:  1. She noted a painful lesion on the sole of the right foot for a few months, since last summer.  It initially was small then became raised.  It spontaneously bled. She is not entirely sure of its appearance initially.  She denies noting any masses behind the knee or in the groin.  2. 1/9/2020, punch biopsy was performed by Dr Jessica Meadows. Patology showed melanoma, at least 3.4 mm deep with ulceration and 0 mitoses, and perineural invasion present. TILs were non-brisk and LVI not identified. pT3b.  3. 2/10/2020, she has right femoral sentinel lymph node biopsy and wide local excision (Specimen #: [id number]) and the right heel lesion extends to a depth of 6.6 mm, and invasive melanoma is present at 0.2 mm from the deep margin. Microsatellites are also present. There was 1 (of 2) lymph nodes involved. The lymph node tissue exhibits extensive subcapsular and parenchymal clusters of melanoma cells, highlighted on Melan-A immunostain. The largest cluster is 7 millimeters in greatest diameter. pT4b pN2c. PD-L1 staining is negative, <1%. No mutation in BRAF, KIT, or NRAS.  4. 2/22/2020, she had PET-CT, which showed intense FDG uptake in a right inguinal lymph node, concerning for an additional focus of metastatic disease. There is also an indeterminate right external iliac lymph node with mild FDG uptake.  5. 3/4/2020, she has medial plantar artery  fasciocutaneous instep flap and Integra placement to the donor site. On 4/2/2020, she has additional reconstruction with skin grafting.  6. 5/22/2020, PET-CT shows a hypermetabolic right inguinal and right external iliac chain lymph node and stable pulmonary nodules.  7. 5/27/2020, she starts nivolumab for presumed low volume lymph node predominant metastatic disease.  8. 8/21/2020, PET-CT shows increase in  hypermetabolic adenopathy, and a hypermetabolic nodule in segment 2 of the liver. Given low volume of disease she prefers to continue therapy with short interval follow-up.  9. 10/9/2020, PET-CT shows increasing size of hepatic metastases with increased hypermetabolism, increased right inguinal and iliac lymphadenopathy, and new FDG uptake right T2 transverse process and right side of S1. MRI-brain obtained 10/21/2020 is negative for brain metastasis.  10. 10/28/2020, she starts ipilimumab 1mg/kg and nivolumab 3mg/kg. She then goes on to maintenance nivolumab through 4/12/21.  11. 5/10/2021, she starts ipilimumab 3 mg/kg monotherapy q3w for 4 cycles.  12. 7/30/21, PET/CT shows mild disease progression.   13. 8/2/21, start ipilimumab 3mg/kg and nivolumab 1mg/kg and received 2 cycles, last on 8/23/21.  14. 8/23/21, develops elevated LFT's.   15. 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   16. 1/28/22, started TVEC    Presents today as an add on for anorexia and weight loss.      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    She recently saw her PCP for a sinus infection, vaginal yeast infection and weight loss. She was given fluconazole but has not taken it as she is upset she did not get a test for yeast infection first. She also was given flonase which does help, though does not take away her symptoms completely.     She has been having ongoing difficulties with appetite and weight loss since last summer. She has lost a total of 5 lbs during these months. She is still eating, consistently breakfast but the rest of the day is unknown. In the morning she will eat cereal and toast, smoothie. She has a lot of food intolerances.  She states her taste and texture off with eating. Some pain with eating as well. PPI and carafate have not worked.     She continues to have lots of pain in legs and back, some in hands. Nothing helps. Did get  injection in back which made things worse.       Current Outpatient Medications   Medication Sig Dispense Refill     cholecalciferol (VITAMIN D3) 125 MCG (5000 UT) TABS tablet Take 2,000 Units by mouth every other day        estradiol (ESTRACE) 0.1 MG/GM vaginal cream Place 2 g vaginally twice a week 42.5 g 1     fluconazole (DIFLUCAN) 200 MG tablet 400 mg by mouth for one day then 200 mg every 24 hours for a further 13 days. 15 tablet 0     fluticasone (FLONASE) 50 MCG/ACT nasal spray Spray 2 sprays into both nostrils daily 16 g 0     OLANZapine (ZYPREXA) 2.5 MG tablet Take 1 tablet (2.5 mg) by mouth At Bedtime 30 tablet 3     lidocaine (LIDODERM) 5 % patch Place 1 patch onto the skin every 24 hours To prevent lidocaine toxicity, patient should be patch free for 12 hrs daily. (Patient not taking: Reported on 1/28/2022) 15 patch 0     omega 3 1000 MG CAPS 1 capsule (Patient not taking: Reported on 2/16/2022)       sertraline (ZOLOFT) 25 MG tablet Take 2 tablets (50 mg) by mouth daily (Patient not taking: Reported on 1/20/2022) 90 tablet 0     triamcinolone (KENALOG) 0.1 % external cream Apply topically 2 times daily (Patient not taking: Reported on 1/28/2022) 453.6 g 11         PHYSICAL EXAMINATION  /88 (BP Location: Left arm, Patient Position: Sitting, Cuff Size: Adult Regular)   Pulse 91   Temp 98.1  F (36.7  C) (Oral)   Resp 16   Wt 64.1 kg (141 lb 6.4 oz)   SpO2 98%   BMI 22.15 kg/m      General: Patient appears well in no acute distress.   Skin: No visualized rash or lesions on visualized skin  Eyes: EOMI, no erythema, sclera icterus or discharge noted  Resp: Appears to be breathing comfortably without accessory muscle usage, speaking in full sentences, no cough  MSK: Appears to have normal range of motion based on visualized movements  Neurologic: No apparent tremors, facial movements symmetric  Psych: affect normal, did avoid eye contact, alert and oriented    LABS  I personally reviewed labs  today   Most Recent 3 CBC's:Recent Labs   Lab Test 02/16/22  0931 01/27/22  1453 10/29/21  1156   WBC 5.9 6.0 9.2   HGB 12.7 12.5 13.8   MCV 92 92 94    364 274     Most Recent 3 BMP's:  Recent Labs   Lab Test 02/16/22  0931 01/27/22  1453 10/29/21  1156    140 138   POTASSIUM 3.7 3.9 3.5   CHLORIDE 107 105 102   CO2 32 29 34*   BUN 21 20 27   CR 0.62 0.62 0.76   ANIONGAP 4 6 2*   GABY 9.3 9.1 8.8   GLC 95 87 114*     Most Recent 2 LFT's:  Recent Labs   Lab Test 02/16/22  0931 01/27/22  1453   AST 30 32   ALT 41 45   ALKPHOS 63 64   BILITOTAL 0.3 0.3         IMAGING  PET Oncology Whole Body  Narrative: Combined Report of:    PET and CT on  1/21/2022 11:26 AM :    1. PET of the neck, chest, abdomen, and pelvis.  2. PET CT Fusion for Attenuation Correction and Anatomical  Localization:    3. Diagnostic CT scan of the chest, abdomen, and pelvis with  intravenous contrast for interpretation.  3. CT of the chest, abdomen and pelvis obtained for diagnostic  interpretation.  4. 3D MIP and PET-CT fused images were processed on an independent  workstation and archived to PACS and reviewed by a radiologist.    Technique:    1. PET: The patient received 13.02 mCi of F-18-FDG; the serum glucose  was 103 prior to administration, body weight was 63.7 kg. Images were  evaluated in the axial, sagittal, and coronal planes as well as the  rotational whole body MIP. Images were acquired from the Vertex to the  Feet.    UPTAKE WAS MEASURED AT 75 MINUTES.     BACKGROUND:  Liver SUV max= 3.7,   Aorta Blood SUV Max: 2.0.     2. CT: Volumetric acquisition for clinical interpretation of the  chest, abdomen, and pelvis acquired at 3 mm sections . The chest,  abdomen, and pelvis were evaluated at 5 mm sections in bone, soft  tissue, and lung windows.      The patient received 86 cc of Isovue 370 intravenously for the  examination.    --    3. 3D MIP and PET-CT fused images were processed on an independent  workstation and archived  to PACS and reviewed by a radiologist.    INDICATION: Melanoma, stage >= IIB, monitor; Malignant melanoma of  right lower extremity including hip (H)    ADDITIONAL INFORMATION OBTAINED FROM EMR: 75 yo female with history  metastatic melanoma, right heel primary with mets to lymph nodes,  liver, bones. Status post immunotherapy.     COMPARISON: PET-CT 10/29/2021    FINDINGS:     HEAD/NECK:  There is no  suspicious FDG uptake in the neck.     Mild diffuse mucosal thickening of the paranasal sinuses. The mastoid  air cells are clear.     The mucosal pharyngeal space, the , prevertebral and carotid  spaces are within normal limits.     No masses, mass effect or pathologically enlarged lymph nodes are  evident. The thyroid gland is unremarkable.    CHEST:  New hypermetabolic groundglass and consolidative opacities in the  posterior right upper lobe with max SUV 8.4 (series 3 image 173) and  in the anterior right middle lobe with max SUV of 7.0 (series 3 image  199).    The ascending aorta and main pulmonary artery are normal in caliber.  The heart is normal in size. No significant pericardial effusion. No  hilar, mediastinal, or axillary lymphadenopathy. Small hiatal hernia.    The central tracheobronchial tree is patent. Bibasilar atelectasis. No  pleural effusion or pneumothorax.     ABDOMEN AND PELVIS:  Hypermetabolic lesion in hepatic segment 2 measures 1.8 x 1.9 cm with  SUV max of 24.6 (series 3 image 242), previously measuring 1.5 x 1.5  cm with SUV max of 14.4. New hypermetabolic lesion in hepatic segment  6 measures 1.3 x 1.1 cm with max SUV of 5.1 (series 3 image 296).     New hypermetabolic portacaval lymph node measures 1.1 x 0.7 cm with  max SUV of 4.7 (series 3 image 263).    Increased size of enlarged and hypermetabolic right external iliac and  right inguinal lymph nodes. For example, right external iliac  conglomeration measures 4.8 x 4.2 cm with max SUV of 25.6 (series 3  image 374),  previously measuring 3.5 x 3.6 cm with max SUV of 31.0.  Right inguinal conglomeration measures 5.3 x 3.7 with max SUV of 28.8  (series 3 image 421), previously measuring 3.9 x 2.9 cm with max SUV  of 20.7.     There is no splenomegaly or evidence for splenic or pancreatic mass  lesion. There are no suspicious adrenal mass lesions or opaque  gallbladder calculi. There is symmetric nephrographic renal phase  without hydronephrosis. Colonic diverticulosis without evidence for  diverticulitis. No bowel obstruction or free fluid.     LOWER EXTREMITIES:   No abnormal masses or hypermetabolic lesions.    BONES:   There are no suspicious lytic or blastic osseous lesions.  There is no  abnormal FDG uptake in the skeleton. Multilevel degenerative changes  of the spine.  Impression: IMPRESSION: In this patient with metastatic melanoma status post  immunotherapy treatment, there is evidence for disease progression:    1. Increased size of the hypermetabolic right inguinal and right  external iliac lymphadenopathy.  2. New metastasis in segment 6 of the liver. Increased size and  hypermetabolism of segment 2 metastatic lesion.  3. New hypermetabolic portacaval lymphadenopathy.  4. New mildly hypermetabolic pulmonary opacities in the right upper  and right middle lobes which may represent infectious/inflammatory  etiology versus malignancy. In setting of progressive disease,  findings are suspicious for pulmonary metastases.      I have personally reviewed the examination and initial interpretation  and I agree with the findings.    CARMEN GIL MD         SYSTEM ID:  [id number]        ASSESSMENT AND PLAN:    #Anorexia  #Weight loss  -ongoing since she was on steroids last summer. Has lost about 5 lbs in the last 6 months, weight has been stable lately. Has little to no appetite. PPI and carafate were not helpful. Zofran did not help.  -she is very concerned about her lack of appetite. Treatment currently should not be causing  these symptoms. Will try appetite stimulant, Zyprexa. Will have her start at 2.5 mg at bedtime. Can increase if needed   -recommend she start a multivitamin  -has met with nutritionist in the past and was not happy with the visit     #Acral melanoma, right heel primary, pT4b pN3c M1, Stage IV  #CPI associated hepatitis, grade 3, resolved  It was a pleasure to meet with Ms. Hoang. 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 She progressed on first line nivolumab, and had evidence of response to 2nd line ipilimumab and nivolumab. On nivolumab maintenance therapy she showed regrowth. She tried ipilimumab monotherapy and had stable disease with significant pruritis, so had reinduction with ipilimumab/nivolumab on 8/2/21. She received her 2nd cycle on 8/23/21 and interval scan 9/1/21 showed about 10% growth. She then developed progressive nausea, reflux symptoms, diarrhea, and elevated transaminases (16-23X ULNl). On PET-CT she has had additional growth and increase in FDG avidity in the right external iliac and inguinal lymph nodes, as well as a new lesion in segment 6 and a growth in the FDG avid metastasis in hepatic segment 2.     -Started TVEC on 1/28/22  -potentially will add nivolumab (240 mg q2w) after first few doses, reasssess in 2 months when she sees Dr Franklin  -Repeat PET-CT in 3-4 months  -will have her follow-up with Meghan Perdomo in 3-4 weeks to check in     #History of depression and anxiety  -She stopped her sertraline 25 mg daily as she did not know if it interacted with her other treatment. Ok to restart.   -She has support of her therapist    #Back and leg pain  She continues to note pain, which seems consistent with low back pain and sciatica type symptoms. However, there is a generalized pain component she also describes, which wer are uncertain about. She had facet joint injection 1/4/22, which she did not find to be  helpful.   - lidoderm patches minimally helpful. Tramadol provided no benefit.     #yeast infection  -should follow-up with PCP about this. She has not started her fluconazole yet    Maddy Burnett PA-C  [facility]  [address] [place] [address] [phone]

## 2022-02-18 NOTE — PROGRESS NOTES
[facility] Dermatology Note  Encounter Date: Feb 18, 2022  Office Visit     Dermatology Problem List:  Last skin check 12/17/21, q6 months through 1/25  1. Metastatic melanoma, originated at right heel  - S/p punch biopsy By Dr. Alvarado 1/9/20 showing melanoma at least 3.4mm deep, ulcerated, no mitoses, perineural invasion, TIL present and nonbrisk, no LVI.  - S/p WLE with 2cm margins (revelead melanoma to depth of 6.6mm with deep margin close, microsatellites were present) and right femoral SLNB 2/10/20 (1/2 nodes positive, largest deposit 7mm)  - S/p re-excision 2/20/20  - Flap completed 3/4/30, skin graft 4/2/20  - Adjuvant nivolumab started 5/2020. Progressed. 10/28/20 started on ipi and nivolumab combination. Went to ipi monotherapy 5/2021. Back to dual immunotherapy 8/2021. Developed severe transaminitis. Stopped immunotherapy 9/2021 and treated with oral steroids.   - TVec started 1/28/22. Plan for addition of mono immunotherapy.  2. AK, right cheek: cryo and biopsy 11/19/20     Social history: . Has 4 children.  Family history: Brother had pancreatic cancer. No melanoma in the family.  ____________________________________________    Assessment & Plan:     # Melanoma, stage IV, originating at R heel, currently off of immunotherapy due to autoimmune adverse events (transaminitis). Known disease in R inguinal nodes and liver. Plan for second TVEC injection today. Discussed what to expect with Tvec injections, including flu like symptoms and rare cutaneous HSV infections. Discussed that tumors often get bigger before showing response. Discussed that median time to improvement in clinical trials was 4 months. Discussed schedule for follow up injections. Questions answered in detail. Instructions for after care given in AVS. Patient instructed to leave waterproof dressing on for one week and given supplies to replace if needed.   - Reviewed last onc note from 2/16/22. Plan to try to start  immunotherapy monotherapy after a few TVEC injections.    Procedures Performed:   TVEC: Topical numbing (Lidocaine 4% ointment) was applied to the skin 30 minutes prior to injection. Cleansed with alcohol. Staff doned appropriated PPE, including gowns, caps, face masks, shields, and masks prior to injections. The skin was cleansed with alcohol. 2cc of 10^8 pfu/cc were injected into the lesion in a retrograde fashion with fanning technique. After injection, pressure was held on the skin for 30 second. Then telfa and tegaderm were placed. Tegaderm cleansed with alcohol, then second layer applied. Patient instructed to leave waterproof dressing on for one week.    Follow-up: Next injection in 2 weeks.    Staff:     Gloria Estrada MD    Department of Dermatology  [facility]: Phone: [phone], Fax:[phone]  [facility]: Phone: [phone], Fax: [phone]    ____________________________________________    CC: Derm Problem (Yadira is here today for Tvec injection)    HPI:  Ms. Yadira Hoang is a(n) 76 year old female who presents today as a return patient for TVEC injection.    She is accompanied by her . Did well after first injection. No flu like symptoms. Area was swollen right after injection then down in the next few days. Bandage mostly stayed on for a week after she replaced it because it was painful in how it was placed in office.    Patient is otherwise feeling well, without additional skin concerns.     Labs Reviewed:  N/A    Physical Exam:  Vitals: /73 (BP Location: Right arm)   Pulse 102   Temp 97.6  F (36.4  C)   SpO2 97%   SKIN: Focused exam of the groin.  - Just under 5cm firm nodule at the R groin inferior to the inguinal crease. Feels softer than at last injection.  - No other lesions of concern on areas examined.       Medications:  Current  Outpatient Medications   Medication     cholecalciferol (VITAMIN D3) 125 MCG (5000 UT) TABS tablet     estradiol (ESTRACE) 0.1 MG/GM vaginal cream     fluticasone (FLONASE) 50 MCG/ACT nasal spray     fluconazole (DIFLUCAN) 200 MG tablet     lidocaine (LIDODERM) 5 % patch     OLANZapine (ZYPREXA) 2.5 MG tablet     omega 3 1000 MG CAPS     sertraline (ZOLOFT) 25 MG tablet     triamcinolone (KENALOG) 0.1 % external cream     Current Facility-Administered Medications   Medication     talimogene laherparepvec (IMLYGIC) 10^6 PFU/mL injection 2 mL      Past Medical History:   Patient Active Problem List   Diagnosis     Malignant melanoma of right lower extremity including hip (H)     Melanoma of skin (H)     GUERLINE (obstructive sleep apnea)     Chronic congestion of paranasal sinus     Weakness of foot     Neck pain, chronic     Myofascial pain     Intractable migraine without aura and without status migrainosus     History of multiple concussions     Foot pain, bilateral     Fibromyositis     Difficulty walking     Chronic pain disorder     Chronic fatigue     Bilateral occipital neuralgia     Articular disc disorder of temporomandibular joint     S/P flap graft     Osteopenia     Injury of head     Hyperlipidemia LDL goal <100     ALEXUS (generalized anxiety disorder)     Major depression, recurrent, chronic (H)     Secondary malignant neoplasm of bone (H)     Generalized pruritus     Immunodeficiency, unspecified (H)     Past Medical History:   Diagnosis Date     Concussion      ALEXUS (generalized anxiety disorder) 5/7/2020     Major depression, recurrent, chronic (H) 5/7/2020     Melanoma (H)      Nonsenile cataract      Sleep apnea     CPAP       CC No referring provider defined for this encounter. on close of this encounter.

## 2022-02-18 NOTE — PATIENT INSTRUCTIONS
MANAGING YOUR CARE DURING AND AFTER TREATMENT    Important things to know while being treated with IMLYGIC :  IMLYGIC  virus can spread to other areas of your body or to people close to you (household members, caregivers, sex partners, or people you share a bed with).  To avoid spreading IMLYGIC  to other areas of your body or to others:    Avoid direct contact between your treatment sites, dressings, or body fluids and people close to you (for example, use condoms when engaging in sexual activity, and avoid kissing close contacts if either has an open mouth sore)    Don't touch or scratch the parts of your body that have been injected with IMLYGIC     Wear gloves while putting on or changing your dressings    Keep treatment sites covered with airtight and watertight dressings for at least 1 week after each treatment (or longer if the treatment site is weeping or oozing)    Replace dressings right away with a clean dressing if they become loose or come off    Place all used dressings and cleaning materials in a sealed plastic bag, and throw them away in the garbage.      What are the possible side effects of IMLYGIC ?    Common side effects of IMLYGIC  include tiredness, chills, fever, nausea, flu-like symptoms, and pain at treatment site.  Tell your doctor right away if you get any of the following:  - Pain, burning, or tingling in a blister around the mouth or genitals or on the fingers or ears.  - Eye pain, light sensitivity, discharge from the eyes, or blurry vision.  - Weakness in arms or legs.  - Extreme drowsiness (feeling sleepy).  - Mental confusion.    If you think you have a herpes infection, inform your doctor. You or your doctor should call [facility] at  [phone] ([phone]) for follow-up testing, if needed.  Tell your doctor if you have any side effect that bothers you or does not go away.  These are not all the possible side effects of IMLYGIC .  If you would like more information, talk  with your doctor.    You are encouraged to report negative side effects of prescription drugs to the FDA. Visit www.fda.gov/medwatch, or  call 0-077-KMZ-6043.

## 2022-02-18 NOTE — LETTER
2/18/2022       RE: Yadira Hoang  [address] [facility] [address]     Dear Colleague,    Thank you for referring your patient, Yadira Hoang, to the [facility] DERMAT[facility] [place] at [facility]. Please see a copy of my visit note below.    [facility] Dermatology Note  Encounter Date: Feb 18, 2022  Office Visit     Dermatology Problem List:  Last skin check 12/17/21, q6 months through 1/25  1. Metastatic melanoma, originated at right heel  - S/p punch biopsy By Dr. Alvarado 1/9/20 showing melanoma at least 3.4mm deep, ulcerated, no mitoses, perineural invasion, TIL present and nonbrisk, no LVI.  - S/p WLE with 2cm margins (revelead melanoma to depth of 6.6mm with deep margin close, microsatellites were present) and right femoral SLNB 2/10/20 (1/2 nodes positive, largest deposit 7mm)  - S/p re-excision 2/20/20  - Flap completed 3/4/30, skin graft 4/2/20  - Adjuvant nivolumab started 5/2020. Progressed. 10/28/20 started on ipi and nivolumab combination. Went to ipi monotherapy 5/2021. Back to dual immunotherapy 8/2021. Developed severe transaminitis. Stopped immunotherapy 9/2021 and treated with oral steroids.   - TVec started 1/28/22. Plan for addition of mono immunotherapy.  2. AK, right cheek: cryo and biopsy 11/19/20     Social history: . Has 4 children.  Family history: Brother had pancreatic cancer. No melanoma in the family.  ____________________________________________    Assessment & Plan:     # Melanoma, stage IV, originating at R heel, currently off of immunotherapy due to autoimmune adverse events (transaminitis). Known disease in R inguinal nodes and liver. Plan for second TVEC injection today. Discussed what to expect with Tvec injections, including flu like symptoms and rare cutaneous HSV infections. Discussed that tumors often get bigger before showing response. Discussed that median time to  improvement in clinical trials was 4 months. Discussed schedule for follow up injections. Questions answered in detail. Instructions for after care given in AVS. Patient instructed to leave waterproof dressing on for one week and given supplies to replace if needed.   - Reviewed last onc note from 2/16/22. Plan to try to start immunotherapy monotherapy after a few TVEC injections.    Procedures Performed:   TVEC: Topical numbing (Lidocaine 4% ointment) was applied to the skin 30 minutes prior to injection. Cleansed with alcohol. Staff doned appropriated PPE, including gowns, caps, face masks, shields, and masks prior to injections. The skin was cleansed with alcohol. 2cc of 10^8 pfu/cc were injected into the lesion in a retrograde fashion with fanning technique. After injection, pressure was held on the skin for 30 second. Then telfa and tegaderm were placed. Tegaderm cleansed with alcohol, then second layer applied. Patient instructed to leave waterproof dressing on for one week.    Follow-up: Next injection in 2 weeks.    Staff:     Gloria Estrada MD    Department of Dermatology  [facility]: Phone: [phone], Fax:[phone]  [facility]: Phone: [phone], Fax: [phone]    ____________________________________________    CC: Derm Problem (Yadira is here today for Tvec injection)    HPI:  Ms. Yadira Hoang is a(n) 76 year old female who presents today as a return patient for TVEC injection.    She is accompanied by her . Did well after first injection. No flu like symptoms. Area was swollen right after injection then down in the next few days. Bandage mostly stayed on for a week after she replaced it because it was painful in how it was placed in office.    Patient is otherwise feeling well, without additional skin concerns.     Labs Reviewed:  N/A    Physical  Exam:  Vitals: /73 (BP Location: Right arm)   Pulse 102   Temp 97.6  F (36.4  C)   SpO2 97%   SKIN: Focused exam of the groin.  - Just under 5cm firm nodule at the R groin inferior to the inguinal crease. Feels softer than at last injection.  - No other lesions of concern on areas examined.       Medications:  Current Outpatient Medications   Medication     cholecalciferol (VITAMIN D3) 125 MCG (5000 UT) TABS tablet     estradiol (ESTRACE) 0.1 MG/GM vaginal cream     fluticasone (FLONASE) 50 MCG/ACT nasal spray     fluconazole (DIFLUCAN) 200 MG tablet     lidocaine (LIDODERM) 5 % patch     OLANZapine (ZYPREXA) 2.5 MG tablet     omega 3 1000 MG CAPS     sertraline (ZOLOFT) 25 MG tablet     triamcinolone (KENALOG) 0.1 % external cream     Current Facility-Administered Medications   Medication     talimogene laherparepvec (IMLYGIC) 10^6 PFU/mL injection 2 mL      Past Medical History:   Patient Active Problem List   Diagnosis     Malignant melanoma of right lower extremity including hip (H)     Melanoma of skin (H)     GUERLINE (obstructive sleep apnea)     Chronic congestion of paranasal sinus     Weakness of foot     Neck pain, chronic     Myofascial pain     Intractable migraine without aura and without status migrainosus     History of multiple concussions     Foot pain, bilateral     Fibromyositis     Difficulty walking     Chronic pain disorder     Chronic fatigue     Bilateral occipital neuralgia     Articular disc disorder of temporomandibular joint     S/P flap graft     Osteopenia     Injury of head     Hyperlipidemia LDL goal <100     ALEXUS (generalized anxiety disorder)     Major depression, recurrent, chronic (H)     Secondary malignant neoplasm of bone (H)     Generalized pruritus     Immunodeficiency, unspecified (H)     Past Medical History:   Diagnosis Date     Concussion      ALEXUS (generalized anxiety disorder) 5/7/2020     Major depression, recurrent, chronic (H) 5/7/2020     Melanoma (H)       Nonsenile cataract      Sleep apnea     CPAP       CC No referring provider defined for this encounter. on close of this encounter.

## 2022-02-18 NOTE — NURSING NOTE
Drug Administration Record    Prior to injection, verified patient identity using patient's name and date of birth.  Due to injection administration, patient instructed to remain in clinic for 15 minutes  afterwards, and to report any adverse reaction to me immediately.    Drug Name: talimogene laherparepvec  Dose: 2ml  Route administered: ID  NDC #: 04001-236-44  LOT #: Not available  SITE: [facility]  : Not available  EXPIRATION DATE: Not available    Initial medication label went into biohazard bag. Writer called pharmacy and spoke to Carla and asked for another label. Writer noted there was no lot or exp date on label. Contacted pharmacy back and spoke to Kylah who stated they did not have that info and asked writer to bypass that info.

## 2022-03-02 PROBLEM — M62.81 GENERALIZED MUSCLE WEAKNESS: Status: ACTIVE | Noted: 2022-01-01

## 2022-03-02 PROBLEM — M54.50 CHRONIC BILATERAL LOW BACK PAIN WITHOUT SCIATICA: Status: ACTIVE | Noted: 2022-01-01

## 2022-03-02 PROBLEM — M79.604 BILATERAL LEG PAIN: Status: ACTIVE | Noted: 2022-01-01

## 2022-03-02 PROBLEM — G89.29 CHRONIC BILATERAL LOW BACK PAIN WITHOUT SCIATICA: Status: ACTIVE | Noted: 2022-01-01

## 2022-03-02 PROBLEM — M79.605 BILATERAL LEG PAIN: Status: ACTIVE | Noted: 2022-01-01

## 2022-03-02 NOTE — PROGRESS NOTES
"Physical Therapy Initial Evaluation  Subjective:  The history is provided by the patient. No  was used.   Therapist Generated HPI Evaluation  Problem details: She reports that she had cancer (sore on the bottom of her heel) and saw podiatrist. One of the podiatrists she saw found it was cancer and began treatments with surgery.  Following the surgery, she was told she will likely forever have pain in her right foot. With the cancer treatment she was feeling pretty good other than fatigue.  Due to her blood counts, she was told she has to take steroids.  She was given steroids which caused a ton of right leg pain.  She followed with up with Orthopedics who believed her leg pain was potentially low back related and was given an GIFTY. This caused more leg pain, but this was bilateral leg pain and now more in the right shin as well as the thighs. She reports a correlation between steroid use and increase foot / leg pain.  She also reports her lower right leg is more swollen. She also has a fungal infection in her stomach.  With her experience with [facility], they say fungal infections can cause bilateral thigh pain and dry lips, so she wants to pursue [facility]. She isn't eating much. Typically she is \"wiped out\" and has to rest throughout the day. The more active she is she gets more shaky. History of knee problems and OA that worsened with steroids. Back is more painful after the injection..         Type of problem:  Lumbar.    This is a chronic condition.  Condition occurred with:  Insidious onset.  Where condition occurred: for unknown reasons.  Patient reports pain:  Lumbar spine right and lumbar spine left.    Pain radiates to:  Thigh right, thigh left and lower leg right.   Since onset symptoms are rapidly worsening.  Associated symptoms:  Edema and loss of strength. Symptoms are exacerbated by bending, lifting, standing, walking, certain positions and carrying      Previous treatment " includes physical therapy. There was none improvement following previous treatment.  Restrictions due to condition include:  Working in normal job without restrictions.  Barriers include:  None as reported by patient.    Patient Health History  Yadira Hoang being seen for PT Pain In Legs.     Problem began: 3/2/2021.   Problem occurred: Steroids   Pain is reported as 10/10 on pain scale.  General health as reported by patient is good.  Pertinent medical history includes: cancer, depression and sleep disorder/apnea. Other medical history details: Cancer in Foot.   Red flags:  Pain at rest/night.     Surgeries include:  Cancer surgery.        Current occupation is Retired.                                       Objective:  System         Lumbar/SI Evaluation  ROM:    AROM Lumbar:   Flexion:            Moderate Limitation  Ext:                    Moderate Limitation   Side Bend:        Left:  Moderate Limitation    Right:  Moderate Limitation  Rotation:           Left:  Moderate Limitation    Right:  Moderate limitation  Side Glide:        Left:     Right:         Strength: Generalized 5/5 in the lower extremities.  Lumbar Myotomes:  normal                Lumbar Dermtomes:  normal                Neural Tension/Mobility:    Left side:  SLR w/DF positive.  Left side:SLR or Slump  negative.   Right side:   SLR w/DF positive.  Right side:   Slump or SLR  negative.   Lumbar Palpation:  normal                                                         General     ROS    Assessment/Plan:    Patient is a 76 year old female with lumbar complaints.    Patient has the following significant findings with corresponding treatment plan.                Diagnosis 1:  Deconditioning  Pain -  manual therapy, education, directional preference exercise and home program  Decreased ROM/flexibility - manual therapy, therapeutic exercise, therapeutic activity and home program  Decreased strength - therapeutic exercise, therapeutic  activities and home program  Impaired balance - neuro re-education, gait training, therapeutic activities and home program  Impaired gait - gait training, assistive devices and home program  Decreased function - therapeutic activities and home program  Diagnosis 2:  Low Back Pain   Pain -  manual therapy, directional preference exercise and home program  Decreased ROM/flexibility - manual therapy, therapeutic exercise, therapeutic activity and home program  Decreased strength - therapeutic exercise, therapeutic activities and home program  Diagnosis 3:  Bilateral Thigh and Leg Pain  Pain -  hot/cold therapy, manual therapy, education, directional preference exercise and home program  Decreased ROM/flexibility - manual therapy, therapeutic exercise, therapeutic activity and home program  Decreased strength - therapeutic exercise, therapeutic activities and home program  Impaired gait - gait training and home program  Decreased function - therapeutic activities and home program     Therapy Evaluation Codes:   1) History comprised of:   Personal factors that impact the plan of care:      None.    Comorbidity factors that impact the plan of care are:      None.     Medications impacting care: Steroids.  2) Examination of Body Systems comprised of:   Body structures and functions that impact the plan of care:      Ankle, Hip, Knee and Lumbar spine.   Activity limitations that impact the plan of care are:      Bathing, Bending, Cooking, Driving, Lifting, Sitting, Squatting/kneeling, Stairs, Standing, Walking and Laying down.  3) Clinical presentation characteristics are:   Unstable/Unpredictable.  4) Decision-Making    High complexity using standardized patient assessment instrument and/or measureable assessment of functional outcome.  Cumulative Therapy Evaluation is: High complexity.    Previous and current functional limitations:  (See Goal Flow Sheet for this information)    Short term and Long term goals: (See Goal  Flow Sheet for this information)     Communication ability:  Patient appears to be able to clearly communicate and understand verbal and written communication and follow directions correctly.  Treatment Explanation - The following has been discussed with the patient:   RX ordered/plan of care  Anticipated outcomes  Possible risks and side effects  This patient would benefit from PT intervention to resume normal activities.   Rehab potential is fair.    Frequency:  One X week, once daily  Duration:  for 12 weeks  Discharge Plan:  Achieve all LTG.  Independent in home treatment program.  Reach maximal therapeutic benefit.    Please refer to the daily flowsheet for treatment today, total treatment time and time spent performing 1:1 timed codes.

## 2022-03-04 NOTE — PROGRESS NOTES
[facility] Dermatology Note  Encounter Date: Mar 4, 2022  Office Visit     Dermatology Problem List:  Last skin check 12/17/21, q6 months through 1/25  1. Metastatic melanoma, originated at right heel  - S/p punch biopsy By Dr. Alvarado 1/9/20 showing melanoma at least 3.4mm deep, ulcerated, no mitoses, perineural invasion, TIL present and nonbrisk, no LVI.  - S/p WLE with 2cm margins (revelead melanoma to depth of 6.6mm with deep margin close, microsatellites were present) and right femoral SLNB 2/10/20 (1/2 nodes positive, largest deposit 7mm)  - S/p re-excision 2/20/20  - Flap completed 3/4/30, skin graft 4/2/20  - Adjuvant nivolumab started 5/2020. Progressed. 10/28/20 started on ipi and nivolumab combination. Went to ipi monotherapy 5/2021. Back to dual immunotherapy 8/2021. Developed severe transaminitis. Stopped immunotherapy 9/2021 and treated with oral steroids.   - TVec started 1/28/22. Plan for addition of mono immunotherapy.  2. AK, right cheek: cryo and biopsy 11/19/20     Social history: . Has 4 children.  Family history: Brother had pancreatic cancer. No melanoma in the family.  ____________________________________________    Assessment & Plan:     # Melanoma, stage IV, originating at R heel, currently off of immunotherapy due to autoimmune adverse events (transaminitis). Known disease in R inguinal nodes and liver. Plan for third TVEC injection today. Discussed what to expect with Tvec injections, including flu like symptoms and rare cutaneous HSV infections. Discussed that tumors often get bigger before showing response. Discussed that median time to improvement in clinical trials was 4 months. Discussed schedule for follow up injections. Questions answered in detail. Instructions for after care given in AVS. Patient instructed to leave waterproof dressing on for one week and given supplies to replace if needed.     Procedures Performed:   TVEC: Topical numbing (Lidocaine 4%  ointment) was applied to the skin 30 minutes prior to injection. Cleansed with alcohol. Staff doned appropriated PPE, including gowns, caps, face masks, shields, and masks prior to injections. The skin was cleansed with alcohol. 2cc of 10^8 pfu/cc were injected into the lesion in a retrograde fashion with fanning technique. After injection, pressure was held on the skin for 30 second. Then telfa and tegaderm were placed. Tegaderm cleansed with alcohol, then second layer applied. Patient instructed to leave waterproof dressing on for one week.    Follow-up: Next injection in 2 weeks.    Staff:     Gloria Estrada MD    Department of Dermatology  [facility]: Phone: [phone], Fax:[phone]  [facility]: Phone: [phone], Fax: [phone]    ____________________________________________    CC: Derm Problem (TVEC)    HPI:  Ms. Yadira Hoang is a(n) 76 year old female who presents today as a return patient for TVEC injection.    She is accompanied by her . Did well after second injection. No new complaints today.    Patient is otherwise feeling well, without additional skin concerns.     Labs Reviewed:  N/A    Physical Exam:  Vitals: BP (!) 142/84 (BP Location: Left arm)   Pulse 94   SKIN: Focused exam of the groin.  - Just under 5cm firm nodule at the R groin inferior to the inguinal crease. Feels softer and flatter than at last injection, diameter about the same.  - No other lesions of concern on areas examined.       Medications:  Current Outpatient Medications   Medication     cholecalciferol (VITAMIN D3) 125 MCG (5000 UT) TABS tablet     estradiol (ESTRACE) 0.1 MG/GM vaginal cream     fluticasone (FLONASE) 50 MCG/ACT nasal spray     multivitamin w/minerals (MULTI-VITAMIN) tablet     lidocaine (LIDODERM) 5 % patch     OLANZapine (ZYPREXA) 2.5 MG tablet     sertraline  (ZOLOFT) 25 MG tablet     Current Facility-Administered Medications   Medication     talimogene laherparepvec (IMLYGIC) 10^8 PFU/mL injection 2 mL      Past Medical History:   Patient Active Problem List   Diagnosis     Malignant melanoma of right lower extremity including hip (H)     Melanoma of skin (H)     GUERLINE (obstructive sleep apnea)     Chronic congestion of paranasal sinus     Weakness of foot     Neck pain, chronic     Myofascial pain     Intractable migraine without aura and without status migrainosus     History of multiple concussions     Foot pain, bilateral     Fibromyositis     Difficulty walking     Chronic pain disorder     Chronic fatigue     Bilateral occipital neuralgia     Articular disc disorder of temporomandibular joint     S/P flap graft     Osteopenia     Injury of head     Hyperlipidemia LDL goal <100     ALEXUS (generalized anxiety disorder)     Major depression, recurrent, chronic (H)     Secondary malignant neoplasm of bone (H)     Generalized pruritus     Immunodeficiency, unspecified (H)     Bilateral leg pain     Chronic bilateral low back pain without sciatica     Generalized muscle weakness     Past Medical History:   Diagnosis Date     Concussion      ALEXUS (generalized anxiety disorder) 5/7/2020     Major depression, recurrent, chronic (H) 5/7/2020     Melanoma (H)      Nonsenile cataract      Sleep apnea     CPAP       CC No referring provider defined for this encounter. on close of this encounter.

## 2022-03-04 NOTE — LETTER
3/4/2022       RE: Yadira Hoang  [address] [facility] [address]     Dear Colleague,    Thank you for referring your patient, Yadira Hoang, to the [facility] [place] at [facility]. Please see a copy of my visit note below.    [facility] Dermatology Note  Encounter Date: Mar 4, 2022  Office Visit     Dermatology Problem List:  Last skin check 12/17/21, q6 months through 1/25  1. Metastatic melanoma, originated at right heel  - S/p punch biopsy By Dr. Alvarado 1/9/20 showing melanoma at least 3.4mm deep, ulcerated, no mitoses, perineural invasion, TIL present and nonbrisk, no LVI.  - S/p WLE with 2cm margins (revelead melanoma to depth of 6.6mm with deep margin close, microsatellites were present) and right femoral SLNB 2/10/20 (1/2 nodes positive, largest deposit 7mm)  - S/p re-excision 2/20/20  - Flap completed 3/4/30, skin graft 4/2/20  - Adjuvant nivolumab started 5/2020. Progressed. 10/28/20 started on ipi and nivolumab combination. Went to ipi monotherapy 5/2021. Back to dual immunotherapy 8/2021. Developed severe transaminitis. Stopped immunotherapy 9/2021 and treated with oral steroids.   - TVec started 1/28/22. Plan for addition of mono immunotherapy.  2. AK, right cheek: cryo and biopsy 11/19/20     Social history: . Has 4 children.  Family history: Brother had pancreatic cancer. No melanoma in the family.  ____________________________________________    Assessment & Plan:     # Melanoma, stage IV, originating at R heel, currently off of immunotherapy due to autoimmune adverse events (transaminitis). Known disease in R inguinal nodes and liver. Plan for third TVEC injection today. Discussed what to expect with Tvec injections, including flu like symptoms and rare cutaneous HSV infections. Discussed that tumors often get bigger before showing response. Discussed that median time to  improvement in clinical trials was 4 months. Discussed schedule for follow up injections. Questions answered in detail. Instructions for after care given in AVS. Patient instructed to leave waterproof dressing on for one week and given supplies to replace if needed.     Procedures Performed:   TVEC: Topical numbing (Lidocaine 4% ointment) was applied to the skin 30 minutes prior to injection. Cleansed with alcohol. Staff doned appropriated PPE, including gowns, caps, face masks, shields, and masks prior to injections. The skin was cleansed with alcohol. 2cc of 10^8 pfu/cc were injected into the lesion in a retrograde fashion with fanning technique. After injection, pressure was held on the skin for 30 second. Then telfa and tegaderm were placed. Tegaderm cleansed with alcohol, then second layer applied. Patient instructed to leave waterproof dressing on for one week.    Follow-up: Next injection in 2 weeks.    Staff:     Gloria Estrada MD    Department of Dermatology  [facility]: Phone: [phone], Fax:[phone]  [facility]: Phone: [phone], Fax: [phone]    ____________________________________________    CC: Derm Problem (TVEC)    HPI:  Ms. Yadira Hoang is a(n) 76 year old female who presents today as a return patient for TVEC injection.    She is accompanied by her . Did well after second injection. No new complaints today.    Patient is otherwise feeling well, without additional skin concerns.     Labs Reviewed:  N/A    Physical Exam:  Vitals: BP (!) 142/84 (BP Location: Left arm)   Pulse 94   SKIN: Focused exam of the groin.  - Just under 5cm firm nodule at the R groin inferior to the inguinal crease. Feels softer and flatter than at last injection, diameter about the same.  - No other lesions of concern on areas examined.       Medications:  Current  Outpatient Medications   Medication     cholecalciferol (VITAMIN D3) 125 MCG (5000 UT) TABS tablet     estradiol (ESTRACE) 0.1 MG/GM vaginal cream     fluticasone (FLONASE) 50 MCG/ACT nasal spray     multivitamin w/minerals (MULTI-VITAMIN) tablet     lidocaine (LIDODERM) 5 % patch     OLANZapine (ZYPREXA) 2.5 MG tablet     sertraline (ZOLOFT) 25 MG tablet     Current Facility-Administered Medications   Medication     talimogene laherparepvec (IMLYGIC) 10^8 PFU/mL injection 2 mL      Past Medical History:   Patient Active Problem List   Diagnosis     Malignant melanoma of right lower extremity including hip (H)     Melanoma of skin (H)     GUERLINE (obstructive sleep apnea)     Chronic congestion of paranasal sinus     Weakness of foot     Neck pain, chronic     Myofascial pain     Intractable migraine without aura and without status migrainosus     History of multiple concussions     Foot pain, bilateral     Fibromyositis     Difficulty walking     Chronic pain disorder     Chronic fatigue     Bilateral occipital neuralgia     Articular disc disorder of temporomandibular joint     S/P flap graft     Osteopenia     Injury of head     Hyperlipidemia LDL goal <100     ALEXUS (generalized anxiety disorder)     Major depression, recurrent, chronic (H)     Secondary malignant neoplasm of bone (H)     Generalized pruritus     Immunodeficiency, unspecified (H)     Bilateral leg pain     Chronic bilateral low back pain without sciatica     Generalized muscle weakness     Past Medical History:   Diagnosis Date     Concussion      ALEXUS (generalized anxiety disorder) 5/7/2020     Major depression, recurrent, chronic (H) 5/7/2020     Melanoma (H)      Nonsenile cataract      Sleep apnea     CPAP       CC No referring provider defined for this encounter. on close of this encounter.

## 2022-03-04 NOTE — NURSING NOTE
Chief Complaint   Patient presents with     Derm Problem     TVEC     Stacey MORROW, EMT  Dermatology/Dermatology Surgery  [phone]

## 2022-03-04 NOTE — PATIENT INSTRUCTIONS
MANAGING YOUR CARE DURING AND AFTER TREATMENT    Important things to know while being treated with IMLYGIC :  IMLYGIC  virus can spread to other areas of your body or to people close to you (household members, caregivers, sex partners, or people you share a bed with).  To avoid spreading IMLYGIC  to other areas of your body or to others:    Avoid direct contact between your treatment sites, dressings, or body fluids and people close to you (for example, use condoms when engaging in sexual activity, and avoid kissing close contacts if either has an open mouth sore)    Don't touch or scratch the parts of your body that have been injected with IMLYGIC     Wear gloves while putting on or changing your dressings    Keep treatment sites covered with airtight and watertight dressings for at least 1 week after each treatment (or longer if the treatment site is weeping or oozing)    Replace dressings right away with a clean dressing if they become loose or come off    Place all used dressings and cleaning materials in a sealed plastic bag, and throw them away in the garbage.      What are the possible side effects of IMLYGIC ?    Common side effects of IMLYGIC  include tiredness, chills, fever, nausea, flu-like symptoms, and pain at treatment site.  Tell your doctor right away if you get any of the following:  - Pain, burning, or tingling in a blister around the mouth or genitals or on the fingers or ears.  - Eye pain, light sensitivity, discharge from the eyes, or blurry vision.  - Weakness in arms or legs.  - Extreme drowsiness (feeling sleepy).  - Mental confusion.    If you think you have a herpes infection, inform your doctor. You or your doctor should call [facility] at  [phone] ([phone]) for follow-up testing, if needed.  Tell your doctor if you have any side effect that bothers you or does not go away.  These are not all the possible side effects of IMLYGIC .  If you would like more information, talk  with your doctor.    You are encouraged to report negative side effects of prescription drugs to the FDA. Visit www.fda.gov/medwatch, or  call 6-043-ZQD-2284.

## 2022-03-04 NOTE — NURSING NOTE
Drug Administration Record    Drug Name: talimogene laherparepvec (IMLYGIC)  Dose: 2ml  Route administered: ID  No other information provided on label.

## 2022-03-17 NOTE — PROGRESS NOTES
[facility] Dermatology Note  Encounter Date: Mar 18, 2022  Office Visit     Dermatology Problem List:  Last skin check 12/17/21, q6 months through 1/25  1. Metastatic melanoma, originated at right heel  - S/p punch biopsy By Dr. Alvarado 1/9/20 showing melanoma at least 3.4mm deep, ulcerated, no mitoses, perineural invasion, TIL present and nonbrisk, no LVI.  - S/p WLE with 2cm margins (revelead melanoma to depth of 6.6mm with deep margin close, microsatellites were present) and right femoral SLNB 2/10/20 (1/2 nodes positive, largest deposit 7mm)  - S/p re-excision 2/20/20  - Flap completed 3/4/30, skin graft 4/2/20  - Adjuvant nivolumab started 5/2020. Progressed. 10/28/20 started on ipi and nivolumab combination. Went to ipi monotherapy 5/2021. Back to dual immunotherapy 8/2021. Developed severe transaminitis. Stopped immunotherapy 9/2021 and treated with oral steroids.   - TVec started 1/28/22. Plan for addition of mono immunotherapy.  2. AK, right cheek: cryo and biopsy 11/19/20     Social history: . Has 4 children.  Family history: Brother had pancreatic cancer. No melanoma in the family.  ____________________________________________    Assessment & Plan:     # Melanoma, stage IV, originating at R heel, currently off of immunotherapy due to autoimmune adverse events (transaminitis). Known disease in R inguinal nodes and liver. Plan for fourth TVEC injection today. Discussed what to expect with Tvec injections, including flu like symptoms and rare cutaneous HSV infections. Discussed that tumors often get bigger before showing response. Discussed that median time to improvement in clinical trials was 4 months. Discussed schedule for follow up injections. Questions answered in detail. Instructions for after care given in AVS. Patient instructed to leave waterproof dressing on for one week and given supplies to replace if needed.     Procedures Performed:   TVEC: Topical numbing (Lidocaine  4% ointment) was applied to the skin 30 minutes prior to injection. Cleansed with alcohol. Staff doned appropriated PPE, including gowns, caps, face masks, shields, and masks prior to injections. The skin was cleansed with alcohol. 2cc of 10^8 pfu/cc were injected into the lesion in a retrograde fashion with fanning technique. After injection, pressure was held on the skin for 30 second. Then telfa and tegaderm were placed. Tegaderm cleansed with alcohol, then second layer applied. Patient instructed to leave waterproof dressing on for one week.    Follow-up: Next injection in 2 weeks.    Staff:     Gloria Estrada MD    Department of Dermatology  [facility] [place]: Phone: [phone], Fax:[phone]  [facility]: Phone: [phone], Fax: [phone]    ____________________________________________    CC: No chief complaint on file.    HPI:  Ms. Yadira Hoang is a(n) 76 year old female who presents today as a return patient for TVEC injection.    Last visit 3/4/22 for TVec. She is accompanied by her . Did well after third injection. No new complaints today.    Patient is otherwise feeling well, without additional skin concerns.     Labs Reviewed:  N/A    Physical Exam:  Vitals: There were no vitals taken for this visit.  SKIN: Focused exam of the groin.  - Just under 5cm firm nodule at the R groin inferior to the inguinal crease. Feels softer and flatter than at last injection, diameter about the same.  - No other lesions of concern on areas examined.       Medications:  Current Outpatient Medications   Medication     cholecalciferol (VITAMIN D3) 125 MCG (5000 UT) TABS tablet     estradiol (ESTRACE) 0.1 MG/GM vaginal cream     fluticasone (FLONASE) 50 MCG/ACT nasal spray     lidocaine (LIDODERM) 5 % patch     multivitamin w/minerals (MULTI-VITAMIN) tablet     OLANZapine (ZYPREXA)  2.5 MG tablet     sertraline (ZOLOFT) 25 MG tablet     Current Facility-Administered Medications   Medication     lidocaine 1% with EPINEPHrine 1:100,000 injection 1.5 mL     lidocaine 1% with EPINEPHrine 1:100,000 injection 1.5 mL      Past Medical History:   Patient Active Problem List   Diagnosis     Malignant melanoma of right lower extremity including hip (H)     Melanoma of skin (H)     GUERLINE (obstructive sleep apnea)     Chronic congestion of paranasal sinus     Weakness of foot     Neck pain, chronic     Myofascial pain     Intractable migraine without aura and without status migrainosus     History of multiple concussions     Foot pain, bilateral     Fibromyositis     Difficulty walking     Chronic pain disorder     Chronic fatigue     Bilateral occipital neuralgia     Articular disc disorder of temporomandibular joint     S/P flap graft     Osteopenia     Injury of head     Hyperlipidemia LDL goal <100     ALEXUS (generalized anxiety disorder)     Major depression, recurrent, chronic (H)     Secondary malignant neoplasm of bone (H)     Generalized pruritus     Immunodeficiency, unspecified (H)     Bilateral leg pain     Chronic bilateral low back pain without sciatica     Generalized muscle weakness     Past Medical History:   Diagnosis Date     Concussion      ALEXUS (generalized anxiety disorder) 5/7/2020     Major depression, recurrent, chronic (H) 5/7/2020     Melanoma (H)      Nonsenile cataract      Sleep apnea     CPAP       CC No referring provider defined for this encounter. on close of this encounter.

## 2022-03-18 NOTE — NURSING NOTE
Chief Complaint   Patient presents with     Derm Problem     TVEC     Stacey MORROW, EMT  Dermatology/Dermatology Surgery  [phone]

## 2022-03-18 NOTE — LETTER
3/18/2022       RE: Yadira Hoang  [address] [facility] [address]     Dear Colleague,    Thank you for referring your patient, Yadira Hoang, to the [facility] [place] at [facility]. Please see a copy of my visit note below.    [facility] Dermatology Note  Encounter Date: Mar 18, 2022  Office Visit     Dermatology Problem List:  Last skin check 12/17/21, q6 months through 1/25  1. Metastatic melanoma, originated at right heel  - S/p punch biopsy By Dr. Alvarado 1/9/20 showing melanoma at least 3.4mm deep, ulcerated, no mitoses, perineural invasion, TIL present and nonbrisk, no LVI.  - S/p WLE with 2cm margins (revelead melanoma to depth of 6.6mm with deep margin close, microsatellites were present) and right femoral SLNB 2/10/20 (1/2 nodes positive, largest deposit 7mm)  - S/p re-excision 2/20/20  - Flap completed 3/4/30, skin graft 4/2/20  - Adjuvant nivolumab started 5/2020. Progressed. 10/28/20 started on ipi and nivolumab combination. Went to ipi monotherapy 5/2021. Back to dual immunotherapy 8/2021. Developed severe transaminitis. Stopped immunotherapy 9/2021 and treated with oral steroids.   - TVec started 1/28/22. Plan for addition of mono immunotherapy.  2. AK, right cheek: cryo and biopsy 11/19/20     Social history: . Has 4 children.  Family history: Brother had pancreatic cancer. No melanoma in the family.  ____________________________________________    Assessment & Plan:     # Melanoma, stage IV, originating at R heel, currently off of immunotherapy due to autoimmune adverse events (transaminitis). Known disease in R inguinal nodes and liver. Plan for fourth TVEC injection today. Discussed what to expect with Tvec injections, including flu like symptoms and rare cutaneous HSV infections. Discussed that tumors often get bigger before showing response. Discussed that median time to  improvement in clinical trials was 4 months. Discussed schedule for follow up injections. Questions answered in detail. Instructions for after care given in AVS. Patient instructed to leave waterproof dressing on for one week and given supplies to replace if needed.     Procedures Performed:   TVEC: Topical numbing (Lidocaine 4% ointment) was applied to the skin 30 minutes prior to injection. Cleansed with alcohol. Staff doned appropriated PPE, including gowns, caps, face masks, shields, and masks prior to injections. The skin was cleansed with alcohol. 2cc of 10^8 pfu/cc were injected into the lesion in a retrograde fashion with fanning technique. After injection, pressure was held on the skin for 30 second. Then telfa and tegaderm were placed. Tegaderm cleansed with alcohol, then second layer applied. Patient instructed to leave waterproof dressing on for one week.    Follow-up: Next injection in 2 weeks.    Staff:     Gloria Estrada MD    Department of Dermatology  [facility]: Phone: [phone], Fax:[phone]  [facility]: Phone: [phone], Fax: [phone]    ____________________________________________    CC: No chief complaint on file.    HPI:  Ms. Yadira Hoang is a(n) 76 year old female who presents today as a return patient for TVEC injection.    Last visit 3/4/22 for TVec. She is accompanied by her . Did well after third injection. No new complaints today.    Patient is otherwise feeling well, without additional skin concerns.     Labs Reviewed:  N/A    Physical Exam:  Vitals: There were no vitals taken for this visit.  SKIN: Focused exam of the groin.  - Just under 5cm firm nodule at the R groin inferior to the inguinal crease. Feels softer and flatter than at last injection, diameter about the same.  - No other lesions of concern on areas examined.        Medications:  Current Outpatient Medications   Medication     cholecalciferol (VITAMIN D3) 125 MCG (5000 UT) TABS tablet     estradiol (ESTRACE) 0.1 MG/GM vaginal cream     fluticasone (FLONASE) 50 MCG/ACT nasal spray     lidocaine (LIDODERM) 5 % patch     multivitamin w/minerals (MULTI-VITAMIN) tablet     OLANZapine (ZYPREXA) 2.5 MG tablet     sertraline (ZOLOFT) 25 MG tablet     Current Facility-Administered Medications   Medication     lidocaine 1% with EPINEPHrine 1:100,000 injection 1.5 mL     lidocaine 1% with EPINEPHrine 1:100,000 injection 1.5 mL      Past Medical History:   Patient Active Problem List   Diagnosis     Malignant melanoma of right lower extremity including hip (H)     Melanoma of skin (H)     GUERLINE (obstructive sleep apnea)     Chronic congestion of paranasal sinus     Weakness of foot     Neck pain, chronic     Myofascial pain     Intractable migraine without aura and without status migrainosus     History of multiple concussions     Foot pain, bilateral     Fibromyositis     Difficulty walking     Chronic pain disorder     Chronic fatigue     Bilateral occipital neuralgia     Articular disc disorder of temporomandibular joint     S/P flap graft     Osteopenia     Injury of head     Hyperlipidemia LDL goal <100     ALEXUS (generalized anxiety disorder)     Major depression, recurrent, chronic (H)     Secondary malignant neoplasm of bone (H)     Generalized pruritus     Immunodeficiency, unspecified (H)     Bilateral leg pain     Chronic bilateral low back pain without sciatica     Generalized muscle weakness     Past Medical History:   Diagnosis Date     Concussion      ALEXUS (generalized anxiety disorder) 5/7/2020     Major depression, recurrent, chronic (H) 5/7/2020     Melanoma (H)      Nonsenile cataract      Sleep apnea     CPAP       CC No referring provider defined for this encounter. on close of this encounter.    Drug Administration Record    Prior to injection, verified patient  identity using patient's name and date of birth.  Due to injection administration, patient instructed to remain in clinic for 15 minutes  afterwards, and to report any adverse reaction to me immediately.     Drug Name: talimogene laherparepvec  Dose: 2ml  Route administered: ID  NDC #: 12322-442-63  LOT #: Not available  SITE: [facility]  : Not available  EXPIRATION DATE: Not available

## 2022-03-18 NOTE — PROGRESS NOTES
Drug Administration Record    Prior to injection, verified patient identity using patient's name and date of birth.  Due to injection administration, patient instructed to remain in clinic for 15 minutes  afterwards, and to report any adverse reaction to me immediately.     Drug Name: talimogene laherparepvec  Dose: 2ml  Route administered: ID  NDC #: 13695-833-71  LOT #: Not available  SITE: [facility]  : Not available  EXPIRATION DATE: Not available

## 2022-03-21 NOTE — PROGRESS NOTES
MEDICAL ONCOLOGY PROGRESS NOTE  Melanoma Clinic  Mar 22, 2022     CHIEF COMPLAINT: Acral melanoma, on steroids for autoimmune hepatitis    Melanoma History:  1. She noted a painful lesion on the sole of the right foot for a few months, since last summer.  It initially was small then became raised.  It spontaneously bled. She is not entirely sure of its appearance initially.  She denies noting any masses behind the knee or in the groin.  2. 1/9/2020, punch biopsy was performed by Dr Jessica Meadows. Patology showed melanoma, at least 3.4 mm deep with ulceration and 0 mitoses, and perineural invasion present. TILs were non-brisk and LVI not identified. pT3b.  3. 2/10/2020, she has right femoral sentinel lymph node biopsy and wide local excision (Specimen #: [id number]) and the right heel lesion extends to a depth of 6.6 mm, and invasive melanoma is present at 0.2 mm from the deep margin. Microsatellites are also present. There was 1 (of 2) lymph nodes involved. The lymph node tissue exhibits extensive subcapsular and parenchymal clusters of melanoma cells, highlighted on Melan-A immunostain. The largest cluster is 7 millimeters in greatest diameter. pT4b pN2c. PD-L1 staining is negative, <1%. No mutation in BRAF, KIT, or NRAS.  4. 2/22/2020, she had PET-CT, which showed intense FDG uptake in a right inguinal lymph node, concerning for an additional focus of metastatic disease. There is also an indeterminate right external iliac lymph node with mild FDG uptake.  5. 3/4/2020, she has medial plantar artery  fasciocutaneous instep flap and Integra placement to the donor site. On 4/2/2020, she has additional reconstruction with skin grafting.  6. 5/22/2020, PET-CT shows a hypermetabolic right inguinal and right external iliac chain lymph node and stable pulmonary nodules.  7. 5/27/2020, she starts nivolumab for presumed low volume lymph node predominant metastatic disease.  8. 8/21/2020, PET-CT shows increase in  hypermetabolic adenopathy, and a hypermetabolic nodule in segment 2 of the liver. Given low volume of disease she prefers to continue therapy with short interval follow-up.  9. 10/9/2020, PET-CT shows increasing size of hepatic metastases with increased hypermetabolism, increased right inguinal and iliac lymphadenopathy, and new FDG uptake right T2 transverse process and right side of S1. MRI-brain obtained 10/21/2020 is negative for brain metastasis.  10. 10/28/2020, she starts ipilimumab 1mg/kg and nivolumab 3mg/kg. She then goes on to maintenance nivolumab through 4/12/21.  11. 5/10/2021, she starts ipilimumab 3 mg/kg monotherapy q3w for 4 cycles.  12. 7/30/21, PET/CT shows mild disease progression.   13. 8/2/21, start ipilimumab 3mg/kg and nivolumab 1mg/kg and received 2 cycles, last on 8/23/21.  14. 8/23/21, develops elevated LFT's.   15. 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   16. 1/28/22, started TVEC    HISTORY OF PRESENT ILLNESS  Yadira Hoang is a 76 year old female with stage IV acral melanoma.  Patient reports that she continues to feel fatigued with fatigue back.  She takes occasional naps but is not able to sleep for more than an hour at a time.  Her appetite remains poor.  She is unsure if she picked up the Zyprexa or if she tried it.  She recently met with a naturopath again and has been working on a diet with a mix of acidic and alkaline foods and has been trying to eat more protein and carbs.  She reports that her anxiety remains high and she is crabby.  She has been working with the same therapist for a while every 2 weeks and finds this helpful.  She is not taking the Zoloft.  She does not remember if it was helpful or not.  She does have some ongoing indigestion that she attributes to her prior steroid use.  She reports a history of sinus infections and has been using a nasal spray and [place] pot as directed by her primary care provider.  She has an intermittent  cough that she associates with this and wonders what she may use.  She is getting around a little better with a walker, but wonders about having a handicap pass as it is hard for her to walk long distances with her leg pain.  She is working with physical therapy.  She denies other concerns.    Current Outpatient Medications   Medication Sig Dispense Refill     cholecalciferol (VITAMIN D3) 125 MCG (5000 UT) TABS tablet Take 2,000 Units by mouth every other day        estradiol (ESTRACE) 0.1 MG/GM vaginal cream Place 2 g vaginally twice a week 42.5 g 1     fluticasone (FLONASE) 50 MCG/ACT nasal spray Spray 2 sprays into both nostrils daily 16 g 0     multivitamin w/minerals (MULTI-VITAMIN) tablet Take 1 tablet by mouth daily       lidocaine (LIDODERM) 5 % patch Place 1 patch onto the skin every 24 hours To prevent lidocaine toxicity, patient should be patch free for 12 hrs daily. (Patient not taking: Reported on 1/28/2022) 15 patch 0     OLANZapine (ZYPREXA) 2.5 MG tablet Take 1 tablet (2.5 mg) by mouth At Bedtime (Patient not taking: Reported on 2/18/2022) 30 tablet 3     sertraline (ZOLOFT) 25 MG tablet Take 2 tablets (50 mg) by mouth daily (Patient not taking: Reported on 1/20/2022) 90 tablet 0     Physical Exam:  General: The patient is a pleasant female in no acute distress. She is here today with her .  /73   Pulse 102   Temp 97.6  F (36.4  C) (Oral)   Resp 16   Wt 61.7 kg (136 lb 1.6 oz)   SpO2 98%   BMI 21.32 kg/m    Wt Readings from Last 10 Encounters:   03/22/22 61.7 kg (136 lb 1.6 oz)   02/16/22 64.1 kg (141 lb 6.4 oz)   01/27/22 63.4 kg (139 lb 12.8 oz)   01/20/22 64.5 kg (142 lb 3.2 oz)   01/12/22 63.7 kg (140 lb 6.9 oz)   12/17/21 64.9 kg (143 lb 1.3 oz)   12/17/21 64.9 kg (143 lb 1.6 oz)   12/08/21 64.9 kg (143 lb)   12/01/21 65.2 kg (143 lb 12.8 oz)   11/05/21 64 kg (141 lb)   HEENT: EOMI. Sclerae are anicteric.   Lymph: Neck is supple with no lymphadenopathy in the cervical or  supraclavicular areas. Right inguinal mass palpable.   Heart: Regular rate and rhythm.   Lungs: Clear to auscultation bilaterally.   Abdomen: Bowel sounds present, soft, nontender with no palpable hepatosplenomegaly or masses.   Extremities: Trace lower extremity edema noted bilaterally, right slightly more than left.   Neuro: Cranial nerves II through XII are grossly intact.  Skin: No rashes, petechiae, or bruising noted on exposed skin.    LABS  Most Recent 3 CBC's:  Recent Labs   Lab Test 03/22/22  1105 02/16/22  0931 01/27/22  1453   WBC 5.3 5.9 6.0   HGB 13.4 12.7 12.5   MCV 90 92 92    286 364     Most Recent 3 BMP's:  Recent Labs   Lab Test 03/22/22  1105 02/16/22  0931 01/27/22  1453    143 140   POTASSIUM 4.0 3.7 3.9   CHLORIDE 104 107 105   CO2 28 32 29   BUN 30 21 20   CR 0.71 0.62 0.62   ANIONGAP 8 4 6   GAYB 9.4 9.3 9.1   GLC 86 95 87     Most Recent 2 LFT's:  Recent Labs   Lab Test 03/22/22  1105 02/16/22  0931   AST 36 30   ALT 40 41   ALKPHOS 63 63   BILITOTAL 0.5 0.3     ASSESSMENT AND PLAN:  #Acral melanoma, right heel primary, pT4b pN3c M1, Stage IV  #CPI associated hepatitis, grade 3, resolved  It was a pleasure to meet with Ms. Hoang. 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 She progressed on first line nivolumab, and had evidence of response to 2nd line ipilimumab and nivolumab. On nivolumab maintenance therapy she showed regrowth. She tried ipilimumab monotherapy and had stable disease with significant pruritis, so had reinduction with ipilimumab/nivolumab on 8/2/21. She received her 2nd cycle on 8/23/21 and interval scan 9/1/21 showed about 10% growth. She then developed progressive nausea, reflux symptoms, diarrhea, and elevated transaminases (16-23X ULNl). On PET-CT she has had additional growth and increase in FDG avidity in the right external iliac and inguinal lymph nodes, as well as a new lesion in  segment 6 and a growth in the FDG avid metastasis in hepatic segment 2. She started TVEC on 1/28/22. She is tolerating this reasonably well. She will follow-up with Dr. Franklin in April with repeat imaging. We may consider adding nivolumab in the future.     #Anorexia  #Weight loss  -Recommend trying the previously prescribed Zyprexa 2.5 mg at bedtime.    #Depression and anxiety  -Patient's anxiety symptoms have been high lately. Recommend resuming 25 mg sertraline at bedtime. Recommend continuing to see her therapist regularly. Will also refer her to see palliative care.    #Back and leg pain  She continues to note pain, which seems consistent with low back pain and sciatica type symptoms. However, there is a generalized pain component she also describes, which we are uncertain about. She had facet joint injection 1/4/22, which she did not find to be helpful. lidoderm patches minimally helpful. Tramadol provided no benefit. She is working with physical therapy. Given difficulty walking long distances, will provide a handicap parking form.     #Chronic sinusitis  Working with her PCP on this. Discussed OTC options to help with intermittent cough including Delsym, Mucinex, and tea with honey.    Meghan Perdomo PA-C  [facility]  [address] [place] [address] [phone]    35 minutes spent on the date of the encounter doing chart review, review of test results, interpretation of tests, patient visit and documentation

## 2022-03-22 NOTE — LETTER
3/22/2022         RE: Yadira Hoang  [address] [facility] [address]      MEDICAL ONCOLOGY PROGRESS NOTE  Melanoma Clinic  Mar 22, 2022     CHIEF COMPLAINT: Acral melanoma, on steroids for autoimmune hepatitis    Melanoma History:  1. She noted a painful lesion on the sole of the right foot for a few months, since last summer.  It initially was small then became raised.  It spontaneously bled. She is not entirely sure of its appearance initially.  She denies noting any masses behind the knee or in the groin.  2. 1/9/2020, punch biopsy was performed by Dr Jessica Meadows. Patology showed melanoma, at least 3.4 mm deep with ulceration and 0 mitoses, and perineural invasion present. TILs were non-brisk and LVI not identified. pT3b.  3. 2/10/2020, she has right femoral sentinel lymph node biopsy and wide local excision (Specimen #: [id number]) and the right heel lesion extends to a depth of 6.6 mm, and invasive melanoma is present at 0.2 mm from the deep margin. Microsatellites are also present. There was 1 (of 2) lymph nodes involved. The lymph node tissue exhibits extensive subcapsular and parenchymal clusters of melanoma cells, highlighted on Melan-A immunostain. The largest cluster is 7 millimeters in greatest diameter. pT4b pN2c. PD-L1 staining is negative, <1%. No mutation in BRAF, KIT, or NRAS.  4. 2/22/2020, she had PET-CT, which showed intense FDG uptake in a right inguinal lymph node, concerning for an additional focus of metastatic disease. There is also an indeterminate right external iliac lymph node with mild FDG uptake.  5. 3/4/2020, she has medial plantar artery  fasciocutaneous instep flap and Integra placement to the donor site. On 4/2/2020, she has additional reconstruction with skin grafting.  6. 5/22/2020, PET-CT shows a hypermetabolic right inguinal and right external iliac chain lymph node and stable pulmonary nodules.  7. 5/27/2020, she starts nivolumab for presumed low  volume lymph node predominant metastatic disease.  8. 8/21/2020, PET-CT shows increase in hypermetabolic adenopathy, and a hypermetabolic nodule in segment 2 of the liver. Given low volume of disease she prefers to continue therapy with short interval follow-up.  9. 10/9/2020, PET-CT shows increasing size of hepatic metastases with increased hypermetabolism, increased right inguinal and iliac lymphadenopathy, and new FDG uptake right T2 transverse process and right side of S1. MRI-brain obtained 10/21/2020 is negative for brain metastasis.  10. 10/28/2020, she starts ipilimumab 1mg/kg and nivolumab 3mg/kg. She then goes on to maintenance nivolumab through 4/12/21.  11. 5/10/2021, she starts ipilimumab 3 mg/kg monotherapy q3w for 4 cycles.  12. 7/30/21, PET/CT shows mild disease progression.   13. 8/2/21, start ipilimumab 3mg/kg and nivolumab 1mg/kg and received 2 cycles, last on 8/23/21.  14. 8/23/21, develops elevated LFT's.   15. 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   16. 1/28/22, started TVEC    HISTORY OF PRESENT ILLNESS  Yadira Hoang is a 76 year old female with stage IV acral melanoma.  Patient reports that she continues to feel fatigued with fatigue back.  She takes occasional naps but is not able to sleep for more than an hour at a time.  Her appetite remains poor.  She is unsure if she picked up the Zyprexa or if she tried it.  She recently met with a naturopath again and has been working on a diet with a mix of acidic and alkaline foods and has been trying to eat more protein and carbs.  She reports that her anxiety remains high and she is crabby.  She has been working with the same therapist for a while every 2 weeks and finds this helpful.  She is not taking the Zoloft.  She does not remember if it was helpful or not.  She does have some ongoing indigestion that she attributes to her prior steroid use.  She reports a history of sinus infections and has been using a  nasal spray and [place] pot as directed by her primary care provider.  She has an intermittent cough that she associates with this and wonders what she may use.  She is getting around a little better with a walker, but wonders about having a handicap pass as it is hard for her to walk long distances with her leg pain.  She is working with physical therapy.  She denies other concerns.    Current Outpatient Medications   Medication Sig Dispense Refill     cholecalciferol (VITAMIN D3) 125 MCG (5000 UT) TABS tablet Take 2,000 Units by mouth every other day        estradiol (ESTRACE) 0.1 MG/GM vaginal cream Place 2 g vaginally twice a week 42.5 g 1     fluticasone (FLONASE) 50 MCG/ACT nasal spray Spray 2 sprays into both nostrils daily 16 g 0     multivitamin w/minerals (MULTI-VITAMIN) tablet Take 1 tablet by mouth daily       lidocaine (LIDODERM) 5 % patch Place 1 patch onto the skin every 24 hours To prevent lidocaine toxicity, patient should be patch free for 12 hrs daily. (Patient not taking: Reported on 1/28/2022) 15 patch 0     OLANZapine (ZYPREXA) 2.5 MG tablet Take 1 tablet (2.5 mg) by mouth At Bedtime (Patient not taking: Reported on 2/18/2022) 30 tablet 3     sertraline (ZOLOFT) 25 MG tablet Take 2 tablets (50 mg) by mouth daily (Patient not taking: Reported on 1/20/2022) 90 tablet 0     Physical Exam:  General: The patient is a pleasant female in no acute distress. She is here today with her .  /73   Pulse 102   Temp 97.6  F (36.4  C) (Oral)   Resp 16   Wt 61.7 kg (136 lb 1.6 oz)   SpO2 98%   BMI 21.32 kg/m    Wt Readings from Last 10 Encounters:   03/22/22 61.7 kg (136 lb 1.6 oz)   02/16/22 64.1 kg (141 lb 6.4 oz)   01/27/22 63.4 kg (139 lb 12.8 oz)   01/20/22 64.5 kg (142 lb 3.2 oz)   01/12/22 63.7 kg (140 lb 6.9 oz)   12/17/21 64.9 kg (143 lb 1.3 oz)   12/17/21 64.9 kg (143 lb 1.6 oz)   12/08/21 64.9 kg (143 lb)   12/01/21 65.2 kg (143 lb 12.8 oz)   11/05/21 64 kg (141 lb)   HEENT: EOMI.  Sclerae are anicteric.   Lymph: Neck is supple with no lymphadenopathy in the cervical or supraclavicular areas. Right inguinal mass palpable.   Heart: Regular rate and rhythm.   Lungs: Clear to auscultation bilaterally.   Abdomen: Bowel sounds present, soft, nontender with no palpable hepatosplenomegaly or masses.   Extremities: Trace lower extremity edema noted bilaterally, right slightly more than left.   Neuro: Cranial nerves II through XII are grossly intact.  Skin: No rashes, petechiae, or bruising noted on exposed skin.    LABS  Most Recent 3 CBC's:  Recent Labs   Lab Test 03/22/22  1105 02/16/22  0931 01/27/22  1453   WBC 5.3 5.9 6.0   HGB 13.4 12.7 12.5   MCV 90 92 92    286 364     Most Recent 3 BMP's:  Recent Labs   Lab Test 03/22/22  1105 02/16/22  0931 01/27/22  1453    143 140   POTASSIUM 4.0 3.7 3.9   CHLORIDE 104 107 105   CO2 28 32 29   BUN 30 21 20   CR 0.71 0.62 0.62   ANIONGAP 8 4 6   GABY 9.4 9.3 9.1   GLC 86 95 87     Most Recent 2 LFT's:  Recent Labs   Lab Test 03/22/22  1105 02/16/22  0931   AST 36 30   ALT 40 41   ALKPHOS 63 63   BILITOTAL 0.5 0.3     ASSESSMENT AND PLAN:  #Acral melanoma, right heel primary, pT4b pN3c M1, Stage IV  #CPI associated hepatitis, grade 3, resolved  It was a pleasure to meet with Ms. Hoang. 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 She progressed on first line nivolumab, and had evidence of response to 2nd line ipilimumab and nivolumab. On nivolumab maintenance therapy she showed regrowth. She tried ipilimumab monotherapy and had stable disease with significant pruritis, so had reinduction with ipilimumab/nivolumab on 8/2/21. She received her 2nd cycle on 8/23/21 and interval scan 9/1/21 showed about 10% growth. She then developed progressive nausea, reflux symptoms, diarrhea, and elevated transaminases (16-23X ULNl). On PET-CT she has had additional growth and increase in FDG  avidity in the right external iliac and inguinal lymph nodes, as well as a new lesion in segment 6 and a growth in the FDG avid metastasis in hepatic segment 2. She started TVEC on 1/28/22. She is tolerating this reasonably well. She will follow-up with Dr. Franklin in April with repeat imaging. We may consider adding nivolumab in the future.     #Anorexia  #Weight loss  -Recommend trying the previously prescribed Zyprexa 2.5 mg at bedtime.    #Depression and anxiety  -Patient's anxiety symptoms have been high lately. Recommend resuming 25 mg sertraline at bedtime. Recommend continuing to see her therapist regularly. Will also refer her to see palliative care.    #Back and leg pain  She continues to note pain, which seems consistent with low back pain and sciatica type symptoms. However, there is a generalized pain component she also describes, which we are uncertain about. She had facet joint injection 1/4/22, which she did not find to be helpful. lidoderm patches minimally helpful. Tramadol provided no benefit. She is working with physical therapy. Given difficulty walking long distances, will provide a handicap parking form.     #Chronic sinusitis  Working with her PCP on this. Discussed OTC options to help with intermittent cough including Delsym, Mucinex, and tea with honey.    35 minutes spent on the date of the encounter doing chart review, review of test results, interpretation of tests, patient visit and documentation         Meghan Perdomo PA-C

## 2022-03-22 NOTE — NURSING NOTE
"Oncology Rooming Note    March 22, 2022 11:10 AM   Yadira Hoang is a 76 year old female who presents for:    Chief Complaint   Patient presents with     Oncology Clinic Visit     rtn for metastatic melanoma     Blood Draw     Labs drawn via  by RN in lab.  VS taken     Initial Vitals: /73   Pulse 102   Temp 97.6  F (36.4  C) (Oral)   Resp 16   Wt 61.7 kg (136 lb 1.6 oz)   SpO2 98%   BMI 21.32 kg/m   Estimated body mass index is 21.32 kg/m  as calculated from the following:    Height as of 1/12/22: 1.702 m (5' 7\").    Weight as of this encounter: 61.7 kg (136 lb 1.6 oz). Body surface area is 1.71 meters squared.  No Pain (0) Comment: Data Unavailable   No LMP recorded. Patient is postmenopausal.  Allergies reviewed: Yes  Medications reviewed: Yes    Medications: Medication refills not needed today.  Pharmacy name entered into [facility]:    [facility] - [place], [place] - [address], [id number]  [facility] [address] [place] [address] [address] [id number] - [place], [place] - [address] AT [facility] [address] ([facility] - [facility] [address] [facility]    Clinical concerns: none       Leticia Abebe EMT            "

## 2022-03-22 NOTE — NURSING NOTE
Chief Complaint   Patient presents with     Oncology Clinic Visit     rtn for metastatic melanoma     Blood Draw     Labs drawn via  by RN in lab.  VS taken       Labs collected from venipuncture by RN. Vitals taken. Checked in for appointment(s).    Amy Jamison RN

## 2022-03-22 NOTE — PATIENT INSTRUCTIONS
Start olanzapine 2.5 mg at bedtime for appetite and sleep.   Resume sertraline at 25 mg at bedtime.     Cough medicine: Try Delsym or Mucinex. Also, tea with honey can be very helpful for cough.     Will send a referral for palliative care to help with symptoms.

## 2022-04-01 NOTE — NURSING NOTE
Dermatology Rooming Note    Yadira Hoang's goals for this visit include:   Chief Complaint   Patient presents with     Derm Problem     TVdouglas Esposito, CMA

## 2022-04-01 NOTE — PROGRESS NOTES
[facility] Dermatology Note  Encounter Date: Apr 1, 2022  Office Visit     Dermatology Problem List:  Last skin check 12/17/21, q6 months through 1/25  1. Metastatic melanoma, originated at right heel  - S/p punch biopsy By Dr. Alvarado 1/9/20 showing melanoma at least 3.4mm deep, ulcerated, no mitoses, perineural invasion, TIL present and nonbrisk, no LVI.  - S/p WLE with 2cm margins (revelead melanoma to depth of 6.6mm with deep margin close, microsatellites were present) and right femoral SLNB 2/10/20 (1/2 nodes positive, largest deposit 7mm)  - S/p re-excision 2/20/20  - Flap completed 3/4/30, skin graft 4/2/20  - Adjuvant nivolumab started 5/2020. Progressed. 10/28/20 started on ipi and nivolumab combination. Went to ipi monotherapy 5/2021. Back to dual immunotherapy 8/2021. Developed severe transaminitis. Stopped immunotherapy 9/2021 and treated with oral steroids.   - TVec started 1/28/22. Plan for addition of mono immunotherapy.  2. AK, right cheek: cryo and biopsy 11/19/20     Social history: . Has 4 children.  Family history: Brother had pancreatic cancer. No melanoma in the family.  ____________________________________________    Assessment & Plan:     # Melanoma, stage IV, originating at R heel, currently off of immunotherapy due to autoimmune adverse events (transaminitis). Known disease in R inguinal nodes and liver. Plan for fifth TVEC injection today. Discussed what to expect with Tvec injections, including flu like symptoms and rare cutaneous HSV infections. Discussed that tumors often get bigger before showing response. Discussed that median time to improvement in clinical trials was 4 months. Discussed schedule for follow up injections. Questions answered in detail. Instructions for after care given in AVS. Patient instructed to leave waterproof dressing on for one week and given supplies to replace if needed.     Procedures Performed:   TVEC: Topical numbing (Lidocaine 4%  ointment) was applied to the skin 30 minutes prior to injection. Cleansed with alcohol. Staff doned appropriated PPE, including gowns, caps, face masks, shields, and masks prior to injections. The skin was cleansed with alcohol. 2cc of 10^8 pfu/cc were injected into the lesion in a retrograde fashion with fanning technique. After injection, pressure was held on the skin for 30 second. Then telfa and tegaderm were placed. Tegaderm cleansed with alcohol, then second layer applied. Patient instructed to leave waterproof dressing on for one week.    Follow-up: Next injection in 2 weeks.    Staff:     Gloria Estrada MD    Department of Dermatology  [facility]: Phone: [phone], Fax:[phone]  [facility]: Phone: [phone], Fax: [phone]    ____________________________________________    CC: Derm Problem (TVac)    HPI:  Ms. Yadira Hoang is a(n) 76 year old female who presents today as a return patient for TVEC injection.    Last visit 3/4/22 for TVec. She is accompanied by her . Did well after fourth injection. Maybe more tiredness and headaches, now resolved. Feeling well today.    Patient is otherwise feeling well, without additional skin concerns.     Labs Reviewed:  N/A    Physical Exam:  Vitals: /74   Pulse 86   SKIN: Focused exam of the groin.  - Just under 5cm firm nodule at the R groin inferior to the inguinal crease. Feels softer and flatter than at last injection, diameter about the same.  - No other lesions of concern on areas examined.       Medications:  Current Outpatient Medications   Medication     cholecalciferol (VITAMIN D3) 125 MCG (5000 UT) TABS tablet     estradiol (ESTRACE) 0.1 MG/GM vaginal cream     fluticasone (FLONASE) 50 MCG/ACT nasal spray     multivitamin w/minerals (MULTI-VITAMIN) tablet     lidocaine (LIDODERM) 5 % patch      OLANZapine (ZYPREXA) 2.5 MG tablet     sertraline (ZOLOFT) 25 MG tablet     Current Facility-Administered Medications   Medication     lidocaine 1% with EPINEPHrine 1:100,000 injection 1.5 mL     lidocaine 1% with EPINEPHrine 1:100,000 injection 1.5 mL     talimogene laherparepvec (IMLYGIC) 10^8 PFU/mL injection 2 mL      Past Medical History:   Patient Active Problem List   Diagnosis     Malignant melanoma of right lower extremity including hip (H)     Melanoma of skin (H)     GUERLINE (obstructive sleep apnea)     Chronic congestion of paranasal sinus     Weakness of foot     Neck pain, chronic     Myofascial pain     Intractable migraine without aura and without status migrainosus     History of multiple concussions     Foot pain, bilateral     Fibromyositis     Difficulty walking     Chronic pain disorder     Chronic fatigue     Bilateral occipital neuralgia     Articular disc disorder of temporomandibular joint     S/P flap graft     Osteopenia     Injury of head     Hyperlipidemia LDL goal <100     ALEXUS (generalized anxiety disorder)     Major depression, recurrent, chronic (H)     Secondary malignant neoplasm of bone (H)     Generalized pruritus     Immunodeficiency, unspecified (H)     Bilateral leg pain     Chronic bilateral low back pain without sciatica     Generalized muscle weakness     Past Medical History:   Diagnosis Date     Concussion      ALEXUS (generalized anxiety disorder) 5/7/2020     Major depression, recurrent, chronic (H) 5/7/2020     Melanoma (H)      Nonsenile cataract      Sleep apnea     CPAP       CC No referring provider defined for this encounter. on close of this encounter.

## 2022-04-12 NOTE — TELEPHONE ENCOUNTER
Patient needing referral for Dr. George Estes-Audiology needs to be fitted for hearing aides.    Fax number [phone]    Shayla Whitfield RN [facility]

## 2022-04-15 NOTE — PROGRESS NOTES
[facility] Dermatology Note  Encounter Date: Apr 15, 2022  Office Visit     Dermatology Problem List:  Last skin check 12/17/21, q6 months through 1/25  1. Metastatic melanoma, originated at right heel  - S/p punch biopsy By Dr. Alvarado 1/9/20 showing melanoma at least 3.4mm deep, ulcerated, no mitoses, perineural invasion, TIL present and nonbrisk, no LVI.  - S/p WLE with 2cm margins (revelead melanoma to depth of 6.6mm with deep margin close, microsatellites were present) and right femoral SLNB 2/10/20 (1/2 nodes positive, largest deposit 7mm)  - S/p re-excision 2/20/20  - Flap completed 3/4/30, skin graft 4/2/20  - Adjuvant nivolumab started 5/2020. Progressed. 10/28/20 started on ipi and nivolumab combination. Went to ipi monotherapy 5/2021. Back to dual immunotherapy 8/2021. Developed severe transaminitis. Stopped immunotherapy 9/2021 and treated with oral steroids.   - TVec started 1/28/22. Plan for addition of mono immunotherapy.  2. AK, right cheek: cryo and biopsy 11/19/20     Social history: . Has 4 children.  Family history: Brother had pancreatic cancer. No melanoma in the family.  ____________________________________________    Assessment & Plan:     # Melanoma, stage IV, originating at R heel, currently off of immunotherapy due to autoimmune adverse events (transaminitis). Known disease in R inguinal nodes and liver. Plan for sixthTVEC injection today. Discussed what to expect with Tvec injections, including flu like symptoms and rare cutaneous HSV infections. Discussed that tumors often get bigger before showing response. Discussed that median time to improvement in clinical trials was 4 months. Discussed schedule for follow up injections. Questions answered in detail. Instructions for after care given in AVS. Patient instructed to leave waterproof dressing on for one week and given supplies to replace if needed.     Procedures Performed:   TVEC: Topical numbing (Lidocaine 4%  ointment) was applied to the skin 30 minutes prior to injection. Cleansed with alcohol. Staff doned appropriated PPE, including gowns, caps, face masks, shields, and masks prior to injections. The skin was cleansed with alcohol. 2cc of 10^8 pfu/cc were injected into the lesion in a retrograde fashion with fanning technique. After injection, pressure was held on the skin for 30 second. Then telfa and tegaderm were placed. Tegaderm cleansed with alcohol, then second layer applied. Patient instructed to leave waterproof dressing on for one week.    Follow-up: Next injection in 2 weeks - will switch over to Dr. Mendoza.    Staff:     Gloria Estrada MD    Department of Dermatology  [facility]: Phone: [phone], Fax:[phone]  [facility]: Phone: [phone], Fax: [phone]    ____________________________________________    CC: Derm Problem (TVEC)    HPI:  Ms. Yadira Hoang is a(n) 76 year old female who presents today as a return patient for TVEC injection.    Last visit 3/4/22 for TVec. She is accompanied by her . Did well after last injection. Patient is otherwise feeling well, without additional skin concerns.     Labs Reviewed:  N/A    Physical Exam:  Vitals: /80 (BP Location: Right arm)   Pulse 100   SKIN: Focused exam of the groin.  - Just under 5cm firm nodule at the R groin inferior to the inguinal crease. Feels softer than at last injection, diameter about the same.  - No other lesions of concern on areas examined.       Medications:  Current Outpatient Medications   Medication     cholecalciferol (VITAMIN D3) 125 MCG (5000 UT) TABS tablet     estradiol (ESTRACE) 0.1 MG/GM vaginal cream     fluticasone (FLONASE) 50 MCG/ACT nasal spray     multivitamin w/minerals (THERA-VIT-M) tablet     OLANZapine (ZYPREXA) 2.5 MG tablet     sertraline (ZOLOFT) 25 MG tablet      No current facility-administered medications for this visit.      Past Medical History:   Patient Active Problem List   Diagnosis     Malignant melanoma of right lower extremity including hip (H)     Melanoma of skin (H)     GUERLINE (obstructive sleep apnea)     Chronic congestion of paranasal sinus     Weakness of foot     Neck pain, chronic     Myofascial pain     Intractable migraine without aura and without status migrainosus     History of multiple concussions     Foot pain, bilateral     Fibromyositis     Difficulty walking     Chronic pain disorder     Chronic fatigue     Bilateral occipital neuralgia     Articular disc disorder of temporomandibular joint     S/P flap graft     Osteopenia     Injury of head     Hyperlipidemia LDL goal <100     ALEXUS (generalized anxiety disorder)     Major depression, recurrent, chronic (H)     Secondary malignant neoplasm of bone (H)     Generalized pruritus     Immunodeficiency, unspecified (H)     Bilateral leg pain     Chronic bilateral low back pain without sciatica     Generalized muscle weakness     Past Medical History:   Diagnosis Date     Concussion      ALEXUS (generalized anxiety disorder) 5/7/2020     Major depression, recurrent, chronic (H) 5/7/2020     Melanoma (H)      Nonsenile cataract      Sleep apnea     CPAP       CC No referring provider defined for this encounter. on close of this encounter.

## 2022-04-15 NOTE — LETTER
4/15/2022       RE: Yadira Hoang  [address] [place] [facility] [address]     Dear Colleague,    Thank you for referring your patient, Yadira Hoang, to the [facility] [place] at [facility]. Please see a copy of my visit note below.    [facility] Dermatology Note  Encounter Date: Apr 15, 2022  Office Visit     Dermatology Problem List:  Last skin check 12/17/21, q6 months through 1/25  1. Metastatic melanoma, originated at right heel  - S/p punch biopsy By Dr. Alvarado 1/9/20 showing melanoma at least 3.4mm deep, ulcerated, no mitoses, perineural invasion, TIL present and nonbrisk, no LVI.  - S/p WLE with 2cm margins (revelead melanoma to depth of 6.6mm with deep margin close, microsatellites were present) and right femoral SLNB 2/10/20 (1/2 nodes positive, largest deposit 7mm)  - S/p re-excision 2/20/20  - Flap completed 3/4/30, skin graft 4/2/20  - Adjuvant nivolumab started 5/2020. Progressed. 10/28/20 started on ipi and nivolumab combination. Went to ipi monotherapy 5/2021. Back to dual immunotherapy 8/2021. Developed severe transaminitis. Stopped immunotherapy 9/2021 and treated with oral steroids.   - TVec started 1/28/22. Plan for addition of mono immunotherapy.  2. AK, right cheek: cryo and biopsy 11/19/20     Social history: . Has 4 children.  Family history: Brother had pancreatic cancer. No melanoma in the family.  ____________________________________________    Assessment & Plan:     # Melanoma, stage IV, originating at R heel, currently off of immunotherapy due to autoimmune adverse events (transaminitis). Known disease in R inguinal nodes and liver. Plan for sixthTVEC injection today. Discussed what to expect with Tvec injections, including flu like symptoms and rare cutaneous HSV infections. Discussed that tumors often get bigger before showing response. Discussed that median time to  improvement in clinical trials was 4 months. Discussed schedule for follow up injections. Questions answered in detail. Instructions for after care given in AVS. Patient instructed to leave waterproof dressing on for one week and given supplies to replace if needed.     Procedures Performed:   TVEC: Topical numbing (Lidocaine 4% ointment) was applied to the skin 30 minutes prior to injection. Cleansed with alcohol. Staff doned appropriated PPE, including gowns, caps, face masks, shields, and masks prior to injections. The skin was cleansed with alcohol. 2cc of 10^8 pfu/cc were injected into the lesion in a retrograde fashion with fanning technique. After injection, pressure was held on the skin for 30 second. Then telfa and tegaderm were placed. Tegaderm cleansed with alcohol, then second layer applied. Patient instructed to leave waterproof dressing on for one week.    Follow-up: Next injection in 2 weeks - will switch over to Dr. Mendoza.    Staff:     Gloria Estrada MD    Department of Dermatology  [facility]: Phone: [phone], Fax:[phone]  [facility]: Phone: [phone], Fax: [phone]    ____________________________________________    CC: Derm Problem (TVEC)    HPI:  Ms. Yadira Hoang is a(n) 76 year old female who presents today as a return patient for TVEC injection.    Last visit 3/4/22 for TVec. She is accompanied by her . Did well after last injection. Patient is otherwise feeling well, without additional skin concerns.     Labs Reviewed:  N/A    Physical Exam:  Vitals: /80 (BP Location: Right arm)   Pulse 100   SKIN: Focused exam of the groin.  - Just under 5cm firm nodule at the R groin inferior to the inguinal crease. Feels softer than at last injection, diameter about the same.  - No other lesions of concern on areas examined.        Medications:  Current Outpatient Medications   Medication     cholecalciferol (VITAMIN D3) 125 MCG (5000 UT) TABS tablet     estradiol (ESTRACE) 0.1 MG/GM vaginal cream     fluticasone (FLONASE) 50 MCG/ACT nasal spray     multivitamin w/minerals (THERA-VIT-M) tablet     OLANZapine (ZYPREXA) 2.5 MG tablet     sertraline (ZOLOFT) 25 MG tablet     No current facility-administered medications for this visit.      Past Medical History:   Patient Active Problem List   Diagnosis     Malignant melanoma of right lower extremity including hip (H)     Melanoma of skin (H)     GUERLINE (obstructive sleep apnea)     Chronic congestion of paranasal sinus     Weakness of foot     Neck pain, chronic     Myofascial pain     Intractable migraine without aura and without status migrainosus     History of multiple concussions     Foot pain, bilateral     Fibromyositis     Difficulty walking     Chronic pain disorder     Chronic fatigue     Bilateral occipital neuralgia     Articular disc disorder of temporomandibular joint     S/P flap graft     Osteopenia     Injury of head     Hyperlipidemia LDL goal <100     ALEXUS (generalized anxiety disorder)     Major depression, recurrent, chronic (H)     Secondary malignant neoplasm of bone (H)     Generalized pruritus     Immunodeficiency, unspecified (H)     Bilateral leg pain     Chronic bilateral low back pain without sciatica     Generalized muscle weakness     Past Medical History:   Diagnosis Date     Concussion      ALEXUS (generalized anxiety disorder) 5/7/2020     Major depression, recurrent, chronic (H) 5/7/2020     Melanoma (H)      Nonsenile cataract      Sleep apnea     CPAP       CC No referring provider defined for this encounter. on close of this encounter.

## 2022-04-15 NOTE — NURSING NOTE
Chief Complaint   Patient presents with     Derm Problem     TVEC     Stacey MORROW, EMT  Dermatology/Dermatology Surgery  [phone]

## 2022-04-25 NOTE — TELEPHONE ENCOUNTER
Prescription approved per [facility] Refill Protocol.  Phoebe Donaldson RN, BSN   [facility] [place]

## 2022-04-26 NOTE — PROGRESS NOTES
Palliative Care Outpatient Clinic Consultation Note    Patient:  Yadira Hoang    This note was written using voice recognition. I did proof read, but might have missed some things. Please contact me for major errors.    Chief Complaint:   Yadira Hoang 76 year old female who is presenting to the palliative medicine clinic today at the request of Dr Franklin's team for a palliative care consultation secondary to melanoma.       The patient's primary care provider is:  Erika Blanton.     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 Metastatic to R groin LNs   - reconstructive surgery with skin graft March/April 2020   - starts nivolumab May 2020, continues despite development of small liver met in Aug 2020   - addition of ipilimumab Oct 2020-April 2021 for progression in liver, new T2, S1 mets. On one or both of those until Aug 2021 when she develops immune mediated hepatitis treated with prednisone   - T-Vec since Jan 2022  - chronic pain syndrome affecting occiput, neck, joints   - h/o TBI  - ALEXUS, depression    Introduced the scope of our practice. Discussed our potential roles for symptom management, support/coping, and decisional support (aka goals of care).    Yadira shares that she feels overwhelmed with and tired of facing multiple stressors over years.     Physical symptoms are not a major factor affecting her experience.  She reports fatigue that affects her ability to complete all tasks she would like to.     Her appetite has been limited and due to extensive food intolerances she has lost some weight. She did eventually resume eating some foods that can cause abdominal discomfort and noticed a positive effect on her weight. I encourage her to re-challenge herself with one food  item at a time. Offered referral to nutritionist, which she'll consider.     She has some intermittent pain in her right foot/ankle with occasional shooting pains and a separate mild pain in her right groin. At this time this is not significantly affecting her quality of life and doesn't interfere with activities. However, she does have some instability in her ankle and appreciates the work PT has been doing with her.     The steroid treatment for autoimmune hepatitis has caused thigh pain and weakness which is improving since she completed the treatment and the residual symptoms are responding well to PT.     Since she started the Tvec treatments she has developed swelling in her right leg. She hasn't discussed this with oncology or PT as of yet. I recommended lymphedema treatment and offered a referral if needed    She has long-standing mild issues with dry eyes, which have worsened again lately. Good benefit of eye drops, but she is unable to participate in the sewing group at her [employer].     She wakes up after a few hours of sleep. Occasionally she then can't fall asleep again, because her thoughts are racing. With daytime naps she gets sufficient sleep, but would like improved quality.     Patient's Disease Understanding: adequate. She is awaiting her meeting with Dr Franklin tomorrow which will clarify the imaging results and treatment plan going forward.     Coping:  Yadira shares how overwhelming things are feeling for her at this point, since she has been dealing with multiple stressors.   During COVID she has been appropriately cautious. This has left her feeling socially isolated, especially since she has also lost the ability to drive, participate in her sewing group at [employer], and some of her friends moved or have health issues themselves.   One of her sons has moved back home with his 2 year-old daughter. This has caused some concern and stress for Yadira as well. She feels responsible to contribute to her  "granddaughter's upbringing, but also realizes that she doesn't have the energy to quite feel ready for this task. In addition it has changed the environment of her home in a way that is difficult for her to tolerate.    She shares that she had taken the advise to heart to \"think positive\", \"stay busy\" and through that \"beat the cancer\". Sometimes she feels exhausted and that she is overdoing it. We talk about how it is impossible to  someone else's experience and I encourage her to find her own balance of finding positive experiences and also acknowledging that this is really difficult.     She passively follows an online support group, which seems somewhat helpful.     She has been seeing a counselor every other week for her chronic anxiety and depression. She doesn't feel this is very helpful for her to process any of her more acute stressors, especially the cancer experience and would appreciate additional support.       Social History  Living Situation: lives with her ; one son and the 2 year-old granddaughter have moved in with them    Children: the other son lives nearby. Her two daughters both live about an hour away    Support System: family. Yadira appreciates the support her  offers her in many things and also wishes he could help more in other areas. Both of them agree that after the intense one-on-one experience of her cancer during the pandemic, it would be good for them to spend brief periods apart (long weekends with one of their kids, etc).     Advance Care Planning:  Not discussed today    Recommendations & Counseliny/o woman with melanoma     Physical symptoms: no specific changes today.   - encouraged appointment with palliative social work to address sleep  - encouraged her to discuss the leg swelling with PT and reach out if she needs a referral for lymphedema therapy    Coping: struggles with a sense of loneliness and feels overwhelmed by facing multiple stressors.   - " appointment with palliative SW      Return to clinic in 6 weeks.        Data and Chart Review:    REVIEW OF SYSTEMS:   ROS: 10 point ROS neg other than the symptoms noted above in the HPI and here.    Physical Exam:  /72 (BP Location: Right arm)   Pulse 88   Resp 16   SpO2 97%     CONSTIT: awake, appears comfortable  EENT: MMM, EOMI, no icterus  RESP: reg, nl effort  MSK:moves x4, R LE edema  SKIN:  warm, no rash, no obvious lesions  NEURO: alert, oriented x3  PSYCH: flat, somewhat anxious affect, memory and thought process intact    Medications, current, pertinent:      Olanzapine 2.5mg HS  Sertraline 50mg daily    : ok    No pertinent allergies     Past Medical History:   Diagnosis Date     Concussion      ALEXUS (generalized anxiety disorder) 5/7/2020     Major depression, recurrent, chronic (H) 5/7/2020     Melanoma (H)      Nonsenile cataract      Sleep apnea     CPAP     Past Surgical History:   Procedure Laterality Date     BIOPSY NODE SENTINEL Right 2/10/2020    Procedure: [place] Lymph Node Mapping And Biopsy;  Surgeon: Gabriela Dorsey MD;  Location: [facility]     ESOPHAGOGASTRODUODENOSCOPY, WITH BRUSHINGS  1/12/2022    Procedure: Esophagogastroduodenoscopy, With Brushings;  Surgeon: Dayo Merritt MD;  [facility]: [facility]     EXCISE LESION LOWER EXTREMITY Right 2/10/2020    Procedure: Wide Local Excision of RIGHT heel melanoma;  Surgeon: Gabriela Dorsey MD;  Location: [facility]     EXCISE LESION LOWER EXTREMITY Right 2/20/2020    Procedure: Wide local Re-excision RIGHT heel Wound vac application;  Surgeon: Gabriela Dorsey MD;  [facility]     GRAFT SKIN SPLIT THICKNESS FROM EXTREMITY Right 4/2/2020    Procedure: split skin graft from right thigh;  Surgeon: NIKA Crabtree MD;  [facility]     IRRIGATION AND DEBRIDEMENT FOOT, COMBINED Right 4/2/2020    Procedure: right foot/heel debridement;  Surgeon: NIKA Crabtree MD;  [facility]     ORTHOPEDIC SURGERY  Right     heel surgery x2     RECONSTRUCT FOOT Right 3/4/2020    Procedure: Right heel reconstruction with regional flap, biologic dressing and SPY;  Surgeon: NIKA Crabtree MD;  [facility]     TUBAL LIGATION         Family History  Family History   Problem Relation Age of Onset     Glaucoma No family hx of      Macular Degeneration No family hx of      Melanoma No family hx of      Skin Cancer No family hx of        Lab and imaging data Reviewed:  Comments:           Lorena Cole MD  Palliative Medicine  Pager [phone]

## 2022-04-26 NOTE — PROGRESS NOTES
[facility]: Cancer Care Short Note                                    Discussion with Patient:                                                      Leg swelling.       Patient called c/o right leg swelling.  Leg is double the size of left leg.        Assessment:                                                      Discussed leg swelling with Dr. Franklin and Meghan Perdomo PA-C.  Ace wraps recommended.  Patient is willing to try this and [facility].                                  Intervention/Education provided during outreach:                                                           As needed    Signature:  Daniela Guardado MBA, MSN, RN, ONC  RN Care Coordinator  [facility]

## 2022-04-27 NOTE — PATIENT INSTRUCTIONS
Patient Instructions      Expand All Collapse All    Thank you for attending the Palliative Care Clinic appointment today.     1. You will get a call to set up an appointment with the palliative         Return to clinic in about 6 weeks for a follow up.     You can reach the Palliative Care Team during business hours at the following number:    - [phone]  - or send me a [employer] message    To reach the Palliative Care Provider on-call After-hours or on holidays and weekends, call: [phone].  Please note that we are not able to provide pain medication refills on evenings or weekends.

## 2022-04-27 NOTE — NURSING NOTE
"Oncology Rooming Note    April 27, 2022 9:27 AM   Yadira Hoang is a 76 year old female who presents for:    Chief Complaint   Patient presents with     Palliative     New Patient     Initial Vitals: /72 (BP Location: Right arm)   Pulse 88   Resp 16   SpO2 97%  Estimated body mass index is 21.32 kg/m  as calculated from the following:    Height as of 1/12/22: 1.702 m (5' 7\").    Weight as of 3/22/22: 61.7 kg (136 lb 1.6 oz). There is no height or weight on file to calculate BSA.  No Pain (0) Comment: Data Unavailable   No LMP recorded. Patient is postmenopausal.  Allergies reviewed: Yes  Medications reviewed: Yes    Medications: Medication refills not needed today.  Pharmacy name entered into [employer]:    [facility] - [place], [place] - [address], [address]  [facility] [address] [place] [address] [address] [id number] - [place], [place] - [address] [facility] [address] [facility] [address] ([facility] - [place], [place] - [address]    Clinical concerns: New Patient       Ana M Jose LPN              "

## 2022-04-27 NOTE — LETTER
4/27/2022         RE: Yadira Hoang  [address] [place] [facility] [address]        Dear Colleague,    Thank you for referring your patient, Yadira Hoang, to the [facility]. Please see a copy of my visit note below.    Palliative Care Outpatient Clinic Consultation Note    Patient:  Yadira Hoang    This note was written using voice recognition. I did proof read, but might have missed some things. Please contact me for major errors.    Chief Complaint:   Yadira Hoang 76 year old female who is presenting to the palliative medicine clinic today at the request of Dr Franklin's team for a palliative care consultation secondary to melanoma.       The patient's primary care provider is:  Erika Blanton.     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 Metastatic to R groin LNs   - reconstructive surgery with skin graft March/April 2020   - starts nivolumab May 2020, continues despite development of small liver met in Aug 2020   - addition of ipilimumab Oct 2020-April 2021 for progression in liver, new T2, S1 mets. On one or both of those until Aug 2021 when she develops immune mediated hepatitis treated with prednisone   - T-Vec since Jan 2022  - chronic pain syndrome affecting occiput, neck, joints   - h/o TBI  - ALEXUS, depression    Introduced the scope of our practice. Discussed our potential roles for symptom management, support/coping, and decisional support (aka goals of care).    Yadira shares that she feels overwhelmed with and tired of facing multiple stressors over years.     Physical symptoms are not a major factor affecting her experience.  She reports fatigue that affects her ability to complete all tasks she would like to.     Her  appetite has been limited and due to extensive food intolerances she has lost some weight. She did eventually resume eating some foods that can cause abdominal discomfort and noticed a positive effect on her weight. I encourage her to re-challenge herself with one food item at a time. Offered referral to nutritionist, which she'll consider.     She has some intermittent pain in her right foot/ankle with occasional shooting pains and a separate mild pain in her right groin. At this time this is not significantly affecting her quality of life and doesn't interfere with activities. However, she does have some instability in her ankle and appreciates the work PT has been doing with her.     The steroid treatment for autoimmune hepatitis has caused thigh pain and weakness which is improving since she completed the treatment and the residual symptoms are responding well to PT.     Since she started the Tvec treatments she has developed swelling in her right leg. She hasn't discussed this with oncology or PT as of yet. I recommended lymphedema treatment and offered a referral if needed    She has long-standing mild issues with dry eyes, which have worsened again lately. Good benefit of eye drops, but she is unable to participate in the sewing group at her [employer].     She wakes up after a few hours of sleep. Occasionally she then can't fall asleep again, because her thoughts are racing. With daytime naps she gets sufficient sleep, but would like improved quality.     Patient's Disease Understanding: adequate. She is awaiting her meeting with Dr Franklin tomorrow which will clarify the imaging results and treatment plan going forward.     Coping:  Yadira shares how overwhelming things are feeling for her at this point, since she has been dealing with multiple stressors.   During COVID she has been appropriately cautious. This has left her feeling socially isolated, especially since she has also lost the ability to drive,  "participate in her sewing group at [employer], and some of her friends moved or have health issues themselves.   One of her sons has moved back home with his 2 year-old daughter. This has caused some concern and stress for Yadira as well. She feels responsible to contribute to her granddaughter's upbringing, but also realizes that she doesn't have the energy to quite feel ready for this task. In addition it has changed the environment of her home in a way that is difficult for her to tolerate.    She shares that she had taken the advise to heart to \"think positive\", \"stay busy\" and through that \"beat the cancer\". Sometimes she feels exhausted and that she is overdoing it. We talk about how it is impossible to  someone else's experience and I encourage her to find her own balance of finding positive experiences and also acknowledging that this is really difficult.     She passively follows an online support group, which seems somewhat helpful.     She has been seeing a counselor every other week for her chronic anxiety and depression. She doesn't feel this is very helpful for her to process any of her more acute stressors, especially the cancer experience and would appreciate additional support.       Social History  Living Situation: lives with her ; one son and the 2 year-old granddaughter have moved in with them    Children: the other son lives nearby. Her two daughters both live about an hour away    Support System: family. Yadira appreciates the support her  offers her in many things and also wishes he could help more in other areas. Both of them agree that after the intense one-on-one experience of her cancer during the pandemic, it would be good for them to spend brief periods apart (long weekends with one of their kids, etc).     Advance Care Planning:  Not discussed today    Recommendations & Counseliny/o woman with melanoma     Physical symptoms: no specific changes today.   - encouraged " appointment with palliative social work to address sleep  - encouraged her to discuss the leg swelling with PT and reach out if she needs a referral for lymphedema therapy    Coping: struggles with a sense of loneliness and feels overwhelmed by facing multiple stressors.   - appointment with palliative SW      Return to clinic in 6 weeks.        Data and Chart Review:    REVIEW OF SYSTEMS:   ROS: 10 point ROS neg other than the symptoms noted above in the HPI and here.    Physical Exam:  /72 (BP Location: Right arm)   Pulse 88   Resp 16   SpO2 97%     CONSTIT: awake, appears comfortable  EENT: MMM, EOMI, no icterus  RESP: reg, nl effort  MSK:moves x4, R LE edema  SKIN:  warm, no rash, no obvious lesions  NEURO: alert, oriented x3  PSYCH: flat, somewhat anxious affect, memory and thought process intact    Medications, current, pertinent:      Olanzapine 2.5mg HS  Sertraline 50mg daily    : ok    No pertinent allergies     Past Medical History:   Diagnosis Date     Concussion      ALEXUS (generalized anxiety disorder) 5/7/2020     Major depression, recurrent, chronic (H) 5/7/2020     Melanoma (H)      Nonsenile cataract      Sleep apnea     CPAP     Past Surgical History:   Procedure Laterality Date     BIOPSY NODE SENTINEL Right 2/10/2020    Procedure: [place] Lymph Node Mapping And Biopsy;  Surgeon: Gabriela Dorsey MD;  Location: [facility]     ESOPHAGOGASTRODUODENOSCOPY, WITH BRUSHINGS  1/12/2022    Procedure: Esophagogastroduodenoscopy, With Brushings;  Surgeon: Dayo Merritt MD;  [facility]     EXCISE LESION LOWER EXTREMITY Right 2/10/2020    Procedure: Wide Local Excision of RIGHT heel melanoma;  Surgeon: Gabriela Dorsey MD;  Location: [facility]     EXCISE LESION LOWER EXTREMITY Right 2/20/2020    Procedure: Wide local Re-excision RIGHT heel Wound vac application;  Surgeon: Gabriela Dorsey MD;  [facility]     GRAFT SKIN SPLIT THICKNESS FROM EXTREMITY Right 4/2/2020     Procedure: split skin graft from right thigh;  Surgeon: NIKA Crabtree MD;  [facility]     IRRIGATION AND DEBRIDEMENT FOOT, COMBINED Right 4/2/2020    Procedure: right foot/heel debridement;  Surgeon: NIKA Crabtree MD;  [facility]     ORTHOPEDIC SURGERY Right     heel surgery x2     RECONSTRUCT FOOT Right 3/4/2020    Procedure: Right heel reconstruction with regional flap, biologic dressing and SPY;  Surgeon: NIKA Crabtree MD;  [facility]     TUBAL LIGATION         Family History  Family History   Problem Relation Age of Onset     Glaucoma No family hx of      Macular Degeneration No family hx of      Melanoma No family hx of      Skin Cancer No family hx of        Lab and imaging data Reviewed:  Comments:           Lorena Cole MD  Palliative Medicine  Pager [phone]        Again, thank you for allowing me to participate in the care of your patient.        Sincerely,        Lorena Cole MD

## 2022-04-28 NOTE — NURSING NOTE
"Oncology Rooming Note    April 28, 2022 1:50 PM   Yadira Hoang is a 76 year old female who presents for:    Chief Complaint   Patient presents with     Oncology Clinic Visit     Rtn for metastatic melanoma     Initial Vitals: /72   Pulse 89   Temp 98  F (36.7  C) (Oral)   Wt 62.1 kg (137 lb)   SpO2 97%   BMI 21.46 kg/m   Estimated body mass index is 21.46 kg/m  as calculated from the following:    Height as of 1/12/22: 1.702 m (5' 7\").    Weight as of this encounter: 62.1 kg (137 lb). Body surface area is 1.71 meters squared.  No Pain (0) Comment: Data Unavailable   No LMP recorded. Patient is postmenopausal.  Allergies reviewed: Yes  Medications reviewed: Yes    Medications: Medication refills not needed today.  Pharmacy name entered into [employer]:    [facility] [place], [place] - [address], [address]  [facility] [address] [facility] [address] [facility][id number] - [place], [place] - [address] [facility] [address] ([facility] - [place], [place] - [address] [facility]    Clinical concerns: none       Leticia Abebe, EMT            "

## 2022-04-28 NOTE — PROGRESS NOTES
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 She noted a painful lesion on the sole of the right foot for a few months, since last summer.  It initially was small then became raised.  It spontaneously bled. She is not entirely sure of its appearance initially.  She denies noting any masses behind the knee or in the groin.  2. 1/9/2020, punch biopsy was performed by Dr Jessica Meadows. Patology showed melanoma, at least 3.4 mm deep with ulceration and 0 mitoses, and perineural invasion present. TILs were non-brisk and LVI not identified. pT3b.  3. 2/10/2020, she has right femoral sentinel lymph node biopsy and wide local excision (Specimen #: [id number]) and the right heel lesion extends to a depth of 6.6 mm, and invasive melanoma is present at 0.2 mm from the deep margin. Microsatellites are also present. There was 1 (of 2) lymph nodes involved. The lymph node tissue exhibits extensive subcapsular and parenchymal clusters of melanoma cells, highlighted on Melan-A immunostain. The largest cluster is 7 millimeters in greatest diameter. pT4b pN2c. PD-L1 staining is negative, <1%. No mutation in BRAF, KIT, or NRAS.  4. 2/22/2020, she had PET-CT, which showed intense FDG uptake in a right inguinal lymph node, concerning for an additional focus of metastatic disease. There is also an indeterminate right external iliac lymph node with mild FDG uptake.  5. 3/4/2020, she has medial plantar artery  fasciocutaneous instep flap and Integra placement to the donor site. On 4/2/2020, she has additional reconstruction with skin grafting.  6. 5/22/2020, PET-CT shows a hypermetabolic right inguinal and right external iliac chain lymph node and stable pulmonary nodules.  7. 5/27/2020, she starts nivolumab for presumed low volume lymph node predominant metastatic disease.  8. 8/21/2020, PET-CT shows increase in  hypermetabolic adenopathy, and a hypermetabolic nodule in segment 2 of the liver. Given low volume of disease she prefers to continue therapy with short interval follow-up.  9. 10/9/2020, PET-CT shows increasing size of hepatic metastases with increased hypermetabolism, increased right inguinal and iliac lymphadenopathy, and new FDG uptake right T2 transverse process and right side of S1. MRI-brain obtained 10/21/2020 is negative for brain metastasis.  10. 10/28/2020, she starts ipilimumab 1mg/kg and nivolumab 3mg/kg. She then goes on to maintenance nivolumab through 4/12/21.  11. 5/10/2021, she starts ipilimumab 3 mg/kg monotherapy q3w for 4 cycles.  12. 7/30/21, PET/CT shows mild disease progression.   13. 8/2/21, start ipilimumab 3mg/kg and nivolumab 1mg/kg and received 2 cycles, last on 8/23/21.  14. 8/23/21, develops elevated LFT's.   15. 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   16. 1/28/22, started TVEC  17. 4/22/22, progressive disease, consideration for additional therapeutic approaches. Anticipate starting Opdualag.    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    She presents with her  today to review follow-up imaging while on TVEC. She has been tolerating the therapy well.    She has overall been doing about the same as typical and has continued to mostly struggle with poor appetite, early satiety, and some fatigue. She has lost about 5# over the last few months. She also has developed progressive and worsening RLE edema, which has been somewhat bothersome.     She does feel that the melanoma lesion in her R groin has grown, but has been hopeful that this would not manifest on her imaging.    We discussed her imaging findings which showed progressive disease. She was appropriately disappointed and struggled to cope with the news. She does not want to die and has a 1yo granddaughter. She is hopeful that she will be  able to live long enough for her to remember her. She asked whether or she would die due to the cancer. We discussed while we have additional therapeutic options, there certainly is a chance that this cancer would progress and could take her life. We also discussed that this in incurable.     She met with the palliative care group yesterday. She has continued to struggle with coping, anxiety and depression. The plan was to follow-up with palliative SW, but she is a bit disappointed that this will not be for over another month.    She uses a walker when ambulating and remains quite functional and is able to take care of all her ADLs. She denies any falls.      Current Outpatient Medications   Medication Sig Dispense Refill     cholecalciferol (VITAMIN D3) 125 MCG (5000 UT) TABS tablet Take 2,000 Units by mouth every other day        estradiol (ESTRACE) 0.1 MG/GM vaginal cream Place 2 g vaginally twice a week 42.5 g 1     fish oil-omega-3 fatty acids 1000 MG capsule Take 2 g by mouth daily       fluticasone (FLONASE) 50 MCG/ACT nasal spray Spray 2 sprays into both nostrils daily (Patient not taking: Reported on 4/27/2022) 16 g 0     multivitamin w/minerals (THERA-VIT-M) tablet Take 1 tablet by mouth daily       nystatin (MYCOSTATIN) 405740 UNIT/ML suspension Take 5 mLs (500,000 Units) by mouth 4 times daily Swish around throat and swallow 240 mL 3     OLANZapine (ZYPREXA) 2.5 MG tablet Take 1 tablet (2.5 mg) by mouth At Bedtime (Patient not taking: No sig reported) 30 tablet 3     sertraline (ZOLOFT) 25 MG tablet Take 2 tablets (50 mg) by mouth daily (Patient not taking: No sig reported) 90 tablet 0     Physical Exam:  /72   Pulse 89   Temp 98  F (36.7  C) (Oral)   Wt 62.1 kg (137 lb)   SpO2 97%   BMI 21.46 kg/m    Wt Readings from Last 10 Encounters:   04/28/22 62.1 kg (137 lb)   03/22/22 61.7 kg (136 lb 1.6 oz)   02/16/22 64.1 kg (141 lb 6.4 oz)   01/27/22 63.4 kg (139 lb 12.8 oz)   01/20/22 64.5 kg (142  lb 3.2 oz)   01/12/22 63.7 kg (140 lb 6.9 oz)   12/17/21 64.9 kg (143 lb 1.3 oz)   12/17/21 64.9 kg (143 lb 1.6 oz)   12/08/21 64.9 kg (143 lb)   12/01/21 65.2 kg (143 lb 12.8 oz)     General: Well-appearing F, no acute distress.  HEENT: NCAT. PERRLA, EOMI, anicteric sclera. Hearing grossly intact. No nasal congestion or erythema. MMM, no oral lesions.  Neck: Supple. No LAD. JVP wnl.  Lungs: Full and equal expansion. CTAB, no wheezes, rhonchi or rales.  CV: RRR. Normal S1, S2. No m/r/g.  Abd: NABS. Soft, non-tender, non-distended. No rebound or guarding.  Extremities: Somewhat firm subcutaneous inguinal enlarged LN overlying the R proximal anterior and medial thigh, mildly tender, and measuring approximately 5 cm in the longest dimension. Some vague fullness and firmness of the R-sided pelvis as well, but much less discrete. 1-2+ pitting edema confined to the RLE, but involving the entire limb.  Skin: No rashes or lesions.  Neuro: AOx3, answers questions appropriately. Face symmetric. Moves extremities equally and independently.    LABS  Most Recent 3 CBC's:  Recent Labs   Lab Test 03/22/22  1105 02/16/22  0931 01/27/22  1453   WBC 5.3 5.9 6.0   HGB 13.4 12.7 12.5   MCV 90 92 92    286 364     Most Recent 3 BMP's:  Recent Labs   Lab Test 03/22/22  1105 02/16/22  0931 01/27/22  1453    143 140   POTASSIUM 4.0 3.7 3.9   CHLORIDE 104 107 105   CO2 28 32 29   BUN 30 21 20   CR 0.71 0.62 0.62   ANIONGAP 8 4 6   GABY 9.4 9.3 9.1   GLC 86 95 87     Most Recent 2 LFT's:  Recent Labs   Lab Test 03/22/22  1105 02/16/22  0931   AST 36 30   ALT 40 41   ALKPHOS 63 63   BILITOTAL 0.5 0.3     Imaging:    Combined Report of:    PET and CT on  4/22/2022 3:17 PM :     1. PET of the neck, chest, abdomen, and pelvis.  2. PET CT Fusion for Attenuation Correction and Anatomical  Localization:    3. Diagnostic CT scan of the chest, abdomen, and pelvis with  intravenous contrast for interpretation.  3. CT of the chest, abdomen  and pelvis obtained for diagnostic  interpretation.  4. 3D MIP and PET-CT fused images were processed on an independent  workstation and archived to PACS and reviewed by a radiologist.     Technique:     1. PET: The patient received 12.69 mCi of F-18-FDG; the serum glucose  was 83 prior to administration, body weight was 61.7 kg. Images were  evaluated in the axial, sagittal, and coronal planes as well as the  rotational whole body MIP. Images were acquired from the Vertex to the  Feet.     UPTAKE WAS MEASURED AT 63 MINUTES.      BACKGROUND:  Liver SUV max= 2.16,   Aorta Blood SUV Max: 1.09.      2. CT: Volumetric acquisition for clinical interpretation of the  chest, abdomen, and pelvis acquired at 3 mm sections . The chest,  abdomen, and pelvis were evaluated at 5 mm sections in bone, soft  tissue, and lung windows.       The patient received 84 cc of Isovue 370 intravenously for the  examination.    --     3. 3D MIP and PET-CT fused images were processed on an independent  workstation and archived to PACS and reviewed by a radiologist.     INDICATION: Malignant melanoma of right lower extremity including hip  (H)     ADDITIONAL INFORMATION OBTAINED FROM EMR: Metastatic melanoma with  origin about the right heel patient currently on Tvec.      COMPARISON: PET/CT dated 1/21/2022, 10/29/2021, 9/1/2021.     FINDINGS:      HEAD/NECK:  There is no  suspicious FDG uptake in the neck.      The paranasal sinuses are clear. The mastoid air cells are clear. No  pathologically enlarged lymph nodes are evident. The thyroid gland is  unremarkable..     CHEST:  Previously seen hypermetabolic lesion in the right upper lobe has  resolved. Additionally, lesion in the anterior right middle lobe has  also resolved. No new lesions in the lungs. Bibasilar  fibroatelectasis. No pneumothorax. No pleural effusion.     There are no pathologically enlarged mediastinal, hilar or axillary  lymph nodes.     Heart size is within normal limits.  No pericardial effusion. Moderate  coronary artery calcifications. Normal caliber of the thoracic aorta  and main pulmonary artery. Esophagus is unremarkable.     ABDOMEN AND PELVIS:  Multiple hepatic metastases, increased in size from prior, for example  there is a 2.4 x 2.1 cm lesion in hepatic segment 2, previously 1.1 x  1.0 cm with SUV max of 5.9, previously 4.7. Previously seen lesion in  hepatic segment 6 measures 2.1 x 1.1 cm (series 4, image 198) with SUV  max of 10.9, previously subcentimeter with SUV max of 4.7.  Additionally, there is a 1.8 x 1.8 cm lesion at the joseline hepatis with  SUV max of 12.68 and a 1.0 x 0.9 cm lesion in hepatic segment 7  (series 4, image 183), also with increased SUV uptake measuring 5.33.  These lesions were not well visualized on prior exam.      Multiple enlarged right-sided inguinal nodes, the largest measuring  6.2 x 5.1 cm (series 4, image 277) previously measuring 4.9 x 3.8 cm.  Additionally, this lesion has an SUV max of 22.1, previously 26.7.  Additionally, there is a right-sided external iliac node measuring 6.4  x 4.7 cm, previously 5.0 x 4.5 cm with SUV max of 23.9, previously  27.4.     The gallbladder, pancreas, spleen, adrenal glands, and kidneys are  unremarkable. No abnormally distended loops of small or large bowel.  Normal caliber of the abdominal aorta.     LOWER EXTREMITIES:   No abnormal masses or hypermetabolic lesions.     BONES:   Increased FDG uptake corresponding to the sternum with SUV max of 5.1  without corresponding lesion on CT. Additionally, along the left fifth  anterolateral rib there is mildly increased FDG uptake with SUV max of  3.23, new from prior without corresponding lesion on CT.     Degenerative changes of the spine.                                             IMPRESSION:   In this patient with a history of metastatic melanoma there is concern  for overall disease progression:     1. Increase in size and FDG avidity of the external  iliac and inguinal  nodes.     2. Increase in size and number of hepatic metastases.     3. Minimally increased FDG uptake along the sternum and left fifth  anterolateral rib without corresponding lesion on CT, new from prior,  suspicious for developing metastatic disease versus costochondritis.  Attention on follow-up.     4. Resolution of previously seen pulmonary lesions, which given  progression in remainder of the body, suggests these are resolved  infectious/inflammatory lesions rather than resolved metastases.      ASSESSMENT AND PLAN:  #Acral melanoma, right heel primary, pT4b pN3c M1, Stage IV  #H/o CPI associated hepatitis, grade 3, resolved  #RLE lymphedema  Ms. Hoang is a 76 year old woman with metastatic acral melanoma to right inguinal and iliac lymph nodes, liver and bone. She progressed on first line nivolumab, and had evidence of response to 2nd line ipilimumab and nivolumab. On nivolumab maintenance therapy she showed regrowth. She tried ipilimumab monotherapy and had stable disease with significant pruritis, so had reinduction with ipilimumab/nivolumab on 8/2/21. She received her 2nd cycle on 8/23/21 and interval scan 9/1/21 showed about 10% growth. She then developed progressive nausea, reflux symptoms, diarrhea, and elevated transaminases ([id number] ULNl). On PET-CT she has had additional growth and increase in FDG avidity in the right external iliac and inguinal lymph nodes, as well as a new lesion in segment 6 and a growth in the FDG avid metastasis in hepatic segment 2. She started TVEC on 1/28/22. While she has tolerated this well, PET/CT on 4/22/22 showed significant progression at all her known sites (without any new metastases). She is not interested in clinical trials. We will stop her TVEC and proceed with Opdualag (combination nivolumab and relatlimab, a Lag3 checkpoint inhibitor).     #Anorexia  #Weight loss  -Continue prescribed Zyprexa 2.5 mg at bedtime.    #Depression and  anxiety  -She continues to visit with a therapist and is maintained on sertraline. Could consider an alternative with appetite stimulation such as mirtazapine in the future. Met with palliative care on 4/27/22 and will continue to follow.    #Back and leg pain  She continues to note pain, which seems consistent with low back pain and sciatica type symptoms. However, there is a generalized pain component she also describes, which we are uncertain about. She had facet joint injection 1/4/22, which she did not find to be helpful. lidoderm patches minimally helpful. Tramadol provided no benefit. She is working with physical therapy. We previously provided her with handicap parking paperwork.    Overall Plan:  - Will work on insurance authorization of Opdualag and have her return for C1 once this is approved  - Continue to follow with palliative care    The patient was seen by and discussed with Dr. Cartagena.    Emiliano Drew MD PhD  Hematology/Oncology Fellow  Pgr: [phone]    ---  I evaluated the patient and reviewed the plan of care with the patient and the Oncology Fellow, Dr. Drew. I agree with the assessment and plan documented in the clinical note.    Mrs. Hoang has had further progression of her disease with intralesional TVEC. At this point, I am not recommending additional TVEC injections. She previously responded to ipi/nivo, but developed hepatitis on her last cycle of treatment in 8/2021. We reviewed the option of clinical trial, but she is not interested. We also reviewed the potential of chemotherapy or the potential of trying Opdualag a newly FDA approved option for metastatic melanoma. She prefers to try Opdualag. We will work on securing coverage for this and plan for her to return once approved.    Gi Cartagena M.D.   of Medicine  Hematology, Oncology and Transplantation

## 2022-04-28 NOTE — LETTER
4/28/2022         RE: Yadira Hoang  [address] [facility] [address]        Dear Colleague,    Thank you for referring your patient, Yadira Hoang, to the [facility]. Please see a copy of my visit note below.    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 She noted a painful lesion on the sole of the right foot for a few months, since last summer.  It initially was small then became raised.  It spontaneously bled. She is not entirely sure of its appearance initially.  She denies noting any masses behind the knee or in the groin.  2. 1/9/2020, punch biopsy was performed by Dr Jessica Meadows. Patology showed melanoma, at least 3.4 mm deep with ulceration and 0 mitoses, and perineural invasion present. TILs were non-brisk and LVI not identified. pT3b.  3. 2/10/2020, she has right femoral sentinel lymph node biopsy and wide local excision (Specimen #: [id number]) and the right heel lesion extends to a depth of 6.6 mm, and invasive melanoma is present at 0.2 mm from the deep margin. Microsatellites are also present. There was 1 (of 2) lymph nodes involved. The lymph node tissue exhibits extensive subcapsular and parenchymal clusters of melanoma cells, highlighted on Melan-A immunostain. The largest cluster is 7 millimeters in greatest diameter. pT4b pN2c. PD-L1 staining is negative, <1%. No mutation in BRAF, KIT, or NRAS.  4. 2/22/2020, she had PET-CT, which showed intense FDG uptake in a right inguinal lymph node, concerning for an additional focus of metastatic disease. There is also an indeterminate right external iliac lymph node with mild FDG uptake.  5. 3/4/2020, she has medial plantar artery  fasciocutaneous instep flap and Integra placement to the donor site. On 4/2/2020, she has additional reconstruction with skin grafting.  6. 5/22/2020,  PET-CT shows a hypermetabolic right inguinal and right external iliac chain lymph node and stable pulmonary nodules.  7. 5/27/2020, she starts nivolumab for presumed low volume lymph node predominant metastatic disease.  8. 8/21/2020, PET-CT shows increase in hypermetabolic adenopathy, and a hypermetabolic nodule in segment 2 of the liver. Given low volume of disease she prefers to continue therapy with short interval follow-up.  9. 10/9/2020, PET-CT shows increasing size of hepatic metastases with increased hypermetabolism, increased right inguinal and iliac lymphadenopathy, and new FDG uptake right T2 transverse process and right side of S1. MRI-brain obtained 10/21/2020 is negative for brain metastasis.  10. 10/28/2020, she starts ipilimumab 1mg/kg and nivolumab 3mg/kg. She then goes on to maintenance nivolumab through 4/12/21.  11. 5/10/2021, she starts ipilimumab 3 mg/kg monotherapy q3w for 4 cycles.  12. 7/30/21, PET/CT shows mild disease progression.   13. 8/2/21, start ipilimumab 3mg/kg and nivolumab 1mg/kg and received 2 cycles, last on 8/23/21.  14. 8/23/21, develops elevated LFT's.   15. 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   16. 1/28/22, started TVEC  17. 4/22/22, progressive disease, consideration for additional therapeutic approaches. Anticipate starting Opdualag.    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    She presents with her  today to review follow-up imaging while on TVEC. She has been tolerating the therapy well.    She has overall been doing about the same as typical and has continued to mostly struggle with poor appetite, early satiety, and some fatigue. She has lost about 5# over the last few months. She also has developed progressive and worsening RLE edema, which has been somewhat bothersome.     She does feel that the melanoma lesion in her R groin has grown, but has been hopeful that this  would not manifest on her imaging.    We discussed her imaging findings which showed progressive disease. She was appropriately disappointed and struggled to cope with the news. She does not want to die and has a 3yo granddaughter. She is hopeful that she will be able to live long enough for her to remember her. She asked whether or she would die due to the cancer. We discussed while we have additional therapeutic options, there certainly is a chance that this cancer would progress and could take her life. We also discussed that this in incurable.     She met with the palliative care group yesterday. She has continued to struggle with coping, anxiety and depression. The plan was to follow-up with palliative SW, but she is a bit disappointed that this will not be for over another month.    She uses a walker when ambulating and remains quite functional and is able to take care of all her ADLs. She denies any falls.      Current Outpatient Medications   Medication Sig Dispense Refill     cholecalciferol (VITAMIN D3) 125 MCG (5000 UT) TABS tablet Take 2,000 Units by mouth every other day        estradiol (ESTRACE) 0.1 MG/GM vaginal cream Place 2 g vaginally twice a week 42.5 g 1     fish oil-omega-3 fatty acids 1000 MG capsule Take 2 g by mouth daily       fluticasone (FLONASE) 50 MCG/ACT nasal spray Spray 2 sprays into both nostrils daily (Patient not taking: Reported on 4/27/2022) 16 g 0     multivitamin w/minerals (THERA-VIT-M) tablet Take 1 tablet by mouth daily       nystatin (MYCOSTATIN) 203910 UNIT/ML suspension Take 5 mLs (500,000 Units) by mouth 4 times daily Swish around throat and swallow 240 mL 3     OLANZapine (ZYPREXA) 2.5 MG tablet Take 1 tablet (2.5 mg) by mouth At Bedtime (Patient not taking: No sig reported) 30 tablet 3     sertraline (ZOLOFT) 25 MG tablet Take 2 tablets (50 mg) by mouth daily (Patient not taking: No sig reported) 90 tablet 0     Physical Exam:  /72   Pulse 89   Temp 98  F  (36.7  C) (Oral)   Wt 62.1 kg (137 lb)   SpO2 97%   BMI 21.46 kg/m    Wt Readings from Last 10 Encounters:   04/28/22 62.1 kg (137 lb)   03/22/22 61.7 kg (136 lb 1.6 oz)   02/16/22 64.1 kg (141 lb 6.4 oz)   01/27/22 63.4 kg (139 lb 12.8 oz)   01/20/22 64.5 kg (142 lb 3.2 oz)   01/12/22 63.7 kg (140 lb 6.9 oz)   12/17/21 64.9 kg (143 lb 1.3 oz)   12/17/21 64.9 kg (143 lb 1.6 oz)   12/08/21 64.9 kg (143 lb)   12/01/21 65.2 kg (143 lb 12.8 oz)     General: Well-appearing F, no acute distress.  HEENT: NCAT. PERRLA, EOMI, anicteric sclera. Hearing grossly intact. No nasal congestion or erythema. MMM, no oral lesions.  Neck: Supple. No LAD. JVP wnl.  Lungs: Full and equal expansion. CTAB, no wheezes, rhonchi or rales.  CV: RRR. Normal S1, S2. No m/r/g.  Abd: NABS. Soft, non-tender, non-distended. No rebound or guarding.  Extremities: Somewhat firm subcutaneous inguinal enlarged LN overlying the R proximal anterior and medial thigh, mildly tender, and measuring approximately 5 cm in the longest dimension. Some vague fullness and firmness of the R-sided pelvis as well, but much less discrete. 1-2+ pitting edema confined to the RLE, but involving the entire limb.  Skin: No rashes or lesions.  Neuro: AOx3, answers questions appropriately. Face symmetric. Moves extremities equally and independently.    LABS  Most Recent 3 CBC's:  Recent Labs   Lab Test 03/22/22  1105 02/16/22  0931 01/27/22  1453   WBC 5.3 5.9 6.0   HGB 13.4 12.7 12.5   MCV 90 92 92    286 364     Most Recent 3 BMP's:  Recent Labs   Lab Test 03/22/22  1105 02/16/22  0931 01/27/22  1453    143 140   POTASSIUM 4.0 3.7 3.9   CHLORIDE 104 107 105   CO2 28 32 29   BUN 30 21 20   CR 0.71 0.62 0.62   ANIONGAP 8 4 6   GABY 9.4 9.3 9.1   GLC 86 95 87     Most Recent 2 LFT's:  Recent Labs   Lab Test 03/22/22  1105 02/16/22  0931   AST 36 30   ALT 40 41   ALKPHOS 63 63   BILITOTAL 0.5 0.3     Imaging:    Combined Report of:    PET and CT on  4/22/2022 3:17  PM :     1. PET of the neck, chest, abdomen, and pelvis.  2. PET CT Fusion for Attenuation Correction and Anatomical  Localization:    3. Diagnostic CT scan of the chest, abdomen, and pelvis with  intravenous contrast for interpretation.  3. CT of the chest, abdomen and pelvis obtained for diagnostic  interpretation.  4. 3D MIP and PET-CT fused images were processed on an independent  workstation and archived to PACS and reviewed by a radiologist.     Technique:     1. PET: The patient received 12.69 mCi of F-18-FDG; the serum glucose  was 83 prior to administration, body weight was 61.7 kg. Images were  evaluated in the axial, sagittal, and coronal planes as well as the  rotational whole body MIP. Images were acquired from the Vertex to the  Feet.     UPTAKE WAS MEASURED AT 63 MINUTES.      BACKGROUND:  Liver SUV max= 2.16,   Aorta Blood SUV Max: 1.09.      2. CT: Volumetric acquisition for clinical interpretation of the  chest, abdomen, and pelvis acquired at 3 mm sections . The chest,  abdomen, and pelvis were evaluated at 5 mm sections in bone, soft  tissue, and lung windows.       The patient received 84 cc of Isovue 370 intravenously for the  examination.    --     3. 3D MIP and PET-CT fused images were processed on an independent  workstation and archived to PACS and reviewed by a radiologist.     INDICATION: Malignant melanoma of right lower extremity including hip  (H)     ADDITIONAL INFORMATION OBTAINED FROM EMR: Metastatic melanoma with  origin about the right heel patient currently on Tvec.      COMPARISON: PET/CT dated 1/21/2022, 10/29/2021, 9/1/2021.     FINDINGS:      HEAD/NECK:  There is no  suspicious FDG uptake in the neck.      The paranasal sinuses are clear. The mastoid air cells are clear. No  pathologically enlarged lymph nodes are evident. The thyroid gland is  unremarkable..     CHEST:  Previously seen hypermetabolic lesion in the right upper lobe has  resolved. Additionally, lesion in the  anterior right middle lobe has  also resolved. No new lesions in the lungs. Bibasilar  fibroatelectasis. No pneumothorax. No pleural effusion.     There are no pathologically enlarged mediastinal, hilar or axillary  lymph nodes.     Heart size is within normal limits. No pericardial effusion. Moderate  coronary artery calcifications. Normal caliber of the thoracic aorta  and main pulmonary artery. Esophagus is unremarkable.     ABDOMEN AND PELVIS:  Multiple hepatic metastases, increased in size from prior, for example  there is a 2.4 x 2.1 cm lesion in hepatic segment 2, previously 1.1 x  1.0 cm with SUV max of 5.9, previously 4.7. Previously seen lesion in  hepatic segment 6 measures 2.1 x 1.1 cm (series 4, image 198) with SUV  max of 10.9, previously subcentimeter with SUV max of 4.7.  Additionally, there is a 1.8 x 1.8 cm lesion at the joseline hepatis with  SUV max of 12.68 and a 1.0 x 0.9 cm lesion in hepatic segment 7  (series 4, image 183), also with increased SUV uptake measuring 5.33.  These lesions were not well visualized on prior exam.      Multiple enlarged right-sided inguinal nodes, the largest measuring  6.2 x 5.1 cm (series 4, image 277) previously measuring 4.9 x 3.8 cm.  Additionally, this lesion has an SUV max of 22.1, previously 26.7.  Additionally, there is a right-sided external iliac node measuring 6.4  x 4.7 cm, previously 5.0 x 4.5 cm with SUV max of 23.9, previously  27.4.     The gallbladder, pancreas, spleen, adrenal glands, and kidneys are  unremarkable. No abnormally distended loops of small or large bowel.  Normal caliber of the abdominal aorta.     LOWER EXTREMITIES:   No abnormal masses or hypermetabolic lesions.     BONES:   Increased FDG uptake corresponding to the sternum with SUV max of 5.1  without corresponding lesion on CT. Additionally, along the left fifth  anterolateral rib there is mildly increased FDG uptake with SUV max of  3.23, new from prior without corresponding lesion  on CT.     Degenerative changes of the spine.                                             IMPRESSION:   In this patient with a history of metastatic melanoma there is concern  for overall disease progression:     1. Increase in size and FDG avidity of the external iliac and inguinal  nodes.     2. Increase in size and number of hepatic metastases.     3. Minimally increased FDG uptake along the sternum and left fifth  anterolateral rib without corresponding lesion on CT, new from prior,  suspicious for developing metastatic disease versus costochondritis.  Attention on follow-up.     4. Resolution of previously seen pulmonary lesions, which given  progression in remainder of the body, suggests these are resolved  infectious/inflammatory lesions rather than resolved metastases.      ASSESSMENT AND PLAN:  #Acral melanoma, right heel primary, pT4b pN3c M1, Stage IV  #H/o CPI associated hepatitis, grade 3, resolved  #RLE lymphedema  Ms. Hoang is a 76 year old woman with metastatic acral melanoma to right inguinal and iliac lymph nodes, liver and bone. She progressed on first line nivolumab, and had evidence of response to 2nd line ipilimumab and nivolumab. On nivolumab maintenance therapy she showed regrowth. She tried ipilimumab monotherapy and had stable disease with significant pruritis, so had reinduction with ipilimumab/nivolumab on 8/2/21. She received her 2nd cycle on 8/23/21 and interval scan 9/1/21 showed about 10% growth. She then developed progressive nausea, reflux symptoms, diarrhea, and elevated transaminases (16-23X ULNl). On PET-CT she has had additional growth and increase in FDG avidity in the right external iliac and inguinal lymph nodes, as well as a new lesion in segment 6 and a growth in the FDG avid metastasis in hepatic segment 2. She started TVEC on 1/28/22. While she has tolerated this well, PET/CT on 4/22/22 showed significant progression at all her known sites (without any new metastases).  She is not interested in clinical trials. We will stop her TVEC and proceed with Opdualag (combination nivolumab and relatlimab, a Lag3 checkpoint inhibitor).     #Anorexia  #Weight loss  -Continue prescribed Zyprexa 2.5 mg at bedtime.    #Depression and anxiety  -She continues to visit with a therapist and is maintained on sertraline. Could consider an alternative with appetite stimulation such as mirtazapine in the future. Met with palliative care on 4/27/22 and will continue to follow.    #Back and leg pain  She continues to note pain, which seems consistent with low back pain and sciatica type symptoms. However, there is a generalized pain component she also describes, which we are uncertain about. She had facet joint injection 1/4/22, which she did not find to be helpful. lidoderm patches minimally helpful. Tramadol provided no benefit. She is working with physical therapy. We previously provided her with handicap parking paperwork.    Overall Plan:  - Will work on insurance authorization of Opdualag and have her return for  once this is approved  - Continue to follow with palliative care    The patient was seen by and discussed with Dr. Cartagena.    Emiliano Drew MD PhD  Hematology/Oncology Fellow  Pgr: [phone]    ---  I evaluated the patient and reviewed the plan of care with the patient and the Oncology Fellow, Dr. Drew. I agree with the assessment and plan documented in the clinical note.    Mrs. Hoang has had further progression of her disease with intralesional TVEC. At this point, I am not recommending additional TVEC injections. She previously responded to ipi/nivo, but developed hepatitis on her last cycle of treatment in 8/2021. We reviewed the option of clinical trial, but she is not interested. We also reviewed the potential of chemotherapy or the potential of trying Opdualag a newly FDA approved option for metastatic melanoma. She prefers to try Opdualag. We will work on  securing coverage for this and plan for her to return once approved.        Again, thank you for allowing me to participate in the care of your patient.      Sincerely,    Gi Franklin MD

## 2022-05-02 PROBLEM — M13.0 POLYARTHRITIS: Status: ACTIVE | Noted: 2022-01-01

## 2022-05-05 NOTE — TELEPHONE ENCOUNTER
Writer noted Yadira's upcoming appointment for Tvec was cancelled. Reached out to see if she wanted to reschedule and she stated her Oncologist said it wasn't working and that she should stop treatment. No new appointment needed at this time.

## 2022-05-09 NOTE — PROGRESS NOTES
AUDIOLOGY REPORT    SUMMARY: Audiology visit completed. See audiogram for results.    RECOMMENDATIONS: Follow-up with ENT.    Malcolm Davila  Doctor of Audiology  [place] License # [id number]

## 2022-05-11 NOTE — PROGRESS NOTES
Infusion Nursing Note:  Yadira Hoang presents today for C1D1 nivolumab/relatlimab-rmbw (Opdualag).    Patient seen by provider today: No   present during visit today: Not Applicable.    Note:   Patient states she is not on steroids.    Provided patient a patient education handout from [facility], as unable to get education in [employer]'s patient instruction section.    Intravenous Access:  Peripheral IV placed.    Treatment Conditions:  Lab Results   Component Value Date    HGB 12.7 05/11/2022    WBC 5.4 05/11/2022    ANEU 4.6 06/30/2021    ANEUTAUTO 3.4 05/11/2022     05/11/2022      Lab Results   Component Value Date     05/11/2022    POTASSIUM 4.2 05/11/2022    CR 0.59 05/11/2022    GABY 9.5 05/11/2022    BILITOTAL 0.4 05/11/2022    ALBUMIN 3.6 05/11/2022    ALT 33 05/11/2022    AST 29 05/11/2022     Results reviewed, labs MET treatment parameters, ok to proceed with treatment.      Post Infusion Assessment:  Patient tolerated infusion without incident.  Blood return noted pre and post infusion.  Site patent and intact, free from redness, edema or discomfort.  No evidence of extravasations.  Access discontinued per protocol.       Discharge Plan:   AVS to patient via MYCHART.  Patient will return 6/8/22 for next appointment.   Patient discharged in stable condition accompanied by: self.  is  today.  Departure Mode: Ambulatory.      Marguerite Mccloud RN

## 2022-05-18 NOTE — PROGRESS NOTES
Subjective:  HPI  Physical Exam                    Objective:  System    Physical Exam    General     ROS    Assessment/Plan:    PROGRESS  REPORT    Progress reporting period is from 3/2/2022 to 2022.       SUBJECTIVE  Subjective: Reports that she feels tired because she got no sleep. She says she is moving around well without complaints, but is still stuggling with sleeping.    Current Pain level: 0/10.     Initial Pain level: 7/10.   Changes in function:  Yes (See Goal flowsheet attached for changes in current functional level)  Adverse reaction to treatment or activity: None    OBJECTIVE  Changes noted in objective findings:  Yes  Objective: Tandem Stance: 20 seconds each leg.  Left SLS: 3 seconds and Right SLS: 4 Seconds.  Nava/56 seconds.  SL Bridge Left Hip 7 and Right Hip 10. SL Sit To Stand: Unable To Perform     ASSESSMENT/PLAN  Updated problem list and treatment plan: Diagnosis 1:  Deconditioning  Pain -  hot/cold therapy, manual therapy, self management, education, directional preference exercise and home program  Decreased ROM/flexibility - manual therapy, therapeutic exercise, therapeutic activity and home program  Decreased strength - therapeutic exercise, therapeutic activities and home program  Impaired balance - neuro re-education, therapeutic activities and home program  Impaired gait - gait training, assistive devices and home program  Impaired muscle performance - neuro re-education and home program  Decreased function - therapeutic activities and home program  STG/LTGs have been met or progress has been made towards goals:  Yes (See Goal flow sheet completed today.)  Assessment of Progress: The patient's condition is improving.  Self Management Plans:  Patient has been instructed in a home treatment program.  Patient is independent in a home treatment program.  Patient  has been instructed in self management of symptoms.  Patient is independent in self management of symptoms.  I have  re-evaluated this patient and find that the nature, scope, duration and intensity of the therapy is appropriate for the medical condition of the patient.  Yadira Ochoa continues to require the following intervention to meet STG and LTG's:  PT intervention is required to meet STG/LTG.    Recommendations:  This patient would benefit from continued therapy.     Frequency:  1x X week, once daily  Duration:  for 8 weeks        Please refer to the daily flowsheet for treatment today, total treatment time and time spent performing 1:1 timed codes.

## 2022-05-24 NOTE — NURSING NOTE
"Oncology Rooming Note    May 24, 2022 9:25 AM   Yadira Hoang is a 76 year old female who presents for:    Chief Complaint   Patient presents with     Oncology Clinic Visit     Rtn Metastatic Melanoma; Follow Up     Initial Vitals: /78 (BP Location: Right arm, Patient Position: Fowlers, Cuff Size: Adult Regular)   Pulse 89   Temp 97.4  F (36.3  C) (Oral)   Wt 64.7 kg (142 lb 11.2 oz)   SpO2 98%   BMI 22.35 kg/m   Estimated body mass index is 22.35 kg/m  as calculated from the following:    Height as of 1/12/22: 1.702 m (5' 7\").    Weight as of this encounter: 64.7 kg (142 lb 11.2 oz). Body surface area is 1.75 meters squared.  Moderate Pain (4) Comment: Groin   No LMP recorded. Patient is postmenopausal.  Allergies reviewed: Yes  Medications reviewed: Yes    Medications: Medication refills not needed today.  Pharmacy name entered into [facility]:    [facility] - [place], [place] - [address], [id number]  [facility] [address] [place] [address] [address] [id number] - [place], [place] - [address] [facility] [address] ([facility] - [place], [place] - [address]    Pt has no concerns for their provider on May 24, 2022 and would like to discuss the original chief complaint of the appointment.     Taty Cantrell, EMT on May 24, 2022 at 9:25 AM            "

## 2022-05-24 NOTE — LETTER
5/24/2022         RE: Yadira Hoang  [address] [facility] [address]      MEDICAL ONCOLOGY PROGRESS NOTE  Melanoma Clinic  May 24, 2022     CHIEF COMPLAINT: Acral melanoma    Melanoma History:  1. She noted a painful lesion on the sole of the right foot for a few months, since last summer.  It initially was small then became raised.  It spontaneously bled. She is not entirely sure of its appearance initially.  She denies noting any masses behind the knee or in the groin.  2. 1/9/2020, punch biopsy was performed by Dr Jessica Meadows. Patology showed melanoma, at least 3.4 mm deep with ulceration and 0 mitoses, and perineural invasion present. TILs were non-brisk and LVI not identified. pT3b.  3. 2/10/2020, she has right femoral sentinel lymph node biopsy and wide local excision (Specimen #: [id number]) and the right heel lesion extends to a depth of 6.6 mm, and invasive melanoma is present at 0.2 mm from the deep margin. Microsatellites are also present. There was 1 (of 2) lymph nodes involved. The lymph node tissue exhibits extensive subcapsular and parenchymal clusters of melanoma cells, highlighted on Melan-A immunostain. The largest cluster is 7 millimeters in greatest diameter. pT4b pN2c. PD-L1 staining is negative, <1%. No mutation in BRAF, KIT, or NRAS.  4. 2/22/2020, she had PET-CT, which showed intense FDG uptake in a right inguinal lymph node, concerning for an additional focus of metastatic disease. There is also an indeterminate right external iliac lymph node with mild FDG uptake.  5. 3/4/2020, she has medial plantar artery  fasciocutaneous instep flap and Integra placement to the donor site. On 4/2/2020, she has additional reconstruction with skin grafting.  6. 5/22/2020, PET-CT shows a hypermetabolic right inguinal and right external iliac chain lymph node and stable pulmonary nodules.  7. 5/27/2020, she starts nivolumab for presumed low volume lymph node predominant metastatic  disease.  8. 8/21/2020, PET-CT shows increase in hypermetabolic adenopathy, and a hypermetabolic nodule in segment 2 of the liver. Given low volume of disease she prefers to continue therapy with short interval follow-up.  9. 10/9/2020, PET-CT shows increasing size of hepatic metastases with increased hypermetabolism, increased right inguinal and iliac lymphadenopathy, and new FDG uptake right T2 transverse process and right side of S1. MRI-brain obtained 10/21/2020 is negative for brain metastasis.  10. 10/28/2020, she starts ipilimumab 1mg/kg and nivolumab 3mg/kg. She then goes on to maintenance nivolumab through 4/12/21.  11. 5/10/2021, she starts ipilimumab 3 mg/kg monotherapy q3w for 4 cycles.  12. 7/30/21, PET/CT shows mild disease progression.   13. 8/2/21, start ipilimumab 3mg/kg and nivolumab 1mg/kg and received 2 cycles, last on 8/23/21.  14. 8/23/21, develops elevated LFT's.   15. 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   16. 1/28/22, started TVEC  17. 4/22/22, progressive disease, consideration for additional therapeutic approaches.  18. 5/11/22, Start Opdualag    HISTORY OF PRESENT ILLNESS  Yadira Hoang is a 76 year old female with stage IV acral melanoma.  Patient reports that she felt fatigued and had occasional chills following her infusion.  She denies any dyspnea.  She reports normal bowel movements.  She denies any rashes or itchy skin.  She is concerned that the mass in her right inguinal area is increased in size and more firm.  Her appetite remains low.  She has pain in her groin and occasionally takes 1 or 2 ibuprofen for this.  She wonders if she needs more nystatin for thrush in her throat.  She still feels some discomfort in her throat.  She feels her leg pain has started to improve and she has started physical therapy.  She notes increased right leg swelling and has been elevating her legs without much improvement.  She wonders what else she may do.  She  notes a lump in front of her left ear that is itchy and wonders if this could be cancer related.  She continues to feel anxious and is concerned that her palliative care social work appointment is not yet for another couple of weeks.  She wonders about taking a detox supplement.  She denies other concerns.    Current Outpatient Medications   Medication Sig Dispense Refill     cholecalciferol (VITAMIN D3) 125 MCG (5000 UT) TABS tablet Take 2,000 Units by mouth every other day        estradiol (ESTRACE) 0.1 MG/GM vaginal cream Place 2 g vaginally twice a week 42.5 g 1     fish oil-omega-3 fatty acids 1000 MG capsule Take 2 g by mouth daily       multivitamin w/minerals (THERA-VIT-M) tablet Take 1 tablet by mouth daily       nystatin (MYCOSTATIN) 360180 UNIT/ML suspension Take 5 mLs (500,000 Units) by mouth 4 times daily Swish around throat and swallow (Patient not taking: Reported on 5/24/2022) 240 mL 3     OLANZapine (ZYPREXA) 2.5 MG tablet Take 1 tablet (2.5 mg) by mouth At Bedtime (Patient not taking: No sig reported) 30 tablet 3     sertraline (ZOLOFT) 25 MG tablet Take 2 tablets (50 mg) by mouth daily (Patient not taking: No sig reported) 90 tablet 0     Physical Exam:  General: The patient is a pleasant anxious female in no acute distress. She is here today with her .   /78 (BP Location: Right arm, Patient Position: Fowlers, Cuff Size: Adult Regular)   Pulse 89   Temp 97.4  F (36.3  C) (Oral)   Wt 64.7 kg (142 lb 11.2 oz)   SpO2 98%   BMI 22.35 kg/m    Wt Readings from Last 10 Encounters:   05/24/22 64.7 kg (142 lb 11.2 oz)   05/11/22 62.6 kg (138 lb 1.6 oz)   04/28/22 62.1 kg (137 lb)   03/22/22 61.7 kg (136 lb 1.6 oz)   02/16/22 64.1 kg (141 lb 6.4 oz)   01/27/22 63.4 kg (139 lb 12.8 oz)   01/20/22 64.5 kg (142 lb 3.2 oz)   01/12/22 63.7 kg (140 lb 6.9 oz)   12/17/21 64.9 kg (143 lb 1.3 oz)   12/17/21 64.9 kg (143 lb 1.6 oz)   HEENT: EOMI. Sclerae are anicteric.   Lymph: Neck is supple with  no lymphadenopathy in the cervical or supraclavicular areas. Right inguinal area is firm.   Heart: Regular rate and rhythm.   Lungs: Clear to auscultation bilaterally.   Abdomen: Bowel sounds present, soft, nontender with no palpable masses.   Extremities: 2+ right lower extremity edema noted, no left lower extremity edema.   Neuro: Cranial nerves II through XII are grossly intact.  Musculoskeletal: Right upper thigh with approximately 6 cm palpable mass.  Skin: Left pre-auricular area with 5 mm dried scabbed firm lesion, nontender. No rashes, petechiae, or bruising noted on exposed skin.    LABS  Most Recent 3 CBC's:  Recent Labs   Lab Test 05/11/22  1115 03/22/22  1105 02/16/22  0931   WBC 5.4 5.3 5.9   HGB 12.7 13.4 12.7   MCV 94 90 92    269 286     Most Recent 3 BMP's:  Recent Labs   Lab Test 05/11/22  1115 03/22/22  1105 02/16/22  0931    140 143   POTASSIUM 4.2 4.0 3.7   CHLORIDE 105 104 107   CO2 30 28 32   BUN 27 30 21   CR 0.59 0.71 0.62   ANIONGAP 5 8 4   GABY 9.5 9.4 9.3   GLC 93 86 95     Most Recent 2 LFT's:  Recent Labs   Lab Test 05/11/22  1115 03/22/22  1105   AST 29 36   ALT 33 40   ALKPHOS 64 63   BILITOTAL 0.4 0.5     ASSESSMENT AND PLAN:  Acral melanoma, right heel primary, pT4b pN3c M1, Stage IV. Patient most recently started on treatment with Opdualag. She has had some fatigue, but otherwise thus far is tolerating it well. She will follow-up with Meghan Milian CNP prior to cycle 2. Will plan to repeat imaging after 3 cycles.    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 Will trial lymphedema therapy. She has not found a benefit from leg elevation.     Left pre-auricular lesion. Unclear if related to melanoma or not. Will have her see dermatology in [place] to assess.     Anorexia and weight loss. Weight has stablized. Will trial Zyprexa 2.5 mg at bedtime, which may also help with insomnia.     Depression and anxiety. Recommend resuming sertraline 25 mg daily x 7 days, then increase to  50 mg daily. She will be meeting with the palliative care  in early June.     Leg pain. This seemed to worsen when on steroids, now improving with physical therapy, which she will continue.     Supplements. She questioned if she may take a detox supplement. I advised that it is unclear if this may interact with her immune therapy, so I would not advise she take it.     Thrush. Concern for thrush in throat. She will refill her Nystatin swish and swallow.     Meghan Perdomo PA-C  [facility]  [address], [place] [id number] [phone]    53 minutes spent on the date of the encounter doing chart review, review of test results, interpretation of tests, patient visit and documentation

## 2022-05-24 NOTE — PROGRESS NOTES
MEDICAL ONCOLOGY PROGRESS NOTE  Melanoma Clinic  May 24, 2022     CHIEF COMPLAINT: Acral melanoma    Melanoma History:  1. She noted a painful lesion on the sole of the right foot for a few months, since last summer.  It initially was small then became raised.  It spontaneously bled. She is not entirely sure of its appearance initially.  She denies noting any masses behind the knee or in the groin.  2. 1/9/2020, punch biopsy was performed by Dr Jessica Meadows. Patology showed melanoma, at least 3.4 mm deep with ulceration and 0 mitoses, and perineural invasion present. TILs were non-brisk and LVI not identified. pT3b.  3. 2/10/2020, she has right femoral sentinel lymph node biopsy and wide local excision (Specimen #: [id number]) and the right heel lesion extends to a depth of 6.6 mm, and invasive melanoma is present at 0.2 mm from the deep margin. Microsatellites are also present. There was 1 (of 2) lymph nodes involved. The lymph node tissue exhibits extensive subcapsular and parenchymal clusters of melanoma cells, highlighted on Melan-A immunostain. The largest cluster is 7 millimeters in greatest diameter. pT4b pN2c. PD-L1 staining is negative, <1%. No mutation in BRAF, KIT, or NRAS.  4. 2/22/2020, she had PET-CT, which showed intense FDG uptake in a right inguinal lymph node, concerning for an additional focus of metastatic disease. There is also an indeterminate right external iliac lymph node with mild FDG uptake.  5. 3/4/2020, she has medial plantar artery  fasciocutaneous instep flap and Integra placement to the donor site. On 4/2/2020, she has additional reconstruction with skin grafting.  6. 5/22/2020, PET-CT shows a hypermetabolic right inguinal and right external iliac chain lymph node and stable pulmonary nodules.  7. 5/27/2020, she starts nivolumab for presumed low volume lymph node predominant metastatic disease.  8. 8/21/2020, PET-CT shows increase in hypermetabolic adenopathy, and a  hypermetabolic nodule in segment 2 of the liver. Given low volume of disease she prefers to continue therapy with short interval follow-up.  9. 10/9/2020, PET-CT shows increasing size of hepatic metastases with increased hypermetabolism, increased right inguinal and iliac lymphadenopathy, and new FDG uptake right T2 transverse process and right side of S1. MRI-brain obtained 10/21/2020 is negative for brain metastasis.  10. 10/28/2020, she starts ipilimumab 1mg/kg and nivolumab 3mg/kg. She then goes on to maintenance nivolumab through 4/12/21.  11. 5/10/2021, she starts ipilimumab 3 mg/kg monotherapy q3w for 4 cycles.  12. 7/30/21, PET/CT shows mild disease progression.   13. 8/2/21, start ipilimumab 3mg/kg and nivolumab 1mg/kg and received 2 cycles, last on 8/23/21.  14. 8/23/21, develops elevated LFT's.   15. 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   16. 1/28/22, started TVEC  17. 4/22/22, progressive disease, consideration for additional therapeutic approaches.  18. 5/11/22, Start Opdualag    HISTORY OF PRESENT ILLNESS  Yadira Hoang is a 76 year old female with stage IV acral melanoma.  Patient reports that she felt fatigued and had occasional chills following her infusion.  She denies any dyspnea.  She reports normal bowel movements.  She denies any rashes or itchy skin.  She is concerned that the mass in her right inguinal area is increased in size and more firm.  Her appetite remains low.  She has pain in her groin and occasionally takes 1 or 2 ibuprofen for this.  She wonders if she needs more nystatin for thrush in her throat.  She still feels some discomfort in her throat.  She feels her leg pain has started to improve and she has started physical therapy.  She notes increased right leg swelling and has been elevating her legs without much improvement.  She wonders what else she may do.  She notes a lump in front of her left ear that is itchy and wonders if this could be  cancer related.  She continues to feel anxious and is concerned that her palliative care social work appointment is not yet for another couple of weeks.  She wonders about taking a detox supplement.  She denies other concerns.    Current Outpatient Medications   Medication Sig Dispense Refill     cholecalciferol (VITAMIN D3) 125 MCG (5000 UT) TABS tablet Take 2,000 Units by mouth every other day        estradiol (ESTRACE) 0.1 MG/GM vaginal cream Place 2 g vaginally twice a week 42.5 g 1     fish oil-omega-3 fatty acids 1000 MG capsule Take 2 g by mouth daily       multivitamin w/minerals (THERA-VIT-M) tablet Take 1 tablet by mouth daily       nystatin (MYCOSTATIN) 605756 UNIT/ML suspension Take 5 mLs (500,000 Units) by mouth 4 times daily Swish around throat and swallow (Patient not taking: Reported on 5/24/2022) 240 mL 3     OLANZapine (ZYPREXA) 2.5 MG tablet Take 1 tablet (2.5 mg) by mouth At Bedtime (Patient not taking: No sig reported) 30 tablet 3     sertraline (ZOLOFT) 25 MG tablet Take 2 tablets (50 mg) by mouth daily (Patient not taking: No sig reported) 90 tablet 0     Physical Exam:  General: The patient is a pleasant anxious female in no acute distress. She is here today with her .   /78 (BP Location: Right arm, Patient Position: Fowlers, Cuff Size: Adult Regular)   Pulse 89   Temp 97.4  F (36.3  C) (Oral)   Wt 64.7 kg (142 lb 11.2 oz)   SpO2 98%   BMI 22.35 kg/m    Wt Readings from Last 10 Encounters:   05/24/22 64.7 kg (142 lb 11.2 oz)   05/11/22 62.6 kg (138 lb 1.6 oz)   04/28/22 62.1 kg (137 lb)   03/22/22 61.7 kg (136 lb 1.6 oz)   02/16/22 64.1 kg (141 lb 6.4 oz)   01/27/22 63.4 kg (139 lb 12.8 oz)   01/20/22 64.5 kg (142 lb 3.2 oz)   01/12/22 63.7 kg (140 lb 6.9 oz)   12/17/21 64.9 kg (143 lb 1.3 oz)   12/17/21 64.9 kg (143 lb 1.6 oz)   HEENT: EOMI. Sclerae are anicteric.   Lymph: Neck is supple with no lymphadenopathy in the cervical or supraclavicular areas. Right inguinal area  is firm.   Heart: Regular rate and rhythm.   Lungs: Clear to auscultation bilaterally.   Abdomen: Bowel sounds present, soft, nontender with no palpable masses.   Extremities: 2+ right lower extremity edema noted, no left lower extremity edema.   Neuro: Cranial nerves II through XII are grossly intact.  Musculoskeletal: Right upper thigh with approximately 6 cm palpable mass.  Skin: Left pre-auricular area with 5 mm dried scabbed firm lesion, nontender. No rashes, petechiae, or bruising noted on exposed skin.    LABS  Most Recent 3 CBC's:  Recent Labs   Lab Test 05/11/22  1115 03/22/22  1105 02/16/22  0931   WBC 5.4 5.3 5.9   HGB 12.7 13.4 12.7   MCV 94 90 92    269 286     Most Recent 3 BMP's:  Recent Labs   Lab Test 05/11/22  1115 03/22/22  1105 02/16/22  0931    140 143   POTASSIUM 4.2 4.0 3.7   CHLORIDE 105 104 107   CO2 30 28 32   BUN 27 30 21   CR 0.59 0.71 0.62   ANIONGAP 5 8 4   GABY 9.5 9.4 9.3   GLC 93 86 95     Most Recent 2 LFT's:  Recent Labs   Lab Test 05/11/22  1115 03/22/22  1105   AST 29 36   ALT 33 40   ALKPHOS 64 63   BILITOTAL 0.4 0.5     ASSESSMENT AND PLAN:  Acral melanoma, right heel primary, pT4b pN3c M1, Stage IV. Patient most recently started on treatment with Opdualag. She has had some fatigue, but otherwise thus far is tolerating it well. She will follow-up with Meghan Milian CNP prior to cycle 2. Will plan to repeat imaging after 3 cycles.    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 Will trial lymphedema therapy. She has not found a benefit from leg elevation.     Left pre-auricular lesion. Unclear if related to melanoma or not. Will have her see dermatology in [place] to assess.     Anorexia and weight loss. Weight has stablized. Will trial Zyprexa 2.5 mg at bedtime, which may also help with insomnia.     Depression and anxiety. Recommend resuming sertraline 25 mg daily x 7 days, then increase to 50 mg daily. She will be meeting with the palliative care  in early  June.     Leg pain. This seemed to worsen when on steroids, now improving with physical therapy, which she will continue.     Supplements. She questioned if she may take a detox supplement. I advised that it is unclear if this may interact with her immune therapy, so I would not advise she take it.     Thrush. Concern for thrush in throat. She will refill her Nystatin swish and swallow.     Meghan Perdomo PA-C  [facility]  [address] [place], [place] [address] [phone]    53 minutes spent on the date of the encounter doing chart review, review of test results, interpretation of tests, patient visit and documentation

## 2022-05-24 NOTE — PATIENT INSTRUCTIONS
-Start Zyprexa (olanzapine) 2.5 mg at bedtime for decreased appetite.  -Start sertraline at 25 mg daily x 1 week, then increase to 50 mg daily for mood/anxiety.   -Restart Nystatin swish and swallow.   -Will refer you for lymphedema therapy for your leg swelling in [place].   -Will set you up for dermatology follow-up in [place].

## 2022-05-31 NOTE — PROGRESS NOTES
Yadira is a 77 year old who is being evaluated via a billable telephone visit.      What phone number would you like to be contacted at? [phone]  [facility]    Assessment & Plan     Diagnoses and all orders for this visit:    Candida esophagitis (H)  -     Adult Gastro Ref - Consult Only; Future  -     Adult E-Consult to Gastroenterology (Outpt Provider to Specialist Written Question & Response)    Atrophic vaginitis  -     estradiol (ESTRACE) 0.1 MG/GM vaginal cream; Place 2 g vaginally twice a week    Pain, gastric  -     Adult Gastro Ref - Consult Only; Future  -     Adult E-Consult to Gastroenterology (Outpt Provider to Specialist Written Question & Response)    Overactive bladder  Comments:  tria oxybutynin. discussed potential side effects. follow up in 4 weeks.   Orders:  -     oxybutynin (DITROPAN) 5 MG tablet; Take 1 tablet (5 mg) by mouth 2 times daily as needed for bladder spasms    Malignant melanoma of right lower extremity including hip (H)  -     Adult E-Consult to Gastroenterology (Outpt Provider to Specialist Written Question & Response)    Estrogen deficiency  -     DEXA HIP/PELVIS/SPINE - Future; Future    Hyperlipidemia LDL goal <100  -     Lipid panel reflex to direct LDL Fasting; Future    Anxiety  Comments:  continue with sertraline and dose titration per oncology    Nausea  Comments:  encouraged pt to discuss with oncology and take this to see if it helps.        Patient has completed one course of Fluconazole for candida esophagitis. It is not clear that her GI symptoms improved with the treatment. She continues to struggle with poor eating and a sick stomach feeling. She didn't feel any problem with her esophagus. Her complaints today is the same as she had the last year since her diagnosis and treatment with malignant melanoma. It is not clear to me if she cleared the candida, and her current symptoms are more cancer related or if she had a recurrent  candida esophagitis. If recurrence, would she need a longer course of fluconazole than 14 days. She is currently on Nystatin from oncology. I will refer her to see GI for this. Unable to get in until 2/2023. Pt reported she was diagnosed with leaky gut and IBS in the past.  Will start with E consult. Discussed fee, pt is okay with this.     Oncology prescribed Olanzapine at bedtime to help with nausea but pt didn't take for concern of liver enzyme side effect. Perhaps GI can comment on this as well. I encouraged the pt to discuss with her oncologist for this as well.     Return in about 2 weeks (around 6/14/2022) for Virtual visit follow up on depression.    I spent a total of 41 minutes on the day of the visit.  doing chart review, history and exam, documentation and further activities as noted above      Erika Blanton MD PhD  [facility] HILARIO May is a 77 year old who presents for the following health issues     HPI     Concern - Fungus follow up   Onset: for months  Description: still feels like its still in stomach   Intensity: severe  Progression of Symptoms:  same  Accompanying Signs & Symptoms: feels like cant eat   Previous history of similar problem: na  Precipitating factors:        Worsened by: na  Alleviating factors:        Improved by: na  Therapies tried and outcome:  none     Sometimes she starts eating and just can't eat. Sometimes her stomach hurts when she eats. Sometimes her stomach hurts when she doesn't eat.     She had EGD in January, dx with esophagus candidiasis. She was treated with Nystatin swish and swallow.     She is trying to gain weight. She has lost weight from cancer treatment.   She is on a different cancer therapy. She doesn't want to go on chemo. She doesn't know what to do. She doesn't want to go on chemo and doesn't want to die.   She has been eating more nuts. She is worried her danielle sterols may be going up.     She had lymph node injection that  didn't work but developed lymphedema. Her oncology team referred her for lymphedema therapy last week. Her appointment is not until July. They are trying to move it up for her.     Depression/anxiety: she has her own therapist talking every 2 weeks. She ws given Olanzapine for appetite and sertraline for depression/anxiety on 5/24/2022 by her oncology team. Pt decided not to take olanzapine for potential liver side effect. She did start the sertraline.     Has urinary urgency, incomplete bladder emptying. Has had this for a long time but it is getting worse lately.         Review of Systems   Constitutional, HEENT, cardiovascular, pulmonary, gi and gu systems are negative, except as otherwise noted.      Objective           Vitals:  No vitals were obtained today due to virtual visit.    Physical Exam   healthy, alert and no distress  PSYCH: Alert and oriented times 3; coherent speech, normal   rate and volume, able to articulate logical thoughts, able   to abstract reason, no tangential thoughts, no hallucinations   or delusions  Her affect is normal  RESP: No cough, no audible wheezing, able to talk in full sentences  Remainder of exam unable to be completed due to telephone visits      Phone call duration: 25 minutes

## 2022-05-31 NOTE — PROGRESS NOTES
Yadira is a 77 year old who is being evaluated via a billable video visit.      How would you like to obtain your AVS? [facility]  If the video visit is dropped, the invitation should be resent by: Send to e-mail at: [email or web address]  Will anyone else be joining your video visit? No    Video-Visit Details    Type of service:  Video Visit    Video Start Time: 11:44 AM    Video End Time:12:05 PM    Originating Location (pt. Location): Home    Distant Location (provider location):  [facility]     Platform used for Video Visit: [employer]     Medication and allergies have been reviewed.     DEDE Zhou      MEDICAL ONCOLOGY PROGRESS NOTE  Melanoma Clinic  May 31, 2022     CHIEF COMPLAINT: Acral melanoma    Melanoma History:  1. She noted a painful lesion on the sole of the right foot for a few months, since last summer.  It initially was small then became raised.  It spontaneously bled. She is not entirely sure of its appearance initially.  She denies noting any masses behind the knee or in the groin.  2. 1/9/2020, punch biopsy was performed by Dr Jessica Meadows. Patology showed melanoma, at least 3.4 mm deep with ulceration and 0 mitoses, and perineural invasion present. TILs were non-brisk and LVI not identified. pT3b.  3. 2/10/2020, she has right femoral sentinel lymph node biopsy and wide local excision (Specimen #: [id number]) and the right heel lesion extends to a depth of 6.6 mm, and invasive melanoma is present at 0.2 mm from the deep margin. Microsatellites are also present. There was 1 (of 2) lymph nodes involved. The lymph node tissue exhibits extensive subcapsular and parenchymal clusters of melanoma cells, highlighted on Melan-A immunostain. The largest cluster is 7 millimeters in greatest diameter. pT4b pN2c. PD-L1 staining is negative, <1%. No mutation in BRAF, KIT, or NRAS.  4. 2/22/2020, she had PET-CT, which showed intense FDG uptake in a right inguinal lymph node, concerning  for an additional focus of metastatic disease. There is also an indeterminate right external iliac lymph node with mild FDG uptake.  5. 3/4/2020, she has medial plantar artery  fasciocutaneous instep flap and Integra placement to the donor site. On 4/2/2020, she has additional reconstruction with skin grafting.  6. 5/22/2020, PET-CT shows a hypermetabolic right inguinal and right external iliac chain lymph node and stable pulmonary nodules.  7. 5/27/2020, she starts nivolumab for presumed low volume lymph node predominant metastatic disease.  8. 8/21/2020, PET-CT shows increase in hypermetabolic adenopathy, and a hypermetabolic nodule in segment 2 of the liver. Given low volume of disease she prefers to continue therapy with short interval follow-up.  9. 10/9/2020, PET-CT shows increasing size of hepatic metastases with increased hypermetabolism, increased right inguinal and iliac lymphadenopathy, and new FDG uptake right T2 transverse process and right side of S1. MRI-brain obtained 10/21/2020 is negative for brain metastasis.  10. 10/28/2020, she starts ipilimumab 1mg/kg and nivolumab 3mg/kg. She then goes on to maintenance nivolumab through 4/12/21.  11. 5/10/2021, she starts ipilimumab 3 mg/kg monotherapy q3w for 4 cycles.  12. 7/30/21, PET/CT shows mild disease progression.   13. 8/2/21, start ipilimumab 3mg/kg and nivolumab 1mg/kg and received 2 cycles, last on 8/23/21.  14. 8/23/21, develops elevated LFT's.   15. 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   16. 1/28/22, started TVEC  17. 4/22/22, progressive disease, consideration for additional therapeutic approaches.  18. 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    -Patient has ongoing right leg swelling that is getting worse.   -Does not walk normally due to prior surgery.   -Has increased back pain related to walking differently.   -Has  left hip pain now as well.   -Not currently taking anything for pain. Has occasionally taken ibuprofen which does not help a lot. Gets some relief from ice and from sitting in the tub.   -Has occasional headaches.  -Continues to have difficulty eating.   -Feeling stressed about recent disease progression.  -Reports a history of a head injury in 2011 and 2014.   -Having pain in the back of her head and over her left eye that started about 2 months ago. Saw eye doctor recently with no concerns.     Current Outpatient Medications   Medication Sig Dispense Refill     cholecalciferol (VITAMIN D3) 125 MCG (5000 UT) TABS tablet Take 2,000 Units by mouth every other day        [START ON 6/2/2022] estradiol (ESTRACE) 0.1 MG/GM vaginal cream Place 2 g vaginally twice a week 42.5 g 1     fish oil-omega-3 fatty acids 1000 MG capsule Take 2 g by mouth daily       multivitamin w/minerals (THERA-VIT-M) tablet Take 1 tablet by mouth daily       nystatin (MYCOSTATIN) 993046 UNIT/ML suspension Take 5 mLs (500,000 Units) by mouth 4 times daily Swish around throat and swallow 240 mL 3     OLANZapine (ZYPREXA) 2.5 MG tablet Take 1 tablet (2.5 mg) by mouth At Bedtime 30 tablet 3     oxybutynin (DITROPAN) 5 MG tablet Take 1 tablet (5 mg) by mouth 2 times daily as needed for bladder spasms 30 tablet 0     sertraline (ZOLOFT) 25 MG tablet Take 25 mg daily x 1 week, then increase to 50 mg daily. 90 tablet 0     Physical Exam:  General: The patient is a pleasant anxious female in no acute distress. She is here today with her .   /78   Wt 64.7 kg (142 lb 11.2 oz)   BMI 22.35 kg/m    Wt Readings from Last 10 Encounters:   05/31/22 64.7 kg (142 lb 11.2 oz)   05/24/22 64.7 kg (142 lb 11.2 oz)   05/11/22 62.6 kg (138 lb 1.6 oz)   04/28/22 62.1 kg (137 lb)   03/22/22 61.7 kg (136 lb 1.6 oz)   02/16/22 64.1 kg (141 lb 6.4 oz)   01/27/22 63.4 kg (139 lb 12.8 oz)   01/20/22 64.5 kg (142 lb 3.2 oz)   01/12/22 63.7 kg (140 lb 6.9 oz)    12/17/21 64.9 kg (143 lb 1.3 oz)   HEENT: EOMI. Sclerae are anicteric.   Lymph: Neck is supple with no lymphadenopathy in the cervical or supraclavicular areas. Right inguinal area is firm.   Heart: Regular rate and rhythm.   Lungs: Clear to auscultation bilaterally.   Abdomen: Bowel sounds present, soft, nontender with no palpable masses.   Extremities: 2+ right lower extremity edema noted, no left lower extremity edema.   Neuro: Cranial nerves II through XII are grossly intact.  Musculoskeletal: Right upper thigh with approximately 6 cm palpable mass.  Skin: Left pre-auricular area with 5 mm dried scabbed firm lesion, nontender. No rashes, petechiae, or bruising noted on exposed skin.    LABS  Most Recent 3 CBC's:  Recent Labs   Lab Test 05/11/22  1115 03/22/22  1105 02/16/22  0931   WBC 5.4 5.3 5.9   HGB 12.7 13.4 12.7   MCV 94 90 92    269 286     Most Recent 3 BMP's:  Recent Labs   Lab Test 05/11/22  1115 03/22/22  1105 02/16/22  0931    140 143   POTASSIUM 4.2 4.0 3.7   CHLORIDE 105 104 107   CO2 30 28 32   BUN 27 30 21   CR 0.59 0.71 0.62   ANIONGAP 5 8 4   GABY 9.5 9.4 9.3   GLC 93 86 95     Most Recent 2 LFT's:  Recent Labs   Lab Test 05/11/22  1115 03/22/22  1105   AST 29 36   ALT 33 40   ALKPHOS 64 63   BILITOTAL 0.4 0.5     ASSESSMENT AND PLAN:  Acral melanoma, right heel primary, pT4b pN3c M1, Stage IV. Patient most recently started on treatment with Opdualag. She has had some fatigue, but otherwise thus far is tolerating it well. She will follow-up with Meghan Milian CNP prior to cycle 2. Will plan to repeat imaging after 3 cycles.    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 Recommend using ACE wraps. Will trial lymphedema therapy. Will work on seeing how soon we can get this scheduled. She has not found a benefit from leg elevation.     Anorexia and weight loss. Weight has stablized. Again, recommend Will trial Zyprexa 2.5 mg at bedtime, which may also help with insomnia.     Depression and  anxiety. Again, recommend resuming sertraline 25 mg daily x 7 days, then increase to 50 mg daily. She will be meeting with the palliative care  in early June.     Leg pain. This seemed to worsen when on steroids, was improving with physical therapy, but still bothersome. Will have oxycodone available to take prn.     Headaches. Last brain MRI in December 2021 was unremarkable. Will repeat this week, given development of headaches over the last couple of months.     Meghan Perdomo PA-C  [facility]  [address] [place] [address] [id number] [phone]    30 minutes spent on the date of the encounter doing chart review, review of test results, interpretation of tests, patient visit and documentation

## 2022-05-31 NOTE — TELEPHONE ENCOUNTER
Patient calling to say that she can not get into [facility] until 2/2023! Please advise.  Odette Sahu [facility]  2nd Floor  Primary Care

## 2022-05-31 NOTE — TELEPHONE ENCOUNTER
----- Message from Ekaterina Prashant sent at 5/27/2022 10:08 AM CDT -----  Regarding: FW: [place] transfer  Can you please assist patient with scheduling patient at the [facility] with Dr. Fallon.     Thank you!  ----- Message -----  From: Joseph Fallon MD  Sent: 5/26/2022   4:05 PM CDT  To: Ekaterina Prashant, #  Subject: RE: [place] transfer                          She could see me at the [facility], but I can't overbook to see her sooner at [place] unfortunately.     Joseph Fallon MD  Pronouns: he/him/his    Department of Dermatology  [facility]: Phone: [phone], Fax:[phone]  [facility]: Phone: [phone] Fax: [phone]      ----- Message -----  From: Prashant Ekaterina  Sent: 5/26/2022   3:21 PM CDT  To: Joseph Fallon MD  Subject: FW: [place] transfer                          Hi Dr. Fallon,    Please review patients metastatic melanoma history with Dr. Estrada and Oncology. Patient saw you a few years ago. Patient is due for a skin check next month. Who do you advise patient should see given her history or what is the next best step?    Thank you.       ----- Message -----  From: Clara Cortez  Sent: 5/26/2022   2:44 PM CDT  To: [facility] [place]  Subject: FW: [place] transfer                          She wants to be schedule asap at , please call. Thank you.       ----- Message -----  From: Dayo Whipple  Sent: 5/25/2022  10:27 AM CDT  To: [facility]  Subject: [place] transfer                              Hello,    Patient saw [facility] for melanoma checks and is due next month. Are we able to connect her with a provider in [place], per her preference?    Best,  Ke Whipple  Clinic Coordinator  [facility]  [phone], opt 5, opt 2

## 2022-05-31 NOTE — LETTER
5/31/2022         RE: Yadira Hoang  [address] [facility] [address]        Dear Colleague,    Thank you for referring your patient, Yadira Hoang, to the [facility]. Please see a copy of my visit note below.      MEDICAL ONCOLOGY PROGRESS NOTE  Melanoma Clinic  May 31, 2022     CHIEF COMPLAINT: Acral melanoma    Melanoma History:  1. She noted a painful lesion on the sole of the right foot for a few months, since last summer.  It initially was small then became raised.  It spontaneously bled. She is not entirely sure of its appearance initially.  She denies noting any masses behind the knee or in the groin.  2. 1/9/2020, punch biopsy was performed by Dr Jessica Meadows. Patology showed melanoma, at least 3.4 mm deep with ulceration and 0 mitoses, and perineural invasion present. TILs were non-brisk and LVI not identified. pT3b.  3. 2/10/2020, she has right femoral sentinel lymph node biopsy and wide local excision (Specimen #: [id number]) and the right heel lesion extends to a depth of 6.6 mm, and invasive melanoma is present at 0.2 mm from the deep margin. Microsatellites are also present. There was 1 (of 2) lymph nodes involved. The lymph node tissue exhibits extensive subcapsular and parenchymal clusters of melanoma cells, highlighted on Melan-A immunostain. The largest cluster is 7 millimeters in greatest diameter. pT4b pN2c. PD-L1 staining is negative, <1%. No mutation in BRAF, KIT, or NRAS.  4. 2/22/2020, she had PET-CT, which showed intense FDG uptake in a right inguinal lymph node, concerning for an additional focus of metastatic disease. There is also an indeterminate right external iliac lymph node with mild FDG uptake.  5. 3/4/2020, she has medial plantar artery  fasciocutaneous instep flap and Integra placement to the donor site. On 4/2/2020, she has additional reconstruction with skin grafting.  6. 5/22/2020, PET-CT shows a hypermetabolic  right inguinal and right external iliac chain lymph node and stable pulmonary nodules.  7. 5/27/2020, she starts nivolumab for presumed low volume lymph node predominant metastatic disease.  8. 8/21/2020, PET-CT shows increase in hypermetabolic adenopathy, and a hypermetabolic nodule in segment 2 of the liver. Given low volume of disease she prefers to continue therapy with short interval follow-up.  9. 10/9/2020, PET-CT shows increasing size of hepatic metastases with increased hypermetabolism, increased right inguinal and iliac lymphadenopathy, and new FDG uptake right T2 transverse process and right side of S1. MRI-brain obtained 10/21/2020 is negative for brain metastasis.  10. 10/28/2020, she starts ipilimumab 1mg/kg and nivolumab 3mg/kg. She then goes on to maintenance nivolumab through 4/12/21.  11. 5/10/2021, she starts ipilimumab 3 mg/kg monotherapy q3w for 4 cycles.  12. 7/30/21, PET/CT shows mild disease progression.   13. 8/2/21, start ipilimumab 3mg/kg and nivolumab 1mg/kg and received 2 cycles, last on 8/23/21.  14. 8/23/21, develops elevated LFT's.   15. 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   16. 1/28/22, started TVEC  17. 4/22/22, progressive disease, consideration for additional therapeutic approaches.  18. 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    -Patient has ongoing right leg swelling that is getting worse.   -Does not walk normally due to prior surgery.   -Has increased back pain related to walking differently.   -Has left hip pain now as well.   -Not currently taking anything for pain. Has occasionally taken ibuprofen which does not help a lot. Gets some relief from ice and from sitting in the tub.   -Has occasional headaches.  -Continues to have difficulty eating.   -Feeling stressed about recent disease progression.  -Reports a history of a head injury in 2011 and 2014.    -Having pain in the back of her head and over her left eye that started about 2 months ago. Saw eye doctor recently with no concerns.     Current Outpatient Medications   Medication Sig Dispense Refill     cholecalciferol (VITAMIN D3) 125 MCG (5000 UT) TABS tablet Take 2,000 Units by mouth every other day        [START ON 6/2/2022] estradiol (ESTRACE) 0.1 MG/GM vaginal cream Place 2 g vaginally twice a week 42.5 g 1     fish oil-omega-3 fatty acids 1000 MG capsule Take 2 g by mouth daily       multivitamin w/minerals (THERA-VIT-M) tablet Take 1 tablet by mouth daily       nystatin (MYCOSTATIN) 439504 UNIT/ML suspension Take 5 mLs (500,000 Units) by mouth 4 times daily Swish around throat and swallow 240 mL 3     OLANZapine (ZYPREXA) 2.5 MG tablet Take 1 tablet (2.5 mg) by mouth At Bedtime 30 tablet 3     oxybutynin (DITROPAN) 5 MG tablet Take 1 tablet (5 mg) by mouth 2 times daily as needed for bladder spasms 30 tablet 0     sertraline (ZOLOFT) 25 MG tablet Take 25 mg daily x 1 week, then increase to 50 mg daily. 90 tablet 0     Physical Exam:  General: The patient is a pleasant anxious female in no acute distress. She is here today with her .   /78   Wt 64.7 kg (142 lb 11.2 oz)   BMI 22.35 kg/m    Wt Readings from Last 10 Encounters:   05/31/22 64.7 kg (142 lb 11.2 oz)   05/24/22 64.7 kg (142 lb 11.2 oz)   05/11/22 62.6 kg (138 lb 1.6 oz)   04/28/22 62.1 kg (137 lb)   03/22/22 61.7 kg (136 lb 1.6 oz)   02/16/22 64.1 kg (141 lb 6.4 oz)   01/27/22 63.4 kg (139 lb 12.8 oz)   01/20/22 64.5 kg (142 lb 3.2 oz)   01/12/22 63.7 kg (140 lb 6.9 oz)   12/17/21 64.9 kg (143 lb 1.3 oz)   HEENT: EOMI. Sclerae are anicteric.   Lymph: Neck is supple with no lymphadenopathy in the cervical or supraclavicular areas. Right inguinal area is firm.   Heart: Regular rate and rhythm.   Lungs: Clear to auscultation bilaterally.   Abdomen: Bowel sounds present, soft, nontender with no palpable masses.   Extremities: 2+  right lower extremity edema noted, no left lower extremity edema.   Neuro: Cranial nerves II through XII are grossly intact.  Musculoskeletal: Right upper thigh with approximately 6 cm palpable mass.  Skin: Left pre-auricular area with 5 mm dried scabbed firm lesion, nontender. No rashes, petechiae, or bruising noted on exposed skin.    LABS  Most Recent 3 CBC's:  Recent Labs   Lab Test 05/11/22  1115 03/22/22  1105 02/16/22  0931   WBC 5.4 5.3 5.9   HGB 12.7 13.4 12.7   MCV 94 90 92    269 286     Most Recent 3 BMP's:  Recent Labs   Lab Test 05/11/22  1115 03/22/22  1105 02/16/22  0931    140 143   POTASSIUM 4.2 4.0 3.7   CHLORIDE 105 104 107   CO2 30 28 32   BUN 27 30 21   CR 0.59 0.71 0.62   ANIONGAP 5 8 4   GABY 9.5 9.4 9.3   GLC 93 86 95     Most Recent 2 LFT's:  Recent Labs   Lab Test 05/11/22  1115 03/22/22  1105   AST 29 36   ALT 33 40   ALKPHOS 64 63   BILITOTAL 0.4 0.5     ASSESSMENT AND PLAN:  Acral melanoma, right heel primary, pT4b pN3c M1, Stage IV. Patient most recently started on treatment with Opdualag. She has had some fatigue, but otherwise thus far is tolerating it well. She will follow-up with Meghan Milian CNP prior to cycle 2. Will plan to repeat imaging after 3 cycles.    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 Recommend using ACE wraps. Will trial lymphedema therapy. Will work on seeing how soon we can get this scheduled. She has not found a benefit from leg elevation.     Anorexia and weight loss. Weight has stablized. Again, recommend Will trial Zyprexa 2.5 mg at bedtime, which may also help with insomnia.     Depression and anxiety. Again, recommend resuming sertraline 25 mg daily x 7 days, then increase to 50 mg daily. She will be meeting with the palliative care  in early June.     Leg pain. This seemed to worsen when on steroids, was improving with physical therapy, but still bothersome. Will have oxycodone available to take prn.     Headaches. Last brain MRI in  December 2021 was unremarkable. Will repeat this week, given development of headaches over the last couple of months.     Meghan Perdomo PA-C  [facility]  [address] [place] [address] [phone]    30 minutes spent on the date of the encounter doing chart review, review of test results, interpretation of tests, patient visit and documentation

## 2022-06-01 NOTE — PROGRESS NOTES
Chief Complaint   Patient presents with     Consult     Decreased hearing after steroids, 2 1/2 years. Tinnitus since Feb 2020 At night and AM. Needs hearing aid clearance

## 2022-06-01 NOTE — LETTER
6/1/2022         RE: Yadira Hoang  [address] [facility] [address]        Dear Colleague,    Thank you for referring your patient, Yadira Hoang, to the [facility]. Please see a copy of my visit note below.    Chief Complaint   Patient presents with     Consult     Decreased hearing after steroids, 2 1/2 years. Tinnitus since Feb 2020 At night and AM. Needs hearing aid clearance         HPI    This pleasant patient is having hearing loss bilaterally for the past several years. States that started after infusion therapy for her malignant melanoma removal. Describes ringing in her both ears and lightheadedness which occurs when she lays down and turns around. No dizziness when she rolls over in the bed. She has a hx of traumatic brain injury.    Results: Fair to good reliability. Mild to moderate SNHL bilaterally. 100% word rec. Tymps WNL. Absent 1 kHz ipsi/contra reflexes bilaterally.  Rec: ENT eval. prior to trial with hearing aids.    ENT Problem List  GUERLINE (obstructive sleep apnea)    Chronic congestion of paranasal sinus    Neck pain, chronic    Intractable migraine without aura and without status migrainosus    Bilateral occipital neuralgia    Articular disc disorder of temporomandibular joint    Injury of head    Malignant melanoma of right lower extremity including hip (H)    Melanoma of skin (H)    Weakness of foot    Myofascial pain    History of multiple concussions    Foot pain, bilateral    Fibromyositis    Difficulty walking    Chronic pain disorder    Chronic fatigue    S/P flap graft    Osteopenia    Hyperlipidemia LDL goal <100    ALEXUS (generalized anxiety disorder)    Major depression, recurrent, chronic (H)    Secondary malignant neoplasm of bone (H)    Generalized pruritus    Immunodeficiency, unspecified (H)    Bilateral leg pain    Chronic bilateral low back pain without sciatica    Generalized muscle weakness    Polyarthritis        Medications        cholecalciferol (VITAMIN D3) 125 MCG (5000 UT) TABS tablet      estradiol (ESTRACE) 0.1 MG/GM vaginal cream      fish oil-omega-3 fatty acids 1000 MG capsule      multivitamin w/minerals (THERA-VIT-M) tablet      nystatin (MYCOSTATIN) 528385 UNIT/ML suspension      OLANZapine (ZYPREXA) 2.5 MG tablet      oxybutynin (DITROPAN) 5 MG tablet      sertraline (ZOLOFT) 25 MG tablet      oxyCODONE (ROXICODONE) 5 MG tablet      Review of Systems   Constitutional: Negative.    HENT: Positive for hearing loss, sinus pain and tinnitus. Negative for congestion, ear discharge, ear pain, nosebleeds and sore throat.    Eyes: Negative for blurred vision, double vision and photophobia.   Respiratory: Negative for cough, hemoptysis, sputum production, shortness of breath and stridor.    Gastrointestinal: Negative for heartburn, nausea and vomiting.   Skin: Negative.    Neurological: Positive for dizziness and headaches. Negative for tingling and tremors.   Endo/Heme/Allergies: Negative for environmental allergies. Does not bruise/bleed easily.       Physical Exam  Vitals reviewed.   Constitutional:       Appearance: Normal appearance.   HENT:      Head: Normocephalic and atraumatic.      Right Ear: Tympanic membrane, ear canal and external ear normal. Decreased hearing noted. No middle ear effusion. There is no impacted cerumen.      Left Ear: Tympanic membrane, ear canal and external ear normal. Decreased hearing noted.  No middle ear effusion. There is no impacted cerumen.      Nose: No mucosal edema, congestion or rhinorrhea.      Right Turbinates: Not enlarged or swollen.      Left Turbinates: Not enlarged or swollen.      Mouth/Throat:      Mouth: Mucous membranes are moist.      Pharynx: Oropharynx is clear. Uvula midline.   Eyes:      Extraocular Movements: Extraocular movements intact.      Pupils: Pupils are equal, round, and reactive to light.   Neurological:      Mental Status: She is alert.       No spontaneous  nystagmus    A/P  This is a 77 year old patient who has been having Mild to moderate SNHL bilaterally. 100% word rec. Tymps WNL. Absent 1 kHz ipsi/contra reflexes bilaterally.  Rec:  trial with hearing aids, vestibular rehab to r/o any canalolithiasis, good hydration and f/u as needed.            Again, thank you for allowing me to participate in the care of your patient.        Sincerely,        Lane Cohen MD

## 2022-06-01 NOTE — TELEPHONE ENCOUNTER
Patient notified of the information to schedule the EGD. She states they called to schedule GI and soonest appointment was for February of 2023.    Please advise how you would like to proceed.    Shayla Whitfield RN [facility]

## 2022-06-01 NOTE — TELEPHONE ENCOUNTER
Please let pt know.     GI recommended: repeat EGD to be sure the fungus has clear. Ordered placed for this. Call [phone] to schedule at [facility].    GI will try to arrange for an earlier appointment for her after the EGD. She will wait a call from them for this.

## 2022-06-01 NOTE — PROGRESS NOTES
"ALL SMARTFIELDS MUST BE COMPLETED FOR PATIENT CARE AND BILLING    6/1/2022     E-Consult has been accepted.    Interprofessional consultation requested by:  Erika Blanton MD PhD      Clinical Question/Purpose: MY CLINICAL QUESTION IS: treatment for esophageal candidiasis in this patient, chronic \"sick stomach\", lack of appetite.     Patient assessment and information reviewed:   1. Long standing dyspepsia  2. Prior diagnosis of IBS  3. Candida esophagitis on EGD 1/2022    Candida esophagitis typically does not cause abdominal pain. Symptoms usually are odynophagia, dysphagia or GERD. Recommend repeating EGD to ensure candida has resolved.    Given longstanding GI symptoms recommend patient be seen in clinic to discuss those further. I will message clinic to help find a sooner appointment than February.    Recommendations:   -please order referral for EGD to ensure candida esophagitis has healed  -we will arrange formal GI consult.       The recommendations provided in this E-Consult are based on a review of clinical data pertinent to the clinical question presented, without a review of the patient's complete medical record or, the benefit of a comprehensive in-person or virtual patient evaluation. This consultation should not replace the clinical judgement and evaluation of the provider ordering this E-Consult. Any new clinical issues, or changes in patient status since the filing of this E-Consult will need to be taken into account when assessing these recommendations. Please contact me if you have further questions.    My total time spent reviewing clinical information and formulating assessment was 15 minutes.    Report sent automatically to requesting provider once signed.     Harley Austin MD  "

## 2022-06-01 NOTE — PROGRESS NOTES
HPI    This pleasant patient is having hearing loss bilaterally for the past several years. States that started after infusion therapy for her malignant melanoma removal. Describes ringing in her both ears and lightheadedness which occurs when she lays down and turns around. No dizziness when she rolls over in the bed. She has a hx of traumatic brain injury.    Results: Fair to good reliability. Mild to moderate SNHL bilaterally. 100% word rec. Tymps WNL. Absent 1 kHz ipsi/contra reflexes bilaterally.  Rec: ENT eval. prior to trial with hearing aids.    ENT Problem List  GUERLINE (obstructive sleep apnea)    Chronic congestion of paranasal sinus    Neck pain, chronic    Intractable migraine without aura and without status migrainosus    Bilateral occipital neuralgia    Articular disc disorder of temporomandibular joint    Injury of head    Malignant melanoma of right lower extremity including hip (H)    Melanoma of skin (H)    Weakness of foot    Myofascial pain    History of multiple concussions    Foot pain, bilateral    Fibromyositis    Difficulty walking    Chronic pain disorder    Chronic fatigue    S/P flap graft    Osteopenia    Hyperlipidemia LDL goal <100    ALEXUS (generalized anxiety disorder)    Major depression, recurrent, chronic (H)    Secondary malignant neoplasm of bone (H)    Generalized pruritus    Immunodeficiency, unspecified (H)    Bilateral leg pain    Chronic bilateral low back pain without sciatica    Generalized muscle weakness    Polyarthritis        Medications       cholecalciferol (VITAMIN D3) 125 MCG (5000 UT) TABS tablet      estradiol (ESTRACE) 0.1 MG/GM vaginal cream      fish oil-omega-3 fatty acids 1000 MG capsule      multivitamin w/minerals (THERA-VIT-M) tablet      nystatin (MYCOSTATIN) 001957 UNIT/ML suspension      OLANZapine (ZYPREXA) 2.5 MG tablet      oxybutynin (DITROPAN) 5 MG tablet      sertraline (ZOLOFT) 25 MG tablet      oxyCODONE (ROXICODONE) 5 MG tablet      Review of  Systems   Constitutional: Negative.    HENT: Positive for hearing loss, sinus pain and tinnitus. Negative for congestion, ear discharge, ear pain, nosebleeds and sore throat.    Eyes: Negative for blurred vision, double vision and photophobia.   Respiratory: Negative for cough, hemoptysis, sputum production, shortness of breath and stridor.    Gastrointestinal: Negative for heartburn, nausea and vomiting.   Skin: Negative.    Neurological: Positive for dizziness and headaches. Negative for tingling and tremors.   Endo/Heme/Allergies: Negative for environmental allergies. Does not bruise/bleed easily.       Physical Exam  Vitals reviewed.   Constitutional:       Appearance: Normal appearance.   HENT:      Head: Normocephalic and atraumatic.      Right Ear: Tympanic membrane, ear canal and external ear normal. Decreased hearing noted. No middle ear effusion. There is no impacted cerumen.      Left Ear: Tympanic membrane, ear canal and external ear normal. Decreased hearing noted.  No middle ear effusion. There is no impacted cerumen.      Nose: No mucosal edema, congestion or rhinorrhea.      Right Turbinates: Not enlarged or swollen.      Left Turbinates: Not enlarged or swollen.      Mouth/Throat:      Mouth: Mucous membranes are moist.      Pharynx: Oropharynx is clear. Uvula midline.   Eyes:      Extraocular Movements: Extraocular movements intact.      Pupils: Pupils are equal, round, and reactive to light.   Neurological:      Mental Status: She is alert.       No spontaneous nystagmus    A/P  This is a 77 year old patient who has been having Mild to moderate SNHL bilaterally. 100% word rec. Tymps WNL. Absent 1 kHz ipsi/contra reflexes bilaterally.  Rec:  trial with hearing aids, vestibular rehab to r/o any canalolithiasis, good hydration and f/u as needed.

## 2022-06-01 NOTE — NURSING NOTE
Yadira Hoang's chief complaint for this visit includes:  Chief Complaint   Patient presents with     Consult     Decreased hearing after steroids, 2 1/2 years. Tinnitus since Feb 2020 At night and AM. Needs hearing aid clearance     PCP: Erika Blanton    Referring Provider:  Meghan Perdomo PA-C  [address]  [place] [address]    /67   Pulse 88   Data Unavailable        Allergies   Allergen Reactions     Prednisone      Leg and back pain     Atorvastatin      Statins all swelling     Hmg-Coa-R Inhibitors Swelling         Do you need any medication refills at today's visit?

## 2022-06-02 NOTE — PROGRESS NOTES
Appropriate assistive devices provided during their visit. yes (Yes, No, N/A) cane (list device)    Exam table and/or cart  placed in the lowest position. yes (Yes, No, N/A)    Brakes on tables/carts/wheelchairs used at all times. yes (Yes, No, N/A)    Non slip footwear applied. na (Yes, No, NA)    Patient was accompanied by staff throughout visit. yes (Yes, No, N/A)    Equipment safety straps used. N/a (Yes, No, N/A)    Assist with toileting. N/a (Yes, No, N/A)

## 2022-06-02 NOTE — TELEPHONE ENCOUNTER
"Just to clarify so she does not need to follow up with GI after the EGD?    \"GI will try to arrange for an earlier appointment for her after the EGD. She will wait a call from them for this.\"       Shayla Whitfield RN [facility]    "

## 2022-06-02 NOTE — TELEPHONE ENCOUNTER
Screening Questions  BlueKIND OF PREP RedLOCATION [review exclusion criteria] GreenSEDATION TYPE  1. Have you had a positive covid test in the last 90 days? N     2. Do you have a legal guardian or medical Power of ?  Are you able to give consent for your medical care?Y (Sedation review/consideration needed)    3. Are you active on mychart? Y    4. What insurance is in the chart? BCBS MEDICARE     3.   Ordering/Referring Provider: Erika Blanton MD PhD    4. BMI 22.2 [BMI OVER 40-EXTENDED PREP]  If greater than 40 review exclusion criteria [PAC APPT IF @ [facility]]      5.  Respiratory Screening :  [If yes to any of the following HOSPITAL setting only]     Do you use daily home oxygen? N    Do you have mod to severe Obstructive Sleep Apnea? N MILD PER PT  [OKAY @ [facility] SH PH RI]   Do you have Pulmonary Hypertension? N     Do you have UNCONTROLLED asthma? N        6.   Have you had a heart or lung transplant? N      7.   Are you currently on dialysis? N [ If yes, G-PREP & HOSPITAL setting only]     8.   Do you have chronic kidney disease? N [ If yes, G-PREP ]    9.   Have you had a stroke or Transient ischemic attack (TIA - aka  mini stroke ) within 6 months?  N (If yes, please review exclusion criteria)    10.   In the past 6 months, have you had any heart related issues including cardiomyopathy or heart attack? N           If yes, did it require cardiac stenting or other implantable device? N      11.   Do you have any implantable devices in your body (pacemaker, defib, LVAD)? N (If yes, please review exclusion criteria)    12.   Do you take nitroglycerin? N           If yes, how often? N  (if yes, HOSPITAL setting ONLY)    13.   Are you currently taking any blood thinners? N           [IF YES, INFORM PATIENT TO FOLLOW UP W/ ORDERING PROVIDER FOR BRIDGING INSTRUCTIONS]     14.   Do you have a diagnosis of diabetes? N   [ If yes, G-PREP ]    15.   [FEMALES] Are you currently pregnant? N    If yes, how many  weeks? N    16.   Are you taking any prescription pain medications on a routine schedule?  N  [ If yes, EXTENDED PREP.] [If yes, MAC]    17.   Do you have any chemical dependencies such as alcohol, street drugs, or methadone?  N [If yes, MAC]    18.   Do you have any history of post-traumatic stress syndrome, severe anxiety or history of psychosis?  N  [If yes, MAC]    19.   Do you transfer independently?  Y    20.  On a regular basis do you go 3-5 days between bowel movements?   [ If yes, EXTENDED PREP.]    21.   Preferred LOCAL Pharmacy for Pre Prescription        [facility] [address] [place] [address] [id number] [facility]        Scheduling Details      Caller :  Yadira Ochoa   (Please ask for phone number if not scheduled by patient)    Type of Procedure Scheduled: Upper Endoscopy [EGD]  Which Colonoscopy Prep was Sent?:   GERALD CF PATIENTS & GROEN'S PATIENTS NEEDS EXTENDED PREP  Surgeon: JOSE MIGUEL  Date of Procedure: 6/20  Location:     Sedation Type: CS    Conscious Sedation- Needs  for 6 hours after the procedure  MAC/General-Needs  for 24 hours after procedure    Pre-op Required at [facility] [place], [place] and OR for MAC sedation: N  (advise patient they will need a pre-op prior to procedure -)      Informed patient they will need an adult  Y  Cannot take any type of public or medical transportation alone    Pre-Pro[facility] or Externally: HOME TEST     Confirmed Nurse will call to complete assessment Y    Additional comments:

## 2022-06-02 NOTE — PROGRESS NOTES
[facility]    OUTPATIENT Physical Therapy ORTHOPEDIC EVALUATION  PLAN OF TREATMENT FOR OUTPATIENT REHABILITATION  (COMPLETE FOR INITIAL CLAIMS ONLY)  Patient's Last Name, First Name, M.I.  YOB: 1945  ViktorYadira Ochoa  RUBI    Provider s Name:  [facility]   Medical Record No.  [id number]   Start of Care Date:  03/02/22   Onset Date:   01/04/22   Type:     _X__PT   ___OT Medical Diagnosis:    Encounter Diagnoses   Name Primary?    Bilateral leg pain Yes    Chronic bilateral low back pain without sciatica     Generalized muscle weakness         Treatment Diagnosis:  Bilateral Leg Pain / Low Back Pain / Deconditioning        Goals:     06/02/22 0500   Body Part   Goals listed below are for Deconditioning / Low Back Pain / Bilateral Leg Pain   Goal #1   Goal #1 ambulation   Previous Functional Level No restrictions   Performance Level No restrictions   Current Functional Level Minutes patient can walk   Performance Level Cannot stand or walk for 10 minutes without leg pain, back pain or need to rest   STG Target Performance Minutes patient will be able to walk   Performance Level Patient will tolerate 10 minutes of ambulation without fatigue, low back pain worse than 2/10 and bilateral leg pain worse than 2/10   Rationale for safe household ambulation;for safe outdoor household ambulation   Due Date 03/31/22   Date Goal Met 05/18/22    LTG Target Performance Hours patient will be able to walk   Performance Level Patient will tolerate 60 minutes of ambulation without fatigue, low back pain worse than 2/10 and bilateral leg pain worse than 2/10   Rationale for safe household ambulation;for safe outdoor household ambulation   Due Date 05/30/22   Date Goal Met 05/18/22   Goal #2   Goal #2 pushing/pulling   Previous Functional Level No restrictions   Performance level No  restrictions   Current Functional Level Cannot push;Cannot pull   Performance level Without fatigue, low back pain or leg pain   STG Target Performance Push an item weighing;Pull an item weighing   Performance level 5 lbs Without fatigue, low back pain or leg pain worse than 2/10   Rationale for vacuuming;for snow removal;for grocery shopping   Due date 03/31/22   Date Goal Met 05/18/22   LTG Target Performance Push an item weighing;Pull an item weighing   Performance Level 12 lbs Without fatigue, low back pain or leg pain worse than 2/10   Rationale for vacuuming;for snow removal;for grocery shopping   Due date 07/30/22   If goal not met, Why? Struggling with side effects and additional pain from other conditions such as headaches, vertigo, foot pain, lymphedema and cancer/infusion treatments. Functional is improving nicely however       Therapy Frequency:  1x a week  Predicted Duration of Therapy Intervention:  2 months    Chau Clifton, PT                 I CERTIFY THE NEED FOR THESE SERVICES FURNISHED UNDER        THIS PLAN OF TREATMENT AND WHILE UNDER MY CARE     (Physician co-signature of this document indicates review and certification of the therapy plan).                     Certification Date From:  05/30/22   Certification Date To:  07/31/22    Referring Provider:  Maxi Mullen MD    Initial Assessment        See Epic Evaluation SOC Date: 03/02/22

## 2022-06-03 NOTE — PROGRESS NOTES
Yadira is a 77 year old who is being evaluated via a billable video visit.      How would you like to obtain your AVS? [employer]  If the video visit is dropped, the invitation should be resent by: Text to cell phone: [phone]   Will anyone else be joining your video visit? No      Video Start Time: 10:25 AM  Video-Visit Details    Type of service:  Video Visit    Video End Time:10:46 AM    Originating Location (pt. Location): Home    Distant Location (provider location):  [facility]     Platform used for Video Visit: Sanjuanita Miller    MEDICAL ONCOLOGY PROGRESS NOTE  Melanoma Clinic  Jun 6, 2022     CHIEF COMPLAINT: Acral melanoma    Melanoma History:  1. She noted a painful lesion on the sole of the right foot for a few months, since last summer.  It initially was small then became raised.  It spontaneously bled. She is not entirely sure of its appearance initially.  She denies noting any masses behind the knee or in the groin.  2. 1/9/2020, punch biopsy was performed by Dr Jessica Meadows. Patology showed melanoma, at least 3.4 mm deep with ulceration and 0 mitoses, and perineural invasion present. TILs were non-brisk and LVI not identified. pT3b.  3. 2/10/2020, she has right femoral sentinel lymph node biopsy and wide local excision (Specimen #: [id number]) and the right heel lesion extends to a depth of 6.6 mm, and invasive melanoma is present at 0.2 mm from the deep margin. Microsatellites are also present. There was 1 (of 2) lymph nodes involved. The lymph node tissue exhibits extensive subcapsular and parenchymal clusters of melanoma cells, highlighted on Melan-A immunostain. The largest cluster is 7 millimeters in greatest diameter. pT4b pN2c. PD-L1 staining is negative, <1%. No mutation in BRAF, KIT, or NRAS.  4. 2/22/2020, she had PET-CT, which showed intense FDG uptake in a right inguinal lymph node, concerning for an additional focus of metastatic disease. There is also  an indeterminate right external iliac lymph node with mild FDG uptake.  5. 3/4/2020, she has medial plantar artery  fasciocutaneous instep flap and Integra placement to the donor site. On 4/2/2020, she has additional reconstruction with skin grafting.  6. 5/22/2020, PET-CT shows a hypermetabolic right inguinal and right external iliac chain lymph node and stable pulmonary nodules.  7. 5/27/2020, she starts nivolumab for presumed low volume lymph node predominant metastatic disease.  8. 8/21/2020, PET-CT shows increase in hypermetabolic adenopathy, and a hypermetabolic nodule in segment 2 of the liver. Given low volume of disease she prefers to continue therapy with short interval follow-up.  9. 10/9/2020, PET-CT shows increasing size of hepatic metastases with increased hypermetabolism, increased right inguinal and iliac lymphadenopathy, and new FDG uptake right T2 transverse process and right side of S1. MRI-brain obtained 10/21/2020 is negative for brain metastasis.  10. 10/28/2020, she starts ipilimumab 1mg/kg and nivolumab 3mg/kg. She then goes on to maintenance nivolumab through 4/12/21.  11. 5/10/2021, she starts ipilimumab 3 mg/kg monotherapy q3w for 4 cycles.  12. 7/30/21, PET/CT shows mild disease progression.   13. 8/2/21, start ipilimumab 3mg/kg and nivolumab 1mg/kg and received 2 cycles, last on 8/23/21.  14. 8/23/21, develops elevated LFT's.   15. 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   16. 1/28/22, started TVEC  17. 4/22/22, progressive disease, consideration for additional therapeutic approaches.  18. 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 She presents today for a virtual visit prior to cycle 2 Opdualag.    -Tolerated cycle 1 well with mild fatigue lasting a few days.  -Continues to be frustrated about lower extremity edema. Has not tried ACE wrap as she states she could not  find the correct supplies.  -Will be seeing lymphedema therapy on Saturday 6/11.  -Has had mild, intermittent constipation. Controlled with Miralax.  -Has not tried Zyprexa for appetite. She'd like to hear from Dr. Franklin directly that this is okay to use with liver involvement.  -States she has met with a dietitian in the past. Does not wish to meet with nutrition again.  -Continues to feel concerned about her abdominal pain and inability to gain weight.  -Feels that a palpable inguinal lymph node may be a bit larger.  -Denies chest pain, shortness of breath, cough, diarrhea, joint pain or skin rash/dryness    Current Outpatient Medications   Medication Sig Dispense Refill     cholecalciferol (VITAMIN D3) 125 MCG (5000 UT) TABS tablet Take 2,000 Units by mouth every other day        estradiol (ESTRACE) 0.1 MG/GM vaginal cream Place 2 g vaginally twice a week 42.5 g 1     fish oil-omega-3 fatty acids 1000 MG capsule Take 2 g by mouth daily       multivitamin w/minerals (THERA-VIT-M) tablet Take 1 tablet by mouth daily       nystatin (MYCOSTATIN) 644272 UNIT/ML suspension Take 5 mLs (500,000 Units) by mouth 4 times daily Swish around throat and swallow 240 mL 3     OLANZapine (ZYPREXA) 2.5 MG tablet Take 1 tablet (2.5 mg) by mouth At Bedtime 30 tablet 3     oxybutynin (DITROPAN) 5 MG tablet Take 1 tablet (5 mg) by mouth 2 times daily as needed for bladder spasms 30 tablet 0     oxyCODONE (ROXICODONE) 5 MG tablet Take 1-2 tablets (5-10 mg) by mouth every 4 hours as needed for moderate to severe pain 20 tablet 0     sertraline (ZOLOFT) 25 MG tablet Take 25 mg daily x 1 week, then increase to 50 mg daily. 90 tablet 0     Physical Exam:  There were no vitals taken for this visit.  Wt Readings from Last 10 Encounters:   05/31/22 64.7 kg (142 lb 11.2 oz)   05/24/22 64.7 kg (142 lb 11.2 oz)   05/11/22 62.6 kg (138 lb 1.6 oz)   04/28/22 62.1 kg (137 lb)   03/22/22 61.7 kg (136 lb 1.6 oz)   02/16/22 64.1 kg (141 lb 6.4 oz)    01/27/22 63.4 kg (139 lb 12.8 oz)   01/20/22 64.5 kg (142 lb 3.2 oz)   01/12/22 63.7 kg (140 lb 6.9 oz)   12/17/21 64.9 kg (143 lb 1.3 oz)     Video physical exam  General: Patient appears well in no acute distress.   Skin: No visualized rash or lesions on visualized skin  Eyes: EOMI, no erythema, sclera icterus or discharge noted  Resp: Appears to be breathing comfortably without accessory muscle usage, speaking in full sentences, no cough  MSK: Appears to have normal range of motion based on visualized movements  Neurologic: No apparent tremors, facial movements symmetric  Psych: affect pleasant/anxious, alert and oriented    The rest of a comprehensive physical examination is deferred due to PHE (public health emergency) video restrictions    LABS  Most Recent 3 CBC's:  Recent Labs   Lab Test 05/11/22  1115 03/22/22  1105 02/16/22  0931   WBC 5.4 5.3 5.9   HGB 12.7 13.4 12.7   MCV 94 90 92    269 286     Most Recent 3 BMP's:  Recent Labs   Lab Test 05/11/22  1115 03/22/22  1105 02/16/22  0931    140 143   POTASSIUM 4.2 4.0 3.7   CHLORIDE 105 104 107   CO2 30 28 32   BUN 27 30 21   CR 0.59 0.71 0.62   ANIONGAP 5 8 4   GABY 9.5 9.4 9.3   GLC 93 86 95     Most Recent 2 LFT's:  Recent Labs   Lab Test 05/11/22  1115 03/22/22  1105   AST 29 36   ALT 33 40   ALKPHOS 64 63   BILITOTAL 0.4 0.5     ASSESSMENT AND PLAN:  Acral melanoma, right heel primary, pT4b pN3c M1, Stage IV. Started Opdualag 5/11/22 and tolerated cycle 1 well with only fatigue. Proceed with cycle 2 6/8/22 pending labs. Will plan to repeat imaging after 3 cycles.  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 Again recommend using ACE wraps and elevation. Lymphedema therapy scheduled later this week. Will work on seeing how soon we can get this scheduled. She has not found a benefit from leg elevation.     Anorexia and weight loss. Weight has stablized. Previously a trial of Zyprexa 2.5 mg at bedtime  was recommended, which may also help with insomnia. She would like to wait to discuss this with Dr. Franklin. Working with PCP for appetite issues. EGD scheduled later this month.    Depression and anxiety. Again, recommend resuming sertraline 25 mg daily x 7 days, then increase to 50 mg daily. She will be meeting with the palliative care  today. Provided emotional support in discussing her concern with recent disease progression. Reassured her that it is too soon to evaluate effectiveness of new treatment but it is positive that she has tolerated her first cycle without significant toxicities.     Leg pain. This seemed to worsen when on steroids, was improving with physical therapy, but still bothersome. Was given oxycodone to use PRN but has not used as she is very hesitant to use opioids.    Headaches. Brain MRI repeated 6/2/22 negative for acute infarct or metastatic disease.    40 minutes spent on the date of the encounter doing chart review, review of test results, interpretation of tests, patient visit and documentation     Meghan Milian CNP  [facility]  [address] [place] [address] [id number] [phone]

## 2022-06-06 NOTE — PROGRESS NOTES
Telemedicine Visit: The patient's condition can be safely assessed and treated via synchronous audio and visual telemedicine encounter.      Reason for Telemedicine Visit: covid19 precautions     Originating Site (Patient Location): Patient's home    Distant Site (Provider Location): [facility]: oncology clinic     Consent:  The patient/guardian has verbally consented to: the potential risks and benefits of telemedicine (video visit) versus in person care; bill my insurance or make self-payment for services provided; and responsibility for payment of non-covered services.     Mode of Communication:  Video Conference via [employer]    As the provider I attest to compliance with applicable laws and regulations related to telemedicine.      Palliative Care Counseling Services - Initial Assessment    PLEASE NOTE:  THIS IS A MENTAL HEALTH NOTE.  OTHER PROVIDERS VIEWING THIS NOTE SHOULD USE THIS ONLY FOR UNDERSTANDING THE CONTEXT OF THE PATIENT S EXPERIENCE.  TOPICS DESCRIBED IN THIS NOTE SHOULD NOT BE REFERENCED TO THE PATIENT BY MEDICAL PROVIDERS      Yadira Hoang  is a 77  year old woman with diagnosis of aeral melanoma, ALEXUS, Depression, seen today for initial palliative care counseling assessment via MICMALI video.    Referred by: Dr. Cole (palliative)    Presenting Issues: Coping with illness and see sleep concerns noted in Dr. Cole's documentation     Preferred Name: Yadira    Mental Status Exam: (List all that apply)      Appearance: Appropriate      Eye Contact: Good       Orientation: Yes, x4      Mood: Normal  Irritable-- slightly irritable at start of visit, which she attributed to fatigue from spending the day with her young grandchildren. Mood seemed to improve as visit progressed       Affect: Appropriate      Thought Content: Clear         Thought Form: Logical      Psychomotor Behavior: Normal    Family:       Marital status:     Years : 52     Years together: not asked      "Name of spouse/partner: Sulaiman Hoang      Children: 4 children all live within an hour.  Her youngest son just moved back in with Yadira and Sulaiman along with his 2 year old daughter Verónica       Parents: not asked      Siblings: Yadira is the youngest of 6.  I believe 3 siblings are still living.  She describes multiple losses including a sister who  from dementia, a sister who is living but has dementia, and a brother who  from pancreatic cancer.  A few in laws have also .  One sister lives nearby and is involved and supportive in Yadira's life.  SHe describes them as a close knit family unit.       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     Medical History/Issues (patient account): diagnosed with \"Rare cancer-- it doesn't come from the sun\" on foot.  [facility] which felt like [facility]. Last year blood count went up so was started on steroids \"they backfired\" led to leg pain and other side effects.  Was seeing a cancer PT, leg was swollen, tests were done \"no one had told me the treatments weren't working\".  Reports she was tOld that if this current treatment doesn't work then she will have to switch to chemotherapy.  Understands diagnosis is terminal \"but I'm not " "ready to die yet\".  Wants more time with youngest grandchild.   Has been struggling with appetite and weight loss.  Has lots of food intolerances / sensitivities      Pain/Discomfort Issues: Pain, Fatigue, Sleep problems, Anxiety and Existential distress       Coping: Reports long history of depression and anxiety for which she has been treated much of her life (see below).  Struggling with fatigue, general sense of emotional exhaustion, due in part to having her son and granddaughter move back home,  also has medical issues (rheumatoid arthritis); dependent on others for transport to appointments; struggling with longing for life before illness, struggling with not feeling \"more positive\"     Sleep: Will have nights where she wakes up and cannot fall back asleep for a few hours.  Has read on cancer support resources online that this is a common occurrence for people living with cancer     Sexual Health/Intimacy: Not explored in depth today, but notes \"it's different\"  expresses worry he will cause harm to her despite her reassurances     Mental Health History and Current Review of Symptoms (patient account):     Depression in past?: yes \"I've had anxiety and depression for most of my life\"   Treatment in past?:  yes medications, therapy  Treatment currently?:  Yes -- has a psychologist who she sees biweekly.  Hoping to find additional emotional support from someone with more familiarity around serious illness. Continues to see psychologist    Depression symptoms currently?:  - Anhedonia:  yes  - Hopeless or down mood:  yes  - Sleep problems (too much or too little):  yes  - Fatigue:  yes  - Appetite (too much or too little):  yes  - Excessively negative self perception:  no  - Trouble concentrating:  no  - Motor slowness:  no  - Current and/or recent thoughts of suicide:  no    Suicide risk screen:  - Past thoughts of suicide?  no  - Past attempts to end own life?  no  - If yes, how? No past " "attempts  - Protective factors currently? \"I would never take my own life\" no history of attempts, alicia  - Safety plan needed currently? Not indicated at this time    Anxiety in past?: yes   Treatment in past?  yes medications, therapy  Treatment currently?  yes therapy    Anxiety symptoms currently?  - Nervous, on edge:    - Sleep problems:    - Worrying a lot/hard to manage worries:  yes  Specific recent areas of worry/fear/concern: illness, worries about her response to illness and not feeling more positive  - Tense, hard to relax:  Not asked   - Feeling restless, hard to sit still:  no  - Irritable:  Not asked -- did share that having her son and granddaughter living with her has felt difficult and emotionally and physically draining  - Feeling of dread/ afraid that something awful may happen:  no  - How long?   - Impacts on daily life?             Grief vs Depression:  Endorses symptoms for: Grief and depressive episode    Psychological Trauma and/or Major Losses:   Not explored today     Nightmares?      Thought about when not wanting to think about?   Tried hard not to think about it?   Avoided situations that reminded you of it?   Felt constantly on guard, watchful, or easily startled?   Felt numb or detached from others, activities, or your surroundings?     Safety Screen:  History of being harmed or controlled by someone close to you?    Being hurt or controlled by someone close to you?    Worried will be harmed in future?    Worried will harm someone else in future?      CD History:   Using alcohol actively? no    Amount per week: na  Current concerns (self or other) about alcohol or drug use? no  Past concerns and/or CD treatment? no        Alicia/Spirituality:       Belong to a alicia community: yes     Specify:  Scientology      Identify with a particular Anglican: Cathorlic      Identify as spiritual: yes      How find expression: Prayer and Attending services    Hope:      What do you hope for:    For " enough time with youngest grandchild that she will be able to have memories with Yadira; for mental health support that allows for processing of serious illness       What gives you hope:   Not explored directly today but Yadira referenced the connections she feels with her family, especially her cullen with her oldest grandson as a source of pride, and as a continuation of her family legacy which valued family closeness and connection      Internal Resources (positive memories, sources of seth): not explored today    Perceived Needs: continued emotional support related to serious illness     Resource needs/Referrals: None    Yadira Schwartz is a 77 year old woman. They were referred by Dr. Cole for Initial pallaitive care clinical social work assessment and attend the interview today via Dash video. They are diagnosed with melanoma, and in addition to their medical diagnosis also meets criteria for longstanding Depression and Generalized Anxiety disorder. Their symptoms include low mood, feelings of ongoing emotional and physical fatigue, struggling with why she doesn't feel more positive as she navigates her cancer diagnosis . These symptoms have been present to varying degrees for most of Yadira's life, per her report.   and the client has experienced functional impairment in sense of ability to cope with her cancer diagnosis since her symptoms have worsened and she learned that current treatment regimen was not working. It appears that contributing factors for their emotional distress  Include:  also has RA and has medical needs, son and his 2 year old daughter just moved back in with Yadira and Sulaiman. The COVID pandemic has led to feelings of isolation; multiple losses in recent years including the death of 2 siblings.     relationships include  of 50+ years, 4 adult children, 8 grandchildren.  Describes these relationships as supportive.. Their medical condition has the potential to influence their mental  health in the following ways: internal resources feel less accessible, worries about starting an antidepressant or anti anxiety medication because her liver function is compromised, cancer side effects have impacted her appetite and eating is particularly difficult right now.        Other mental health diagnoses that were considered include: adjustment disorder. The symptoms related to these possible diagnoses can currently be explained by long history of depression and anxiety.  Has support from long time psychologist.   It is medically necessary for this client to receive outpatient psychotherapy services at this time. If their current mental health symptoms go untreated it is likely that distress related to illness will increase, possibly triggering a severe depressive episode or trauma response. My recommendations for services for this client include continuing psychotherapy with current psychologist, continue support from palliative care MD, narrative therapy, CBT, meaning centered psychotherapy. The client's prognosis from a mental health perspective is good.          Intervention: Initial palliative care counseling / clinical social work evaluation was conducted.  Palliative Care Counseling interventions available were discussed, including counseling related to serious illness, behavioral interventions for symptom management, consultation regarding goals of care/health care directive/POLST, and other interventions specific to the patient's situation or concerns.     Plan: follow up visits scheduled through August    DSM5 Diagnoses:   296.32 Major Depressive Disorder, Recurrent Episode, Moderate With melancholic features  300.02 (F41.1) Generalized Anxiety Disorder    Time Spent with Patient/Family: 62 minutes  (Start 3:00, end 4:02)    JORGE Noriega, [facility]   Palliative Care    Pgr:[phone]  Ph: [phone]      DO NOT SEND ANY LETTERS

## 2022-06-06 NOTE — NURSING NOTE
Patient confirms medications and allergies are accurate via patients echeck in completion, and or denies any changes since last reviewed/verified.     Jeremie Miller, Virtual Facilitator

## 2022-06-06 NOTE — LETTER
6/6/2022         RE: Yadira Hoang  [address] [facility] [address]        Dear Colleague,    Thank you for referring your patient, Yadira Hoang, to the [facility] [place]. Please see a copy of my visit note below.    Telemedicine Visit: The patient's condition can be safely assessed and treated via synchronous audio and visual telemedicine encounter.      Reason for Telemedicine Visit: covid19 precautions     Originating Site (Patient Location): Patient's home    Distant Site (Provider Location): [facility]: oncology clinic     Consent:  The patient/guardian has verbally consented to: the potential risks and benefits of telemedicine (video visit) versus in person care; bill my insurance or make self-payment for services provided; and responsibility for payment of non-covered services.     Mode of Communication:  Video Conference via [employer]    As the provider I attest to compliance with applicable laws and regulations related to telemedicine.      Palliative Care Counseling Services - Initial Assessment    PLEASE NOTE:  THIS IS A MENTAL HEALTH NOTE.  OTHER PROVIDERS VIEWING THIS NOTE SHOULD USE THIS ONLY FOR UNDERSTANDING THE CONTEXT OF THE PATIENT S EXPERIENCE.  TOPICS DESCRIBED IN THIS NOTE SHOULD NOT BE REFERENCED TO THE PATIENT BY MEDICAL PROVIDERS      Yadira Hoang  is a 77  year old woman with diagnosis of aeral melanoma, ALEXUS, Depression, seen today for initial palliative care counseling assessment via AQH video.    Referred by: Dr. Cole (palliative)    Presenting Issues: Coping with illness and see sleep concerns noted in Dr. Cole's documentation     Preferred Name: Yadira    Mental Status Exam: (List all that apply)      Appearance: Appropriate      Eye Contact: Good       Orientation: Yes, x4      Mood: Normal  Irritable-- slightly irritable at start of visit, which she attributed to fatigue from spending the day with her young  "grandchildren. Mood seemed to improve as visit progressed       Affect: Appropriate      Thought Content: Clear         Thought Form: Logical      Psychomotor Behavior: Normal    Family:       Marital status:     Years : 52     Years together: not asked     Name of spouse/partner: Sulaiman Hoang      Children: 4 children all live within an hour.  Her youngest son just moved back in with Yadira and Sulaiman along with his 2 year old daughter Verónica       Parents: not asked      Siblings: Yadira is the youngest of 6.  I believe 3 siblings are still living.  She describes multiple losses including a sister who  from dementia, a sister who is living but has dementia, and a brother who  from pancreatic cancer.  A few in laws have also .  One sister lives nearby and is involved and supportive in Yadira's life.  SHe describes them as a close knit family unit.       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     Medical History/Issues (patient account): diagnosed with \"Rare cancer-- it doesn't come from the sun\" on foot.  [facility], which felt like a blessing. Last year blood count went up so was started on steroids \"they backfired\" led to leg pain and other side effects.  " "Was seeing a cancer PT, leg was swollen, tests were done \"no one had told me the treatments weren't working\".  Reports she was tOld that if this current treatment doesn't work then she will have to switch to chemotherapy.  Understands diagnosis is terminal \"but I'm not ready to die yet\".  Wants more time with youngest grandchild.   Has been struggling with appetite and weight loss.  Has lots of food intolerances / sensitivities      Pain/Discomfort Issues: Pain, Fatigue, Sleep problems, Anxiety and Existential distress       Coping: Reports long history of depression and anxiety for which she has been treated much of her life (see below).  Struggling with fatigue, general sense of emotional exhaustion, due in part to having her son and granddaughter move back home,  also has medical issues (rheumatoid arthritis); dependent on others for transport to appointments; struggling with longing for life before illness, struggling with not feeling \"more positive\"     Sleep: Will have nights where she wakes up and cannot fall back asleep for a few hours.  Has read on cancer support resources online that this is a common occurrence for people living with cancer     Sexual Health/Intimacy: Not explored in depth today, but notes \"it's different\"  expresses worry he will cause harm to her despite her reassurances     Mental Health History and Current Review of Symptoms (patient account):     Depression in past?: yes \"I've had anxiety and depression for most of my life\"   Treatment in past?:  yes medications, therapy  Treatment currently?:  Yes -- has a psychologist who she sees biweekly.  Hoping to find additional emotional support from someone with more familiarity around serious illness. Continues to see psychologist    Depression symptoms currently?:  - Anhedonia:  yes  - Hopeless or down mood:  yes  - Sleep problems (too much or too little):  yes  - Fatigue:  yes  - Appetite (too much or too little):  yes  - " "Excessively negative self perception:  no  - Trouble concentrating:  no  - Motor slowness:  no  - Current and/or recent thoughts of suicide:  no    Suicide risk screen:  - Past thoughts of suicide?  no  - Past attempts to end own life?  no  - If yes, how? No past attempts  - Protective factors currently? \"I would never take my own life\" no history of attempts, bisi  - Safety plan needed currently? Not indicated at this time    Anxiety in past?: yes   Treatment in past?  yes medications, therapy  Treatment currently?  yes therapy    Anxiety symptoms currently?  - Nervous, on edge:    - Sleep problems:    - Worrying a lot/hard to manage worries:  yes  Specific recent areas of worry/fear/concern: illness, worries about her response to illness and not feeling more positive  - Tense, hard to relax:  Not asked   - Feeling restless, hard to sit still:  no  - Irritable:  Not asked -- did share that having her son and granddaughter living with her has felt difficult and emotionally and physically draining  - Feeling of dread/ afraid that something awful may happen:  no  - How long?   - Impacts on daily life?             Grief vs Depression:  Endorses symptoms for: Grief and depressive episode    Psychological Trauma and/or Major Losses:   Not explored today     Nightmares?      Thought about when not wanting to think about?   Tried hard not to think about it?   Avoided situations that reminded you of it?   Felt constantly on guard, watchful, or easily startled?   Felt numb or detached from others, activities, or your surroundings?     Safety Screen:  History of being harmed or controlled by someone close to you?    Being hurt or controlled by someone close to you?    Worried will be harmed in future?    Worried will harm someone else in future?      CD History:   Using alcohol actively? no    Amount per week: na  Current concerns (self or other) about alcohol or drug use? no  Past concerns and/or CD treatment? no  "       Alicia/Spirituality:       Belong to a alicia community: yes     Specify:  Religion      Identify with a particular Sabianist: Cathorlic      Identify as spiritual: yes      How find expression: Prayer and Attending services    Hope:      What do you hope for:    For enough time with youngest grandchild that she will be able to have memories with Yadira; for mental health support that allows for processing of serious illness       What gives you hope:   Not explored directly today but Yadira referenced the connections she feels with her family, especially her cullen with her oldest grandson as a source of pride, and as a continuation of her family legacy which valued family closeness and connection      Internal Resources (positive memories, sources of seth): not explored today    Perceived Needs: continued emotional support related to serious illness     Resource needs/Referrals: None    Yadira Schwartz is a 77 year old woman. They were referred by Dr. Cole for Initial pallaitive care clinical social work assessment and attend the interview today via Khush video. They are diagnosed with melanoma, and in addition to their medical diagnosis also meets criteria for longstanding Depression and Generalized Anxiety disorder. Their symptoms include low mood, feelings of ongoing emotional and physical fatigue, struggling with why she doesn't feel more positive as she navigates her cancer diagnosis . These symptoms have been present to varying degrees for most of Yadira's life, per her report.   and the client has experienced functional impairment in sense of ability to cope with her cancer diagnosis since her symptoms have worsened and she learned that current treatment regimen was not working. It appears that contributing factors for their emotional distress  Include:  also has RA and has medical needs, son and his 2 year old daughter just moved back in with Yadira and Sulaiman. The COVID pandemic has led to feelings of  isolation; multiple losses in recent years including the death of 2 siblings.     relationships include  of 50+ years, 4 adult children, 8 grandchildren.  Describes these relationships as supportive.. Their medical condition has the potential to influence their mental health in the following ways: internal resources feel less accessible, worries about starting an antidepressant or anti anxiety medication because her liver function is compromised, cancer side effects have impacted her appetite and eating is particularly difficult right now.        Other mental health diagnoses that were considered include: adjustment disorder. The symptoms related to these possible diagnoses can currently be explained by long history of depression and anxiety.  Has support from long time psychologist.   It is medically necessary for this client to receive outpatient psychotherapy services at this time. If their current mental health symptoms go untreated it is likely that distress related to illness will increase, possibly triggering a severe depressive episode or trauma response. My recommendations for services for this client include continuing psychotherapy with current psychologist, continue support from palliative care MD, narrative therapy, CBT, meaning centered psychotherapy. The client's prognosis from a mental health perspective is good.          Intervention: Initial palliative care counseling / clinical social work evaluation was conducted.  Palliative Care Counseling interventions available were discussed, including counseling related to serious illness, behavioral interventions for symptom management, consultation regarding goals of care/health care directive/POLST, and other interventions specific to the patient's situation or concerns.     Plan: follow up visits scheduled through August    DSM5 Diagnoses:   296.32 Major Depressive Disorder, Recurrent Episode, Moderate With melancholic features  300.02 (F41.1)  Generalized Anxiety Disorder    Time Spent with Patient/Family: 62 minutes  (Start 3:00, end 4:02)    JOREG Noriega, [facility]   Palliative Care    Pgr:[phone]  Ph: [phone]      DO NOT SEND ANY LETTERS          Again, thank you for allowing me to participate in the care of your patient.        Sincerely,        VEENA Noriega

## 2022-06-08 NOTE — PROGRESS NOTES
"Infusion Nursing Note:  Yadira Hoang presents today for C2D1 Opdualag.    Patient seen by provider today: No   present during visit today: Not Applicable.    Note: patient have most difficulty with RLE.  Its lymphadema size and weight make ambulating difficult. Also reports pain in R ankle.  appts for PT/OT.  Patient losing hope that there will be improvement.  Frustrated with her \"other issues\" outside of cancer dx, such as lymphadema and GI symptoms.  GI consult scheduled..      Intravenous Access:  Peripheral IV placed.    Treatment Conditions:  Results reviewed, labs MET treatment parameters, ok to proceed with treatment.      Post Infusion Assessment:  Patient tolerated infusion without incident.       Discharge Plan:   RTC 7/6 as scheduled for provider/C3.  Patient also has PT/OT scheduled for RLE lymphadema and pain.      CHRISTOPHE BEST RN                      "

## 2022-06-11 NOTE — PROGRESS NOTES
06/11/22 0900   Rehab Discipline   Discipline OT   Type of Visit   Type of visit Initial Edema Evaluation   General Information   Start of care 06/11/22   Referring physician Meghan Perdomo PA-C   Orders Evaluate and treat as indicated   Order date 05/24/22   Medical diagnosis malignant melanoma of RLE including hip, R leg swelling   Onset of illness / date of surgery 02/10/20  (R femoral sentinel node biopsy, wide local excision of R heel)   Edema onset 04/22/22   Affected body parts RLE   Edema etiology Cancer with lymph node dissection   Location - Cancer with lymph node dissection R inguinal nodes   Edema etiology comments metastatic melanoma that spread to R inguinal nodes, did not have surgery in the groin   Pertinent history of current problem (PT: include personal factors and/or comorbidities that impact the POC; OT: include additional occupational profile info) Pt is a 78 yo referred to lymphedema therapy to address increasing RLE edema that onset during treatment for metastatic cancer that spread to the inguinal nodes. Pt is currently getting injections the R groin for treatment.   Surgical / medical history reviewed Yes   Prior level of functional mobility indep   Community support Family / friend caregiver   Patient role / employment history Retired   Psychosocial concerns Impaired body image;Impaired coping;Impaired sleep   Living environment [place] [facility]   Living environment comments no concerns, lives with    Current assistive devices Standard cane   Abuse Screen (yes response referral indicated)   Feels Unsafe at Home or Work/School no   Feels Threatened by Someone no   Does Anyone Try to Keep You From Having Contact with Others or Doing Things Outside Your Home? no   Physical Signs of Abuse Present no   Subjective Report   Patient report of symptoms quickly increasing swelling in the right leg, leg is painful and heavy   Patient / Family Goals   Patient / family goals statement to reduce  RLE edema   Cognitive Status   Orientation Orientation to person, place and time   Level of consciousness Alert   Follows commands and answers questions 100% of the time   Edema Exam / Assessment   Skin condition Pitting;Non-pitting;Intact   Skin condition comments RLE with firm non pitting edema from MTP joints to knee crease skin is taut and shiny. Combo pitting/non pitting edema around the knee to the groin with skin more pliable, enlarged pores in this area. Large firm mass in the R groin.   Scar Yes   Location R heel/lateral foot, rectangular discoloration on thigh from skin graft   Scar comments foot scarring is extensive   Girth Measurements   Girth Measurements Refer to separate girth measurement flowsheet   Volume LE   Right LE (mL) 8396   Range of Motion   ROM comments RLE impaired with swelling   Strength   Strength No deficits were identified   Posture   Posture Normal   Activities of Daily Living   Activities of Daily Living needing more assist with dressing   Bed Mobility   Bed mobility hard to get her leg into bed but able to do indep   Transfers   Transfers indep   Gait / Locomotion   Gait / Locomotion mod I with cane   Sensory   Sensory perception comments neuropathy, nerve pain   Planned Edema Interventions   Planned edema interventions Manual lymph drainage;Gradient compression bandaging;Fit for compression garment;Exercises;Precautions to prevent infection / exacerbation;Education;Manual therapy;ADL training;Skin care / precautions;Scar mobilization;Soft tissue mobilization;Home management program development   Clinical Impression   Criteria for skilled therapeutic intervention met Yes   Therapy diagnosis RLE lymphedema   Assessment of Occupational Performance 1-3 Performance Deficits   Identified Performance Deficits   (impaired gait, impaired fit of clothing, at risk for progression of edema if not addressed)   Clinical Decision Making (Complexity) Moderate complexity   Treatment Frequency  2x/week   Treatment duration 8 weeks   Patient / family and/or staff in agreement with plan of care Yes   Risks and benefits of therapy have been explained Yes   Clinical impression comments Pt presents with significant RLE lymphedema due to cancer/large tumors in the groin. The swelling affects pts safety with mobility, ability to independently complete ADLs and puts her at a high risk for skin breakdown, wounds and infection. Pt would benefit from skilled treatment to reduce RLE edema, fit for compression garment and provide long term management education.   Goals   Edema Eval Goals 1;2;3   Goal 1   Goal identifier Volume   Goal description RLE will decrease at least 500 mL to improve fit of clothing/shoes, safety with mobility and skin integrity.   Target date 09/09/22   Goal 2   Goal identifier Compression   Goal description Pt will be fit for and demonstrate independence using new compression garments for long term edema management   Target date 09/09/22   Goal 3   Goal identifier Home program   Goal description Pt will verbalize and demonstrate understanding of long term edema management including following recommended wear schedule for compression garments, manual techniques, skin care and exercise.   Target date 09/09/22   Total Evaluation Time   OT Eval, Moderate Complexity Minutes (72628) 20

## 2022-06-13 NOTE — LETTER
"    6/13/2022         RE: Yadira Hoang  [address] [facility] [address]        Dear Colleague,    Thank you for referring your patient, Yadira Hoang, to the [facility] [place]. Please see a copy of my visit note below.    Palliative Care Clinical Social Work Return Appointment    PLEASE NOTE:  THIS IS A MENTAL HEALTH NOTE.  OTHER PROVIDERS VIEWING THIS NOTE SHOULD USE THIS ONLY FOR UNDERSTANDING THE CONTEXT OF THE PATIENT'S EXPERIENCE.  TOPICS DESCRIBED IN THIS NOTE SHOULD NOT BE REFERENCED TO THE PATIENT BY MEDICAL PROVIDERS.    Yadira Hoang is a 77 year old woman with diagnosis of stage IV acral melanoma, seen today via [employer] video for a return psychotherapy appointment to address adjustment related distress as it relates to coping with illness and treatment.    Mental Status Exam:(List all that apply)      Appearance: Appropriate      Eye Contact: Good       Orientation: Yes, x4      Mood: Normal      Affect: Appropriate      Thought Content: Clear         Thought Form: Logical      Psychomotor Behavior: Normal    Visit themes: Wanting to feel more optimistic, sense of guilt over worry about her illness      Adjustment to Illness: Struggling with symptoms \"the side effects of the treatment are worse than the cancer\"  Worry that next round of scans will show cancer is not responding to treatment and she will have to proceed to chemotherapy.  Worries about end of life and dying \"I can't die yet, my youngest grandchild won't remember who I am\" Spiritual distress-- struggling with wondering why she has to face this, feels guilt over possible anger towards God.     Yadira did identify a  who she has met before and trusts, we talked about reaching out to him with her concerns about her feelings towards God and seeing if she could arrange a time to meet with him.    Explored aspects of her legacy in her relationships with her children and her grandchildren and the bond " she shares with her youngest grandchild who currently lives with her.          Mental Health (thoughts, feelings, actions, coping, and symptoms): struggles with sleep    Helpful activities: time with grandchildren    Helpful cognitions:     Unhelpful and/or triggering or exacerbating factors:     Body-Mind Skills Education:     Relationships: remain supportive, has good support from her children    End of Life and Advance Care Planning:     Main therapeutic interventions provided this session include:   Provide psychoeducation, Facilitate processing of thoughts and feelings and Facilitate structured problem solving    Plan:  Will return for psychotherapy with palliative care focus in 1 month    Time spent with patient/family:  55 minutes (Start 1:00, end 1:55)    Palliative Care Counseling Treatment Plan    Client's Name: Yadira Hoang  YOB: 1945    Date: June 17, 2022    Initial DSM5 Diagnoses:   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    Anticipated number of sessions to complete episode of care:  10         Treatment Goal(s)  Date Goal Dates  Reviewed Status   5/23/2019   Goal 1:  Client will develop effective strategies for depressed mood.      New   5/23/2019   Objective #1A (Client Action)  Patient will Identify activities that provide comfort and/or pleasure.    Intervention(s)  Therapist will Provide psychoeducation and Facilitate processing of thoughts and feelings .    New   5/23/2019   Objective #1B  Patient will Effectively communicate needs to others.    Intervention(s)  Therapist will Provide psychoeducation, Facilitate processing of thoughts and feelings and Facilitate structured problem solving.    New   5/23/2019   Objective #1C  Patient will Identify an activity that would provide  meaning/contribute to feeling of self worth.    Intervention(s)  Therapist will Provide psychoeducation, Facilitate processing of thoughts and feelings and Facilitate structured problem solving.    New       Date Goal Dates  Reviewed Status   5/23/2019   Goal 2:  Client will demonstrate effective management of grief / loss.    New   5/23/2019   Objective #2A (Client Action)  Patient will Increase understanding of loss and grief.    Intervention(s) (Therapist Action)  Therapist will Provide psychoeducation.    New   5/23/2019   Objective #2B  Patient will Identify losses related to illness.    Intervention(s)  Therapist will Provide psychoeducation and Facilitate processing of thoughts and feelings.    New   5/23/2019     Objective #2C  Patient will Develop strategies for coping with grief/loss.    Intervention(s)  Therapist will Provide psychoeducation.    New       Client has contributed regarding goals and concerns, but has not reviewed the treatment plan. Plan to review at future session.    JORGE Noriega, [facility]  Palliative Care Clinical        DO NOT SEND ANY LETTERS                    Again, thank you for allowing me to participate in the care of your patient.        Sincerely,        Taty Serrano [facility]

## 2022-06-13 NOTE — LETTER
"    6/13/2022         RE: Yadira Hoang  [address] [facility] [address]        Dear Colleague,    Thank you for referring your patient, Yadira Hoang, to the [facility] [place]. Please see a copy of my visit note below.    Palliative Care Clinical Social Work Return Appointment    PLEASE NOTE:  THIS IS A MENTAL HEALTH NOTE.  OTHER PROVIDERS VIEWING THIS NOTE SHOULD USE THIS ONLY FOR UNDERSTANDING THE CONTEXT OF THE PATIENT'S EXPERIENCE.  TOPICS DESCRIBED IN THIS NOTE SHOULD NOT BE REFERENCED TO THE PATIENT BY MEDICAL PROVIDERS.    Yadira Hoang is a 77 year old woman with diagnosis of stage IV acral melanoma, seen today via [employer] video for a return psychotherapy appointment to address adjustment related distress as it relates to coping with illness and treatment.    Mental Status Exam:(List all that apply)      Appearance: Appropriate      Eye Contact: Good       Orientation: Yes, x4      Mood: Normal      Affect: Appropriate      Thought Content: Clear         Thought Form: Logical      Psychomotor Behavior: Normal    Visit themes: Wanting to feel more optimistic, sense of guilt over worry about her illness      Adjustment to Illness: Struggling with symptoms \"the side effects of the treatment are worse than the cancer\"  Worry that next round of scans will show cancer is not responding to treatment and she will have to proceed to chemotherapy.  Worries about end of life and dying \"I can't die yet, my youngest grandchild won't remember who I am\" Spiritual distress-- struggling with wondering why she has to face this, feels guilt over possible anger towards God.     Yadira did identify a  who she has met before and trusts, we talked about reaching out to him with her concerns about her feelings towards God and seeing if she could arrange a time to meet with him.    Explored aspects of her legacy in her relationships with her children and her grandchildren and the bond " she shares with her youngest grandchild who currently lives with her.          Mental Health (thoughts, feelings, actions, coping, and symptoms): struggles with sleep    Helpful activities: time with grandchildren    Helpful cognitions:     Unhelpful and/or triggering or exacerbating factors:     Body-Mind Skills Education:     Relationships: remain supportive, has good support from her children    End of Life and Advance Care Planning:     Main therapeutic interventions provided this session include:   Provide psychoeducation, Facilitate processing of thoughts and feelings and Facilitate structured problem solving    Plan:  Will return for psychotherapy with palliative care focus in 1 month    Time spent with patient/family:  55 minutes (Start 1:00, end 1:55)    Palliative Care Counseling Treatment Plan    Client's Name: Yadira Hoang  YOB: 1945    Date: June 17, 2022    Initial DSM5 Diagnoses:   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    Anticipated number of sessions to complete episode of care:  10         Treatment Goal(s)  Date Goal Dates  Reviewed Status   5/23/2019   Goal 1:  Client will develop effective strategies for depressed mood.      New   5/23/2019   Objective #1A (Client Action)  Patient will Identify activities that provide comfort and/or pleasure.    Intervention(s)  Therapist will Provide psychoeducation and Facilitate processing of thoughts and feelings .    New   5/23/2019   Objective #1B  Patient will Effectively communicate needs to others.    Intervention(s)  Therapist will Provide psychoeducation, Facilitate processing of thoughts and feelings and Facilitate structured problem solving.    New   5/23/2019   Objective #1C  Patient will Identify an activity that would provide  meaning/contribute to feeling of self worth.    Intervention(s)  Therapist will Provide psychoeducation, Facilitate processing of thoughts and feelings and Facilitate structured problem solving.    New       Date Goal Dates  Reviewed Status   5/23/2019   Goal 2:  Client will demonstrate effective management of grief / loss.    New   5/23/2019   Objective #2A (Client Action)  Patient will Increase understanding of loss and grief.    Intervention(s) (Therapist Action)  Therapist will Provide psychoeducation.    New   5/23/2019   Objective #2B  Patient will Identify losses related to illness.    Intervention(s)  Therapist will Provide psychoeducation and Facilitate processing of thoughts and feelings.    New   5/23/2019     Objective #2C  Patient will Develop strategies for coping with grief/loss.    Intervention(s)  Therapist will Provide psychoeducation.    New       Client has contributed regarding goals and concerns, but has not reviewed the treatment plan. Plan to review at future session.    JORGE Noriega, [facility]  Palliative Care Clinical        DO NOT SEND ANY LETTERS                    Again, thank you for allowing me to participate in the care of your patient.        Sincerely,        Taty Serrano [facility]

## 2022-06-16 NOTE — PROGRESS NOTES
"Yadira is a 77 year old who is being evaluated via a billable telephone visit.      What phone number would you like to be contacted at? [phone]  How would you like to obtain your AVS? MyChart    {PROVIDER CHARTING PREFERENCE:450127}    Subjective   Yadira is a 77 year old presenting for the following health issues:  Follow Up      HPI   Follow up per Provider. Pt unsure why    {SUPERLIST (Optional):165588}  {additonal problems for provider to add (Optional):062953}    Review of Systems   {ROS COMP (Optional):834520}      Objective           Vitals:  No vitals were obtained today due to virtual visit.    Physical Exam   {GENERAL APPEARANCE:50::\"healthy\",\"alert\",\"no distress\"}  PSYCH: Alert and oriented times 3; coherent speech, normal   rate and volume, able to articulate logical thoughts, able   to abstract reason, no tangential thoughts, no hallucinations   or delusions  Her affect is { :6373999::\"normal\"}  RESP: No cough, no audible wheezing, able to talk in full sentences  Remainder of exam unable to be completed due to telephone visits    {Diagnostic Test Results (Optional):466129}    {AMBULATORY ATTESTATION (Optional):181511}        Phone call duration: *** minutes    .  ..  "

## 2022-06-16 NOTE — PROGRESS NOTES
This encounter was opened in error. Please disregard.    Unable to connect with patient today.  I left her voicemail to return call if she was to continue today's visit.  Patient did not call back.

## 2022-06-17 NOTE — PROGRESS NOTES
"Palliative Care Clinical Social Work Return Appointment    PLEASE NOTE:  THIS IS A MENTAL HEALTH NOTE.  OTHER PROVIDERS VIEWING THIS NOTE SHOULD USE THIS ONLY FOR UNDERSTANDING THE CONTEXT OF THE PATIENT'S EXPERIENCE.  TOPICS DESCRIBED IN THIS NOTE SHOULD NOT BE REFERENCED TO THE PATIENT BY MEDICAL PROVIDERS.    Yadira Hoang is a 77 year old woman with diagnosis of stage IV acral melanoma, seen today via [employer] video for a return psychotherapy appointment to address adjustment related distress as it relates to coping with illness and treatment.    Mental Status Exam:(List all that apply)      Appearance: Appropriate      Eye Contact: Good       Orientation: Yes, x4      Mood: Normal      Affect: Appropriate      Thought Content: Clear         Thought Form: Logical      Psychomotor Behavior: Normal    Visit themes: Wanting to feel more optimistic, sense of guilt over worry about her illness      Adjustment to Illness: Struggling with symptoms \"the side effects of the treatment are worse than the cancer\"  Worry that next round of scans will show cancer is not responding to treatment and she will have to proceed to chemotherapy.  Worries about end of life and dying \"I can't die yet, my youngest grandchild won't remember who I am\" Spiritual distress-- struggling with wondering why she has to face this, feels guilt over possible anger towards God.     Yadira did identify a  who she has met before and trusts, we talked about reaching out to him with her concerns about her feelings towards God and seeing if she could arrange a time to meet with him.    Explored aspects of her legacy in her relationships with her children and her grandchildren and the bond she shares with her youngest grandchild who currently lives with her.          Mental Health (thoughts, feelings, actions, coping, and symptoms): struggles with sleep    Helpful activities: time with grandchildren    Helpful cognitions:     Unhelpful and/or " triggering or exacerbating factors:     Body-Mind Skills Education:     Relationships: remain supportive, has good support from her children    End of Life and Advance Care Planning:     Main therapeutic interventions provided this session include:   Provide psychoeducation, Facilitate processing of thoughts and feelings and Facilitate structured problem solving    Plan:  Will return for psychotherapy with palliative care focus in 1 month    Time spent with patient/family:  55 minutes (Start 1:00, end 1:55)    Palliative Care Counseling Treatment Plan    Client's Name: Yadira Hoang  YOB: 1945    Date: June 17, 2022    Initial DSM5 Diagnoses:   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    Anticipated number of sessions to complete episode of care:  10         Treatment Goal(s)  Date Goal Dates  Reviewed Status   5/23/2019   Goal 1:  Client will develop effective strategies for depressed mood.      New   5/23/2019   Objective #1A (Client Action)  Patient will Identify activities that provide comfort and/or pleasure.    Intervention(s)  Therapist will Provide psychoeducation and Facilitate processing of thoughts and feelings .    New   5/23/2019   Objective #1B  Patient will Effectively communicate needs to others.    Intervention(s)  Therapist will Provide psychoeducation, Facilitate processing of thoughts and feelings and Facilitate structured problem solving.    New   5/23/2019   Objective #1C  Patient will Identify an activity that would provide meaning/contribute to feeling of self worth.    Intervention(s)  Therapist will Provide psychoeducation, Facilitate processing of thoughts and feelings and Facilitate structured problem solving.    New       Date Goal Dates  Reviewed Status   5/23/2019   Goal 2:  Client  will demonstrate effective management of grief / loss.    New   5/23/2019   Objective #2A (Client Action)  Patient will Increase understanding of loss and grief.    Intervention(s) (Therapist Action)  Therapist will Provide psychoeducation.    New   5/23/2019   Objective #2B  Patient will Identify losses related to illness.    Intervention(s)  Therapist will Provide psychoeducation and Facilitate processing of thoughts and feelings.    New   5/23/2019     Objective #2C  Patient will Develop strategies for coping with grief/loss.    Intervention(s)  Therapist will Provide psychoeducation.    New       Client has contributed regarding goals and concerns, but has not reviewed the treatment plan. Plan to review at future session.    JORGE Noriega, [facility]  Palliative Care Clinical        DO NOT SEND ANY LETTERS

## 2022-06-21 NOTE — PROGRESS NOTES
AUDIOLOGY REPORT    SUBJECTIVE: Yadira Hoang is a 77 year old female was seen in the Audiology Clinic at  [facility] on 6/21/22 to discuss concerns with hearing and functional communication difficulties.  Yadira Ochoa has been seen previously on 5/09/2022, and results revealed a mild to moderate sensorineural hearing loss bilaterally.  The patient was medically evaluated and determined to be cleared for binaural hearing aids by Tony Cohen MD.} Yadira Ochoa notes difficulty with communication in a variety of listening situations.    OBJECTIVE:    Based her her test results and significant communication concerns, the patient is a hearing aid candidate. Patient would like to move forward with a hearing aid evaluation today. Therefore, the patient was presented with different options for amplification to help aid in communication. Discussed styles, levels of technology and monaural vs. binaural fitting.     The hearing aid(s) mutually chosen were:  Binaural: Phonak Audeo P70-RL  COLOR: champagne  BATTERY SIZE: rechargeable  EARMOLD/TIPS: RITE Size 1  CANAL/ LENGTH: 1    ASSESSMENT:     ICD-10-CM    1. Sensorineural hearing loss (SNHL) of both ears  H90.3 Hearing Aid Exam, Binaural (95402)       Reviewed purchase information and warranty information with patient. The 45 day trial period was explained to patient. The patient was given a copy of the [place] Department of Health consumer brochure on purchasing hearing instruments. Patient risk factors have been provided to the patient in writing prior to the sale of the hearing aid per FDA regulation. The risk factors are also available in the User Instructional Booklet to be presented on the day of the hearing aid fitting. Hearing aid evaluation completed.    PLAN: Yadira Ochoa will take some time to consider her options and let us know if and how she would like to proceed or if she has any questions. Please contact this clinic with  any questions or concerns.      Richie Davila.  Doctor of Audiology  [place] License # [id number]

## 2022-06-22 NOTE — RESULT ENCOUNTER NOTE
Dear Yadira,   Your recent test results showed the following:  -- Lipid panel is elevated.  Your 10-year heart risk is 17%.  You will benefit from taking a cholesterol medicine.  If you are open to this, I can send your prescription.  We will can also monitor this and recheck it next year.    Please call or Mychart to our office if you have further questions.     Erika Blanton MD-PhD

## 2022-06-23 NOTE — TELEPHONE ENCOUNTER
Prior Authorization Not Needed per Insurance    Medication: Voriconazole 200 MG Oral Tablet (VFEND)  Insurance Company: [employer] - Phone [phone] Fax [phone]  Expected CoPay:      Pharmacy Filling the Rx: [facility] [address] [place] [address] [address] [facility]  Pharmacy Notified: Yes  Patient Notified: Yes    Medication covered.  Called [facility] and spoke with pharmacist. He states they have a paid claim but they need to order this in for patient. Pharmacy will alert patient when this is ready for .

## 2022-06-23 NOTE — TELEPHONE ENCOUNTER
Prior Authorization Retail Medication Request    Medication/Dose: Voriconazole 200 MG Oral Tablet (VFEND)  ICD code (if different than what is on RX):    Previously Tried and Failed:    Rationale:      Insurance Name:    Insurance ID:        Pharmacy Information (if different than what is on RX)  Name:    Phone:

## 2022-06-23 NOTE — TELEPHONE ENCOUNTER
Central Prior Authorization Team   Phone: [phone]    PA Initiation    Medication: Voriconazole 200 MG Oral Tablet (VFEND)  Insurance Company: [employer] - Phone [phone] Fax [phone]  Pharmacy Filling the Rx: [address] [facility]  Filling Pharmacy Phone: [phone]  Filling Pharmacy Fax:    Start Date: 6/23/2022

## 2022-06-23 NOTE — CONFIDENTIAL NOTE
Per Dr. Alfred,    Hey -   I put in voriconazole for her for esophageal candidiasis as she previously failed fluconazole - it looks like it may need a prior auth.   Thanks!   Yu

## 2022-06-28 NOTE — PROGRESS NOTES
"Yadira is a 77 year old who is being evaluated via a billable video visit.  Currently in [place].    How would you like to obtain your AVS? MyChart  If the video visit is dropped, the invitation should be resent by: Send to e-mail at: [email or web address]  Will anyone else be joining your video visit? No  {If patient encounters technical issues they should call [phone] :[id number]}     DEDE Castellano      Video-Visit Details    Video Start Time: {video visit start/end time for provider to select:912376}    Type of service:  Video Visit    Video End Time:{video visit start/end time for provider to select:903297}    Originating Location (pt. Location): {video visit patient location:920277::\"Home\"}    Distant Location (provider location):  [facility]     Platform used for Video Visit: {Virtual Visit Platforms:370515::\"SA Ignite\"}        Palliative Care Outpatient Clinic Progress Note    Patient Name: Yadira Hoang is being evaluated via a billable video visit.    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 Metastatic to R groin LNs              - reconstructive surgery with skin graft March/April 2020              - starts nivolumab May 2020, continues despite development of small liver met in Aug 2020              - addition of ipilimumab Oct 2020-April 2021 for progression in liver, new T2, S1 mets. 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 One of her sons has moved back in together with his 2 year-old daughter. This has been an ambivalent experience for her. They have another son nearby and two daughters, each about 1h away.     Psychospiritual  ***    Care Planning   ***    Opioid Safety   ***     Interim History:  At the last visit we discussed physical symptoms (sleep, leg swelling), coping (has established with palliative social work)    Patient is on the call ***    ***    Coping:  ***    Social History:  Pertinent changes to social history/social situation since last visit: ***      Functional Status:  Palliative Performance Scale: ***  Previous: ***        Sexual Health:  How has the illness/treatment affected intimacy and body image? ***  - physical: ***  - emotional: ***  - relationship: ***  - environmental: ***    Impression & Recommendations & Counseling:  ***      Return to clinic in ***    Data / Chart Review:    Review of Systems:   ROS: 10 point ROS neg other than the symptoms noted above in the HPI *** and pertinents here.         Physical Exam:   Physical Exam:  Vital Signs not checked, virtual visit    CONSTIT: awake, appears ***comfortable  EENT: MM***, EOMI, no icterus  RESP: reg, *** effort, *** cough  MSK: moves x4, ROM***  SKIN: no rash, no obvious lesions  NEURO: alert, oriented x3  PSYCH: *** affect, memory and thought process intact    The rest of a comprehensive physical examination is deferred  due to public health emergency video visit restrictions.      Current pertinent medications:  reviewed    : ***    Opioid safety assessment:   ***    ***No pertinent allergies      Lab and imaging data reviewed:  Comments: ***        Video-Visit Details    Type of service:  Video Visit    Physician has received verbal consent for a Video Visit from the patient? {YES-NO  Default Yes:4444::\"Yes\"}    Video " "Start Time: {video visit start time:152948}    Video End Time (time video stopped): ***    Originating Location (pt. Location): {video visit patient location:410453::\"Home\"}    Distant Location (provider location):  [facility]     Mode of Communication:  Video Conference via [facility]    Lorena Cole MD  Palliative Medicine  Pager [phone]    "

## 2022-06-29 NOTE — NURSING NOTE
Dermatology Rooming Note    Deisi T Viktor's goals for this visit include:   Chief Complaint   Patient presents with     Skin Check     Yadira is here for a skin check.  Has an area of concern on her face.     Corinne Fields, CMA

## 2022-06-29 NOTE — LETTER
6/29/2022       RE: Yadira Hoang  [address] [facility] [address]     Dear Colleague,    Thank you for referring your patient, Yadira Hoang, to the [facility] DERMATOLOGY CLINIC [place] at [facility]. Please see a copy of my visit note below.    [facility] Dermatology Note  Encounter Date: Jun 29, 2022  Office Visit     Dermatology Problem List:  Last skin check 12/17/21, q6 months through 1/25   1. Metastatic melanoma, originated at right heel  - S/p punch biopsy By Dr. Alvarado 1/9/20 showing melanoma at least 3.4mm deep, ulcerated, no mitoses, perineural invasion, TIL present and nonbrisk, no LVI.  - S/p WLE with 2cm margins (revelead melanoma to depth of 6.6mm with deep margin close, microsatellites were present) and right femoral SLNB 2/10/20 (1/2 nodes positive, largest deposit 7mm)  - S/p re-excision 2/20/20  - Flap completed 3/4/30, skin graft 4/2/20  - Adjuvant nivolumab started 5/2020. Progressed. 10/28/20 started on ipi and nivolumab combination. Went to ipi monotherapy 5/2021. Back to dual immunotherapy 8/2021. Developed severe transaminitis. Stopped immunotherapy 9/2021 and treated with oral steroids.   - TVec started 1/28/22. However stopped due to minimal improvement  - Opdualag started 5/11/22 with monthly infusions  2. AK, right cheek: cryo and biopsy 11/19/20  3. 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 Has 4 children.  Family history: Brother had pancreatic cancer. No melanoma in the family.    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 Known disease in R inguinal nodes and liver.TVEC discontinued after January due to minimal improvement.  Now receiving monthly infusions of Opdualag started 5/11/22 by oncology. Did communicate with oncology team to confirm decision to discontinue TVEC withoncology team due to progression of disease.     # Benign lesions: Multiple benign nevi, solar lentigos, seborrheic keratoses, cherry angiomas. Explained to patient benign nature of lesion. No treatment is necessary at this time unless the lesion changes or becomes symptomatic.   - ABCDs of melanoma were discussed and self skin checks were advised.  - Sun precaution was advised including the use of sun screens of SPF 30 or higher, sun protective clothing, and avoidance of tanning beds.    Procedures Performed:   - Cryotherapy procedure note, location(s): left preauricular region. After verbal consent and discussion of risks and benefits including, but not limited to, dyspigmentation/scar, blister, and pain, 1 lesion(s) was(were) treated with 1-2 mm freeze border for 1-2 cycles with liquid nitrogen. Post cryotherapy instructions were provided.    Follow-up: 6 month(s) in-person, or earlier for new or changing lesions    Staff, Medical Student and Scribe:     Scribe Disclosure:  I, Meghan Kaba, am serving as a scribe to document services personally performed by Joseph Fallon MD based on data collection and the provider's statements to me.     I, Koki Ortiz, saw and examined the patient under the guidance of Dr. Fallon.     Provider Disclosure:   The documentation recorded by the scribe accurately reflects the services I personally performed and the decisions made by me.    Staff Physician:  I was present with the medical student who participated in the service and in the documentation of the note. I have verified the history and personally performed the physical exam and medical decision making. I agree with the assessment and plan of care as documented in the note.     The procedure(s) was(were) performed by myself.    Joseph Fallon MD  Pronouns:  he/him/his    Department of Dermatology  [facility]: Phone: [phone], Fax:[phone]  [facility]: Phone: [phone] Fax: [phone]  ____________________________________________    CC: Skin Check (Yadira is here for a skin check.  Has an area of concern on her face.)    HPI:  Ms. Yadira Hoang is a(n) 77 year old female who presents today as a return patient for 6 month skin check. The patient was last seen in dermatology on 4/15/22 by Dr. Estrada at which time TVEC injections were performed for treatment of stage IV melanoma originating at the right heel.TVEC was discontinued and monthly infusions of Opdualag was started 5/11/22.    Last skin check was 12/17/2. Today complains of a itchy, crusty red spot in front of her left ear and then a cyst behind her left ear that does not bother her.     Patient is otherwise feeling well, without additional skin concerns.    Labs Reviewed:  N/A    Physical Exam:  Vitals: There were no vitals taken for this visit.  SKIN: Full skin, which includes the head/face, both arms, chest, back, abdomen,both legs, genitalia and/or groin buttocks, digits and/or nails, was examined.  - 2mm white cyst behind left ear without crust or scale  - There is an erythematous macule with overyling adherent scale on the left preauricular region..   - There are waxy stuck on tan to brown papules on the legs..   - Multiple regular brown pigmented macules and papules are identified on the arms.   - No other lesions of concern on areas examined.     Medications:  Current Outpatient Medications   Medication     carboxymethylcellulose PF (REFRESH LIQUIGEL) 1 % ophthalmic gel     cholecalciferol (VITAMIN D3) 125 MCG (5000 UT) TABS tablet     estradiol (ESTRACE) 0.1 MG/GM vaginal cream     fish oil-omega-3 fatty acids 1000 MG capsule     multivitamin w/minerals  (THERA-VIT-M) tablet     voriconazole (VFEND) 200 MG tablet     No current facility-administered medications for this visit.      Past Medical History:   Patient Active Problem List   Diagnosis     Malignant melanoma of right lower extremity including hip (H)     Melanoma of skin (H)     GUERLINE (obstructive sleep apnea)     Chronic congestion of paranasal sinus     Weakness of foot     Neck pain, chronic     Myofascial pain     Intractable migraine without aura and without status migrainosus     History of multiple concussions     Foot pain, bilateral     Fibromyositis     Difficulty walking     Chronic pain disorder     Chronic fatigue     Bilateral occipital neuralgia     Articular disc disorder of temporomandibular joint     S/P flap graft     Osteopenia     Injury of head     Hyperlipidemia LDL goal <100     ALEXUS (generalized anxiety disorder)     Major depression, recurrent, chronic (H)     Secondary malignant neoplasm of bone (H)     Generalized pruritus     Immunodeficiency, unspecified (H)     Bilateral leg pain     Chronic bilateral low back pain without sciatica     Generalized muscle weakness     Polyarthritis     Past Medical History:   Diagnosis Date     Concussion 2011    brain injury     ALEXUS (generalized anxiety disorder) 05/07/2020     Major depression, recurrent, chronic (H) 05/07/2020     Melanoma (H)      Nonsenile cataract      Sleep apnea     CPAP        CC Erika Blanton MD PhD  [address] [place] [address] on close of this encounter.

## 2022-06-29 NOTE — PROGRESS NOTES
Yadira is a 77 year old who is being evaluated via a billable video visit.  Currently in [place].    How would you like to obtain your AVS? MyChart  If the video visit is dropped, the invitation should be resent by: Send to e-mail at: [email or web address]  Will anyone else be joining your video visit? DEDE Mcdowell      Palliative Care Outpatient Clinic Progress Note    Patient Name: Yadira Hoang is being evaluated via a billable video visit.    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 Metastatic to R groin LNs              - reconstructive surgery with skin graft March/April 2020              - starts nivolumab May 2020, continues despite development of small liver met in Aug 2020              - addition of ipilimumab Oct 2020-April 2021 for progression in liver, new T2, S1 mets. 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 One of her sons has moved back in together with his 2 year-old daughter.  They have another son nearby and two daughters, each about 1h away.     Psychospiritual  She has a lot of concerns about her treatment plan and present as well as potential side effects.     Care Planning   Started conversation today. One aspect we spent time on was to start exploring what concerns her most about the idea of chemo and what  would make it acceptable or not acceptable to her. She is mostly concerned about various side effects (see below).   Her objective is to live as long as possible. She emphasizes the importance of her granddaughter remembering her, since she also fulfills the mother-role to some extent. She is interested in legacy work and is planning to work on this with the palliative .     Interim History:  At the last visit we discussed physical symptoms (sleep, leg swelling), coping (has established with palliative social work)    Patient is on the call by herself.     She still has significant swelling in her right leg, started ACE wraps.     Hearing that the next treatment option after the current would be chemotherapy. This frightens her. She states that she has heard about the side effects (weight loss, hair loss, and others).    She is particularly worried about her weight. She hasn't been able to regain weight she lost around the time of her surgery. Due to her multiple sensitivities/allergies she has limited options for nutrition. Hasn't seen a nutritionist recently.     She has been working with PT to address the weakness and pain that developed after steroids. It has been helpful,but she suffered a setback due to the leg swelling that developed after the TVEC treatment.     She started lymphedema treatment for the leg swelling which is starting to show effect. She is using wraps. Her  Sulaiman has been helping with putting them on.     Coping:  She is worried about the effectiveness of her treatments.   It is frustrating to her that the treatment side effects are this limiting for her functional status.     Social History:  Pertinent changes to social history/social situation since last visit: no changes      Functional Status:  Palliative Performance Scale: 70  She does much of the housework, but is limited in certain activities (anything that requires kneeling/bending down) and has fatigue  afterwards          Impression & Recommendations & Counseliny/o with malignant melanoma currently on later line immunotherapy     Nutrition: limited by several food intolerances. Her weight has been stable lately despite efforts to gain weight  - referral to nutritionist    Treatment plan/preferences: will continue conversation at our next visit.   - appreciate support of palliative social work      Return to clinic in 2 months    Data / Chart Review:    Review of Systems:   ROS: 10 point ROS neg other than the symptoms noted above in the HPI and pertinents here.         Physical Exam:   Physical Exam:  Vital Signs not checked, virtual visit    CONSTIT: awake, appears comfortable  EENT: MMM, EOMI, no icterus  RESP: reg, nl effort, no cough  MSK: moves x4, FARIDEH  SKIN: no rash, no obvious lesions  NEURO: alert, oriented x3  PSYCH: appropriate affect, memory and thought process intact    The rest of a comprehensive physical examination is deferred  due to public [facility] emergency video visit restrictions.      Current pertinent medications:  reviewed    : ok    No pertinent allergies      Lab and imaging data reviewed:  Comments:           Video-Visit Details    Type of service:  Video Visit    Physician has received verbal consent for a Video Visit from the patient? Yes    Video Start Time: 11:21 AM    Video End Time (time video stopped): 1153    Originating Location (pt. Location): Home    Distant Location (provider location):  [facility]     Mode of Communication:  Video Conference via [facility]    Lorena Cole MD  Palliative Medicine  Pager [phone]

## 2022-06-29 NOTE — PROGRESS NOTES
[facility] Dermatology Note  Encounter Date: Jun 29, 2022  Office Visit     Dermatology Problem List:  Last skin check 12/17/21, q6 months through 1/25   1. Metastatic melanoma, originated at right heel  - S/p punch biopsy By Dr. Alvarado 1/9/20 showing melanoma at least 3.4mm deep, ulcerated, no mitoses, perineural invasion, TIL present and nonbrisk, no LVI.  - S/p WLE with 2cm margins (revelead melanoma to depth of 6.6mm with deep margin close, microsatellites were present) and right femoral SLNB 2/10/20 (1/2 nodes positive, largest deposit 7mm)  - S/p re-excision 2/20/20  - Flap completed 3/4/30, skin graft 4/2/20  - Adjuvant nivolumab started 5/2020. Progressed. 10/28/20 started on ipi and nivolumab combination. Went to ipi monotherapy 5/2021. Back to dual immunotherapy 8/2021. Developed severe transaminitis. Stopped immunotherapy 9/2021 and treated with oral steroids.   - TVec started 1/28/22. However stopped due to minimal improvement  - Opdualag started 5/11/22 with monthly infusions  2. AK, right cheek: cryo and biopsy 11/19/20  3. 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 Has 4 children.  Family history: Brother had pancreatic cancer. No melanoma in the family.    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 Known disease in R inguinal nodes and liver.TVEC discontinued after January due to minimal improvement. Now receiving monthly infusions of Opdualag started 5/11/22 by oncology. Did communicate with oncology team to confirm decision to discontinue TVEC withoncology team due to progression of disease.     # Benign lesions: Multiple benign nevi, solar lentigos, seborrheic keratoses, cherry angiomas. Explained to patient benign  nature of lesion. No treatment is necessary at this time unless the lesion changes or becomes symptomatic.   - ABCDs of melanoma were discussed and self skin checks were advised.  - Sun precaution was advised including the use of sun screens of SPF 30 or higher, sun protective clothing, and avoidance of tanning beds.    Procedures Performed:   - Cryotherapy procedure note, location(s): left preauricular region. After verbal consent and discussion of risks and benefits including, but not limited to, dyspigmentation/scar, blister, and pain, 1 lesion(s) was(were) treated with 1-2 mm freeze border for 1-2 cycles with liquid nitrogen. Post cryotherapy instructions were provided.    Follow-up: 6 month(s) in-person, or earlier for new or changing lesions    Staff, Medical Student and Scribe:     Scribe Disclosure:  I, Meghan Kaba, am serving as a scribe to document services personally performed by Joseph Fallon MD based on data collection and the provider's statements to me.     I, Koki Ortiz, saw and examined the patient under the guidance of Dr. Fallon.     Provider Disclosure:   The documentation recorded by the scribe accurately reflects the services I personally performed and the decisions made by me.    Staff Physician:  I was present with the medical student who participated in the service and in the documentation of the note. I have verified the history and personally performed the physical exam and medical decision making. I agree with the assessment and plan of care as documented in the note.     The procedure(s) was(were) performed by myself.    Joseph Fallon MD  Pronouns: he/him/his    Department of Dermatology  [facility]: Phone: [phone], Fax:[phone]  [facility]: Phone: [phone] Fax: [phone]  ____________________________________________    CC: Skin Check (Yadira  is here for a skin check.  Has an area of concern on her face.)    HPI:  Ms. Yadira Hoang is a(n) 77 year old female who presents today as a return patient for 6 month skin check. The patient was last seen in dermatology on 4/15/22 by Dr. Estrada at which time TVEC injections were performed for treatment of stage IV melanoma originating at the right heel.TVEC was discontinued and monthly infusions of Opdualag was started 5/11/22.    Last skin check was 12/17/2. Today complains of a itchy, crusty red spot in front of her left ear and then a cyst behind her left ear that does not bother her.     Patient is otherwise feeling well, without additional skin concerns.    Labs Reviewed:  N/A    Physical Exam:  Vitals: There were no vitals taken for this visit.  SKIN: Full skin, which includes the head/face, both arms, chest, back, abdomen,both legs, genitalia and/or groin buttocks, digits and/or nails, was examined.  - 2mm white cyst behind left ear without crust or scale  - There is an erythematous macule with overyling adherent scale on the left preauricular region..   - There are waxy stuck on tan to brown papules on the legs..   - Multiple regular brown pigmented macules and papules are identified on the arms.   - No other lesions of concern on areas examined.     Medications:  Current Outpatient Medications   Medication     carboxymethylcellulose PF (REFRESH LIQUIGEL) 1 % ophthalmic gel     cholecalciferol (VITAMIN D3) 125 MCG (5000 UT) TABS tablet     estradiol (ESTRACE) 0.1 MG/GM vaginal cream     fish oil-omega-3 fatty acids 1000 MG capsule     multivitamin w/minerals (THERA-VIT-M) tablet     voriconazole (VFEND) 200 MG tablet     No current facility-administered medications for this visit.      Past Medical History:   Patient Active Problem List   Diagnosis     Malignant melanoma of right lower extremity including hip (H)     Melanoma of skin (H)     GUERLINE (obstructive sleep apnea)     Chronic congestion  of paranasal sinus     Weakness of foot     Neck pain, chronic     Myofascial pain     Intractable migraine without aura and without status migrainosus     History of multiple concussions     Foot pain, bilateral     Fibromyositis     Difficulty walking     Chronic pain disorder     Chronic fatigue     Bilateral occipital neuralgia     Articular disc disorder of temporomandibular joint     S/P flap graft     Osteopenia     Injury of head     Hyperlipidemia LDL goal <100     ALEXUS (generalized anxiety disorder)     Major depression, recurrent, chronic (H)     Secondary malignant neoplasm of bone (H)     Generalized pruritus     Immunodeficiency, unspecified (H)     Bilateral leg pain     Chronic bilateral low back pain without sciatica     Generalized muscle weakness     Polyarthritis     Past Medical History:   Diagnosis Date     Concussion 2011    brain injury     ALEXUS (generalized anxiety disorder) 05/07/2020     Major depression, recurrent, chronic (H) 05/07/2020     Melanoma (H)      Nonsenile cataract      Sleep apnea     CPAP        CC Erika Blanton MD PhD  [address] [place] [address] on close of this encounter.

## 2022-06-29 NOTE — LETTER
6/29/2022         RE: Yadira Hoang  [address] [facility] [address]        Dear Colleague,    Thank you for referring your patient, Yadira Hoang, to the [facility]. Please see a copy of my visit note below.    Yadira is a 77 year old who is being evaluated via a billable video visit.  Currently in [place].    How would you like to obtain your AVS? MyChart  If the video visit is dropped, the invitation should be resent by: Send to e-mail at: [email or web address]  Will anyone else be joining your video visit? DEDE Mcdowell      Palliative Care Outpatient Clinic Progress Note    Patient Name: Yadira Hoang is being evaluated via a billable video visit.    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 Metastatic to R groin LNs              - reconstructive surgery with skin graft March/April 2020              - starts nivolumab May 2020, continues despite development of small liver met in Aug 2020              - addition of ipilimumab Oct 2020-April 2021 for progression in liver, new T2, S1 mets. 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 One of her sons has moved back in together with his 2 year-old daughter.  They have another son nearby and two daughters, each about 1h away.      Psychospiritual  She has a lot of concerns about her treatment plan and present as well as potential side effects.     Care Planning   Started conversation today. One aspect we spent time on was to start exploring what concerns her most about the idea of chemo and what would make it acceptable or not acceptable to her. She is mostly concerned about various side effects (see below).   Her objective is to live as long as possible. She emphasizes the importance of her granddaughter remembering her, since she also fulfills the mother-role to some extent. She is interested in legacy work and is planning to work on this with the palliative .     Interim History:  At the last visit we discussed physical symptoms (sleep, leg swelling), coping (has established with palliative social work)    Patient is on the call by herself.     She still has significant swelling in her right leg, started ACE wraps.     Hearing that the next treatment option after the current would be chemotherapy. This frightens her. She states that she has heard about the side effects (weight loss, hair loss, and others).    She is particularly worried about her weight. She hasn't been able to regain weight she lost around the time of her surgery. Due to her multiple sensitivities/allergies she has limited options for nutrition. Hasn't seen a nutritionist recently.     She has been working with PT to address the weakness and pain that developed after steroids. It has been helpful,but she suffered a setback due to the leg swelling that developed after the TVEC treatment.     She started lymphedema treatment for the leg swelling which is starting to show effect. She is using wraps. Her  Sulaiman has been helping with putting them on.     Coping:  She is worried about the effectiveness of her treatments.   It is frustrating to her that the treatment side effects are this limiting for her functional status.     Social History:  Pertinent  changes to social history/social situation since last visit: no changes      Functional Status:  Palliative Performance Scale: 70  She does much of the housework, but is limited in certain activities (anything that requires kneeling/bending down) and has fatigue afterwards          Impression & Recommendations & Counseliny/o with malignant melanoma currently on later line immunotherapy     Nutrition: limited by several food intolerances. Her weight has been stable lately despite efforts to gain weight  - referral to nutritionist    Treatment plan/preferences: will continue conversation at our next visit.   - appreciate support of palliative social work      Return to clinic in 2 months    Data / Chart Review:    Review of Systems:   ROS: 10 point ROS neg other than the symptoms noted above in the HPI and pertinents here.         Physical Exam:   Physical Exam:  Vital Signs not checked, virtual visit    CONSTIT: awake, appears comfortable  EENT: MMM, EOMI, no icterus  RESP: reg, nl effort, no cough  MSK: moves x4, FARIDEH  SKIN: no rash, no obvious lesions  NEURO: alert, oriented x3  PSYCH: appropriate affect, memory and thought process intact    The rest of a comprehensive physical examination is deferred  due to public health emergency video visit restrictions.      Current pertinent medications:  reviewed    : ok    No pertinent allergies      Lab and imaging data reviewed:  Comments:           Video-Visit Details    Type of service:  Video Visit    Physician has received verbal consent for a Video Visit from the patient? Yes    Video Start Time: 11:21 AM    Video End Time (time video stopped): 1153    Originating Location (pt. Location): Home    Distant Location (provider location):  [facility]     Mode of Communication:  Video Conference via [facility]    Lorena Cole MD  Palliative Medicine  Pager [phone]        Again, thank you for allowing me to participate in  the care of your patient.        Sincerely,        Lorena Cole MD

## 2022-06-30 NOTE — PROGRESS NOTES
"Yadira Hoang is a 77 year old female who presents for:  Chief Complaint   Patient presents with     Consult     Patient has a history of head injuries. She is getting headache type head issues that have increased since her cancer diagnosis. Often feels pressure in head, other times it feels blown up like a balloon, she has had instances of a sharp shoot pain above left eye.         Initial Vitals:  /79 (BP Location: Right arm, Patient Position: Sitting, Cuff Size: Adult Regular)   Pulse 100   Ht 1.702 m (5' 7\")   Wt 61.7 kg (136 lb)   SpO2 98%   BMI 21.30 kg/m   Estimated body mass index is 21.3 kg/m  as calculated from the following:    Height as of this encounter: 1.702 m (5' 7\").    Weight as of this encounter: 61.7 kg (136 lb).. Body surface area is 1.71 meters squared. BP completed using cuff size: regular        Meghan Peguero  "

## 2022-06-30 NOTE — PROGRESS NOTES
"INITIAL NEUROLOGY CONSULTATION    DATE OF VISIT: 6/30/2022  CLINIC LOCATION: [facility]  MRN: [id number]  PATIENT NAME: Yadira Hoang  YOB: 1945    REASON FOR VISIT:   Chief Complaint   Patient presents with     Consult     Patient has a history of head injuries. She is getting headache type head issues that have increased since her cancer diagnosis. Often feels pressure in head, other times it feels blown up like a balloon, she has had instances of a sharp shoot pain above left eye.      HISTORY OF PRESENT ILLNESS:                                                    Ms. Yadira Hoang is 77 year old right handed female patient with past medical history of melanoma, obstructive sleep apnea, migraine, hyperlipidemia, anxiety, and depression, who was seen today for sequelae of head injury/persistent headaches.    Per patient's report, she sustained 2 head injuries in 2011 (hit her head on the left side) and in May 2014 that led to intermittent headaches.  At the present time, she reports 2 kinds.      First type is left-sided shooting headache that radiates from the base of the skull to her left eye.  It recently flared up and is more frequent when she uses her CPAP machine regularly.  She is unable to sleep on her left side due to discomfort.  No other associated symptoms.    The second type is intermittent bifrontotemporal pressure moderate headache without additional symptoms that may occur from 1-2 times per week to daily and lasts close to 30 minutes.  At times, it feels that her brain is bigger than the head (\"like a balloon\").  Frequency of these headaches increased following her diagnosis of skin cancer/melanoma.    She denies focal neurological symptoms or prior history of seizures, strokes, or CNS infections.    Laboratory evaluation from May-June 2022 includes normal TSH (2.03), low albumin (3.3) and otherwise unremarkable CMP, elevated LDL (136), and normal " "CBC.    Brain MRI with and without contrast from 6/2/2022 demonstrated generalized brain atrophy and presumed microvascular ischemic changes.  Images were personally reviewed and independently interpreted.    No additional useful information is available in Care Everywhere, which was reviewed.  PAST MEDICAL/SURGICAL HISTORY:                                                    I personally reviewed patient's past medical and surgical history with the patient at today's visit.  MEDICATIONS:                                                    I personally reviewed patient's medications and allergies with the patient at today's visit.  ALLERGIES:                                                      Allergies   Allergen Reactions     Prednisone      Leg and back pain     Atorvastatin      Statins all swelling     Hmg-Coa-R Inhibitors Swelling     EXAM:                                                    VITAL SIGNS:   /79 (BP Location: Right arm, Patient Position: Sitting, Cuff Size: Adult Regular)   Pulse 100   Ht 1.702 m (5' 7\")   Wt 61.7 kg (136 lb)   SpO2 98%   BMI 21.30 kg/m    Mini-Cog Assessment:  Mini Cog Assessment  Clock Draw Score: 2 Normal  3 Item Recall: 3 objects recalled  Mini Cog Total Score: 5  Administered by: : Meghan HAUSER    General: pt is in NAD, cooperative.  Skin: normal turgor, moist mucous membranes, no lesions/rashes noticed, RLE edema (has lymphedema wraps on).  HEENT: ATNC, EOMI, PERRL, white sclera, normal conjunctiva, no nystagmus or ptosis. No carotid bruits bilaterally.  Respiratory: lung sounds clear to auscultation bilaterally, no crackles, wheezes, rhonchi. Symmetric lung excursion, no accessory respiratory muscle use.  Cardiovascular: normal S1/S2, no murmurs/rubs/gallops.   Abdomen: Not distended.  : deferred.    Neurological:  Mental: alert, follows commands, Mini Cog Total Score: 5/5 with 3/3 on memory recall, no aphasia or dysarthria. Fund of knowledge is appropriate for " age.  Cranial Nerves:  CN II: visual acuity - able to accurately count fingers with each eye. Visual fields intact, fundi: discs sharp, no papilledema and normal vessels bilaterally.  CN III, IV, VI: EOM intact, pupils equal and reactive  CN V: facial sensation nl  CN VII: face symmetric, no facial droop  CN VIII: hearing normal  CN IX: palate elevation symmetric, uvula at midline  CN XI SCM normal, shoulder shrug nl  CN XII: tongue midline  Motor: Strength: 5/5 in all major groups of all extremities. Normal tone. No abnormal movements. No pronator drift b/l.  Reflexes: Triceps, biceps, brachioradialis, patellar, and achilles reflexes normal and symmetric. No clonus noted. Toes are down-going b/l.   Sensory: light touch, pinprick, and vibration intact. Romberg: negative.  Coordination: FNF and heel-shin tests intact b/l.   Gait:  Normal, able to tandem walk with mild difficulty.  Uses a walker to help with stability related to right lower extremity lymphedema.  There is tenderness to palpation over the left lesser occipital and great auricular nerves.  DATA:   LABS/EEG/IMAGING/OTHER STUDIES: I reviewed pertinent medical records, as detailed in the history of present illness.  ASSESSMENT AND PLAN:      ASSESSMENT: Yadira Hoang is a 77 year old female patient with listed above past medical history, who presents with chronic headaches that started following 2 head injuries.    We had a detailed discussion with the patient regarding her presenting complaints.  The neurological exam today is notable for tenderness to palpation over the left lesser occipital and great auricular nerves.  Her recent brain MRI is unrevealing.  We reviewed images together.    We discussed that her clinical presentation is most likely consistent with multifactorial disorder, including posttraumatic tension headaches and left occipital neuralgia.  We reviewed the suspected diagnoses, available treatment options, and the plan, as  summarized below.    DIAGNOSES:    ICD-10-CM    1. Chronic post-traumatic headache, not intractable  G44.329    2. Cervico-occipital neuralgia of left side  M54.81 Physical Therapy Referral     PLAN: At today's visit we thoroughly discussed various diagnostic possibilities for patient's symptoms that are most likely consistent with multifactorial headache disorder.  We also reviewed available treatment options, brain MRI results, and the plan.    For headache prevention:  1.  I advised the patient to try riboflavin at 400 mg every morning for the next 3 months.  She will contact my clinic with any intolerable side effects and give me an update in 4-6 weeks after starting this supplement.  2. Other future options include Homero-relief, cyproheptadine, Topamax, Depakote, propranolol, verapamil, CGRP antagonists, and possibly gabapentin.  For acute headache therapy: May consider Excedrin or Naproxen 500 mg, but should take it with food and limit use of all analgesics to less that 15 days/month.    Reviewed non-pharmacological headache prevention measures include proper sleep hygiene, regular meals, adequate hydration, regular aerobic exercise, stress reduction techniques, and limiting caffeine intake.    Regarding left occipital neuralgia, I placed a referral for physical therapy.  We also discussed need for ergonomic positioning of her head during the day, cushioning the straps of CPAP machine, and possibly changing her pillow to see if this condition improves.  We also discussed an option of occipital nerve block if pain worsens in the future.    I advised the patient to keep the headache diary (paper or Migraine Jesus) and bring it to the next follow up visit or upload it via My Chart.    Next follow-up appointment is in the next 3 months or earlier if needed.    Total Time: 69 minutes spent on the date of the encounter doing chart review, history and exam, documentation and further activities per the note.    Yvon  MANJEET Rivera MD  [facility] Neurology  (Chart documentation was completed in part with Dragon voice-recognition software. Even though reviewed, some grammatical, spelling, and word errors may remain.)
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
## 2022-06-30 NOTE — PATIENT INSTRUCTIONS
AFTER VISIT SUMMARY (AVS):    At today's visit we thoroughly discussed various diagnostic possibilities for your symptoms that are most likely consistent with multifactorial headache disorder.  We also reviewed available treatment options, brain MRI results, and the plan.    For headache prevention:  1.  Riboflavin 400 mg every morning for the next 3 months.  Please contact my clinic with any intolerable side effects and give me an update in 4-6 weeks after starting this supplement.  2. Other future options include Homero-relief, cyproheptadine, Topamax, Depakote, propranolol, verapamil, CGRP antagonists, and possibly gabapentin.  For acute headache therapy: May consider Excedrin or Naproxen 500 mg, but take it with food and limit use of all analgesics to less that 15 days/month.    Reviewed non-pharmacological headache prevention measures include proper sleep hygiene, regular meals, adequate hydration, regular aerobic exercise, stress reduction techniques, and limiting caffeine intake.    Regarding left occipital neuralgia, I placed a referral for physical therapy.  We also discussed need for ergonomic positioning of your head during the day, cushioning the straps of CPAP machine, and possibly changing your pillow to see if this condition improves.  We also discussed an option of occipital nerve block.    Please keep the headache diary (paper or Migraine Jesus) and bring it to the next follow up visit or upload it via My Chart.    Next follow-up appointment is in the next 3 months or earlier if needed.    Please do not hesitate to call me with any questions or concerns.    Thanks.
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
## 2022-07-06 NOTE — PROGRESS NOTES
MEDICAL ONCOLOGY PROGRESS NOTE  [facility]  Jul 6, 2022     CHIEF COMPLAINT: Acral melanoma    Melanoma History:  1. She noted a painful lesion on the sole of the right foot for a few months, since last summer.  It initially was small then became raised.  It spontaneously bled. She is not entirely sure of its appearance initially.  She denies noting any masses behind the knee or in the groin.  2. 1/9/2020, punch biopsy was performed by Dr Jessica Meadows. Patology showed melanoma, at least 3.4 mm deep with ulceration and 0 mitoses, and perineural invasion present. TILs were non-brisk and LVI not identified. pT3b.  3. 2/10/2020, she has right femoral sentinel lymph node biopsy and wide local excision (Specimen #: [id number]) and the right heel lesion extends to a depth of 6.6 mm, and invasive melanoma is present at 0.2 mm from the deep margin. Microsatellites are also present. There was 1 (of 2) lymph nodes involved. The lymph node tissue exhibits extensive subcapsular and parenchymal clusters of melanoma cells, highlighted on Melan-A immunostain. The largest cluster is 7 millimeters in greatest diameter. pT4b pN2c. PD-L1 staining is negative, <1%. No mutation in BRAF, KIT, or NRAS.  4. 2/22/2020, she had PET-CT, which showed intense FDG uptake in a right inguinal lymph node, concerning for an additional focus of metastatic disease. There is also an indeterminate right external iliac lymph node with mild FDG uptake.  5. 3/4/2020, she has medial plantar artery  fasciocutaneous instep flap and Integra placement to the donor site. On 4/2/2020, she has additional reconstruction with skin grafting.  6. 5/22/2020, PET-CT shows a hypermetabolic right inguinal and right external iliac chain lymph node and stable pulmonary nodules.  7. 5/27/2020, she starts nivolumab for presumed low volume lymph node predominant metastatic disease.  8. 8/21/2020, PET-CT shows increase in hypermetabolic adenopathy, and a  hypermetabolic nodule in segment 2 of the liver. Given low volume of disease she prefers to continue therapy with short interval follow-up.  9. 10/9/2020, PET-CT shows increasing size of hepatic metastases with increased hypermetabolism, increased right inguinal and iliac lymphadenopathy, and new FDG uptake right T2 transverse process and right side of S1. MRI-brain obtained 10/21/2020 is negative for brain metastasis.  10. 10/28/2020, she starts ipilimumab 1mg/kg and nivolumab 3mg/kg. She then goes on to maintenance nivolumab through 4/12/21.  11. 5/10/2021, she starts ipilimumab 3 mg/kg monotherapy q3w for 4 cycles.  12. 7/30/21, PET/CT shows mild disease progression.   13. 8/2/21, start ipilimumab 3mg/kg and nivolumab 1mg/kg and received 2 cycles, last on 8/23/21.  14. 8/23/21, develops elevated LFT's.   15. 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   16. 1/28/22, started TVEC  17. 4/22/22, progressive disease, consideration for additional therapeutic approaches.  18. 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 She presents today for a virtual visit prior to cycle 3 Opdualag.    Patient reports that she feels fatigued and naps about 1 hour/day.  She does have difficulty staying asleep.  She continues to be very worried about her diagnosis.  She has her son and almost 3-year-old granddaughter living with her.  She is worried that her granddaughter will not remember her after she dies.  She thinks the mass in her right groin is getting bigger.  She is doing lymphedema wraps.  She reports eating fair.  She did not start the voriconazole as she was worried that it could impact her liver function.  She reports doing okay getting in fluids.  She has occasional constipation.  She denies any shortness of breath.  She has had intermittent shooting pains in her right foot and ankle.  She is getting evaluated for  hearing aids.  Due to imbalance, she uses a walker to get around.  She denies other concerns.    Current Outpatient Medications   Medication Sig Dispense Refill     carboxymethylcellulose PF (REFRESH LIQUIGEL) 1 % ophthalmic gel 1 drop 3 times daily       cholecalciferol (VITAMIN D3) 125 MCG (5000 UT) TABS tablet Take 2,000 Units by mouth every other day        estradiol (ESTRACE) 0.1 MG/GM vaginal cream Place 2 g vaginally twice a week 42.5 g 1     fish oil-omega-3 fatty acids 1000 MG capsule Take 2 g by mouth daily       multivitamin w/minerals (THERA-VIT-M) tablet Take 1 tablet by mouth daily       voriconazole (VFEND) 200 MG tablet Take 1 tablet (200 mg) by mouth 2 times daily 28 tablet 0     Physical Exam:  General: The patient is a pleasant female in no acute distress.  /67   Pulse 93   Temp 98  F (36.7  C)   Resp 16   Wt 64.3 kg (141 lb 12.8 oz)   SpO2 98%   BMI 22.21 kg/m    Wt Readings from Last 10 Encounters:   07/06/22 64.3 kg (141 lb 12.8 oz)   06/30/22 61.7 kg (136 lb)   06/08/22 65 kg (143 lb 4.8 oz)   05/31/22 64.7 kg (142 lb 11.2 oz)   05/24/22 64.7 kg (142 lb 11.2 oz)   05/11/22 62.6 kg (138 lb 1.6 oz)   04/28/22 62.1 kg (137 lb)   03/22/22 61.7 kg (136 lb 1.6 oz)   02/16/22 64.1 kg (141 lb 6.4 oz)   01/27/22 63.4 kg (139 lb 12.8 oz)   HEENT: EOMI. Sclerae are anicteric.  Lymph: Neck is supple with no lymphadenopathy in the cervical or supraclavicular areas.   Heart: Regular rate and rhythm.   Lungs: Clear to auscultation bilaterally.   Abdomen: Bowel sounds present, soft, mild RLQ tenderness with associated firmness.  Extremities: Right leg is wrapped. 10 x 8 cm firm mass palpable in the right groin. No LLE edema.   Neuro: Cranial nerves II through XII are grossly intact.  Skin: No rashes, petechiae, or bruising noted on exposed skin.    LABS  Most Recent 3 CBC's:  Recent Labs   Lab Test 05/11/22  1115 03/22/22  1105 02/16/22  0931   WBC 5.4 5.3 5.9   HGB 12.7 13.4 12.7   MCV 94 90 92     269 286     Most Recent 3 BMP's:  Recent Labs   Lab Test 07/06/22  1031 06/08/22  1231 05/11/22  1115    142 140   POTASSIUM 4.2 4.0 4.2   CHLORIDE 105 105 105   CO2 30 35* 30   BUN 20 26 27   CR 0.60 0.55 0.59   ANIONGAP 7 2* 5   GABY 9.5 9.4 9.5   GLC 94 104* 93     Most Recent 2 LFT's:  Recent Labs   Lab Test 07/06/22  1031 06/08/22  1231   AST 37 29   ALT 26 30   ALKPHOS 74 74   BILITOTAL 0.4 0.3     ASSESSMENT AND PLAN:  Acral melanoma, right heel primary, pT4b pN3c M1, Stage IV. Started Opdualag 5/11/22 and tolerated the first 2 cycles well with only fatigue. Proceed with cycle 3 tomorrow. Will plan to repeat imaging after 3 cycles.    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 Working with lymphedema therapy and wraps. Previously, encouraged elevation.    Anorexia and weight loss. Weight has stablized. Previously a trial of Zyprexa 2.5 mg at bedtime was recommended, which may also help with insomnia. EGD consistent with candida. Recommend she start on voriconazole.     Depression and anxiety. Declines taking sertraline. Is working with palliative care social work.     Leg pain. This seemed to worsen when on steroids, was improving with physical therapy, but still bothersome. Was given oxycodone to use PRN but has not used as she is very hesitant to use opioids.    Meghan Perdomo PA-C  [facility]  [address] [place], [place] [address] [phone]    25 minutes spent on the date of the encounter doing chart review, review of test results, interpretation of tests, patient visit and documentation

## 2022-07-06 NOTE — LETTER
7/6/2022         RE: Yadira Hoang  [address] [facility] [address]      MEDICAL ONCOLOGY PROGRESS NOTE  Melanoma Clinic  Jul 6, 2022     CHIEF COMPLAINT: Acral melanoma    Melanoma History:  1. She noted a painful lesion on the sole of the right foot for a few months, since last summer.  It initially was small then became raised.  It spontaneously bled. She is not entirely sure of its appearance initially.  She denies noting any masses behind the knee or in the groin.  2. 1/9/2020, punch biopsy was performed by Dr Jessica Meadows. Patology showed melanoma, at least 3.4 mm deep with ulceration and 0 mitoses, and perineural invasion present. TILs were non-brisk and LVI not identified. pT3b.  3. 2/10/2020, she has right femoral sentinel lymph node biopsy and wide local excision (Specimen #: [id number]) and the right heel lesion extends to a depth of 6.6 mm, and invasive melanoma is present at 0.2 mm from the deep margin. Microsatellites are also present. There was 1 (of 2) lymph nodes involved. The lymph node tissue exhibits extensive subcapsular and parenchymal clusters of melanoma cells, highlighted on Melan-A immunostain. The largest cluster is 7 millimeters in greatest diameter. pT4b pN2c. PD-L1 staining is negative, <1%. No mutation in BRAF, KIT, or NRAS.  4. 2/22/2020, she had PET-CT, which showed intense FDG uptake in a right inguinal lymph node, concerning for an additional focus of metastatic disease. There is also an indeterminate right external iliac lymph node with mild FDG uptake.  5. 3/4/2020, she has medial plantar artery  fasciocutaneous instep flap and Integra placement to the donor site. On 4/2/2020, she has additional reconstruction with skin grafting.  6. 5/22/2020, PET-CT shows a hypermetabolic right inguinal and right external iliac chain lymph node and stable pulmonary nodules.  7. 5/27/2020, she starts nivolumab for presumed low volume lymph node predominant metastatic  disease.  8. 8/21/2020, PET-CT shows increase in hypermetabolic adenopathy, and a hypermetabolic nodule in segment 2 of the liver. Given low volume of disease she prefers to continue therapy with short interval follow-up.  9. 10/9/2020, PET-CT shows increasing size of hepatic metastases with increased hypermetabolism, increased right inguinal and iliac lymphadenopathy, and new FDG uptake right T2 transverse process and right side of S1. MRI-brain obtained 10/21/2020 is negative for brain metastasis.  10. 10/28/2020, she starts ipilimumab 1mg/kg and nivolumab 3mg/kg. She then goes on to maintenance nivolumab through 4/12/21.  11. 5/10/2021, she starts ipilimumab 3 mg/kg monotherapy q3w for 4 cycles.  12. 7/30/21, PET/CT shows mild disease progression.   13. 8/2/21, start ipilimumab 3mg/kg and nivolumab 1mg/kg and received 2 cycles, last on 8/23/21.  14. 8/23/21, develops elevated LFT's.   15. 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   16. 1/28/22, started TVEC  17. 4/22/22, progressive disease, consideration for additional therapeutic approaches.  18. 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 She presents today for a virtual visit prior to cycle 3 Opdualag.    Patient reports that she feels fatigued and naps about 1 hour/day.  She does have difficulty staying asleep.  She continues to be very worried about her diagnosis.  She has her son and almost 3-year-old granddaughter living with her.  She is worried that her granddaughter will not remember her after she dies.  She thinks the mass in her right groin is getting bigger.  She is doing lymphedema wraps.  She reports eating fair.  She did not start the voriconazole as she was worried that it could impact her liver function.  She reports doing okay getting in fluids.  She has occasional constipation.  She denies any shortness of breath.  She has had  intermittent shooting pains in her right foot and ankle.  She is getting evaluated for hearing aids.  Due to imbalance, she uses a walker to get around.  She denies other concerns.    Current Outpatient Medications   Medication Sig Dispense Refill     carboxymethylcellulose PF (REFRESH LIQUIGEL) 1 % ophthalmic gel 1 drop 3 times daily       cholecalciferol (VITAMIN D3) 125 MCG (5000 UT) TABS tablet Take 2,000 Units by mouth every other day        estradiol (ESTRACE) 0.1 MG/GM vaginal cream Place 2 g vaginally twice a week 42.5 g 1     fish oil-omega-3 fatty acids 1000 MG capsule Take 2 g by mouth daily       multivitamin w/minerals (THERA-VIT-M) tablet Take 1 tablet by mouth daily       voriconazole (VFEND) 200 MG tablet Take 1 tablet (200 mg) by mouth 2 times daily 28 tablet 0     Physical Exam:  General: The patient is a pleasant female in no acute distress.  /67   Pulse 93   Temp 98  F (36.7  C)   Resp 16   Wt 64.3 kg (141 lb 12.8 oz)   SpO2 98%   BMI 22.21 kg/m    Wt Readings from Last 10 Encounters:   07/06/22 64.3 kg (141 lb 12.8 oz)   06/30/22 61.7 kg (136 lb)   06/08/22 65 kg (143 lb 4.8 oz)   05/31/22 64.7 kg (142 lb 11.2 oz)   05/24/22 64.7 kg (142 lb 11.2 oz)   05/11/22 62.6 kg (138 lb 1.6 oz)   04/28/22 62.1 kg (137 lb)   03/22/22 61.7 kg (136 lb 1.6 oz)   02/16/22 64.1 kg (141 lb 6.4 oz)   01/27/22 63.4 kg (139 lb 12.8 oz)   HEENT: EOMI. Sclerae are anicteric.  Lymph: Neck is supple with no lymphadenopathy in the cervical or supraclavicular areas.   Heart: Regular rate and rhythm.   Lungs: Clear to auscultation bilaterally.   Abdomen: Bowel sounds present, soft, mild RLQ tenderness with associated firmness.  Extremities: Right leg is wrapped. 10 x 8 cm firm mass palpable in the right groin. No LLE edema.   Neuro: Cranial nerves II through XII are grossly intact.  Skin: No rashes, petechiae, or bruising noted on exposed skin.    LABS  Most Recent 3 CBC's:  Recent Labs   Lab Test  05/11/22  1115 03/22/22  1105 02/16/22  0931   WBC 5.4 5.3 5.9   HGB 12.7 13.4 12.7   MCV 94 90 92    269 286     Most Recent 3 BMP's:  Recent Labs   Lab Test 07/06/22  1031 06/08/22  1231 05/11/22  1115    142 140   POTASSIUM 4.2 4.0 4.2   CHLORIDE 105 105 105   CO2 30 35* 30   BUN 20 26 27   CR 0.60 0.55 0.59   ANIONGAP 7 2* 5   GABY 9.5 9.4 9.5   GLC 94 104* 93     Most Recent 2 LFT's:  Recent Labs   Lab Test 07/06/22  1031 06/08/22  1231   AST 37 29   ALT 26 30   ALKPHOS 74 74   BILITOTAL 0.4 0.3     ASSESSMENT AND PLAN:  Acral melanoma, right heel primary, pT4b pN3c M1, Stage IV. Started Opdualag 5/11/22 and tolerated the first 2 cycles well with only fatigue. Proceed with cycle 3 tomorrow. Will plan to repeat imaging after 3 cycles.    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 Working with lymphedema therapy and wraps. Previously, encouraged elevation.    Anorexia and weight loss. Weight has stablized. Previously a trial of Zyprexa 2.5 mg at bedtime was recommended, which may also help with insomnia. EGD consistent with candida. Recommend she start on voriconazole.     Depression and anxiety. Declines taking sertraline. Is working with palliative care social work.     Leg pain. This seemed to worsen when on steroids, was improving with physical therapy, but still bothersome. Was given oxycodone to use PRN but has not used as she is very hesitant to use opioids.    25 minutes spent on the date of the encounter doing chart review, review of test results, interpretation of tests, patient visit and documentation       Meghan Perdomo PA-C

## 2022-07-06 NOTE — NURSING NOTE
Chief Complaint   Patient presents with     Blood Draw     Labs obtained via  butterfly 23 gauge needle, VS taken, checked into next appt

## 2022-07-06 NOTE — NURSING NOTE
"Oncology Rooming Note    July 6, 2022 10:43 AM   Yadira Hoang is a 77 year old female who presents for:    Chief Complaint   Patient presents with     Blood Draw     Labs obtained via  butterfly 23 gauge needle, VS taken, checked into next appt     Initial Vitals: /67   Pulse 93   Temp 98  F (36.7  C)   Resp 16   Wt 64.3 kg (141 lb 12.8 oz)   SpO2 98%   BMI 22.21 kg/m   Estimated body mass index is 22.21 kg/m  as calculated from the following:    Height as of 6/30/22: 1.702 m (5' 7\").    Weight as of this encounter: 64.3 kg (141 lb 12.8 oz). Body surface area is 1.74 meters squared.  Mild Pain (2) Comment: Data Unavailable   No LMP recorded. Patient is postmenopausal.  Allergies reviewed: Yes  Medications reviewed: Yes    Medications: Medication refills not needed today.  Pharmacy name entered into [facility]:    [facility] [place], [place] - [address], [address]  [facility] [address] [place] [address] [address] [facility]    Clinical concerns: Pt presents today for f/u.       Amanda Sousa LPN  7/6/2022              "

## 2022-07-07 NOTE — PROGRESS NOTES
"SPIRITUAL HEALTH SERVICES Progress Note  [facility] - Infusion    Visit for emotional support per nursing suggestion.    Yadira shared a wide-ranging personal narrative incorporating elements of her family, life story, health history, emotional and spiritual lives.    We explored notions of acceptance, not-knowing, resilience, self-advocacy, family relationships and mutual support, her [employer] bisi, and her strong desire for independence and personal privacy. She spoke of lack of energy, several deaths in the family, and various emotions, including crying a lot at times, anger, and loneliness.    Yadira shared that her son and granddaughter live with her and her  Sulaiman. She stated that her hope is to have a \"miracle\" so she lives longer with Sulaiman and can raise her grandchild.    I offered reflective conversation, normalized emotions, encouraged self-care, invited discernment, and said a prayer.    SH remains available.    Rev Mercy Mullen  Associate   [facility] Phone Line [phone]  [facility] (Tuesdays & Thursdays) [phone]    "

## 2022-07-07 NOTE — PROGRESS NOTES
Infusion Nursing Note:  Yadira Hoang presents today for C3D1 Opdualag.    Patient seen by provider today: No   present during visit today: Not Applicable.    Note:   States fatigue is her worst side effect.    Intravenous Access:  Peripheral IV placed.    Treatment Conditions:  Lab Results   Component Value Date     07/06/2022    POTASSIUM 4.2 07/06/2022    CR 0.60 07/06/2022    GABY 9.5 07/06/2022    BILITOTAL 0.4 07/06/2022    ALBUMIN 3.2 (L) 07/06/2022    ALT 26 07/06/2022    AST 37 07/06/2022     Results reviewed, labs MET treatment parameters, ok to proceed with treatment.    Post Infusion Assessment:  Patient tolerated infusion without incident.  Blood return noted pre and post infusion.  Site patent and intact, free from redness, edema or discomfort.  No evidence of extravasations.  Access discontinued per protocol.     Discharge Plan:   AVS to patient via MYCHART.  Patient will return 8/8/22 for next appointment.   Patient discharged in stable condition accompanied by: self.  is .  Departure Mode: Ambulatory.      Marguerite Mccloud RN

## 2022-07-11 NOTE — PROGRESS NOTES
Yadira is a 77 year old who is being evaluated via a billable video visit.      How would you like to obtain your AVS? MyChart  If the video visit is dropped, the invitation should be resent by: Send to e-mail at: [email or web address]  Will anyone else be joining your video visit? No     Roomed by virtual facilitator: Betty Jenkins        GASTROENTEROLOGY NEW PATIENT VIDEO VISIT     Video Start Time: 8:32 AM    CC/REFERRING MD:    Erika Blanton    REASON FOR CONSULTATION:   Referred by Erika Blanton for esophageal candidiasis       HISTORY OF PRESENT ILLNESS:    Yadira Hoang is 77 year old female who presents for evaluation of esophageal candidiasis.     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 She reports having stomach pains for several months now. She was evaluated with an EGD in January 2022 and found to have esophageal candidiasis. She was treated with fluconazole which helped temporarily but symptoms quickly returned. She had a repeat EGD last month in June 2022 and was again found to have esophageal candidiasis as well as chronic gastritis and intestinal metaplasia in the stomach. She was given voriconazole for treatment however has not started this as of yet. She was very apprehensive given potential side effects. She continues to have stomach pains and is not eating well. Her weight is fairly stable but she is concerned about her lack of appetite.       Yadira  has a past medical history of Concussion (2011), ALEXUS (generalized anxiety disorder) (05/07/2020), Major depression, recurrent, chronic (H) (05/07/2020), Melanoma (H), Nonsenile cataract, and Sleep apnea.    She  has a past surgical history that includes tubal ligation; Excise lesion lower extremity (Right, 2/10/2020); Biopsy node sentinel (Right, 2/10/2020); Excise lesion lower extremity (Right, 2/20/2020); orthopedic surgery (Right); Reconstruct Foot (Right, 3/4/2020); Irrigation and debridement foot, combined  (Right, 4/2/2020); Graft skin split thickness from extremity (Right, 4/2/2020); Esophagogastroduodenoscopy, With Brushings (1/12/2022); Combined Esophagoscopy, Gastroscopy, Duodenoscopy (Egd) With Co2 Insufflation (N/A, 6/20/2022); and Esophagoscopy, gastroscopy, duodenoscopy (EGD), combined (N/A, 6/20/2022).    She  reports that she has never smoked. She has never used smokeless tobacco. She reports previous alcohol use. She reports that she does not use drugs.    Her family history is not on file.    ALLERGIES:  Prednisone, Atorvastatin, and Hmg-coa-r inhibitors      PREVIOUS ENDOSCOPY:    UPPER ENDOSCOPY 6/20/22    Impression:      - Esophageal plaques were found, suspicious for                             candidiasis. Biopsied. Cells for cytology obtained.                             - Tortuous esophagus.                             - Erythematous mucosa in the antrum. Biopsied.                             - Normal examined duodenum.       A. STOMACH, BIOPSY:   Gastric antral & fundic mucosa with chronic gastritis and extensive complete intestinal metaplasia; negative for dysplasia; H. pylori immunohistochemical stain is negative     B. ESOPHAGUS, BIOPSY:   Squamous mucosa with mild reactive changes and clusters of candida pseudohyphae; no increase in intraepithelial eosinophils or other abnormalities        UPPER ENDOSCOPY 1/12/2022  Impression:    - White nummular lesions in esophageal mucosa.                          Brushings performed.                          - Tortuous esophagus.                          - Normal stomach.                          - Normal examined duodenum.       PERTINENT MEDICATIONS:    Current Outpatient Medications:      carboxymethylcellulose PF (REFRESH LIQUIGEL) 1 % ophthalmic gel, 1 drop 3 times daily, Disp: , Rfl:      cholecalciferol (VITAMIN D3) 125 MCG (5000 UT) TABS tablet, Take 2,000 Units by mouth every other day , Disp: , Rfl:      estradiol (ESTRACE) 0.1 MG/GM vaginal  cream, Place 2 g vaginally twice a week, Disp: 42.5 g, Rfl: 1     fish oil-omega-3 fatty acids 1000 MG capsule, Take 2 g by mouth daily, Disp: , Rfl:      multivitamin w/minerals (THERA-VIT-M) tablet, Take 1 tablet by mouth daily, Disp: , Rfl:      voriconazole (VFEND) 200 MG tablet, Take 1 tablet (200 mg) by mouth 2 times daily, Disp: 28 tablet, Rfl: 0        PHYSICAL EXAMINATION:  Constitutional: aaox3, cooperative, pleasant, not dyspneic/diaphoretic, no acute distress      GENERAL: Healthy, alert and no distress  EYES: Eyes grossly normal to inspection.  No discharge or erythema, or obvious scleral/conjunctival abnormalities.  RESP: No audible wheeze, cough, or visible cyanosis.  No visible retractions or increased work of breathing.    SKIN: Visible skin clear. No significant rash, abnormal pigmentation or lesions.  NEURO: Cranial nerves grossly intact.  Mentation and speech appropriate for age.  PSYCH: Mentation appears normal, affect normal/bright, judgement and insight intact, normal speech and appearance well-groomed.          PERTINENT STUDIES: (I personally reviewed these laboratory studies today)  Most recent CBC:   Recent Labs   Lab Test 05/11/22  1115 03/22/22  1105   WBC 5.4 5.3   HGB 12.7 13.4   HCT 37.9 40.7    269     Most recent hepatic panel:  Recent Labs   Lab Test 07/06/22  1031 06/08/22  1231   ALT 26 30   AST 37 29     Most recent creatinine:  Recent Labs   Lab Test 07/06/22  1031 06/08/22  1231   CR 0.60 0.55       TSH   Date Value Ref Range Status   07/06/2022 1.52 0.40 - 4.00 mU/L Final   06/21/2021 2.13 0.40 - 4.00 mU/L Final         ASSESSMENT/PLAN:    1. Candida esophagitis (H)  - Adult Gastro Ref - Consult Only    2. Pain, gastric  - Adult Gastro Ref - Consult Only  - famotidine (PEPCID) 20 MG tablet; Take 1 tablet (20 mg) by mouth 2 times daily as needed (acid reflux/heartburn)  Dispense: 60 tablet; Refill: 1      Yadira Hoang is a 77 year old female who presents for GI  consult. 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 She has esophageal candidiasis, initially diagnosed via EGD in January 2022 with only partial improvement after fluconazole. Repeat EGD June 2022 continues to show candida albicans. She has been given course of voriconazole however has not started as of yet. We reviewed her diagnosis and medication. She is apprehensive about potential side effects especially regarding eye disturbance as she already has eye issues. She does have an eye specialist that she follows with and so I recommended her to review these concerns with her specialist. She does feel more comfortable about medication after we reviewed her diagnosis and will plan to start as soon as she can.     She also has epigastric abdominal discomfort. She did have gastritis on gastric biopsy. Recommended trial of pepcid bid prn. rx sent to pharmacy.     She was found to have intestinal metaplasia on gastric biopsy as well. Further follow up was not recommended given age.       Return if symptoms worsen or fail to improve.      Thank you for this consultation.  It was a pleasure to participate in the care of this patient; please contact us with any further questions.        This note was created with voice recognition software, and while reviewed for accuracy, typos may remain.       60 minutes spent on the date of the encounter doing chart review, history and exam, documentation and further activities per the note    Radha Fish PA-C  Gastroenterology  [facility]       Video-Visit Details    Type of service:  Video Visit    Video End Time:9:07 AM    Originating Location (pt. Location): Home    Distant Location (provider location):  [facility]     Platform used for Video Visit: Sanjuanita

## 2022-07-11 NOTE — TELEPHONE ENCOUNTER
Spoke with patient. She reports that she would like to proceed with a hearing aid trial and keep her appointment next month.     Richie Davila.  Doctor of Audiology  [place] License # [id number]

## 2022-07-11 NOTE — Clinical Note
Can we check in on this patient in 2 weeks. She has esophageal candidiasis and has been hesitant to start her medication. She plans on starting it soon, so I wanted to check back in and see how her symptoms were after taking treatment. Thank you  - Radha

## 2022-07-11 NOTE — PATIENT INSTRUCTIONS
It was a pleasure visiting with you today.     Please start the voriconazole for your esophageal fungal infection. Report any persistent symptoms.     You can take pepcid as needed for any further stomach pain.     Follow up if no improvement in symptoms.     Radha Fish PA-C  Gastroenterology  [facility]

## 2022-07-12 NOTE — LETTER
7/12/2022         RE: Yadira Hoang  [address] [place] [address]        Dear Colleague,    Thank you for referring your patient, Yadira Hoang, to the [facility] [place]. Please see a copy of my visit note below.      Video-Visit Details     Type of service:  Video Visit     Video Start Time (time video started): 1:00pm     Video End Time (time video stopped): 1:45pm    Originating Location (pt. Location): Home     Distant Location (provider location):  [facility] NUTRITION SERVICES      Mode of Communication:  Video Conference via [facility] NUTRITION SERVICES - REASSESSMENT NOTE   EVALUATION OF PREVIOUS PLAN OF CARE:   Referring Physician: Douglas 6/29  Time spent with patient: 45 minutes.    Current diet: general diet (avoids pepper, garlic and dairy)  Current appetite/intake: fair  Previous/initial RD consult: 9/9/2020  Monitoring from previous assessment:   -Food intake - Yadira tells me that her intake and appetite have been fair since her food surgeries.  She has lost 8 lb and has not been able to regain this weight.  Her weight has been stable but she would like to regain her weight back.  She c/o low energy.   She admits to having a good breakfast and then loses steam for lunch and dinner.  She is receptive to the idea of adding small frequent meals, snacks to her diet.    Breakfast - oatmeal or maltomeal, 1 pc toast w/ butter, 1-2 eggs  Lunch - BLT  Dinner - Chicken, hamburger, venison, fish w/+/- grain or veggie  Snacks - protein smoothies made by her daughter  She started taking Pepcid and is hoping this will help her appetite with less stomach issues.   -Fluid/beverage intake - > 6 cups water/day with low sugar flavoring    -Weight trends - she lost ~8 lb after all her surgeries over the past couple of months  Wt Readings from Last 12 Encounters:   07/11/22 61.7 kg (136 lb)   07/07/22 64.5 kg (142 lb 3.2 oz)   07/06/22 64.3 kg  (141 lb 12.8 oz)   06/30/22 61.7 kg (136 lb)   06/08/22 65 kg (143 lb 4.8 oz)   05/31/22 64.7 kg (142 lb 11.2 oz)   05/24/22 64.7 kg (142 lb 11.2 oz)   05/11/22 62.6 kg (138 lb 1.6 oz)   04/28/22 62.1 kg (137 lb)   03/22/22 61.7 kg (136 lb 1.6 oz)   02/16/22 64.1 kg (141 lb 6.4 oz)   01/27/22 63.4 kg (139 lb 12.8 oz)     Previous Goals:   1.  Continue 5-6 small frequent meals  2.  Aim for 1600kcal and 75g protein/day  3. Weight maintenance     Evaluation: Not met   Previous Nutrition Diagnosis:   Inadequate oral intake related to decreased appetite and constipation as evidenced by pt not taking protein supplement, pt report low protein intake   Evaluation: Completed   NEW FINDINGS:   Poor appetite  Weight loss   CURRENT NUTRITION DIAGNOSIS   Inadequate oral intake related to decreased appetite as evidenced by 8 lb wt loss x past 3 months   INTERVENTIONS   Recommendations / Nutrition Prescription   1. 5-6 small, frequent meals  2. Start adding 300-500 calories, 25g protein throughout the day via snacks/smoothie   Implementation  Composition of Meals and Snacks and Medical Food Supplement - reviewed calorie, protein goals.  Encouraged to add snacks throughout the day.  Reviewed ways to add calories to foods already consumed ie adding peanut butter to her toast/butter in the morning.    Reviewed total calorie/protein needs of >1500 pradeep, 60g protein/day. Encouraged to track intake in food/notebook diary.   Goals  1.  Continue 5-6 small frequent meals  2.  Aim for 1600kcal and 75g protein/day  3. Weight repletion towards -145 lbs     Follow-Up Plans: Pt has RD contact information for questions.       Kimberly Leiva RDN, LD        Again, thank you for allowing me to participate in the care of your patient.        Sincerely,        Kimberly Leiva RD

## 2022-07-12 NOTE — PROGRESS NOTES
Video-Visit Details     Type of service:  Video Visit     Video Start Time (time video started): 1:00pm     Video End Time (time video stopped): 1:45pm    Originating Location (pt. Location): Home     Distant Location (provider location):  [facility] NUTRITION SERVICES      Mode of Communication:  Video Conference via [facility] NUTRITION SERVICES - REASSESSMENT NOTE   EVALUATION OF PREVIOUS PLAN OF CARE:   Referring Physician: Douglas 6/29  Time spent with patient: 45 minutes.    Current diet: general diet (avoids pepper, garlic and dairy)  Current appetite/intake: fair  Previous/initial RD consult: 9/9/2020  Monitoring from previous assessment:   -Food intake - Yadira tells me that her intake and appetite have been fair since her food surgeries.  She has lost 8 lb and has not been able to regain this weight.  Her weight has been stable but she would like to regain her weight back.  She c/o low energy.   She admits to having a good breakfast and then loses steam for lunch and dinner.  She is receptive to the idea of adding small frequent meals, snacks to her diet.    Breakfast - oatmeal or maltomeal, 1 pc toast w/ butter, 1-2 eggs  Lunch - BLT  Dinner - Chicken, hamburger, venison, fish w/+/- grain or veggie  Snacks - protein smoothies made by her daughter  She started taking Pepcid and is hoping this will help her appetite with less stomach issues.   -Fluid/beverage intake - > 6 cups water/day with low sugar flavoring    -Weight trends - she lost ~8 lb after all her surgeries over the past couple of months  Wt Readings from Last 12 Encounters:   07/11/22 61.7 kg (136 lb)   07/07/22 64.5 kg (142 lb 3.2 oz)   07/06/22 64.3 kg (141 lb 12.8 oz)   06/30/22 61.7 kg (136 lb)   06/08/22 65 kg (143 lb 4.8 oz)   05/31/22 64.7 kg (142 lb 11.2 oz)   05/24/22 64.7 kg (142 lb 11.2 oz)   05/11/22 62.6 kg (138 lb 1.6 oz)   04/28/22 62.1 kg (137 lb)   03/22/22 61.7 kg (136 lb 1.6 oz)   02/16/22 64.1 kg  (141 lb 6.4 oz)   01/27/22 63.4 kg (139 lb 12.8 oz)     Previous Goals:   1.  Continue 5-6 small frequent meals  2.  Aim for 1600kcal and 75g protein/day  3. Weight maintenance     Evaluation: Not met   Previous Nutrition Diagnosis:   Inadequate oral intake related to decreased appetite and constipation as evidenced by pt not taking protein supplement, pt report low protein intake   Evaluation: Completed   NEW FINDINGS:   Poor appetite  Weight loss   CURRENT NUTRITION DIAGNOSIS   Inadequate oral intake related to decreased appetite as evidenced by 8 lb wt loss x past 3 months   INTERVENTIONS   Recommendations / Nutrition Prescription   1. 5-6 small, frequent meals  2. Start adding 300-500 calories, 25g protein throughout the day via snacks/smoothie   Implementation  Composition of Meals and Snacks and Medical Food Supplement - reviewed calorie, protein goals.  Encouraged to add snacks throughout the day.  Reviewed ways to add calories to foods already consumed ie adding peanut butter to her toast/butter in the morning.    Reviewed total calorie/protein needs of >1500 pradeep, 60g protein/day. Encouraged to track intake in food/notebook diary.   Goals  1.  Continue 5-6 small frequent meals  2.  Aim for 1600kcal and 75g protein/day  3. Weight repletion towards -145 lbs     Follow-Up Plans: Pt has RD contact information for questions.       Kimberly Leiva RDN, LD

## 2022-07-19 NOTE — TELEPHONE ENCOUNTER
Patient is calling regards to requesting a refill of estradiol cream and wanting to change her prescription voriconazole.     Writer advised patient that she should have refills of her estradiol cream at her pharmacy. Writer advised patient to contact her pharmacy for refills.    Writer advised patient that she should follow up with her GI specialist in regards to her prescription voriconazole since that was the original prescriber. Writer educated to call back to the clinic if her GI provider wants patient to follow up with PCP.      Patient verbalized understanding. No further questions or concerns at this time.     Radha House RN, BSN  [facility]

## 2022-07-19 NOTE — TELEPHONE ENCOUNTER
[facility] Call Center    Phone Message    May a detailed message be left on voicemail: yes     Reason for Call: Medication Question or concern regarding medication   Prescription Clarification  Name of Medication: voriconazole (VFEND) 200 MG tablet  Prescribing Provider:Yu Alfred DO   Pharmacy: [facility] [address] [place], [place] [id number] [facility]   What on the order needs clarification? Pt is requesting a call back a prescription for a different medication as they are experiencing side effects from the current medication.          Action Taken: Message routed to:  [facility] Gastro    Travel Screening: Not Applicable

## 2022-07-19 NOTE — TELEPHONE ENCOUNTER
Writer contacted patient regarding follow up of message below. Patient was unavailable and a voicemail message was left to contact the [facility] at [phone] and ask to speak with a nurse.     If the patient calls back please address side effects of prescription Voriconazole. Triage symptoms if needed.     Radha House RN, BSN  [facility]

## 2022-07-19 NOTE — TELEPHONE ENCOUNTER
Patient called back in follow up to the call below. I am unsure why the patient was to call back and have her symptoms triaged as she was instructed to reach out to her GI provider for this issue and I can see that she has done this from a message in her chart.  Patient is still awaiting a response from her GI provider.     Patient was advised to await the response from GI and reach out to us if she needs. Patient verbalized good understanding, agrees with plan and needs no further support.  Thank you. Mansi Cheung R.N.    06/23/22 Sent (none) uY Alfred DO [facility]     voriconazole (VFEND) 200 MG tablet 28 tablet 0 6/23/2022  --   Sig - Route: Take 1 tablet (200 mg) by mouth 2 times daily - Oral     [facility] [address] [place] [address] [address] [facility]  Esophageal candidiasis (H) [B37.81]  - Primary

## 2022-07-20 NOTE — TELEPHONE ENCOUNTER
[facility] Call Center    Phone Message    May a detailed message be left on voicemail: yes     Reason for Call: Other: Patient called to return missed call from clinic, re: medication.  Please follow up with patient.      Action Taken: Message routed to:  [facility]    Travel Screening: Not Applicable

## 2022-07-20 NOTE — TELEPHONE ENCOUNTER
"Patient is calling to clinic to follow up on messages from yesterday regarding ongoing symptoms of lack of appetite and continued fungal infection in throat/esophagus.    Patient notes she is barely eating, feels she is very malnourished and is asking provider if can order any blood work on her to check her levels and see \"how low it is\" and if something can be done for her.    Patient stated she is feeling shaky, lightheaded often. Has to sit down a lot. Does not want to eat due to the candida esophagitis. Was prescribed an oral treatment by GI provider, Dr. Alfred but patient only took about 3 pills of the Voriconazole and started to experience SE's, blurred vision, \"terrible\" headaches, so stopped taking medication.   Thought of eating anything is not appealing to her. Tries to eat breakfast, can usually get this down but then doesn't want to eat remainder of the day.  Drinking water all day long.   Has a message out to GI provider and team as of yesterday, waiting for call back, was asking for provider to prescribe alternative to the Voriconazole for fungal treatment but has not heard back from their office as of yet. (Writer able to view that RN from GI team attempted contact to patient this morning but did not answer. Writer noted this to patient, she will call back after this call)    Notes she has spoken with PCP about this previously, \"she is well aware of all of this\" and notes \"this has been going on for months\". Notes she has also communicated with Oncology provider about lack of appetite. Was given advice at last virtual visit with oncology on 7/12/22 to eat 5-6 small meals, increase calorie intake. Patient notes she is not really following this, doesn't want to eat.    Feels very fatigued, \"wore out\" and \"out of it\". Tired of feeling this way.    Discussed with patient that if can get candida under control, may feel more like eating. Patient agreed, another reason why she will call back to GI team " after this call. Discussed concerns for dehydration, patient stated she feels she is hydrated, is drinking a lot of water, urinating, clear urine. Doesn't want to go to ED.    Patient notes she is not nauseous, is not vomiting, just doesn't want to eat. Had half a hamburger for dinner last night, was full, didn't want to eat any more.      Routing to provider to update and advise.        Stacey Iniguez RN  [facility]

## 2022-07-20 NOTE — CONFIDENTIAL NOTE
"Spoke with patient. She stated that she didn't take the voriconazole/Vfend right away - started it just over \"a week or so ago\". She said her pharmacist discussed potential eye issues/side effects with her.  Stated she took one tablet, then took 2 the following day (stated that she was apprehensive about side effects). Then, after had 4 days of a terrible head and eyes were bothersome (Left >Right).   Has not taken any of the Vfend since and admits she should have called sooner. State she has not eaten much. Expressed worry that whatever alternative is ordered should not affect the liver.   Did state that she had tried Nystatin before.   Writer informed patient that this update will be sent to the provider for review and will contact patient with provider recommendations/ response.     Yu Meredith RN    "

## 2022-07-20 NOTE — TELEPHONE ENCOUNTER
Let pt know I will reach out to her cancer doctor to see if she may benefit from an appetite medication for cancer patient while we wait for GI to give us recommendation about treating the candida.

## 2022-07-21 NOTE — CONFIDENTIAL NOTE
Referring for med mgmt. Pharmacist can assist with best options for her given her concerns with side effects.    Nystatin would treat oropharyngeal candidiasis but not esophageal candidiasis.     Radha Fish PA-C  Gastroenterology  [facility]

## 2022-07-21 NOTE — CONFIDENTIAL NOTE
Spoke with patient -informed her that the provider ordered a pharmacy medication management consult that could assist in suggesting best medication option(s) for esophageal candidiasis for patient given her concerns with side effects.   Patient appreciative for return phone call.     Yu Meredith RN

## 2022-07-26 NOTE — PROGRESS NOTES
Medication Therapy Management (MTM) Encounter    ASSESSMENT:                            Medication Adherence/Access: No issues identified    Candida of the Esophagus: Given her concerns with side effects, it may be worth re-trying fluconazole and intensifying the treatment given we know she tolerated this okay. Fluconazole also tends to be the -azole antifungal with the fewest side effects. Would recommend increasing dose to fluconazole 400 mg for at least 21, up to 28 days. If this is unsuccessful, would recommend considering posaconazole based on it's side effects profile. We reviewed that because of how this medication family is metabolized, there is some risk of hepatotoxicity - but this is not the same as being carcinogenic. She would feel more comfortable if we monitored her liver function during treatment, which is a reasonable consideration.    Malignant Melanoma: Unchanged. Plan in place for follow-up with oncology.    Bladder Control: We reviewed that dizziness can certainly be a side effects of oxybutynin. Given she has yet to start this, we discussed holding off on starting it at this point until she has been re-treated for esophageal candidiasis as to not confuse side effects.    Eye Health:  We discussed that sometimes multivitamins can be difficult to tolerate and cause lack of appetite/abdominal discomfort especially when they contain iron. We discussed consideration for skipping this for one or two days to see if it may be contributing to her symptoms. If she does not see a change, she can certainly re-start this.    PLAN:                            1. Stacey to discuss liver monitoring with care team during treatment per patient preference.    2. Try holding your multivitamin for a couple of days to see if this helps with your stomach pains. If no difference, okay to restart.    3. Considerations for candidiasis (esophageal):   -- extend fluconazole   -- posaconazole     Staff message sent to [facility]  team to discuss next step. Addendum: response included below.     Follow-up: Will follow-up after discussion with care team   -- Would prefer a telephone follow-up    -- Update 7/28: Contacted Yadira to discuss plan. Let her know about fluconazole orders as well as weekly hepatic panels during treatment. She will plan to setup the lab appointments after she starts treatment again. My contact information was provided in the event she has questions or concerns going forward.     SUBJECTIVE/OBJECTIVE:                          Yadira Hoang is a 77 year old female called for an initial visit. She was referred to me from Radha Fish PA-C.      Reason for visit: discuss options for esophageal candidiasis    Allergies/ADRs: Reviewed in chart  Tobacco: She reports that she has never smoked. She has never used smokeless tobacco.  Alcohol: none    Medication Adherence/Access: no issues reported    Candida of the Esophagus:   Famotidine 20 mg twice daily     She has been bothered by this sensation/pain in her stomach for some time. Esophageal candida was noted earlier this year and she was given a 14 day course of fluconazole. She does not note any difficulty tolerating this course and believes she was able to complete the whole thing. She had a repeat EGD at the end of June. Notes say esophageal plaques found which were suspicious for candidiasis. Yeast culture from 6/20 showed candida albicans. She was given a course of voriconazole, but did not start this until after her visit with GI, say Radha Fish PA-C on 7/11.     Did just try taking voriconazole for a couple of doses. Notes she had bothersome eye pain and then also headaches that lasted for four days. When she picked the medication the pharmacist said voriconazole was going to be very harmful on her liver and eyes. She does not wish to return to this medication. She is very concerned that these medications will have side effects, especially those which might affect  her liver or eyes. She also notes that she would like to be able to eat again and states she has lost a lot of weight.     We reviewed several options today including intensifying fluconazole or a trial of an alternate -azole.    Wt Readings from Last 2 Encounters:   07/29/22 136 lb (61.7 kg)   07/11/22 136 lb (61.7 kg)      Malignant Melanoma:    Notes she was on steroids and that really messed up her stomach. She had to go to physical therapy. She is followed closely by oncology. She is very concerned about her liver and that fluconazole could worsen cancer in her liver.    Lab Results   Component Value Date    AST 37 07/06/2022    AST 26 06/30/2021     Lab Results   Component Value Date    ALT 26 07/06/2022    ALT 26 06/30/2021     No results found for: BILICONJ   Lab Results   Component Value Date    BILITOTAL 0.4 07/06/2022    BILITOTAL 0.7 06/30/2021     Lab Results   Component Value Date    ALBUMIN 3.2 07/06/2022    ALBUMIN 3.4 06/30/2021     Lab Results   Component Value Date    PROTTOTAL 7.1 07/06/2022    PROTTOTAL 7.3 06/30/2021      Lab Results   Component Value Date    ALKPHOS 74 07/06/2022    ALKPHOS 62 06/30/2021       Bladder Control:   Oxybutynin (not taking)    Notes she was prescribed this back in May. She is very concerned about fainting. She notes she is going to a neurologist and a physical therapist.    Eye Health:   Fish oil 2 g daily  Vitamin D 125 mcg daily  MVI (Centrum silver women 50+)    No reported concerns. Inquired if she has ever paused on the MVI to ensure she tolerates this okay. She has not tried this.    ----------------    I spent 36 minutes with this patient today. I offer these suggestions for consideration by her care team. A copy of the visit note was provided to the patient's provider(s).    The patient was sent via [employer] a summary of these recommendations.     Stacey MoodyD, BCACP  MTM Pharmacist   [facility]  Phone: [phone]    Telemedicine Visit Details  Type of service:  Telephone visit  Start Time: 8:30 AM  End Time: 9:06 AM  Originating Location (patient location): Home  Distant Location (provider location):  [facility]     Medication Therapy Recommendations  Candidiasis, esophageal (H)    Rationale: Condition refractory to medication - Ineffective medication - Effectiveness   Recommendation: Change Medication - fluconazole 200 MG tablet   Status: Accepted per Provider   Note: Recommend increasing fluconazole to 400 mg daily for 21-28 days.              From: Radha Fish PA-C   To: Stacey Henriquez [facility]   Subject: RE: Candida                                         Hi Stacey,     Yes, I think it is reasonable to try the fluconazole at higher dose and for longer period of time. Should I send in the prescription and order the labs or were you preferring to update patient first?     Thank you for seeing her and for your input!     Radha     ----- Message -----   From: Stacey Henriquez [facility]   To: Radha Fish PA-C   Subject: Candida                                           Hi Radha,     I was able to meet with Yadira to discuss potential next steps in treatment. I agree that she is very hesitant about using any of the -azoles given potential side effects.     I noticed that she was on a short course previously, fluconazole 400 mg x 1, then 200 mg daily x 13 days.     Because we know she tolerates this one okay, do you think we could try increasing the dose/duration as our next step?     I am thinking fluconazole 400 mg for 21-28 days. She also requested that we check weekly hepatic function, which I think is reasonable given her concerns.     The other option I would recommend would be posaconazole. Let me know what you think, or if you have other thoughts.     Thank you!     Stacey      No

## 2022-07-27 NOTE — PROGRESS NOTES
[facility] Dermatology Note  Encounter Date: Jul 27, 2022  Office Visit     Dermatology Problem List:  NUB - left upper lip s/p punch bx 7/27/22  Last skin check 6/29/21, q6 months through 1/25   1. Metastatic melanoma, originated at right heel  - S/p punch biopsy By Dr. Alvarado 1/9/20 showing melanoma at least 3.4mm deep, ulcerated, no mitoses, perineural invasion, TIL present and nonbrisk, no LVI.  - S/p WLE with 2cm margins (revelead melanoma to depth of 6.6mm with deep margin close, microsatellites were present) and right femoral SLNB 2/10/20 (1/2 nodes positive, largest deposit 7mm)  - S/p re-excision 2/20/20  - Flap completed 3/4/30, skin graft 4/2/20  - Adjuvant nivolumab started 5/2020. Progressed. 10/28/20 started on ipi and nivolumab combination. Went to ipi monotherapy 5/2021. Back to dual immunotherapy 8/2021. Developed severe transaminitis. Stopped immunotherapy 9/2021 and treated with oral steroids.   - TVec started 1/28/22. However stopped due to minimal improvement  - Opdualag started 5/11/22 with monthly infusions  2. AK, right cheek: cryo and biopsy 11/19/20  3. 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 Has 4 children.  Family history: Brother had pancreatic cancer. No melanoma in the family.  ____________________________________________    Assessment & Plan:    # Melanoma, stage IV, originating at R heel, currently off of immunotherapy due to autoimmune adverse events (transaminitis). Known disease in R inguinal nodes and liver. TVEC discontinued after January due to minimal improvement. Now receiving monthly infusions of Opdualag started 5/11/22 by oncology. Did communicate with oncology team to confirm decision to discontinue TVEC with oncology team due to progression of disease.   -we did not address this today as she had her FBSE 1mo ago.     # Neoplasm of uncertain behavior on the left upper lip. The differential diagnosis includes EIC vs  benign neoplasm vs inflammatory papule/folliculitis vs mets vs other. Briefly reviewed risk of recurrence if it is a cyst.    - See procedure note.     Procedures Performed:   - Punch biopsy procedure note, location(s): left upper lip. After discussion of benefits and risks including but not limited to bleeding, infection, scar, incomplete removal, recurrence, and non-diagnostic biopsy, written consent and photographs were obtained. The area was cleaned with isopropyl alcohol. 0.5mL of 1% lidocaine with epinephrine was injected to obtain adequate anesthesia and a 4 mm punch biopsy was performed at site(s). 6-0 Prolene sutures were utilized to approximate the epidermal edges. White petrolatum ointment and a bandage was applied to the wound. Explicit verbal and written wound care instructions were provided. The patient left the dermatology clinic in good condition.       Follow-up: pending path results    Staff and Scribe:     Scribe Disclosure:   Favian RUBIO, am serving as a scribe to document services personally performed by Bryanna Almaraz PA-C, based on data collection and the provider's statements to me.    Provider Disclosure:   The documentation recorded by the scribe accurately reflects the services I personally performed and the decisions made by me.    All risks, benefits and alternatives were discussed with patient.  Patient is in agreement and understands the assessment and plan.  All questions were answered.    Bryanna Almaraz PA-C, MPAS  [facility]: Phone: [phone], Fax: [phone]  [facility]: Phone: [phone],  Fax: [phone]  [facility]: Phone: [phone], Fax: [phone]  ____________________________________________    CC: Derm Problem (Hx melanoma- R heel/ spot L upper lip/ first noticed few weeks ago, growing in size)    HPI:  Ms. Yadira Hoang is a(n) 77 year old  female who presents today as a return patient for a spot check.     Last seen on 6/29/22 with Dr. Fallon for a full skin check. At that time, an AK on the left preauricular region was treated with cryo.     Today, patient notes concern on the left upper lip. She noticed this spot a few weeks ago. Has been increasing in size slowly. It is not tender/painful and does not drain. Notes she did not have it at her last skin exam that she can recall, so it is new within the past month. She is wondering what it is and if it can get removed. Notes she occasionally gets pimples on the scalp.      Patient is otherwise feeling well, without additional concerns.    Labs:  NA    Physical Exam:  Vitals: There were no vitals taken for this visit.  SKIN: Focused examination of the face and neck was done today  - 7 mm flesh colored discrete papule on the left upper lip. Non tender.   - No other lesions of concern on areas examined.               Medications:  Current Outpatient Medications   Medication     carboxymethylcellulose PF (REFRESH LIQUIGEL) 1 % ophthalmic gel     cholecalciferol (VITAMIN D3) 125 MCG (5000 UT) TABS tablet     estradiol (ESTRACE) 0.1 MG/GM vaginal cream     famotidine (PEPCID) 20 MG tablet     fish oil-omega-3 fatty acids 1000 MG capsule     multivitamin w/minerals (THERA-VIT-M) tablet     Riboflavin 100 MG TABS     No current facility-administered medications for this visit.      Past Medical History:   Patient Active Problem List   Diagnosis     Malignant melanoma of right lower extremity including hip (H)     Melanoma of skin (H)     GUERLINE (obstructive sleep apnea)     Chronic congestion of paranasal sinus     Weakness of foot     Neck pain, chronic     Myofascial pain     Intractable migraine without aura and without status migrainosus     History of multiple concussions     Foot pain, bilateral     Fibromyositis     Difficulty walking     Chronic pain disorder     Chronic fatigue     Bilateral occipital  neuralgia     Articular disc disorder of temporomandibular joint     S/P flap graft     Osteopenia     Injury of head     Hyperlipidemia LDL goal <100     ALEXUS (generalized anxiety disorder)     Major depression, recurrent, chronic (H)     Secondary malignant neoplasm of bone (H)     Generalized pruritus     Immunodeficiency, unspecified (H)     Bilateral leg pain     Chronic bilateral low back pain without sciatica     Generalized muscle weakness     Polyarthritis     Past Medical History:   Diagnosis Date     Concussion 2011    brain injury     ALEXUS (generalized anxiety disorder) 05/07/2020     Major depression, recurrent, chronic (H) 05/07/2020     Melanoma (H)      Nonsenile cataract      Sleep apnea     CPAP

## 2022-07-27 NOTE — NURSING NOTE
Yadira Hoang's goals for this visit include:   Chief Complaint   Patient presents with     Derm Problem     Hx melanoma- R heel/ spot L upper lip/ first noticed few weeks ago, growing in size     She requests these members of her care team be copied on today's visit information:     PCP: Erika Blanton    Referring Provider:  Referred Self, MD  No address on file    There were no vitals taken for this visit.    Do you need any medication refills at today's visit? No  Cori Espinsoa, JAGDISH on 7/27/2022 at 10:19 AM

## 2022-07-27 NOTE — LETTER
7/27/2022         RE: Yadira Hoang  [address] [place] [address]        Dear Colleague,    Thank you for referring your patient, Yadira Hoang, to the [facility]. Please see a copy of my visit note below.    [facility] Dermatology Note  Encounter Date: Jul 27, 2022  Office Visit     Dermatology Problem List:  NUB - left upper lip s/p punch bx 7/27/22  Last skin check 6/29/21, q6 months through 1/25   1. Metastatic melanoma, originated at right heel  - S/p punch biopsy By Dr. Alvarado 1/9/20 showing melanoma at least 3.4mm deep, ulcerated, no mitoses, perineural invasion, TIL present and nonbrisk, no LVI.  - S/p WLE with 2cm margins (revelead melanoma to depth of 6.6mm with deep margin close, microsatellites were present) and right femoral SLNB 2/10/20 (1/2 nodes positive, largest deposit 7mm)  - S/p re-excision 2/20/20  - Flap completed 3/4/30, skin graft 4/2/20  - Adjuvant nivolumab started 5/2020. Progressed. 10/28/20 started on ipi and nivolumab combination. Went to ipi monotherapy 5/2021. Back to dual immunotherapy 8/2021. Developed severe transaminitis. Stopped immunotherapy 9/2021 and treated with oral steroids.   - TVec started 1/28/22. However stopped due to minimal improvement  - Opdualag started 5/11/22 with monthly infusions  2. AK, right cheek: cryo and biopsy 11/19/20  3. 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 Has 4 children.  Family history: Brother had pancreatic cancer. No melanoma in the family.  ____________________________________________    Assessment & Plan:    # Melanoma, stage IV, originating at R heel, currently off of immunotherapy due to autoimmune adverse events (transaminitis). Known disease in R inguinal nodes and liver. TVEC discontinued after January due to minimal improvement. Now receiving monthly infusions of Opdualag started 5/11/22 by oncology. Did communicate with oncology  team to confirm decision to discontinue TVEC with oncology team due to progression of disease.   -we did not address this today as she had her FBSE 1mo ago.     # Neoplasm of uncertain behavior on the left upper lip. The differential diagnosis includes EIC vs benign neoplasm vs inflammatory papule/folliculitis vs mets vs other. Briefly reviewed risk of recurrence if it is a cyst.    - See procedure note.     Procedures Performed:   - Punch biopsy procedure note, location(s): left upper lip. After discussion of benefits and risks including but not limited to bleeding, infection, scar, incomplete removal, recurrence, and non-diagnostic biopsy, written consent and photographs were obtained. The area was cleaned with isopropyl alcohol. 0.5mL of 1% lidocaine with epinephrine was injected to obtain adequate anesthesia and a 4 mm punch biopsy was performed at site(s). 6-0 Prolene sutures were utilized to approximate the epidermal edges. White petrolatum ointment and a bandage was applied to the wound. Explicit verbal and written wound care instructions were provided. The patient left the dermatology clinic in good condition.       Follow-up: pending path results    Staff and Scribe:     Scribe Disclosure:   Favian RUBIO, am serving as a scribe to document services personally performed by Bryanna Almaraz PA-C, based on data collection and the provider's statements to me.    Provider Disclosure:   The documentation recorded by the scribe accurately reflects the services I personally performed and the decisions made by me.    All risks, benefits and alternatives were discussed with patient.  Patient is in agreement and understands the assessment and plan.  All questions were answered.    Bryanna Almaraz PA-C, MPAS  [facility]: Phone: [phone], Fax: [phone]  [facility]: Phone: [phone],  Fax: [phone]  [facility] [place]: Phone: [phone], Fax: [phone]  ____________________________________________    CC: Derm Problem (Hx melanoma- R heel/ spot L upper lip/ first noticed few weeks ago, growing in size)    HPI:  Ms. Yadira Hoang is a(n) 77 year old female who presents today as a return patient for a spot check.     Last seen on 6/29/22 with Dr. Fallon for a full skin check. At that time, an AK on the left preauricular region was treated with cryo.     Today, patient notes concern on the left upper lip. She noticed this spot a few weeks ago. Has been increasing in size slowly. It is not tender/painful and does not drain. Notes she did not have it at her last skin exam that she can recall, so it is new within the past month. She is wondering what it is and if it can get removed. Notes she occasionally gets pimples on the scalp.      Patient is otherwise feeling well, without additional concerns.    Labs:  NA    Physical Exam:  Vitals: There were no vitals taken for this visit.  SKIN: Focused examination of the face and neck was done today  - 7 mm flesh colored discrete papule on the left upper lip. Non tender.   - No other lesions of concern on areas examined.               Medications:  Current Outpatient Medications   Medication     carboxymethylcellulose PF (REFRESH LIQUIGEL) 1 % ophthalmic gel     cholecalciferol (VITAMIN D3) 125 MCG (5000 UT) TABS tablet     estradiol (ESTRACE) 0.1 MG/GM vaginal cream     famotidine (PEPCID) 20 MG tablet     fish oil-omega-3 fatty acids 1000 MG capsule     multivitamin w/minerals (THERA-VIT-M) tablet     Riboflavin 100 MG TABS     No current facility-administered medications for this visit.      Past Medical History:   Patient Active Problem List   Diagnosis     Malignant melanoma of right lower extremity including hip (H)     Melanoma of skin (H)     GUERLINE (obstructive sleep apnea)     Chronic congestion of paranasal sinus     Weakness of foot     Neck pain, chronic      Myofascial pain     Intractable migraine without aura and without status migrainosus     History of multiple concussions     Foot pain, bilateral     Fibromyositis     Difficulty walking     Chronic pain disorder     Chronic fatigue     Bilateral occipital neuralgia     Articular disc disorder of temporomandibular joint     S/P flap graft     Osteopenia     Injury of head     Hyperlipidemia LDL goal <100     ALEXUS (generalized anxiety disorder)     Major depression, recurrent, chronic (H)     Secondary malignant neoplasm of bone (H)     Generalized pruritus     Immunodeficiency, unspecified (H)     Bilateral leg pain     Chronic bilateral low back pain without sciatica     Generalized muscle weakness     Polyarthritis     Past Medical History:   Diagnosis Date     Concussion 2011    brain injury     ALEXUS (generalized anxiety disorder) 05/07/2020     Major depression, recurrent, chronic (H) 05/07/2020     Melanoma (H)      Nonsenile cataract      Sleep apnea     CPAP              Again, thank you for allowing me to participate in the care of your patient.        Sincerely,        Bryanna Almaraz PA-C

## 2022-07-27 NOTE — PROGRESS NOTES
Social Work Intervention  [facility]    Data/Intervention:    Patient Name:  Yadira Hoang  /Age:  1945 (77 year old)    Visit Type: in person  Referral Source: VEE Leiva  Reason for Referral:  [employer]    Collaborated With:    Yadira, her  Sulaiman    Psychosocial Information/Concerns:  Yadira interested in completing application for [employer]    Intervention/Education/Resources Provided:  SW received referral from VEE that she discussed [employer] with Yadira and Yadira is seeking support in completing application.     SW called Yadira this morning, there were some phone issues, but confirmed via [employer] that Yadira would come to clinic following her derm appt and SW would assist with application. SW met with Yadira and her  Sulaiman. Discussed [employer] and confirmed that they are comfortable picking up their meals as they are out of the delivery area. SW assisted with completion of application, Yadira signed as needed, and application was faxed for review.     BELGICA also discussed [employer] as it appeared Yadira would qualify. She agreed that SW could submit application. Application for [employer] as well as request for [employer] submitted.     Brief supportive counseling provided. Yadira struggles with her current health not matching her expectations of these group home years. Her son and 3 year old granddaughter, Verónica, also live in the home and they help out as they can, though again, physical limitations impact that ability. BELGICA acknowledged the challenges and adjustment to these evolving health needs. Yadira appreciative of the support she has from family and thanked BELGICA for assistance.     Assessment/Plan:  SW encouraged Yadira to reach out with any further questions or needs.     Provided patient/family with contact information and availability.      JORGE Pelaez, [facility]  Clinical , Adult Oncology  Phone: [phone]

## 2022-07-27 NOTE — PATIENT INSTRUCTIONS
Wound Care After a Biopsy    What is a skin biopsy?  A skin biopsy allows the doctor to examine a very small piece of tissue under the microscope to determine the diagnosis and the best treatment for the skin condition. A local anesthetic (numbing medicine)  is injected with a very small needle into the skin area to be tested. A small piece of skin is taken from the area. Sometimes a suture (stitch) is used.     What are the risks of a skin biopsy?  I will experience scar, bleeding, swelling, pain, crusting and redness. I may experience incomplete removal or recurrence. Risks of this procedure are excessive bleeding, bruising, infection, nerve damage, numbness, thick (hypertrophic or keloidal) scar and non-diagnostic biopsy.    How should I care for my wound for the first 24 hours?  Keep the wound dry and covered for 24 hours  If it bleeds, hold direct pressure on the area for 15 minutes. If bleeding does not stop then go to the emergency room  Avoid strenuous exercise the first 1-2 days or as your doctor instructs you    How should I care for the wound after 24 hours?  After 24 hours, remove the bandage  You may bathe or shower as normal  If you had a scalp biopsy, you can shampoo as usual and can use shower water to clean the biopsy site daily  Clean the wound twice a day with gentle soap and water  Do not scrub, be gentle  Apply white petroleum/Vaseline after cleaning the wound with a cotton swab or a clean finger, and keep the site covered with a Bandaid /bandage. Bandages are not necessary with a scalp biopsy  If you are unable to cover the site with a Bandaid /bandage, re-apply ointment 2-3 times a day to keep the site moist. Moisture will help with healing  Avoid strenuous activity for first 1-2 days  Avoid lakes, rivers, pools, and oceans until the stitches are removed or the site is healed    How do I clean my wound?  Wash hands thoroughly with soap or use hand  before all wound care  Clean the  wound with gentle soap and water  Apply white petroleum/Vaseline  to wound after it is clean  Replace the Bandaid /bandage to keep the wound covered for the first few days or as instructed by your doctor  If you had a scalp biopsy, warm shower water to the area on a daily basis should suffice    What should I use to clean my wound?   Cotton-tipped applicators (Qtips )  White petroleum jelly (Vaseline ). Use a clean new container and use Q-tips to apply.  Bandaids   as needed  Gentle soap     How should I care for my wound long term?  Do not get your wound dirty  Keep up with wound care for one week or until the area is healed.  A small scab will form and fall off by itself when the area is completely healed. The area will be red and will become pink in color as it heals. Sun protection is very important for how your scar will turn out. Sunscreen with an SPF 30 or greater is recommended once the area is healed.  If you have stitches, stitches need to be removed in 5-7 days. You may return to our clinic for this or you may have it done locally at your doctor s office.  You should have some soreness but it should be mild and slowly go away over several days. Talk to your doctor about using tylenol for pain,    When should I call my doctor?  If you have increased:   Pain or swelling  Pus or drainage (clear or slightly yellow drainage is ok)  Temperature over 100F  Spreading redness or warmth around wound    When will I hear about my results?  The biopsy results can take 2 weeks to come back.  Your results will automatically release to [employer] before your provider has even reviewed them.  The clinic will call you with the results, send you a [employer] message, or have you schedule a follow-up clinic or phone time to discuss the results.  Contact our clinics if you do not hear from us in 2 weeks.    Who should I call with questions?  [facility]: [phone]  [facility],  [place]: [phone]  For urgent needs outside of business hours call the [facility] at [phone] and ask for the dermatology resident on call

## 2022-07-27 NOTE — NURSING NOTE
The following medication was given:     MEDICATION:  Lidocaine with epinephrine 1% [id number]  ROUTE: SQ  SITE: see procedure note  DOSE: 1cc  LOT #: [id number]  : [employer]  EXPIRATION DATE:12.1.22  NDC#: 0873-9498-73  Was there drug waste? 0cc  Multi-dose vial: Yes    Cori Espinosa, JAGDISH on 7/27/2022 at 3:25 PM

## 2022-07-28 NOTE — PROGRESS NOTES
Telemedicine Visit: The patient's condition can be safely assessed and treated via synchronous audio and visual telemedicine encounter.      Reason for Telemedicine Visit: [email or web address]     Originating Site (Patient Location): Patient's home    Distant Site (Provider Location): [facility]: palliative care clinic     Consent:  The patient/guardian has verbally consented to: the potential risks and benefits of telemedicine (video visit) versus in person care; bill my insurance or make self-payment for services provided; and responsibility for payment of non-covered services.     Mode of Communication:  Video Conference via [employer]    As the provider I attest to compliance with applicable laws and regulations related to telemedicine.    Palliative Care Clinical Social Work Return Appointment    PLEASE NOTE:  THIS IS A MENTAL HEALTH NOTE.  OTHER PROVIDERS VIEWING THIS NOTE SHOULD USE THIS ONLY FOR UNDERSTANDING THE CONTEXT OF THE PATIENT'S EXPERIENCE.  TOPICS DESCRIBED IN THIS NOTE SHOULD NOT BE REFERENCED TO THE PATIENT BY MEDICAL PROVIDERS.    Yadira Hoang is a 77 year old woman with diagnosis of stage IV acral melanoma, seen today via [employer] video for a return psychotherapy appointment to address adjustment related distress as it relates to coping with illness and treatment.    Mental Status Exam:(List all that apply)      Appearance: Appropriate      Eye Contact: Good       Orientation: Yes, x4      Mood: Normal      Affect: Appropriate      Thought Content: Clear         Thought Form: Logical      Psychomotor Behavior: Normal    Visit themes: Wanting to feel more optimistic, sense of guilt over worry about her illness      Adjustment to Illness: Lymphedema has been more burdensome lately.  She has been seen by a specialist, but she has felt frustrated by not being able to see the therapist as frequently as she wants.  Shared she has a fungs in her body that is impacting her appetite.  Eating has felt  difficult.  Also endorses increased fatigue.     Mental Health (thoughts, feelings, actions, coping, and symptoms): Struggles for grief over changes in independence, she cannot drive any longer. Continues to worry that she will die and her granddaughter will not remember who she is. Does not feel as close to her spouse.       Helpful activities: time with grandchildren    Helpful cognitions:     Unhelpful and/or triggering or exacerbating factors:     Body-Mind Skills Education:     Relationships: remain supportive, has good support from her children    End of Life and Advance Care Planning:     Main therapeutic interventions provided this session include:   Provide psychoeducation, Facilitate processing of thoughts and feelings and Facilitate structured problem solving, talked about legacy of relationships     Plan:  Will return for psychotherapy with palliative care focus in 1 month    Time spent with patient/family:  55 minutes (Start 12:30, end 1:20)    Palliative Care Counseling Treatment Plan    Client's Name: Yadira Hoang  YOB: 1945    Date: June 17, 2022    Initial DSM5 Diagnoses:   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    Anticipated number of sessions to complete episode of care:  10         Treatment Goal(s)  Date Goal Dates  Reviewed Status   5/23/2019   Goal 1:  Client will develop effective strategies for depressed mood.      New   5/23/2019   Objective #1A (Client Action)  Patient will Identify activities that provide comfort and/or pleasure.    Intervention(s)  Therapist will Provide psychoeducation and Facilitate processing of thoughts and feelings .    New   5/23/2019   Objective #1B  Patient will Effectively communicate needs to others.    Intervention(s)  Therapist will Provide  psychoeducation, Facilitate processing of thoughts and feelings and Facilitate structured problem solving.    New   5/23/2019   Objective #1C  Patient will Identify an activity that would provide meaning/contribute to feeling of self worth.    Intervention(s)  Therapist will Provide psychoeducation, Facilitate processing of thoughts and feelings and Facilitate structured problem solving.    New       Date Goal Dates  Reviewed Status   5/23/2019   Goal 2:  Client will demonstrate effective management of grief / loss.    New   5/23/2019   Objective #2A (Client Action)  Patient will Increase understanding of loss and grief.    Intervention(s) (Therapist Action)  Therapist will Provide psychoeducation.    New   5/23/2019   Objective #2B  Patient will Identify losses related to illness.    Intervention(s)  Therapist will Provide psychoeducation and Facilitate processing of thoughts and feelings.    New   5/23/2019     Objective #2C  Patient will Develop strategies for coping with grief/loss.    Intervention(s)  Therapist will Provide psychoeducation.    New       Client has contributed regarding goals and concerns, but has not reviewed the treatment plan. Plan to review at future session.    JORGE Noriega, [facility]  Palliative Care Clinical        DO NOT SEND ANY LETTERS

## 2022-07-28 NOTE — LETTER
7/28/2022         RE: Yadira Hoang  [address] [place] [facility] [address]        Dear Colleague,    Thank you for referring your patient, Yadira Hoang, to the [facility] [place]. Please see a copy of my visit note below.    Telemedicine Visit: The patient's condition can be safely assessed and treated via synchronous audio and visual telemedicine encounter.      Reason for Telemedicine Visit: [email or web address]     Originating Site (Patient Location): Patient's home    Distant Site (Provider Location): [facility]: palliative care clinic     Consent:  The patient/guardian has verbally consented to: the potential risks and benefits of telemedicine (video visit) versus in person care; bill my insurance or make self-payment for services provided; and responsibility for payment of non-covered services.     Mode of Communication:  Video Conference via [employer]    As the provider I attest to compliance with applicable laws and regulations related to telemedicine.    Palliative Care Clinical Social Work Return Appointment    PLEASE NOTE:  THIS IS A MENTAL HEALTH NOTE.  OTHER PROVIDERS VIEWING THIS NOTE SHOULD USE THIS ONLY FOR UNDERSTANDING THE CONTEXT OF THE PATIENT'S EXPERIENCE.  TOPICS DESCRIBED IN THIS NOTE SHOULD NOT BE REFERENCED TO THE PATIENT BY MEDICAL PROVIDERS.    Yadira Hoang is a 77 year old woman with diagnosis of stage IV acral melanoma, seen today via [employer] video for a return psychotherapy appointment to address adjustment related distress as it relates to coping with illness and treatment.    Mental Status Exam:(List all that apply)      Appearance: Appropriate      Eye Contact: Good       Orientation: Yes, x4      Mood: Normal      Affect: Appropriate      Thought Content: Clear         Thought Form: Logical      Psychomotor Behavior: Normal    Visit themes: Wanting to feel more optimistic, sense of guilt over worry about her illness      Adjustment to  Illness: Lymphedema has been more burdensome lately.  She has been seen by a specialist, but she has felt frustrated by not being able to see the therapist as frequently as she wants.  Shared she has a fungs in her body that is impacting her appetite.  Eating has felt difficult.  Also endorses increased fatigue.     Mental Health (thoughts, feelings, actions, coping, and symptoms): Struggles for grief over changes in independence, she cannot drive any longer. Continues to worry that she will die and her granddaughter will not remember who she is. Does not feel as close to her spouse.       Helpful activities: time with grandchildren    Helpful cognitions:     Unhelpful and/or triggering or exacerbating factors:     Body-Mind Skills Education:     Relationships: remain supportive, has good support from her children    End of Life and Advance Care Planning:     Main therapeutic interventions provided this session include:   Provide psychoeducation, Facilitate processing of thoughts and feelings and Facilitate structured problem solving, talked about legacy of relationships     Plan:  Will return for psychotherapy with palliative care focus in 1 month    Time spent with patient/family:  55 minutes (Start 12:30, end 1:20)    Palliative Care Counseling Treatment Plan    Client's Name: Yadira Hoang  YOB: 1945    Date: June 17, 2022    Initial DSM5 Diagnoses:   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    Anticipated number of sessions to complete episode of care:  10         Treatment Goal(s)  Date Goal Dates  Reviewed Status   5/23/2019   Goal 1:  Client will develop effective strategies for depressed mood.      New   5/23/2019   Objective #1A (Client Action)  Patient will Identify activities that provide comfort  and/or pleasure.    Intervention(s)  Therapist will Provide psychoeducation and Facilitate processing of thoughts and feelings .    New   5/23/2019   Objective #1B  Patient will Effectively communicate needs to others.    Intervention(s)  Therapist will Provide psychoeducation, Facilitate processing of thoughts and feelings and Facilitate structured problem solving.    New   5/23/2019   Objective #1C  Patient will Identify an activity that would provide meaning/contribute to feeling of self worth.    Intervention(s)  Therapist will Provide psychoeducation, Facilitate processing of thoughts and feelings and Facilitate structured problem solving.    New       Date Goal Dates  Reviewed Status   5/23/2019   Goal 2:  Client will demonstrate effective management of grief / loss.    New   5/23/2019   Objective #2A (Client Action)  Patient will Increase understanding of loss and grief.    Intervention(s) (Therapist Action)  Therapist will Provide psychoeducation.    New   5/23/2019   Objective #2B  Patient will Identify losses related to illness.    Intervention(s)  Therapist will Provide psychoeducation and Facilitate processing of thoughts and feelings.    New   5/23/2019     Objective #2C  Patient will Develop strategies for coping with grief/loss.    Intervention(s)  Therapist will Provide psychoeducation.    New       Client has contributed regarding goals and concerns, but has not reviewed the treatment plan. Plan to review at future session.    JORGE Noriega, [facility]  Palliative Care Clinical        DO NOT SEND ANY LETTERS                  Again, thank you for allowing me to participate in the care of your patient.        Sincerely,        Taty Serrano [facility]

## 2022-07-28 NOTE — PROGRESS NOTES
Reviewed management of anti-fungal with pharmacist. See MTM visit.     Will treat esophageal candidiasis with fluconazole 400mg for 21-28 days and monitor liver panel weekly. Orders placed.     Pharmacist will update patient on above plan.     Radha Fish PA-C  Gastroenterology  [facility]

## 2022-07-28 NOTE — LETTER
7/28/2022         RE: Yadira Hoang  [address] [place] [facility] [address]        Dear Colleague,    Thank you for referring your patient, Yadira Hoang, to the [facility] [place]. Please see a copy of my visit note below.    Telemedicine Visit: The patient's condition can be safely assessed and treated via synchronous audio and visual telemedicine encounter.      Reason for Telemedicine Visit: [email or web address]     Originating Site (Patient Location): Patient's home    Distant Site (Provider Location): [facility]: palliative care clinic     Consent:  The patient/guardian has verbally consented to: the potential risks and benefits of telemedicine (video visit) versus in person care; bill my insurance or make self-payment for services provided; and responsibility for payment of non-covered services.     Mode of Communication:  Video Conference via [employer]    As the provider I attest to compliance with applicable laws and regulations related to telemedicine.    Palliative Care Clinical Social Work Return Appointment    PLEASE NOTE:  THIS IS A MENTAL HEALTH NOTE.  OTHER PROVIDERS VIEWING THIS NOTE SHOULD USE THIS ONLY FOR UNDERSTANDING THE CONTEXT OF THE PATIENT'S EXPERIENCE.  TOPICS DESCRIBED IN THIS NOTE SHOULD NOT BE REFERENCED TO THE PATIENT BY MEDICAL PROVIDERS.    Yadira Hoang is a 77 year old woman with diagnosis of stage IV acral melanoma, seen today via [employer] video for a return psychotherapy appointment to address adjustment related distress as it relates to coping with illness and treatment.    Mental Status Exam:(List all that apply)      Appearance: Appropriate      Eye Contact: Good       Orientation: Yes, x4      Mood: Normal      Affect: Appropriate      Thought Content: Clear         Thought Form: Logical      Psychomotor Behavior: Normal    Visit themes: Wanting to feel more optimistic, sense of guilt over worry about her illness      Adjustment to  Illness: Lymphedema has been more burdensome lately.  She has been seen by a specialist, but she has felt frustrated by not being able to see the therapist as frequently as she wants.  Shared she has a fungs in her body that is impacting her appetite.  Eating has felt difficult.  Also endorses increased fatigue.     Mental Health (thoughts, feelings, actions, coping, and symptoms): Struggles for grief over changes in independence, she cannot drive any longer. Continues to worry that she will die and her granddaughter will not remember who she is. Does not feel as close to her spouse.       Helpful activities: time with grandchildren    Helpful cognitions:     Unhelpful and/or triggering or exacerbating factors:     Body-Mind Skills Education:     Relationships: remain supportive, has good support from her children    End of Life and Advance Care Planning:     Main therapeutic interventions provided this session include:   Provide psychoeducation, Facilitate processing of thoughts and feelings and Facilitate structured problem solving, talked about legacy of relationships     Plan:  Will return for psychotherapy with palliative care focus in 1 month    Time spent with patient/family:  55 minutes (Start 12:30, end 1:20)    Palliative Care Counseling Treatment Plan    Client's Name: Yadira Hoang  YOB: 1945    Date: June 17, 2022    Initial DSM5 Diagnoses:   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    Anticipated number of sessions to complete episode of care:  10         Treatment Goal(s)  Date Goal Dates  Reviewed Status   5/23/2019   Goal 1:  Client will develop effective strategies for depressed mood.      New   5/23/2019   Objective #1A (Client Action)  Patient will Identify activities that provide comfort  and/or pleasure.    Intervention(s)  Therapist will Provide psychoeducation and Facilitate processing of thoughts and feelings .    New   5/23/2019   Objective #1B  Patient will Effectively communicate needs to others.    Intervention(s)  Therapist will Provide psychoeducation, Facilitate processing of thoughts and feelings and Facilitate structured problem solving.    New   5/23/2019   Objective #1C  Patient will Identify an activity that would provide meaning/contribute to feeling of self worth.    Intervention(s)  Therapist will Provide psychoeducation, Facilitate processing of thoughts and feelings and Facilitate structured problem solving.    New       Date Goal Dates  Reviewed Status   5/23/2019   Goal 2:  Client will demonstrate effective management of grief / loss.    New   5/23/2019   Objective #2A (Client Action)  Patient will Increase understanding of loss and grief.    Intervention(s) (Therapist Action)  Therapist will Provide psychoeducation.    New   5/23/2019   Objective #2B  Patient will Identify losses related to illness.    Intervention(s)  Therapist will Provide psychoeducation and Facilitate processing of thoughts and feelings.    New   5/23/2019     Objective #2C  Patient will Develop strategies for coping with grief/loss.    Intervention(s)  Therapist will Provide psychoeducation.    New       Client has contributed regarding goals and concerns, but has not reviewed the treatment plan. Plan to review at future session.    JORGE Noriega, [facility]  Palliative Care Clinical        DO NOT SEND ANY LETTERS                  Again, thank you for allowing me to participate in the care of your patient.        Sincerely,        Taty Serrano [facility]

## 2022-07-29 NOTE — LETTER
7/29/2022         RE: Yadira Hoang  [address] [facility] [address]        Dear Colleague,    Thank you for referring your patient, Yadira Hoang, to the [facility]. Please see a copy of my visit note below.    MEDICAL ONCOLOGY PROGRESS NOTE  Melanoma Clinic  Jul 29, 2022     CHIEF COMPLAINT: Acral melanoma    Melanoma History:  1. She noted a painful lesion on the sole of the right foot for a few months, since last summer.  It initially was small then became raised.  It spontaneously bled. She is not entirely sure of its appearance initially.  She denies noting any masses behind the knee or in the groin.  2. 1/9/2020, punch biopsy was performed by Dr Jessica Meadows. Patology showed melanoma, at least 3.4 mm deep with ulceration and 0 mitoses, and perineural invasion present. TILs were non-brisk and LVI not identified. pT3b.  3. 2/10/2020, she has right femoral sentinel lymph node biopsy and wide local excision (Specimen #: [id number]) and the right heel lesion extends to a depth of 6.6 mm, and invasive melanoma is present at 0.2 mm from the deep margin. Microsatellites are also present. There was 1 (of 2) lymph nodes involved. The lymph node tissue exhibits extensive subcapsular and parenchymal clusters of melanoma cells, highlighted on Melan-A immunostain. The largest cluster is 7 millimeters in greatest diameter. pT4b pN2c. PD-L1 staining is negative, <1%. No mutation in BRAF, KIT, or NRAS.  4. 2/22/2020, she had PET-CT, which showed intense FDG uptake in a right inguinal lymph node, concerning for an additional focus of metastatic disease. There is also an indeterminate right external iliac lymph node with mild FDG uptake.  5. 3/4/2020, she has medial plantar artery  fasciocutaneous instep flap and Integra placement to the donor site. On 4/2/2020, she has additional reconstruction with skin grafting.  6. 5/22/2020, PET-CT shows a hypermetabolic right  inguinal and right external iliac chain lymph node and stable pulmonary nodules.  7. 5/27/2020, she starts nivolumab for presumed low volume lymph node predominant metastatic disease.  8. 8/21/2020, PET-CT shows increase in hypermetabolic adenopathy, and a hypermetabolic nodule in segment 2 of the liver. Given low volume of disease she prefers to continue therapy with short interval follow-up.  9. 10/9/2020, PET-CT shows increasing size of hepatic metastases with increased hypermetabolism, increased right inguinal and iliac lymphadenopathy, and new FDG uptake right T2 transverse process and right side of S1. MRI-brain obtained 10/21/2020 is negative for brain metastasis.  10. 10/28/2020, she starts ipilimumab 1mg/kg and nivolumab 3mg/kg. She then goes on to maintenance nivolumab through 4/12/21.  11. 5/10/2021, she starts ipilimumab 3 mg/kg monotherapy q3w for 4 cycles.  12. 7/30/21, PET/CT shows mild disease progression.   13. 8/2/21, start ipilimumab 3mg/kg and nivolumab 1mg/kg and received 2 cycles, last on 8/23/21.  14. 8/23/21, develops elevated LFT's.   15. 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   16. 1/28/22, started TVEC  17. 4/22/22, progressive disease, consideration for additional therapeutic approaches.  18. 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 She presents today for a virtual visit prior to cycle 3 Opdualag.    -Notes right groin lymph node remains large and has some associated pain.   -Has been doing lymphedema therapy with wraps. Has noticed some mild improvement in the swelling.  -Denies any dyspnea or new chest pain.   -Appetite is fair. Will be taking fluconazole for 1 month.   -Takes occasional naps.     Current Outpatient Medications   Medication Sig Dispense Refill     carboxymethylcellulose PF (REFRESH LIQUIGEL) 1 % ophthalmic gel 1 drop 3 times daily       cholecalciferol  (VITAMIN D3) 125 MCG (5000 UT) TABS tablet Take 125 mcg by mouth every other day       estradiol (ESTRACE) 0.1 MG/GM vaginal cream Place 2 g vaginally twice a week 42.5 g 1     famotidine (PEPCID) 20 MG tablet Take 1 tablet (20 mg) by mouth 2 times daily as needed (acid reflux/heartburn) 60 tablet 1     fish oil-omega-3 fatty acids 1000 MG capsule Take 2 g by mouth daily       fluconazole (DIFLUCAN) 200 MG tablet Take 2 tablets (400 mg) by mouth daily for 28 days 56 tablet 0     multivitamin w/minerals (THERA-VIT-M) tablet Take 1 tablet by mouth daily       Riboflavin 100 MG TABS Take 400 mg by mouth daily       Objective:  General: patient appears well in no acute distress, alert and oriented, speech clear and fluid  Skin: no visualized rash or lesions on visualized skin  Resp: Appears to be breathing comfortably without accessory muscle usage, speaking in full sentences, no audible wheezes or cough.  Psych: Coherent speech, normal rate and volume, able to articulate logical thoughts, able to abstract reason, no tangential thoughts, no hallucinations or delusions  Patient's affect is appropriate.    LABS  Most Recent 3 CBC's:  Recent Labs   Lab Test 05/11/22  1115 03/22/22  1105 02/16/22  0931   WBC 5.4 5.3 5.9   HGB 12.7 13.4 12.7   MCV 94 90 92    269 286     Most Recent 3 BMP's:  Recent Labs   Lab Test 07/06/22  1031 06/08/22  1231 05/11/22  1115    142 140   POTASSIUM 4.2 4.0 4.2   CHLORIDE 105 105 105   CO2 30 35* 30   BUN 20 26 27   CR 0.60 0.55 0.59   ANIONGAP 7 2* 5   GABY 9.5 9.4 9.5   GLC 94 104* 93     Most Recent 2 LFT's:  Recent Labs   Lab Test 07/06/22  1031 06/08/22  1231   AST 37 29   ALT 26 30   ALKPHOS 74 74   BILITOTAL 0.4 0.3     ASSESSMENT AND PLAN:  Right lower extremity DVT. I received a call from radiology today with this report from today's PET/CT. Secondary to tumor compression. Will start her on Eliquis. Discussed the potential drug interaction of Eliquis with fluconazole and  to monitor for any bleeding issues. Discussed that she will need to be on anticoagulation lifelong.      Acral melanoma, right heel primary, pT4b pN3c M1, Stage IV. Started Opdualag 5/11/22 and has received 3 cycles. The preliminary read of her PET/CT is concerning for disease progression. We briefly discussed the potential for clinical trials and she is not interested in any clinical trials. She will see Dr. Franklin next week to discuss further. Of note, she would like more regular follow-up with our clinic.     RLE lymphema. Secondary to tumors and new DVT. Working with lymphedema therapy and wraps. Previously, encouraged elevation. Anticoagulation as above.     Anorexia and weight loss. Weight has stablized. Previously a trial of Zyprexa 2.5 mg at bedtime was recommended, which may also help with insomnia. EGD consistent with candida. She is going to be starting on a 28 day course of fluconazole.     Depression and anxiety. Declines taking sertraline. Is working with palliative care social work.     Leg pain. This seemed to worsen when on steroids, was improving with physical therapy, but still bothersome. Was given oxycodone to use PRN but has not used as she is very hesitant to use opioids.      20 minutes spent on the date of the encounter doing chart review, review of test results, interpretation of tests, patient visit and documentation     Yadira is a 77 year old who is being evaluated via a billable video visit.      How would you like to obtain your AVS? MyChart  If the video visit is dropped, the invitation should be resent by: Send to e-mail at: [email or web address]  Will anyone else be joining your video visit? Angeles Jean Baptiste        Again, thank you for allowing me to participate in the care of your patient.      Sincerely,    Meghan Perdomo PA-C

## 2022-07-29 NOTE — PROGRESS NOTES
Yadira is a 77 year old who is being evaluated via a billable video visit.      How would you like to obtain your AVS? MyChart  If the video visit is dropped, the invitation should be resent by: Send to e-mail at: [email or web address]  Will anyone else be joining your video visit? Angeles Jean Baptiste    Video-Visit Details    Video Start Time: 4:02 PM    Type of service:  Video Visit    Video End Time:4:14 PM    Originating Location (pt. Location): Home    Distant Location (provider location):  [facility]     Platform used for Video Visit: [employer]

## 2022-07-29 NOTE — TELEPHONE ENCOUNTER
Call received from Meghan Perdomo requesting Triage call this pt to assist with scheduling either video or in person appointment today so she can discuss imaging results and starting anticoagulant therapy.    Pt's PET scan showed a R DVT.    Called pt on all three phone numbers listed in chart. LVM on Yadira's phone.    Noted that  Pt has an apt in  [facility] right now until 2:30.   .  Called Lymphedema clinic (at 1354) and LVM: asked them to have pt call back to Triage at [phone], option 5, option 2 when she is finished with her apt.    Sent message to [facility] to contact pt to schedule for apt today with Meghan and transfer call to this RN if pt has questions.     1440: Called Yadira to explain that Meghan needs to see her today. Pt is currently driving home from apt in .  Pt did not think 3:15 would work for her, but thought that 4:15 would be better. Explained that pt DVT was found on imaging and Meghan wanted to go over it with her and discuss treatment.     Sent info to scheduling to call pt to arrange virtual apt w/Meghan at 4:15.  Updated Meghan.    Message Routed to KEITH Cohn and Daniela Guardado RNCC

## 2022-07-29 NOTE — PROGRESS NOTES
MEDICAL ONCOLOGY PROGRESS NOTE  Melanoma Clinic  Jul 29, 2022     CHIEF COMPLAINT: Acral melanoma    Melanoma History:  1. She noted a painful lesion on the sole of the right foot for a few months, since last summer.  It initially was small then became raised.  It spontaneously bled. She is not entirely sure of its appearance initially.  She denies noting any masses behind the knee or in the groin.  2. 1/9/2020, punch biopsy was performed by Dr Jessica Meadows. Patology showed melanoma, at least 3.4 mm deep with ulceration and 0 mitoses, and perineural invasion present. TILs were non-brisk and LVI not identified. pT3b.  3. 2/10/2020, she has right femoral sentinel lymph node biopsy and wide local excision (Specimen #: [id number]) and the right heel lesion extends to a depth of 6.6 mm, and invasive melanoma is present at 0.2 mm from the deep margin. Microsatellites are also present. There was 1 (of 2) lymph nodes involved. The lymph node tissue exhibits extensive subcapsular and parenchymal clusters of melanoma cells, highlighted on Melan-A immunostain. The largest cluster is 7 millimeters in greatest diameter. pT4b pN2c. PD-L1 staining is negative, <1%. No mutation in BRAF, KIT, or NRAS.  4. 2/22/2020, she had PET-CT, which showed intense FDG uptake in a right inguinal lymph node, concerning for an additional focus of metastatic disease. There is also an indeterminate right external iliac lymph node with mild FDG uptake.  5. 3/4/2020, she has medial plantar artery  fasciocutaneous instep flap and Integra placement to the donor site. On 4/2/2020, she has additional reconstruction with skin grafting.  6. 5/22/2020, PET-CT shows a hypermetabolic right inguinal and right external iliac chain lymph node and stable pulmonary nodules.  7. 5/27/2020, she starts nivolumab for presumed low volume lymph node predominant metastatic disease.  8. 8/21/2020, PET-CT shows increase in hypermetabolic adenopathy, and a  hypermetabolic nodule in segment 2 of the liver. Given low volume of disease she prefers to continue therapy with short interval follow-up.  9. 10/9/2020, PET-CT shows increasing size of hepatic metastases with increased hypermetabolism, increased right inguinal and iliac lymphadenopathy, and new FDG uptake right T2 transverse process and right side of S1. MRI-brain obtained 10/21/2020 is negative for brain metastasis.  10. 10/28/2020, she starts ipilimumab 1mg/kg and nivolumab 3mg/kg. She then goes on to maintenance nivolumab through 4/12/21.  11. 5/10/2021, she starts ipilimumab 3 mg/kg monotherapy q3w for 4 cycles.  12. 7/30/21, PET/CT shows mild disease progression.   13. 8/2/21, start ipilimumab 3mg/kg and nivolumab 1mg/kg and received 2 cycles, last on 8/23/21.  14. 8/23/21, develops elevated LFT's.   15. 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   16. 1/28/22, started TVEC  17. 4/22/22, progressive disease, consideration for additional therapeutic approaches.  18. 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 She presents today for a virtual visit prior to cycle 3 Opdualag.    -Notes right groin lymph node remains large and has some associated pain.   -Has been doing lymphedema therapy with wraps. Has noticed some mild improvement in the swelling.  -Denies any dyspnea or new chest pain.   -Appetite is fair. Will be taking fluconazole for 1 month.   -Takes occasional naps.     Current Outpatient Medications   Medication Sig Dispense Refill     carboxymethylcellulose PF (REFRESH LIQUIGEL) 1 % ophthalmic gel 1 drop 3 times daily       cholecalciferol (VITAMIN D3) 125 MCG (5000 UT) TABS tablet Take 125 mcg by mouth every other day       estradiol (ESTRACE) 0.1 MG/GM vaginal cream Place 2 g vaginally twice a week 42.5 g 1     famotidine (PEPCID) 20 MG tablet Take 1 tablet (20 mg) by mouth 2 times daily as  needed (acid reflux/heartburn) 60 tablet 1     fish oil-omega-3 fatty acids 1000 MG capsule Take 2 g by mouth daily       fluconazole (DIFLUCAN) 200 MG tablet Take 2 tablets (400 mg) by mouth daily for 28 days 56 tablet 0     multivitamin w/minerals (THERA-VIT-M) tablet Take 1 tablet by mouth daily       Riboflavin 100 MG TABS Take 400 mg by mouth daily       Objective:  General: patient appears well in no acute distress, alert and oriented, speech clear and fluid  Skin: no visualized rash or lesions on visualized skin  Resp: Appears to be breathing comfortably without accessory muscle usage, speaking in full sentences, no audible wheezes or cough.  Psych: Coherent speech, normal rate and volume, able to articulate logical thoughts, able to abstract reason, no tangential thoughts, no hallucinations or delusions  Patient's affect is appropriate.    LABS  Most Recent 3 CBC's:  Recent Labs   Lab Test 05/11/22  1115 03/22/22  1105 02/16/22  0931   WBC 5.4 5.3 5.9   HGB 12.7 13.4 12.7   MCV 94 90 92    269 286     Most Recent 3 BMP's:  Recent Labs   Lab Test 07/06/22  1031 06/08/22  1231 05/11/22  1115    142 140   POTASSIUM 4.2 4.0 4.2   CHLORIDE 105 105 105   CO2 30 35* 30   BUN 20 26 27   CR 0.60 0.55 0.59   ANIONGAP 7 2* 5   GABY 9.5 9.4 9.5   GLC 94 104* 93     Most Recent 2 LFT's:  Recent Labs   Lab Test 07/06/22  1031 06/08/22  1231   AST 37 29   ALT 26 30   ALKPHOS 74 74   BILITOTAL 0.4 0.3     ASSESSMENT AND PLAN:  Right lower extremity DVT. I received a call from radiology today with this report from today's PET/CT. Secondary to tumor compression. Will start her on Eliquis. Discussed the potential drug interaction of Eliquis with fluconazole and to monitor for any bleeding issues. Discussed that she will need to be on anticoagulation lifelong.      Acral melanoma, right heel primary, pT4b pN3c M1, Stage IV. Started Opdualag 5/11/22 and has received 3 cycles. The preliminary read of her PET/CT is  concerning for disease progression. We briefly discussed the potential for clinical trials and she is not interested in any clinical trials. She will see Dr. Franklin next week to discuss further. Of note, she would like more regular follow-up with our clinic.     RLE lymphema. Secondary to tumors and new DVT. Working with lymphedema therapy and wraps. Previously, encouraged elevation. Anticoagulation as above.     Anorexia and weight loss. Weight has stablized. Previously a trial of Zyprexa 2.5 mg at bedtime was recommended, which may also help with insomnia. EGD consistent with candida. She is going to be starting on a 28 day course of fluconazole.     Depression and anxiety. Declines taking sertraline. Is working with palliative care social work.     Leg pain. This seemed to worsen when on steroids, was improving with physical therapy, but still bothersome. Was given oxycodone to use PRN but has not used as she is very hesitant to use opioids.    Meghan Perdomo PA-C  [facility]  [address], [place] [address] [phone]    20 minutes spent on the date of the encounter doing chart review, review of test results, interpretation of tests, patient visit and documentation

## 2022-07-29 NOTE — PROGRESS NOTES
07/29/22 1300   Signing Clinician's Name / Credentials   Signing clinician's name / credentials Ashley Paige OTR/L, CLT   Session Number   Session Number 6 [employer]   Additional Session Number 18/75 max visits   Progress/Recertification   Recertification Due 09/09/22   Adult OT Eval Goals   Edema Eval Goals 1;2;3   Goal 1   Goal identifier Volume   Goal description RLE will decrease at least 1500 mL to improve fit of clothing/shoes, safety with mobility and skin integrity.   Goal Progress since last measurement RLE -299 mL, overall -1694 mL   Target date 09/09/22   Date met 07/29/22   Goal 2   Goal identifier Compression   Goal description Pt will be fit for and demonstrate independence using new compression garments for long term edema management   Goal Progress still waiting for garments to arrive   Target date 09/09/22   Goal 3   Goal identifier Home program   Goal description Pt will verbalize and demonstrate understanding of long term edema management including following recommended wear schedule for compression garments, manual techniques, skin care and exercise.   Goal Progress ongoing   Target date 09/09/22   Subjective Report   Subjective Report Pt continues to be frustrated with her perceived lack of progress, wants her leg to reduce faster   Objective Measures   Objective Measures Objective Measure 1;Objective Measure 2;Objective Measure 3   Objective Measure 1   Objective Measure skin assessment   Details RLE with minimal edema in the toes, very mild edema on the dorsum of the foot, mild non pitting edema from ankle to knee crease with bony landmarks much more visible, decreased softer non pitting edema above the knee to groin.   Objective Measure 2   Objective Measure home program   Details full leg GCB, R side MLD, skin care   Objective Measure 3   Objective Measure volume   Details decreased an additional 299 mL for an overall decrease of 1694 mL   Therapeutic Interventions   Therapeutic Interventions  Manual Therapy   Manual Therapy   Manual Therapy Minutes ([id number]) 56   Skilled Intervention MLD, GCB   Treatment Detail Pt continues to be concerned with only having treatment once a week, that her  is not able to provide the same level of care and that her leg is not getting smaller. CLT took updated measurements, which continue to show a decrease in RLE volume, and educated pt about the results. CLT assured pt that edema would not be reducing this well if she and her  were doing something wrong. CLT also educated pt on how/why lymphedema occurs and that her RLE may never be the same size as her left due to the large mass in her groin blocking lymph flow. CLT reminded pt that compression garments had been ordered, and they would be much easier to use than GCB. CLT also educated pt about the use of a pneumatic pump for long term management and pt was interested in having a demo. Pt did appear calmer and more reassured after discussion. With pt lying supine, CLT completed the following edema mobilization techniques for RLE...clearing of SC nodes and shoulder collectors, clearing of R axilla, clearing of pathway up lateral trunk from hip to axillary nodes, clearing of abdomen from medial to lateral, clearing of thigh moving medial to lateral and up to hip, clearing of popliteal lymph nodes and around the knees, continued clearing of lower leg from ankle to knee with f/u moves to hip, up lateral trunk to axilla and ending with clearing of axillary nodes and SC nodes and deep breathing. CLT applied anti itch lotion, then reapplied skilled GCB to RLE as follows  4 inch stockinette applied from knee to groin, Rosidal soft foam applied from low ankle to groin, 8 cm short stretch bandage applied from MTP joints to just above ankle using HAS technique, 10 cm short stretch bandage applied from ankle to knee crease using spiral technique, 12 cm short stretch bandage applied from just below knee crease to mid thigh  using X technique behind the knee then spiral technique, 12 cm SS bandage applied from mid thigh to groin using spiral technique. Coban used from knee to thigh to try and hold wraps up better. All GCB applied with 50 percent tension and overlap, pt reported comfort once applied. Able to don socks and shoes over wraps.   Progress New measurements demonstrate a continued decrease in RLE volume, however due to the location of the mass, pt continues to have edema congested in the upper thigh and lower abdomen. Pt has tried conservative treatment for the past 4 weeks and is making progress, but as mentioned continues to have symptoms of edema in her thigh and trunk. Pt would benefit from a pneumatic pump for daily and long term edema management.   Education   Learner Patient   Readiness Acceptance   Method Explanation;Demonstration   Response Verbalizes understanding;Needs reinforcement   Plan   Homework reapply GCB every 2-3 days, UB MLD and full MLD on days bandages are removed.   Plan for next session MLD, GCB, assess garments if arrived   Total Session Time   Timed Code Treatment Minutes 56   Total Treatment Time (sum of timed and untimed services) 56   AMBULATORY CLINICS ONLY-MEDICAL AND TREATMENT DIAGNOSIS   Medical diagnosis malignant melanoma of RLE including hip, R leg swelling   Therapy diagnosis RLE lymphedema

## 2022-08-01 NOTE — PATIENT INSTRUCTIONS
"Recommendations from today's MTM visit:                                                    MTM (medication therapy management) is a service provided by a clinical pharmacist designed to help you get the most of out of your medicines.   Today we reviewed what your medicines are for, how to know if they are working, that your medicines are safe and how to make your medicine regimen as easy as possible.      1. Stacey to discuss liver monitoring with care team during treatment per patient preference.    2. Try holding your multivitamin for a couple of days to see if this helps with your stomach pains. If no difference, okay to restart.    3. Considerations for candidiasis (esophageal):   -- extend fluconazole   -- posaconazole     Staff message sent to care team to discuss next step. Addendum: response included below.     Follow-up: Will follow-up after discussion with care team   -- Would prefer a telephone follow-up    -- Update 7/28: Contacted Yadira to discuss plan. Let her know about fluconazole orders as well as weekly hepatic panels during treatment. She will plan to setup the lab appointments after she starts treatment again. My contact information was provided in the event she has questions or concerns going forward.     It was great speaking with you today.  I value your experience and would be very thankful for your time in providing feedback in our clinic survey. In the next few days, you may receive an email or text message from [employer] with a link to a survey related to your  clinical pharmacist.\"     To schedule another MTM appointment, please call the clinic directly or you may call the MTM scheduling line at [phone] or toll-free at [phone].     My Clinical Pharmacist's contact information:                                                      Please feel free to contact me with any questions or concerns you have.      Stacey Henriquez PharmD, BCACP  MTM Pharmacist   [facility] " Gastroenterology and Rheumatology  Phone: [phone]

## 2022-08-03 NOTE — NURSING NOTE
Yadira Hoang comes into clinic today at the request of [facility]  Ordering Provider for Suture removal .    Yadira Hoang presents to the clinic today for  removal of sutures.  The patient has had the sutures in place for 7 days.    There has been no history of infection or drainage.    O: 2 sutures are seen located on the left upper lip .  The wound is healing well with no signs of infection.    Tetanus status is up to date.    A: Suture removal.    P:  All sutures were easily removed today.  Routine wound care discussed.  The patient will follow up as needed.      This service provided today was under the supervising provider of the day Bryanna Almaraz , who was available if needed.    Melissa Ellsworth RN

## 2022-08-03 NOTE — TELEPHONE ENCOUNTER
Per Dr. Mendoza - patient requires an in person consult    Kirsten Romo, Surgery Scheduler 8/3/2022 at 4:08 PM

## 2022-08-03 NOTE — TELEPHONE ENCOUNTER
----- Message from DAVIN Gutierrez sent at 8/3/2022  4:02 PM CDT -----  Regarding: Referral  T[place],     Az lux on this one, Dr. Mendoza suggests an in person consult prior to scheduling mohs. You can schedule with Dr. Munoz or Kelly though    Thanks,   Stacey MORROW EMT  Dermatology/Dermatology Surgery  [phone]      ----- Message -----  From: Sagar Mendoza MD  Sent: 8/3/2022   3:27 PM CDT  To: Lisa Monsalve, LAKSHMI, Vivienne Guevara, [facility], #    Hi Everyone,    That's unfortunate news. In this case, tissue sparing Mohs (Mohs with less than 1-2 cm margin) could be appropriate. I recommend a face to face consult prior to surgery. Thank you.              ----- Message -----  From: Gi Kowalski MD  Sent: 8/3/2022   1:25 PM CDT  To: Sagar Mendoza MD, Bryanna Almaraz PA-C, #    Great, thank you all!  ----- Message -----  From: Bryanna Almaraz PA-C  Sent: 8/3/2022   9:45 AM CDT  To: Sagar Mendoza MD, Gi Franklin MD, #    I will place a referral to derm surg for conservative resection.    Bryanna  ----- Message -----  From: Gi Kowalski MD  Sent: 8/2/2022   3:26 PM CDT  To: Sagar Mendoza MD, Bryanna Almaraz PA-C, #    Hi Bryanna,  Thanks for the message. She likely will transition to chemoimmunotherapy since we have no additional good treatments left. I'll need to see her and discuss, but in theory I'd be fine with a resection aimed at preventing trouble from a large lip mass down the line.  Thanks again,  Gi   ----- Message -----  From: Bryanna Almaraz PA-C  Sent: 8/2/2022   1:43 PM CDT  To: Sagar Mendoza MD, Gi Franklin MD, #    Hi Dr. Franklin,    I wanted to copy you on this dermatopathology report I received today regarding your patient Yadira. Unfortunately she developed a new bump on her upper lip which had been present only a few weeks. Luckily we were able to sneak her in as she had just had a full skin exam 1mo ago. We biopsied it and it is consistent  with her metastatic melanoma. I spoke with her and discussed this with her. She mentioned she has an appointment with you on Friday of this week. She is currently on immunotherapy but also mentioned the possibility of transitioning to chemo. I will obviously defer the course of treatment to you, but I copied our surgeons on this message as well in the event a surgical intervention is needed for this lesion. I m sorry for the news.     Thanks for taking such great care of your patients.    Bryanna Almaraz PA-C  Dermatology

## 2022-08-03 NOTE — TELEPHONE ENCOUNTER
Per oncology palliative care, low dose ritalin can be considered to help with longer eating sessions and improved intake but will need to wait until candida is treated.     MTM discussed with patient on treating with Fluconazole for 28 days for esophageal candidiasis on 7/28/2022.     Pt will follow up with oncology from this point.

## 2022-08-04 NOTE — LETTER
8/4/2022         RE: Yadira Hoang  [address] [place] [facility] [address]        Dear Colleague,    Thank you for referring your patient, Yadira Hoang, to the [facility]. Please see a copy of my visit note below.    Telemedicine Visit: The patient's condition can be safely assessed and treated via synchronous audio and visual telemedicine encounter.      Reason for Telemedicine Visit: [email or web address]     Originating Site (Patient Location): Patient's home    Distant Site (Provider Location): [facility]: palliative care clinic     Consent:  The patient/guardian has verbally consented to: the potential risks and benefits of telemedicine (video visit) versus in person care; bill my insurance or make self-payment for services provided; and responsibility for payment of non-covered services.     Mode of Communication:  Video Conference via [email or web address]    As the provider I attest to compliance with applicable laws and regulations related to telemedicine.    Palliative Care Clinical Social Work Return Appointment    PLEASE NOTE:  THIS IS A MENTAL HEALTH NOTE.  OTHER PROVIDERS VIEWING THIS NOTE SHOULD USE THIS ONLY FOR UNDERSTANDING THE CONTEXT OF THE PATIENT'S EXPERIENCE.  TOPICS DESCRIBED IN THIS NOTE SHOULD NOT BE REFERENCED TO THE PATIENT BY MEDICAL PROVIDERS.    Yadira Hoang is a 77 year old woman with diagnosis of stage IV acral melanoma, seen today via [employer] video for a return psychotherapy appointment to address adjustment related distress and depression as it relates to coping with illness and treatment.    Mental Status Exam:(List all that apply)      Appearance: Appropriate      Eye Contact: Good       Orientation: Yes, x4      Mood: Normal      Affect: Appropriate      Thought Content: Clear         Thought Form: Logical      Psychomotor Behavior: Normal    Visit themes: Wanting to feel more optimistic, sense of guilt over worry about her illness   "    Adjustment to Illness: Since our last session Yadira received news that her cancer has spread after a bump she found on her lip was biopsied.  She is meeting with her oncologist tomorrow to discuss a treatment plan and anticipates that she will be told that the next option is chemotherapy.     Though she worries about the side effects of chemotherapy including weight loss and hair loss, she plans to accept it as her goal is to prolong life.      Continues to endorse symptoms that include pain in her legs, fatigue / low endurance, low appetite (offered education about difference between anorexia and anorexia nervosa after she saw the first term listed as a diagnosis and was alarmed to think her treatment team would think her lack of intake was deliberate).       Mental Health (thoughts, feelings, actions, coping, and symptoms): Continues to struggle with existential distress and describes sense of demoralization.  Expresses guilt for not having more positive thoughts and not believing that \"it will all be ok\"  We talked about the role of positive thinking along side acknowledging  What she feels worried about.  She also struggles with quality of life-- because of pain and fatigue she is unable to participate in activities outside and her tolerance for activity is low.  That said, she remains focused on living for as long as she can, even if this means heavy symptom burden. She continues to share that she is worried her youngest granddaughter (3 yo) will not remember her.     Given persistence of symptoms that include low mood, sense of demoralization, persistent feelings of sadness, brooding, self-pity and pessimism, diminished enjoyment of activities such as time with family I now believe that Yadira meets criteria for major depressive disorder, rather than adjustment disorder.      Helpful activities: time with grandchildren    Helpful cognitions:     Unhelpful and/or triggering or exacerbating factors: "     Body-Mind Skills Education:     Relationships: remain supportive, has good support from her children    End of Life and Advance Care Planning:     Main therapeutic interventions provided this session include:   Provide psychoeducation, Facilitate processing of thoughts and feelings and Facilitate structured problem solving,    Plan:  Will return for psychotherapy with palliative care focus in 1 month    Time spent with patient/family:  48 minutes (Start 12:30, end 1:18)    Palliative Care Counseling Treatment Plan    Client's Name: Yadira Hoang  YOB: 1945    Date: June 17, 2022    Initial DSM5 Diagnoses:   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    Anticipated number of sessions to complete episode of care:  10         Treatment Goal(s)  Date Goal Dates  Reviewed Status   5/23/2019   Goal 1:  Client will develop effective strategies for depressed mood.      New   5/23/2019   Objective #1A (Client Action)  Patient will Identify activities that provide comfort and/or pleasure.    Intervention(s)  Therapist will Provide psychoeducation and Facilitate processing of thoughts and feelings .    New   5/23/2019   Objective #1B  Patient will Effectively communicate needs to others.    Intervention(s)  Therapist will Provide psychoeducation, Facilitate processing of thoughts and feelings and Facilitate structured problem solving.    New   5/23/2019   Objective #1C  Patient will Identify an activity that would provide meaning/contribute to feeling of self worth.    Intervention(s)  Therapist will Provide psychoeducation, Facilitate processing of thoughts and feelings and Facilitate structured problem solving.    New       Date Goal Dates  Reviewed Status   5/23/2019   Goal 2:  Client will demonstrate effective  management of grief / loss.    New   5/23/2019   Objective #2A (Client Action)  Patient will Increase understanding of loss and grief.    Intervention(s) (Therapist Action)  Therapist will Provide psychoeducation.    New   5/23/2019   Objective #2B  Patient will Identify losses related to illness.    Intervention(s)  Therapist will Provide psychoeducation and Facilitate processing of thoughts and feelings.    New   5/23/2019     Objective #2C  Patient will Develop strategies for coping with grief/loss.    Intervention(s)  Therapist will Provide psychoeducation.    New       Client has contributed regarding goals and concerns, but has not reviewed the treatment plan. Plan to review at future session.    JORGE Noriega, [facility]  Palliative Care Clinical        DO NOT SEND ANY LETTERS                Again, thank you for allowing me to participate in the care of your patient.        Sincerely,        VEENA Noriega

## 2022-08-04 NOTE — LETTER
8/4/2022         RE: Yadira Hoang  [address] [facility] [address]        Dear Colleague,    Thank you for referring your patient, Yadira Hoang, to the [facility] [place]. Please see a copy of my visit note below.    Telemedicine Visit: The patient's condition can be safely assessed and treated via synchronous audio and visual telemedicine encounter.      Reason for Telemedicine Visit: [email or web address]     Originating Site (Patient Location): Patient's home    Distant Site (Provider Location): [facility]: palliative care clinic     Consent:  The patient/guardian has verbally consented to: the potential risks and benefits of telemedicine (video visit) versus in person care; bill my insurance or make self-payment for services provided; and responsibility for payment of non-covered services.     Mode of Communication:  Video Conference via [employer]    As the provider I attest to compliance with applicable laws and regulations related to telemedicine.    Palliative Care Clinical Social Work Return Appointment    PLEASE NOTE:  THIS IS A MENTAL HEALTH NOTE.  OTHER PROVIDERS VIEWING THIS NOTE SHOULD USE THIS ONLY FOR UNDERSTANDING THE CONTEXT OF THE PATIENT'S EXPERIENCE.  TOPICS DESCRIBED IN THIS NOTE SHOULD NOT BE REFERENCED TO THE PATIENT BY MEDICAL PROVIDERS.    Yadira Hoang is a 77 year old woman with diagnosis of stage IV acral melanoma, seen today via [employer] video for a return psychotherapy appointment to address adjustment related distress and depression as it relates to coping with illness and treatment.    Mental Status Exam:(List all that apply)      Appearance: Appropriate      Eye Contact: Good       Orientation: Yes, x4      Mood: Normal      Affect: Appropriate      Thought Content: Clear         Thought Form: Logical      Psychomotor Behavior: Normal    Visit themes: Wanting to feel more optimistic, sense of guilt over worry about her illness   "    Adjustment to Illness: Since our last session Yadira received news that her cancer has spread after a bump she found on her lip was biopsied.  She is meeting with her oncologist tomorrow to discuss a treatment plan and anticipates that she will be told that the next option is chemotherapy.     Though she worries about the side effects of chemotherapy including weight loss and hair loss, she plans to accept it as her goal is to prolong life.      Continues to endorse symptoms that include pain in her legs, fatigue / low endurance, low appetite (offered education about difference between anorexia and anorexia nervosa after she saw the first term listed as a diagnosis and was alarmed to think her treatment team would think her lack of intake was deliberate).       Mental Health (thoughts, feelings, actions, coping, and symptoms): Continues to struggle with existential distress and describes sense of demoralization.  Expresses guilt for not having more positive thoughts and not believing that \"it will all be ok\"  We talked about the role of positive thinking along side acknowledging  What she feels worried about.  She also struggles with quality of life-- because of pain and fatigue she is unable to participate in activities outside and her tolerance for activity is low.  That said, she remains focused on living for as long as she can, even if this means heavy symptom burden. She continues to share that she is worried her youngest granddaughter (3 yo) will not remember her.     Given persistence of symptoms that include low mood, sense of demoralization, persistent feelings of sadness, brooding, self-pity and pessimism, diminished enjoyment of activities such as time with family I now believe that Yadira meets criteria for major depressive disorder, rather than adjustment disorder.      Helpful activities: time with grandchildren    Helpful cognitions:     Unhelpful and/or triggering or exacerbating factors: "     Body-Mind Skills Education:     Relationships: remain supportive, has good support from her children    End of Life and Advance Care Planning:     Main therapeutic interventions provided this session include:   Provide psychoeducation, Facilitate processing of thoughts and feelings and Facilitate structured problem solving,    Plan:  Will return for psychotherapy with palliative care focus in 1 month    Time spent with patient/family:  48 minutes (Start 12:30, end 1:18)    Palliative Care Counseling Treatment Plan    Client's Name: Yadira Hoang  YOB: 1945    Date: June 17, 2022    Initial DSM5 Diagnoses:   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    Anticipated number of sessions to complete episode of care:  10         Treatment Goal(s)  Date Goal Dates  Reviewed Status   5/23/2019   Goal 1:  Client will develop effective strategies for depressed mood.      New   5/23/2019   Objective #1A (Client Action)  Patient will Identify activities that provide comfort and/or pleasure.    Intervention(s)  Therapist will Provide psychoeducation and Facilitate processing of thoughts and feelings .    New   5/23/2019   Objective #1B  Patient will Effectively communicate needs to others.    Intervention(s)  Therapist will Provide psychoeducation, Facilitate processing of thoughts and feelings and Facilitate structured problem solving.    New   5/23/2019   Objective #1C  Patient will Identify an activity that would provide meaning/contribute to feeling of self worth.    Intervention(s)  Therapist will Provide psychoeducation, Facilitate processing of thoughts and feelings and Facilitate structured problem solving.    New       Date Goal Dates  Reviewed Status   5/23/2019   Goal 2:  Client will demonstrate effective  management of grief / loss.    New   5/23/2019   Objective #2A (Client Action)  Patient will Increase understanding of loss and grief.    Intervention(s) (Therapist Action)  Therapist will Provide psychoeducation.    New   5/23/2019   Objective #2B  Patient will Identify losses related to illness.    Intervention(s)  Therapist will Provide psychoeducation and Facilitate processing of thoughts and feelings.    New   5/23/2019     Objective #2C  Patient will Develop strategies for coping with grief/loss.    Intervention(s)  Therapist will Provide psychoeducation.    New       Client has contributed regarding goals and concerns, but has not reviewed the treatment plan. Plan to review at future session.    JORGE Noriega, [facility]  Palliative Care Clinical        DO NOT SEND ANY LETTERS                Again, thank you for allowing me to participate in the care of your patient.        Sincerely,        VEENA Noriega

## 2022-08-04 NOTE — PROGRESS NOTES
Telemedicine Visit: The patient's condition can be safely assessed and treated via synchronous audio and visual telemedicine encounter.      Reason for Telemedicine Visit: [email or web address]     Originating Site (Patient Location): Patient's home    Distant Site (Provider Location): [facility]: palliative care clinic     Consent:  The patient/guardian has verbally consented to: the potential risks and benefits of telemedicine (video visit) versus in person care; bill my insurance or make self-payment for services provided; and responsibility for payment of non-covered services.     Mode of Communication:  Video Conference via [employer]    As the provider I attest to compliance with applicable laws and regulations related to telemedicine.    Palliative Care Clinical Social Work Return Appointment    PLEASE NOTE:  THIS IS A MENTAL HEALTH NOTE.  OTHER PROVIDERS VIEWING THIS NOTE SHOULD USE THIS ONLY FOR UNDERSTANDING THE CONTEXT OF THE PATIENT'S EXPERIENCE.  TOPICS DESCRIBED IN THIS NOTE SHOULD NOT BE REFERENCED TO THE PATIENT BY MEDICAL PROVIDERS.    Yadira Hoang is a 77 year old woman with diagnosis of stage IV acral melanoma, seen today via [employer] video for a return psychotherapy appointment to address adjustment related distress and depression as it relates to coping with illness and treatment.    Mental Status Exam:(List all that apply)      Appearance: Appropriate      Eye Contact: Good       Orientation: Yes, x4      Mood: Normal      Affect: Appropriate      Thought Content: Clear         Thought Form: Logical      Psychomotor Behavior: Normal    Visit themes: Wanting to feel more optimistic, sense of guilt over worry about her illness      Adjustment to Illness: Since our last session Yadira received news that her cancer has spread after a bump she found on her lip was biopsied.  She is meeting with her oncologist tomorrow to discuss a treatment plan and anticipates that she will be told that the next  "option is chemotherapy.     Though she worries about the side effects of chemotherapy including weight loss and hair loss, she plans to accept it as her goal is to prolong life.      Continues to endorse symptoms that include pain in her legs, fatigue / low endurance, low appetite (offered education about difference between anorexia and anorexia nervosa after she saw the first term listed as a diagnosis and was alarmed to think her treatment team would think her lack of intake was deliberate).       Mental Health (thoughts, feelings, actions, coping, and symptoms): Continues to struggle with existential distress and describes sense of demoralization.  Expresses guilt for not having more positive thoughts and not believing that \"it will all be ok\"  We talked about the role of positive thinking along side acknowledging  What she feels worried about.  She also struggles with quality of life-- because of pain and fatigue she is unable to participate in activities outside and her tolerance for activity is low.  That said, she remains focused on living for as long as she can, even if this means heavy symptom burden. She continues to share that she is worried her youngest granddaughter (3 yo) will not remember her.     Given persistence of symptoms that include low mood, sense of demoralization, persistent feelings of sadness, brooding, self-pity and pessimism, diminished enjoyment of activities such as time with family I now believe that Yadira meets criteria for major depressive disorder, rather than adjustment disorder.      Helpful activities: time with grandchildren    Helpful cognitions:     Unhelpful and/or triggering or exacerbating factors:     Body-Mind Skills Education:     Relationships: remain supportive, has good support from her children    End of Life and Advance Care Planning:     Main therapeutic interventions provided this session include:   Provide psychoeducation, Facilitate processing of thoughts and " feelings and Facilitate structured problem solving,    Plan:  Will return for psychotherapy with palliative care focus in 1 month    Time spent with patient/family:  48 minutes (Start 12:30, end 1:18)    Palliative Care Counseling Treatment Plan    Client's Name: Yadira Hoang  YOB: 1945    Date: June 17, 2022    Initial DSM5 Diagnoses:   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    Anticipated number of sessions to complete episode of care:  10         Treatment Goal(s)  Date Goal Dates  Reviewed Status   5/23/2019   Goal 1:  Client will develop effective strategies for depressed mood.      New   5/23/2019   Objective #1A (Client Action)  Patient will Identify activities that provide comfort and/or pleasure.    Intervention(s)  Therapist will Provide psychoeducation and Facilitate processing of thoughts and feelings .    New   5/23/2019   Objective #1B  Patient will Effectively communicate needs to others.    Intervention(s)  Therapist will Provide psychoeducation, Facilitate processing of thoughts and feelings and Facilitate structured problem solving.    New   5/23/2019   Objective #1C  Patient will Identify an activity that would provide meaning/contribute to feeling of self worth.    Intervention(s)  Therapist will Provide psychoeducation, Facilitate processing of thoughts and feelings and Facilitate structured problem solving.    New       Date Goal Dates  Reviewed Status   5/23/2019   Goal 2:  Client will demonstrate effective management of grief / loss.    New   5/23/2019   Objective #2A (Client Action)  Patient will Increase understanding of loss and grief.    Intervention(s) (Therapist Action)  Therapist will Provide psychoeducation.    New   5/23/2019   Objective #2B  Patient will Identify losses related  to illness.    Intervention(s)  Therapist will Provide psychoeducation and Facilitate processing of thoughts and feelings.    New   5/23/2019     Objective #2C  Patient will Develop strategies for coping with grief/loss.    Intervention(s)  Therapist will Provide psychoeducation.    New       Client has contributed regarding goals and concerns, but has not reviewed the treatment plan. Plan to review at future session.    JORGE Noriega, Health system  Palliative Care Clinical        DO NOT SEND ANY LETTERS

## 2022-08-04 NOTE — TELEPHONE ENCOUNTER
Please advise where I should put this patient for mohs for L upper lip - metastatic melanoma    Kirsten Romo, Surgery Scheduler 8/4/2022 at 2:25 PM

## 2022-08-05 NOTE — NURSING NOTE
"Oncology Rooming Note    August 5, 2022 2:05 PM   Yadira Hoang is a 77 year old female who presents for:    Chief Complaint   Patient presents with     Blood Draw     Labs drawn via  by RN in lab. VS taken.      Initial Vitals: Blood Pressure 112/66 (BP Location: Right arm, Patient Position: Sitting, Cuff Size: Adult Regular)   Pulse 99   Temperature 98.2  F (36.8  C) (Oral)   Respiration 18   Weight 62.6 kg (138 lb)   Oxygen Saturation 97%   Body Mass Index 21.61 kg/m   Estimated body mass index is 21.61 kg/m  as calculated from the following:    Height as of 7/29/22: 1.702 m (5' 7\").    Weight as of this encounter: 62.6 kg (138 lb). Body surface area is 1.72 meters squared.  Severe Pain (7) Comment: Data Unavailable   No LMP recorded. Patient is postmenopausal.  Allergies reviewed: Yes  Medications reviewed: Yes    Medications: Medication refills not needed today.  Pharmacy name entered into [facility]:    [facility] - [place], [place] - [address], [id number]  [facility] [address] [place] [address] [address] [facility]    Clinical concerns: none       Archana Hensley MA            "

## 2022-08-05 NOTE — PROGRESS NOTES
MEDICAL ONCOLOGY PROGRESS NOTE  Melanoma Clinic  Aug 5, 2022     CHIEF COMPLAINT: Metastatic acral melanoma    Melanoma History:  1. She noted a painful lesion on the sole of the right foot for a few months, since last summer.  It initially was small then became raised.  It spontaneously bled. She is not entirely sure of its appearance initially.  She denies noting any masses behind the knee or in the groin.  2. 1/9/2020, punch biopsy was performed by Dr Jessica Meadows. Patology showed melanoma, at least 3.4 mm deep with ulceration and 0 mitoses, and perineural invasion present. TILs were non-brisk and LVI not identified. pT3b.  3. 2/10/2020, she has right femoral sentinel lymph node biopsy and wide local excision (Specimen #: [id number]) and the right heel lesion extends to a depth of 6.6 mm, and invasive melanoma is present at 0.2 mm from the deep margin. Microsatellites are also present. There was 1 (of 2) lymph nodes involved. The lymph node tissue exhibits extensive subcapsular and parenchymal clusters of melanoma cells, highlighted on Melan-A immunostain. The largest cluster is 7 millimeters in greatest diameter. pT4b pN2c. PD-L1 staining is negative, <1%. No mutation in BRAF, KIT, or NRAS.  4. 2/22/2020, she had PET-CT, which showed intense FDG uptake in a right inguinal lymph node, concerning for an additional focus of metastatic disease. There is also an indeterminate right external iliac lymph node with mild FDG uptake.  5. 3/4/2020, she has medial plantar artery  fasciocutaneous instep flap and Integra placement to the donor site. On 4/2/2020, she has additional reconstruction with skin grafting.  6. 5/22/2020, PET-CT shows a hypermetabolic right inguinal and right external iliac chain lymph node and stable pulmonary nodules.  7. 5/27/2020, she starts nivolumab for presumed low volume lymph node predominant metastatic disease.  8. 8/21/2020, PET-CT shows increase in hypermetabolic adenopathy, and  a hypermetabolic nodule in segment 2 of the liver. Given low volume of disease she prefers to continue therapy with short interval follow-up.  9. 10/9/2020, PET-CT shows increasing size of hepatic metastases with increased hypermetabolism, increased right inguinal and iliac lymphadenopathy, and new FDG uptake right T2 transverse process and right side of S1. MRI-brain obtained 10/21/2020 is negative for brain metastasis.  10. 10/28/2020, she starts ipilimumab 1mg/kg and nivolumab 3mg/kg. She then goes on to maintenance nivolumab through 4/12/21.  11. 5/10/2021, she starts ipilimumab 3 mg/kg monotherapy q3w for 4 cycles.  12. 7/30/21, PET/CT shows mild disease progression.   13. 8/2/21, start ipilimumab 3mg/kg and nivolumab 1mg/kg and received 2 cycles, last on 8/23/21.  14. 8/23/21, develops elevated LFT's.   15. 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   16. 1/28/22, started TVEC  17. 4/22/22, progressive disease, consideration for additional therapeutic approaches.  18. 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 She presents today for a virtual visit prior to cycle 4 Opdualag.    EGD on 6/20 showed evidence of recurrent esophageal candidiasis. She is taking fluconazole 400 mg daily, but she doesn't feel it is helping. She feels weak because she is not eating enough. She feels like she has to force down food. She is not losing significant weight, however, which she doesn't understand.    Otherwise, the right groin tumors causes her some pain. She also notes back pain and hip pain. She had PET-CT on 7/29, which shows new diffuse metastatic bony disease, increased size of the liver metastases, and increased size of the lymph node conglomerates throughout the pelvis and abdomen. She was also found to have a non-occlusive DVT and was started on Eliquis.     Current Outpatient Medications   Medication Sig  Dispense Refill     Apixaban Starter Pack (ELIQUIS DVT/PE STARTER PACK) 5 MG TBPK Take 10 mg by mouth 2 times daily for 7 days, THEN 5 mg 2 times daily for 23 days. 74 each 0     carboxymethylcellulose PF (REFRESH LIQUIGEL) 1 % ophthalmic gel 1 drop 3 times daily       cholecalciferol (VITAMIN D3) 125 MCG (5000 UT) TABS tablet Take 125 mcg by mouth every other day       estradiol (ESTRACE) 0.1 MG/GM vaginal cream Place 2 g vaginally twice a week 42.5 g 1     famotidine (PEPCID) 20 MG tablet Take 1 tablet (20 mg) by mouth 2 times daily as needed (acid reflux/heartburn) 60 tablet 1     fish oil-omega-3 fatty acids 1000 MG capsule Take 2 g by mouth daily       fluconazole (DIFLUCAN) 200 MG tablet Take 2 tablets (400 mg) by mouth daily for 28 days 56 tablet 0     multivitamin w/minerals (THERA-VIT-M) tablet Take 1 tablet by mouth daily       Riboflavin 100 MG TABS Take 400 mg by mouth daily       Objective:  General: patient appears well in no acute distress, alert and oriented, speech clear and fluid  Skin: no visualized rash or lesions on visualized skin  Resp: Appears to be breathing comfortably without accessory muscle usage, speaking in full sentences, no audible wheezes or cough.  Psych: Coherent speech, normal rate and volume, able to articulate logical thoughts, able to abstract reason, no tangential thoughts, no hallucinations or delusions  Patient's affect is appropriate.    LABS  Oncology Visit on 08/05/2022   Component Date Value Ref Range Status     TSH 08/05/2022 2.45  0.40 - 4.00 mU/L Final     Sodium 08/05/2022 139  133 - 144 mmol/L Final     Potassium 08/05/2022 3.9  3.4 - 5.3 mmol/L Final     Chloride 08/05/2022 101  94 - 109 mmol/L Final     Carbon Dioxide (CO2) 08/05/2022 28  20 - 32 mmol/L Final     Anion Gap 08/05/2022 10  3 - 14 mmol/L Final     Urea Nitrogen 08/05/2022 20  7 - 30 mg/dL Final     Creatinine 08/05/2022 0.64  0.52 - 1.04 mg/dL Final     Calcium 08/05/2022 9.1  8.5 - 10.1 mg/dL Final      Glucose 08/05/2022 115 (A) 70 - 99 mg/dL Final     Alkaline Phosphatase 08/05/2022 76  40 - 150 U/L Final     AST 08/05/2022 48 (A) 0 - 45 U/L Final     ALT 08/05/2022 22  0 - 50 U/L Final     Protein Total 08/05/2022 7.4  6.8 - 8.8 g/dL Final     Albumin 08/05/2022 3.1 (A) 3.4 - 5.0 g/dL Final     Bilirubin Total 08/05/2022 0.4  0.2 - 1.3 mg/dL Final     GFR Estimate 08/05/2022 90  >60 mL/min/1.73m2 Final     WBC Count 08/05/2022 5.7  4.0 - 11.0 10e3/uL Final     RBC Count 08/05/2022 3.71 (A) 3.80 - 5.20 10e6/uL Final     Hemoglobin 08/05/2022 11.5 (A) 11.7 - 15.7 g/dL Final     Hematocrit 08/05/2022 34.9 (A) 35.0 - 47.0 % Final     MCV 08/05/2022 94  78 - 100 fL Final     MCH 08/05/2022 31.0  26.5 - 33.0 pg Final     MCHC 08/05/2022 33.0  31.5 - 36.5 g/dL Final     RDW 08/05/2022 13.1  10.0 - 15.0 % Final     Platelet Count 08/05/2022 352  150 - 450 10e3/uL Final     % Neutrophils 08/05/2022 71  % Final     % Lymphocytes 08/05/2022 19  % Final     % Monocytes 08/05/2022 8  % Final     % Eosinophils 08/05/2022 2  % Final     % Basophils 08/05/2022 0  % Final     % Immature Granulocytes 08/05/2022 0  % Final     NRBCs per 100 WBC 08/05/2022 0  <1 /100 Final     Absolute Neutrophils 08/05/2022 4.1  1.6 - 8.3 10e3/uL Final     Absolute Lymphocytes 08/05/2022 1.1  0.8 - 5.3 10e3/uL Final     Absolute Monocytes 08/05/2022 0.5  0.0 - 1.3 10e3/uL Final     Absolute Eosinophils 08/05/2022 0.1  0.0 - 0.7 10e3/uL Final     Absolute Basophils 08/05/2022 0.0  0.0 - 0.2 10e3/uL Final     Absolute Immature Granulocytes 08/05/2022 0.0  <=0.4 10e3/uL Final     Absolute NRBCs 08/05/2022 0.0  10e3/uL Final       IMAGING  PET Oncology Whole Body  Narrative: Combined Report of:    PET and CT on  7/29/2022 11:14 AM :    1. PET of the neck, chest, abdomen, and pelvis.  2. PET CT Fusion for Attenuation Correction and Anatomical  Localization:    3. Diagnostic CT scan of the chest, abdomen, and pelvis with  intravenous contrast for  interpretation.  4. 3D MIP and PET-CT fused images were processed on an independent  workstation and archived to PACS and reviewed by a radiologist.    Technique:    1. PET: The patient received 12.22 mCi of F-18-FDG; the serum glucose  was 83 prior to administration, body weight was 61.7 kg. Images were  evaluated in the axial, sagittal, and coronal planes as well as the  rotational whole body MIP. Images were acquired from the Vertex to the  Feet.    UPTAKE WAS MEASURED AT 60 MINUTES.     BACKGROUND:  Liver SUV max= 2.11,   Aorta Blood SUV Max: 1.62.     2. CT: Volumetric acquisition for clinical interpretation of the  chest, abdomen, and pelvis acquired at 3 mm sections after the  uneventful administration of intravenous contrast. The chest, abdomen,  and pelvis were evaluated at 5 mm sections in bone, soft tissue, and  lung windows.      The patient received 86 cc of Isovue 370 intravenously for the  examination.    --    3. 3D MIP and PET-CT fused images were processed on an independent  workstation and archived to PACS and reviewed by a radiologist.    INDICATION: Malignant melanoma of right lower extremity including hip  (H)    ADDITIONAL INFORMATION OBTAINED FROM EMR: none    COMPARISON: PET CT 4/22/2022    FINDINGS:     HEAD/NECK:  There is no suspicious FDG uptake in the neck.    The paranasal sinuses and mastoid air cells are clear.    The mucosal pharyngeal space, the , prevertebral and carotid  spaces are within normal limits.     No masses, mass effect or pathologically enlarged lymph nodes are  evident. The thyroid gland is within normal limits..    CHEST:  7 mm hypermetabolic solid pulmonary nodule in the right lower lobe  with Max SUV 5.04, previously 4.2 (series 7, image 76).    Several new solid pulmonary nodules with low-level FDG avidity, for  example a 5 mm solid pulmonary nodule in the right upper lobe with Max  SUV 1.40 (series 7, image 35) and an adjacent 5 mm solid  pulmonary  nodule with max SUV of 1.13 (series 7, image 36).    No airspace consolidation. No pleural effusion or pneumothorax.    The cardiac size is normal. Normal caliber aorta and main pulmonary  artery. No central pulmonary embolism. Moderate coronary artery  calcium. No enlarged or hypermetabolic thoracic lymph nodes.    ABDOMEN AND PELVIS:  Increased size and metabolic activity of numerous hepatic lesions,  including the following:  -4.6 x 4.3 cm mass in the left hepatic lobe with max SUV 20.8,  previously 2.5 x 2.4 cm with max SUV 24.44.  -2.8 x 3.4 cm mass in the inferior right hepatic lobe with max SUV  12.19, previously 1.1 x 1.0 cm with max SUV 5.70.    Increased size and metabolic activity of a centrally necrotic joseline  hepatis lymph node which measures 3.5 x 2.7 cm with max SUV 17.48,  previously 2.1 x 1.6 cm with max SUV 13.75.    Increased size of the large lymph node conglomerate within the right  lower abdomen and pelvis, intimately associated with the external  iliac vessels, measuring approximately 8.4 x 6.7 cm with max SUV of  21.36, previously 6.3 x 5.4 cm with max SUV 25.8. There is  circumferential encasement of the right external iliac artery and  vein, with associated nonocclusive thrombus involving the distal right  external iliac vein and common femoral vein.    Increased size of the large right conglomerate of lymph nodes in the  right groin, measuring 8.2 x 6.4 cm with max SUV 23.22, previously 4.4  x 3.8 cm with max suv 26.97.    The gallbladder, biliary system, spleen, and pancreas are within  normal limits. Adrenal glands are unremarkable. Symmetric enhancement  kidneys. No hydronephrosis. Urinary bladder is within normal limits.  Unchanged endometrial hyper enhancement without associated elevated  FDG uptake.    Small sliding hiatal hernia. No abnormally dilated loops of small or  large bowel. The portal venous system appears patent. Suspected  contrast admixture within the right  portal vein and extrahepatic main  portal vein. Normal caliber abdominal aorta.    LOWER EXTREMITIES:   No abnormal masses or hypermetabolic lesions.    BONES AND SOFT TISSUES:   Diffuse abnormal patchy FDG uptake throughout the axial and proximal  appendicular skeleton, which is new from the prior exam, highly  suspicious for new infiltrative osseous metastases. No associated  lytic or sclerotic changes in the bones, however physiologic (PET)  findings precede anatomic (CT) findings. No suspected pathologic  fracture.  Impression: IMPRESSION:   In this patient with a history of metastatic melanoma, findings of  disease progression:    1. New diffuse metastatic osseous disease.    2. Increased size of the hypermetabolic liver metastases.    3. Increased size of the hypermetabolic lymph node conglomerates  throughout the abdomen and pelvis.    4. Several new solid pulmonary nodules, without significant FDG  avidity but are also too small to accurately characterize by PET.  These likely represent new metastatic disease.    5. Nonocclusive deep venous thrombosis within the right external iliac  vein, common femoral vein, and femoral vein, likely secondary to slow  venous flow from mass effect from the metastatic adenopathy. Consider  dedicated ultrasound of the right lower extremity for complete  evaluation of the thrombus burden.     ----  The findings of the study were discussed over the telephone with the  ordering provider Meghan Perdomo PA-C by Dr. Alexandra at 1:25 PM on  7/29/2022.    I have personally reviewed the examination and initial interpretation  and I agree with the findings.    MICHAEL LOPEZ MD         SYSTEM ID:  [id number]  CT Chest/Abdomen/Pelvis w Contrast  Narrative: Combined Report of:    PET and CT on  7/29/2022 11:14 AM :    1. PET of the neck, chest, abdomen, and pelvis.  2. PET CT Fusion for Attenuation Correction and Anatomical  Localization:    3. Diagnostic CT scan of the chest, abdomen, and  pelvis with  intravenous contrast for interpretation.  4. 3D MIP and PET-CT fused images were processed on an independent  workstation and archived to PACS and reviewed by a radiologist.    Technique:    1. PET: The patient received 12.22 mCi of F-18-FDG; the serum glucose  was 83 prior to administration, body weight was 61.7 kg. Images were  evaluated in the axial, sagittal, and coronal planes as well as the  rotational whole body MIP. Images were acquired from the Vertex to the  Feet.    UPTAKE WAS MEASURED AT 60 MINUTES.     BACKGROUND:  Liver SUV max= 2.11,   Aorta Blood SUV Max: 1.62.     2. CT: Volumetric acquisition for clinical interpretation of the  chest, abdomen, and pelvis acquired at 3 mm sections after the  uneventful administration of intravenous contrast. The chest, abdomen,  and pelvis were evaluated at 5 mm sections in bone, soft tissue, and  lung windows.      The patient received 86 cc of Isovue 370 intravenously for the  examination.    --    3. 3D MIP and PET-CT fused images were processed on an independent  workstation and archived to PACS and reviewed by a radiologist.    INDICATION: Malignant melanoma of right lower extremity including hip  (H)    ADDITIONAL INFORMATION OBTAINED FROM EMR: none    COMPARISON: PET CT 4/22/2022    FINDINGS:     HEAD/NECK:  There is no suspicious FDG uptake in the neck.    The paranasal sinuses and mastoid air cells are clear.    The mucosal pharyngeal space, the , prevertebral and carotid  spaces are within normal limits.     No masses, mass effect or pathologically enlarged lymph nodes are  evident. The thyroid gland is within normal limits..    CHEST:  7 mm hypermetabolic solid pulmonary nodule in the right lower lobe  with Max SUV 5.04, previously 4.2 (series 7, image 76).    Several new solid pulmonary nodules with low-level FDG avidity, for  example a 5 mm solid pulmonary nodule in the right upper lobe with Max  SUV 1.40 (series 7, image 35) and  an adjacent 5 mm solid pulmonary  nodule with max SUV of 1.13 (series 7, image 36).    No airspace consolidation. No pleural effusion or pneumothorax.    The cardiac size is normal. Normal caliber aorta and main pulmonary  artery. No central pulmonary embolism. Moderate coronary artery  calcium. No enlarged or hypermetabolic thoracic lymph nodes.    ABDOMEN AND PELVIS:  Increased size and metabolic activity of numerous hepatic lesions,  including the following:  -4.6 x 4.3 cm mass in the left hepatic lobe with max SUV 20.8,  previously 2.5 x 2.4 cm with max SUV 24.44.  -2.8 x 3.4 cm mass in the inferior right hepatic lobe with max SUV  12.19, previously 1.1 x 1.0 cm with max SUV 5.70.    Increased size and metabolic activity of a centrally necrotic joseline  hepatis lymph node which measures 3.5 x 2.7 cm with max SUV 17.48,  previously 2.1 x 1.6 cm with max SUV 13.75.    Increased size of the large lymph node conglomerate within the right  lower abdomen and pelvis, intimately associated with the external  iliac vessels, measuring approximately 8.4 x 6.7 cm with max SUV of  21.36, previously 6.3 x 5.4 cm with max SUV 25.8. There is  circumferential encasement of the right external iliac artery and  vein, with associated nonocclusive thrombus involving the distal right  external iliac vein and common femoral vein.    Increased size of the large right conglomerate of lymph nodes in the  right groin, measuring 8.2 x 6.4 cm with max SUV 23.22, previously 4.4  x 3.8 cm with max suv 26.97.    The gallbladder, biliary system, spleen, and pancreas are within  normal limits. Adrenal glands are unremarkable. Symmetric enhancement  kidneys. No hydronephrosis. Urinary bladder is within normal limits.  Unchanged endometrial hyper enhancement without associated elevated  FDG uptake.    Small sliding hiatal hernia. No abnormally dilated loops of small or  large bowel. The portal venous system appears patent. Suspected  contrast  admixture within the right portal vein and extrahepatic main  portal vein. Normal caliber abdominal aorta.    LOWER EXTREMITIES:   No abnormal masses or hypermetabolic lesions.    BONES AND SOFT TISSUES:   Diffuse abnormal patchy FDG uptake throughout the axial and proximal  appendicular skeleton, which is new from the prior exam, highly  suspicious for new infiltrative osseous metastases. No associated  lytic or sclerotic changes in the bones, however physiologic (PET)  findings precede anatomic (CT) findings. No suspected pathologic  fracture.  Impression: IMPRESSION:   In this patient with a history of metastatic melanoma, findings of  disease progression:    1. New diffuse metastatic osseous disease.    2. Increased size of the hypermetabolic liver metastases.    3. Increased size of the hypermetabolic lymph node conglomerates  throughout the abdomen and pelvis.    4. Several new solid pulmonary nodules, without significant FDG  avidity but are also too small to accurately characterize by PET.  These likely represent new metastatic disease.    5. Nonocclusive deep venous thrombosis within the right external iliac  vein, common femoral vein, and femoral vein, likely secondary to slow  venous flow from mass effect from the metastatic adenopathy. Consider  dedicated ultrasound of the right lower extremity for complete  evaluation of the thrombus burden.     ----  The findings of the study were discussed over the telephone with the  ordering provider Meghan Perdomo PA-C by Dr. Alexandra at 1:25 PM on  7/29/2022.    I have personally reviewed the examination and initial interpretation  and I agree with the findings.    MICHAEL LOPEZ MD         SYSTEM ID:  [id number]        ASSESSMENT AND PLAN:  # Metastatic acral melanoma, to lungs, liver, lymphnodes, and bone  She has received multiple lines of prior therapy including ipi/nivo and TVEC. She started Opdualag 5/11/22 and has received 3 cycles. PET-CT from 7/29/22 appears  to show disease progression. She reviewed potential clinical trials participation previously with Meghan Perdomo and she indicated she is not interested in any clinical trials.    We reviewed the situation at length. We could continue Opdualag for another 6 weeks to confirm progression, however, given the degree of progression seen true progression is most likely. However, if confirmed then next line of treatment would involve clinical trial (which she declines) or chemotherapy, either alone or in combination with immunotherapy. Response rates to chemotherapy are in the range of 10-20%. Temozolomide is well tolerated but has response rates in the range of 10-15%. Carboplatin and pactlitaxel is associated with classical chemotherapy side effects and  has response rates in the range of 20-25%. In a retrospective study, chemoimunotherapy in the 2nd line was associated with a 50% response rate, but toxicity and durability of responses remains main concerns.    We reviewed that treatment with multiagent chemotherapy is likely to decrease her quality of life, while treatment with less aggressive chemotherapy is unlikely to be effective. A reasonable option would be to continue with no additional treatment, and change focus to comfort measures and/or supportive care measures. She s not yet ready to consider this. She is not ready to stop treatment and is not ready for hospice.    She prefers to continue with upcoming treatment and confirm progression in 6 weeks. She will continue to weigh her options until then.    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 Continue Eliquis anticoagulation lifelong.      # RLE lymphema.   Secondary to tumors and new DVT. Working with lymphedema therapy and wraps. Previously, encouraged elevation. Anticoagulation as above.     # Anorexia and weight loss.  # Candida albicans esophagitis, recurrent  Weight has stablized. Previously a trial of Zyprexa 2.5 mg at bedtime was  recommended, which may also help with insomnia. EGD culture positive candida albicans and she is on a 28 day course of fluconazole. If medication ineffective she could require repeat endoscopy for sensitivities.    # Depression and anxiety. Declines taking sertraline. Is working with palliative care social work.     # Leg pain. This seemed to worsen when on steroids, was improving with physical therapy, but still bothersome. Was given oxycodone to use PRN but has not used as she is very hesitant to use opioids. We will try tramadol 50 mg q6h as needed for pain.    Gi Cartagena M.D.   of Medicine  Hematology, Oncology and Transplantation  Pager: [phone]

## 2022-08-05 NOTE — LETTER
8/5/2022         RE: Yadira Hoang  [address] [facility] [id number]        Dear Colleague,    Thank you for referring your patient, Yadira Hoang, to the [facility]. Please see a copy of my visit note below.    MEDICAL ONCOLOGY PROGRESS NOTE  Melanoma Clinic  Aug 5, 2022     CHIEF COMPLAINT: Metastatic acral melanoma    Melanoma History:  1. She noted a painful lesion on the sole of the right foot for a few months, since last summer.  It initially was small then became raised.  It spontaneously bled. She is not entirely sure of its appearance initially.  She denies noting any masses behind the knee or in the groin.  2. 1/9/2020, punch biopsy was performed by Dr Jessica Meadows. Patology showed melanoma, at least 3.4 mm deep with ulceration and 0 mitoses, and perineural invasion present. TILs were non-brisk and LVI not identified. pT3b.  3. 2/10/2020, she has right femoral sentinel lymph node biopsy and wide local excision (Specimen #: [id number]) and the right heel lesion extends to a depth of 6.6 mm, and invasive melanoma is present at 0.2 mm from the deep margin. Microsatellites are also present. There was 1 (of 2) lymph nodes involved. The lymph node tissue exhibits extensive subcapsular and parenchymal clusters of melanoma cells, highlighted on Melan-A immunostain. The largest cluster is 7 millimeters in greatest diameter. pT4b pN2c. PD-L1 staining is negative, <1%. No mutation in BRAF, KIT, or NRAS.  4. 2/22/2020, she had PET-CT, which showed intense FDG uptake in a right inguinal lymph node, concerning for an additional focus of metastatic disease. There is also an indeterminate right external iliac lymph node with mild FDG uptake.  5. 3/4/2020, she has medial plantar artery  fasciocutaneous instep flap and Integra placement to the donor site. On 4/2/2020, she has additional reconstruction with skin grafting.  6. 5/22/2020, PET-CT shows a  hypermetabolic right inguinal and right external iliac chain lymph node and stable pulmonary nodules.  7. 5/27/2020, she starts nivolumab for presumed low volume lymph node predominant metastatic disease.  8. 8/21/2020, PET-CT shows increase in hypermetabolic adenopathy, and a hypermetabolic nodule in segment 2 of the liver. Given low volume of disease she prefers to continue therapy with short interval follow-up.  9. 10/9/2020, PET-CT shows increasing size of hepatic metastases with increased hypermetabolism, increased right inguinal and iliac lymphadenopathy, and new FDG uptake right T2 transverse process and right side of S1. MRI-brain obtained 10/21/2020 is negative for brain metastasis.  10. 10/28/2020, she starts ipilimumab 1mg/kg and nivolumab 3mg/kg. She then goes on to maintenance nivolumab through 4/12/21.  11. 5/10/2021, she starts ipilimumab 3 mg/kg monotherapy q3w for 4 cycles.  12. 7/30/21, PET/CT shows mild disease progression.   13. 8/2/21, start ipilimumab 3mg/kg and nivolumab 1mg/kg and received 2 cycles, last on 8/23/21.  14. 8/23/21, develops elevated LFT's.   15. 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   16. 1/28/22, started TVEC  17. 4/22/22, progressive disease, consideration for additional therapeutic approaches.  18. 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 She presents today for a virtual visit prior to cycle 4 Opdualag.    EGD on 6/20 showed evidence of recurrent esophageal candidiasis. She is taking fluconazole 400 mg daily, but she doesn't feel it is helping. She feels weak because she is not eating enough. She feels like she has to force down food. She is not losing significant weight, however, which she doesn't understand.    Otherwise, the right groin tumors causes her some pain. She also notes back pain and hip pain. She had PET-CT on 7/29, which shows new diffuse  metastatic bony disease, increased size of the liver metastases, and increased size of the lymph node conglomerates throughout the pelvis and abdomen. She was also found to have a non-occlusive DVT and was started on Eliquis.     Current Outpatient Medications   Medication Sig Dispense Refill     Apixaban Starter Pack (ELIQUIS DVT/PE STARTER PACK) 5 MG TBPK Take 10 mg by mouth 2 times daily for 7 days, THEN 5 mg 2 times daily for 23 days. 74 each 0     carboxymethylcellulose PF (REFRESH LIQUIGEL) 1 % ophthalmic gel 1 drop 3 times daily       cholecalciferol (VITAMIN D3) 125 MCG (5000 UT) TABS tablet Take 125 mcg by mouth every other day       estradiol (ESTRACE) 0.1 MG/GM vaginal cream Place 2 g vaginally twice a week 42.5 g 1     famotidine (PEPCID) 20 MG tablet Take 1 tablet (20 mg) by mouth 2 times daily as needed (acid reflux/heartburn) 60 tablet 1     fish oil-omega-3 fatty acids 1000 MG capsule Take 2 g by mouth daily       fluconazole (DIFLUCAN) 200 MG tablet Take 2 tablets (400 mg) by mouth daily for 28 days 56 tablet 0     multivitamin w/minerals (THERA-VIT-M) tablet Take 1 tablet by mouth daily       Riboflavin 100 MG TABS Take 400 mg by mouth daily       Objective:  General: patient appears well in no acute distress, alert and oriented, speech clear and fluid  Skin: no visualized rash or lesions on visualized skin  Resp: Appears to be breathing comfortably without accessory muscle usage, speaking in full sentences, no audible wheezes or cough.  Psych: Coherent speech, normal rate and volume, able to articulate logical thoughts, able to abstract reason, no tangential thoughts, no hallucinations or delusions  Patient's affect is appropriate.    LABS  Oncology Visit on 08/05/2022   Component Date Value Ref Range Status     TSH 08/05/2022 2.45  0.40 - 4.00 mU/L Final     Sodium 08/05/2022 139  133 - 144 mmol/L Final     Potassium 08/05/2022 3.9  3.4 - 5.3 mmol/L Final     Chloride 08/05/2022 101  94 - 109  mmol/L Final     Carbon Dioxide (CO2) 08/05/2022 28  20 - 32 mmol/L Final     Anion Gap 08/05/2022 10  3 - 14 mmol/L Final     Urea Nitrogen 08/05/2022 20  7 - 30 mg/dL Final     Creatinine 08/05/2022 0.64  0.52 - 1.04 mg/dL Final     Calcium 08/05/2022 9.1  8.5 - 10.1 mg/dL Final     Glucose 08/05/2022 115 (A) 70 - 99 mg/dL Final     Alkaline Phosphatase 08/05/2022 76  40 - 150 U/L Final     AST 08/05/2022 48 (A) 0 - 45 U/L Final     ALT 08/05/2022 22  0 - 50 U/L Final     Protein Total 08/05/2022 7.4  6.8 - 8.8 g/dL Final     Albumin 08/05/2022 3.1 (A) 3.4 - 5.0 g/dL Final     Bilirubin Total 08/05/2022 0.4  0.2 - 1.3 mg/dL Final     GFR Estimate 08/05/2022 90  >60 mL/min/1.73m2 Final     WBC Count 08/05/2022 5.7  4.0 - 11.0 10e3/uL Final     RBC Count 08/05/2022 3.71 (A) 3.80 - 5.20 10e6/uL Final     Hemoglobin 08/05/2022 11.5 (A) 11.7 - 15.7 g/dL Final     Hematocrit 08/05/2022 34.9 (A) 35.0 - 47.0 % Final     MCV 08/05/2022 94  78 - 100 fL Final     MCH 08/05/2022 31.0  26.5 - 33.0 pg Final     MCHC 08/05/2022 33.0  31.5 - 36.5 g/dL Final     RDW 08/05/2022 13.1  10.0 - 15.0 % Final     Platelet Count 08/05/2022 352  150 - 450 10e3/uL Final     % Neutrophils 08/05/2022 71  % Final     % Lymphocytes 08/05/2022 19  % Final     % Monocytes 08/05/2022 8  % Final     % Eosinophils 08/05/2022 2  % Final     % Basophils 08/05/2022 0  % Final     % Immature Granulocytes 08/05/2022 0  % Final     NRBCs per 100 WBC 08/05/2022 0  <1 /100 Final     Absolute Neutrophils 08/05/2022 4.1  1.6 - 8.3 10e3/uL Final     Absolute Lymphocytes 08/05/2022 1.1  0.8 - 5.3 10e3/uL Final     Absolute Monocytes 08/05/2022 0.5  0.0 - 1.3 10e3/uL Final     Absolute Eosinophils 08/05/2022 0.1  0.0 - 0.7 10e3/uL Final     Absolute Basophils 08/05/2022 0.0  0.0 - 0.2 10e3/uL Final     Absolute Immature Granulocytes 08/05/2022 0.0  <=0.4 10e3/uL Final     Absolute NRBCs 08/05/2022 0.0  10e3/uL Final       IMAGING  PET Oncology Whole  Body  Narrative: Combined Report of:    PET and CT on  7/29/2022 11:14 AM :    1. PET of the neck, chest, abdomen, and pelvis.  2. PET CT Fusion for Attenuation Correction and Anatomical  Localization:    3. Diagnostic CT scan of the chest, abdomen, and pelvis with  intravenous contrast for interpretation.  4. 3D MIP and PET-CT fused images were processed on an independent  workstation and archived to PACS and reviewed by a radiologist.    Technique:    1. PET: The patient received 12.22 mCi of F-18-FDG; the serum glucose  was 83 prior to administration, body weight was 61.7 kg. Images were  evaluated in the axial, sagittal, and coronal planes as well as the  rotational whole body MIP. Images were acquired from the Vertex to the  Feet.    UPTAKE WAS MEASURED AT 60 MINUTES.     BACKGROUND:  Liver SUV max= 2.11,   Aorta Blood SUV Max: 1.62.     2. CT: Volumetric acquisition for clinical interpretation of the  chest, abdomen, and pelvis acquired at 3 mm sections after the  uneventful administration of intravenous contrast. The chest, abdomen,  and pelvis were evaluated at 5 mm sections in bone, soft tissue, and  lung windows.      The patient received 86 cc of Isovue 370 intravenously for the  examination.    --    3. 3D MIP and PET-CT fused images were processed on an independent  workstation and archived to PACS and reviewed by a radiologist.    INDICATION: Malignant melanoma of right lower extremity including hip  (H)    ADDITIONAL INFORMATION OBTAINED FROM EMR: none    COMPARISON: PET CT 4/22/2022    FINDINGS:     HEAD/NECK:  There is no suspicious FDG uptake in the neck.    The paranasal sinuses and mastoid air cells are clear.    The mucosal pharyngeal space, the , prevertebral and carotid  spaces are within normal limits.     No masses, mass effect or pathologically enlarged lymph nodes are  evident. The thyroid gland is within normal limits..    CHEST:  7 mm hypermetabolic solid pulmonary nodule in  the right lower lobe  with Max SUV 5.04, previously 4.2 (series 7, image 76).    Several new solid pulmonary nodules with low-level FDG avidity, for  example a 5 mm solid pulmonary nodule in the right upper lobe with Max  SUV 1.40 (series 7, image 35) and an adjacent 5 mm solid pulmonary  nodule with max SUV of 1.13 (series 7, image 36).    No airspace consolidation. No pleural effusion or pneumothorax.    The cardiac size is normal. Normal caliber aorta and main pulmonary  artery. No central pulmonary embolism. Moderate coronary artery  calcium. No enlarged or hypermetabolic thoracic lymph nodes.    ABDOMEN AND PELVIS:  Increased size and metabolic activity of numerous hepatic lesions,  including the following:  -4.6 x 4.3 cm mass in the left hepatic lobe with max SUV 20.8,  previously 2.5 x 2.4 cm with max SUV 24.44.  -2.8 x 3.4 cm mass in the inferior right hepatic lobe with max SUV  12.19, previously 1.1 x 1.0 cm with max SUV 5.70.    Increased size and metabolic activity of a centrally necrotic joseline  hepatis lymph node which measures 3.5 x 2.7 cm with max SUV 17.48,  previously 2.1 x 1.6 cm with max SUV 13.75.    Increased size of the large lymph node conglomerate within the right  lower abdomen and pelvis, intimately associated with the external  iliac vessels, measuring approximately 8.4 x 6.7 cm with max SUV of  21.36, previously 6.3 x 5.4 cm with max SUV 25.8. There is  circumferential encasement of the right external iliac artery and  vein, with associated nonocclusive thrombus involving the distal right  external iliac vein and common femoral vein.    Increased size of the large right conglomerate of lymph nodes in the  right groin, measuring 8.2 x 6.4 cm with max SUV 23.22, previously 4.4  x 3.8 cm with max suv 26.97.    The gallbladder, biliary system, spleen, and pancreas are within  normal limits. Adrenal glands are unremarkable. Symmetric enhancement  kidneys. No hydronephrosis. Urinary bladder is  within normal limits.  Unchanged endometrial hyper enhancement without associated elevated  FDG uptake.    Small sliding hiatal hernia. No abnormally dilated loops of small or  large bowel. The portal venous system appears patent. Suspected  contrast admixture within the right portal vein and extrahepatic main  portal vein. Normal caliber abdominal aorta.    LOWER EXTREMITIES:   No abnormal masses or hypermetabolic lesions.    BONES AND SOFT TISSUES:   Diffuse abnormal patchy FDG uptake throughout the axial and proximal  appendicular skeleton, which is new from the prior exam, highly  suspicious for new infiltrative osseous metastases. No associated  lytic or sclerotic changes in the bones, however physiologic (PET)  findings precede anatomic (CT) findings. No suspected pathologic  fracture.  Impression: IMPRESSION:   In this patient with a history of metastatic melanoma, findings of  disease progression:    1. New diffuse metastatic osseous disease.    2. Increased size of the hypermetabolic liver metastases.    3. Increased size of the hypermetabolic lymph node conglomerates  throughout the abdomen and pelvis.    4. Several new solid pulmonary nodules, without significant FDG  avidity but are also too small to accurately characterize by PET.  These likely represent new metastatic disease.    5. Nonocclusive deep venous thrombosis within the right external iliac  vein, common femoral vein, and femoral vein, likely secondary to slow  venous flow from mass effect from the metastatic adenopathy. Consider  dedicated ultrasound of the right lower extremity for complete  evaluation of the thrombus burden.     ----  The findings of the study were discussed over the telephone with the  ordering provider Meghan Perdomo PA-C by Dr. Alexandra at 1:25 PM on  7/29/2022.    I have personally reviewed the examination and initial interpretation  and I agree with the findings.    MICHAEL LOPEZ MD         SYSTEM ID:  [id number]  CT  Chest/Abdomen/Pelvis w Contrast  Narrative: Combined Report of:    PET and CT on  7/29/2022 11:14 AM :    1. PET of the neck, chest, abdomen, and pelvis.  2. PET CT Fusion for Attenuation Correction and Anatomical  Localization:    3. Diagnostic CT scan of the chest, abdomen, and pelvis with  intravenous contrast for interpretation.  4. 3D MIP and PET-CT fused images were processed on an independent  workstation and archived to PACS and reviewed by a radiologist.    Technique:    1. PET: The patient received 12.22 mCi of F-18-FDG; the serum glucose  was 83 prior to administration, body weight was 61.7 kg. Images were  evaluated in the axial, sagittal, and coronal planes as well as the  rotational whole body MIP. Images were acquired from the Vertex to the  Feet.    UPTAKE WAS MEASURED AT 60 MINUTES.     BACKGROUND:  Liver SUV max= 2.11,   Aorta Blood SUV Max: 1.62.     2. CT: Volumetric acquisition for clinical interpretation of the  chest, abdomen, and pelvis acquired at 3 mm sections after the  uneventful administration of intravenous contrast. The chest, abdomen,  and pelvis were evaluated at 5 mm sections in bone, soft tissue, and  lung windows.      The patient received 86 cc of Isovue 370 intravenously for the  examination.    --    3. 3D MIP and PET-CT fused images were processed on an independent  workstation and archived to PACS and reviewed by a radiologist.    INDICATION: Malignant melanoma of right lower extremity including hip  (H)    ADDITIONAL INFORMATION OBTAINED FROM EMR: none    COMPARISON: PET CT 4/22/2022    FINDINGS:     HEAD/NECK:  There is no suspicious FDG uptake in the neck.    The paranasal sinuses and mastoid air cells are clear.    The mucosal pharyngeal space, the , prevertebral and carotid  spaces are within normal limits.     No masses, mass effect or pathologically enlarged lymph nodes are  evident. The thyroid gland is within normal limits..    CHEST:  7 mm hypermetabolic  solid pulmonary nodule in the right lower lobe  with Max SUV 5.04, previously 4.2 (series 7, image 76).    Several new solid pulmonary nodules with low-level FDG avidity, for  example a 5 mm solid pulmonary nodule in the right upper lobe with Max  SUV 1.40 (series 7, image 35) and an adjacent 5 mm solid pulmonary  nodule with max SUV of 1.13 (series 7, image 36).    No airspace consolidation. No pleural effusion or pneumothorax.    The cardiac size is normal. Normal caliber aorta and main pulmonary  artery. No central pulmonary embolism. Moderate coronary artery  calcium. No enlarged or hypermetabolic thoracic lymph nodes.    ABDOMEN AND PELVIS:  Increased size and metabolic activity of numerous hepatic lesions,  including the following:  -4.6 x 4.3 cm mass in the left hepatic lobe with max SUV 20.8,  previously 2.5 x 2.4 cm with max SUV 24.44.  -2.8 x 3.4 cm mass in the inferior right hepatic lobe with max SUV  12.19, previously 1.1 x 1.0 cm with max SUV 5.70.    Increased size and metabolic activity of a centrally necrotic joseline  hepatis lymph node which measures 3.5 x 2.7 cm with max SUV 17.48,  previously 2.1 x 1.6 cm with max SUV 13.75.    Increased size of the large lymph node conglomerate within the right  lower abdomen and pelvis, intimately associated with the external  iliac vessels, measuring approximately 8.4 x 6.7 cm with max SUV of  21.36, previously 6.3 x 5.4 cm with max SUV 25.8. There is  circumferential encasement of the right external iliac artery and  vein, with associated nonocclusive thrombus involving the distal right  external iliac vein and common femoral vein.    Increased size of the large right conglomerate of lymph nodes in the  right groin, measuring 8.2 x 6.4 cm with max SUV 23.22, previously 4.4  x 3.8 cm with max suv 26.97.    The gallbladder, biliary system, spleen, and pancreas are within  normal limits. Adrenal glands are unremarkable. Symmetric enhancement  kidneys. No  hydronephrosis. Urinary bladder is within normal limits.  Unchanged endometrial hyper enhancement without associated elevated  FDG uptake.    Small sliding hiatal hernia. No abnormally dilated loops of small or  large bowel. The portal venous system appears patent. Suspected  contrast admixture within the right portal vein and extrahepatic main  portal vein. Normal caliber abdominal aorta.    LOWER EXTREMITIES:   No abnormal masses or hypermetabolic lesions.    BONES AND SOFT TISSUES:   Diffuse abnormal patchy FDG uptake throughout the axial and proximal  appendicular skeleton, which is new from the prior exam, highly  suspicious for new infiltrative osseous metastases. No associated  lytic or sclerotic changes in the bones, however physiologic (PET)  findings precede anatomic (CT) findings. No suspected pathologic  fracture.  Impression: IMPRESSION:   In this patient with a history of metastatic melanoma, findings of  disease progression:    1. New diffuse metastatic osseous disease.    2. Increased size of the hypermetabolic liver metastases.    3. Increased size of the hypermetabolic lymph node conglomerates  throughout the abdomen and pelvis.    4. Several new solid pulmonary nodules, without significant FDG  avidity but are also too small to accurately characterize by PET.  These likely represent new metastatic disease.    5. Nonocclusive deep venous thrombosis within the right external iliac  vein, common femoral vein, and femoral vein, likely secondary to slow  venous flow from mass effect from the metastatic adenopathy. Consider  dedicated ultrasound of the right lower extremity for complete  evaluation of the thrombus burden.     ----  The findings of the study were discussed over the telephone with the  ordering provider Meghan Perdomo PA-C by Dr. Alexandra at 1:25 PM on  7/29/2022.    I have personally reviewed the examination and initial interpretation  and I agree with the findings.    MICHAEL LOPEZ MD          SYSTEM ID:  [id number]        ASSESSMENT AND PLAN:  # Metastatic acral melanoma, to lungs, liver, lymphnodes, and bone  She has received multiple lines of prior therapy including ipi/nivo and TVEC. She started Opdualag 5/11/22 and has received 3 cycles. PET-CT from 7/29/22 appears to show disease progression. She reviewed potential clinical trials participation previously with Meghan Perdomo and she indicated she is not interested in any clinical trials.    We reviewed the situation at length. We could continue Opdualag for another 6 weeks to confirm progression, however, given the degree of progression seen true progression is most likely. However, if confirmed then next line of treatment would involve clinical trial (which she declines) or chemotherapy, either alone or in combination with immunotherapy. Response rates to chemotherapy are in the range of 10-20%. Temozolomide is well tolerated but has response rates in the range of 10-15%. Carboplatin and pactlitaxel is associated with classical chemotherapy side effects and  has response rates in the range of 20-25%. In a retrospective study, chemoimunotherapy in the 2nd line was associated with a 50% response rate, but toxicity and durability of responses remains main concerns.    We reviewed that treatment with multiagent chemotherapy is likely to decrease her quality of life, while treatment with less aggressive chemotherapy is unlikely to be effective. A reasonable option would be to continue with no additional treatment, and change focus to comfort measures and/or supportive care measures. She s not yet ready to consider this. She is not ready to stop treatment and is not ready for hospice.    She prefers to continue with upcoming treatment and confirm progression in 6 weeks. She will continue to weigh her options until then.    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 Continue Eliquis anticoagulation lifelong.      # RLE lymphema.    Secondary to tumors and new DVT. Working with lymphedema therapy and wraps. Previously, encouraged elevation. Anticoagulation as above.     # Anorexia and weight loss.  # Candida albicans esophagitis, recurrent  Weight has stablized. Previously a trial of Zyprexa 2.5 mg at bedtime was recommended, which may also help with insomnia. EGD culture positive candida albicans and she is on a 28 day course of fluconazole. If medication ineffective she could require repeat endoscopy for sensitivities.    # Depression and anxiety. Declines taking sertraline. Is working with palliative care social work.     # Leg pain. This seemed to worsen when on steroids, was improving with physical therapy, but still bothersome. Was given oxycodone to use PRN but has not used as she is very hesitant to use opioids. We will try tramadol 50 mg q6h as needed for pain.      Again, thank you for allowing me to participate in the care of your patient.      Sincerely,    Gi Franklin MD

## 2022-08-05 NOTE — NURSING NOTE
Chief Complaint   Patient presents with     Blood Draw     Labs drawn via  by RN in lab. VS taken.      Labs collected from venipuncture by RN. Vitals taken. Checked in for appointment(s).    Michelle Walters RN

## 2022-08-06 NOTE — TELEPHONE ENCOUNTER
FUTURE VISIT INFORMATION      FUTURE VISIT INFORMATION:    Date: 8.8.22    Time: 2:00    Location: [facility] [place]  REFERRAL INFORMATION:    Referring provider:  Sung    Referring providers clinic:  Derm    Reason for visit/diagnosis  L upper lip - metastatic melanoma need melanoma slot    RECORDS REQUESTED FROM:       Clinic name Comments Records Status Imaging Status   Derm 7.27.22  Path # [id number] [facility]

## 2022-08-08 NOTE — NURSING NOTE
Chief Complaint   Patient presents with     Derm Problem     L upper lip MM consult     Stacey MORROW, EMT  Dermatology/Dermatology Surgery  [phone]

## 2022-08-08 NOTE — PROGRESS NOTES
DERMATOLOGIC EXCISION SURGERY PROCEDURE NOTE   Dermatology Problem List:  Metastatic melanoma      NAME OF PROCEDURE: Excision with complex linear closure  Staff surgeon: Tammy  Resident: none  Scrub nurse: john    PRE-OPERATIVE DIAGNOSIS:  Metastatic melanoma  POST-OPERATIVE DIAGNOSIS: Same   LOCATION: L upper lip  FINAL EXCISION SIZE(DEFECT SIZE): 1.4 cm  MARGIN: 1 cm  FINAL REPAIR LENGTH: 3.3 cm   ANESTHESIA: 6.0 cc 1% lidocaine with 1:100,000 epinephrine    INDICATIONS: This patient presented with a 1.2cm cutaneous melanoma met. Excision was indicated.   We discussed the principles of treatment and most likely complications including bleeding, infection, scarring, alteration in skin color and sensation, wound dehiscence,muscle weakness in the area, or recurrence of the lesion or disease. We reviewed that on occasion, after healing, a secondary procedure or revision may be recommended in order to obtain the best cosmetic or functional result.   PROCEDURE: The patient was taken to the operative suite. Time-out was performed. The treatment area was anesthetized. The area was washed with Hibiclens, rinsed with saline and draped with sterile towels. The lesion was delineated and excised down to deep subcutaneous fat in a fusiform manner. Hemostasis was obtained by electrocoagulation.     REPAIR: A complex layered linear closure was selected as the procedure which would maximally preserve both function and cosmesis and for the following reasons: 1) the defect was widely undermined; 2) multiple deep plication and layered sutures placed; 3) wound size, depth, tension, and location.   After the excision of the tumor, the area was extensively and carefully undermined using blunt Metzenbaum scissors. Hemostasis was obtained with spot electrocautery and ligation of vessels where necessary. An initial deep plication sutures of 4.0 Monocryl sutures  were placed in the deep, subcutaneous and fascial planes to appose the  lateral margins.  The subcutaneous and dermal layers were then closed with additional 4.0 Monocryl sutures. The epidermis was then carefully approximated along the length of the wound using 5.0 FAG running sutures.    Estimated blood loss was less than 10 ml for all surgical sites. A sterile pressure dressing was applied and wound care instructions, with a written handout, were given. The [facility] and ambulatory.   The patient elected for a 7 day mychart hold on pathology results to allow the clinical staff to contact them with the result.   FU for suture removal in n/a and in dermatology clinic with MD in n/a.        Dr. Munoz staffed the patient and was present for the key portions of the above procedure.     Staff Involved:  Staff Only      German Munoz MD

## 2022-08-08 NOTE — PROGRESS NOTES
Infusion Nursing Note:  Yadira Hoang presents today for C4D1 Opdualag.    Patient seen by provider today: No   present during visit today: Not Applicable.    Note: Pt c/o ongoing fatigue, states her appt with Dr Franklin was not good on Friday, was told her cancer had progressed.  States she is not ready to die yet.  See flow sheet for assessment.    Intravenous Access:  Peripheral IV placed.    Treatment Conditions:  Lab Results   Component Value Date    HGB 11.5 (L) 08/05/2022    WBC 5.7 08/05/2022    ANEU 4.6 06/30/2021    ANEUTAUTO 4.1 08/05/2022     08/05/2022      Lab Results   Component Value Date     08/05/2022    POTASSIUM 3.9 08/05/2022    CR 0.64 08/05/2022    GABY 9.1 08/05/2022    BILITOTAL 0.4 08/05/2022    ALBUMIN 3.1 (L) 08/05/2022    ALT 22 08/05/2022    AST 48 (H) 08/05/2022     Results reviewed, labs MET treatment parameters, ok to proceed with treatment.    Post Infusion Assessment:  Patient tolerated infusion without incident.  Blood return noted pre and post infusion.  Site patent and intact, free from redness, edema or discomfort.  No evidence of extravasations.  Access discontinued per protocol.     Discharge Plan:   Patient discharged in stable condition accompanied by: self.  Departure Mode: Ambulatory.  Pt will RTC in 6-8 weeks to F/U with Dr Franklin.      Williams Purdy RN

## 2022-08-08 NOTE — LETTER
8/8/2022       RE: Yadira Hoang  [address] [facility] [address]     Dear Colleague,    Thank you for referring your patient, Yadira Hoang, to the [facility] [place] [facility]. Please see a copy of my visit note below.    DERMATOLOGIC EXCISION SURGERY PROCEDURE NOTE   Dermatology Problem List:  Metastatic melanoma      NAME OF PROCEDURE: Excision with complex linear closure  Staff surgeon: Tammy  Resident: none  Scrub nurse: john    PRE-OPERATIVE DIAGNOSIS:  Metastatic melanoma  POST-OPERATIVE DIAGNOSIS: Same   LOCATION: L upper lip  FINAL EXCISION SIZE(DEFECT SIZE): 1.4 cm  MARGIN: 1 cm  FINAL REPAIR LENGTH: 3.3 cm   ANESTHESIA: 6.0 cc 1% lidocaine with 1:100,000 epinephrine    INDICATIONS: This patient presented with a 1.2cm cutaneous melanoma met. Excision was indicated.   We discussed the principles of treatment and most likely complications including bleeding, infection, scarring, alteration in skin color and sensation, wound dehiscence,muscle weakness in the area, or recurrence of the lesion or disease. We reviewed that on occasion, after healing, a secondary procedure or revision may be recommended in order to obtain the best cosmetic or functional result.   PROCEDURE: The patient was taken to the operative suite. Time-out was performed. The treatment area was anesthetized. The area was washed with Hibiclens, rinsed with saline and draped with sterile towels. The lesion was delineated and excised down to deep subcutaneous fat in a fusiform manner. Hemostasis was obtained by electrocoagulation.     REPAIR: A complex layered linear closure was selected as the procedure which would maximally preserve both function and cosmesis and for the following reasons: 1) the defect was widely undermined; 2) multiple deep plication and layered sutures placed; 3) wound size, depth, tension, and location.   After the  excision of the tumor, the area was extensively and carefully undermined using blunt Metzenbaum scissors. Hemostasis was obtained with spot electrocautery and ligation of vessels where necessary. An initial deep plication sutures of 4.0 Monocryl sutures  were placed in the deep, subcutaneous and fascial planes to appose the lateral margins.  The subcutaneous and dermal layers were then closed with additional 4.0 Monocryl sutures. The epidermis was then carefully approximated along the length of the wound using 5.0 FAG running sutures.    Estimated blood loss was less than 10 ml for all surgical sites. A sterile pressure dressing was applied and wound care instructions, with a written handout, were given. The patient was [facility] alert and ambulatory.   The patient elected for a 7 day mychart hold on pathology results to allow the clinical staff to contact them with the result.   FU for suture removal in n/a and in dermatology clinic with MD in n/a.        Dr. Munoz staffed the patient and was present for the key portions of the above procedure.     Staff Involved:  Staff Only      German Munoz MD

## 2022-08-08 NOTE — PROGRESS NOTES
RC @ 08:10  WINIFRED Franklin/ FELICITA Hu RN Please continue with opdualag therapy today. Will reassess response in 6-8 weeks.    Daniela Hu RN

## 2022-08-08 NOTE — RESULT ENCOUNTER NOTE
Hey man,  I just did a little excision on it.  There's no point in margin confirmation for a met and I figured the tissue MIGHT have some use for therapeutic selection.    German

## 2022-08-08 NOTE — PATIENT INSTRUCTIONS

## 2022-08-10 NOTE — TELEPHONE ENCOUNTER
Wondering if she is suppose to get bone density test with the cancer she has?     Also wondering about treatment ioptions and got together with kids and they are wondering if they can ask questions on my chart.    Advised that she can send questions in via my chart to either Dr Franklin or Meghan Perdomo.

## 2022-08-10 NOTE — PROGRESS NOTES
Subjective:  HPI  Physical Exam                    Objective:  System    Physical Exam    General     ROS    Assessment/Plan:    DISCHARGE REPORT    Progress reporting period is from 3/2/2022 to 8/10/2022.       SUBJECTIVE  Subjective: She reports about a month ago there found a blood clot in the right groin. She was put on a blood thinner that made her quite tired.  She was getting very fatigued and she was getting so exhausted she cancelled all of the therapies she had to go through.  She also reports that all the treatments she has been trying are failing in her body.    Current Pain level: 4/10.      Initial Pain level: 7/10.   Changes in function:  Yes (See Goal flowsheet attached for changes in current functional level)  Adverse reaction to treatment or activity: None    OBJECTIVE  Changes noted in objective findings:  Yes  Objective: Improved flexibility in the quads, hamstrings, glutes and piriformis bilaterally.  Decreased strength with right extensor lag after 4 reps. Left has no extensor lag.     ASSESSMENT/PLAN  Updated problem list and treatment plan: Diagnosis 1:  Generalized low back and leg pain bilaterally  Pain -  hot/cold therapy, manual therapy, self management, education, directional preference exercise and home program  Decreased ROM/flexibility - manual therapy, therapeutic exercise, therapeutic activity and home program  Decreased strength - therapeutic exercise, therapeutic activities and home program  STG/LTGs have been met or progress has been made towards goals:  Yes (See Goal flow sheet completed today.)  Assessment of Progress: The patient's progress has plateaued.  The patient has had set backs in their progress.  Self Management Plans:  Patient has been instructed in a home treatment program.  Patient is independent in a home treatment program.  Patient  has been instructed in self management of symptoms.  Patient is independent in self management of symptoms.  I have re-evaluated  this patient and find that the nature, scope, duration and intensity of the therapy is appropriate for the medical condition of the patient.  Yadira Ochoa continues to require the following intervention to meet STG and LTG's:  PT intervention is no longer required to meet STG/LTG.    Recommendations:  This patient is ready to be discharged from therapy and continue their home treatment program.    Please refer to the daily flowsheet for treatment today, total treatment time and time spent performing 1:1 timed codes.

## 2022-08-10 NOTE — PROGRESS NOTES
[facility]    OUTPATIENT Physical Therapy ORTHOPEDIC EVALUATION  PLAN OF TREATMENT FOR OUTPATIENT REHABILITATION  (COMPLETE FOR INITIAL CLAIMS ONLY)  Patient's Last Name, First Name, M.I.  YOB: 1945  ViktorYadira Ochoa  RUBI    Provider s Name:  [facility]   Medical Record No.  [id number]   Start of Care Date:  03/02/22   Onset Date:   01/04/22   Type:     _X__PT   ___OT Medical Diagnosis:    Encounter Diagnoses   Name Primary?    Bilateral leg pain Yes    Chronic bilateral low back pain without sciatica     Generalized muscle weakness         Treatment Diagnosis:  Bilateral Leg Pain / Low Back Pain / Deconditioning        Goals:     08/10/22 0500   Body Part   Goals listed below are for Deconditioning / Low Back Pain / Bilateral Leg Pain   Goal #1   Goal #1 ambulation   Previous Functional Level No restrictions   Performance Level No restrictions   Current Functional Level Minutes patient can walk   Performance Level Cannot stand or walk for 10 minutes without leg pain, back pain or need to rest   STG Target Performance Minutes patient will be able to walk   Performance Level Patient will tolerate 10 minutes of ambulation without fatigue, low back pain worse than 2/10 and bilateral leg pain worse than 2/10   Rationale for safe household ambulation;for safe outdoor household ambulation   Due Date 03/31/22   Date Goal Met 05/18/22    LTG Target Performance Hours patient will be able to walk   Performance Level Patient will tolerate 60 minutes of ambulation without fatigue, low back pain worse than 2/10 and bilateral leg pain worse than 2/10   Rationale for safe household ambulation;for safe outdoor household ambulation   Due Date 05/30/22   Date Goal Met 05/18/22   Goal #2   Goal #2 pushing/pulling   Previous Functional Level No restrictions   Performance level No  restrictions   Current Functional Level Cannot push;Cannot pull   Performance level Without fatigue, low back pain or leg pain   STG Target Performance Push an item weighing;Pull an item weighing   Performance level 5 lbs Without fatigue, low back pain or leg pain worse than 2/10   Rationale for vacuuming;for snow removal;for grocery shopping   Due date 03/31/22   Date Goal Met 05/18/22   LTG Target Performance Push an item weighing;Pull an item weighing   Performance Level 12 lbs Without fatigue, low back pain or leg pain worse than 2/10   Rationale for vacuuming;for snow removal;for grocery shopping   Due date 07/30/22   If goal not met, Why? Struggling with side effects and additional pain from other conditions such as headaches, vertigo, foot pain, lymphedema and cancer/infusion treatments. Function has declined slightly due to comorbities resulting in progressive weakness.       Therapy Frequency:  1x a week  Predicted Duration of Therapy Intervention:  1 visit for final discharge [place], [place]                 I CERTIFY THE NEED FOR THESE SERVICES FURNISHED UNDER        THIS PLAN OF TREATMENT AND WHILE UNDER MY CARE     (Physician co-signature of this document indicates review and certification of the therapy plan).                     Certification Date From:  07/31/22   Certification Date To:  08/10/22    Referring Provider:  Maxi Mullen MD    Initial Assessment        See Epic Evaluation SOC Date: 03/02/22

## 2022-08-15 NOTE — PATIENT INSTRUCTIONS

## 2022-08-15 NOTE — PROGRESS NOTES
AUDIOLOGY REPORT    SUBJECTIVE: Yadira Hoang, a 77 year old female, was seen in the Audiology Clinic at [facility] today for a Binaural hearing aid fitting. Previous results have revealed a bilateral mild to moderate sensorineural hearing loss. The patient was given medical clearance to pursue amplification by  Tony Cohen MD.      OBJECTIVE:  Prior to fitting, a hearing aid check was performed to ensure device functionality. The hearing aid conformity evaluation was completed.The hearing aids were placed and they provided a good fit. Real-ear-probe-microphone measurements were completed on the [employer] system and were a good match to NAL-NL2 target with soft sounds audible, moderate sounds comfortable, and loud sounds below discomfort. UCLs are verified through maximum power output measures and demonstrate appropriate limiting of loud inputs. Ms. Hoang was oriented to proper hearing aid use, care, cleaning (no water, dry brush), batteries (rechargeable, insertion/removal, toxicity, low-battery signal), aid insertion/removal, user booklet, warranty information, storage cases, and other hearing aid details. The patient confirmed understanding of hearing aid use and care, and showed proper insertion of hearing aid and batteries while in the office today. Ms. Hoang reported good volume and sound quality today.    EAR(S) FIT: Binaural  MA HEARING AID MAKE: Right: Phonak; Left: Phonak  HEARING AID STYLE: Right: BTE; Left: BTE     LENGTH: Right:  1; Left:  1  SERIAL NUMBERS: Right: [id number]; Left: [id number]  WARRANTY END DATE: Right: 10/10/2025; Left:: 10/10/2025      CHARGES:   Hearing Aid Check: Binaural, 72026, $81.00  Dispensing Fee: Binaural, , $500.00  Fit/Orientation: Binaural, , $416.00  Hearing Aid Conformity Evaluation: 2, , $174.00  Hearing Aid Digital: Binaural, BTE,  $4429  Total: $5600     ASSESSMENT: Binaural hearing aid fitting completed  today. Verification measures were performed. The 45 day trial period was explained to patient, and they expressed understanding. Ms. Hoang signed the Hearing Aid Purchase Agreement and was given a copy, as well as details on her hearing aids. Patient was counseled that exact out of pocket amounts cannot be determined for hearing aid claims being sent to insurance. Any insurance coverage information presented to the patient is an estimate only, and is not a guarantee of payment. Patient has been advised to check with their own insurance.    PLAN: Ms. Hoang will return for follow-up in 2-3 weeks for a hearing aid review appointment. Please call this clinic with questions regarding today s appointment.    Richie Davila.  Doctor of Audiology  [place] License # [id number]

## 2022-08-16 NOTE — LETTER
8/16/2022         RE: Yadira Hoang  [address] [facility] [address]        Dear Colleague,    Thank you for referring your patient, Yadira Hoang, to the [facility]. Please see a copy of my visit note below.    Yadira is a 77 year old who is being evaluated via a billable video visit.      If the video visit is dropped, the invitation should be resent by: Send to e-mail at: [email or web address]  Will anyone else be joining your video visit? No    Video-Visit Details    Video Start Time: 9:57 AM    Type of service:  Video Visit    Video End Time:10:46AM    Originating Location (pt. Location): Home    Distant Location (provider location):  [facility]     Platform used for Video Visit: Piqniq     [facility] ONCOLOGY PROGRESS NOTE  Melanoma Clinic  Aug 16, 2022     CHIEF COMPLAINT: Metastatic acral melanoma    Melanoma History:  1. She noted a painful lesion on the sole of the right foot for a few months, since last summer.  It initially was small then became raised.  It spontaneously bled. She is not entirely sure of its appearance initially.  She denies noting any masses behind the knee or in the groin.  2. 1/9/2020, punch biopsy was performed by Dr Jessica Meadows. Patology showed melanoma, at least 3.4 mm deep with ulceration and 0 mitoses, and perineural invasion present. TILs were non-brisk and LVI not identified. pT3b.  3. 2/10/2020, she has right femoral sentinel lymph node biopsy and wide local excision (Specimen #: [id number]) and the right heel lesion extends to a depth of 6.6 mm, and invasive melanoma is present at 0.2 mm from the deep margin. Microsatellites are also present. There was 1 (of 2) lymph nodes involved. The lymph node tissue exhibits extensive subcapsular and parenchymal clusters of melanoma cells, highlighted on Melan-A immunostain. The largest cluster is 7 millimeters in greatest diameter. pT4b pN2c. PD-L1 staining is  negative, <1%. No mutation in BRAF, KIT, or NRAS.  4. 2/22/2020, she had PET-CT, which showed intense FDG uptake in a right inguinal lymph node, concerning for an additional focus of metastatic disease. There is also an indeterminate right external iliac lymph node with mild FDG uptake.  5. 3/4/2020, she has medial plantar artery  fasciocutaneous instep flap and Integra placement to the donor site. On 4/2/2020, she has additional reconstruction with skin grafting.  6. 5/22/2020, PET-CT shows a hypermetabolic right inguinal and right external iliac chain lymph node and stable pulmonary nodules.  7. 5/27/2020, she starts nivolumab for presumed low volume lymph node predominant metastatic disease.  8. 8/21/2020, PET-CT shows increase in hypermetabolic adenopathy, and a hypermetabolic nodule in segment 2 of the liver. Given low volume of disease she prefers to continue therapy with short interval follow-up.  9. 10/9/2020, PET-CT shows increasing size of hepatic metastases with increased hypermetabolism, increased right inguinal and iliac lymphadenopathy, and new FDG uptake right T2 transverse process and right side of S1. MRI-brain obtained 10/21/2020 is negative for brain metastasis.  10. 10/28/2020, she starts ipilimumab 1mg/kg and nivolumab 3mg/kg. She then goes on to maintenance nivolumab through 4/12/21.  11. 5/10/2021, she starts ipilimumab 3 mg/kg monotherapy q3w for 4 cycles.  12. 7/30/21, PET/CT shows mild disease progression.   13. 8/2/21, start ipilimumab 3mg/kg and nivolumab 1mg/kg and received 2 cycles, last on 8/23/21.  14. 8/23/21, develops elevated LFT's.   15. 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   16. 1/28/22, started TVEC  17. 4/22/22, progressive disease, consideration for additional therapeutic approaches.  18. 5/11/22, Start Opdualag  19. 7/29/22: PET-CT shows new diffuse bony metastatic bony disease, increased size of liver mets and increased size of lymph  node conglomerates throughout pelvis and abdomen. DVT in right external iliac vein, common femoral vein and femoral vein also found. Started on Eliquis.     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 She presents today for a virtual visit for routine follow up to assess progress in plan regarding treatment options previously outlined in her last visit with Dr. Franklin.  -Yadira is feeling discouraged today. She continues to have stomach pain. She had trouble remembering which pills to take and so had not been taking the fluconazole for the esophageal candidiasis but restarted it a couple days ago. (Her daughter is going to come this weekend and help her organize her medications). She states that her appetite is very reduced and she is hardly eating as a result of the stomach pain which worsens with oral intake. She is eating breakfast but then typically doesn't eat lunch and has very little for supper. She states that she weighs herself at home and weight is stable. She is able to take oral fluids. Denies emesis and only occasionally has some nausea.  -She reports some constipation but is unsure if this is due to poor appetite. Is mindful of drinking water. Having a stool about every other day. Has not tried prune juice or a softener yet.  -Her back/hip pain is bothersome and limits mobility. Able to walk with a walker. Has not tried the tramadol yet. Does use an ice pack to manage the pain.  -Lymphedema to right leg with no appreciable change. Does notice the lymph node in her right groin seems larger.   -She recently saw dermatology to have a lesion above her lip removed. She also noticed 2 new pimple like lesions on her scalp which she would like dermatology to evaluate. She has an appointment scheduled on 10/11.   -Her mood waxes and wanes but she is understandably saddened and frustrated by the fact that her treatment is not working.   -Denies any  bleeding.    Review of Systems:  Patient denies any of the following except if noted above: fevers, chills, difficulty with energy, vision or hearing changes, chest pain, dyspnea, abdominal pain, nausea, vomiting, diarrhea, constipation, urinary concerns, headaches, numbness, tingling, issues with sleep or mood. He also denies lumps, bumps, rashes or skin lesions, bleeding or bruising issues.    Current Outpatient Medications   Medication Sig Dispense Refill     Apixaban Starter Pack (ELIQUIS DVT/PE STARTER PACK) 5 MG TBPK Take 10 mg by mouth 2 times daily for 7 days, THEN 5 mg 2 times daily for 23 days. 74 each 0     carboxymethylcellulose PF (REFRESH LIQUIGEL) 1 % ophthalmic gel 1 drop 3 times daily       cholecalciferol (VITAMIN D3) 125 MCG (5000 UT) TABS tablet Take 125 mcg by mouth every other day       estradiol (ESTRACE) 0.1 MG/GM vaginal cream Place 2 g vaginally twice a week 42.5 g 1     famotidine (PEPCID) 20 MG tablet Take 1 tablet (20 mg) by mouth 2 times daily as needed (acid reflux/heartburn) (Patient not taking: No sig reported) 60 tablet 1     fish oil-omega-3 fatty acids 1000 MG capsule Take 2 g by mouth daily       fluconazole (DIFLUCAN) 200 MG tablet Take 2 tablets (400 mg) by mouth daily for 28 days (Patient not taking: No sig reported) 56 tablet 0     multivitamin w/minerals (THERA-VIT-M) tablet Take 1 tablet by mouth daily (Patient not taking: No sig reported)       Riboflavin 100 MG TABS Take 400 mg by mouth daily (Patient not taking: No sig reported)       Objective:  Video physical exam  General: Patient appears well in no acute distress.   Skin: Lesion/scar to left upper lip; not well visualized.  Eyes: EOMI, no erythema, sclera icterus or discharge noted  Resp: Appears to be breathing comfortably without accessory muscle usage, speaking in full sentences, no cough  MSK: Appears to have normal range of motion based on visualized movements  Neurologic: No apparent tremors, facial movements  symmetric  Psych: affect sad-tearful at times, alert and oriented    LABS  No labs reviewed today    IMAGING  PET Oncology Whole Body  Narrative: Combined Report of:    PET and CT on  7/29/2022 11:14 AM :    1. PET of the neck, chest, abdomen, and pelvis.  2. PET CT Fusion for Attenuation Correction and Anatomical  Localization:    3. Diagnostic CT scan of the chest, abdomen, and pelvis with  intravenous contrast for interpretation.  4. 3D MIP and PET-CT fused images were processed on an independent  workstation and archived to PACS and reviewed by a radiologist.    Technique:    1. PET: The patient received 12.22 mCi of F-18-FDG; the serum glucose  was 83 prior to administration, body weight was 61.7 kg. Images were  evaluated in the axial, sagittal, and coronal planes as well as the  rotational whole body MIP. Images were acquired from the Vertex to the  Feet.    UPTAKE WAS MEASURED AT 60 MINUTES.     BACKGROUND:  Liver SUV max= 2.11,   Aorta Blood SUV Max: 1.62.     2. CT: Volumetric acquisition for clinical interpretation of the  chest, abdomen, and pelvis acquired at 3 mm sections after the  uneventful administration of intravenous contrast. The chest, abdomen,  and pelvis were evaluated at 5 mm sections in bone, soft tissue, and  lung windows.      The patient received 86 cc of Isovue 370 intravenously for the  examination.    --    3. 3D MIP and PET-CT fused images were processed on an independent  workstation and archived to PACS and reviewed by a radiologist.    INDICATION: Malignant melanoma of right lower extremity including hip  (H)    ADDITIONAL INFORMATION OBTAINED FROM EMR: none    COMPARISON: PET CT 4/22/2022    FINDINGS:     HEAD/NECK:  There is no suspicious FDG uptake in the neck.    The paranasal sinuses and mastoid air cells are clear.    The mucosal pharyngeal space, the , prevertebral and carotid  spaces are within normal limits.     No masses, mass effect or pathologically enlarged  lymph nodes are  evident. The thyroid gland is within normal limits..    CHEST:  7 mm hypermetabolic solid pulmonary nodule in the right lower lobe  with Max SUV 5.04, previously 4.2 (series 7, image 76).    Several new solid pulmonary nodules with low-level FDG avidity, for  example a 5 mm solid pulmonary nodule in the right upper lobe with Max  SUV 1.40 (series 7, image 35) and an adjacent 5 mm solid pulmonary  nodule with max SUV of 1.13 (series 7, image 36).    No airspace consolidation. No pleural effusion or pneumothorax.    The cardiac size is normal. Normal caliber aorta and main pulmonary  artery. No central pulmonary embolism. Moderate coronary artery  calcium. No enlarged or hypermetabolic thoracic lymph nodes.    ABDOMEN AND PELVIS:  Increased size and metabolic activity of numerous hepatic lesions,  including the following:  -4.6 x 4.3 cm mass in the left hepatic lobe with max SUV 20.8,  previously 2.5 x 2.4 cm with max SUV 24.44.  -2.8 x 3.4 cm mass in the inferior right hepatic lobe with max SUV  12.19, previously 1.1 x 1.0 cm with max SUV 5.70.    Increased size and metabolic activity of a centrally necrotic joseline  hepatis lymph node which measures 3.5 x 2.7 cm with max SUV 17.48,  previously 2.1 x 1.6 cm with max SUV 13.75.    Increased size of the large lymph node conglomerate within the right  lower abdomen and pelvis, intimately associated with the external  iliac vessels, measuring approximately 8.4 x 6.7 cm with max SUV of  21.36, previously 6.3 x 5.4 cm with max SUV 25.8. There is  circumferential encasement of the right external iliac artery and  vein, with associated nonocclusive thrombus involving the distal right  external iliac vein and common femoral vein.    Increased size of the large right conglomerate of lymph nodes in the  right groin, measuring 8.2 x 6.4 cm with max SUV 23.22, previously 4.4  x 3.8 cm with max suv 26.97.    The gallbladder, biliary system, spleen, and pancreas are  within  normal limits. Adrenal glands are unremarkable. Symmetric enhancement  kidneys. No hydronephrosis. Urinary bladder is within normal limits.  Unchanged endometrial hyper enhancement without associated elevated  FDG uptake.    Small sliding hiatal hernia. No abnormally dilated loops of small or  large bowel. The portal venous system appears patent. Suspected  contrast admixture within the right portal vein and extrahepatic main  portal vein. Normal caliber abdominal aorta.    LOWER EXTREMITIES:   No abnormal masses or hypermetabolic lesions.    BONES AND SOFT TISSUES:   Diffuse abnormal patchy FDG uptake throughout the axial and proximal  appendicular skeleton, which is new from the prior exam, highly  suspicious for new infiltrative osseous metastases. No associated  lytic or sclerotic changes in the bones, however physiologic (PET)  findings precede anatomic (CT) findings. No suspected pathologic  fracture.  Impression: IMPRESSION:   In this patient with a history of metastatic melanoma, findings of  disease progression:    1. New diffuse metastatic osseous disease.    2. Increased size of the hypermetabolic liver metastases.    3. Increased size of the hypermetabolic lymph node conglomerates  throughout the abdomen and pelvis.    4. Several new solid pulmonary nodules, without significant FDG  avidity but are also too small to accurately characterize by PET.  These likely represent new metastatic disease.    5. Nonocclusive deep venous thrombosis within the right external iliac  vein, common femoral vein, and femoral vein, likely secondary to slow  venous flow from mass effect from the metastatic adenopathy. Consider  dedicated ultrasound of the right lower extremity for complete  evaluation of the thrombus burden.     ----  The findings of the study were discussed over the telephone with the  ordering provider Meghan Perdomo PA-C by Dr. Alexandra at 1:25 PM on  7/29/2022.    I have personally reviewed the  examination and initial interpretation  and I agree with the findings.    MICHAEL LOPEZ MD         SYSTEM ID:  [id number]  CT Chest/Abdomen/Pelvis w Contrast  Narrative: Combined Report of:    PET and CT on  7/29/2022 11:14 AM :    1. PET of the neck, chest, abdomen, and pelvis.  2. PET CT Fusion for Attenuation Correction and Anatomical  Localization:    3. Diagnostic CT scan of the chest, abdomen, and pelvis with  intravenous contrast for interpretation.  4. 3D MIP and PET-CT fused images were processed on an independent  workstation and archived to PACS and reviewed by a radiologist.    Technique:    1. PET: The patient received 12.22 mCi of F-18-FDG; the serum glucose  was 83 prior to administration, body weight was 61.7 kg. Images were  evaluated in the axial, sagittal, and coronal planes as well as the  rotational whole body MIP. Images were acquired from the Vertex to the  Feet.    UPTAKE WAS MEASURED AT 60 MINUTES.     BACKGROUND:  Liver SUV max= 2.11,   Aorta Blood SUV Max: 1.62.     2. CT: Volumetric acquisition for clinical interpretation of the  chest, abdomen, and pelvis acquired at 3 mm sections after the  uneventful administration of intravenous contrast. The chest, abdomen,  and pelvis were evaluated at 5 mm sections in bone, soft tissue, and  lung windows.      The patient received 86 cc of Isovue 370 intravenously for the  examination.    --    3. 3D MIP and PET-CT fused images were processed on an independent  workstation and archived to PACS and reviewed by a radiologist.    INDICATION: Malignant melanoma of right lower extremity including hip  (H)    ADDITIONAL INFORMATION OBTAINED FROM EMR: none    COMPARISON: PET CT 4/22/2022    FINDINGS:     HEAD/NECK:  There is no suspicious FDG uptake in the neck.    The paranasal sinuses and mastoid air cells are clear.    The mucosal pharyngeal space, the , prevertebral and carotid  spaces are within normal limits.     No masses, mass effect  or pathologically enlarged lymph nodes are  evident. The thyroid gland is within normal limits..    CHEST:  7 mm hypermetabolic solid pulmonary nodule in the right lower lobe  with Max SUV 5.04, previously 4.2 (series 7, image 76).    Several new solid pulmonary nodules with low-level FDG avidity, for  example a 5 mm solid pulmonary nodule in the right upper lobe with Max  SUV 1.40 (series 7, image 35) and an adjacent 5 mm solid pulmonary  nodule with max SUV of 1.13 (series 7, image 36).    No airspace consolidation. No pleural effusion or pneumothorax.    The cardiac size is normal. Normal caliber aorta and main pulmonary  artery. No central pulmonary embolism. Moderate coronary artery  calcium. No enlarged or hypermetabolic thoracic lymph nodes.    ABDOMEN AND PELVIS:  Increased size and metabolic activity of numerous hepatic lesions,  including the following:  -4.6 x 4.3 cm mass in the left hepatic lobe with max SUV 20.8,  previously 2.5 x 2.4 cm with max SUV 24.44.  -2.8 x 3.4 cm mass in the inferior right hepatic lobe with max SUV  12.19, previously 1.1 x 1.0 cm with max SUV 5.70.    Increased size and metabolic activity of a centrally necrotic joseline  hepatis lymph node which measures 3.5 x 2.7 cm with max SUV 17.48,  previously 2.1 x 1.6 cm with max SUV 13.75.    Increased size of the large lymph node conglomerate within the right  lower abdomen and pelvis, intimately associated with the external  iliac vessels, measuring approximately 8.4 x 6.7 cm with max SUV of  21.36, previously 6.3 x 5.4 cm with max SUV 25.8. There is  circumferential encasement of the right external iliac artery and  vein, with associated nonocclusive thrombus involving the distal right  external iliac vein and common femoral vein.    Increased size of the large right conglomerate of lymph nodes in the  right groin, measuring 8.2 x 6.4 cm with max SUV 23.22, previously 4.4  x 3.8 cm with max suv 26.97.    The gallbladder, biliary  system, spleen, and pancreas are within  normal limits. Adrenal glands are unremarkable. Symmetric enhancement  kidneys. No hydronephrosis. Urinary bladder is within normal limits.  Unchanged endometrial hyper enhancement without associated elevated  FDG uptake.    Small sliding hiatal hernia. No abnormally dilated loops of small or  large bowel. The portal venous system appears patent. Suspected  contrast admixture within the right portal vein and extrahepatic main  portal vein. Normal caliber abdominal aorta.    LOWER EXTREMITIES:   No abnormal masses or hypermetabolic lesions.    BONES AND SOFT TISSUES:   Diffuse abnormal patchy FDG uptake throughout the axial and proximal  appendicular skeleton, which is new from the prior exam, highly  suspicious for new infiltrative osseous metastases. No associated  lytic or sclerotic changes in the bones, however physiologic (PET)  findings precede anatomic (CT) findings. No suspected pathologic  fracture.  Impression: IMPRESSION:   In this patient with a history of metastatic melanoma, findings of  disease progression:    1. New diffuse metastatic osseous disease.    2. Increased size of the hypermetabolic liver metastases.    3. Increased size of the hypermetabolic lymph node conglomerates  throughout the abdomen and pelvis.    4. Several new solid pulmonary nodules, without significant FDG  avidity but are also too small to accurately characterize by PET.  These likely represent new metastatic disease.    5. Nonocclusive deep venous thrombosis within the right external iliac  vein, common femoral vein, and femoral vein, likely secondary to slow  venous flow from mass effect from the metastatic adenopathy. Consider  dedicated ultrasound of the right lower extremity for complete  evaluation of the thrombus burden.     ----  The findings of the study were discussed over the telephone with the  ordering provider Meghan Perdomo PA-C by Dr. Alexandra at 1:25 PM on  7/29/2022.    I  have personally reviewed the examination and initial interpretation  and I agree with the findings.    MICHAEL LOPEZ MD         SYSTEM ID:  [id number]        ASSESSMENT AND PLAN:  # Metastatic acral melanoma, to lungs, liver, lymphnodes, and bone  She has received multiple lines of prior therapy including ipi/nivo and TVEC. She started Opdualag 5/11/22 and has received 4 cycles. PET-CT from 7/29/22 appears to show disease progression. She reviewed potential clinical trials participation previously with Meghan Perdomo and she indicated she is not interested in any clinical trials.    Previously discussed with Dr. Franklin that we could continue Opdualag for another 6 weeks to confirm progression, however, given the degree of progression seen, true progression is most likely. However, if confirmed then next line of treatment would involve clinical trial (which she declines) or chemotherapy, either alone or in combination with immunotherapy. Options include Temozolomide or Carboplatin and paclitaxel. Option to pursue comfort measures was also discussed but patient is not ready to take this step.     She continues to discuss the above options with her children. She also would like to have improved appetite prior to starting chemotherapy in an effort to improve tolerance. In the meantime, she will continue with upcoming treatment of Opdualag as planned in 3 weeks and confirm progression in 4 weeks. She will continue to weigh her options until then.    #Scalp lesions  Likely represent metastatic disease. Can keep appointment with dermatology on 10/11/22 but will not likely change treatment plan. Will see patient in person in 3 weeks prior to Opdualag to further assess.     # Anorexia and weight loss.  # Candida albicans esophagitis, recurrent  Weight not assessed today due to video visit but reported to be stable. Previously, EGD culture with positive candida albicans and she is on a 28 day course of fluconazole. Discussed  importance of completing the course of fluconazole so we can assess if symptoms improve. If medication ineffective she could require repeat endoscopy for sensitivities. Also, discussed strategies for increasing caloric intake such as supplementing with shakes.    #Constipation. Maintain hydration as able. Start prune juice or senna or MiraLAX with goal to have a bowel movement at least every other day.     # Mood. Waxes and wanes. Tearful today. Will request follow up with palliative care social work.     # Leg pain. Previously prescribed tramadol 50 mg q6h as needed for pain but has not tried this yet. Reluctant to take opioids. Discussed at least trying medication, maybe in the evening to minimize drowsiness and see if symptoms improve. Discussed that it does not need to be taken every 6 hours but can be used intermittently for symptomatic relief. Discussed expected side effect of constipation and strategies for mitigation as outlined above.     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 Continue Eliquis anticoagulation lifelong.      # RLE lymphema.   Secondary to tumors and new DVT. Reportedly, stable.  Anticoagulation as above.     Amber Scheierl, CNP    The patient was seen in conjunction with Amber Scheierl, CNP who served as a scribe for today's visit. I have reviewed and edited the above note, and agree with the above findings and plan.  Meghan Perdomo PA-C    80 minutes spent on the date of the encounter doing chart review, review of test results, interpretation of tests, patient visit and documentation         Again, thank you for allowing me to participate in the care of your patient.      Sincerely,    Meghan Perdomo PA-C

## 2022-08-16 NOTE — PROGRESS NOTES
Yadira is a 77 year old who is being evaluated via a billable video visit.      If the video visit is dropped, the invitation should be resent by: Send to e-mail at: [email or web address]  Will anyone else be joining your video visit? No    Video-Visit Details    Video Start Time: 9:57 AM    Type of service:  Video Visit    Video End Time:10:46AM    Originating Location (pt. Location): Home    Distant Location (provider location):  [facility]     Platform used for Video Visit: [facility] ONCOLOGY PROGRESS NOTE  Melanoma Clinic  Aug 16, 2022     CHIEF COMPLAINT: Metastatic acral melanoma    Melanoma History:  1. She noted a painful lesion on the sole of the right foot for a few months, since last summer.  It initially was small then became raised.  It spontaneously bled. She is not entirely sure of its appearance initially.  She denies noting any masses behind the knee or in the groin.  2. 1/9/2020, punch biopsy was performed by Dr Jessica Meadows. Patology showed melanoma, at least 3.4 mm deep with ulceration and 0 mitoses, and perineural invasion present. TILs were non-brisk and LVI not identified. pT3b.  3. 2/10/2020, she has right femoral sentinel lymph node biopsy and wide local excision (Specimen #: [id number]) and the right heel lesion extends to a depth of 6.6 mm, and invasive melanoma is present at 0.2 mm from the deep margin. Microsatellites are also present. There was 1 (of 2) lymph nodes involved. The lymph node tissue exhibits extensive subcapsular and parenchymal clusters of melanoma cells, highlighted on Melan-A immunostain. The largest cluster is 7 millimeters in greatest diameter. pT4b pN2c. PD-L1 staining is negative, <1%. No mutation in BRAF, KIT, or NRAS.  4. 2/22/2020, she had PET-CT, which showed intense FDG uptake in a right inguinal lymph node, concerning for an additional focus of metastatic disease. There is also an indeterminate right external iliac lymph node  with mild FDG uptake.  5. 3/4/2020, she has medial plantar artery  fasciocutaneous instep flap and Integra placement to the donor site. On 4/2/2020, she has additional reconstruction with skin grafting.  6. 5/22/2020, PET-CT shows a hypermetabolic right inguinal and right external iliac chain lymph node and stable pulmonary nodules.  7. 5/27/2020, she starts nivolumab for presumed low volume lymph node predominant metastatic disease.  8. 8/21/2020, PET-CT shows increase in hypermetabolic adenopathy, and a hypermetabolic nodule in segment 2 of the liver. Given low volume of disease she prefers to continue therapy with short interval follow-up.  9. 10/9/2020, PET-CT shows increasing size of hepatic metastases with increased hypermetabolism, increased right inguinal and iliac lymphadenopathy, and new FDG uptake right T2 transverse process and right side of S1. MRI-brain obtained 10/21/2020 is negative for brain metastasis.  10. 10/28/2020, she starts ipilimumab 1mg/kg and nivolumab 3mg/kg. She then goes on to maintenance nivolumab through 4/12/21.  11. 5/10/2021, she starts ipilimumab 3 mg/kg monotherapy q3w for 4 cycles.  12. 7/30/21, PET/CT shows mild disease progression.   13. 8/2/21, start ipilimumab 3mg/kg and nivolumab 1mg/kg and received 2 cycles, last on 8/23/21.  14. 8/23/21, develops elevated LFT's.   15. 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   16. 1/28/22, started TVEC  17. 4/22/22, progressive disease, consideration for additional therapeutic approaches.  18. 5/11/22, Start Opdualag  19. 7/29/22: PET-CT shows new diffuse bony metastatic bony disease, increased size of liver mets and increased size of lymph node conglomerates throughout pelvis and abdomen. DVT in right external iliac vein, common femoral vein and femoral vein also found. Started on Eliquis.     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 She  presents today for a virtual visit for routine follow up to assess progress in plan regarding treatment options previously outlined in her last visit with Dr. Franklin.  -Yadira is feeling discouraged today. She continues to have stomach pain. She had trouble remembering which pills to take and so had not been taking the fluconazole for the esophageal candidiasis but restarted it a couple days ago. (Her daughter is going to come this weekend and help her organize her medications). She states that her appetite is very reduced and she is hardly eating as a result of the stomach pain which worsens with oral intake. She is eating breakfast but then typically doesn't eat lunch and has very little for supper. She states that she weighs herself at home and weight is stable. She is able to take oral fluids. Denies emesis and only occasionally has some nausea.  -She reports some constipation but is unsure if this is due to poor appetite. Is mindful of drinking water. Having a stool about every other day. Has not tried prune juice or a softener yet.  -Her back/hip pain is bothersome and limits mobility. Able to walk with a walker. Has not tried the tramadol yet. Does use an ice pack to manage the pain.  -Lymphedema to right leg with no appreciable change. Does notice the lymph node in her right groin seems larger.   -She recently saw dermatology to have a lesion above her lip removed. She also noticed 2 new pimple like lesions on her scalp which she would like dermatology to evaluate. She has an appointment scheduled on 10/11.   -Her mood waxes and wanes but she is understandably saddened and frustrated by the fact that her treatment is not working.   -Denies any bleeding.    Review of Systems:  Patient denies any of the following except if noted above: fevers, chills, difficulty with energy, vision or hearing changes, chest pain, dyspnea, abdominal pain, nausea, vomiting, diarrhea, constipation, urinary concerns, headaches,  numbness, tingling, issues with sleep or mood. He also denies lumps, bumps, rashes or skin lesions, bleeding or bruising issues.    Current Outpatient Medications   Medication Sig Dispense Refill     Apixaban Starter Pack (ELIQUIS DVT/PE STARTER PACK) 5 MG TBPK Take 10 mg by mouth 2 times daily for 7 days, THEN 5 mg 2 times daily for 23 days. 74 each 0     carboxymethylcellulose PF (REFRESH LIQUIGEL) 1 % ophthalmic gel 1 drop 3 times daily       cholecalciferol (VITAMIN D3) 125 MCG (5000 UT) TABS tablet Take 125 mcg by mouth every other day       estradiol (ESTRACE) 0.1 MG/GM vaginal cream Place 2 g vaginally twice a week 42.5 g 1     famotidine (PEPCID) 20 MG tablet Take 1 tablet (20 mg) by mouth 2 times daily as needed (acid reflux/heartburn) (Patient not taking: No sig reported) 60 tablet 1     fish oil-omega-3 fatty acids 1000 MG capsule Take 2 g by mouth daily       fluconazole (DIFLUCAN) 200 MG tablet Take 2 tablets (400 mg) by mouth daily for 28 days (Patient not taking: No sig reported) 56 tablet 0     multivitamin w/minerals (THERA-VIT-M) tablet Take 1 tablet by mouth daily (Patient not taking: No sig reported)       Riboflavin 100 MG TABS Take 400 mg by mouth daily (Patient not taking: No sig reported)       Objective:  Video physical exam  General: Patient appears well in no acute distress.   Skin: Lesion/scar to left upper lip; not well visualized.  Eyes: EOMI, no erythema, sclera icterus or discharge noted  Resp: Appears to be breathing comfortably without accessory muscle usage, speaking in full sentences, no cough  MSK: Appears to have normal range of motion based on visualized movements  Neurologic: No apparent tremors, facial movements symmetric  Psych: affect sad-tearful at times, alert and oriented    LABS  No labs reviewed today    IMAGING  PET Oncology Whole Body  Narrative: Combined Report of:    PET and CT on  7/29/2022 11:14 AM :    1. PET of the neck, chest, abdomen, and pelvis.  2. PET CT  Fusion for Attenuation Correction and Anatomical  Localization:    3. Diagnostic CT scan of the chest, abdomen, and pelvis with  intravenous contrast for interpretation.  4. 3D MIP and PET-CT fused images were processed on an independent  workstation and archived to PACS and reviewed by a radiologist.    Technique:    1. PET: The patient received 12.22 mCi of F-18-FDG; the serum glucose  was 83 prior to administration, body weight was 61.7 kg. Images were  evaluated in the axial, sagittal, and coronal planes as well as the  rotational whole body MIP. Images were acquired from the Vertex to the  Feet.    UPTAKE WAS MEASURED AT 60 MINUTES.     BACKGROUND:  Liver SUV max= 2.11,   Aorta Blood SUV Max: 1.62.     2. CT: Volumetric acquisition for clinical interpretation of the  chest, abdomen, and pelvis acquired at 3 mm sections after the  uneventful administration of intravenous contrast. The chest, abdomen,  and pelvis were evaluated at 5 mm sections in bone, soft tissue, and  lung windows.      The patient received 86 cc of Isovue 370 intravenously for the  examination.    --    3. 3D MIP and PET-CT fused images were processed on an independent  workstation and archived to PACS and reviewed by a radiologist.    INDICATION: Malignant melanoma of right lower extremity including hip  (H)    ADDITIONAL INFORMATION OBTAINED FROM EMR: none    COMPARISON: PET CT 4/22/2022    FINDINGS:     HEAD/NECK:  There is no suspicious FDG uptake in the neck.    The paranasal sinuses and mastoid air cells are clear.    The mucosal pharyngeal space, the , prevertebral and carotid  spaces are within normal limits.     No masses, mass effect or pathologically enlarged lymph nodes are  evident. The thyroid gland is within normal limits..    CHEST:  7 mm hypermetabolic solid pulmonary nodule in the right lower lobe  with Max SUV 5.04, previously 4.2 (series 7, image 76).    Several new solid pulmonary nodules with low-level FDG  avidity, for  example a 5 mm solid pulmonary nodule in the right upper lobe with Max  SUV 1.40 (series 7, image 35) and an adjacent 5 mm solid pulmonary  nodule with max SUV of 1.13 (series 7, image 36).    No airspace consolidation. No pleural effusion or pneumothorax.    The cardiac size is normal. Normal caliber aorta and main pulmonary  artery. No central pulmonary embolism. Moderate coronary artery  calcium. No enlarged or hypermetabolic thoracic lymph nodes.    ABDOMEN AND PELVIS:  Increased size and metabolic activity of numerous hepatic lesions,  including the following:  -4.6 x 4.3 cm mass in the left hepatic lobe with max SUV 20.8,  previously 2.5 x 2.4 cm with max SUV 24.44.  -2.8 x 3.4 cm mass in the inferior right hepatic lobe with max SUV  12.19, previously 1.1 x 1.0 cm with max SUV 5.70.    Increased size and metabolic activity of a centrally necrotic joseline  hepatis lymph node which measures 3.5 x 2.7 cm with max SUV 17.48,  previously 2.1 x 1.6 cm with max SUV 13.75.    Increased size of the large lymph node conglomerate within the right  lower abdomen and pelvis, intimately associated with the external  iliac vessels, measuring approximately 8.4 x 6.7 cm with max SUV of  21.36, previously 6.3 x 5.4 cm with max SUV 25.8. There is  circumferential encasement of the right external iliac artery and  vein, with associated nonocclusive thrombus involving the distal right  external iliac vein and common femoral vein.    Increased size of the large right conglomerate of lymph nodes in the  right groin, measuring 8.2 x 6.4 cm with max SUV 23.22, previously 4.4  x 3.8 cm with max suv 26.97.    The gallbladder, biliary system, spleen, and pancreas are within  normal limits. Adrenal glands are unremarkable. Symmetric enhancement  kidneys. No hydronephrosis. Urinary bladder is within normal limits.  Unchanged endometrial hyper enhancement without associated elevated  FDG uptake.    Small sliding hiatal hernia.  No abnormally dilated loops of small or  large bowel. The portal venous system appears patent. Suspected  contrast admixture within the right portal vein and extrahepatic main  portal vein. Normal caliber abdominal aorta.    LOWER EXTREMITIES:   No abnormal masses or hypermetabolic lesions.    BONES AND SOFT TISSUES:   Diffuse abnormal patchy FDG uptake throughout the axial and proximal  appendicular skeleton, which is new from the prior exam, highly  suspicious for new infiltrative osseous metastases. No associated  lytic or sclerotic changes in the bones, however physiologic (PET)  findings precede anatomic (CT) findings. No suspected pathologic  fracture.  Impression: IMPRESSION:   In this patient with a history of metastatic melanoma, findings of  disease progression:    1. New diffuse metastatic osseous disease.    2. Increased size of the hypermetabolic liver metastases.    3. Increased size of the hypermetabolic lymph node conglomerates  throughout the abdomen and pelvis.    4. Several new solid pulmonary nodules, without significant FDG  avidity but are also too small to accurately characterize by PET.  These likely represent new metastatic disease.    5. Nonocclusive deep venous thrombosis within the right external iliac  vein, common femoral vein, and femoral vein, likely secondary to slow  venous flow from mass effect from the metastatic adenopathy. Consider  dedicated ultrasound of the right lower extremity for complete  evaluation of the thrombus burden.     ----  The findings of the study were discussed over the telephone with the  ordering provider Meghan Perdomo PA-C by Dr. Alexandra at 1:25 PM on  7/29/2022.    I have personally reviewed the examination and initial interpretation  and I agree with the findings.    MICHAEL LOPEZ MD         SYSTEM ID:  [id number]  CT Chest/Abdomen/Pelvis w Contrast  Narrative: Combined Report of:    PET and CT on  7/29/2022 11:14 AM :    1. PET of the neck, chest,  abdomen, and pelvis.  2. PET CT Fusion for Attenuation Correction and Anatomical  Localization:    3. Diagnostic CT scan of the chest, abdomen, and pelvis with  intravenous contrast for interpretation.  4. 3D MIP and PET-CT fused images were processed on an independent  workstation and archived to PACS and reviewed by a radiologist.    Technique:    1. PET: The patient received 12.22 mCi of F-18-FDG; the serum glucose  was 83 prior to administration, body weight was 61.7 kg. Images were  evaluated in the axial, sagittal, and coronal planes as well as the  rotational whole body MIP. Images were acquired from the Vertex to the  Feet.    UPTAKE WAS MEASURED AT 60 MINUTES.     BACKGROUND:  Liver SUV max= 2.11,   Aorta Blood SUV Max: 1.62.     2. CT: Volumetric acquisition for clinical interpretation of the  chest, abdomen, and pelvis acquired at 3 mm sections after the  uneventful administration of intravenous contrast. The chest, abdomen,  and pelvis were evaluated at 5 mm sections in bone, soft tissue, and  lung windows.      The patient received 86 cc of Isovue 370 intravenously for the  examination.    --    3. 3D MIP and PET-CT fused images were processed on an independent  workstation and archived to PACS and reviewed by a radiologist.    INDICATION: Malignant melanoma of right lower extremity including hip  (H)    ADDITIONAL INFORMATION OBTAINED FROM EMR: none    COMPARISON: PET CT 4/22/2022    FINDINGS:     HEAD/NECK:  There is no suspicious FDG uptake in the neck.    The paranasal sinuses and mastoid air cells are clear.    The mucosal pharyngeal space, the , prevertebral and carotid  spaces are within normal limits.     No masses, mass effect or pathologically enlarged lymph nodes are  evident. The thyroid gland is within normal limits..    CHEST:  7 mm hypermetabolic solid pulmonary nodule in the right lower lobe  with Max SUV 5.04, previously 4.2 (series 7, image 76).    Several new solid pulmonary  nodules with low-level FDG avidity, for  example a 5 mm solid pulmonary nodule in the right upper lobe with Max  SUV 1.40 (series 7, image 35) and an adjacent 5 mm solid pulmonary  nodule with max SUV of 1.13 (series 7, image 36).    No airspace consolidation. No pleural effusion or pneumothorax.    The cardiac size is normal. Normal caliber aorta and main pulmonary  artery. No central pulmonary embolism. Moderate coronary artery  calcium. No enlarged or hypermetabolic thoracic lymph nodes.    ABDOMEN AND PELVIS:  Increased size and metabolic activity of numerous hepatic lesions,  including the following:  -4.6 x 4.3 cm mass in the left hepatic lobe with max SUV 20.8,  previously 2.5 x 2.4 cm with max SUV 24.44.  -2.8 x 3.4 cm mass in the inferior right hepatic lobe with max SUV  12.19, previously 1.1 x 1.0 cm with max SUV 5.70.    Increased size and metabolic activity of a centrally necrotic joseline  hepatis lymph node which measures 3.5 x 2.7 cm with max SUV 17.48,  previously 2.1 x 1.6 cm with max SUV 13.75.    Increased size of the large lymph node conglomerate within the right  lower abdomen and pelvis, intimately associated with the external  iliac vessels, measuring approximately 8.4 x 6.7 cm with max SUV of  21.36, previously 6.3 x 5.4 cm with max SUV 25.8. There is  circumferential encasement of the right external iliac artery and  vein, with associated nonocclusive thrombus involving the distal right  external iliac vein and common femoral vein.    Increased size of the large right conglomerate of lymph nodes in the  right groin, measuring 8.2 x 6.4 cm with max SUV 23.22, previously 4.4  x 3.8 cm with max suv 26.97.    The gallbladder, biliary system, spleen, and pancreas are within  normal limits. Adrenal glands are unremarkable. Symmetric enhancement  kidneys. No hydronephrosis. Urinary bladder is within normal limits.  Unchanged endometrial hyper enhancement without associated elevated  FDG  uptake.    Small sliding hiatal hernia. No abnormally dilated loops of small or  large bowel. The portal venous system appears patent. Suspected  contrast admixture within the right portal vein and extrahepatic main  portal vein. Normal caliber abdominal aorta.    LOWER EXTREMITIES:   No abnormal masses or hypermetabolic lesions.    BONES AND SOFT TISSUES:   Diffuse abnormal patchy FDG uptake throughout the axial and proximal  appendicular skeleton, which is new from the prior exam, highly  suspicious for new infiltrative osseous metastases. No associated  lytic or sclerotic changes in the bones, however physiologic (PET)  findings precede anatomic (CT) findings. No suspected pathologic  fracture.  Impression: IMPRESSION:   In this patient with a history of metastatic melanoma, findings of  disease progression:    1. New diffuse metastatic osseous disease.    2. Increased size of the hypermetabolic liver metastases.    3. Increased size of the hypermetabolic lymph node conglomerates  throughout the abdomen and pelvis.    4. Several new solid pulmonary nodules, without significant FDG  avidity but are also too small to accurately characterize by PET.  These likely represent new metastatic disease.    5. Nonocclusive deep venous thrombosis within the right external iliac  vein, common femoral vein, and femoral vein, likely secondary to slow  venous flow from mass effect from the metastatic adenopathy. Consider  dedicated ultrasound of the right lower extremity for complete  evaluation of the thrombus burden.     ----  The findings of the study were discussed over the telephone with the  ordering provider Meghan Perdomo PA-C by Dr. Alexandra at 1:25 PM on  7/29/2022.    I have personally reviewed the examination and initial interpretation  and I agree with the findings.    MICHAEL LOPEZ MD         SYSTEM ID:  [id number]        ASSESSMENT AND PLAN:  # Metastatic acral melanoma, to lungs, liver, lymphnodes, and bone  She has  received multiple lines of prior therapy including ipi/nivo and TVEC. She started Opdualag 5/11/22 and has received 4 cycles. PET-CT from 7/29/22 appears to show disease progression. She reviewed potential clinical trials participation previously with Meghan Perdomo and she indicated she is not interested in any clinical trials.    Previously discussed with Dr. Franklin that we could continue Opdualag for another 6 weeks to confirm progression, however, given the degree of progression seen, true progression is most likely. However, if confirmed then next line of treatment would involve clinical trial (which she declines) or chemotherapy, either alone or in combination with immunotherapy. Options include Temozolomide or Carboplatin and paclitaxel. Option to pursue comfort measures was also discussed but patient is not ready to take this step.     She continues to discuss the above options with her children. She also would like to have improved appetite prior to starting chemotherapy in an effort to improve tolerance. In the meantime, she will continue with upcoming treatment of Opdualag as planned in 3 weeks and confirm progression in 4 weeks. She will continue to weigh her options until then.    #Scalp lesions  Likely represent metastatic disease. Can keep appointment with dermatology on 10/11/22 but will not likely change treatment plan. Will see patient in person in 3 weeks prior to Opdualag to further assess.     # Anorexia and weight loss.  # Candida albicans esophagitis, recurrent  Weight not assessed today due to video visit but reported to be stable. Previously, EGD culture with positive candida albicans and she is on a 28 day course of fluconazole. Discussed importance of completing the course of fluconazole so we can assess if symptoms improve. If medication ineffective she could require repeat endoscopy for sensitivities. Also, discussed strategies for increasing caloric intake such as supplementing with  shakes.    #Constipation. Maintain hydration as able. Start prune juice or senna or MiraLAX with goal to have a bowel movement at least every other day.     # Mood. Waxes and wanes. Tearful today. Will request follow up with palliative care social work.     # Leg pain. Previously prescribed tramadol 50 mg q6h as needed for pain but has not tried this yet. Reluctant to take opioids. Discussed at least trying medication, maybe in the evening to minimize drowsiness and see if symptoms improve. Discussed that it does not need to be taken every 6 hours but can be used intermittently for symptomatic relief. Discussed expected side effect of constipation and strategies for mitigation as outlined above.     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 Continue Eliquis anticoagulation lifelong.      # RLE lymphema.   Secondary to tumors and new DVT. Reportedly, stable.  Anticoagulation as above.     Amber Scheierl, CNP    The patient was seen in conjunction with Amber Scheierl, CNP who served as a scribe for today's visit. I have reviewed and edited the above note, and agree with the above findings and plan.  Meghan Perdomo PA-C    80 minutes spent on the date of the encounter doing chart review, review of test results, interpretation of tests, patient visit and documentation

## 2022-08-26 NOTE — TELEPHONE ENCOUNTER
Patient called and is getting treatment for cancer.  She gets occasionally nauseated and is asking for the oncology team to reach out to her as they know her history. Patient did not want anything from Dr. Blanton's team.  She has not vomited and thinks she can wait until then.    Patient states she reached out to the oncology team but has not heard back yet.      Routing to patient's oncology team for further review and advisement.    Mirella Jimenez RN, [facility]

## 2022-08-30 NOTE — PROGRESS NOTES
AUDIOLOGY REPORT    SUBJECTIVE:Yadira Hoang is a 77 year old female who was seen in the Audiology Clinic at the [facility] on 8/30/2022  for a follow-up check regarding the fitting of new hearing aids. Previous results have revealed a mild to moderate sensorineural hearing loss bilaterally.  The patient has been seen previously in this clinic and was fit with Phonak Audeo P70-RL hearing aids on 8/15/2022.  Yadira Ochoa reports concerns that she is not inserting her hearing aids correctly.    OBJECTIVE:   The International Outcome Inventory-Hearing Aids (IOI-HA) was administered today.The patient s responses to the 7 questions can be compared to normative data relative to how others are performing with their hearing aids, as well as focusing audiologic care and counseling.This patient s Quality of Life score (Question 7) was 4, which is above normative average.     Based on patient report, she was counseled on proper insertion of her hearing aids.    Reviewed 45 day trial period, care, cleaning (no water, dry brush), batteries (rechargeable) insertion/removal, toxicity, low-battery signal), aid insertion/removal, volume adjustment (if applicable), user booklet, warranty information, storage cases, and other hearing aid details.     No charge visit today (in warranty hearing aid check).     ASSESSMENT: A follow-up appointment for hearing aid fitting was completed today. IOI-HA administered today. Changes to hearing aid was completed as outlined above.     PLAN:Yadira Ochoa will return for follow-up as needed, or at least every 4-6 months for cleaning and assessment of hearing aid.  . Please call this clinic with any questions regarding today s appointment.    Richie Davila.  Doctor of Audiology  [place] License # [id number]

## 2022-08-31 NOTE — NURSING NOTE
Patient declined/unable to complete Oncology Distress Screening today.  Teodora Coker, Virtual Facilitator

## 2022-08-31 NOTE — LETTER
8/31/2022         RE: Yaidra Hoang  [address] [facility] [address]        Dear Colleague,    Thank you for referring your patient, Yadira Hoang, to the [facility]. Please see a copy of my visit note below.    Yadira is a 77 year old who is being evaluated via a billable telephone visit.      Patient stated she is in the state of [place] for the visit today.    What phone number would you like to be contacted at? [phone]  How would you like to obtain your AVS? [employer]  Phone call duration: 19 minutes  Teodora Coker, Virtual Visit Facilitator    MEDICAL ONCOLOGY PROGRESS NOTE  Melanoma Clinic  Aug 31, 2022     CHIEF COMPLAINT: Metastatic acral melanoma    Melanoma History:  1. She noted a painful lesion on the sole of the right foot for a few months, since last summer.  It initially was small then became raised.  It spontaneously bled. She is not entirely sure of its appearance initially.  She denies noting any masses behind the knee or in the groin.  2. 1/9/2020, punch biopsy was performed by Dr Jessica Meadows. Patology showed melanoma, at least 3.4 mm deep with ulceration and 0 mitoses, and perineural invasion present. TILs were non-brisk and LVI not identified. pT3b.  3. 2/10/2020, she has right femoral sentinel lymph node biopsy and wide local excision (Specimen #: [id number]) and the right heel lesion extends to a depth of 6.6 mm, and invasive melanoma is present at 0.2 mm from the deep margin. Microsatellites are also present. There was 1 (of 2) lymph nodes involved. The lymph node tissue exhibits extensive subcapsular and parenchymal clusters of melanoma cells, highlighted on Melan-A immunostain. The largest cluster is 7 millimeters in greatest diameter. pT4b pN2c. PD-L1 staining is negative, <1%. No mutation in BRAF, KIT, or NRAS.  4. 2/22/2020, she had PET-CT, which showed intense FDG uptake in a right inguinal lymph node, concerning for an additional focus  of metastatic disease. There is also an indeterminate right external iliac lymph node with mild FDG uptake.  5. 3/4/2020, she has medial plantar artery  fasciocutaneous instep flap and Integra placement to the donor site. On 4/2/2020, she has additional reconstruction with skin grafting.  6. 5/22/2020, PET-CT shows a hypermetabolic right inguinal and right external iliac chain lymph node and stable pulmonary nodules.  7. 5/27/2020, she starts nivolumab for presumed low volume lymph node predominant metastatic disease.  8. 8/21/2020, PET-CT shows increase in hypermetabolic adenopathy, and a hypermetabolic nodule in segment 2 of the liver. Given low volume of disease she prefers to continue therapy with short interval follow-up.  9. 10/9/2020, PET-CT shows increasing size of hepatic metastases with increased hypermetabolism, increased right inguinal and iliac lymphadenopathy, and new FDG uptake right T2 transverse process and right side of S1. MRI-brain obtained 10/21/2020 is negative for brain metastasis.  10. 10/28/2020, she starts ipilimumab 1mg/kg and nivolumab 3mg/kg. She then goes on to maintenance nivolumab through 4/12/21.  11. 5/10/2021, she starts ipilimumab 3 mg/kg monotherapy q3w for 4 cycles.  12. 7/30/21, PET/CT shows mild disease progression.   13. 8/2/21, start ipilimumab 3mg/kg and nivolumab 1mg/kg and received 2 cycles, last on 8/23/21.  14. 8/23/21, develops elevated LFT's.   15. 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   16. 1/28/22, started TVEC  17. 4/22/22, progressive disease, consideration for additional therapeutic approaches.  18. 5/11/22, Start Opdualag  19. 7/29/22: PET-CT shows new diffuse bony metastatic bony disease, increased size of liver mets and increased size of lymph node conglomerates throughout pelvis and abdomen. DVT in right external iliac vein, common femoral vein and femoral vein also found. Started on Eliquis.     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 She presents today for a virtual visit for routine follow up.    -Concerned that fungus pills are not helping, has been taking them for a little over 2 weeks. Still having difficulty eating.   -Finds that she feels full easily.  -Has a lot of burping as well. Takes Gas-x, but does not always help.   -Denies vomiting.   -Has phlegm that comes from sinuses. Does not like nasal sprays.  -Has vaginal discomfort.    -Has intermittent pain in right groin as site of enlarged node. Does not wish to take narcotics.     Current Outpatient Medications   Medication Sig Dispense Refill     dicyclomine (BENTYL) 10 MG capsule Take 1 capsule (10 mg) by mouth 4 times daily (before meals and nightly) 120 capsule 1     mirtazapine (REMERON) 7.5 MG tablet Take 1 tablet (7.5 mg) by mouth At Bedtime 30 tablet 3     apixaban ANTICOAGULANT (ELIQUIS) 5 MG tablet Take 1 tablet (5 mg) by mouth 2 times daily 60 tablet 11     carboxymethylcellulose PF (REFRESH LIQUIGEL) 1 % ophthalmic gel 1 drop 3 times daily       cholecalciferol (VITAMIN D3) 125 MCG (5000 UT) TABS tablet Take 125 mcg by mouth every other day       estradiol (ESTRACE) 0.1 MG/GM vaginal cream Place 2 g vaginally twice a week 42.5 g 1     famotidine (PEPCID) 20 MG tablet Take 1 tablet (20 mg) by mouth 2 times daily as needed (acid reflux/heartburn) (Patient not taking: No sig reported) 60 tablet 1     fish oil-omega-3 fatty acids 1000 MG capsule Take 2 g by mouth daily       multivitamin w/minerals (THERA-VIT-M) tablet Take 1 tablet by mouth daily (Patient not taking: No sig reported)       ondansetron (ZOFRAN) 8 MG tablet Take 1 tablet (8 mg) by mouth every 8 hours as needed for nausea 30 tablet 3     Riboflavin 100 MG TABS Take 400 mg by mouth daily (Patient not taking: No sig reported)       Objective:  There were no vitals taken for this visit.  General: alert and no distress  Psych: Alert and  oriented times; coherent speech, normal rate and volume, able to articulate logical thoughts, able to abstract reason, no tangential thoughts, no hallucinations or delusions  Patient's affect is appropriate.   Pulm: Speaking in full sentences, unlabored, no audible wheezes or cough.    ASSESSMENT AND PLAN:  Metastatic acral melanoma, to lungs, liver, lymphnodes, and bone  She has received multiple lines of prior therapy including ipi/nivo and TVEC. She started Opdualag 5/11/22 and has received 4 cycles. PET-CT from 7/29/22 appears to show disease progression. She reviewed potential clinical trials participation previously with Meghan Perdomo and she indicated she is not interested in any clinical trials.    Previously discussed with Dr. Franklin that we could continue Opdualag for another 6 weeks to confirm progression, however, given the degree of progression seen, true progression is most likely. However, if confirmed then next line of treatment would involve clinical trial (which she declines) or chemotherapy, either alone or in combination with immunotherapy. Options include Temozolomide or Carboplatin and paclitaxel. Option to pursue comfort measures was also discussed but patient is not ready to take this step.     We again discussed considering chemotherapy. She is considering this.    In the meantime, she will continue with upcoming treatment of Opdualag as planned and upcoming imaging.     Scalp lesions  Likely represent metastatic disease. Can keep appointment with dermatology on 10/11/22 but will not likely change treatment plan. Will see patient in person next week prior to Opdualag to further assess.     Anorexia and weight loss.  Candida albicans esophagitis, recurrent  Previously, EGD culture with positive candida albicans and she is on a 28 day course of fluconazole, thinks she has 1-2 weeks left.  If medication ineffective she could require repeat endoscopy for sensitivities. Will request this be  scheduled. Also, discussed strategies for increasing caloric intake such as supplementing with shakes. She is not interesting in follow-up with our dietician. She has started to receive Open Arms. She will trial low dose mirtazapine.     Mood. Waxes and wanes. Will continue follow up with palliative care social work.     Leg pain. Previously prescribed tramadol 50 mg q6h as needed for pain but has not tried this yet. Remains reluctant to take opioids.     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 Continue Eliquis anticoagulation lifelong.      RLE lymphema.   Secondary to tumors and new DVT. Reportedly, mildly improved.  Anticoagulation as above. Will continue with lymphedema therapy.     Abdominal gas.   No relief from Gas-X. Will try Bentyl.     Recurrent sinusitis.   Has a jefferson pot, encouraged to use bid. Also, recommend trying Claritin. Declines a trial of Flonase.     Vaginal discomfort.   Recommend she see a gynecologist or PCP for a vaginal exam.     11 minutes spent on the date of the encounter doing chart review, review of test results, interpretation of tests and documentation, in addition to 19 minutes spent on the phone with the patient.           Again, thank you for allowing me to participate in the care of your patient.      Sincerely,    Meghan Perdomo PA-C

## 2022-08-31 NOTE — PROGRESS NOTES
Yadira is a 77 year old who is being evaluated via a billable telephone visit.      Patient stated she is in the state of [place] for the visit today.    What phone number would you like to be contacted at? [phone]  How would you like to obtain your AVS? [employer]  Phone call duration: 19 minutes  Teodora Coker, Virtual Visit Facilitator    MEDICAL ONCOLOGY PROGRESS NOTE  Melanoma Clinic  Aug 31, 2022     CHIEF COMPLAINT: Metastatic acral melanoma    Melanoma History:  1. She noted a painful lesion on the sole of the right foot for a few months, since last summer.  It initially was small then became raised.  It spontaneously bled. She is not entirely sure of its appearance initially.  She denies noting any masses behind the knee or in the groin.  2. 1/9/2020, punch biopsy was performed by Dr Jessica Meadows. Patology showed melanoma, at least 3.4 mm deep with ulceration and 0 mitoses, and perineural invasion present. TILs were non-brisk and LVI not identified. pT3b.  3. 2/10/2020, she has right femoral sentinel lymph node biopsy and wide local excision (Specimen #: [id number]) and the right heel lesion extends to a depth of 6.6 mm, and invasive melanoma is present at 0.2 mm from the deep margin. Microsatellites are also present. There was 1 (of 2) lymph nodes involved. The lymph node tissue exhibits extensive subcapsular and parenchymal clusters of melanoma cells, highlighted on Melan-A immunostain. The largest cluster is 7 millimeters in greatest diameter. pT4b pN2c. PD-L1 staining is negative, <1%. No mutation in BRAF, KIT, or NRAS.  4. 2/22/2020, she had PET-CT, which showed intense FDG uptake in a right inguinal lymph node, concerning for an additional focus of metastatic disease. There is also an indeterminate right external iliac lymph node with mild FDG uptake.  5. 3/4/2020, she has medial plantar artery  fasciocutaneous instep flap and Integra placement to the donor site. On 4/2/2020, she has additional  reconstruction with skin grafting.  6. 5/22/2020, PET-CT shows a hypermetabolic right inguinal and right external iliac chain lymph node and stable pulmonary nodules.  7. 5/27/2020, she starts nivolumab for presumed low volume lymph node predominant metastatic disease.  8. 8/21/2020, PET-CT shows increase in hypermetabolic adenopathy, and a hypermetabolic nodule in segment 2 of the liver. Given low volume of disease she prefers to continue therapy with short interval follow-up.  9. 10/9/2020, PET-CT shows increasing size of hepatic metastases with increased hypermetabolism, increased right inguinal and iliac lymphadenopathy, and new FDG uptake right T2 transverse process and right side of S1. MRI-brain obtained 10/21/2020 is negative for brain metastasis.  10. 10/28/2020, she starts ipilimumab 1mg/kg and nivolumab 3mg/kg. She then goes on to maintenance nivolumab through 4/12/21.  11. 5/10/2021, she starts ipilimumab 3 mg/kg monotherapy q3w for 4 cycles.  12. 7/30/21, PET/CT shows mild disease progression.   13. 8/2/21, start ipilimumab 3mg/kg and nivolumab 1mg/kg and received 2 cycles, last on 8/23/21.  14. 8/23/21, develops elevated LFT's.   15. 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   16. 1/28/22, started TVEC  17. 4/22/22, progressive disease, consideration for additional therapeutic approaches.  18. 5/11/22, Start Opdualag  19. 7/29/22: PET-CT shows new diffuse bony metastatic bony disease, increased size of liver mets and increased size of lymph node conglomerates throughout pelvis and abdomen. DVT in right external iliac vein, common femoral vein and femoral vein also found. Started on Eliquis.     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 She presents today for a virtual visit for routine follow up.    -Concerned that fungus pills are not helping, has been taking them for a little over 2 weeks. Still having difficulty  eating.   -Finds that she feels full easily.  -Has a lot of burping as well. Takes Gas-x, but does not always help.   -Denies vomiting.   -Has phlegm that comes from sinuses. Does not like nasal sprays.  -Has vaginal discomfort.    -Has intermittent pain in right groin as site of enlarged node. Does not wish to take narcotics.     Current Outpatient Medications   Medication Sig Dispense Refill     dicyclomine (BENTYL) 10 MG capsule Take 1 capsule (10 mg) by mouth 4 times daily (before meals and nightly) 120 capsule 1     mirtazapine (REMERON) 7.5 MG tablet Take 1 tablet (7.5 mg) by mouth At Bedtime 30 tablet 3     apixaban ANTICOAGULANT (ELIQUIS) 5 MG tablet Take 1 tablet (5 mg) by mouth 2 times daily 60 tablet 11     carboxymethylcellulose PF (REFRESH LIQUIGEL) 1 % ophthalmic gel 1 drop 3 times daily       cholecalciferol (VITAMIN D3) 125 MCG (5000 UT) TABS tablet Take 125 mcg by mouth every other day       estradiol (ESTRACE) 0.1 MG/GM vaginal cream Place 2 g vaginally twice a week 42.5 g 1     famotidine (PEPCID) 20 MG tablet Take 1 tablet (20 mg) by mouth 2 times daily as needed (acid reflux/heartburn) (Patient not taking: No sig reported) 60 tablet 1     fish oil-omega-3 fatty acids 1000 MG capsule Take 2 g by mouth daily       multivitamin w/minerals (THERA-VIT-M) tablet Take 1 tablet by mouth daily (Patient not taking: No sig reported)       ondansetron (ZOFRAN) 8 MG tablet Take 1 tablet (8 mg) by mouth every 8 hours as needed for nausea 30 tablet 3     Riboflavin 100 MG TABS Take 400 mg by mouth daily (Patient not taking: No sig reported)       Objective:  There were no vitals taken for this visit.  General: alert and no distress  Psych: Alert and oriented times; coherent speech, normal rate and volume, able to articulate logical thoughts, able to abstract reason, no tangential thoughts, no hallucinations or delusions  Patient's affect is appropriate.   Pulm: Speaking in full sentences, unlabored, no audible  wheezes or cough.    ASSESSMENT AND PLAN:  Metastatic acral melanoma, to lungs, liver, lymphnodes, and bone  She has received multiple lines of prior therapy including ipi/nivo and TVEC. She started Opdualag 5/11/22 and has received 4 cycles. PET-CT from 7/29/22 appears to show disease progression. She reviewed potential clinical trials participation previously with Meghan Perdomo and she indicated she is not interested in any clinical trials.    Previously discussed with Dr. Franklin that we could continue Opdualag for another 6 weeks to confirm progression, however, given the degree of progression seen, true progression is most likely. However, if confirmed then next line of treatment would involve clinical trial (which she declines) or chemotherapy, either alone or in combination with immunotherapy. Options include Temozolomide or Carboplatin and paclitaxel. Option to pursue comfort measures was also discussed but patient is not ready to take this step.     We again discussed considering chemotherapy. She is considering this.    In the meantime, she will continue with upcoming treatment of Opdualag as planned and upcoming imaging.     Scalp lesions  Likely represent metastatic disease. Can keep appointment with dermatology on 10/11/22 but will not likely change treatment plan. Will see patient in person next week prior to Opdualag to further assess.     Anorexia and weight loss.  Candida albicans esophagitis, recurrent  Previously, EGD culture with positive candida albicans and she is on a 28 day course of fluconazole, thinks she has 1-2 weeks left.  If medication ineffective she could require repeat endoscopy for sensitivities. Will request this be scheduled. Also, discussed strategies for increasing caloric intake such as supplementing with shakes. She is not interesting in follow-up with our dietician. She has started to receive Open Arms. She will trial low dose mirtazapine.     Mood. Waxes and wanes. Will  continue follow up with palliative care social work.     Leg pain. Previously prescribed tramadol 50 mg q6h as needed for pain but has not tried this yet. Remains reluctant to take opioids.     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 Continue Eliquis anticoagulation lifelong.      RLE lymphema.   Secondary to tumors and new DVT. Reportedly, mildly improved.  Anticoagulation as above. Will continue with lymphedema therapy.     Abdominal gas.   No relief from Gas-X. Will try Bentyl.     Recurrent sinusitis.   Has a jefferson pot, encouraged to use bid. Also, recommend trying Claritin. Declines a trial of Flonase.     Vaginal discomfort.   Recommend she see a gynecologist or PCP for a vaginal exam.     Meghan Perdomo PA-C  [facility]  [address], [place] [id number] [phone]    11 minutes spent on the date of the encounter doing chart review, review of test results, interpretation of tests and documentation, in addition to 19 minutes spent on the phone with the patient.

## 2022-09-02 NOTE — TELEPHONE ENCOUNTER
Screening Questions    BlueKIND OF PREP RedLOCATION [review exclusion criteria] GreenSEDATION TYPE      1. Are you active on mychart? y    2. What insurance is in the chart? BCBS     3.   Ordering/Referring Provider: Meghan Perdomo PA-C      4. BMI   (If greater than 40 review exclusion criteria [PAC APPT IF [MAC] @ [facility])  21.3  [If yes, BMI OVER 40-EXTENDED PREP]      **(Sedation review/consideration needed)**  Do you have a legal guardian or Medical Power of    and/or are you able to give consent for your medical care?     Can give consent    5. Have you had a positive covid test in the last 90 days?   n -     6.  Are you currently on dialysis?   n [ If yes, G-PREP & HOSPITAL setting ONLY]     7.  Do you have chronic kidney disease?  n [ If yes, G-PREP ]    8.   Do you have a diagnosis of diabetes?   n   [ If yes, G-PREP ]    9.  On a regular basis do you go 3-5 days between bowel movements?   y   [ If yes, EXTENDED PREP]    10.  Are you taking any prescription pain medications on a routine schedule?    n -  [ If yes, EXTENDED PREP] [If yes, MAC]      11.   Do you have any chemical dependencies such as alcohol, street drugs, or methadone?    n [If yes, MAC]    12.   Do you have any history of post-traumatic stress syndrome, severe anxiety or history of psychosis?    y  [If yes, MAC]    13.  [FEMALES] Are you currently pregnant? n    If yes, how many weeks?       Respiratory/Heart Screening:  [If yes to any of the following HOSPITAL setting only]     14. Do you have Pulmonary Hypertension [Lungs]?   n       15. Do you have UNCONTROLLED asthma?   n     16.  Do you use daily home oxygen?  n      17. Do you have mod to severe Obstructive Sleep Apnea?         (OKAY @ [facility]  [facility]  SH  PH  RI  MG - if pt is not on OXYGEN)  y      18.   Have you had a heart or lung transplant?   n      19.   Have you had a stroke or Transient ischemic attack (TIA - aka  mini stroke ) within 6 months?  (If yes, please  review exclusion criteria)  n     20.   In the past 6 months, have you had any heart related issues including cardiomyopathy or heart attack?   n           If yes, did it require cardiac stenting or other implantable device?         21.   Do you have any implantable devices in your body (pacemaker, defib, LVAD)? (If yes, please review exclusion criteria)  n   22.  Do you take the medication Phentermine?     Yes-> Hold for 7 days before procedure.  Please consult your prescribing provider if you have questions about holding this medication.     No-> Continue to next question.    23. Do you take nitroglycerin?   n           If yes, how often?   (if yes, HOSPITAL setting ONLY)    24.  Are you currently taking any blood thinners?    [If yes, INFORM patient to follw up w/ ORDERING PROVIDER FOR BRIDGING INSTRUCTIONS]     y    25.   Do you transfer independently?                (If NO, please HOSPITAL setting ONLY)  y    26.   Preferred LOCAL Pharmacy for Pre Prescription:         [facility] [address] [place] [address] [id number] [facility]    Scheduling Details  (Please ask for phone number if not scheduled by patient)      Caller : Yadira Hoang    Date of Procedure: 9/12  Surgeon: Herbert  Location:         Sedation Type: MAC l anxiety  Conscious Sedation- Needs  for 6 hours after the procedure  MAC/General-Needs  for 24 hours after procedure    n :[Pre-op Required] at [facility]  SH  MG and OR for MAC sedation   (advise patient they will need a pre-op WITH IN 30 DAYS of procedure date)     Type of Procedure Scheduled:   Upper Endoscopy [EGD]    Which Colonoscopy Prep was Sent?:    -     PEDROORUTS CF PATIENTS & GROEN'S PATIENTS NEEDS EXTENDED PREP       Informed patient they will need an adult  y  Cannot take any type of public or medical transportation alone    Pre-Procedure Covid test to be completed at [facility] or Externally: home test  **INFORMED OF HOME TESTING & LAB  OPTION**        Confirmed Nurse will call to complete assessment y    Additional comments:

## 2022-09-06 NOTE — TELEPHONE ENCOUNTER
Pt notified of provider message as written.  Pt verbalized good understanding.  Carola Plasencia BSN, RN

## 2022-09-06 NOTE — PROGRESS NOTES
Yadira is a 77 year old who is being evaluated via a billable video visit.  Currently in [place].    How would you like to obtain your AVS? MyChart  If the video visit is dropped, the invitation should be resent by: Send to e-mail at: [email or web address]  Will anyone else be joining your video visit? Angeles Echols, DEDE      Palliative Care Outpatient Clinic Progress Note    Patient Name: Yadira Hoang is being evaluated via a billable video visit.    Primary Provider:  Erika Blanton  Primary Oncologist: Dr Esteban Franklin  Last seen by palliative care: myself, 6/29/22. 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 Metastatic to R groin LNs              - reconstructive surgery with skin graft March/April 2020              - starts nivolumab May 2020, continues despite development of small liver met in Aug 2020              - addition of ipilimumab Oct 2020-April 2021 for progression in liver, new T2, S1 mets. 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 Pt declines exploration of clinical trials, but is also apprehensive about starting chemotherapy, which would be the alternative should progression be confirmed. . Decision was to continue opdualag and reassess for possible pseudoprogression mid-September.               - R leg lymphedema 2/2 tumors, working with lymphedema therapy  - chronic pain syndrome affecting occiput, neck, joints   - h/o TBI  - ALEXUS, depression    Social  Yadira lives with her . One of her sons has moved back in together with his  3 year-old daughter. They have another son nearby and two daughters, each about 1h away.      Psychospiritual  She has concerns about her treatment plan and the prospect of possibly having to start chemo again. It is weighing on her that she has to choose between that and forgoing cancer treatment altogether.     She shares that she has been feeling angry and frustrated by her situation, which has been disorienting for her. I assure her that those are very common feelings.     Yadira feels very overwhelmed and struggles to focus on any given topic.      Care Planning   She is contemplating her options, feels very torn about which way she wants to take her medical care going forward.   She has had a conversation with her  and children. Her children had several questions about the treatment plan and expected outcomes that she wasn't able to answer. They were going to reach out to oncology, but she is not certain whether that has happened yet.     Yadira again highlights how important she is in her 3 year-old granddaughter's life and that she is to some extend filling the role of a mother for her. Yadira is very worried that her granddaughter will forget her, being as young as she is.   She also expresses several times that she doesn't want to die and has many unfinished things she wants to complete.     She heard that her life expectancy is likely limited to months, which affects her deeply.     She is not able to engage in a conversation about tradeoffs today, in which scenario it might feel better to her not to get chemotherapy any more, but rather focus on quality of life.     Interim History:  At the last visit we discussed nutrition (referred to nutrition considering multiple food intolerances and apprehension to reintroduce certain food groups); treatment preferences (started convo)    Patient is on the call by herself  History gathered today from: patient, medical chart    She reports low po intake and poor  appetite. She is very concerned about further weight loss. We don't discuss her dietary habits in any detail today. She met with the nutritionist recently.   She was given a prescription for mirtazapine by oncology, but hasn't picked it up from the pharmacy as of yet.   She recalls taking this for depression with some relief in the past and somewhat reluctantly agrees to start it now. We discuss that mirtazapine can help with weight gain. I also emphasize that her work with VEENA Noriega will be more impactful in addressing her situation than medications. However, the medication can support and facilitate that work.       Functional Status:  Previous:  2022: 69  She does much of the housework, but is limited in certain activities (anything that requires kneeling/bending down) and has fatigue afterwards      Impression & Recommendations & Counseliny/o woman with metastatic melanoma which appears to have progressed through her current treatment.     Treatment preferences, coping: Yadira seems very overwhelmed by her current situation and the question as to how she wants to continue her medical cares  - ongoing conversation about her goals, hopes, concerns. Appreciate support by palliative social work  - she will take her daughter Alma to her upcoming appointment with Dr Franklin, would like to bring her son along as well if possible.    Mood: low mood, feeling very overwhelmed.   - strongly encouraged ongoing work with palliative SW  - await effect of mirtazapine    Return to clinic in about 3 weeks    Data / Chart Review:    Review of Systems:   ROS: 10 point ROS neg other than the symptoms noted above in the HPI  and pertinents here.         Physical Exam:   Physical Exam:  Vital Signs not checked, virtual visit    CONSTIT: awake, appears comfortable  EENT: MMM, EOMI, no icterus  RESP: reg,nl effort, no cough  MSK: moves x4, FARIDEH, slowed gait  SKIN: no rash, no obvious lesions  NEURO: alert,  oriented x3  PSYCH: flat affect, memory and thought process intact    The rest of a comprehensive physical examination is deferred  due to public health emergency video visit restrictions.      Current pertinent medications:  Mirtazapine 7.5mg HS since 8/31      No pertinent allergies      Lab and imaging data reviewed:  Comments:           Video-Visit Details    Type of service:  Video Visit    Physician has received verbal consent for a Video Visit from the patient? Yes    Video Start Time: 1437    Video End Time (time video stopped): 1513      Originating Location (pt. Location): Home    Distant Location (provider location):  [facility]     Mode of Communication:  Video Conference via [facility]    Lorena Cole MD  Palliative Medicine  Pager [phone]     Family/Patient

## 2022-09-06 NOTE — TELEPHONE ENCOUNTER
She can increase Miralax to 2 servings a day. She can titrate the Miralax dose down to 1 serving a day if stool is more diarrhea like or more than twice a day.

## 2022-09-06 NOTE — TELEPHONE ENCOUNTER
Patient c/o constipation. Has been trying Miralax 1 capful daily, does help but only has 1 bowel movement every few days  Last bowel movement was Saturday, was normal  Patient c/o pain if strains, nausea, is taking anti-nausea medication  Per patient she is not taking any pain medications  Patient feels bloated  Patient wondering what else she can take for the constipation    To provider to advise    Betty MIRAMONTES, RN

## 2022-09-07 NOTE — PATIENT INSTRUCTIONS
Patient Instructions      Expand All Collapse All    Thank you for attending the Palliative Care Clinic appointment today.     1. I reached out to Meghan Perdomo asking whether both your children can come for your next appointment.      2. Start taking mirtazapine (remeron) 15mg at bedtime    Call if your symptoms change and the medications we have prescribed are not working.   Do not take your medications at any other dose or frequently than how they are prescribed. Call if you think we should make any changes.    Call us at least 3 workdays in advance if you need a medication refill.    Please have all your pill bottles ready for your next appointment.     Return to clinic in about 3 weeks for a follow up.     You can reach the Palliative Care Team during business hours at the following number:    - [phone]  - or send me a [employer] message    To reach the Palliative Care Provider on-call After-hours or on holidays and weekends, call: [phone].  Please note that we are not able to provide pain medication refills on evenings or weekends.

## 2022-09-07 NOTE — LETTER
9/7/2022         RE: Yadira Hoang  [address] [facility] [address]        Dear Colleague,    Thank you for referring your patient, Yadira Hoang, to the [facility]. Please see a copy of my visit note below.    MEDICAL ONCOLOGY PROGRESS NOTE  Melanoma Clinic  Sep 7, 2022     CHIEF COMPLAINT: Metastatic acral melanoma    Melanoma History:  1. She noted a painful lesion on the sole of the right foot for a few months, since last summer.  It initially was small then became raised.  It spontaneously bled. She is not entirely sure of its appearance initially.  She denies noting any masses behind the knee or in the groin.  2. 1/9/2020, punch biopsy was performed by Dr Jessica Meadows. Patology showed melanoma, at least 3.4 mm deep with ulceration and 0 mitoses, and perineural invasion present. TILs were non-brisk and LVI not identified. pT3b.  3. 2/10/2020, she has right femoral sentinel lymph node biopsy and wide local excision (Specimen #: [id number]) and the right heel lesion extends to a depth of 6.6 mm, and invasive melanoma is present at 0.2 mm from the deep margin. Microsatellites are also present. There was 1 (of 2) lymph nodes involved. The lymph node tissue exhibits extensive subcapsular and parenchymal clusters of melanoma cells, highlighted on Melan-A immunostain. The largest cluster is 7 millimeters in greatest diameter. pT4b pN2c. PD-L1 staining is negative, <1%. No mutation in BRAF, KIT, or NRAS.  4. 2/22/2020, she had PET-CT, which showed intense FDG uptake in a right inguinal lymph node, concerning for an additional focus of metastatic disease. There is also an indeterminate right external iliac lymph node with mild FDG uptake.  5. 3/4/2020, she has medial plantar artery  fasciocutaneous instep flap and Integra placement to the donor site. On 4/2/2020, she has additional reconstruction with skin grafting.  6. 5/22/2020, PET-CT shows a  hypermetabolic right inguinal and right external iliac chain lymph node and stable pulmonary nodules.  7. 5/27/2020, she starts nivolumab for presumed low volume lymph node predominant metastatic disease.  8. 8/21/2020, PET-CT shows increase in hypermetabolic adenopathy, and a hypermetabolic nodule in segment 2 of the liver. Given low volume of disease she prefers to continue therapy with short interval follow-up.  9. 10/9/2020, PET-CT shows increasing size of hepatic metastases with increased hypermetabolism, increased right inguinal and iliac lymphadenopathy, and new FDG uptake right T2 transverse process and right side of S1. MRI-brain obtained 10/21/2020 is negative for brain metastasis.  10. 10/28/2020, she starts ipilimumab 1mg/kg and nivolumab 3mg/kg. She then goes on to maintenance nivolumab through 4/12/21.  11. 5/10/2021, she starts ipilimumab 3 mg/kg monotherapy q3w for 4 cycles.  12. 7/30/21, PET/CT shows mild disease progression.   13. 8/2/21, start ipilimumab 3mg/kg and nivolumab 1mg/kg and received 2 cycles, last on 8/23/21.  14. 8/23/21, develops elevated LFT's.   15. 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   16. 1/28/22, started TVEC  17. 4/22/22, progressive disease, consideration for additional therapeutic approaches.  18. 5/11/22, Start Opdualag  19. 7/29/22: PET-CT shows new diffuse bony metastatic bony disease, increased size of liver mets and increased size of lymph node conglomerates throughout pelvis and abdomen. DVT in right external iliac vein, common femoral vein and femoral vein also found. Started on Eliquis.     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 She presents today for evaluation prior to planned cycle 5 of Opdualag.    -Yadira is feeling ok today but continues to note a poor appetite which is discouraging for her. She feels when she eats that she gets full very quickly. Eats a good breakfast but  intake tapers as the day goes on. Not currently utilizing nutrition shakes, concerned about the sugar content. Did complete the course of fluconazole. She sometimes has lower abdominal discomfort but this comes and goes. Nausea is intermittent. She will occasionally take Zofran for this which helps. She has had some constipation. Last good BM was one week ago, has passed small amounts since then. Passing lots of gas. Advised by PCP yesterday to increase MiraLAX to twice daily so she plans to do this. She thinks her hydration is around 40 ounces per day roughly. Denies dizziness.  -Her right leg pain at this time is tolerable. Sometimes the wraps that she uses to manage swelling on that leg aggravate the pain,taking periodic breaks provides relief. Has not used tramadol yet from what she recalls. Right leg swelling is improving per patient. She also feels the lump in her right groin is smaller.  She has noticed a few bumps on her scalp and she is concerned that these may be cancer.  -Denies any bleeding concerns.   -She has some difficulty remember what medications she is taking and for what. She does not have her pill bottles with her today.     Objective:  /65   Pulse 100   Resp 16   Wt 59.7 kg (131 lb 11.2 oz)   SpO2 97%   BMI 20.63 kg/m     General: Well-appearing female in no acute distress.  Eyes: EOMI, PERRL. No scleral icterus.  ENT: Oral mucosa is moist without lesions or thrush.   Lymphatic: Neck is supple without cervical or supraclavicular lymphadenopathy. Right groin with firm lump present, difficult to estimate size, maybe golf ball sized.  Cardiovascular: RRR No murmurs, gallops, or rubs. Right lower extremity with visible edema, unable to grade d/t wraps.   Respiratory: CTA bilaterally. No wheezes or crackles.  Gastrointestinal: BS +. Abdomen soft, non-tender. No palpable hepatosplenomegaly or masses.   Neurologic: Cranial nerves II through XII are grossly intact.  Skin: 3 palpable,  satellite, firm nodules, clear in color located on scalp. 1 on right frontal and 1 on right temporal and one on left frontal scalp.     Labs  Most Recent 3 CBC's:Recent Labs   Lab Test 08/05/22  1314 05/11/22  1115 03/22/22  1105   WBC 5.7 5.4 5.3   HGB 11.5* 12.7 13.4   MCV 94 94 90    267 269   ANEUTAUTO 4.1 3.4 3.1     Most Recent 3 BMP's:  Recent Labs   Lab Test 08/05/22  1314 07/06/22  1031 06/08/22  1231    142 142   POTASSIUM 3.9 4.2 4.0   CHLORIDE 101 105 105   CO2 28 30 35*   BUN 20 20 26   CR 0.64 0.60 0.55   ANIONGAP 10 7 2*   GABY 9.1 9.5 9.4   * 94 104*   PROTTOTAL 7.4 7.1 6.8   ALBUMIN 3.1* 3.2* 3.3*    Most Recent 3 LFT's:  Recent Labs   Lab Test 08/05/22  1314 07/06/22  1031 06/08/22  1231   AST 48* 37 29   ALT 22 26 30   ALKPHOS 76 74 74   BILITOTAL 0.4 0.4 0.3    Most Recent 2 TSH and T4:  Recent Labs   Lab Test 08/05/22  1314 07/06/22  1031   TSH 2.45 1.52     I reviewed the above labs today.    Imaging  PET Oncology Whole Body  Narrative: Combined Report of:    PET and CT on  7/29/2022 11:14 AM :    1. PET of the neck, chest, abdomen, and pelvis.  2. PET CT Fusion for Attenuation Correction and Anatomical  Localization:    3. Diagnostic CT scan of the chest, abdomen, and pelvis with  intravenous contrast for interpretation.  4. 3D MIP and PET-CT fused images were processed on an independent  workstation and archived to PACS and reviewed by a radiologist.    Technique:    1. PET: The patient received 12.22 mCi of F-18-FDG; the serum glucose  was 83 prior to administration, body weight was 61.7 kg. Images were  evaluated in the axial, sagittal, and coronal planes as well as the  rotational whole body MIP. Images were acquired from the Vertex to the  Feet.    UPTAKE WAS MEASURED AT 60 MINUTES.     BACKGROUND:  Liver SUV max= 2.11,   Aorta Blood SUV Max: 1.62.     2. CT: Volumetric acquisition for clinical interpretation of the  chest, abdomen, and pelvis acquired at 3 mm sections  after the  uneventful administration of intravenous contrast. The chest, abdomen,  and pelvis were evaluated at 5 mm sections in bone, soft tissue, and  lung windows.      The patient received 86 cc of Isovue 370 intravenously for the  examination.    --    3. 3D MIP and PET-CT fused images were processed on an independent  workstation and archived to PACS and reviewed by a radiologist.    INDICATION: Malignant melanoma of right lower extremity including hip  (H)    ADDITIONAL INFORMATION OBTAINED FROM EMR: none    COMPARISON: PET CT 4/22/2022    FINDINGS:     HEAD/NECK:  There is no suspicious FDG uptake in the neck.    The paranasal sinuses and mastoid air cells are clear.    The mucosal pharyngeal space, the , prevertebral and carotid  spaces are within normal limits.     No masses, mass effect or pathologically enlarged lymph nodes are  evident. The thyroid gland is within normal limits..    CHEST:  7 mm hypermetabolic solid pulmonary nodule in the right lower lobe  with Max SUV 5.04, previously 4.2 (series 7, image 76).    Several new solid pulmonary nodules with low-level FDG avidity, for  example a 5 mm solid pulmonary nodule in the right upper lobe with Max  SUV 1.40 (series 7, image 35) and an adjacent 5 mm solid pulmonary  nodule with max SUV of 1.13 (series 7, image 36).    No airspace consolidation. No pleural effusion or pneumothorax.    The cardiac size is normal. Normal caliber aorta and main pulmonary  artery. No central pulmonary embolism. Moderate coronary artery  calcium. No enlarged or hypermetabolic thoracic lymph nodes.    ABDOMEN AND PELVIS:  Increased size and metabolic activity of numerous hepatic lesions,  including the following:  -4.6 x 4.3 cm mass in the left hepatic lobe with max SUV 20.8,  previously 2.5 x 2.4 cm with max SUV 24.44.  -2.8 x 3.4 cm mass in the inferior right hepatic lobe with max SUV  12.19, previously 1.1 x 1.0 cm with max SUV 5.70.    Increased size and  metabolic activity of a centrally necrotic joseline  hepatis lymph node which measures 3.5 x 2.7 cm with max SUV 17.48,  previously 2.1 x 1.6 cm with max SUV 13.75.    Increased size of the large lymph node conglomerate within the right  lower abdomen and pelvis, intimately associated with the external  iliac vessels, measuring approximately 8.4 x 6.7 cm with max SUV of  21.36, previously 6.3 x 5.4 cm with max SUV 25.8. There is  circumferential encasement of the right external iliac artery and  vein, with associated nonocclusive thrombus involving the distal right  external iliac vein and common femoral vein.    Increased size of the large right conglomerate of lymph nodes in the  right groin, measuring 8.2 x 6.4 cm with max SUV 23.22, previously 4.4  x 3.8 cm with max suv 26.97.    The gallbladder, biliary system, spleen, and pancreas are within  normal limits. Adrenal glands are unremarkable. Symmetric enhancement  kidneys. No hydronephrosis. Urinary bladder is within normal limits.  Unchanged endometrial hyper enhancement without associated elevated  FDG uptake.    Small sliding hiatal hernia. No abnormally dilated loops of small or  large bowel. The portal venous system appears patent. Suspected  contrast admixture within the right portal vein and extrahepatic main  portal vein. Normal caliber abdominal aorta.    LOWER EXTREMITIES:   No abnormal masses or hypermetabolic lesions.    BONES AND SOFT TISSUES:   Diffuse abnormal patchy FDG uptake throughout the axial and proximal  appendicular skeleton, which is new from the prior exam, highly  suspicious for new infiltrative osseous metastases. No associated  lytic or sclerotic changes in the bones, however physiologic (PET)  findings precede anatomic (CT) findings. No suspected pathologic  fracture.  Impression: IMPRESSION:   In this patient with a history of metastatic melanoma, findings of  disease progression:    1. New diffuse metastatic osseous disease.    2.  Increased size of the hypermetabolic liver metastases.    3. Increased size of the hypermetabolic lymph node conglomerates  throughout the abdomen and pelvis.    4. Several new solid pulmonary nodules, without significant FDG  avidity but are also too small to accurately characterize by PET.  These likely represent new metastatic disease.    5. Nonocclusive deep venous thrombosis within the right external iliac  vein, common femoral vein, and femoral vein, likely secondary to slow  venous flow from mass effect from the metastatic adenopathy. Consider  dedicated ultrasound of the right lower extremity for complete  evaluation of the thrombus burden.     ----  The findings of the study were discussed over the telephone with the  ordering provider Meghan Perdomo PA-C by Dr. Alexandra at 1:25 PM on  7/29/2022.    I have personally reviewed the examination and initial interpretation  and I agree with the findings.    MICHAEL LOPEZ MD         SYSTEM ID:  [id number]  CT Chest/Abdomen/Pelvis w Contrast  Narrative: Combined Report of:    PET and CT on  7/29/2022 11:14 AM :    1. PET of the neck, chest, abdomen, and pelvis.  2. PET CT Fusion for Attenuation Correction and Anatomical  Localization:    3. Diagnostic CT scan of the chest, abdomen, and pelvis with  intravenous contrast for interpretation.  4. 3D MIP and PET-CT fused images were processed on an independent  workstation and archived to PACS and reviewed by a radiologist.    Technique:    1. PET: The patient received 12.22 mCi of F-18-FDG; the serum glucose  was 83 prior to administration, body weight was 61.7 kg. Images were  evaluated in the axial, sagittal, and coronal planes as well as the  rotational whole body MIP. Images were acquired from the Vertex to the  Feet.    UPTAKE WAS MEASURED AT 60 MINUTES.     BACKGROUND:  Liver SUV max= 2.11,   Aorta Blood SUV Max: 1.62.     2. CT: Volumetric acquisition for clinical interpretation of the  chest, abdomen, and pelvis  acquired at 3 mm sections after the  uneventful administration of intravenous contrast. The chest, abdomen,  and pelvis were evaluated at 5 mm sections in bone, soft tissue, and  lung windows.      The patient received 86 cc of Isovue 370 intravenously for the  examination.    --    3. 3D MIP and PET-CT fused images were processed on an independent  workstation and archived to PACS and reviewed by a radiologist.    INDICATION: Malignant melanoma of right lower extremity including hip  (H)    ADDITIONAL INFORMATION OBTAINED FROM EMR: none    COMPARISON: PET CT 4/22/2022    FINDINGS:     HEAD/NECK:  There is no suspicious FDG uptake in the neck.    The paranasal sinuses and mastoid air cells are clear.    The mucosal pharyngeal space, the , prevertebral and carotid  spaces are within normal limits.     No masses, mass effect or pathologically enlarged lymph nodes are  evident. The thyroid gland is within normal limits..    CHEST:  7 mm hypermetabolic solid pulmonary nodule in the right lower lobe  with Max SUV 5.04, previously 4.2 (series 7, image 76).    Several new solid pulmonary nodules with low-level FDG avidity, for  example a 5 mm solid pulmonary nodule in the right upper lobe with Max  SUV 1.40 (series 7, image 35) and an adjacent 5 mm solid pulmonary  nodule with max SUV of 1.13 (series 7, image 36).    No airspace consolidation. No pleural effusion or pneumothorax.    The cardiac size is normal. Normal caliber aorta and main pulmonary  artery. No central pulmonary embolism. Moderate coronary artery  calcium. No enlarged or hypermetabolic thoracic lymph nodes.    ABDOMEN AND PELVIS:  Increased size and metabolic activity of numerous hepatic lesions,  including the following:  -4.6 x 4.3 cm mass in the left hepatic lobe with max SUV 20.8,  previously 2.5 x 2.4 cm with max SUV 24.44.  -2.8 x 3.4 cm mass in the inferior right hepatic lobe with max SUV  12.19, previously 1.1 x 1.0 cm with max SUV  5.70.    Increased size and metabolic activity of a centrally necrotic joseline  hepatis lymph node which measures 3.5 x 2.7 cm with max SUV 17.48,  previously 2.1 x 1.6 cm with max SUV 13.75.    Increased size of the large lymph node conglomerate within the right  lower abdomen and pelvis, intimately associated with the external  iliac vessels, measuring approximately 8.4 x 6.7 cm with max SUV of  21.36, previously 6.3 x 5.4 cm with max SUV 25.8. There is  circumferential encasement of the right external iliac artery and  vein, with associated nonocclusive thrombus involving the distal right  external iliac vein and common femoral vein.    Increased size of the large right conglomerate of lymph nodes in the  right groin, measuring 8.2 x 6.4 cm with max SUV 23.22, previously 4.4  x 3.8 cm with max suv 26.97.    The gallbladder, biliary system, spleen, and pancreas are within  normal limits. Adrenal glands are unremarkable. Symmetric enhancement  kidneys. No hydronephrosis. Urinary bladder is within normal limits.  Unchanged endometrial hyper enhancement without associated elevated  FDG uptake.    Small sliding hiatal hernia. No abnormally dilated loops of small or  large bowel. The portal venous system appears patent. Suspected  contrast admixture within the right portal vein and extrahepatic main  portal vein. Normal caliber abdominal aorta.    LOWER EXTREMITIES:   No abnormal masses or hypermetabolic lesions.    BONES AND SOFT TISSUES:   Diffuse abnormal patchy FDG uptake throughout the axial and proximal  appendicular skeleton, which is new from the prior exam, highly  suspicious for new infiltrative osseous metastases. No associated  lytic or sclerotic changes in the bones, however physiologic (PET)  findings precede anatomic (CT) findings. No suspected pathologic  fracture.  Impression: IMPRESSION:   In this patient with a history of metastatic melanoma, findings of  disease progression:    1. New diffuse  metastatic osseous disease.    2. Increased size of the hypermetabolic liver metastases.    3. Increased size of the hypermetabolic lymph node conglomerates  throughout the abdomen and pelvis.    4. Several new solid pulmonary nodules, without significant FDG  avidity but are also too small to accurately characterize by PET.  These likely represent new metastatic disease.    5. Nonocclusive deep venous thrombosis within the right external iliac  vein, common femoral vein, and femoral vein, likely secondary to slow  venous flow from mass effect from the metastatic adenopathy. Consider  dedicated ultrasound of the right lower extremity for complete  evaluation of the thrombus burden.     ----  The findings of the study were discussed over the telephone with the  ordering provider Meghan Perdomo PA-C by Dr. Alexandra at 1:25 PM on  7/29/2022.    I have personally reviewed the examination and initial interpretation  and I agree with the findings.    MICHAEL LOPEZ MD         SYSTEM ID:  [id number]      ASSESSMENT AND PLAN:  Metastatic acral melanoma, to lungs, liver, lymphnodes, and bone  She has received multiple lines of prior therapy including ipi/nivo and TVEC. She started Opdualag 5/11/22 and has received 4 cycles. PET-CT from 7/29/22 appears to showed disease progression. She reviewed potential clinical trials participation previously and she indicated she is not interested in any clinical trials.    Previously discussed by Dr. Franklin that we could continue Opdualag for another 6 weeks to confirm progression, however, given the degree of progression seen, true progression is most likely. However, if confirmed then next line of treatment would involve clinical trial (which she declines) or chemotherapy, either alone or in combination with immunotherapy. Options include Temozolomide or Carboplatin and paclitaxel. Option to pursue comfort measures was also discussed but patient is not ready to take this step.     She now  is leaning towards pursuing chemotherapy pending the results of her upcoming scan. However, she feels her appetite will need to be improved prior to starting chemotherapy.    In the meantime, clinically appropriate to proceed with Opdualag tomorrow pending labs. CT CAP on 9/15 followed by Dr. Franklin on 9/16. I will discuss with Dr. Franklin whether imaging should be changed to a PET CT given scalp lesions and inability to get into derm until 10/11.    Scalp lesions  Possibly metastatic disease. Can keep appointment with dermatology on 10/11/22 but will not likely change treatment plan. Will see if PET is warranted as described above.     Anorexia and weight loss.  Candida albicans esophagitis, recurrent  Constipation  Previously, EGD culture with positive candida albicans and has now completed 28 day course of fluconazole with no real change in symptoms. Patient still experiencing early satiety with poor appetite, plan to repeat endoscopy for sensitivities; scheduled on 9/12. In the meantime, we discussed the importance of a good bowel regimen. She will increase MiraLAX to twice daily as recommended by PCP and will increase fluid intake to a goal of 8, 8oz glasses per day. If no good BM by Friday, advised patient to notify us. At that time, could consider magnesium citrate. Also, reiterated strategies for increasing caloric intake such as supplementing with shakes. She is interested in follow-up with our dietician and I have requested this.     Mood. Understandably discouraged. She is not sure if she has started mirtazapine (see supportive care below). Has follow up with palliative care tomorrow and palliative care SW in October which has been helpful.     Leg pain. She is looking into purchasing an ice pack that she had in the past and was helpful. Removing wraps on legs can also provide relief. She has not tried tramadol yet. Remains reluctant to take opioids and pain appears to be tolerable with non-pharmacologic  measures at this time.     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 Continue Eliquis anticoagulation lifelong. No bleeding concerns today.     RLE lymphema.   Secondary to tumors and new DVT. Improving per patient  Anticoagulation as above. Follows with lymphedema therapy.     Supportive Care  Patient seems to have difficulty knowing which medications to take and when. IB message sent to [facility] to see what we can offer to help her sort this out, perhaps a phone encounter with a pharmacist.    100 minutes spent on the date of the encounter doing chart review, review of test results, interpretation of tests, patient visit and documentation           Again, thank you for allowing me to participate in the care of your patient.      Sincerely,    Amber J. Scheierl, APRN CNP

## 2022-09-07 NOTE — LETTER
9/7/2022         RE: Yadira Hoang  [address] [facility] [address]        Dear Colleague,    Thank you for referring your patient, Yadira Hoang, to the [facility]. Please see a copy of my visit note below.    Yadira is a 77 year old who is being evaluated via a billable video visit.  Currently in [place].    How would you like to obtain your AVS? MyChart  If the video visit is dropped, the invitation should be resent by: Send to e-mail at: [email or web address]  Will anyone else be joining your video visit? DEDE Mcdowell      Palliative Care Outpatient Clinic Progress Note    Patient Name: Yadira Hoang is being evaluated via a billable video visit.    Primary Provider:  Erika Blanton  Primary Oncologist: Dr Esteban Franklin  Last seen by palliative care: myself, 6/29/22. 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 Metastatic to R groin LNs              - reconstructive surgery with skin graft March/April 2020              - starts nivolumab May 2020, continues despite development of small liver met in Aug 2020              - addition of ipilimumab Oct 2020-April 2021 for progression in liver, new T2, S1 mets. 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 Pt declines exploration of clinical trials, but is also apprehensive about starting chemotherapy, which would be the alternative should progression be confirmed. . Decision was to continue opdualag and reassess for possible pseudoprogression  mid-September.               - R leg lymphedema 2/2 tumors, working with lymphedema therapy  - chronic pain syndrome affecting occiput, neck, joints   - h/o TBI  - ALEXUS, depression    Social  Yadira lives with her . One of her sons has moved back in together with his 3 year-old daughter. They have another son nearby and two daughters, each about 1h away.      Psychospiritual  She has concerns about her treatment plan and the prospect of possibly having to start chemo again. It is weighing on her that she has to choose between that and forgoing cancer treatment altogether.     She shares that she has been feeling angry and frustrated by her situation, which has been disorienting for her. I assure her that those are very common feelings.     Yadira feels very overwhelmed and struggles to focus on any given topic.      Care Planning   She is contemplating her options, feels very torn about which way she wants to take her medical care going forward.   She has had a conversation with her  and children. Her children had several questions about the treatment plan and expected outcomes that she wasn't able to answer. They were going to reach out to oncology, but she is not certain whether that has happened yet.     Yadira again highlights how important she is in her 3 year-old granddaughter's life and that she is to some extend filling the role of a mother for her.  She also expresses several times that she doesn't want to die and has many unfinished things she wants to complete.     She heard that her life expectancy is likely limited to months, which affects her deeply.     She is not able to engage in a conversation about tradeoffs today, in which scenario it might feel better to her not to get chemotherapy any more, but rather focus on quality of life.     Interim History:  At the last visit we discussed nutrition (referred to nutrition considering multiple food intolerances and apprehension to reintroduce  certain food groups); treatment preferences (started convo)    Patient is on the call by herself  History gathered today from: patient, medical chart    She reports low po intake and poor appetite. She is very concerned about further weight loss. We don't discuss her dietary habits in any detail today. She met with the nutritionist recently.   She was given a prescription for mirtazapine by oncology, but hasn't picked it up from the pharmacy as of yet.   She recalls taking this for depression with some relief in the past and somewhat reluctantly agrees to start it now. We discuss that mirtazapine can help with weight gain. I also emphasize that her work with VEENA Noriega will be more impactful in addressing her situation than medications. However, the medication can support and facilitate that work.       Functional Status:  Previous:  2022: 70  She does much of the housework, but is limited in certain activities (anything that requires kneeling/bending down) and has fatigue afterwards      Impression & Recommendations & Counseliny/o woman with metastatic melanoma which appears to have progressed through her current treatment.     Treatment preferences, coping: Yadira seems very overwhelmed by her current situation and the question as to how she wants to continue her medical cares  - ongoing conversation about her goals, hopes, concerns. Appreciate support by palliative social work  - she will take her daughter Alma to her upcoming appointment with Dr Franklin, would like to bring her son along as well if possible.    Mood: low mood, feeling very overwhelmed.   - strongly encouraged ongoing work with palliative SW  - await effect of mirtazapine    Return to clinic in about 3 weeks    Data / Chart Review:    Review of Systems:   ROS: 10 point ROS neg other than the symptoms noted above in the HPI  and pertinents here.         Physical Exam:   Physical Exam:  Vital Signs not checked, virtual  visit    CONSTIT: awake, appears comfortable  EENT: MMM, EOMI, no icterus  RESP: reg,nl effort, no cough  MSK: moves x4, FARIDEH, slowed gait  SKIN: no rash, no obvious lesions  NEURO: alert, oriented x3  PSYCH: flat affect, memory and thought process intact    The rest of a comprehensive physical examination is deferred  due to public health emergency video visit restrictions.      Current pertinent medications:  Mirtazapine 7.5mg HS since 8/31      No pertinent allergies      Lab and imaging data reviewed:  Comments:           Video-Visit Details    Type of service:  Video Visit    Physician has received verbal consent for a Video Visit from the patient? Yes    Video Start Time: 1437    Video End Time (time video stopped): 1513      Originating Location (pt. Location): Home    Distant Location (provider location):  [facility]     Mode of Communication:  Video Conference via [employer]    Lorena Cole MD  Palliative Medicine  Pager [phone]        Again, thank you for allowing me to participate in the care of your patient.        Sincerely,        Lorena Cole MD

## 2022-09-07 NOTE — NURSING NOTE
"Oncology Rooming Note    September 7, 2022 11:45 AM   Yadira Hoang is a 77 year old female who presents for:    Chief Complaint   Patient presents with     Oncology Clinic Visit     Pt is here for a rtn for Malignant Melanoma     Initial Vitals: Blood Pressure 101/65   Pulse 100   Respiration 16   Weight 59.7 kg (131 lb 11.2 oz)   Oxygen Saturation 97%   Body Mass Index 20.63 kg/m   Estimated body mass index is 20.63 kg/m  as calculated from the following:    Height as of 7/29/22: 1.702 m (5' 7\").    Weight as of this encounter: 59.7 kg (131 lb 11.2 oz). Body surface area is 1.68 meters squared.  Moderate Pain (4) Comment: Data Unavailable   No LMP recorded. Patient is postmenopausal.  Allergies reviewed: Yes  Medications reviewed: Yes    Medications: Medication refills not needed today.  Pharmacy name entered into "[employer]:    [facility] [place], [place] - [phone], [id number]  [facility] [address] [place] [address] [address] [facility]    Clinical concerns: Pt said she has been having some severe belly pain with a lot of gas. She is having a Endoscopy on Monday and is worried because she is constipated. She has that Miralax has not been working.        Archana Hensley MA            "

## 2022-09-07 NOTE — PROGRESS NOTES
MEDICAL ONCOLOGY PROGRESS NOTE  [facility]  Sep 7, 2022     CHIEF COMPLAINT: Metastatic acral melanoma    Melanoma History:  1. She noted a painful lesion on the sole of the right foot for a few months, since last summer.  It initially was small then became raised.  It spontaneously bled. She is not entirely sure of its appearance initially.  She denies noting any masses behind the knee or in the groin.  2. 1/9/2020, punch biopsy was performed by Dr Jessica Meadows. Patology showed melanoma, at least 3.4 mm deep with ulceration and 0 mitoses, and perineural invasion present. TILs were non-brisk and LVI not identified. pT3b.  3. 2/10/2020, she has right femoral sentinel lymph node biopsy and wide local excision (Specimen #: [id number]) and the right heel lesion extends to a depth of 6.6 mm, and invasive melanoma is present at 0.2 mm from the deep margin. Microsatellites are also present. There was 1 (of 2) lymph nodes involved. The lymph node tissue exhibits extensive subcapsular and parenchymal clusters of melanoma cells, highlighted on Melan-A immunostain. The largest cluster is 7 millimeters in greatest diameter. pT4b pN2c. PD-L1 staining is negative, <1%. No mutation in BRAF, KIT, or NRAS.  4. 2/22/2020, she had PET-CT, which showed intense FDG uptake in a right inguinal lymph node, concerning for an additional focus of metastatic disease. There is also an indeterminate right external iliac lymph node with mild FDG uptake.  5. 3/4/2020, she has medial plantar artery  fasciocutaneous instep flap and Integra placement to the donor site. On 4/2/2020, she has additional reconstruction with skin grafting.  6. 5/22/2020, PET-CT shows a hypermetabolic right inguinal and right external iliac chain lymph node and stable pulmonary nodules.  7. 5/27/2020, she starts nivolumab for presumed low volume lymph node predominant metastatic disease.  8. 8/21/2020, PET-CT shows increase in hypermetabolic adenopathy, and  a hypermetabolic nodule in segment 2 of the liver. Given low volume of disease she prefers to continue therapy with short interval follow-up.  9. 10/9/2020, PET-CT shows increasing size of hepatic metastases with increased hypermetabolism, increased right inguinal and iliac lymphadenopathy, and new FDG uptake right T2 transverse process and right side of S1. MRI-brain obtained 10/21/2020 is negative for brain metastasis.  10. 10/28/2020, she starts ipilimumab 1mg/kg and nivolumab 3mg/kg. She then goes on to maintenance nivolumab through 4/12/21.  11. 5/10/2021, she starts ipilimumab 3 mg/kg monotherapy q3w for 4 cycles.  12. 7/30/21, PET/CT shows mild disease progression.   13. 8/2/21, start ipilimumab 3mg/kg and nivolumab 1mg/kg and received 2 cycles, last on 8/23/21.  14. 8/23/21, develops elevated LFT's.   15. 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   16. 1/28/22, started TVEC  17. 4/22/22, progressive disease, consideration for additional therapeutic approaches.  18. 5/11/22, Start Opdualag  19. 7/29/22: PET-CT shows new diffuse bony metastatic bony disease, increased size of liver mets and increased size of lymph node conglomerates throughout pelvis and abdomen. DVT in right external iliac vein, common femoral vein and femoral vein also found. Started on Eliquis.     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 She presents today for evaluation prior to planned cycle 5 of Opdualag.    -Yadira is feeling ok today but continues to note a poor appetite which is discouraging for her. She feels when she eats that she gets full very quickly. Eats a good breakfast but intake tapers as the day goes on. Not currently utilizing nutrition shakes, concerned about the sugar content. Did complete the course of fluconazole. She sometimes has lower abdominal discomfort but this comes and goes. Nausea is intermittent. She will occasionally take  Zofran for this which helps. She has had some constipation. Last good BM was one week ago, has passed small amounts since then. Passing lots of gas. Advised by PCP yesterday to increase MiraLAX to twice daily so she plans to do this. She thinks her hydration is around 40 ounces per day roughly. Denies dizziness.  -Her right leg pain at this time is tolerable. Sometimes the wraps that she uses to manage swelling on that leg aggravate the pain,taking periodic breaks provides relief. Has not used tramadol yet from what she recalls. Right leg swelling is improving per patient. She also feels the lump in her right groin is smaller.  She has noticed a few bumps on her scalp and she is concerned that these may be cancer.  -Denies any bleeding concerns.   -She has some difficulty remember what medications she is taking and for what. She does not have her pill bottles with her today.     Objective:  /65   Pulse 100   Resp 16   Wt 59.7 kg (131 lb 11.2 oz)   SpO2 97%   BMI 20.63 kg/m     General: Well-appearing female in no acute distress.  Eyes: EOMI, PERRL. No scleral icterus.  ENT: Oral mucosa is moist without lesions or thrush.   Lymphatic: Neck is supple without cervical or supraclavicular lymphadenopathy. Right groin with firm lump present, difficult to estimate size, maybe golf ball sized.  Cardiovascular: RRR No murmurs, gallops, or rubs. Right lower extremity with visible edema, unable to grade d/t wraps.   Respiratory: CTA bilaterally. No wheezes or crackles.  Gastrointestinal: BS +. Abdomen soft, non-tender. No palpable hepatosplenomegaly or masses.   Neurologic: Cranial nerves II through XII are grossly intact.  Skin: 3 palpable, satellite, firm nodules, clear in color located on scalp. 1 on right frontal and 1 on right temporal and one on left frontal scalp.     Labs  Most Recent 3 CBC's:Recent Labs   Lab Test 08/05/22  1314 05/11/22  1115 03/22/22  1105   WBC 5.7 5.4 5.3   HGB 11.5* 12.7 13.4   MCV 94  94 90    267 269   ANEUTAUTO 4.1 3.4 3.1     Most Recent 3 BMP's:  Recent Labs   Lab Test 08/05/22  1314 07/06/22  1031 06/08/22  1231    142 142   POTASSIUM 3.9 4.2 4.0   CHLORIDE 101 105 105   CO2 28 30 35*   BUN 20 20 26   CR 0.64 0.60 0.55   ANIONGAP 10 7 2*   GABY 9.1 9.5 9.4   * 94 104*   PROTTOTAL 7.4 7.1 6.8   ALBUMIN 3.1* 3.2* 3.3*    Most Recent 3 LFT's:  Recent Labs   Lab Test 08/05/22  1314 07/06/22  1031 06/08/22  1231   AST 48* 37 29   ALT 22 26 30   ALKPHOS 76 74 74   BILITOTAL 0.4 0.4 0.3    Most Recent 2 TSH and T4:  Recent Labs   Lab Test 08/05/22  1314 07/06/22  1031   TSH 2.45 1.52     I reviewed the above labs today.    Imaging  PET Oncology Whole Body  Narrative: Combined Report of:    PET and CT on  7/29/2022 11:14 AM :    1. PET of the neck, chest, abdomen, and pelvis.  2. PET CT Fusion for Attenuation Correction and Anatomical  Localization:    3. Diagnostic CT scan of the chest, abdomen, and pelvis with  intravenous contrast for interpretation.  4. 3D MIP and PET-CT fused images were processed on an independent  workstation and archived to PACS and reviewed by a radiologist.    Technique:    1. PET: The patient received 12.22 mCi of F-18-FDG; the serum glucose  was 83 prior to administration, body weight was 61.7 kg. Images were  evaluated in the axial, sagittal, and coronal planes as well as the  rotational whole body MIP. Images were acquired from the Vertex to the  Feet.    UPTAKE WAS MEASURED AT 60 MINUTES.     BACKGROUND:  Liver SUV max= 2.11,   Aorta Blood SUV Max: 1.62.     2. CT: Volumetric acquisition for clinical interpretation of the  chest, abdomen, and pelvis acquired at 3 mm sections after the  uneventful administration of intravenous contrast. The chest, abdomen,  and pelvis were evaluated at 5 mm sections in bone, soft tissue, and  lung windows.      The patient received 86 cc of Isovue 370 intravenously for the  examination.    --    3. 3D MIP and PET-CT  fused images were processed on an independent  workstation and archived to PACS and reviewed by a radiologist.    INDICATION: Malignant melanoma of right lower extremity including hip  (H)    ADDITIONAL INFORMATION OBTAINED FROM EMR: none    COMPARISON: PET CT 4/22/2022    FINDINGS:     HEAD/NECK:  There is no suspicious FDG uptake in the neck.    The paranasal sinuses and mastoid air cells are clear.    The mucosal pharyngeal space, the , prevertebral and carotid  spaces are within normal limits.     No masses, mass effect or pathologically enlarged lymph nodes are  evident. The thyroid gland is within normal limits..    CHEST:  7 mm hypermetabolic solid pulmonary nodule in the right lower lobe  with Max SUV 5.04, previously 4.2 (series 7, image 76).    Several new solid pulmonary nodules with low-level FDG avidity, for  example a 5 mm solid pulmonary nodule in the right upper lobe with Max  SUV 1.40 (series 7, image 35) and an adjacent 5 mm solid pulmonary  nodule with max SUV of 1.13 (series 7, image 36).    No airspace consolidation. No pleural effusion or pneumothorax.    The cardiac size is normal. Normal caliber aorta and main pulmonary  artery. No central pulmonary embolism. Moderate coronary artery  calcium. No enlarged or hypermetabolic thoracic lymph nodes.    ABDOMEN AND PELVIS:  Increased size and metabolic activity of numerous hepatic lesions,  including the following:  -4.6 x 4.3 cm mass in the left hepatic lobe with max SUV 20.8,  previously 2.5 x 2.4 cm with max SUV 24.44.  -2.8 x 3.4 cm mass in the inferior right hepatic lobe with max SUV  12.19, previously 1.1 x 1.0 cm with max SUV 5.70.    Increased size and metabolic activity of a centrally necrotic joseline  hepatis lymph node which measures 3.5 x 2.7 cm with max SUV 17.48,  previously 2.1 x 1.6 cm with max SUV 13.75.    Increased size of the large lymph node conglomerate within the right  lower abdomen and pelvis, intimately associated  with the external  iliac vessels, measuring approximately 8.4 x 6.7 cm with max SUV of  21.36, previously 6.3 x 5.4 cm with max SUV 25.8. There is  circumferential encasement of the right external iliac artery and  vein, with associated nonocclusive thrombus involving the distal right  external iliac vein and common femoral vein.    Increased size of the large right conglomerate of lymph nodes in the  right groin, measuring 8.2 x 6.4 cm with max SUV 23.22, previously 4.4  x 3.8 cm with max suv 26.97.    The gallbladder, biliary system, spleen, and pancreas are within  normal limits. Adrenal glands are unremarkable. Symmetric enhancement  kidneys. No hydronephrosis. Urinary bladder is within normal limits.  Unchanged endometrial hyper enhancement without associated elevated  FDG uptake.    Small sliding hiatal hernia. No abnormally dilated loops of small or  large bowel. The portal venous system appears patent. Suspected  contrast admixture within the right portal vein and extrahepatic main  portal vein. Normal caliber abdominal aorta.    LOWER EXTREMITIES:   No abnormal masses or hypermetabolic lesions.    BONES AND SOFT TISSUES:   Diffuse abnormal patchy FDG uptake throughout the axial and proximal  appendicular skeleton, which is new from the prior exam, highly  suspicious for new infiltrative osseous metastases. No associated  lytic or sclerotic changes in the bones, however physiologic (PET)  findings precede anatomic (CT) findings. No suspected pathologic  fracture.  Impression: IMPRESSION:   In this patient with a history of metastatic melanoma, findings of  disease progression:    1. New diffuse metastatic osseous disease.    2. Increased size of the hypermetabolic liver metastases.    3. Increased size of the hypermetabolic lymph node conglomerates  throughout the abdomen and pelvis.    4. Several new solid pulmonary nodules, without significant FDG  avidity but are also too small to accurately characterize  by PET.  These likely represent new metastatic disease.    5. Nonocclusive deep venous thrombosis within the right external iliac  vein, common femoral vein, and femoral vein, likely secondary to slow  venous flow from mass effect from the metastatic adenopathy. Consider  dedicated ultrasound of the right lower extremity for complete  evaluation of the thrombus burden.     ----  The findings of the study were discussed over the telephone with the  ordering provider Meghan Perdomo PA-C by Dr. Alexandra at 1:25 PM on  7/29/2022.    I have personally reviewed the examination and initial interpretation  and I agree with the findings.    MICHAEL LOPEZ MD         SYSTEM ID:  [id number]  CT Chest/Abdomen/Pelvis w Contrast  Narrative: Combined Report of:    PET and CT on  7/29/2022 11:14 AM :    1. PET of the neck, chest, abdomen, and pelvis.  2. PET CT Fusion for Attenuation Correction and Anatomical  Localization:    3. Diagnostic CT scan of the chest, abdomen, and pelvis with  intravenous contrast for interpretation.  4. 3D MIP and PET-CT fused images were processed on an independent  workstation and archived to PACS and reviewed by a radiologist.    Technique:    1. PET: The patient received 12.22 mCi of F-18-FDG; the serum glucose  was 83 prior to administration, body weight was 61.7 kg. Images were  evaluated in the axial, sagittal, and coronal planes as well as the  rotational whole body MIP. Images were acquired from the Vertex to the  Feet.    UPTAKE WAS MEASURED AT 60 MINUTES.     BACKGROUND:  Liver SUV max= 2.11,   Aorta Blood SUV Max: 1.62.     2. CT: Volumetric acquisition for clinical interpretation of the  chest, abdomen, and pelvis acquired at 3 mm sections after the  uneventful administration of intravenous contrast. The chest, abdomen,  and pelvis were evaluated at 5 mm sections in bone, soft tissue, and  lung windows.      The patient received 86 cc of Isovue 370 intravenously for the  examination.     --    3. 3D MIP and PET-CT fused images were processed on an independent  workstation and archived to PACS and reviewed by a radiologist.    INDICATION: Malignant melanoma of right lower extremity including hip  (H)    ADDITIONAL INFORMATION OBTAINED FROM EMR: none    COMPARISON: PET CT 4/22/2022    FINDINGS:     HEAD/NECK:  There is no suspicious FDG uptake in the neck.    The paranasal sinuses and mastoid air cells are clear.    The mucosal pharyngeal space, the , prevertebral and carotid  spaces are within normal limits.     No masses, mass effect or pathologically enlarged lymph nodes are  evident. The thyroid gland is within normal limits..    CHEST:  7 mm hypermetabolic solid pulmonary nodule in the right lower lobe  with Max SUV 5.04, previously 4.2 (series 7, image 76).    Several new solid pulmonary nodules with low-level FDG avidity, for  example a 5 mm solid pulmonary nodule in the right upper lobe with Max  SUV 1.40 (series 7, image 35) and an adjacent 5 mm solid pulmonary  nodule with max SUV of 1.13 (series 7, image 36).    No airspace consolidation. No pleural effusion or pneumothorax.    The cardiac size is normal. Normal caliber aorta and main pulmonary  artery. No central pulmonary embolism. Moderate coronary artery  calcium. No enlarged or hypermetabolic thoracic lymph nodes.    ABDOMEN AND PELVIS:  Increased size and metabolic activity of numerous hepatic lesions,  including the following:  -4.6 x 4.3 cm mass in the left hepatic lobe with max SUV 20.8,  previously 2.5 x 2.4 cm with max SUV 24.44.  -2.8 x 3.4 cm mass in the inferior right hepatic lobe with max SUV  12.19, previously 1.1 x 1.0 cm with max SUV 5.70.    Increased size and metabolic activity of a centrally necrotic joseline  hepatis lymph node which measures 3.5 x 2.7 cm with max SUV 17.48,  previously 2.1 x 1.6 cm with max SUV 13.75.    Increased size of the large lymph node conglomerate within the right  lower abdomen and  pelvis, intimately associated with the external  iliac vessels, measuring approximately 8.4 x 6.7 cm with max SUV of  21.36, previously 6.3 x 5.4 cm with max SUV 25.8. There is  circumferential encasement of the right external iliac artery and  vein, with associated nonocclusive thrombus involving the distal right  external iliac vein and common femoral vein.    Increased size of the large right conglomerate of lymph nodes in the  right groin, measuring 8.2 x 6.4 cm with max SUV 23.22, previously 4.4  x 3.8 cm with max suv 26.97.    The gallbladder, biliary system, spleen, and pancreas are within  normal limits. Adrenal glands are unremarkable. Symmetric enhancement  kidneys. No hydronephrosis. Urinary bladder is within normal limits.  Unchanged endometrial hyper enhancement without associated elevated  FDG uptake.    Small sliding hiatal hernia. No abnormally dilated loops of small or  large bowel. The portal venous system appears patent. Suspected  contrast admixture within the right portal vein and extrahepatic main  portal vein. Normal caliber abdominal aorta.    LOWER EXTREMITIES:   No abnormal masses or hypermetabolic lesions.    BONES AND SOFT TISSUES:   Diffuse abnormal patchy FDG uptake throughout the axial and proximal  appendicular skeleton, which is new from the prior exam, highly  suspicious for new infiltrative osseous metastases. No associated  lytic or sclerotic changes in the bones, however physiologic (PET)  findings precede anatomic (CT) findings. No suspected pathologic  fracture.  Impression: IMPRESSION:   In this patient with a history of metastatic melanoma, findings of  disease progression:    1. New diffuse metastatic osseous disease.    2. Increased size of the hypermetabolic liver metastases.    3. Increased size of the hypermetabolic lymph node conglomerates  throughout the abdomen and pelvis.    4. Several new solid pulmonary nodules, without significant FDG  avidity but are also too  small to accurately characterize by PET.  These likely represent new metastatic disease.    5. Nonocclusive deep venous thrombosis within the right external iliac  vein, common femoral vein, and femoral vein, likely secondary to slow  venous flow from mass effect from the metastatic adenopathy. Consider  dedicated ultrasound of the right lower extremity for complete  evaluation of the thrombus burden.     ----  The findings of the study were discussed over the telephone with the  ordering provider Meghan Perdomo PA-C by Dr. Alexandra at 1:25 PM on  7/29/2022.    I have personally reviewed the examination and initial interpretation  and I agree with the findings.    MICHAEL LOPEZ MD         SYSTEM ID:  [id number]      ASSESSMENT AND PLAN:  Metastatic acral melanoma, to lungs, liver, lymphnodes, and bone  She has received multiple lines of prior therapy including ipi/nivo and TVEC. She started Opdualag 5/11/22 and has received 4 cycles. PET-CT from 7/29/22 appears to showed disease progression. She reviewed potential clinical trials participation previously and she indicated she is not interested in any clinical trials.    Previously discussed by Dr. Franklin that we could continue Opdualag for another 6 weeks to confirm progression, however, given the degree of progression seen, true progression is most likely. However, if confirmed then next line of treatment would involve clinical trial (which she declines) or chemotherapy, either alone or in combination with immunotherapy. Options include Temozolomide or Carboplatin and paclitaxel. Option to pursue comfort measures was also discussed but patient is not ready to take this step.     She now is leaning towards pursuing chemotherapy pending the results of her upcoming scan. However, she feels her appetite will need to be improved prior to starting chemotherapy.    In the meantime, clinically appropriate to proceed with Opdualag tomorrow pending labs. CT CAP on 9/15  followed by Dr. Franklin on 9/16. Request placed to change to a PET CT given scalp lesions and inability to get into derm until 10/11.    Scalp lesions  Possibly metastatic disease. Can keep appointment with dermatology on 10/11/22 but will not likely change treatment plan. Will see if PET can be done instead of CT CAP.     Anorexia and weight loss.  Candida albicans esophagitis, recurrent  Constipation  Previously, EGD culture with positive candida albicans and has now completed 28 day course of fluconazole with no real change in symptoms. Patient still experiencing early satiety with poor appetite, plan to repeat endoscopy for sensitivities; scheduled on 9/12. In the meantime, we discussed the importance of a good bowel regimen. She will increase MiraLAX to twice daily as recommended by PCP and will increase fluid intake to a goal of 8, 8oz glasses per day. If no good BM by Friday, advised patient to notify us. At that time, could consider magnesium citrate. Also, reiterated strategies for increasing caloric intake such as supplementing with shakes. She is interested in follow-up with our dietician and I have requested this.     Mood. Understandably discouraged. She is not sure if she has started mirtazapine (see supportive care below). Has follow up with palliative care tomorrow and palliative care SW in October which has been helpful.     Leg pain. She is looking into purchasing an ice pack that she had in the past and was helpful. Removing wraps on legs can also provide relief. She has not tried tramadol yet. Remains reluctant to take opioids and pain appears to be tolerable with non-pharmacologic measures at this time.     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 Continue Eliquis anticoagulation lifelong. No bleeding concerns today.     RLE lymphema.   Secondary to tumors and new DVT. Improving per patient  Anticoagulation as above. Follows with lymphedema therapy.     Supportive Care  Patient  seems to have difficulty knowing which medications to take and when. IB message sent to [facility] to see what we can offer to help her sort this out, perhaps a phone encounter with a pharmacist.      Amber Scheierl, CNP    100 minutes spent on the date of the encounter doing chart review, review of test results, interpretation of tests, patient visit and documentation

## 2022-09-07 NOTE — NURSING NOTE
Patient declined individual allergy and medication review by support staff because meds were reviewed yesterday and no changes per pt.    Leonor Echols, VF

## 2022-09-08 NOTE — PROGRESS NOTES
Infusion Nursing Note:  Yadira Hoang presents today for Cycle 5 Day 1 nivolumab/reatlimab-rmbw.    Patient seen by provider today: No, virtual visit with Amber Scheierl, CNP   present during visit today: Not Applicable.    Note: Yadira presents today feeling okay. Has low back pain, provided pillows for comfort. Offers no changes or concerns since visit with Amber Scheierl yesterday.    Intravenous Access:  Peripheral IV placed.    Treatment Conditions:     Latest Reference Range & Units 09/08/22 11:56   Sodium 136 - 145 mmol/L 139   Potassium 3.4 - 5.3 mmol/L 3.9   Chloride 98 - 107 mmol/L 98   Carbon Dioxide (CO2) 22 - 29 mmol/L 29   Urea Nitrogen 8.0 - 23.0 mg/dL 14.6   Creatinine 0.51 - 0.95 mg/dL 0.72   GFR Estimate >60 mL/min/1.73m2 85   Calcium 8.8 - 10.2 mg/dL 9.9   Anion Gap 7 - 15 mmol/L 12   Albumin 3.5 - 5.2 g/dL 3.6   Protein Total 6.4 - 8.3 g/dL 7.1   Alkaline Phosphatase 35 - 104 U/L 86   ALT 10 - 35 U/L 13   AST 10 - 35 U/L 70 (H)   Bilirubin Total <=1.2 mg/dL 0.4   Glucose 70 - 99 mg/dL 82   WBC 4.0 - 11.0 10e3/uL 5.7   Hemoglobin 11.7 - 15.7 g/dL 10.7 (L)   Hematocrit 35.0 - 47.0 % 32.8 (L)   Platelet Count 150 - 450 10e3/uL 386   RBC Count 3.80 - 5.20 10e6/uL 3.52 (L)   MCV 78 - 100 fL 93   MCH 26.5 - 33.0 pg 30.4   MCHC 31.5 - 36.5 g/dL 32.6   RDW 10.0 - 15.0 % 14.5   % Neutrophils % 71   % Lymphocytes % 16   % Monocytes % 10   % Eosinophils % 1   % Basophils % 1   Absolute Basophils 0.0 - 0.2 10e3/uL 0.0   Absolute Eosinophils 0.0 - 0.7 10e3/uL 0.1   Absolute Immature Granulocytes <=0.4 10e3/uL 0.0   Absolute Lymphocytes 0.8 - 5.3 10e3/uL 0.9   Absolute Monocytes 0.0 - 1.3 10e3/uL 0.6   % Immature Granulocytes % 1   Absolute Neutrophils 1.6 - 8.3 10e3/uL 4.1   Absolute NRBCs 10e3/uL 0.0   NRBCs per 100 WBC <1 /100 0     Results reviewed, labs MET treatment parameters, ok to proceed with treatment.    Post Infusion Assessment:  Patient tolerated infusion without incident.  Blood  return noted pre and post infusion.  Site patent and intact, free from redness, edema or discomfort.  No evidence of extravasations.  Access discontinued per protocol.     Discharge Plan:   Patient declined prescription refills.  Discharge instructions reviewed with: Patient.  Patient and/or family verbalized understanding of discharge instructions and all questions answered.  AVS to patient via [employer].  Patient will return 09/16 for next appointment with Dr. Franklin.   Patient discharged in stable condition accompanied by: self.  Departure Mode: Ambulatory.      Luly Lozano RN

## 2022-09-08 NOTE — NURSING NOTE
Chief Complaint   Patient presents with     Labs Only     Venipuncture, vitals checked   PIV placed  Caterina Phoenix RN on 9/8/2022 at 12:00 PM

## 2022-09-08 NOTE — PROGRESS NOTES
Oncology Distress Screening Follow-up  Clinical Social Work  [facility]    Identified Concern and Score From Distress Screenin. How concerned are you about your ability to eat?  10 Abnormal        2. How concerned are you about unintended weight loss or your current weight?  10 Abnormal        3. How concerned are you about feeling depressed or very sad?  8 Abnormal        4. How concerned are you about feeling anxious or very scared?  8 Abnormal        5. Do you struggle with the loss of meaning and seth in your life?  Somewhat       6. How concerned are you about work and home life issues that may be affected by your cancer?  5       7. How concerned are you about knowing what resources are available to help you?  8 Abnormal        8. Do you currently have what you would describe as Lutheran or spiritual struggles?             Not at all               Date of Distress Screenin22      Data: Yadira was seen in virtual visit with Dr. Cole for ongoing symptom management.       Intervention/Education Provided:: BELGICA called and spoke to Yadira this afternoon, reintroducing self and reason for call. Yadira states she was getting her infusion, then phone got disconnected. BELGICA sent follow up [employer] message encouraging Yadira to call with any questions or resource needs.       Follow-up Required: SW will remain available as needed and appropriate.         JORGE Pelaez, [facility]  Clinical , Adult Oncology  Phone: [phone]

## 2022-09-08 NOTE — PATIENT INSTRUCTIONS
[facility] Triage and after hours / weekends / holidays:  [phone]    Please call the triage or after hours line if you experience a temperature greater than or equal to 100.4, shaking chills, have uncontrolled nausea, vomiting and/or diarrhea, dizziness, shortness of breath, chest pain, bleeding, unexplained bruising, or if you have any other new/concerning symptoms, questions or concerns.      If you are having any concerning symptoms or wish to speak to a provider before your next infusion visit, please call your care coordinator or triage to notify them so we can adequately serve you.     If you need a refill on a narcotic prescription or other medication, please call before your infusion appointment.

## 2022-09-09 NOTE — PROGRESS NOTES
Assessment & Plan     (R53.1) Weakness  (R10.84) Abdominal pain, generalized  (primary encounter diagnosis)  (K59.00) Constipation, unspecified constipation type  (C79.9) Metastatic melanoma (H)  (R10.84) Abdominal pain, generalized  (primary encounter diagnosis)  (R63.0) Decreased appetite  (R10.84) Abdominal pain, generalized  (primary encounter diagnosis)  (R74.8) Elevated liver enzymes  (D64.9) Anemia, unspecified type    Plan: CBC with platelets and differential,         Comprehensive metabolic panel, Lipase, CT         Abdomen Pelvis w Contrast, UA with Microscopic         reflex to Culture - Clinic Collect, ondansetron        (ZOFRAN) injection 4 mg, 0.9% sodium chloride         BOLUS, Urine Microscopic    Cancer is progressing.  Anemia and liver enzymes stable.  Abdominal pain stable.  Constipation present.  Weakness due to progressive cancer, anemia, depression/anxiety/stress, decreased po intake, etc.    Patient given 1L NS IV, zofran 4 mg IV, and fleets enema after which she had a good bowel movement.  She felt better after hydration and markedly less weak    64 ounces of water daily  Miralax 17 grams two times daily  Dulcolax 5 g once daily  Walk  Start mirtazapine for sleep/mood and to help maintain/increase weight  Continue close follow up with oncology team and nutrition to help with diet management  Discussed comfort measures versus continued cancer specific treatment.  She will consider     114 minutes spent on the date of the encounter doing chart review, review of outside records, review of test results, interpretation of tests, patient visit, documentation and discussion with other provider(s)      See Patient Instructions    No follow-ups on file.    Dmitry Estes MD  [facility]    Aylin May is a 77 year old female, presenting for the following health issues:  Dehydration      HPI     Concern - Abdominal pain and dehydration   Onset: x 1  year   Description: Not able to eat much, stomach pain, feeling of well   Intensity: severe  Progression of Symptoms:  worsening  Accompanying Signs & Symptoms: constipation   Previous history of similar problem:   Precipitating factors:        Worsened by:   Alleviating factors:        Improved by: sometimes going to the bathroom.   Therapies tried and outcome: None    This is a 78 yo female who has metastatic melanoma initially on the heel of her foot now has spread to her abdomen and chest.  She has markedly decreased appetite, is concerned about potential weight loss.  Also has abdominal pain that is ongoing and constipation, has not had a bowel movement in 2 weeks per her report.  She has been getting active cancer treatment for 2 years.  No fever or chills.  Is worried about her 3 yo granddaughter and  and does not want to die.  She is frustrated the cancer is spreading.  I reviewed her CT/PET scan from July 2022 with her.  Is concerned she is dehydrated and came to the clinic today as she thought she may not be able to make it through the weekend without being seen today.  Discussed whether she wanted to do extensive workup to evaluate cause of her constipation (?SBO or other) and abdominal pain or just wanted fluids and meds.  She and I collectively decided to do full workup today as she is not ready for comfort measures    Review of Systems   Constitutional, HEENT, cardiovascular, pulmonary, GI, , musculoskeletal, neuro, skin, endocrine and psych systems are negative, except as otherwise noted.      Objective    /70 (BP Location: Right arm, Patient Position: Sitting, Cuff Size: Adult Regular)   Pulse 97   Temp 97.3  F (36.3  C) (Oral)   SpO2 97%   There is no height or weight on file to calculate BMI.  Physical Exam   GENERAL: healthy, alert and no distress  EYES: Eyes grossly normal to inspection, PERRL and conjunctivae and sclerae normal  HENT: ear canals and TM's normal, nose and mouth  without ulcers or lesions  NECK: no adenopathy, no asymmetry, masses, or scars and thyroid normal to palpation  RESP: lungs clear to auscultation - no rales, rhonchi or wheezes  CV: regular rate and rhythm, normal S1 S2, no S3 or S4, no murmur, click or rub, no peripheral edema and peripheral pulses strong  ABDOMEN: soft, nontender, no hepatosplenomegaly, no masses and bowel sounds normal  MS: no gross musculoskeletal defects noted, no edema  SKIN: no suspicious lesions or rashes  NEURO: Normal strength and tone, mentation intact and speech normal  PSYCH: mentation appears normal, affect normal/bright    Results for orders placed or performed in visit on 09/09/22   CT Abdomen Pelvis w Contrast     Status: Abnormal   Result Value Ref Range    Radiologist flags Worsening metastatic disease and moderate stool (Urgent)     Narrative    EXAMINATION: CT ABDOMEN PELVIS W CONTRAST, 9/9/2022 4:20 PM    INDICATION: Abdominal pain, generalized    COMPARISON STUDY: PET CT 7/29/2022    TECHNIQUE: CT scan of the abdomen and pelvis was performed on  multidetector CT scanner using volumetric acquisition technique and  images were reconstructed in multiple planes with variable thickness  and reviewed on dedicated workstations.     CONTRAST: 75 cc Isovue-370 injected IV without oral contrast    CT scan radiation dose is optimized to minimum requisite dose using  automated dose modulation techniques.    FINDINGS:    Lower thorax: Normal.    Liver: Metastatic hepatic lesions noted, the largest in the left  hepatic lobe has increased measuring 5.2 x 5.1 cm, previously 4.7 x  4.4 cm.  Enlarging joseline hepatic mass series 2 image 44 measuring 2.1  x 2.5 cm, previously 2.8 x 2.9 cm. With mass effect and effacement of  the portal veins..    Biliary System: Normal gallbladder. No extrahepatic biliary ductal  dilation.    Pancreas: No mass or pancreatic ductal dilation.    Adrenal glands: No mass or nodules    Spleen: New/more conspicuous  hypodensities throughout the spleen.    Kidneys: No suspicious mass, obstructing calculus or hydronephrosis.    Gastrointestinal tract :Normal appendix. Normal caliber small bowel.   Moderate colonic stool burden.    Mesentery/peritoneum/retroperitoneum: Increasing previously noted FDG  avid lymph nodes/masses throughout the abdomen/pelvis. For example  just adjacent to the right iliopsoas (series 2 image 119) measuring  3.3 x 2.5 cm, previously 2.4 x 2 cm. Soft tissue nodule adjacent to  the transverse colon on series 2 image 96, possibly new from prior  exam. Mass in the right pelvis and right inguinal region encasing the  right external iliac artery and vein) is overall likely enlarged for  example cross-sectional pelvic component measures 8.6 x 7.4 cm  previously 7.6 x 6.6 cm, and inguinal component measures 8.6 x 7.9 cm,  previously 7.6 x 8.2 cm. (AP x TR measurements).    Vasculature: Patent major intra-abdominal vasculature including main  portal vein is abutted by joseline hepatic masses and right external  iliac artery encased by large pelvic/inguinal mass.  Slow flow versus  nonocclusive thrombus in the right femoral, common femoral, and  external iliac vein with faint contrast enhancement on slightly more  delayed imaging (series 3).    Pelvis: Urinary bladder is normal.  The uterus and left ovary are  within normal limits. Stable right ovarian simple cyst measuring up to  1.8 cm.    Osseous structures: Known osseous metastases are better visualized on  comparison PET CT.      Soft tissues: Scattered soft tissue density nodules for example  inferior right breast on series 2 image 6 and posterior right thigh  were seen on prior PET.      Impression    IMPRESSION: In this patient with history of metastatic melanoma,  findings are suspicious for progression of disease.  1. New/more conspicuous splenic and peritoneal metastatic lesions from  prior PET/CT.  2. Increasing size of known abdominopelvic and soft  tissue metastatic  lesions including liver and right pelvis/inguinal region.  3. Moderate colonic stool burden.  4. Nonocclusive thrombus vs slow flow in right femoral, common  femoral, and external iliac veins again noted.    [Urgent Result: Worsening metastatic disease and moderate stool  burden]    Finding was identified on 9/9/2022 4:23 PM.     Dr Estes was contacted by Dr. Morrow at 9/9/2022 4:40 PM and  verbalized understanding of the urgent finding.     I have personally reviewed the examination and initial interpretation  and I agree with the findings.    DINORAH MCKEON,          SYSTEM ID:  [id number]   Results for orders placed or performed in visit on 09/09/22   Comprehensive metabolic panel     Status: Abnormal   Result Value Ref Range    Sodium 138 133 - 144 mmol/L    Potassium 4.2 3.4 - 5.3 mmol/L    Chloride 100 94 - 109 mmol/L    Carbon Dioxide (CO2) 32 20 - 32 mmol/L    Anion Gap 6 3 - 14 mmol/L    Urea Nitrogen 15 7 - 30 mg/dL    Creatinine 0.66 0.52 - 1.04 mg/dL    Calcium 9.9 8.5 - 10.1 mg/dL    Glucose 85 70 - 99 mg/dL    Alkaline Phosphatase 83 40 - 150 U/L    AST 74 (H) 0 - 45 U/L    ALT 22 0 - 50 U/L    Protein Total 7.3 6.8 - 8.8 g/dL    Albumin 2.9 (L) 3.4 - 5.0 g/dL    Bilirubin Total 0.6 0.2 - 1.3 mg/dL    GFR Estimate 89 >60 mL/min/1.73m2   Lipase     Status: Normal   Result Value Ref Range    Lipase 94 73 - 393 U/L   UA with Microscopic reflex to Culture - Clinic Collect     Status: Abnormal    Specimen: Urine, Midstream   Result Value Ref Range    Color Urine Yellow Colorless, Straw, Light Yellow, Yellow    Appearance Urine Clear Clear    Glucose Urine Negative Negative mg/dL    Bilirubin Urine Negative Negative    Ketones Urine Negative Negative mg/dL    Specific Gravity Urine 1.026 0.999 - 1.035    Blood Urine Trace (A) Negative    pH Urine 5.5 5.0 - 7.0    Protein Albumin Urine 30  (A) Negative mg/dL    Nitrite Urine Negative Negative    Leukocyte Esterase Urine Negative  Negative    Urobilinogen Urine 4.0 (A) Normal, 2.0 mg/dL   CBC with platelets and differential     Status: Abnormal   Result Value Ref Range    WBC Count 5.9 4.0 - 11.0 10e3/uL    RBC Count 3.53 (L) 3.80 - 5.20 10e6/uL    Hemoglobin 10.9 (L) 11.7 - 15.7 g/dL    Hematocrit 33.7 (L) 35.0 - 47.0 %    MCV 96 78 - 100 fL    MCH 30.9 26.5 - 33.0 pg    MCHC 32.3 31.5 - 36.5 g/dL    RDW 14.5 10.0 - 15.0 %    Platelet Count 373 150 - 450 10e3/uL    % Neutrophils 72 %    % Lymphocytes 17 %    % Monocytes 9 %    % Eosinophils 1 %    % Basophils 0 %    % Immature Granulocytes 1 %    NRBCs per 100 WBC 0 <1 /100    Absolute Neutrophils 4.3 1.6 - 8.3 10e3/uL    Absolute Lymphocytes 1.0 0.8 - 5.3 10e3/uL    Absolute Monocytes 0.5 0.0 - 1.3 10e3/uL    Absolute Eosinophils 0.1 0.0 - 0.7 10e3/uL    Absolute Basophils 0.0 0.0 - 0.2 10e3/uL    Absolute Immature Granulocytes 0.0 <=0.4 10e3/uL    Absolute NRBCs 0.0 10e3/uL   Urine Microscopic     Status: Abnormal   Result Value Ref Range    Bacteria Urine Few (A) None Seen /HPF    RBC Urine 0-2 0-2 /HPF /HPF    WBC Urine 0-5 0-5 /HPF /HPF    Squamous Epithelials Urine Few (A) None Seen /LPF    Mucus Urine Present (A) None Seen /LPF    Narrative    Urine Culture not indicated   CBC with platelets and differential     Status: Abnormal    Narrative    The following orders were created for panel order CBC with platelets and differential.  Procedure                               Abnormality         Status                     ---------                               -----------         ------                     CBC with platelets and d...[[id number]]  Abnormal            Final result                 Please view results for these tests on the individual orders.

## 2022-09-09 NOTE — PROGRESS NOTES
Social Work Note: Telephone Call  Oncology Clinic    Data/Intervention:  Patient Name:  Yadira Hoang  /Age:  1945 (77 year old)    Call From:  BELGICA  Reason for Call:  Psychosocial follow up    Assessment:  BELGICA called and spoke to Yadira briefly this morning. She states she is on her way to the ED on the advice of Dr. Blanton. Yadira reports she is really struggling with feeling poorly, unable to eat, but she is also feeling very angry that this has been going on so long and only now is being sent to the ER. She notes that she understands if she isn't able to start eating it will be a significant issue. BELGICA provided empathetic listening and supportive counseling. BELGICA acknowledged Yadira's frustration and expressed her hope that Yadira would get some answers today with the visit to the ER. BELGICA and Yadira spoke about plans for follow up call, Yadira has a very full schedule next week of appointments. BELGICA suggested Yadira could call back at a time that works best for Yadira, Yadira agreed.     Plan:  Yadira on her way to ED, will contact BELGICA at a time that is better for her.      JORGE Pelaez, [facility]  Clinical , Adult Oncology  Phone: [phone]

## 2022-09-09 NOTE — ORAL ONC MGMT
Oral Chemotherapy Monitoring Program     Placed call to patient per request from Amber Scheierl. Patient had a question about her liver function and her current medications. We discussed her most recent labs, which show slightly elevated AST of 70, but no other concerns with liver labs. Yadira wanted to know if it was ok to keep taking her current medications with her liver labs. Advised her that this was ok, and that we would continue to monitor the liver labs monthly. No other questions or concerns at this time. Provided oral chemotherapy pharmacist number for future questions.    Ellyn Mei, PharmD, [facility]  Hematology/Oncology Clinical Pharmacist  [facility]  [facility]

## 2022-09-09 NOTE — PATIENT INSTRUCTIONS
64 ounces of water daily  Miralax 17 grams two times daily  Senna-docusate one tablet two times daily  Dulcolax 5 g once daily

## 2022-09-09 NOTE — TELEPHONE ENCOUNTER
Patient has been taking 2 servings of Miralax and not helping  Patient states last bowel movement is greater than 1 week  Patient c/o abdominal pain and bloating  Has appointment for EGD on Monday, 9/12    Instructed patient needs to be seen for constipation  Patient to go to urgent care for further evaluation  Patient verbalized understanding    Betty MIRAMONTES, RN

## 2022-09-11 NOTE — H&P
[facility] History and Physical    Yadira Hoang MRN# [id number]   Age: 77 year old YOB: 1945     Date of Admission:  (Not on file)    Home clinic: [facility]  Primary care provider: Erika Blanton          Impression and Plan:   Impression:   Candida albicans infection [B37.9]  Last EGD 6/22 - white plaques/fungal infection      Plan:   Proceed to EGD as planned.  The procedure, risks(bleeding, perforation), benefits and alternatives were discussed and the patient agrees to proceed. Cleared for Anesthesia             Chief Complaint:   Candida albicans infection [B37.9]    History is obtained from the patient          History of Present Illness:   This 77 year old female(poor historian) is being seen at this time for evaluation for EGD.  Last EGD 6/22 - candidiasis.  C/o of poor oral intake.  No pain.             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     ALEXUS (generalized anxiety disorder) 05/07/2020     Major depression, recurrent, chronic (H) 05/07/2020     Melanoma (H)      Nonsenile cataract      Sleep apnea     CPAP            Past Surgical History:     Past Surgical History:   Procedure Laterality Date     BIOPSY NODE SENTINEL Right 2/10/2020    Procedure: [place] Lymph Node Mapping And Biopsy;  Surgeon: Gabriela Dorsey MD;  Location:  OR     COMBINED ESOPHAGOSCOPY, GASTROSCOPY, DUODENOSCOPY (EGD) WITH CO2 INSUFFLATION N/A 6/20/2022    Procedure: ESOPHAGOGASTRODUODENOSCOPY, WITH CO2 INSUFFLATION;  Surgeon: Yu Alfred DO;  Location: [facility] OR     ESOPHAGOGASTRODUODENOSCOPY, WITH BRUSHINGS  1/12/2022    Procedure: Esophagogastroduodenoscopy, With Brushings;  Surgeon: Dayo Merritt MD;  Location:  GI     ESOPHAGOSCOPY, GASTROSCOPY, DUODENOSCOPY (EGD), COMBINED N/A 6/20/2022    Procedure: ESOPHAGOGASTRODUODENOSCOPY, WITH BIOPSY;  Surgeon: Yu Alfred DO;  Location:  OR      EXCISE LESION LOWER EXTREMITY Right 2/10/2020    Procedure: Wide Local Excision of RIGHT heel melanoma;  Surgeon: Gabriela Dorsey MD;  Location: [facility] OR     EXCISE LESION LOWER EXTREMITY Right 2/20/2020    Procedure: Wide local Re-excision RIGHT heel Wound vac application;  Surgeon: Gabriela Dorsey MD;  [facility]: [facility] OR     GRAFT SKIN SPLIT THICKNESS FROM EXTREMITY Right 4/2/2020    Procedure: split skin graft from right thigh;  Surgeon: NIKA Crabtree MD;  Location: [facility] OR     IRRIGATION AND DEBRIDEMENT FOOT, COMBINED Right 4/2/2020    Procedure: right foot/heel debridement;  Surgeon: NIKA Crabtree MD;  Location: [facility] OR     ORTHOPEDIC SURGERY Right     heel surgery x2     RECONSTRUCT FOOT Right 3/4/2020    Procedure: Right heel reconstruction with regional flap, biologic dressing and SPY;  Surgeon: NIKA Crabtree MD;  Location: [facility] OR     TUBAL LIGATION              Social History:     Social History     Tobacco Use     Smoking status: Never Smoker     Smokeless tobacco: Never Used   Substance Use Topics     Alcohol use: Not Currently            Family History:     Family History   Problem Relation Age of Onset     Glaucoma No family hx of      Macular Degeneration No family hx of      Melanoma No family hx of      Skin Cancer No family hx of             Immunizations:     VACCINE/DOSE   Diptheria   DPT   DTAP   HBIG   Hepatitis A   Hepatitis B   HIB   Influenza   Measles   Meningococcal   MMR   Mumps   Pneumococcal   Polio   Rubella   Small Pox   TDAP   Varicella   Zoster            Allergies:     Allergies   Allergen Reactions     Prednisone      Leg and back pain     Atorvastatin      Statins all swelling     Hmg-Coa-R Inhibitors Swelling            Medications:     No current facility-administered medications for this encounter.     Current Outpatient Medications   Medication Sig     apixaban ANTICOAGULANT (ELIQUIS) 5 MG tablet Take 1 tablet (5 mg) by mouth 2 times daily      bisacodyl (DULCOLAX) 5 MG EC tablet Take 1 tablet (5 mg) by mouth daily     carboxymethylcellulose PF (REFRESH LIQUIGEL) 1 % ophthalmic gel 1 drop 3 times daily     cholecalciferol (VITAMIN D3) 125 MCG (5000 UT) TABS tablet Take 125 mcg by mouth every other day     dicyclomine (BENTYL) 10 MG capsule Take 1 capsule (10 mg) by mouth 4 times daily (before meals and nightly)     estradiol (ESTRACE) 0.1 MG/GM vaginal cream Place 2 g vaginally twice a week     famotidine (PEPCID) 20 MG tablet Take 1 tablet (20 mg) by mouth 2 times daily as needed (acid reflux/heartburn) (Patient not taking: No sig reported)     fish oil-omega-3 fatty acids 1000 MG capsule Take 2 g by mouth daily     mirtazapine (REMERON) 15 MG tablet Take 1 tablet (15 mg) by mouth At Bedtime     multivitamin w/minerals (THERA-VIT-M) tablet Take 1 tablet by mouth daily (Patient not taking: No sig reported)     ondansetron (ZOFRAN) 8 MG tablet Take 1 tablet (8 mg) by mouth every 8 hours as needed for nausea     Riboflavin 100 MG TABS Take 400 mg by mouth daily (Patient not taking: No sig reported)     senna-docusate (SENOKOT-S/PERICOLACE) 8.6-50 MG tablet Take 1 tablet by mouth 2 times daily     Facility-Administered Medications Ordered in Other Encounters   Medication     sodium chloride (PF) 0.9% PF flush 10-60 mL             Review of Systems:   The review of systems was positive for the following findings.  None.  The remainder of the review of systems was unremarkable.          Physical Exam:   All vitals have been reviewed  not currently breastfeeding.  No intake or output data in the 24 hours ending 09/11/22 1646  SHEENT examination revealed NC/aT EOMI.  Examination of the chest revealed CTA.  Examination of the heart revealed RRR.  Examination of the abdomen revealed soft, non tender.  The neuromuscular examination was NL.          Data:   All laboratory data reviewed  No results found for any visits on 09/12/22.  -     Chivo Godinez MD, FACS

## 2022-09-12 NOTE — ANESTHESIA PREPROCEDURE EVALUATION
Anesthesia Pre-Procedure Evaluation    Patient: Yadira Hoang   MRN: [id number] : 1945        Procedure : Procedure(s):  ESOPHAGOGASTRODUODENOSCOPY (EGD)          Past Medical History:   Diagnosis Date     Concussion     brain injury     ALEXUS (generalized anxiety disorder) 2020     Major depression, recurrent, chronic (H) 2020     Melanoma (H)      Nonsenile cataract      Sleep apnea     CPAP      Past Surgical History:   Procedure Laterality Date     BIOPSY NODE SENTINEL Right 2/10/2020    Procedure: [place] Lymph Node Mapping And Biopsy;  Surgeon: Gabriela Dorsey MD;  Location: [facility] OR     COMBINED ESOPHAGOSCOPY, GASTROSCOPY, DUODENOSCOPY (EGD) WITH CO2 INSUFFLATION N/A 2022    Procedure: ESOPHAGOGASTRODUODENOSCOPY, WITH CO2 INSUFFLATION;  Surgeon: Yu Alfred DO;  Location: [facility] OR     ESOPHAGOGASTRODUODENOSCOPY, WITH BRUSHINGS  2022    Procedure: Esophagogastroduodenoscopy, With Brushings;  Surgeon: Dayo Merritt MD;  Location:  GI     ESOPHAGOSCOPY, GASTROSCOPY, DUODENOSCOPY (EGD), COMBINED N/A 2022    Procedure: ESOPHAGOGASTRODUODENOSCOPY, WITH BIOPSY;  Surgeon: Yu Alfred DO;  Location: [facility] OR     EXCISE LESION LOWER EXTREMITY Right 2/10/2020    Procedure: Wide Local Excision of RIGHT heel melanoma;  Surgeon: Gabriela Dorsey MD;  Location: [facility] OR     EXCISE LESION LOWER EXTREMITY Right 2020    Procedure: Wide local Re-excision RIGHT heel Wound vac application;  Surgeon: Gabriela Dorsey MD;  [facility]     GRAFT SKIN SPLIT THICKNESS FROM EXTREMITY Right 2020    Procedure: split skin graft from right thigh;  Surgeon: NIKA Crabtree MD;  Location: [facility] OR     IRRIGATION AND DEBRIDEMENT FOOT, COMBINED Right 2020    Procedure: right foot/heel debridement;  Surgeon: NIKA Crabtree MD;  Location: [facility] OR     ORTHOPEDIC SURGERY Right     heel surgery x2     RECONSTRUCT FOOT Right 3/4/2020    Procedure:  Right heel reconstruction with regional flap, biologic dressing and SPY;  Surgeon: NIKA Crabtree MD;  Location: [facility] OR     TUBAL LIGATION        Allergies   Allergen Reactions     Prednisone      Leg and back pain     Atorvastatin      Statins all swelling     Hmg-Coa-R Inhibitors Swelling      Social History     Tobacco Use     Smoking status: Never Smoker     Smokeless tobacco: Never Used   Substance Use Topics     Alcohol use: Not Currently      Wt Readings from Last 1 Encounters:   09/09/22 58.1 kg (128 lb)        Anesthesia Evaluation   Pt has had prior anesthetic. Type: MAC and General.    No history of anesthetic complications       ROS/MED HX  ENT/Pulmonary:     (+) sleep apnea, moderate, uses CPAP,     Neurologic:     (+) migraines, TIA,     Cardiovascular:     (+) Dyslipidemia -----Previous cardiac testing   Echo: Date: Results:    Stress Test: Date: Results:    ECG Reviewed: Date: 1/20/22 Results:  SR  Cath: Date: Results:      METS/Exercise Tolerance:     Hematologic:     (+) anemia,     Musculoskeletal:   (+) arthritis,     GI/Hepatic:     (+) GERD, Symptomatic,     Renal/Genitourinary:  - neg Renal ROS     Endo:  - neg endo ROS     Psychiatric/Substance Use:     (+) psychiatric history anxiety and depression     Infectious Disease:  - neg infectious disease ROS     Malignancy:   (+) Malignancy, History of Other.Other CA CA of foot/heel Remission status post Surgery.    Other:  - neg other ROS    (+) , H/O Chronic Pain,        Physical Exam    Airway  airway exam normal      Mallampati: II   TM distance: > 3 FB   Neck ROM: full   Mouth opening: > 3 cm    Respiratory Devices and Support         Dental       (+) caps      Cardiovascular   cardiovascular exam normal       Rhythm and rate: regular and normal     Pulmonary   pulmonary exam normal        breath sounds clear to auscultation           OUTSIDE LABS:  CBC:   Lab Results   Component Value Date    WBC 5.9 09/09/2022    WBC 5.7 09/08/2022     HGB 10.9 (L) 09/09/2022    HGB 10.7 (L) 09/08/2022    HCT 33.7 (L) 09/09/2022    HCT 32.8 (L) 09/08/2022     09/09/2022     09/08/2022     BMP:   Lab Results   Component Value Date     09/09/2022     09/08/2022    POTASSIUM 4.2 09/09/2022    POTASSIUM 3.9 09/08/2022    CHLORIDE 100 09/09/2022    CHLORIDE 98 09/08/2022    CO2 32 09/09/2022    CO2 29 09/08/2022    BUN 15 09/09/2022    BUN 14.6 09/08/2022    CR 0.66 09/09/2022    CR 0.72 09/08/2022    GLC 85 09/09/2022    GLC 82 09/08/2022     COAGS:   Lab Results   Component Value Date    INR 1.09 09/01/2021     POC:   Lab Results   Component Value Date    BGM 96 04/02/2020     HEPATIC:   Lab Results   Component Value Date    ALBUMIN 2.9 (L) 09/09/2022    PROTTOTAL 7.3 09/09/2022    ALT 22 09/09/2022    AST 74 (H) 09/09/2022    ALKPHOS 83 09/09/2022    BILITOTAL 0.6 09/09/2022     OTHER:   Lab Results   Component Value Date    GABY 9.9 09/09/2022    LIPASE 94 09/09/2022    AMYLASE 45 10/28/2020    TSH 1.70 09/08/2022    CRP 14.7 (H) 11/24/2021    SED 36 (H) 11/24/2021       Anesthesia Plan    ASA Status:  2   NPO Status:  NPO Appropriate    Anesthesia Type: MAC.      Maintenance: TIVA.        Consents    Anesthesia Plan(s) and associated risks, benefits, and realistic alternatives discussed. Questions answered and patient/representative(s) expressed understanding.    - Discussed:     - Discussed with:  Patient    Use of blood products discussed: No .     Postoperative Care            Comments:    Other Comments: The risks and benefits of anesthesia, and the alternatives where applicable, have been discussed with the patient, and they wish to proceed.            Abhi Wood, APRN CRNA

## 2022-09-12 NOTE — PROGRESS NOTES
Assessment & Plan     Vaginal symptom  Suspect that she could be dealing with atrophic vaginitis as her wet prep is negative but she currently doesn't have symptoms. Offered vaginal exam today but declined. Did order Wet prep that she can do when she has active symptoms.  She is very tired today, she does talk more about her concerns with her primary care, her cancer diagnosis, her lack of appetite. She notes she went to  and they gave her some fluids and she notes that has helped significantly, she can actually eat some now but small portions.   - Wet prep  - Wet prep      Return in about 1 week (around 9/19/2022) for If not improving, sooner if worse or new concerns.     Options for treatment and follow-up care were reviewed with the patient and/or guardian. Patient and/or guardian engaged in the decision making process and verbalized understanding of the options discussed and agreed with the final plan.      Tre Newby PA-C  [facility] KAREN May is a 77 year old, presenting for the following health issues:  Vaginal Problem      Vaginal Problem   This is a recurrent problem. The current episode started more than 1 week ago. The problem occurs daily. The problem has not changed since onset.Vaginal discharge characteristics: ITCHING. Associated symptoms include genital itching.   History of Present Illness       Reason for visit:  Yeast Infection    She eats 2-3 servings of fruits and vegetables daily.She consumes 0 sweetened beverage(s) daily.She exercises with enough effort to increase her heart rate 10 to 19 minutes per day.  She exercises with enough effort to increase her heart rate 3 or less days per week.   She is taking medications regularly.    Today's PHQ-9         PHQ-9 Total Score: 15    PHQ-9 Q9 Thoughts of better off dead/self-harm past 2 weeks :   Not at all    How difficult have these problems made it for you to do your work, take care of things at home,  "or get along with other people: Very difficult       Symptoms happen on and off, she'll experience vaginal itching and irritation, doesn't currently have it today.   She has no urinary symptoms. No vaginal discharge.       Review of Systems   Genitourinary: Positive for vaginal discharge.      Constitutional,  gi and gu systems are negative, except as otherwise noted.      Objective    /60   Pulse 111   Temp 97.9  F (36.6  C) (Temporal)   Resp 22   Ht 1.702 m (5' 7\")   Wt 61.2 kg (135 lb)   SpO2 97%   BMI 21.14 kg/m    Body mass index is 21.14 kg/m .  Physical Exam   GENERAL: alert, no distress and fatigued  MS: no gross musculoskeletal defects noted, no edema  PSYCH: mentation appears normal, affect normal/bright    Results for orders placed or performed in visit on 09/12/22   Wet prep     Status: Abnormal    Specimen: Vagina; Swab   Result Value Ref Range    Trichomonas Absent Absent    Yeast Absent Absent    Clue Cells Absent Absent    WBCs/high power field 2+ (A) None   Results for orders placed or performed during the hospital encounter of 09/12/22   UPPER GI ENDOSCOPY     Status: None   Result Value Ref Range    Upper GI Endoscopy       [address] [address]  _______________________________________________________________________________  Patient Name: Yadira Hoang      Procedure Date: 9/12/2022 8:03 AM  MRN: [id number]                       Account Number: [id number]  YOB: 1945              Admit Type: Outpatient  Age: 77                               Room: [facility] 02  Gender: Female                        Note Status: Finalized  Attending MD: KINGSLEY IBRAHIM MD    Total Sedation Time:   _______________________________________________________________________________     Procedure:             Upper GI endoscopy  Indications:           Failure to thrive, Malnutrition, Weight loss  Providers:             KINGSLEY IBRAHIM, " MD  Referring MD:          KEITH Cohn  Medicines:             Propofol per Anesthesia  Complications:         No immediate complications.  _______________________________________________________________________________   Procedure:             Pre-Anesthesia Assessment:                         - Prior to the procedure, a History and Physical was                          performed, and patient medications, allergies and                          sensitivities were reviewed. The patient's tolerance                          of previous anesthesia was reviewed.                         - Pre-procedure physical examination revealed no                          contraindications to sedation.                         After obtaining informed consent, the endoscope was                          passed under direct vision. Throughout the procedure,                          the patient's blood pressure, pulse, and oxygen                          saturations were monitored continuously. The Endoscope                          was introduced through the mouth, and advanced to the                          second part of duodenum.                                                                                   Findings:        The Z-line was irregular and was found 40 cm from the incisors. Biopsies        were taken with a cold forceps for histology.       The entire examined stomach was normal. Biopsies were taken with a cold        forceps for Helicobacter pylori testing.       The examined duodenum was normal.                                                                                   Impression:            - Z-line irregular, 40 cm from the incisors. Biopsied.                         - Normal stomach. Biopsied.                         - Normal examined duodenum.  Recommendation:        - Await pathology results.                         - Follow an antireflux regimen.                         - Continue present  medications.                                                                                     Chivo Ibrahim  ___________________  CHIVO IBRAHIM MD  9/12/2022 8:19:55 AM  I was physically present for the entire viewing portion of the exam.CHIVO IBRAHIM MD  Number of Addenda : 0    Note Initiated On: 9/12/2022 8:03 AM

## 2022-09-12 NOTE — Clinical Note
Hello.  I met this patient for the first time.  I do not have great background on her as I saw her for an acute visit but she really kept talking about her lack of appetite and fatigue and how the IV she received from [facility] was so helpful for her. I don't know if she has had any discussions with Palliative or if you feel that would be beneficial for her.    Thanks  Tre Newby PA-C

## 2022-09-12 NOTE — DISCHARGE INSTRUCTIONS
[facility]    Home Care Following Endoscopy          Activity:  You have just undergone an endoscopic procedure usually performed with conscious sedation.  Do not work or operate machinery (including a car) for at least 12 hours.    I encourage you to walk and attempt to pass this air as soon as possible.    Diet:  Return to the diet you were on before your procedure but eat lightly for the first 12-24 hours.  Drink plenty of water.  Resume any regular medications unless otherwise advised by your physician.  Please begin any new medication prescribed as a result of your procedure as directed by your physician.   If you had any biopsy or polyp removed please refrain from aspirin or aspirin products for 2 days.  If on Coumadin please restart as instructed by your physician.   Pain:  You may take Tylenol as needed for pain.  Expected Recovery:  You can expect some mild abdominal fullness and/or discomfort due to the air used to inflate your intestinal tract. It is also normal to have a mild sore throat after upper endoscopy.    Call Your Physician if You Have:  After Upper Endoscopy:  Shoulder, back or chest pain.  Difficulty breathing or swallowing.  Vomiting blood.    Any questions or concerns about your recovery, please call [phone].    Follow-up Care:  You did have biopsy tissue sample(s) removed.  The biopsy tissue sample(s) will be sent to pathology.  You should receive letter from Dr. Godinez in your My Chart with your results within 1-2 weeks. If you do not participate in My Chart a physical letter will come in the mail in 2-3 weeks.  Please call if you have not received a notification of your results.  If asked to return to clinic please make an appointment 1 week after your procedure.  Call [phone].      Resume your Eliquis tomorrow (Tuesday 9/13/22).    Start taking the Famotidine (Pepcid) daily.

## 2022-09-12 NOTE — ANESTHESIA CARE TRANSFER NOTE
Patient: Yadira Hoang    Procedure: Procedure(s):  ESOPHAGOGASTRODUODENOSCOPY, WITH BIOPSY       Diagnosis: Candida albicans infection [B37.9]  Diagnosis Additional Information: No value filed.    Anesthesia Type:   MAC     Note:    Oropharynx: oropharynx clear of all foreign objects and spontaneously breathing  Level of Consciousness: drowsy  Oxygen Supplementation: room air    Independent Airway: airway patency satisfactory and stable  Dentition: dentition unchanged  Vital Signs Stable: post-procedure vital signs reviewed and stable  Report to RN Given: handoff report given  Patient transferred to: Phase II    Handoff Report: Identifed the Patient, Identified the Reponsible Provider, Reviewed the pertinent medical history, Discussed the surgical course, Reviewed Intra-OP anesthesia mangement and issues during anesthesia, Set expectations for post-procedure period and Allowed opportunity for questions and acknowledgement of understanding      Vitals:  Vitals Value Taken Time   BP 90/47 09/12/22 0826   Temp     Pulse 85 09/12/22 0826   Resp     SpO2 97 % 09/12/22 0826   Vitals shown include unvalidated device data.    Electronically Signed By: YVONNE Fitch CRNA  September 12, 2022  8:27 AM

## 2022-09-12 NOTE — ANESTHESIA POSTPROCEDURE EVALUATION
Patient: Yadira Hoang    Procedure: Procedure(s):  ESOPHAGOGASTRODUODENOSCOPY, WITH BIOPSY       Anesthesia Type:  MAC    Note:  Disposition: Outpatient   Postop Pain Control: Uneventful            Sign Out: Well controlled pain   PONV: No   Neuro/Psych: Uneventful            Sign Out: Acceptable/Baseline neuro status   Airway/Respiratory: Uneventful            Sign Out: Acceptable/Baseline resp. status   CV/Hemodynamics: Uneventful            Sign Out: Acceptable CV status   Other NRE: NONE   DID A NON-ROUTINE EVENT OCCUR? No    Event details/Postop Comments:  Pt was happy with anesthesia care.  No complications.  I will follow up with the pt if needed.           Last vitals:  Vitals Value Taken Time   BP 98/55 09/12/22 0850   Temp     Pulse 78 09/12/22 0850   Resp 16 09/12/22 0840   SpO2 97 % 09/12/22 0852   Vitals shown include unvalidated device data.    Electronically Signed By: YVONNE Fitch CRNA  September 12, 2022  8:54 AM

## 2022-09-12 NOTE — PATIENT INSTRUCTIONS
- Next time you have symptoms come into our clinic and tell the  you have a lab order - Lab Order Wet Prep.   - I'll reach out to oncology about Palliative.   - Over the counter at the store you can get Vagasil or Monistat cream and that can sometimes help with symptoms as well.

## 2022-09-14 NOTE — PROGRESS NOTES
"Telemedicine Visit: The patient's condition can be safely assessed and treated via synchronous audio and visual telemedicine encounter.      Reason for Telemedicine Visit: [email or web address]     Originating Site (Patient Location): Patient's home    Distant Site (Provider Location): [facility]: palliative care clinic     Consent:  The patient/guardian has verbally consented to: the potential risks and benefits of telemedicine (video visit) versus in person care; bill my insurance or make self-payment for services provided; and responsibility for payment of non-covered services.     Mode of Communication:  Video Conference via [employer]    As the provider I attest to compliance with applicable laws and regulations related to telemedicine.    Palliative Care Clinical Social Work Return Appointment    PLEASE NOTE:  THIS IS A MENTAL HEALTH NOTE.  OTHER PROVIDERS VIEWING THIS NOTE SHOULD USE THIS ONLY FOR UNDERSTANDING THE CONTEXT OF THE PATIENT'S EXPERIENCE.  TOPICS DESCRIBED IN THIS NOTE SHOULD NOT BE REFERENCED TO THE PATIENT BY MEDICAL PROVIDERS.    Yadira Hoang is a 77 year old woman with diagnosis of stage IV acral melanoma, seen today via [employer] video for a return psychotherapy appointment to address adjustment related distress and depression as it relates to coping with illness and treatment.    Mental Status Exam:(List all that apply)      Appearance: Appropriate      Eye Contact: Good       Orientation: Yes, x4      Mood: Normal      Affect: Appropriate      Thought Content: Clear         Thought Form: Logical      Psychomotor Behavior: Normal    Visit themes:      Adjustment to Illness: Sleep was poor last night; pain has been more in recent weeks; got an infusion of \"IV nutrition\" in clinic and has been feeling better with more energy \"why didn't they do this sooner?\" Struggling with anorexia; Feelings of loneliness, feels disconnected from others, feels sad for impact her illness has had on he " rhusband; now has someone with her if her  goes out due to worries about falls.     Mental Health (thoughts, feelings, actions, coping, and symptoms): struggles with feeling disconnected from others; struggling with worry that she will die and her granddaughter will not remember her; struggling with feelings of anger and guilt that this is not the future she planned for herself.        Helpful activities: listening to gospel music     Helpful cognitions:     Unhelpful and/or triggering or exacerbating factors:     Body-Mind Skills Education:     Relationships: remain supportive, has good support from her children    End of Life and Advance Care Planning:     Main therapeutic interventions provided this session include:   Provide psychoeducation, Facilitate processing of thoughts and feelings and Facilitate structured problem solving,    Plan:  Will return for psychotherapy with palliative care focus in 1 month    Time spent with patient/family:  54 minutes minutes (Start 9:00, end 9:54)    Palliative Care Counseling Treatment Plan    Client's Name: Yadira Hoang  YOB: 1945    Date: June 17, 2022    Initial DSM5 Diagnoses:   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    Anticipated number of sessions to complete episode of care:  10         Treatment Goal(s)  Date Goal Dates  Reviewed Status   5/23/2019   Goal 1:  Client will develop effective strategies for depressed mood.      New   5/23/2019   Objective #1A (Client Action)  Patient will Identify activities that provide comfort and/or pleasure.    Intervention(s)  Therapist will Provide psychoeducation and Facilitate processing of thoughts and feelings .    New   5/23/2019   Objective #1B  Patient will Effectively communicate needs to  others.    Intervention(s)  Therapist will Provide psychoeducation, Facilitate processing of thoughts and feelings and Facilitate structured problem solving.    New   5/23/2019   Objective #1C  Patient will Identify an activity that would provide meaning/contribute to feeling of self worth.    Intervention(s)  Therapist will Provide psychoeducation, Facilitate processing of thoughts and feelings and Facilitate structured problem solving.    New       Date Goal Dates  Reviewed Status   5/23/2019   Goal 2:  Client will demonstrate effective management of grief / loss.    New   5/23/2019   Objective #2A (Client Action)  Patient will Increase understanding of loss and grief.    Intervention(s) (Therapist Action)  Therapist will Provide psychoeducation.    New   5/23/2019   Objective #2B  Patient will Identify losses related to illness.    Intervention(s)  Therapist will Provide psychoeducation and Facilitate processing of thoughts and feelings.    New   5/23/2019     Objective #2C  Patient will Develop strategies for coping with grief/loss.    Intervention(s)  Therapist will Provide psychoeducation.    New       Client has contributed regarding goals and concerns, but has not reviewed the treatment plan. Plan to review at future session.    JORGE Noriega, [facility]  Palliative Care Clinical        DO NOT SEND ANY LETTERS

## 2022-09-14 NOTE — LETTER
9/14/2022         RE: Yadira Hoang  [address] [facility] [address]        Dear Colleague,    Thank you for referring your patient, Yadira Hoang, to the [facility]. Please see a copy of my visit note below.    Telemedicine Visit: The patient's condition can be safely assessed and treated via synchronous audio and visual telemedicine encounter.      Reason for Telemedicine Visit: [email or web address]     Originating Site (Patient Location): Patient's home    Distant Site (Provider Location): [facility]: palliative care clinic     Consent:  The patient/guardian has verbally consented to: the potential risks and benefits of telemedicine (video visit) versus in person care; bill my insurance or make self-payment for services provided; and responsibility for payment of non-covered services.     Mode of Communication:  Video Conference via [employer]    As the provider I attest to compliance with applicable laws and regulations related to telemedicine.    Palliative Care Clinical Social Work Return Appointment    PLEASE NOTE:  THIS IS A MENTAL HEALTH NOTE.  OTHER PROVIDERS VIEWING THIS NOTE SHOULD USE THIS ONLY FOR UNDERSTANDING THE CONTEXT OF THE PATIENT'S EXPERIENCE.  TOPICS DESCRIBED IN THIS NOTE SHOULD NOT BE REFERENCED TO THE PATIENT BY MEDICAL PROVIDERS.    Yadira Hoang is a 77 year old woman with diagnosis of stage IV acral melanoma, seen today via [employer] video for a return psychotherapy appointment to address adjustment related distress and depression as it relates to coping with illness and treatment.    Mental Status Exam:(List all that apply)      Appearance: Appropriate      Eye Contact: Good       Orientation: Yes, x4      Mood: Normal      Affect: Appropriate      Thought Content: Clear         Thought Form: Logical      Psychomotor Behavior: Normal    Visit themes:      Adjustment to Illness: Sleep was poor last night; pain has been more in  "recent weeks; got an infusion of \"IV nutrition\" in clinic and has been feeling better with more energy \"why didn't they do this sooner?\" Struggling with anorexia; Feelings of loneliness, feels disconnected from others, feels sad for impact her illness has had on he rhusband; now has someone with her if her  goes out due to worries about falls.     Mental Health (thoughts, feelings, actions, coping, and symptoms): struggles with feeling disconnected from others; struggling with worry that she will die and her granddaughter will not remember her; struggling with feelings of anger and guilt that this is not the future she planned for herself.        Helpful activities: listening to [employer] music     Helpful cognitions:     Unhelpful and/or triggering or exacerbating factors:     Body-Mind Skills Education:     Relationships: remain supportive, has good support from her children    End of Life and Advance Care Planning:     Main therapeutic interventions provided this session include:   Provide psychoeducation, Facilitate processing of thoughts and feelings and Facilitate structured problem solving,    Plan:  Will return for psychotherapy with palliative care focus in 1 month    Time spent with patient/family:  54 minutes minutes (Start 9:00, end 9:54)    Palliative Care Counseling Treatment Plan    Client's Name: Yadira Hoang  YOB: 1945    Date: June 17, 2022    Initial DSM5 Diagnoses:   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    Anticipated number of sessions to complete episode of care:  10         Treatment Goal(s)  Date Goal Dates  Reviewed Status   5/23/2019   Goal 1:  Client will develop effective strategies for depressed mood.      New   5/23/2019   Objective #1A (Client Action)  Patient will Identify " activities that provide comfort and/or pleasure.    Intervention(s)  Therapist will Provide psychoeducation and Facilitate processing of thoughts and feelings .    New   5/23/2019   Objective #1B  Patient will Effectively communicate needs to others.    Intervention(s)  Therapist will Provide psychoeducation, Facilitate processing of thoughts and feelings and Facilitate structured problem solving.    New   5/23/2019   Objective #1C  Patient will Identify an activity that would provide meaning/contribute to feeling of self worth.    Intervention(s)  Therapist will Provide psychoeducation, Facilitate processing of thoughts and feelings and Facilitate structured problem solving.    New       Date Goal Dates  Reviewed Status   5/23/2019   Goal 2:  Client will demonstrate effective management of grief / loss.    New   5/23/2019   Objective #2A (Client Action)  Patient will Increase understanding of loss and grief.    Intervention(s) (Therapist Action)  Therapist will Provide psychoeducation.    New   5/23/2019   Objective #2B  Patient will Identify losses related to illness.    Intervention(s)  Therapist will Provide psychoeducation and Facilitate processing of thoughts and feelings.    New   5/23/2019     Objective #2C  Patient will Develop strategies for coping with grief/loss.    Intervention(s)  Therapist will Provide psychoeducation.    New       Client has contributed regarding goals and concerns, but has not reviewed the treatment plan. Plan to review at future session.    JORGE Noriega, [facility]  Palliative Care Clinical        DO NOT SEND ANY LETTERS              Again, thank you for allowing me to participate in the care of your patient.        Sincerely,        Taty Serrano [facility]

## 2022-09-15 NOTE — TELEPHONE ENCOUNTER
[facility] Triage    Incoming Call:    Yadira would like to know if her Pet is covered today.    [employer] Daniela Guardado and she stated it should be fine as they would call her and let her know it is not covered.    Advised Yadira of above and she verbally understood.

## 2022-09-16 NOTE — PROGRESS NOTES
MEDICAL ONCOLOGY PROGRESS NOTE  [facility]  Sep 16, 2022     CHIEF COMPLAINT: Metastatic acral melanoma    Melanoma History:  1. She noted a painful lesion on the sole of the right foot for a few months, since last summer.  It initially was small then became raised.  It spontaneously bled. She is not entirely sure of its appearance initially.  She denies noting any masses behind the knee or in the groin.  2. 1/9/2020, punch biopsy was performed by Dr Jessica Meadows. Patology showed melanoma, at least 3.4 mm deep with ulceration and 0 mitoses, and perineural invasion present. TILs were non-brisk and LVI not identified. pT3b.  3. 2/10/2020, she has right femoral sentinel lymph node biopsy and wide local excision (Specimen #: [id number]) and the right heel lesion extends to a depth of 6.6 mm, and invasive melanoma is present at 0.2 mm from the deep margin. Microsatellites are also present. There was 1 (of 2) lymph nodes involved. The lymph node tissue exhibits extensive subcapsular and parenchymal clusters of melanoma cells, highlighted on Melan-A immunostain. The largest cluster is 7 millimeters in greatest diameter. pT4b pN2c. PD-L1 staining is negative, <1%. No mutation in BRAF, KIT, or NRAS.  4. 2/22/2020, she had PET-CT, which showed intense FDG uptake in a right inguinal lymph node, concerning for an additional focus of metastatic disease. There is also an indeterminate right external iliac lymph node with mild FDG uptake.  5. 3/4/2020, she has medial plantar artery  fasciocutaneous instep flap and Integra placement to the donor site. On 4/2/2020, she has additional reconstruction with skin grafting.  6. 5/22/2020, PET-CT shows a hypermetabolic right inguinal and right external iliac chain lymph node and stable pulmonary nodules.  7. 5/27/2020, she starts nivolumab for presumed low volume lymph node predominant metastatic disease.  8. 8/21/2020, PET-CT shows increase in hypermetabolic adenopathy,  and a hypermetabolic nodule in segment 2 of the liver. Given low volume of disease she prefers to continue therapy with short interval follow-up.  9. 10/9/2020, PET-CT shows increasing size of hepatic metastases with increased hypermetabolism, increased right inguinal and iliac lymphadenopathy, and new FDG uptake right T2 transverse process and right side of S1. MRI-brain obtained 10/21/2020 is negative for brain metastasis.  10. 10/28/2020, she starts ipilimumab 1mg/kg and nivolumab 3mg/kg. She then goes on to maintenance nivolumab through 4/12/21.  11. 5/10/2021, she starts ipilimumab 3 mg/kg monotherapy q3w for 4 cycles.  12. 7/30/21, PET/CT shows mild disease progression.   13. 8/2/21, start ipilimumab 3mg/kg and nivolumab 1mg/kg and received 2 cycles, last on 8/23/21.  14. 8/23/21, develops elevated LFT's.   15. 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   16. 1/28/22, started TVEC  17. 4/22/22, progressive disease, consideration for additional therapeutic approaches.  18. 5/11/22, Start Opdualag  19. 7/29/22: PET-CT shows new diffuse bony metastatic bony disease, increased size of liver mets and increased size of lymph node conglomerates throughout pelvis and abdomen. DVT in right external iliac vein, common femoral vein and femoral vein also found. Started on Eliquis.     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 She presents today for routine follow up with her , son, and daughter.    She has received 5 cycles of Opdualag. PET-CT obtained yesterday shows evidence of progression. It also showed a brain metastasis, so we obtained MRI-brain, which confirmed two new metastatic lesions within the right cerebral hemisphere.    She endorses bony pains in her back, mostly. She also has difficulty rising from a seated position. She is getting along ok with her walker.      Current Outpatient Medications   Medication Sig Dispense  Refill     apixaban ANTICOAGULANT (ELIQUIS) 5 MG tablet Take 1 tablet (5 mg) by mouth 2 times daily 60 tablet 11     fish oil-omega-3 fatty acids 1000 MG capsule Take 2 g by mouth daily       ondansetron (ZOFRAN) 8 MG tablet Take 1 tablet (8 mg) by mouth every 8 hours as needed for nausea 30 tablet 3     carboxymethylcellulose PF (REFRESH LIQUIGEL) 1 % ophthalmic gel 1 drop 3 times daily       cholecalciferol (VITAMIN D3) 125 MCG (5000 UT) TABS tablet Take 125 mcg by mouth every other day       dicyclomine (BENTYL) 10 MG capsule Take 1 capsule (10 mg) by mouth 4 times daily (before meals and nightly) (Patient not taking: No sig reported) 120 capsule 1     estradiol (ESTRACE) 0.1 MG/GM vaginal cream Place 2 g vaginally twice a week 42.5 g 1     famotidine (PEPCID) 20 MG tablet Take 1 tablet (20 mg) by mouth 2 times daily as needed (acid reflux/heartburn) (Patient not taking: Reported on 9/16/2022) 60 tablet 1     mirtazapine (REMERON) 15 MG tablet Take 1 tablet (15 mg) by mouth At Bedtime (Patient not taking: No sig reported) 30 tablet 1     multivitamin w/minerals (THERA-VIT-M) tablet Take 1 tablet by mouth daily (Patient not taking: No sig reported)       Riboflavin 100 MG TABS Take 400 mg by mouth daily (Patient not taking: No sig reported)       senna-docusate (SENOKOT-S/PERICOLACE) 8.6-50 MG tablet Take 1 tablet by mouth 2 times daily (Patient not taking: No sig reported) 60 tablet 0     Objective:  There were no vitals taken for this visit.  GENERAL: Alert, oriented, sad and anxious  EYES: Eyes grossly normal to inspection.  No discharge or erythema, or obvious scleral/conjunctival abnormalities.  HENT: Normal cephalic/atraumatic.  External ears, nose and mouth without ulcers or lesions.  No nasal drainage visible.  NECK: No asymmetry, visible masses or scars  RESP: No audible wheeze, cough, or visible cyanosis.  No visible retractions or increased work of breathing.    SKIN: Visible skin clear. No significant  rash, abnormal pigmentation or lesions.  NEURO: Cranial nerves grossly intact.  Mentation and speech appropriate for age.  PSYCH: Mentation appears normal, affect anxious and depressed, judgement and insight intact, normal speech and appearance well-groomed.    Imaging:  Combined Report of: PET and CT on 9/15/2022 2:24 PM:     1. PET of the neck, chest, abdomen, and pelvis.  2. PET CT Fusion for Attenuation Correction and Anatomical  Localization.  3. Diagnostic CT of the chest, abdomen and pelvis with intravenous  contrast obtained for diagnostic interpretation.  4. 3D MIP and PET-CT fused images were processed on an independent  workstation and archived to PACS and reviewed by a radiologist.     Technique:     1. PET: The patient received 10.12 mCi of F-18-FDG. The serum glucose  was 96 prior to administration. Body weight was 61.2 kg. Images were  evaluated in the axial, sagittal, and coronal planes as well as the  rotational whole body MIP. Images were acquired from the cranial  vertex to the feet.     UPTAKE WAS MEASURED AT 60 MINUTES.      BACKGROUND: Liver SUV max = 2.2, Aorta Blood SUV Max = 1.52.      2. CT: Volumetric acquisition for clinical interpretation of the  chest, abdomen, and pelvis acquired at 3 mm sections. The chest,  abdomen, and pelvis were evaluated at 5 mm sections in bone, soft  tissue, and lung windows.     Contrast and Medications:  IV contrast: 74 mL of Isovue 370 intravenously.  PO contrast: none.  Additional Medications: None.     3. 3D MIP and PET-CT fused images were processed on an independent  workstation and archived to PACS and reviewed by a radiologist.     INDICATION: Malignant melanoma of right lower extremity including hip  (H); Secondary malignant neoplasm of bone (H); Melanoma of skin (H).     ADDITIONAL INFORMATION OBTAINED FROM EMR: 77 year old female?history  of known metastatic melanoma to multiple areas including the liver and  peritoneal region?who presents for  evaluation of weakness, anorexia  and constipation. Cycle 5 Day 1 nivolumab/reatlimab-rmbw on 9/8/2022.  ?     COMPARISON: PET/CT from 7/29/2022, brain MRI from 6/2/2022.     FINDINGS:      HEAD/NECK:     FDG avid (max SUV 15.0) 1.4 cm enhancing mass at the gray-white  junction in the right parietal lobe representing metastatic lesion,  jeannie visualized on the same day MRI.  Focal FDG uptake within the right anterior scalp with SUV max 10.38  and right temporal scalp with SUV max 5.16, most suggestive of  metastatic deposits.     The paranasal sinuses are clear. The mastoid air cells are clear.      No hypermetabolic lymph nodes are demonstrated in the neck.      The mucosal and deep spaces of the neck are unremarkable. The major  salivary glands are unremarkable. The thyroid is unremarkable. The  major vasculature of the neck are patent. Focal dilatation of the  right internal jugular vein within the lower neck.     CHEST:  Right upper lobe posterior segment FDG avid nodules, largest measures  6 mm and 5 mm (series 7 image 32 and 34) with SUV max 7.36, unchanged  in size, but slightly increased in metabolism, compared to 7/29/2022  PET/CT.     Unchanged size of the right lower lobe 7 mm pulmonary nodule with SUV  max 8.04, previously 5.04. No new FDG avid pulmonary nodules.     The central tracheobronchial tree is clear. No pleural effusion or  pneumothorax. No acute consolidation.      No hypermetabolic lymph nodes are demonstrated in the chest.     Heart size is within normal limits. No pericardial effusion. The  thoracic aorta and main pulmonary artery are within normal limits for  diameter. The esophagus is unremarkable.      ABDOMEN AND PELVIS:  Redemonstration of a hypermetabolic mass within the right side of the  pelvis, extending along the right iliac artery to the anterior thigh  with SUV max 26.31, previously 2 and 1.36. The pelvic portion of the  mass measures 9.1 x 6.5 x 15 cm and inguinal/upper thigh  component  measures 9.1 x 7.6 x 14 cm, overall increased in size compared to  prior PET CT, measuring 7.9 x 7 x 10 cm and 8.3 x 6.9 x 13 cm  respectively.Mass is encasing the right external iliac and femoral  artery.   Hypermetabolic subcentimeter nodularities left within the right  anterior subcutaneous thigh inferior to the mass with SUV max 12.5.     Additional smaller mass/enlarged lymph node right lateral to the right  iliac vein (series 2 image 231) with mild mass effect on vein,  measuring 3.9 x 2.7 cm.     Multiple metastatic liver lesions, some of them are new and some are  increased in size compared to prior PET/CT. Largest lesion within the  left hepatic lobe, measuring 5.3 x 4.8 cm with SUV max of 31.06,  previously 5 x 4.5 cm with SUV max 20.08.      The gallbladder is unremarkable. The pancreas is unremarkable. No  pancreatic ductal dilatation. A few new FDG avid hypodense splenic  lesions, largest measures 1.3 cm with SUV max 7.56, new compared to  prior PET/CT. The adrenal glands are unremarkable.     Symmetric enhancement of the kidneys. No hydronephrosis. The urinary  bladder is unremarkable. The reproductive organs are unremarkable.     Hypermetabolic right common iliac 3.5 cm mass chantell mass, larger  previously 2.8 cm.     Normal caliber of the small and large bowel. No free air, free fluid,  or fluid collection. Normal caliber of the abdominal aorta.     LOWER EXTREMITIES:   Large anterior thigh mass, see above.     New focus of FDG uptake within the medial thigh (series 10 image 315)  with SUV max 14.71.     BONES:   Innumerable osseous metastatic disease with FDG uptake in the axial  and appendicular skeleton, involving the spine, hyoid, bilateral  humerus, clavicles, scapula, sternum, ribs, pelvic bones and femurs.  Compared to prior PET/CT, some of them are new. No pathologic  vertebral fracture.                                                                      IMPRESSION: In this patient  with history malign melanoma status  ongoing nivolumab/reatlimab-rmbw, findings are compatible with  progressive disease compared to 7/29/2022 PET/CT.     1. Increase in size/metabolism of the large right pelvic/right upper  thigh mass. New metastatic focus medial to the thigh.     2. Right parietal lobe metastatic lesion, enlarged, better visualized  on prior MRI.     3. Two foci of FDG uptake within the scalp, likely metastatic, new  compared to prior PET/CT.      3. New metabolism of the right upper lobe nodularities and increased  metabolism of the right lower lobe 7 mm pulmonary nodule, unchanged in  size.     4. Multiple metastatic liver lesions, some of them are new and some  are increased in size.     5. New metastatic lesions within the spleen.     6. Progressive metastatic bone disease.     I have personally reviewed the examination and initial interpretation  and I agree with the findings.     KAYA MARIE MD         MR BRAIN W/O & W CONTRAST 9/16/2022 12:10 PM     Provided History: Melanoma of skin (H); Brain metastasis (H).  ICD-10: Melanoma of skin (H); Brain metastasis (H)     Comparison: 6/2/2022 dated MR.     Technique: Multiplanar T1-weighted, axial FLAIR, and susceptibility  images were obtained without intravenous contrast. Following  intravenous gadolinium-based contrast administration, axial  T2-weighted, diffusion, and T1-weighted images (in multiple planes)  were obtained.     Contrast: 6 cc Gadavist     Findings:  There are 2 new enhancing lesions, 1 in the right temporoparietal lobe  and measuring 1.7 cm (series 13, image 14) and the other one in the  right lingular gyrus measuring 0.6 cm (series 13, image 14). Lesions  demonstrate associated susceptibility. Associated surrounding  vasogenic edema.     There is no mass effect, midline shift, or evidence of intracranial  hemorrhage. The ventricles are proportionate to the cerebral sulci.  Normal major vascular intracranial flow-voids. Mild  to moderate  leukoaraiosis. Mild diffuse cerebral volume loss.     Postcontrast images demonstrate no abnormal intracranial enhancement.     No abnormality of the skull marrow signal. The visualized portions of  paranasal sinuses, and mastoid air cells are relatively clear. The  orbits are grossly unremarkable.                                                                      Impression:  1. Two new metastatic lesions within the right cerebral hemisphere.  Mild surrounding vasogenic edema.  2. Mild diffuse cerebral volume loss and leukoaraiosis.     LEO TINAJERO MD         ASSESSMENT AND PLAN:  #Metastatic acral melanoma, to lungs, liver, lymphnodes, and bone  #Brain metastasis  She has received multiple lines of prior therapy including ipi/nivo and TVEC. She started Opdualag 5/11/22 and received 4 cycles. PET-CT from 7/29/22 appears to show disease progression. She elected to continue Opdualag for another 6 weeks to confirm progression. Unfortunately, the most recent scans from 9/15 and 9/16 confirm that there has been additional progression of her disease.    Given appearance of right parietal brain metastasis on PET and MRI we will obtain radiation oncology consultation. They may be able to palliate the brain disease with whole brain radiation vs. Gamma knife. We discussed her next systemic therapy option could be chemotherapy, either alone or in combination with immunotherapy. Options include Temozolomide or Carboplatin and paclitaxel, though, I am not eager to use chemotherapy, which would likely negatively impact quality of life.    Comfort measures was also discussed but patient is not ready to take this step.    -radiation oncology consult     #Scalp lesions  Represent metastatic disease. Can keep appointment with dermatology on 10/11/22 but will any local therapy offering will not alter overall plan.    #Anorexia and weight loss.  She had EGD on 9/12/22, which shoswed the entire exmained stomach was  normal appearing. Biopsies taken for h pylori. No evidence of candida esophagitis on EGD. She has started to receive [facility]. She will trial low dose mirtazapine.     #Depression  #Anxiety   Waxes and wanes. Will continue follow up with palliative care social work.     #Cancer pain  #Back pain  #Leg pain.   Previously prescribed tramadol 50 mg q6h as needed for pain but has not tried this yet. Remains reluctant to take opioids. Have re-written for tramadol. Discusses making sure she is taking something for her bowels. Recommended Senna twice a day and Miralax once daily as a bowel regimen during opioid therapy.    #Right lower extremity DVT  Secondary to tumor compression. Continue Eliquis 5mg bid anticoagulation lifelong.      #RLE lymphema.   Secondary to right groin tumors and new DVT. Anticoagulation as above. She will also continue with lymphedema therapy.       Gi Cartagena M.D.   of Medicine  Hematology, Oncology and Transplantation  Pager: [phone]

## 2022-09-16 NOTE — NURSING NOTE
"Oncology Rooming Note    September 16, 2022 12:44 PM   Yadira Hoang is a 77 year old female who presents for:    Chief Complaint   Patient presents with     Oncology Clinic Visit     Melanoma of skin; brain metastasis     Initial Vitals: /61   Pulse 117   Temp 97.7  F (36.5  C) (Oral)   Resp 16   Wt 59 kg (130 lb)   SpO2 98%   BMI 20.36 kg/m   Estimated body mass index is 20.36 kg/m  as calculated from the following:    Height as of 9/12/22: 1.702 m (5' 7\").    Weight as of this encounter: 59 kg (130 lb). Body surface area is 1.67 meters squared.  Severe Pain (6) Comment: Data Unavailable   No LMP recorded. Patient is postmenopausal.  Allergies reviewed: Yes  Medications reviewed: Yes    Medications: Medication refills not needed today.  Pharmacy name entered into Re[employer]:    [facility] [place], [place] - [address], [id number]  [facility] [address] [place] [address] [id number] [facility]    Clinical concerns: none       Jessica Valladares CMA            "

## 2022-09-16 NOTE — DISCHARGE INSTRUCTIONS
MRI Contrast Discharge Instructions    The IV contrast you received today will pass out of your body in your  urine. This will happen in the next 24 hours. You will not feel this process.  Your urine will not change color.    Drink at least 4 extra glasses of water or juice today (unless your doctor  has restricted your fluids). This reduces the stress on your kidneys.  You may take your regular medicines.    If you are on dialysis: It is best to have dialysis today.    If you have a reaction: Most reactions happen right away. If you have  any new symptoms after leaving the hospital (such as hives or swelling),  call your hospital at the correct number below. Or call your family doctor.  If you have breathing distress or wheezing, call 911.    Special instructions: ***    I have read and understand the above information.    Signature:______________________________________ Date:___________    Staff:__________________________________________ Date:___________     Time:__________    [facility] Departments:    ___[place]: [phone]  ___[facility]: [phone]  ___[place]: [phone] ___[facility]: [phone]  ___[facility]: [phone]  ___[facility]: [phone]  ___[phone]  ___[facility]: [phone]  ___[phone]  
.

## 2022-09-16 NOTE — LETTER
9/16/2022         RE: Yadira Hoang  [address] [facility] [address]        Dear Colleague,    Thank you for referring your patient, Yadira Hoang, to the [facility]. Please see a copy of my visit note below.      MEDICAL ONCOLOGY PROGRESS NOTE  Melanoma Clinic  Sep 16, 2022     CHIEF COMPLAINT: Metastatic acral melanoma    Melanoma History:  1. She noted a painful lesion on the sole of the right foot for a few months, since last summer.  It initially was small then became raised.  It spontaneously bled. She is not entirely sure of its appearance initially.  She denies noting any masses behind the knee or in the groin.  2. 1/9/2020, punch biopsy was performed by Dr Jessica Meadows. Patology showed melanoma, at least 3.4 mm deep with ulceration and 0 mitoses, and perineural invasion present. TILs were non-brisk and LVI not identified. pT3b.  3. 2/10/2020, she has right femoral sentinel lymph node biopsy and wide local excision (Specimen #: [id number]) and the right heel lesion extends to a depth of 6.6 mm, and invasive melanoma is present at 0.2 mm from the deep margin. Microsatellites are also present. There was 1 (of 2) lymph nodes involved. The lymph node tissue exhibits extensive subcapsular and parenchymal clusters of melanoma cells, highlighted on Melan-A immunostain. The largest cluster is 7 millimeters in greatest diameter. pT4b pN2c. PD-L1 staining is negative, <1%. No mutation in BRAF, KIT, or NRAS.  4. 2/22/2020, she had PET-CT, which showed intense FDG uptake in a right inguinal lymph node, concerning for an additional focus of metastatic disease. There is also an indeterminate right external iliac lymph node with mild FDG uptake.  5. 3/4/2020, she has medial plantar artery  fasciocutaneous instep flap and Integra placement to the donor site. On 4/2/2020, she has additional reconstruction with skin grafting.  6. 5/22/2020, PET-CT shows a  hypermetabolic right inguinal and right external iliac chain lymph node and stable pulmonary nodules.  7. 5/27/2020, she starts nivolumab for presumed low volume lymph node predominant metastatic disease.  8. 8/21/2020, PET-CT shows increase in hypermetabolic adenopathy, and a hypermetabolic nodule in segment 2 of the liver. Given low volume of disease she prefers to continue therapy with short interval follow-up.  9. 10/9/2020, PET-CT shows increasing size of hepatic metastases with increased hypermetabolism, increased right inguinal and iliac lymphadenopathy, and new FDG uptake right T2 transverse process and right side of S1. MRI-brain obtained 10/21/2020 is negative for brain metastasis.  10. 10/28/2020, she starts ipilimumab 1mg/kg and nivolumab 3mg/kg. She then goes on to maintenance nivolumab through 4/12/21.  11. 5/10/2021, she starts ipilimumab 3 mg/kg monotherapy q3w for 4 cycles.  12. 7/30/21, PET/CT shows mild disease progression.   13. 8/2/21, start ipilimumab 3mg/kg and nivolumab 1mg/kg and received 2 cycles, last on 8/23/21.  14. 8/23/21, develops elevated LFT's.   15. 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   16. 1/28/22, started TVEC  17. 4/22/22, progressive disease, consideration for additional therapeutic approaches.  18. 5/11/22, Start Opdualag  19. 7/29/22: PET-CT shows new diffuse bony metastatic bony disease, increased size of liver mets and increased size of lymph node conglomerates throughout pelvis and abdomen. DVT in right external iliac vein, common femoral vein and femoral vein also found. Started on Eliquis.     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 She presents today for routine follow up with her , son, and daughter.    She has received 5 cycles of Opdualag. PET-CT obtained yesterday shows evidence of progression. It also showed a brain metastasis, so we obtained MRI-brain, which confirmed  two new metastatic lesions within the right cerebral hemisphere.    She endorses bony pains in her back, mostly. She also has difficulty rising from a seated position. She is getting along ok with her walker.      Current Outpatient Medications   Medication Sig Dispense Refill     apixaban ANTICOAGULANT (ELIQUIS) 5 MG tablet Take 1 tablet (5 mg) by mouth 2 times daily 60 tablet 11     fish oil-omega-3 fatty acids 1000 MG capsule Take 2 g by mouth daily       ondansetron (ZOFRAN) 8 MG tablet Take 1 tablet (8 mg) by mouth every 8 hours as needed for nausea 30 tablet 3     carboxymethylcellulose PF (REFRESH LIQUIGEL) 1 % ophthalmic gel 1 drop 3 times daily       cholecalciferol (VITAMIN D3) 125 MCG (5000 UT) TABS tablet Take 125 mcg by mouth every other day       dicyclomine (BENTYL) 10 MG capsule Take 1 capsule (10 mg) by mouth 4 times daily (before meals and nightly) (Patient not taking: No sig reported) 120 capsule 1     estradiol (ESTRACE) 0.1 MG/GM vaginal cream Place 2 g vaginally twice a week 42.5 g 1     famotidine (PEPCID) 20 MG tablet Take 1 tablet (20 mg) by mouth 2 times daily as needed (acid reflux/heartburn) (Patient not taking: Reported on 9/16/2022) 60 tablet 1     mirtazapine (REMERON) 15 MG tablet Take 1 tablet (15 mg) by mouth At Bedtime (Patient not taking: No sig reported) 30 tablet 1     multivitamin w/minerals (THERA-VIT-M) tablet Take 1 tablet by mouth daily (Patient not taking: No sig reported)       Riboflavin 100 MG TABS Take 400 mg by mouth daily (Patient not taking: No sig reported)       senna-docusate (SENOKOT-S/PERICOLACE) 8.6-50 MG tablet Take 1 tablet by mouth 2 times daily (Patient not taking: No sig reported) 60 tablet 0     Objective:  There were no vitals taken for this visit.  GENERAL: Alert, oriented, sad and anxious  EYES: Eyes grossly normal to inspection.  No discharge or erythema, or obvious scleral/conjunctival abnormalities.  HENT: Normal cephalic/atraumatic.  External  ears, nose and mouth without ulcers or lesions.  No nasal drainage visible.  NECK: No asymmetry, visible masses or scars  RESP: No audible wheeze, cough, or visible cyanosis.  No visible retractions or increased work of breathing.    SKIN: Visible skin clear. No significant rash, abnormal pigmentation or lesions.  NEURO: Cranial nerves grossly intact.  Mentation and speech appropriate for age.  PSYCH: Mentation appears normal, affect anxious and depressed, judgement and insight intact, normal speech and appearance well-groomed.    Imaging:  Combined Report of: PET and CT on 9/15/2022 2:24 PM:     1. PET of the neck, chest, abdomen, and pelvis.  2. PET CT Fusion for Attenuation Correction and Anatomical  Localization.  3. Diagnostic CT of the chest, abdomen and pelvis with intravenous  contrast obtained for diagnostic interpretation.  4. 3D MIP and PET-CT fused images were processed on an independent  workstation and archived to PACS and reviewed by a radiologist.     Technique:     1. PET: The patient received 10.12 mCi of F-18-FDG. The serum glucose  was 96 prior to administration. Body weight was 61.2 kg. Images were  evaluated in the axial, sagittal, and coronal planes as well as the  rotational whole body MIP. Images were acquired from the cranial  vertex to the feet.     UPTAKE WAS MEASURED AT 60 MINUTES.      BACKGROUND: Liver SUV max = 2.2, Aorta Blood SUV Max = 1.52.      2. CT: Volumetric acquisition for clinical interpretation of the  chest, abdomen, and pelvis acquired at 3 mm sections. The chest,  abdomen, and pelvis were evaluated at 5 mm sections in bone, soft  tissue, and lung windows.     Contrast and Medications:  IV contrast: 74 mL of Isovue 370 intravenously.  PO contrast: none.  Additional Medications: None.     3. 3D MIP and PET-CT fused images were processed on an independent  workstation and archived to PACS and reviewed by a radiologist.     INDICATION: Malignant melanoma of right lower  extremity including hip  (H); Secondary malignant neoplasm of bone (H); Melanoma of skin (H).     ADDITIONAL INFORMATION OBTAINED FROM EMR: 77 year old female?history  of known metastatic melanoma to multiple areas including the liver and  peritoneal region?who presents for evaluation of weakness, anorexia  and constipation. Cycle 5 Day 1 nivolumab/reatlimab-rmbw on 9/8/2022.  ?     COMPARISON: PET/CT from 7/29/2022, brain MRI from 6/2/2022.     FINDINGS:      HEAD/NECK:     FDG avid (max SUV 15.0) 1.4 cm enhancing mass at the gray-white  junction in the right parietal lobe representing metastatic lesion,  jeannie visualized on the same day MRI.  Focal FDG uptake within the right anterior scalp with SUV max 10.38  and right temporal scalp with SUV max 5.16, most suggestive of  metastatic deposits.     The paranasal sinuses are clear. The mastoid air cells are clear.      No hypermetabolic lymph nodes are demonstrated in the neck.      The mucosal and deep spaces of the neck are unremarkable. The major  salivary glands are unremarkable. The thyroid is unremarkable. The  major vasculature of the neck are patent. Focal dilatation of the  right internal jugular vein within the lower neck.     CHEST:  Right upper lobe posterior segment FDG avid nodules, largest measures  6 mm and 5 mm (series 7 image 32 and 34) with SUV max 7.36, unchanged  in size, but slightly increased in metabolism, compared to 7/29/2022  PET/CT.     Unchanged size of the right lower lobe 7 mm pulmonary nodule with SUV  max 8.04, previously 5.04. No new FDG avid pulmonary nodules.     The central tracheobronchial tree is clear. No pleural effusion or  pneumothorax. No acute consolidation.      No hypermetabolic lymph nodes are demonstrated in the chest.     Heart size is within normal limits. No pericardial effusion. The  thoracic aorta and main pulmonary artery are within normal limits for  diameter. The esophagus is unremarkable.      ABDOMEN AND  PELVIS:  Redemonstration of a hypermetabolic mass within the right side of the  pelvis, extending along the right iliac artery to the anterior thigh  with SUV max 26.31, previously 2 and 1.36. The pelvic portion of the  mass measures 9.1 x 6.5 x 15 cm and inguinal/upper thigh component  measures 9.1 x 7.6 x 14 cm, overall increased in size compared to  prior PET CT, measuring 7.9 x 7 x 10 cm and 8.3 x 6.9 x 13 cm  respectively.Mass is encasing the right external iliac and femoral  artery.   Hypermetabolic subcentimeter nodularities left within the right  anterior subcutaneous thigh inferior to the mass with SUV max 12.5.     Additional smaller mass/enlarged lymph node right lateral to the right  iliac vein (series 2 image 231) with mild mass effect on vein,  measuring 3.9 x 2.7 cm.     Multiple metastatic liver lesions, some of them are new and some are  increased in size compared to prior PET/CT. Largest lesion within the  left hepatic lobe, measuring 5.3 x 4.8 cm with SUV max of 31.06,  previously 5 x 4.5 cm with SUV max 20.08.      The gallbladder is unremarkable. The pancreas is unremarkable. No  pancreatic ductal dilatation. A few new FDG avid hypodense splenic  lesions, largest measures 1.3 cm with SUV max 7.56, new compared to  prior PET/CT. The adrenal glands are unremarkable.     Symmetric enhancement of the kidneys. No hydronephrosis. The urinary  bladder is unremarkable. The reproductive organs are unremarkable.     Hypermetabolic right common iliac 3.5 cm mass chantell mass, larger  previously 2.8 cm.     Normal caliber of the small and large bowel. No free air, free fluid,  or fluid collection. Normal caliber of the abdominal aorta.     LOWER EXTREMITIES:   Large anterior thigh mass, see above.     New focus of FDG uptake within the medial thigh (series 10 image 315)  with SUV max 14.71.     BONES:   Innumerable osseous metastatic disease with FDG uptake in the axial  and appendicular skeleton, involving  the spine, hyoid, bilateral  humerus, clavicles, scapula, sternum, ribs, pelvic bones and femurs.  Compared to prior PET/CT, some of them are new. No pathologic  vertebral fracture.                                                                      IMPRESSION: In this patient with history malign melanoma status  ongoing nivolumab/reatlimab-rmbw, findings are compatible with  progressive disease compared to 7/29/2022 PET/CT.     1. Increase in size/metabolism of the large right pelvic/right upper  thigh mass. New metastatic focus medial to the thigh.     2. Right parietal lobe metastatic lesion, enlarged, better visualized  on prior MRI.     3. Two foci of FDG uptake within the scalp, likely metastatic, new  compared to prior PET/CT.      3. New metabolism of the right upper lobe nodularities and increased  metabolism of the right lower lobe 7 mm pulmonary nodule, unchanged in  size.     4. Multiple metastatic liver lesions, some of them are new and some  are increased in size.     5. New metastatic lesions within the spleen.     6. Progressive metastatic bone disease.     I have personally reviewed the examination and initial interpretation  and I agree with the findings.     KAYA MARIE MD         MR BRAIN W/O & W CONTRAST 9/16/2022 12:10 PM     Provided History: Melanoma of skin (H); Brain metastasis (H).  ICD-10: Melanoma of skin (H); Brain metastasis (H)     Comparison: 6/2/2022 dated MR.     Technique: Multiplanar T1-weighted, axial FLAIR, and susceptibility  images were obtained without intravenous contrast. Following  intravenous gadolinium-based contrast administration, axial  T2-weighted, diffusion, and T1-weighted images (in multiple planes)  were obtained.     Contrast: 6 cc Gadavist     Findings:  There are 2 new enhancing lesions, 1 in the right temporoparietal lobe  and measuring 1.7 cm (series 13, image 14) and the other one in the  right lingular gyrus measuring 0.6 cm (series 13, image 14).  Lesions  demonstrate associated susceptibility. Associated surrounding  vasogenic edema.     There is no mass effect, midline shift, or evidence of intracranial  hemorrhage. The ventricles are proportionate to the cerebral sulci.  Normal major vascular intracranial flow-voids. Mild to moderate  leukoaraiosis. Mild diffuse cerebral volume loss.     Postcontrast images demonstrate no abnormal intracranial enhancement.     No abnormality of the skull marrow signal. The visualized portions of  paranasal sinuses, and mastoid air cells are relatively clear. The  orbits are grossly unremarkable.                                                                      Impression:  1. Two new metastatic lesions within the right cerebral hemisphere.  Mild surrounding vasogenic edema.  2. Mild diffuse cerebral volume loss and leukoaraiosis.     LEO TINAJERO MD         ASSESSMENT AND PLAN:  #Metastatic acral melanoma, to lungs, liver, lymphnodes, and bone  #Brain metastasis  She has received multiple lines of prior therapy including ipi/nivo and TVEC. She started Opdualag 5/11/22 and received 4 cycles. PET-CT from 7/29/22 appears to show disease progression. She elected to continue Opdualag for another 6 weeks to confirm progression. Unfortunately, the most recent scans from 9/15 and 9/16 confirm that there has been additional progression of her disease.    Given appearance of right parietal brain metastasis on PET and MRI we will obtain radiation oncology consultation. They may be able to palliate the brain disease with whole brain radiation vs. Gamma knife. We discussed her next systemic therapy option could be chemotherapy, either alone or in combination with immunotherapy. Options include Temozolomide or Carboplatin and paclitaxel, though, I am not eager to use chemotherapy, which would likely negatively impact quality of life.    Comfort measures was also discussed but patient is not ready to take this step.    -radiation  oncology consult     #Scalp lesions  Represent metastatic disease. Can keep appointment with dermatology on 10/11/22 but will any local therapy offering will not alter overall plan.    #Anorexia and weight loss.  She had EGD on 9/12/22, which shoswed the entire exmained stomach was normal appearing. Biopsies taken for h pylori. No evidence of candida esophagitis on EGD. She has started to receive [facility]. She will trial low dose mirtazapine.     #Depression  #Anxiety   Waxes and wanes. Will continue follow up with palliative care social work.     #Cancer pain  #Back pain  #Leg pain.   Previously prescribed tramadol 50 mg q6h as needed for pain but has not tried this yet. Remains reluctant to take opioids. Have re-written for tramadol. Discusses making sure she is taking something for her bowels. Recommended Senna twice a day and Miralax once daily as a bowel regimen during opioid therapy.    #Right lower extremity DVT  Secondary to tumor compression. Continue Eliquis 5mg bid anticoagulation lifelong.      #RLE lymphema.   Secondary to right groin tumors and new DVT. Anticoagulation as above. She will also continue with lymphedema therapy.         Again, thank you for allowing me to participate in the care of your patient.      Sincerely,    Gi Franklin MD

## 2022-09-20 NOTE — TELEPHONE ENCOUNTER
MEDICAL RECORDS REQUEST   Radiation Oncology  [address] [place] [address]  PHONE: [phone]  Fax: [phone]        FUTURE VISIT INFORMATION                                                   Yadira Hoang, : 1945 scheduled for future visit at [facility] Radiation Oncology    APPOINTMENT INFORMATION:    Date: 22    Provider:  Dr. Al Singh    Reason for Visit/Diagnosis: Brain metastasis (H) [C79.31]     REFERRAL INFORMATION:    Referring provider:  Dr. Gi Franklin    Specialty: Med Onc    Referring providers clinic:  [facility] contact number:  [phone]    RECORDS REQUESTED FOR VISIT                                                     OTHER BRAIN TUMORS( OLIGO,ARPITA,MENIGIONMA,AUM,ACOUSTIC NEUROMA,TRIGEMINAL NEURALGIA    MOST RECENT BRAIN MRI/CT REPORTS yes 22-10/21/20   MED ONCOLOGIST NOTES(CHEMO RECS) yes 22: Dr. Gi Franklin   INITIAL ,PRIMARY,NEUROLOGIST NOTES yes 22: Dr. Yvon Rivera   CURRENT MEDICATION LIST yes   SOFT TISSUE & BREAST     CT, MRI, PET/CT AND REPORTS yes 09/15/22-20: PET Onc Whole Body  09/15/22-21: CT Chest Abd pel   ANY RECENT LABS yes, most recent 09/15/22   SURGICAL REPORTS yes 20: Wide local Re-excision RIGHT heel Wound vac application  02/10/20: Wide Local Excision of RIGHT heel melanoma   PATHOLOGY REPORTS yes 20: [id number]  20: [id number]  02/10/20: [id number]   CHEMO & MEDICAL ONCOLOGY NOTES yes  22: Dr. Gi Franklin   CURRENT MEDICATION LIST yes     PRE-VISIT CHECKLIST      Record collection complete Yes   Appointment appropriately scheduled           (right time/right provider) Yes

## 2022-09-21 NOTE — PROGRESS NOTES
"[facility]    Outpatient Physical Therapy Discharge Note  Patient: Yadira Hoang  : 1945    Beginning/End Dates of Reporting Period:  22 to 22    Referring Provider: Yvon Rivera MD     Therapy Diagnosis: impaired balance     Client Self Report: She is feeling very fatigued-- like she is \"Sleep walking\" most days. She is wanting and IV for fluids and nutrition so she went to  and then  hopsital and got IV. She did feel that this helped her quite a bit but now on Monday she had endoscopy and she is still feeling very tired after this.    Objective Measurements:  Objective Measure: TUG  Details: 13.5 with no AD       Goals:  Goal Identifier TUG   Goal Description Yadira will score less than 13.5 seconds on the Timed Up and Go in 2/2 trials for less reliance on her 4ww.   Target Date 08/15/22   Date Met  22   Progress (detail required for progress note): 13.3, MET     Goal Identifier Habituation   Goal Description Yadira will perform households activities reporting no dizziness with consistently turning her head adn bending while unloading the  on a daily basis.   Target Date 08/15/22   Date Met  22   Progress (detail required for progress note): gets some lightheaded feelings but does not feel any dizziness with this at this time.     Goal Identifier DHI   Goal Description Yadira will improve her score on the DHI by 20 points or greater with an initial score of 54/100 to inidcate less feelings of instability with daily activities.   Target Date 22   Date Met      Progress (detail required for progress note): did not reasess todday         Plan:  Discharge from therapy. Patient was seen for three PT sessions focused on balance and general strengthening. Patient did make progress towards all of her goals. She is feeling overwhelmed with having many medical " appts and felt she was ready to continue her HEP on her own so is appropriate for discharge today.    Discharge:    Reason for Discharge: Patient chooses to discontinue therapy.    Equipment Issued: none    Discharge Plan: Patient to continue home program.

## 2022-09-23 PROBLEM — E86.0 DEHYDRATION: Status: ACTIVE | Noted: 2022-01-01

## 2022-09-23 NOTE — PATIENT INSTRUCTIONS
What to expect at your Simulation visit:    You will meet with a Radiation Therapist and other team members who will be doing a planning session called a  simulation  with you. This process will determine your daily treatment.    ~ You will lie on a flat table and have a treatment planning CT scan.  It is important during the scan to hold very still and breathe normally.    ~ Your therapist may construct a body mold to help you hold still for your treatments.    ~ If you are having treatment to the head or neck area you will be fitted with a plastic mesh mask that fits very snugly over your face and neck.     ~ Your therapist will be taking some digital photos that will go in your treatment chart.      ~Your therapist will make marks on your skin and take measurements. Your therapist may ask you about making small tattoos (a permanent small dot) over these marks.  These marks are used to position you daily for your radiation therapy treatments. Please do not wash off any marks until all of your radiation therapy treatments are complete unless you are instructed to do so by your therapist.    ~ Once the simulation is completed it can take from 3 to 10 business days before you start radiation therapy treatments.    ~ You may meet with a nurse who will go over management of treatment side effects and self care during your treatments. The nurse will help to plan care with other departments and physicians if needed.       Please contact [facility] RN with questions or concerns following today's appointment: [phone].    Thank you!

## 2022-09-23 NOTE — PROGRESS NOTES
Department of Radiation Oncology  [facility]: Cancer Center  [facility]  [address] [place] [address]  [phone]       Consultation Note    Name: Yadira Hoang MRN: [id number]   : 1945   Date of Service: 2022  Referring: Dr. Franklin     Reason for consultation: brain metastasis     History of Present Illness     Ms. 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    Briefly, her oncologic history is as follows:    Patient initially noticed a painful lesion in the sole of her right foot and eventually underwent a biopsy in 2020 consistent with a melanoma, at least 3.4 mm deep with ulceration.      She underwent bilateral excision and sentinel lymph node biopsy on 2/10/2020 with right foot lesion extending to a depth of 6.6 mm, with a positive margin, with evidence of microsatellite nodules, 1/2 lymph nodes positive, [id number].    She underwent a PET scan on 2020 demonstrating metastatic disease in the right inguinal lymph node as well as a right external iliac lymph node with mild uptake.    On 2020 she started nivolumab for metastatic disease in the lymph nodes.  Unfortunately, she developed metastatic disease in the liver in 2020.  PET scan on 10/9/2020 demonstrated progression of disease in the liver as well as right inguinal and right external iliac adenopathy with progression to the bone at T2 and S1.    She began ipilimumab in the belimumab eventually with progression of disease in 2022.  She started Opdualag in May 2022 with PET scan in 2022 demonstrating diffuse bony metastatic uptake, progression on size of liver metastasis and size of lymph nodes in the pelvis of the abdomen.    Her most recent PET scan on 9/15/2022 unfortunately demonstrates 2 foci within the scalp likely metastatic, a  right parietal lobe metastatic lesion in the brain, as well as increase in size of pelvic and inguinal adenopathy as well as progression of the metastatic lesions in the liver, and progression of metastatic bone disease.    MRI of the brain was obtained on 9/16/2022 demonstrating 2 metastatic lesions within the right cerebral hemisphere with some mild surrounding vasogenic edema.    Thus, she was referred for discussion of radiation for brain metastasis.  Today, she is accompanied by her son as well as her .  She states that she denies any active headaches, neurologic changes or loss of strength in the upper or lower extremities, or any numbness or paresthesias.  She uses a walker due to her lower extremity pain from surgery.  She denies any GI or  incontinence.  She does issues with mild confusion although she is completely alert and oriented.  She has some mild aphasia.      We also discussed her additional lesions particularly with focus of the bony metastatic lesions in the pelvis.  She states that at times she does have 10 out of 10 pain but this is well controlled with medication as well as conservative management.  She at this point does not wish to pursue any radiation to the sites.    Of note she does have a history of a DVT and has also been started on Eliquis.    Past Medical History:   Past Medical History:   Diagnosis Date     Concussion 2011    brain injury     ALEXUS (generalized anxiety disorder) 05/07/2020     Major depression, recurrent, chronic (H) 05/07/2020     Melanoma (H)      Nonsenile cataract      Sleep apnea     CPAP       Past Surgical History:   Past Surgical History:   Procedure Laterality Date     BIOPSY NODE SENTINEL Right 2/10/2020    Procedure: [place] Lymph Node Mapping And Biopsy;  Surgeon: Gabriela Dorsey MD;  Location: [facility]     COMBINED ESOPHAGOSCOPY, GASTROSCOPY, DUODENOSCOPY (EGD) WITH CO2 INSUFFLATION N/A 6/20/2022    Procedure: ESOPHAGOGASTRODUODENOSCOPY, WITH  CO2 INSUFFLATION;  Surgeon: Yu Alfred DO;  Location: [facility] OR     ESOPHAGOGASTRODUODENOSCOPY, WITH BRUSHINGS  1/12/2022    Procedure: Esophagogastroduodenoscopy, With Brushings;  Surgeon: Dayo Merritt MD;  [facility]     ESOPHAGOSCOPY, GASTROSCOPY, DUODENOSCOPY (EGD), COMBINED N/A 6/20/2022    Procedure: ESOPHAGOGASTRODUODENOSCOPY, WITH BIOPSY;  Surgeon: Yu Alfred DO;  [facility]: [facility]     ESOPHAGOSCOPY, GASTROSCOPY, DUODENOSCOPY (EGD), COMBINED N/A 9/12/2022    Procedure: ESOPHAGOGASTRODUODENOSCOPY, WITH BIOPSY;  Surgeon: Chivo Godinez MD;  Location: [facility]     EXCISE LESION LOWER EXTREMITY Right 2/10/2020    Procedure: Wide Local Excision of RIGHT heel melanoma;  Surgeon: Gabriela Dorsey MD;  [facility]: [facility]     EXCISE LESION LOWER EXTREMITY Right 2/20/2020    Procedure: Wide local Re-excision RIGHT heel Wound vac application;  Surgeon: Gabriela Dorsey MD;  [facility]     GRAFT SKIN SPLIT THICKNESS FROM EXTREMITY Right 4/2/2020    Procedure: split skin graft from right thigh;  Surgeon: NIKA Crabtree MD;  [facility]     IRRIGATION AND DEBRIDEMENT FOOT, COMBINED Right 4/2/2020    Procedure: right foot/heel debridement;  Surgeon: NIKA Crabtree MD;  [facility]     ORTHOPEDIC SURGERY Right     heel surgery x2     RECONSTRUCT FOOT Right 3/4/2020    Procedure: Right heel reconstruction with regional flap, biologic dressing and SPY;  Surgeon: NIKA Crabtree MD;  [facility]     TUBAL LIGATION         Chemotherapy History:  Per HPI    Radiation History:  No prior radiation    Pregnant: No  Implanted Cardiac Devices: No    Medications:  Current Outpatient Medications   Medication     apixaban ANTICOAGULANT (ELIQUIS) 5 MG tablet     carboxymethylcellulose PF (REFRESH LIQUIGEL) 1 % ophthalmic gel     cholecalciferol (VITAMIN D3) 125 MCG (5000 UT) TABS tablet     estradiol (ESTRACE) 0.1 MG/GM vaginal cream     fish oil-omega-3 fatty acids  1000 MG capsule     traMADol (ULTRAM) 50 MG tablet     dicyclomine (BENTYL) 10 MG capsule     famotidine (PEPCID) 20 MG tablet     mirtazapine (REMERON) 15 MG tablet     multivitamin w/minerals (THERA-VIT-M) tablet     ondansetron (ZOFRAN) 8 MG tablet     Riboflavin 100 MG TABS     senna-docusate (SENOKOT-S/PERICOLACE) 8.6-50 MG tablet     No current facility-administered medications for this visit.     Facility-Administered Medications Ordered in Other Visits   Medication     sodium chloride (PF) 0.9% PF flush 10-60 mL         Allergies:     Allergies   Allergen Reactions     Prednisone      Leg and back pain     Atorvastatin      Statins all swelling     Hmg-Coa-R Inhibitors Swelling       Social History:  Tobacco: Non-smoker  Alcohol: No alcohol  Employment: Retired      Family History:  Family History   Problem Relation Age of Onset     Cervical Cancer Sister      Pancreatic Cancer Brother      Glaucoma No family hx of      Macular Degeneration No family hx of      Melanoma No family hx of      Skin Cancer No family hx of        Review of Systems   A 10-point review of systems was performed. Pertinent findings are noted in the HPI.    Physical Exam   ECOG Status: 2    Vitals:  /66 (BP Location: Left arm, Patient Position: Standing, Cuff Size: Adult Small)   Pulse (!) 124   Temp 98.5  F (36.9  C) (Oral)   Resp 16   Wt 60.1 kg (132 lb 6.4 oz)   SpO2 94%   BMI 20.74 kg/m      Gen: Alert, in NAD  Head: NC/AT  Eyes: PERRL, EOMI, sclera anicteric  Ears: No external auricular lesions  Nose/sinus: No rhinorrhea or epistaxis  Oral cavity/oropharynx: MMM, no visible oral cavity lesions, FOM and BOT are soft to palpation  Neck: Full ROM, supple, no palpable adenopathy  Pulm: No wheezing, stridor or respiratory distress  CV: Extremities are warm and well-perfused, no cyanosis, no pedal edema  Abdominal: Normal bowel sounds, soft, nontender, no masses  Musculoskeletal: Normal bulk and tone  Skin: Normal color and  stone  Neuro: A/Ox3, CN II-XII intact, normal gait    Imaging/Path/Labs   Imaging: Per HPI reviewed and in agreement    Path: Per HPI reviewed and in agreement    Labs: Per HPI renal in agreement  Assessment      Ms. 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    MRI reveals 2 enhancing lesions, 1 in the right temporoparietal lobe measuring 1.7 cm in size and the other 1 the right lingular gyrus measuring 6 mm in size, with associated mild vasogenic edema.    In general, we discussed the management of brain metastasis.  In general, patients with limited intracranial disease, good performance status as well as a well-controlled extracranial disease, recommendation is for gamma knife radiosurgery.  However, in this particular situation given patient's poor performance status uncontrolled/progressive extracranial disease, and overall life expectancy, recommend against gamma knife radiosurgery.     We discussed options of whole brain radiotherapy, particular focusing on 30 Gy/10 fractions versus 20 Gy/5 fractions, or observation with steroid management alone.     Plan     1.  After extent discussion with patient and she wishes to pursue whole brain radiation with 30 Gy/10 fractions.    2.  CT simulation to be performed today 9/23/2022 with plan for starting radiation 9/27/2022.    3.  She will continue to follow-up with Dr. Franklin for systemic therapy.    All benefits and risks discussed, and patient is in agreement with the oncologic plan discussed above.     Thank you for allowing me to participate in your patient's care.  If you should require any additional information, please do not hesitate in contacting me.     Al Singh MD  Department of Radiation Oncology  [facility]

## 2022-09-23 NOTE — NURSING NOTE
INITIAL PATIENT ASSESSMENT      Diagnosis: metastatic melanoma    Prior radiation therapy: None    Prior chemotherapy: Patient most recently received Cycle 5 Day 1 nivolumab/reatlimab-rmbw on 9/8/2022 with Dr. Franklin    Prior hormonal therapy:No    Pain Eval:  Current history of pain associated with this visit:   Intensity: 6/10  Current: dull and aching  Location: low back and bilateral hips  Treatment: patient reports taking Tramadol po as needed.    Psychosocial  Living arrangements: lives at home in [place], presents to clinic with  and son  Fall Risk: ambulates with rolling walker  MIPS Falls Risk Screening Completed: Yes Result: Negative   referral needs: Not needed    Advanced Directive: Yes - Location: patient/family have copy  Implantable Cardiac Device: No  Authorization To Share Protected Health Information: YES - Date: 9/23/2022      Onset of menopause: patient reports menopause at age 40s  Abnormal vaginal bleeding/discharge: No  Are you pregnant? No  Urine Pregnancy Testing Needed: No - post-menopausal    Review of Systems     Constitutional: Positive for chills, malaise/fatigue and weight loss. Negative for diaphoresis and fever.        Patient reports approximate 12 pound weight loss since time of diagnosis, reports decreased appetite.  Patient reports generalized weakness and fatigue, reports chills easily.   HENT: Positive for hearing loss. Negative for congestion, ear discharge, ear pain, nosebleeds, sinus pain, sore throat and tinnitus.         Patient reports wearing bilateral hearing aides.   Eyes: Negative.    Respiratory: Positive for cough. Negative for hemoptysis, sputum production, shortness of breath, wheezing and stridor.         Patient reports intermittent dry cough.   Cardiovascular: Negative.    Gastrointestinal: Negative.    Genitourinary: Negative.    Musculoskeletal: Positive for back pain and joint pain. Negative for falls, myalgias and neck pain.         "See pain note.   Skin: Negative.    Neurological: Positive for tremors, speech change, weakness and headaches. Negative for dizziness, tingling, sensory change, focal weakness, seizures and loss of consciousness.        Patient reports rare frontal headaches.  Reports new shaking of bilateral upper extremities and hands R > L over the past 12 months and worsening over time.  Patient and family report quiet voice, difficulty with word recall and word finding, pauses in speech.   Endo/Heme/Allergies: Negative.    Psychiatric/Behavioral: Positive for memory loss. Negative for depression, hallucinations, substance abuse and suicidal ideas. The patient is not nervous/anxious and does not have insomnia.         Patient reports memory loss \"I feel like I lost half my brain\", patient's family also report worsening memory loss.         Nurse face-to-face time: Level 5:  over 15 min face to face time.    Rima Saenz RN BSN OCN CBCN      "

## 2022-09-23 NOTE — LETTER
2022         RE: Yadira Hoang  [address] [facility] [address]        Dear Colleague,    Thank you for referring your patient, Yadira Hoang, to the [facility] RADIATION ONCOLOGY [place]. Please see a copy of my visit note below.       Department of Radiation Oncology  [facility]: Cancer Center  [facility]  [address] [address] [place] [address]  [phone]       Consultation Note    Name: Yadira Hoang MRN: [id number]   : 1945   Date of Service: 2022  Referring: Dr. Franklin     Reason for consultation: brain metastasis     History of Present Illness     Ms. 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    Briefly, her oncologic history is as follows:    Patient initially noticed a painful lesion in the sole of her right foot and eventually underwent a biopsy in 2020 consistent with a melanoma, at least 3.4 mm deep with ulceration.      She underwent bilateral excision and sentinel lymph node biopsy on 2/10/2020 with right foot lesion extending to a depth of 6.6 mm, with a positive margin, with evidence of microsatellite nodules, 1/2 lymph nodes positive, [id number].    She underwent a PET scan on 2020 demonstrating metastatic disease in the right inguinal lymph node as well as a right external iliac lymph node with mild uptake.    On 2020 she started nivolumab for metastatic disease in the lymph nodes.  Unfortunately, she developed metastatic disease in the liver in 2020.  PET scan on 10/9/2020 demonstrated progression of disease in the liver as well as right inguinal and right external iliac adenopathy with progression to the bone at T2 and S1.    She began ipilimumab in the belimumab eventually with progression of disease in 2022.  She started Opdualag in May 2022 with PET  scan in July 29, 2022 demonstrating diffuse bony metastatic uptake, progression on size of liver metastasis and size of lymph nodes in the pelvis of the abdomen.    Her most recent PET scan on 9/15/2022 unfortunately demonstrates 2 foci within the scalp likely metastatic, a right parietal lobe metastatic lesion in the brain, as well as increase in size of pelvic and inguinal adenopathy as well as progression of the metastatic lesions in the liver, and progression of metastatic bone disease.    MRI of the brain was obtained on 9/16/2022 demonstrating 2 metastatic lesions within the right cerebral hemisphere with some mild surrounding vasogenic edema.    Thus, she was referred for discussion of radiation for brain metastasis.  Today, she is accompanied by her son as well as her .  She states that she denies any active headaches, neurologic changes or loss of strength in the upper or lower extremities, or any numbness or paresthesias.  She uses a walker due to her lower extremity pain from surgery.  She denies any GI or  incontinence.  She does issues with mild confusion although she is completely alert and oriented.  She has some mild aphasia.      We also discussed her additional lesions particularly with focus of the bony metastatic lesions in the pelvis.  She states that at times she does have 10 out of 10 pain but this is well controlled with medication as well as conservative management.  She at this point does not wish to pursue any radiation to the sites.    Of note she does have a history of a DVT and has also been started on Eliquis.    Past Medical History:   Past Medical History:   Diagnosis Date     Concussion 2011    brain injury     ALEXUS (generalized anxiety disorder) 05/07/2020     Major depression, recurrent, chronic (H) 05/07/2020     Melanoma (H)      Nonsenile cataract      Sleep apnea     CPAP       Past Surgical History:   Past Surgical History:   Procedure Laterality Date     BIOPSY NODE  SENTINEL Right 2/10/2020    Procedure: [place] Lymph Node Mapping And Biopsy;  Surgeon: Gabriela Dorsey MD;  Location: [facility]     COMBINED ESOPHAGOSCOPY, GASTROSCOPY, DUODENOSCOPY (EGD) WITH CO2 INSUFFLATION N/A 6/20/2022    Procedure: ESOPHAGOGASTRODUODENOSCOPY, WITH CO2 INSUFFLATION;  Surgeon: Yu Alfred DO;  Location: [facility]     ESOPHAGOGASTRODUODENOSCOPY, WITH BRUSHINGS  1/12/2022    Procedure: Esophagogastroduodenoscopy, With Brushings;  Surgeon: Dayo Merritt MD;  [facility]     ESOPHAGOSCOPY, GASTROSCOPY, DUODENOSCOPY (EGD), COMBINED N/A 6/20/2022    Procedure: ESOPHAGOGASTRODUODENOSCOPY, WITH BIOPSY;  Surgeon: Yu Alfred DO;  [facility]: [facility]     ESOPHAGOSCOPY, GASTROSCOPY, DUODENOSCOPY (EGD), COMBINED N/A 9/12/2022    Procedure: ESOPHAGOGASTRODUODENOSCOPY, WITH BIOPSY;  Surgeon: Chivo Godinez MD;  Location:  GI     EXCISE LESION LOWER EXTREMITY Right 2/10/2020    Procedure: Wide Local Excision of RIGHT heel melanoma;  Surgeon: Gabriela Dorsey MD;  [facility]: [facility]     EXCISE LESION LOWER EXTREMITY Right 2/20/2020    Procedure: Wide local Re-excision RIGHT heel Wound vac application;  Surgeon: Gabriela Dorsey MD;  [facility]     GRAFT SKIN SPLIT THICKNESS FROM EXTREMITY Right 4/2/2020    Procedure: split skin graft from right thigh;  Surgeon: NIKA Crabtree MD;  [facility]     IRRIGATION AND DEBRIDEMENT FOOT, COMBINED Right 4/2/2020    Procedure: right foot/heel debridement;  Surgeon: NIKA Crabtree MD;  [facility]     ORTHOPEDIC SURGERY Right     heel surgery x2     RECONSTRUCT FOOT Right 3/4/2020    Procedure: Right heel reconstruction with regional flap, biologic dressing and SPY;  Surgeon: NIKA Crabtree MD;  [facility]     TUBAL LIGATION         Chemotherapy History:  Per HPI    Radiation History:  No prior radiation    Pregnant: No  Implanted Cardiac Devices: No    Medications:  Current Outpatient Medications    Medication     apixaban ANTICOAGULANT (ELIQUIS) 5 MG tablet     carboxymethylcellulose PF (REFRESH LIQUIGEL) 1 % ophthalmic gel     cholecalciferol (VITAMIN D3) 125 MCG (5000 UT) TABS tablet     estradiol (ESTRACE) 0.1 MG/GM vaginal cream     fish oil-omega-3 fatty acids 1000 MG capsule     traMADol (ULTRAM) 50 MG tablet     dicyclomine (BENTYL) 10 MG capsule     famotidine (PEPCID) 20 MG tablet     mirtazapine (REMERON) 15 MG tablet     multivitamin w/minerals (THERA-VIT-M) tablet     ondansetron (ZOFRAN) 8 MG tablet     Riboflavin 100 MG TABS     senna-docusate (SENOKOT-S/PERICOLACE) 8.6-50 MG tablet     No current facility-administered medications for this visit.     Facility-Administered Medications Ordered in Other Visits   Medication     sodium chloride (PF) 0.9% PF flush 10-60 mL         Allergies:     Allergies   Allergen Reactions     Prednisone      Leg and back pain     Atorvastatin      Statins all swelling     Hmg-Coa-R Inhibitors Swelling       Social History:  Tobacco: Non-smoker  Alcohol: No alcohol  Employment: Retired      Family History:  Family History   Problem Relation Age of Onset     Cervical Cancer Sister      Pancreatic Cancer Brother      Glaucoma No family hx of      Macular Degeneration No family hx of      Melanoma No family hx of      Skin Cancer No family hx of        Review of Systems   A 10-point review of systems was performed. Pertinent findings are noted in the HPI.    Physical Exam   ECOG Status: 2    Vitals:  /66 (BP Location: Left arm, Patient Position: Standing, Cuff Size: Adult Small)   Pulse (!) 124   Temp 98.5  F (36.9  C) (Oral)   Resp 16   Wt 60.1 kg (132 lb 6.4 oz)   SpO2 94%   BMI 20.74 kg/m      Gen: Alert, in NAD  Head: NC/AT  Eyes: PERRL, EOMI, sclera anicteric  Ears: No external auricular lesions  Nose/sinus: No rhinorrhea or epistaxis  Oral cavity/oropharynx: MMM, no visible oral cavity lesions, FOM and BOT are soft to palpation  Neck: Full ROM,  supple, no palpable adenopathy  Pulm: No wheezing, stridor or respiratory distress  CV: Extremities are warm and well-perfused, no cyanosis, no pedal edema  Abdominal: Normal bowel sounds, soft, nontender, no masses  Musculoskeletal: Normal bulk and tone  Skin: Normal color and turgor  Neuro: A/Ox3, CN II-XII intact, normal gait    Imaging/Path/Labs   Imaging: Per HPI reviewed and in agreement    Path: Per HPI reviewed and in agreement    Labs: Per HPI renal in agreement  Assessment      Ms. 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    MRI reveals 2 enhancing lesions, 1 in the right temporoparietal lobe measuring 1.7 cm in size and the other 1 the right lingular gyrus measuring 6 mm in size, with associated mild vasogenic edema.    In general, we discussed the management of brain metastasis.  In general, patients with limited intracranial disease, good performance status as well as a well-controlled extracranial disease, recommendation is for gamma knife radiosurgery.  However, in this particular situation given patient's poor performance status uncontrolled/progressive extracranial disease, and overall life expectancy, recommend against gamma knife radiosurgery.     We discussed options of whole brain radiotherapy, particular focusing on 30 Gy/10 fractions versus 20 Gy/5 fractions, or observation with steroid management alone.     Plan     1.  After extent discussion with patient and she wishes to pursue whole brain radiation with 30 Gy/10 fractions.    2.  CT simulation to be performed today 9/23/2022 with plan for starting radiation 9/27/2022.    3.  She will continue to follow-up with Dr. Franklin for systemic therapy.    All benefits and risks discussed, and patient is in agreement with the oncologic plan discussed above.     Thank you for allowing me to participate in your patient's care.  If you should require any  additional information, please do not hesitate in contacting me.     Al Singh MD  Department of Radiation Oncology  [facility]         Again, thank you for allowing me to participate in the care of your patient.        Sincerely,        Al Singh MD

## 2022-09-25 NOTE — PROGRESS NOTES
Infusion Nursing Note:  Yadira Hoang presents today for IV fluids.    Patient seen by provider today: No   present during visit today: Not Applicable.    Note: Yadira presets to infusion today feeling well. She denies any symptoms or concerns today. She reports pain 5/10 in her back and states this is chronic pain. She denies the need for any intervention while in infusion.     Intravenous Access:  Peripheral IV placed.    Treatment Conditions:  Lab Results   Component Value Date     09/25/2022    POTASSIUM 4.2 09/25/2022    CR 0.54 09/25/2022    GABY 9.7 09/25/2022    BILITOTAL 0.4 09/25/2022    ALBUMIN 3.2 (L) 09/25/2022    ALT 11 09/25/2022     (H) 09/25/2022     No electrolyte replacement needed.     Post Infusion Assessment:  Patient tolerated infusion without incident.  Blood return noted pre and post infusion.  Site patent and intact, free from redness, edema or discomfort.  No evidence of extravasations.  Access discontinued per protocol.     Discharge Plan:   Discharge instructions reviewed with: Patient.  Patient and/or family verbalized understanding of discharge instructions and all questions answered.  AVS to patient via [employer].  Patient will return 9/29/22 for next appointment.   Patient discharged in stable condition accompanied by: self.  Departure Mode: Ambulatory.      Fouzia Willis RN

## 2022-09-26 NOTE — ED PROVIDER NOTES
History     Chief Complaint   Patient presents with     Constipation     HPI  Yadira Hoang is a 77 year old female who presents with concerns for constipation.  Last bowel movement was approximately 7 days ago.  No vomiting.  Prior appendectomy as a child.  No previous bowel obstruction.  States normal bowel movements usually 1-2 daily.  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  She has met with the radiation oncology.  Her daughter is coming to stay with her as of tomorrow.  She is having mild intermittent abdominal cramping.      Allergies:  Allergies   Allergen Reactions     Prednisone      Leg and back pain     Atorvastatin      Statins all swelling     Hmg-Coa-R Inhibitors Swelling       Problem List:    Patient Active Problem List    Diagnosis Date Noted     Dehydration 09/23/2022     Priority: Medium     Polyarthritis 05/02/2022     Priority: Medium     Bilateral leg pain 03/02/2022     Priority: Medium     Chronic bilateral low back pain without sciatica 03/02/2022     Priority: Medium     Generalized muscle weakness 03/02/2022     Priority: Medium     Immunodeficiency, unspecified (H) 08/06/2021     Priority: Medium     Generalized pruritus 02/23/2021     Priority: Medium     Associated with immunotherapy treatments       Secondary malignant neoplasm of bone (H) 01/19/2021     Priority: Medium     ALEXUS (generalized anxiety disorder) 05/07/2020     Priority: Medium     Major depression, recurrent, chronic (H) 05/07/2020     Priority: Medium     S/P flap graft 03/25/2020     Priority: Medium     Added automatically from request for surgery [id number]       Chronic congestion of paranasal sinus 02/07/2020     Priority: Medium     Melanoma of skin (H) 02/04/2020     Priority: Medium     Added automatically from request for surgery [id number]       Malignant melanoma of right lower extremity including hip  (H) 01/27/2020     Priority: Medium     Chronic fatigue 01/06/2020     Priority: Medium     Chronic pain disorder 11/13/2018     Priority: Medium     Myofascial pain 10/31/2018     Priority: Medium     Neck pain, chronic 05/30/2017     Priority: Medium     Intractable migraine without aura and without status migrainosus 05/30/2017     Priority: Medium     History of multiple concussions 05/30/2017     Priority: Medium     Bilateral occipital neuralgia 05/30/2017     Priority: Medium     Weakness of foot 05/02/2017     Priority: Medium     Foot pain, bilateral 05/02/2017     Priority: Medium     Difficulty walking 05/02/2017     Priority: Medium     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  2011, headaches x2 yr, biofeedback, resolved       Hyperlipidemia LDL goal <100 03/25/2014     Priority: Medium     The 10-year ASCVD risk score ([place] PRISCILLA Jr., et al., 2013) is: 20.5%    Values used to calculate the score:      Age: 75 years      Sex: Female      Is Non- : No      Diabetic: No      Tobacco smoker: No      Systolic Blood Pressure: 138 mmHg      Is BP treated: No      HDL Cholesterol: 57 mg/dL      Total Cholesterol: 235 mg/dL       GUERLINE (obstructive sleep apnea) 08/08/2005     Priority: Medium     Has CPAP    AHI 5.2/RDI 5.9 desats 83% [place], mild insomnia,    Formatting of this note might be different from the original.  AHI 5.2/RDI 5.9 desats 83% [place], mild insomnia,          Past Medical History:    Past Medical History:   Diagnosis Date     Concussion 2011     ALEXUS (generalized anxiety disorder) 05/07/2020     Major depression, recurrent, chronic (H) 05/07/2020     Melanoma (H)       Nonsenile cataract      Sleep apnea        Past Surgical History:    Past Surgical History:   Procedure Laterality Date     BIOPSY NODE SENTINEL Right 2/10/2020    Procedure: [place]aline Lymph Node Mapping And Biopsy;  Surgeon: Gabriela Dorsey MD;  Location: [facility]     COMBINED ESOPHAGOSCOPY, GASTROSCOPY, DUODENOSCOPY (EGD) WITH CO2 INSUFFLATION N/A 6/20/2022    Procedure: ESOPHAGOGASTRODUODENOSCOPY, WITH CO2 INSUFFLATION;  Surgeon: Yu Alfred DO;  Location: [facility]     ESOPHAGOGASTRODUODENOSCOPY, WITH BRUSHINGS  1/12/2022    Procedure: Esophagogastroduodenoscopy, With Brushings;  Surgeon: Dayo Merritt MD;  Location: [facility]     ESOPHAGOSCOPY, GASTROSCOPY, DUODENOSCOPY (EGD), COMBINED N/A 6/20/2022    Procedure: ESOPHAGOGASTRODUODENOSCOPY, WITH BIOPSY;  Surgeon: Yu Alfred DO;  Location: [facility]     ESOPHAGOSCOPY, GASTROSCOPY, DUODENOSCOPY (EGD), COMBINED N/A 9/12/2022    Procedure: ESOPHAGOGASTRODUODENOSCOPY, WITH BIOPSY;  Surgeon: Chivo Godinez MD;  Location: [facility]     EXCISE LESION LOWER EXTREMITY Right 2/10/2020    Procedure: Wide Local Excision of RIGHT heel melanoma;  Surgeon: Gabriela Dorsey MD;  Location: [facility]     EXCISE LESION LOWER EXTREMITY Right 2/20/2020    Procedure: Wide local Re-excision RIGHT heel Wound vac application;  Surgeon: Gabriela Dorsey MD;  Location: [facility]     GRAFT SKIN SPLIT THICKNESS FROM EXTREMITY Right 4/2/2020    Procedure: split skin graft from right thigh;  Surgeon: NIKA Crabtree MD;  Location: [facility]     IRRIGATION AND DEBRIDEMENT FOOT, COMBINED Right 4/2/2020    Procedure: right foot/heel debridement;  Surgeon: NIKA Crabtree MD;  Location: [facility]     ORTHOPEDIC SURGERY Right     heel surgery x2     RECONSTRUCT FOOT Right 3/4/2020    Procedure: Right heel reconstruction with regional flap, biologic dressing and SPY;  Surgeon: NIKA Crabtree MD;  Location: [facility]     TUBAL LIGATION         Family History:    Family  History   Problem Relation Age of Onset     Cervical Cancer Sister      Pancreatic Cancer Brother      Glaucoma No family hx of      Macular Degeneration No family hx of      Melanoma No family hx of      Skin Cancer No family hx of        Social History:  Marital Status:   [2]  Social History     Tobacco Use     Smoking status: Never Smoker     Smokeless tobacco: Never Used   Vaping Use     Vaping Use: Never used   Substance Use Topics     Alcohol use: Not Currently     Drug use: Never        Medications:    apixaban ANTICOAGULANT (ELIQUIS) 5 MG tablet  cholecalciferol (VITAMIN D3) 125 MCG (5000 UT) TABS tablet  senna-docusate (SENOKOT-S/PERICOLACE) 8.6-50 MG tablet  traMADol (ULTRAM) 50 MG tablet  carboxymethylcellulose PF (REFRESH LIQUIGEL) 1 % ophthalmic gel  dicyclomine (BENTYL) 10 MG capsule  estradiol (ESTRACE) 0.1 MG/GM vaginal cream  famotidine (PEPCID) 20 MG tablet  fish oil-omega-3 fatty acids 1000 MG capsule  mirtazapine (REMERON) 15 MG tablet  multivitamin w/minerals (THERA-VIT-M) tablet  ondansetron (ZOFRAN) 8 MG tablet  Riboflavin 100 MG TABS          Review of Systems   All other systems reviewed and are negative.      Physical Exam   BP: 131/77  Pulse: 107  Resp: 18  SpO2: 95 %      Physical Exam  Vitals and nursing note reviewed.   Constitutional:       Appearance: She is not ill-appearing.   HENT:      Head: Normocephalic.      Nose: Nose normal.   Eyes:      Conjunctiva/sclera: Conjunctivae normal.   Cardiovascular:      Rate and Rhythm: Normal rate.   Pulmonary:      Effort: Pulmonary effort is normal.   Abdominal:      General: Abdomen is flat. Bowel sounds are normal. There is no distension.      Palpations: There is no mass.      Tenderness: There is no abdominal tenderness. There is no guarding or rebound.      Hernia: No hernia is present.   Skin:     General: Skin is warm.      Capillary Refill: Capillary refill takes less than 2 seconds.      Findings: No rash.   Neurological:       General: No focal deficit present.      Mental Status: She is alert and oriented to person, place, and time.   Psychiatric:         Mood and Affect: Mood normal.         Behavior: Behavior normal.         ED Course                 Procedures                  Results for orders placed or performed during the hospital encounter of 09/26/22 (from the past 24 hour(s))   XR Abdomen 2 Views    Narrative    XR ABDOMEN 2 VIEWS   9/26/2022 12:10 PM     HISTORY: No bowel movement x7 days -history for stage IV melanoma,  prior appendectomy-last BM 7 days ago.  Still passing gas per rectum.    COMPARISON: CT 9/15/2022.    FINDINGS:  Nonobstructive bowel gas pattern. Moderate stool volume  throughout colon. No free air. Mild right convex lumbar curve.       Impression    IMPRESSION: Non obstructive bowel gas pattern.    DORA PULIDO MD         SYSTEM ID:  [id number]       Medications   Enema Compound (docusate/mineral oil/NaPhos) NO MAG CIT PREMIX (226 mLs Rectal Given 9/26/22 1240)       Assessments & Plan (with Medical Decision Making)  Very anxious 77-year-old female with recent notification that her malignant melanoma has metastasized to both bone and brain.  Daughter coming to stay with her tomorrow  Presented with concerns for constipation.  X-ray showed a nonobstructive gas pattern but moderate amount of stool throughout the colon.  She had successful release of a large amounts of stool after giving her a pink lady enema.  Advised that she return back to use for daily stool softener and MiraLAX which she had stopped.     I have reviewed the nursing notes.    I have reviewed the findings, diagnosis, plan and need for follow up with the patient.      New Prescriptions    No medications on file       Final diagnoses:   Slow transit constipation       9/26/2022   [facility] EMERGENCY DEPT     Clyde Estes, DO  09/27/22 0758

## 2022-09-26 NOTE — ED TRIAGE NOTES
"Pt presents requesting an enema.  Pt stated that the clinic told her to come to the ED for an enema.  Last bowel movement was a week ago and states that she is on a medication that causes \"bowel blockage\".      Triage Assessment     Row Name 09/26/22 1133       Triage Assessment (Adult)    Airway WDL WDL       Respiratory WDL    Respiratory WDL WDL       Skin Circulation/Temperature WDL    Skin Circulation/Temperature WDL WDL       Cardiac WDL    Cardiac WDL WDL       Peripheral/Neurovascular WDL    Peripheral Neurovascular WDL WDL              "

## 2022-09-26 NOTE — DISCHARGE INSTRUCTIONS
X-ray of the abdomen identified no signs of any bowel obstruction.  There is a moderate amount of stool in the left side of the colon.  You had good results with the enema given in the emergency department.  Recommend that she continue with a stool softener such as Colace once a day and MiraLAX 1 scoop daily.  Drink more fluids and continue to increase fiber in your diet.

## 2022-09-26 NOTE — TELEPHONE ENCOUNTER
Calls in wanting a referral for an enema   Has not had a BM for a week or more.   Tomorrow is suppose to start radiation and  needs to get her stomach figured out prior to starting radiation.   Having abdominal pain.   Urinating in small amounts at a time.   States has been crying a lot this week.   Has been taking Miralax but states this does not help anything.   Informed her that she would have to go to ER for Enema as they will not do them in the clinic.     Is going to go to [facility].     Call placed to [facility] and gave report.

## 2022-09-28 NOTE — LETTER
2022         RE: Yadira Hoang  [address] [facility] [address]        Dear Colleague,    Thank you for referring your patient, Yadira Hoang, to the [facility] RADIATION ONCOLOGY [place]. Please see a copy of my visit note below.    [facility] PHYSICIANS  SPECIALIZING IN BREAKTHROUGHS  Radiation Oncology    On Treatment Visit Note      Yadira Hoang      Date: Sep 28, 2022   MRN: [id number]   : 1945  Diagnosis: brain metastases (metastatic melanoma)        ID: Ms. Hoang is a 77 year old female with widely metastatic melanoma to bone, liver, distant lymph nodes now presenting with brain metastasis.MRI reveals 2 enhancing lesions, 1 in the right temporoparietal lobe measuring 1.7 cm in size and the other 1 the right lingular gyrus measuring 6 mm in size, with associated mild vasogenic edema.  In general, we discussed the management of brain metastasis.  In general, patients with limited intracranial disease, good performance status as well as a well-controlled extracranial disease, recommendation is for gamma knife radiosurgery.  However, in this particular situation given patient's poor performance status uncontrolled/progressive extracranial disease, and overall life expectancy, recommend against gamma knife radiosurgery. After extent discussion with patient and she wishes to pursue whole brain radiation with 30 Gy/10 fractions.    Reason for Visit:  On Radiation Treatment Visit       Treatment Summary to Date  Treatment Site: whole brain Current Dose: 600/3000 cGy Fractions: 2/10      Chemotherapy  Chemo concurrent with radx?: No (Dr. Franklin)    Subjective:   No complaints, tolerating RT well overall. No neurologic changes.    Nursing ROS:   Nutrition Alteration  Diet Type: Patient's Preference  Nutrition Note: reviewed high-calorie/high-protein, reviewed nutrition supplements  Skin  Skin Reaction: 0 - No changes  Skin Intervention: reviewed  skin changes/cares and hair loss  CNS Alteration  CNS note: alert and oriented, denies headaches     Cardiovascular  Respiratory effort: 1 - Normal - without distress  Gastrointestinal  Nausea: 0 - None     Psychosocial  Mood - Anxiety: 0 - Normal  Mood - Depression: 0 - Normal  Psychosocial Note: fatigue at baseline  Pain Assessment  0-10 Pain Scale: 0      Objective:   /61 (BP Location: Left arm, Patient Position: Chair, Cuff Size: Adult Regular)   Pulse 106   Temp 97.5  F (36.4  C) (Oral)   Resp 16   Wt 60.3 kg (133 lb)   SpO2 97%   BMI 20.83 kg/m    Gen: Appears well, in no acute distress      Labs:  CBC RESULTS: Recent Labs   Lab Test 09/15/22  1330   WBC 7.1   RBC 3.46*   HGB 10.3*   HCT 32.0*   MCV 93   MCH 29.8   MCHC 32.2   RDW 15.1*        ELECTROLYTES:  Recent Labs   Lab Test 09/25/22  1119      POTASSIUM 4.2   CHLORIDE 96*   GABY 9.7   CO2 26   BUN 14.5   CR 0.54   *       Assessment:    Tolerating radiation therapy well.  All questions and concerns addressed.      Plan:   1. Continue current therapy.        [facility] chart and setup information reviewed  Ports checked and MVCT/IGRT images checked    Medication Review  Med list reviewed with patient?: Yes  Med list printed and given: Offered and declined    Educational Topic Discussed  Education Instructions: radiation therapy side effects reviewed: fatigue, skin changes and skin cares, hair loss, nausea/vomiting, headaches, vision changes    Al Singh MD  Radiation Oncology   [facility]  Clinic: [phone]          Again, thank you for allowing me to participate in the care of your patient.        Sincerely,        Al Singh MD

## 2022-09-28 NOTE — PROGRESS NOTES
[facility] PHYSICIANS  SPECIALIZING IN BREAKTHROUGHS  Radiation Oncology    On Treatment Visit Note      Yadira Hoang      Date: Sep 28, 2022   MRN: [id number]   : 1945  Diagnosis: brain metastases (metastatic melanoma)        ID: Ms. Hoang is a 77 year old female with widely metastatic melanoma to bone, liver, distant lymph nodes now presenting with brain metastasis.MRI reveals 2 enhancing lesions, 1 in the right temporoparietal lobe measuring 1.7 cm in size and the other 1 the right lingular gyrus measuring 6 mm in size, with associated mild vasogenic edema.  In general, we discussed the management of brain metastasis.  In general, patients with limited intracranial disease, good performance status as well as a well-controlled extracranial disease, recommendation is for gamma knife radiosurgery.  However, in this particular situation given patient's poor performance status uncontrolled/progressive extracranial disease, and overall life expectancy, recommend against gamma knife radiosurgery. After extent discussion with patient and she wishes to pursue whole brain radiation with 30 Gy/10 fractions.    Reason for Visit:  On Radiation Treatment Visit       Treatment Summary to Date  Treatment Site: whole brain Current Dose: 600/3000 cGy Fractions: 2/10      Chemotherapy  Chemo concurrent with radx?: No (Dr. Franklin)    Subjective:   No complaints, tolerating RT well overall. No neurologic changes.    Nursing ROS:   Nutrition Alteration  Diet Type: Patient's Preference  Nutrition Note: reviewed high-calorie/high-protein, reviewed nutrition supplements  Skin  Skin Reaction: 0 - No changes  Skin Intervention: reviewed skin changes/cares and hair loss  CNS Alteration  CNS note: alert and oriented, denies headaches     Cardiovascular  Respiratory effort: 1 - Normal - without distress  Gastrointestinal  Nausea: 0 - None     Psychosocial  Mood - Anxiety: 0 - Normal  Mood - Depression: 0 -  Normal  Psychosocial Note: fatigue at baseline  Pain Assessment  0-10 Pain Scale: 0      Objective:   /61 (BP Location: Left arm, Patient Position: Chair, Cuff Size: Adult Regular)   Pulse 106   Temp 97.5  F (36.4  C) (Oral)   Resp 16   Wt 60.3 kg (133 lb)   SpO2 97%   BMI 20.83 kg/m    Gen: Appears well, in no acute distress      Labs:  CBC RESULTS: Recent Labs   Lab Test 09/15/22  1330   WBC 7.1   RBC 3.46*   HGB 10.3*   HCT 32.0*   MCV 93   MCH 29.8   MCHC 32.2   RDW 15.1*        ELECTROLYTES:  Recent Labs   Lab Test 09/25/22  1119      POTASSIUM 4.2   CHLORIDE 96*   GABY 9.7   CO2 26   BUN 14.5   CR 0.54   *       Assessment:    Tolerating radiation therapy well.  All questions and concerns addressed.      Plan:   1. Continue current therapy.        [facility] chart and setup information reviewed  Ports checked and MVCT/IGRT images checked    Medication Review  Med list reviewed with patient?: Yes  Med list printed and given: Offered and declined    Educational Topic Discussed  Education Instructions: radiation therapy side effects reviewed: fatigue, skin changes and skin cares, hair loss, nausea/vomiting, headaches, vision changes    Al Singh MD  Radiation Oncology   [facility]  Clinic: [phone]

## 2022-09-28 NOTE — PATIENT INSTRUCTIONS
Please contact [facility] RN with questions or concerns following today's appointment: [phone].    Thank you!

## 2022-09-29 NOTE — PROGRESS NOTES
MEDICAL ONCOLOGY PROGRESS NOTE  Melanoma Clinic  Sep 29, 2022     CHIEF COMPLAINT: Metastatic acral melanoma    Melanoma History:  1. She noted a painful lesion on the sole of the right foot for a few months, since last summer.  It initially was small then became raised.  It spontaneously bled. She is not entirely sure of its appearance initially.  She denies noting any masses behind the knee or in the groin.  2. 1/9/2020, punch biopsy was performed by Dr Jessica Meadows. Patology showed melanoma, at least 3.4 mm deep with ulceration and 0 mitoses, and perineural invasion present. TILs were non-brisk and LVI not identified. pT3b.  3. 2/10/2020, she has right femoral sentinel lymph node biopsy and wide local excision (Specimen #: [id number]) and the right heel lesion extends to a depth of 6.6 mm, and invasive melanoma is present at 0.2 mm from the deep margin. Microsatellites are also present. There was 1 (of 2) lymph nodes involved. The lymph node tissue exhibits extensive subcapsular and parenchymal clusters of melanoma cells, highlighted on Melan-A immunostain. The largest cluster is 7 millimeters in greatest diameter. pT4b pN2c. PD-L1 staining is negative, <1%. No mutation in BRAF, KIT, or NRAS.  4. 2/22/2020, she had PET-CT, which showed intense FDG uptake in a right inguinal lymph node, concerning for an additional focus of metastatic disease. There is also an indeterminate right external iliac lymph node with mild FDG uptake.  5. 3/4/2020, she has medial plantar artery  fasciocutaneous instep flap and Integra placement to the donor site. On 4/2/2020, she has additional reconstruction with skin grafting.  6. 5/22/2020, PET-CT shows a hypermetabolic right inguinal and right external iliac chain lymph node and stable pulmonary nodules.  7. 5/27/2020, she starts nivolumab for presumed low volume lymph node predominant metastatic disease.  8. 8/21/2020, PET-CT shows increase in hypermetabolic  adenopathy, and a hypermetabolic nodule in segment 2 of the liver. Given low volume of disease she prefers to continue therapy with short interval follow-up.  9. 10/9/2020, PET-CT shows increasing size of hepatic metastases with increased hypermetabolism, increased right inguinal and iliac lymphadenopathy, and new FDG uptake right T2 transverse process and right side of S1. MRI-brain obtained 10/21/2020 is negative for brain metastasis.  10. 10/28/2020, she starts ipilimumab 1mg/kg and nivolumab 3mg/kg. She then goes on to maintenance nivolumab through 4/12/21.  11. 5/10/2021, she starts ipilimumab 3 mg/kg monotherapy q3w for 4 cycles.  12. 7/30/21, PET/CT shows mild disease progression.   13. 8/2/21, start ipilimumab 3mg/kg and nivolumab 1mg/kg and received 2 cycles, last on 8/23/21.  14. 8/23/21, develops elevated LFT's.   15. 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   16. 1/28/22, started TVEC  17. 4/22/22, progressive disease, consideration for additional therapeutic approaches.  18. 5/11/22, Start Opdualag  19. 7/29/22: PET-CT shows new diffuse bony metastatic bony disease, increased size of liver mets and increased size of lymph node conglomerates throughout pelvis and abdomen. DVT in right external iliac vein, common femoral vein and femoral vein also found. Started on Eliquis.  20. 9/15/22, PET-CT shows increase in size/metabolism of the large right pelvic/right upper thigh mass, new metastatic focus medial to the thigh, a right parietal lobe metastatic brain lesion, two foci of FDG uptake within the scalp, likely metastatic, new metabolism of the right upper lobe nodularities and increased metabolism of the right lower lobe 7 mm pulmonary nodule, multiple metastatic liver lesions, some of them are new and some are increased in size, new metastatic lesions within the spleen, and progressive metastatic bone disease. MRI-brain shows two brain metastases in the right cerebral hemisphere  with mild surrounding vasogenic edema.       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 She presents today in routine follow up with her  and daughter.    She has started whole brain radiation for palliation of intracranial disease under Dr. Al Singh. She has started to note some additional mild fatigue set in. Otherwise, no headaches or other side effects from radiation.    She notes significant body aches and pains, largely in the back and right thigh. She has been taking the tramadol as needed. She took to tramadol earlier today to get to clinic, but still is uncomfortable sitting for prolonged periods of time. She rates her pain today as 6 out of 10.    She has been taking senna and Miralax on as needed basis, and not as a regular bowel regimen, and she has not had a good bowel movement in the last 2 days. She states she is also eating very little. She does have her family at her side in the home and helping her around the house, which has been extremely helpful to her.  She denies any cough or shortness of breath. She has difficulty rising up from a seated position, but her  and daughter have been helping her rock out of a chair to a standing position. She continues to get around with a walker.      Current Outpatient Medications   Medication Sig Dispense Refill     apixaban ANTICOAGULANT (ELIQUIS) 5 MG tablet Take 1 tablet (5 mg) by mouth 2 times daily 60 tablet 11     carboxymethylcellulose PF (REFRESH LIQUIGEL) 1 % ophthalmic gel 1 drop 3 times daily       cholecalciferol (VITAMIN D3) 125 MCG (5000 UT) TABS tablet Take 125 mcg by mouth daily       dicyclomine (BENTYL) 10 MG capsule Take 1 capsule (10 mg) by mouth 4 times daily (before meals and nightly) (Patient not taking: No sig reported) 120 capsule 1     estradiol (ESTRACE) 0.1 MG/GM vaginal cream Place 2 g vaginally twice a week 42.5 g 1     famotidine (PEPCID) 20 MG tablet Take 1  tablet (20 mg) by mouth 2 times daily as needed (acid reflux/heartburn) (Patient not taking: No sig reported) 60 tablet 1     fish oil-omega-3 fatty acids 1000 MG capsule Take 2 g by mouth daily       mirtazapine (REMERON) 15 MG tablet Take 1 tablet (15 mg) by mouth At Bedtime (Patient not taking: No sig reported) 30 tablet 1     multivitamin w/minerals (THERA-VIT-M) tablet Take 1 tablet by mouth daily (Patient not taking: No sig reported)       naloxegol (MOVANTIK) 25 MG TABS tablet Take 1 tablet (25 mg) by mouth every morning (before breakfast) 30 tablet 1     ondansetron (ZOFRAN) 8 MG tablet Take 1 tablet (8 mg) by mouth every 8 hours as needed for nausea (Patient not taking: No sig reported) 30 tablet 3     Riboflavin 100 MG TABS Take 400 mg by mouth daily (Patient not taking: No sig reported)       senna-docusate (SENOKOT-S/PERICOLACE) 8.6-50 MG tablet Take 1 tablet by mouth 2 times daily (Patient not taking: Reported on 9/28/2022) 60 tablet 0     traMADol (ULTRAM) 50 MG tablet Take 1 tablet (50 mg) by mouth every 6 hours as needed for severe pain (pain) 30 tablet 0     Objective:  There were no vitals taken for this visit.  GENERAL: Healthy, alert and no distress  EYES: Eyes grossly normal to inspection.  No discharge or erythema, or obvious scleral/conjunctival abnormalities.  HENT: Normal cephalic/atraumatic.  External ears, nose and mouth without ulcers or lesions.  No nasal drainage visible.  NECK: No asymmetry, visible masses or scars  RESP: No audible wheeze, cough, or visible cyanosis.  No visible retractions or increased work of breathing.    SKIN: Visible skin clear. No significant rash, abnormal pigmentation or lesions.  NEURO: Cranial nerves grossly intact.  Mentation and speech appropriate for age.  PSYCH: Mentation appears normal, affect normal/bright, judgement and insight intact, normal speech and appearance well-groomed.      ASSESSMENT AND PLAN:  #Diffusely metastatic acral melanoma  #Brain  metastasis  She has received multiple lines of prior therapy including ipi/nivo and TVEC. She received 5 cycles of Opdualag 5 and on recent PET-CT, unforatuntely there was disease progression in the body and also new brain metastases. She is currently undergoing whole brain radiation therapy for palliation of intracranial disease. Unfortunately, her prognosis is poor and end of life planning is appropriate.    We discussed the importance of reviewing her goals of care and establishing an advance directive. I would like for her to see palliative care also for assistance in pain management, opioid induced constipation, depression, anxiety, anorexia.     We previously reviewed the potential option of chemotherapy, alone or in combination with immunotherapy. Options include dacarbazine, temozolomide, or Carboplatin and paclitaxel. At this time I would recommend we focus on completing radiation therapy and pain control and bowel regimen. She agrees that these are major priorities. She does, however, remain interested in the possibility of chemotherapy. We will plan to meet again in 1 month to review potential use of palliative chemotherapy.    -Continue radiation therapy  -Palliative referral ASAP  -RTC in 1 month to review next steps     #Cancer pain  #Back pain  #Leg pain.   Previously prescribed tramadol 50 mg q6h, which she has been taking recently, but it causes her constipation. She will continue the tramadol, since it is helping with pain. Otherwise, could increase potency next with trial of oxycodone if becomes ineffective. We must work to keep her bowels regular.      #Constipation, opioid induced  Has been in the ED for severe constipation, likely from opioid use. Reviewed importance of bowel regimen. Must take senna twice a day and Miralax once daily as a bowel regimen during opioid therapy. Magnesium citrate also provided for as needed use. Movantiq written for, but is pending insurance review.    #Right  lower extremity DVT  Secondary to tumor compression. Continue Eliquis 5mg bid anticoagulation lifelong.       #RLE lymphema.   Secondary to right groin tumors and new DVT. Anticoagulation as above. She will also continue with lymphedema therapy.     #Depression  #Anxiety   Waxes and wanes. Will continue follow up with palliative care social work.      #Anorexia and weight loss.  She had EGD on 9/12/22, which showed the entire examined stomach was normal appearing. Biopsies taken for h pylori. No evidence of candida esophagitis on EGD.     #Scalp lesions  Represent metastatic disease. Can keep appointment with dermatology on 10/11/22 to review, but given extent of her disease local therapy is unlikely to change the overall course.       Gi Cartagena M.D.   of Medicine  Hematology, Oncology and Transplantation  Pager: [phone]

## 2022-09-29 NOTE — NURSING NOTE
"Oncology Rooming Note    September 29, 2022 2:45 PM   Yadira Hoang is a 77 year old female who presents for:    Chief Complaint   Patient presents with     Oncology Clinic Visit     Melanoma of skin     Initial Vitals: /70   Pulse 97   Temp 98  F (36.7  C) (Oral)   Resp 16   Wt 61.2 kg (135 lb)   SpO2 98%   BMI 21.14 kg/m   Estimated body mass index is 21.14 kg/m  as calculated from the following:    Height as of 9/12/22: 1.702 m (5' 7\").    Weight as of this encounter: 61.2 kg (135 lb). Body surface area is 1.7 meters squared.  Worst Pain (10) Comment: Data Unavailable   No LMP recorded. Patient is postmenopausal.  Allergies reviewed: Yes  Medications reviewed: Yes    Medications: Medication refills not needed today.  Pharmacy name entered into [employer]:    [facility] [place], [place] - [address], [id number]  [facility] [address] [place] [address] [address] [facility]    Clinical concerns: questions about a medication she asked for, has been waiting to hear from Galdino Valladares CMA            "

## 2022-09-29 NOTE — LETTER
9/29/2022         RE: Yadira Hoang  [address] [facility] [address]        Dear Colleague,    Thank you for referring your patient, Yadira Hoang, to the [facility]. Please see a copy of my visit note below.        MEDICAL ONCOLOGY PROGRESS NOTE  Melanoma Clinic  Sep 29, 2022     CHIEF COMPLAINT: Metastatic acral melanoma    Melanoma History:  1. She noted a painful lesion on the sole of the right foot for a few months, since last summer.  It initially was small then became raised.  It spontaneously bled. She is not entirely sure of its appearance initially.  She denies noting any masses behind the knee or in the groin.  2. 1/9/2020, punch biopsy was performed by Dr Jessica Meadows. Patology showed melanoma, at least 3.4 mm deep with ulceration and 0 mitoses, and perineural invasion present. TILs were non-brisk and LVI not identified. pT3b.  3. 2/10/2020, she has right femoral sentinel lymph node biopsy and wide local excision (Specimen #: [id number]) and the right heel lesion extends to a depth of 6.6 mm, and invasive melanoma is present at 0.2 mm from the deep margin. Microsatellites are also present. There was 1 (of 2) lymph nodes involved. The lymph node tissue exhibits extensive subcapsular and parenchymal clusters of melanoma cells, highlighted on Melan-A immunostain. The largest cluster is 7 millimeters in greatest diameter. pT4b pN2c. PD-L1 staining is negative, <1%. No mutation in BRAF, KIT, or NRAS.  4. 2/22/2020, she had PET-CT, which showed intense FDG uptake in a right inguinal lymph node, concerning for an additional focus of metastatic disease. There is also an indeterminate right external iliac lymph node with mild FDG uptake.  5. 3/4/2020, she has medial plantar artery  fasciocutaneous instep flap and Integra placement to the donor site. On 4/2/2020, she has additional reconstruction with skin grafting.  6. 5/22/2020, PET-CT shows a  hypermetabolic right inguinal and right external iliac chain lymph node and stable pulmonary nodules.  7. 5/27/2020, she starts nivolumab for presumed low volume lymph node predominant metastatic disease.  8. 8/21/2020, PET-CT shows increase in hypermetabolic adenopathy, and a hypermetabolic nodule in segment 2 of the liver. Given low volume of disease she prefers to continue therapy with short interval follow-up.  9. 10/9/2020, PET-CT shows increasing size of hepatic metastases with increased hypermetabolism, increased right inguinal and iliac lymphadenopathy, and new FDG uptake right T2 transverse process and right side of S1. MRI-brain obtained 10/21/2020 is negative for brain metastasis.  10. 10/28/2020, she starts ipilimumab 1mg/kg and nivolumab 3mg/kg. She then goes on to maintenance nivolumab through 4/12/21.  11. 5/10/2021, she starts ipilimumab 3 mg/kg monotherapy q3w for 4 cycles.  12. 7/30/21, PET/CT shows mild disease progression.   13. 8/2/21, start ipilimumab 3mg/kg and nivolumab 1mg/kg and received 2 cycles, last on 8/23/21.  14. 8/23/21, develops elevated LFT's.   15. 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   16. 1/28/22, started TVEC  17. 4/22/22, progressive disease, consideration for additional therapeutic approaches.  18. 5/11/22, Start Opdualag  19. 7/29/22: PET-CT shows new diffuse bony metastatic bony disease, increased size of liver mets and increased size of lymph node conglomerates throughout pelvis and abdomen. DVT in right external iliac vein, common femoral vein and femoral vein also found. Started on Eliquis.  20. 9/15/22, PET-CT shows increase in size/metabolism of the large right pelvic/right upper thigh mass, new metastatic focus medial to the thigh, a right parietal lobe metastatic brain lesion, two foci of FDG uptake within the scalp, likely metastatic, new metabolism of the right upper lobe nodularities and increased metabolism of the right lower lobe 7  mm pulmonary nodule, multiple metastatic liver lesions, some of them are new and some are increased in size, new metastatic lesions within the spleen, and progressive metastatic bone disease. MRI-brain shows two brain metastases in the right cerebral hemisphere with mild surrounding vasogenic edema.       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 She presents today in routine follow up with her  and daughter.    She has started whole brain radiation for palliation of intracranial disease under Dr. Al Singh. She has started to note some additional mild fatigue set in. Otherwise, no headaches or other side effects from radiation.    She notes significant body aches and pains, largely in the back and right thigh. She has been taking the tramadol as needed. She took to tramadol earlier today to get to clinic, but still is uncomfortable sitting for prolonged periods of time. She rates her pain today as 6 out of 10.    She has been taking senna and Miralax on as needed basis, and not as a regular bowel regimen, and she has not had a good bowel movement in the last 2 days. She states she is also eating very little. She does have her family at her side in the home and helping her around the house, which has been extremely helpful to her.  She denies any cough or shortness of breath. She has difficulty rising up from a seated position, but her  and daughter have been helping her rock out of a chair to a standing position. She continues to get around with a walker.      Current Outpatient Medications   Medication Sig Dispense Refill     apixaban ANTICOAGULANT (ELIQUIS) 5 MG tablet Take 1 tablet (5 mg) by mouth 2 times daily 60 tablet 11     carboxymethylcellulose PF (REFRESH LIQUIGEL) 1 % ophthalmic gel 1 drop 3 times daily       cholecalciferol (VITAMIN D3) 125 MCG (5000 UT) TABS tablet Take 125 mcg by mouth daily       dicyclomine (BENTYL) 10 MG  capsule Take 1 capsule (10 mg) by mouth 4 times daily (before meals and nightly) (Patient not taking: No sig reported) 120 capsule 1     estradiol (ESTRACE) 0.1 MG/GM vaginal cream Place 2 g vaginally twice a week 42.5 g 1     famotidine (PEPCID) 20 MG tablet Take 1 tablet (20 mg) by mouth 2 times daily as needed (acid reflux/heartburn) (Patient not taking: No sig reported) 60 tablet 1     fish oil-omega-3 fatty acids 1000 MG capsule Take 2 g by mouth daily       mirtazapine (REMERON) 15 MG tablet Take 1 tablet (15 mg) by mouth At Bedtime (Patient not taking: No sig reported) 30 tablet 1     multivitamin w/minerals (THERA-VIT-M) tablet Take 1 tablet by mouth daily (Patient not taking: No sig reported)       naloxegol (MOVANTIK) 25 MG TABS tablet Take 1 tablet (25 mg) by mouth every morning (before breakfast) 30 tablet 1     ondansetron (ZOFRAN) 8 MG tablet Take 1 tablet (8 mg) by mouth every 8 hours as needed for nausea (Patient not taking: No sig reported) 30 tablet 3     Riboflavin 100 MG TABS Take 400 mg by mouth daily (Patient not taking: No sig reported)       senna-docusate (SENOKOT-S/PERICOLACE) 8.6-50 MG tablet Take 1 tablet by mouth 2 times daily (Patient not taking: Reported on 9/28/2022) 60 tablet 0     traMADol (ULTRAM) 50 MG tablet Take 1 tablet (50 mg) by mouth every 6 hours as needed for severe pain (pain) 30 tablet 0     Objective:  There were no vitals taken for this visit.  GENERAL: Healthy, alert and no distress  EYES: Eyes grossly normal to inspection.  No discharge or erythema, or obvious scleral/conjunctival abnormalities.  HENT: Normal cephalic/atraumatic.  External ears, nose and mouth without ulcers or lesions.  No nasal drainage visible.  NECK: No asymmetry, visible masses or scars  RESP: No audible wheeze, cough, or visible cyanosis.  No visible retractions or increased work of breathing.    SKIN: Visible skin clear. No significant rash, abnormal pigmentation or lesions.  NEURO: Cranial  nerves grossly intact.  Mentation and speech appropriate for age.  PSYCH: Mentation appears normal, affect normal/bright, judgement and insight intact, normal speech and appearance well-groomed.      ASSESSMENT AND PLAN:  #Diffusely metastatic acral melanoma  #Brain metastasis  She has received multiple lines of prior therapy including ipi/nivo and TVEC. She received 5 cycles of Opdualag 5 and on recent PET-CT, unforatuntely there was disease progression in the body and also new brain metastases. She is currently undergoing whole brain radiation therapy for palliation of intracranial disease. Unfortunately, her prognosis is poor and end of life planning is appropriate.    We discussed the importance of reviewing her goals of care and establishing an advance directive. I would like for her to see palliative care also for assistance in pain management, opioid induced constipation, depression, anxiety, anorexia.     We previously reviewed the potential option of chemotherapy, alone or in combination with immunotherapy. Options include dacarbazine, temozolomide, or Carboplatin and paclitaxel. At this time I would recommend we focus on completing radiation therapy and pain control and bowel regimen. She agrees that these are major priorities. She does, however, remain interested in the possibility of chemotherapy. We will plan to meet again in 1 month to review potential use of palliative chemotherapy.    -Continue radiation therapy  -Palliative referral ASAP  -RTC in 1 month to review next steps     #Cancer pain  #Back pain  #Leg pain.   Previously prescribed tramadol 50 mg q6h, which she has been taking recently, but it causes her constipation. She will continue the tramadol, since it is helping with pain. Otherwise, could increase potency next with trial of oxycodone if becomes ineffective. We must work to keep her bowels regular.      #Constipation, opioid induced  Has been in the ED for severe constipation, likely  from opioid use. Reviewed importance of bowel regimen. Must take senna twice a day and Miralax once daily as a bowel regimen during opioid therapy. Magnesium citrate also provided for as needed use. Movantiq written for, but is pending insurance review.    #Right lower extremity DVT  Secondary to tumor compression. Continue Eliquis 5mg bid anticoagulation lifelong.       #RLE lymphema.   Secondary to right groin tumors and new DVT. Anticoagulation as above. She will also continue with lymphedema therapy.     #Depression  #Anxiety   Waxes and wanes. Will continue follow up with palliative care social work.      #Anorexia and weight loss.  She had EGD on 9/12/22, which showed the entire examined stomach was normal appearing. Biopsies taken for h pylori. No evidence of candida esophagitis on EGD.     #Scalp lesions  Represent metastatic disease. Can keep appointment with dermatology on 10/11/22 to review, but given extent of her disease local therapy is unlikely to change the overall course.             Again, thank you for allowing me to participate in the care of your patient.      Sincerely,    Gi Franklin MD

## 2022-10-02 NOTE — TELEPHONE ENCOUNTER
Needs someone positive to talk to. Has skin cancer. Her back is hurtful and painful. She was on tramdol and went off of it because of constipation. Has to be back on it but they have to give a pill to prevent constipation. Insurance has to approve it. Doesn't know how long it will take for them to do that. She has a . Calls are so far apart, not getting a lot of help from them.  Sees Dr Franklin. Daughter was with last time. MD is trying to get her some benefits. Daughter was with her Tuesday thru Thursday. Other daughter was there Friday and part of Saturday.  hasn't been the best. All she's doing is crying. She stated taking the tramadol again.  We discussed metamucil and rectal suppositories.  She needs a home health aid at different times, since her back is so painful. They will be talking about benefits she can get.    Susy Vickers RN  [facility]    Reason for Disposition    Taking new prescription medication that may be causing constipation    Additional Information    Negative: Sounds like a life-threatening emergency to the triager    Negative: [1] Abdomen pain is main symptom AND [2] male    Negative: [1] Abdomen pain is main symptom AND [2] adult female    Negative: Rectal bleeding or blood in stool is main symptom    Negative: Rectal pain or itching is main symptom    Negative: [1] Neutropenia known or suspected (e.g., recent cancer chemotherapy) AND [2] fever > 100.4 F (38.0 C)    Negative: [1] Vomiting AND [2] contains bile (green color)    Negative: Patient sounds very sick or weak to the triager    Negative: [1] Vomiting AND [2] abdomen looks much more swollen than usual    Negative: [1] Constant abdominal pain AND [2] present > 2 hours    Negative: [1] Sudden onset rectal pain (straining, rectal pressure or fullness) AND [2] NOT better after SITZ bath, suppository or enema    Negative: [1] Abdominal pain (i.e., mild or intermittent) AND [2] fever    Negative: [1]  Abdominal pain (i.e., mild or intermittent) AND [2] abdomen looks much more swollen than usual    Negative: Last bowel movement (BM) > 4 days ago     Had a tiny bit the past few days.    Negative: Leaking stool    Negative: [1] Constipation present > 1 week AND [2] no improvement after using CARE ADVICE    Negative: Unable to have a bowel movement (BM) without laxative or enema (Exception: this the doctor's current treatment plan for constipation)     Takes Miralax and a stool softener and something else.    Protocols used: CANCER - CONSTIPATION-A-

## 2022-10-05 NOTE — LETTER
10/5/2022         RE: Yadira Hoang  [address] [place] [facility] [address]        Dear Colleague,    Thank you for referring your patient, Yadira Hoang, to the [facility] CANCER [facility]. Please see a copy of my visit note below.    Yadira is a 77 year old who is being evaluated via a billable video visit.  Currently in [place].    How would you like to obtain your AVS? MyChart  If the video visit is dropped, the invitation should be resent by: Text to cell phone: [phone]  Will anyone else be joining your video visit? No     DEDE Castellano      Palliative Care Outpatient Clinic Progress Note    Patient Name: Yadira Hoang is being evaluated via a billable video visit.    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 Metastatic to R groin LNs              - reconstructive surgery with skin graft March/April 2020              - starts nivolumab May 2020, continues despite development of small liver met in Aug 2020              - addition of ipilimumab Oct 2020-April 2021 for progression in liver, new T2, S1 mets. 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 Completed 5 cycles of opdualag, but scans showed progression again in Sept 2022, including two metastatic lesions in the brain.     - currently undergoing WBRT              - R leg lymphedema 2/2 tumors, working with lymphedema  "therapy  - chronic pain syndrome affecting occiput, neck, joints   - h/o TBI  - ALEXUS, depression     Social  Yadira lives with her . One of her sons has moved back in together with his 3 year-old daughter. They have another son nearby and two daughters, each about 1h away.      Psychospiritual  She has concerns about her treatment plan and the prospect of possibly having to start chemo again. It is weighing on her that she has to choose between that and forgoing cancer treatment altogether.      She shares that she has been feeling angry and frustrated by her situation, which has been disorienting for her. I assure her that those are very common feelings.      Yadira feels very overwhelmed and struggles to focus on any given topic.      Care Planning   She has heard from Dr Cartagena that her cancer has spread further on the most recent treatments. She isn't able to outline any treatment options that were discussed at the last visit, wants to focus on completing radiation and then meet with oncology to discuss things further.     They understand that the scans were \"not good\" and that going forward any given treatment's benefits is much less certain than before.   I encourage her to consider what would make future treatments worthwhile for her. Also, what side effects she wouldn't be willing to risk.     I assure them that either way, we will continue with diligent pain and symptom management as well as support for her.     Opioid Safety:  Prognosis shorter  Indication strong  No concerning behaviours/red flags    Interim History:  Patient is on the call with her  Sulaiman and their son  History gathered today from: patient, family/loved ones, medical chart    She has more pain now, in her lower back, going into her right hip. Worse when she sits up, gets up. She took oxycodone 10mg with good effect, was able to sleep through the night.  No cognitive side effects.     She has had issues with constipation. " Has been taking miralax daily and some colace.   She is waiting for insurance approval of movantik. Agrees to work with Senna and Miralax until that goes through.     Functional Status:  Palliative Performance Scale: 50          Impression & Recommendations & Counseliny/o woman with metastatic melanoma which has progressed through her current treatment, undergoing WBRT currently    Pain: adequate control with oxycodone which was prescribed yesterday.   - continue 5-10mg q4h prn  - stop tramadol    Constipation:  - Senna 2-4 bid  - miralax bid  - await approval for movantik    Treatment preferences: resumed conversation today.   - encouraged her to consider tradeoffs  - appointment with palliative SW tomorrow when this conversation can be continued    Return to clinic on  as scheduled    Data / Chart Review:    Review of Systems:   ROS: 10 point ROS neg other than the symptoms noted above in the HPI and pertinents here.         Physical Exam:   Physical Exam:  Vital Signs not checked, virtual visit    CONSTIT: awake, appears uncomfortable  EENT: MMM, EOMI, no icterus  RESP: reg, nl effort, no cough  MSK: moves x4, struggles to sit through appointment 2/2 hip pain  SKIN: no rash, no obvious lesions  NEURO: alert, oriented x3  PSYCH: appropriate affect, memory and thought process intact    The rest of a comprehensive physical examination is deferred  due to public health emergency video visit restrictions.      Current pertinent medications:  Oxycodone 5mg q6h prn  Tramadol 50mg q6h prn    Mirtazapine       No pertinent allergies      Lab and imaging data reviewed:  Comments:           Video-Visit Details    Type of service:  Video Visit    Physician has received verbal consent for a Video Visit from the patient? Yes    Video Start Time: 1003    Video End Time (time video stopped): 1041    Originating Location (pt. Location): Home    Distant Location (provider location):  [facility]  [place]     Mode of Communication:  Video Conference via [employer]    Lorena Cole MD  Palliative Medicine  Pager [phone]        Again, thank you for allowing me to participate in the care of your patient.        Sincerely,        Lorena Cole MD

## 2022-10-05 NOTE — LETTER
10/5/2022         RE: Yadira oHang  [address] [place] [facility] [address]        Dear Colleague,    Thank you for referring your patient, Yadira Hoang, to the [facility] RADIATION ONCOLOGY [place]. Please see a copy of my visit note below.    [facility]YS[facility]  SPECIALIZING IN BREAKTHROUGHS  Radiation Oncology    On Treatment Visit Note      Yadira Hoang      Date: Oct 5, 2022   MRN: [id number]   : 1945  Diagnosis: brain metastases (metastatic melanoma)        ID: Ms. Hoang is a 77 year old female with widely metastatic melanoma to bone, liver, distant lymph nodes now presenting with brain metastasis.MRI reveals 2 enhancing lesions, 1 in the right temporoparietal lobe measuring 1.7 cm in size and the other 1 the right lingular gyrus measuring 6 mm in size, with associated mild vasogenic edema.  In general, we discussed the management of brain metastasis.  In general, patients with limited intracranial disease, good performance status as well as a well-controlled extracranial disease, recommendation is for gamma knife radiosurgery.  However, in this particular situation given patient's poor performance status uncontrolled/progressive extracranial disease, and overall life expectancy, recommend against gamma knife radiosurgery. After extent discussion with patient and she wishes to pursue whole brain radiation with 30 Gy/10 fractions.    Reason for Visit:  On Radiation Treatment Visit       Treatment Summary to Date  Treatment Site: whole brain Current Dose: 2100/3000 cGy Fractions: 7/10      Chemotherapy  Chemo concurrent with radx?: No (Dr. Franklin)    Subjective:   No complaints, tolerating RT well overall.  No neurologic changes. Mild HA's and nausea.     Nursing ROS:   Nutrition Alteration  Diet Type: Patient's Preference  Nutrition Note: reviewed high-calorie/high-protein, reviewed nutrition supplements  Skin  Skin Reaction: 0 - No changes  Skin  Intervention: reviewed skin changes/cares and hair loss  CNS Alteration  CNS note: alert and oriented, reports intermittent frontal headaches over the past one week     Cardiovascular  Respiratory effort: 1 - Normal - without distress  Gastrointestinal  Nausea: 2 - Three to four 4 episodes of nausea/24  Vomitin - One episode in 24 hours (ons)  GI Note: patient reports intermittent nausea over the past one week, reports episode of vomiting today on the way to radiation treatment, taking Zofran po without relief - Dr. Singh updated     Psychosocial  Mood - Anxiety: 0 - Normal  Mood - Depression: 0 - Normal  Psychosocial Note: fatigue at baseline  Pain Assessment  0-10 Pain Scale: 0      Objective:   /68 (BP Location: Right arm, Patient Position: Chair, Cuff Size: Adult Small)   Pulse 118   Temp 98.2  F (36.8  C) (Oral)   Resp 18   Wt 60.8 kg (134 lb)   SpO2 96%   BMI 20.99 kg/m    Gen: Appears well, in no acute distress  Neuro: UE/LE intact    Labs:  CBC RESULTS: Recent Labs   Lab Test 09/15/22  1330   WBC 7.1   RBC 3.46*   HGB 10.3*   HCT 32.0*   MCV 93   MCH 29.8   MCHC 32.2   RDW 15.1*        ELECTROLYTES:  Recent Labs   Lab Test 22  1119      POTASSIUM 4.2   CHLORIDE 96*   GABY 9.7   CO2 26   BUN 14.5   CR 0.54   *       Assessment:    Tolerating radiation therapy well.  All questions and concerns addressed.      Plan:   1. Continue current therapy.    2. Dex 2mg daily (short course) for HA's and nausea secondary to whole brain radiation  3. Follow up with Dr. Franklin on 10/7/22      Mosaiq chart and setup information reviewed  Ports checked and MVCT/IGRT images checked    Medication Review  Med list reviewed with patient?: Yes  Med list printed and given: Offered and declined    Educational Topic Discussed  Additional Instructions: follow-up with Dr. Franklin scheduled 10/7/2022  Education Instructions: radiation therapy side effects reviewed: fatigue, skin changes and skin  cares, hair loss, nausea/vomiting, headaches, vision changes    Al Singh MD  Radiation Oncology   [facility]: [phone]          Again, thank you for allowing me to participate in the care of your patient.        Sincerely,        Al Singh MD

## 2022-10-05 NOTE — NURSING NOTE
Patient declined individual allergy and medication review by support staff because meds were reviewed on 09/29 and pt states no changes.    Leonor Echols, VF

## 2022-10-05 NOTE — PROGRESS NOTES
Yadira is a 77 year old who is being evaluated via a billable video visit.  Currently in [place].    How would you like to obtain your AVS? MyChart  If the video visit is dropped, the invitation should be resent by: Text to cell phone: [phone]  Will anyone else be joining your video visit? DEDE Mcdowell      Palliative Care Outpatient Clinic Progress Note    Patient Name: Yadira Hoang is being evaluated via a billable video visit.    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 Metastatic to R groin LNs              - reconstructive surgery with skin graft March/April 2020              - starts nivolumab May 2020, continues despite development of small liver met in Aug 2020              - addition of ipilimumab Oct 2020-April 2021 for progression in liver, new T2, S1 mets. 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 Completed 5 cycles of opdualag, but scans showed progression again in Sept 2022, including two metastatic lesions in the brain.     - currently undergoing WBRT              - R leg lymphedema 2/2 tumors, working with lymphedema therapy  - chronic pain syndrome affecting occiput, neck, joints   - h/o TBI  - ALEXUS, depression     Social  Yadira lives with her . One of her sons has moved back in together with his 3 year-old daughter. They have another son nearby and two daughters, each about 1h  "away.      Psychospiritual  She has concerns about her treatment plan and the prospect of possibly having to start chemo again. It is weighing on her that she has to choose between that and forgoing cancer treatment altogether.      She shares that she has been feeling angry and frustrated by her situation, which has been disorienting for her. I assure her that those are very common feelings.      Yadira feels very overwhelmed and struggles to focus on any given topic.      Care Planning   She has heard from Dr Cartagena that her cancer has spread further on the most recent treatments. She isn't able to outline any treatment options that were discussed at the last visit, wants to focus on completing radiation and then meet with oncology to discuss things further.     They understand that the scans were \"not good\" and that going forward any given treatment's benefits is much less certain than before.   I encourage her to consider what would make future treatments worthwhile for her. Also, what side effects she wouldn't be willing to risk.     I assure them that either way, we will continue with diligent pain and symptom management as well as support for her.     Opioid Safety:  Prognosis shorter  Indication strong  No concerning behaviours/red flags    Interim History:  Patient is on the call with her  Sulaiman and their son  History gathered today from: patient, family/loved ones, medical chart    She has more pain now, in her lower back, going into her right hip. Worse when she sits up, gets up. She took oxycodone 10mg with good effect, was able to sleep through the night.  No cognitive side effects.     She has had issues with constipation. Has been taking miralax daily and some colace.   She is waiting for insurance approval of movantik. Agrees to work with Senna and Miralax until that goes through.     Functional Status:  Palliative Performance Scale: 50          Impression & Recommendations & " Counseliny/o woman with metastatic melanoma which has progressed through her current treatment, undergoing WBRT currently    Pain: adequate control with oxycodone which was prescribed yesterday.   - continue 5-10mg q4h prn  - stop tramadol    Constipation:  - Senna 2-4 bid  - miralax bid  - await approval for movantik    Treatment preferences: resumed conversation today.   - encouraged her to consider tradeoffs  - appointment with palliative SW tomorrow when this conversation can be continued    Return to clinic on  as scheduled    Data / Chart Review:    Review of Systems:   ROS: 10 point ROS neg other than the symptoms noted above in the HPI and pertinents here.         Physical Exam:   Physical Exam:  Vital Signs not checked, virtual visit    CONSTIT: awake, appears uncomfortable  EENT: MMM, EOMI, no icterus  RESP: reg, nl effort, no cough  MSK: moves x4, struggles to sit through appointment 2/2 hip pain  SKIN: no rash, no obvious lesions  NEURO: alert, oriented x3  PSYCH: appropriate affect, memory and thought process intact    The rest of a comprehensive physical examination is deferred  due to public health emergency video visit restrictions.      Current pertinent medications:  Oxycodone 5mg q6h prn  Tramadol 50mg q6h prn    Mirtazapine       No pertinent allergies      Lab and imaging data reviewed:  Comments:           Video-Visit Details    Type of service:  Video Visit    Physician has received verbal consent for a Video Visit from the patient? Yes    Video Start Time: 1003    Video End Time (time video stopped): 1041    Originating Location (pt. Location): Home    Distant Location (provider location):  [facility]     Mode of Communication:  Video Conference via [facility]    Lorena Cole MD  Palliative Medicine  Pager [phone]

## 2022-10-05 NOTE — PROGRESS NOTES
[facility] PHYSICIANS  SPECIALIZING IN BREAKTHROUGHS  Radiation Oncology    On Treatment Visit Note      Yadira Hoang      Date: Oct 5, 2022   MRN: [id number]   : 1945  Diagnosis: brain metastases (metastatic melanoma)        ID: Ms. Hoang is a 77 year old female with widely metastatic melanoma to bone, liver, distant lymph nodes now presenting with brain metastasis.MRI reveals 2 enhancing lesions, 1 in the right temporoparietal lobe measuring 1.7 cm in size and the other 1 the right lingular gyrus measuring 6 mm in size, with associated mild vasogenic edema.  In general, we discussed the management of brain metastasis.  In general, patients with limited intracranial disease, good performance status as well as a well-controlled extracranial disease, recommendation is for gamma knife radiosurgery.  However, in this particular situation given patient's poor performance status uncontrolled/progressive extracranial disease, and overall life expectancy, recommend against gamma knife radiosurgery. After extent discussion with patient and she wishes to pursue whole brain radiation with 30 Gy/10 fractions.    Reason for Visit:  On Radiation Treatment Visit       Treatment Summary to Date  Treatment Site: whole brain Current Dose: 2100/3000 cGy Fractions: 7/10      Chemotherapy  Chemo concurrent with radx?: No (Dr. Franklin)    Subjective:   No complaints, tolerating RT well overall.  No neurologic changes. Mild HA's and nausea.     Nursing ROS:   Nutrition Alteration  Diet Type: Patient's Preference  Nutrition Note: reviewed high-calorie/high-protein, reviewed nutrition supplements  Skin  Skin Reaction: 0 - No changes  Skin Intervention: reviewed skin changes/cares and hair loss  CNS Alteration  CNS note: alert and oriented, reports intermittent frontal headaches over the past one week     Cardiovascular  Respiratory effort: 1 - Normal - without distress  Gastrointestinal  Nausea: 2 - Three to  four 4 episodes of nausea/24  Vomitin - One episode in 24 hours (ons)  GI Note: patient reports intermittent nausea over the past one week, reports episode of vomiting today on the way to radiation treatment, taking Zofran po without relief - Dr. Singh updated     Psychosocial  Mood - Anxiety: 0 - Normal  Mood - Depression: 0 - Normal  Psychosocial Note: fatigue at baseline  Pain Assessment  0-10 Pain Scale: 0      Objective:   /68 (BP Location: Right arm, Patient Position: Chair, Cuff Size: Adult Small)   Pulse 118   Temp 98.2  F (36.8  C) (Oral)   Resp 18   Wt 60.8 kg (134 lb)   SpO2 96%   BMI 20.99 kg/m    Gen: Appears well, in no acute distress  Neuro: UE/LE intact    Labs:  CBC RESULTS: Recent Labs   Lab Test 09/15/22  1330   WBC 7.1   RBC 3.46*   HGB 10.3*   HCT 32.0*   MCV 93   MCH 29.8   MCHC 32.2   RDW 15.1*        ELECTROLYTES:  Recent Labs   Lab Test 22  1119      POTASSIUM 4.2   CHLORIDE 96*   GABY 9.7   CO2 26   BUN 14.5   CR 0.54   *       Assessment:    Tolerating radiation therapy well.  All questions and concerns addressed.      Plan:   1. Continue current therapy.    2. Dex 2mg daily (short course) for HA's and nausea secondary to whole brain radiation  3. Follow up with Dr. Franklin on 10/7/22      Mosaiq chart and setup information reviewed  Ports checked and MVCT/IGRT images checked    Medication Review  Med list reviewed with patient?: Yes  Med list printed and given: Offered and declined    Educational Topic Discussed  Additional Instructions: follow-up with Dr. Franklin scheduled 10/7/2022  Education Instructions: radiation therapy side effects reviewed: fatigue, skin changes and skin cares, hair loss, nausea/vomiting, headaches, vision changes    Al Singh MD  Radiation Oncology   [facility]  Clinic: [phone]

## 2022-10-05 NOTE — PATIENT INSTRUCTIONS
Please contact [facility] RN with questions or concerns following today's appointment: [phone].    Thank you!

## 2022-10-06 NOTE — PROGRESS NOTES
"Telemedicine Visit: The patient's condition can be safely assessed and treated via synchronous audio and visual telemedicine encounter.      Reason for Telemedicine Visit: [email or web address]     Originating Site (Patient Location): Patient's home    Distant Site (Provider Location): [facility]: palliative care clinic     Consent:  The patient/guardian has verbally consented to: the potential risks and benefits of telemedicine (video visit) versus in person care; bill my insurance or make self-payment for services provided; and responsibility for payment of non-covered services.     Mode of Communication:  Video Conference via [employer]    As the provider I attest to compliance with applicable laws and regulations related to telemedicine.    Palliative Care Clinical Social Work Return Appointment    PLEASE NOTE:  THIS IS A MENTAL HEALTH NOTE.  OTHER PROVIDERS VIEWING THIS NOTE SHOULD USE THIS ONLY FOR UNDERSTANDING THE CONTEXT OF THE PATIENT'S EXPERIENCE.  TOPICS DESCRIBED IN THIS NOTE SHOULD NOT BE REFERENCED TO THE PATIENT BY MEDICAL PROVIDERS.    Yadira Hoang is a 77 year old woman with diagnosis of stage IV acral melanoma, seen today via [email or web address] video for a return psychotherapy appointment to address adjustment related distress and depression as it relates to coping with illness and treatment.  Yadira attended today's visit by herself.     Mental Status Exam:(List all that apply)      Appearance: Appropriate      Eye Contact: Good       Orientation: Yes, x4      Mood: Normal      Affect: Appropriate      Thought Content: Clear         Thought Form: Logical      Psychomotor Behavior: Normal    Visit themes:      Adjustment to Illness: Continues to struggle with symptom burden.  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  Continues to struggle with symptoms " "including back pain, fatigue, nausea/vomiting.  Voices feelings afraid of dying, but cannot explore this in depth with me today.      Mental Health (thoughts, feelings, actions, coping, and symptoms): Voices that she is afraid to die at this time in her life \"I can't be dying\"-- existential distress as she tries to comply with treatments and instructions as best she is able, but she continues to feel poorly and the cancer continues to spread.     I see in other provider notes that the topic of hospice has been broached with Yadira and with her family, but Yadira was not able to explore this with me today.  She seemed to struggle with word finding as well which was frustrating.  She did not recall meeting with palliative care yesterday.       Helpful activities: listening to gospel music     Helpful cognitions:     Unhelpful and/or triggering or exacerbating factors:     Body-Mind Skills Education:     Relationships: remain supportive, has good support from her children    End of Life and Advance Care Planning:     Main therapeutic interventions provided this session include:   Provide psychoeducation, Facilitate processing of thoughts and feelings and Facilitate structured problem solving,    Plan:  Will return for psychotherapy with palliative care focus in 1 month    Time spent with patient/family:  54 minutes minutes (Start 12:30, end 1:25)    Palliative Care Counseling Treatment Plan    Client's Name: Yadira Hoang  YOB: 1945    Date: June 17, 2022    Initial DSM5 Diagnoses:   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    Anticipated number of sessions to complete episode of care:  10         Treatment Goal(s)  Date Goal Dates  Reviewed Status   5/23/2019   Goal 1:  Client will develop effective strategies for " depressed mood.      New   5/23/2019   Objective #1A (Client Action)  Patient will Identify activities that provide comfort and/or pleasure.    Intervention(s)  Therapist will Provide psychoeducation and Facilitate processing of thoughts and feelings .    New   5/23/2019   Objective #1B  Patient will Effectively communicate needs to others.    Intervention(s)  Therapist will Provide psychoeducation, Facilitate processing of thoughts and feelings and Facilitate structured problem solving.    New   5/23/2019   Objective #1C  Patient will Identify an activity that would provide meaning/contribute to feeling of self worth.    Intervention(s)  Therapist will Provide psychoeducation, Facilitate processing of thoughts and feelings and Facilitate structured problem solving.    New       Date Goal Dates  Reviewed Status   5/23/2019   Goal 2:  Client will demonstrate effective management of grief / loss.    New   5/23/2019   Objective #2A (Client Action)  Patient will Increase understanding of loss and grief.    Intervention(s) (Therapist Action)  Therapist will Provide psychoeducation.    New   5/23/2019   Objective #2B  Patient will Identify losses related to illness.    Intervention(s)  Therapist will Provide psychoeducation and Facilitate processing of thoughts and feelings.    New   5/23/2019     Objective #2C  Patient will Develop strategies for coping with grief/loss.    Intervention(s)  Therapist will Provide psychoeducation.    New       Client has contributed regarding goals and concerns, but has not reviewed the treatment plan. Plan to review at future session.    JORGE Noriega, [facility]  Palliative Care Clinical        DO NOT SEND ANY LETTERS

## 2022-10-06 NOTE — PROGRESS NOTES
Social Work Note: Telephone Call  Oncology Clinic    Data/Intervention:  Patient Name:  Yadira Hoang  /Age:  1945 (77 year old)    Call From:  BELGICA  Reason for Call:  Resource outreach    Assessment:  BELGICA received referral from Dr. Cole, palliative care, to contact Yadira's daughter regarding support services at home.     BELGICA called and spoke to Oly this morning, introducing self and reason for call. Oly asked some questions about about support services that Yadira could be eligible for, BELGICA talked through skilled home care versus private pay home care services. Oly listened then asked about hospice services and how those are different from palliative care. BELGICA provided hospice education, including when it may be appropriate, services provided, and limitations including no further treatment. Oly feels that this may be the best next step, they plan to discuss it further with Dr. Franklin at Yadira's appointment tomorrow. No other needs confirmed today, BELGICA plans to connect with Oly again next Wednesday to check in and offer additional support as needed.       Plan:  BELGICA coordinated with mulitidisciplinary team.     BELGICA will follow up with Oly on Wednesday, 10/12.    JORGE Pelaez, [facility]  Clinical , Adult Oncology  Phone: [phone]

## 2022-10-06 NOTE — LETTER
10/6/2022         RE: Yadira Hoang  [address] [place] [facility] [address]        Dear Colleague,    Thank you for referring your patient, Yadira Hoang, to the [facility] [place]. Please see a copy of my visit note below.    Telemedicine Visit: The patient's condition can be safely assessed and treated via synchronous audio and visual telemedicine encounter.      Reason for Telemedicine Visit: [email or web address]     Originating Site (Patient Location): Patient's home    Distant Site (Provider Location): [facility]: palliative care clinic     Consent:  The patient/guardian has verbally consented to: the potential risks and benefits of telemedicine (video visit) versus in person care; bill my insurance or make self-payment for services provided; and responsibility for payment of non-covered services.     Mode of Communication:  Video Conference via [employer]    As the provider I attest to compliance with applicable laws and regulations related to telemedicine.    Palliative Care Clinical Social Work Return Appointment    PLEASE NOTE:  THIS IS A MENTAL HEALTH NOTE.  OTHER PROVIDERS VIEWING THIS NOTE SHOULD USE THIS ONLY FOR UNDERSTANDING THE CONTEXT OF THE PATIENT'S EXPERIENCE.  TOPICS DESCRIBED IN THIS NOTE SHOULD NOT BE REFERENCED TO THE PATIENT BY MEDICAL PROVIDERS.    Yadira Hoang is a 77 year old woman with diagnosis of stage IV acral melanoma, seen today via [employer] video for a return psychotherapy appointment to address adjustment related distress and depression as it relates to coping with illness and treatment.  Yadira attended today's visit by herself.     Mental Status Exam:(List all that apply)      Appearance: Appropriate      Eye Contact: Good       Orientation: Yes, x4      Mood: Normal      Affect: Appropriate      Thought Content: Clear         Thought Form: Logical      Psychomotor Behavior: Normal    Visit themes:      Adjustment to Illness: Continues to  "struggle with symptom burden.  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  Continues to struggle with symptoms including back pain, fatigue, nausea/vomiting.  Voices feelings afraid of dying, but cannot explore this in depth with me today.      Mental Health (thoughts, feelings, actions, coping, and symptoms): Voices that she is afraid to die at this time in her life \"I can't be dying\"-- existential distress as she tries to comply with treatments and instructions as best she is able, but she continues to feel poorly and the cancer continues to spread.     I see in other provider notes that the topic of hospice has been broached with Yadira and with her family, but Yadira was not able to explore this with me today.  She seemed to struggle with word finding as well which was frustrating.  She did not recall meeting with palliative care yesterday.       Helpful activities: listening to [employer] music     Helpful cognitions:     Unhelpful and/or triggering or exacerbating factors:     Body-Mind Skills Education:     Relationships: remain supportive, has good support from her children    End of Life and Advance Care Planning:     Main therapeutic interventions provided this session include:   Provide psychoeducation, Facilitate processing of thoughts and feelings and Facilitate structured problem solving,    Plan:  Will return for psychotherapy with palliative care focus in 1 month    Time spent with patient/family:  54 minutes minutes (Start 12:30, end 1:25)    Palliative Care Counseling Treatment Plan    Client's Name: Yadira Hoang  YOB: 1945    Date: June 17, 2022    Initial DSM5 Diagnoses:   Adjustment Disorders  309.28 (F43.23) With mixed anxiety and depressed mood      Date to review plan (90 days usually): 8/21/19    Referral / " Collaboration:  .Referral to another professional/service is not indicated at this time.    Anticipated number of sessions to complete episode of care:  10         Treatment Goal(s)  Date Goal Dates  Reviewed Status   5/23/2019   Goal 1:  Client will develop effective strategies for depressed mood.      New   5/23/2019   Objective #1A (Client Action)  Patient will Identify activities that provide comfort and/or pleasure.    Intervention(s)  Therapist will Provide psychoeducation and Facilitate processing of thoughts and feelings .    New   5/23/2019   Objective #1B  Patient will Effectively communicate needs to others.    Intervention(s)  Therapist will Provide psychoeducation, Facilitate processing of thoughts and feelings and Facilitate structured problem solving.    New   5/23/2019   Objective #1C  Patient will Identify an activity that would provide meaning/contribute to feeling of self worth.    Intervention(s)  Therapist will Provide psychoeducation, Facilitate processing of thoughts and feelings and Facilitate structured problem solving.    New       Date Goal Dates  Reviewed Status   5/23/2019   Goal 2:  Client will demonstrate effective management of grief / loss.    New   5/23/2019   Objective #2A (Client Action)  Patient will Increase understanding of loss and grief.    Intervention(s) (Therapist Action)  Therapist will Provide psychoeducation.    New   5/23/2019   Objective #2B  Patient will Identify losses related to illness.    Intervention(s)  Therapist will Provide psychoeducation and Facilitate processing of thoughts and feelings.    New   5/23/2019     Objective #2C  Patient will Develop strategies for coping with grief/loss.    Intervention(s)  Therapist will Provide psychoeducation.    New       Client has contributed regarding goals and concerns, but has not reviewed the treatment plan. Plan to review at future session.    JORGE Noriega, [facility]  Palliative Care Clinical         DO NOT SEND ANY LETTERS            Again, thank you for allowing me to participate in the care of your patient.        Sincerely,        CHETAN NoriegaSW

## 2022-10-06 NOTE — LETTER
10/6/2022         RE: Yadira Hoang  [address] [place] [address]        Dear Colleague,    Thank you for referring your patient, Yadira Hoang, to the [facility] [place]. Please see a copy of my visit note below.    Telemedicine Visit: The patient's condition can be safely assessed and treated via synchronous audio and visual telemedicine encounter.      Reason for Telemedicine Visit: [email or web address]     Originating Site (Patient Location): Patient's home    Distant Site (Provider Location): [facility]: palliative care clinic     Consent:  The patient/guardian has verbally consented to: the potential risks and benefits of telemedicine (video visit) versus in person care; bill my insurance or make self-payment for services provided; and responsibility for payment of non-covered services.     Mode of Communication:  Video Conference via [employer]    As the provider I attest to compliance with applicable laws and regulations related to telemedicine.    Palliative Care Clinical Social Work Return Appointment    PLEASE NOTE:  THIS IS A MENTAL HEALTH NOTE.  OTHER PROVIDERS VIEWING THIS NOTE SHOULD USE THIS ONLY FOR UNDERSTANDING THE CONTEXT OF THE PATIENT'S EXPERIENCE.  TOPICS DESCRIBED IN THIS NOTE SHOULD NOT BE REFERENCED TO THE PATIENT BY MEDICAL PROVIDERS.    Yadira Hoang is a 77 year old woman with diagnosis of stage IV acral melanoma, seen today via [employer] video for a return psychotherapy appointment to address adjustment related distress and depression as it relates to coping with illness and treatment.  Yadira attended today's visit by herself.     Mental Status Exam:(List all that apply)      Appearance: Appropriate      Eye Contact: Good       Orientation: Yes, x4      Mood: Normal      Affect: Appropriate      Thought Content: Clear         Thought Form: Logical      Psychomotor Behavior: Normal    Visit themes:      Adjustment to Illness: Continues to  "struggle with symptom burden.  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  Continues to struggle with symptoms including back pain, fatigue, nausea/vomiting.  Voices feelings afraid of dying, but cannot explore this in depth with me today.      Mental Health (thoughts, feelings, actions, coping, and symptoms): Voices that she is afraid to die at this time in her life \"I can't be dying\"-- existential distress as she tries to comply with treatments and instructions as best she is able, but she continues to feel poorly and the cancer continues to spread.     I see in other provider notes that the topic of hospice has been broached with Yadira and with her family, but Yadira was not able to explore this with me today.  She seemed to struggle with word finding as well which was frustrating.  She did not recall meeting with palliative care yesterday.       Helpful activities: listening to [employer] music     Helpful cognitions:     Unhelpful and/or triggering or exacerbating factors:     Body-Mind Skills Education:     Relationships: remain supportive, has good support from her children    End of Life and Advance Care Planning:     Main therapeutic interventions provided this session include:   Provide psychoeducation, Facilitate processing of thoughts and feelings and Facilitate structured problem solving,    Plan:  Will return for psychotherapy with palliative care focus in 1 month    Time spent with patient/family:  54 minutes minutes (Start 12:30, end 1:25)    Palliative Care Counseling Treatment Plan    Client's Name: Yadira Hoang  YOB: 1945    Date: June 17, 2022    Initial DSM5 Diagnoses:   Adjustment Disorders  309.28 (F43.23) With mixed anxiety and depressed mood      Date to review plan (90 days usually): 8/21/19    Referral / " Collaboration:  .Referral to another professional/service is not indicated at this time.    Anticipated number of sessions to complete episode of care:  10         Treatment Goal(s)  Date Goal Dates  Reviewed Status   5/23/2019   Goal 1:  Client will develop effective strategies for depressed mood.      New   5/23/2019   Objective #1A (Client Action)  Patient will Identify activities that provide comfort and/or pleasure.    Intervention(s)  Therapist will Provide psychoeducation and Facilitate processing of thoughts and feelings .    New   5/23/2019   Objective #1B  Patient will Effectively communicate needs to others.    Intervention(s)  Therapist will Provide psychoeducation, Facilitate processing of thoughts and feelings and Facilitate structured problem solving.    New   5/23/2019   Objective #1C  Patient will Identify an activity that would provide meaning/contribute to feeling of self worth.    Intervention(s)  Therapist will Provide psychoeducation, Facilitate processing of thoughts and feelings and Facilitate structured problem solving.    New       Date Goal Dates  Reviewed Status   5/23/2019   Goal 2:  Client will demonstrate effective management of grief / loss.    New   5/23/2019   Objective #2A (Client Action)  Patient will Increase understanding of loss and grief.    Intervention(s) (Therapist Action)  Therapist will Provide psychoeducation.    New   5/23/2019   Objective #2B  Patient will Identify losses related to illness.    Intervention(s)  Therapist will Provide psychoeducation and Facilitate processing of thoughts and feelings.    New   5/23/2019     Objective #2C  Patient will Develop strategies for coping with grief/loss.    Intervention(s)  Therapist will Provide psychoeducation.    New       Client has contributed regarding goals and concerns, but has not reviewed the treatment plan. Plan to review at future session.    JORGE Noriega, [facility]  Palliative Care Clinical         DO NOT SEND ANY LETTERS            Again, thank you for allowing me to participate in the care of your patient.        Sincerely,        CHETAN NoriegaSW

## 2022-10-07 NOTE — NURSING NOTE
Patient declined individual allergy and medication review by support staff because pt states nothing has changed since last reviwed.    Teo Esquivel VF

## 2022-10-07 NOTE — LETTER
10/7/2022         RE: Yadira Hoang  [address] [facility] [address]        Dear Colleague,    Thank you for referring your patient, Yadira Hoang, to the [facility]. Please see a copy of my visit note below.    Yadira is a 77 year old who is being evaluated via a billable video visit.      How would you like to obtain your AVS? MyChart  If the video visit is dropped, the invitation should be resent by: Send to e-mail at: [email or web address]  Will anyone else be joining your video visit? Yes: Spouse and daughter. How would they like to receive their invitation? Text to cell phone: [phone]      Teo AGUAYO    Video-Visit Details    Video Start Time: 4:00 PM    Type of service:  Video Visit    Video End Time: 4:30 PM    Originating Location (pt. Location): Home    Distant Location (provider location):  [facility]     Platform used for Video Visit: [facility] ONCOLOGY PROGRESS NOTE  Melanoma Clinic  Oct 7, 2022     CHIEF COMPLAINT: Metastatic acral melanoma    Melanoma History:  1. She noted a painful lesion on the sole of the right foot for a few months, since last summer.  It initially was small then became raised.  It spontaneously bled. She is not entirely sure of its appearance initially.  She denies noting any masses behind the knee or in the groin.  2. 1/9/2020, punch biopsy was performed by Dr Jessica Meadows. Patology showed melanoma, at least 3.4 mm deep with ulceration and 0 mitoses, and perineural invasion present. TILs were non-brisk and LVI not identified. pT3b.  3. 2/10/2020, she has right femoral sentinel lymph node biopsy and wide local excision (Specimen #: [id number]) and the right heel lesion extends to a depth of 6.6 mm, and invasive melanoma is present at 0.2 mm from the deep margin. Microsatellites are also present. There was 1 (of 2) lymph nodes involved. The lymph node tissue exhibits extensive  subcapsular and parenchymal clusters of melanoma cells, highlighted on Melan-A immunostain. The largest cluster is 7 millimeters in greatest diameter. pT4b pN2c. PD-L1 staining is negative, <1%. No mutation in BRAF, KIT, or NRAS.  4. 2/22/2020, she had PET-CT, which showed intense FDG uptake in a right inguinal lymph node, concerning for an additional focus of metastatic disease. There is also an indeterminate right external iliac lymph node with mild FDG uptake.  5. 3/4/2020, she has medial plantar artery  fasciocutaneous instep flap and Integra placement to the donor site. On 4/2/2020, she has additional reconstruction with skin grafting.  6. 5/22/2020, PET-CT shows a hypermetabolic right inguinal and right external iliac chain lymph node and stable pulmonary nodules.  7. 5/27/2020, she starts nivolumab for presumed low volume lymph node predominant metastatic disease.  8. 8/21/2020, PET-CT shows increase in hypermetabolic adenopathy, and a hypermetabolic nodule in segment 2 of the liver. Given low volume of disease she prefers to continue therapy with short interval follow-up.  9. 10/9/2020, PET-CT shows increasing size of hepatic metastases with increased hypermetabolism, increased right inguinal and iliac lymphadenopathy, and new FDG uptake right T2 transverse process and right side of S1. MRI-brain obtained 10/21/2020 is negative for brain metastasis.  10. 10/28/2020, she starts ipilimumab 1mg/kg and nivolumab 3mg/kg. She then goes on to maintenance nivolumab through 4/12/21.  11. 5/10/2021, she starts ipilimumab 3 mg/kg monotherapy q3w for 4 cycles.  12. 7/30/21, PET/CT shows mild disease progression.   13. 8/2/21, start ipilimumab 3mg/kg and nivolumab 1mg/kg and received 2 cycles, last on 8/23/21.  14. 8/23/21, develops elevated LFT's.   15. 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   16. 1/28/22, started TVEC  17. 4/22/22, progressive disease, consideration for additional  therapeutic approaches.  18. 5/11/22, Start Opdualag  19. 7/29/22: PET-CT shows new diffuse bony metastatic bony disease, increased size of liver mets and increased size of lymph node conglomerates throughout pelvis and abdomen. DVT in right external iliac vein, common femoral vein and femoral vein also found. Started on Eliquis.  20. 9/15/22, PET-CT shows increase in size/metabolism of the large right pelvic/right upper thigh mass, new metastatic focus medial to the thigh, a right parietal lobe metastatic brain lesion, two foci of FDG uptake within the scalp, likely metastatic, new metabolism of the right upper lobe nodularities and increased metabolism of the right lower lobe 7 mm pulmonary nodule, multiple metastatic liver lesions, some of them are new and some are increased in size, new metastatic lesions within the spleen, and progressive metastatic bone disease. MRI-brain shows two brain metastases in the right cerebral hemisphere with mild surrounding vasogenic edema.   21. 9/27/22, she starts whole brain radiation therapy.    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 She presents today via video follow up with her  and daughter.    She has started to feel more fatigue and complains of short term memory changes with further rounds of radiation therapy. She continues with body aches and pains, largely in the back and right thigh. Most of the pain she describes today is in the low back. She has been taking oxycodone, but sparingly. She took 2 times on Wednesday, once yesterday, and has not taken any today. She feels the medication does not work for her and instead causes increased pain. She wants to try something else. She has been taking senna twice a day and Miralax and has regular bowel movements. She visited with palliative care on 10/5.      Current Outpatient Medications   Medication Sig Dispense Refill      fentaNYL (DURAGESIC) 12 mcg/hr 72 hr patch Place 1 patch onto the skin every 72 hours remove old patch. 5 patch 0     naloxone (NARCAN) 4 MG/0.1ML nasal spray Spray 1 spray (4 mg) into one nostril alternating nostrils as needed for opioid reversal every 2-3 minutes until assistance arrives 0.2 mL 1     apixaban ANTICOAGULANT (ELIQUIS) 5 MG tablet Take 1 tablet (5 mg) by mouth 2 times daily 60 tablet 11     carboxymethylcellulose PF (REFRESH LIQUIGEL) 1 % ophthalmic gel 1 drop 3 times daily       cholecalciferol (VITAMIN D3) 125 MCG (5000 UT) TABS tablet Take 125 mcg by mouth daily       dexamethasone (DECADRON) 2 MG tablet Take 1 tablet (2 mg) by mouth daily for 14 days 14 tablet 1     dicyclomine (BENTYL) 10 MG capsule Take 1 capsule (10 mg) by mouth 4 times daily (before meals and nightly) (Patient not taking: No sig reported) 120 capsule 1     estradiol (ESTRACE) 0.1 MG/GM vaginal cream Place 2 g vaginally twice a week 42.5 g 1     famotidine (PEPCID) 20 MG tablet Take 1 tablet (20 mg) by mouth 2 times daily as needed (acid reflux/heartburn) (Patient not taking: No sig reported) 60 tablet 1     fish oil-omega-3 fatty acids 1000 MG capsule Take 2 g by mouth daily       mirtazapine (REMERON) 15 MG tablet Take 1 tablet (15 mg) by mouth At Bedtime (Patient not taking: No sig reported) 30 tablet 1     multivitamin w/minerals (THERA-VIT-M) tablet Take 1 tablet by mouth daily       naloxegol (MOVANTIK) 25 MG TABS tablet Take 1 tablet (25 mg) by mouth every morning (before breakfast) (Patient not taking: Reported on 10/5/2022) 30 tablet 1     ondansetron (ZOFRAN) 8 MG tablet Take 1 tablet (8 mg) by mouth every 8 hours as needed for nausea 30 tablet 3     oxyCODONE (ROXICODONE) 5 MG tablet Take 1 tablet (5 mg) by mouth every 6 hours as needed for pain 56 tablet 0     Riboflavin 100 MG TABS Take 400 mg by mouth daily       senna-docusate (SENOKOT-S/PERICOLACE) 8.6-50 MG tablet Take 1 tablet by mouth 2 times daily 60 tablet  0     Objective:  There were no vitals taken for this visit.  GENERAL: Healthy, alert and no distress  EYES: Eyes grossly normal to inspection.  No discharge or erythema, or obvious scleral/conjunctival abnormalities.  HENT: Normal cephalic/atraumatic.  External ears, nose and mouth without ulcers or lesions.  No nasal drainage visible.  NECK: No asymmetry, visible masses or scars  RESP: No audible wheeze, cough, or visible cyanosis.  No visible retractions or increased work of breathing.    SKIN: Visible skin clear. No significant rash, abnormal pigmentation or lesions.  NEURO: Cranial nerves grossly intact.  Mentation and speech appropriate for age.  PSYCH: Mentation appears normal, affect normal/bright, judgement and insight intact, normal speech and appearance well-groomed.      ASSESSMENT AND PLAN:  #Diffusely metastatic acral melanoma  #Brain metastasis  Mrs. Hoang is a 77 year old woman with wide spread metastatic melanoma with brain metastasis currently receiving whole brain radiation therapy. She is status-post 5 cycles of Opdualag. We have discussed that I am not recommending any additional treatment out of concern that it may impair quality of life. However, potential options include dacarbazine or temozolomide with response rates of about 18%. At this time I would recommend we focus on completing radiation therapy and pain control. We will continue to review potential use of palliative chemotherapy.    -Continue radiation therapy  -Continue with palliative care visits  -RTC in 1 month to review next steps     #Cancer pain, severe  #Back pain  #Leg pain.   We tried tramadol and also oxycodone, but she states the oxycodone only exacerbates the pain. I suspect any benefit may be short lived requiring additional dosing, but she is averse to taking the medication. I have suggested a Fentanyl patch may be a better way to get a more steady state therapeutic drug level. Have written for fentanyl 12 mcg/hr for  at least some basal pain relief for her severe pain. She may then obtain additional relief with as needed oral pain medications.    #Constipation, opioid induced  Has been in the ED for severe constipation, likely from opioid use. Continue senna twice a day and Miralax once to twice daily as a bowel regimen during opioid therapy. Magnesium citrate also provided for as needed use. Movantiq written for, but is pending insurance approval.    #Right lower extremity DVT  Secondary to tumor compression. Continue Eliquis 5mg bid anticoagulation lifelong.       #RLE lymphema.   Secondary to right groin tumors and new DVT. Anticoagulation as above. Continue with lymphedema therapy.     #Depression  #Anxiety   Continue follow up with palliative care social work.      #Anorexia and weight loss.  She had EGD on 9/12/22, which showed the entire examined stomach was normal appearing. Biopsies taken for h pylori. No evidence of candida esophagitis on EGD.     #Scalp lesions  Represents metastatic disease. I am not recommending treatment. Can keep appointment with dermatology on 10/11/22 to review palliative excision, but will not change the overall disease course.           Again, thank you for allowing me to participate in the care of your patient.      Sincerely,    Gi Franklin MD

## 2022-10-07 NOTE — PROGRESS NOTES
Yadira is a 77 year old who is being evaluated via a billable video visit.      How would you like to obtain your AVS? [employer]  If the video visit is dropped, the invitation should be resent by: Send to e-mail at: [email or web address]  Will anyone else be joining your video visit? Yes: Spouse and daughter. How would they like to receive their invitation? Text to cell phone: [phone]      Teo AGUAYO    Video-Visit Details    Video Start Time: 4:00 PM    Type of service:  Video Visit    Video End Time: 4:30 PM    Originating Location (pt. Location): Home    Distant Location (provider location):  [facility]     Platform used for Video Visit: [facility] ONCOLOGY PROGRESS NOTE  Melanoma Clinic  Oct 7, 2022     CHIEF COMPLAINT: Metastatic acral melanoma    Melanoma History:  1. She noted a painful lesion on the sole of the right foot for a few months, since last summer.  It initially was small then became raised.  It spontaneously bled. She is not entirely sure of its appearance initially.  She denies noting any masses behind the knee or in the groin.  2. 1/9/2020, punch biopsy was performed by Dr Jessica Meadows. Patology showed melanoma, at least 3.4 mm deep with ulceration and 0 mitoses, and perineural invasion present. TILs were non-brisk and LVI not identified. pT3b.  3. 2/10/2020, she has right femoral sentinel lymph node biopsy and wide local excision (Specimen #: [id number]) and the right heel lesion extends to a depth of 6.6 mm, and invasive melanoma is present at 0.2 mm from the deep margin. Microsatellites are also present. There was 1 (of 2) lymph nodes involved. The lymph node tissue exhibits extensive subcapsular and parenchymal clusters of melanoma cells, highlighted on Melan-A immunostain. The largest cluster is 7 millimeters in greatest diameter. pT4b pN2c. PD-L1 staining is negative, <1%. No mutation in BRAF, KIT, or NRAS.  4. 2/22/2020, she had PET-CT,  which showed intense FDG uptake in a right inguinal lymph node, concerning for an additional focus of metastatic disease. There is also an indeterminate right external iliac lymph node with mild FDG uptake.  5. 3/4/2020, she has medial plantar artery  fasciocutaneous instep flap and Integra placement to the donor site. On 4/2/2020, she has additional reconstruction with skin grafting.  6. 5/22/2020, PET-CT shows a hypermetabolic right inguinal and right external iliac chain lymph node and stable pulmonary nodules.  7. 5/27/2020, she starts nivolumab for presumed low volume lymph node predominant metastatic disease.  8. 8/21/2020, PET-CT shows increase in hypermetabolic adenopathy, and a hypermetabolic nodule in segment 2 of the liver. Given low volume of disease she prefers to continue therapy with short interval follow-up.  9. 10/9/2020, PET-CT shows increasing size of hepatic metastases with increased hypermetabolism, increased right inguinal and iliac lymphadenopathy, and new FDG uptake right T2 transverse process and right side of S1. MRI-brain obtained 10/21/2020 is negative for brain metastasis.  10. 10/28/2020, she starts ipilimumab 1mg/kg and nivolumab 3mg/kg. She then goes on to maintenance nivolumab through 4/12/21.  11. 5/10/2021, she starts ipilimumab 3 mg/kg monotherapy q3w for 4 cycles.  12. 7/30/21, PET/CT shows mild disease progression.   13. 8/2/21, start ipilimumab 3mg/kg and nivolumab 1mg/kg and received 2 cycles, last on 8/23/21.  14. 8/23/21, develops elevated LFT's.   15. 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   16. 1/28/22, started TVEC  17. 4/22/22, progressive disease, consideration for additional therapeutic approaches.  18. 5/11/22, Start Opdualag  19. 7/29/22: PET-CT shows new diffuse bony metastatic bony disease, increased size of liver mets and increased size of lymph node conglomerates throughout pelvis and abdomen. DVT in right external iliac vein,  common femoral vein and femoral vein also found. Started on Eliquis.  20. 9/15/22, PET-CT shows increase in size/metabolism of the large right pelvic/right upper thigh mass, new metastatic focus medial to the thigh, a right parietal lobe metastatic brain lesion, two foci of FDG uptake within the scalp, likely metastatic, new metabolism of the right upper lobe nodularities and increased metabolism of the right lower lobe 7 mm pulmonary nodule, multiple metastatic liver lesions, some of them are new and some are increased in size, new metastatic lesions within the spleen, and progressive metastatic bone disease. MRI-brain shows two brain metastases in the right cerebral hemisphere with mild surrounding vasogenic edema.   21. 9/27/22, she starts whole brain radiation therapy.    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 She presents today via video follow up with her  and daughter.    She has started to feel more fatigue and complains of short term memory changes with further rounds of radiation therapy. She continues with body aches and pains, largely in the back and right thigh. Most of the pain she describes today is in the low back. She has been taking oxycodone, but sparingly. She took 2 times on Wednesday, once yesterday, and has not taken any today. She feels the medication does not work for her and instead causes increased pain. She wants to try something else. She has been taking senna twice a day and Miralax and has regular bowel movements. She visited with palliative care on 10/5.      Current Outpatient Medications   Medication Sig Dispense Refill     fentaNYL (DURAGESIC) 12 mcg/hr 72 hr patch Place 1 patch onto the skin every 72 hours remove old patch. 5 patch 0     naloxone (NARCAN) 4 MG/0.1ML nasal spray Spray 1 spray (4 mg) into one nostril alternating nostrils as needed for opioid reversal every 2-3  minutes until assistance arrives 0.2 mL 1     apixaban ANTICOAGULANT (ELIQUIS) 5 MG tablet Take 1 tablet (5 mg) by mouth 2 times daily 60 tablet 11     carboxymethylcellulose PF (REFRESH LIQUIGEL) 1 % ophthalmic gel 1 drop 3 times daily       cholecalciferol (VITAMIN D3) 125 MCG (5000 UT) TABS tablet Take 125 mcg by mouth daily       dexamethasone (DECADRON) 2 MG tablet Take 1 tablet (2 mg) by mouth daily for 14 days 14 tablet 1     dicyclomine (BENTYL) 10 MG capsule Take 1 capsule (10 mg) by mouth 4 times daily (before meals and nightly) (Patient not taking: No sig reported) 120 capsule 1     estradiol (ESTRACE) 0.1 MG/GM vaginal cream Place 2 g vaginally twice a week 42.5 g 1     famotidine (PEPCID) 20 MG tablet Take 1 tablet (20 mg) by mouth 2 times daily as needed (acid reflux/heartburn) (Patient not taking: No sig reported) 60 tablet 1     fish oil-omega-3 fatty acids 1000 MG capsule Take 2 g by mouth daily       mirtazapine (REMERON) 15 MG tablet Take 1 tablet (15 mg) by mouth At Bedtime (Patient not taking: No sig reported) 30 tablet 1     multivitamin w/minerals (THERA-VIT-M) tablet Take 1 tablet by mouth daily       naloxegol (MOVANTIK) 25 MG TABS tablet Take 1 tablet (25 mg) by mouth every morning (before breakfast) (Patient not taking: Reported on 10/5/2022) 30 tablet 1     ondansetron (ZOFRAN) 8 MG tablet Take 1 tablet (8 mg) by mouth every 8 hours as needed for nausea 30 tablet 3     oxyCODONE (ROXICODONE) 5 MG tablet Take 1 tablet (5 mg) by mouth every 6 hours as needed for pain 56 tablet 0     Riboflavin 100 MG TABS Take 400 mg by mouth daily       senna-docusate (SENOKOT-S/PERICOLACE) 8.6-50 MG tablet Take 1 tablet by mouth 2 times daily 60 tablet 0     Objective:  There were no vitals taken for this visit.  GENERAL: Healthy, alert and no distress  EYES: Eyes grossly normal to inspection.  No discharge or erythema, or obvious scleral/conjunctival abnormalities.  HENT: Normal cephalic/atraumatic.   External ears, nose and mouth without ulcers or lesions.  No nasal drainage visible.  NECK: No asymmetry, visible masses or scars  RESP: No audible wheeze, cough, or visible cyanosis.  No visible retractions or increased work of breathing.    SKIN: Visible skin clear. No significant rash, abnormal pigmentation or lesions.  NEURO: Cranial nerves grossly intact.  Mentation and speech appropriate for age.  PSYCH: Mentation appears normal, affect normal/bright, judgement and insight intact, normal speech and appearance well-groomed.      ASSESSMENT AND PLAN:  #Diffusely metastatic acral melanoma  #Brain metastasis  Mrs. Hoang is a 77 year old woman with wide spread metastatic melanoma with brain metastasis currently receiving whole brain radiation therapy. She is status-post 5 cycles of Opdualag. We have discussed that I am not recommending any additional treatment out of concern that it may impair quality of life. However, potential options include dacarbazine or temozolomide with response rates of about 18%. At this time I would recommend we focus on completing radiation therapy and pain control. We will continue to review potential use of palliative chemotherapy.    -Continue radiation therapy  -Continue with palliative care visits  -RTC in 1 month to review next steps     #Cancer pain, severe  #Back pain  #Leg pain.   We tried tramadol and also oxycodone, but she states the oxycodone only exacerbates the pain. I suspect any benefit may be short lived requiring additional dosing, but she is averse to taking the medication. I have suggested a Fentanyl patch may be a better way to get a more steady state therapeutic drug level. Have written for fentanyl 12 mcg/hr for at least some basal pain relief for her severe pain. She may then obtain additional relief with as needed oral pain medications.    #Constipation, opioid induced  Has been in the ED for severe constipation, likely from opioid use. Continue senna twice a  day and Miralax once to twice daily as a bowel regimen during opioid therapy. Magnesium citrate also provided for as needed use. Movantiq written for, but is pending insurance approval.    #Right lower extremity DVT  Secondary to tumor compression. Continue Eliquis 5mg bid anticoagulation lifelong.       #RLE lymphema.   Secondary to right groin tumors and new DVT. Anticoagulation as above. Continue with lymphedema therapy.     #Depression  #Anxiety   Continue follow up with palliative care social work.      #Anorexia and weight loss.  She had EGD on 9/12/22, which showed the entire examined stomach was normal appearing. Biopsies taken for h pylori. No evidence of candida esophagitis on EGD.     #Scalp lesions  Represents metastatic disease. I am not recommending treatment. Can keep appointment with dermatology on 10/11/22 to review palliative excision, but will not change the overall disease course.       Gi Cartagena M.D.   of Medicine  Hematology, Oncology and Transplantation  Pager: [phone]

## 2022-10-12 NOTE — PROGRESS NOTES
"Social Work Note: Telephone Call  Oncology Clinic    Data/Intervention:  Patient Name:  Yadira Hoang  /Age:  1945 (77 year old)    Call From:  BELGICA  Reason for Call:  Follow up support    Assessment:  BELGICA called and spoke to Yadira's daughter, Oly, as planned follow up. Oly was friendly and appreciative of check in. She states that as she talked to her mom about the differences between hospice and palliative care, her mom decided she does not want to do hospice. Oly reports, her mom states, \"I want to live.\" BELGICA understood and agreed that was up to her mom. They do have the information now and Oly understands they can request a referral at any time, or an informational meeting. Support provided.     No additional needs indicated at this time.       Plan:  Yadira and family will reach out with any further needs or questions.       JORGE Pelaez, [facility]  Clinical , Adult Oncology  Phone: [phone]        "

## 2022-10-12 NOTE — ED TRIAGE NOTES
Just finished 10 rounds of radiation and feels like she has an increase in pain that she shouldn't and she is hurting all over.  Doctor wanted her to be seen

## 2022-10-12 NOTE — ED PROVIDER NOTES
History     Chief Complaint   Patient presents with     Generalized Body Aches     HPI  Yadira Hoang is a 77 year old female with history of metastatic melanoma, migraine, chronic fatigue, polyarthritis, hyperlipidemia, chronic pain related to metastatic disease, and DVT (on eliquis) who presents for evaluation of generalized body aches and pain all over. Patient has been following with oncology (Dr. Cartagena) and with palliative care (Dr. Cole). She has just completed 10 rounds of brain radiation. She has been reluctant to take oxycodone, thinks it doesn't help. She was switched to fentanyl patch. She continues to have severe pain all over. She tells me she has no appetite and is concerned that she is malnourished. She was offered hospice but declined because she thought it is only for people who are going to die very soon. She also thought that you can't be on any medications when on hospice.      Most recent note from oncology visit below:       MEDICAL ONCOLOGY PROGRESS NOTE  [facility]  Oct 7, 2022     CHIEF COMPLAINT: Metastatic acral melanoma     Melanoma History:  1. She noted a painful lesion on the sole of the right foot for a few months, since last summer.  It initially was small then became raised.  It spontaneously bled. She is not entirely sure of its appearance initially.  She denies noting any masses behind the knee or in the groin.  2. 1/9/2020, punch biopsy was performed by Dr Jessica Meadows. Patology showed melanoma, at least 3.4 mm deep with ulceration and 0 mitoses, and perineural invasion present. TILs were non-brisk and LVI not identified. pT3b.  3. 2/10/2020, she has right femoral sentinel lymph node biopsy and wide local excision (Specimen #: [id number]) and the right heel lesion extends to a depth of 6.6 mm, and invasive melanoma is present at 0.2 mm from the deep margin. Microsatellites are also present. There was 1 (of 2) lymph nodes involved. The lymph node tissue  exhibits extensive subcapsular and parenchymal clusters of melanoma cells, highlighted on Melan-A immunostain. The largest cluster is 7 millimeters in greatest diameter. pT4b pN2c. PD-L1 staining is negative, <1%. No mutation in BRAF, KIT, or NRAS.  4. 2/22/2020, she had PET-CT, which showed intense FDG uptake in a right inguinal lymph node, concerning for an additional focus of metastatic disease. There is also an indeterminate right external iliac lymph node with mild FDG uptake.  5. 3/4/2020, she has medial plantar artery  fasciocutaneous instep flap and Integra placement to the donor site. On 4/2/2020, she has additional reconstruction with skin grafting.  6. 5/22/2020, PET-CT shows a hypermetabolic right inguinal and right external iliac chain lymph node and stable pulmonary nodules.  7. 5/27/2020, she starts nivolumab for presumed low volume lymph node predominant metastatic disease.  8. 8/21/2020, PET-CT shows increase in hypermetabolic adenopathy, and a hypermetabolic nodule in segment 2 of the liver. Given low volume of disease she prefers to continue therapy with short interval follow-up.  9. 10/9/2020, PET-CT shows increasing size of hepatic metastases with increased hypermetabolism, increased right inguinal and iliac lymphadenopathy, and new FDG uptake right T2 transverse process and right side of S1. MRI-brain obtained 10/21/2020 is negative for brain metastasis.  10. 10/28/2020, she starts ipilimumab 1mg/kg and nivolumab 3mg/kg. She then goes on to maintenance nivolumab through 4/12/21.  11. 5/10/2021, she starts ipilimumab 3 mg/kg monotherapy q3w for 4 cycles.  12. 7/30/21, PET/CT shows mild disease progression.   13. 8/2/21, start ipilimumab 3mg/kg and nivolumab 1mg/kg and received 2 cycles, last on 8/23/21.  14. 8/23/21, develops elevated LFT's.   15. 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   16. 1/28/22, started TVEC  17. 4/22/22, progressive disease,  consideration for additional therapeutic approaches.  18. 5/11/22, Start Opdualag  19. 7/29/22: PET-CT shows new diffuse bony metastatic bony disease, increased size of liver mets and increased size of lymph node conglomerates throughout pelvis and abdomen. DVT in right external iliac vein, common femoral vein and femoral vein also found. Started on Eliquis.  20. 9/15/22, PET-CT shows increase in size/metabolism of the large right pelvic/right upper thigh mass, new metastatic focus medial to the thigh, a right parietal lobe metastatic brain lesion, two foci of FDG uptake within the scalp, likely metastatic, new metabolism of the right upper lobe nodularities and increased metabolism of the right lower lobe 7 mm pulmonary nodule, multiple metastatic liver lesions, some of them are new and some are increased in size, new metastatic lesions within the spleen, and progressive metastatic bone disease. MRI-brain shows two brain metastases in the right cerebral hemisphere with mild surrounding vasogenic edema.   21. 9/27/22, she starts whole brain radiation therapy.     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 She presents today via video follow up with her  and daughter.     She has started to feel more fatigue and complains of short term memory changes with further rounds of radiation therapy. She continues with body aches and pains, largely in the back and right thigh. Most of the pain she describes today is in the low back. She has been taking oxycodone, but sparingly. She took 2 times on Wednesday, once yesterday, and has not taken any today. She feels the medication does not work for her and instead causes increased pain. She wants to try something else. She has been taking senna twice a day and Miralax and has regular bowel movements. She visited with palliative care on 10/5.        Current Outpatient Prescriptions           Current Outpatient Medications   Medication Sig Dispense Refill     fentaNYL (DURAGESIC) 12 mcg/hr 72 hr patch Place 1 patch onto the skin every 72 hours remove old patch. 5 patch 0     naloxone (NARCAN) 4 MG/0.1ML nasal spray Spray 1 spray (4 mg) into one nostril alternating nostrils as needed for opioid reversal every 2-3 minutes until assistance arrives 0.2 mL 1     apixaban ANTICOAGULANT (ELIQUIS) 5 MG tablet Take 1 tablet (5 mg) by mouth 2 times daily 60 tablet 11     carboxymethylcellulose PF (REFRESH LIQUIGEL) 1 % ophthalmic gel 1 drop 3 times daily         cholecalciferol (VITAMIN D3) 125 MCG (5000 UT) TABS tablet Take 125 mcg by mouth daily         dexamethasone (DECADRON) 2 MG tablet Take 1 tablet (2 mg) by mouth daily for 14 days 14 tablet 1     dicyclomine (BENTYL) 10 MG capsule Take 1 capsule (10 mg) by mouth 4 times daily (before meals and nightly) (Patient not taking: No sig reported) 120 capsule 1     estradiol (ESTRACE) 0.1 MG/GM vaginal cream Place 2 g vaginally twice a week 42.5 g 1     famotidine (PEPCID) 20 MG tablet Take 1 tablet (20 mg) by mouth 2 times daily as needed (acid reflux/heartburn) (Patient not taking: No sig reported) 60 tablet 1     fish oil-omega-3 fatty acids 1000 MG capsule Take 2 g by mouth daily         mirtazapine (REMERON) 15 MG tablet Take 1 tablet (15 mg) by mouth At Bedtime (Patient not taking: No sig reported) 30 tablet 1     multivitamin w/minerals (THERA-VIT-M) tablet Take 1 tablet by mouth daily         naloxegol (MOVANTIK) 25 MG TABS tablet Take 1 tablet (25 mg) by mouth every morning (before breakfast) (Patient not taking: Reported on 10/5/2022) 30 tablet 1     ondansetron (ZOFRAN) 8 MG tablet Take 1 tablet (8 mg) by mouth every 8 hours as needed for nausea 30 tablet 3     oxyCODONE (ROXICODONE) 5 MG tablet Take 1 tablet (5 mg) by mouth every 6 hours as needed for pain 56 tablet 0     Riboflavin 100 MG TABS Take 400 mg by mouth daily         senna-docusate  (SENOKOT-S/PERICOLACE) 8.6-50 MG tablet Take 1 tablet by mouth 2 times daily 60 tablet 0         Objective:  There were no vitals taken for this visit.  GENERAL: Healthy, alert and no distress  EYES: Eyes grossly normal to inspection.  No discharge or erythema, or obvious scleral/conjunctival abnormalities.  HENT: Normal cephalic/atraumatic.  External ears, nose and mouth without ulcers or lesions.  No nasal drainage visible.  NECK: No asymmetry, visible masses or scars  RESP: No audible wheeze, cough, or visible cyanosis.  No visible retractions or increased work of breathing.    SKIN: Visible skin clear. No significant rash, abnormal pigmentation or lesions.  NEURO: Cranial nerves grossly intact.  Mentation and speech appropriate for age.  PSYCH: Mentation appears normal, affect normal/bright, judgement and insight intact, normal speech and appearance well-groomed.        ASSESSMENT AND PLAN:  #Diffusely metastatic acral melanoma  #Brain metastasis  Mrs. Hoang is a 77 year old woman with wide spread metastatic melanoma with brain metastasis currently receiving whole brain radiation therapy. She is status-post 5 cycles of Opdualag. We have discussed that I am not recommending any additional treatment out of concern that it may impair quality of life. However, potential options include dacarbazine or temozolomide with response rates of about 18%. At this time I would recommend we focus on completing radiation therapy and pain control. We will continue to review potential use of palliative chemotherapy.     -Continue radiation therapy  -Continue with palliative care visits  -RTC in 1 month to review next steps     #Cancer pain, severe  #Back pain  #Leg pain.   We tried tramadol and also oxycodone, but she states the oxycodone only exacerbates the pain. I suspect any benefit may be short lived requiring additional dosing, but she is averse to taking the medication. I have suggested a Fentanyl patch may be a better  way to get a more steady state therapeutic drug level. Have written for fentanyl 12 mcg/hr for at least some basal pain relief for her severe pain. She may then obtain additional relief with as needed oral pain medications.     #Constipation, opioid induced  Has been in the ED for severe constipation, likely from opioid use. Continue senna twice a day and Miralax once to twice daily as a bowel regimen during opioid therapy. Magnesium citrate also provided for as needed use. Movantiq written for, but is pending insurance approval.     #Right lower extremity DVT  Secondary to tumor compression. Continue Eliquis 5mg bid anticoagulation lifelong.       #RLE lymphema.   Secondary to right groin tumors and new DVT. Anticoagulation as above. Continue with lymphedema therapy.      #Depression  #Anxiety   Continue follow up with palliative care social work.      #Anorexia and weight loss.  She had EGD on 9/12/22, which showed the entire examined stomach was normal appearing. Biopsies taken for h pylori. No evidence of candida esophagitis on EGD.      #Scalp lesions  Represents metastatic disease. I am not recommending treatment. Can keep appointment with dermatology on 10/11/22 to review palliative excision, but will not change the overall disease course.        Gi Cartagena M.D.   of Medicine  Hematology, Oncology and Transplantation  Pager: [phone]    Allergies:  Allergies   Allergen Reactions     Prednisone      Leg and back pain     Atorvastatin      Statins all swelling     Hmg-Coa-R Inhibitors Swelling       Problem List:    Patient Active Problem List    Diagnosis Date Noted     Dehydration 09/23/2022     Priority: Medium     Polyarthritis 05/02/2022     Priority: Medium     Bilateral leg pain 03/02/2022     Priority: Medium     Chronic bilateral low back pain without sciatica 03/02/2022     Priority: Medium     Generalized muscle weakness 03/02/2022     Priority: Medium      Immunodeficiency, unspecified (H) 08/06/2021     Priority: Medium     Generalized pruritus 02/23/2021     Priority: Medium     Associated with immunotherapy treatments       Secondary malignant neoplasm of bone (H) 01/19/2021     Priority: Medium     ALEXUS (generalized anxiety disorder) 05/07/2020     Priority: Medium     Major depression, recurrent, chronic (H) 05/07/2020     Priority: Medium     S/P flap graft 03/25/2020     Priority: Medium     Added automatically from request for surgery [id number]       Chronic congestion of paranasal sinus 02/07/2020     Priority: Medium     Melanoma of skin (H) 02/04/2020     Priority: Medium     Added automatically from request for surgery [id number]       Malignant melanoma of right lower extremity including hip (H) 01/27/2020     Priority: Medium     Chronic fatigue 01/06/2020     Priority: Medium     Chronic pain disorder 11/13/2018     Priority: Medium     Myofascial pain 10/31/2018     Priority: Medium     Neck pain, chronic 05/30/2017     Priority: Medium     Intractable migraine without aura and without status migrainosus 05/30/2017     Priority: Medium     History of multiple concussions 05/30/2017     Priority: Medium     Bilateral occipital neuralgia 05/30/2017     Priority: Medium     Weakness of foot 05/02/2017     Priority: Medium     Foot pain, bilateral 05/02/2017     Priority: Medium     Difficulty walking 05/02/2017     Priority: Medium     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  2011, headaches x2 yr, biofeedback, resolved       Hyperlipidemia LDL goal <100 03/25/2014     Priority: Medium     The 10-year ASCVD risk score ([place] WELLS Jr., et al., 2013) is: 20.5%     Values used to calculate the score:      Age: 75 years      Sex: Female      Is Non- : No      Diabetic: No      Tobacco smoker: No      Systolic Blood Pressure: 138 mmHg      Is BP treated: No      HDL Cholesterol: 57 mg/dL      Total Cholesterol: 235 mg/dL       GUERLINE (obstructive sleep apnea) 08/08/2005     Priority: Medium     Has CPAP    AHI 5.2/RDI 5.9 desats 83% Milner, mild insomnia,    Formatting of this note might be different from the original.  AHI 5.2/RDI 5.9 desats 83% Milner, mild insomnia,          Past Medical History:    Past Medical History:   Diagnosis Date     Concussion 2011     ALEXUS (generalized anxiety disorder) 05/07/2020     Major depression, recurrent, chronic (H) 05/07/2020     Melanoma (H)      Nonsenile cataract      S/P radiation therapy      Sleep apnea        Past Surgical History:    Past Surgical History:   Procedure Laterality Date     BIOPSY NODE SENTINEL Right 2/10/2020    Procedure: Candia Lymph Node Mapping And Biopsy;  Surgeon: Gabriela Dorsey MD;  Location: [facility]     COMBINED ESOPHAGOSCOPY, GASTROSCOPY, DUODENOSCOPY (EGD) WITH CO2 INSUFFLATION N/A 6/20/2022    Procedure: ESOPHAGOGASTRODUODENOSCOPY, WITH CO2 INSUFFLATION;  Surgeon: Yu Alfred DO;  Location: [facility] OR     ESOPHAGOGASTRODUODENOSCOPY, WITH BRUSHINGS  1/12/2022    Procedure: Esophagogastroduodenoscopy, With Brushings;  Surgeon: Dayo Merritt MD;  Location: [facility]     ESOPHAGOSCOPY, GASTROSCOPY, DUODENOSCOPY (EGD), COMBINED N/A 6/20/2022    Procedure: ESOPHAGOGASTRODUODENOSCOPY, WITH BIOPSY;  Surgeon: Yu Alfred DO;  Location: [facility]     ESOPHAGOSCOPY, GASTROSCOPY, DUODENOSCOPY (EGD), COMBINED N/A 9/12/2022    Procedure: ESOPHAGOGASTRODUODENOSCOPY, WITH BIOPSY;  Surgeon: Chivo Godinez MD;  Location: [facility]     EXCISE LESION LOWER EXTREMITY Right 2/10/2020    Procedure: Wide Local Excision of RIGHT heel melanoma;  Surgeon: Gabriela Dorsey MD;  Location:   OR     EXCISE LESION LOWER EXTREMITY Right 2/20/2020    Procedure: Wide local Re-excision RIGHT heel Wound vac application;  Surgeon: Gabriela Dorsey MD;  Location: [facility]     GRAFT SKIN SPLIT THICKNESS FROM EXTREMITY Right 4/2/2020    Procedure: split skin graft from right thigh;  Surgeon: NIKA Crabtree MD;  Location: [facility]     IRRIGATION AND DEBRIDEMENT FOOT, COMBINED Right 4/2/2020    Procedure: right foot/heel debridement;  Surgeon: NIKA Crabtree MD;  Location: [facility]     ORTHOPEDIC SURGERY Right     heel surgery x2     RECONSTRUCT FOOT Right 3/4/2020    Procedure: Right heel reconstruction with regional flap, biologic dressing and SPY;  Surgeon: NIKA Crabtree MD;  Location: [facility]     TUBAL LIGATION         Family History:    Family History   Problem Relation Age of Onset     Cervical Cancer Sister      Pancreatic Cancer Brother      Glaucoma No family hx of      Macular Degeneration No family hx of      Melanoma No family hx of      Skin Cancer No family hx of        Social History:  Marital Status:   [2]  Social History     Tobacco Use     Smoking status: Never     Smokeless tobacco: Never   Vaping Use     Vaping Use: Never used   Substance Use Topics     Alcohol use: Not Currently     Drug use: Never        Medications:    apixaban ANTICOAGULANT (ELIQUIS) 5 MG tablet  cholecalciferol (VITAMIN D3) 125 MCG (5000 UT) TABS tablet  dexamethasone (DECADRON) 2 MG tablet  docusate sodium (COLACE) 50 MG capsule  fentaNYL (DURAGESIC) 12 mcg/hr 72 hr patch  fish oil-omega-3 fatty acids 1000 MG capsule  multivitamin w/minerals (THERA-VIT-M) tablet  ondansetron (ZOFRAN) 8 MG tablet  polyethylene glycol (MIRALAX) 17 g packet  carboxymethylcellulose PF (REFRESH LIQUIGEL) 1 % ophthalmic gel  dicyclomine (BENTYL) 10 MG capsule  estradiol (ESTRACE) 0.1 MG/GM vaginal cream  famotidine (PEPCID) 20 MG tablet  mirtazapine (REMERON) 15 MG tablet  naloxegol (MOVANTIK) 25 MG TABS tablet  naloxone  "(NARCAN) 4 MG/0.1ML nasal spray  oxyCODONE (ROXICODONE) 5 MG tablet  senna-docusate (SENOKOT-S/PERICOLACE) 8.6-50 MG tablet          Review of Systems  As mentioned above in the history present illness. All other systems were reviewed and are negative.    Physical Exam   BP: 103/65  Pulse: (!) 122  Temp: 97.8  F (36.6  C)  Resp: 19  Height: 170.2 cm (5' 7\")  Weight: 59.4 kg (131 lb)  SpO2: 97 %      Physical Exam  Constitutional:       Appearance: She is cachectic. She is ill-appearing.   HENT:      Head: Normocephalic and atraumatic.      Right Ear: External ear normal.      Left Ear: External ear normal.      Mouth/Throat:      Mouth: Mucous membranes are dry.      Pharynx: Oropharynx is clear.   Eyes:      Conjunctiva/sclera: Conjunctivae normal.   Cardiovascular:      Rate and Rhythm: Regular rhythm. Tachycardia present.   Pulmonary:      Effort: Pulmonary effort is normal.      Breath sounds: Normal breath sounds.   Abdominal:      General: Bowel sounds are normal. There is no distension.      Palpations: Abdomen is soft.      Tenderness: There is no abdominal tenderness.   Musculoskeletal:         General: Normal range of motion.      Right lower leg: Edema present.      Comments: Right anterior thigh mass.   Skin:     General: Skin is warm and dry.   Neurological:      General: No focal deficit present.      Mental Status: She is alert and oriented to person, place, and time.         ED Course                 Procedures              Results for orders placed or performed during the hospital encounter of 10/12/22 (from the past 24 hour(s))   CBC with platelets differential    Narrative    The following orders were created for panel order CBC with platelets differential.  Procedure                               Abnormality         Status                     ---------                               -----------         ------                     CBC with platelets and d...[[id number]]  Abnormal            Final " result               Manual Differential[[id number]]          Abnormal            Final result                 Please view results for these tests on the individual orders.   Comprehensive metabolic panel   Result Value Ref Range    Sodium 137 133 - 144 mmol/L    Potassium 3.9 3.4 - 5.3 mmol/L    Chloride 99 94 - 109 mmol/L    Carbon Dioxide (CO2) 30 20 - 32 mmol/L    Anion Gap 8 3 - 14 mmol/L    Urea Nitrogen 25 7 - 30 mg/dL    Creatinine 0.47 (L) 0.52 - 1.04 mg/dL    Calcium 9.6 8.5 - 10.1 mg/dL    Glucose 116 (H) 70 - 99 mg/dL    Alkaline Phosphatase 120 40 - 150 U/L     (H) 0 - 45 U/L    ALT 18 0 - 50 U/L    Protein Total 6.5 (L) 6.8 - 8.8 g/dL    Albumin 2.4 (L) 3.4 - 5.0 g/dL    Bilirubin Total 0.5 0.2 - 1.3 mg/dL    GFR Estimate >90 >60 mL/min/1.73m2   CBC with platelets and differential   Result Value Ref Range    WBC Count 5.5 4.0 - 11.0 10e3/uL    RBC Count 2.49 (L) 3.80 - 5.20 10e6/uL    Hemoglobin 7.5 (L) 11.7 - 15.7 g/dL    Hematocrit 22.8 (L) 35.0 - 47.0 %    MCV 92 78 - 100 fL    MCH 30.1 26.5 - 33.0 pg    MCHC 32.9 31.5 - 36.5 g/dL    RDW 16.6 (H) 10.0 - 15.0 %    Platelet Count 259 150 - 450 10e3/uL   Manual Differential   Result Value Ref Range    % Neutrophils 78 %    % Lymphocytes 9 %    % Monocytes 10 %    % Eosinophils 1 %    % Basophils 1 %    % Other Cells 1 %    NRBCs per 100 WBC 1 (H) <=0 %    Absolute Neutrophils 4.3 1.6 - 8.3 10e3/uL    Absolute Lymphocytes 0.5 (L) 0.8 - 5.3 10e3/uL    Absolute Monocytes 0.6 0.0 - 1.3 10e3/uL    Absolute Eosinophils 0.1 0.0 - 0.7 10e3/uL    Absolute Basophils 0.1 0.0 - 0.2 10e3/uL    Absolute Other Cells 0.1 (H) <=0.0 10e3/uL    Absolute NRBCs 0.1 (H) <=0.0 10e3/uL    RBC Morphology Confirmed RBC Indices     Platelet Assessment  Automated Count Confirmed. Platelet morphology is normal.     Automated Count Confirmed. Platelet morphology is normal.    Polychromasia Slight (A) None Seen   UA with Microscopic reflex to Culture    Specimen: Urine,  Midstream   Result Value Ref Range    Color Urine Yellow Colorless, Straw, Light Yellow, Yellow    Appearance Urine Slightly Cloudy (A) Clear    Glucose Urine Negative Negative mg/dL    Bilirubin Urine Negative Negative    Ketones Urine Negative Negative mg/dL    Specific Gravity Urine 1.014 1.003 - 1.035    Blood Urine Negative Negative    pH Urine 5.0 5.0 - 7.0    Protein Albumin Urine Negative Negative mg/dL    Urobilinogen Urine Normal Normal, 2.0 mg/dL    Nitrite Urine Negative Negative    Leukocyte Esterase Urine Negative Negative    Mucus Urine Present (A) None Seen /LPF    RBC Urine 1 <=2 /HPF    WBC Urine 2 <=5 /HPF    Squamous Epithelials Urine 7 (H) <=1 /HPF    Hyaline Casts Urine 10 (H) <=2 /LPF    Narrative    Urine Culture not indicated       Medications   0.9% sodium chloride BOLUS (0 mLs Intravenous Stopped 10/12/22 1812)       Assessments & Plan (with Medical Decision Making)  77 year old female with metastatic acral melanoma who presents for evaluation of cancer related pain.   Patient is alert and oriented. Short term memory impairment related probably related to her metastatic disease and brain radiation. She is afebrile. Normotensive. Mild tachycardia. Exam as noted above. WBC normal. Hgb 7.5. normal platelets.  . Normal ALT and normal bilirubin. UA negative for infection. Patient was given IV NS 1 liter bolus. She is wearing her Fentanyl patch. I had long discussion with patient and spouse regarding pain control and I recommend she try to use oxycodone twice a day for breakthru pain.  We discussed treating any opioid induced constipation. I also revisited discussion regarding hospice as I feel patient and family would greatly benefit from this service. Patient is aware that she will most certainly die at some point from her metastatic cancer and she does not have to be imminently dying to qualify for hospice which can help her with pain control and in-home care and services.   Plan:  For  your pain:  Continue Fentanyl Patch as prescribed.  Use your Oxycodone 1/2 - 1 tablet every 12 hours as needed for severe pain. We will see if this gives you some better pain control.  Continue taking Colace (stool softener) twice a day.  Use Miralax 1 capful in 8 ounces of water for any constipation problems.   For hydration and nutrition:  Frequent sips of water, keep a water bottle near you.  Use Ensure/Boost or Premier protein supplement daily or twice a day if tolerated.  Anemia:  Your hemoglobin is low today at 7.5. We typically do not give blood transfusion unless you have hemoglobin less than 7. However, I would have you ask your oncologist tomorrow what they think about this.  I have placed order for recheck of your labs.  Tomorrow morning, call Dr. Cartagena (your oncologist) for a sooner appointment to discuss pain management, low hemoglobin, and that I have ordered recheck of your labs. You have also indicated that you would be interested in revisiting the option of Hospice.  You have upcoming virtual appointments with  (Taty) on 10/20 and Palliative Care (Dr. Cole) on 11/2.         Discharge Medication List as of 10/12/2022  6:18 PM          Final diagnoses:   Chronic pain disorder - Cancer related pain.   Anemia, unspecified type       10/12/2022   [facility] EMERGENCY DEPT     Ros Montgomery APRN CNP  10/13/22 0143

## 2022-10-12 NOTE — DISCHARGE INSTRUCTIONS
For your pain:  Continue Fentanyl Patch as prescribed.  Use your Oxycodone 1/2 - 1 tablet every 12 hours as needed for severe pain. We will see if this gives you some better pain control.  Continue taking Colace (stool softener) twice a day.  Use Miralax 1 capful in 8 ounces of water for any constipation problems.     For hydration and nutrition:  Frequent sips of water, keep a water bottle near you.  Use Ensure/Boost or Premier protein supplement daily or twice a day if tolerated.    Anemia:  Your hemoglobin is low today at 7.5. We typically do not give blood transfusion unless you have hemoglobin less than 7. However, I would have you ask your oncologist tomorrow what they think about this.  I have placed order for recheck of your labs.    Tomorrow morning, call Dr. Cartagena (your oncologist) for a sooner appointment to discuss pain management, low hemoglobin, and that I have ordered recheck of your labs. You have also indicated that you would be interested in revisiting the option of Hospice.    You have upcoming virtual appointments with  (Taty) on 10/20 and Palliative Care (Dr. Cole) on 11/2.

## 2022-10-13 NOTE — TELEPHONE ENCOUNTER
Pt was sent to the ER yesterday after reporting 10/10 pain and inability to move without causing severe pain. Upon chart review, the ER provider encouraged use of PRN oxycodone in addition to the fentanyl patch she has in place to better manage the pain.    Received message from pt's oncology care coordinator that pt is still in pain and pt/family are needing some direction on what to do.    Call placed to pt's  Sulaiman. He states they have not been giving any oxycodone, even after being encouraged to do so by the ER provider, because a pharmacist told them that pt cannot take oxycodone while wearing a fentanyl patch. I provided education and reassurance about safely using both together. I reviewed PRN dosing instructions of the oxycodone. Discussed future increases in the fentanyl dose if pain is still uncontrolled despite use of oxycodone. He verbalized understanding and will give her an oxycodone now.    Pt will have a follow up visit with Dr. Cole on 10/19 to discuss pain control and effectiveness of pain medications.    KULWINDER Valencia, RN  Palliative Care Nurse Clinician    [phone] (Direct)  [phone] (Main)  [phone] (Appointment Scheduling)

## 2022-10-14 PROBLEM — E83.52 HYPERCALCEMIA: Status: ACTIVE | Noted: 2022-01-01

## 2022-10-14 NOTE — TELEPHONE ENCOUNTER
Prior Authorization Retail Medication Request    Medication/Dose: ONDANSETRON HCL 8MG TABS  ICD code (if different than what is on RX):    Previously Tried and Failed:    Rationale:      Insurance Name:  [employer]  Insurance ID:  [id number]      Pharmacy Information (if different than what is on RX)  Name:  [facility]   Phone:  [phone]

## 2022-10-14 NOTE — NURSING NOTE
Chief Complaint   Patient presents with     Blood Draw     Vpt blood draw by lab RN. Vitals taken and appointment arrived     Meghan Romero RN

## 2022-10-14 NOTE — LETTER
10/14/2022         RE: Yadira Hoang  [address] [facility] [address]        Dear Colleague,    Thank you for referring your patient, Yadira Hoang, to the [facility]. Please see a copy of my visit note below.    MEDICAL ONCOLOGY PROGRESS NOTE  Melanoma Clinic  Oct 14, 2022     CHIEF COMPLAINT: Metastatic acral melanoma    Melanoma History:  1. She noted a painful lesion on the sole of the right foot for a few months, since last summer.  It initially was small then became raised.  It spontaneously bled. She is not entirely sure of its appearance initially.  She denies noting any masses behind the knee or in the groin.  2. 1/9/2020, punch biopsy was performed by Dr Jessica Meadows. Patology showed melanoma, at least 3.4 mm deep with ulceration and 0 mitoses, and perineural invasion present. TILs were non-brisk and LVI not identified. pT3b.  3. 2/10/2020, she has right femoral sentinel lymph node biopsy and wide local excision (Specimen #: [id number]) and the right heel lesion extends to a depth of 6.6 mm, and invasive melanoma is present at 0.2 mm from the deep margin. Microsatellites are also present. There was 1 (of 2) lymph nodes involved. The lymph node tissue exhibits extensive subcapsular and parenchymal clusters of melanoma cells, highlighted on Melan-A immunostain. The largest cluster is 7 millimeters in greatest diameter. pT4b pN2c. PD-L1 staining is negative, <1%. No mutation in BRAF, KIT, or NRAS.  4. 2/22/2020, she had PET-CT, which showed intense FDG uptake in a right inguinal lymph node, concerning for an additional focus of metastatic disease. There is also an indeterminate right external iliac lymph node with mild FDG uptake.  5. 3/4/2020, she has medial plantar artery  fasciocutaneous instep flap and Integra placement to the donor site. On 4/2/2020, she has additional reconstruction with skin grafting.  6. 5/22/2020, PET-CT shows a  hypermetabolic right inguinal and right external iliac chain lymph node and stable pulmonary nodules.  7. 5/27/2020, she starts nivolumab for presumed low volume lymph node predominant metastatic disease.  8. 8/21/2020, PET-CT shows increase in hypermetabolic adenopathy, and a hypermetabolic nodule in segment 2 of the liver. Given low volume of disease she prefers to continue therapy with short interval follow-up.  9. 10/9/2020, PET-CT shows increasing size of hepatic metastases with increased hypermetabolism, increased right inguinal and iliac lymphadenopathy, and new FDG uptake right T2 transverse process and right side of S1. MRI-brain obtained 10/21/2020 is negative for brain metastasis.  10. 10/28/2020, she starts ipilimumab 1mg/kg and nivolumab 3mg/kg. She then goes on to maintenance nivolumab through 4/12/21.  11. 5/10/2021, she starts ipilimumab 3 mg/kg monotherapy q3w for 4 cycles.  12. 7/30/21, PET/CT shows mild disease progression.   13. 8/2/21, start ipilimumab 3mg/kg and nivolumab 1mg/kg and received 2 cycles, last on 8/23/21.  14. 8/23/21, develops elevated LFT's.   15. 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   16. 1/28/22, started TVEC  17. 4/22/22, progressive disease, consideration for additional therapeutic approaches.  18. 5/11/22, Start Opdualag  19. 7/29/22: PET-CT shows new diffuse bony metastatic bony disease, increased size of liver mets and increased size of lymph node conglomerates throughout pelvis and abdomen. DVT in right external iliac vein, common femoral vein and femoral vein also found. Started on Eliquis.  20. 9/15/22, PET-CT shows increase in size/metabolism of the large right pelvic/right upper thigh mass, new metastatic focus medial to the thigh, a right parietal lobe metastatic brain lesion, two foci of FDG uptake within the scalp, likely metastatic, new metabolism of the right upper lobe nodularities and increased metabolism of the right lower lobe 7  mm pulmonary nodule, multiple metastatic liver lesions, some of them are new and some are increased in size, new metastatic lesions within the spleen, and progressive metastatic bone disease. MRI-brain shows two brain metastases in the right cerebral hemisphere with mild surrounding vasogenic edema.   21. 9/27/22, she starts whole brain radiation therapy.    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 She presents today via video follow up with her  and daughter.    She has completed whole brain radiation therapy.    She started the base of Fentanyl 12 mcg/hr and has had pain relief with intermittent prn as needed oxycodone. She has been taking senna twice a day and Miralax and has regular bowel movements. She visited with palliative care on 10/5.      Current Outpatient Medications   Medication Sig Dispense Refill     apixaban ANTICOAGULANT (ELIQUIS) 5 MG tablet Take 1 tablet (5 mg) by mouth 2 times daily 60 tablet 11     carboxymethylcellulose PF (REFRESH LIQUIGEL) 1 % ophthalmic gel 1 drop 3 times daily       cholecalciferol (VITAMIN D3) 125 MCG (5000 UT) TABS tablet Take 125 mcg by mouth daily       dexamethasone (DECADRON) 2 MG tablet Take 1 tablet (2 mg) by mouth daily for 14 days (Patient taking differently: Take 2 mg by mouth every other day For 5 days (started 10/14/22)) 14 tablet 1     dicyclomine (BENTYL) 10 MG capsule Take 1 capsule (10 mg) by mouth 4 times daily (before meals and nightly) (Patient not taking: No sig reported) 120 capsule 1     docusate sodium (COLACE) 50 MG capsule Take 50 mg by mouth daily as needed for constipation       estradiol (ESTRACE) 0.1 MG/GM vaginal cream Place 2 g vaginally twice a week 42.5 g 1     famotidine (PEPCID) 20 MG tablet Take 1 tablet (20 mg) by mouth 2 times daily as needed (acid reflux/heartburn) (Patient not taking: No sig reported) 60 tablet 1     fentaNYL  (DURAGESIC) 12 mcg/hr 72 hr patch Place 1 patch onto the skin every 48 hours remove old patch. 15 patch 0     fish oil-omega-3 fatty acids 1000 MG capsule Take 2 g by mouth daily       mirtazapine (REMERON) 15 MG tablet Take 1 tablet (15 mg) by mouth At Bedtime (Patient not taking: No sig reported) 30 tablet 1     multivitamin w/minerals (THERA-VIT-M) tablet Take 1 tablet by mouth daily       naloxegol (MOVANTIK) 25 MG TABS tablet Take 1 tablet (25 mg) by mouth every morning (before breakfast) (Patient not taking: No sig reported) 30 tablet 1     naloxone (NARCAN) 4 MG/0.1ML nasal spray Spray 1 spray (4 mg) into one nostril alternating nostrils as needed for opioid reversal every 2-3 minutes until assistance arrives 0.2 mL 1     ondansetron (ZOFRAN) 8 MG tablet Take 1 tablet (8 mg) by mouth every 8 hours as needed for nausea 30 tablet 3     oxyCODONE (ROXICODONE) 5 MG tablet Take 1 tablet (5 mg) by mouth every 6 hours as needed for pain 56 tablet 0     polyethylene glycol (MIRALAX) 17 g packet Take 1 packet by mouth daily       senna-docusate (SENOKOT-S/PERICOLACE) 8.6-50 MG tablet Take 1 tablet by mouth 2 times daily 60 tablet 0     Objective:  /59   Pulse 110   Temp 97.6  F (36.4  C) (Oral)   Resp 18   Wt 58.7 kg (129 lb 8 oz)   SpO2 96%   BMI 20.28 kg/m    GENERAL: Healthy, alert and no distress  EYES: Eyes grossly normal to inspection.  No discharge or erythema, or obvious scleral/conjunctival abnormalities.  HENT: Normal cephalic/atraumatic.  External ears, nose and mouth without ulcers or lesions.  No nasal drainage visible.  NECK: No asymmetry, visible masses or scars  RESP: No audible wheeze, cough, or visible cyanosis.  No visible retractions or increased work of breathing.    SKIN: Visible skin clear. No significant rash, abnormal pigmentation or lesions.  NEURO: Cranial nerves grossly intact.  Mentation and speech appropriate for age.  PSYCH: Mentation appears normal, affect normal/bright,  judgement and insight intact, normal speech and appearance well-groomed.      ASSESSMENT AND PLAN:  #Diffusely metastatic acral melanoma  #Brain metastasis  Mrs. Hoang is a 77 year old woman with wide spread metastatic melanoma with brain metastasis. She is status-post 5 cycles of Opdualag. She has completed whole brain radiation therapy. We have discussed that I am not recommending any additional treatment out of concern that it may impair quality of life. However, potential options include dacarbazine or temozolomide with response rates of about 18%. Can consider palliative chemotherapy and supportive cares or hospice. For now will continue with plan for IV fluids and Zometa tomorrow. She will consider whether to proceed with palliative chemo.  -Continue with palliative care visits  -RTC in about 1 month with SUSI to review     #Cancer pain, severe  #Back pain  #Leg pain.   Continue fentanyl 12 mcg/hr for basal pain relief and as needed oral oycodone    #Constipation, opioid induced  Has been in the ED for severe constipation, likely from opioid use. Continue senna twice a day and Miralax once to twice daily as a bowel regimen during opioid therapy. Magnesium citrate also provided for as needed use. Movantiq written for, but is pending insurance approval.    #Right lower extremity DVT  Secondary to tumor compression. Continue Eliquis 5mg bid anticoagulation lifelong.       #RLE lymphema.   Secondary to right groin tumors and new DVT. Anticoagulation as above. Continue with lymphedema therapy.     #Depression  #Anxiety   Continue follow up with palliative care social work.      #Anorexia and weight loss.  She had EGD on 9/12/22, which showed the entire examined stomach was normal appearing. Biopsies taken for h pylori. No evidence of candida esophagitis on EGD.     #Hypercalcemia  Calcium level elevated at 10.5 today. Stop vitamin D supplements.   We will start IV fluids and zometa to ensure this does not  worsen.      Again, thank you for allowing me to participate in the care of your patient.      Sincerely,    Gi Franklin MD

## 2022-10-14 NOTE — TELEPHONE ENCOUNTER
Central Prior Authorization Team   Phone: [phone]      PA Initiation    Medication: ONDANSETRON HCL 8MG TABS-PA initiated  Insurance Company: [employer] - Phone [phone] Fax [phone]  Pharmacy Filling the Rx: [facility] [address] [place] [address] [address] [facility]  Filling Pharmacy Phone: [phone]  Filling Pharmacy Fax:    Start Date: 10/14/2022

## 2022-10-14 NOTE — NURSING NOTE
"Oncology Rooming Note    October 14, 2022 2:58 PM   Yadira Hoang is a 77 year old female who presents for:    Chief Complaint   Patient presents with     Blood Draw     Vpt blood draw by lab RN. Vitals taken and appointment arrived     Oncology Clinic Visit     Secondary malignant neoplasm of bone      Initial Vitals: /59   Pulse 110   Temp 97.6  F (36.4  C) (Oral)   Resp 18   Wt 58.7 kg (129 lb 8 oz)   SpO2 96%   BMI 20.28 kg/m   Estimated body mass index is 20.28 kg/m  as calculated from the following:    Height as of 10/12/22: 1.702 m (5' 7\").    Weight as of this encounter: 58.7 kg (129 lb 8 oz). Body surface area is 1.67 meters squared.  Severe Pain (7) Comment: back and hips   No LMP recorded. Patient is postmenopausal.  Allergies reviewed: Yes  Medications reviewed: Yes    Medications: Medication refills not needed today.  Pharmacy name entered into [facility]:    [facility] [place], [place] - [address], [id number]  [facility] [address] [place] [address] [address] [facility]    Clinical concerns: none.       Art Rich"

## 2022-10-15 NOTE — PROGRESS NOTES
Infusion Nursing Note:  Yadira Hoang presents today for 2L IV fluids and Zometa.    Patient seen by provider today: No   present during visit today: Not Applicable.    Note: Patient was seen by Dr. Franklin last evening.  She reports no changes since that visit.  She continues to have back pain, but states she took some PO pain medication at home prior to coming in this morning.  She would like to lay down in a bed while in infusion today to help with this pain.  Patient placed in a room with a bed and helped to reposition until comfortable.  She offers no other new concerns at this time.    Zometa is new for patient today.  Writer verbally reviewed with patient and her spouse what this medication is, why it is being given, and potential side effects to look out for.  They verbalized understanding.    Intravenous Access:  Peripheral IV placed.    Treatment Conditions:  Lab Results   Component Value Date    HGB 8.2 (L) 10/14/2022    WBC 5.9 10/14/2022    ANEU 5.2 10/14/2022    ANEUTAUTO 5.4 09/15/2022     10/14/2022      Lab Results   Component Value Date     10/14/2022    POTASSIUM 3.9 10/14/2022    CR 0.52 10/14/2022    GABY 10.5 (H) 10/14/2022    BILITOTAL 0.4 10/14/2022    ALBUMIN 3.2 (L) 10/14/2022    ALT 15 10/14/2022     (H) 10/14/2022     Results reviewed, labs MET treatment parameters, ok to proceed with treatment.    Post Infusion Assessment:  Patient tolerated infusion without incident.  Blood return noted pre and post infusion.  Site patent and intact, free from redness, edema or discomfort.  No evidence of extravasations.  Access discontinued per protocol.     Discharge Plan:   Patient declined prescription refills.  Discharge instructions reviewed with: Patient and Family.  Patient and/or family verbalized understanding of discharge instructions and all questions answered.  AVS to patient via [employer].  Patient will go to [facility] Monday for a lab recheck.  Message  sent to scheduling to work on this as patient did not have appointment scheduled yet.  Patient discharged in stable condition accompanied by: .  Departure Mode: Ambulatory and w/walker.  Face to Face time: 5 minutes.      RACHELL NAGY RN

## 2022-10-17 NOTE — TELEPHONE ENCOUNTER
Signed forms received and e-mailed to [email or web address] - Forms placed in family file.    Fouzia Camargo CMA on 10/17/2022 at 8:00 AM

## 2022-10-17 NOTE — TELEPHONE ENCOUNTER
Prior Authorization Approval    Authorization Effective Date: 9/14/2022  Authorization Expiration Date: 10/14/2023  Medication: ONDANSETRON HCL 8MG TABS-PA approved  Approved Dose/Quantity:   Reference #:     Insurance Company: [employer] - Phone [phone] Fax [phone]  Expected CoPay:       CoPay Card Available:      Foundation Assistance Needed:    Which Pharmacy is filling the prescription (Not needed for infusion/clinic administered): [facility] [address] [place] [address] [address] [facility]  Pharmacy Notified: Yes  Patient Notified: No

## 2022-10-19 NOTE — NURSING NOTE
Patient declined individual allergy and medication review by support staff because meds were reviewed on 10/15 and pt states no changes.    Leonor Echols, VF

## 2022-10-19 NOTE — LETTER
10/19/2022         RE: Yadira Hoang  [address] [facility] [address]        Dear Colleague,    Thank you for referring your patient, Yadira Hoang, to the [facility]. Please see a copy of my visit note below.    Yadira is a 77 year old who is being evaluated via a billable video visit.  Currently in [place].    How would you like to obtain your AVS? MyChart  If the video visit is dropped, the invitation should be resent by: Send to e-mail at: [email or web address]  Will anyone else be joining your video visit? Yes, patients  and son.     DEDE Castellano      Palliative Care Outpatient Clinic Progress Note    Patient Name: Yadira Hoang is being evaluated via a billable video visit.    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 Metastatic to R groin LNs              - reconstructive surgery with skin graft March/April 2020              - starts nivolumab May 2020, continues despite development of small liver met in Aug 2020              - addition of ipilimumab Oct 2020-April 2021 for progression in liver, new T2, S1 mets. 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 Completed 5 cycles of opdualag, but scans showed progression again in Sept 2022, including two metastatic lesions in the brain.                           - currently undergoing WBRT              - R leg lymphedema 2/2 tumors,  "working with lymphedema therapy  - chronic pain syndrome affecting occiput, neck, joints   - h/o TBI  - ALEXUS, depression     Social  Yadira lives with her . One of her sons has moved back in together with his 3 year-old daughter. They have another son nearby and two daughters, each about 1h away.      Psychospiritual  Yadira feels very overwhelmed with her situation.   She is tearful when discussing her limited life expectancy (\"I don't want to die, but I'll probably die sooner rather than later\")     Care Planning   We discuss her treatment options and that while there are treatment options for her cancer, those might cause more problems than they convey benefits. Yadira and her family seem to have been aware of this prior to our conversation today.     When discussing what she is worried about, she shares that she doesn't feel ready to die. \"I want to live long, but know I won't\". She wants to go through papers and photos. A big limitation is her low energy.     We discuss frankly that with her time and energy being limited, it becomes very important to focus on using these resources in a meaningful way.     She knows that the offered treatments have a low chance of success and also that the definition of success is somewhat unclear in this case. They appreciate that no treatment will be able to achieve more than gaining her a limited amount of extra time.      She repeatedly states that she doesn't appreciate having frequent doctor/lab/clinic visits, requires much time to recover from them.     Her  expresses hope that she will continue to recover from the setback she experienced with the radiation treatments. He has also noticed improved overall wellbeing when her pain is better controlled. He shares his hope that she might be able to tolerate more treatment if this trend continues. I support that hope and also caution that most treatments will come at a cost in energy and wellbeing, as seen in how the " "radiation affected her.   In this context I do clarify though, that also if she does enroll in hospice, she can reconnect with Dr Franklin should her condition indeed improve dramatically. I caution them that this is not common.    I assure them that no matter what options she chooses, we will continue with diligent pain and symptom management as well as support for her. Also that hospice would provide expert symptom management should she decide to enroll with them.     At the end of today's visit we all agree that this is a very hard situation for her to be in without an obvious good solution. I encourage them to continue exploring what she is hoping for and to also take breaks from these conversations as needed.      Opioid Safety:  Prognosis shorter  Indication strong  No concerning behaviours/red flags    : reviewed, ok    Interim History:  At the last visit we discussed pain (continued oxycodone), constipation (counseling), treatment preferences (ongoing conversation)    Patient is on the call with her son Sulaiman  History gathered today from: patient, family/loved ones, medical chart    She has been feeling very week and has been shaky.     Her pain has improved with the fentanyl patch. She is still taking oxycodone prn, notices a day before it's time to replace the patch that the pain worsens.     Constipation is still an issue. She is taking miralax daily and senna 1-2/day with some improvement.      She describes that she mostly sleeps and sits. Since her pain has improved she has been able to eat a bit again.    Functional Status:  Palliative Performance Scale: 50      Impression & Recommendations & Counseliny/o woman with metastatic melanoma    Treatment Plan: Yadira is aware of her limited prognosis. She is reluctant to \"give up\" and seems to feel compelled to pursue any additional possible treatment option. At the same time she emphasizes the importance of having some energy and being able to engage " with her family and some legacy work.   - continue conversation with palliative SW tomorrow, her son Sulaiman will also attend that appointment  - hoping for earlier appointment with oncology to clarify the expectations around the possible treatments.   - referral to home health services for home safety check and recommendations in regards to helpful equipment    Pain: improved control with addition of fentanyl patch  - change patch to q48h  - continue oxycodone 5mg q6h prn    Return to clinic on 11/2 as scheduled    Data / Chart Review:    Review of Systems:   ROS: 10 point ROS neg other than the symptoms noted above in the HPI and pertinents here.         Physical Exam:   Physical Exam:  Vital Signs not checked, virtual visit    CONSTIT: awake, appears uncomfortable towards the end of our visit  EENT: MMM, EOMI, no icterus  RESP: reg, nl effort, no cough  SKIN: no rash, no obvious lesions  NEURO: alert, oriented x3  PSYCH: appropriate, sad affect, memory and thought process intact    The rest of a comprehensive physical examination is deferred  due to public health emergency video visit restrictions.      Current pertinent medications:  Fentanyl 12mcg/h  Oxycodone 5mg q6hprn   Naloxone ordered    mirtazapine      No pertinent allergies      Lab and imaging data reviewed:  Comments:       Video-Visit Details    Type of service:  Video Visit    Physician has received verbal consent for a Video Visit from the patient? Yes    Video Start Time: 1005    Video End Time (time video stopped): 1042      Originating Location (pt. Location): Home    Distant Location (provider location):  [facility]   Distant Location (provider location):  Off-site    Mode of Communication:  Video Conference via [facility]    Lorena Cole MD  Palliative Medicine  Pager [phone]        Again, thank you for allowing me to participate in the care of your patient.        Sincerely,        Lorena Cole,  MD

## 2022-10-19 NOTE — PROGRESS NOTES
Yadira is a 77 year old who is being evaluated via a billable video visit.  Currently in [place]    How would you like to obtain your AVS? MyChart  If the video visit is dropped, the invitation should be resent by: Send to e-mail at: [email or web address]  Will anyone else be joining your video visit? Yes, patients  and son.     Leonor Echols,       Palliative Care Outpatient Clinic Progress Note    Patient Name: Yadira Hoang is being evaluated via a billable video visit.    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 Metastatic to R groin LNs              - reconstructive surgery with skin graft March/April 2020              - starts nivolumab May 2020, continues despite development of small liver met in Aug 2020              - addition of ipilimumab Oct 2020-April 2021 for progression in liver, new T2, S1 mets. 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 Completed 5 cycles of opdualag, but scans showed progression again in Sept 2022, including two metastatic lesions in the brain.                           - currently undergoing WBRT              - R leg lymphedema 2/2 tumors, working with lymphedema therapy  - chronic pain syndrome affecting occiput, neck, joints   - h/o TBI  - ALEXUS, depression     Social  Yadira lives with her . One of her sons has moved back in together with his 3 year-old daughter. They have another son nearby and two  "daughters, each about 1h away.      Psychospiritual  Yadira feels very overwhelmed with her situation.   She is tearful when discussing her limited life expectancy (\"I don't want to die, but I'll probably die sooner rather than later\")     Care Planning   We discuss her treatment options and that while there are treatment options for her cancer, those might cause more problems than they convey benefits. Yadira and her family seem to have been aware of this prior to our conversation today.     When discussing what she is worried about, she shares that she doesn't feel ready to die. \"I want to live long, but know I won't\". She wants to go through papers and photos. A big limitation is her low energy.     We discuss frankly that with her time and energy being limited, it becomes very important to focus on using these resources in a meaningful way.     She knows that the offered treatments have a low chance of success and also that the definition of success is somewhat unclear in this case. They appreciate that no treatment will be able to achieve more than gaining her a limited amount of extra time.      She repeatedly states that she doesn't appreciate having frequent doctor/lab/clinic visits, requires much time to recover from them.     Her  expresses hope that she will continue to recover from the setback she experienced with the radiation treatments. He has also noticed improved overall wellbeing when her pain is better controlled. He shares his hope that she might be able to tolerate more treatment if this trend continues. I support that hope and also caution that most treatments will come at a cost in energy and wellbeing, as seen in how the radiation affected her.   In this context I do clarify though, that also if she does enroll in hospice, she can reconnect with Dr Franklin should her condition indeed improve dramatically. I caution them that this is not common.    I assure them that no matter what options " "she chooses, we will continue with diligent pain and symptom management as well as support for her. Also that hospice would provide expert symptom management should she decide to enroll with them.     At the end of today's visit we all agree that this is a very hard situation for her to be in without an obvious good solution. I encourage them to continue exploring what she is hoping for and to also take breaks from these conversations as needed.      Opioid Safety:  Prognosis shorter  Indication strong  No concerning behaviours/red flags    : reviewed, ok    Interim History:  At the last visit we discussed pain (continued oxycodone), constipation (counseling), treatment preferences (ongoing conversation)    Patient is on the call with her son Sulaiman  History gathered today from: patient, family/loved ones, medical chart    She has been feeling very week and has been shaky.     Her pain has improved with the fentanyl patch. She is still taking oxycodone prn, notices a day before it's time to replace the patch that the pain worsens.     Constipation is still an issue. She is taking miralax daily and senna 1-2/day with some improvement.      She describes that she mostly sleeps and sits. Since her pain has improved she has been able to eat a bit again.    Functional Status:  Palliative Performance Scale: 50      Impression & Recommendations & Counseliny/o woman with metastatic melanoma    Treatment Plan: Yadira is aware of her limited prognosis. She is reluctant to \"give up\" and seems to feel compelled to pursue any additional possible treatment option. At the same time she emphasizes the importance of having some energy and being able to engage with her family and some legacy work.   - continue conversation with palliative SW tomorrow, her son Sulaiman will also attend that appointment  - hoping for earlier appointment with oncology to clarify the expectations around the possible treatments.   - referral to home health " services for home safety check and recommendations in regards to helpful equipment    Pain: improved control with addition of fentanyl patch  - change patch to q48h  - continue oxycodone 5mg q6h prn    Return to clinic on 11/2 as scheduled    Data / Chart Review:    Review of Systems:   ROS: 10 point ROS neg other than the symptoms noted above in the HPI and pertinents here.         Physical Exam:   Physical Exam:  Vital Signs not checked, virtual visit    CONSTIT: awake, appears uncomfortable towards the end of our visit  EENT: MMM, EOMI, no icterus  RESP: reg, nl effort, no cough  SKIN: no rash, no obvious lesions  NEURO: alert, oriented x3  PSYCH: appropriate, sad affect, memory and thought process intact    The rest of a comprehensive physical examination is deferred  due to public health emergency video visit restrictions.      Current pertinent medications:  Fentanyl 12mcg/h  Oxycodone 5mg q6hprn   Naloxone ordered    mirtazapine      No pertinent allergies      Lab and imaging data reviewed:  Comments:       Video-Visit Details    Type of service:  Video Visit    Physician has received verbal consent for a Video Visit from the patient? Yes    Video Start Time: 1005    Video End Time (time video stopped): 1042      Originating Location (pt. Location): Home    Distant Location (provider location):  [facility]   Distant Location (provider location):  Off-site    Mode of Communication:  Video Conference via Opal Cole MD  Palliative Medicine  Pager [phone]

## 2022-10-20 NOTE — LETTER
10/20/2022         RE: Yadira Hoang  [address] [facility] [address]        Dear Colleague,    Thank you for referring your patient, Yadira Hoang, to the [facility] [place]. Please see a copy of my visit note below.    Telemedicine Visit: The patient's condition can be safely assessed and treated via synchronous audio and visual telemedicine encounter.      Reason for Telemedicine Visit: [email or web address]     Originating Site (Patient Location): Patient's home    Distant Site (Provider Location): [facility]: palliative care clinic     Consent:  The patient/guardian has verbally consented to: the potential risks and benefits of telemedicine (video visit) versus in person care; bill my insurance or make self-payment for services provided; and responsibility for payment of non-covered services.     Mode of Communication:  Video Conference via [employer]    As the provider I attest to compliance with applicable laws and regulations related to telemedicine.    Palliative Care Clinical Social Work Return Appointment    PLEASE NOTE:  THIS IS A MENTAL HEALTH NOTE.  OTHER PROVIDERS VIEWING THIS NOTE SHOULD USE THIS ONLY FOR UNDERSTANDING THE CONTEXT OF THE PATIENT'S EXPERIENCE.  TOPICS DESCRIBED IN THIS NOTE SHOULD NOT BE REFERENCED TO THE PATIENT BY MEDICAL PROVIDERS.    Yadira Hoang is a 77 year old woman with diagnosis of stage IV acral melanoma, seen today via [email or web address] video for a return psychotherapy appointment to address adjustment related distress and depression as it relates to coping with illness and treatment.  Yadira attended today's visit with her  Sulaiman and son Ghazala     Mental Status Exam:(List all that apply)      Appearance: Appropriate      Eye Contact: Good       Orientation: Yes, x4      Mood: Normal      Affect: Appropriate      Thought Content: Clear         Thought Form: Logical      Psychomotor Behavior: Normal    Visit themes:      Adjustment to  "Illness:Has finished whole brain radiation and is struggling with the side effects including memory loss and difficulty concentrating.  Still has pain, now with new pain in jaw.  Goals of care conversations have been ongoing with oncology and palliative care.  Yadira shares understanding that \"the doctor isn't too keen on doing more chemotherapy\".  Her family affirms understanding that the success rate for the remaining treatment options is low, and the likelihood of side effects is high.     SHe continues to struggle with her prognosis and uncertainty \"it's so hard when you don't know how much time is left\".      Informational visit with hospice is scheduled for next week.     Yadira is clear that most important thing at this time is quality time with family however, she is struggling with the idea of enrolling in hospice     Mental Health (thoughts, feelings, actions, coping, and symptoms): existential distress      Helpful activities: listening to [employer] music, got to go to pumpkin patch with family this past weekend     Helpful cognitions:     Unhelpful and/or triggering or exacerbating factors:     Body-Mind Skills Education:     Relationships: remain supportive, has good support from her children    End of Life and Advance Care Planning:     Main therapeutic interventions provided this session include:   Provide psychoeducation, Facilitate processing of thoughts and feelings and Facilitate structured problem solving,    Plan:  Will return for psychotherapy with palliative care focus in 1 month    Time spent with patient/family:  49 minutes minutes (Start 12:30, end 1:19)    Palliative Care Counseling Treatment Plan    Client's Name: Yadira Hoang  YOB: 1945    Date: June 17, 2022    Initial DSM5 Diagnoses:   Adjustment Disorders  309.28 (F43.23) With mixed anxiety and depressed mood      Date to review plan (90 days usually): 8/21/19    Referral / Collaboration:  .Referral to another " professional/service is not indicated at this time.    Anticipated number of sessions to complete episode of care:  10         Treatment Goal(s)  Date Goal Dates  Reviewed Status   5/23/2019   Goal 1:  Client will develop effective strategies for depressed mood.      New   5/23/2019   Objective #1A (Client Action)  Patient will Identify activities that provide comfort and/or pleasure.    Intervention(s)  Therapist will Provide psychoeducation and Facilitate processing of thoughts and feelings .    New   5/23/2019   Objective #1B  Patient will Effectively communicate needs to others.    Intervention(s)  Therapist will Provide psychoeducation, Facilitate processing of thoughts and feelings and Facilitate structured problem solving.    New   5/23/2019   Objective #1C  Patient will Identify an activity that would provide meaning/contribute to feeling of self worth.    Intervention(s)  Therapist will Provide psychoeducation, Facilitate processing of thoughts and feelings and Facilitate structured problem solving.    New       Date Goal Dates  Reviewed Status   5/23/2019   Goal 2:  Client will demonstrate effective management of grief / loss.    New   5/23/2019   Objective #2A (Client Action)  Patient will Increase understanding of loss and grief.    Intervention(s) (Therapist Action)  Therapist will Provide psychoeducation.    New   5/23/2019   Objective #2B  Patient will Identify losses related to illness.    Intervention(s)  Therapist will Provide psychoeducation and Facilitate processing of thoughts and feelings.    New   5/23/2019     Objective #2C  Patient will Develop strategies for coping with grief/loss.    Intervention(s)  Therapist will Provide psychoeducation.    New       Client has contributed regarding goals and concerns, but has not reviewed the treatment plan. Plan to review at future session.    JORGE Noriega, [facility]  Palliative Care Clinical        DO NOT SEND ANY  LETTERS          Again, thank you for allowing me to participate in the care of your patient.        Sincerely,        CHETAN NoriegaSW

## 2022-10-20 NOTE — TELEPHONE ENCOUNTER
Medication:Ondansetron  Last prescribing provider:Meghan Perdomo  Last clinic visit date: 10/20/22 Dr. Carter  Recommendations for requested medication:nausea  Any other pertinent information:routed to oncology and palliative care

## 2022-10-20 NOTE — PROGRESS NOTES
Telemedicine Visit: The patient's condition can be safely assessed and treated via synchronous audio and visual telemedicine encounter.      Reason for Telemedicine Visit: covid19     Originating Site (Patient Location): Patient's home    Distant Site (Provider Location): [facility]: palliative care clinic     Consent:  The patient/guardian has verbally consented to: the potential risks and benefits of telemedicine (video visit) versus in person care; bill my insurance or make self-payment for services provided; and responsibility for payment of non-covered services.     Mode of Communication:  Video Conference via [employer]    As the provider I attest to compliance with applicable laws and regulations related to telemedicine.    Palliative Care Clinical Social Work Return Appointment    PLEASE NOTE:  THIS IS A MENTAL HEALTH NOTE.  OTHER PROVIDERS VIEWING THIS NOTE SHOULD USE THIS ONLY FOR UNDERSTANDING THE CONTEXT OF THE PATIENT'S EXPERIENCE.  TOPICS DESCRIBED IN THIS NOTE SHOULD NOT BE REFERENCED TO THE PATIENT BY MEDICAL PROVIDERS.    Yadira Hoang is a 77 year old woman with diagnosis of stage IV acral melanoma, seen today via [employer] video for a return psychotherapy appointment to address adjustment related distress and depression as it relates to coping with illness and treatment.  Yadira attended today's visit with her  Sulaiman and son Ghazala     Mental Status Exam:(List all that apply)      Appearance: Appropriate      Eye Contact: Good       Orientation: Yes, x4      Mood: Normal      Affect: Appropriate      Thought Content: Clear         Thought Form: Logical      Psychomotor Behavior: Normal    Visit themes:      Adjustment to Illness:Has finished whole brain radiation and is struggling with the side effects including memory loss and difficulty concentrating.  Still has pain, now with new pain in jaw.  Goals of care conversations have been ongoing with oncology and palliative care.  Yadira  "shares understanding that \"the doctor isn't too keen on doing more chemotherapy\".  Her family affirms understanding that the success rate for the remaining treatment options is low, and the likelihood of side effects is high.     SHe continues to struggle with her prognosis and uncertainty \"it's so hard when you don't know how much time is left\".      Informational visit with hospice is scheduled for next week.     Yadira is clear that most important thing at this time is quality time with family however, she is struggling with the idea of enrolling in hospice     Mental Health (thoughts, feelings, actions, coping, and symptoms): existential distress      Helpful activities: listening to Narrative music, got to go to pumpkin patch with family this past weekend     Helpful cognitions:     Unhelpful and/or triggering or exacerbating factors:     Body-Mind Skills Education:     Relationships: remain supportive, has good support from her children    End of Life and Advance Care Planning:     Main therapeutic interventions provided this session include:   Provide psychoeducation, Facilitate processing of thoughts and feelings and Facilitate structured problem solving,    Plan:  Will return for psychotherapy with palliative care focus in 1 month    Time spent with patient/family:  49 minutes minutes (Start 12:30, end 1:19)    Palliative Care Counseling Treatment Plan    Client's Name: Yadira Hoang  YOB: 1945    Date: June 17, 2022    Initial DSM5 Diagnoses:   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    Anticipated number of sessions to complete episode of care:  10         Treatment Goal(s)  Date Goal Dates  Reviewed Status   5/23/2019   Goal 1:  Client will develop effective strategies for depressed mood.      New "   5/23/2019   Objective #1A (Client Action)  Patient will Identify activities that provide comfort and/or pleasure.    Intervention(s)  Therapist will Provide psychoeducation and Facilitate processing of thoughts and feelings .    New   5/23/2019   Objective #1B  Patient will Effectively communicate needs to others.    Intervention(s)  Therapist will Provide psychoeducation, Facilitate processing of thoughts and feelings and Facilitate structured problem solving.    New   5/23/2019   Objective #1C  Patient will Identify an activity that would provide meaning/contribute to feeling of self worth.    Intervention(s)  Therapist will Provide psychoeducation, Facilitate processing of thoughts and feelings and Facilitate structured problem solving.    New       Date Goal Dates  Reviewed Status   5/23/2019   Goal 2:  Client will demonstrate effective management of grief / loss.    New   5/23/2019   Objective #2A (Client Action)  Patient will Increase understanding of loss and grief.    Intervention(s) (Therapist Action)  Therapist will Provide psychoeducation.    New   5/23/2019   Objective #2B  Patient will Identify losses related to illness.    Intervention(s)  Therapist will Provide psychoeducation and Facilitate processing of thoughts and feelings.    New   5/23/2019     Objective #2C  Patient will Develop strategies for coping with grief/loss.    Intervention(s)  Therapist will Provide psychoeducation.    New       Client has contributed regarding goals and concerns, but has not reviewed the treatment plan. Plan to review at future session.    JORGE Noriega, [facility]  Palliative Care Clinical        DO NOT SEND ANY LETTERS

## 2022-10-20 NOTE — LETTER
10/20/2022         RE: Yadira Hoang  [address] [facility] [address]        Dear Colleague,    Thank you for referring your patient, Yadira Hoang, to the [facility] [place]. Please see a copy of my visit note below.    Telemedicine Visit: The patient's condition can be safely assessed and treated via synchronous audio and visual telemedicine encounter.      Reason for Telemedicine Visit: covid19     Originating Site (Patient Location): Patient's home    Distant Site (Provider Location): [facility]: palliative care clinic     Consent:  The patient/guardian has verbally consented to: the potential risks and benefits of telemedicine (video visit) versus in person care; bill my insurance or make self-payment for services provided; and responsibility for payment of non-covered services.     Mode of Communication:  Video Conference via [employer]    As the provider I attest to compliance with applicable laws and regulations related to telemedicine.    Palliative Care Clinical Social Work Return Appointment    PLEASE NOTE:  THIS IS A MENTAL HEALTH NOTE.  OTHER PROVIDERS VIEWING THIS NOTE SHOULD USE THIS ONLY FOR UNDERSTANDING THE CONTEXT OF THE PATIENT'S EXPERIENCE.  TOPICS DESCRIBED IN THIS NOTE SHOULD NOT BE REFERENCED TO THE PATIENT BY MEDICAL PROVIDERS.    Yadira Hoang is a 77 year old woman with diagnosis of stage IV acral melanoma, seen today via [employer] video for a return psychotherapy appointment to address adjustment related distress and depression as it relates to coping with illness and treatment.  Yadira attended today's visit with her  Sulaiman and son Ghazala     Mental Status Exam:(List all that apply)      Appearance: Appropriate      Eye Contact: Good       Orientation: Yes, x4      Mood: Normal      Affect: Appropriate      Thought Content: Clear         Thought Form: Logical      Psychomotor Behavior: Normal    Visit themes:      Adjustment to  "Illness:Has finished whole brain radiation and is struggling with the side effects including memory loss and difficulty concentrating.  Still has pain, now with new pain in jaw.  Goals of care conversations have been ongoing with oncology and palliative care.  Yadira shares understanding that \"the doctor isn't too keen on doing more chemotherapy\".  Her family affirms understanding that the success rate for the remaining treatment options is low, and the likelihood of side effects is high.     SHe continues to struggle with her prognosis and uncertainty \"it's so hard when you don't know how much time is left\".      Informational visit with hospice is scheduled for next week.     Yadira is clear that most important thing at this time is quality time with family however, she is struggling with the idea of enrolling in hospice     Mental Health (thoughts, feelings, actions, coping, and symptoms): existential distress      Helpful activities: listening to [employer] music, got to go to pumpkin patch with family this past weekend     Helpful cognitions:     Unhelpful and/or triggering or exacerbating factors:     Body-Mind Skills Education:     Relationships: remain supportive, has good support from her children    End of Life and Advance Care Planning:     Main therapeutic interventions provided this session include:   Provide psychoeducation, Facilitate processing of thoughts and feelings and Facilitate structured problem solving,    Plan:  Will return for psychotherapy with palliative care focus in 1 month    Time spent with patient/family:  49 minutes minutes (Start 12:30, end 1:19)    Palliative Care Counseling Treatment Plan    Client's Name: Yadira Hoang  YOB: 1945    Date: June 17, 2022    Initial DSM5 Diagnoses:   Adjustment Disorders  309.28 (F43.23) With mixed anxiety and depressed mood      Date to review plan (90 days usually): 8/21/19    Referral / Collaboration:  .Referral to another " professional/service is not indicated at this time.    Anticipated number of sessions to complete episode of care:  10         Treatment Goal(s)  Date Goal Dates  Reviewed Status   5/23/2019   Goal 1:  Client will develop effective strategies for depressed mood.      New   5/23/2019   Objective #1A (Client Action)  Patient will Identify activities that provide comfort and/or pleasure.    Intervention(s)  Therapist will Provide psychoeducation and Facilitate processing of thoughts and feelings .    New   5/23/2019   Objective #1B  Patient will Effectively communicate needs to others.    Intervention(s)  Therapist will Provide psychoeducation, Facilitate processing of thoughts and feelings and Facilitate structured problem solving.    New   5/23/2019   Objective #1C  Patient will Identify an activity that would provide meaning/contribute to feeling of self worth.    Intervention(s)  Therapist will Provide psychoeducation, Facilitate processing of thoughts and feelings and Facilitate structured problem solving.    New       Date Goal Dates  Reviewed Status   5/23/2019   Goal 2:  Client will demonstrate effective management of grief / loss.    New   5/23/2019   Objective #2A (Client Action)  Patient will Increase understanding of loss and grief.    Intervention(s) (Therapist Action)  Therapist will Provide psychoeducation.    New   5/23/2019   Objective #2B  Patient will Identify losses related to illness.    Intervention(s)  Therapist will Provide psychoeducation and Facilitate processing of thoughts and feelings.    New   5/23/2019     Objective #2C  Patient will Develop strategies for coping with grief/loss.    Intervention(s)  Therapist will Provide psychoeducation.    New       Client has contributed regarding goals and concerns, but has not reviewed the treatment plan. Plan to review at future session.    JORGE Noriega, [facility]  Palliative Care Clinical        DO NOT SEND ANY  LETTERS          Again, thank you for allowing me to participate in the care of your patient.        Sincerely,        CHETAN NoriegaSW

## 2022-10-21 NOTE — TELEPHONE ENCOUNTER
This was originally closed by insurance due to conflicting prescriber info. I spoke to Maximiliano at [facility] and reinitiated this via phone. This has been approved. He will fax approval letter. I will update encounter when received. Pharmacy has been notified.

## 2022-10-21 NOTE — TELEPHONE ENCOUNTER
Prior Authorization Retail Medication Request    Medication/Dose: FENTANYL 12MCG/HR 72 HR PATCHES   ICD code (if different than what is on RX):    Previously Tried and Failed:    Rationale:  Pain    Insurance Name:  [facility]  Insurance ID:  [id number]      Pharmacy Information (if different than what is on RX)  Name:  [facility]   Phone:  [phone]

## 2022-10-21 NOTE — TELEPHONE ENCOUNTER
Central Prior Authorization Team   Phone: [phone]      PA Initiation    Medication: FENTANYL 12MCG/HR 72 HR PATCHES -PA initiated  Insurance Company: [employer] - Phone [phone] Fax [phone]  Pharmacy Filling the Rx: [facility] [address] [place] [address] [address] [facility]  Filling Pharmacy Phone: [phone]  Filling Pharmacy Fax:    Start Date: 10/21/2022

## 2022-10-24 NOTE — TELEPHONE ENCOUNTER
Prior Authorization Approval    Authorization Effective Date: 9/21/2022  Authorization Expiration Date: 4/19/2023  Medication: FENTANYL 12MCG/HR 72 HR PATCHES -PA approved  Approved Dose/Quantity:   Reference #:     Insurance Company: [employer] - Phone [phone] Fax [phone]  Expected CoPay:       CoPay Card Available:      Foundation Assistance Needed:    Which Pharmacy is filling the prescription (Not needed for infusion/clinic administered): [facility] [address] [place] [address] [address] [facility]  Pharmacy Notified: Yes  Patient Notified: No

## 2022-10-24 NOTE — PROGRESS NOTES
AUDIOLOGY REPORT    BACKGROUND INFORMATION: Yadira Hoang was seen in the Audiology Clinic  At [facility] on 10/24/2022 for follow-up.  The patient has been seen previously in this clinic on 5/09/2022 and results revealed a mild to moderate sensorineural hearing loss bilaterally.  The patient was fit with Phonak Audeo P70-RL hearing aids on 8/15/2022.  The patient reports that her hearing aids don't seem to be working.    TEST RESULTS AND PROCEDURES: An electroacoustic hearing aid check was performed. The hearing aid were dead and not charging in the .  Adjustments were made including resetting the  and the hearing aid began charging. The patient will charge them for a few hours before wearing them and let us know if problems persist.     SUMMARY AND RECOMMENDATIONS: A hearing aid check was performed today and the patient will let us know if she has any more charging issues.  Adjustments were made as noted above and the patient will return as needed or at least every 4-6 months for cleaning and assessment of hearing aid.  Call this clinic with questions regarding today s visit.       Richie Davila.  Doctor of Audiology  [place] License # [id number]

## 2022-10-25 NOTE — TELEPHONE ENCOUNTER
Received call from pt reporting unbearable pain in her mouth and left jaw and teeth on the left side of her mouth. It is difficult to get accurate details and questions answered when talking to her, although she said PRN oxycodone is not helping. She finds ice helpful when she applies it, but  Pain returns as soon as she takes it off. Reviewed Dr. Cole's note from last week. There was no mention of this pain.     Spoke with Dr. Cole and confirmed this. She advised pt be evaluated by her dentist to determine if this pain is due to abscess or some other cause.    Return call placed to pt's dtr. She left a message with oncology asking if this pain could be caused from the zometa infusion she recently had. Discussed recommendation for contacting pt's dentist for evaluation. She verbalized understanding and will help pt get an appt.     GRAHAM ValenciaN, RN  Palliative Care Nurse Clinician    [phone] (Direct)  [phone] (Main)  [phone] (Appointment Scheduling)

## 2022-11-01 NOTE — PROGRESS NOTES
MEDICAL ONCOLOGY PROGRESS NOTE  [facility]  Oct 14, 2022     CHIEF COMPLAINT: Metastatic acral melanoma    Melanoma History:  1. She noted a painful lesion on the sole of the right foot for a few months, since last summer.  It initially was small then became raised.  It spontaneously bled. She is not entirely sure of its appearance initially.  She denies noting any masses behind the knee or in the groin.  2. 1/9/2020, punch biopsy was performed by Dr Jessica Meadows. Patology showed melanoma, at least 3.4 mm deep with ulceration and 0 mitoses, and perineural invasion present. TILs were non-brisk and LVI not identified. pT3b.  3. 2/10/2020, she has right femoral sentinel lymph node biopsy and wide local excision (Specimen #: [id number]) and the right heel lesion extends to a depth of 6.6 mm, and invasive melanoma is present at 0.2 mm from the deep margin. Microsatellites are also present. There was 1 (of 2) lymph nodes involved. The lymph node tissue exhibits extensive subcapsular and parenchymal clusters of melanoma cells, highlighted on Melan-A immunostain. The largest cluster is 7 millimeters in greatest diameter. pT4b pN2c. PD-L1 staining is negative, <1%. No mutation in BRAF, KIT, or NRAS.  4. 2/22/2020, she had PET-CT, which showed intense FDG uptake in a right inguinal lymph node, concerning for an additional focus of metastatic disease. There is also an indeterminate right external iliac lymph node with mild FDG uptake.  5. 3/4/2020, she has medial plantar artery  fasciocutaneous instep flap and Integra placement to the donor site. On 4/2/2020, she has additional reconstruction with skin grafting.  6. 5/22/2020, PET-CT shows a hypermetabolic right inguinal and right external iliac chain lymph node and stable pulmonary nodules.  7. 5/27/2020, she starts nivolumab for presumed low volume lymph node predominant metastatic disease.  8. 8/21/2020, PET-CT shows increase in hypermetabolic adenopathy,  and a hypermetabolic nodule in segment 2 of the liver. Given low volume of disease she prefers to continue therapy with short interval follow-up.  9. 10/9/2020, PET-CT shows increasing size of hepatic metastases with increased hypermetabolism, increased right inguinal and iliac lymphadenopathy, and new FDG uptake right T2 transverse process and right side of S1. MRI-brain obtained 10/21/2020 is negative for brain metastasis.  10. 10/28/2020, she starts ipilimumab 1mg/kg and nivolumab 3mg/kg. She then goes on to maintenance nivolumab through 4/12/21.  11. 5/10/2021, she starts ipilimumab 3 mg/kg monotherapy q3w for 4 cycles.  12. 7/30/21, PET/CT shows mild disease progression.   13. 8/2/21, start ipilimumab 3mg/kg and nivolumab 1mg/kg and received 2 cycles, last on 8/23/21.  14. 8/23/21, develops elevated LFT's.   15. 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   16. 1/28/22, started TVEC  17. 4/22/22, progressive disease, consideration for additional therapeutic approaches.  18. 5/11/22, Start Opdualag  19. 7/29/22: PET-CT shows new diffuse bony metastatic bony disease, increased size of liver mets and increased size of lymph node conglomerates throughout pelvis and abdomen. DVT in right external iliac vein, common femoral vein and femoral vein also found. Started on Eliquis.  20. 9/15/22, PET-CT shows increase in size/metabolism of the large right pelvic/right upper thigh mass, new metastatic focus medial to the thigh, a right parietal lobe metastatic brain lesion, two foci of FDG uptake within the scalp, likely metastatic, new metabolism of the right upper lobe nodularities and increased metabolism of the right lower lobe 7 mm pulmonary nodule, multiple metastatic liver lesions, some of them are new and some are increased in size, new metastatic lesions within the spleen, and progressive metastatic bone disease. MRI-brain shows two brain metastases in the right cerebral hemisphere with mild  surrounding vasogenic edema.   21. 9/27/22, she starts whole brain radiation therapy.    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 She presents today via video follow up with her  and daughter.    She has completed whole brain radiation therapy.    She started the base of Fentanyl 12 mcg/hr and has had pain relief with intermittent prn as needed oxycodone. She has been taking senna twice a day and Miralax and has regular bowel movements. She visited with palliative care on 10/5.      Current Outpatient Medications   Medication Sig Dispense Refill     apixaban ANTICOAGULANT (ELIQUIS) 5 MG tablet Take 1 tablet (5 mg) by mouth 2 times daily 60 tablet 11     carboxymethylcellulose PF (REFRESH LIQUIGEL) 1 % ophthalmic gel 1 drop 3 times daily       cholecalciferol (VITAMIN D3) 125 MCG (5000 UT) TABS tablet Take 125 mcg by mouth daily       dexamethasone (DECADRON) 2 MG tablet Take 1 tablet (2 mg) by mouth daily for 14 days (Patient taking differently: Take 2 mg by mouth every other day For 5 days (started 10/14/22)) 14 tablet 1     dicyclomine (BENTYL) 10 MG capsule Take 1 capsule (10 mg) by mouth 4 times daily (before meals and nightly) (Patient not taking: No sig reported) 120 capsule 1     docusate sodium (COLACE) 50 MG capsule Take 50 mg by mouth daily as needed for constipation       estradiol (ESTRACE) 0.1 MG/GM vaginal cream Place 2 g vaginally twice a week 42.5 g 1     famotidine (PEPCID) 20 MG tablet Take 1 tablet (20 mg) by mouth 2 times daily as needed (acid reflux/heartburn) (Patient not taking: No sig reported) 60 tablet 1     fentaNYL (DURAGESIC) 12 mcg/hr 72 hr patch Place 1 patch onto the skin every 48 hours remove old patch. 15 patch 0     fish oil-omega-3 fatty acids 1000 MG capsule Take 2 g by mouth daily       mirtazapine (REMERON) 15 MG tablet Take 1 tablet (15 mg) by mouth At Bedtime (Patient not  taking: No sig reported) 30 tablet 1     multivitamin w/minerals (THERA-VIT-M) tablet Take 1 tablet by mouth daily       naloxegol (MOVANTIK) 25 MG TABS tablet Take 1 tablet (25 mg) by mouth every morning (before breakfast) (Patient not taking: No sig reported) 30 tablet 1     naloxone (NARCAN) 4 MG/0.1ML nasal spray Spray 1 spray (4 mg) into one nostril alternating nostrils as needed for opioid reversal every 2-3 minutes until assistance arrives 0.2 mL 1     ondansetron (ZOFRAN) 8 MG tablet Take 1 tablet (8 mg) by mouth every 8 hours as needed for nausea 30 tablet 3     oxyCODONE (ROXICODONE) 5 MG tablet Take 1 tablet (5 mg) by mouth every 6 hours as needed for pain 56 tablet 0     polyethylene glycol (MIRALAX) 17 g packet Take 1 packet by mouth daily       senna-docusate (SENOKOT-S/PERICOLACE) 8.6-50 MG tablet Take 1 tablet by mouth 2 times daily 60 tablet 0     Objective:  /59   Pulse 110   Temp 97.6  F (36.4  C) (Oral)   Resp 18   Wt 58.7 kg (129 lb 8 oz)   SpO2 96%   BMI 20.28 kg/m    GENERAL: Healthy, alert and no distress  EYES: Eyes grossly normal to inspection.  No discharge or erythema, or obvious scleral/conjunctival abnormalities.  HENT: Normal cephalic/atraumatic.  External ears, nose and mouth without ulcers or lesions.  No nasal drainage visible.  NECK: No asymmetry, visible masses or scars  RESP: No audible wheeze, cough, or visible cyanosis.  No visible retractions or increased work of breathing.    SKIN: Visible skin clear. No significant rash, abnormal pigmentation or lesions.  NEURO: Cranial nerves grossly intact.  Mentation and speech appropriate for age.  PSYCH: Mentation appears normal, affect normal/bright, judgement and insight intact, normal speech and appearance well-groomed.      ASSESSMENT AND PLAN:  #Diffusely metastatic acral melanoma  #Brain metastasis  Mrs. Hoang is a 77 year old woman with wide spread metastatic melanoma with brain metastasis. She is status-post 5  cycles of Opdualag. She has completed whole brain radiation therapy. We have discussed that I am not recommending any additional treatment out of concern that it may impair quality of life. However, potential options include dacarbazine or temozolomide with response rates of about 18%. Can consider palliative chemotherapy and supportive cares or hospice. For now will continue with plan for IV fluids and Zometa tomorrow. She will consider whether to proceed with palliative chemo.  -Continue with palliative care visits  -RTC in about 1 month with SUSI to review     #Cancer pain, severe  #Back pain  #Leg pain.   Continue fentanyl 12 mcg/hr for basal pain relief and as needed oral oycodone    #Constipation, opioid induced  Has been in the ED for severe constipation, likely from opioid use. Continue senna twice a day and Miralax once to twice daily as a bowel regimen during opioid therapy. Magnesium citrate also provided for as needed use. Movantiq written for, but is pending insurance approval.    #Right lower extremity DVT  Secondary to tumor compression. Continue Eliquis 5mg bid anticoagulation lifelong.       #RLE lymphema.   Secondary to right groin tumors and new DVT. Anticoagulation as above. Continue with lymphedema therapy.     #Depression  #Anxiety   Continue follow up with palliative care social work.      #Anorexia and weight loss.  She had EGD on 9/12/22, which showed the entire examined stomach was normal appearing. Biopsies taken for h pylori. No evidence of candida esophagitis on EGD.     #Hypercalcemia  Calcium level elevated at 10.5 today. Stop vitamin D supplements.   We will start IV fluids and zometa to ensure this does not worsen.    Gi Cartagena M.D.   of Medicine  Hematology, Oncology and Transplantation  Pager: [phone]

## 2022-11-10 NOTE — PROGRESS NOTES
Radiotherapy Treatment Summary              PATIENT: Yadira Hoang  MEDICAL RECORD NO: [id number]   : 1945    DIAGNOSIS / ID: Ms. Hoang is a 77 year old female with widely metastatic melanoma to bone, liver, distant lymph nodes now presenting with brain metastasis.MRI reveals 2 enhancing lesions, 1 in the right temporoparietal lobe measuring 1.7 cm in size and the other 1 the right lingular gyrus measuring 6 mm in size, with associated mild vasogenic edema.  In general, we discussed the management of brain metastasis.  In general, patients with limited intracranial disease, good performance status as well as a well-controlled extracranial disease, recommendation is for gamma knife radiosurgery.  However, in this particular situation given patient's poor performance status uncontrolled/progressive extracranial disease, and overall life expectancy, recommend against gamma knife radiosurgery. After extent discussion with patient and she wishes to pursue whole brain radiation with 30 Gy/10 fractions.     INTENT OF RADIOTHERAPY: palliative     CONCURRENT SYSTEMIC THERAPY: No             SITE OF TREATMENT: whole brain    DATES  OF TREATMENT: 22- 10/10/22    TOTAL DOSE OF TREATMENT / FRACTIONS: 30Gy/10fx                                         FOLLOW UP PLAN:  1. Follow up with Radiation Oncology on as needed basis  2. Follow up with Dr. Franklin for systemic therapy     CC  Patient Care Team:  Erika Blanton MD PhD as PCP - General (Internal Medicine)  Jessica Meadows DPM as Referring Physician  Gi Kowalski MD as MD (Hematology & Oncology)  Meghan Perdomo PA-C as Physician Assistant (Physician Assistant)  Erika Blanton MD PhD as Assigned PCP  Gloria Estrada MD as Assigned Surgical Provider  Maxi Mullen MD as Assigned Musculoskeletal Provider  Daniela Guardado RN as Specialty Care Coordinator (Hematology & Oncology)  Lorena Cole MD as Assigned Palliative  Care Provider  Albina Lockett, PhD LP as Assigned Behavioral Health Provider  Yvon Rivera MD as MD (Neurology)  Radha Fish PA-C as Physician Assistant (Gastroenterology)  Yvon Rivera MD as Assigned Neuroscience Provider  Stacey Henriquez [facility] as Pharmacist (Pharmacist Ambulatory Care)  Jose Antonio Munoz MD as MD (Dermatology)  Al Singh MD as MD (Radiation Oncology)  Stacey Henriquez [facility] as Assigned [facility] Pharmacist  Gi Kowalski MD as Assigned Cancer Care Provider       Al Singh M.D.  Department of Radiation Oncology  [facility]

## 2022-11-18 NOTE — TELEPHONE ENCOUNTER
Left voicemail for patient to call us back to schedule Dr. Cole follow up     10/31 Patient said she would call us back to reschedule    Jumana Lopez  Procedure   [phone]

## 2023-05-02 NOTE — PROGRESS NOTES
Yadira is a 76 year old who is being evaluated via a billable video visit.      How would you like to obtain your AVS? [employer]  If the video visit is dropped, the invitation should be resent by: Text to cell phone: [phone]  Will anyone else be joining your video visit? No    Video-Visit Details    Type of service:  Video Visit    Video Start Time: 4:21 PM    Video End Time:4:43 PM    Originating Location (pt. Location): Home    Distant Location (provider location):  [facility]     Platform used for Video Visit: [facility]    Oncology/Hematology Visit Note  Oct 8, 2021   Oncologist: Dr. Gi Franklin     Reason for Visit: Follow up of stage IV melanoma    History of Present Illness: Yadira Hoang is a 76 year old female who presents for telephone call to follow up on her acral melanoma, stage IV, per oncologic history below:     Oncologic History: Acral melanoma, stage IV, s/p primary resection and reconstruction  1. She noted a painful lesion on the sole of the right foot for a few months, since summer 2019.  It initially was small then became raised.  It spontaneously bled. She is not entirely sure of its appearance initially.  She denies noting any masses behind the knee or in the groin.  2. 1/9/2020, punch biopsy was performed by Dr Jessica Alvarado.  It showed melanoma, at least 3.4 mm deep with ulceration and 0 mitoses, and perineural invasion present. TILs were non-brisk and LVI not identified. pT3b.  3. 2/10/2020, she has right femoral sentinel lymph node biopsy and wide local excision (Specimen #: [id number]) and the right heel lesion extends to a depth of 6.6 mm, and invasive melanoma is present at 0.2 mm from the deep margin. Microsatellites are also present. There was 1 (of 2) lymph nodes involved. The lymph node tissue exhibits extensive subcapsular and parenchymal clusters of melanoma cells, highlighted on Melan-A immunostain. The largest cluster is 7 millimeters in  greatest diameter. pT4b pN2c.  4. 2/22/2020, she had PET-CT, which showed intense FDG uptake in a right inguinal lymph node, concerning for an additional focus of metastatic disease. There is also an indeterminate right external iliac lymph node with mild FDG uptake.  5. 3/4/2020, she has medial plantar artery  fasciocutaneous instep flap and Integra placement to the donor site. On 4/2/2020, she has additional reconstruction with skin grafting.  6. 5/27/2020, initiated on nivolumab immunotherapy.  Had C2 6/24/20. C3 on 7/22/20. C4 on 9/3/2020.  7. 8/21/2020 evidence of a new liver metastasis on PET-CT.  9. 10/9/2020, PET-CT shows increasing size of hepatic metastases with increased hypermetabolism, increased right inguinal and iliac lymphadenopathy, and new FDG uptake right T2 transverse process and right side of S1. MRI-brain obtained 10/21/2020 is negative for brain metastasis.  10. 10/28/2020, she starts ipilimumab 1mg/kg and nivolumab 3mg/kg, then nivolumab maintenance through cycle 8.  11. 2/12/21, PET-CT shows evidence of partial response with several liver lesions no longer hypermetabolic and smaller in size, only one hypermetabolic lesion in the hepatic dome. Slightly increased size and FDG uptake in a single right inguinal lymph node, remainder of inguinal and right iliac chain lymph nodes have decreased in size and FDG uptake since prior exam.   12. 5/7/2021, PET-CT shows mixed response with increased size and hypermetabolism in the right iliac chain lymphadenopathy.  13. 5/10/2021, she starts ipilimumab 3 mg/kg every 3 weeks and receives 4 cycles.  14. 7/30/21, PET/CT shows mild disease progression.   15. 8/2/21, start ipi/nivo and received 2 cycles, last on 8/23/21.  16. 8/23/21, develops elevated LFT's.   17. 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     Interval History:  -Has been feeling lightheaded more often in the last few days.   -Doing well drinking  fluids.  -Has been eating fairly well lately.  -Denies any abdominal pain, nausea or vomiting, or constipation.   -Has been having 2-3 stools formed stools per day.   -Has had some heartburn. Has been taking omeprazole in the mornings.   -Feels more nervous on the steroids.   -Reports itching has resolved.   -Headaches have resolved.  -Reports decreased fatigue.   -Not sleeping well with melatonin.  -Feels lymph node is bigger and harder.   -Feels that feet are cold frequently.   -Talks with counselor every 2 weeks.     Current Outpatient Medications   Medication Sig Dispense Refill     cholecalciferol (VITAMIN D3) 125 MCG (5000 UT) TABS tablet Take 2,000 Units by mouth every other day        estradiol (ESTRACE) 0.1 MG/GM vaginal cream Place 2 g vaginally twice a week 42.5 g 1     omeprazole (PRILOSEC OTC) 20 MG EC tablet Take 1 tablet (20 mg) by mouth every morning 30-60 minutes before eating 30 tablet 0     omeprazole (PRILOSEC) 20 MG DR capsule TAKE 1 CAPSULE BY MOUTH ONCE DAILY IN THE MORNING 30 60 MINUTES BEFORE EATING       ondansetron (ZOFRAN-ODT) 4 MG ODT tab Take 1 tablet (4 mg) by mouth every 8 hours as needed for nausea 60 tablet 1     ondansetron (ZOFRAN-ODT) 4 MG ODT tab Take 1 tablet (4 mg) by mouth every 8 hours as needed for nausea       predniSONE (DELTASONE) 20 MG tablet Take 1.5 tablets (30 mg) by mouth daily for 7 days 10 tablet 0     sertraline (ZOLOFT) 25 MG tablet Take 1 tablet (25 mg) by mouth daily 30 tablet 4     triamcinolone (KENALOG) 0.1 % external cream Apply topically 2 times daily 453.6 g 11     Objective:  General: patient appears well in no acute distress, alert and oriented, speech clear and fluid  Skin: no visualized rash or lesions on visualized skin  Resp: Appears to be breathing comfortably without accessory muscle usage, speaking in full sentences, no audible wheezes or cough.  Psych: Coherent speech, normal rate and volume, able to articulate logical thoughts, able to  abstract reason, no tangential thoughts, no hallucinations or delusions  Patient's affect is appropriate.    Laboratory Data:   Most Recent 3 CBC's:  Recent Labs   Lab Test 09/22/21  1015 09/17/21  1151 09/10/21  1022   WBC 10.4 7.9 5.8   HGB 12.3 10.8* 11.2*   MCV 96 95 92    362 263    Most Recent 3 BMP's:  Recent Labs   Lab Test 09/22/21  1015 09/17/21  1151 09/13/21  0917    144 143   POTASSIUM 4.0 3.4 3.3*   CHLORIDE 103 107 108   CO2 33* 32 33*   BUN 18 20 24   CR 0.73 0.78 0.73   ANIONGAP 4 5 2*   GABY 9.2 8.2* 9.0   GLC 95 163* 136*   Most Recent TSH and T4:  Recent Labs   Lab Test 09/22/21  1015   TSH 1.44      8/23/2021 12:39 9/1/2021 10:37 9/10/2021 10:22 9/13/2021 09:17 9/17/2021 11:51 9/22/2021 10:15 9/30/2021 13:35   AST 70 (H) 155 (H) 727 (HH) 191 (H) 70 (H) 54 (H) 139 (H)    (H) 292 (H) 1,173 (HH) 736 (HH) 351 (H) 233 (H) 406 (H)   I reviewed the above labs today.    Assessment and Plan:  Acral melanoma, right heel primary, pT4b pN3c M1, Stage IV. 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 She progressed on first line nivolumab, but shows clear evidence of response to 2nd line ipilimumab and nivolumab. However, once on nivolumab maintenance therapy she started to show regrowth in the right inguinal node. She switched over to ipilimumab every 3 weeks in the third line on 5/10/21, for a maximum of 4 doses. She then was switched to ipi/nivo after her imaging showed mild disease progression. She received 2 cycles and developed immune mediated hepatitis. Treatment will remain on hold for now. She will have repeat imaging in early November.     Dry skin and pruritis. Resolved with steroids.     Lightheadedness. Recommend checking BP at home when episodes occur. If persists, may need to consider a brain MRI. Last brain MRI was negative for brain mets on 10/21/20.     Acid reflux. Recommend increasing omeprazole to 40 mg daily.     Immune mediated  hepatitis. Will recheck labs on 10/11. She will remain on prednisone at 30 mg daily until then.     Insomnia. Patient may try Benadryl or Unisom.     Cold feet. Possible a circulation issue. Will use warm socks and a heating pad.     Mood. Has been feeling more isolated lately. She will continue on sertraline. Will request social work get her support group resources. Will also set her up with our palliative care .     Meghan Perdomo PA-C  [facility]  [address] [place] [address] [phone]    40 minutes spent on the date of the encounter doing chart review, review of test results, interpretation of tests, patient visit and documentation      For information on Fall & Injury Prevention, visit: [email or web address] OR  [email or web address] OR  https://www.cdc.gov/steadi/patient.html

## 2023-10-23 NOTE — TELEPHONE ENCOUNTER
----- Message from Ellyn Marquez sent at 3/5/2021  1:57 PM CST -----  Regarding: FW: URGENT Ortho within one week    ----- Message -----  From: Woody Dalal PA  Sent: 3/5/2021  11:19 AM CST  To: [facility]  Subject: URGENT Ortho within one week                     Please call Yadira to schedule her to see ortho (for foot pain) within the week. Either here or [facility].    Thank you!  Woody       MA Rooming Questions  Patient: Mita Harrell  MRN: [id number]    Date: 10/23/2023        NEW PATIENT   Wt Readings from Last 3 Encounters:   10/23/23 80.6 kg (177 lb 12.8 oz)   09/28/23 85.4 kg (188 lb 4.4 oz)   09/26/23 81.9 kg (180 lb 8 oz)        5. Did the patient have a depression screening completed today?  Yes    PHQ-9 Total Score: 0 (10/23/2023  9:21 AM)       PHQ-9 Given to (if applicable):               PHQ-9 Score (if applicable):                     [] Positive     []  Negative              Does question #9 need addressed (if applicable)                     [] Yes    []  No               Diane Palacios CMA

## 2024-05-14 NOTE — NURSING NOTE
"Chief Complaint   Patient presents with     UTI     UTI x 1 month       Initial /70 (BP Location: Right arm, Patient Position: Sitting, Cuff Size: Adult Regular)   Pulse 93   Temp 98.8  F (37.1  C) (Temporal)   Wt 64 kg (141 lb 1.6 oz)   SpO2 96%   Breastfeeding No   BMI 22.09 kg/m   Estimated body mass index is 22.09 kg/m  as calculated from the following:    Height as of 5/28/20: 1.702 m (5' 7.01\").    Weight as of this encounter: 64 kg (141 lb 1.6 oz).  Medication Reconciliation: complete      AHMET Cardenas      " none

## 2024-07-18 NOTE — LETTER
4/1/2022       RE: Yadira Hoang  [address] [facility] [address]     Dear Colleague,    Thank you for referring your patient, Yadira Hoang, to the [facility] [place] [facility]. Please see a copy of my visit note below.    [facility] D[facility]atology Note  Encounter Date: Apr 1, 2022  Office Visit     Dermatology Problem List:  Last skin check 12/17/21, q6 months through 1/25  1. Metastatic melanoma, originated at right heel  - S/p punch biopsy By Dr. Alvarado 1/9/20 showing melanoma at least 3.4mm deep, ulcerated, no mitoses, perineural invasion, TIL present and nonbrisk, no LVI.  - S/p WLE with 2cm margins (revelead melanoma to depth of 6.6mm with deep margin close, microsatellites were present) and right femoral SLNB 2/10/20 (1/2 nodes positive, largest deposit 7mm)  - S/p re-excision 2/20/20  - Flap completed 3/4/30, skin graft 4/2/20  - Adjuvant nivolumab started 5/2020. Progressed. 10/28/20 started on ipi and nivolumab combination. Went to ipi monotherapy 5/2021. Back to dual immunotherapy 8/2021. Developed severe transaminitis. Stopped immunotherapy 9/2021 and treated with oral steroids.   - TVec started 1/28/22. Plan for addition of mono immunotherapy.  2. AK, right cheek: cryo and biopsy 11/19/20     Social history: . Has 4 children.  Family history: Brother had pancreatic cancer. No melanoma in the family.  ____________________________________________    Assessment & Plan:     # Melanoma, stage IV, originating at R heel, currently off of immunotherapy due to autoimmune adverse events (transaminitis). Known disease in R inguinal nodes and liver. Plan for fifth TVEC injection today. Discussed what to expect with Tvec injections, including flu like symptoms and rare cutaneous HSV infections. Discussed that tumors often get bigger before showing response. Discussed that median time to  Due to behavior today results unattainable. Will try a morning appt. If no success recommend sedated ABR at outside facility. improvement in clinical trials was 4 months. Discussed schedule for follow up injections. Questions answered in detail. Instructions for after care given in AVS. Patient instructed to leave waterproof dressing on for one week and given supplies to replace if needed.     Procedures Performed:   TVEC: Topical numbing (Lidocaine 4% ointment) was applied to the skin 30 minutes prior to injection. Cleansed with alcohol. Staff doned appropriated PPE, including gowns, caps, face masks, shields, and masks prior to injections. The skin was cleansed with alcohol. 2cc of 10^8 pfu/cc were injected into the lesion in a retrograde fashion with fanning technique. After injection, pressure was held on the skin for 30 second. Then telfa and tegaderm were placed. Tegaderm cleansed with alcohol, then second layer applied. Patient instructed to leave waterproof dressing on for one week.    Follow-up: Next injection in 2 weeks.    Staff:     Gloria Estrada MD    Department of Dermatology  [facility]: Phone: [phone], Fax:[phone]  [facility]: Phone: [phone], Fax: [phone]    ____________________________________________    CC: Derm Problem (TVac)    HPI:  Ms. Yadira Hoang is a(n) 76 year old female who presents today as a return patient for TVEC injection.    Last visit 3/4/22 for TVec. She is accompanied by her . Did well after fourth injection. Maybe more tiredness and headaches, now resolved. Feeling well today.    Patient is otherwise feeling well, without additional skin concerns.     Labs Reviewed:  N/A    Physical Exam:  Vitals: /74   Pulse 86   SKIN: Focused exam of the groin.  - Just under 5cm firm nodule at the R groin inferior to the inguinal crease. Feels softer and flatter than at last injection, diameter about the same.  - No other lesions of concern on areas  examined.       Medications:  Current Outpatient Medications   Medication     cholecalciferol (VITAMIN D3) 125 MCG (5000 UT) TABS tablet     estradiol (ESTRACE) 0.1 MG/GM vaginal cream     fluticasone (FLONASE) 50 MCG/ACT nasal spray     multivitamin w/minerals (MULTI-VITAMIN) tablet     lidocaine (LIDODERM) 5 % patch     OLANZapine (ZYPREXA) 2.5 MG tablet     sertraline (ZOLOFT) 25 MG tablet     Current Facility-Administered Medications   Medication     lidocaine 1% with EPINEPHrine 1:100,000 injection 1.5 mL     lidocaine 1% with EPINEPHrine 1:100,000 injection 1.5 mL     talimogene laherparepvec (IMLYGIC) 10^8 PFU/mL injection 2 mL      Past Medical History:   Patient Active Problem List   Diagnosis     Malignant melanoma of right lower extremity including hip (H)     Melanoma of skin (H)     GUERLINE (obstructive sleep apnea)     Chronic congestion of paranasal sinus     Weakness of foot     Neck pain, chronic     Myofascial pain     Intractable migraine without aura and without status migrainosus     History of multiple concussions     Foot pain, bilateral     Fibromyositis     Difficulty walking     Chronic pain disorder     Chronic fatigue     Bilateral occipital neuralgia     Articular disc disorder of temporomandibular joint     S/P flap graft     Osteopenia     Injury of head     Hyperlipidemia LDL goal <100     ALEXUS (generalized anxiety disorder)     Major depression, recurrent, chronic (H)     Secondary malignant neoplasm of bone (H)     Generalized pruritus     Immunodeficiency, unspecified (H)     Bilateral leg pain     Chronic bilateral low back pain without sciatica     Generalized muscle weakness     Past Medical History:   Diagnosis Date     Concussion      ALEXUS (generalized anxiety disorder) 5/7/2020     Major depression, recurrent, chronic (H) 5/7/2020     Melanoma (H)      Nonsenile cataract      Sleep apnea     CPAP       CC No referring provider defined for this encounter. on close of this  encounter.

## 2024-10-09 NOTE — PATIENT INSTRUCTIONS
CONSUELO PLAN:    RUR-24%    Patient accepted for hospice admission tomorrow by [facility] at 4:30 pm.    [facility] scheduled for 2:00 pm on 10/10/24    Awaiting Hospice admission from [facility] ([phone])    CM received a call from patient's son Shailesh regarding the family meeting with [facility]. He said they would like hospice care at home but would prefer to use a different agency. CM offered a choice and he chose [facility].     BRIANNE sent a referral to [facility], spoke with Peri in Admissions and informed her to call patient's son Shailesh at [phone].    Per Peri, they might not be able to admit patient today if unable to deliver equipment on time. BRIANNE notified Dr. Ovalles of same.    CM will continue to follow patient for CONSUELO. ELMER Platt MSA, RN, CM      1:20 PM. CM called and spoke with patient's son Shailesh regarding hospice admission tomorrow. He was agreeable to 2:00 pm discharge and verified patient's address.     CM will continue to follow. ELMER Platt MSA, RN, CM         Don't take with calcium or dairy   Call or sent a message if not improved by next week

## 2025-02-18 NOTE — LETTER
[facility] [address] [address]  Phone: [phone]  Fax: [phone]        January 15, 2022        To Whom it May Concern,      I am writing on behalf of Yadira Hoang to document the medical necessity of T-Vec injections for metastatic melanoma. This letter provides information about the patient's medical history and diagnosis, and a statement summarizing my treatment rationale.     Yadira has known metastatic melanoma. Contrary to the letter received from you previously, she has not been treated and continues to have metastatic melanoma. She is currently not on any treatment as she developed severe transaminitis with immunotherapy. T-Vec is the only FDA approved treatment option. Without treatment, Yadira will not survive.     Please, also see the attached medical record and be sure to read in detail. She does not have cutaneous disease, but rather metastatic disease. T-Vec is indicated for metastatic disease.      We strive to provide our patients with outstanding care. Therefore, I request you please contact me at the number below if you have any questions regarding coverage or our treatment rationale           Sincerely,      Gloria Estrada MD    Department of Dermatology  [facility]: Phone: [phone], Fax:[phone]  [facility] [place]: Phone: [phone], Fax: [phone]  
I have personally seen and examined the patient. I have collaborated with and supervised the

## (undated) DEVICE — PREP CHLORAPREP 26ML TINTED ORANGE  260815

## (undated) DEVICE — PACK MINOR CUSTOM ASC

## (undated) DEVICE — LABEL MEDICATION SYSTEM 3303-P

## (undated) DEVICE — DRAPE U SPLIT 74X120" 29440

## (undated) DEVICE — LINEN TOWEL PACK X5 5464

## (undated) DEVICE — SUCTION TIP YANKAUER VIAGUARD BULBOUS HANDLE SMK200

## (undated) DEVICE — Device

## (undated) DEVICE — RETR BLADE LONE STAR 20MM SPIRA 3 FINGER 3335-4G

## (undated) DEVICE — SOL NACL 0.9% IRRIG 1000ML BOTTLE 2F7124

## (undated) DEVICE — PREP POVIDONE IODINE SCRUB 7.5% 4OZ APL82212

## (undated) DEVICE — PITCHER STERILE 1000ML  SSK9004A

## (undated) DEVICE — GLOVE PROTEXIS BLUE W/NEU-THERA 6.0  2D73EB60

## (undated) DEVICE — ESU ELEC BLADE 2.75" COATED/INSULATED E1455

## (undated) DEVICE — DRAPE IOBAN INCISE 23X17" 6650EZ

## (undated) DEVICE — SU MONOCRYL 2-0 SH 27" UND Y417H

## (undated) DEVICE — TONGUE DEPRESSOR STERILE 6023

## (undated) DEVICE — NDL ANGIOCATH 22GA 1" 4050

## (undated) DEVICE — ESU PENCIL SMOKE EVAC W/ROCKER SWITCH 0703-047-000

## (undated) DEVICE — PREP DURAPREP 26ML APL 8630

## (undated) DEVICE — EYE SPONGE SPEAR WECK CEL 0008685

## (undated) DEVICE — GLOVE PROTEXIS W/NEU-THERA 5.5  2D73TE55

## (undated) DEVICE — DRSG PRIMAPORE 02X3" 7133

## (undated) DEVICE — DRSG KERLIX 4 1/2"X4YDS ROLL 6715

## (undated) DEVICE — MARKER MARGIN MARKER STD 6 COLOR SGL USE MMS6

## (undated) DEVICE — BNDG ESMARK 4" STERILE LF 820-3412

## (undated) DEVICE — DRSG TELFA 3X8" 1238

## (undated) DEVICE — SU MONOCRYL 4-0 PS-2 18" UND Y496G

## (undated) DEVICE — SU SILK 3-0 TIE 24" SA74H

## (undated) DEVICE — ESU GROUND PAD ADULT W/CORD E7507

## (undated) DEVICE — CLIP HORIZON MED BLUE 002200

## (undated) DEVICE — TUBING SUCTION 12"X1/4" N612

## (undated) DEVICE — CLIP GEM MICRO GEM2431

## (undated) DEVICE — DRSG WOUND VAC SPONGE MED BLACK M8275052/5

## (undated) DEVICE — TOURNIQUET CUFF 34" REPRO BROWN 60-7070-106

## (undated) DEVICE — DRAPE LAP W/ARMBOARD 29410

## (undated) DEVICE — LINEN TOWEL PACK X6 WHITE 5487

## (undated) DEVICE — STPL SKIN 35W ROTATING HEAD PRW35

## (undated) DEVICE — STRAP UNIVERSAL POSITIONING 2-PIECE 4X47X76" 91-287

## (undated) DEVICE — ESU CORD BIPOLAR GREEN 10-4000

## (undated) DEVICE — RETR ELASTIC STAYS LONE STAR BLUNT DUAL LEAD 3550-1G

## (undated) DEVICE — DRAPE WARMER 66X44" ORS-300

## (undated) DEVICE — CLIP HORIZON SM RED WIDE SLOT 001201

## (undated) DEVICE — DRAPE POUCH INSTRUMENT 1018

## (undated) DEVICE — CLIP HORIZON LG ORANGE 004200

## (undated) DEVICE — WIPES FOLEY CARE SURESTEP PROVON DFC100

## (undated) DEVICE — GLOVE PROTEXIS MICRO 5.5  2D73PM55

## (undated) DEVICE — PACK MINOR SBA15MIFSE

## (undated) DEVICE — LINEN GOWN XLG 5407

## (undated) DEVICE — GLOVE PROTEXIS W/NEU-THERA 7.0  2D73TE70

## (undated) DEVICE — SU ETHILON 2-0 FS 18" 664H

## (undated) DEVICE — SOL WATER IRRIG 1000ML BOTTLE 07139-09

## (undated) DEVICE — CLIP GEM MICRO GEM1521

## (undated) DEVICE — LUBRICATING JELLY 4.25OZ

## (undated) DEVICE — WIPE INSTRUMENT MEROCEL 400200

## (undated) DEVICE — DRSG WOUND VAC GRANUFOAM MED SILVER M8275096/5

## (undated) DEVICE — CAST PADDING 6" COTTON WEBRIL STERILE 9086S

## (undated) DEVICE — DRSG ABDOMINAL 07 1/2X8" 7197D

## (undated) DEVICE — SYR BULB IRRIG 50ML LATEX FREE 0035280

## (undated) DEVICE — CAST PADDING 6" COTTON WEBRIL UNSTERILE 9086

## (undated) DEVICE — SU SILK 3-0 FS-1 18" 684G

## (undated) DEVICE — KIT ENDO TURNOVER/PROCEDURE CARRY-ON 101822

## (undated) DEVICE — CLIP ETHICON LIGACLIP SM BLUE LT100

## (undated) DEVICE — DRSG TEGADERM 8X12" 1629

## (undated) DEVICE — DRAPE SHEET HALF 40X60" 9358

## (undated) DEVICE — DRSG AQUACEL AG EXTRA 4X4.7" 420677

## (undated) DEVICE — SU VICRYL 3-0 SH 27" J316H

## (undated) DEVICE — DRAPE EXTREMITY W/ARMBOARD 29405

## (undated) DEVICE — SUCTION MANIFOLD DORNOCH ULTRA CART UL-CL500

## (undated) DEVICE — CAST PADDING 4" STERILE 9044S

## (undated) DEVICE — SOL WATER IRRIG 500ML BOTTLE 2F7113

## (undated) DEVICE — BLADE KNIFE SURG 10 371110

## (undated) DEVICE — DERMACARRIER 1.5:1 ZIMMER 00-2195-012-00

## (undated) DEVICE — SYR 03ML LL W/O NDL 309657

## (undated) DEVICE — BLADE DERMATOME ZIMMER  00-8800-000-10

## (undated) DEVICE — CAST PLASTER SPLINT 5X30" 7395

## (undated) DEVICE — BNDG ELASTIC 6" DBL LENGTH UNSTERILE 6611-16

## (undated) DEVICE — DRSG XEROFORM 5X9" CUR253590W

## (undated) DEVICE — SYR BULB IRRIG DOVER 60 ML LATEX FREE 67000

## (undated) DEVICE — GOWN IMPERVIOUS BREATHABLE SMART XLG 89045

## (undated) DEVICE — DRAPE STOCKINETTE IMPERVIOUS 10" 21048

## (undated) DEVICE — PREP POVIDONE IODINE SOLUTION 10% 4OZ

## (undated) DEVICE — SOL NACL 0.9% IRRIG 500ML BOTTLE 2F7123

## (undated) DEVICE — SOL WATER IRRIG 1000ML BOTTLE 2F7114

## (undated) DEVICE — CAST PADDING 4" UNSTERILE 9044

## (undated) DEVICE — SU DERMABOND PRINEO 22CM CLR222US

## (undated) DEVICE — LINEN TOWEL PACK X30 5481

## (undated) DEVICE — SUCTION CARDIAC FRAZIER TIP 6FR STERILE 3" 10053

## (undated) DEVICE — NDL ANGIOCATH 24GA 0.75" 4053

## (undated) DEVICE — PREP SKIN SCRUB TRAY 4461A

## (undated) DEVICE — DRSG STERI STRIP 1/2X4" R1547

## (undated) DEVICE — COVER TRANSDUCER PROBE 610-637

## (undated) DEVICE — SYR 05ML LL W/O NDL

## (undated) DEVICE — KIT SPY ELITE DISP LC3006

## (undated) DEVICE — DECANTER TRANSFER DEVICE 2008S

## (undated) DEVICE — GEL ULTRASOUND AQUASONIC 20GM 01-01

## (undated) DEVICE — CAST PADDING 6" UNSTERILE 9046

## (undated) RX ORDER — ONDANSETRON 2 MG/ML
INJECTION INTRAMUSCULAR; INTRAVENOUS
Status: DISPENSED
Start: 2020-03-04

## (undated) RX ORDER — CLINDAMYCIN PHOSPHATE 900 MG/50ML
INJECTION, SOLUTION INTRAVENOUS
Status: DISPENSED
Start: 2020-02-20

## (undated) RX ORDER — MINERAL OIL
OIL (ML) MISCELLANEOUS
Status: DISPENSED
Start: 2020-04-02

## (undated) RX ORDER — ACETAMINOPHEN 325 MG/1
TABLET ORAL
Status: DISPENSED
Start: 2020-03-04

## (undated) RX ORDER — VERAPAMIL HYDROCHLORIDE 2.5 MG/ML
INJECTION, SOLUTION INTRAVENOUS
Status: DISPENSED
Start: 2020-02-22

## (undated) RX ORDER — EPINEPHRINE 1 MG/ML
INJECTION, SOLUTION INTRAMUSCULAR; SUBCUTANEOUS
Status: DISPENSED
Start: 2020-02-10

## (undated) RX ORDER — LABETALOL HYDROCHLORIDE 5 MG/ML
INJECTION, SOLUTION INTRAVENOUS
Status: DISPENSED
Start: 2020-03-04

## (undated) RX ORDER — OXYCODONE HYDROCHLORIDE 5 MG/1
TABLET ORAL
Status: DISPENSED
Start: 2020-02-20

## (undated) RX ORDER — BUPIVACAINE HYDROCHLORIDE 2.5 MG/ML
INJECTION, SOLUTION EPIDURAL; INFILTRATION; INTRACAUDAL
Status: DISPENSED
Start: 2020-03-04

## (undated) RX ORDER — DEXAMETHASONE SODIUM PHOSPHATE 4 MG/ML
INJECTION, SOLUTION INTRA-ARTICULAR; INTRALESIONAL; INTRAMUSCULAR; INTRAVENOUS; SOFT TISSUE
Status: DISPENSED
Start: 2020-02-10

## (undated) RX ORDER — SODIUM CHLORIDE, SODIUM LACTATE, POTASSIUM CHLORIDE, CALCIUM CHLORIDE 600; 310; 30; 20 MG/100ML; MG/100ML; MG/100ML; MG/100ML
INJECTION, SOLUTION INTRAVENOUS
Status: DISPENSED
Start: 2020-03-04

## (undated) RX ORDER — LIDOCAINE HYDROCHLORIDE 20 MG/ML
INJECTION, SOLUTION EPIDURAL; INFILTRATION; INTRACAUDAL; PERINEURAL
Status: DISPENSED
Start: 2022-01-01

## (undated) RX ORDER — DIPHENHYDRAMINE HYDROCHLORIDE 50 MG/ML
INJECTION INTRAMUSCULAR; INTRAVENOUS
Status: DISPENSED
Start: 2022-01-01

## (undated) RX ORDER — LIDOCAINE 40 MG/G
CREAM TOPICAL
Status: DISPENSED
Start: 2022-01-01

## (undated) RX ORDER — FENTANYL CITRATE 50 UG/ML
INJECTION, SOLUTION INTRAMUSCULAR; INTRAVENOUS
Status: DISPENSED
Start: 2020-02-20

## (undated) RX ORDER — DEXAMETHASONE SODIUM PHOSPHATE 4 MG/ML
INJECTION, SOLUTION INTRA-ARTICULAR; INTRALESIONAL; INTRAMUSCULAR; INTRAVENOUS; SOFT TISSUE
Status: DISPENSED
Start: 2020-03-04

## (undated) RX ORDER — BETAMETHASONE SODIUM PHOSPHATE AND BETAMETHASONE ACETATE 3; 3 MG/ML; MG/ML
INJECTION, SUSPENSION INTRA-ARTICULAR; INTRALESIONAL; INTRAMUSCULAR; SOFT TISSUE
Status: DISPENSED
Start: 2022-01-01

## (undated) RX ORDER — ISOSULFAN BLUE 50 MG/5ML
INJECTION, SOLUTION SUBCUTANEOUS
Status: DISPENSED
Start: 2020-02-10

## (undated) RX ORDER — HYDROMORPHONE HYDROCHLORIDE 1 MG/ML
INJECTION, SOLUTION INTRAMUSCULAR; INTRAVENOUS; SUBCUTANEOUS
Status: DISPENSED
Start: 2020-03-04

## (undated) RX ORDER — DEXAMETHASONE SODIUM PHOSPHATE 4 MG/ML
INJECTION, SOLUTION INTRA-ARTICULAR; INTRALESIONAL; INTRAMUSCULAR; INTRAVENOUS; SOFT TISSUE
Status: DISPENSED
Start: 2020-02-20

## (undated) RX ORDER — PAPAVERINE HYDROCHLORIDE 30 MG/ML
INJECTION INTRAMUSCULAR; INTRAVENOUS
Status: DISPENSED
Start: 2020-02-22

## (undated) RX ORDER — EPHEDRINE SULFATE 50 MG/ML
INJECTION, SOLUTION INTRAMUSCULAR; INTRAVENOUS; SUBCUTANEOUS
Status: DISPENSED
Start: 2020-04-02

## (undated) RX ORDER — PROPOFOL 10 MG/ML
INJECTION, EMULSION INTRAVENOUS
Status: DISPENSED
Start: 2020-02-20

## (undated) RX ORDER — EPHEDRINE SULFATE 50 MG/ML
INJECTION, SOLUTION INTRAMUSCULAR; INTRAVENOUS; SUBCUTANEOUS
Status: DISPENSED
Start: 2020-03-04

## (undated) RX ORDER — CEFAZOLIN SODIUM 2 G/100ML
INJECTION, SOLUTION INTRAVENOUS
Status: DISPENSED
Start: 2020-03-04

## (undated) RX ORDER — GLYCOPYRROLATE 0.2 MG/ML
INJECTION INTRAMUSCULAR; INTRAVENOUS
Status: DISPENSED
Start: 2022-01-01

## (undated) RX ORDER — KETAMINE HCL IN 0.9 % NACL 50 MG/5 ML
SYRINGE (ML) INTRAVENOUS
Status: DISPENSED
Start: 2020-03-04

## (undated) RX ORDER — MINERAL OIL
OIL (ML) MISCELLANEOUS
Status: DISPENSED
Start: 2020-03-04

## (undated) RX ORDER — PROPOFOL 10 MG/ML
INJECTION, EMULSION INTRAVENOUS
Status: DISPENSED
Start: 2020-03-04

## (undated) RX ORDER — FENTANYL CITRATE 50 UG/ML
INJECTION, SOLUTION INTRAMUSCULAR; INTRAVENOUS
Status: DISPENSED
Start: 2022-01-01

## (undated) RX ORDER — CLINDAMYCIN PHOSPHATE 150 MG/ML
INJECTION, SOLUTION INTRAVENOUS
Status: DISPENSED
Start: 2020-02-10

## (undated) RX ORDER — ACETAMINOPHEN 325 MG/1
TABLET ORAL
Status: DISPENSED
Start: 2020-02-10

## (undated) RX ORDER — BUPIVACAINE HYDROCHLORIDE 2.5 MG/ML
INJECTION, SOLUTION INFILTRATION; PERINEURAL
Status: DISPENSED
Start: 2020-02-10

## (undated) RX ORDER — HEPARIN SODIUM 1000 [USP'U]/ML
INJECTION, SOLUTION INTRAVENOUS; SUBCUTANEOUS
Status: DISPENSED
Start: 2020-03-04

## (undated) RX ORDER — PROPOFOL 10 MG/ML
INJECTION, EMULSION INTRAVENOUS
Status: DISPENSED
Start: 2020-04-02

## (undated) RX ORDER — LIDOCAINE HYDROCHLORIDE 10 MG/ML
INJECTION, SOLUTION EPIDURAL; INFILTRATION; INTRACAUDAL; PERINEURAL
Status: DISPENSED
Start: 2022-01-01

## (undated) RX ORDER — BUPIVACAINE HYDROCHLORIDE 2.5 MG/ML
INJECTION, SOLUTION EPIDURAL; INFILTRATION; INTRACAUDAL
Status: DISPENSED
Start: 2020-04-02

## (undated) RX ORDER — PHENYLEPHRINE HCL IN 0.9% NACL 1 MG/10 ML
SYRINGE (ML) INTRAVENOUS
Status: DISPENSED
Start: 2020-02-10

## (undated) RX ORDER — ONDANSETRON 2 MG/ML
INJECTION INTRAMUSCULAR; INTRAVENOUS
Status: DISPENSED
Start: 2020-02-20

## (undated) RX ORDER — LIDOCAINE HYDROCHLORIDE AND EPINEPHRINE 10; 10 MG/ML; UG/ML
INJECTION, SOLUTION INFILTRATION; PERINEURAL
Status: DISPENSED
Start: 2020-02-20

## (undated) RX ORDER — PHENYLEPHRINE HCL IN 0.9% NACL 1 MG/10 ML
SYRINGE (ML) INTRAVENOUS
Status: DISPENSED
Start: 2020-02-20

## (undated) RX ORDER — GLYCOPYRROLATE 0.2 MG/ML
INJECTION, SOLUTION INTRAMUSCULAR; INTRAVENOUS
Status: DISPENSED
Start: 2020-03-04

## (undated) RX ORDER — GABAPENTIN 300 MG/1
CAPSULE ORAL
Status: DISPENSED
Start: 2020-03-04

## (undated) RX ORDER — BUPIVACAINE HYDROCHLORIDE 2.5 MG/ML
INJECTION, SOLUTION EPIDURAL; INFILTRATION; INTRACAUDAL
Status: DISPENSED
Start: 2020-02-20

## (undated) RX ORDER — ACETAMINOPHEN 325 MG/1
TABLET ORAL
Status: DISPENSED
Start: 2020-04-02

## (undated) RX ORDER — ACETAMINOPHEN 325 MG/1
TABLET ORAL
Status: DISPENSED
Start: 2020-02-20

## (undated) RX ORDER — KETOROLAC TROMETHAMINE 30 MG/ML
INJECTION, SOLUTION INTRAMUSCULAR; INTRAVENOUS
Status: DISPENSED
Start: 2020-02-10

## (undated) RX ORDER — PROPOFOL 10 MG/ML
INJECTION, EMULSION INTRAVENOUS
Status: DISPENSED
Start: 2020-02-10

## (undated) RX ORDER — CEFAZOLIN SODIUM 1 G/3ML
INJECTION, POWDER, FOR SOLUTION INTRAMUSCULAR; INTRAVENOUS
Status: DISPENSED
Start: 2020-03-04

## (undated) RX ORDER — FENTANYL CITRATE 50 UG/ML
INJECTION, SOLUTION INTRAMUSCULAR; INTRAVENOUS
Status: DISPENSED
Start: 2020-02-10

## (undated) RX ORDER — PAPAVERINE HYDROCHLORIDE 30 MG/ML
INJECTION INTRAMUSCULAR; INTRAVENOUS
Status: DISPENSED
Start: 2020-03-04

## (undated) RX ORDER — GLYCOPYRROLATE 0.2 MG/ML
INJECTION INTRAMUSCULAR; INTRAVENOUS
Status: DISPENSED
Start: 2020-02-20

## (undated) RX ORDER — KETAMINE HCL IN 0.9 % NACL 50 MG/5 ML
SYRINGE (ML) INTRAVENOUS
Status: DISPENSED
Start: 2020-02-20

## (undated) RX ORDER — LIDOCAINE HYDROCHLORIDE 20 MG/ML
INJECTION, SOLUTION EPIDURAL; INFILTRATION; INTRACAUDAL; PERINEURAL
Status: DISPENSED
Start: 2020-02-20

## (undated) RX ORDER — FENTANYL CITRATE 50 UG/ML
INJECTION, SOLUTION INTRAMUSCULAR; INTRAVENOUS
Status: DISPENSED
Start: 2020-04-02

## (undated) RX ORDER — HYDRALAZINE HYDROCHLORIDE 20 MG/ML
INJECTION INTRAMUSCULAR; INTRAVENOUS
Status: DISPENSED
Start: 2020-03-04

## (undated) RX ORDER — OXYCODONE HYDROCHLORIDE 5 MG/1
TABLET ORAL
Status: DISPENSED
Start: 2020-04-02

## (undated) RX ORDER — LIDOCAINE HYDROCHLORIDE 20 MG/ML
INJECTION, SOLUTION EPIDURAL; INFILTRATION; INTRACAUDAL; PERINEURAL
Status: DISPENSED
Start: 2020-04-02

## (undated) RX ORDER — PHENYLEPHRINE HCL IN 0.9% NACL 1 MG/10 ML
SYRINGE (ML) INTRAVENOUS
Status: DISPENSED
Start: 2020-03-04

## (undated) RX ORDER — LIDOCAINE HYDROCHLORIDE 20 MG/ML
INJECTION, SOLUTION EPIDURAL; INFILTRATION; INTRACAUDAL; PERINEURAL
Status: DISPENSED
Start: 2020-03-04

## (undated) RX ORDER — PHENYLEPHRINE HCL IN 0.9% NACL 1 MG/10 ML
SYRINGE (ML) INTRAVENOUS
Status: DISPENSED
Start: 2020-04-02

## (undated) RX ORDER — FENTANYL CITRATE 50 UG/ML
INJECTION, SOLUTION INTRAMUSCULAR; INTRAVENOUS
Status: DISPENSED
Start: 2020-03-04

## (undated) RX ORDER — ONDANSETRON 4 MG/1
TABLET, ORALLY DISINTEGRATING ORAL
Status: DISPENSED
Start: 2020-02-10

## (undated) RX ORDER — ONDANSETRON 2 MG/ML
INJECTION INTRAMUSCULAR; INTRAVENOUS
Status: DISPENSED
Start: 2020-02-10

## (undated) RX ORDER — HEPARIN SODIUM 1000 [USP'U]/ML
INJECTION, SOLUTION INTRAVENOUS; SUBCUTANEOUS
Status: DISPENSED
Start: 2020-02-22

## (undated) RX ORDER — EPINEPHRINE 1 MG/ML
INJECTION, SOLUTION INTRAMUSCULAR; SUBCUTANEOUS
Status: DISPENSED
Start: 2020-04-02

## (undated) RX ORDER — OXYCODONE HYDROCHLORIDE 5 MG/1
TABLET ORAL
Status: DISPENSED
Start: 2020-02-10

## (undated) RX ORDER — ONDANSETRON 2 MG/ML
INJECTION INTRAMUSCULAR; INTRAVENOUS
Status: DISPENSED
Start: 2020-04-02